# Patient Record
Sex: MALE | Race: WHITE | Employment: FULL TIME | URBAN - METROPOLITAN AREA
[De-identification: names, ages, dates, MRNs, and addresses within clinical notes are randomized per-mention and may not be internally consistent; named-entity substitution may affect disease eponyms.]

---

## 2017-09-06 ENCOUNTER — APPOINTMENT (OUTPATIENT)
Dept: RADIOLOGY | Facility: MEDICAL CENTER | Age: 47
End: 2017-09-06
Attending: EMERGENCY MEDICINE
Payer: MEDICAID

## 2017-09-06 ENCOUNTER — HOSPITAL ENCOUNTER (EMERGENCY)
Facility: MEDICAL CENTER | Age: 47
End: 2017-09-06
Attending: EMERGENCY MEDICINE
Payer: MEDICAID

## 2017-09-06 VITALS
HEART RATE: 90 BPM | WEIGHT: 193.34 LBS | RESPIRATION RATE: 16 BRPM | HEIGHT: 72 IN | BODY MASS INDEX: 26.19 KG/M2 | DIASTOLIC BLOOD PRESSURE: 60 MMHG | TEMPERATURE: 98.6 F | OXYGEN SATURATION: 96 % | SYSTOLIC BLOOD PRESSURE: 122 MMHG

## 2017-09-06 DIAGNOSIS — S82.61XA CLOSED DISPLACED FRACTURE OF LATERAL MALLEOLUS OF RIGHT FIBULA, INITIAL ENCOUNTER: ICD-10-CM

## 2017-09-06 DIAGNOSIS — S82.861A: ICD-10-CM

## 2017-09-06 DIAGNOSIS — S82.51XA CLOSED DISPLACED FRACTURE OF MEDIAL MALLEOLUS OF RIGHT TIBIA, INITIAL ENCOUNTER: ICD-10-CM

## 2017-09-06 PROCEDURE — 302874 HCHG BANDAGE ACE 2 OR 3""

## 2017-09-06 PROCEDURE — 73610 X-RAY EXAM OF ANKLE: CPT | Mod: RT

## 2017-09-06 PROCEDURE — 99284 EMERGENCY DEPT VISIT MOD MDM: CPT

## 2017-09-06 PROCEDURE — 73590 X-RAY EXAM OF LOWER LEG: CPT | Mod: RT

## 2017-09-06 PROCEDURE — 29515 APPLICATION SHORT LEG SPLINT: CPT

## 2017-09-06 PROCEDURE — 73562 X-RAY EXAM OF KNEE 3: CPT | Mod: RT

## 2017-09-06 PROCEDURE — 302875 HCHG BANDAGE ACE 4 OR 6""

## 2017-09-06 PROCEDURE — 73630 X-RAY EXAM OF FOOT: CPT | Mod: RT

## 2017-09-06 PROCEDURE — 700111 HCHG RX REV CODE 636 W/ 250 OVERRIDE (IP): Performed by: EMERGENCY MEDICINE

## 2017-09-06 PROCEDURE — 96372 THER/PROPH/DIAG INJ SC/IM: CPT

## 2017-09-06 RX ORDER — ONDANSETRON 4 MG/1
4 TABLET, ORALLY DISINTEGRATING ORAL ONCE
Status: COMPLETED | OUTPATIENT
Start: 2017-09-06 | End: 2017-09-06

## 2017-09-06 RX ORDER — OXYCODONE HYDROCHLORIDE AND ACETAMINOPHEN 5; 325 MG/1; MG/1
1-2 TABLET ORAL EVERY 4 HOURS PRN
Qty: 15 TAB | Refills: 0 | Status: ON HOLD | OUTPATIENT
Start: 2017-09-06 | End: 2021-03-18

## 2017-09-06 RX ADMIN — HYDROMORPHONE HYDROCHLORIDE 1 MG: 1 INJECTION, SOLUTION INTRAMUSCULAR; INTRAVENOUS; SUBCUTANEOUS at 16:09

## 2017-09-06 RX ADMIN — ONDANSETRON 4 MG: 4 TABLET, ORALLY DISINTEGRATING ORAL at 16:09

## 2017-09-06 NOTE — ED NOTES
Pt presents to ED with CC of right ankle pain, edema and right calf edema x 5 days. Pt states he fell last Friday and started experiencing pain after that. Pt able to bear weight.  Pt able to move toes, slight tingling has been present at times.

## 2017-09-07 NOTE — DISCHARGE INSTRUCTIONS
Ankle Fracture  A fracture is a break in a bone. The ankle joint is made up of three bones. These include the lower (distal) sections of your lower leg bones, called the tibia and fibula, along with a bone in your foot, called the talus. Depending on how bad the break is and if more than one ankle joint bone is broken, a cast or splint is used to protect and keep your injured bone from moving while it heals. Sometimes, surgery is required to help the fracture heal properly.   There are two general types of fractures:  · Stable fracture. This includes a single fracture line through one bone, with no injury to ankle ligaments. A fracture of the talus that does not have any displacement (movement of the bone on either side of the fracture line) is also stable.  · Unstable fracture. This includes more than one fracture line through one or more bones in the ankle joint. It also includes fractures that have displacement of the bone on either side of the fracture line.  CAUSES  · A direct blow to the ankle.    · Quickly and severely twisting your ankle.  · Trauma, such as a car accident or falling from a significant height.  RISK FACTORS  You may be at a higher risk of ankle fracture if:  · You have certain medical conditions.  · You are involved in high-impact sports.  · You are involved in a high-impact car accident.  SIGNS AND SYMPTOMS   · Tender and swollen ankle.  · Bruising around the injured ankle.  · Pain on movement of the ankle.  · Difficulty walking or putting weight on the ankle.  · A cold foot below the site of the ankle injury. This can occur if the blood vessels passing through your injured ankle were also damaged.  · Numbness in the foot below the site of the ankle injury.  DIAGNOSIS   An ankle fracture is usually diagnosed with a physical exam and X-rays. A CT scan may also be required for complex fractures.  TREATMENT   Stable fractures are treated with a cast or splint and using crutches to avoid putting  weight on your injured ankle. This is followed by an ankle strengthening program. Some patients require a special type of cast, depending on other medical problems they may have. Unstable fractures require surgery to ensure the bones heal properly. Your health care provider will tell you what type of fracture you have and the best treatment for your condition.  HOME CARE INSTRUCTIONS   · Review correct crutch use with your health care provider and use your crutches as directed. Safe use of crutches is extremely important. Misuse of crutches can cause you to fall or cause injury to nerves in your hands or armpits.  · Do not put weight or pressure on the injured ankle until directed by your health care provider.  · To lessen the swelling, keep the injured leg elevated while sitting or lying down.  · Apply ice to the injured area:  ¨ Put ice in a plastic bag.  ¨ Place a towel between your cast and the bag.  ¨ Leave the ice on for 20 minutes, 2-3 times a day.  · If you have a plaster or fiberglass cast:  ¨ Do not try to scratch the skin under the cast with any objects. This can increase your risk of skin infection.  ¨ Check the skin around the cast every day. You may put lotion on any red or sore areas.  ¨ Keep your cast dry and clean.  · If you have a plaster splint:  ¨ Wear the splint as directed.  ¨ You may loosen the elastic around the splint if your toes become numb, tingle, or turn cold or blue.  · Do not put pressure on any part of your cast or splint; it may break. Rest your cast only on a pillow the first 24 hours until it is fully hardened.  · Your cast or splint can be protected during bathing with a plastic bag sealed to your skin with medical tape. Do not lower the cast or splint into water.  · Take medicines as directed by your health care provider. Only take over-the-counter or prescription medicines for pain, discomfort, or fever as directed by your health care provider.  · Do not drive a vehicle until  your health care provider specifically tells you it is safe to do so.  · If your health care provider has given you a follow-up appointment, it is very important to keep that appointment. Not keeping the appointment could result in a chronic or permanent injury, pain, and disability. If you have any problem keeping the appointment, call the facility for assistance.  SEEK MEDICAL CARE IF:  You develop increased swelling or discomfort.  SEEK IMMEDIATE MEDICAL CARE IF:   · Your cast gets damaged or breaks.  · You have continued severe pain.  · You develop new pain or swelling after the cast was put on.  · Your skin or toenails below the injury turn blue or gray.  · Your skin or toenails below the injury feel cold, numb, or have loss of sensitivity to touch.  · There is a bad smell or pus draining from under the cast.  MAKE SURE YOU:   · Understand these instructions.  · Will watch your condition.  · Will get help right away if you are not doing well or get worse.     This information is not intended to replace advice given to you by your health care provider. Make sure you discuss any questions you have with your health care provider.     Document Released: 12/15/2001 Document Revised: 12/23/2014 Document Reviewed: 07/17/2014  [employer] Interactive Patient Education ©2016 [employer]      Cast or Splint Care  Casts and splints support injured limbs and keep bones from moving while they heal. It is important to care for your cast or splint at home.    HOME CARE INSTRUCTIONS  · Keep the cast or splint uncovered during the drying period. It can take 24 to 48 hours to dry if it is made of plaster. A fiberglass cast will dry in less than 1 hour.  · Do not rest the cast on anything harder than a pillow for the first 24 hours.  · Do not put weight on your injured limb or apply pressure to the cast until your health care provider gives you permission.  · Keep the cast or splint dry. Wet casts or splints can lose their shape  and may not support the limb as well. A wet cast that has lost its shape can also create harmful pressure on your skin when it dries. Also, wet skin can become infected.  ¨ Cover the cast or splint with a plastic bag when bathing or when out in the rain or snow. If the cast is on the trunk of the body, take sponge baths until the cast is removed.  ¨ If your cast does become wet, dry it with a towel or a blow dryer on the cool setting only.  · Keep your cast or splint clean. Soiled casts may be wiped with a moistened cloth.  · Do not place any hard or soft foreign objects under your cast or splint, such as cotton, toilet paper, lotion, or powder.  · Do not try to scratch the skin under the cast with any object. The object could get stuck inside the cast. Also, scratching could lead to an infection. If itching is a problem, use a blow dryer on a cool setting to relieve discomfort.  · Do not trim or cut your cast or remove padding from inside of it.  · Exercise all joints next to the injury that are not immobilized by the cast or splint. For example, if you have a long leg cast, exercise the hip joint and toes. If you have an arm cast or splint, exercise the shoulder, elbow, thumb, and fingers.  · Elevate your injured arm or leg on 1 or 2 pillows for the first 1 to 3 days to decrease swelling and pain. It is best if you can comfortably elevate your cast so it is higher than your heart.  SEEK MEDICAL CARE IF:   · Your cast or splint cracks.  · Your cast or splint is too tight or too loose.  · You have unbearable itching inside the cast.  · Your cast becomes wet or develops a soft spot or area.  · You have a bad smell coming from inside your cast.  · You get an object stuck under your cast.  · Your skin around the cast becomes red or raw.  · You have new pain or worsening pain after the cast has been applied.  SEEK IMMEDIATE MEDICAL CARE IF:   · You have fluid leaking through the cast.  · You are unable to move your  fingers or toes.  · You have discolored (blue or white), cool, painful, or very swollen fingers or toes beyond the cast.  · You have tingling or numbness around the injured area.  · You have severe pain or pressure under the cast.  · You have any difficulty with your breathing or have shortness of breath.  · You have chest pain.     This information is not intended to replace advice given to you by your health care provider. Make sure you discuss any questions you have with your health care provider.     Document Released: 12/15/2001 Document Revised: 10/08/2014 Document Reviewed: 06/26/2014  [employer] Interactive Patient Education ©2016 [employer]

## 2017-09-07 NOTE — DISCHARGE PLANNING
Telephone message with pt verbalizing frustration over  2 month wait at [facility] and having to pay privately up front. At the [facility] express.    CM returned call and pt reports he will be seeing his PCP tomorrow and has gone to Medicaid office to apply for [place] Medicaid.  No other needs at this time re referrals.  Pt will get referral from Dr. Arredondo PCP

## 2017-09-07 NOTE — ED PROVIDER NOTES
ED Provider Note    CHIEF COMPLAINT  Chief Complaint   Patient presents with   • Ankle Pain     Right   • Other     right calf edema       HPI  Yury Blake is a 47 y.o. male who presentsWith fall 3 days ago twisting his right ankle. The patient did strike his head at that time but has denied any headaches but didn't lose consciousness when he fell. The patient was drinking at that time. The patient says that his ankle has progressively gotten more and more swollen his way up to his knee. He was concerned given the amount of swelling and so came in to the emergency department. He says the pain is right over the ankle and is very painful when he moves it. He says he also has some pain over the outside aspect of his knee. The patient has no pain in his head chest neck or back at this time.    REVIEW OF SYSTEMS  See HPI for further details. All other systems are negative.     PAST MEDICAL HISTORY       SOCIAL HISTORY  Social History     Social History Main Topics   • Smoking status: Not on file   • Smokeless tobacco: Not on file   • Alcohol use Not on file   • Drug use: Unknown   • Sexual activity: Not on file       SURGICAL HISTORY  patient denies any surgical history    CURRENT MEDICATIONS  Home Medications    **Home medications have not yet been reviewed for this encounter**         ALLERGIES  No Known Allergies    PHYSICAL EXAM  VITAL SIGNS: /81   Pulse (!) 104   Temp 37 °C (98.6 °F)   Resp 18   Ht 1.829 m (6')   Wt 87.7 kg (193 lb 5.5 oz)   SpO2 96%   BMI 26.22 kg/m²  @FLORA[[id number]::@   Pulse ox interpretation: I interpret this pulse ox as normal.  Constitutional: Alert in no apparent distress.  HENT: No signs of trauma, Bilateral external ears normal, Nose normal.   Eyes: Pupils are equal and reactive, Conjunctiva normal, Non-icteric.   Neck: Normal range of motion, No tenderness, Supple, No stridor.   Lymphatic: No lymphadenopathy noted.   Cardiovascular: Regular rate and rhythm, no  murmurs.   Thorax & Lungs: Normal breath sounds, No respiratory distress, No wheezing, No chest tenderness.   Abdomen: Bowel sounds normal, Soft, No tenderness, No masses, No pulsatile masses. No peritoneal signs.  Skin: Warm, Dry, No erythema, No rash.   Back: No bony tenderness, No CVA tenderness.   Musculoskeletal: Edema noted on the patient's foot ankle up to his knee. The patient has no tenderness to palpation over his forefoot or over the lateral aspect of his right foot. The patient is able to wiggle his toes and sensation intact to light touch throughout his foot. The patient has pain with range of motion of his ankle. The patient has tenderness to palpation over the medial and lateral malleolus. The patient also has edema tracking up his leg and ecchymosis. To below his knee. The patient has tenderness palpation over the head of the fibula and very little to no pain in range of motion of his knee or any joint line tenderness or any laxity of the right knee  Neurologic: Alert , Normal motor function, Normal sensory function, No focal deficits noted.   Psychiatric: Affect normal, Judgment normal, Mood normal.       DIAGNOSTIC STUDIES / PROCEDURES      RADIOLOGY  DX-TIBIA AND FIBULA RIGHT   Final Result      Avulsion medial malleolus and possible nondisplaced avulsion lateral malleolus.   Linear bone fragment anterior to the distal tibia.   No proximal tibial and fibular fracture identified.      DX-KNEE 3 VIEWS RIGHT   Final Result      1.  There is a questionable nondisplaced fracture right proximal fibula versus prominent vascular channel.   2.  The right knee is intact without joint effusion.      DX-FOOT-COMPLETE 3+ RIGHT   Final Result      1.  There is no fracture of the right foot.   2.  There is an avulsion fracture involving the medial malleolus and a linear avulsion fragment projecting anterior to the distal tibia possibly due to an additional avulsion site.   3.  There is diffuse swelling of the  foot and ankle.      DX-ANKLE 3+ VIEWS RIGHT   Final Result      Marked soft tissue swelling.   Displaced avulsion of the medial malleolus.   Possible subtle nondisplaced avulsion tip of the lateral malleolus.   Linear bone fragment is located anterior to the distal tibia.              COURSE & MEDICAL DECISION MAKING  Pertinent Labs & Imaging studies reviewed. (See chart for details)    47 red male who presents today with fall and twisting his ankle 3 days ago. He has clear evidence of injury to his right lower extremity. I did assess his head and found no ecchymosis and no contusions or any other evidence of trauma. I do not believe a CT scan of his head is pertinent this time. Given the tenderness over the knee x-rayed and found that the patient had a fracture of the head of the fibula as well as the lateral and medial malleolus. Given this concern I consulted orthopedic surgery and spoke to Dr. Caldera. The recommendation was to have the patient follow-up in clinic and placed him in a short posterior splint. The patient will be nonweightbearing at this time. I will send the patient home with Percocet. I stressed to the patient the possibility he might need surgery given the questionable instability of this injury.    The patient will not drink alcohol nor drive with prescribed medications. The patient will return for worsening symptoms and is stable at the time of discharge. The patient verbalizes understanding and will comply.    FINAL IMPRESSION  1. Lateral Mall fx r  2.  Medial mal fx r  3. Fib head fx         Electronically signed by: Devang Hicks, 9/6/2017 6:42 PM

## 2017-09-12 NOTE — DISCHARGE PLANNING
9/12/17 1200 pm    Received call from pt requesting a referral be sent to Dr Reyes's office, done with confirmation.     Phone: [phone]  Fax:      [phone]

## 2017-09-12 NOTE — DISCHARGE PLANNING
9/12/17 1540    Call from pt who is extremely upset as now Dr Reyes will not see him due to his pending Medicaid. He states '[facility] dropped the ball and nobody is helping me'. He had tried [facility] but they wanted to charge him $450 up front.    CM called the following to see if they accept Medicaid:  [facility]= no unless <18 yo  [facility]=no  Dr Pan= yes but doesn't do ankles    CM called [facility] to speak with  (More) who stated they must see pt in office at least once and if Medicaid is approved they will be retro paid. She will call [facility] to explain they cannot charge him. CM asked if she would please call pt to explain and she is willing to do so. Phone number provided.

## 2017-09-26 ENCOUNTER — APPOINTMENT (OUTPATIENT)
Dept: RADIOLOGY | Facility: MEDICAL CENTER | Age: 47
End: 2017-09-26
Attending: ORTHOPAEDIC SURGERY
Payer: MEDICAID

## 2017-09-26 ENCOUNTER — HOSPITAL ENCOUNTER (OUTPATIENT)
Facility: MEDICAL CENTER | Age: 47
End: 2017-09-26
Attending: ORTHOPAEDIC SURGERY | Admitting: ORTHOPAEDIC SURGERY
Payer: MEDICAID

## 2017-09-26 VITALS
RESPIRATION RATE: 18 BRPM | BODY MASS INDEX: 26.19 KG/M2 | HEIGHT: 72 IN | DIASTOLIC BLOOD PRESSURE: 83 MMHG | SYSTOLIC BLOOD PRESSURE: 132 MMHG | HEART RATE: 75 BPM | OXYGEN SATURATION: 96 % | TEMPERATURE: 97.1 F | WEIGHT: 193.34 LBS

## 2017-09-26 PROBLEM — S82.891A OTHER FRACTURE OF RIGHT LOWER LEG, INITIAL ENCOUNTER FOR CLOSED FRACTURE: Status: ACTIVE | Noted: 2017-09-26

## 2017-09-26 PROCEDURE — 160002 HCHG RECOVERY MINUTES (STAT): Performed by: ORTHOPAEDIC SURGERY

## 2017-09-26 PROCEDURE — A9270 NON-COVERED ITEM OR SERVICE: HCPCS

## 2017-09-26 PROCEDURE — 160029 HCHG SURGERY MINUTES - 1ST 30 MINS LEVEL 4: Performed by: ORTHOPAEDIC SURGERY

## 2017-09-26 PROCEDURE — C1713 ANCHOR/SCREW BN/BN,TIS/BN: HCPCS | Performed by: ORTHOPAEDIC SURGERY

## 2017-09-26 PROCEDURE — 160046 HCHG PACU - 1ST 60 MINS PHASE II: Performed by: ORTHOPAEDIC SURGERY

## 2017-09-26 PROCEDURE — 160009 HCHG ANES TIME/MIN: Performed by: ORTHOPAEDIC SURGERY

## 2017-09-26 PROCEDURE — 160022 HCHG BLOCK: Performed by: ORTHOPAEDIC SURGERY

## 2017-09-26 PROCEDURE — 700102 HCHG RX REV CODE 250 W/ 637 OVERRIDE(OP)

## 2017-09-26 PROCEDURE — 160048 HCHG OR STATISTICAL LEVEL 1-5: Performed by: ORTHOPAEDIC SURGERY

## 2017-09-26 PROCEDURE — 500423 HCHG DRESSING, ABD COMBINE: Performed by: ORTHOPAEDIC SURGERY

## 2017-09-26 PROCEDURE — 501838 HCHG SUTURE GENERAL: Performed by: ORTHOPAEDIC SURGERY

## 2017-09-26 PROCEDURE — 160025 RECOVERY II MINUTES (STATS): Performed by: ORTHOPAEDIC SURGERY

## 2017-09-26 PROCEDURE — 160041 HCHG SURGERY MINUTES - EA ADDL 1 MIN LEVEL 4: Performed by: ORTHOPAEDIC SURGERY

## 2017-09-26 PROCEDURE — 700101 HCHG RX REV CODE 250

## 2017-09-26 PROCEDURE — 501480 HCHG STOCKINETTE: Performed by: ORTHOPAEDIC SURGERY

## 2017-09-26 PROCEDURE — 500881 HCHG PACK, EXTREMITY: Performed by: ORTHOPAEDIC SURGERY

## 2017-09-26 PROCEDURE — 73610 X-RAY EXAM OF ANKLE: CPT | Mod: RT

## 2017-09-26 PROCEDURE — 160035 HCHG PACU - 1ST 60 MINS PHASE I: Performed by: ORTHOPAEDIC SURGERY

## 2017-09-26 PROCEDURE — 700111 HCHG RX REV CODE 636 W/ 250 OVERRIDE (IP)

## 2017-09-26 DEVICE — IMPLANTABLE DEVICE: Type: IMPLANTABLE DEVICE | Status: FUNCTIONAL

## 2017-09-26 RX ORDER — LIDOCAINE HYDROCHLORIDE 10 MG/ML
0.5 INJECTION, SOLUTION INFILTRATION; PERINEURAL
Status: COMPLETED | OUTPATIENT
Start: 2017-09-26 | End: 2017-09-26

## 2017-09-26 RX ORDER — SODIUM CHLORIDE, SODIUM LACTATE, POTASSIUM CHLORIDE, CALCIUM CHLORIDE 600; 310; 30; 20 MG/100ML; MG/100ML; MG/100ML; MG/100ML
INJECTION, SOLUTION INTRAVENOUS CONTINUOUS
Status: DISCONTINUED | OUTPATIENT
Start: 2017-09-26 | End: 2017-09-26 | Stop reason: HOSPADM

## 2017-09-26 RX ORDER — OXYCODONE HCL 5 MG/5 ML
SOLUTION, ORAL ORAL
Status: COMPLETED
Start: 2017-09-26 | End: 2017-09-26

## 2017-09-26 RX ORDER — LIDOCAINE HYDROCHLORIDE 10 MG/ML
INJECTION, SOLUTION INFILTRATION; PERINEURAL
Status: COMPLETED
Start: 2017-09-26 | End: 2017-09-26

## 2017-09-26 RX ORDER — LIDOCAINE AND PRILOCAINE 25; 25 MG/G; MG/G
1 CREAM TOPICAL
Status: COMPLETED | OUTPATIENT
Start: 2017-09-26 | End: 2017-09-26

## 2017-09-26 RX ORDER — ASPIRIN 325 MG
325 TABLET ORAL 2 TIMES DAILY
Status: ON HOLD | COMMUNITY
Start: 2017-09-26 | End: 2021-03-18 | Stop reason: SDUPTHER

## 2017-09-26 RX ADMIN — SODIUM CHLORIDE, SODIUM LACTATE, POTASSIUM CHLORIDE, CALCIUM CHLORIDE: 600; 310; 30; 20 INJECTION, SOLUTION INTRAVENOUS at 10:00

## 2017-09-26 RX ADMIN — OXYCODONE HYDROCHLORIDE 5 MG: 5 SOLUTION ORAL at 11:29

## 2017-09-26 RX ADMIN — LIDOCAINE HYDROCHLORIDE 0.5 ML: 10 INJECTION, SOLUTION INFILTRATION; PERINEURAL at 10:00

## 2017-09-26 ASSESSMENT — PAIN SCALES - GENERAL
PAINLEVEL_OUTOF10: 0
PAINLEVEL_OUTOF10: 0
PAINLEVEL_OUTOF10: 5
PAINLEVEL_OUTOF10: 0

## 2017-09-26 NOTE — OR SURGEON
Operative Report    PreOp Diagnosis: Right prox fibula fracture, right syndesmosis disruption, right deltoid ligament disruption    PostOp Diagnosis: Same    Procedure(s):  ANKLE ORIF SYNDESOMOSIS REPAIR - Wound Class: Clean    Surgeon(s):  Armando Woodard M.D.    Anesthesiologist/Type of Anesthesia:  Anesthesiologist: Wilner Santizo M.D./General    Surgical Staff:  Circulator: Noris Oden R.N.  Scrub Person: Akanksha Jeong  First Assist: Sebastián Starkey P.A.-C.  Radiology Technologist: Elle Lofton    Specimens:  * No specimens in log *    Estimated Blood Loss: 25cc    TT: 14 min @ 250mmHg    Findings: Marked syndesmosis and deltoid instability    Complications: None        9/26/2017 11:29 AM Armando Woodard

## 2017-09-26 NOTE — DISCHARGE INSTRUCTIONS
ACTIVITY: Rest and take it easy for the first 24 hours.  A responsible adult is recommended to remain with you during that time.  It is normal to feel sleepy.  We encourage you to not do anything that requires balance, judgment or coordination.    MILD FLU-LIKE SYMPTOMS ARE NORMAL. YOU MAY EXPERIENCE GENERALIZED MUSCLE ACHES, THROAT IRRITATION, HEADACHE AND/OR SOME NAUSEA.    FOR 24 HOURS DO NOT:  Drive, operate machinery or run household appliances.  Drink beer or alcoholic beverages.   Make important decisions or sign legal documents.    SPECIAL INSTRUCTIONS: refer to [facility] packet    DIET: To avoid nausea, slowly advance diet as tolerated, avoiding spicy or greasy foods for the first day.  Add more substantial food to your diet according to your physician's instructions.  Babies can be fed formula or breast milk as soon as they are hungry.  INCREASE FLUIDS AND FIBER TO AVOID CONSTIPATION.    SURGICAL DRESSING/BATHING: keep clean and dry    FOLLOW-UP APPOINTMENT:  A follow-up appointment should be arranged with your doctor in 1-2weeks; call to schedule.    You should CALL YOUR PHYSICIAN if you develop:  Fever greater than 101 degrees F.  Pain not relieved by medication, or persistent nausea or vomiting.  Excessive bleeding (blood soaking through dressing) or unexpected drainage from the wound.  Extreme redness or swelling around the incision site, drainage of pus or foul smelling drainage.  Inability to urinate or empty your bladder within 8 hours.  Problems with breathing or chest pain.    You should call 911 if you develop problems with breathing or chest pain.  If you are unable to contact your doctor or surgical center, you should go to the nearest emergency room or urgent care center.  Physician's telephone #: [phone]    If any questions arise, call your doctor.  If your doctor is not available, please feel free to call the Surgical Center at [phone].  The Center is open Monday through Friday from 7AM  to 7PM.  You can also call the HEALTH HOTLINE open 24 hours/day, 7 days/week and speak to a nurse at [phone], or toll free at [phone].    A registered nurse may call you a few days after your surgery to see how you are doing after your procedure.    MEDICATIONS: Resume taking daily medication.  Take prescribed pain medication with food.  If no medication is prescribed, you may take non-aspirin pain medication if needed.  PAIN MEDICATION CAN BE VERY CONSTIPATING.  Take a stool softener or laxative such as senokot, pericolace, or milk of magnesia if needed.    Prescription given for hydrocodone.  Last pain medication given at 1130.    If your physician has prescribed pain medication that includes Acetaminophen (Tylenol), do not take additional Acetaminophen (Tylenol) while taking the prescribed medication.    Depression / Suicide Risk    As you are discharged from this [facility] facility, it is important to learn how to keep safe from harming yourself.    Recognize the warning signs:  · Abrupt changes in personality, positive or negative- including increase in energy   · Giving away possessions  · Change in eating patterns- significant weight changes-  positive or negative  · Change in sleeping patterns- unable to sleep or sleeping all the time   · Unwillingness or inability to communicate  · Depression  · Unusual sadness, discouragement and loneliness  · Talk of wanting to die  · Neglect of personal appearance   · Rebelliousness- reckless behavior  · Withdrawal from people/activities they love  · Confusion- inability to concentrate     If you or a loved one observes any of these behaviors or has concerns about self-harm, here's what you can do:  · Talk about it- your feelings and reasons for harming yourself  · Remove any means that you might use to hurt yourself (examples: pills, rope, extension cords, firearm)  · Get professional help from the community (Mental Health, Substance Abuse,  psychological counseling)  · Do not be alone:Call your Safe Contact- someone whom you trust who will be there for you.  · Call your local CRISIS HOTLINE 396-5177 or 420-432-9780  · Call your local Children's Mobile Crisis Response Team [place] (717) 679-3594 or www.[employer]  · Call the toll free National Suicide Prevention Hotlines   · National Suicide Prevention Lifeline 517-023-PBJA (6389)  · National Hope Line Network 800-SUICIDE (781-0458)

## 2017-09-26 NOTE — OP REPORT
DATE OF SERVICE:  09/26/2017    PREOPERATIVE DIAGNOSES:  1.  Right closed proximal fibula fracture Maisonneuve type.  2.  Right syndesmosis disruption.  3.  Right deltoid disruption.    POSTOPERATIVE DIAGNOSES:  1.  Right closed proximal fibula fracture Maisonneuve type.  2.  Right syndesmosis disruption.  3.  Right deltoid disruption.    PROCEDURE:  1.  Closed treatment right proximal fibular fracture with manipulation.  2.  Open reduction and internal fixation, right syndesmosis.    SURGEON:  Armando Woodard MD    ASSISTANT:  Sebastián Starkey PA-C    ANESTHESIA:  General with peripheral nerve block requested by me for   postoperative pain control.    ESTIMATED BLOOD LOSS:  25 mL.    TOURNIQUET TIME:  14 minutes at 250 mmHg.    IMPLANTS:  [employer]hew 2-hole 1/3 tubular plate and two 3.5 mm cortical   screws.    COMPLICATIONS:  None.    OUTCOME:  PACU in stable condition.    HISTORY OF PRESENT ILLNESS:  This is a very pleasant 47-year-old gentleman who   sustained a twisting injury to his right ankle.  He had marked instability of   the syndesmosis as well as his deltoid and had a minimally displaced   Maisonneuve type proximal fibular fracture.  He was indicated for the above   stated procedure, was agreed in the preoperative holding area and identified   by name and medical record number.  The right lower extremity was marked.    Risks of the procedure including bleeding, infection, pain, posttraumatic   arthritis, and need for more surgery were discussed and he provided a written   consent.    DESCRIPTION OF PROCEDURE:  He was taken to operating room and placed on table   in supine position.  Preoperative antibiotics were administered and general   anesthesia was induced.  A nonsterile tourniquet was placed on his right   thigh.  His right lower extremity was prepped and draped in the usual sterile   fashion.  An operative pause was undertaken, where all present were in   agreement with patient identification,  laterality, and procedure to be   performed.    Open reduction and internal fixation of syndesmosis, a 3 cm longitudinal   incision was made along the direct lateral border of the distal fibula.  Blunt   dissection was carried down to the syndesmosis with AITFL was found to be   ruptured.  A point-to-point reduction clamp was placed along the lateral   fibula midsubstance and along the medial tibia midsubstance just above the   joint line.  The syndesmosis was reduced both clinically and radiographically   with triplanar fluoroscopy.  A 2-hole plate was selected and a 3.5 mm cortical   screw was driven from lateral to medial into the tibia with 15 degrees of   posterior to anterior angulation.  A tricortical screw was placed.  We then   placed a second tricortical 3.5 mm screw with a 30-degree posterior to   anterior inclination.  At this point, on removal of the clamp the ankle   mortise remained anatomically reduced.  The external rotation stress test was   now negative.  We radiographically evaluated the Maisonneuve type proximal   fibular fracture, which was now anatomically reduced.  The tourniquet was let   down and hemostasis was achieved.  The subcutaneous layer closed with 3-0   Vicryl in buried interrupted fashion and skin closed with 3-0 nylon in   horizontal mattress fashion.  Xeroform gauze and a well-padded posterior   splint with a stirrup were placed.  The patient was awakened and extubated in   stable condition.    POSTOPERATIVE COURSE:  Discharge home today assuming adequate pain control and   mobilization.  I have recommended aspirin 325 mg b.i.d. for DVT prophylaxis.    Will follow up in 10-14 days for suture removal, wound check and transition   to a Cam walker boot.  He will be nonweightbearing for approximately 6 weeks.       ____________________________________     MD TWIN STANTON / ANAND    DD:  09/26/2017 11:29:39  DT:  09/26/2017 11:43:13    D#:  [id number]  Job#:  [id number]

## 2020-09-09 ENCOUNTER — APPOINTMENT (OUTPATIENT)
Dept: RADIOLOGY | Facility: MEDICAL CENTER | Age: 50
DRG: 853 | End: 2020-09-09
Attending: EMERGENCY MEDICINE
Payer: MEDICAID

## 2020-09-09 ENCOUNTER — APPOINTMENT (OUTPATIENT)
Dept: RADIOLOGY | Facility: MEDICAL CENTER | Age: 50
DRG: 853 | End: 2020-09-09
Attending: INTERNAL MEDICINE
Payer: MEDICAID

## 2020-09-09 ENCOUNTER — HOSPITAL ENCOUNTER (INPATIENT)
Facility: MEDICAL CENTER | Age: 50
LOS: 190 days | DRG: 853 | End: 2021-03-18
Attending: EMERGENCY MEDICINE | Admitting: HOSPITALIST
Payer: MEDICAID

## 2020-09-09 DIAGNOSIS — E80.6 HYPERBILIRUBINEMIA: ICD-10-CM

## 2020-09-09 DIAGNOSIS — S91.302D OPEN WOUND OF LEFT FOOT, SUBSEQUENT ENCOUNTER: ICD-10-CM

## 2020-09-09 DIAGNOSIS — E87.20 METABOLIC ACIDOSIS: ICD-10-CM

## 2020-09-09 DIAGNOSIS — N17.9 AKI (ACUTE KIDNEY INJURY) (HCC): ICD-10-CM

## 2020-09-09 DIAGNOSIS — D68.9 COAGULOPATHY (HCC): ICD-10-CM

## 2020-09-09 DIAGNOSIS — R13.19 OTHER DYSPHAGIA: ICD-10-CM

## 2020-09-09 DIAGNOSIS — A41.9 SEPTIC SHOCK (HCC): ICD-10-CM

## 2020-09-09 DIAGNOSIS — G92.9 TOXIC ENCEPHALOPATHY: ICD-10-CM

## 2020-09-09 DIAGNOSIS — R65.21 SEPSIS WITH ACUTE RENAL FAILURE AND SEPTIC SHOCK, DUE TO UNSPECIFIED ORGANISM, UNSPECIFIED ACUTE RENAL FAILURE TYPE (HCC): ICD-10-CM

## 2020-09-09 DIAGNOSIS — J96.00 ACUTE RESPIRATORY FAILURE, UNSPECIFIED WHETHER WITH HYPOXIA OR HYPERCAPNIA (HCC): ICD-10-CM

## 2020-09-09 DIAGNOSIS — N17.9 SEPSIS WITH ACUTE RENAL FAILURE AND SEPTIC SHOCK, DUE TO UNSPECIFIED ORGANISM, UNSPECIFIED ACUTE RENAL FAILURE TYPE (HCC): ICD-10-CM

## 2020-09-09 DIAGNOSIS — R65.21 SEPTIC SHOCK (HCC): ICD-10-CM

## 2020-09-09 DIAGNOSIS — A41.9 SEPSIS WITH ACUTE RENAL FAILURE AND SEPTIC SHOCK, DUE TO UNSPECIFIED ORGANISM, UNSPECIFIED ACUTE RENAL FAILURE TYPE (HCC): ICD-10-CM

## 2020-09-09 DIAGNOSIS — G93.41 ACUTE METABOLIC ENCEPHALOPATHY: ICD-10-CM

## 2020-09-09 DIAGNOSIS — E87.6 HYPOKALEMIA: ICD-10-CM

## 2020-09-09 DIAGNOSIS — G93.40 ENCEPHALOPATHY ACUTE: ICD-10-CM

## 2020-09-09 DIAGNOSIS — R79.89 ELEVATED TROPONIN: ICD-10-CM

## 2020-09-09 PROBLEM — F10.10 ALCOHOL ABUSE: Status: ACTIVE | Noted: 2020-09-09

## 2020-09-09 PROBLEM — E86.0 DEHYDRATION: Status: ACTIVE | Noted: 2020-09-09

## 2020-09-09 PROBLEM — F13.20 BENZODIAZEPINE DEPENDENCE (HCC): Status: ACTIVE | Noted: 2020-09-09

## 2020-09-09 PROBLEM — R74.01 ELEVATED TRANSAMINASE LEVEL: Status: ACTIVE | Noted: 2020-09-09

## 2020-09-09 PROBLEM — J96.01 ACUTE RESPIRATORY FAILURE WITH HYPOXIA (HCC): Status: ACTIVE | Noted: 2020-09-09

## 2020-09-09 PROBLEM — E86.0 DEHYDRATION: Status: RESOLVED | Noted: 2020-09-09 | Resolved: 2020-09-09

## 2020-09-09 LAB
ACTION RANGE TRIGGERED IACRT: NO
ALBUMIN SERPL BCP-MCNC: 3.4 G/DL (ref 3.2–4.9)
ALBUMIN/GLOB SERPL: 0.9 G/DL
ALP SERPL-CCNC: 156 U/L (ref 30–99)
ALT SERPL-CCNC: 48 U/L (ref 2–50)
AMMONIA PLAS-SCNC: 17 UMOL/L (ref 11–45)
AMPHET UR QL SCN: NEGATIVE
ANION GAP SERPL CALC-SCNC: 35 MMOL/L (ref 7–16)
ANISOCYTOSIS BLD QL SMEAR: ABNORMAL
APAP SERPL-MCNC: 6 UG/ML (ref 10–30)
APPEARANCE UR: CLEAR
AST SERPL-CCNC: 70 U/L (ref 12–45)
BACTERIA #/AREA URNS HPF: NEGATIVE /HPF
BARBITURATES UR QL SCN: NEGATIVE
BASE EXCESS BLDA CALC-SCNC: -10 MMOL/L (ref -4–3)
BASOPHILS # BLD AUTO: 3.4 % (ref 0–1.8)
BASOPHILS # BLD: 0.78 K/UL (ref 0–0.12)
BENZODIAZ UR QL SCN: POSITIVE
BILIRUB SERPL-MCNC: 2 MG/DL (ref 0.1–1.5)
BILIRUB UR QL STRIP.AUTO: ABNORMAL
BODY TEMPERATURE: ABNORMAL DEGREES
BUN SERPL-MCNC: 56 MG/DL (ref 8–22)
BURR CELLS/RBC NFR CSF MANUAL: 0 %
BZE UR QL SCN: NEGATIVE
CALCIUM SERPL-MCNC: 9.9 MG/DL (ref 8.5–10.5)
CANNABINOIDS UR QL SCN: NEGATIVE
CFT BLD TEG: 10.9 MIN (ref 5–10)
CHLORIDE SERPL-SCNC: 89 MMOL/L (ref 96–112)
CK SERPL-CCNC: 689 U/L (ref 0–154)
CLARITY CSF: CLEAR
CLOT ANGLE BLD TEG: 63.4 DEGREES (ref 53–72)
CLOT LYSIS 30M P MA LENFR BLD TEG: 0 % (ref 0–8)
CO2 BLDA-SCNC: 16 MMOL/L (ref 20–33)
CO2 SERPL-SCNC: 14 MMOL/L (ref 20–33)
COLOR CSF: COLORLESS
COLOR SPUN CSF: COLORLESS
COLOR UR: YELLOW
COVID ORDER STATUS COVID19: NORMAL
CREAT SERPL-MCNC: 2.73 MG/DL (ref 0.5–1.4)
CT.EXTRINSIC BLD ROTEM: 1.9 MIN (ref 1–3)
EKG IMPRESSION: NORMAL
EOSINOPHIL # BLD AUTO: 0 K/UL (ref 0–0.51)
EOSINOPHIL NFR BLD: 0 % (ref 0–6.9)
EPI CELLS #/AREA URNS HPF: NEGATIVE /HPF
ERYTHROCYTE [DISTWIDTH] IN BLOOD BY AUTOMATED COUNT: 46.2 FL (ref 35.9–50)
GLOBULIN SER CALC-MCNC: 3.6 G/DL (ref 1.9–3.5)
GLUCOSE BLD-MCNC: 111 MG/DL (ref 65–99)
GLUCOSE BLD-MCNC: 123 MG/DL (ref 65–99)
GLUCOSE BLD-MCNC: 134 MG/DL (ref 65–99)
GLUCOSE BLD-MCNC: 150 MG/DL (ref 65–99)
GLUCOSE CSF-MCNC: 82 MG/DL (ref 40–80)
GLUCOSE SERPL-MCNC: 188 MG/DL (ref 65–99)
GLUCOSE UR STRIP.AUTO-MCNC: NEGATIVE MG/DL
GRAM STN SPEC: NORMAL
HCO3 BLDA-SCNC: 15.2 MMOL/L (ref 17–25)
HCT VFR BLD AUTO: 49.9 % (ref 42–52)
HGB BLD-MCNC: 10.7 G/DL (ref 14–18)
HGB BLD-MCNC: 12.3 G/DL (ref 14–18)
HGB BLD-MCNC: 12.8 G/DL (ref 14–18)
HGB BLD-MCNC: 16.5 G/DL (ref 14–18)
HOROWITZ INDEX BLDA+IHG-RTO: 298 MM[HG]
HYALINE CASTS #/AREA URNS LPF: ABNORMAL /LPF
INR PPP: 1.56 (ref 0.87–1.13)
INR PPP: 8.93 (ref 0.87–1.13)
INR PPP: >=10 (ref 0.87–1.13)
INST. QUALIFIED PATIENT IIQPT: YES
KETONES UR STRIP.AUTO-MCNC: NEGATIVE MG/DL
LACTATE BLD-SCNC: 1.7 MMOL/L (ref 0.5–2)
LACTATE BLD-SCNC: 11 MMOL/L (ref 0.5–2)
LACTATE BLD-SCNC: 2.2 MMOL/L (ref 0.5–2)
LACTATE BLD-SCNC: 2.6 MMOL/L (ref 0.5–2)
LEUKOCYTE ESTERASE UR QL STRIP.AUTO: NEGATIVE
LIPASE SERPL-CCNC: 84 U/L (ref 11–82)
LYMPHOCYTES # BLD AUTO: 3.14 K/UL (ref 1–4.8)
LYMPHOCYTES NFR BLD: 13.7 % (ref 22–41)
LYMPHOCYTES NFR CSF: 19 %
MACROCYTES BLD QL SMEAR: ABNORMAL
MAGNESIUM SERPL-MCNC: 3.5 MG/DL (ref 1.5–2.5)
MANUAL DIFF BLD: NORMAL
MCF BLD TEG: 70.3 MM (ref 50–70)
MCH RBC QN AUTO: 31.7 PG (ref 27–33)
MCHC RBC AUTO-ENTMCNC: 33.1 G/DL (ref 33.7–35.3)
MCV RBC AUTO: 95.8 FL (ref 81.4–97.8)
METAMYELOCYTES NFR BLD MANUAL: 1.7 %
METHADONE UR QL SCN: NEGATIVE
MICRO URNS: ABNORMAL
MONOCYTES # BLD AUTO: 1.17 K/UL (ref 0–0.85)
MONOCYTES NFR BLD AUTO: 5.1 % (ref 0–13.4)
MONONUC CELLS NFR CSF: 79 %
MORPHOLOGY BLD-IMP: NORMAL
MRSA DNA SPEC QL NAA+PROBE: NORMAL
NEUTROPHILS # BLD AUTO: 17.43 K/UL (ref 1.82–7.42)
NEUTROPHILS NFR BLD: 74.4 % (ref 44–72)
NEUTROPHILS NFR CSF: 2 %
NEUTS BAND NFR BLD MANUAL: 1.7 % (ref 0–10)
NITRITE UR QL STRIP.AUTO: POSITIVE
NRBC # BLD AUTO: 0.27 K/UL
NRBC BLD-RTO: 1.2 /100 WBC
O2/TOTAL GAS SETTING VFR VENT: 50 %
OPIATES UR QL SCN: NEGATIVE
OXYCODONE UR QL SCN: NEGATIVE
PA AA BLD-ACNC: 0 %
PA ADP BLD-ACNC: 14.4 %
PCO2 BLDA: 29.2 MMHG (ref 26–37)
PCP UR QL SCN: NEGATIVE
PH BLDA: 7.32 [PH] (ref 7.4–7.5)
PH UR STRIP.AUTO: 5.5 [PH] (ref 5–8)
PHOSPHATE SERPL-MCNC: 6 MG/DL (ref 2.5–4.5)
PLATELET # BLD AUTO: 464 K/UL (ref 164–446)
PLATELET BLD QL SMEAR: NORMAL
PMV BLD AUTO: 11.2 FL (ref 9–12.9)
PO2 BLDA: 149 MMHG (ref 64–87)
POIKILOCYTOSIS BLD QL SMEAR: NORMAL
POLYCHROMASIA BLD QL SMEAR: NORMAL
POTASSIUM SERPL-SCNC: 2.7 MMOL/L (ref 3.6–5.5)
POTASSIUM SERPL-SCNC: 3.2 MMOL/L (ref 3.6–5.5)
PROPOXYPH UR QL SCN: NEGATIVE
PROT CSF-MCNC: 67 MG/DL (ref 15–45)
PROT SERPL-MCNC: 7 G/DL (ref 6–8.2)
PROT UR QL STRIP: NEGATIVE MG/DL
PROT UR-MCNC: 20 MG/DL (ref 0–15)
PROTHROMBIN TIME: 19.1 SEC (ref 12–14.6)
PROTHROMBIN TIME: 75.7 SEC (ref 12–14.6)
PROTHROMBIN TIME: 85.2 SEC (ref 12–14.6)
RBC # BLD AUTO: 5.21 M/UL (ref 4.7–6.1)
RBC # CSF: 2 CELLS/UL
RBC # URNS HPF: ABNORMAL /HPF
RBC BLD AUTO: PRESENT
RBC UR QL AUTO: NEGATIVE
SALICYLATES SERPL-MCNC: <1 MG/DL (ref 15–25)
SAO2 % BLDA: 99 % (ref 93–99)
SARS-COV-2 RNA RESP QL NAA+PROBE: NOTDETECTED
SIGNIFICANT IND 70042: NORMAL
SIGNIFICANT IND 70042: NORMAL
SITE SITE: NORMAL
SITE SITE: NORMAL
SODIUM SERPL-SCNC: 138 MMOL/L (ref 135–145)
SODIUM UR-SCNC: <20 MMOL/L
SOURCE SOURCE: NORMAL
SOURCE SOURCE: NORMAL
SP GR UR STRIP.AUTO: 1.02
SPECIMEN DRAWN FROM PATIENT: ABNORMAL
SPECIMEN SOURCE: NORMAL
SPECIMEN VOL CSF: 15.5 ML
TEG ALGORITHM TGALG: ABNORMAL
TROPONIN T SERPL-MCNC: 42 NG/L (ref 6–19)
TROPONIN T SERPL-MCNC: 45 NG/L (ref 6–19)
TSH SERPL DL<=0.005 MIU/L-ACNC: 1.45 UIU/ML (ref 0.38–5.33)
TUBE # CSF: 3
TUBE # CSF: 4
UROBILINOGEN UR STRIP.AUTO-MCNC: 1 MG/DL
VANCOMYCIN SERPL-MCNC: 24.9 UG/ML
WBC # BLD AUTO: 22.9 K/UL (ref 4.8–10.8)
WBC # CSF: 2 CELLS/UL (ref 0–10)
WBC #/AREA URNS HPF: ABNORMAL /HPF

## 2020-09-09 PROCEDURE — 62270 DX LMBR SPI PNXR: CPT

## 2020-09-09 PROCEDURE — 86789 WEST NILE VIRUS ANTIBODY: CPT

## 2020-09-09 PROCEDURE — 83690 ASSAY OF LIPASE: CPT

## 2020-09-09 PROCEDURE — 86696 HERPES SIMPLEX TYPE 2 TEST: CPT

## 2020-09-09 PROCEDURE — 700102 HCHG RX REV CODE 250 W/ 637 OVERRIDE(OP): Performed by: INTERNAL MEDICINE

## 2020-09-09 PROCEDURE — 86652 ENCEPHALTIS EAST EQNE ANBDY: CPT | Mod: 91

## 2020-09-09 PROCEDURE — 700111 HCHG RX REV CODE 636 W/ 250 OVERRIDE (IP): Performed by: INTERNAL MEDICINE

## 2020-09-09 PROCEDURE — 36556 INSERT NON-TUNNEL CV CATH: CPT

## 2020-09-09 PROCEDURE — 76700 US EXAM ABDOM COMPLETE: CPT

## 2020-09-09 PROCEDURE — 700101 HCHG RX REV CODE 250: Performed by: EMERGENCY MEDICINE

## 2020-09-09 PROCEDURE — 94760 N-INVAS EAR/PLS OXIMETRY 1: CPT

## 2020-09-09 PROCEDURE — 4A133B1 MONITORING OF ARTERIAL PRESSURE, PERIPHERAL, PERCUTANEOUS APPROACH: ICD-10-PCS | Performed by: INTERNAL MEDICINE

## 2020-09-09 PROCEDURE — 80307 DRUG TEST PRSMV CHEM ANLYZR: CPT

## 2020-09-09 PROCEDURE — 87205 SMEAR GRAM STAIN: CPT

## 2020-09-09 PROCEDURE — 85027 COMPLETE CBC AUTOMATED: CPT

## 2020-09-09 PROCEDURE — 81001 URINALYSIS AUTO W/SCOPE: CPT

## 2020-09-09 PROCEDURE — 31500 INSERT EMERGENCY AIRWAY: CPT

## 2020-09-09 PROCEDURE — C1751 CATH, INF, PER/CENT/MIDLINE: HCPCS

## 2020-09-09 PROCEDURE — 82140 ASSAY OF AMMONIA: CPT

## 2020-09-09 PROCEDURE — 82945 GLUCOSE OTHER FLUID: CPT

## 2020-09-09 PROCEDURE — 87086 URINE CULTURE/COLONY COUNT: CPT | Mod: 91

## 2020-09-09 PROCEDURE — 96367 TX/PROPH/DG ADDL SEQ IV INF: CPT

## 2020-09-09 PROCEDURE — U0003 INFECTIOUS AGENT DETECTION BY NUCLEIC ACID (DNA OR RNA); SEVERE ACUTE RESPIRATORY SYNDROME CORONAVIRUS 2 (SARS-COV-2) (CORONAVIRUS DISEASE [COVID-19]), AMPLIFIED PROBE TECHNIQUE, MAKING USE OF HIGH THROUGHPUT TECHNOLOGIES AS DESCRIBED BY CMS-2020-01-R: HCPCS

## 2020-09-09 PROCEDURE — A9270 NON-COVERED ITEM OR SERVICE: HCPCS | Performed by: INTERNAL MEDICINE

## 2020-09-09 PROCEDURE — 70450 CT HEAD/BRAIN W/O DYE: CPT

## 2020-09-09 PROCEDURE — 93010 ELECTROCARDIOGRAM REPORT: CPT | Performed by: INTERNAL MEDICINE

## 2020-09-09 PROCEDURE — 87070 CULTURE OTHR SPECIMN AEROBIC: CPT

## 2020-09-09 PROCEDURE — 99291 CRITICAL CARE FIRST HOUR: CPT

## 2020-09-09 PROCEDURE — 87483 CNS DNA AMP PROBE TYPE 12-25: CPT

## 2020-09-09 PROCEDURE — 94770 HCHG CO2 EXPIRED GAS DETERMINATION: CPT

## 2020-09-09 PROCEDURE — 36620 INSERTION CATHETER ARTERY: CPT

## 2020-09-09 PROCEDURE — 99292 CRITICAL CARE ADDL 30 MIN: CPT | Mod: 25 | Performed by: INTERNAL MEDICINE

## 2020-09-09 PROCEDURE — 84443 ASSAY THYROID STIM HORMONE: CPT

## 2020-09-09 PROCEDURE — 86651 ENCEPHALITIS CALIFORN ANTBDY: CPT

## 2020-09-09 PROCEDURE — 85018 HEMOGLOBIN: CPT | Mod: 91

## 2020-09-09 PROCEDURE — C9803 HOPD COVID-19 SPEC COLLECT: HCPCS | Performed by: EMERGENCY MEDICINE

## 2020-09-09 PROCEDURE — 82550 ASSAY OF CK (CPK): CPT

## 2020-09-09 PROCEDURE — 770022 HCHG ROOM/CARE - ICU (200)

## 2020-09-09 PROCEDURE — 87040 BLOOD CULTURE FOR BACTERIA: CPT | Mod: 91

## 2020-09-09 PROCEDURE — 93005 ELECTROCARDIOGRAM TRACING: CPT | Performed by: EMERGENCY MEDICINE

## 2020-09-09 PROCEDURE — 302214 INTUBATION BOX: Performed by: EMERGENCY MEDICINE

## 2020-09-09 PROCEDURE — 74176 CT ABD & PELVIS W/O CONTRAST: CPT

## 2020-09-09 PROCEDURE — 03HY32Z INSERTION OF MONITORING DEVICE INTO UPPER ARTERY, PERCUTANEOUS APPROACH: ICD-10-PCS | Performed by: INTERNAL MEDICINE

## 2020-09-09 PROCEDURE — 3E043XZ INTRODUCTION OF VASOPRESSOR INTO CENTRAL VEIN, PERCUTANEOUS APPROACH: ICD-10-PCS | Performed by: EMERGENCY MEDICINE

## 2020-09-09 PROCEDURE — 94002 VENT MGMT INPAT INIT DAY: CPT

## 2020-09-09 PROCEDURE — 71045 X-RAY EXAM CHEST 1 VIEW: CPT

## 2020-09-09 PROCEDURE — 85610 PROTHROMBIN TIME: CPT | Mod: 91

## 2020-09-09 PROCEDURE — 99291 CRITICAL CARE FIRST HOUR: CPT | Mod: 25 | Performed by: INTERNAL MEDICINE

## 2020-09-09 PROCEDURE — A9270 NON-COVERED ITEM OR SERVICE: HCPCS | Performed by: EMERGENCY MEDICINE

## 2020-09-09 PROCEDURE — C9113 INJ PANTOPRAZOLE SODIUM, VIA: HCPCS | Performed by: INTERNAL MEDICINE

## 2020-09-09 PROCEDURE — 02HV33Z INSERTION OF INFUSION DEVICE INTO SUPERIOR VENA CAVA, PERCUTANEOUS APPROACH: ICD-10-PCS | Performed by: EMERGENCY MEDICINE

## 2020-09-09 PROCEDURE — 87498 ENTEROVIRUS PROBE&REVRS TRNS: CPT

## 2020-09-09 PROCEDURE — 84157 ASSAY OF PROTEIN OTHER: CPT

## 2020-09-09 PROCEDURE — 84156 ASSAY OF PROTEIN URINE: CPT

## 2020-09-09 PROCEDURE — 700105 HCHG RX REV CODE 258: Performed by: INTERNAL MEDICINE

## 2020-09-09 PROCEDURE — 89051 BODY FLUID CELL COUNT: CPT

## 2020-09-09 PROCEDURE — 96368 THER/DIAG CONCURRENT INF: CPT

## 2020-09-09 PROCEDURE — 82803 BLOOD GASES ANY COMBINATION: CPT

## 2020-09-09 PROCEDURE — 96365 THER/PROPH/DIAG IV INF INIT: CPT

## 2020-09-09 PROCEDURE — 700101 HCHG RX REV CODE 250: Performed by: INTERNAL MEDICINE

## 2020-09-09 PROCEDURE — 80202 ASSAY OF VANCOMYCIN: CPT

## 2020-09-09 PROCEDURE — 0BH18EZ INSERTION OF ENDOTRACHEAL AIRWAY INTO TRACHEA, VIA NATURAL OR ARTIFICIAL OPENING ENDOSCOPIC: ICD-10-PCS | Performed by: EMERGENCY MEDICINE

## 2020-09-09 PROCEDURE — 85576 BLOOD PLATELET AGGREGATION: CPT

## 2020-09-09 PROCEDURE — 86694 HERPES SIMPLEX NES ANTBDY: CPT

## 2020-09-09 PROCEDURE — 84100 ASSAY OF PHOSPHORUS: CPT

## 2020-09-09 PROCEDURE — 86788 WEST NILE VIRUS AB IGM: CPT

## 2020-09-09 PROCEDURE — 85347 COAGULATION TIME ACTIVATED: CPT

## 2020-09-09 PROCEDURE — 009U3ZX DRAINAGE OF SPINAL CANAL, PERCUTANEOUS APPROACH, DIAGNOSTIC: ICD-10-PCS | Performed by: INTERNAL MEDICINE

## 2020-09-09 PROCEDURE — 62270 DX LMBR SPI PNXR: CPT | Performed by: INTERNAL MEDICINE

## 2020-09-09 PROCEDURE — 96366 THER/PROPH/DIAG IV INF ADDON: CPT

## 2020-09-09 PROCEDURE — 84300 ASSAY OF URINE SODIUM: CPT

## 2020-09-09 PROCEDURE — 86695 HERPES SIMPLEX TYPE 1 TEST: CPT

## 2020-09-09 PROCEDURE — 85384 FIBRINOGEN ACTIVITY: CPT

## 2020-09-09 PROCEDURE — 86654 ENCEPHALTIS WEST EQNE ANTBDY: CPT | Mod: 91

## 2020-09-09 PROCEDURE — 36600 WITHDRAWAL OF ARTERIAL BLOOD: CPT

## 2020-09-09 PROCEDURE — 700111 HCHG RX REV CODE 636 W/ 250 OVERRIDE (IP): Performed by: EMERGENCY MEDICINE

## 2020-09-09 PROCEDURE — 85007 BL SMEAR W/DIFF WBC COUNT: CPT

## 2020-09-09 PROCEDURE — 86653 ENCEPHALTIS ST LOUIS ANTBODY: CPT | Mod: 91

## 2020-09-09 PROCEDURE — 83605 ASSAY OF LACTIC ACID: CPT | Mod: 91

## 2020-09-09 PROCEDURE — 82962 GLUCOSE BLOOD TEST: CPT | Mod: 91

## 2020-09-09 PROCEDURE — 5A1945Z RESPIRATORY VENTILATION, 24-96 CONSECUTIVE HOURS: ICD-10-PCS | Performed by: EMERGENCY MEDICINE

## 2020-09-09 PROCEDURE — 83735 ASSAY OF MAGNESIUM: CPT

## 2020-09-09 PROCEDURE — 96375 TX/PRO/DX INJ NEW DRUG ADDON: CPT

## 2020-09-09 PROCEDURE — 4A133J1 MONITORING OF ARTERIAL PULSE, PERIPHERAL, PERCUTANEOUS APPROACH: ICD-10-PCS | Performed by: INTERNAL MEDICINE

## 2020-09-09 PROCEDURE — 84132 ASSAY OF SERUM POTASSIUM: CPT

## 2020-09-09 PROCEDURE — 36620 INSERTION CATHETER ARTERY: CPT | Performed by: INTERNAL MEDICINE

## 2020-09-09 PROCEDURE — 700102 HCHG RX REV CODE 250 W/ 637 OVERRIDE(OP): Performed by: EMERGENCY MEDICINE

## 2020-09-09 PROCEDURE — 84484 ASSAY OF TROPONIN QUANT: CPT

## 2020-09-09 PROCEDURE — 80053 COMPREHEN METABOLIC PANEL: CPT

## 2020-09-09 PROCEDURE — 700105 HCHG RX REV CODE 258: Performed by: EMERGENCY MEDICINE

## 2020-09-09 PROCEDURE — 87641 MR-STAPH DNA AMP PROBE: CPT

## 2020-09-09 RX ORDER — PROMETHAZINE HYDROCHLORIDE 25 MG/1
12.5-25 SUPPOSITORY RECTAL EVERY 4 HOURS PRN
Status: DISCONTINUED | OUTPATIENT
Start: 2020-09-09 | End: 2020-10-09

## 2020-09-09 RX ORDER — KETAMINE HYDROCHLORIDE 50 MG/ML
INJECTION, SOLUTION INTRAMUSCULAR; INTRAVENOUS
Status: DISPENSED
Start: 2020-09-09 | End: 2020-09-09

## 2020-09-09 RX ORDER — SODIUM CHLORIDE 9 MG/ML
INJECTION, SOLUTION INTRAVENOUS
Status: COMPLETED
Start: 2020-09-09 | End: 2020-09-09

## 2020-09-09 RX ORDER — ACETAMINOPHEN 650 MG/1
650 SUPPOSITORY RECTAL ONCE
Status: COMPLETED | OUTPATIENT
Start: 2020-09-09 | End: 2020-09-09

## 2020-09-09 RX ORDER — SODIUM CHLORIDE, SODIUM LACTATE, POTASSIUM CHLORIDE, AND CALCIUM CHLORIDE .6; .31; .03; .02 G/100ML; G/100ML; G/100ML; G/100ML
30 INJECTION, SOLUTION INTRAVENOUS
Status: DISCONTINUED | OUTPATIENT
Start: 2020-09-09 | End: 2020-10-09

## 2020-09-09 RX ORDER — AMOXICILLIN 250 MG
2 CAPSULE ORAL 2 TIMES DAILY
Status: DISCONTINUED | OUTPATIENT
Start: 2020-09-09 | End: 2020-09-14

## 2020-09-09 RX ORDER — ACETAMINOPHEN 325 MG/1
650 TABLET ORAL EVERY 6 HOURS PRN
Status: DISCONTINUED | OUTPATIENT
Start: 2020-09-09 | End: 2020-09-10

## 2020-09-09 RX ORDER — POLYETHYLENE GLYCOL 3350 17 G/17G
1 POWDER, FOR SOLUTION ORAL
Status: DISCONTINUED | OUTPATIENT
Start: 2020-09-09 | End: 2020-09-14

## 2020-09-09 RX ORDER — ROCURONIUM BROMIDE 10 MG/ML
50 INJECTION, SOLUTION INTRAVENOUS ONCE
Status: COMPLETED | OUTPATIENT
Start: 2020-09-09 | End: 2020-09-09

## 2020-09-09 RX ORDER — BISACODYL 10 MG
10 SUPPOSITORY, RECTAL RECTAL
Status: DISCONTINUED | OUTPATIENT
Start: 2020-09-09 | End: 2020-09-14

## 2020-09-09 RX ORDER — NOREPINEPHRINE BITARTRATE 0.03 MG/ML
.5-3 INJECTION, SOLUTION INTRAVENOUS CONTINUOUS
Status: DISCONTINUED | OUTPATIENT
Start: 2020-09-09 | End: 2020-09-12

## 2020-09-09 RX ORDER — MIDAZOLAM HYDROCHLORIDE 1 MG/ML
1 INJECTION INTRAMUSCULAR; INTRAVENOUS ONCE
Status: COMPLETED | OUTPATIENT
Start: 2020-09-09 | End: 2020-09-09

## 2020-09-09 RX ORDER — KETAMINE HYDROCHLORIDE 50 MG/ML
100 INJECTION, SOLUTION INTRAMUSCULAR; INTRAVENOUS ONCE
Status: COMPLETED | OUTPATIENT
Start: 2020-09-09 | End: 2020-09-09

## 2020-09-09 RX ORDER — THIAMINE MONONITRATE (VIT B1) 100 MG
100 TABLET ORAL DAILY
Status: DISCONTINUED | OUTPATIENT
Start: 2020-09-09 | End: 2020-09-09

## 2020-09-09 RX ORDER — POTASSIUM CHLORIDE 7.45 MG/ML
10 INJECTION INTRAVENOUS ONCE
Status: COMPLETED | OUTPATIENT
Start: 2020-09-09 | End: 2020-09-09

## 2020-09-09 RX ORDER — PROCHLORPERAZINE EDISYLATE 5 MG/ML
5-10 INJECTION INTRAMUSCULAR; INTRAVENOUS EVERY 4 HOURS PRN
Status: DISCONTINUED | OUTPATIENT
Start: 2020-09-09 | End: 2020-10-09

## 2020-09-09 RX ORDER — IPRATROPIUM BROMIDE AND ALBUTEROL SULFATE 2.5; .5 MG/3ML; MG/3ML
3 SOLUTION RESPIRATORY (INHALATION)
Status: DISCONTINUED | OUTPATIENT
Start: 2020-09-09 | End: 2020-11-26

## 2020-09-09 RX ORDER — FOLIC ACID 1 MG/1
1 TABLET ORAL DAILY
Status: DISCONTINUED | OUTPATIENT
Start: 2020-09-09 | End: 2020-09-10

## 2020-09-09 RX ORDER — ONDANSETRON 2 MG/ML
4 INJECTION INTRAMUSCULAR; INTRAVENOUS EVERY 4 HOURS PRN
Status: DISCONTINUED | OUTPATIENT
Start: 2020-09-09 | End: 2020-11-08

## 2020-09-09 RX ORDER — SODIUM CHLORIDE, SODIUM LACTATE, POTASSIUM CHLORIDE, AND CALCIUM CHLORIDE .6; .31; .03; .02 G/100ML; G/100ML; G/100ML; G/100ML
1000 INJECTION, SOLUTION INTRAVENOUS
Status: DISCONTINUED | OUTPATIENT
Start: 2020-09-09 | End: 2020-09-11

## 2020-09-09 RX ORDER — HEPARIN SODIUM 5000 [USP'U]/ML
5000 INJECTION, SOLUTION INTRAVENOUS; SUBCUTANEOUS EVERY 8 HOURS
Status: DISCONTINUED | OUTPATIENT
Start: 2020-09-09 | End: 2020-09-09

## 2020-09-09 RX ORDER — SODIUM CHLORIDE 9 MG/ML
2000 INJECTION, SOLUTION INTRAVENOUS ONCE
Status: COMPLETED | OUTPATIENT
Start: 2020-09-09 | End: 2020-09-09

## 2020-09-09 RX ORDER — PROMETHAZINE HYDROCHLORIDE 25 MG/1
12.5-25 TABLET ORAL EVERY 4 HOURS PRN
Status: DISCONTINUED | OUTPATIENT
Start: 2020-09-09 | End: 2020-09-10

## 2020-09-09 RX ORDER — PANTOPRAZOLE SODIUM 40 MG/10ML
40 INJECTION, POWDER, LYOPHILIZED, FOR SOLUTION INTRAVENOUS 2 TIMES DAILY
Status: DISCONTINUED | OUTPATIENT
Start: 2020-09-09 | End: 2020-09-11

## 2020-09-09 RX ORDER — POTASSIUM CHLORIDE 29.8 MG/ML
40 INJECTION INTRAVENOUS ONCE
Status: COMPLETED | OUTPATIENT
Start: 2020-09-09 | End: 2020-09-09

## 2020-09-09 RX ORDER — DEXMEDETOMIDINE HYDROCHLORIDE 4 UG/ML
0-.7 INJECTION, SOLUTION INTRAVENOUS CONTINUOUS
Status: DISCONTINUED | OUTPATIENT
Start: 2020-09-09 | End: 2020-09-10

## 2020-09-09 RX ORDER — FAMOTIDINE 20 MG/1
20 TABLET, FILM COATED ORAL EVERY 12 HOURS
Status: DISCONTINUED | OUTPATIENT
Start: 2020-09-09 | End: 2020-09-09

## 2020-09-09 RX ORDER — MIDAZOLAM HYDROCHLORIDE 1 MG/ML
2 INJECTION INTRAMUSCULAR; INTRAVENOUS ONCE
Status: COMPLETED | OUTPATIENT
Start: 2020-09-09 | End: 2020-09-09

## 2020-09-09 RX ORDER — DEXTROSE MONOHYDRATE 25 G/50ML
50 INJECTION, SOLUTION INTRAVENOUS
Status: DISCONTINUED | OUTPATIENT
Start: 2020-09-09 | End: 2020-09-10

## 2020-09-09 RX ORDER — ONDANSETRON 4 MG/1
4 TABLET, ORALLY DISINTEGRATING ORAL EVERY 4 HOURS PRN
Status: DISCONTINUED | OUTPATIENT
Start: 2020-09-09 | End: 2020-09-10

## 2020-09-09 RX ORDER — SODIUM CHLORIDE 9 MG/ML
2352 INJECTION, SOLUTION INTRAVENOUS ONCE
Status: COMPLETED | OUTPATIENT
Start: 2020-09-09 | End: 2020-09-09

## 2020-09-09 RX ORDER — MIDAZOLAM HYDROCHLORIDE 1 MG/ML
INJECTION INTRAMUSCULAR; INTRAVENOUS
Status: ACTIVE
Start: 2020-09-09 | End: 2020-09-10

## 2020-09-09 RX ADMIN — SODIUM CHLORIDE 2352 ML: 9 INJECTION, SOLUTION INTRAVENOUS at 00:15

## 2020-09-09 RX ADMIN — Medication 2500 MCG: at 01:46

## 2020-09-09 RX ADMIN — DOCUSATE SODIUM 50 MG AND SENNOSIDES 8.6 MG 2 TABLET: 8.6; 5 TABLET, FILM COATED ORAL at 17:43

## 2020-09-09 RX ADMIN — ACETAMINOPHEN 650 MG: 650 SUPPOSITORY RECTAL at 03:13

## 2020-09-09 RX ADMIN — ACETAMINOPHEN 650 MG: 325 TABLET, FILM COATED ORAL at 17:57

## 2020-09-09 RX ADMIN — POTASSIUM CHLORIDE 10 MEQ: 7.46 INJECTION, SOLUTION INTRAVENOUS at 03:14

## 2020-09-09 RX ADMIN — PANTOPRAZOLE SODIUM 40 MG: 40 INJECTION, POWDER, LYOPHILIZED, FOR SOLUTION INTRAVENOUS at 17:43

## 2020-09-09 RX ADMIN — PIPERACILLIN AND TAZOBACTAM 4.5 G: 4; .5 INJECTION, POWDER, LYOPHILIZED, FOR SOLUTION INTRAVENOUS; PARENTERAL at 01:45

## 2020-09-09 RX ADMIN — PIPERACILLIN AND TAZOBACTAM 4.5 G: 4; .5 INJECTION, POWDER, LYOPHILIZED, FOR SOLUTION INTRAVENOUS; PARENTERAL at 08:56

## 2020-09-09 RX ADMIN — DEXMEDETOMIDINE HYDROCHLORIDE 0.5 MCG/KG/HR: 100 INJECTION, SOLUTION INTRAVENOUS at 08:16

## 2020-09-09 RX ADMIN — FOLIC ACID 1 MG: 1 TABLET ORAL at 05:13

## 2020-09-09 RX ADMIN — SODIUM CHLORIDE 2000 ML: 9 INJECTION, SOLUTION INTRAVENOUS at 04:16

## 2020-09-09 RX ADMIN — POTASSIUM CHLORIDE 40 MEQ: 29.8 INJECTION, SOLUTION INTRAVENOUS at 05:01

## 2020-09-09 RX ADMIN — NOREPINEPHRINE BITARTRATE 15 MCG/MIN: 1 INJECTION INTRAVENOUS at 05:04

## 2020-09-09 RX ADMIN — HEPARIN SODIUM 5000 UNITS: 5000 INJECTION, SOLUTION INTRAVENOUS; SUBCUTANEOUS at 06:14

## 2020-09-09 RX ADMIN — THIAMINE HYDROCHLORIDE 500 MG: 100 INJECTION, SOLUTION INTRAMUSCULAR; INTRAVENOUS at 11:49

## 2020-09-09 RX ADMIN — NOREPINEPHRINE BITARTRATE 13 MCG/MIN: 1 INJECTION INTRAVENOUS at 12:56

## 2020-09-09 RX ADMIN — DEXMEDETOMIDINE HYDROCHLORIDE 0.7 MCG/KG/HR: 100 INJECTION, SOLUTION INTRAVENOUS at 12:57

## 2020-09-09 RX ADMIN — MIDAZOLAM HYDROCHLORIDE 2 MG: 1 INJECTION, SOLUTION INTRAMUSCULAR; INTRAVENOUS at 00:32

## 2020-09-09 RX ADMIN — FENTANYL CITRATE 50 MCG: 50 INJECTION INTRAMUSCULAR; INTRAVENOUS at 12:57

## 2020-09-09 RX ADMIN — PHYTONADIONE 10 MG: 10 INJECTION, EMULSION INTRAMUSCULAR; INTRAVENOUS; SUBCUTANEOUS at 05:01

## 2020-09-09 RX ADMIN — ROCURONIUM BROMIDE 50 MG: 10 INJECTION, SOLUTION INTRAVENOUS at 00:15

## 2020-09-09 RX ADMIN — PANTOPRAZOLE SODIUM 40 MG: 40 INJECTION, POWDER, LYOPHILIZED, FOR SOLUTION INTRAVENOUS at 06:14

## 2020-09-09 RX ADMIN — FENTANYL CITRATE 50 MCG: 50 INJECTION INTRAMUSCULAR; INTRAVENOUS at 10:50

## 2020-09-09 RX ADMIN — VANCOMYCIN HYDROCHLORIDE 2000 MG: 500 INJECTION, POWDER, LYOPHILIZED, FOR SOLUTION INTRAVENOUS at 02:24

## 2020-09-09 RX ADMIN — THERA TABS 1 TABLET: TAB at 05:13

## 2020-09-09 RX ADMIN — HYDROCORTISONE SODIUM SUCCINATE 50 MG: 100 INJECTION, POWDER, FOR SOLUTION INTRAMUSCULAR; INTRAVENOUS at 09:29

## 2020-09-09 RX ADMIN — FENTANYL CITRATE 50 MCG: 50 INJECTION INTRAMUSCULAR; INTRAVENOUS at 12:39

## 2020-09-09 RX ADMIN — FENTANYL CITRATE 50 MCG: 50 INJECTION INTRAMUSCULAR; INTRAVENOUS at 12:34

## 2020-09-09 RX ADMIN — THIAMINE HYDROCHLORIDE 500 MG: 100 INJECTION, SOLUTION INTRAMUSCULAR; INTRAVENOUS at 05:40

## 2020-09-09 RX ADMIN — CEFTRIAXONE SODIUM 2 G: 2 INJECTION, POWDER, FOR SOLUTION INTRAMUSCULAR; INTRAVENOUS at 16:18

## 2020-09-09 RX ADMIN — HYDROCORTISONE SODIUM SUCCINATE 50 MG: 100 INJECTION, POWDER, FOR SOLUTION INTRAMUSCULAR; INTRAVENOUS at 23:43

## 2020-09-09 RX ADMIN — PIPERACILLIN AND TAZOBACTAM 4.5 G: 4; .5 INJECTION, POWDER, LYOPHILIZED, FOR SOLUTION INTRAVENOUS; PARENTERAL at 05:13

## 2020-09-09 RX ADMIN — THIAMINE HYDROCHLORIDE 500 MG: 100 INJECTION, SOLUTION INTRAMUSCULAR; INTRAVENOUS at 17:43

## 2020-09-09 RX ADMIN — Medication 200 MCG/HR: at 16:40

## 2020-09-09 RX ADMIN — HYDROCORTISONE SODIUM SUCCINATE 50 MG: 100 INJECTION, POWDER, FOR SOLUTION INTRAMUSCULAR; INTRAVENOUS at 17:43

## 2020-09-09 RX ADMIN — MIDAZOLAM HYDROCHLORIDE 2 MG: 1 INJECTION, SOLUTION INTRAMUSCULAR; INTRAVENOUS at 17:21

## 2020-09-09 RX ADMIN — KETAMINE HYDROCHLORIDE 100 MG: 50 INJECTION INTRAMUSCULAR; INTRAVENOUS at 00:15

## 2020-09-09 RX ADMIN — DEXMEDETOMIDINE HYDROCHLORIDE 0.2 MCG/KG/HR: 100 INJECTION, SOLUTION INTRAVENOUS at 01:52

## 2020-09-09 ASSESSMENT — PAIN DESCRIPTION - PAIN TYPE
TYPE: ACUTE PAIN

## 2020-09-09 ASSESSMENT — FIBROSIS 4 INDEX: FIB4 SCORE: 1.07

## 2020-09-09 NOTE — DISCHARGE PLANNING
Medical Social Work (Late Entry)    Referral: Critical Patient    Intervention: LSW responded to RED 06. Pt JOCELYNE Abreu from home after ex-wife called Diony CARUSO to do a wellness check on the pt. EMS called to the home where pt was found altered and hypotensive. Pt's name is Yury Blake ( 1970).     LSW received call from pt's ex-wife, Kellee Fish ([phone]), who reports that she is now staying at the pt's home to watch the house. Kellee further states that she has the pt's house keys, cell phone, and dog if he asks. Per Kellee, the pt has no family, no kids, and she is the closest thing to family that the pt has. Kellee will remain available by phone for updates.     Kellee states that the pt takes xanax and high blood pressure medications and may have Medicaid FFS for insurance.     Plan: LSW will remain available as needed.

## 2020-09-09 NOTE — ASSESSMENT & PLAN NOTE
Consistent with ATN due to hypotension, dehydration and sepsis  Renal dose meds, avoid nephrotoxins  Strict I/Os  Improving

## 2020-09-09 NOTE — ED NOTES
Pt intubated, airway patent, rr even and unlabored, pt transports to floor with Caitlin RN, RT and second RN on ventilator and monitor in fair condition.

## 2020-09-09 NOTE — ED TRIAGE NOTES
"Chief Complaint   Patient presents with   • Altered Mental Status   • Hypotension     Pt arrives via EMS with complaint of altered mental status x 2 days. Upon arrival, EMS found patient altered and hypotensive. Hx of alcohol abuse.    Pulse (!) 126   Resp (!) 24   Ht 1.803 m (5' 11\")   Wt 79.4 kg (175 lb)   SpO2 98%   BMI 24.41 kg/m²     "

## 2020-09-09 NOTE — ED PROVIDER NOTES
"ED Provider Note    CHIEF COMPLAINT  Chief Complaint   Patient presents with   • Altered Mental Status   • Hypotension       HPI  Yury Blake is a 50 y.o. male who presents via EMS after being found altered in his home.  Patient was apparently last seen 2 days ago and his ex-wife was performing a welfare check because she had not been able to get a hold of them.  She and law enforcement had to make a forced entry into the house and found him in a state of decreased level of consciousness and altered mentation and his easy chair.  It was not clear how long he had been there.  He was A and O x0 for EMS and markedly hypotensive.    REVIEW OF SYSTEMS  Unable to obtain due to clinical condition      PAST MEDICAL HISTORY   has a past medical history of Arthritis, Hypertension, Pain, and Psychiatric problem.    SOCIAL HISTORY  Social History     Tobacco Use   • Smoking status: Never Smoker   • Smokeless tobacco: Current User     Types: Chew   Substance and Sexual Activity   • Alcohol use: Yes     Comment: 6-10 beers daily   • Drug use: No   • Sexual activity: Not on file       SURGICAL HISTORY   has a past surgical history that includes ankle orif (Right, 9/26/2017).    CURRENT MEDICATIONS  Home Medications    **Home medications have not yet been reviewed for this encounter**         ALLERGIES  No Known Allergies    PHYSICAL EXAM  VITAL SIGNS: BP (!) 95/57   Pulse 93   Temp (!) 38.8 °C (101.8 °F) (Core)   Resp 20   Ht 1.803 m (5' 11\")   Wt 79.4 kg (175 lb)   SpO2 100%   BMI 24.41 kg/m²    Gen: Stuporous, appears pale, cool  HEENT: No signs of trauma, Bilateral external ears normal, Nose normal. Conjunctiva normal, Non-icteric.  Pupils 2 to 3 mm, equal bilaterally  Neck: Supple, no masses, no JVD  Lymphatic: No cervical lymphadenopathy noted.   Cardiovascular: Tachycardia with regular rhythm.  Markedly delayed capillary refill 7 to 10 seconds all extremities, thready radial pulses.  1+ femoral pulses.  " Thready bilateral dorsalis pedis pulses.    Thorax & Lungs: Normal breath sounds, No respiratory distress, No wheezing bilateral chest rise  Abdomen: Bowel sounds normal, Soft, No tenderness, No masses, No pulsatile masses. No Guarding or rebound  Skin: Warm, Dry, No erythema, No rash noted to exposed areas.   Back: No bony tenderness, No CVA tenderness.   Extremities: Intact distal pulses, No edema  Neurologic: Alert , no facial droop, grossly normal coordination and strength  Psychiatric: Affect normal, Judgment normal, Mood normal.       LABS  Results for orders placed or performed during the hospital encounter of 09/09/20   CBC WITH DIFFERENTIAL   Result Value Ref Range    WBC 22.9 (H) 4.8 - 10.8 K/uL    RBC 5.21 4.70 - 6.10 M/uL    Hemoglobin 16.5 14.0 - 18.0 g/dL    Hematocrit 49.9 42.0 - 52.0 %    MCV 95.8 81.4 - 97.8 fL    MCH 31.7 27.0 - 33.0 pg    MCHC 33.1 (L) 33.7 - 35.3 g/dL    RDW 46.2 35.9 - 50.0 fL    Platelet Count 464 (H) 164 - 446 K/uL    MPV 11.2 9.0 - 12.9 fL    Neutrophils-Polys 74.40 (H) 44.00 - 72.00 %    Lymphocytes 13.70 (L) 22.00 - 41.00 %    Monocytes 5.10 0.00 - 13.40 %    Eosinophils 0.00 0.00 - 6.90 %    Basophils 3.40 (H) 0.00 - 1.80 %    Nucleated RBC 1.20 /100 WBC    Neutrophils (Absolute) 17.43 (H) 1.82 - 7.42 K/uL    Lymphs (Absolute) 3.14 1.00 - 4.80 K/uL    Monos (Absolute) 1.17 (H) 0.00 - 0.85 K/uL    Eos (Absolute) 0.00 0.00 - 0.51 K/uL    Baso (Absolute) 0.78 (H) 0.00 - 0.12 K/uL    NRBC (Absolute) 0.27 K/uL    Anisocytosis 1+     Macrocytosis 1+    COMP METABOLIC PANEL   Result Value Ref Range    Sodium 138 135 - 145 mmol/L    Potassium 2.7 (LL) 3.6 - 5.5 mmol/L    Chloride 89 (L) 96 - 112 mmol/L    Co2 14 (L) 20 - 33 mmol/L    Anion Gap 35.0 (H) 7.0 - 16.0    Glucose 188 (H) 65 - 99 mg/dL    Bun 56 (H) 8 - 22 mg/dL    Creatinine 2.73 (H) 0.50 - 1.40 mg/dL    Calcium 9.9 8.5 - 10.5 mg/dL    AST(SGOT) 70 (H) 12 - 45 U/L    ALT(SGPT) 48 2 - 50 U/L    Alkaline Phosphatase 156  (H) 30 - 99 U/L    Total Bilirubin 2.0 (H) 0.1 - 1.5 mg/dL    Albumin 3.4 3.2 - 4.9 g/dL    Total Protein 7.0 6.0 - 8.2 g/dL    Globulin 3.6 (H) 1.9 - 3.5 g/dL    A-G Ratio 0.9 g/dL   LACTIC ACID   Result Value Ref Range    Lactic Acid 11.0 (HH) 0.5 - 2.0 mmol/L   MAGNESIUM   Result Value Ref Range    Magnesium 3.5 (H) 1.5 - 2.5 mg/dL   PHOSPHORUS   Result Value Ref Range    Phosphorus 6.0 (H) 2.5 - 4.5 mg/dL   TROPONIN   Result Value Ref Range    Troponin T 45 (H) 6 - 19 ng/L   URINALYSIS    Specimen: Urine   Result Value Ref Range    Color Yellow     Character Clear     Specific Gravity 1.025 <1.035    Ph 5.5 5.0 - 8.0    Glucose Negative Negative mg/dL    Ketones Negative Negative mg/dL    Protein Negative Negative mg/dL    Bilirubin Small (A) Negative    Urobilinogen, Urine 1.0 Negative    Nitrite Positive (A) Negative    Leukocyte Esterase Negative Negative    Occult Blood Negative Negative    Micro Urine Req Microscopic    COVID/SARS CoV-2 PCR    Specimen: Nasopharyngeal; Respirate   Result Value Ref Range    COVID Order Status Received    SARS-CoV-2, PCR (In-House)   Result Value Ref Range    SARS-CoV-2 Source NP Swab     SARS-CoV-2 by PCR NotDetected    URINE MICROSCOPIC (W/UA)   Result Value Ref Range    WBC 0-2 (A) /hpf    RBC 2-5 (A) /hpf    Bacteria Negative None /hpf    Epithelial Cells Negative /hpf    Hyaline Cast 0-2 /lpf   ESTIMATED GFR   Result Value Ref Range    GFR If African American 30 (A) >60 mL/min/1.73 m 2    GFR If Non African American 25 (A) >60 mL/min/1.73 m 2   Prothrombin Time   Result Value Ref Range    PT 75.7 (HH) 12.0 - 14.6 sec    INR 8.93 (HH) 0.87 - 1.13   DIFFERENTIAL MANUAL   Result Value Ref Range    Bands-Stabs 1.70 0.00 - 10.00 %    Metamyelocytes 1.70 %    Manual Diff Status PERFORMED    PERIPHERAL SMEAR REVIEW   Result Value Ref Range    Peripheral Smear Review see below    PLATELET ESTIMATE   Result Value Ref Range    Plt Estimation Increased    MORPHOLOGY   Result Value  Ref Range    RBC Morphology Present     Polychromia 1+     Poikilocytosis 1+    ACCU-CHEK GLUCOSE   Result Value Ref Range    Glucose - Accu-Ck 150 (H) 65 - 99 mg/dL   EKG   Result Value Ref Range    Report       [facility] Emergency Dept.    Test Date:  2020  [facility]           Department: ER  MRN:        [id number]                      Room:       [facility]  Gender:     Male                         Technician: [id number]  :        1970                   Requested By:ER TRIAGE PROTOCOL  Order #:    [id number]                    Reading MD: Jamel Carmona MD    Measurements  Intervals                                Axis  Rate:       129                          P:  NC:                                      QRS:        80  QRSD:       80                           T:          219  QT:         308  QTc:        452    Interpretive Statements  JUNCTIONAL TACHYCARDIA  PROBABLE INFERIOR INFARCT, AGE INDETERMINATE  BORDERLINE R WAVE PROGRESSION, ANTERIOR LEADS  ABNORMAL T, CONSIDER ISCHEMIA, ANT-LAT LEADS  No previous ECG available for comparison  Electronically Signed On 2020 4:38:17 PDT by Jamel Carmona MD     EKG   Result Value Ref Range    Report       [facility] Emergency Dept.    Test Date:  2020  [facility]           Department: ER  MRN:        [id number]                      Room:       [facility]  Gender:     Male                         Technician: [id number]  :        1970                   Requested By:JAMEL CARMONA  Order #:    [id number]                    Reading MD: Jamel Carmona MD    Measurements  Intervals                                Axis  Rate:       93                           P:          67  NC:         192                          QRS:        62  QRSD:       90                           T:          118  QT:         396  QTc:        493    Interpretive Statements  SINUS RHYTHM  LOW VOLTAGE IN FRONTAL  LEADS  ABNORMAL T, CONSIDER ISCHEMIA, ANT-LAT LEADS  BORDERLINE PROLONGED QT INTERVAL  Compared to ECG 09/09/2020 01:22:50  Low QRS voltage now present  Junctional tachycardia no longer present  Myocardial infarct finding no longer present  T-wave abnormality still present   Possible ischemia still present  Electronically Signed On 9-9-2020 4:38:33 PDT by Tushar Mak MD     ISTAT ARTERIAL BLOOD GAS   Result Value Ref Range    Ph 7.323 (L) 7.400 - 7.500    Pco2 29.2 26.0 - 37.0 mmHg    Po2 149 (H) 64 - 87 mmHg    Tco2 16 (L) 20 - 33 mmol/L    S02 99 93 - 99 %    Hco3 15.2 (L) 17.0 - 25.0 mmol/L    BE -10 (L) -4 - 3 mmol/L    Body Temp see below degrees    O2 Therapy 50 %    iPF Ratio 298     Specimen Arterial     Action Range Triggered NO     Inst. Qualified Patient YES        RADIOLOGY  CT-HEAD W/O   Final Result         1.  No acute intracranial abnormality is identified, there are nonspecific white matter changes, commonly associated with small vessel ischemic disease.  Associated mild cerebral atrophy is noted.   2.  Atherosclerosis.      DX-CHEST-PORTABLE (1 VIEW)   Final Result         1.  No acute cardiopulmonary disease.      US-ABDOMEN COMPLETE SURVEY    (Results Pending)   EC-ECHOCARDIOGRAM COMPLETE W/O CONT    (Results Pending)     Intubation Procedure Note    Indication: airway protection    Consent: Unable to be obtained due to the emergent nature of this procedure.    Medications Used: ketamine intravenously    Procedure: The patient was placed in the appropriate position.  Cricoid pressure was not required.  Intubation was performed by direct laryngoscopy using a laryngoscope and an 8.0 cuffed endotracheal tube.  The cuff was then inflated and the tube was secured appropriately at a distance of 24 cm to the dental ridge.  Initial confirmation of placement included bilateral breath sounds, positive colorimetric end-tidal CO2, chest x-ray, and auscultation of both lungs in the epigastrium.  A chest  x-ray to verify correct placement of the tube showed appropriate tube position.    The patient tolerated the procedure well.     Complications: None  Total time for procedure 7 minutes    Central Line Placement Procedure Note  Indication: centrally administered medications    Consent: Unable to be obtained due to the emergent nature of this procedure.    Procedure: The patient was positioned appropriately and the skin over the right internal jugular vein was prepped with chlorhexidine. Local anesthesia was not performed due to the emergent nature of this procedure.  A large bore needle was used to identify the vein.  A guide wire was then inserted into the vein through the needle. A triple lumen catheter was then inserted into the vessel over the guide wire using the Seldinger technique.  All ports showed good, free flowing blood return and were flushed with saline solution.  The catheter was then securely fastened to the skin with sutures and covered with a sterile dressing.  A post procedure X-ray was ordered and showed good line position.    The patient tolerated the procedure well.    Complications: None  Total time for procedure 23 minutes    Critical Care Note  Upon my evaluation, this patient had high probability of imminent and life-threatening deterioration due to altered level consciousness, respiratory failure, hypotension and sepsis, which required my direct attention, intervention, and personal management. I personally provided 35 minutes of critical care time exclusive of time spent on separately billable procedures including intubation and central line placement. Time includes review of laboratory data, radiology results, discussion with critical care physician, and monitoring for potential decompensation.       HYDRATION: Based on the patient's presentation of Dehydration the patient was given IV fluids. IV Hydration was used because oral hydration was not adequate alone. Upon recheck following  hydration, the patient was stable.    COURSE & MEDICAL DECISION MAKING  Patient arrived altered with a GCS of 7 in the setting of signs of extremely poor peripheral perfusion and hypotension.  Patient appeared markedly dehydrated on clinical exam with mottled extremities and dry mucous membranes.  He had been given 1 mg of Narcan by EMS and received another from us because he did have pinpoint pupils.  This made absolutely no difference and patient was subsequently intubated for airway protection.  Right internal jugular central line was placed by the resident under my direct supervision.    4:11 AM  Still borderline in terms of mean arterial pressure, patient has apparently is only received 2 L of LR as a bolus despite initial orders for 30 mL/kg bolus.  Will start another liter of NS promptly.  Will initiate levophed as well.    4:30 AM  Patient's blood pressure and map improving after 2 more liters of normal saline are bolused.  Still holding off on Levophed at this point.  Patient will be taken to the intensive care unit.  Patient was discussed with Dr. Spencer, intensivist.  At this point patient appears to have a metabolic encephalopathy likely related to severe sepsis with organ dysfunction and hypotension.  Patient was broadly cultured and given empiric antibiotics including vancomycin and Zosyn.  He has a coagulopathy of unknown etiology although his liver enzymes do not appear impressively elevated.  He is noted to have a mildly elevated troponin which is likely demand ischemia although it is noted that he has ST segment depressions in the anterolateral leads.    FINAL IMPRESSION  1. Acute metabolic encephalopathy    2. Acute respiratory failure, unspecified whether with hypoxia or hypercapnia (HCC)    3. JEWELS (acute kidney injury) (HCC)    4. Sepsis with acute renal failure and septic shock, due to unspecified organism, unspecified acute renal failure type (HCC)    5. Coagulopathy (HCC)    6. Elevated  troponin    7. Hypokalemia    8. Metabolic acidosis    9. Hyperbilirubinemia        Electronically signed by: Tushar Mak M.D., 9/9/2020 1:59 AM

## 2020-09-09 NOTE — ASSESSMENT & PLAN NOTE
Monitor for seizure/withdrawal  Xanax 0.5 mg BID  EEG after sz-like activity 9/10 is negative despite jerking motions

## 2020-09-09 NOTE — CARE PLAN
Problem: Skin Integrity  Goal: Risk for impaired skin integrity will decrease  Outcome: PROGRESSING AS EXPECTED  Note: Pt turned Q2 hours with pillow positioning. No at risk areas noted.     Problem: Venous Thromboembolism (VTW)/Deep Vein Thrombosis (DVT) Prevention:  Goal: Patient will participate in Venous Thrombosis (VTE)/Deep Vein Thrombosis (DVT)Prevention Measures  Flowsheets  Taken 9/9/2020 1347  SCDs, Sequential Compression Device: On  Pharmacologic Prophylaxis Used: Contraindicated per MD  Taken 9/9/2020 1200  Mechanical Prophylaxis: SCDs, Sequential Compression Device  Note: INR >10, no medical DVT prophylaxis.

## 2020-09-09 NOTE — ED PROVIDER NOTES
ED Provider Note    Scribed for Sacha Ayers M.D. by Giorgi Hammer. 9/9/2020,  12:08 AM.    I had signed up for the patient, however the patient was critically ill and was already receiving excellent care from Dr. Mak when I entered the room.  Please see Dr. Mak's note for the details of this encounter and treatment.  I did not evaluate or treat this patient.

## 2020-09-09 NOTE — CARE PLAN
Problem: Ventilation Defect:  Goal: Ability to achieve and maintain unassisted ventilation or tolerate decreased levels of ventilator support  Outcome: PROGRESSING AS EXPECTED      Ventilator Daily Summary    Vent Day # 1    Ventilator settings changed this shift: FiO2 40%    Weaning trials: SBT x 1    Plan: Continue current ventilator settings and wean mechanical ventilation as tolerated per physician orders.

## 2020-09-09 NOTE — ASSESSMENT & PLAN NOTE
Tox vs metabolic/septic  CNS infection interrogated with LP, initial studies are reassuring  EEG negative for seizures  UDS + benzos

## 2020-09-09 NOTE — DISCHARGE PLANNING
"Care Transition Team Discharge Planning    Anticipated Discharge Disposition: TBD    Action: Per AM rounds, pt has a dog in his home. Pt's neighbor called BS RN, and was very concerned about the animal. There was no name or number for the neighbor.    Lsw called emergency contacts and was able to speak w/ 2nd EC Kellee @ [phone]. She indicates pt's mother, Tiesha, passed away February 2020.     Kellee has access to pt's dog, and is feeding and checking on her at pt's home. Kellee reports pt's friend, Glenn called Kellee to advise of pt's current situation, and Kellee had law enforcement conduct a welfare check earlier. Kellee works as dispatch at [employer].    Pt has hx w/ ETOH and per Glenn pt did not eat for the 11 days prior to admission which led to the medical crisis causing the admission.    Tiesha was w/ pt for 7 years and  for 1 year then  for the last year. Kellee and pt remained friends.     Pt is currently in legal gold w/ his brother Maxwell (unsure if correct name?). They are estranged and since mom passed fighting over mom's Living Trust via attorneys. \"Maxwell\" is addicted to Meth, per what pt has told his ex-wife.    Pt also has an Uncle Lance Rincon, mom's brother, in [place]. He was a former City  Member. Also, a great Aunt in [place] named [facility] Mayor who owns a string of Kapoor's.    Lsw called [facility] non-emergency dispatch and they indicate there are 4 Lance Olmedo in [place] in their 70's.    Lsw emailed Mony to request a NOK search.         Barriers to Discharge: TBD    Plan: f/u w/ medical team, etc.     "

## 2020-09-09 NOTE — ED NOTES
Dr. Mak notified pt blood pressure with map of 65 with intermittent episodes with map <65. Dr. Mak wants to hold off on levophed at this time.

## 2020-09-09 NOTE — CONSULTS
Critical Care Consultation    Date of consult: 9/9/2020    Referring Physician  Tushar Mak M.D.    Reason for Consultation  Acute respiratory failure, acute severe encephalopathy    History of Presenting Illness  Due to the patient's encephalopathic status and no family at bedside, all history is obtained from bedside providers and chart     50 y.o. male with a pmhx of HTN, chronic pain and alcohol abuse who presented 9/9/2020 with altered mental status, he was found obtunded in his house by his ex-wife who was checking on him as he had not been seen for 2 days or been able to get a hold of him.  He was found in a chair,  A&Ox0 and hypotensive.  He arrived in the ER with a GCS of 7 (E1,V1,M5) and severely hypotensive.  The patient was intubated for airway protection and central venous access was obtained for administration of high-volume crystalloid resuscitation and vasopressor therapy.  Labs in the ER show a significant leukocytosis at 22.9, metabolic panel with hypokalemia at 2.7, CO2 14, anion gap 35 glucose 188, creatinine 2.73, BUN 56, AST 70, ALT 48, alkaline phosphatase 136, total bilirubin 2, INR 8.93 and lactate 11.  UA did not show any evidence of urinary tract infection.    9/9 Lactate cleared, making urine, awake and alert - not following commands. CT A/P neg, tox w/u negative, LP pending    INR rapidly corrected with vitamin K, will proceed with lumbar puncture; TEG was overall reassuring even during coagulopathy.      Code Status  Full Code    Review of Systems  Review of Systems   Unable to perform ROS: Critical illness       Past Medical History   has a past medical history of Arthritis, Hypertension, Pain, and Psychiatric problem.    Surgical History   has a past surgical history that includes ankle orif (Right, 9/26/2017).    Family History  family history is not on file.    Social History   reports that he has never smoked. His smokeless tobacco use includes chew. He reports current alcohol  use. He reports that he does not use drugs.    Medications  Home Medications    **Home medications have not yet been reviewed for this encounter**       Current Facility-Administered Medications   Medication Dose Route Frequency Provider Last Rate Last Dose   • Respiratory Therapy Consult   Nebulization Continuous RT VERÓNICA Villalpando Jr..O.       • ipratropium-albuterol (DUONEB) nebulizer solution  3 mL Nebulization Q2HRS PRN (RT) VERÓNICA Villalpando Jr..O.       • senna-docusate (PERICOLACE or SENOKOT S) 8.6-50 MG per tablet 2 Tab  2 Tab Enteral Tube BID VERÓNICA Villalpando Jr..O.   Stopped at 09/09/20 0600    And   • polyethylene glycol/lytes (MIRALAX) PACKET 1 Packet  1 Packet Enteral Tube QDAY PRN VERÓNICA Villalpando Jr..O.        And   • magnesium hydroxide (MILK OF MAGNESIA) suspension 30 mL  30 mL Enteral Tube QDAY PRN VERÓNICA Villalpando Jr..O.        And   • bisacodyl (DULCOLAX) suppository 10 mg  10 mg Rectal QDAY PRN VERÓNICA Villalpando Jr..O.       • MD Alert...ICU Electrolyte Replacement per Pharmacy   Other PHARMACY TO DOSE VERÓNICA Villalpando Jr..ALBERT.       • lidocaine (XYLOCAINE) 1 % injection 1-2 mL  1-2 mL Tracheal Tube Q30 MIN PRN VERÓNICA Villalpando Jr..O.       • fentaNYL (SUBLIMAZE) injection 50 mcg  50 mcg Intravenous Q15 MIN PRN VERÓNICA Villalpando Jr..O.   50 mcg at 09/09/20 1239    And   • fentaNYL (SUBLIMAZE) injection 100 mcg  100 mcg Intravenous Q15 MIN PRN VERÓNICA Villalpando Jr..O.   50 mcg at 09/09/20 1257    And   • fentaNYL (SUBLIMAZE) 50 mcg/mL in 50mL (Continuous Infusion)   Intravenous Continuous VERÓNICA Villalpando Jr..O. 4 mL/hr at 09/09/20 1640 200 mcg/hr at 09/09/20 1640    And   • dexmedetomidine (PRECEDEX) 400 mcg/100mL NS premix infusion  0-0.7 mcg/kg/hr Intravenous Continuous VERÓNICA Villalpando Jr..O.   Stopped at 09/09/20 1625   • pantoprazole (PROTONIX) injection 40 mg  40 mg Intravenous BID Mode Ojeda Jr., D.O.   40 mg at 09/09/20 0614   • lactated ringers infusion (BOLUS):  BMI less than or equal to 30  30 mL/kg Intravenous Once PRN Tushar Mak M.D.       • lactated ringers infusion (BOLUS)  1,000 mL Intravenous Once PRN VERÓNICA Villalpando Jr..O.       • norepinephrine (Levophed) infusion 8 mg/250 mL (premix)  0.5-30 mcg/min Intravenous Continuous VERÓNICA Villalpando Jr..OJose Alberto 11.3 mL/hr at 09/09/20 1642 6 mcg/min at 09/09/20 1642    And   • vasopressin (VASOSTRICT) 20 Units in  mL Infusion  0.03 Units/min Intravenous Continuous VERÓNICA Villalpando Jr..O.   Stopped at 09/09/20 0400   • insulin regular (HumuLIN R,NovoLIN R) injection  1-6 Units Subcutaneous Q6HRS VERÓNICA Villalpando Jr..O.   Stopped at 09/09/20 0600    And   • glucose 4 g chewable tablet 16 g  16 g Oral Q15 MIN PRN VERÓNICA Villalpando Jr..OJose Alberto        And   • dextrose 50% (D50W) injection 50 mL  50 mL Intravenous Q15 MIN PRN VERÓNICA Villalpando Jr..O.       • thiamine (B-1) 500 mg in D5W 100 mL IVPB  500 mg Intravenous TID VERÓNICA Villalpando Jr..O. 200 mL/hr at 09/09/20 1149 500 mg at 09/09/20 1149    Followed by   • [START ON 9/11/2020] thiamine (B-1) 250 mg in D5W 100 mL IVPB  250 mg Intravenous DAILY VERÓNICA Villalpando Jr..O.       • multivitamin (THERAGRAN) tablet 1 Tab  1 Tab Oral DAILY VERÓNICA Villalpando Jr..O.   1 Tab at 09/09/20 0513    And   • folic acid (FOLVITE) tablet 1 mg  1 mg Oral DAILY VERÓNICA Villalpando Jr..O.   1 mg at 09/09/20 0513   • ondansetron (ZOFRAN) syringe/vial injection 4 mg  4 mg Intravenous Q4HRS PRN VERÓNICA Villalpando Jr..O.       • ondansetron (ZOFRAN ODT) dispertab 4 mg  4 mg Oral Q4HRS PRN Mode M Rusty Jr., D.O.       • promethazine (PHENERGAN) tablet 12.5-25 mg  12.5-25 mg Oral Q4HRS PRN Mode Ojeda Jr., D.O.       • promethazine (PHENERGAN) suppository 12.5-25 mg  12.5-25 mg Rectal Q4HRS PRN Mode Ojeda Jr., D.O.       • prochlorperazine (COMPAZINE) injection 5-10 mg  5-10 mg Intravenous Q4HRS PRN Mode Ojeda Jr., D.O.       • acetaminophen (TYLENOL) tablet 650 mg  650  mg Oral Q6HRS PRN Mode Ojeda Jr., D.O.       • hydrocortisone sodium succinate PF (SOLU-CORTEF) 100 MG injection 50 mg  50 mg Intravenous Q6HRS Avery Oliver M.D.   50 mg at 09/09/20 0929   • cefTRIAXone (ROCEPHIN) 2 g in  mL IVPB  2 g Intravenous Q24HRS Avery Oliver M.D.   Stopped at 09/09/20 1648   • MIDAZOLAM HCL 5 MG/5ML INJ SOLN (WRAPPED)                Allergies  No Known Allergies    Vital Signs last 24 hours  Temp:  [35.8 °C (96.4 °F)-39 °C (102.2 °F)] 38.2 °C (100.8 °F)  Pulse:  [] 58  Resp:  [15-42] 20  BP: ()/(31-80) 87/41  SpO2:  [71 %-100 %] 100 %    Physical Exam  Physical Exam  Vitals signs and nursing note reviewed.   Constitutional:       General: He is in acute distress.      Appearance: He is well-developed. He is ill-appearing and toxic-appearing.      Interventions: He is intubated.   HENT:      Head: Normocephalic and atraumatic.      Right Ear: External ear normal.      Left Ear: External ear normal.      Nose: Nose normal.      Mouth/Throat:      Mouth: Mucous membranes are dry.      Pharynx: Oropharynx is clear.      Comments: Dark fluid in OGT  ETT in place  Eyes:      Extraocular Movements: Extraocular movements intact.      Conjunctiva/sclera: Conjunctivae normal.      Pupils: Pupils are equal, round, and reactive to light.   Neck:      Musculoskeletal: Neck supple. No neck rigidity.      Vascular: No JVD.      Trachea: No tracheal deviation.      Meningeal: Brudzinski's sign and Kernig's sign absent.   Cardiovascular:      Rate and Rhythm: Regular rhythm. Tachycardia present.      Pulses: Normal pulses.   Pulmonary:      Effort: Respiratory distress present. He is intubated.      Breath sounds: No wheezing.   Abdominal:      General: Bowel sounds are normal. There is no distension.      Palpations: Abdomen is soft.      Tenderness: There is no abdominal tenderness. There is no guarding or rebound.   Musculoskeletal:         General: No tenderness.    Skin:     General: Skin is warm and dry.      Capillary Refill: Capillary refill takes less than 2 seconds.      Findings: No rash.   Neurological:      Cranial Nerves: No cranial nerve deficit.      Comments: Moving all 4 extremities equally.  Localizes to pain in UE   Psychiatric:      Comments: Unable to assess         Fluids    Intake/Output Summary (Last 24 hours) at 9/9/2020 1723  Last data filed at 9/9/2020 1600  Gross per 24 hour   Intake 6464.55 ml   Output 975 ml   Net 5489.55 ml       Laboratory  Recent Results (from the past 48 hour(s))   CBC WITH DIFFERENTIAL    Collection Time: 09/09/20 12:08 AM   Result Value Ref Range    WBC 22.9 (H) 4.8 - 10.8 K/uL    RBC 5.21 4.70 - 6.10 M/uL    Hemoglobin 16.5 14.0 - 18.0 g/dL    Hematocrit 49.9 42.0 - 52.0 %    MCV 95.8 81.4 - 97.8 fL    MCH 31.7 27.0 - 33.0 pg    MCHC 33.1 (L) 33.7 - 35.3 g/dL    RDW 46.2 35.9 - 50.0 fL    Platelet Count 464 (H) 164 - 446 K/uL    MPV 11.2 9.0 - 12.9 fL    Neutrophils-Polys 74.40 (H) 44.00 - 72.00 %    Lymphocytes 13.70 (L) 22.00 - 41.00 %    Monocytes 5.10 0.00 - 13.40 %    Eosinophils 0.00 0.00 - 6.90 %    Basophils 3.40 (H) 0.00 - 1.80 %    Nucleated RBC 1.20 /100 WBC    Neutrophils (Absolute) 17.43 (H) 1.82 - 7.42 K/uL    Lymphs (Absolute) 3.14 1.00 - 4.80 K/uL    Monos (Absolute) 1.17 (H) 0.00 - 0.85 K/uL    Eos (Absolute) 0.00 0.00 - 0.51 K/uL    Baso (Absolute) 0.78 (H) 0.00 - 0.12 K/uL    NRBC (Absolute) 0.27 K/uL    Anisocytosis 1+     Macrocytosis 1+    COMP METABOLIC PANEL    Collection Time: 09/09/20 12:08 AM   Result Value Ref Range    Sodium 138 135 - 145 mmol/L    Potassium 2.7 (LL) 3.6 - 5.5 mmol/L    Chloride 89 (L) 96 - 112 mmol/L    Co2 14 (L) 20 - 33 mmol/L    Anion Gap 35.0 (H) 7.0 - 16.0    Glucose 188 (H) 65 - 99 mg/dL    Bun 56 (H) 8 - 22 mg/dL    Creatinine 2.73 (H) 0.50 - 1.40 mg/dL    Calcium 9.9 8.5 - 10.5 mg/dL    AST(SGOT) 70 (H) 12 - 45 U/L    ALT(SGPT) 48 2 - 50 U/L    Alkaline Phosphatase 156  (H) 30 - 99 U/L    Total Bilirubin 2.0 (H) 0.1 - 1.5 mg/dL    Albumin 3.4 3.2 - 4.9 g/dL    Total Protein 7.0 6.0 - 8.2 g/dL    Globulin 3.6 (H) 1.9 - 3.5 g/dL    A-G Ratio 0.9 g/dL   LACTIC ACID    Collection Time: 09/09/20 12:08 AM   Result Value Ref Range    Lactic Acid 11.0 (HH) 0.5 - 2.0 mmol/L   MAGNESIUM    Collection Time: 09/09/20 12:08 AM   Result Value Ref Range    Magnesium 3.5 (H) 1.5 - 2.5 mg/dL   PHOSPHORUS    Collection Time: 09/09/20 12:08 AM   Result Value Ref Range    Phosphorus 6.0 (H) 2.5 - 4.5 mg/dL   TROPONIN    Collection Time: 09/09/20 12:08 AM   Result Value Ref Range    Troponin T 45 (H) 6 - 19 ng/L   ESTIMATED GFR    Collection Time: 09/09/20 12:08 AM   Result Value Ref Range    GFR If African American 30 (A) >60 mL/min/1.73 m 2    GFR If Non African American 25 (A) >60 mL/min/1.73 m 2   DIFFERENTIAL MANUAL    Collection Time: 09/09/20 12:08 AM   Result Value Ref Range    Bands-Stabs 1.70 0.00 - 10.00 %    Metamyelocytes 1.70 %    Manual Diff Status PERFORMED    PERIPHERAL SMEAR REVIEW    Collection Time: 09/09/20 12:08 AM   Result Value Ref Range    Peripheral Smear Review see below    PLATELET ESTIMATE    Collection Time: 09/09/20 12:08 AM   Result Value Ref Range    Plt Estimation Increased    MORPHOLOGY    Collection Time: 09/09/20 12:08 AM   Result Value Ref Range    RBC Morphology Present     Polychromia 1+     Poikilocytosis 1+    ACCU-CHEK GLUCOSE    Collection Time: 09/09/20 12:10 AM   Result Value Ref Range    Glucose - Accu-Ck 150 (H) 65 - 99 mg/dL   ISTAT ARTERIAL BLOOD GAS    Collection Time: 09/09/20 12:34 AM   Result Value Ref Range    Ph 7.323 (L) 7.400 - 7.500    Pco2 29.2 26.0 - 37.0 mmHg    Po2 149 (H) 64 - 87 mmHg    Tco2 16 (L) 20 - 33 mmol/L    S02 99 93 - 99 %    Hco3 15.2 (L) 17.0 - 25.0 mmol/L    BE -10 (L) -4 - 3 mmol/L    Body Temp see below degrees    O2 Therapy 50 %    iPF Ratio 298     Specimen Arterial     Action Range Triggered NO     Inst. Qualified  Patient YES    EKG    Collection Time: 20  1:22 AM   Result Value Ref Range    Report       [facility] Emergency Dept.    Test Date:  2020  Pt Name:    JOON CANTU           Department: ER  MRN:        [id number]                      Room:       [facility]  Gender:     Male                         Technician: [id number]  :        1970                   Requested By:ER TRIAGE PROTOCOL  Order #:    [id number]                    Reading MD: Tushar Mak MD    Measurements  Intervals                                Axis  Rate:       129                          P:  VA:                                      QRS:        80  QRSD:       80                           T:          219  QT:         308  QTc:        452    Interpretive Statements  JUNCTIONAL TACHYCARDIA  PROBABLE INFERIOR INFARCT, AGE INDETERMINATE  BORDERLINE R WAVE PROGRESSION, ANTERIOR LEADS  ABNORMAL T, CONSIDER ISCHEMIA, ANT-LAT LEADS  No previous ECG available for comparison  Electronically Signed On 2020 4:38:17 PDT by Tushar Mak MD     COVID/SARS CoV-2 PCR    Collection Time: 20  1:24 AM    Specimen: Nasopharyngeal; Respirate   Result Value Ref Range    COVID Order Status Received    SARS-CoV-2, PCR (In-House)    Collection Time: 20  1:24 AM   Result Value Ref Range    SARS-CoV-2 Source NP Swab     SARS-CoV-2 by PCR NotDetected    URINALYSIS    Collection Time: 20  1:49 AM    Specimen: Blood   Result Value Ref Range    Color Yellow     Character Clear     Specific Gravity 1.025 <1.035    Ph 5.5 5.0 - 8.0    Glucose Negative Negative mg/dL    Ketones Negative Negative mg/dL    Protein Negative Negative mg/dL    Bilirubin Small (A) Negative    Urobilinogen, Urine 1.0 Negative    Nitrite Positive (A) Negative    Leukocyte Esterase Negative Negative    Occult Blood Negative Negative    Micro Urine Req Microscopic    URINE MICROSCOPIC (W/UA)    Collection Time: 20  1:49 AM   Result Value Ref  Range    WBC 0-2 (A) /hpf    RBC 2-5 (A) /hpf    Bacteria Negative None /hpf    Epithelial Cells Negative /hpf    Hyaline Cast 0-2 /lpf   URINE DRUG SCREEN    Collection Time: 20  1:49 AM   Result Value Ref Range    Amphetamines Urine Negative Negative    Barbiturates Negative Negative    Benzodiazepines Positive (A) Negative    Cocaine Metabolite Negative Negative    Methadone Negative Negative    Opiates Negative Negative    Oxycodone Negative Negative    Phencyclidine -Pcp Negative Negative    Propoxyphene Negative Negative    Cannabinoid Metab Negative Negative   URINE SODIUM RANDOM    Collection Time: 20  1:49 AM   Result Value Ref Range    Sodium, Urine -per volume <20 mmol/L   URINE PROTEIN    Collection Time: 20  1:49 AM   Result Value Ref Range    Total Protein, Urine 20.0 (H) 0.0 - 15.0 mg/dL   Prothrombin Time    Collection Time: 20  2:15 AM   Result Value Ref Range    PT 75.7 (HH) 12.0 - 14.6 sec    INR 8.93 (HH) 0.87 - 1.13   EKG    Collection Time: 20  4:22 AM   Result Value Ref Range    Report       [facility] Emergency Dept.    Test Date:  2020  Pt Name:    JOON CANTU           Department: ER  MRN:        [id number]                      Room:       [facility]  Gender:     Male                         Technician: [id number]  :        1970                   Requested By:JAMEL CARMONA  Order #:    [id number]                    Reading MD: Jamel Carmona MD    Measurements  Intervals                                Axis  Rate:       93                           P:          67  AL:         192                          QRS:        62  QRSD:       90                           T:          118  QT:         396  QTc:        493    Interpretive Statements  SINUS RHYTHM  LOW VOLTAGE IN FRONTAL LEADS  ABNORMAL T, CONSIDER ISCHEMIA, ANT-LAT LEADS  BORDERLINE PROLONGED QT INTERVAL  Compared to ECG 2020 01:22:50  Low QRS voltage now  present  Junctional tachycardia no longer present  Myocardial infarct finding no longer present  T-wave abnormality still present   Possible ischemia still present  Electronically Signed On 9-9-2020 4:38:33 PDT by Tushar Mak MD     ACETAMINOPHEN    Collection Time: 09/09/20  4:55 AM   Result Value Ref Range    Acetaminophen -Tylenol 6 (L) 10 - 30 ug/mL   Salicylate    Collection Time: 09/09/20  4:55 AM   Result Value Ref Range    Salicylates, Quant. <1 (L) 15 - 25 mg/dL   Lactic Acid Every four hours after STAT order    Collection Time: 09/09/20  4:55 AM   Result Value Ref Range    Lactic Acid 2.2 (H) 0.5 - 2.0 mmol/L   Prothrombin time (INR)    Collection Time: 09/09/20  4:55 AM   Result Value Ref Range    PT 85.2 (HH) 12.0 - 14.6 sec    INR >=10.00 (HH) 0.87 - 1.13   PLATELET MAPPING WITH BASIC TEG    Collection Time: 09/09/20  4:55 AM   Result Value Ref Range    Reaction Time Initial-R 10.9 (H) 5.0 - 10.0 min    Clot Kinetics-K 1.9 1.0 - 3.0 min    Clot Angle-Angle 63.4 53.0 - 72.0 degrees    Maximum Clot Strength-MA 70.3 (H) 50.0 - 70.0 mm    Lysis 30 minutes-LY30 0.0 0.0 - 8.0 %    % Inhibition ADP 14.4 %    % Inhibition AA 0.0 %    TEG Algorithm Link Algorithm    HGB    Collection Time: 09/09/20  4:55 AM   Result Value Ref Range    Hemoglobin 10.7 (L) 14.0 - 18.0 g/dL   TROPONIN    Collection Time: 09/09/20  4:55 AM   Result Value Ref Range    Troponin T 45 (H) 6 - 19 ng/L   CREATINE KINASE    Collection Time: 09/09/20  4:55 AM   Result Value Ref Range    CPK Total 689 (H) 0 - 154 U/L   AMMONIA    Collection Time: 09/09/20  5:20 AM   Result Value Ref Range    Ammonia 17 11 - 45 umol/L   ACCU-CHEK GLUCOSE    Collection Time: 09/09/20  6:23 AM   Result Value Ref Range    Glucose - Accu-Ck 134 (H) 65 - 99 mg/dL   EKG (NOW)    Collection Time: 09/09/20  7:49 AM   Result Value Ref Range    Report       [facility]    Test Date:  2020-09-09  Pt Name:    JOON CANTU           Department: ER  MRN:         [id number]                      Room:       [facility]  Gender:     Male                         Technician: [facility]  :        1970                   Requested By:JAMEL VERÓNICA KRISTINE  Order #:    [id number]                    Reading MD: Ronen Martínez MD    Measurements  Intervals                                Axis  Rate:       87                           P:          66  NE:         190                          QRS:        45  QRSD:       89                           T:          100  QT:         419  QTc:        504    Interpretive Statements  SINUS RHYTHM  LOW VOLTAGE, EXTREMITY AND PRECORDIAL LEADS  ABNRM T, CONSIDER ISCHEMIA, ANTEROLATERAL LDS  CONSIDER POSTERIOR INFARCT  PROLONGED QT INTERVAL  Compared to ECG 2020 04:22:58  T-wave abnormality no longer present  Possible ischemia still present  Electronically Signed On 2020 8:34:57 PDT by Ronen Martínez MD     HGB    Collection Time: 20  8:04 AM   Result Value Ref Range    Hemoglobin 12.8 (L) 14.0 - 18.0 g/dL   TROPONIN    Collection Time: 20  8:04 AM   Result Value Ref Range    Troponin T 45 (H) 6 - 19 ng/L   Lactic Acid Every four hours after STAT order    Collection Time: 20  8:04 AM   Result Value Ref Range    Lactic Acid 2.6 (H) 0.5 - 2.0 mmol/L   LIPASE    Collection Time: 20  9:22 AM   Result Value Ref Range    Lipase 84 (H) 11 - 82 U/L   TSH WITH REFLEX TO FT4    Collection Time: 20  9:22 AM   Result Value Ref Range    TSH 1.450 0.380 - 5.330 uIU/mL   MRSA By PCR (Amp)    Collection Time: 20  9:35 AM    Specimen: Nares; Respirate   Result Value Ref Range    Significant Indicator NEG     Source RESP     Site NARES     MRSA PCR Negative for MRSA by PCR.    ACCU-CHEK GLUCOSE    Collection Time: 20 11:36 AM   Result Value Ref Range    Glucose - Accu-Ck 111 (H) 65 - 99 mg/dL   VANCOMYCIN TIMED    Collection Time: 20  2:02 PM   Result Value Ref Range    Vancomycin Unknown Level 24.9 ug/mL   Lactic  Acid Every four hours after STAT order    Collection Time: 09/09/20  2:02 PM   Result Value Ref Range    Lactic Acid 1.7 0.5 - 2.0 mmol/L   TROPONIN    Collection Time: 09/09/20  2:02 PM   Result Value Ref Range    Troponin T 42 (H) 6 - 19 ng/L   Prothrombin Time    Collection Time: 09/09/20  2:02 PM   Result Value Ref Range    PT 19.1 (H) 12.0 - 14.6 sec    INR 1.56 (H) 0.87 - 1.13   POTASSIUM SERUM (K)    Collection Time: 09/09/20  2:02 PM   Result Value Ref Range    Potassium 3.2 (L) 3.6 - 5.5 mmol/L   HGB    Collection Time: 09/09/20  2:45 PM   Result Value Ref Range    Hemoglobin 12.3 (L) 14.0 - 18.0 g/dL       Imaging  CT-ABDOMEN-PELVIS W/O   Final Result      1.  No renal stone or hydronephrosis.   2.  Normal appendix.   3.  No focal mesenteric inflammatory process.      DX-ABDOMEN FOR TUBE PLACEMENT   Final Result      NG tube tip projects over expected location the gastric fundus.      US-ABDOMEN COMPLETE SURVEY   Final Result         1.  Echogenic liver compatible with fatty change versus fibrosis.   2.  Gallbladder sludge without additional sonographic findings of cholecystitis.   3.  Partial atrophic bilateral kidneys with lobulated contour         CT-HEAD W/O   Final Result         1.  No acute intracranial abnormality is identified, there are nonspecific white matter changes, commonly associated with small vessel ischemic disease.  Associated mild cerebral atrophy is noted.   2.  Atherosclerosis.      DX-CHEST-PORTABLE (1 VIEW)   Final Result         1.  No acute cardiopulmonary disease.      EC-ECHOCARDIOGRAM COMPLETE W/O CONT    (Results Pending)       Assessment/Plan  Acute respiratory failure with hypoxia (HCC)- (present on admission)  Assessment & Plan  Intubated for airway protection  RT/O2 protocols  Daily and PRN ABGs  Titration of ventilator therapy based on ABGs and patient's status  Sedation as tolerated/indicated  Daily CXR  HOB >30 degrees and peridex for VAP prevention  Pepcid for GI  prophylaxis  SAT/SBT when able (ABCDEF Bundle)  Early mobility    Benzodiazepine dependence (HCC)- (present on admission)  Assessment & Plan  Monitor for seizure/withdrawal    Elevated transaminase level- (present on admission)  Assessment & Plan  AST, Alk phos  RUQ ultrasound ordered    Lactic acidosis- (present on admission)  Assessment & Plan  Improved s/p resusitation    Alcohol abuse- (present on admission)  Assessment & Plan  Reported hx of  Vitamins  High dose thiamine  Seizure precautions    Coagulopathy (HCC)- (present on admission)  Assessment & Plan  Markedly elevated INR  Protonix 40 mg BID  Vit K 10 mg reduced it to 1.5    Septic shock (HCC)- (present on admission)  Assessment & Plan  This is Septic shock Present on admission  SIRS criteria identified on my evaluation include: Fever, with temperature greater than 101 deg F, Tachycardia, with heart rate greater than 90 BPM, Tachypnea, with respirations greater than 20 per minute and Leukocytosis, with WBC greater than 12,000  Source is Unknown, ? pulmonary  Presentation includes: Severe sepsis present, persistent hypotension after 30 ml/kg completed, and initial lactate level result is >= 4 mmol/L.   Despite appropriate fluid resuscitation with crystalloid given per sepsis guidelines, the patient remains hypotensive with systolic blood pressure less than 90 or MAP less than 65  Hemodynamic support with additional fluids and IV vasopressors as needed to maintain a SBP of 90 or MAP of 65  IV antibiotics as appropriate for source of sepsis  Reassessment: I have reassessed the patient's hemodynamic status    sepsis protocol  Broad-spectrum antibiotics: vanc/zosyn --> ceftriaxone  30 mL/kg crystalloid bolus provided  Titrate vasopressors MAP >65 mmHg  Pressors if needed to maintain MAP >65 mmHg  blood, respiratory and urine cultures       JEWELS (acute kidney injury) (HCC)- (present on admission)  Assessment & Plan  Likely ATN due to hypotension, dehydration and  sepsis  IVF  Renal dose meds, avoid nephrotoxins  Strict I/Os  Follow renal function  urine studies and US    Hypokalemia- (present on admission)  Assessment & Plan  Agressively replete to maintain >4    Encephalopathy acute- (present on admission)  Assessment & Plan  Tox vs metabolic/septic  CNS infection interrogated with LP  Limit sedatives  Monitor for need for EEG/MRI  UDS + benzos  On percocet and Xanax at home    Titrating vasopressors for MAPs > 65    Lung protective ventilation strategies  Titrate ventilator prescription to optimize oxygenation, ventilation, and acid base balance.      Discussed patient condition and risk of morbidity and/or mortality with RN, RT, Pharmacy, Code status disscussed and ERP.      The patient remains critically ill.  Critical care time = 52 minutes in directly providing and coordinating critical care and extensive data review.  No time overlap and excludes procedures.

## 2020-09-09 NOTE — ASSESSMENT & PLAN NOTE
Broad-spectrum antibiotics: vanc/zosyn --> C3, now will monitor off abx  Blood culture, CSF culture, urinalysis are negative  Monitor off antibiotics  Norepinephrine if needed

## 2020-09-09 NOTE — CONSULTS
Critical Care Consultation    Date of consult: 9/9/2020    Referring Physician  Tushar Mak M.D.    Reason for Consultation  Acute respiratory failure, acute severe encephalopathy    History of Presenting Illness  Due to the patient's encephalopathic status and no family at bedside, all history is obtained from bedside providers and chart     50 y.o. male with a pmhx of HTN, chronic pain and alcohol abuse who presented 9/9/2020 with altered mental status, he was found obtunded in his house by his ex-wife who was checking on him as he had not been seen for 2 days or been able to get a hold of him.  He was found in a chair,  A&Ox0 and hypotensive.  He arrived in the ER with a GCS of 7 (E1,V1,M5) and severely hypotensive.  The patient was intubated for airway protection and central venous access was obtained for administration of high-volume crystalloid resuscitation and vasopressor therapy.  Labs in the ER show a significant leukocytosis at 22.9, metabolic panel with hypokalemia at 2.7, CO2 14, anion gap 35 glucose 188, creatinine 2.73, BUN 56, AST 70, ALT 48, alkaline phosphatase 136, total bilirubin 2, INR 8.93 and lactate 11.  UA did not show any evidence of urinary tract infection.    Code Status  Full Code    Review of Systems  Review of Systems   Unable to perform ROS: Critical illness       Past Medical History   has a past medical history of Arthritis, Hypertension, Pain, and Psychiatric problem.    Surgical History   has a past surgical history that includes ankle orif (Right, 9/26/2017).    Family History  family history is not on file.    Social History   reports that he has never smoked. His smokeless tobacco use includes chew. He reports current alcohol use. He reports that he does not use drugs.    Medications  Home Medications    **Home medications have not yet been reviewed for this encounter**       Current Facility-Administered Medications   Medication Dose Route Frequency Provider Last Rate  Last Dose   • Respiratory Therapy Consult   Nebulization Continuous RT VERÓNICA Villalpando Jr..ALBERT.       • ipratropium-albuterol (DUONEB) nebulizer solution  3 mL Nebulization Q2HRS PRN (RT) REYES Villalpando Jr.O.       • senna-docusate (PERICOLACE or SENOKOT S) 8.6-50 MG per tablet 2 Tab  2 Tab Enteral Tube BID Mode Ojeda Jr., D.O.        And   • polyethylene glycol/lytes (MIRALAX) PACKET 1 Packet  1 Packet Enteral Tube QDAY PRN VERÓNICA Villalpando Jr..O.        And   • magnesium hydroxide (MILK OF MAGNESIA) suspension 30 mL  30 mL Enteral Tube QDAY PRN VERÓNICA Villalpando Jr..KITTY        And   • bisacodyl (DULCOLAX) suppository 10 mg  10 mg Rectal QDAY PRN VERÓNICA Villalpando Jr..O.       • MD Alert...ICU Electrolyte Replacement per Pharmacy   Other PHARMACY TO DOSE VERÓNICA Vilallpando Jr..ALBERT.       • lidocaine (XYLOCAINE) 1 % injection 1-2 mL  1-2 mL Tracheal Tube Q30 MIN PRN VERÓNICA Villalpando Jr..O.       • fentaNYL (SUBLIMAZE) injection 50 mcg  50 mcg Intravenous Q15 MIN PRN Mode Ojeda Jr., D.O.        And   • fentaNYL (SUBLIMAZE) injection 100 mcg  100 mcg Intravenous Q15 MIN PRN VERÓNICA Villalpando Jr..OJose Alberto        And   • fentaNYL (SUBLIMAZE) 50 mcg/mL in 50mL (Continuous Infusion)   Intravenous Continuous VERÓNICA Villalpando Jr..O. 50 mL/hr at 09/09/20 0146 2,500 mcg at 09/09/20 0146    And   • dexmedetomidine (PRECEDEX) 400 mcg/100mL NS premix infusion  0-0.7 mcg/kg/hr Intravenous Continuous VERÓNICA Villalpando Jr..O. 4 mL/hr at 09/09/20 0152 0.2 mcg/kg/hr at 09/09/20 0152   • pantoprazole (PROTONIX) injection 40 mg  40 mg Intravenous BID VERÓNICA Villalpando Jr..O.       • lactated ringers infusion (BOLUS): BMI less than or equal to 30  30 mL/kg Intravenous Once PRN Tushar Mak M.D.       • vancomycin (VANCOCIN) 2,000 mg in  mL IVPB  25 mg/kg Intravenous Once Tushar Mak M.D. 166.7 mL/hr at 09/09/20 0224 2,000 mg at 09/09/20 0224   • potassium chloride (KCL) ivpb 10 mEq  10 mEq Intravenous  Once Tushar Mak M.D. 100 mL/hr at 09/09/20 0314 10 mEq at 09/09/20 0314   • phytonadione (AQUA-MEPHYTON) 10 mg in NS 50 mL IVPB  10 mg Intravenous Once Tushar Mak M.D.       • potassium chloride in water (KCL) ivpb **Administer in ICU only** 40 mEq  40 mEq Intravenous Once Mode Ojeda Jr., D.O.       • lactated ringers infusion (BOLUS)  1,000 mL Intravenous Once PRN VERÓNICA Villalpando Jr..O.       • piperacillin-tazobactam (ZOSYN) 4.5 g in  mL IVPB  4.5 g Intravenous Once Mode Ojeda Jr., D.O.        And   • piperacillin-tazobactam (ZOSYN) 4.5 g in  mL IVPB  4.5 g Intravenous Q8HRS VERÓNICA Villalpando Jr..O.       • MD Alert...Vancomycin per Pharmacy  1 Each Other PHARMACY TO DOSE VERÓNICA Villalpando Jr..ALBERT.       • norepinephrine (Levophed) infusion 8 mg/250 mL (premix)  0.5-30 mcg/min Intravenous Continuous VERÓNICA Villalpando Jr..O.        And   • vasopressin (VASOSTRICT) 20 Units in  mL Infusion  0.03 Units/min Intravenous Continuous VERÓNICA Villalpando Jr..O.       • insulin regular (HumuLIN R,NovoLIN R) injection  1-6 Units Subcutaneous Q6HRS VERÓNICA Villalpando Jr..O.        And   • glucose 4 g chewable tablet 16 g  16 g Oral Q15 MIN PRN VERÓNICA Villalpando Jr..O.        And   • dextrose 50% (D50W) injection 50 mL  50 mL Intravenous Q15 MIN PRN VERÓNICA Villalpando Jr..O.         Current Outpatient Medications   Medication Sig Dispense Refill   • aspirin (ASA) 325 MG Tab Take 1 Tab by mouth 2 Times a Day.     • oxycodone-acetaminophen (PERCOCET) 5-325 MG Tab Take 1-2 Tabs by mouth every four hours as needed. 15 Tab 0   • allopurinol (ZYLOPRIM) 300 MG Tab Take 300 mg by mouth every day.  11   • alprazolam (XANAX) 0.5 MG Tab Take 0.5-1 mg by mouth 2 times a day as needed.  5   • hydrochlorothiazide (HYDRODIURIL) 25 MG Tab Take 25 mg by mouth every day.  11   • verapamil ER (CALAN-SR) 240 MG Tab CR Take 240 mg by mouth every day.  11       Allergies  No Known Allergies    Vital Signs  last 24 hours  Temp:  [38.8 °C (101.8 °F)] 38.8 °C (101.8 °F)  Pulse:  [112-133] 112  Resp:  [19-24] 20  BP: ()/(53-69) 91/55  SpO2:  [97 %-100 %] 100 %    Physical Exam  Physical Exam  Vitals signs and nursing note reviewed.   Constitutional:       General: He is in acute distress.      Appearance: He is well-developed. He is ill-appearing and toxic-appearing.      Interventions: He is intubated.   HENT:      Head: Normocephalic and atraumatic.      Right Ear: External ear normal.      Left Ear: External ear normal.      Nose: Nose normal.      Mouth/Throat:      Mouth: Mucous membranes are dry.      Pharynx: Oropharynx is clear.      Comments: Dark fluid in OGT  ETT in place  Eyes:      Extraocular Movements: Extraocular movements intact.      Conjunctiva/sclera: Conjunctivae normal.      Pupils: Pupils are equal, round, and reactive to light.   Neck:      Musculoskeletal: Neck supple. No neck rigidity.      Vascular: No JVD.      Trachea: No tracheal deviation.      Meningeal: Brudzinski's sign and Kernig's sign absent.   Cardiovascular:      Rate and Rhythm: Regular rhythm. Tachycardia present.      Pulses: Normal pulses.   Pulmonary:      Effort: Respiratory distress present. He is intubated.      Breath sounds: No wheezing.   Abdominal:      General: Bowel sounds are normal. There is no distension.      Palpations: Abdomen is soft.      Tenderness: There is no abdominal tenderness. There is no guarding or rebound.   Musculoskeletal:         General: No tenderness.   Skin:     General: Skin is warm and dry.      Capillary Refill: Capillary refill takes less than 2 seconds.      Findings: No rash.   Neurological:      Cranial Nerves: No cranial nerve deficit.      Comments: Moving all 4 extremities equally.  Localizes to pain in UE   Psychiatric:      Comments: Unable to assess         Fluids  No intake or output data in the 24 hours ending 09/09/20 8626    Laboratory  Recent Results (from the past 48  hour(s))   CBC WITH DIFFERENTIAL    Collection Time: 09/09/20 12:08 AM   Result Value Ref Range    WBC 22.9 (H) 4.8 - 10.8 K/uL    RBC 5.21 4.70 - 6.10 M/uL    Hemoglobin 16.5 14.0 - 18.0 g/dL    Hematocrit 49.9 42.0 - 52.0 %    MCV 95.8 81.4 - 97.8 fL    MCH 31.7 27.0 - 33.0 pg    MCHC 33.1 (L) 33.7 - 35.3 g/dL    RDW 46.2 35.9 - 50.0 fL    Platelet Count 464 (H) 164 - 446 K/uL    MPV 11.2 9.0 - 12.9 fL    Neutrophils-Polys 74.40 (H) 44.00 - 72.00 %    Lymphocytes 13.70 (L) 22.00 - 41.00 %    Monocytes 5.10 0.00 - 13.40 %    Eosinophils 0.00 0.00 - 6.90 %    Basophils 3.40 (H) 0.00 - 1.80 %    Nucleated RBC 1.20 /100 WBC    Neutrophils (Absolute) 17.43 (H) 1.82 - 7.42 K/uL    Lymphs (Absolute) 3.14 1.00 - 4.80 K/uL    Monos (Absolute) 1.17 (H) 0.00 - 0.85 K/uL    Eos (Absolute) 0.00 0.00 - 0.51 K/uL    Baso (Absolute) 0.78 (H) 0.00 - 0.12 K/uL    NRBC (Absolute) 0.27 K/uL    Anisocytosis 1+     Macrocytosis 1+    COMP METABOLIC PANEL    Collection Time: 09/09/20 12:08 AM   Result Value Ref Range    Sodium 138 135 - 145 mmol/L    Potassium 2.7 (LL) 3.6 - 5.5 mmol/L    Chloride 89 (L) 96 - 112 mmol/L    Co2 14 (L) 20 - 33 mmol/L    Anion Gap 35.0 (H) 7.0 - 16.0    Glucose 188 (H) 65 - 99 mg/dL    Bun 56 (H) 8 - 22 mg/dL    Creatinine 2.73 (H) 0.50 - 1.40 mg/dL    Calcium 9.9 8.5 - 10.5 mg/dL    AST(SGOT) 70 (H) 12 - 45 U/L    ALT(SGPT) 48 2 - 50 U/L    Alkaline Phosphatase 156 (H) 30 - 99 U/L    Total Bilirubin 2.0 (H) 0.1 - 1.5 mg/dL    Albumin 3.4 3.2 - 4.9 g/dL    Total Protein 7.0 6.0 - 8.2 g/dL    Globulin 3.6 (H) 1.9 - 3.5 g/dL    A-G Ratio 0.9 g/dL   LACTIC ACID    Collection Time: 09/09/20 12:08 AM   Result Value Ref Range    Lactic Acid 11.0 (HH) 0.5 - 2.0 mmol/L   MAGNESIUM    Collection Time: 09/09/20 12:08 AM   Result Value Ref Range    Magnesium 3.5 (H) 1.5 - 2.5 mg/dL   PHOSPHORUS    Collection Time: 09/09/20 12:08 AM   Result Value Ref Range    Phosphorus 6.0 (H) 2.5 - 4.5 mg/dL   TROPONIN     Collection Time: 20 12:08 AM   Result Value Ref Range    Troponin T 45 (H) 6 - 19 ng/L   ESTIMATED GFR    Collection Time: 20 12:08 AM   Result Value Ref Range    GFR If African American 30 (A) >60 mL/min/1.73 m 2    GFR If Non African American 25 (A) >60 mL/min/1.73 m 2   DIFFERENTIAL MANUAL    Collection Time: 20 12:08 AM   Result Value Ref Range    Bands-Stabs 1.70 0.00 - 10.00 %    Metamyelocytes 1.70 %    Manual Diff Status PERFORMED    PERIPHERAL SMEAR REVIEW    Collection Time: 20 12:08 AM   Result Value Ref Range    Peripheral Smear Review see below    PLATELET ESTIMATE    Collection Time: 20 12:08 AM   Result Value Ref Range    Plt Estimation Increased    MORPHOLOGY    Collection Time: 20 12:08 AM   Result Value Ref Range    RBC Morphology Present     Polychromia 1+     Poikilocytosis 1+    ACCU-CHEK GLUCOSE    Collection Time: 20 12:10 AM   Result Value Ref Range    Glucose - Accu-Ck 150 (H) 65 - 99 mg/dL   ISTAT ARTERIAL BLOOD GAS    Collection Time: 20 12:34 AM   Result Value Ref Range    Ph 7.323 (L) 7.400 - 7.500    Pco2 29.2 26.0 - 37.0 mmHg    Po2 149 (H) 64 - 87 mmHg    Tco2 16 (L) 20 - 33 mmol/L    S02 99 93 - 99 %    Hco3 15.2 (L) 17.0 - 25.0 mmol/L    BE -10 (L) -4 - 3 mmol/L    Body Temp see below degrees    O2 Therapy 50 %    iPF Ratio 298     Specimen Arterial     Action Range Triggered NO     Inst. Qualified Patient YES    EKG    Collection Time: 20  1:22 AM   Result Value Ref Range    Report       [facility] Emergency Dept.    Test Date:  2020  Pt Name:    JOON CANTU           Department: ER  MRN:        [id number]                      Room:        06  Gender:     Male                         Technician: [id number]  :        1970                   Requested By:ER TRIAGE PROTOCOL  Order #:    [id number]                    Reading MD:    Measurements  Intervals                                Axis  Rate:        129                          P:  VT:                                      QRS:        80  QRSD:       80                           T:          219  QT:         308  QTc:        452    Interpretive Statements  JUNCTIONAL TACHYCARDIA  PROBABLE INFERIOR INFARCT, AGE INDETERMINATE  BORDERLINE R WAVE PROGRESSION, ANTERIOR LEADS  ABNORMAL T, CONSIDER ISCHEMIA, ANT-LAT LEADS  No previous ECG available for comparison     COVID/SARS CoV-2 PCR    Collection Time: 09/09/20  1:24 AM    Specimen: Nasopharyngeal; Respirate   Result Value Ref Range    COVID Order Status Received    SARS-CoV-2, PCR (In-House)    Collection Time: 09/09/20  1:24 AM   Result Value Ref Range    SARS-CoV-2 Source NP Swab     SARS-CoV-2 by PCR NotDetected    URINALYSIS    Collection Time: 09/09/20  1:49 AM    Specimen: Urine   Result Value Ref Range    Color Yellow     Character Clear     Specific Gravity 1.025 <1.035    Ph 5.5 5.0 - 8.0    Glucose Negative Negative mg/dL    Ketones Negative Negative mg/dL    Protein Negative Negative mg/dL    Bilirubin Small (A) Negative    Urobilinogen, Urine 1.0 Negative    Nitrite Positive (A) Negative    Leukocyte Esterase Negative Negative    Occult Blood Negative Negative    Micro Urine Req Microscopic    URINE MICROSCOPIC (W/UA)    Collection Time: 09/09/20  1:49 AM   Result Value Ref Range    WBC 0-2 (A) /hpf    RBC 2-5 (A) /hpf    Bacteria Negative None /hpf    Epithelial Cells Negative /hpf    Hyaline Cast 0-2 /lpf   Prothrombin Time    Collection Time: 09/09/20  2:15 AM   Result Value Ref Range    PT 75.7 (HH) 12.0 - 14.6 sec    INR 8.93 (HH) 0.87 - 1.13       Imaging  CT-HEAD W/O   Final Result         1.  No acute intracranial abnormality is identified, there are nonspecific white matter changes, commonly associated with small vessel ischemic disease.  Associated mild cerebral atrophy is noted.   2.  Atherosclerosis.      DX-CHEST-PORTABLE (1 VIEW)   Final Result         1.  No acute cardiopulmonary  disease.      US-ABDOMEN COMPLETE SURVEY    (Results Pending)   EC-ECHOCARDIOGRAM COMPLETE W/O CONT    (Results Pending)       Assessment/Plan  Benzodiazepine dependence (HCC)- (present on admission)  Assessment & Plan  Monitor for seizure/withdrawal    Elevated transaminase level- (present on admission)  Assessment & Plan  AST, Alk phos  RUQ ultrasound ordered    Dehydration- (present on admission)  Assessment & Plan  Agressively resuscitated in the ER    Lactic acidosis- (present on admission)  Assessment & Plan  Trend, continue aggressive resuscitation    Alcohol abuse- (present on admission)  Assessment & Plan  Reported hx of  Vitamins  High dose thiamine  Seizure precautions    Coagulopathy (HCC)- (present on admission)  Assessment & Plan  Markedly elevated INR  Black GI aspirate, ? minor GIB  Protonix 40 mg BID  Vit K 10 mg  Check TEG    Septic shock (HCC)- (present on admission)  Assessment & Plan  This is Septic shock Present on admission  SIRS criteria identified on my evaluation include: Fever, with temperature greater than 101 deg F, Tachycardia, with heart rate greater than 90 BPM, Tachypnea, with respirations greater than 20 per minute and Leukocytosis, with WBC greater than 12,000  Source is Unknown, ? pulmonary  Presentation includes: Severe sepsis present, persistent hypotension after 30 ml/kg completed, and initial lactate level result is >= 4 mmol/L.   Despite appropriate fluid resuscitation with crystalloid given per sepsis guidelines, the patient remains hypotensive with systolic blood pressure less than 90 or MAP less than 65  Hemodynamic support with additional fluids and IV vasopressors as needed to maintain a SBP of 90 or MAP of 65  IV antibiotics as appropriate for source of sepsis  Reassessment: I have reassessed the patient's hemodynamic status    sepsis protocol  Broad-spectrum antibiotics: zosyn, vanco  30 mL/kg crystalloid bolus provided  PRN IVF bolus to maintain MAP >65 mmHg  Pressors  if needed to maintain MAP >65 mmHg  blood, respiratory and urine cultures  lactate every 4 hours until normalized or downtrending    JEWELS (acute kidney injury) (HCC)- (present on admission)  Assessment & Plan  Likely ATN due to hypotension, dehydration and sepsis  IVF  Renal dose meds, avoid nephrotoxins  Strict I/Os  Follow renal function  urine studies and US    Hypokalemia- (present on admission)  Assessment & Plan  Agressively replete to maintain >4    Encephalopathy acute- (present on admission)  Assessment & Plan  ? Tox vs metabolic/septioc  CNS infection unlikely given exam  Limit sedatives  Monitor for need for EEG/MRI/LP  UDS pending  On percocet and Xanax at home      Discussed patient condition and risk of morbidity and/or mortality with RN, RT, Pharmacy, Code status disscussed and ERP.      The patient remains critically ill.  Critical care time = 92 minutes in directly providing and coordinating critical care and extensive data review.  No time overlap and excludes procedures.

## 2020-09-09 NOTE — PROGRESS NOTES
Pt admitted to [facility] with cardiac monitoring, ACLS RN, and RT.    Pt intubated and has bilateral soft wrist restraints. Gtts verified. 2 L bolus from ED infusing. Bed locked and in low position with bed alarm on.

## 2020-09-09 NOTE — ED NOTES
Thu from Lab called with critical result of potassium of 2.7 at 0205. Critical lab result read back to Thu.   Dr. Mak notified of critical lab result at 0206.  Critical lab result read back by Dr. Mak.

## 2020-09-09 NOTE — ED NOTES
ERP notified of pt bp of 75/52 with map of 60 by break RN. 2 liter of NS ordered, wants to hold on levophed at this time and see what response is to fluids.

## 2020-09-09 NOTE — PROCEDURES
"Arterial Line Insertion    Date/Time: 9/9/2020 5:54 AM  Performed by: Mode Ojeda Jr., D.O.  Authorized by: Mode Ojeda Jr., D.O.   Consent: The procedure was performed in an emergent situation.  Time out: Immediately prior to procedure a \"time out\" was called to verify the correct patient, procedure, equipment, support staff and site/side marked as required.  Preparation: Patient was prepped and draped in the usual sterile fashion.  Indications: multiple ABGs, respiratory failure and hemodynamic monitoring  Location: left radial    Sedation:  Patient sedated: yes (vent)    Abilio's test normal: yes  Needle gauge: 20  Seldinger technique: Seldinger technique used  Number of attempts: 1  Post-procedure: line sutured and dressing applied  Post-procedure CMS: unchanged  Patient tolerance: patient tolerated the procedure well with no immediate complications              "

## 2020-09-09 NOTE — PROGRESS NOTES
1100 pt back to room from CT. VS stable during transportation. 50 mcg fentanyl given as pt was restless.

## 2020-09-10 ENCOUNTER — APPOINTMENT (OUTPATIENT)
Dept: CARDIOLOGY | Facility: MEDICAL CENTER | Age: 50
DRG: 853 | End: 2020-09-10
Attending: INTERNAL MEDICINE
Payer: MEDICAID

## 2020-09-10 ENCOUNTER — APPOINTMENT (OUTPATIENT)
Dept: RADIOLOGY | Facility: MEDICAL CENTER | Age: 50
DRG: 853 | End: 2020-09-10
Attending: INTERNAL MEDICINE
Payer: MEDICAID

## 2020-09-10 PROBLEM — E87.20 LACTIC ACIDOSIS: Status: RESOLVED | Noted: 2020-09-09 | Resolved: 2020-09-10

## 2020-09-10 PROBLEM — R74.01 ELEVATED TRANSAMINASE LEVEL: Status: RESOLVED | Noted: 2020-09-09 | Resolved: 2020-09-10

## 2020-09-10 PROBLEM — E87.6 HYPOKALEMIA: Status: RESOLVED | Noted: 2020-09-09 | Resolved: 2020-09-10

## 2020-09-10 LAB
ACTION RANGE TRIGGERED IACRT: NO
ANION GAP SERPL CALC-SCNC: 14 MMOL/L (ref 7–16)
BASE EXCESS BLDA CALC-SCNC: -5 MMOL/L (ref -4–3)
BASOPHILS # BLD AUTO: 0.2 % (ref 0–1.8)
BASOPHILS # BLD: 0.05 K/UL (ref 0–0.12)
BODY TEMPERATURE: ABNORMAL DEGREES
BUN SERPL-MCNC: 46 MG/DL (ref 8–22)
CALCIUM SERPL-MCNC: 8.6 MG/DL (ref 8.5–10.5)
CHLORIDE SERPL-SCNC: 112 MMOL/L (ref 96–112)
CO2 BLDA-SCNC: 18 MMOL/L (ref 20–33)
CO2 SERPL-SCNC: 19 MMOL/L (ref 20–33)
CREAT SERPL-MCNC: 1.56 MG/DL (ref 0.5–1.4)
EOSINOPHIL # BLD AUTO: 0 K/UL (ref 0–0.51)
EOSINOPHIL NFR BLD: 0 % (ref 0–6.9)
ERYTHROCYTE [DISTWIDTH] IN BLOOD BY AUTOMATED COUNT: 45.5 FL (ref 35.9–50)
GLUCOSE BLD-MCNC: 121 MG/DL (ref 65–99)
GLUCOSE SERPL-MCNC: 140 MG/DL (ref 65–99)
HCO3 BLDA-SCNC: 17.5 MMOL/L (ref 17–25)
HCT VFR BLD AUTO: 35.7 % (ref 42–52)
HGB BLD-MCNC: 11.6 G/DL (ref 14–18)
HOROWITZ INDEX BLDA+IHG-RTO: 410 MM[HG]
IMM GRANULOCYTES # BLD AUTO: 0.44 K/UL (ref 0–0.11)
IMM GRANULOCYTES NFR BLD AUTO: 2 % (ref 0–0.9)
INST. QUALIFIED PATIENT IIQPT: YES
LV EJECT FRACT  99904: 55
LV EJECT FRACT MOD 2C 99903: 55.1
LV EJECT FRACT MOD 4C 99902: 57.38
LV EJECT FRACT MOD BP 99901: 56.28
LYMPHOCYTES # BLD AUTO: 1.28 K/UL (ref 1–4.8)
LYMPHOCYTES NFR BLD: 5.9 % (ref 22–41)
MAGNESIUM SERPL-MCNC: 2.9 MG/DL (ref 1.5–2.5)
MCH RBC QN AUTO: 31.3 PG (ref 27–33)
MCHC RBC AUTO-ENTMCNC: 33 G/DL (ref 33.7–35.3)
MCV RBC AUTO: 94.6 FL (ref 81.4–97.8)
MONOCYTES # BLD AUTO: 0.76 K/UL (ref 0–0.85)
MONOCYTES NFR BLD AUTO: 3.5 % (ref 0–13.4)
NEUTROPHILS # BLD AUTO: 19.26 K/UL (ref 1.82–7.42)
NEUTROPHILS NFR BLD: 88.4 % (ref 44–72)
NRBC # BLD AUTO: 0.03 K/UL
NRBC BLD-RTO: 0.1 /100 WBC
O2/TOTAL GAS SETTING VFR VENT: 40 %
PCO2 BLDA: 22.5 MMHG (ref 26–37)
PCO2 TEMP ADJ BLDA: 23.3 MMHG (ref 26–37)
PH BLDA: 7.5 [PH] (ref 7.4–7.5)
PH TEMP ADJ BLDA: 7.49 [PH] (ref 7.4–7.5)
PHOSPHATE SERPL-MCNC: 3 MG/DL (ref 2.5–4.5)
PLATELET # BLD AUTO: 210 K/UL (ref 164–446)
PMV BLD AUTO: 10.9 FL (ref 9–12.9)
PO2 BLDA: 164 MMHG (ref 64–87)
PO2 TEMP ADJ BLDA: 168 MMHG (ref 64–87)
POTASSIUM SERPL-SCNC: 2.9 MMOL/L (ref 3.6–5.5)
RBC # BLD AUTO: 3.68 M/UL (ref 4.7–6.1)
SAO2 % BLDA: 100 % (ref 93–99)
SODIUM SERPL-SCNC: 145 MMOL/L (ref 135–145)
SPECIMEN DRAWN FROM PATIENT: ABNORMAL
TRIGL SERPL-MCNC: 118 MG/DL (ref 0–149)
WBC # BLD AUTO: 21.8 K/UL (ref 4.8–10.8)

## 2020-09-10 PROCEDURE — 4A10X4Z MONITORING OF CENTRAL NERVOUS ELECTRICAL ACTIVITY, EXTERNAL APPROACH: ICD-10-PCS | Performed by: PSYCHIATRY & NEUROLOGY

## 2020-09-10 PROCEDURE — 770022 HCHG ROOM/CARE - ICU (200)

## 2020-09-10 PROCEDURE — 85025 COMPLETE CBC W/AUTO DIFF WBC: CPT

## 2020-09-10 PROCEDURE — 700102 HCHG RX REV CODE 250 W/ 637 OVERRIDE(OP): Performed by: INTERNAL MEDICINE

## 2020-09-10 PROCEDURE — 700111 HCHG RX REV CODE 636 W/ 250 OVERRIDE (IP): Performed by: INTERNAL MEDICINE

## 2020-09-10 PROCEDURE — 83735 ASSAY OF MAGNESIUM: CPT

## 2020-09-10 PROCEDURE — 93306 TTE W/DOPPLER COMPLETE: CPT | Mod: 26 | Performed by: INTERNAL MEDICINE

## 2020-09-10 PROCEDURE — 84478 ASSAY OF TRIGLYCERIDES: CPT

## 2020-09-10 PROCEDURE — 74018 RADEX ABDOMEN 1 VIEW: CPT

## 2020-09-10 PROCEDURE — 700105 HCHG RX REV CODE 258: Performed by: INTERNAL MEDICINE

## 2020-09-10 PROCEDURE — 94003 VENT MGMT INPAT SUBQ DAY: CPT

## 2020-09-10 PROCEDURE — A9270 NON-COVERED ITEM OR SERVICE: HCPCS | Performed by: INTERNAL MEDICINE

## 2020-09-10 PROCEDURE — 80048 BASIC METABOLIC PNL TOTAL CA: CPT

## 2020-09-10 PROCEDURE — 94770 HCHG CO2 EXPIRED GAS DETERMINATION: CPT

## 2020-09-10 PROCEDURE — 99291 CRITICAL CARE FIRST HOUR: CPT | Performed by: INTERNAL MEDICINE

## 2020-09-10 PROCEDURE — 95822 EEG COMA OR SLEEP ONLY: CPT | Mod: 26 | Performed by: PSYCHIATRY & NEUROLOGY

## 2020-09-10 PROCEDURE — 93306 TTE W/DOPPLER COMPLETE: CPT

## 2020-09-10 PROCEDURE — 82803 BLOOD GASES ANY COMBINATION: CPT

## 2020-09-10 PROCEDURE — 36600 WITHDRAWAL OF ARTERIAL BLOOD: CPT

## 2020-09-10 PROCEDURE — 84100 ASSAY OF PHOSPHORUS: CPT

## 2020-09-10 PROCEDURE — 302136 NUTRITION PUMP: Performed by: INTERNAL MEDICINE

## 2020-09-10 PROCEDURE — 700101 HCHG RX REV CODE 250: Performed by: INTERNAL MEDICINE

## 2020-09-10 PROCEDURE — 94760 N-INVAS EAR/PLS OXIMETRY 1: CPT

## 2020-09-10 PROCEDURE — C9113 INJ PANTOPRAZOLE SODIUM, VIA: HCPCS | Performed by: INTERNAL MEDICINE

## 2020-09-10 PROCEDURE — 71045 X-RAY EXAM CHEST 1 VIEW: CPT

## 2020-09-10 PROCEDURE — 95822 EEG COMA OR SLEEP ONLY: CPT | Performed by: PSYCHIATRY & NEUROLOGY

## 2020-09-10 PROCEDURE — 700105 HCHG RX REV CODE 258

## 2020-09-10 RX ORDER — ALPRAZOLAM 0.5 MG/1
0.5 TABLET ORAL 2 TIMES DAILY
Status: DISCONTINUED | OUTPATIENT
Start: 2020-09-10 | End: 2020-09-10

## 2020-09-10 RX ORDER — ALPRAZOLAM 0.5 MG/1
0.5 TABLET ORAL 2 TIMES DAILY
Status: DISCONTINUED | OUTPATIENT
Start: 2020-09-10 | End: 2020-09-12

## 2020-09-10 RX ORDER — HEPARIN SODIUM 5000 [USP'U]/ML
5000 INJECTION, SOLUTION INTRAVENOUS; SUBCUTANEOUS EVERY 8 HOURS
Status: DISCONTINUED | OUTPATIENT
Start: 2020-09-10 | End: 2020-09-18

## 2020-09-10 RX ORDER — SODIUM CHLORIDE 9 MG/ML
INJECTION, SOLUTION INTRAVENOUS
Status: COMPLETED
Start: 2020-09-10 | End: 2020-09-11

## 2020-09-10 RX ORDER — LEVETIRACETAM 500 MG/1
500 TABLET ORAL 2 TIMES DAILY
Status: DISCONTINUED | OUTPATIENT
Start: 2020-09-10 | End: 2020-09-10

## 2020-09-10 RX ORDER — POTASSIUM CHLORIDE 29.8 MG/ML
40 INJECTION INTRAVENOUS ONCE
Status: COMPLETED | OUTPATIENT
Start: 2020-09-10 | End: 2020-09-10

## 2020-09-10 RX ORDER — ACETAMINOPHEN 325 MG/1
650 TABLET ORAL EVERY 6 HOURS PRN
Status: DISCONTINUED | OUTPATIENT
Start: 2020-09-10 | End: 2020-09-14

## 2020-09-10 RX ORDER — FOLIC ACID 1 MG/1
1 TABLET ORAL DAILY
Status: COMPLETED | OUTPATIENT
Start: 2020-09-11 | End: 2020-09-12

## 2020-09-10 RX ORDER — ONDANSETRON 4 MG/1
4 TABLET, ORALLY DISINTEGRATING ORAL EVERY 4 HOURS PRN
Status: DISCONTINUED | OUTPATIENT
Start: 2020-09-10 | End: 2020-10-09

## 2020-09-10 RX ORDER — LORAZEPAM 2 MG/ML
INJECTION INTRAMUSCULAR
Status: COMPLETED
Start: 2020-09-10 | End: 2020-09-10

## 2020-09-10 RX ORDER — LORAZEPAM 2 MG/ML
4 INJECTION INTRAMUSCULAR
Status: DISCONTINUED | OUTPATIENT
Start: 2020-09-10 | End: 2020-09-22

## 2020-09-10 RX ORDER — DEXMEDETOMIDINE HYDROCHLORIDE 4 UG/ML
.1-1.5 INJECTION, SOLUTION INTRAVENOUS CONTINUOUS
Status: DISCONTINUED | OUTPATIENT
Start: 2020-09-10 | End: 2020-09-10

## 2020-09-10 RX ORDER — PROMETHAZINE HYDROCHLORIDE 25 MG/1
12.5-25 TABLET ORAL EVERY 4 HOURS PRN
Status: DISCONTINUED | OUTPATIENT
Start: 2020-09-10 | End: 2020-10-09

## 2020-09-10 RX ORDER — LEVETIRACETAM 5 MG/ML
500 INJECTION INTRAVASCULAR EVERY 12 HOURS
Status: DISCONTINUED | OUTPATIENT
Start: 2020-09-10 | End: 2020-09-11

## 2020-09-10 RX ORDER — HEPARIN SODIUM 5000 [USP'U]/ML
5000 INJECTION, SOLUTION INTRAVENOUS; SUBCUTANEOUS EVERY 8 HOURS
Status: DISCONTINUED | OUTPATIENT
Start: 2020-09-10 | End: 2020-09-10

## 2020-09-10 RX ORDER — POTASSIUM CHLORIDE 14.9 MG/ML
20 INJECTION INTRAVENOUS ONCE
Status: COMPLETED | OUTPATIENT
Start: 2020-09-10 | End: 2020-09-10

## 2020-09-10 RX ADMIN — PANTOPRAZOLE SODIUM 40 MG: 40 INJECTION, POWDER, LYOPHILIZED, FOR SOLUTION INTRAVENOUS at 04:36

## 2020-09-10 RX ADMIN — SODIUM CHLORIDE 2000 MG: 9 INJECTION, SOLUTION INTRAVENOUS at 06:45

## 2020-09-10 RX ADMIN — Medication 200 MCG/HR: at 03:05

## 2020-09-10 RX ADMIN — FENTANYL CITRATE 100 MCG: 50 INJECTION INTRAMUSCULAR; INTRAVENOUS at 19:36

## 2020-09-10 RX ADMIN — THIAMINE HYDROCHLORIDE 500 MG: 100 INJECTION, SOLUTION INTRAMUSCULAR; INTRAVENOUS at 18:08

## 2020-09-10 RX ADMIN — SODIUM CHLORIDE 500 ML: 9 INJECTION, SOLUTION INTRAVENOUS at 11:50

## 2020-09-10 RX ADMIN — DEXMEDETOMIDINE HYDROCHLORIDE 0.2 MCG/KG/HR: 100 INJECTION, SOLUTION INTRAVENOUS at 10:58

## 2020-09-10 RX ADMIN — THIAMINE HYDROCHLORIDE 500 MG: 100 INJECTION, SOLUTION INTRAMUSCULAR; INTRAVENOUS at 04:36

## 2020-09-10 RX ADMIN — POTASSIUM CHLORIDE 20 MEQ: 14.9 INJECTION, SOLUTION INTRAVENOUS at 16:08

## 2020-09-10 RX ADMIN — CEFTRIAXONE SODIUM 2 G: 2 INJECTION, POWDER, FOR SOLUTION INTRAMUSCULAR; INTRAVENOUS at 17:58

## 2020-09-10 RX ADMIN — PROPOFOL 10 MCG/KG/MIN: 10 INJECTION, EMULSION INTRAVENOUS at 05:58

## 2020-09-10 RX ADMIN — POTASSIUM CHLORIDE 40 MEQ: 29.8 INJECTION, SOLUTION INTRAVENOUS at 10:50

## 2020-09-10 RX ADMIN — HEPARIN SODIUM 5000 UNITS: 5000 INJECTION, SOLUTION INTRAVENOUS; SUBCUTANEOUS at 23:14

## 2020-09-10 RX ADMIN — DOCUSATE SODIUM 50 MG AND SENNOSIDES 8.6 MG 2 TABLET: 8.6; 5 TABLET, FILM COATED ORAL at 17:58

## 2020-09-10 RX ADMIN — SODIUM CHLORIDE 250 ML: 9 INJECTION, SOLUTION INTRAVENOUS at 18:08

## 2020-09-10 RX ADMIN — Medication 200 MCG/HR: at 19:38

## 2020-09-10 RX ADMIN — PANTOPRAZOLE SODIUM 40 MG: 40 INJECTION, POWDER, LYOPHILIZED, FOR SOLUTION INTRAVENOUS at 17:58

## 2020-09-10 RX ADMIN — THIAMINE HYDROCHLORIDE 500 MG: 100 INJECTION, SOLUTION INTRAMUSCULAR; INTRAVENOUS at 11:49

## 2020-09-10 RX ADMIN — HEPARIN SODIUM 5000 UNITS: 5000 INJECTION, SOLUTION INTRAVENOUS; SUBCUTANEOUS at 13:26

## 2020-09-10 RX ADMIN — ALPRAZOLAM 0.5 MG: 0.5 TABLET ORAL at 17:58

## 2020-09-10 RX ADMIN — LEVETIRACETAM INJECTION 500 MG: 5 INJECTION INTRAVENOUS at 18:47

## 2020-09-10 RX ADMIN — HYDROCORTISONE SODIUM SUCCINATE 50 MG: 100 INJECTION, POWDER, FOR SOLUTION INTRAMUSCULAR; INTRAVENOUS at 04:36

## 2020-09-10 RX ADMIN — SODIUM CHLORIDE 250 ML: 9 INJECTION, SOLUTION INTRAVENOUS at 18:14

## 2020-09-10 RX ADMIN — LORAZEPAM 4 MG: 2 INJECTION INTRAMUSCULAR; INTRAVENOUS at 05:10

## 2020-09-10 ASSESSMENT — PAIN DESCRIPTION - PAIN TYPE
TYPE: ACUTE PAIN

## 2020-09-10 ASSESSMENT — FIBROSIS 4 INDEX: FIB4 SCORE: 2.36

## 2020-09-10 NOTE — PROCEDURES
Procedure Lumbar Puncture    Date/Time: 9/9/2020 5:14 PM  Performed by: Avery Oliver M.D.  Authorized by: Avery Oliver M.D.     Consent:     Consent obtained:  Written    Consent given by:  Healthcare agent    Alternatives discussed:  No treatment  Pre-procedure details:     Procedure purpose:  Diagnostic    Preparation: Patient was prepped and draped in usual sterile fashion    Sedation:     Sedation type:  Anxiolysis  Anesthesia:     Anesthesia method:  None  Procedure details:     Lumbar space:  L3-L4 interspace    Needle gauge:  20    Needle type:  Spinal needle - Quincke tip    Needle length (in):  2.5    Ultrasound guidance: no      Number of attempts:  1    Fluid appearance:  Clear    Tubes of fluid:  4    Total volume (ml):  12  Post-procedure:     Puncture site:  Adhesive bandage applied and direct pressure applied    Patient tolerance of procedure:  Tolerated well, no immediate complications

## 2020-09-10 NOTE — DIETARY
"Nutrition Support Assessment:  Day 1 of admit.  Yury Blake is a 50 y.o. male with admitting DX of acute respiratory failure with hypoxia.      Current problem list:  1. Encephalopathy acute  2. Acute kidney injury  3. Septic shock  4. Coagulopathy  5. Alcohol abuse  6. Benzodiazepine dependence  7. Acute respiratory failure with hypoxia     Assessment:  Estimated Nutritional Needs based on:   Height: 180.3 cm (5' 10.98\")  Weight: 84.9 kg (187 lb 2.7 oz) via bed scale 9/10 - pt noted to be dehydrated upon admit, suspect this weight closer to this pt's euvolemic wt.   Body mass index is 26.12 kg/m²., BMI classification: overweight    Calculation/Equation: MSJ x 1.2 = 2085 kcal/day; PSU (Ve 7.3, Tmax 39) = 2160 kcal/day  Calories: 9110-7874 kcal/day (25-28 kcal/kg current wt)  Protein: 127-163 grams/day (1.5-2 g/kg current wt)     Evaluation:   1. Nutrition support indicated as patient is currently intubated and sedated.  2. Enteral access via gastric Cortrak pending placement verification.  3. MAR: ICU electrolyte replacement per pharmacy, MVI, folvite, KCl, thiamine 500 mg in D5W @ 200 mL/hr, levophed @ 2 mcg/min  4. Labs: K 2.9 (L), glu 140 (acceptable), BUN 46 (H), Creat 1.56 (H), GFR 47 (L), Mag 2.9 (H), lipase 84 (H)  5. Skin/bowel movements reviewed- no nutrition concerns at this time.   6. Pt febrile overnight (Tmax 39).   7. EEG today for seizure-like activity this morning.   8. High protein, lower CHO formula appropriate to meet patient's needs given current critical status.      Malnutrition Risk: NA     Recommendations/Plan:  1. Okay per MD to start trickle feeds- suggest Impact 1.5 @ 10 mL/hr. Advancement per MD. Suggested goal rate of 60 mL/hr will provide 2160 kcal, 135.36 g pro, and 1108.8 mL free water per day.   2. Monitor for refeeding: Order BMP w/ Mg and Phos x 7 days. Replete K, Phos and Mg prn. Supplement 100 mg Thiamine x 7 days to reduce risk of refeeding.    3. Fluids per " MD. QUAN following.

## 2020-09-10 NOTE — PROGRESS NOTES
RT called this RN to bedside due to seizure like activity. Upon arrival, pt appears to have seizure like activity with all four extremities and body convulsing in bed. Pt desaturating in the 70's so pt removed from ventilator and bagged by RT.    Dr. Ojeda paged to bedside. Orders for 4 mg Ativan. Post Ativan administration, seizure like activity decreased. Pt connected back to Ventilator, saturating high 90's. New orders received for Keppra and Propofol - see MAR.

## 2020-09-10 NOTE — PROGRESS NOTES
Critical care brief note    Patient remains febrile, altered with negative infectious work-up today.  Lumbar puncture was performed by Dr. Oliver this evening.  I reviewed CSF results: No significant pleocytosis, WBC 2, protein elevated 67, glucose 82.    Results for JOON CANTU (MRN [id number]) as of 9/9/2020 19:38   Ref. Range 9/9/2020 17:13   Number Of Tubes Unknown 4   Volume Latest Units: mL 15.5   Color-Body Fluid Unknown Colorless   Character-Body Fluid Unknown Clear   Supernatant Appearance Unknown Colorless   Total WBC Count Latest Ref Range: 0 - 10 cells/uL 2   Total RBC Count Latest Units: cells/uL 2   Crenated RBC Latest Units: % 0   Polys Latest Units: % 2   Lymphs Latest Units: % 19   Mononuclear Cells - CSF Latest Units: % 79   CSF Tube Number Unknown 3   Glucose CSF Latest Ref Range: 40 - 80 mg/dL 82 (H)   Total Protein, CSF Latest Ref Range: 15 - 45 mg/dL 67 (H)   Enterovirus Source Unknown CSF       MDM: We will continue current ceftriaxone 2 g every 24 hours and not escalate to CNS coverage currently.  Await viral studies and cultures.

## 2020-09-10 NOTE — CARE PLAN
Problem: Nutritional:  Goal: Nutrition support tolerated and meeting greater than 85% of estimated needs  Outcome: NOT MET   Okay per MD to start trickle feeds- Impact 1.5 @ 10 mL/hr. Advancement per MD to goal rate of 60 mL/hr. See RD note.    RD following.

## 2020-09-10 NOTE — PROGRESS NOTES
Pulmonary/Critical Care Medicine   Progress Note    Date of service: 9/10/2020  Time: 5:22 AM      Call to bedside for seizure-like activity witnessed by RN. Patient was given 4 mg of IV Lorazepam prior to my arrival. On my exam the patient has intermittent rhythmic eye/facial movements with fine arm tremor, no clearly ictal (this exam is changed from prior per bedside RN, was clearly ictal prior).     The patient does take Xanax at home, ? Withdrawal seizure vs withdrawal. I will restart his home Xanax, start propofol for sedation with KING activity. He will be loaded with keppra and started on BID dosing. EEG has been ordered.    Mode Ojeda Jr., D.O.  [facility]

## 2020-09-10 NOTE — PROGRESS NOTES
Lumbar puncture with time out performed at 1650. Versed given to help pt remain still, Levophed gtt titrated as needed for blood pressure support.

## 2020-09-10 NOTE — PROGRESS NOTES
Monitor Summary    SB-SR 50-70's (Brief Tachycardia 140-150's)  .20/.08/.48    12 Hour Chart Check

## 2020-09-10 NOTE — CARE PLAN
Problem: Ventilation Defect:  Goal: Ability to achieve and maintain unassisted ventilation or tolerate decreased levels of ventilator support  Outcome: PROGRESSING AS EXPECTED      Ventilator Daily Summary    Vent Day # 2    Ventilator settings changed this shift: RR 16    Weaning trials: SBT x 2    Plan: Continue current ventilator settings and wean mechanical ventilation as tolerated per physician orders.

## 2020-09-10 NOTE — PROGRESS NOTES
Received a phone call from patient's Brother, Maxwell Blake ([phone]).  Was requesting updates on his brothers status.  Maxwell informed me that he has concerns that his brother has been poisoned by a woman named Rajwinder LOPEZ.  He states that his mother was under her care, recently passed, and he believes that she also was poisoned.  Rajwinder has apparently been spending time with the patient recently.  However, per Kellee, patient's ex-wife and point of contact, the patient and his brother are estranged.

## 2020-09-10 NOTE — CONSULTS
Critical Care Consultation    Date of consult: 9/9/2020    Referring Physician  Tushar Mak M.D.    Reason for Consultation  Acute respiratory failure, acute severe encephalopathy    History of Presenting Illness  Due to the patient's encephalopathic status and no family at bedside, all history is obtained from bedside providers and chart     50 y.o. male with a pmhx of HTN, chronic pain and alcohol abuse who presented 9/9/2020 with altered mental status, he was found obtunded in his house by his ex-wife who was checking on him as he had not been seen for 2 days or been able to get a hold of him.  He was found in a chair,  A&Ox0 and hypotensive.  He arrived in the ER with a GCS of 7 (E1,V1,M5) and severely hypotensive.  The patient was intubated for airway protection and central venous access was obtained for administration of high-volume crystalloid resuscitation and vasopressor therapy.  Labs in the ER show a significant leukocytosis at 22.9, metabolic panel with hypokalemia at 2.7, CO2 14, anion gap 35 glucose 188, creatinine 2.73, BUN 56, AST 70, ALT 48, alkaline phosphatase 136, total bilirubin 2, INR 8.93 and lactate 11.  UA did not show any evidence of urinary tract infection.    9/9 Lactate cleared, making urine, awake and alert - not following commands. CT A/P neg, tox w/u negative, LP pending  INR rapidly corrected with vitamin K, will proceed with lumbar puncture; TEG was overall reassuring even during coagulopathy.    9/10 seizure-like activity overnight, awaiting continuous EEG, continues to have fevers with cultures that are negative-CNS studies appear reassuring, okay to start heparin subcu for prophylaxis given resolution of coagulopathy    Reviewed last 24 hour events:              - Overnight events: seizure like activity              - Hemodynamics: NE for MAPs .> 65              - Neuro/Pain/Sedation: propofol   - Resp: MV              - GI: initiate TF today  - Infectious Disease: empiric  ceftriaxone               - UOP:  adequate                         Code Status  Full Code    Review of Systems  Review of Systems   Unable to perform ROS: Critical illness       Past Medical History   has a past medical history of Arthritis, Hypertension, Pain, and Psychiatric problem.    Surgical History   has a past surgical history that includes ankle orif (Right, 9/26/2017).    Family History  family history is not on file.    Social History   reports that he has never smoked. His smokeless tobacco use includes chew. He reports current alcohol use. He reports that he does not use drugs.    Medications  Home Medications    **Home medications have not yet been reviewed for this encounter**       Current Facility-Administered Medications   Medication Dose Route Frequency Provider Last Rate Last Dose   • LORazepam (ATIVAN) injection 4 mg  4 mg Intravenous Q10 MIN PRN Mode Ojeda Jr., D.O.   4 mg at 09/10/20 0510   • heparin injection 5,000 Units  5,000 Units Subcutaneous Q8HRS Avery Oliver M.D.   5,000 Units at 09/10/20 1326   • potassium chloride in water (KCL) ivpb **Administer in ICU only** 20 mEq  20 mEq Intravenous Once Avery Oliver M.D. 50 mL/hr at 09/10/20 1608 20 mEq at 09/10/20 1608   • levETIRAcetam (Keppra) 500 mg in 100 mL NaCl IV premix  500 mg Intravenous Q12HRS Avery Oliver M.D.       • Pharmacy Consult: Enteral tube insertion - review meds/change route/product selection  1 Each Other PHARMACY TO DOSE Avery Oliver M.D.       • acetaminophen (TYLENOL) tablet 650 mg  650 mg Enteral Tube Q6HRS PRN Mode Ojeda Jr., D.O.       • ondansetron (ZOFRAN ODT) dispertab 4 mg  4 mg Enteral Tube Q4HRS PRN Mode Ojeda Jr., D.O.       • promethazine (PHENERGAN) tablet 12.5-25 mg  12.5-25 mg Enteral Tube Q4HRS PRN Mode Ojeda Jr., D.O.       • [START ON 9/11/2020] multivitamin (THERAGRAN) tablet 1 Tab  1 Tab Enteral Tube DAILY Mode Ojeda Jr., D.O.        And   • [START ON  9/11/2020] folic acid (FOLVITE) tablet 1 mg  1 mg Enteral Tube DAILY REYES Villalpando Jr.O.       • ALPRAZolam (XANAX) tablet 0.5 mg  0.5 mg Enteral Tube BID Mode Ojeda Jr., D.O.       • ketamine 500 mg in  mL infusion (critical care only)  0-2.5 mg/kg/hr Intravenous Continuous Avery Oliver M.D.       • Respiratory Therapy Consult   Nebulization Continuous RT VERÓNICA Villalpando Jr..ALBERT.       • ipratropium-albuterol (DUONEB) nebulizer solution  3 mL Nebulization Q2HRS PRN (RT) Mode Ojeda Jr., D.O.       • senna-docusate (PERICOLACE or SENOKOT S) 8.6-50 MG per tablet 2 Tab  2 Tab Enteral Tube BID VERÓNICA Villalpando Jr..O.   Stopped at 09/10/20 0600    And   • polyethylene glycol/lytes (MIRALAX) PACKET 1 Packet  1 Packet Enteral Tube QDAY PRN VERÓNICA Villalpando Jr..KITTY        And   • magnesium hydroxide (MILK OF MAGNESIA) suspension 30 mL  30 mL Enteral Tube QDAY PRN REYES Villalpando Jr.OJose Alberto        And   • bisacodyl (DULCOLAX) suppository 10 mg  10 mg Rectal QDAY PRN VERÓNICA Villalpando Jr..O.       • MD Alert...ICU Electrolyte Replacement per Pharmacy   Other PHARMACY TO DOSE VERÓNICA Villalpando Jr..ALBERT.       • lidocaine (XYLOCAINE) 1 % injection 1-2 mL  1-2 mL Tracheal Tube Q30 MIN PRN VERÓNICA Villalpando Jr..O.       • fentaNYL (SUBLIMAZE) injection 50 mcg  50 mcg Intravenous Q15 MIN PRN VERÓNICA Villalpando Jr..O.   50 mcg at 09/09/20 1239    And   • fentaNYL (SUBLIMAZE) injection 100 mcg  100 mcg Intravenous Q15 MIN PRN VERÓNICA Villalpando Jr..O.   50 mcg at 09/09/20 1257    And   • fentaNYL (SUBLIMAZE) 50 mcg/mL in 50mL (Continuous Infusion)   Intravenous Continuous VERÓINCA Villalpando Jr..O. 1 mL/hr at 09/10/20 1550 50 mcg/hr at 09/10/20 1550   • pantoprazole (PROTONIX) injection 40 mg  40 mg Intravenous BID Mode Ojeda Jr., D.O.   40 mg at 09/10/20 3886   • lactated ringers infusion (BOLUS): BMI less than or equal to 30  30 mL/kg Intravenous Once PRN Tushar Mak M.D.       • lactated  ringers infusion (BOLUS)  1,000 mL Intravenous Once PRN VERÓNICA Villalpando Jr..O.       • norepinephrine (Levophed) infusion 8 mg/250 mL (premix)  0.5-30 mcg/min Intravenous Continuous VERÓNICA Villalpando Jr..OJose Alberto 3.8 mL/hr at 09/10/20 1050 2 mcg/min at 09/10/20 1050    And   • vasopressin (VASOSTRICT) 20 Units in  mL Infusion  0.03 Units/min Intravenous Continuous VERÓNICA Villalpando Jr..OJose Alberto   Stopped at 09/09/20 0400   • thiamine (B-1) 500 mg in D5W 100 mL IVPB  500 mg Intravenous TID VERÓNICA Villalpando Jr..O. 200 mL/hr at 09/10/20 1149 500 mg at 09/10/20 1149    Followed by   • [START ON 9/11/2020] thiamine (B-1) 250 mg in D5W 100 mL IVPB  250 mg Intravenous DAILY VERÓNICA Villalpando Jr..O.       • ondansetron (ZOFRAN) syringe/vial injection 4 mg  4 mg Intravenous Q4HRS PRN VERÓNICA Villalpando Jr..O.       • promethazine (PHENERGAN) suppository 12.5-25 mg  12.5-25 mg Rectal Q4HRS PRN VERÓNICA Villalpando Jr..O.       • prochlorperazine (COMPAZINE) injection 5-10 mg  5-10 mg Intravenous Q4HRS PRN VERÓNICA Villalpando Jr..O.       • cefTRIAXone (ROCEPHIN) 2 g in  mL IVPB  2 g Intravenous Q24HRS Avery Oliver M.D.   Stopped at 09/09/20 1648       Allergies  No Known Allergies    Vital Signs last 24 hours  Temp:  [36.6 °C (97.9 °F)-38.7 °C (101.7 °F)] 36.6 °C (97.9 °F)  Pulse:  [] 49  Resp:  [13-27] 16  BP: ()/() 84/56  SpO2:  [79 %-100 %] 100 %    Physical Exam  Physical Exam  Vitals signs and nursing note reviewed.   Constitutional:       General: He is not in acute distress.     Appearance: He is ill-appearing. He is not toxic-appearing.   HENT:      Head: Normocephalic and atraumatic.      Mouth/Throat:      Mouth: Mucous membranes are moist.   Eyes:      Extraocular Movements: Extraocular movements intact.      Pupils: Pupils are equal, round, and reactive to light.   Cardiovascular:      Rate and Rhythm: Regular rhythm. Bradycardia present.   Pulmonary:      Breath sounds: Normal breath  sounds.      Comments: Mechanical ventilation  Abdominal:      General: Abdomen is flat. There is no distension.      Tenderness: There is no abdominal tenderness. There is no guarding or rebound.   Musculoskeletal:         General: No swelling or tenderness.   Skin:     General: Skin is warm and dry.      Capillary Refill: Capillary refill takes less than 2 seconds.   Neurological:      Comments: Moves all 4 extremities, does not localize but opens his eyes to noxious stimuli and looks around         Fluids    Intake/Output Summary (Last 24 hours) at 9/10/2020 1712  Last data filed at 9/10/2020 1600  Gross per 24 hour   Intake 527.62 ml   Output 1420 ml   Net -892.38 ml       Laboratory  Recent Results (from the past 48 hour(s))   CBC WITH DIFFERENTIAL    Collection Time: 09/09/20 12:08 AM   Result Value Ref Range    WBC 22.9 (H) 4.8 - 10.8 K/uL    RBC 5.21 4.70 - 6.10 M/uL    Hemoglobin 16.5 14.0 - 18.0 g/dL    Hematocrit 49.9 42.0 - 52.0 %    MCV 95.8 81.4 - 97.8 fL    MCH 31.7 27.0 - 33.0 pg    MCHC 33.1 (L) 33.7 - 35.3 g/dL    RDW 46.2 35.9 - 50.0 fL    Platelet Count 464 (H) 164 - 446 K/uL    MPV 11.2 9.0 - 12.9 fL    Neutrophils-Polys 74.40 (H) 44.00 - 72.00 %    Lymphocytes 13.70 (L) 22.00 - 41.00 %    Monocytes 5.10 0.00 - 13.40 %    Eosinophils 0.00 0.00 - 6.90 %    Basophils 3.40 (H) 0.00 - 1.80 %    Nucleated RBC 1.20 /100 WBC    Neutrophils (Absolute) 17.43 (H) 1.82 - 7.42 K/uL    Lymphs (Absolute) 3.14 1.00 - 4.80 K/uL    Monos (Absolute) 1.17 (H) 0.00 - 0.85 K/uL    Eos (Absolute) 0.00 0.00 - 0.51 K/uL    Baso (Absolute) 0.78 (H) 0.00 - 0.12 K/uL    NRBC (Absolute) 0.27 K/uL    Anisocytosis 1+     Macrocytosis 1+    COMP METABOLIC PANEL    Collection Time: 09/09/20 12:08 AM   Result Value Ref Range    Sodium 138 135 - 145 mmol/L    Potassium 2.7 (LL) 3.6 - 5.5 mmol/L    Chloride 89 (L) 96 - 112 mmol/L    Co2 14 (L) 20 - 33 mmol/L    Anion Gap 35.0 (H) 7.0 - 16.0    Glucose 188 (H) 65 - 99 mg/dL    Bun  56 (H) 8 - 22 mg/dL    Creatinine 2.73 (H) 0.50 - 1.40 mg/dL    Calcium 9.9 8.5 - 10.5 mg/dL    AST(SGOT) 70 (H) 12 - 45 U/L    ALT(SGPT) 48 2 - 50 U/L    Alkaline Phosphatase 156 (H) 30 - 99 U/L    Total Bilirubin 2.0 (H) 0.1 - 1.5 mg/dL    Albumin 3.4 3.2 - 4.9 g/dL    Total Protein 7.0 6.0 - 8.2 g/dL    Globulin 3.6 (H) 1.9 - 3.5 g/dL    A-G Ratio 0.9 g/dL   LACTIC ACID    Collection Time: 09/09/20 12:08 AM   Result Value Ref Range    Lactic Acid 11.0 (HH) 0.5 - 2.0 mmol/L   MAGNESIUM    Collection Time: 09/09/20 12:08 AM   Result Value Ref Range    Magnesium 3.5 (H) 1.5 - 2.5 mg/dL   PHOSPHORUS    Collection Time: 09/09/20 12:08 AM   Result Value Ref Range    Phosphorus 6.0 (H) 2.5 - 4.5 mg/dL   TROPONIN    Collection Time: 09/09/20 12:08 AM   Result Value Ref Range    Troponin T 45 (H) 6 - 19 ng/L   ESTIMATED GFR    Collection Time: 09/09/20 12:08 AM   Result Value Ref Range    GFR If African American 30 (A) >60 mL/min/1.73 m 2    GFR If Non African American 25 (A) >60 mL/min/1.73 m 2   DIFFERENTIAL MANUAL    Collection Time: 09/09/20 12:08 AM   Result Value Ref Range    Bands-Stabs 1.70 0.00 - 10.00 %    Metamyelocytes 1.70 %    Manual Diff Status PERFORMED    PERIPHERAL SMEAR REVIEW    Collection Time: 09/09/20 12:08 AM   Result Value Ref Range    Peripheral Smear Review see below    PLATELET ESTIMATE    Collection Time: 09/09/20 12:08 AM   Result Value Ref Range    Plt Estimation Increased    MORPHOLOGY    Collection Time: 09/09/20 12:08 AM   Result Value Ref Range    RBC Morphology Present     Polychromia 1+     Poikilocytosis 1+    ACCU-CHEK GLUCOSE    Collection Time: 09/09/20 12:10 AM   Result Value Ref Range    Glucose - Accu-Ck 150 (H) 65 - 99 mg/dL   BLOOD CULTURE    Collection Time: 09/09/20 12:30 AM    Specimen: Peripheral; Blood   Result Value Ref Range    Significant Indicator NEG     Source BLD     Site PERIPHERAL     Culture Result       No Growth  Note: Blood cultures are incubated for 5 days  and  are monitored continuously.Positive blood cultures  are called to the RN and reported as soon as  they are identified.     ISTAT ARTERIAL BLOOD GAS    Collection Time: 20 12:34 AM   Result Value Ref Range    Ph 7.323 (L) 7.400 - 7.500    Pco2 29.2 26.0 - 37.0 mmHg    Po2 149 (H) 64 - 87 mmHg    Tco2 16 (L) 20 - 33 mmol/L    S02 99 93 - 99 %    Hco3 15.2 (L) 17.0 - 25.0 mmol/L    BE -10 (L) -4 - 3 mmol/L    Body Temp see below degrees    O2 Therapy 50 %    iPF Ratio 298     Specimen Arterial     Action Range Triggered NO     Inst. Qualified Patient YES    EKG    Collection Time: 20  1:22 AM   Result Value Ref Range    Report       [facility] Emergency Dept.    Test Date:  2020  Pt Name:    JOON CANTU           Department: ER  MRN:        [id number]                      Room:       [facility]  Gender:     Male                         Technician: [id number]  :        1970                   Requested By:ER TRIAGE PROTOCOL  Order #:    [id number]                    Reading MD: Tushar Mak MD    Measurements  Intervals                                Axis  Rate:       129                          P:  AZ:                                      QRS:        80  QRSD:       80                           T:          219  QT:         308  QTc:        452    Interpretive Statements  JUNCTIONAL TACHYCARDIA  PROBABLE INFERIOR INFARCT, AGE INDETERMINATE  BORDERLINE R WAVE PROGRESSION, ANTERIOR LEADS  ABNORMAL T, CONSIDER ISCHEMIA, ANT-LAT LEADS  No previous ECG available for comparison  Electronically Signed On 2020 4:38:17 PDT by Tushar Mak MD     BLOOD CULTURE    Collection Time: 20  1:24 AM    Specimen: Peripheral; Blood   Result Value Ref Range    Significant Indicator NEG     Source BLD     Site PERIPHERAL     Culture Result       No Growth  Note: Blood cultures are incubated for 5 days and  are monitored continuously.Positive blood cultures  are called to the RN and  reported as soon as  they are identified.     COVID/SARS CoV-2 PCR    Collection Time: 09/09/20  1:24 AM    Specimen: Nasopharyngeal; Respirate   Result Value Ref Range    COVID Order Status Received    SARS-CoV-2, PCR (In-House)    Collection Time: 09/09/20  1:24 AM   Result Value Ref Range    SARS-CoV-2 Source NP Swab     SARS-CoV-2 by PCR NotDetected    URINALYSIS    Collection Time: 09/09/20  1:49 AM    Specimen: Blood   Result Value Ref Range    Color Yellow     Character Clear     Specific Gravity 1.025 <1.035    Ph 5.5 5.0 - 8.0    Glucose Negative Negative mg/dL    Ketones Negative Negative mg/dL    Protein Negative Negative mg/dL    Bilirubin Small (A) Negative    Urobilinogen, Urine 1.0 Negative    Nitrite Positive (A) Negative    Leukocyte Esterase Negative Negative    Occult Blood Negative Negative    Micro Urine Req Microscopic    URINE MICROSCOPIC (W/UA)    Collection Time: 09/09/20  1:49 AM   Result Value Ref Range    WBC 0-2 (A) /hpf    RBC 2-5 (A) /hpf    Bacteria Negative None /hpf    Epithelial Cells Negative /hpf    Hyaline Cast 0-2 /lpf   URINE DRUG SCREEN    Collection Time: 09/09/20  1:49 AM   Result Value Ref Range    Amphetamines Urine Negative Negative    Barbiturates Negative Negative    Benzodiazepines Positive (A) Negative    Cocaine Metabolite Negative Negative    Methadone Negative Negative    Opiates Negative Negative    Oxycodone Negative Negative    Phencyclidine -Pcp Negative Negative    Propoxyphene Negative Negative    Cannabinoid Metab Negative Negative   URINE SODIUM RANDOM    Collection Time: 09/09/20  1:49 AM   Result Value Ref Range    Sodium, Urine -per volume <20 mmol/L   URINE PROTEIN    Collection Time: 09/09/20  1:49 AM   Result Value Ref Range    Total Protein, Urine 20.0 (H) 0.0 - 15.0 mg/dL   Prothrombin Time    Collection Time: 09/09/20  2:15 AM   Result Value Ref Range    PT 75.7 (HH) 12.0 - 14.6 sec    INR 8.93 (HH) 0.87 - 1.13   EKG    Collection Time: 09/09/20   4:22 AM   Result Value Ref Range    Report       [facility] Emergency Dept.    Test Date:  2020  Pt Name:    JOON CANTU           Department: ER  MRN:        [id number]                      Room:        06  Gender:     Male                         Technician: [id number]  :        1970                   Requested By:JAMEL CARMONA  Order #:    [id number]                    Reading MD: Jamel Carmona MD    Measurements  Intervals                                Axis  Rate:       93                           P:          67  IL:         192                          QRS:        62  QRSD:       90                           T:          118  QT:         396  QTc:        493    Interpretive Statements  SINUS RHYTHM  LOW VOLTAGE IN FRONTAL LEADS  ABNORMAL T, CONSIDER ISCHEMIA, ANT-LAT LEADS  BORDERLINE PROLONGED QT INTERVAL  Compared to ECG 2020 01:22:50  Low QRS voltage now present  Junctional tachycardia no longer present  Myocardial infarct finding no longer present  T-wave abnormality still present   Possible ischemia still present  Electronically Signed On 2020 4:38:33 PDT by Jamel Carmona MD     ACETAMINOPHEN    Collection Time: 20  4:55 AM   Result Value Ref Range    Acetaminophen -Tylenol 6 (L) 10 - 30 ug/mL   Salicylate    Collection Time: 20  4:55 AM   Result Value Ref Range    Salicylates, Quant. <1 (L) 15 - 25 mg/dL   Lactic Acid Every four hours after STAT order    Collection Time: 20  4:55 AM   Result Value Ref Range    Lactic Acid 2.2 (H) 0.5 - 2.0 mmol/L   Prothrombin time (INR)    Collection Time: 20  4:55 AM   Result Value Ref Range    PT 85.2 (HH) 12.0 - 14.6 sec    INR >=10.00 (HH) 0.87 - 1.13   PLATELET MAPPING WITH BASIC TEG    Collection Time: 20  4:55 AM   Result Value Ref Range    Reaction Time Initial-R 10.9 (H) 5.0 - 10.0 min    Clot Kinetics-K 1.9 1.0 - 3.0 min    Clot Angle-Angle 63.4 53.0 - 72.0 degrees    Maximum Clot  Strength-MA 70.3 (H) 50.0 - 70.0 mm    Lysis 30 minutes-LY30 0.0 0.0 - 8.0 %    % Inhibition ADP 14.4 %    % Inhibition AA 0.0 %    TEG Algorithm Link Algorithm    HGB    Collection Time: 20  4:55 AM   Result Value Ref Range    Hemoglobin 10.7 (L) 14.0 - 18.0 g/dL   TROPONIN    Collection Time: 20  4:55 AM   Result Value Ref Range    Troponin T 45 (H) 6 - 19 ng/L   CREATINE KINASE    Collection Time: 20  4:55 AM   Result Value Ref Range    CPK Total 689 (H) 0 - 154 U/L   AMMONIA    Collection Time: 20  5:20 AM   Result Value Ref Range    Ammonia 17 11 - 45 umol/L   ACCU-CHEK GLUCOSE    Collection Time: 20  6:23 AM   Result Value Ref Range    Glucose - Accu-Ck 134 (H) 65 - 99 mg/dL   EKG (NOW)    Collection Time: 20  7:49 AM   Result Value Ref Range    Report       [facility]    Test Date:  2020  Pt Name:    JOON CANTU           Department: ER  MRN:        [id number]                      Room:       [facility]  Gender:     Male                         Technician: [facility]  :        1970                   Requested By:JAMEL CARMONA  Order #:    [id number]                    Reading MD: Ronen Martínez MD    Measurements  Intervals                                Axis  Rate:       87                           P:          66  GA:         190                          QRS:        45  QRSD:       89                           T:          100  QT:         419  QTc:        504    Interpretive Statements  SINUS RHYTHM  LOW VOLTAGE, EXTREMITY AND PRECORDIAL LEADS  ABNRM T, CONSIDER ISCHEMIA, ANTEROLATERAL LDS  CONSIDER POSTERIOR INFARCT  PROLONGED QT INTERVAL  Compared to ECG 2020 04:22:58  T-wave abnormality no longer present  Possible ischemia still present  Electronically Signed On 2020 8:34:57 PDT by Ronen Martínez MD     HGB    Collection Time: 20  8:04 AM   Result Value Ref Range    Hemoglobin 12.8 (L) 14.0 - 18.0 g/dL   TROPONIN    Collection  Time: 09/09/20  8:04 AM   Result Value Ref Range    Troponin T 45 (H) 6 - 19 ng/L   Lactic Acid Every four hours after STAT order    Collection Time: 09/09/20  8:04 AM   Result Value Ref Range    Lactic Acid 2.6 (H) 0.5 - 2.0 mmol/L   LIPASE    Collection Time: 09/09/20  9:22 AM   Result Value Ref Range    Lipase 84 (H) 11 - 82 U/L   TSH WITH REFLEX TO FT4    Collection Time: 09/09/20  9:22 AM   Result Value Ref Range    TSH 1.450 0.380 - 5.330 uIU/mL   MRSA By PCR (Amp)    Collection Time: 09/09/20  9:35 AM    Specimen: Nares; Respirate   Result Value Ref Range    Significant Indicator NEG     Source RESP     Site NARES     MRSA PCR Negative for MRSA by PCR.    ACCU-CHEK GLUCOSE    Collection Time: 09/09/20 11:36 AM   Result Value Ref Range    Glucose - Accu-Ck 111 (H) 65 - 99 mg/dL   VANCOMYCIN TIMED    Collection Time: 09/09/20  2:02 PM   Result Value Ref Range    Vancomycin Unknown Level 24.9 ug/mL   Lactic Acid Every four hours after STAT order    Collection Time: 09/09/20  2:02 PM   Result Value Ref Range    Lactic Acid 1.7 0.5 - 2.0 mmol/L   TROPONIN    Collection Time: 09/09/20  2:02 PM   Result Value Ref Range    Troponin T 42 (H) 6 - 19 ng/L   Prothrombin Time    Collection Time: 09/09/20  2:02 PM   Result Value Ref Range    PT 19.1 (H) 12.0 - 14.6 sec    INR 1.56 (H) 0.87 - 1.13   POTASSIUM SERUM (K)    Collection Time: 09/09/20  2:02 PM   Result Value Ref Range    Potassium 3.2 (L) 3.6 - 5.5 mmol/L   HGB    Collection Time: 09/09/20  2:45 PM   Result Value Ref Range    Hemoglobin 12.3 (L) 14.0 - 18.0 g/dL   CSF Glucose    Collection Time: 09/09/20  5:13 PM   Result Value Ref Range    Glucose CSF 82 (H) 40 - 80 mg/dL   CSF Protein    Collection Time: 09/09/20  5:13 PM   Result Value Ref Range    Total Protein, CSF 67 (H) 15 - 45 mg/dL   CSF Cell Count    Collection Time: 09/09/20  5:13 PM   Result Value Ref Range    Number Of Tubes 4     Volume 15.5 mL    Color-Body Fluid Colorless     Character-Body  Fluid Clear     Supernatant Appearance Colorless     Total RBC Count 2 cells/uL    Crenated RBC 0 %    Total WBC Count 2 0 - 10 cells/uL    Polys 2 %    Lymphs 19 %    Mononuclear Cells - CSF 79 %    CSF Tube Number 3    Enterovirus (CSF) PCR    Collection Time: 09/09/20  5:13 PM   Result Value Ref Range    Enterovirus Source CSF    CSF CULTURE    Collection Time: 09/09/20  5:13 PM    Specimen: CSF   Result Value Ref Range    Significant Indicator NEG     Source CSF     Site Tap     Culture Result No growth at 24 hours.     Gram Stain Result No organisms seen.    GRAM STAIN    Collection Time: 09/09/20  5:13 PM    Specimen: CSF   Result Value Ref Range    Significant Indicator .     Source CSF     Site Tap     Gram Stain Result No organisms seen.    ACCU-CHEK GLUCOSE    Collection Time: 09/09/20  5:35 PM   Result Value Ref Range    Glucose - Accu-Ck 123 (H) 65 - 99 mg/dL   ACCU-CHEK GLUCOSE    Collection Time: 09/09/20 11:42 PM   Result Value Ref Range    Glucose - Accu-Ck 121 (H) 65 - 99 mg/dL   CBC with Differential    Collection Time: 09/10/20  3:55 AM   Result Value Ref Range    WBC 21.8 (H) 4.8 - 10.8 K/uL    RBC 3.68 (L) 4.70 - 6.10 M/uL    Hemoglobin 11.6 (L) 14.0 - 18.0 g/dL    Hematocrit 35.7 (L) 42.0 - 52.0 %    MCV 94.6 81.4 - 97.8 fL    MCH 31.3 27.0 - 33.0 pg    MCHC 33.0 (L) 33.7 - 35.3 g/dL    RDW 45.5 35.9 - 50.0 fL    Platelet Count 210 164 - 446 K/uL    MPV 10.9 9.0 - 12.9 fL    Neutrophils-Polys 88.40 (H) 44.00 - 72.00 %    Lymphocytes 5.90 (L) 22.00 - 41.00 %    Monocytes 3.50 0.00 - 13.40 %    Eosinophils 0.00 0.00 - 6.90 %    Basophils 0.20 0.00 - 1.80 %    Immature Granulocytes 2.00 (H) 0.00 - 0.90 %    Nucleated RBC 0.10 /100 WBC    Neutrophils (Absolute) 19.26 (H) 1.82 - 7.42 K/uL    Lymphs (Absolute) 1.28 1.00 - 4.80 K/uL    Monos (Absolute) 0.76 0.00 - 0.85 K/uL    Eos (Absolute) 0.00 0.00 - 0.51 K/uL    Baso (Absolute) 0.05 0.00 - 0.12 K/uL    Immature Granulocytes (abs) 0.44 (H) 0.00 -  0.11 K/uL    NRBC (Absolute) 0.03 K/uL   Basic Metabolic Panel (BMP)    Collection Time: 09/10/20  3:55 AM   Result Value Ref Range    Sodium 145 135 - 145 mmol/L    Potassium 2.9 (L) 3.6 - 5.5 mmol/L    Chloride 112 96 - 112 mmol/L    Co2 19 (L) 20 - 33 mmol/L    Glucose 140 (H) 65 - 99 mg/dL    Bun 46 (H) 8 - 22 mg/dL    Creatinine 1.56 (H) 0.50 - 1.40 mg/dL    Calcium 8.6 8.5 - 10.5 mg/dL    Anion Gap 14.0 7.0 - 16.0   Magnesium    Collection Time: 09/10/20  3:55 AM   Result Value Ref Range    Magnesium 2.9 (H) 1.5 - 2.5 mg/dL   Phosphorus    Collection Time: 09/10/20  3:55 AM   Result Value Ref Range    Phosphorus 3.0 2.5 - 4.5 mg/dL   ESTIMATED GFR    Collection Time: 09/10/20  3:55 AM   Result Value Ref Range    GFR If  57 (A) >60 mL/min/1.73 m 2    GFR If Non  47 (A) >60 mL/min/1.73 m 2   Triglycerides Starting now and then Every 3 Days    Collection Time: 09/10/20  3:55 AM   Result Value Ref Range    Triglycerides 118 0 - 149 mg/dL   ISTAT ARTERIAL BLOOD GAS    Collection Time: 09/10/20  8:06 AM   Result Value Ref Range    Ph 7.500 7.400 - 7.500    Pco2 22.5 (L) 26.0 - 37.0 mmHg    Po2 164 (H) 64 - 87 mmHg    Tco2 18 (L) 20 - 33 mmol/L    S02 100 (H) 93 - 99 %    Hco3 17.5 17.0 - 25.0 mmol/L    BE -5 (L) -4 - 3 mmol/L    Body Temp 37.8 C degrees    O2 Therapy 40 %    iPF Ratio 410     Ph Temp Emelina 7.488 7.400 - 7.500    Pco2 Temp Co 23.3 (L) 26.0 - 37.0 mmHg    Po2 Temp Cor 168 (H) 64 - 87 mmHg    Specimen Arterial     Action Range Triggered NO     Inst. Qualified Patient YES    EC-ECHOCARDIOGRAM COMPLETE W/O CONT    Collection Time: 09/10/20 10:06 AM   Result Value Ref Range    Eject.Frac. MOD BP 56.28     Eject.Frac. MOD 4C 57.38     Eject.Frac. MOD 2C 55.1     Left Ventrical Ejection Fraction 55        Imaging  [id number] VIEW   Final Result      Enteric tube projects over the distal stomach/pylorus.         EC-ECHOCARDIOGRAM COMPLETE W/O CONT   Final Result       DX-CHEST-PORTABLE (1 VIEW)   Final Result         1.  No acute cardiopulmonary disease.      CT-ABDOMEN-PELVIS W/O   Final Result      1.  No renal stone or hydronephrosis.   2.  Normal appendix.   3.  No focal mesenteric inflammatory process.      DX-ABDOMEN FOR TUBE PLACEMENT   Final Result      NG tube tip projects over expected location the gastric fundus.      US-ABDOMEN COMPLETE SURVEY   Final Result         1.  Echogenic liver compatible with fatty change versus fibrosis.   2.  Gallbladder sludge without additional sonographic findings of cholecystitis.   3.  Partial atrophic bilateral kidneys with lobulated contour         CT-HEAD W/O   Final Result         1.  No acute intracranial abnormality is identified, there are nonspecific white matter changes, commonly associated with small vessel ischemic disease.  Associated mild cerebral atrophy is noted.   2.  Atherosclerosis.      DX-CHEST-PORTABLE (1 VIEW)   Final Result         1.  No acute cardiopulmonary disease.          Assessment/Plan  Acute respiratory failure with hypoxia (HCC)- (present on admission)  Assessment & Plan  Intubated for airway protection  RT/O2 protocols  Daily and PRN ABGs  Titration of ventilator therapy based on ABGs and patient's status  Sedation as tolerated/indicated  Daily CXR  HOB >30 degrees and peridex for VAP prevention  Pepcid for GI prophylaxis  SAT/SBT when able (ABCDEF Bundle)  Early mobility    Benzodiazepine dependence (HCC)- (present on admission)  Assessment & Plan  Monitor for seizure/withdrawal  Xanax 0.5 mg BID  cEEG after sz-like activity 9/10    Alcohol abuse- (present on admission)  Assessment & Plan  Reported hx of  Vitamins  High dose thiamine  Seizure precautions    Coagulopathy (HCC)- (present on admission)  Assessment & Plan  Markedly elevated INR  Protonix 40 mg BID  Vit K 10 mg reduced it to 1.5    Septic shock (HCC)- (present on admission)  Assessment & Plan  This is Septic shock Present on  admission  SIRS criteria identified on my evaluation include: Fever, with temperature greater than 101 deg F, Tachycardia, with heart rate greater than 90 BPM, Tachypnea, with respirations greater than 20 per minute and Leukocytosis, with WBC greater than 12,000  Source is Unknown, ? pulmonary  Presentation includes: Severe sepsis present, persistent hypotension after 30 ml/kg completed, and initial lactate level result is >= 4 mmol/L.   Despite appropriate fluid resuscitation with crystalloid given per sepsis guidelines, the patient remains hypotensive with systolic blood pressure less than 90 or MAP less than 65  Hemodynamic support with additional fluids and IV vasopressors as needed to maintain a SBP of 90 or MAP of 65  IV antibiotics as appropriate for source of sepsis  Reassessment: I have reassessed the patient's hemodynamic status    sepsis protocol  Broad-spectrum antibiotics: vanc/zosyn --> ceftriaxone  30 mL/kg crystalloid bolus provided  Titrate vasopressors MAP >65 mmHg  Pressors if needed to maintain MAP >65 mmHg  blood, respiratory and urine cultures       JEWELS (acute kidney injury) (HCC)- (present on admission)  Assessment & Plan  Consistent with ATN due to hypotension, dehydration and sepsis  Renal dose meds, avoid nephrotoxins  Strict I/Os       Encephalopathy acute- (present on admission)  Assessment & Plan  Tox vs metabolic/septic  CNS infection interrogated with LP, initial studies are reassuring  Monitor for need for EEG   UDS + benzos       Titrating vasopressors for MAPs > 65    Lung protective ventilation strategies  Titrate ventilator prescription to optimize oxygenation, ventilation, and acid base balance.      Discussed patient condition and risk of morbidity and/or mortality with RN, RT, Pharmacy, Code status disscussed and ERP.      The patient remains critically ill.  Critical care time = 41 minutes in directly providing and coordinating critical care and extensive data review.  No time  overlap and excludes procedures.

## 2020-09-10 NOTE — PROGRESS NOTES
Cortrak Placement    Tube Team verified patient name and medical record number prior to tube placement.  Cortrak tube (43 inches, 10 Croatian) placed at 85 cm in right nare.  Per Cortrak picture, tube appears to be in the stomach.  Nursing Instructions: Awaiting KUB to confirm placement before use for medications or feeding. Once placement confirmed, flush tube with 30 ml of water, and then remove and save stylet, in patient medication drawer.

## 2020-09-10 NOTE — PROCEDURES
ROUTINE ELECTROENCEPHALOGRAM REPORT      Referring provider: Dr. Ojeda.     DOS: 9/10/2020 (total recording of 26 minutes)    INDICATION:  Yury Blake 50 y.o. male presenting with ams.    CURRENT ANTIEPILEPTIC REGIMEN: Levetiracetam, Lorazepam, Propofol (paused).     TECHNIQUE: 30 channel routine electroencephalogram (EEG) was performed in accordance with the international 10-20 system. The study was reviewed in bipolar and referential montages. The recording examined a sedated and/or comatose patient.     DESCRIPTION OF THE RECORD:  Generalized delta / theta activity. Did not wake up and no background eeg changes with stimulation. Mild jerks captured, which were not epileptic in nature.       ACTIVATION PROCEDURES:   Not performed.     EKG: sampling of the EKG recording demonstrated sinus rhythm.       INTERPRETATION:  This is an abnormal routine EEG recording in a sedated and/or comatose patient. A moderate encephalopathy is suggested, which is non specific. The patient did not wake up and there were no background eeg changes with stimulation. Mild jerks captured, which were not epileptic in nature. No seizures captured.  Clinical correlation is recommended.    Note: this EEG does not rule out epilepsy.  If the clinical suspicion remains high for seizures, a prolonged recording to capture clinical or subclinical events may be helpful.        Pérez Sharma MD   Epilepsy and Neurodiagnostics.   Clinical  of Neurology [facility] [employer].   Diplomate in Neurology, Epilepsy, and Electrodiagnostic Medicine.   Office: [phone]  Fax: [phone]

## 2020-09-10 NOTE — CARE PLAN
Problem: Communication  Goal: The ability to communicate needs accurately and effectively will improve  Outcome: PROGRESSING AS EXPECTED     Problem: Safety  Goal: Will remain free from injury  Outcome: PROGRESSING AS EXPECTED     Problem: Bowel/Gastric:  Goal: Normal bowel function is maintained or improved  Outcome: PROGRESSING AS EXPECTED     Problem: Knowledge Deficit  Goal: Knowledge of disease process/condition, treatment plan, diagnostic tests, and medications will improve  Outcome: PROGRESSING AS EXPECTED     Problem: Fluid Volume:  Goal: Will maintain balanced intake and output  Outcome: PROGRESSING AS EXPECTED     Problem: Respiratory:  Goal: Respiratory status will improve  Outcome: PROGRESSING AS EXPECTED     Problem: Safety - Medical Restraint  Goal: Remains free of injury from restraints (Restraint for Interference with Medical Device)  Description: INTERVENTIONS:  1. Determine that other, less restrictive measures have been tried or would not be effective before applying the restraint  2. Evaluate the patient's condition at the time of restraint application  3. Inform patient/family regarding the reason for restraint  4. Q2H: Monitor safety, psychosocial status, comfort, nutrition and hydration  Outcome: PROGRESSING AS EXPECTED     Problem: Pain Management  Goal: Pain level will decrease to patient's comfort goal  Outcome: PROGRESSING AS EXPECTED

## 2020-09-11 ENCOUNTER — APPOINTMENT (OUTPATIENT)
Dept: RADIOLOGY | Facility: MEDICAL CENTER | Age: 50
DRG: 853 | End: 2020-09-11
Attending: INTERNAL MEDICINE
Payer: MEDICAID

## 2020-09-11 LAB
ACTION RANGE TRIGGERED IACRT: NO
ANION GAP SERPL CALC-SCNC: 12 MMOL/L (ref 7–16)
BACTERIA UR CULT: NORMAL
BACTERIA UR CULT: NORMAL
BASE EXCESS BLDA CALC-SCNC: -4 MMOL/L (ref -4–3)
BASOPHILS # BLD AUTO: 0.2 % (ref 0–1.8)
BASOPHILS # BLD: 0.02 K/UL (ref 0–0.12)
BODY TEMPERATURE: ABNORMAL DEGREES
BUN SERPL-MCNC: 39 MG/DL (ref 8–22)
C GATTII+NEOFOR DNA CSF QL NAA+NON-PROBE: NOT DETECTED
CALCIUM SERPL-MCNC: 8.5 MG/DL (ref 8.5–10.5)
CHLORIDE SERPL-SCNC: 119 MMOL/L (ref 96–112)
CMV DNA CSF QL NAA+NON-PROBE: NOT DETECTED
CO2 BLDA-SCNC: 21 MMOL/L (ref 20–33)
CO2 SERPL-SCNC: 21 MMOL/L (ref 20–33)
CREAT SERPL-MCNC: 1.36 MG/DL (ref 0.5–1.4)
CRP SERPL HS-MCNC: 9.21 MG/DL (ref 0–0.75)
E COLI K1 DNA CSF QL NAA+NON-PROBE: NOT DETECTED
EOSINOPHIL # BLD AUTO: 0 K/UL (ref 0–0.51)
EOSINOPHIL NFR BLD: 0 % (ref 0–6.9)
ERYTHROCYTE [DISTWIDTH] IN BLOOD BY AUTOMATED COUNT: 47.4 FL (ref 35.9–50)
EV RNA CSF QL NAA+NON-PROBE: NOT DETECTED
GLUCOSE SERPL-MCNC: 114 MG/DL (ref 65–99)
GP B STREP DNA CSF QL NAA+NON-PROBE: NOT DETECTED
HAEM INFLU DNA CSF QL NAA+NON-PROBE: NOT DETECTED
HCO3 BLDA-SCNC: 20.3 MMOL/L (ref 17–25)
HCT VFR BLD AUTO: 30.8 % (ref 42–52)
HGB BLD-MCNC: 10.2 G/DL (ref 14–18)
HHV6 DNA CSF QL NAA+NON-PROBE: NOT DETECTED
HOROWITZ INDEX BLDA+IHG-RTO: 368 MM[HG]
HSV1 DNA CSF QL NAA+NON-PROBE: NOT DETECTED
HSV2 DNA CSF QL NAA+NON-PROBE: NOT DETECTED
IMM GRANULOCYTES # BLD AUTO: 0.16 K/UL (ref 0–0.11)
IMM GRANULOCYTES NFR BLD AUTO: 1.3 % (ref 0–0.9)
INST. QUALIFIED PATIENT IIQPT: YES
L MONOCYTOG DNA CSF QL NAA+NON-PROBE: NOT DETECTED
LYMPHOCYTES # BLD AUTO: 1.61 K/UL (ref 1–4.8)
LYMPHOCYTES NFR BLD: 13 % (ref 22–41)
MAGNESIUM SERPL-MCNC: 3.1 MG/DL (ref 1.5–2.5)
MCH RBC QN AUTO: 31.7 PG (ref 27–33)
MCHC RBC AUTO-ENTMCNC: 33.1 G/DL (ref 33.7–35.3)
MCV RBC AUTO: 95.7 FL (ref 81.4–97.8)
MONOCYTES # BLD AUTO: 0.68 K/UL (ref 0–0.85)
MONOCYTES NFR BLD AUTO: 5.5 % (ref 0–13.4)
N MEN DNA CSF QL NAA+NON-PROBE: NOT DETECTED
NEUTROPHILS # BLD AUTO: 9.92 K/UL (ref 1.82–7.42)
NEUTROPHILS NFR BLD: 80 % (ref 44–72)
NRBC # BLD AUTO: 0 K/UL
NRBC BLD-RTO: 0 /100 WBC
O2/TOTAL GAS SETTING VFR VENT: 40 %
PARECHOVIRUS A RNA CSF QL NAA+NON-PROBE: NOT DETECTED
PCO2 BLDA: 31.6 MMHG (ref 26–37)
PCO2 TEMP ADJ BLDA: 30.7 MMHG (ref 26–37)
PH BLDA: 7.42 [PH] (ref 7.4–7.5)
PH TEMP ADJ BLDA: 7.42 [PH] (ref 7.4–7.5)
PHOSPHATE SERPL-MCNC: 2.1 MG/DL (ref 2.5–4.5)
PLATELET # BLD AUTO: 158 K/UL (ref 164–446)
PMV BLD AUTO: 11.3 FL (ref 9–12.9)
PO2 BLDA: 147 MMHG (ref 64–87)
PO2 TEMP ADJ BLDA: 144 MMHG (ref 64–87)
POTASSIUM SERPL-SCNC: 3.2 MMOL/L (ref 3.6–5.5)
POTASSIUM SERPL-SCNC: 3.8 MMOL/L (ref 3.6–5.5)
PREALB SERPL-MCNC: 8.8 MG/DL (ref 18–38)
RBC # BLD AUTO: 3.22 M/UL (ref 4.7–6.1)
S PNEUM DNA CSF QL NAA+NON-PROBE: NOT DETECTED
SAO2 % BLDA: 99 % (ref 93–99)
SIGNIFICANT IND 70042: NORMAL
SIGNIFICANT IND 70042: NORMAL
SITE SITE: NORMAL
SITE SITE: NORMAL
SODIUM SERPL-SCNC: 152 MMOL/L (ref 135–145)
SOURCE SOURCE: NORMAL
SOURCE SOURCE: NORMAL
SPECIMEN DRAWN FROM PATIENT: ABNORMAL
VZV DNA CSF QL NAA+NON-PROBE: NOT DETECTED
WBC # BLD AUTO: 12.4 K/UL (ref 4.8–10.8)

## 2020-09-11 PROCEDURE — 700105 HCHG RX REV CODE 258: Performed by: INTERNAL MEDICINE

## 2020-09-11 PROCEDURE — 84134 ASSAY OF PREALBUMIN: CPT

## 2020-09-11 PROCEDURE — 36600 WITHDRAWAL OF ARTERIAL BLOOD: CPT

## 2020-09-11 PROCEDURE — 700102 HCHG RX REV CODE 250 W/ 637 OVERRIDE(OP): Performed by: INTERNAL MEDICINE

## 2020-09-11 PROCEDURE — 83735 ASSAY OF MAGNESIUM: CPT

## 2020-09-11 PROCEDURE — 80048 BASIC METABOLIC PNL TOTAL CA: CPT

## 2020-09-11 PROCEDURE — 85025 COMPLETE CBC W/AUTO DIFF WBC: CPT

## 2020-09-11 PROCEDURE — 84100 ASSAY OF PHOSPHORUS: CPT

## 2020-09-11 PROCEDURE — 700101 HCHG RX REV CODE 250: Performed by: INTERNAL MEDICINE

## 2020-09-11 PROCEDURE — 700111 HCHG RX REV CODE 636 W/ 250 OVERRIDE (IP): Performed by: INTERNAL MEDICINE

## 2020-09-11 PROCEDURE — 82803 BLOOD GASES ANY COMBINATION: CPT

## 2020-09-11 PROCEDURE — A9270 NON-COVERED ITEM OR SERVICE: HCPCS | Performed by: INTERNAL MEDICINE

## 2020-09-11 PROCEDURE — 94760 N-INVAS EAR/PLS OXIMETRY 1: CPT

## 2020-09-11 PROCEDURE — 86140 C-REACTIVE PROTEIN: CPT

## 2020-09-11 PROCEDURE — 84132 ASSAY OF SERUM POTASSIUM: CPT

## 2020-09-11 PROCEDURE — 770022 HCHG ROOM/CARE - ICU (200)

## 2020-09-11 PROCEDURE — 94003 VENT MGMT INPAT SUBQ DAY: CPT

## 2020-09-11 PROCEDURE — 99291 CRITICAL CARE FIRST HOUR: CPT | Performed by: INTERNAL MEDICINE

## 2020-09-11 PROCEDURE — C9113 INJ PANTOPRAZOLE SODIUM, VIA: HCPCS | Performed by: INTERNAL MEDICINE

## 2020-09-11 PROCEDURE — 71045 X-RAY EXAM CHEST 1 VIEW: CPT

## 2020-09-11 PROCEDURE — 94770 HCHG CO2 EXPIRED GAS DETERMINATION: CPT

## 2020-09-11 RX ORDER — POTASSIUM CHLORIDE 29.8 MG/ML
40 INJECTION INTRAVENOUS ONCE
Status: COMPLETED | OUTPATIENT
Start: 2020-09-11 | End: 2020-09-11

## 2020-09-11 RX ORDER — ALPRAZOLAM 0.5 MG/1
0.5 TABLET ORAL EVERY 8 HOURS PRN
Status: DISCONTINUED | OUTPATIENT
Start: 2020-09-11 | End: 2020-09-14

## 2020-09-11 RX ORDER — FAMOTIDINE 20 MG/1
20 TABLET, FILM COATED ORAL 2 TIMES DAILY
Status: DISCONTINUED | OUTPATIENT
Start: 2020-09-11 | End: 2020-09-12

## 2020-09-11 RX ADMIN — ALPRAZOLAM 0.5 MG: 0.5 TABLET ORAL at 16:40

## 2020-09-11 RX ADMIN — POTASSIUM CHLORIDE 40 MEQ: 29.8 INJECTION, SOLUTION INTRAVENOUS at 07:14

## 2020-09-11 RX ADMIN — DOCUSATE SODIUM 50 MG AND SENNOSIDES 8.6 MG 2 TABLET: 8.6; 5 TABLET, FILM COATED ORAL at 16:39

## 2020-09-11 RX ADMIN — FOLIC ACID 1 MG: 1 TABLET ORAL at 05:45

## 2020-09-11 RX ADMIN — THIAMINE HYDROCHLORIDE 250 MG: 100 INJECTION, SOLUTION INTRAMUSCULAR; INTRAVENOUS at 05:49

## 2020-09-11 RX ADMIN — HEPARIN SODIUM 5000 UNITS: 5000 INJECTION, SOLUTION INTRAVENOUS; SUBCUTANEOUS at 05:44

## 2020-09-11 RX ADMIN — HEPARIN SODIUM 5000 UNITS: 5000 INJECTION, SOLUTION INTRAVENOUS; SUBCUTANEOUS at 21:50

## 2020-09-11 RX ADMIN — SODIUM PHOSPHATE, MONOBASIC, MONOHYDRATE 15 MMOL: 276; 142 INJECTION, SOLUTION INTRAVENOUS at 10:15

## 2020-09-11 RX ADMIN — HEPARIN SODIUM 5000 UNITS: 5000 INJECTION, SOLUTION INTRAVENOUS; SUBCUTANEOUS at 13:48

## 2020-09-11 RX ADMIN — DOCUSATE SODIUM 50 MG AND SENNOSIDES 8.6 MG 2 TABLET: 8.6; 5 TABLET, FILM COATED ORAL at 05:45

## 2020-09-11 RX ADMIN — FENTANYL CITRATE 100 MCG: 50 INJECTION INTRAMUSCULAR; INTRAVENOUS at 04:23

## 2020-09-11 RX ADMIN — Medication 200 MCG/HR: at 06:34

## 2020-09-11 RX ADMIN — ACETAMINOPHEN 650 MG: 325 TABLET, FILM COATED ORAL at 16:40

## 2020-09-11 RX ADMIN — PANTOPRAZOLE SODIUM 40 MG: 40 INJECTION, POWDER, LYOPHILIZED, FOR SOLUTION INTRAVENOUS at 05:46

## 2020-09-11 RX ADMIN — FAMOTIDINE 20 MG: 20 TABLET, FILM COATED ORAL at 16:39

## 2020-09-11 RX ADMIN — LEVETIRACETAM INJECTION 500 MG: 5 INJECTION INTRAVENOUS at 05:46

## 2020-09-11 RX ADMIN — ALPRAZOLAM 0.5 MG: 0.5 TABLET ORAL at 05:45

## 2020-09-11 RX ADMIN — THERA TABS 1 TABLET: TAB at 05:45

## 2020-09-11 ASSESSMENT — COGNITIVE AND FUNCTIONAL STATUS - GENERAL
MOVING TO AND FROM BED TO CHAIR: A LOT
SUGGESTED CMS G CODE MODIFIER MOBILITY: CM
HELP NEEDED FOR BATHING: TOTAL
SUGGESTED CMS G CODE MODIFIER DAILY ACTIVITY: CN
CLIMB 3 TO 5 STEPS WITH RAILING: TOTAL
MOBILITY SCORE: 8
STANDING UP FROM CHAIR USING ARMS: TOTAL
TOILETING: TOTAL
DRESSING REGULAR UPPER BODY CLOTHING: TOTAL
WALKING IN HOSPITAL ROOM: TOTAL
DRESSING REGULAR LOWER BODY CLOTHING: TOTAL
MOVING FROM LYING ON BACK TO SITTING ON SIDE OF FLAT BED: UNABLE
EATING MEALS: TOTAL
PERSONAL GROOMING: TOTAL
TURNING FROM BACK TO SIDE WHILE IN FLAT BAD: A LOT
DAILY ACTIVITIY SCORE: 6

## 2020-09-11 ASSESSMENT — PAIN DESCRIPTION - PAIN TYPE: TYPE: ACUTE PAIN

## 2020-09-11 ASSESSMENT — FIBROSIS 4 INDEX: FIB4 SCORE: 3.13

## 2020-09-11 NOTE — CARE PLAN
Problem: Ventilation Defect:  Goal: Ability to achieve and maintain unassisted ventilation or tolerate decreased levels of ventilator support  Outcome: PROGRESSING AS EXPECTED      Ventilator Daily Summary    Vent Day # 3      Weaning trials: SBT x 1    Plan: Continue current ventilator settings and wean mechanical ventilation as tolerated per physician orders.

## 2020-09-11 NOTE — FLOWSHEET NOTE
Extubation    Cuff leak noted Yes  Stridor present No     FiO2%: 40 % (09/11/20 1200)        Patient toleration No complications  RCP Complete? Unable to obtain patient confused  Events/Summary/Plan: No parameters obtained. Patient extubated per Dr Oliver (09/11/20 1312)

## 2020-09-11 NOTE — PROGRESS NOTES
Upon this RNs arrival, patient was moving all extremities and spontaneously agitated with a RASS of 3+, unable to follow commands, PERRL at 2mm, not tracking. Patient disconnected baptiste by sitting up abruptly.  Patient removed his own restraint.  Still not following commands or tracking.  Sedation restarted due to safety concerns.

## 2020-09-11 NOTE — PROGRESS NOTES
BP unable to take on either upper extremities.  Attempted with different device.  Manual yielded 108/64, patient making adequate urine.  Calf pressure taken, MAP correlating.

## 2020-09-11 NOTE — CARE PLAN
Problem: Respiratory:  Goal: Respiratory status will improve  Outcome: PROGRESSING AS EXPECTED  Intervention: Administer and titrate oxygen therapy  Note: SpaO2 remains >/= 92% on 2L via NC flow; Pt extubated earlier today by RT per order without incident.      Problem: Safety - Medical Restraint  Goal: Remains free of injury from restraints (Restraint for Interference with Medical Device)  Description: INTERVENTIONS:  1. Determine that other, less restrictive measures have been tried or would not be effective before applying the restraint  2. Evaluate the patient's condition at the time of restraint application  3. Inform patient/family regarding the reason for restraint  4. Q2H: Monitor safety, psychosocial status, comfort, nutrition and hydration  Outcome: PROGRESSING AS EXPECTED  Flowsheets (Taken 9/11/2020 9793)  Addressed this shift: Remains free of injury from restraints (restraint for interference with medical device):   Determine that other, less restrictive measures have been tried or would not be effective before applying the restraint   Evaluate the patient's condition at the time of restraint application   Inform patient/family regarding the reason for restraint   Every 2 hours: Monitor safety, psychosocial status, comfort, nutrition and hydration  Note: Pt remains free from s/s of injury r/t medical restraint use.

## 2020-09-11 NOTE — DISCHARGE PLANNING
Care Transition Team Discharge Planning    Anticipated Discharge Disposition: TBD    Action: Lsw received VM from pt's  Jennifer Patrick's office. Aleja at the Atty office states Atty wants to speak w/ Lsw. They would like Lsw to call them at office number [phone]. They understand this Lsw was calling pt's ex-wife, Kellee.    Lsw staffed case w/ SW Director, Elle. Lsw will call Atty office to see if they have any information to assist medical team. Lsw is not able to release any information as the medical admission is not related to the Atty involvement. If the Atty insists she needs information, Lsw can contact supervisors to advise of the situation for f/u.       Barriers to Discharge: TBD    Plan: f/u w/ medical team, etc.

## 2020-09-11 NOTE — PROGRESS NOTES
Patient was found to become bradycardic to 42 while on 0.3mcg/kg/hr of precedex and 200mcg/hr of Fentanyl.  Precedex stopped and Fentanyl decreased to 50mcg/hr.  Dr. lOiver came to bedside and recommends cutting all sedation off to see if we can get him to wake up and increase heart rate.  Fentanyl stopped at this time.  Patient will be monitored for any signs of distress or pain.

## 2020-09-11 NOTE — CONSULTS
Critical Care Consultation    Date of consult: 9/9/2020    Referring Physician  Tushar Mak M.D.    Reason for Consultation  Acute respiratory failure, acute severe encephalopathy    History of Presenting Illness  Due to the patient's encephalopathic status and no family at bedside, all history is obtained from bedside providers and chart     50 y.o. male with a pmhx of HTN, chronic pain and alcohol abuse who presented 9/9/2020 with altered mental status, he was found obtunded in his house by his ex-wife who was checking on him as he had not been seen for 2 days or been able to get a hold of him.  He was found in a chair,  A&Ox0 and hypotensive.  He arrived in the ER with a GCS of 7 (E1,V1,M5) and severely hypotensive.  The patient was intubated for airway protection and central venous access was obtained for administration of high-volume crystalloid resuscitation and vasopressor therapy.  Labs in the ER show a significant leukocytosis at 22.9, metabolic panel with hypokalemia at 2.7, CO2 14, anion gap 35 glucose 188, creatinine 2.73, BUN 56, AST 70, ALT 48, alkaline phosphatase 136, total bilirubin 2, INR 8.93 and lactate 11.  UA did not show any evidence of urinary tract infection.    9/9 Lactate cleared, making urine, awake and alert - not following commands. CT A/P neg, tox w/u negative, LP pending  INR rapidly corrected with vitamin K, will proceed with lumbar puncture; TEG was overall reassuring even during coagulopathy.    9/10 seizure-like activity overnight, awaiting continuous EEG, continues to have fevers with cultures that are negative-CNS studies appear reassuring, okay to start heparin subcu for prophylaxis given resolution of coagulopathy    9/11 non-epileptiform activity with jerking based upon yesterday's EEG, wean to extubation if able, cultures are still negative, stop antibiotics, stop Keppra    Reviewed last 24 hour events:              - Overnight events:               - Hemodynamics: NE  for MAPs > 65              - Neuro/Pain/Sedation: off   - Resp: MV              - GI: initiate TF advance to goal, increase FWF  - Infectious Disease: monitor off abx    - UOP:  adequate                         Code Status  Full Code    Review of Systems  Review of Systems   Unable to perform ROS: Critical illness       Past Medical History   has a past medical history of Arthritis, Hypertension, Pain, and Psychiatric problem.    Surgical History   has a past surgical history that includes ankle orif (Right, 9/26/2017).    Family History  family history is not on file.    Social History   reports that he has never smoked. His smokeless tobacco use includes chew. He reports current alcohol use. He reports that he does not use drugs.    Medications  Home Medications    **Home medications have not yet been reviewed for this encounter**       Current Facility-Administered Medications   Medication Dose Route Frequency Provider Last Rate Last Dose   • sodium phosphate 15 mmol in D5W 250 mL ivpb  15 mmol Intravenous Once Avery Oliver M.D. 62.5 mL/hr at 09/11/20 1015 15 mmol at 09/11/20 1015   • LORazepam (ATIVAN) injection 4 mg  4 mg Intravenous Q10 MIN PRN Mode Ojeda Jr., D.O.   4 mg at 09/10/20 0510   • heparin injection 5,000 Units  5,000 Units Subcutaneous Q8HRS Avery Oliver M.D.   5,000 Units at 09/11/20 0544   • Pharmacy Consult: Enteral tube insertion - review meds/change route/product selection  1 Each Other PHARMACY TO DOSE Avery Oliver M.D.       • acetaminophen (TYLENOL) tablet 650 mg  650 mg Enteral Tube Q6HRS PRN Mode Ojeda Jr., D.O.       • ondansetron (ZOFRAN ODT) dispertab 4 mg  4 mg Enteral Tube Q4HRS PRN Mode Ojeda Jr., D.O.       • promethazine (PHENERGAN) tablet 12.5-25 mg  12.5-25 mg Enteral Tube Q4HRS PRN Mode Ojeda Jr., D.O.       • multivitamin (THERAGRAN) tablet 1 Tab  1 Tab Enteral Tube DAILY Mode Ojeda Jr., D.O.   1 Tab at 09/11/20 0545    And   • folic  acid (FOLVITE) tablet 1 mg  1 mg Enteral Tube DAILY VERÓNICA Villalpando Jr..O.   1 mg at 09/11/20 0545   • ALPRAZolam (XANAX) tablet 0.5 mg  0.5 mg Enteral Tube BID VERÓNICA Villalpando Jr..O.   0.5 mg at 09/11/20 0545   • ketamine 500 mg in  mL infusion (critical care only)  0-2.5 mg/kg/hr Intravenous Continuous Avery Oliver M.D.   Stopped at 09/10/20 1730   • Respiratory Therapy Consult   Nebulization Continuous RT VERÓNICA Villalpando Jr..O.       • ipratropium-albuterol (DUONEB) nebulizer solution  3 mL Nebulization Q2HRS PRN (RT) Mode Ojeda Jr., D.O.       • senna-docusate (PERICOLACE or SENOKOT S) 8.6-50 MG per tablet 2 Tab  2 Tab Enteral Tube BID REYES Villalpando Jr.OJose Alberto   2 Tab at 09/11/20 0545    And   • polyethylene glycol/lytes (MIRALAX) PACKET 1 Packet  1 Packet Enteral Tube QDAY PRN VERÓNICA Villalpando Jr..OJose Alberto        And   • magnesium hydroxide (MILK OF MAGNESIA) suspension 30 mL  30 mL Enteral Tube QDAY PRN VERÓNICA Villalpando Jr..KITTY        And   • bisacodyl (DULCOLAX) suppository 10 mg  10 mg Rectal QDAY PRN VERÓNICA Villalpando Jr..O.       • MD Alert...ICU Electrolyte Replacement per Pharmacy   Other PHARMACY TO DOSE VERÓNICA Villalpando Jr..ALBERT.       • lidocaine (XYLOCAINE) 1 % injection 1-2 mL  1-2 mL Tracheal Tube Q30 MIN PRN VERÓNICA Villalpando Jr..O.       • fentaNYL (SUBLIMAZE) injection 50 mcg  50 mcg Intravenous Q15 MIN PRN VERÓNICA Villalpando Jr..O.   50 mcg at 09/09/20 1239    And   • fentaNYL (SUBLIMAZE) injection 100 mcg  100 mcg Intravenous Q15 MIN PRN VERÓNICA Villalpando Jr..OJose Alberto   100 mcg at 09/11/20 0423    And   • fentaNYL (SUBLIMAZE) 50 mcg/mL in 50mL (Continuous Infusion)   Intravenous Continuous VERÓNICA Villalpando Jr..OJose Alberto   Stopped at 09/11/20 1033   • pantoprazole (PROTONIX) injection 40 mg  40 mg Intravenous BID Mode Ojeda Jr., D.O.   40 mg at 09/11/20 0546   • lactated ringers infusion (BOLUS): BMI less than or equal to 30  30 mL/kg Intravenous Once PRN Tushar Mak,  M.D.       • lactated ringers infusion (BOLUS)  1,000 mL Intravenous Once PRN VERÓNICA Villalpando Jr..O.       • norepinephrine (Levophed) infusion 8 mg/250 mL (premix)  0.5-30 mcg/min Intravenous Continuous VERÓNICA Villalpando Jr..OJose Alberto   Stopped at 09/11/20 0020    And   • vasopressin (VASOSTRICT) 20 Units in  mL Infusion  0.03 Units/min Intravenous Continuous VERÓNICA Villalpando Jr..OJose Alberto   Stopped at 09/09/20 0400   • thiamine (B-1) 250 mg in D5W 100 mL IVPB  250 mg Intravenous DAILY VERÓNICA Villalpando Jr..O. 200 mL/hr at 09/11/20 0549 250 mg at 09/11/20 0549   • ondansetron (ZOFRAN) syringe/vial injection 4 mg  4 mg Intravenous Q4HRS PRN VERÓNICA Villalpando Jr..O.       • promethazine (PHENERGAN) suppository 12.5-25 mg  12.5-25 mg Rectal Q4HRS PRN VERÓNICA Villalpando Jr..O.       • prochlorperazine (COMPAZINE) injection 5-10 mg  5-10 mg Intravenous Q4HRS PRN VERÓNICA Villalpando Jr..O.           Allergies  No Known Allergies    Vital Signs last 24 hours  Temp:  [36.1 °C (97 °F)-37.2 °C (99 °F)] 37.2 °C (99 °F)  Pulse:  [] 68  Resp:  [13-29] 23  BP: ()/(33-72) 108/60  SpO2:  [83 %-100 %] 100 %    Physical Exam  Physical Exam  Vitals signs and nursing note reviewed.   Constitutional:       General: He is not in acute distress.     Appearance: He is ill-appearing. He is not toxic-appearing.   HENT:      Head: Normocephalic and atraumatic.      Mouth/Throat:      Mouth: Mucous membranes are moist.   Eyes:      Extraocular Movements: Extraocular movements intact.      Pupils: Pupils are equal, round, and reactive to light.   Cardiovascular:      Rate and Rhythm: Regular rhythm. Bradycardia present.   Pulmonary:      Breath sounds: Normal breath sounds.      Comments: Mechanical ventilation  Abdominal:      General: Abdomen is flat. There is no distension.      Tenderness: There is no abdominal tenderness. There is no guarding or rebound.   Musculoskeletal:         General: No swelling or tenderness.   Skin:      General: Skin is warm and dry.      Capillary Refill: Capillary refill takes less than 2 seconds.   Neurological:      Comments: Moves all 4 extremities, does not localize but opens his eyes to noxious stimuli and looks around         Fluids    Intake/Output Summary (Last 24 hours) at 9/11/2020 1119  Last data filed at 9/11/2020 0600  Gross per 24 hour   Intake 798.08 ml   Output 745 ml   Net 53.08 ml       Laboratory  Recent Results (from the past 48 hour(s))   ACCU-CHEK GLUCOSE    Collection Time: 09/09/20 11:36 AM   Result Value Ref Range    Glucose - Accu-Ck 111 (H) 65 - 99 mg/dL   VANCOMYCIN TIMED    Collection Time: 09/09/20  2:02 PM   Result Value Ref Range    Vancomycin Unknown Level 24.9 ug/mL   Lactic Acid Every four hours after STAT order    Collection Time: 09/09/20  2:02 PM   Result Value Ref Range    Lactic Acid 1.7 0.5 - 2.0 mmol/L   TROPONIN    Collection Time: 09/09/20  2:02 PM   Result Value Ref Range    Troponin T 42 (H) 6 - 19 ng/L   Prothrombin Time    Collection Time: 09/09/20  2:02 PM   Result Value Ref Range    PT 19.1 (H) 12.0 - 14.6 sec    INR 1.56 (H) 0.87 - 1.13   POTASSIUM SERUM (K)    Collection Time: 09/09/20  2:02 PM   Result Value Ref Range    Potassium 3.2 (L) 3.6 - 5.5 mmol/L   URINE CULTURE(NEW)    Collection Time: 09/09/20  2:36 PM    Specimen: Urine, Clean Catch   Result Value Ref Range    Significant Indicator NEG     Source UR     Site URINE, CLEAN CATCH     Culture Result No growth at 48 hours.    HGB    Collection Time: 09/09/20  2:45 PM   Result Value Ref Range    Hemoglobin 12.3 (L) 14.0 - 18.0 g/dL   CSF Glucose    Collection Time: 09/09/20  5:13 PM   Result Value Ref Range    Glucose CSF 82 (H) 40 - 80 mg/dL   CSF Protein    Collection Time: 09/09/20  5:13 PM   Result Value Ref Range    Total Protein, CSF 67 (H) 15 - 45 mg/dL   CSF Cell Count    Collection Time: 09/09/20  5:13 PM   Result Value Ref Range    Number Of Tubes 4     Volume 15.5 mL    Color-Body Fluid  Colorless     Character-Body Fluid Clear     Supernatant Appearance Colorless     Total RBC Count 2 cells/uL    Crenated RBC 0 %    Total WBC Count 2 0 - 10 cells/uL    Polys 2 %    Lymphs 19 %    Mononuclear Cells - CSF 79 %    CSF Tube Number 3    Enterovirus (CSF) PCR    Collection Time: 09/09/20  5:13 PM   Result Value Ref Range    Enterovirus Source CSF    CSF CULTURE    Collection Time: 09/09/20  5:13 PM    Specimen: CSF   Result Value Ref Range    Significant Indicator NEG     Source CSF     Site Tap     Culture Result No growth at 48 hours.     Gram Stain Result No organisms seen.    GRAM STAIN    Collection Time: 09/09/20  5:13 PM    Specimen: CSF   Result Value Ref Range    Significant Indicator .     Source CSF     Site Tap     Gram Stain Result No organisms seen.    ACCU-CHEK GLUCOSE    Collection Time: 09/09/20  5:35 PM   Result Value Ref Range    Glucose - Accu-Ck 123 (H) 65 - 99 mg/dL   ACCU-CHEK GLUCOSE    Collection Time: 09/09/20 11:42 PM   Result Value Ref Range    Glucose - Accu-Ck 121 (H) 65 - 99 mg/dL   CBC with Differential    Collection Time: 09/10/20  3:55 AM   Result Value Ref Range    WBC 21.8 (H) 4.8 - 10.8 K/uL    RBC 3.68 (L) 4.70 - 6.10 M/uL    Hemoglobin 11.6 (L) 14.0 - 18.0 g/dL    Hematocrit 35.7 (L) 42.0 - 52.0 %    MCV 94.6 81.4 - 97.8 fL    MCH 31.3 27.0 - 33.0 pg    MCHC 33.0 (L) 33.7 - 35.3 g/dL    RDW 45.5 35.9 - 50.0 fL    Platelet Count 210 164 - 446 K/uL    MPV 10.9 9.0 - 12.9 fL    Neutrophils-Polys 88.40 (H) 44.00 - 72.00 %    Lymphocytes 5.90 (L) 22.00 - 41.00 %    Monocytes 3.50 0.00 - 13.40 %    Eosinophils 0.00 0.00 - 6.90 %    Basophils 0.20 0.00 - 1.80 %    Immature Granulocytes 2.00 (H) 0.00 - 0.90 %    Nucleated RBC 0.10 /100 WBC    Neutrophils (Absolute) 19.26 (H) 1.82 - 7.42 K/uL    Lymphs (Absolute) 1.28 1.00 - 4.80 K/uL    Monos (Absolute) 0.76 0.00 - 0.85 K/uL    Eos (Absolute) 0.00 0.00 - 0.51 K/uL    Baso (Absolute) 0.05 0.00 - 0.12 K/uL    Immature  Granulocytes (abs) 0.44 (H) 0.00 - 0.11 K/uL    NRBC (Absolute) 0.03 K/uL   Basic Metabolic Panel (BMP)    Collection Time: 09/10/20  3:55 AM   Result Value Ref Range    Sodium 145 135 - 145 mmol/L    Potassium 2.9 (L) 3.6 - 5.5 mmol/L    Chloride 112 96 - 112 mmol/L    Co2 19 (L) 20 - 33 mmol/L    Glucose 140 (H) 65 - 99 mg/dL    Bun 46 (H) 8 - 22 mg/dL    Creatinine 1.56 (H) 0.50 - 1.40 mg/dL    Calcium 8.6 8.5 - 10.5 mg/dL    Anion Gap 14.0 7.0 - 16.0   Magnesium    Collection Time: 09/10/20  3:55 AM   Result Value Ref Range    Magnesium 2.9 (H) 1.5 - 2.5 mg/dL   Phosphorus    Collection Time: 09/10/20  3:55 AM   Result Value Ref Range    Phosphorus 3.0 2.5 - 4.5 mg/dL   ESTIMATED GFR    Collection Time: 09/10/20  3:55 AM   Result Value Ref Range    GFR If  57 (A) >60 mL/min/1.73 m 2    GFR If Non  47 (A) >60 mL/min/1.73 m 2   Triglycerides Starting now and then Every 3 Days    Collection Time: 09/10/20  3:55 AM   Result Value Ref Range    Triglycerides 118 0 - 149 mg/dL   ISTAT ARTERIAL BLOOD GAS    Collection Time: 09/10/20  8:06 AM   Result Value Ref Range    Ph 7.500 7.400 - 7.500    Pco2 22.5 (L) 26.0 - 37.0 mmHg    Po2 164 (H) 64 - 87 mmHg    Tco2 18 (L) 20 - 33 mmol/L    S02 100 (H) 93 - 99 %    Hco3 17.5 17.0 - 25.0 mmol/L    BE -5 (L) -4 - 3 mmol/L    Body Temp 37.8 C degrees    O2 Therapy 40 %    iPF Ratio 410     Ph Temp Emelina 7.488 7.400 - 7.500    Pco2 Temp Co 23.3 (L) 26.0 - 37.0 mmHg    Po2 Temp Cor 168 (H) 64 - 87 mmHg    Specimen Arterial     Action Range Triggered NO     Inst. Qualified Patient YES    EC-ECHOCARDIOGRAM COMPLETE W/O CONT    Collection Time: 09/10/20 10:06 AM   Result Value Ref Range    Eject.Frac. MOD BP 56.28     Eject.Frac. MOD 4C 57.38     Eject.Frac. MOD 2C 55.1     Left Ventrical Ejection Fraction 55    ISTAT ARTERIAL BLOOD GAS    Collection Time: 09/11/20  2:33 AM   Result Value Ref Range    Ph 7.416 7.400 - 7.500    Pco2 31.6 26.0 - 37.0 mmHg     Po2 147 (H) 64 - 87 mmHg    Tco2 21 20 - 33 mmol/L    S02 99 93 - 99 %    Hco3 20.3 17.0 - 25.0 mmol/L    BE -4 -4 - 3 mmol/L    Body Temp 36.4 C degrees    O2 Therapy 40 %    iPF Ratio 368     Ph Temp Emelina 7.425 7.400 - 7.500    Pco2 Temp Co 30.7 26.0 - 37.0 mmHg    Po2 Temp Cor 144 (H) 64 - 87 mmHg    Specimen Arterial     Action Range Triggered NO     Inst. Qualified Patient YES    CBC with Differential    Collection Time: 09/11/20  3:30 AM   Result Value Ref Range    WBC 12.4 (H) 4.8 - 10.8 K/uL    RBC 3.22 (L) 4.70 - 6.10 M/uL    Hemoglobin 10.2 (L) 14.0 - 18.0 g/dL    Hematocrit 30.8 (L) 42.0 - 52.0 %    MCV 95.7 81.4 - 97.8 fL    MCH 31.7 27.0 - 33.0 pg    MCHC 33.1 (L) 33.7 - 35.3 g/dL    RDW 47.4 35.9 - 50.0 fL    Platelet Count 158 (L) 164 - 446 K/uL    MPV 11.3 9.0 - 12.9 fL    Neutrophils-Polys 80.00 (H) 44.00 - 72.00 %    Lymphocytes 13.00 (L) 22.00 - 41.00 %    Monocytes 5.50 0.00 - 13.40 %    Eosinophils 0.00 0.00 - 6.90 %    Basophils 0.20 0.00 - 1.80 %    Immature Granulocytes 1.30 (H) 0.00 - 0.90 %    Nucleated RBC 0.00 /100 WBC    Neutrophils (Absolute) 9.92 (H) 1.82 - 7.42 K/uL    Lymphs (Absolute) 1.61 1.00 - 4.80 K/uL    Monos (Absolute) 0.68 0.00 - 0.85 K/uL    Eos (Absolute) 0.00 0.00 - 0.51 K/uL    Baso (Absolute) 0.02 0.00 - 0.12 K/uL    Immature Granulocytes (abs) 0.16 (H) 0.00 - 0.11 K/uL    NRBC (Absolute) 0.00 K/uL   Basic Metabolic Panel (BMP)    Collection Time: 09/11/20  3:30 AM   Result Value Ref Range    Sodium 152 (H) 135 - 145 mmol/L    Potassium 3.2 (L) 3.6 - 5.5 mmol/L    Chloride 119 (H) 96 - 112 mmol/L    Co2 21 20 - 33 mmol/L    Glucose 114 (H) 65 - 99 mg/dL    Bun 39 (H) 8 - 22 mg/dL    Creatinine 1.36 0.50 - 1.40 mg/dL    Calcium 8.5 8.5 - 10.5 mg/dL    Anion Gap 12.0 7.0 - 16.0   Magnesium    Collection Time: 09/11/20  3:30 AM   Result Value Ref Range    Magnesium 3.1 (H) 1.5 - 2.5 mg/dL   Phosphorus    Collection Time: 09/11/20  3:30 AM   Result Value Ref Range     Phosphorus 2.1 (L) 2.5 - 4.5 mg/dL   CRP QUANTITIVE (NON-CARDIAC)    Collection Time: 09/11/20  3:30 AM   Result Value Ref Range    Stat C-Reactive Protein 9.21 (H) 0.00 - 0.75 mg/dL   ESTIMATED GFR    Collection Time: 09/11/20  3:30 AM   Result Value Ref Range    GFR If African American >60 >60 mL/min/1.73 m 2    GFR If Non  56 (A) >60 mL/min/1.73 m 2   Prealbumin    Collection Time: 09/11/20  6:00 AM   Result Value Ref Range    Pre-Albumin 8.8 (L) 18.0 - 38.0 mg/dL       Imaging  DX-CHEST-PORTABLE (1 VIEW)   Final Result         1.  Patchy left lung base opacity, new since prior, could represent early infiltrate         [id number] VIEW   Final Result      Enteric tube projects over the distal stomach/pylorus.         EC-ECHOCARDIOGRAM COMPLETE W/O CONT   Final Result      DX-CHEST-PORTABLE (1 VIEW)   Final Result         1.  No acute cardiopulmonary disease.      CT-ABDOMEN-PELVIS W/O   Final Result      1.  No renal stone or hydronephrosis.   2.  Normal appendix.   3.  No focal mesenteric inflammatory process.      DX-ABDOMEN FOR TUBE PLACEMENT   Final Result      NG tube tip projects over expected location the gastric fundus.      US-ABDOMEN COMPLETE SURVEY   Final Result         1.  Echogenic liver compatible with fatty change versus fibrosis.   2.  Gallbladder sludge without additional sonographic findings of cholecystitis.   3.  Partial atrophic bilateral kidneys with lobulated contour         CT-HEAD W/O   Final Result         1.  No acute intracranial abnormality is identified, there are nonspecific white matter changes, commonly associated with small vessel ischemic disease.  Associated mild cerebral atrophy is noted.   2.  Atherosclerosis.      DX-CHEST-PORTABLE (1 VIEW)   Final Result         1.  No acute cardiopulmonary disease.          Assessment/Plan  Acute respiratory failure with hypoxia (HCC)- (present on admission)  Assessment & Plan  Intubated for airway protection  RT/O2  protocols  Daily and PRN ABGs  Titration of ventilator therapy based on ABGs and patient's status  Sedation as tolerated/indicated  Daily CXR  HOB >30 degrees and peridex for VAP prevention  Pepcid for GI prophylaxis  SAT/SBT when able (ABCDEF Bundle)  Early mobility    Benzodiazepine dependence (HCC)- (present on admission)  Assessment & Plan  Monitor for seizure/withdrawal  Xanax 0.5 mg BID  EEG after sz-like activity 9/10 is negative despite jerking motions    Alcohol abuse- (present on admission)  Assessment & Plan  Reported hx of  Vitamins  High dose thiamine  Seizure precautions    Coagulopathy (HCC)- (present on admission)  Assessment & Plan  Markedly elevated INR  Vit K 10 mg reduced it to 1.5  Resolved    Septic shock ([facility])- (present on admission)  Assessment & Plan  Broad-spectrum antibiotics: vanc/zosyn --> C3, now will monitor off abx  Titrate vasopressors MAP >65 mmHg  Pressors if needed to maintain MAP >65 mmHg  blood, respiratory and urine cultures negative       JEWELS (acute kidney injury) ([facility])- (present on admission)  Assessment & Plan  Consistent with ATN due to hypotension, dehydration and sepsis  Renal dose meds, avoid nephrotoxins  Strict I/Os  Improving    Encephalopathy acute- (present on admission)  Assessment & Plan  Tox vs metabolic/septic  CNS infection interrogated with LP, initial studies are reassuring  Monitor for need for EEG   UDS + benzos       Titrating vasopressors for MAPs > 65    Lung protective ventilation strategies  Titrate ventilator prescription to optimize oxygenation, ventilation, and acid base balance.      Discussed patient condition and risk of morbidity and/or mortality with RN, RT, Pharmacy, Code status disscussed and ERP.      The patient remains critically ill.  Critical care time = 39 minutes in directly providing and coordinating critical care and extensive data review.  No time overlap and excludes procedures.

## 2020-09-12 ENCOUNTER — APPOINTMENT (OUTPATIENT)
Dept: RADIOLOGY | Facility: MEDICAL CENTER | Age: 50
DRG: 853 | End: 2020-09-12
Attending: INTERNAL MEDICINE
Payer: MEDICAID

## 2020-09-12 PROBLEM — D68.9 COAGULOPATHY (HCC): Status: RESOLVED | Noted: 2020-09-09 | Resolved: 2020-09-12

## 2020-09-12 LAB
ANION GAP SERPL CALC-SCNC: 14 MMOL/L (ref 7–16)
BACTERIA CSF CULT: NORMAL
BASOPHILS # BLD AUTO: 0.4 % (ref 0–1.8)
BASOPHILS # BLD: 0.04 K/UL (ref 0–0.12)
BUN SERPL-MCNC: 29 MG/DL (ref 8–22)
CALCIUM SERPL-MCNC: 9 MG/DL (ref 8.5–10.5)
CHLORIDE SERPL-SCNC: 117 MMOL/L (ref 96–112)
CO2 SERPL-SCNC: 22 MMOL/L (ref 20–33)
CREAT SERPL-MCNC: 1.06 MG/DL (ref 0.5–1.4)
EOSINOPHIL # BLD AUTO: 0.07 K/UL (ref 0–0.51)
EOSINOPHIL NFR BLD: 0.7 % (ref 0–6.9)
ERYTHROCYTE [DISTWIDTH] IN BLOOD BY AUTOMATED COUNT: 48 FL (ref 35.9–50)
GLUCOSE SERPL-MCNC: 103 MG/DL (ref 65–99)
GRAM STN SPEC: NORMAL
HCT VFR BLD AUTO: 31.6 % (ref 42–52)
HGB BLD-MCNC: 10.1 G/DL (ref 14–18)
HSV1+2 IGM CSF-ACNC: 0.33 IV
IMM GRANULOCYTES # BLD AUTO: 0.16 K/UL (ref 0–0.11)
IMM GRANULOCYTES NFR BLD AUTO: 1.6 % (ref 0–0.9)
LYMPHOCYTES # BLD AUTO: 2.5 K/UL (ref 1–4.8)
LYMPHOCYTES NFR BLD: 25.2 % (ref 22–41)
MAGNESIUM SERPL-MCNC: 2.2 MG/DL (ref 1.5–2.5)
MCH RBC QN AUTO: 31.5 PG (ref 27–33)
MCHC RBC AUTO-ENTMCNC: 32 G/DL (ref 33.7–35.3)
MCV RBC AUTO: 98.4 FL (ref 81.4–97.8)
MONOCYTES # BLD AUTO: 0.84 K/UL (ref 0–0.85)
MONOCYTES NFR BLD AUTO: 8.5 % (ref 0–13.4)
NEUTROPHILS # BLD AUTO: 6.33 K/UL (ref 1.82–7.42)
NEUTROPHILS NFR BLD: 63.6 % (ref 44–72)
NRBC # BLD AUTO: 0.03 K/UL
NRBC BLD-RTO: 0.3 /100 WBC
PHOSPHATE SERPL-MCNC: 2.2 MG/DL (ref 2.5–4.5)
PLATELET # BLD AUTO: 164 K/UL (ref 164–446)
PMV BLD AUTO: 11.4 FL (ref 9–12.9)
POTASSIUM SERPL-SCNC: 3.8 MMOL/L (ref 3.6–5.5)
RBC # BLD AUTO: 3.21 M/UL (ref 4.7–6.1)
SIGNIFICANT IND 70042: NORMAL
SITE SITE: NORMAL
SODIUM SERPL-SCNC: 153 MMOL/L (ref 135–145)
SOURCE SOURCE: NORMAL
WBC # BLD AUTO: 9.9 K/UL (ref 4.8–10.8)

## 2020-09-12 PROCEDURE — 700111 HCHG RX REV CODE 636 W/ 250 OVERRIDE (IP): Performed by: INTERNAL MEDICINE

## 2020-09-12 PROCEDURE — 84100 ASSAY OF PHOSPHORUS: CPT

## 2020-09-12 PROCEDURE — 80048 BASIC METABOLIC PNL TOTAL CA: CPT

## 2020-09-12 PROCEDURE — 700105 HCHG RX REV CODE 258: Performed by: INTERNAL MEDICINE

## 2020-09-12 PROCEDURE — A9270 NON-COVERED ITEM OR SERVICE: HCPCS | Performed by: INTERNAL MEDICINE

## 2020-09-12 PROCEDURE — 71045 X-RAY EXAM CHEST 1 VIEW: CPT

## 2020-09-12 PROCEDURE — 99291 CRITICAL CARE FIRST HOUR: CPT | Performed by: INTERNAL MEDICINE

## 2020-09-12 PROCEDURE — 85025 COMPLETE CBC W/AUTO DIFF WBC: CPT

## 2020-09-12 PROCEDURE — 700102 HCHG RX REV CODE 250 W/ 637 OVERRIDE(OP): Performed by: INTERNAL MEDICINE

## 2020-09-12 PROCEDURE — 83735 ASSAY OF MAGNESIUM: CPT

## 2020-09-12 PROCEDURE — 770022 HCHG ROOM/CARE - ICU (200)

## 2020-09-12 RX ORDER — ALPRAZOLAM 0.5 MG/1
0.5 TABLET ORAL 3 TIMES DAILY
Status: DISCONTINUED | OUTPATIENT
Start: 2020-09-12 | End: 2020-09-14

## 2020-09-12 RX ADMIN — ACETAMINOPHEN 650 MG: 325 TABLET, FILM COATED ORAL at 02:50

## 2020-09-12 RX ADMIN — DOCUSATE SODIUM 50 MG AND SENNOSIDES 8.6 MG 2 TABLET: 8.6; 5 TABLET, FILM COATED ORAL at 06:03

## 2020-09-12 RX ADMIN — DIBASIC SODIUM PHOSPHATE, MONOBASIC POTASSIUM PHOSPHATE AND MONOBASIC SODIUM PHOSPHATE 500 MG: 852; 155; 130 TABLET ORAL at 17:11

## 2020-09-12 RX ADMIN — DOCUSATE SODIUM 50 MG AND SENNOSIDES 8.6 MG 2 TABLET: 8.6; 5 TABLET, FILM COATED ORAL at 17:11

## 2020-09-12 RX ADMIN — THERA TABS 1 TABLET: TAB at 06:06

## 2020-09-12 RX ADMIN — HEPARIN SODIUM 5000 UNITS: 5000 INJECTION, SOLUTION INTRAVENOUS; SUBCUTANEOUS at 06:03

## 2020-09-12 RX ADMIN — FAMOTIDINE 20 MG: 20 TABLET, FILM COATED ORAL at 06:03

## 2020-09-12 RX ADMIN — DIBASIC SODIUM PHOSPHATE, MONOBASIC POTASSIUM PHOSPHATE AND MONOBASIC SODIUM PHOSPHATE 500 MG: 852; 155; 130 TABLET ORAL at 12:40

## 2020-09-12 RX ADMIN — HEPARIN SODIUM 5000 UNITS: 5000 INJECTION, SOLUTION INTRAVENOUS; SUBCUTANEOUS at 21:42

## 2020-09-12 RX ADMIN — THIAMINE HYDROCHLORIDE 250 MG: 100 INJECTION, SOLUTION INTRAMUSCULAR; INTRAVENOUS at 06:07

## 2020-09-12 RX ADMIN — ALPRAZOLAM 0.5 MG: 0.5 TABLET ORAL at 02:50

## 2020-09-12 RX ADMIN — ALPRAZOLAM 0.5 MG: 0.5 TABLET ORAL at 17:10

## 2020-09-12 RX ADMIN — ALPRAZOLAM 0.5 MG: 0.5 TABLET ORAL at 06:03

## 2020-09-12 RX ADMIN — ALPRAZOLAM 0.5 MG: 0.5 TABLET ORAL at 10:39

## 2020-09-12 RX ADMIN — ALPRAZOLAM 0.5 MG: 0.5 TABLET ORAL at 12:40

## 2020-09-12 RX ADMIN — FOLIC ACID 1 MG: 1 TABLET ORAL at 06:03

## 2020-09-12 RX ADMIN — HEPARIN SODIUM 5000 UNITS: 5000 INJECTION, SOLUTION INTRAVENOUS; SUBCUTANEOUS at 14:01

## 2020-09-12 ASSESSMENT — PATIENT HEALTH QUESTIONNAIRE - PHQ9
2. FEELING DOWN, DEPRESSED, IRRITABLE, OR HOPELESS: NOT AT ALL
1. LITTLE INTEREST OR PLEASURE IN DOING THINGS: NOT AT ALL
SUM OF ALL RESPONSES TO PHQ9 QUESTIONS 1 AND 2: 0

## 2020-09-12 ASSESSMENT — FIBROSIS 4 INDEX: FIB4 SCORE: 3.02

## 2020-09-12 NOTE — PROGRESS NOTES
Cortrak Placement    Tube Team verified patient name and medical record number prior to tube placement.  Cortrak tube (43 inches, 10 [place]) placed at 85 cm in right nare.  Per Cortrak picture, tube appears to be in the small bowel.  Nursing Instructions: Awaiting KUB to confirm placement before use for medications or feeding. Once placement confirmed, flush tube with 30 ml of water, and then remove and save stylet, in patient medication drawer.

## 2020-09-12 NOTE — CARE PLAN
Problem: Venous Thromboembolism (VTW)/Deep Vein Thrombosis (DVT) Prevention:  Goal: Patient will participate in Venous Thrombosis (VTE)/Deep Vein Thrombosis (DVT)Prevention Measures  Outcome: PROGRESSING AS EXPECTED     Problem: Communication  Goal: The ability to communicate needs accurately and effectively will improve  Outcome: PROGRESSING SLOWER THAN EXPECTED  Note: Pt non-verbal at this time     Problem: Safety  Goal: Will remain free from injury  Outcome: PROGRESSING SLOWER THAN EXPECTED  Note: Pt impulsive, has no regard for safety

## 2020-09-12 NOTE — RESPIRATORY CARE
COPD EDUCATION by COPD CLINICAL EDUCATOR  9/12/2020 at 9:43 AM by Shy Modi, CICI     Patient reviewed by COPD education team. Patient does not have a history or diagnosis of COPD and is a non-smoker, therefore does not qualify for the COPD program.

## 2020-09-12 NOTE — PROGRESS NOTES
12 hour chart check     Monitor Summary:     Sinus tachycardia, rate of 110-120bpm:     .14/.08/.32

## 2020-09-12 NOTE — CONSULTS
Critical Care Consultation    Date of consult: 9/9/2020    Referring Physician  Tushar Mak M.D.    Reason for Consultation  Acute respiratory failure, acute severe encephalopathy    History of Presenting Illness  Due to the patient's encephalopathic status and no family at bedside, all history is obtained from bedside providers and chart     50 y.o. male with a pmhx of HTN, chronic pain and alcohol abuse who presented 9/9/2020 with altered mental status, he was found obtunded in his house by his ex-wife who was checking on him as he had not been seen for 2 days or been able to get a hold of him.  He was found in a chair,  A&Ox0 and hypotensive.  He arrived in the ER with a GCS of 7 (E1,V1,M5) and severely hypotensive.  The patient was intubated for airway protection and central venous access was obtained for administration of high-volume crystalloid resuscitation and vasopressor therapy.  Labs in the ER show a significant leukocytosis at 22.9, metabolic panel with hypokalemia at 2.7, CO2 14, anion gap 35 glucose 188, creatinine 2.73, BUN 56, AST 70, ALT 48, alkaline phosphatase 136, total bilirubin 2, INR 8.93 and lactate 11.  UA did not show any evidence of urinary tract infection.    9/9 Lactate cleared, making urine, awake and alert - not following commands. CT A/P neg, tox w/u negative, LP pending  INR rapidly corrected with vitamin K, will proceed with lumbar puncture; TEG was overall reassuring even during coagulopathy.    9/10 seizure-like activity overnight, awaiting continuous EEG, continues to have fevers with cultures that are negative-CNS studies appear reassuring, okay to start heparin subcu for prophylaxis given resolution of coagulopathy    9/11 non-epileptiform activity with jerking based upon yesterday's EEG, wean to extubation if able, cultures are still negative, stop antibiotics, stop Keppra    9/12 poor mental status with risk of re-intubation, begin FWF for elevated Na    Reviewed last 24  hour events:              - Overnight events: remains extubated              - Hemodynamics: MAP>65              - Neuro/Pain/Sedation: off   - Resp: NC              - GI: initiate TF advance to goal, increase FWF  - Infectious Disease: monitor off abx    - UOP:  adequate                         Code Status  Full Code    Review of Systems  Review of Systems   Unable to perform ROS: Mental acuity       Past Medical History   has a past medical history of Arthritis, Hypertension, Pain, and Psychiatric problem.    Surgical History   has a past surgical history that includes ankle orif (Right, 9/26/2017).    Family History  family history is not on file.    Social History   reports that he has never smoked. His smokeless tobacco use includes chew. He reports current alcohol use. He reports that he does not use drugs.    Medications  Home Medications    **Home medications have not yet been reviewed for this encounter**       Current Facility-Administered Medications   Medication Dose Route Frequency Provider Last Rate Last Dose   • phosphorus (K-Phos-Neutral) per tablet 500 mg  500 mg Oral TID Avery Oliver M.D.       • ALPRAZolam (XANAX) tablet 0.5 mg  0.5 mg Enteral Tube TID Avery Oliver M.D.       • ALPRAZolam (XANAX) tablet 0.5 mg  0.5 mg Enteral Tube Q8HRS PRN Avery Oliver M.D.   0.5 mg at 09/12/20 1039   • LORazepam (ATIVAN) injection 4 mg  4 mg Intravenous Q10 MIN PRN Mode Ojeda Jr., D.O.   4 mg at 09/10/20 0510   • heparin injection 5,000 Units  5,000 Units Subcutaneous Q8HRS Avery Oliver M.D.   5,000 Units at 09/12/20 0603   • Pharmacy Consult: Enteral tube insertion - review meds/change route/product selection  1 Each Other PHARMACY TO DOSE Avery Oliver M.D.       • acetaminophen (TYLENOL) tablet 650 mg  650 mg Enteral Tube Q6HRS PRN Mode Ojeda Jr., D.O.   650 mg at 09/12/20 0250   • ondansetron (ZOFRAN ODT) dispertab 4 mg  4 mg Enteral Tube Q4HRS PRN Mode Ojeda Jr., D.O.        • promethazine (PHENERGAN) tablet 12.5-25 mg  12.5-25 mg Enteral Tube Q4HRS PRN VERÓNICA Villalpando Jr..O.       • Respiratory Therapy Consult   Nebulization Continuous RT VERÓNICA Villalpando Jr..ALBERT.       • ipratropium-albuterol (DUONEB) nebulizer solution  3 mL Nebulization Q2HRS PRN (RT) REYES Villalpando Jr.O.       • senna-docusate (PERICOLACE or SENOKOT S) 8.6-50 MG per tablet 2 Tab  2 Tab Enteral Tube BID VERÓNICA Villalpando Jr..O.   2 Tab at 09/12/20 0603    And   • polyethylene glycol/lytes (MIRALAX) PACKET 1 Packet  1 Packet Enteral Tube QDAY PRN VERÓNICA Villalpando Jr..OJose Alberto        And   • magnesium hydroxide (MILK OF MAGNESIA) suspension 30 mL  30 mL Enteral Tube QDAY PRN VERÓNICA Villalpando Jr..OJose Alberto        And   • bisacodyl (DULCOLAX) suppository 10 mg  10 mg Rectal QDAY PRN VERÓNICA Villalpando Jr..O.       • MD Alert...ICU Electrolyte Replacement per Pharmacy   Other PHARMACY TO DOSE VERÓNICA Villalpando Jr..ALBERT.       • lactated ringers infusion (BOLUS): BMI less than or equal to 30  30 mL/kg Intravenous Once PRN Tushar Mak M.D.       • thiamine (B-1) 250 mg in D5W 100 mL IVPB  250 mg Intravenous DAILY VERÓNICA Villalpando Jr..O. 200 mL/hr at 09/12/20 0607 250 mg at 09/12/20 0607   • ondansetron (ZOFRAN) syringe/vial injection 4 mg  4 mg Intravenous Q4HRS PRN VERÓNICA Villalpando Jr..O.       • promethazine (PHENERGAN) suppository 12.5-25 mg  12.5-25 mg Rectal Q4HRS PRN VERÓNICA Villalpando Jr..O.       • prochlorperazine (COMPAZINE) injection 5-10 mg  5-10 mg Intravenous Q4HRS PRN VERÓNICA Villalpando Jr..O.           Allergies  No Known Allergies    Vital Signs last 24 hours  Temp:  [36.2 °C (97.1 °F)-38.3 °C (100.9 °F)] 36.2 °C (97.1 °F)  Pulse:  [] 114  Resp:  [4-30] 27  BP: ()/(42-90) 118/69  SpO2:  [95 %-100 %] 99 %    Physical Exam  Physical Exam  Vitals signs and nursing note reviewed.   Constitutional:       Appearance: He is ill-appearing.      Comments: Ranges from agitation to somnolence    HENT:      Head: Normocephalic and atraumatic.      Mouth/Throat:      Mouth: Mucous membranes are moist.   Eyes:      Extraocular Movements: Extraocular movements intact.      Pupils: Pupils are equal, round, and reactive to light.   Neck:      Musculoskeletal: No neck rigidity or muscular tenderness.   Cardiovascular:      Rate and Rhythm: Normal rate and regular rhythm.      Pulses: Normal pulses.      Heart sounds: Normal heart sounds.   Pulmonary:      Effort: Pulmonary effort is normal. No respiratory distress.      Breath sounds: No wheezing.   Abdominal:      General: Abdomen is flat. There is no distension.      Tenderness: There is no abdominal tenderness. There is no guarding or rebound.   Musculoskeletal:         General: No swelling or tenderness.      Right lower leg: No edema.      Left lower leg: No edema.   Skin:     General: Skin is warm and dry.      Capillary Refill: Capillary refill takes less than 2 seconds.   Neurological:      Comments: Somnolent and when he arouses he is agitated, oriented to self only         Fluids    Intake/Output Summary (Last 24 hours) at 9/12/2020 1159  Last data filed at 9/12/2020 1000  Gross per 24 hour   Intake 1990.01 ml   Output 1650 ml   Net 340.01 ml       Laboratory  Recent Results (from the past 48 hour(s))   ISTAT ARTERIAL BLOOD GAS    Collection Time: 09/11/20  2:33 AM   Result Value Ref Range    Ph 7.416 7.400 - 7.500    Pco2 31.6 26.0 - 37.0 mmHg    Po2 147 (H) 64 - 87 mmHg    Tco2 21 20 - 33 mmol/L    S02 99 93 - 99 %    Hco3 20.3 17.0 - 25.0 mmol/L    BE -4 -4 - 3 mmol/L    Body Temp 36.4 C degrees    O2 Therapy 40 %    iPF Ratio 368     Ph Temp Emelina 7.425 7.400 - 7.500    Pco2 Temp Co 30.7 26.0 - 37.0 mmHg    Po2 Temp Cor 144 (H) 64 - 87 mmHg    Specimen Arterial     Action Range Triggered NO     Inst. Qualified Patient YES    CBC with Differential    Collection Time: 09/11/20  3:30 AM   Result Value Ref Range    WBC 12.4 (H) 4.8 - 10.8 K/uL    RBC  3.22 (L) 4.70 - 6.10 M/uL    Hemoglobin 10.2 (L) 14.0 - 18.0 g/dL    Hematocrit 30.8 (L) 42.0 - 52.0 %    MCV 95.7 81.4 - 97.8 fL    MCH 31.7 27.0 - 33.0 pg    MCHC 33.1 (L) 33.7 - 35.3 g/dL    RDW 47.4 35.9 - 50.0 fL    Platelet Count 158 (L) 164 - 446 K/uL    MPV 11.3 9.0 - 12.9 fL    Neutrophils-Polys 80.00 (H) 44.00 - 72.00 %    Lymphocytes 13.00 (L) 22.00 - 41.00 %    Monocytes 5.50 0.00 - 13.40 %    Eosinophils 0.00 0.00 - 6.90 %    Basophils 0.20 0.00 - 1.80 %    Immature Granulocytes 1.30 (H) 0.00 - 0.90 %    Nucleated RBC 0.00 /100 WBC    Neutrophils (Absolute) 9.92 (H) 1.82 - 7.42 K/uL    Lymphs (Absolute) 1.61 1.00 - 4.80 K/uL    Monos (Absolute) 0.68 0.00 - 0.85 K/uL    Eos (Absolute) 0.00 0.00 - 0.51 K/uL    Baso (Absolute) 0.02 0.00 - 0.12 K/uL    Immature Granulocytes (abs) 0.16 (H) 0.00 - 0.11 K/uL    NRBC (Absolute) 0.00 K/uL   Basic Metabolic Panel (BMP)    Collection Time: 09/11/20  3:30 AM   Result Value Ref Range    Sodium 152 (H) 135 - 145 mmol/L    Potassium 3.2 (L) 3.6 - 5.5 mmol/L    Chloride 119 (H) 96 - 112 mmol/L    Co2 21 20 - 33 mmol/L    Glucose 114 (H) 65 - 99 mg/dL    Bun 39 (H) 8 - 22 mg/dL    Creatinine 1.36 0.50 - 1.40 mg/dL    Calcium 8.5 8.5 - 10.5 mg/dL    Anion Gap 12.0 7.0 - 16.0   Magnesium    Collection Time: 09/11/20  3:30 AM   Result Value Ref Range    Magnesium 3.1 (H) 1.5 - 2.5 mg/dL   Phosphorus    Collection Time: 09/11/20  3:30 AM   Result Value Ref Range    Phosphorus 2.1 (L) 2.5 - 4.5 mg/dL   CRP QUANTITIVE (NON-CARDIAC)    Collection Time: 09/11/20  3:30 AM   Result Value Ref Range    Stat C-Reactive Protein 9.21 (H) 0.00 - 0.75 mg/dL   ESTIMATED GFR    Collection Time: 09/11/20  3:30 AM   Result Value Ref Range    GFR If African American >60 >60 mL/min/1.73 m 2    GFR If Non  56 (A) >60 mL/min/1.73 m 2   Prealbumin    Collection Time: 09/11/20  6:00 AM   Result Value Ref Range    Pre-Albumin 8.8 (L) 18.0 - 38.0 mg/dL   Potassium Serum (K)     Collection Time: 09/11/20 11:55 AM   Result Value Ref Range    Potassium 3.8 3.6 - 5.5 mmol/L   CBC with Differential    Collection Time: 09/12/20  6:35 AM   Result Value Ref Range    WBC 9.9 4.8 - 10.8 K/uL    RBC 3.21 (L) 4.70 - 6.10 M/uL    Hemoglobin 10.1 (L) 14.0 - 18.0 g/dL    Hematocrit 31.6 (L) 42.0 - 52.0 %    MCV 98.4 (H) 81.4 - 97.8 fL    MCH 31.5 27.0 - 33.0 pg    MCHC 32.0 (L) 33.7 - 35.3 g/dL    RDW 48.0 35.9 - 50.0 fL    Platelet Count 164 164 - 446 K/uL    MPV 11.4 9.0 - 12.9 fL    Neutrophils-Polys 63.60 44.00 - 72.00 %    Lymphocytes 25.20 22.00 - 41.00 %    Monocytes 8.50 0.00 - 13.40 %    Eosinophils 0.70 0.00 - 6.90 %    Basophils 0.40 0.00 - 1.80 %    Immature Granulocytes 1.60 (H) 0.00 - 0.90 %    Nucleated RBC 0.30 /100 WBC    Neutrophils (Absolute) 6.33 1.82 - 7.42 K/uL    Lymphs (Absolute) 2.50 1.00 - 4.80 K/uL    Monos (Absolute) 0.84 0.00 - 0.85 K/uL    Eos (Absolute) 0.07 0.00 - 0.51 K/uL    Baso (Absolute) 0.04 0.00 - 0.12 K/uL    Immature Granulocytes (abs) 0.16 (H) 0.00 - 0.11 K/uL    NRBC (Absolute) 0.03 K/uL   Basic Metabolic Panel (BMP)    Collection Time: 09/12/20  6:35 AM   Result Value Ref Range    Sodium 153 (H) 135 - 145 mmol/L    Potassium 3.8 3.6 - 5.5 mmol/L    Chloride 117 (H) 96 - 112 mmol/L    Co2 22 20 - 33 mmol/L    Glucose 103 (H) 65 - 99 mg/dL    Bun 29 (H) 8 - 22 mg/dL    Creatinine 1.06 0.50 - 1.40 mg/dL    Calcium 9.0 8.5 - 10.5 mg/dL    Anion Gap 14.0 7.0 - 16.0   Magnesium    Collection Time: 09/12/20  6:35 AM   Result Value Ref Range    Magnesium 2.2 1.5 - 2.5 mg/dL   Phosphorus    Collection Time: 09/12/20  6:35 AM   Result Value Ref Range    Phosphorus 2.2 (L) 2.5 - 4.5 mg/dL   ESTIMATED GFR    Collection Time: 09/12/20  6:35 AM   Result Value Ref Range    GFR If African American >60 >60 mL/min/1.73 m 2    GFR If Non African American >60 >60 mL/min/1.73 m 2       Imaging  DX-ABDOMEN FOR TUBE PLACEMENT   Final Result      Feeding tube tip overlies the second  portion of the duodenum.      DX-CHEST-PORTABLE (1 VIEW)   Final Result         1.  Patchy left lung base opacity, new since prior, could represent early infiltrate            DX-CHEST-PORTABLE (1 VIEW)   Final Result         1.  Patchy left lung base opacity, new since prior, could represent early infiltrate         [id number] VIEW   Final Result      Enteric tube projects over the distal stomach/pylorus.         EC-ECHOCARDIOGRAM COMPLETE W/O CONT   Final Result      DX-CHEST-PORTABLE (1 VIEW)   Final Result         1.  No acute cardiopulmonary disease.      CT-ABDOMEN-PELVIS W/O   Final Result      1.  No renal stone or hydronephrosis.   2.  Normal appendix.   3.  No focal mesenteric inflammatory process.      DX-ABDOMEN FOR TUBE PLACEMENT   Final Result      NG tube tip projects over expected location the gastric fundus.      US-ABDOMEN COMPLETE SURVEY   Final Result         1.  Echogenic liver compatible with fatty change versus fibrosis.   2.  Gallbladder sludge without additional sonographic findings of cholecystitis.   3.  Partial atrophic bilateral kidneys with lobulated contour         CT-HEAD W/O   Final Result         1.  No acute intracranial abnormality is identified, there are nonspecific white matter changes, commonly associated with small vessel ischemic disease.  Associated mild cerebral atrophy is noted.   2.  Atherosclerosis.      DX-CHEST-PORTABLE (1 VIEW)   Final Result         1.  No acute cardiopulmonary disease.          Assessment/Plan  Acute respiratory failure with hypoxia (HCC)- (present on admission)  Assessment & Plan  Extubated 9/11  Requiring nasal cannula, high risk for reintubation given mental status    Benzodiazepine dependence (HCC)- (present on admission)  Assessment & Plan  Monitor for seizure/withdrawal  Xanax 0.5 mg BID  EEG after sz-like activity 9/10 is negative despite jerking motions    Alcohol abuse- (present on admission)  Assessment & Plan  Reported hx  of  Vitamins  High dose thiamine  Seizure precautions    Septic shock (HCC)- (present on admission)  Assessment & Plan  Broad-spectrum antibiotics: vanc/zosyn --> C3, now will monitor off abx  Blood culture, CSF culture, urinalysis are negative  Monitor off antibiotics  Norepinephrine if needed    JEWELS (acute kidney injury) (HCC)- (present on admission)  Assessment & Plan  Consistent with ATN due to hypotension, dehydration and sepsis  Renal dose meds, avoid nephrotoxins  Strict I/Os  Improving    Encephalopathy acute- (present on admission)  Assessment & Plan  Tox vs metabolic/septic  CNS infection interrogated with LP, initial studies are reassuring  EEG negative for seizures  UDS + benzos         50-year-old male with a history of significant benzodiazepine and alcohol consumption presents with hypoxic respiratory failure and encephalopathy, now extubated but has tenuous mental status requiring close monitoring for possible reintubation.    Discussed patient condition and risk of morbidity and/or mortality with RN, RT, Pharmacy, Code status disscussed and ERP.      The patient remains critically ill.  Critical care time = 35 minutes in directly providing and coordinating critical care and extensive data review.  No time overlap and excludes procedures.

## 2020-09-12 NOTE — PROGRESS NOTES
Pt had been more alert and engaged, mobilized to edge of bed, unable to stand.  Pt suddenly became more confused and belligerent.  Pulled out Cortrak, broke temp sensor on catheter,  Cortrak replaced.  Pt c/o need to void, attempted to reposition newton and pt voided large amount tea colored urine around catheter.  Attempted to flush newton and was unable.  Newton catheter replaced.  Xray to confirm Cortrak unable to be performed due to agitation.

## 2020-09-13 PROBLEM — E87.0 HYPERNATREMIA: Status: ACTIVE | Noted: 2020-09-13

## 2020-09-13 LAB
ANION GAP SERPL CALC-SCNC: 11 MMOL/L (ref 7–16)
BASOPHILS # BLD AUTO: 1 % (ref 0–1.8)
BASOPHILS # BLD: 0.08 K/UL (ref 0–0.12)
BUN SERPL-MCNC: 24 MG/DL (ref 8–22)
CALCIUM SERPL-MCNC: 8.8 MG/DL (ref 8.5–10.5)
CHLORIDE SERPL-SCNC: 112 MMOL/L (ref 96–112)
CO2 SERPL-SCNC: 26 MMOL/L (ref 20–33)
CREAT SERPL-MCNC: 0.67 MG/DL (ref 0.5–1.4)
EOSINOPHIL # BLD AUTO: 0.19 K/UL (ref 0–0.51)
EOSINOPHIL NFR BLD: 2.5 % (ref 0–6.9)
ERYTHROCYTE [DISTWIDTH] IN BLOOD BY AUTOMATED COUNT: 46.6 FL (ref 35.9–50)
EV RNA SPEC QL NAA+PROBE: NOT DETECTED
GLUCOSE SERPL-MCNC: 114 MG/DL (ref 65–99)
HCT VFR BLD AUTO: 30 % (ref 42–52)
HGB BLD-MCNC: 10 G/DL (ref 14–18)
IMM GRANULOCYTES # BLD AUTO: 0.13 K/UL (ref 0–0.11)
IMM GRANULOCYTES NFR BLD AUTO: 1.7 % (ref 0–0.9)
LYMPHOCYTES # BLD AUTO: 2.08 K/UL (ref 1–4.8)
LYMPHOCYTES NFR BLD: 27 % (ref 22–41)
MCH RBC QN AUTO: 32.3 PG (ref 27–33)
MCHC RBC AUTO-ENTMCNC: 33.3 G/DL (ref 33.7–35.3)
MCV RBC AUTO: 96.8 FL (ref 81.4–97.8)
MONOCYTES # BLD AUTO: 0.67 K/UL (ref 0–0.85)
MONOCYTES NFR BLD AUTO: 8.7 % (ref 0–13.4)
NEUTROPHILS # BLD AUTO: 4.54 K/UL (ref 1.82–7.42)
NEUTROPHILS NFR BLD: 59.1 % (ref 44–72)
NRBC # BLD AUTO: 0 K/UL
NRBC BLD-RTO: 0 /100 WBC
PLATELET # BLD AUTO: 149 K/UL (ref 164–446)
PMV BLD AUTO: 11.6 FL (ref 9–12.9)
POTASSIUM SERPL-SCNC: 3 MMOL/L (ref 3.6–5.5)
RBC # BLD AUTO: 3.1 M/UL (ref 4.7–6.1)
SODIUM SERPL-SCNC: 149 MMOL/L (ref 135–145)
SPECIMEN SOURCE: NORMAL
WBC # BLD AUTO: 7.7 K/UL (ref 4.8–10.8)

## 2020-09-13 PROCEDURE — 770022 HCHG ROOM/CARE - ICU (200)

## 2020-09-13 PROCEDURE — 700102 HCHG RX REV CODE 250 W/ 637 OVERRIDE(OP): Performed by: INTERNAL MEDICINE

## 2020-09-13 PROCEDURE — 700105 HCHG RX REV CODE 258: Performed by: INTERNAL MEDICINE

## 2020-09-13 PROCEDURE — 51798 US URINE CAPACITY MEASURE: CPT

## 2020-09-13 PROCEDURE — 99253 IP/OBS CNSLTJ NEW/EST LOW 45: CPT | Performed by: STUDENT IN AN ORGANIZED HEALTH CARE EDUCATION/TRAINING PROGRAM

## 2020-09-13 PROCEDURE — 85025 COMPLETE CBC W/AUTO DIFF WBC: CPT

## 2020-09-13 PROCEDURE — A9270 NON-COVERED ITEM OR SERVICE: HCPCS | Performed by: INTERNAL MEDICINE

## 2020-09-13 PROCEDURE — 700111 HCHG RX REV CODE 636 W/ 250 OVERRIDE (IP): Performed by: INTERNAL MEDICINE

## 2020-09-13 PROCEDURE — 80048 BASIC METABOLIC PNL TOTAL CA: CPT

## 2020-09-13 RX ORDER — HALOPERIDOL 5 MG/ML
2.5 INJECTION INTRAMUSCULAR EVERY 4 HOURS PRN
Status: DISCONTINUED | OUTPATIENT
Start: 2020-09-13 | End: 2020-09-28

## 2020-09-13 RX ORDER — QUETIAPINE FUMARATE 25 MG/1
12.5 TABLET, FILM COATED ORAL EVERY 8 HOURS
Status: DISCONTINUED | OUTPATIENT
Start: 2020-09-13 | End: 2020-09-14

## 2020-09-13 RX ADMIN — HEPARIN SODIUM 5000 UNITS: 5000 INJECTION, SOLUTION INTRAVENOUS; SUBCUTANEOUS at 04:53

## 2020-09-13 RX ADMIN — ALPRAZOLAM 0.5 MG: 0.5 TABLET ORAL at 04:50

## 2020-09-13 RX ADMIN — ALPRAZOLAM 0.5 MG: 0.5 TABLET ORAL at 12:22

## 2020-09-13 RX ADMIN — HEPARIN SODIUM 5000 UNITS: 5000 INJECTION, SOLUTION INTRAVENOUS; SUBCUTANEOUS at 21:49

## 2020-09-13 RX ADMIN — POTASSIUM BICARBONATE 25 MEQ: 978 TABLET, EFFERVESCENT ORAL at 18:32

## 2020-09-13 RX ADMIN — POTASSIUM BICARBONATE 25 MEQ: 978 TABLET, EFFERVESCENT ORAL at 14:42

## 2020-09-13 RX ADMIN — ALPRAZOLAM 0.5 MG: 0.5 TABLET ORAL at 00:19

## 2020-09-13 RX ADMIN — QUETIAPINE FUMARATE 12.5 MG: 25 TABLET ORAL at 09:33

## 2020-09-13 RX ADMIN — POTASSIUM BICARBONATE 25 MEQ: 978 TABLET, EFFERVESCENT ORAL at 09:32

## 2020-09-13 RX ADMIN — ALPRAZOLAM 0.5 MG: 0.5 TABLET ORAL at 18:32

## 2020-09-13 RX ADMIN — THIAMINE HYDROCHLORIDE 250 MG: 100 INJECTION, SOLUTION INTRAMUSCULAR; INTRAVENOUS at 04:50

## 2020-09-13 RX ADMIN — QUETIAPINE FUMARATE 12.5 MG: 25 TABLET ORAL at 21:50

## 2020-09-13 RX ADMIN — HEPARIN SODIUM 5000 UNITS: 5000 INJECTION, SOLUTION INTRAVENOUS; SUBCUTANEOUS at 14:43

## 2020-09-13 ASSESSMENT — FIBROSIS 4 INDEX: FIB4 SCORE: 3.02

## 2020-09-13 ASSESSMENT — PATIENT HEALTH QUESTIONNAIRE - PHQ9
1. LITTLE INTEREST OR PLEASURE IN DOING THINGS: NOT AT ALL
SUM OF ALL RESPONSES TO PHQ9 QUESTIONS 1 AND 2: 0
2. FEELING DOWN, DEPRESSED, IRRITABLE, OR HOPELESS: NOT AT ALL

## 2020-09-13 NOTE — ASSESSMENT & PLAN NOTE
-likely secondary to etoh   -LP, MRI brain, EEG negative. TSH normal. HIV/RPR negative.  -Continue risperdal.  -Remains free from restraints but still requiring telesitter for frequent falls   -oriented to self only at baseline

## 2020-09-13 NOTE — CARE PLAN
Problem: Fluid Volume:  Goal: Will maintain balanced intake and output  Intervention: Monitor, educate, and encourage compliance with therapeutic intake of liquids  Note: Free water initiated     Problem: Safety - Medical Restraint  Goal: Remains free of injury from restraints (Restraint for Interference with Medical Device)  Description: INTERVENTIONS:  1. Determine that other, less restrictive measures have been tried or would not be effective before applying the restraint  2. Evaluate the patient's condition at the time of restraint application  3. Inform patient/family regarding the reason for restraint  4. Q2H: Monitor safety, psychosocial status, comfort, nutrition and hydration  Flowsheets (Taken 9/12/2020 4032)  Addressed this shift: Remains free of injury from restraints (restraint for interference with medical device): Every 2 hours: Monitor safety, psychosocial status, comfort, nutrition and hydration

## 2020-09-13 NOTE — CARE PLAN
Problem: Venous Thromboembolism (VTW)/Deep Vein Thrombosis (DVT) Prevention:  Goal: Patient will participate in Venous Thrombosis (VTE)/Deep Vein Thrombosis (DVT)Prevention Measures  Outcome: PROGRESSING AS EXPECTED     Problem: Respiratory:  Goal: Respiratory status will improve  Outcome: PROGRESSING AS EXPECTED     Problem: Bowel/Gastric:  Goal: Normal bowel function is maintained or improved  Outcome: PROGRESSING SLOWER THAN EXPECTED  Note: Bowels have started moving.  Pt has no sensation of needed to stool.     Problem: Psychosocial Needs:  Goal: Level of anxiety will decrease  Outcome: PROGRESSING SLOWER THAN EXPECTED  Note: Pt has short term memory impairment and needs frequent reorientation.

## 2020-09-13 NOTE — ASSESSMENT & PLAN NOTE
Resolved  Broad-spectrum antibiotics: s/p vanc/zosyn --> C3, now off abx  Blood culture, CSF culture, urinalysis allnegative  Monitor off antibiotics

## 2020-09-13 NOTE — CONSULTS
Hospital Medicine Consultation    Date of Service  9/13/2020    Referring Physician  Dr Gonda.     Consulting Physician  Xavi Durham D.O.    Reason for Consultation  ICU transfer    History of Presenting Illness  Due to the patient's encephalopathic status and no family at bedside, all history is obtained from bedside providers and chart     50 y.o. male with a pmhx of HTN, chronic pain and alcohol abuse who presented 9/9/2020 with altered mental status, he was found obtunded in his house by his ex-wife who was checking on him as he had not been seen for 2 days or been able to get a hold of him.  He was found in a chair,  A&Ox0 and hypotensive.  He arrived in the ER with a GCS of 7 (E1,V1,M5) and severely hypotensive.  The patient was intubated for airway protection and central venous access was obtained for administration of high-volume crystalloid resuscitation and vasopressor therapy.      Labs in the ER show a significant leukocytosis at 22.9, metabolic panel with hypokalemia at 2.7, CO2 14, anion gap 35 glucose 188, creatinine 2.73, BUN 56, AST 70, ALT 48, alkaline phosphatase 136, total bilirubin 2, INR 8.93 and lactate 11. UA did not show any evidence of urinary tract infection.    CT head as well as A/P were all unremarkable. He was briefly treated with antibiotics for concern for an underlying infection and a lumbar tap done was mostly unreassuring. Cultures have remained negative thus far.  For his coagulopathy, this was rapidly corrected with vitamin K and has remained low.  EEG done for concern for seizure-like activity was without any epileptiform activity. He was extubated on 9/11 and has been able to protect his airway ever since. A feeding tube remains in place and has been getting tube feeds without any complications. He is currently receiving treatment with benzos for concern for benzo withdrawals and IV thiamine for his history of alcohol abuse.    He will transfer out of the ICU to  telemetry/med for continued monitoring.    Review of Systems  Review of Systems   Unable to perform ROS: Mental status change       Past Medical History   has a past medical history of Arthritis, Hypertension, Pain, and Psychiatric problem.    Surgical History   has a past surgical history that includes ankle orif (Right, 9/26/2017).    Family History  family history is not on file.    Social History   reports that he has never smoked. His smokeless tobacco use includes chew. He reports current alcohol use. He reports that he does not use drugs.    Medications  Prior to Admission Medications   Prescriptions Last Dose Informant Patient Reported? Taking?   allopurinol (ZYLOPRIM) 300 MG Tab  Patient Yes No   Sig: Take 300 mg by mouth every day.   alprazolam (XANAX) 0.5 MG Tab  Patient Yes No   Sig: Take 0.5-1 mg by mouth 2 times a day as needed.   aspirin (ASA) 325 MG Tab   Yes No   Sig: Take 1 Tab by mouth 2 Times a Day.   hydrochlorothiazide (HYDRODIURIL) 25 MG Tab  Patient Yes No   Sig: Take 25 mg by mouth every day.   oxycodone-acetaminophen (PERCOCET) 5-325 MG Tab  Patient No No   Sig: Take 1-2 Tabs by mouth every four hours as needed.   verapamil ER (CALAN-SR) 240 MG Tab CR  Patient Yes No   Sig: Take 240 mg by mouth every day.      Facility-Administered Medications: None       Allergies  No Known Allergies    Physical Exam  Temp:  [36.2 °C (97.1 °F)-37 °C (98.6 °F)] 36.2 °C (97.1 °F)  Pulse:  [] 94  Resp:  [15-27] 25  BP: (114-141)/(62-93) 114/73  SpO2:  [89 %-100 %] 89 %    Physical Exam  Constitutional:       General: He is not in acute distress.     Appearance: He is ill-appearing.   HENT:      Head: Normocephalic and atraumatic.   Cardiovascular:      Rate and Rhythm: Normal rate and regular rhythm.      Pulses: Normal pulses.      Heart sounds: No murmur.   Pulmonary:      Effort: Pulmonary effort is normal. No respiratory distress.      Breath sounds: No wheezing.   Abdominal:      General: Bowel  sounds are normal. There is no distension.      Palpations: Abdomen is soft.      Tenderness: There is no abdominal tenderness.   Skin:     General: Skin is warm.      Coloration: Skin is not jaundiced or pale.      Findings: No bruising.   Neurological:      Comments: Somnolent, but when he arouses he is agitated, oriented to self only, moving all extremities, able to follow commands.         Fluids  Date 09/13/20 0700 - 09/14/20 0659   Shift 7514-4197 1648-6206 8825-5357 24 Hour Total   INTAKE   NG/   240   Enteral 30   30   Shift Total 270   270   OUTPUT   Urine 850   850   Shift Total 850   850   Weight (kg) 79.1 79.1 79.1 79.1       Laboratory  Recent Labs     09/11/20  0330 09/12/20  0635 09/13/20  0440   WBC 12.4* 9.9 7.7   RBC 3.22* 3.21* 3.10*   HEMOGLOBIN 10.2* 10.1* 10.0*   HEMATOCRIT 30.8* 31.6* 30.0*   MCV 95.7 98.4* 96.8   MCH 31.7 31.5 32.3   MCHC 33.1* 32.0* 33.3*   RDW 47.4 48.0 46.6   PLATELETCT 158* 164 149*   MPV 11.3 11.4 11.6     Recent Labs     09/11/20  0330 09/11/20  1155 09/12/20  0635 09/13/20  0440   SODIUM 152*  --  153* 149*   POTASSIUM 3.2* 3.8 3.8 3.0*   CHLORIDE 119*  --  117* 112   CO2 21  --  22 26   GLUCOSE 114*  --  103* 114*   BUN 39*  --  29* 24*   CREATININE 1.36  --  1.06 0.67   CALCIUM 8.5  --  9.0 8.8                     Imaging  DX-ABDOMEN FOR TUBE PLACEMENT   Final Result      Feeding tube tip overlies the second portion of the duodenum.      DX-CHEST-PORTABLE (1 VIEW)   Final Result         1.  Patchy left lung base opacity, new since prior, could represent early infiltrate            DX-CHEST-PORTABLE (1 VIEW)   Final Result         1.  Patchy left lung base opacity, new since prior, could represent early infiltrate         [id number] VIEW   Final Result      Enteric tube projects over the distal stomach/pylorus.         EC-ECHOCARDIOGRAM COMPLETE W/O CONT   Final Result      DX-CHEST-PORTABLE (1 VIEW)   Final Result         1.  No acute cardiopulmonary disease.       CT-ABDOMEN-PELVIS W/O   Final Result      1.  No renal stone or hydronephrosis.   2.  Normal appendix.   3.  No focal mesenteric inflammatory process.      DX-ABDOMEN FOR TUBE PLACEMENT   Final Result      NG tube tip projects over expected location the gastric fundus.      US-ABDOMEN COMPLETE SURVEY   Final Result         1.  Echogenic liver compatible with fatty change versus fibrosis.   2.  Gallbladder sludge without additional sonographic findings of cholecystitis.   3.  Partial atrophic bilateral kidneys with lobulated contour         CT-HEAD W/O   Final Result         1.  No acute intracranial abnormality is identified, there are nonspecific white matter changes, commonly associated with small vessel ischemic disease.  Associated mild cerebral atrophy is noted.   2.  Atherosclerosis.      DX-CHEST-PORTABLE (1 VIEW)   Final Result         1.  No acute cardiopulmonary disease.          Assessment/Plan  Hypernatremia  Assessment & Plan  FWF with tube feeds  Monitor BMP  Watch for over correction.    Acute respiratory failure with hypoxia (HCC)- (present on admission)  Assessment & Plan  Extubated 9/11  Requiring nasal cannula, high risk for reintubation given mental status    Benzodiazepine dependence (HCC)- (present on admission)  Assessment & Plan  Monitor for seizure/withdrawal  Xanax 0.5 mg BID  EEG after sz-like activity 9/10 is negative despite jerking motions    Alcohol abuse- (present on admission)  Assessment & Plan  Reported hx of  Vitamins  High dose thiamine  Seizure precautions    Septic shock (HCC)- (present on admission)  Assessment & Plan  Broad-spectrum antibiotics: vanc/zosyn --> C3, now will monitor off abx  Blood culture, CSF culture, urinalysis are negative  Monitor off antibiotics    JEWELS (acute kidney injury) (HCC)- (present on admission)  Assessment & Plan  Consistent with ATN due to hypotension, dehydration and sepsis  Renal dose meds, avoid nephrotoxins  Strict  I/Os  Improving    Encephalopathy acute- (present on admission)  Assessment & Plan  Tox vs metabolic/septic  CNS infection interrogated with LP, initial studies are reassuring  EEG negative for seizures  UDS + benzos

## 2020-09-13 NOTE — PROGRESS NOTES
Alerted that patient was stable to transfer out of the ICU by Dr Gonda.  I have contacted Dr Vicente Aleln and he will assume attending role/care of patient today.

## 2020-09-13 NOTE — ASSESSMENT & PLAN NOTE
Consistent with ATN 2/2 hypotension, dehydration and sepsis  Avoid nephrotoxins.  Renal dose all medications.  Currently problem is resolved

## 2020-09-14 PROBLEM — R13.19 OTHER DYSPHAGIA: Status: ACTIVE | Noted: 2020-09-14

## 2020-09-14 LAB
ALBUMIN SERPL BCP-MCNC: 2.9 G/DL (ref 3.2–4.9)
ALBUMIN/GLOB SERPL: 1.1 G/DL
ALP SERPL-CCNC: 103 U/L (ref 30–99)
ALT SERPL-CCNC: 61 U/L (ref 2–50)
ANION GAP SERPL CALC-SCNC: 7 MMOL/L (ref 7–16)
AST SERPL-CCNC: 47 U/L (ref 12–45)
BACTERIA BLD CULT: NORMAL
BACTERIA BLD CULT: NORMAL
BASOPHILS # BLD AUTO: 0.8 % (ref 0–1.8)
BASOPHILS # BLD: 0.05 K/UL (ref 0–0.12)
BILIRUB SERPL-MCNC: 1 MG/DL (ref 0.1–1.5)
BUN SERPL-MCNC: 25 MG/DL (ref 8–22)
CALCIUM SERPL-MCNC: 9.3 MG/DL (ref 8.5–10.5)
CHLORIDE SERPL-SCNC: 109 MMOL/L (ref 96–112)
CO2 SERPL-SCNC: 29 MMOL/L (ref 20–33)
CREAT SERPL-MCNC: 0.63 MG/DL (ref 0.5–1.4)
EOSINOPHIL # BLD AUTO: 0.19 K/UL (ref 0–0.51)
EOSINOPHIL NFR BLD: 2.9 % (ref 0–6.9)
ERYTHROCYTE [DISTWIDTH] IN BLOOD BY AUTOMATED COUNT: 47.1 FL (ref 35.9–50)
GLOBULIN SER CALC-MCNC: 2.7 G/DL (ref 1.9–3.5)
GLUCOSE SERPL-MCNC: 111 MG/DL (ref 65–99)
HCT VFR BLD AUTO: 32 % (ref 42–52)
HGB BLD-MCNC: 10.3 G/DL (ref 14–18)
HSV1 GG IGG CSF-ACNC: 0 IV
HSV2 GG IGG CSF-ACNC: 0.02 IV
IMM GRANULOCYTES # BLD AUTO: 0.17 K/UL (ref 0–0.11)
IMM GRANULOCYTES NFR BLD AUTO: 2.6 % (ref 0–0.9)
INR PPP: 0.9 (ref 0.87–1.13)
LYMPHOCYTES # BLD AUTO: 1.74 K/UL (ref 1–4.8)
LYMPHOCYTES NFR BLD: 26.2 % (ref 22–41)
MCH RBC QN AUTO: 31.7 PG (ref 27–33)
MCHC RBC AUTO-ENTMCNC: 32.2 G/DL (ref 33.7–35.3)
MCV RBC AUTO: 98.5 FL (ref 81.4–97.8)
MONOCYTES # BLD AUTO: 0.63 K/UL (ref 0–0.85)
MONOCYTES NFR BLD AUTO: 9.5 % (ref 0–13.4)
NEUTROPHILS # BLD AUTO: 3.85 K/UL (ref 1.82–7.42)
NEUTROPHILS NFR BLD: 58 % (ref 44–72)
NRBC # BLD AUTO: 0 K/UL
NRBC BLD-RTO: 0 /100 WBC
PHOSPHATE SERPL-MCNC: 2.3 MG/DL (ref 2.5–4.5)
PLATELET # BLD AUTO: 157 K/UL (ref 164–446)
PMV BLD AUTO: 11.2 FL (ref 9–12.9)
POTASSIUM SERPL-SCNC: 3.5 MMOL/L (ref 3.6–5.5)
PROT SERPL-MCNC: 5.6 G/DL (ref 6–8.2)
PROTHROMBIN TIME: 12.4 SEC (ref 12–14.6)
RBC # BLD AUTO: 3.25 M/UL (ref 4.7–6.1)
SIGNIFICANT IND 70042: NORMAL
SIGNIFICANT IND 70042: NORMAL
SITE SITE: NORMAL
SITE SITE: NORMAL
SODIUM SERPL-SCNC: 145 MMOL/L (ref 135–145)
SOURCE SOURCE: NORMAL
SOURCE SOURCE: NORMAL
WBC # BLD AUTO: 6.6 K/UL (ref 4.8–10.8)

## 2020-09-14 PROCEDURE — 92610 EVALUATE SWALLOWING FUNCTION: CPT

## 2020-09-14 PROCEDURE — 700102 HCHG RX REV CODE 250 W/ 637 OVERRIDE(OP): Performed by: STUDENT IN AN ORGANIZED HEALTH CARE EDUCATION/TRAINING PROGRAM

## 2020-09-14 PROCEDURE — 770001 HCHG ROOM/CARE - MED/SURG/GYN PRIV*

## 2020-09-14 PROCEDURE — A9270 NON-COVERED ITEM OR SERVICE: HCPCS | Performed by: INTERNAL MEDICINE

## 2020-09-14 PROCEDURE — 85610 PROTHROMBIN TIME: CPT

## 2020-09-14 PROCEDURE — 700102 HCHG RX REV CODE 250 W/ 637 OVERRIDE(OP): Performed by: INTERNAL MEDICINE

## 2020-09-14 PROCEDURE — 700111 HCHG RX REV CODE 636 W/ 250 OVERRIDE (IP): Performed by: INTERNAL MEDICINE

## 2020-09-14 PROCEDURE — 51798 US URINE CAPACITY MEASURE: CPT

## 2020-09-14 PROCEDURE — 80053 COMPREHEN METABOLIC PANEL: CPT

## 2020-09-14 PROCEDURE — 99232 SBSQ HOSP IP/OBS MODERATE 35: CPT | Performed by: STUDENT IN AN ORGANIZED HEALTH CARE EDUCATION/TRAINING PROGRAM

## 2020-09-14 PROCEDURE — 85025 COMPLETE CBC W/AUTO DIFF WBC: CPT

## 2020-09-14 PROCEDURE — 84100 ASSAY OF PHOSPHORUS: CPT

## 2020-09-14 PROCEDURE — A9270 NON-COVERED ITEM OR SERVICE: HCPCS | Performed by: STUDENT IN AN ORGANIZED HEALTH CARE EDUCATION/TRAINING PROGRAM

## 2020-09-14 PROCEDURE — 700105 HCHG RX REV CODE 258: Performed by: INTERNAL MEDICINE

## 2020-09-14 RX ORDER — ALPRAZOLAM 0.5 MG/1
0.5 TABLET ORAL EVERY 8 HOURS PRN
Status: DISCONTINUED | OUTPATIENT
Start: 2020-09-14 | End: 2020-09-23

## 2020-09-14 RX ORDER — BISACODYL 10 MG
10 SUPPOSITORY, RECTAL RECTAL
Status: DISCONTINUED | OUTPATIENT
Start: 2020-09-14 | End: 2020-10-09

## 2020-09-14 RX ORDER — ALPRAZOLAM 0.5 MG/1
0.5 TABLET ORAL 3 TIMES DAILY
Status: DISCONTINUED | OUTPATIENT
Start: 2020-09-14 | End: 2020-09-22

## 2020-09-14 RX ORDER — POLYETHYLENE GLYCOL 3350 17 G/17G
1 POWDER, FOR SOLUTION ORAL
Status: DISCONTINUED | OUTPATIENT
Start: 2020-09-14 | End: 2021-01-24

## 2020-09-14 RX ORDER — ACETAMINOPHEN 325 MG/1
650 TABLET ORAL EVERY 6 HOURS PRN
Status: DISCONTINUED | OUTPATIENT
Start: 2020-09-14 | End: 2021-01-07

## 2020-09-14 RX ORDER — AMOXICILLIN 250 MG
2 CAPSULE ORAL 2 TIMES DAILY
Status: DISCONTINUED | OUTPATIENT
Start: 2020-09-14 | End: 2021-01-24

## 2020-09-14 RX ORDER — QUETIAPINE FUMARATE 25 MG/1
12.5 TABLET, FILM COATED ORAL EVERY 8 HOURS
Status: DISCONTINUED | OUTPATIENT
Start: 2020-09-14 | End: 2020-09-23

## 2020-09-14 RX ADMIN — QUETIAPINE FUMARATE 12.5 MG: 25 TABLET ORAL at 04:53

## 2020-09-14 RX ADMIN — HEPARIN SODIUM 5000 UNITS: 5000 INJECTION, SOLUTION INTRAVENOUS; SUBCUTANEOUS at 13:24

## 2020-09-14 RX ADMIN — THIAMINE HYDROCHLORIDE 250 MG: 100 INJECTION, SOLUTION INTRAMUSCULAR; INTRAVENOUS at 05:20

## 2020-09-14 RX ADMIN — QUETIAPINE FUMARATE 12.5 MG: 25 TABLET ORAL at 22:00

## 2020-09-14 RX ADMIN — DOCUSATE SODIUM 50 MG AND SENNOSIDES 8.6 MG 2 TABLET: 8.6; 5 TABLET, FILM COATED ORAL at 16:53

## 2020-09-14 RX ADMIN — DOCUSATE SODIUM 50 MG AND SENNOSIDES 8.6 MG 2 TABLET: 8.6; 5 TABLET, FILM COATED ORAL at 04:54

## 2020-09-14 RX ADMIN — ALPRAZOLAM 0.5 MG: 0.5 TABLET ORAL at 11:24

## 2020-09-14 RX ADMIN — QUETIAPINE FUMARATE 12.5 MG: 25 TABLET ORAL at 13:24

## 2020-09-14 RX ADMIN — HALOPERIDOL LACTATE 2.5 MG: 5 INJECTION, SOLUTION INTRAMUSCULAR at 09:59

## 2020-09-14 RX ADMIN — HEPARIN SODIUM 5000 UNITS: 5000 INJECTION, SOLUTION INTRAVENOUS; SUBCUTANEOUS at 22:00

## 2020-09-14 RX ADMIN — ALPRAZOLAM 0.5 MG: 0.5 TABLET ORAL at 04:54

## 2020-09-14 RX ADMIN — ALPRAZOLAM 0.5 MG: 0.5 TABLET ORAL at 16:54

## 2020-09-14 RX ADMIN — ALPRAZOLAM 0.5 MG: 0.5 TABLET ORAL at 22:00

## 2020-09-14 RX ADMIN — HEPARIN SODIUM 5000 UNITS: 5000 INJECTION, SOLUTION INTRAVENOUS; SUBCUTANEOUS at 04:55

## 2020-09-14 ASSESSMENT — ENCOUNTER SYMPTOMS
GASTROINTESTINAL NEGATIVE: 1
WEAKNESS: 1
DIZZINESS: 0
HEADACHES: 0
EYES NEGATIVE: 1
TREMORS: 0
MUSCULOSKELETAL NEGATIVE: 1
CONSTITUTIONAL NEGATIVE: 1
CARDIOVASCULAR NEGATIVE: 1
RESPIRATORY NEGATIVE: 1

## 2020-09-14 NOTE — THERAPY
[facility]   Clinical Swallow Evaluation     Patient Name: Yury Blake  AGE:  50 y.o., SEX:  male  Medical Record #: [id number]  Today's Date: 9/14/2020          Assessment    Patient is 50 y.o. male with a pmhx of HTN, chronic pain and alcohol abuse who presented 9/9/2020 with altered mental status, he was found obtunded in his house by his ex-wife who was checking on him as he had not been seen for 2 days or been able to get a hold of him.The patient was intubated for airway protection from  9/9/2020-9/11/2020. Chest xray results from 9/9/2020 were unremarkable. Recent Chest xray completed on 9/12/2020 showed patchy left lung base opacity, new since prior, could represent early infiltrate. Additional factors influencing patient status/progress: Confusion, Impulsivity.     Pt was seen on this date for a clinical bedside assessment of swallow. RN reported that Pt presents with increased confusion, requiring bilateral wrist restraints. Pt attempted to get out of bed and pulled his NG tube out early this morning. Pt AAOx1, cooperative and able to follow directions during assessment. Oral motor examination WNL, vocal quality mildly hoarse, cough strength productive. PO trials were administered (see below). Swallow trigger was timely. Pt exhibited delayed mild cough x 2 following regular trial (cracker). Mild residue noted with regular texture which cleared with a liquid rinse. Pt was able to feed himself requiring cues for bolus size and to refrain from talking during PO trials. No additional overt s/sx aspiration observed.      Plan    Given AMS and impulsivity SLP recommendation include: soft/bited sized and mildly thick liquid diet, 1:1 supervision, no straws, ok to have unthickened water in between meals, float medication whole with puree.     Recommend Speech Therapy 3 times per week until therapy goals are met for the following treatments:  Dysphagia Training and Patient / Family /  Caregiver Education.    Discharge Recommendations: Recommend post-acute placement for additional speech therapy services prior to discharge home       Objective       09/14/20 0947   Vitals   O2 Delivery Device None - Room Air   Prior Level Of Function   Communication Unknown   Swallow Unknown   Dentition Intact   Dentures None   Hearing Within Functional Limits for Evaluation   Hearing Aid None   Vision Within Functional Limits for Evaluation   Patient's Primary Language English   Oral Motor Eval    Is Patient Able to Complete Oral Motor Eval Yes, Within Normal Limits   Laryngeal Function   Voice Quality Minimal  (Hoarse)   Volutional Cough Within Functional Limits   Excursion Upon Swallow Complete   Oral Food Presentation   Ice Chips Within Functional Limits   Single Swallow Mildly Thick (2) - (Nectar Thick)  Within Functional Limits   Single Swallow Thin (0) Within Functional Limits   Liquidised (3) Within Functional Limits   Pureed (4) Within Functional Limits   Soft & Bite-Sized (6) - (Dysphagia III) Within Functional Limits   Regular (7) Minimal  (Delayed throat clear  Simultaneous filing. User may not have seen previous data.)   Regular-Easy to Chew (7) Within Functional Limits   Self Feeding Independent   Dysphagia Strategies / Recommendations   Strategies / Interventions Recommended (Yes / No) Yes   Compensatory Strategies Direct Supervision During Meals;Head of Bed 90 Degrees During Eating / Drinking;Cough / Clear Wet Vocal Quality Post Swallow;No Straws;Single Sips / Bites;Alternate Solids / Liquids;No Talking During Eating / Drinking   Diet / Liquid Recommendation Mildly Thick (2) - (Nectar Thick);Soft & Bite-Sized (6) - (Dysphagia III)   Medication Administration  Float Whole with Puree   Therapy Interventions Dysphagia Therapy By Speech Language Pathologist;One To One Supervision With Nursing   Short Term Goals   Short Term Goal # 1 Pt will consume SB6/MT2 diet with no overt s/s aspiration given direct  supervision by nursing

## 2020-09-14 NOTE — CARE PLAN
Problem: Safety  Goal: Will remain free from injury  Outcome: PROGRESSING AS EXPECTED  Bed locked and in low position, Lower bed rails raised, bed alarm in use, call light within reach, treaded slipper socks on patient and patient room near nursing station. Pt educated on calling when in need of assistance.      Problem: Skin Integrity  Goal: Risk for impaired skin integrity will decrease  Outcome: PROGRESSING AS EXPECTED  Clean linen environment, helped with turning, pillows under elbows

## 2020-09-14 NOTE — DIETARY
Nutrition Services: Update   Day 5 of admit.  Yury Blake is a 50 y.o. male with admitting DX of Acute respiratory failure with hypoxia (HCC).    Pt is currently on 1:1 regular Level 6, mildly thick diet. Wt 9/13: 79.1 kg via bed scale - slightly down from previous weights.    Pt now off TF. Per SLP note, pt pulled NG tube and now on modified diet after SLP evaluation.     Evaluation:  1. Labs: Potassium 3.5, Phos 2.3, glu 111  2. Meds: thaimine 250mg, bowel regamen, KLYTE (9/13)  3. GI: last BM 9/13  4. Per progress notes, pt confused this am but more alert and able to follow commands.         Recommendations/Plan:  1. Continue on Level 6/Level 2 diet per SLP recommendations.    2. Encourage intake of meals.  3. Document intake of all meals as % taken in ADL's to provide interdisciplinary communication across all shifts.   4. Monitor weight.  5. Nutrition rep will continue to see patient for ongoing meal and snack preferences.  6. Obtain supplement order per RD as needed.  7. Continue to monitor refeeding labs (K+, Phos,and Mg), replete as needed.   8. Diet advance per SLP.     RD to follow for adequate intake/monitoring refeeding labs.

## 2020-09-14 NOTE — PROGRESS NOTES
Patient has not voided since straight cathed at 0500. Per RN protocol to insert newton catheter. Dr. Durham updated and orders to place newton catheter for urinary retention.

## 2020-09-14 NOTE — PROGRESS NOTES
Hospital Medicine Daily Progress Note    Date of Service  9/14/2020    Chief Complaint  50 y.o. male with a pmhx of HTN, chronic pain and alcohol abuse who presented 9/9/2020 with altered mental status.     Hospital Course  50 y.o. male with a pmhx of HTN, chronic pain and alcohol abuse who presented 9/9/2020 with altered mental status, he was found obtunded in his house by his ex-wife who was checking on him as he had not been seen for 2 days or been able to get a hold of him.  He was found in a chair,  A&Ox0 and hypotensive.  He arrived in the ER with a GCS of 7 (E1,V1,M5) and severely hypotensive.  The patient was intubated for airway protection and central venous access was obtained for administration of high-volume crystalloid resuscitation and vasopressor therapy.       Labs in the ER show a significant leukocytosis at 22.9, metabolic panel with hypokalemia at 2.7, CO2 14, anion gap 35 glucose 188, creatinine 2.73, BUN 56, AST 70, ALT 48, alkaline phosphatase 136, total bilirubin 2, INR 8.93 and lactate 11. UA did not show any evidence of urinary tract infection.     CT head as well as A/P were all unremarkable. He was briefly treated with antibiotics for concern for an underlying infection and a lumbar tap done was mostly unreassuring. Cultures have remained negative thus far.  For his coagulopathy, this was rapidly corrected with vitamin K and has remained low.  EEG done for concern for seizure-like activity was without any epileptiform activity. He was extubated on 9/11 and has been able to protect his airway ever since. A feeding tube remains in place and has been getting tube feeds without any complications. He is currently receiving treatment with benzos for concern for benzo withdrawals and IV thiamine for his history of alcohol abuse.    Interval Problem Update  Pt with confusion this morning, trying to get out of bed. Pulled out his cortrak. Restraints ordered.   He is more awake and able to follow  commands, will have SLP re-eval today.     Consultants/Specialty  ICU    Code Status  Full Code    Disposition  pending    Review of Systems  Review of Systems   Constitutional: Negative.    HENT: Negative.    Eyes: Negative.    Respiratory: Negative.    Cardiovascular: Negative.    Gastrointestinal: Negative.    Genitourinary: Negative.    Musculoskeletal: Negative.    Skin: Negative.    Neurological: Positive for weakness. Negative for dizziness, tremors and headaches.   Endo/Heme/Allergies: Negative.         Physical Exam  Temp:  [36.2 °C (97.1 °F)-37.2 °C (99 °F)] 36.9 °C (98.4 °F)  Pulse:  [77-94] 77  Resp:  [19-25] 20  BP: (114-125)/(64-77) 115/64  SpO2:  [89 %-100 %] 97 %    Physical Exam  Constitutional:       General: He is not in acute distress.     Appearance: He is not ill-appearing.   HENT:      Head: Normocephalic and atraumatic.   Eyes:      Conjunctiva/sclera: Conjunctivae normal.      Pupils: Pupils are equal, round, and reactive to light.   Neck:      Musculoskeletal: Normal range of motion and neck supple. No neck rigidity or muscular tenderness.   Cardiovascular:      Rate and Rhythm: Normal rate and regular rhythm.      Pulses: Normal pulses.      Heart sounds: Normal heart sounds.   Pulmonary:      Effort: Pulmonary effort is normal. No respiratory distress.      Breath sounds: Normal breath sounds. No wheezing.   Abdominal:      General: Bowel sounds are normal. There is no distension.      Palpations: Abdomen is soft.      Tenderness: There is no abdominal tenderness.   Musculoskeletal:         General: No swelling or tenderness.   Neurological:      Mental Status: He is alert.      Sensory: No sensory deficit.      Motor: Weakness present.      Comments: Awake, alert, but disoriented,oriented to self only, moving all extremities, able to follow commands.              Fluids    Intake/Output Summary (Last 24 hours) at 9/14/2020 0706  Last data filed at 9/14/2020 0500  Gross per 24 hour    Intake 1780 ml   Output 1050 ml   Net 730 ml       Laboratory  Recent Labs     09/12/20  0635 09/13/20  0440 09/14/20  0505   WBC 9.9 7.7 6.6   RBC 3.21* 3.10* 3.25*   HEMOGLOBIN 10.1* 10.0* 10.3*   HEMATOCRIT 31.6* 30.0* 32.0*   MCV 98.4* 96.8 98.5*   MCH 31.5 32.3 31.7   MCHC 32.0* 33.3* 32.2*   RDW 48.0 46.6 47.1   PLATELETCT 164 149* 157*   MPV 11.4 11.6 11.2     Recent Labs     09/12/20  0635 09/13/20  0440 09/14/20  0505   SODIUM 153* 149* 145   POTASSIUM 3.8 3.0* 3.5*   CHLORIDE 117* 112 109   CO2 22 26 29   GLUCOSE 103* 114* 111*   BUN 29* 24* 25*   CREATININE 1.06 0.67 0.63   CALCIUM 9.0 8.8 9.3     Recent Labs     09/14/20  0505   INR 0.90               Imaging  DX-ABDOMEN FOR TUBE PLACEMENT   Final Result      Feeding tube tip overlies the second portion of the duodenum.      DX-CHEST-PORTABLE (1 VIEW)   Final Result         1.  Patchy left lung base opacity, new since prior, could represent early infiltrate            DX-CHEST-PORTABLE (1 VIEW)   Final Result         1.  Patchy left lung base opacity, new since prior, could represent early infiltrate         [id number] VIEW   Final Result      Enteric tube projects over the distal stomach/pylorus.         EC-ECHOCARDIOGRAM COMPLETE W/O CONT   Final Result      DX-CHEST-PORTABLE (1 VIEW)   Final Result         1.  No acute cardiopulmonary disease.      CT-ABDOMEN-PELVIS W/O   Final Result      1.  No renal stone or hydronephrosis.   2.  Normal appendix.   3.  No focal mesenteric inflammatory process.      DX-ABDOMEN FOR TUBE PLACEMENT   Final Result      NG tube tip projects over expected location the gastric fundus.      US-ABDOMEN COMPLETE SURVEY   Final Result         1.  Echogenic liver compatible with fatty change versus fibrosis.   2.  Gallbladder sludge without additional sonographic findings of cholecystitis.   3.  Partial atrophic bilateral kidneys with lobulated contour         CT-HEAD W/O   Final Result         1.  No acute intracranial  abnormality is identified, there are nonspecific white matter changes, commonly associated with small vessel ischemic disease.  Associated mild cerebral atrophy is noted.   2.  Atherosclerosis.      DX-CHEST-PORTABLE (1 VIEW)   Final Result         1.  No acute cardiopulmonary disease.           Assessment/Plan  Other dysphagia  Assessment & Plan  On tube feed  SLP consult    Hypernatremia  Assessment & Plan  FWF with tube feeds  Monitor BMP  Watch for over correction.  Improving.    Acute respiratory failure with hypoxia ([facility])- (present on admission)  Assessment & Plan  Extubated 9/11  RESOLVED  On RA.    Benzodiazepine dependence ([facility])- (present on admission)  Assessment & Plan  Monitor for seizure/withdrawal  Xanax 0.5 mg BID  EEG after sz-like activity 9/10 is negative despite jerking motions    Alcohol abuse- (present on admission)  Assessment & Plan  Reported hx of  Vitamins  High dose thiamine  Seizure precautions    Septic shock ([facility])- (present on admission)  Assessment & Plan  Broad-spectrum antibiotics: vanc/zosyn --> C3, now will monitor off abx  Blood culture, CSF culture, urinalysis are negative  Monitor off antibiotics    RESOLVED    JEWELS (acute kidney injury) ([facility])- (present on admission)  Assessment & Plan  Consistent with ATN due to hypotension, dehydration and sepsis  Renal dose meds, avoid nephrotoxins  Strict I/Os  RESOLVED    Encephalopathy acute- (present on admission)  Assessment & Plan  Tox vs metabolic/septic  CNS infection interrogated with LP- negative  EEG negative for seizures  UDS + benzos  On xanax for concerns for benzo withdrawal  Seroquel for delirium          VTE prophylaxis: heparin

## 2020-09-14 NOTE — CARE PLAN
Problem: Bowel/Gastric:  Goal: Normal bowel function is maintained or improved  Outcome: PROGRESSING AS EXPECTED  Note: Regular BM overnight     Problem: Knowledge Deficit  Goal: Knowledge of disease process/condition, treatment plan, diagnostic tests, and medications will improve  Outcome: PROGRESSING SLOWER THAN EXPECTED  Note: Orientation improving, reoriented throughout day

## 2020-09-14 NOTE — PROGRESS NOTES
Patient AO x0-1, pulled cortrak part way out with bridle. Cortrak removed. Waiting fpr speech eval today. Patient continues to put rails down and crawl out of bed. Dr. Durham updated. Orders received for soft wrist restraints.

## 2020-09-15 LAB
ANION GAP SERPL CALC-SCNC: 11 MMOL/L (ref 7–16)
BASOPHILS # BLD AUTO: 0.6 % (ref 0–1.8)
BASOPHILS # BLD: 0.05 K/UL (ref 0–0.12)
BUN SERPL-MCNC: 18 MG/DL (ref 8–22)
CALCIUM SERPL-MCNC: 9 MG/DL (ref 8.5–10.5)
CHLORIDE SERPL-SCNC: 107 MMOL/L (ref 96–112)
CO2 SERPL-SCNC: 25 MMOL/L (ref 20–33)
CREAT SERPL-MCNC: 0.63 MG/DL (ref 0.5–1.4)
CRP SERPL HS-MCNC: 4.77 MG/DL (ref 0–0.75)
EEEV IGG TITR CSF IF: NORMAL {TITER}
EEEV IGM TITR CSF IF: NORMAL {TITER}
EOSINOPHIL # BLD AUTO: 0.19 K/UL (ref 0–0.51)
EOSINOPHIL NFR BLD: 2.3 % (ref 0–6.9)
ERYTHROCYTE [DISTWIDTH] IN BLOOD BY AUTOMATED COUNT: 47.2 FL (ref 35.9–50)
GLUCOSE SERPL-MCNC: 103 MG/DL (ref 65–99)
HCT VFR BLD AUTO: 30.6 % (ref 42–52)
HGB BLD-MCNC: 10.2 G/DL (ref 14–18)
IMM GRANULOCYTES # BLD AUTO: 0.15 K/UL (ref 0–0.11)
IMM GRANULOCYTES NFR BLD AUTO: 1.9 % (ref 0–0.9)
LACV IGG TITR CSF IF: NORMAL {TITER}
LACV IGM TITR CSF IF: NORMAL {TITER}
LYMPHOCYTES # BLD AUTO: 1.71 K/UL (ref 1–4.8)
LYMPHOCYTES NFR BLD: 21.1 % (ref 22–41)
MCH RBC QN AUTO: 32.2 PG (ref 27–33)
MCHC RBC AUTO-ENTMCNC: 33.3 G/DL (ref 33.7–35.3)
MCV RBC AUTO: 96.5 FL (ref 81.4–97.8)
MONOCYTES # BLD AUTO: 0.65 K/UL (ref 0–0.85)
MONOCYTES NFR BLD AUTO: 8 % (ref 0–13.4)
NEUTROPHILS # BLD AUTO: 5.34 K/UL (ref 1.82–7.42)
NEUTROPHILS NFR BLD: 66.1 % (ref 44–72)
NRBC # BLD AUTO: 0 K/UL
NRBC BLD-RTO: 0 /100 WBC
PLATELET # BLD AUTO: 177 K/UL (ref 164–446)
PMV BLD AUTO: 11.7 FL (ref 9–12.9)
POTASSIUM SERPL-SCNC: 3.3 MMOL/L (ref 3.6–5.5)
RBC # BLD AUTO: 3.17 M/UL (ref 4.7–6.1)
SLEV IGG TITR CSF IF: NORMAL {TITER}
SLEV IGM TITR CSF IF: NORMAL {TITER}
SODIUM SERPL-SCNC: 143 MMOL/L (ref 135–145)
WBC # BLD AUTO: 8.1 K/UL (ref 4.8–10.8)
WEEV IGG TITR CSF IF: NORMAL {TITER}
WEEV IGM TITR CSF IF: NORMAL {TITER}
WNV IGG CSF IA-ACNC: 0.1 IV
WNV IGM CSF IA-ACNC: 0.01 IV

## 2020-09-15 PROCEDURE — A9270 NON-COVERED ITEM OR SERVICE: HCPCS | Performed by: STUDENT IN AN ORGANIZED HEALTH CARE EDUCATION/TRAINING PROGRAM

## 2020-09-15 PROCEDURE — 80048 BASIC METABOLIC PNL TOTAL CA: CPT

## 2020-09-15 PROCEDURE — 700101 HCHG RX REV CODE 250: Performed by: STUDENT IN AN ORGANIZED HEALTH CARE EDUCATION/TRAINING PROGRAM

## 2020-09-15 PROCEDURE — 770001 HCHG ROOM/CARE - MED/SURG/GYN PRIV*

## 2020-09-15 PROCEDURE — 92526 ORAL FUNCTION THERAPY: CPT

## 2020-09-15 PROCEDURE — 99232 SBSQ HOSP IP/OBS MODERATE 35: CPT | Performed by: HOSPITALIST

## 2020-09-15 PROCEDURE — 97162 PT EVAL MOD COMPLEX 30 MIN: CPT

## 2020-09-15 PROCEDURE — 700105 HCHG RX REV CODE 258: Performed by: INTERNAL MEDICINE

## 2020-09-15 PROCEDURE — 85025 COMPLETE CBC W/AUTO DIFF WBC: CPT

## 2020-09-15 PROCEDURE — 700102 HCHG RX REV CODE 250 W/ 637 OVERRIDE(OP): Performed by: STUDENT IN AN ORGANIZED HEALTH CARE EDUCATION/TRAINING PROGRAM

## 2020-09-15 PROCEDURE — 86140 C-REACTIVE PROTEIN: CPT

## 2020-09-15 PROCEDURE — 51798 US URINE CAPACITY MEASURE: CPT

## 2020-09-15 PROCEDURE — 700111 HCHG RX REV CODE 636 W/ 250 OVERRIDE (IP): Performed by: INTERNAL MEDICINE

## 2020-09-15 RX ADMIN — POLYETHYLENE GLYCOL 3350 1 PACKET: 17 POWDER, FOR SOLUTION ORAL at 17:14

## 2020-09-15 RX ADMIN — ALPRAZOLAM 0.5 MG: 0.5 TABLET ORAL at 16:00

## 2020-09-15 RX ADMIN — THIAMINE HYDROCHLORIDE 250 MG: 100 INJECTION, SOLUTION INTRAMUSCULAR; INTRAVENOUS at 04:46

## 2020-09-15 RX ADMIN — QUETIAPINE FUMARATE 12.5 MG: 25 TABLET ORAL at 16:00

## 2020-09-15 RX ADMIN — ALPRAZOLAM 0.5 MG: 0.5 TABLET ORAL at 05:05

## 2020-09-15 RX ADMIN — ALPRAZOLAM 0.5 MG: 0.5 TABLET ORAL at 17:07

## 2020-09-15 RX ADMIN — DOCUSATE SODIUM 50 MG AND SENNOSIDES 8.6 MG 2 TABLET: 8.6; 5 TABLET, FILM COATED ORAL at 05:05

## 2020-09-15 RX ADMIN — QUETIAPINE FUMARATE 12.5 MG: 25 TABLET ORAL at 05:05

## 2020-09-15 RX ADMIN — DOCUSATE SODIUM 50 MG AND SENNOSIDES 8.6 MG 2 TABLET: 8.6; 5 TABLET, FILM COATED ORAL at 17:08

## 2020-09-15 RX ADMIN — HEPARIN SODIUM 5000 UNITS: 5000 INJECTION, SOLUTION INTRAVENOUS; SUBCUTANEOUS at 04:47

## 2020-09-15 ASSESSMENT — COGNITIVE AND FUNCTIONAL STATUS - GENERAL
STANDING UP FROM CHAIR USING ARMS: A LOT
CLIMB 3 TO 5 STEPS WITH RAILING: TOTAL
TURNING FROM BACK TO SIDE WHILE IN FLAT BAD: A LITTLE
MOBILITY SCORE: 13
WALKING IN HOSPITAL ROOM: A LOT
MOVING TO AND FROM BED TO CHAIR: A LITTLE
SUGGESTED CMS G CODE MODIFIER MOBILITY: CL
MOVING FROM LYING ON BACK TO SITTING ON SIDE OF FLAT BED: A LOT

## 2020-09-15 ASSESSMENT — GAIT ASSESSMENTS
ASSISTIVE DEVICE: FRONT WHEEL WALKER
GAIT LEVEL OF ASSIST: MODERATE ASSIST
DISTANCE (FEET): 20

## 2020-09-15 ASSESSMENT — LIFESTYLE VARIABLES
CONSUMPTION TOTAL: POSITIVE
HAVE YOU EVER FELT YOU SHOULD CUT DOWN ON YOUR DRINKING: YES
ON A TYPICAL DAY WHEN YOU DRINK ALCOHOL HOW MANY DRINKS DO YOU HAVE: 20
HOW MANY TIMES IN THE PAST YEAR HAVE YOU HAD 5 OR MORE DRINKS IN A DAY: 365
ALCOHOL_USE: YES
HAVE PEOPLE ANNOYED YOU BY CRITICIZING YOUR DRINKING: YES
AVERAGE NUMBER OF DAYS PER WEEK YOU HAVE A DRINK CONTAINING ALCOHOL: 7
EVER FELT BAD OR GUILTY ABOUT YOUR DRINKING: YES
TOTAL SCORE: 4
EVER HAD A DRINK FIRST THING IN THE MORNING TO STEADY YOUR NERVES TO GET RID OF A HANGOVER: YES
TOTAL SCORE: 4
DOES PATIENT WANT TO STOP DRINKING: CANNOT ASSESS
TOTAL SCORE: 4

## 2020-09-15 NOTE — PROGRESS NOTES
Hospital Medicine Daily Progress Note    Date of Service  9/15/2020    Chief Complaint  50 y.o. male with a pmhx of HTN, chronic pain and alcohol abuse who presented 9/9/2020 with altered mental status.     Hospital Course  50 y.o. male with a pmhx of HTN, chronic pain and alcohol abuse who presented 9/9/2020 with altered mental status, he was found obtunded in his house by his ex-wife who was checking on him as he had not been seen for 2 days or been able to get a hold of him.  He was found in a chair,  A&Ox0 and hypotensive.  He arrived in the ER with a GCS of 7 (E1,V1,M5) and severely hypotensive.  The patient was intubated for airway protection and central venous access was obtained for administration of high-volume crystalloid resuscitation and vasopressor therapy.    CT head and abdomen and pelvis were all unremarkable. He was briefly treated with antibiotics for concern for an underlying infection and a lumbar tap done was negative. Cultures remained negative.    EEG was done for concern for seizure-like activity was without any epileptiform activity.    He was extubated on 9/11 and has been able to protect his airway ever since. He is currently receiving treatment with benzos for concern for benzo withdrawals and IV thiamine for his history of alcohol abuse.    Interval Problem Update  Passed speech eval  Remains confused and in restraints but calm and cooperative     Consultants/Specialty  Intensivist    Code Status  Full Code    Disposition  Med floor  Will need placement of some sort if remains altered    D/W tx team on rounds      Review of Systems  Review of Systems   Unable to perform ROS: Mental acuity   he is not aware he has a newton, says he just gets up to go to the bathroom    Physical Exam  Temp:  [36.5 °C (97.7 °F)-37 °C (98.6 °F)] 36.9 °C (98.4 °F)  Pulse:  [67-91] 67  Resp:  [18] 18  BP: (113-130)/(65-75) 113/72  SpO2:  [91 %-100 %] 96 %    Physical Exam  Vitals signs and nursing note  reviewed.   Constitutional:       General: He is not in acute distress.  HENT:      Head: Normocephalic and atraumatic.      Right Ear: External ear normal.      Left Ear: External ear normal.      Nose: Nose normal.      Mouth/Throat:      Mouth: Mucous membranes are dry.   Eyes:      Pupils: Pupils are equal, round, and reactive to light.   Neck:      Musculoskeletal: Normal range of motion and neck supple. No neck rigidity or muscular tenderness.   Cardiovascular:      Rate and Rhythm: Normal rate and regular rhythm.      Pulses: Normal pulses.   Pulmonary:      Effort: Pulmonary effort is normal. No respiratory distress.      Breath sounds: Wheezing present. No rales.   Abdominal:      General: Bowel sounds are normal. There is no distension.      Palpations: Abdomen is soft.      Tenderness: There is no abdominal tenderness.   Genitourinary:     Comments: Catheter draining dark orange urine  Musculoskeletal: Normal range of motion.         General: No tenderness.      Right lower leg: No edema.      Left lower leg: No edema.      Comments: In restraints   Skin:     General: Skin is warm and dry.      Findings: No erythema.   Neurological:      General: No focal deficit present.      Mental Status: He is alert.      Comments: Oriented to self    Psychiatric:         Behavior: Behavior normal.         Fluids    Intake/Output Summary (Last 24 hours) at 9/15/2020 1411  Last data filed at 9/15/2020 0800  Gross per 24 hour   Intake 460 ml   Output 1085 ml   Net -625 ml       Laboratory  Recent Labs     09/13/20  0440 09/14/20  0505 09/15/20  0505   WBC 7.7 6.6 8.1   RBC 3.10* 3.25* 3.17*   HEMOGLOBIN 10.0* 10.3* 10.2*   HEMATOCRIT 30.0* 32.0* 30.6*   MCV 96.8 98.5* 96.5   MCH 32.3 31.7 32.2   MCHC 33.3* 32.2* 33.3*   RDW 46.6 47.1 47.2   PLATELETCT 149* 157* 177   MPV 11.6 11.2 11.7     Recent Labs     09/13/20  0440 09/14/20  0505 09/15/20  0505   SODIUM 149* 145 143   POTASSIUM 3.0* 3.5* 3.3*   CHLORIDE 112 109 107    CO2 26 29 25   GLUCOSE 114* 111* 103*   BUN 24* 25* 18   CREATININE 0.67 0.63 0.63   CALCIUM 8.8 9.3 9.0     Recent Labs     09/14/20  0505   INR 0.90               Imaging  DX-ABDOMEN FOR TUBE PLACEMENT   Final Result      Feeding tube tip overlies the second portion of the duodenum.      DX-CHEST-PORTABLE (1 VIEW)   Final Result         1.  Patchy left lung base opacity, new since prior, could represent early infiltrate            DX-CHEST-PORTABLE (1 VIEW)   Final Result         1.  Patchy left lung base opacity, new since prior, could represent early infiltrate         [id number] VIEW   Final Result      Enteric tube projects over the distal stomach/pylorus.         EC-ECHOCARDIOGRAM COMPLETE W/O CONT   Final Result      DX-CHEST-PORTABLE (1 VIEW)   Final Result         1.  No acute cardiopulmonary disease.      CT-ABDOMEN-PELVIS W/O   Final Result      1.  No renal stone or hydronephrosis.   2.  Normal appendix.   3.  No focal mesenteric inflammatory process.      DX-ABDOMEN FOR TUBE PLACEMENT   Final Result      NG tube tip projects over expected location the gastric fundus.      US-ABDOMEN COMPLETE SURVEY   Final Result         1.  Echogenic liver compatible with fatty change versus fibrosis.   2.  Gallbladder sludge without additional sonographic findings of cholecystitis.   3.  Partial atrophic bilateral kidneys with lobulated contour         CT-HEAD W/O   Final Result         1.  No acute intracranial abnormality is identified, there are nonspecific white matter changes, commonly associated with small vessel ischemic disease.  Associated mild cerebral atrophy is noted.   2.  Atherosclerosis.      DX-CHEST-PORTABLE (1 VIEW)   Final Result         1.  No acute cardiopulmonary disease.           Assessment/Plan  Other dysphagia- (present on admission)  Assessment & Plan  Passed speech eval now  On modified diet    Hypernatremia  Assessment & Plan  resolved    Acute respiratory failure with hypoxia (HCC)-  (present on admission)  Assessment & Plan  2/2 mental status  Extubated 9/11  Remains on room air    Benzodiazepine dependence (HCC)- (present on admission)  Assessment & Plan  Monitor for seizure/withdrawal  Xanax 0.5 mg BID; taper off  EEG after sz-like activity 9/10 was negative despite jerking motions    Alcohol abuse- (present on admission)  Assessment & Plan  Reported hx of  Continue Vitamins  High dose thiamine  Seizure precautions  S/p etoh w/d protocol    Septic shock ([facility])- (present on admission)  Assessment & Plan  Resolved  Broad-spectrum antibiotics: s/p vanc/zosyn --> C3, now off abx  Blood culture, CSF culture, urinalysis allnegative  Monitor off antibiotics        JEWELS (acute kidney injury) ([facility])- (present on admission)  Assessment & Plan  Consistent with ATN 2/2 hypotension, dehydration and sepsis  Resolved  Strict I/Os      Encephalopathy acute- (present on admission)  Assessment & Plan  Toxic vs metabolic/septic  LP- negative  EEG was negative for seizures  UDS + benzos  On xanax for concerns for benzo withdrawal-taper off  Seroquel for delirium          VTE prophylaxis: sq heparin

## 2020-09-15 NOTE — THERAPY
"Physical Therapy   Initial Evaluation     Patient Name: Yury Blake  Age:  50 y.o., Sex:  male  Medical Record #: [id number]  Today's Date: 9/15/2020     Precautions: Fall Risk    Assessment  Patient is 50 y.o. male that presented to acute after being found down with AMS; he required intubation during acute stay. PMHx significant for chronic pain and ETOH. He presented to PT with apparent impaired cognition, poor insight and safety awareness, impaired balance and coordination, functional weakness, and decreased activity tolerance which are limiting his ability to safely perform mobility at [facility]. He required mod A during ambulation with FWW for safety, he demonstrated inconsistent ROSALVA and increased lateral sway as well as LLE buckling. Anticipate he will progress well as cognition clears and with continued mobilization. Will follow.    Plan    Recommend Physical Therapy 3 times per week until therapy goals are met for the following treatments:  Bed Mobility, Community Re-integration, Equipment, Gait Training, Manual Therapy, Neuro Re-Education / Balance, Self Care/Home Evaluation, Stair Training, Therapeutic Activities and Therapeutic Exercises    DC Equipment Recommendations: Front-Wheel Walker, Unable to determine at this time  Discharge Recommendations: Recommend post-acute placement for additional physical therapy services prior to discharge home       Subjective    \"I remember you.\"     Objective       09/15/20 1508   Prior Living Situation   Prior Services Unable To Determine At This Time   Comments Patient reported he had 1SH but sold it and is now living \"here\"; reported he will likely buy another home   Prior Level of Functional Mobility   Bed Mobility Independent   Transfer Status Independent   Ambulation Independent   Distance Ambulation (Feet)   (community)   Assistive Devices Used Unable to Determine At This Time   Comments Patient confused and unreliable historian, need to verify social and " PLOF   History of Falls   History of Falls   (unable to determine)   Cognition    Cognition / Consciousness X   Orientation Level Not Oriented to Reason;Not Oriented to Year   Level of Consciousness Confused   Ability To Follow Commands 1 Step   Safety Awareness Impaired;Impulsive   New Learning Impaired   Attention Impaired   Comments patient thought he recognized PT despite never meeting; thought it was 1999 and the president was [place]; at times nonsensical thoughts   Balance Assessment   Sitting Balance (Static) Fair   Sitting Balance (Dynamic) Fair   Standing Balance (Static) Fair -   Standing Balance (Dynamic) Poor   Comments with FWW, required hands on assist for safety with mobility   Gait Analysis   Gait Level Of Assist Moderate Assist   Assistive Device Front Wheel Walker   Distance (Feet) 20   # of Times Distance was Traveled 1   Deviation   (inconsistent ROSALVA and step length)   # of Stairs Climbed 0   Bed Mobility    Supine to Sit Supervised  (with bed features)   Sit to Supine Supervised   Scooting Supervised   Functional Mobility   Sit to Stand Minimal Assist   Bed, Chair, Wheelchair Transfer Minimal Assist   Transfer Method Stand Step   Patient / Family Goals    Patient / Family Goal #1 go home   Short Term Goals    Short Term Goal # 1 Patient will move supine<>sitting EOB without bed features with supervision within 6tx in order to get in/out of bed   Short Term Goal # 2 Patient will move sitting<>standing with supervision within 6tx in order to initiate gait and transfers   Short Term Goal # 3 Patient will ambulate 150ft with supervision within 6tx in order to access environment   Anticipated Discharge Equipment and Recommendations   DC Equipment Recommendations Front-Wheel Walker;Unable to determine at this time   Discharge Recommendations Recommend post-acute placement for additional physical therapy services prior to discharge home

## 2020-09-15 NOTE — PROGRESS NOTES
Attending Hospitalist is Dr Fernandez starting at 0700. Please contact this physician for orders, updates or questions today.

## 2020-09-15 NOTE — CARE PLAN
Problem: Communication  Goal: The ability to communicate needs accurately and effectively will improve  Outcome: PROGRESSING AS EXPECTED     Problem: Safety  Goal: Will remain free from injury  Outcome: PROGRESSING AS EXPECTED     Problem: Venous Thromboembolism (VTW)/Deep Vein Thrombosis (DVT) Prevention:  Goal: Patient will participate in Venous Thrombosis (VTE)/Deep Vein Thrombosis (DVT)Prevention Measures  Outcome: PROGRESSING AS EXPECTED     Problem: Knowledge Deficit  Goal: Knowledge of disease process/condition, treatment plan, diagnostic tests, and medications will improve  Outcome: PROGRESSING AS EXPECTED     Problem: Respiratory:  Goal: Respiratory status will improve  Outcome: PROGRESSING AS EXPECTED     Problem: Psychosocial Needs:  Goal: Level of anxiety will decrease  Outcome: PROGRESSING AS EXPECTED     Problem: Skin Integrity  Goal: Risk for impaired skin integrity will decrease  Outcome: PROGRESSING AS EXPECTED

## 2020-09-15 NOTE — DISCHARGE PLANNING
Care Transition Team Discharge Planning    Anticipated Discharge Disposition: TBD    Action: Lsw spoke to PFA. They would like to speak w/ pt to complete an application for insurance. They are aware pt is A/O x1-to self today. Pt also appears to have an order to transfer to a lower level of care off the ICU. He appears to no longer meet ICU status.They will f/u w/ him again tomorrow. They are aware they may be able to speak w/ his ex wife Kellee as she has remained his friend and they have only been  for 1 year after being together for 7 years.     Barriers to Discharge: TBD    Plan: f/u w/ medical team, etc.

## 2020-09-16 LAB
ANION GAP SERPL CALC-SCNC: 12 MMOL/L (ref 7–16)
BASOPHILS # BLD AUTO: 0.4 % (ref 0–1.8)
BASOPHILS # BLD: 0.03 K/UL (ref 0–0.12)
BUN SERPL-MCNC: 13 MG/DL (ref 8–22)
CALCIUM SERPL-MCNC: 9 MG/DL (ref 8.5–10.5)
CHLORIDE SERPL-SCNC: 108 MMOL/L (ref 96–112)
CO2 SERPL-SCNC: 24 MMOL/L (ref 20–33)
CREAT SERPL-MCNC: 0.65 MG/DL (ref 0.5–1.4)
EOSINOPHIL # BLD AUTO: 0.17 K/UL (ref 0–0.51)
EOSINOPHIL NFR BLD: 2.4 % (ref 0–6.9)
ERYTHROCYTE [DISTWIDTH] IN BLOOD BY AUTOMATED COUNT: 48 FL (ref 35.9–50)
GLUCOSE SERPL-MCNC: 99 MG/DL (ref 65–99)
HCT VFR BLD AUTO: 32.4 % (ref 42–52)
HGB BLD-MCNC: 11 G/DL (ref 14–18)
IMM GRANULOCYTES # BLD AUTO: 0.11 K/UL (ref 0–0.11)
IMM GRANULOCYTES NFR BLD AUTO: 1.6 % (ref 0–0.9)
LYMPHOCYTES # BLD AUTO: 1.59 K/UL (ref 1–4.8)
LYMPHOCYTES NFR BLD: 22.6 % (ref 22–41)
MCH RBC QN AUTO: 32.2 PG (ref 27–33)
MCHC RBC AUTO-ENTMCNC: 34 G/DL (ref 33.7–35.3)
MCV RBC AUTO: 94.7 FL (ref 81.4–97.8)
MONOCYTES # BLD AUTO: 0.67 K/UL (ref 0–0.85)
MONOCYTES NFR BLD AUTO: 9.5 % (ref 0–13.4)
NEUTROPHILS # BLD AUTO: 4.48 K/UL (ref 1.82–7.42)
NEUTROPHILS NFR BLD: 63.5 % (ref 44–72)
NRBC # BLD AUTO: 0 K/UL
NRBC BLD-RTO: 0 /100 WBC
PLATELET # BLD AUTO: 181 K/UL (ref 164–446)
PMV BLD AUTO: 11.3 FL (ref 9–12.9)
POTASSIUM SERPL-SCNC: 3.4 MMOL/L (ref 3.6–5.5)
RBC # BLD AUTO: 3.42 M/UL (ref 4.7–6.1)
SODIUM SERPL-SCNC: 144 MMOL/L (ref 135–145)
WBC # BLD AUTO: 7.1 K/UL (ref 4.8–10.8)

## 2020-09-16 PROCEDURE — 99232 SBSQ HOSP IP/OBS MODERATE 35: CPT | Performed by: STUDENT IN AN ORGANIZED HEALTH CARE EDUCATION/TRAINING PROGRAM

## 2020-09-16 PROCEDURE — 770001 HCHG ROOM/CARE - MED/SURG/GYN PRIV*

## 2020-09-16 PROCEDURE — A9270 NON-COVERED ITEM OR SERVICE: HCPCS | Performed by: STUDENT IN AN ORGANIZED HEALTH CARE EDUCATION/TRAINING PROGRAM

## 2020-09-16 PROCEDURE — 80048 BASIC METABOLIC PNL TOTAL CA: CPT

## 2020-09-16 PROCEDURE — 700102 HCHG RX REV CODE 250 W/ 637 OVERRIDE(OP): Performed by: STUDENT IN AN ORGANIZED HEALTH CARE EDUCATION/TRAINING PROGRAM

## 2020-09-16 PROCEDURE — 85025 COMPLETE CBC W/AUTO DIFF WBC: CPT

## 2020-09-16 PROCEDURE — 700111 HCHG RX REV CODE 636 W/ 250 OVERRIDE (IP): Performed by: INTERNAL MEDICINE

## 2020-09-16 RX ADMIN — HEPARIN SODIUM 5000 UNITS: 5000 INJECTION, SOLUTION INTRAVENOUS; SUBCUTANEOUS at 22:28

## 2020-09-16 RX ADMIN — ALPRAZOLAM 0.5 MG: 0.5 TABLET ORAL at 13:06

## 2020-09-16 RX ADMIN — QUETIAPINE FUMARATE 12.5 MG: 25 TABLET ORAL at 13:06

## 2020-09-16 RX ADMIN — QUETIAPINE FUMARATE 12.5 MG: 25 TABLET ORAL at 22:28

## 2020-09-16 RX ADMIN — ALPRAZOLAM 0.5 MG: 0.5 TABLET ORAL at 05:07

## 2020-09-16 RX ADMIN — DOCUSATE SODIUM 50 MG AND SENNOSIDES 8.6 MG 2 TABLET: 8.6; 5 TABLET, FILM COATED ORAL at 17:12

## 2020-09-16 RX ADMIN — HEPARIN SODIUM 5000 UNITS: 5000 INJECTION, SOLUTION INTRAVENOUS; SUBCUTANEOUS at 13:07

## 2020-09-16 RX ADMIN — DOCUSATE SODIUM 50 MG AND SENNOSIDES 8.6 MG 2 TABLET: 8.6; 5 TABLET, FILM COATED ORAL at 05:07

## 2020-09-16 RX ADMIN — QUETIAPINE FUMARATE 12.5 MG: 25 TABLET ORAL at 05:07

## 2020-09-16 RX ADMIN — ALPRAZOLAM 0.5 MG: 0.5 TABLET ORAL at 17:12

## 2020-09-16 NOTE — CARE PLAN
Problem: Discharge Barriers/Planning  Goal: Patient's continuum of care needs will be met  Outcome: PROGRESSING SLOWER THAN EXPECTED  Note: Patient had previously lived independently, however, now is confused and only oriented to self. Patient will need to increase orientation or need additional care in order to discharge     Problem: Urinary Elimination:  Goal: Ability to reestablish a normal urinary elimination pattern will improve  Outcome: PROGRESSING SLOWER THAN EXPECTED  Note: Gallardo catheter still in place for urinary retention

## 2020-09-16 NOTE — CARE PLAN
Problem: Nutritional:  Goal: Achieve adequate nutritional intake  Description: Patient will consume >50% of meals.  Outcome: PROGRESSING AS EXPECTED

## 2020-09-16 NOTE — CARE PLAN
Problem: Safety  Goal: Will remain free from falls  Outcome: PROGRESSING AS EXPECTED  Intervention: Assess risk factors for falls  Fall Risk: High Risk to Fall - 2 or more points   History of fall: 2  Mobility Status Assessment: 1-1 Healthcare Provider Required for Assistance with Ambulation & Transfer  Risk for Injury-Any positive answers results in the pt being at high risk for fall related injury: Alcohol Abuse  Intervention: Implement fall precautions  Bed Alarm: Yes - Alarm On  Environmental Precautions:   Communication Sign for Patients & Families   Bed in Low Position   Personal Belongings, Wastebasket, Call Bell etc. in Easy Reach   Treaded Slipper Socks on Patient     Problem: Communication  Goal: The ability to communicate needs accurately and effectively will improve  Outcome: PROGRESSING SLOWER THAN EXPECTED  Intervention: Reorient patient to environment as needed  Note: Attempt to reorient patient at every interaction.  Pt has twice now recognized that he is in the hospital, but at most assessments, pt believes he is in his apartment or at his mom's house or at some other location.  Pt alternately states [facility] is President and it is March.  Pt routinely makes requests that are indicative of a complete lack of awareness of his surroundings.  Intervention: Educate patient and significant other/support system about the plan of care, procedures, treatments, medications, Location of Bathroom, Visiting Policy, Unit Routine, Call Light and Bedside Controls, Bedside Rail Policy, Smoking Policy, Rights and Responsibilities, Bedside Report, and allow for questions.   Note: Pt does not retain education.  Was able to fully educate pt's ex-wife Kellee, at bedside during visiting hours.

## 2020-09-16 NOTE — CARE PLAN
Problem: Nutritional:  Goal: Nutrition support tolerated and meeting greater than 85% of estimated needs  Outcome: DISCHARGED-GOAL NOT MET  Cortak out and diet initiated.

## 2020-09-16 NOTE — THERAPY
"Speech Language Pathology  Daily Treatment     Patient Name: Yury Blake  Age:  50 y.o., Sex:  male  Medical Record #: [id number]  Today's Date: 9/15/2020     Precautions  Precautions: (P) Fall Risk, Swallow Precautions ( See Comments)    Assessment    Patient was seen for dysphagia tx today. Per RN, he did not eat breakfast or lunch today, refused both. Pt's ex wife was at bedside and said she tried to get him to eat some peaches but he refused. Pt was alert, oriented x1, confused in general, thought he was at home yesterday and earlier today, wondering when he can leave and go to the [facility]. Pt agreed to eat a container of soft/bite sized peaches and half a string cheese before refusing further PO offered. He drank sips of thin water from the cup with no s/sx of aspiration (~5 oz). Recommend continuing Soft & Bite Sized (SB6) with upgrade to Thin Liquids (TN0). Pt will require 1:1 supervision d/t confusion, impulsivity.     Plan    Soft & Bite Sized (SB6) solids and Thin Liquids (TN0) with 1:1 supervision. Float pills in puree.     Continue current treatment plan.    Discharge Recommendations: (P) Recommend post-acute placement for additional speech therapy services prior to discharge home    Subjective    \"I thought we were going to the [facility].\"     Objective       09/15/20 1649   Cognitive-Linguistic   Level of Consciousness Confused   Dysphagia    Positioning / Behavior Modification Modulate Rate or Bite Size;Self Monitoring   Other Treatments   (PO of SB6 (peaches, cheese), tx trials of TN0)   Diet / Liquid Recommendation Soft & Bite-Sized (6) - (Dysphagia III);Thin (0)   Recommended Route of Medication Administration   Medication Administration  Float Whole with Puree   Short Term Goals   Short Term Goal # 1 Pt will consume SB6/MT2 diet with no overt s/s aspiration given direct supervision by nursing    Goal Outcome # 1 Goal met, new goal added   Short Term Goal # 1 B  Patient will " consume meals of SB6/TN0 with no s/sx of aspiration given 1:1 supervision for safe swallow strategies.

## 2020-09-16 NOTE — CARE PLAN
Problem: Safety  Goal: Will remain free from injury  Outcome: PROGRESSING AS EXPECTED  Note: Bed locked and in lowest position. Bed alarm on. Treaded socks. Call light and belongings within reach. Patient educated to call for assistance. Pt verbalized understanding. Hourly rounding in place.       Problem: Safety - Medical Restraint  Goal: Remains free of injury from restraints (Restraint for Interference with Medical Device)  Description: INTERVENTIONS:  1. Determine that other, less restrictive measures have been tried or would not be effective before applying the restraint  2. Evaluate the patient's condition at the time of restraint application  3. Inform patient/family regarding the reason for restraint  4. Q2H: Monitor safety, psychosocial status, comfort, nutrition and hydration  Outcome: PROGRESSING AS EXPECTED  Flowsheets (Taken 9/12/2020 [id number] by Elidia Barajas R.N.)  Addressed this shift: Remains free of injury from restraints (restraint for interference with medical device): Every 2 hours: Monitor safety, psychosocial status, comfort, nutrition and hydration

## 2020-09-17 LAB
ANION GAP SERPL CALC-SCNC: 14 MMOL/L (ref 7–16)
BASOPHILS # BLD AUTO: 0.4 % (ref 0–1.8)
BASOPHILS # BLD: 0.03 K/UL (ref 0–0.12)
BUN SERPL-MCNC: 8 MG/DL (ref 8–22)
CALCIUM SERPL-MCNC: 9.1 MG/DL (ref 8.5–10.5)
CHLORIDE SERPL-SCNC: 106 MMOL/L (ref 96–112)
CO2 SERPL-SCNC: 22 MMOL/L (ref 20–33)
CREAT SERPL-MCNC: 0.55 MG/DL (ref 0.5–1.4)
EOSINOPHIL # BLD AUTO: 0.16 K/UL (ref 0–0.51)
EOSINOPHIL NFR BLD: 2.2 % (ref 0–6.9)
ERYTHROCYTE [DISTWIDTH] IN BLOOD BY AUTOMATED COUNT: 53.6 FL (ref 35.9–50)
GLUCOSE SERPL-MCNC: 94 MG/DL (ref 65–99)
HCT VFR BLD AUTO: 31.9 % (ref 42–52)
HGB BLD-MCNC: 10.3 G/DL (ref 14–18)
IMM GRANULOCYTES # BLD AUTO: 0.12 K/UL (ref 0–0.11)
IMM GRANULOCYTES NFR BLD AUTO: 1.7 % (ref 0–0.9)
LYMPHOCYTES # BLD AUTO: 1.63 K/UL (ref 1–4.8)
LYMPHOCYTES NFR BLD: 22.4 % (ref 22–41)
MCH RBC QN AUTO: 31.9 PG (ref 27–33)
MCHC RBC AUTO-ENTMCNC: 32.3 G/DL (ref 33.7–35.3)
MCV RBC AUTO: 98.8 FL (ref 81.4–97.8)
MONOCYTES # BLD AUTO: 0.62 K/UL (ref 0–0.85)
MONOCYTES NFR BLD AUTO: 8.5 % (ref 0–13.4)
NEUTROPHILS # BLD AUTO: 4.71 K/UL (ref 1.82–7.42)
NEUTROPHILS NFR BLD: 64.8 % (ref 44–72)
NRBC # BLD AUTO: 0 K/UL
NRBC BLD-RTO: 0 /100 WBC
PLATELET # BLD AUTO: 204 K/UL (ref 164–446)
PMV BLD AUTO: 11 FL (ref 9–12.9)
POTASSIUM SERPL-SCNC: 3.1 MMOL/L (ref 3.6–5.5)
RBC # BLD AUTO: 3.23 M/UL (ref 4.7–6.1)
SODIUM SERPL-SCNC: 142 MMOL/L (ref 135–145)
WBC # BLD AUTO: 7.3 K/UL (ref 4.8–10.8)

## 2020-09-17 PROCEDURE — 700111 HCHG RX REV CODE 636 W/ 250 OVERRIDE (IP): Performed by: INTERNAL MEDICINE

## 2020-09-17 PROCEDURE — A9270 NON-COVERED ITEM OR SERVICE: HCPCS | Performed by: STUDENT IN AN ORGANIZED HEALTH CARE EDUCATION/TRAINING PROGRAM

## 2020-09-17 PROCEDURE — 85025 COMPLETE CBC W/AUTO DIFF WBC: CPT

## 2020-09-17 PROCEDURE — 700101 HCHG RX REV CODE 250: Performed by: STUDENT IN AN ORGANIZED HEALTH CARE EDUCATION/TRAINING PROGRAM

## 2020-09-17 PROCEDURE — 770022 HCHG ROOM/CARE - ICU (200)

## 2020-09-17 PROCEDURE — 97166 OT EVAL MOD COMPLEX 45 MIN: CPT

## 2020-09-17 PROCEDURE — 700102 HCHG RX REV CODE 250 W/ 637 OVERRIDE(OP): Performed by: STUDENT IN AN ORGANIZED HEALTH CARE EDUCATION/TRAINING PROGRAM

## 2020-09-17 PROCEDURE — 99232 SBSQ HOSP IP/OBS MODERATE 35: CPT | Performed by: STUDENT IN AN ORGANIZED HEALTH CARE EDUCATION/TRAINING PROGRAM

## 2020-09-17 PROCEDURE — 80048 BASIC METABOLIC PNL TOTAL CA: CPT

## 2020-09-17 RX ORDER — POTASSIUM CHLORIDE 20 MEQ/1
40 TABLET, EXTENDED RELEASE ORAL DAILY
Status: DISCONTINUED | OUTPATIENT
Start: 2020-09-17 | End: 2020-09-23

## 2020-09-17 RX ADMIN — DOCUSATE SODIUM 50 MG AND SENNOSIDES 8.6 MG 2 TABLET: 8.6; 5 TABLET, FILM COATED ORAL at 05:59

## 2020-09-17 RX ADMIN — HEPARIN SODIUM 5000 UNITS: 5000 INJECTION, SOLUTION INTRAVENOUS; SUBCUTANEOUS at 22:07

## 2020-09-17 RX ADMIN — QUETIAPINE FUMARATE 12.5 MG: 25 TABLET ORAL at 06:00

## 2020-09-17 RX ADMIN — POTASSIUM CHLORIDE 40 MEQ: 1500 TABLET, EXTENDED RELEASE ORAL at 17:23

## 2020-09-17 RX ADMIN — ACETAMINOPHEN 650 MG: 325 TABLET, FILM COATED ORAL at 17:23

## 2020-09-17 RX ADMIN — ALPRAZOLAM 0.5 MG: 0.5 TABLET ORAL at 00:49

## 2020-09-17 RX ADMIN — DOCUSATE SODIUM 50 MG AND SENNOSIDES 8.6 MG 2 TABLET: 8.6; 5 TABLET, FILM COATED ORAL at 17:23

## 2020-09-17 RX ADMIN — ALPRAZOLAM 0.5 MG: 0.5 TABLET ORAL at 05:59

## 2020-09-17 RX ADMIN — HEPARIN SODIUM 5000 UNITS: 5000 INJECTION, SOLUTION INTRAVENOUS; SUBCUTANEOUS at 06:00

## 2020-09-17 RX ADMIN — QUETIAPINE FUMARATE 12.5 MG: 25 TABLET ORAL at 22:07

## 2020-09-17 RX ADMIN — ALPRAZOLAM 0.5 MG: 0.5 TABLET ORAL at 11:29

## 2020-09-17 RX ADMIN — QUETIAPINE FUMARATE 12.5 MG: 25 TABLET ORAL at 15:24

## 2020-09-17 RX ADMIN — POLYETHYLENE GLYCOL 3350 1 PACKET: 17 POWDER, FOR SOLUTION ORAL at 17:23

## 2020-09-17 RX ADMIN — ALPRAZOLAM 0.5 MG: 0.5 TABLET ORAL at 17:23

## 2020-09-17 RX ADMIN — HEPARIN SODIUM 5000 UNITS: 5000 INJECTION, SOLUTION INTRAVENOUS; SUBCUTANEOUS at 15:23

## 2020-09-17 ASSESSMENT — COGNITIVE AND FUNCTIONAL STATUS - GENERAL
DRESSING REGULAR LOWER BODY CLOTHING: A LITTLE
DAILY ACTIVITIY SCORE: 20
DRESSING REGULAR UPPER BODY CLOTHING: A LITTLE
SUGGESTED CMS G CODE MODIFIER DAILY ACTIVITY: CJ
HELP NEEDED FOR BATHING: A LITTLE
TOILETING: A LITTLE

## 2020-09-17 ASSESSMENT — ACTIVITIES OF DAILY LIVING (ADL): TOILETING: UNABLE TO DETERMINE AT THIS TIME

## 2020-09-17 NOTE — PROGRESS NOTES
"Pt awakened. He is A+O to self. Difficult to elicit complaints from pt. He is rambling this am, but pleasantly confused. For instance, he says this am: \"All my friends dress up in super hero costumes and sneak around.\" Eating breakfast w/o difficulty, this RN SBA.   "

## 2020-09-17 NOTE — CARE PLAN
Problem: Safety  Goal: Will remain free from falls  Outcome: PROGRESSING AS EXPECTED    Strip alarm in use. Pt up to chair at this time.      Problem: Knowledge Deficit  Goal: Knowledge of disease process/condition, treatment plan, diagnostic tests, and medications will improve  Outcome: PROGRESSING SLOWER THAN EXPECTED    Pt pleasantly confused, difficult to educate due to his condition, awaiting hospitalist rounds.

## 2020-09-17 NOTE — PROGRESS NOTES
Hospital Medicine Daily Progress Note    Date of Service  9/16/2020    Chief Complaint  50 y.o. male with a pmhx of HTN, chronic pain and alcohol abuse who presented 9/9/2020 with altered mental status.     Hospital Course  50 y.o. male with a pmhx of HTN, chronic pain and alcohol abuse who presented 9/9/2020 with altered mental status, he was found obtunded in his house by his ex-wife who was checking on him as he had not been seen for 2 days or been able to get a hold of him.  He was found in a chair,  A&Ox0 and hypotensive.  He arrived in the ER with a GCS of 7 (E1,V1,M5) and severely hypotensive.  The patient was intubated for airway protection and central venous access was obtained for administration of high-volume crystalloid resuscitation and vasopressor therapy.    CT head and abdomen and pelvis were all unremarkable. He was briefly treated with antibiotics for concern for an underlying infection and a lumbar tap done was negative. Cultures remained negative.   EEG was done for concern for seizure-like activity was without any epileptiform activity.  He was extubated on 9/11 and has been able to protect his airway ever since. He is currently receiving treatment with benzos for concern for benzo withdrawals and IV thiamine for his history of alcohol abuse.    Interval Problem Update  Passed speech eval  Remains confused and in restraints but calm and cooperative     Consultants/Specialty  Intensivist    Code Status  Full Code    Disposition  Med floor  Will need placement of some sort if remains altered    Review of Systems  Review of Systems   Unable to perform ROS: Mental acuity   he is not aware he has a newton, says he just gets up to go to the bathroom    Physical Exam  Temp:  [36 °C (96.8 °F)-36.1 °C (97 °F)] 36 °C (96.8 °F)  Pulse:  [77-87] 77  Resp:  [18] 18  BP: (102-133)/(55-81) 107/55  SpO2:  [95 %-97 %] 95 %    Physical Exam  Vitals signs and nursing note reviewed.   Constitutional:       General:  He is not in acute distress.  HENT:      Head: Normocephalic and atraumatic.      Right Ear: External ear normal.      Left Ear: External ear normal.      Nose: Nose normal.      Mouth/Throat:      Mouth: Mucous membranes are dry.   Eyes:      Pupils: Pupils are equal, round, and reactive to light.   Neck:      Musculoskeletal: Normal range of motion and neck supple. No neck rigidity or muscular tenderness.   Cardiovascular:      Rate and Rhythm: Normal rate and regular rhythm.      Pulses: Normal pulses.   Pulmonary:      Effort: Pulmonary effort is normal. No respiratory distress.      Breath sounds: Wheezing present. No rales.   Abdominal:      General: Bowel sounds are normal. There is no distension.      Palpations: Abdomen is soft.      Tenderness: There is no abdominal tenderness.   Genitourinary:     Comments: Catheter draining dark orange urine  Musculoskeletal: Normal range of motion.         General: No tenderness.      Right lower leg: No edema.      Left lower leg: No edema.      Comments: In restraints   Skin:     General: Skin is warm and dry.      Findings: No erythema.   Neurological:      General: No focal deficit present.      Mental Status: He is alert.      Comments: Oriented to self    Psychiatric:         Behavior: Behavior normal.         Fluids    Intake/Output Summary (Last 24 hours) at 9/16/2020 1733  Last data filed at 9/16/2020 1600  Gross per 24 hour   Intake 1340 ml   Output 2075 ml   Net -735 ml       Laboratory  Recent Labs     09/14/20  0505 09/15/20  0505 09/16/20  0522   WBC 6.6 8.1 7.1   RBC 3.25* 3.17* 3.42*   HEMOGLOBIN 10.3* 10.2* 11.0*   HEMATOCRIT 32.0* 30.6* 32.4*   MCV 98.5* 96.5 94.7   MCH 31.7 32.2 32.2   MCHC 32.2* 33.3* 34.0   RDW 47.1 47.2 48.0   PLATELETCT 157* 177 181   MPV 11.2 11.7 11.3     Recent Labs     09/14/20  0505 09/15/20  0505 09/16/20  0522   SODIUM 145 143 144   POTASSIUM 3.5* 3.3* 3.4*   CHLORIDE 109 107 108   CO2 29 25 24   GLUCOSE 111* 103* 99   BUN  25* 18 13   CREATININE 0.63 0.63 0.65   CALCIUM 9.3 9.0 9.0     Recent Labs     09/14/20  0505   INR 0.90               Imaging  DX-ABDOMEN FOR TUBE PLACEMENT   Final Result      Feeding tube tip overlies the second portion of the duodenum.      DX-CHEST-PORTABLE (1 VIEW)   Final Result         1.  Patchy left lung base opacity, new since prior, could represent early infiltrate            DX-CHEST-PORTABLE (1 VIEW)   Final Result         1.  Patchy left lung base opacity, new since prior, could represent early infiltrate         [id number] VIEW   Final Result      Enteric tube projects over the distal stomach/pylorus.         EC-ECHOCARDIOGRAM COMPLETE W/O CONT   Final Result      DX-CHEST-PORTABLE (1 VIEW)   Final Result         1.  No acute cardiopulmonary disease.      CT-ABDOMEN-PELVIS W/O   Final Result      1.  No renal stone or hydronephrosis.   2.  Normal appendix.   3.  No focal mesenteric inflammatory process.      DX-ABDOMEN FOR TUBE PLACEMENT   Final Result      NG tube tip projects over expected location the gastric fundus.      US-ABDOMEN COMPLETE SURVEY   Final Result         1.  Echogenic liver compatible with fatty change versus fibrosis.   2.  Gallbladder sludge without additional sonographic findings of cholecystitis.   3.  Partial atrophic bilateral kidneys with lobulated contour         CT-HEAD W/O   Final Result         1.  No acute intracranial abnormality is identified, there are nonspecific white matter changes, commonly associated with small vessel ischemic disease.  Associated mild cerebral atrophy is noted.   2.  Atherosclerosis.      DX-CHEST-PORTABLE (1 VIEW)   Final Result         1.  No acute cardiopulmonary disease.           Assessment/Plan  Other dysphagia- (present on admission)  Assessment & Plan  Passed speech eval now  On modified diet    Hypernatremia  Assessment & Plan  resolved    Acute respiratory failure with hypoxia (HCC)- (present on admission)  Assessment & Plan  2/2  mental status  Extubated 9/11  Remains on room air    Benzodiazepine dependence (HCC)- (present on admission)  Assessment & Plan  Monitor for seizure/withdrawal  Xanax 0.5 mg BID; taper off  EEG after sz-like activity 9/10 was negative despite jerking motions    Alcohol abuse- (present on admission)  Assessment & Plan  Reported hx of  Continue Vitamins  High dose thiamine  Seizure precautions  S/p etoh w/d protocol    Septic shock ([facility])- (present on admission)  Assessment & Plan  Resolved  Broad-spectrum antibiotics: s/p vanc/zosyn --> C3, now off abx  Blood culture, CSF culture, urinalysis allnegative  Monitor off antibiotics        JEWELS (acute kidney injury) ([facility])- (present on admission)  Assessment & Plan  Consistent with ATN 2/2 hypotension, dehydration and sepsis  Resolved  Strict I/Os      Encephalopathy acute- (present on admission)  Assessment & Plan  Toxic vs metabolic/septic  LP- negative  EEG was negative for seizures  UDS + benzos  On xanax for concerns for benzo withdrawal-taper off  Seroquel for delirium          VTE prophylaxis: sq heparin

## 2020-09-17 NOTE — CARE PLAN
Problem: Safety  Goal: Will remain free from injury  Note: Bed alarm on at all times. Pt frequently checked on, reorientation provided. Bed locked and in low position      Problem: Psychosocial Needs:  Goal: Level of anxiety will decrease  Note: Pt A&OX1- self only. Continuous reorientation provided w/ little effect. Pt cooperative, PRN xanex administered for agitation

## 2020-09-17 NOTE — THERAPY
"Occupational Therapy   Initial Evaluation     Patient Name: Yury Blake  Age:  50 y.o., Sex:  male  Medical Record #: [id number]  Today's Date: 9/17/2020     Precautions  Precautions: Fall Risk, Swallow Precautions ( See Comments)    Assessment  Patient is 50 y.o. male who was found down with AMS by ex-wife; he required intubation during acute stay. PMHx significant for chronic pain and ETOH. Pt presents to OT eval with significant cognitive impairments and impaired balance affecting his independence and safety with ADLs, ADL transfers, and functional mobility. Once setup, pt is physically able to complete ADLs with min verbal cues for initiating tasks. Pt has impaired balance while seated EOB attempting to don/doff socks, pt has difficulty maintaining balance. Pt requires Jyoti to stand and to pivot for transfers. Pt currently unable to care for self and unable to return home safely, therefore recommend post-acute placement for additional occupational therapy services prior to discharge home. OT will continue to work with pt while here to address cognition, balance, ADL transfers, and functional mobility.    Plan    Recommend Occupational Therapy 3 times per week until therapy goals are met for the following treatments:  Adaptive Equipment, Cognitive Skill Development, Manual Therapy Techniques, Neuro Re-Education / Balance, Self Care/Activities of Daily Living, Therapeutic Activities and Therapeutic Exercises.    DC Equipment Recommendations: Unable to determine at this time  Discharge Recommendations: Recommend post-acute placement for additional occupational therapy services prior to discharge home     Subjective    Pt pleasantly confused and cooperative during OT eval. Pt states, \"You know Deonte, you guys used to date.\" Pt thought he knew therapist despite never meeting prior to this session.      Objective       09/17/20 1411   Prior Living Situation   Prior Services Unable To Determine At This Time "   Comments Pt unable to provide information regarding PLOF/home setup at this time. Chart review indicates pt lives alone.   Prior Level of ADL Function   Self Feeding Unable To Determine At This Time   Grooming / Hygiene Unable To Determine At This Time   Bathing Unable To Determine At This Time   Dressing Unable To Determine At This Time   Toileting Unable To Determine At This Time   Prior Level of IADL Function   Medication Management Unable To Determine At This Time   Laundry Unable To Determine At This Time   Kitchen Mobility Unable To Determine At This Time   Finances Unable To Determine At This Time   Home Management Unable To Determine At This Time   Shopping Unable To Determine At This Time   Prior Level Of Mobility Unable to Determine At This Time   Driving / Transportation Unable To Determine At This Time   Occupation (Pre-Hospital Vocational) Unable To Determine At This Time   Leisure Interests Unable To Determine At This Time   Cognition    Cognition / Consciousness X   Orientation Level Not Oriented to Year;Not Oriented to Month;Not Oriented to Day;Not Oriented to Time;Not Oriented to Place;Not Oriented to Reason   Level of Consciousness Confused   Ability To Follow Commands 1 Step   Safety Awareness Impaired;Impulsive   New Learning Impaired   Attention Impaired   Comments Pt thought therapist was someone he knew despite never meeting before and that he was at home.   Balance Assessment   Comments standing with FWW, took 1-2 steps but unsteady, losing balance, and sat back down.   Bed Mobility    Supine to Sit Supervised   ADL Assessment   Grooming Supervision;Seated   Lower Body Dressing Supervision  (socks)   Toileting   (unable to get to BSC, catheter in place)   Functional Mobility   Sit to Stand Minimal Assist   Toilet Transfers Unable to Participate   Mobility supine to EOB, STS x3, SPT to chair   Activity Tolerance   Comments limited by cognition, poor balance   Patient / Family Goals   Patient  / Family Goal #1 Unable to state   Short Term Goals   Short Term Goal # 1 Pt will complete toilet transfer using BSC if needed with spv, no LOB by discharge.   Short Term Goal # 2 Pt will complete standing grooming/hygiene with spv by discharge.   Short Term Goal # 3 Pt will complete toileting with spv by discharge.

## 2020-09-17 NOTE — PROGRESS NOTES
Medical Status    VSS    Patient still confused and only oriented to self    Per ex wife, Kellee, patient got in ambulance with ID and clothing, which is not currently with patient. Not in patient's room, [facility] legal hold locker or other patient belonging storage. Reached out to ER charge and not in ER. Per [employer], they do not keep clothing, however, they will look for his ID

## 2020-09-17 NOTE — PROGRESS NOTES
Hospital Medicine Daily Progress Note    Date of Service  9/16/2020    Chief Complaint  50 y.o. male with a pmhx of HTN, chronic pain and alcohol abuse who presented 9/9/2020 with altered mental status.     Hospital Course  50 y.o. male with a pmhx of HTN, chronic pain and alcohol abuse who presented 9/9/2020 with altered mental status, he was found obtunded in his house by his ex-wife who was checking on him as he had not been seen for 2 days or been able to get a hold of him.  He was found in a chair,  A&Ox0 and hypotensive.  He arrived in the ER with a GCS of 7 (E1,V1,M5) and severely hypotensive.  The patient was intubated for airway protection and central venous access was obtained for administration of high-volume crystalloid resuscitation and vasopressor therapy.    CT head and abdomen and pelvis were all unremarkable. He was briefly treated with antibiotics for concern for an underlying infection and a lumbar tap done was negative. Cultures remained negative.    EEG was done for concern for seizure-like activity was without any epileptiform activity.    He was extubated on 9/11 and has been able to protect his airway ever since. He is currently receiving treatment with benzos for concern for benzo withdrawals and IV thiamine for his history of alcohol abuse.    Interval Problem Update  Passed speech eval  Remains confused and in restraints but calm and cooperative     Consultants/Specialty  Intensivist    Code Status  Full Code    Disposition  Med floor  Will need placement of some sort if remains altered    D/W tx team on rounds      Review of Systems  Review of Systems   Unable to perform ROS: Mental acuity   he is not aware he has a newton, says he just gets up to go to the bathroom    Physical Exam  Temp:  [36 °C (96.8 °F)-36.1 °C (97 °F)] 36 °C (96.8 °F)  Pulse:  [77-78] 77  Resp:  [18] 18  BP: (102-133)/(55-76) 107/55  SpO2:  [95 %-96 %] 95 %    Physical Exam  Vitals signs and nursing note reviewed.    Constitutional:       General: He is not in acute distress.  HENT:      Head: Normocephalic and atraumatic.      Right Ear: External ear normal.      Left Ear: External ear normal.      Nose: Nose normal.      Mouth/Throat:      Mouth: Mucous membranes are dry.   Eyes:      Pupils: Pupils are equal, round, and reactive to light.   Neck:      Musculoskeletal: Normal range of motion and neck supple. No neck rigidity or muscular tenderness.   Cardiovascular:      Rate and Rhythm: Normal rate and regular rhythm.      Pulses: Normal pulses.   Pulmonary:      Effort: Pulmonary effort is normal. No respiratory distress.      Breath sounds: Wheezing present. No rales.   Abdominal:      General: Bowel sounds are normal. There is no distension.      Palpations: Abdomen is soft.      Tenderness: There is no abdominal tenderness.   Genitourinary:     Comments: Catheter draining dark orange urine  Musculoskeletal: Normal range of motion.         General: No tenderness.      Right lower leg: No edema.      Left lower leg: No edema.      Comments: In restraints   Skin:     General: Skin is warm and dry.      Findings: No erythema.   Neurological:      General: No focal deficit present.      Mental Status: He is alert.      Comments: Oriented to self    Psychiatric:         Behavior: Behavior normal.         Fluids    Intake/Output Summary (Last 24 hours) at 9/16/2020 1828  Last data filed at 9/16/2020 1600  Gross per 24 hour   Intake 1340 ml   Output 1925 ml   Net -585 ml       Laboratory  Recent Labs     09/14/20  0505 09/15/20  0505 09/16/20  0522   WBC 6.6 8.1 7.1   RBC 3.25* 3.17* 3.42*   HEMOGLOBIN 10.3* 10.2* 11.0*   HEMATOCRIT 32.0* 30.6* 32.4*   MCV 98.5* 96.5 94.7   MCH 31.7 32.2 32.2   MCHC 32.2* 33.3* 34.0   RDW 47.1 47.2 48.0   PLATELETCT 157* 177 181   MPV 11.2 11.7 11.3     Recent Labs     09/14/20  0505 09/15/20  0505 09/16/20  0522   SODIUM 145 143 144   POTASSIUM 3.5* 3.3* 3.4*   CHLORIDE 109 107 108   CO2 29 25  24   GLUCOSE 111* 103* 99   BUN 25* 18 13   CREATININE 0.63 0.63 0.65   CALCIUM 9.3 9.0 9.0     Recent Labs     09/14/20  0505   INR 0.90               Imaging  DX-ABDOMEN FOR TUBE PLACEMENT   Final Result      Feeding tube tip overlies the second portion of the duodenum.      DX-CHEST-PORTABLE (1 VIEW)   Final Result         1.  Patchy left lung base opacity, new since prior, could represent early infiltrate            DX-CHEST-PORTABLE (1 VIEW)   Final Result         1.  Patchy left lung base opacity, new since prior, could represent early infiltrate         [id number] VIEW   Final Result      Enteric tube projects over the distal stomach/pylorus.         EC-ECHOCARDIOGRAM COMPLETE W/O CONT   Final Result      DX-CHEST-PORTABLE (1 VIEW)   Final Result         1.  No acute cardiopulmonary disease.      CT-ABDOMEN-PELVIS W/O   Final Result      1.  No renal stone or hydronephrosis.   2.  Normal appendix.   3.  No focal mesenteric inflammatory process.      DX-ABDOMEN FOR TUBE PLACEMENT   Final Result      NG tube tip projects over expected location the gastric fundus.      US-ABDOMEN COMPLETE SURVEY   Final Result         1.  Echogenic liver compatible with fatty change versus fibrosis.   2.  Gallbladder sludge without additional sonographic findings of cholecystitis.   3.  Partial atrophic bilateral kidneys with lobulated contour         CT-HEAD W/O   Final Result         1.  No acute intracranial abnormality is identified, there are nonspecific white matter changes, commonly associated with small vessel ischemic disease.  Associated mild cerebral atrophy is noted.   2.  Atherosclerosis.      DX-CHEST-PORTABLE (1 VIEW)   Final Result         1.  No acute cardiopulmonary disease.           Assessment/Plan  Other dysphagia- (present on admission)  Assessment & Plan  Passed speech eval now  On modified diet    Hypernatremia  Assessment & Plan  resolved    Acute respiratory failure with hypoxia (HCC)- (present on  admission)  Assessment & Plan  2/2 mental status  Extubated 9/11  Remains on room air    Benzodiazepine dependence (HCC)- (present on admission)  Assessment & Plan  Monitor for seizure/withdrawal  Xanax 0.5 mg BID; taper off  EEG after sz-like activity 9/10 was negative despite jerking motions    Alcohol abuse- (present on admission)  Assessment & Plan  Reported hx of  Continue Vitamins  High dose thiamine  Seizure precautions  S/p etoh w/d protocol    Septic shock ([facility])- (present on admission)  Assessment & Plan  Resolved  Broad-spectrum antibiotics: s/p vanc/zosyn --> C3, now off abx  Blood culture, CSF culture, urinalysis allnegative  Monitor off antibiotics        JEWELS (acute kidney injury) ([facility])- (present on admission)  Assessment & Plan  Consistent with ATN 2/2 hypotension, dehydration and sepsis  Resolved  Strict I/Os      Encephalopathy acute- (present on admission)  Assessment & Plan  Toxic vs metabolic/septic  LP- negative  EEG was negative for seizures  UDS + benzos  On xanax for concerns for benzo withdrawal-taper off  Seroquel for delirium          VTE prophylaxis: sq heparin

## 2020-09-18 LAB
ANION GAP SERPL CALC-SCNC: 14 MMOL/L (ref 7–16)
BASOPHILS # BLD AUTO: 0.5 % (ref 0–1.8)
BASOPHILS # BLD: 0.03 K/UL (ref 0–0.12)
BUN SERPL-MCNC: 8 MG/DL (ref 8–22)
CALCIUM SERPL-MCNC: 9.5 MG/DL (ref 8.5–10.5)
CHLORIDE SERPL-SCNC: 105 MMOL/L (ref 96–112)
CO2 SERPL-SCNC: 22 MMOL/L (ref 20–33)
CREAT SERPL-MCNC: 0.64 MG/DL (ref 0.5–1.4)
EOSINOPHIL # BLD AUTO: 0.13 K/UL (ref 0–0.51)
EOSINOPHIL NFR BLD: 2.1 % (ref 0–6.9)
ERYTHROCYTE [DISTWIDTH] IN BLOOD BY AUTOMATED COUNT: 52.4 FL (ref 35.9–50)
GLUCOSE SERPL-MCNC: 94 MG/DL (ref 65–99)
HCT VFR BLD AUTO: 33.6 % (ref 42–52)
HGB BLD-MCNC: 11 G/DL (ref 14–18)
IMM GRANULOCYTES # BLD AUTO: 0.09 K/UL (ref 0–0.11)
IMM GRANULOCYTES NFR BLD AUTO: 1.5 % (ref 0–0.9)
LYMPHOCYTES # BLD AUTO: 1.7 K/UL (ref 1–4.8)
LYMPHOCYTES NFR BLD: 27.8 % (ref 22–41)
MCH RBC QN AUTO: 31.7 PG (ref 27–33)
MCHC RBC AUTO-ENTMCNC: 32.7 G/DL (ref 33.7–35.3)
MCV RBC AUTO: 96.8 FL (ref 81.4–97.8)
MONOCYTES # BLD AUTO: 0.57 K/UL (ref 0–0.85)
MONOCYTES NFR BLD AUTO: 9.3 % (ref 0–13.4)
NEUTROPHILS # BLD AUTO: 3.59 K/UL (ref 1.82–7.42)
NEUTROPHILS NFR BLD: 58.8 % (ref 44–72)
NRBC # BLD AUTO: 0 K/UL
NRBC BLD-RTO: 0 /100 WBC
PLATELET # BLD AUTO: 224 K/UL (ref 164–446)
PMV BLD AUTO: 11 FL (ref 9–12.9)
POTASSIUM SERPL-SCNC: 3.8 MMOL/L (ref 3.6–5.5)
RBC # BLD AUTO: 3.47 M/UL (ref 4.7–6.1)
SODIUM SERPL-SCNC: 141 MMOL/L (ref 135–145)
WBC # BLD AUTO: 6.1 K/UL (ref 4.8–10.8)

## 2020-09-18 PROCEDURE — 700102 HCHG RX REV CODE 250 W/ 637 OVERRIDE(OP): Performed by: STUDENT IN AN ORGANIZED HEALTH CARE EDUCATION/TRAINING PROGRAM

## 2020-09-18 PROCEDURE — A9270 NON-COVERED ITEM OR SERVICE: HCPCS | Performed by: STUDENT IN AN ORGANIZED HEALTH CARE EDUCATION/TRAINING PROGRAM

## 2020-09-18 PROCEDURE — 99232 SBSQ HOSP IP/OBS MODERATE 35: CPT | Performed by: STUDENT IN AN ORGANIZED HEALTH CARE EDUCATION/TRAINING PROGRAM

## 2020-09-18 PROCEDURE — 80048 BASIC METABOLIC PNL TOTAL CA: CPT

## 2020-09-18 PROCEDURE — 51798 US URINE CAPACITY MEASURE: CPT

## 2020-09-18 PROCEDURE — 770006 HCHG ROOM/CARE - MED/SURG/GYN SEMI*

## 2020-09-18 PROCEDURE — 700111 HCHG RX REV CODE 636 W/ 250 OVERRIDE (IP): Performed by: INTERNAL MEDICINE

## 2020-09-18 PROCEDURE — 85025 COMPLETE CBC W/AUTO DIFF WBC: CPT

## 2020-09-18 PROCEDURE — 700111 HCHG RX REV CODE 636 W/ 250 OVERRIDE (IP): Performed by: STUDENT IN AN ORGANIZED HEALTH CARE EDUCATION/TRAINING PROGRAM

## 2020-09-18 RX ADMIN — QUETIAPINE FUMARATE 12.5 MG: 25 TABLET ORAL at 05:57

## 2020-09-18 RX ADMIN — QUETIAPINE FUMARATE 12.5 MG: 25 TABLET ORAL at 13:35

## 2020-09-18 RX ADMIN — ALPRAZOLAM 0.5 MG: 0.5 TABLET ORAL at 11:43

## 2020-09-18 RX ADMIN — ALPRAZOLAM 0.5 MG: 0.5 TABLET ORAL at 05:57

## 2020-09-18 RX ADMIN — DOCUSATE SODIUM 50 MG AND SENNOSIDES 8.6 MG 2 TABLET: 8.6; 5 TABLET, FILM COATED ORAL at 18:29

## 2020-09-18 RX ADMIN — ALPRAZOLAM 0.5 MG: 0.5 TABLET ORAL at 18:29

## 2020-09-18 RX ADMIN — HEPARIN SODIUM 5000 UNITS: 5000 INJECTION, SOLUTION INTRAVENOUS; SUBCUTANEOUS at 13:35

## 2020-09-18 RX ADMIN — MAGNESIUM HYDROXIDE 30 ML: 400 SUSPENSION ORAL at 11:43

## 2020-09-18 RX ADMIN — HEPARIN SODIUM 5000 UNITS: 5000 INJECTION, SOLUTION INTRAVENOUS; SUBCUTANEOUS at 05:56

## 2020-09-18 RX ADMIN — ENOXAPARIN SODIUM 40 MG: 40 INJECTION SUBCUTANEOUS at 18:29

## 2020-09-18 RX ADMIN — QUETIAPINE FUMARATE 12.5 MG: 25 TABLET ORAL at 21:55

## 2020-09-18 RX ADMIN — DOCUSATE SODIUM 50 MG AND SENNOSIDES 8.6 MG 2 TABLET: 8.6; 5 TABLET, FILM COATED ORAL at 05:56

## 2020-09-18 RX ADMIN — POTASSIUM CHLORIDE 40 MEQ: 1500 TABLET, EXTENDED RELEASE ORAL at 05:56

## 2020-09-18 ASSESSMENT — FIBROSIS 4 INDEX: FIB4 SCORE: 1.32

## 2020-09-18 NOTE — DIETARY
Nutrition Services: Malnutrition Screening Score of 2 for unsure weight loss. Reviewed current weight against stated weight and determined no significant difference. RD available as further indicated.

## 2020-09-18 NOTE — PROGRESS NOTES
[facility] Medicine Daily Progress Note    Date of Service  9/18/2020    Chief Complaint  50 y.o. male with a pmhx of HTN, chronic pain and alcohol abuse who presented 9/9/2020 with altered mental status.     Hospital Course  50 y.o. male with a pmhx of HTN, chronic pain and alcohol abuse who presented 9/9/2020 with altered mental status, he was found obtunded in his house by his ex-wife who was checking on him as he had not been seen for 2 days or been able to get a hold of him.  He was found in a chair,  A&Ox0 and hypotensive.  He arrived in the ER with a GCS of 7 (E1,V1,M5) and severely hypotensive.  The patient was intubated for airway protection and central venous access was obtained for administration of high-volume crystalloid resuscitation and vasopressor therapy.    CT head and abdomen and pelvis were all unremarkable. He was briefly treated with antibiotics for concern for an underlying infection and a lumbar tap done was negative. Cultures remained negative.   EEG was done for concern for seizure-like activity was without any epileptiform activity.  He was extubated on 9/11 and has been able to protect his airway ever since. He is currently receiving treatment with benzos for concern for benzo withdrawals and IV thiamine for his history of alcohol abuse.    Interval Problem Update  Passed speech eval  Remains confused and in restraints but calm and cooperative     Consultants/Specialty  Intensivist    Code Status  Full Code    Disposition  Med floor  Will need placement of some sort if remains altered    Review of Systems  Review of Systems   Unable to perform ROS: Mental acuity   he is not aware he has a newton, says he just gets up to go to the bathroom    Physical Exam  Temp:  [35.9 °C (96.6 °F)-36.4 °C (97.6 °F)] 35.9 °C (96.6 °F)  Pulse:  [59-86] 75  Resp:  [16-18] 16  BP: (117-137)/(68-88) 124/79  SpO2:  [96 %-99 %] 97 %    Physical Exam  Vitals signs and nursing note reviewed.   Constitutional:        General: He is not in acute distress.  HENT:      Head: Normocephalic and atraumatic.      Right Ear: External ear normal.      Left Ear: External ear normal.      Nose: Nose normal.      Mouth/Throat:      Mouth: Mucous membranes are dry.   Eyes:      Pupils: Pupils are equal, round, and reactive to light.   Neck:      Musculoskeletal: Normal range of motion and neck supple. No neck rigidity or muscular tenderness.   Cardiovascular:      Rate and Rhythm: Normal rate and regular rhythm.      Pulses: Normal pulses.   Pulmonary:      Effort: Pulmonary effort is normal. No respiratory distress.      Breath sounds: Wheezing present. No rales.   Abdominal:      General: Bowel sounds are normal. There is no distension.      Palpations: Abdomen is soft.      Tenderness: There is no abdominal tenderness.   Genitourinary:     Comments: Catheter draining dark orange urine  Musculoskeletal: Normal range of motion.         General: No tenderness.      Right lower leg: No edema.      Left lower leg: No edema.      Comments: In restraints   Skin:     General: Skin is warm and dry.      Findings: No erythema.   Neurological:      General: No focal deficit present.      Mental Status: He is alert.      Comments: Oriented to self    Psychiatric:         Behavior: Behavior normal.         Fluids    Intake/Output Summary (Last 24 hours) at 9/18/2020 0647  Last data filed at 9/18/2020 0100  Gross per 24 hour   Intake 950 ml   Output 1750 ml   Net -800 ml       Laboratory  Recent Labs     09/16/20  0522 09/17/20  0820 09/18/20  0352   WBC 7.1 7.3 6.1   RBC 3.42* 3.23* 3.47*   HEMOGLOBIN 11.0* 10.3* 11.0*   HEMATOCRIT 32.4* 31.9* 33.6*   MCV 94.7 98.8* 96.8   MCH 32.2 31.9 31.7   MCHC 34.0 32.3* 32.7*   RDW 48.0 53.6* 52.4*   PLATELETCT 181 204 224   MPV 11.3 11.0 11.0     Recent Labs     09/16/20  0522 09/17/20  0558 09/18/20  0352   SODIUM 144 142 141   POTASSIUM 3.4* 3.1* 3.8   CHLORIDE 108 106 105   CO2 24 22 22   GLUCOSE 99 94 94    BUN 13 8 8   CREATININE 0.65 0.55 0.64   CALCIUM 9.0 9.1 9.5                   Imaging  DX-ABDOMEN FOR TUBE PLACEMENT   Final Result      Feeding tube tip overlies the second portion of the duodenum.      DX-CHEST-PORTABLE (1 VIEW)   Final Result         1.  Patchy left lung base opacity, new since prior, could represent early infiltrate            DX-CHEST-PORTABLE (1 VIEW)   Final Result         1.  Patchy left lung base opacity, new since prior, could represent early infiltrate         [id number] VIEW   Final Result      Enteric tube projects over the distal stomach/pylorus.         EC-ECHOCARDIOGRAM COMPLETE W/O CONT   Final Result      DX-CHEST-PORTABLE (1 VIEW)   Final Result         1.  No acute cardiopulmonary disease.      CT-ABDOMEN-PELVIS W/O   Final Result      1.  No renal stone or hydronephrosis.   2.  Normal appendix.   3.  No focal mesenteric inflammatory process.      DX-ABDOMEN FOR TUBE PLACEMENT   Final Result      NG tube tip projects over expected location the gastric fundus.      US-ABDOMEN COMPLETE SURVEY   Final Result         1.  Echogenic liver compatible with fatty change versus fibrosis.   2.  Gallbladder sludge without additional sonographic findings of cholecystitis.   3.  Partial atrophic bilateral kidneys with lobulated contour         CT-HEAD W/O   Final Result         1.  No acute intracranial abnormality is identified, there are nonspecific white matter changes, commonly associated with small vessel ischemic disease.  Associated mild cerebral atrophy is noted.   2.  Atherosclerosis.      DX-CHEST-PORTABLE (1 VIEW)   Final Result         1.  No acute cardiopulmonary disease.           Assessment/Plan  Other dysphagia- (present on admission)  Assessment & Plan  Passed speech eval now  On modified diet    Hypernatremia  Assessment & Plan  resolved    Acute respiratory failure with hypoxia (HCC)- (present on admission)  Assessment & Plan  2/2 mental status  Extubated 9/11  Remains  on room air    Benzodiazepine dependence (HCC)- (present on admission)  Assessment & Plan  Monitor for seizure/withdrawal  Xanax 0.5 mg BID; taper off  EEG after sz-like activity 9/10 was negative despite jerking motions    Alcohol abuse- (present on admission)  Assessment & Plan  Reported hx of  Continue Vitamins  High dose thiamine  Seizure precautions  S/p etoh w/d protocol    Septic shock ([facility])- (present on admission)  Assessment & Plan  Resolved  Broad-spectrum antibiotics: s/p vanc/zosyn --> C3, now off abx  Blood culture, CSF culture, urinalysis allnegative  Monitor off antibiotics        JEWELS (acute kidney injury) ([facility])- (present on admission)  Assessment & Plan  Consistent with ATN 2/2 hypotension, dehydration and sepsis  Resolved  Strict I/Os      Encephalopathy acute- (present on admission)  Assessment & Plan  Toxic vs metabolic/septic  LP- negative  EEG was negative for seizures  UDS + benzos  On xanax for concerns for benzo withdrawal-taper off  Seroquel for delirium          VTE prophylaxis: sq heparin

## 2020-09-18 NOTE — PROGRESS NOTES
Medicated for pain control. Confused, but calm. Sitting up in bed, eating dinner with supervision. Gallardo removed, condom cath placed. Discussed pt's care with Hospitalist this evening. Orders received. Will pass care to NOC RN @ [facility].

## 2020-09-18 NOTE — PROGRESS NOTES
[facility] Medicine Daily Progress Note    Date of Service  9/18/2020    Chief Complaint  50 y.o. male with a pmhx of HTN, chronic pain and alcohol abuse who presented 9/9/2020 with altered mental status.     Hospital Course  50 y.o. male with a pmhx of HTN, chronic pain and alcohol abuse who presented 9/9/2020 with altered mental status, he was found obtunded in his house by his ex-wife who was checking on him as he had not been seen for 2 days or been able to get a hold of him.  He was found in a chair,  A&Ox0 and hypotensive.  He arrived in the ER with a GCS of 7 (E1,V1,M5) and severely hypotensive.  The patient was intubated for airway protection and central venous access was obtained for administration of high-volume crystalloid resuscitation and vasopressor therapy.    CT head and abdomen and pelvis were all unremarkable. He was briefly treated with antibiotics for concern for an underlying infection and a lumbar tap done was negative. Cultures remained negative.   EEG was done for concern for seizure-like activity was without any epileptiform activity.  He was extubated on 9/11 and has been able to protect his airway ever since. He is currently receiving treatment with benzos for concern for benzo withdrawals and IV thiamine for his history of alcohol abuse.    Interval Problem Update  Passed speech eval  Remains confused and in restraints but calm and cooperative     Consultants/Specialty  Intensivist    Code Status  Full Code    Disposition  Med floor  Will need placement of some sort if remains altered    Review of Systems  Review of Systems   Unable to perform ROS: Mental acuity   he is not aware he has a newton, says he just gets up to go to the bathroom    Physical Exam  Temp:  [35.9 °C (96.6 °F)-36.4 °C (97.6 °F)] 35.9 °C (96.6 °F)  Pulse:  [59-86] 75  Resp:  [16-18] 16  BP: (117-137)/(68-88) 124/79  SpO2:  [96 %-99 %] 97 %    Physical Exam  Vitals signs and nursing note reviewed.   Constitutional:        General: He is not in acute distress.  HENT:      Head: Normocephalic and atraumatic.      Right Ear: External ear normal.      Left Ear: External ear normal.      Nose: Nose normal.      Mouth/Throat:      Mouth: Mucous membranes are dry.   Eyes:      Pupils: Pupils are equal, round, and reactive to light.   Neck:      Musculoskeletal: Normal range of motion and neck supple. No neck rigidity or muscular tenderness.   Cardiovascular:      Rate and Rhythm: Normal rate and regular rhythm.      Pulses: Normal pulses.   Pulmonary:      Effort: Pulmonary effort is normal. No respiratory distress.      Breath sounds: Wheezing present. No rales.   Abdominal:      General: Bowel sounds are normal. There is no distension.      Palpations: Abdomen is soft.      Tenderness: There is no abdominal tenderness.   Genitourinary:     Comments: Catheter draining dark orange urine  Musculoskeletal: Normal range of motion.         General: No tenderness.      Right lower leg: No edema.      Left lower leg: No edema.      Comments: In restraints   Skin:     General: Skin is warm and dry.      Findings: No erythema.   Neurological:      General: No focal deficit present.      Mental Status: He is alert.      Comments: Oriented to self    Psychiatric:         Behavior: Behavior normal.         Fluids    Intake/Output Summary (Last 24 hours) at 9/18/2020 0648  Last data filed at 9/18/2020 0100  Gross per 24 hour   Intake 950 ml   Output 1750 ml   Net -800 ml       Laboratory  Recent Labs     09/16/20  0522 09/17/20  0820 09/18/20  0352   WBC 7.1 7.3 6.1   RBC 3.42* 3.23* 3.47*   HEMOGLOBIN 11.0* 10.3* 11.0*   HEMATOCRIT 32.4* 31.9* 33.6*   MCV 94.7 98.8* 96.8   MCH 32.2 31.9 31.7   MCHC 34.0 32.3* 32.7*   RDW 48.0 53.6* 52.4*   PLATELETCT 181 204 224   MPV 11.3 11.0 11.0     Recent Labs     09/16/20  0522 09/17/20  0558 09/18/20  0352   SODIUM 144 142 141   POTASSIUM 3.4* 3.1* 3.8   CHLORIDE 108 106 105   CO2 24 22 22   GLUCOSE 99 94 94    BUN 13 8 8   CREATININE 0.65 0.55 0.64   CALCIUM 9.0 9.1 9.5                   Imaging  DX-ABDOMEN FOR TUBE PLACEMENT   Final Result      Feeding tube tip overlies the second portion of the duodenum.      DX-CHEST-PORTABLE (1 VIEW)   Final Result         1.  Patchy left lung base opacity, new since prior, could represent early infiltrate            DX-CHEST-PORTABLE (1 VIEW)   Final Result         1.  Patchy left lung base opacity, new since prior, could represent early infiltrate         [id number] VIEW   Final Result      Enteric tube projects over the distal stomach/pylorus.         EC-ECHOCARDIOGRAM COMPLETE W/O CONT   Final Result      DX-CHEST-PORTABLE (1 VIEW)   Final Result         1.  No acute cardiopulmonary disease.      CT-ABDOMEN-PELVIS W/O   Final Result      1.  No renal stone or hydronephrosis.   2.  Normal appendix.   3.  No focal mesenteric inflammatory process.      DX-ABDOMEN FOR TUBE PLACEMENT   Final Result      NG tube tip projects over expected location the gastric fundus.      US-ABDOMEN COMPLETE SURVEY   Final Result         1.  Echogenic liver compatible with fatty change versus fibrosis.   2.  Gallbladder sludge without additional sonographic findings of cholecystitis.   3.  Partial atrophic bilateral kidneys with lobulated contour         CT-HEAD W/O   Final Result         1.  No acute intracranial abnormality is identified, there are nonspecific white matter changes, commonly associated with small vessel ischemic disease.  Associated mild cerebral atrophy is noted.   2.  Atherosclerosis.      DX-CHEST-PORTABLE (1 VIEW)   Final Result         1.  No acute cardiopulmonary disease.           Assessment/Plan  Other dysphagia- (present on admission)  Assessment & Plan  Passed speech eval now  On modified diet    Hypernatremia  Assessment & Plan  resolved    Acute respiratory failure with hypoxia (HCC)- (present on admission)  Assessment & Plan  2/2 mental status  Extubated 9/11  Remains  on room air    Benzodiazepine dependence (HCC)- (present on admission)  Assessment & Plan  Monitor for seizure/withdrawal  Xanax 0.5 mg BID; taper off  EEG after sz-like activity 9/10 was negative despite jerking motions    Alcohol abuse- (present on admission)  Assessment & Plan  Reported hx of  Continue Vitamins  High dose thiamine  Seizure precautions  S/p etoh w/d protocol    Septic shock ([facility])- (present on admission)  Assessment & Plan  Resolved  Broad-spectrum antibiotics: s/p vanc/zosyn --> C3, now off abx  Blood culture, CSF culture, urinalysis allnegative  Monitor off antibiotics        JEWELS (acute kidney injury) ([facility])- (present on admission)  Assessment & Plan  Consistent with ATN 2/2 hypotension, dehydration and sepsis  Resolved  Strict I/Os      Encephalopathy acute- (present on admission)  Assessment & Plan  Toxic vs metabolic/septic  LP- negative  EEG was negative for seizures  UDS + benzos  On xanax for concerns for benzo withdrawal-taper off  Seroquel for delirium          VTE prophylaxis: sq heparin

## 2020-09-18 NOTE — CARE PLAN
Problem: Nutritional:  Goal: Achieve adequate nutritional intake  Description: Patient will consume >50% of meals.  9/18/2020 1057 by Dara Don R.D.  Outcome: MET     PO intake consistently recorded as % per ADLs.

## 2020-09-19 LAB
ANION GAP SERPL CALC-SCNC: 10 MMOL/L (ref 7–16)
APPEARANCE UR: CLEAR
BASOPHILS # BLD AUTO: 0.5 % (ref 0–1.8)
BASOPHILS # BLD: 0.03 K/UL (ref 0–0.12)
BILIRUB UR QL STRIP.AUTO: NEGATIVE
BUN SERPL-MCNC: 8 MG/DL (ref 8–22)
CALCIUM SERPL-MCNC: 9.3 MG/DL (ref 8.5–10.5)
CHLORIDE SERPL-SCNC: 107 MMOL/L (ref 96–112)
CO2 SERPL-SCNC: 22 MMOL/L (ref 20–33)
COLOR UR: YELLOW
CREAT SERPL-MCNC: 0.6 MG/DL (ref 0.5–1.4)
EOSINOPHIL # BLD AUTO: 0.12 K/UL (ref 0–0.51)
EOSINOPHIL NFR BLD: 1.8 % (ref 0–6.9)
ERYTHROCYTE [DISTWIDTH] IN BLOOD BY AUTOMATED COUNT: 55.8 FL (ref 35.9–50)
GLUCOSE SERPL-MCNC: 97 MG/DL (ref 65–99)
GLUCOSE UR STRIP.AUTO-MCNC: NEGATIVE MG/DL
HCT VFR BLD AUTO: 31 % (ref 42–52)
HGB BLD-MCNC: 10.1 G/DL (ref 14–18)
IMM GRANULOCYTES # BLD AUTO: 0.05 K/UL (ref 0–0.11)
IMM GRANULOCYTES NFR BLD AUTO: 0.8 % (ref 0–0.9)
KETONES UR STRIP.AUTO-MCNC: NEGATIVE MG/DL
LEUKOCYTE ESTERASE UR QL STRIP.AUTO: NEGATIVE
LYMPHOCYTES # BLD AUTO: 1.64 K/UL (ref 1–4.8)
LYMPHOCYTES NFR BLD: 25 % (ref 22–41)
MCH RBC QN AUTO: 32 PG (ref 27–33)
MCHC RBC AUTO-ENTMCNC: 32.6 G/DL (ref 33.7–35.3)
MCV RBC AUTO: 98.1 FL (ref 81.4–97.8)
MICRO URNS: NORMAL
MONOCYTES # BLD AUTO: 0.56 K/UL (ref 0–0.85)
MONOCYTES NFR BLD AUTO: 8.5 % (ref 0–13.4)
NEUTROPHILS # BLD AUTO: 4.17 K/UL (ref 1.82–7.42)
NEUTROPHILS NFR BLD: 63.4 % (ref 44–72)
NITRITE UR QL STRIP.AUTO: NEGATIVE
NRBC # BLD AUTO: 0 K/UL
NRBC BLD-RTO: 0 /100 WBC
PH UR STRIP.AUTO: 6 [PH] (ref 5–8)
PLATELET # BLD AUTO: 224 K/UL (ref 164–446)
PMV BLD AUTO: 11.3 FL (ref 9–12.9)
POTASSIUM SERPL-SCNC: 3.9 MMOL/L (ref 3.6–5.5)
PROT UR QL STRIP: NEGATIVE MG/DL
RBC # BLD AUTO: 3.16 M/UL (ref 4.7–6.1)
RBC UR QL AUTO: NEGATIVE
SODIUM SERPL-SCNC: 139 MMOL/L (ref 135–145)
SP GR UR STRIP.AUTO: 1
UROBILINOGEN UR STRIP.AUTO-MCNC: 0.2 MG/DL
WBC # BLD AUTO: 6.6 K/UL (ref 4.8–10.8)

## 2020-09-19 PROCEDURE — 770006 HCHG ROOM/CARE - MED/SURG/GYN SEMI*

## 2020-09-19 PROCEDURE — 36415 COLL VENOUS BLD VENIPUNCTURE: CPT

## 2020-09-19 PROCEDURE — 85025 COMPLETE CBC W/AUTO DIFF WBC: CPT

## 2020-09-19 PROCEDURE — A9270 NON-COVERED ITEM OR SERVICE: HCPCS | Performed by: STUDENT IN AN ORGANIZED HEALTH CARE EDUCATION/TRAINING PROGRAM

## 2020-09-19 PROCEDURE — 700111 HCHG RX REV CODE 636 W/ 250 OVERRIDE (IP): Performed by: STUDENT IN AN ORGANIZED HEALTH CARE EDUCATION/TRAINING PROGRAM

## 2020-09-19 PROCEDURE — 80048 BASIC METABOLIC PNL TOTAL CA: CPT

## 2020-09-19 PROCEDURE — 99232 SBSQ HOSP IP/OBS MODERATE 35: CPT | Performed by: FAMILY MEDICINE

## 2020-09-19 PROCEDURE — 81003 URINALYSIS AUTO W/O SCOPE: CPT

## 2020-09-19 PROCEDURE — 700102 HCHG RX REV CODE 250 W/ 637 OVERRIDE(OP): Performed by: FAMILY MEDICINE

## 2020-09-19 PROCEDURE — 700102 HCHG RX REV CODE 250 W/ 637 OVERRIDE(OP): Performed by: NURSE PRACTITIONER

## 2020-09-19 PROCEDURE — 700102 HCHG RX REV CODE 250 W/ 637 OVERRIDE(OP): Performed by: STUDENT IN AN ORGANIZED HEALTH CARE EDUCATION/TRAINING PROGRAM

## 2020-09-19 PROCEDURE — 93005 ELECTROCARDIOGRAM TRACING: CPT | Performed by: FAMILY MEDICINE

## 2020-09-19 PROCEDURE — 51798 US URINE CAPACITY MEASURE: CPT

## 2020-09-19 PROCEDURE — A9270 NON-COVERED ITEM OR SERVICE: HCPCS | Performed by: NURSE PRACTITIONER

## 2020-09-19 PROCEDURE — A9270 NON-COVERED ITEM OR SERVICE: HCPCS | Performed by: FAMILY MEDICINE

## 2020-09-19 RX ORDER — TAMSULOSIN HYDROCHLORIDE 0.4 MG/1
0.4 CAPSULE ORAL 2 TIMES DAILY
Status: DISCONTINUED | OUTPATIENT
Start: 2020-09-19 | End: 2020-09-19

## 2020-09-19 RX ORDER — TAMSULOSIN HYDROCHLORIDE 0.4 MG/1
0.8 CAPSULE ORAL
Status: DISCONTINUED | OUTPATIENT
Start: 2020-09-19 | End: 2021-03-18 | Stop reason: HOSPADM

## 2020-09-19 RX ORDER — TAMSULOSIN HYDROCHLORIDE 0.4 MG/1
0.4 CAPSULE ORAL
Status: DISCONTINUED | OUTPATIENT
Start: 2020-09-19 | End: 2020-09-19

## 2020-09-19 RX ADMIN — POTASSIUM CHLORIDE 40 MEQ: 1500 TABLET, EXTENDED RELEASE ORAL at 06:10

## 2020-09-19 RX ADMIN — ENOXAPARIN SODIUM 40 MG: 40 INJECTION SUBCUTANEOUS at 16:55

## 2020-09-19 RX ADMIN — TAMSULOSIN HYDROCHLORIDE 0.4 MG: 0.4 CAPSULE ORAL at 07:31

## 2020-09-19 RX ADMIN — TAMSULOSIN HYDROCHLORIDE 0.8 MG: 0.4 CAPSULE ORAL at 10:49

## 2020-09-19 RX ADMIN — ALPRAZOLAM 0.5 MG: 0.5 TABLET ORAL at 16:54

## 2020-09-19 RX ADMIN — ALPRAZOLAM 0.5 MG: 0.5 TABLET ORAL at 06:10

## 2020-09-19 RX ADMIN — DOCUSATE SODIUM 50 MG AND SENNOSIDES 8.6 MG 2 TABLET: 8.6; 5 TABLET, FILM COATED ORAL at 06:10

## 2020-09-19 RX ADMIN — QUETIAPINE FUMARATE 12.5 MG: 25 TABLET ORAL at 13:17

## 2020-09-19 RX ADMIN — QUETIAPINE FUMARATE 12.5 MG: 25 TABLET ORAL at 06:09

## 2020-09-19 RX ADMIN — DOCUSATE SODIUM 50 MG AND SENNOSIDES 8.6 MG 2 TABLET: 8.6; 5 TABLET, FILM COATED ORAL at 16:54

## 2020-09-19 RX ADMIN — ALPRAZOLAM 0.5 MG: 0.5 TABLET ORAL at 13:17

## 2020-09-19 RX ADMIN — QUETIAPINE FUMARATE 12.5 MG: 25 TABLET ORAL at 19:45

## 2020-09-19 ASSESSMENT — FIBROSIS 4 INDEX: FIB4 SCORE: 1.32

## 2020-09-19 NOTE — PROGRESS NOTES
Assumed care of patient at 0700. Received bedside report from noc RN. Patient resting comfortably in bed. No signs of distress. Bed is in low and locked position. Non-slip socks are in place. Call light is within reach. Bed alarm is on. 1:1 sitter at bedside.

## 2020-09-19 NOTE — CARE PLAN
Problem: Psychosocial Needs:  Goal: Level of anxiety will decrease  Note: Safety sitter CNA at bedside.      Problem: Urinary Elimination:  Goal: Ability to reestablish a normal urinary elimination pattern will improve  Intervention: Assess and monitor for signs and symptoms of urinary retention  Note: Bladder scan ordered for hx retention.

## 2020-09-19 NOTE — CARE PLAN
Problem: Communication  Goal: The ability to communicate needs accurately and effectively will improve  Outcome: PROGRESSING SLOWER THAN EXPECTED  Note: Educated patient on the use of call light for assistance. Went over controls and functions on the remote. Answered any questions patient had. Patient has not been calling appropriately.      Problem: Knowledge Deficit  Goal: Knowledge of disease process/condition, treatment plan, diagnostic tests, and medications will improve  Outcome: PROGRESSING SLOWER THAN EXPECTED  Note: Went over plan of care with patient and answered any questions patient had. Patient is confused and needs frequent reorientation and redirection.

## 2020-09-19 NOTE — PROGRESS NOTES
"Rec'd pt via WC, escorted by ICU RN. Assumed pt care. Assessment completed. AA&O to self, reoriented to time, place, event. Explained unit routine including call light use, bed alarm need, plan of care. Pt verbalized understanding and then started talking about the events of his day which included \"I went to  my wife and mother in law, we went to the track races. Can you see if my car registration is ?\"  Continual reorientation required for pt. Safety sitter CNA is at bedside. Pt's BLE are cold to touch, pale color, +1 pedal pulses. Bed in lowest position, bed locked, bed alarm on for safety, treaded socks in place, RN and CNA numbers provided, call light within reach.   "

## 2020-09-19 NOTE — PROGRESS NOTES
[facility] Medicine Daily Progress Note    Date of Service  9/19/2020    Chief Complaint  50 y.o. male with a pmhx of HTN, chronic pain and alcohol abuse who presented 9/9/2020 with altered mental status.     Hospital Course  50 y.o. male with a pmhx of HTN, chronic pain and alcohol abuse who presented 9/9/2020 with altered mental status, he was found obtunded in his house by his ex-wife who was checking on him as he had not been seen for 2 days or been able to get a hold of him.  He was found in a chair,  A&Ox0 and hypotensive.  He arrived in the ER with a GCS of 7 (E1,V1,M5) and severely hypotensive.  The patient was intubated for airway protection and central venous access was obtained for administration of high-volume crystalloid resuscitation and vasopressor therapy.    CT head and abdomen and pelvis were all unremarkable. He was briefly treated with antibiotics for concern for an underlying infection and a lumbar tap done was negative. Cultures remained negative.   EEG was done for concern for seizure-like activity was without any epileptiform activity.  He was extubated on 9/11 and has been able to protect his airway ever since. He is currently receiving treatment with benzos for concern for benzo withdrawals and IV thiamine for his history of alcohol abuse.    Interval Problem Update  9/19: Resting in bed comfortable.  Saturating well on room air.  Off restraints.  Sitter at bedside.  Disoriented to time.  Poor insight.  Easily distracted and gets easily confused.  Tangential speech.     Consultants/Specialty  Intensivist    Code Status  Full Code    Disposition  Pending SNF    Review of Systems  Review of Systems   Unable to perform ROS: Mental acuity   he is not aware he has a newton, says he just gets up to go to the bathroom    Physical Exam  Temp:  [36.1 °C (97 °F)-36.6 °C (97.8 °F)] 36.3 °C (97.4 °F)  Pulse:  [] 80  Resp:  [16-18] 17  BP: (115-135)/(79-92) 130/82  SpO2:  [95 %-98 %] 97 %    Physical  Exam  Vitals signs and nursing note reviewed.   Constitutional:       General: He is not in acute distress.  HENT:      Head: Normocephalic and atraumatic.      Right Ear: External ear normal.      Left Ear: External ear normal.      Nose: Nose normal.   Eyes:      Extraocular Movements: Extraocular movements intact.      Pupils: Pupils are equal, round, and reactive to light.   Neck:      Musculoskeletal: Normal range of motion and neck supple. No neck rigidity or muscular tenderness.   Cardiovascular:      Rate and Rhythm: Normal rate and regular rhythm.      Pulses: Normal pulses.   Pulmonary:      Effort: Pulmonary effort is normal. No respiratory distress.   Abdominal:      General: Bowel sounds are normal. There is no distension.      Palpations: Abdomen is soft.   Genitourinary:     Comments: Catheter draining dark orange urine  Musculoskeletal: Normal range of motion.         General: No tenderness.      Right lower leg: No edema.      Left lower leg: No edema.      Comments: In restraints   Skin:     General: Skin is warm and dry.      Findings: No erythema.   Neurological:      General: No focal deficit present.      Mental Status: He is alert. He is disoriented.      Comments: Oriented to self    Psychiatric:         Cognition and Memory: Cognition is impaired. He exhibits impaired recent memory.         Judgment: Judgment is impulsive.         Fluids    Intake/Output Summary (Last 24 hours) at 9/19/2020 1101  Last data filed at 9/19/2020 0955  Gross per 24 hour   Intake 400 ml   Output 800 ml   Net -400 ml       Laboratory  Recent Labs     09/17/20  0820 09/18/20  0352 09/19/20  0454   WBC 7.3 6.1 6.6   RBC 3.23* 3.47* 3.16*   HEMOGLOBIN 10.3* 11.0* 10.1*   HEMATOCRIT 31.9* 33.6* 31.0*   MCV 98.8* 96.8 98.1*   MCH 31.9 31.7 32.0   MCHC 32.3* 32.7* 32.6*   RDW 53.6* 52.4* 55.8*   PLATELETCT 204 224 224   MPV 11.0 11.0 11.3     Recent Labs     09/17/20  0558 09/18/20  0352 09/19/20  0454   SODIUM 142 141  139   POTASSIUM 3.1* 3.8 3.9   CHLORIDE 106 105 107   CO2 22 22 22   GLUCOSE 94 94 97   BUN 8 8 8   CREATININE 0.55 0.64 0.60   CALCIUM 9.1 9.5 9.3                   Imaging  DX-ABDOMEN FOR TUBE PLACEMENT   Final Result      Feeding tube tip overlies the second portion of the duodenum.      DX-CHEST-PORTABLE (1 VIEW)   Final Result         1.  Patchy left lung base opacity, new since prior, could represent early infiltrate            DX-CHEST-PORTABLE (1 VIEW)   Final Result         1.  Patchy left lung base opacity, new since prior, could represent early infiltrate         [id number] VIEW   Final Result      Enteric tube projects over the distal stomach/pylorus.         EC-ECHOCARDIOGRAM COMPLETE W/O CONT   Final Result      DX-CHEST-PORTABLE (1 VIEW)   Final Result         1.  No acute cardiopulmonary disease.      CT-ABDOMEN-PELVIS W/O   Final Result      1.  No renal stone or hydronephrosis.   2.  Normal appendix.   3.  No focal mesenteric inflammatory process.      DX-ABDOMEN FOR TUBE PLACEMENT   Final Result      NG tube tip projects over expected location the gastric fundus.      US-ABDOMEN COMPLETE SURVEY   Final Result         1.  Echogenic liver compatible with fatty change versus fibrosis.   2.  Gallbladder sludge without additional sonographic findings of cholecystitis.   3.  Partial atrophic bilateral kidneys with lobulated contour         CT-HEAD W/O   Final Result         1.  No acute intracranial abnormality is identified, there are nonspecific white matter changes, commonly associated with small vessel ischemic disease.  Associated mild cerebral atrophy is noted.   2.  Atherosclerosis.      DX-CHEST-PORTABLE (1 VIEW)   Final Result         1.  No acute cardiopulmonary disease.           Assessment/Plan  Other dysphagia- (present on admission)  Assessment & Plan  Passed speech eval now  On modified diet    Hypernatremia  Assessment & Plan  resolved    Acute respiratory failure with hypoxia (HCC)-  (present on admission)  Assessment & Plan  2/2 mental status  Extubated 9/11  Remains on room air    Benzodiazepine dependence (HCC)- (present on admission)  Assessment & Plan  Monitor for seizure/withdrawal  Xanax 0.5 mg BID; taper off  EEG after sz-like activity 9/10 was negative despite jerking motions    Alcohol abuse- (present on admission)  Assessment & Plan  Reported hx of  Continue Vitamins  High dose thiamine  Seizure precautions  S/p etoh w/d protocol    Septic shock ([facility])- (present on admission)  Assessment & Plan  Resolved  Broad-spectrum antibiotics: s/p vanc/zosyn --> C3, now off abx  Blood culture, CSF culture, urinalysis allnegative  Monitor off antibiotics        JEWELS (acute kidney injury) ([facility])- (present on admission)  Assessment & Plan  Consistent with ATN 2/2 hypotension, dehydration and sepsis  Resolved  Strict I/Os      Encephalopathy acute- (present on admission)  Assessment & Plan  No clear etiology. Patient was found down for unknown period.   Toxic vs metabolic/septic  LP- negative  EEG was negative for seizures  UDS + benzos  On xanax for concerns for benzo withdrawal-taper off  Seroquel for delirium          VTE prophylaxis: sq heparin

## 2020-09-19 NOTE — PROGRESS NOTES
Pt voided in urinal, about 800ml yellow urine. Will order new bladder scan for post void residuals.

## 2020-09-19 NOTE — PROGRESS NOTES
· 2 RN skin check complete Trupti RN.   · Devices in place n/a.  · Skin assessed under devices n/a.  · Confirmed pressure ulcers found on n/a.  · New potential pressure ulcers noted on n/a. Wound consult placed? n/a. Photo uploaded? n/a.   · The following interventions are in place pt self turns, pillows used for positioning.    Callous to left outer foot (near 5th digit). Elbows are pink and blanching. Bilat heels are cold to touch, pale in color.

## 2020-09-20 LAB
ANION GAP SERPL CALC-SCNC: 13 MMOL/L (ref 7–16)
BASOPHILS # BLD AUTO: 0.4 % (ref 0–1.8)
BASOPHILS # BLD: 0.03 K/UL (ref 0–0.12)
BUN SERPL-MCNC: 5 MG/DL (ref 8–22)
CALCIUM SERPL-MCNC: 9.6 MG/DL (ref 8.5–10.5)
CHLORIDE SERPL-SCNC: 105 MMOL/L (ref 96–112)
CO2 SERPL-SCNC: 22 MMOL/L (ref 20–33)
CREAT SERPL-MCNC: 0.66 MG/DL (ref 0.5–1.4)
EKG IMPRESSION: NORMAL
EOSINOPHIL # BLD AUTO: 0.12 K/UL (ref 0–0.51)
EOSINOPHIL NFR BLD: 1.8 % (ref 0–6.9)
ERYTHROCYTE [DISTWIDTH] IN BLOOD BY AUTOMATED COUNT: 55 FL (ref 35.9–50)
GLUCOSE SERPL-MCNC: 103 MG/DL (ref 65–99)
HCT VFR BLD AUTO: 32.5 % (ref 42–52)
HGB BLD-MCNC: 10.6 G/DL (ref 14–18)
IMM GRANULOCYTES # BLD AUTO: 0.05 K/UL (ref 0–0.11)
IMM GRANULOCYTES NFR BLD AUTO: 0.7 % (ref 0–0.9)
LYMPHOCYTES # BLD AUTO: 1.76 K/UL (ref 1–4.8)
LYMPHOCYTES NFR BLD: 26 % (ref 22–41)
MCH RBC QN AUTO: 31.7 PG (ref 27–33)
MCHC RBC AUTO-ENTMCNC: 32.6 G/DL (ref 33.7–35.3)
MCV RBC AUTO: 97.3 FL (ref 81.4–97.8)
MONOCYTES # BLD AUTO: 0.5 K/UL (ref 0–0.85)
MONOCYTES NFR BLD AUTO: 7.4 % (ref 0–13.4)
NEUTROPHILS # BLD AUTO: 4.31 K/UL (ref 1.82–7.42)
NEUTROPHILS NFR BLD: 63.7 % (ref 44–72)
NRBC # BLD AUTO: 0 K/UL
NRBC BLD-RTO: 0 /100 WBC
PLATELET # BLD AUTO: 271 K/UL (ref 164–446)
PMV BLD AUTO: 11.1 FL (ref 9–12.9)
POTASSIUM SERPL-SCNC: 4 MMOL/L (ref 3.6–5.5)
RBC # BLD AUTO: 3.34 M/UL (ref 4.7–6.1)
SODIUM SERPL-SCNC: 140 MMOL/L (ref 135–145)
WBC # BLD AUTO: 6.8 K/UL (ref 4.8–10.8)

## 2020-09-20 PROCEDURE — 85025 COMPLETE CBC W/AUTO DIFF WBC: CPT

## 2020-09-20 PROCEDURE — 700102 HCHG RX REV CODE 250 W/ 637 OVERRIDE(OP): Performed by: FAMILY MEDICINE

## 2020-09-20 PROCEDURE — 700102 HCHG RX REV CODE 250 W/ 637 OVERRIDE(OP): Performed by: STUDENT IN AN ORGANIZED HEALTH CARE EDUCATION/TRAINING PROGRAM

## 2020-09-20 PROCEDURE — 36415 COLL VENOUS BLD VENIPUNCTURE: CPT

## 2020-09-20 PROCEDURE — A9270 NON-COVERED ITEM OR SERVICE: HCPCS | Performed by: FAMILY MEDICINE

## 2020-09-20 PROCEDURE — 770006 HCHG ROOM/CARE - MED/SURG/GYN SEMI*

## 2020-09-20 PROCEDURE — A9270 NON-COVERED ITEM OR SERVICE: HCPCS | Performed by: STUDENT IN AN ORGANIZED HEALTH CARE EDUCATION/TRAINING PROGRAM

## 2020-09-20 PROCEDURE — 80048 BASIC METABOLIC PNL TOTAL CA: CPT

## 2020-09-20 PROCEDURE — 700111 HCHG RX REV CODE 636 W/ 250 OVERRIDE (IP): Performed by: STUDENT IN AN ORGANIZED HEALTH CARE EDUCATION/TRAINING PROGRAM

## 2020-09-20 PROCEDURE — 99232 SBSQ HOSP IP/OBS MODERATE 35: CPT | Performed by: FAMILY MEDICINE

## 2020-09-20 PROCEDURE — 93010 ELECTROCARDIOGRAM REPORT: CPT | Performed by: INTERNAL MEDICINE

## 2020-09-20 PROCEDURE — 700111 HCHG RX REV CODE 636 W/ 250 OVERRIDE (IP): Performed by: INTERNAL MEDICINE

## 2020-09-20 RX ADMIN — ALPRAZOLAM 0.5 MG: 0.5 TABLET ORAL at 13:09

## 2020-09-20 RX ADMIN — QUETIAPINE FUMARATE 12.5 MG: 25 TABLET ORAL at 20:55

## 2020-09-20 RX ADMIN — DOCUSATE SODIUM 50 MG AND SENNOSIDES 8.6 MG 2 TABLET: 8.6; 5 TABLET, FILM COATED ORAL at 17:49

## 2020-09-20 RX ADMIN — HALOPERIDOL LACTATE 2.5 MG: 5 INJECTION, SOLUTION INTRAMUSCULAR at 04:41

## 2020-09-20 RX ADMIN — QUETIAPINE FUMARATE 12.5 MG: 25 TABLET ORAL at 04:22

## 2020-09-20 RX ADMIN — ALPRAZOLAM 0.5 MG: 0.5 TABLET ORAL at 04:22

## 2020-09-20 RX ADMIN — ENOXAPARIN SODIUM 40 MG: 40 INJECTION SUBCUTANEOUS at 17:49

## 2020-09-20 RX ADMIN — ALPRAZOLAM 0.5 MG: 0.5 TABLET ORAL at 17:49

## 2020-09-20 RX ADMIN — TAMSULOSIN HYDROCHLORIDE 0.8 MG: 0.4 CAPSULE ORAL at 09:40

## 2020-09-20 RX ADMIN — QUETIAPINE FUMARATE 12.5 MG: 25 TABLET ORAL at 13:09

## 2020-09-20 RX ADMIN — POTASSIUM CHLORIDE 40 MEQ: 1500 TABLET, EXTENDED RELEASE ORAL at 04:22

## 2020-09-20 ASSESSMENT — COGNITIVE AND FUNCTIONAL STATUS - GENERAL
WALKING IN HOSPITAL ROOM: A LOT
CLIMB 3 TO 5 STEPS WITH RAILING: TOTAL
SUGGESTED CMS G CODE MODIFIER DAILY ACTIVITY: CJ
STANDING UP FROM CHAIR USING ARMS: A LOT
SUGGESTED CMS G CODE MODIFIER MOBILITY: CL
MOVING FROM LYING ON BACK TO SITTING ON SIDE OF FLAT BED: A LOT
DRESSING REGULAR LOWER BODY CLOTHING: A LITTLE
MOBILITY SCORE: 13
TURNING FROM BACK TO SIDE WHILE IN FLAT BAD: A LITTLE
DRESSING REGULAR UPPER BODY CLOTHING: A LITTLE
TOILETING: A LITTLE
HELP NEEDED FOR BATHING: A LITTLE
MOVING TO AND FROM BED TO CHAIR: A LITTLE
DAILY ACTIVITIY SCORE: 20

## 2020-09-20 ASSESSMENT — FIBROSIS 4 INDEX: FIB4 SCORE: 1.32

## 2020-09-20 NOTE — DISCHARGE PLANNING
Care Transition Team Discharge Planning    Anticipated Discharge Disposition: d/c to SNF for rehab PT/OT/SLP prior to d/c home     Action: Lsw received order for SNF. Lsw unable to acquire choice as pt is not A/O and has no AD or appropriate NOK to complete the document.     Unless the ethics committee would be comfortable w/ pt's ex-wife of 1 yr and current friend, Kellee, to be decision maker. She currently has his dog, and I have been unable to contact his Uncle who reportedly lives in [place].     Also, he and his brother are currently suing each other over their mom's estate since her death February 2020.    Lsw requested NOK search again.       Barriers to Discharge: pt is not A/O, is pt at base line...    Plan: f/u w/ medical team, CCA

## 2020-09-20 NOTE — CARE PLAN
Problem: Infection  Goal: Will remain free from infection  Outcome: PROGRESSING AS EXPECTED  Intervention: Assess signs and symptoms of infection  Note: Vitals WNL. Afebrile. Denies chills.      Problem: Communication  Goal: The ability to communicate needs accurately and effectively will improve  Outcome: PROGRESSING SLOWER THAN EXPECTED  Intervention: [place] patient and significant other/support system to call light to alert staff of needs  Note: Pt remains confused. PSA in room for safety.

## 2020-09-20 NOTE — CARE PLAN
Problem: Infection  Goal: Will remain free from infection  Outcome: PROGRESSING SLOWER THAN EXPECTED  Note: Patient educated on the importance of washing hands to prevent spread of infection. Patient needs frequent reminders.     Problem: Knowledge Deficit  Goal: Knowledge of disease process/condition, treatment plan, diagnostic tests, and medications will improve  Outcome: PROGRESSING SLOWER THAN EXPECTED  Note: Went over plan of care with patient and answered any questions patient had. Patient requires frequent reorientation and redirection.

## 2020-09-20 NOTE — PROGRESS NOTES
Pt oriented to self only. Trying to orient to plae and situation without success. Thinks he is going to [place] and wants to get ready and pack to his flight. Multiple reorientation required. He gets anxious at times. On RA. Respirations equal and unlabored. Complaints of buttocks pain but relieves after repositioning. Pivot self from wheelchair to bed with one person assist... Good po intake without any s/sx of aspiration noted. GARNER.  Plan of care reviewed including: fall precautions, medications and safety. Verbalized understanding and agrees. White board updated. PSA in room for safety. Call light within reach.

## 2020-09-20 NOTE — PROGRESS NOTES
Assumed care of patient at 0700. Received report from noc RN. Patient resting comfortably in bed sleeping. No signs of distress. Bed is in low and locked position. Non-slip socks are in place. Call light is within reach. Bed alarm is on. 1:1 safety sitter at bedside.

## 2020-09-20 NOTE — PROGRESS NOTES
Hospital Medicine Daily Progress Note    Date of Service  9/20/2020    Chief Complaint  50 y.o. male with a pmhx of HTN, chronic pain and alcohol abuse who presented 9/9/2020 with altered mental status.     Hospital Course  50 y.o. male with a pmhx of HTN, chronic pain and alcohol abuse who presented 9/9/2020 with altered mental status, he was found obtunded in his house by his ex-wife who was checking on him as he had not been seen for 2 days or been able to get a hold of him.  He was found in a chair,  A&Ox0 and hypotensive.  He arrived in the ER with a GCS of 7 (E1,V1,M5) and severely hypotensive.  The patient was intubated for airway protection and central venous access was obtained for administration of high-volume crystalloid resuscitation and vasopressor therapy.    CT head and abdomen and pelvis were all unremarkable. He was briefly treated with antibiotics for concern for an underlying infection and a lumbar tap done was negative. Cultures remained negative.   EEG was done for concern for seizure-like activity was without any epileptiform activity.  He was extubated on 9/11 and has been able to protect his airway ever since. He is currently receiving treatment with benzos for concern for benzo withdrawals and IV thiamine for his history of alcohol abuse.    Interval Problem Update  9/19: Resting in bed comfortable.  Saturating well on room air.  Off restraints.  Sitter at bedside.  Disoriented to time.  Poor insight.  Easily distracted and gets easily confused.  Tangential speech.   9/20: Resting in bed comfortably. Still confused. Sitter at bedside. No acute distress noted. No issues over night per staff.   Consultants/Specialty  Intensivist    Code Status  Full Code    Disposition  Pending [facility]    Review of Systems  Review of Systems   Unable to perform ROS: Mental acuity   he is not aware he has a newton, says he just gets up to go to the bathroom    Physical Exam  Temp:  [36.1 °C (96.9 °F)-36.7 °C (98  °F)] 36.7 °C (98 °F)  Pulse:  [107-120] 120  Resp:  [16-18] 17  BP: (121-137)/(86-96) 121/94  SpO2:  [96 %-100 %] 96 %    Physical Exam  Vitals signs and nursing note reviewed.   Constitutional:       General: He is not in acute distress.     Appearance: He is not toxic-appearing.   HENT:      Head: Normocephalic and atraumatic.      Right Ear: External ear normal.      Left Ear: External ear normal.      Nose: Nose normal.   Eyes:      Extraocular Movements: Extraocular movements intact.      Pupils: Pupils are equal, round, and reactive to light.   Neck:      Musculoskeletal: Normal range of motion and neck supple. No neck rigidity or muscular tenderness.   Cardiovascular:      Rate and Rhythm: Normal rate and regular rhythm.      Pulses: Normal pulses.   Pulmonary:      Effort: Pulmonary effort is normal. No respiratory distress.   Abdominal:      General: Bowel sounds are normal.      Palpations: Abdomen is soft.   Genitourinary:     Comments: Catheter draining dark orange urine  Musculoskeletal: Normal range of motion.         General: No tenderness.   Skin:     General: Skin is warm and dry.      Findings: No erythema.   Neurological:      General: No focal deficit present.      Mental Status: He is alert. He is disoriented.      Comments: Oriented to self    Psychiatric:         Cognition and Memory: Cognition is impaired. He exhibits impaired recent memory.         Judgment: Judgment is impulsive.         Fluids    Intake/Output Summary (Last 24 hours) at 9/20/2020 1228  Last data filed at 9/20/2020 1006  Gross per 24 hour   Intake 185 ml   Output 2600 ml   Net -2415 ml       Laboratory  Recent Labs     09/18/20  0352 09/19/20  0454 09/20/20  0428   WBC 6.1 6.6 6.8   RBC 3.47* 3.16* 3.34*   HEMOGLOBIN 11.0* 10.1* 10.6*   HEMATOCRIT 33.6* 31.0* 32.5*   MCV 96.8 98.1* 97.3   MCH 31.7 32.0 31.7   MCHC 32.7* 32.6* 32.6*   RDW 52.4* 55.8* 55.0*   PLATELETCT 224 224 271   MPV 11.0 11.3 11.1     Recent Labs      09/18/20  0352 09/19/20  0454 09/20/20  0428   SODIUM 141 139 140   POTASSIUM 3.8 3.9 4.0   CHLORIDE 105 107 105   CO2 22 22 22   GLUCOSE 94 97 103*   BUN 8 8 5*   CREATININE 0.64 0.60 0.66   CALCIUM 9.5 9.3 9.6                   Imaging  DX-ABDOMEN FOR TUBE PLACEMENT   Final Result      Feeding tube tip overlies the second portion of the duodenum.      DX-CHEST-PORTABLE (1 VIEW)   Final Result         1.  Patchy left lung base opacity, new since prior, could represent early infiltrate            DX-CHEST-PORTABLE (1 VIEW)   Final Result         1.  Patchy left lung base opacity, new since prior, could represent early infiltrate         [id number] VIEW   Final Result      Enteric tube projects over the distal stomach/pylorus.         EC-ECHOCARDIOGRAM COMPLETE W/O CONT   Final Result      DX-CHEST-PORTABLE (1 VIEW)   Final Result         1.  No acute cardiopulmonary disease.      CT-ABDOMEN-PELVIS W/O   Final Result      1.  No renal stone or hydronephrosis.   2.  Normal appendix.   3.  No focal mesenteric inflammatory process.      DX-ABDOMEN FOR TUBE PLACEMENT   Final Result      NG tube tip projects over expected location the gastric fundus.      US-ABDOMEN COMPLETE SURVEY   Final Result         1.  Echogenic liver compatible with fatty change versus fibrosis.   2.  Gallbladder sludge without additional sonographic findings of cholecystitis.   3.  Partial atrophic bilateral kidneys with lobulated contour         CT-HEAD W/O   Final Result         1.  No acute intracranial abnormality is identified, there are nonspecific white matter changes, commonly associated with small vessel ischemic disease.  Associated mild cerebral atrophy is noted.   2.  Atherosclerosis.      DX-CHEST-PORTABLE (1 VIEW)   Final Result         1.  No acute cardiopulmonary disease.           Assessment/Plan  Other dysphagia- (present on admission)  Assessment & Plan  Passed speech eval now  On modified diet    Hypernatremia  Assessment &  Plan  resolved    Acute respiratory failure with hypoxia (HCC)- (present on admission)  Assessment & Plan  2/2 mental status  Extubated 9/11  Remains on room air    Benzodiazepine dependence (HCC)- (present on admission)  Assessment & Plan  Monitor for seizure/withdrawal  Xanax 0.5 mg BID; taper off  EEG after sz-like activity 9/10 was negative despite jerking motions    Alcohol abuse- (present on admission)  Assessment & Plan  Reported hx of  Continue Vitamins  High dose thiamine  Seizure precautions  S/p etoh w/d protocol    Septic shock ([facility])- (present on admission)  Assessment & Plan  Resolved  Broad-spectrum antibiotics: s/p vanc/zosyn --> C3, now off abx  Blood culture, CSF culture, urinalysis allnegative  Monitor off antibiotics        JEWELS (acute kidney injury) ([facility])- (present on admission)  Assessment & Plan  Consistent with ATN 2/2 hypotension, dehydration and sepsis  Resolved  Avoid nephrotoxins.  Renal dose all medications.      Encephalopathy acute- (present on admission)  Assessment & Plan  No clear etiology. Patient was found down for unknown period.   Toxic vs metabolic/septic  LP- negative  EEG was negative for seizures  UDS + benzos  On xanax for concerns for benzo withdrawal-taper off  Seroquel for delirium  9/20: Check MRI brain.           VTE prophylaxis: Lovenox

## 2020-09-20 NOTE — PROGRESS NOTES
Pt woke up frustrated and wanting to leave for a . Reorients without success. He was getting anxious and did not want to stay in the hospital any longer. Haldol given. Resting now.

## 2020-09-21 ENCOUNTER — APPOINTMENT (OUTPATIENT)
Dept: RADIOLOGY | Facility: MEDICAL CENTER | Age: 50
DRG: 853 | End: 2020-09-21
Attending: FAMILY MEDICINE
Payer: MEDICAID

## 2020-09-21 LAB
ANION GAP SERPL CALC-SCNC: 12 MMOL/L (ref 7–16)
BASOPHILS # BLD AUTO: 0.5 % (ref 0–1.8)
BASOPHILS # BLD: 0.03 K/UL (ref 0–0.12)
BUN SERPL-MCNC: 6 MG/DL (ref 8–22)
CALCIUM SERPL-MCNC: 9.2 MG/DL (ref 8.5–10.5)
CHLORIDE SERPL-SCNC: 107 MMOL/L (ref 96–112)
CO2 SERPL-SCNC: 21 MMOL/L (ref 20–33)
CREAT SERPL-MCNC: 0.76 MG/DL (ref 0.5–1.4)
EOSINOPHIL # BLD AUTO: 0.1 K/UL (ref 0–0.51)
EOSINOPHIL NFR BLD: 1.6 % (ref 0–6.9)
ERYTHROCYTE [DISTWIDTH] IN BLOOD BY AUTOMATED COUNT: 55.5 FL (ref 35.9–50)
GLUCOSE SERPL-MCNC: 97 MG/DL (ref 65–99)
HCT VFR BLD AUTO: 29.4 % (ref 42–52)
HGB BLD-MCNC: 9.4 G/DL (ref 14–18)
IMM GRANULOCYTES # BLD AUTO: 0.05 K/UL (ref 0–0.11)
IMM GRANULOCYTES NFR BLD AUTO: 0.8 % (ref 0–0.9)
LYMPHOCYTES # BLD AUTO: 1.59 K/UL (ref 1–4.8)
LYMPHOCYTES NFR BLD: 25.4 % (ref 22–41)
MCH RBC QN AUTO: 31.5 PG (ref 27–33)
MCHC RBC AUTO-ENTMCNC: 32 G/DL (ref 33.7–35.3)
MCV RBC AUTO: 98.7 FL (ref 81.4–97.8)
MONOCYTES # BLD AUTO: 0.51 K/UL (ref 0–0.85)
MONOCYTES NFR BLD AUTO: 8.2 % (ref 0–13.4)
NEUTROPHILS # BLD AUTO: 3.97 K/UL (ref 1.82–7.42)
NEUTROPHILS NFR BLD: 63.5 % (ref 44–72)
NRBC # BLD AUTO: 0 K/UL
NRBC BLD-RTO: 0 /100 WBC
PLATELET # BLD AUTO: 250 K/UL (ref 164–446)
PMV BLD AUTO: 10.7 FL (ref 9–12.9)
POTASSIUM SERPL-SCNC: 3.8 MMOL/L (ref 3.6–5.5)
RBC # BLD AUTO: 2.98 M/UL (ref 4.7–6.1)
SODIUM SERPL-SCNC: 140 MMOL/L (ref 135–145)
WBC # BLD AUTO: 6.3 K/UL (ref 4.8–10.8)

## 2020-09-21 PROCEDURE — A9270 NON-COVERED ITEM OR SERVICE: HCPCS | Performed by: STUDENT IN AN ORGANIZED HEALTH CARE EDUCATION/TRAINING PROGRAM

## 2020-09-21 PROCEDURE — 700102 HCHG RX REV CODE 250 W/ 637 OVERRIDE(OP): Performed by: FAMILY MEDICINE

## 2020-09-21 PROCEDURE — 70551 MRI BRAIN STEM W/O DYE: CPT

## 2020-09-21 PROCEDURE — 80048 BASIC METABOLIC PNL TOTAL CA: CPT

## 2020-09-21 PROCEDURE — 770006 HCHG ROOM/CARE - MED/SURG/GYN SEMI*

## 2020-09-21 PROCEDURE — A9270 NON-COVERED ITEM OR SERVICE: HCPCS | Performed by: FAMILY MEDICINE

## 2020-09-21 PROCEDURE — 700111 HCHG RX REV CODE 636 W/ 250 OVERRIDE (IP): Performed by: STUDENT IN AN ORGANIZED HEALTH CARE EDUCATION/TRAINING PROGRAM

## 2020-09-21 PROCEDURE — 97112 NEUROMUSCULAR REEDUCATION: CPT

## 2020-09-21 PROCEDURE — 87086 URINE CULTURE/COLONY COUNT: CPT

## 2020-09-21 PROCEDURE — 97530 THERAPEUTIC ACTIVITIES: CPT

## 2020-09-21 PROCEDURE — 700102 HCHG RX REV CODE 250 W/ 637 OVERRIDE(OP): Performed by: STUDENT IN AN ORGANIZED HEALTH CARE EDUCATION/TRAINING PROGRAM

## 2020-09-21 PROCEDURE — 99231 SBSQ HOSP IP/OBS SF/LOW 25: CPT | Performed by: FAMILY MEDICINE

## 2020-09-21 PROCEDURE — 85025 COMPLETE CBC W/AUTO DIFF WBC: CPT

## 2020-09-21 PROCEDURE — 51798 US URINE CAPACITY MEASURE: CPT

## 2020-09-21 PROCEDURE — 36415 COLL VENOUS BLD VENIPUNCTURE: CPT

## 2020-09-21 PROCEDURE — 92526 ORAL FUNCTION THERAPY: CPT

## 2020-09-21 RX ADMIN — ALPRAZOLAM 0.5 MG: 0.5 TABLET ORAL at 04:50

## 2020-09-21 RX ADMIN — DOCUSATE SODIUM 50 MG AND SENNOSIDES 8.6 MG 2 TABLET: 8.6; 5 TABLET, FILM COATED ORAL at 17:18

## 2020-09-21 RX ADMIN — TAMSULOSIN HYDROCHLORIDE 0.8 MG: 0.4 CAPSULE ORAL at 08:22

## 2020-09-21 RX ADMIN — DOCUSATE SODIUM 50 MG AND SENNOSIDES 8.6 MG 2 TABLET: 8.6; 5 TABLET, FILM COATED ORAL at 04:50

## 2020-09-21 RX ADMIN — QUETIAPINE FUMARATE 12.5 MG: 25 TABLET ORAL at 04:50

## 2020-09-21 RX ADMIN — QUETIAPINE FUMARATE 12.5 MG: 25 TABLET ORAL at 13:58

## 2020-09-21 RX ADMIN — ENOXAPARIN SODIUM 40 MG: 40 INJECTION SUBCUTANEOUS at 17:18

## 2020-09-21 RX ADMIN — ALPRAZOLAM 0.5 MG: 0.5 TABLET ORAL at 17:18

## 2020-09-21 RX ADMIN — ALPRAZOLAM 0.5 MG: 0.5 TABLET ORAL at 12:48

## 2020-09-21 RX ADMIN — POTASSIUM CHLORIDE 40 MEQ: 1500 TABLET, EXTENDED RELEASE ORAL at 04:51

## 2020-09-21 RX ADMIN — QUETIAPINE FUMARATE 12.5 MG: 25 TABLET ORAL at 21:13

## 2020-09-21 ASSESSMENT — GAIT ASSESSMENTS
DISTANCE (FEET): 15
ASSISTIVE DEVICE: FRONT WHEEL WALKER
GAIT LEVEL OF ASSIST: MINIMAL ASSIST

## 2020-09-21 ASSESSMENT — COGNITIVE AND FUNCTIONAL STATUS - GENERAL
CLIMB 3 TO 5 STEPS WITH RAILING: A LOT
MOBILITY SCORE: 16
WALKING IN HOSPITAL ROOM: A LITTLE
MOVING TO AND FROM BED TO CHAIR: A LITTLE
SUGGESTED CMS G CODE MODIFIER MOBILITY: CK
MOVING FROM LYING ON BACK TO SITTING ON SIDE OF FLAT BED: A LOT
STANDING UP FROM CHAIR USING ARMS: A LITTLE
TURNING FROM BACK TO SIDE WHILE IN FLAT BAD: A LITTLE

## 2020-09-21 NOTE — PROGRESS NOTES
Patient showing increased agitation and pulling at newton. Patient took apart newton earlier in the NOC shift and is not redirectable. Patient is non-compliant with staff requests and decision to apply bi-lateral soft wrist restraints made. Patient pulling at medical equipment and attempting to stand up while being unsafe to ambulate. MD paged for order. Pending page back.   On call hospitalist called back with orders for bilateral soft wrist restraints.

## 2020-09-21 NOTE — PROGRESS NOTES
Assumed care at 0700, report received from NOC RN.  Pt A&O x 2, disoriented to event, and time. States pain is 03/10 in penis, explains it feels like a yanking. Declines intervention. Pt has a Gallardo in place.  Pt has bilateral wrist restraints on, and earlier this AM was able to get out of them twice and pulled out his IV, as well as tugged on his Gallardo. MD notified, and order placed for an additional vest restraint. Rounding in place. Bed alarm on. Bed locked, 2 rails up, bed in lowest position. Call light in place, belongings at bedside, no needs at this time and hourly rounding in place.

## 2020-09-21 NOTE — PROGRESS NOTES
"Bedside report completed with day shift RN Teresa and patient. Fall precautions in place and hourly rounding continued. Patient vital signs /77   Pulse 99   Temp 36.3 °C (97.4 °F) (Temporal)   Resp 17   Ht 1.803 m (5' 10.98\")   Wt 82 kg (180 lb 12.4 oz)   SpO2 97%   BMI 25.22 kg/m² . Full assessment completed in flowsheet. Assumed patient care at 1900.   "

## 2020-09-21 NOTE — CARE PLAN
Problem: Bowel/Gastric:  Goal: Will not experience complications related to bowel motility  Outcome: PROGRESSING AS EXPECTED  Intervention: Assess baseline bowel pattern  Note: Patient denies bowel issues with constipation or diarrhea.      Problem: Knowledge Deficit  Goal: Knowledge of the prescribed therapeutic regimen will improve  Outcome: PROGRESSING AS EXPECTED  Intervention: Discuss information regarding therpeutic regimen and document in education  Note: Knowledge of plan of care provided. No signs of learning.

## 2020-09-21 NOTE — DISCHARGE PLANNING
Care Transition Team Discharge Planning    Anticipated Discharge Disposition: TBD    Action: Lsw received VM from pt's Atty Jennifer Patrick @ [phone]. She left VM indicating she is aware [facility] is not able to share information w/ her. She further indicates if LSw would like to do so, Lsw may call her.     Atty reports she is talking to pt's ex wife, Kellee. Atty was working w/ pt on estate planning prior to his admission. She would assist him w/ completing the paperwork if he becomes A/Ox4 again.        Barriers to Discharge: TBD    Plan: f/u w/ medical team, etc.

## 2020-09-21 NOTE — PROGRESS NOTES
[facility] Medicine Daily Progress Note    Date of Service  9/21/2020    Chief Complaint  50 y.o. male with a pmhx of HTN, chronic pain and alcohol abuse who presented 9/9/2020 with altered mental status.     Hospital Course  50 y.o. male with a pmhx of HTN, chronic pain and alcohol abuse who presented 9/9/2020 with altered mental status, he was found obtunded in his house by his ex-wife who was checking on him as he had not been seen for 2 days or been able to get a hold of him.  He was found in a chair,  A&Ox0 and hypotensive.  He arrived in the ER with a GCS of 7 (E1,V1,M5) and severely hypotensive.  The patient was intubated for airway protection and central venous access was obtained for administration of high-volume crystalloid resuscitation and vasopressor therapy.    CT head and abdomen and pelvis were all unremarkable. He was briefly treated with antibiotics for concern for an underlying infection and a lumbar tap done was negative. Cultures remained negative.   EEG was done for concern for seizure-like activity was without any epileptiform activity.  He was extubated on 9/11 and has been able to protect his airway ever since. He is currently receiving treatment with benzos for concern for benzo withdrawals and IV thiamine for his history of alcohol abuse.    Interval Problem Update  9/19: Resting in bed comfortable.  Saturating well on room air.  Off restraints.  Sitter at bedside.  Disoriented to time.  Poor insight.  Easily distracted and gets easily confused.  Tangential speech.   9/20: Resting in bed comfortably. Still confused. Sitter at bedside. No acute distress noted. No issues over night per staff.   9/21: Resting in bed comfortable.  Agitated and confused occasionally.  Disoriented to place and time.  Requiring physical restraints.  No acute distress noted.  Dynamically stable.  No issues overnight per staff.  Consultants/Specialty  Intensivist    Code Status  Full Code    Disposition  Pending  SNF    Review of Systems  Review of Systems   Unable to perform ROS: Mental acuity   he is not aware he has a newton, says he just gets up to go to the bathroom    Physical Exam  Temp:  [36.3 °C (97.4 °F)-36.7 °C (98.1 °F)] 36.4 °C (97.6 °F)  Pulse:  [] 101  Resp:  [17-18] 18  BP: (128-147)/(77-95) 134/86  SpO2:  [96 %-99 %] 99 %    Physical Exam  Vitals signs and nursing note reviewed.   Constitutional:       General: He is not in acute distress.  HENT:      Head: Normocephalic and atraumatic.      Right Ear: External ear normal.      Left Ear: External ear normal.      Nose: Nose normal.   Eyes:      Extraocular Movements: Extraocular movements intact.      Pupils: Pupils are equal, round, and reactive to light.   Neck:      Musculoskeletal: Normal range of motion and neck supple. No neck rigidity or muscular tenderness.   Cardiovascular:      Rate and Rhythm: Normal rate and regular rhythm.      Pulses: Normal pulses.   Pulmonary:      Effort: Pulmonary effort is normal.   Abdominal:      General: Bowel sounds are normal.      Palpations: Abdomen is soft.   Genitourinary:     Comments: Catheter draining dark orange urine  Musculoskeletal: Normal range of motion.         General: No tenderness.   Skin:     General: Skin is warm and dry.   Neurological:      General: No focal deficit present.      Mental Status: He is alert. He is disoriented.      Comments: Oriented to self    Psychiatric:         Cognition and Memory: Cognition is impaired. He exhibits impaired recent memory.         Judgment: Judgment is impulsive.      Comments: Confused          Fluids    Intake/Output Summary (Last 24 hours) at 9/21/2020 1519  Last data filed at 9/21/2020 1400  Gross per 24 hour   Intake 1380 ml   Output 1950 ml   Net -570 ml       Laboratory  Recent Labs     09/19/20  0454 09/20/20  0428 09/21/20  0524   WBC 6.6 6.8 6.3   RBC 3.16* 3.34* 2.98*   HEMOGLOBIN 10.1* 10.6* 9.4*   HEMATOCRIT 31.0* 32.5* 29.4*   MCV 98.1* 97.3  98.7*   MCH 32.0 31.7 31.5   MCHC 32.6* 32.6* 32.0*   RDW 55.8* 55.0* 55.5*   PLATELETCT 224 271 250   MPV 11.3 11.1 10.7     Recent Labs     09/19/20  0454 09/20/20  0428 09/21/20  0524   SODIUM 139 140 140   POTASSIUM 3.9 4.0 3.8   CHLORIDE 107 105 107   CO2 22 22 21   GLUCOSE 97 103* 97   BUN 8 5* 6*   CREATININE 0.60 0.66 0.76   CALCIUM 9.3 9.6 9.2                   Imaging  DX-ABDOMEN FOR TUBE PLACEMENT   Final Result      Feeding tube tip overlies the second portion of the duodenum.      DX-CHEST-PORTABLE (1 VIEW)   Final Result         1.  Patchy left lung base opacity, new since prior, could represent early infiltrate            DX-CHEST-PORTABLE (1 VIEW)   Final Result         1.  Patchy left lung base opacity, new since prior, could represent early infiltrate         [id number] VIEW   Final Result      Enteric tube projects over the distal stomach/pylorus.         EC-ECHOCARDIOGRAM COMPLETE W/O CONT   Final Result      DX-CHEST-PORTABLE (1 VIEW)   Final Result         1.  No acute cardiopulmonary disease.      CT-ABDOMEN-PELVIS W/O   Final Result      1.  No renal stone or hydronephrosis.   2.  Normal appendix.   3.  No focal mesenteric inflammatory process.      DX-ABDOMEN FOR TUBE PLACEMENT   Final Result      NG tube tip projects over expected location the gastric fundus.      US-ABDOMEN COMPLETE SURVEY   Final Result         1.  Echogenic liver compatible with fatty change versus fibrosis.   2.  Gallbladder sludge without additional sonographic findings of cholecystitis.   3.  Partial atrophic bilateral kidneys with lobulated contour         CT-HEAD W/O   Final Result         1.  No acute intracranial abnormality is identified, there are nonspecific white matter changes, commonly associated with small vessel ischemic disease.  Associated mild cerebral atrophy is noted.   2.  Atherosclerosis.      DX-CHEST-PORTABLE (1 VIEW)   Final Result         1.  No acute cardiopulmonary disease.      MR-BRAIN-W/O     (Results Pending)        Assessment/Plan  Other dysphagia- (present on admission)  Assessment & Plan  Passed speech eval now  On modified diet    Hypernatremia  Assessment & Plan  resolved    Acute respiratory failure with hypoxia ([facility])- (present on admission)  Assessment & Plan  2/2 mental status  Extubated 9/11  Remains on room air    Benzodiazepine dependence (HCC)- (present on admission)  Assessment & Plan  Monitor for seizure/withdrawal  Xanax 0.5 mg BID; taper off  EEG after sz-like activity 9/10 was negative despite jerking motions    Alcohol abuse- (present on admission)  Assessment & Plan  Reported hx of  Continue Vitamins  High dose thiamine  Seizure precautions  S/p etoh w/d protocol    Septic shock ([facility])- (present on admission)  Assessment & Plan  Resolved  Broad-spectrum antibiotics: s/p vanc/zosyn --> C3, now off abx  Blood culture, CSF culture, urinalysis allnegative  Monitor off antibiotics        JEWELS (acute kidney injury) ([facility])- (present on admission)  Assessment & Plan  Consistent with ATN 2/2 hypotension, dehydration and sepsis  Resolved  Avoid nephrotoxins.  Renal dose all medications.      Encephalopathy acute- (present on admission)  Assessment & Plan  No clear etiology. Patient was found down for unknown period.   Toxic vs metabolic/septic  LP- negative  EEG was negative for seizures  UDS + benzos  On xanax for concerns for benzo withdrawal-taper off  Seroquel for delirium  9/20: Check MRI brain.   9/21: Pending MRI brain.           VTE prophylaxis: Lovenox

## 2020-09-21 NOTE — THERAPY
Speech Language Pathology  Daily Treatment     Patient Name: Yury Blake  Age:  50 y.o., Sex:  male  Medical Record #: [id number]  Today's Date: 9/21/2020     Precautions: (P) Fall Risk, Swallow Precautions ( See Comments)    Assessment    Patient was seen on this date for dysphagia treatment with a SB6/TN0 meal tray. Patient pleasantly confused. Per RN, pt pulled IV this morning and OK to be off bilateral wrist restraints only with supervision. PO consisted of >50% of SB6 textures, 12 oz thins, and cereal with raisins (regular texture). Patient able to feed independently but required verbal reminders to reduce bite size. No overt s/sx of aspiration with all PO consumed this session including consecutive sips of thins from cup. Mastication timely and functional for regular solids.     Plan    Recommend upgrade to regular/thin liquid diet given 1:1 supervision. OK to administer meds whole with liquid wash. Consider placing orders for a cognitive-linguistic evaluation if confusion persists.     Continue current treatment plan.    Discharge Recommendations: Recommend post-acute placement for additional speech therapy services prior to discharge home       Objective       09/21/20 0905   Vitals   O2 Delivery Device None - Room Air   Cognitive-Linguistic   Level of Consciousness Confused   Orientation Level Not Oriented to Reason;Not Oriented to Time;Not Oriented to Day;Not Oriented to Year   Dysphagia    Positioning / Behavior Modification Modulate Rate or Bite Size;Self Monitoring   Other Treatments Items from SB6/TN0 meal tray and PO trials of regular textures   Diet / Liquid Recommendation Regular (7);Thin (0)   Recommended Route of Medication Administration   Medication Administration  Whole with Liquid Wash   Short Term Goals   Short Term Goal # 1 B  Patient will consume meals of SB6/TN0 with no s/sx of aspiration given 1:1 supervision for safe swallow strategies.   Goal Outcome  # 1 B Goal met   Short  Term Goal # 2 NEW 9/21: Patient will consume a RG7/TN0 diet with no overt s/sx of aspiration given min cues to swallow strategies.

## 2020-09-21 NOTE — CARE PLAN
Problem: Knowledge Deficit  Goal: Knowledge of the prescribed therapeutic regimen will improve  Outcome: NOT MET  Intervention: Discuss information regarding therpeutic regimen and document in education  Note: Pt confused. No learning evident regarding therapeutic regimen.     Problem: Safety  Goal: Will remain free from falls  Outcome: PROGRESSING AS EXPECTED  Note: Pt is rounded on frequently, bed alarm in place, charting done near bedside.

## 2020-09-22 PROBLEM — G93.40 ENCEPHALOPATHY ACUTE: Status: RESOLVED | Noted: 2020-09-09 | Resolved: 2020-09-22

## 2020-09-22 PROBLEM — G92.9 TOXIC ENCEPHALOPATHY: Status: ACTIVE | Noted: 2020-09-09

## 2020-09-22 LAB
ANION GAP SERPL CALC-SCNC: 12 MMOL/L (ref 7–16)
BASOPHILS # BLD AUTO: 0.6 % (ref 0–1.8)
BASOPHILS # BLD: 0.05 K/UL (ref 0–0.12)
BUN SERPL-MCNC: 5 MG/DL (ref 8–22)
CALCIUM SERPL-MCNC: 9.7 MG/DL (ref 8.5–10.5)
CHLORIDE SERPL-SCNC: 107 MMOL/L (ref 96–112)
CO2 SERPL-SCNC: 21 MMOL/L (ref 20–33)
CREAT SERPL-MCNC: 0.66 MG/DL (ref 0.5–1.4)
EOSINOPHIL # BLD AUTO: 0.13 K/UL (ref 0–0.51)
EOSINOPHIL NFR BLD: 1.6 % (ref 0–6.9)
ERYTHROCYTE [DISTWIDTH] IN BLOOD BY AUTOMATED COUNT: 52 FL (ref 35.9–50)
GLUCOSE SERPL-MCNC: 95 MG/DL (ref 65–99)
HCT VFR BLD AUTO: 32.5 % (ref 42–52)
HGB BLD-MCNC: 10.5 G/DL (ref 14–18)
IMM GRANULOCYTES # BLD AUTO: 0.04 K/UL (ref 0–0.11)
IMM GRANULOCYTES NFR BLD AUTO: 0.5 % (ref 0–0.9)
LYMPHOCYTES # BLD AUTO: 1.67 K/UL (ref 1–4.8)
LYMPHOCYTES NFR BLD: 20.8 % (ref 22–41)
MCH RBC QN AUTO: 31.5 PG (ref 27–33)
MCHC RBC AUTO-ENTMCNC: 32.3 G/DL (ref 33.7–35.3)
MCV RBC AUTO: 97.6 FL (ref 81.4–97.8)
MONOCYTES # BLD AUTO: 0.62 K/UL (ref 0–0.85)
MONOCYTES NFR BLD AUTO: 7.7 % (ref 0–13.4)
NEUTROPHILS # BLD AUTO: 5.5 K/UL (ref 1.82–7.42)
NEUTROPHILS NFR BLD: 68.8 % (ref 44–72)
NRBC # BLD AUTO: 0 K/UL
NRBC BLD-RTO: 0 /100 WBC
PLATELET # BLD AUTO: 274 K/UL (ref 164–446)
PMV BLD AUTO: 9.9 FL (ref 9–12.9)
POTASSIUM SERPL-SCNC: 3.7 MMOL/L (ref 3.6–5.5)
RBC # BLD AUTO: 3.33 M/UL (ref 4.7–6.1)
SODIUM SERPL-SCNC: 140 MMOL/L (ref 135–145)
WBC # BLD AUTO: 8 K/UL (ref 4.8–10.8)

## 2020-09-22 PROCEDURE — 80048 BASIC METABOLIC PNL TOTAL CA: CPT

## 2020-09-22 PROCEDURE — 700102 HCHG RX REV CODE 250 W/ 637 OVERRIDE(OP): Performed by: FAMILY MEDICINE

## 2020-09-22 PROCEDURE — A9270 NON-COVERED ITEM OR SERVICE: HCPCS | Performed by: STUDENT IN AN ORGANIZED HEALTH CARE EDUCATION/TRAINING PROGRAM

## 2020-09-22 PROCEDURE — 36415 COLL VENOUS BLD VENIPUNCTURE: CPT

## 2020-09-22 PROCEDURE — 700102 HCHG RX REV CODE 250 W/ 637 OVERRIDE(OP): Performed by: STUDENT IN AN ORGANIZED HEALTH CARE EDUCATION/TRAINING PROGRAM

## 2020-09-22 PROCEDURE — A9270 NON-COVERED ITEM OR SERVICE: HCPCS | Performed by: FAMILY MEDICINE

## 2020-09-22 PROCEDURE — 700111 HCHG RX REV CODE 636 W/ 250 OVERRIDE (IP): Performed by: INTERNAL MEDICINE

## 2020-09-22 PROCEDURE — 700111 HCHG RX REV CODE 636 W/ 250 OVERRIDE (IP): Performed by: STUDENT IN AN ORGANIZED HEALTH CARE EDUCATION/TRAINING PROGRAM

## 2020-09-22 PROCEDURE — 99232 SBSQ HOSP IP/OBS MODERATE 35: CPT | Performed by: HOSPITALIST

## 2020-09-22 PROCEDURE — 85025 COMPLETE CBC W/AUTO DIFF WBC: CPT

## 2020-09-22 PROCEDURE — 92526 ORAL FUNCTION THERAPY: CPT

## 2020-09-22 PROCEDURE — 770006 HCHG ROOM/CARE - MED/SURG/GYN SEMI*

## 2020-09-22 RX ORDER — ALPRAZOLAM 0.5 MG/1
0.5 TABLET ORAL 2 TIMES DAILY
Status: DISCONTINUED | OUTPATIENT
Start: 2020-09-23 | End: 2020-09-23

## 2020-09-22 RX ADMIN — DOCUSATE SODIUM 50 MG AND SENNOSIDES 8.6 MG 2 TABLET: 8.6; 5 TABLET, FILM COATED ORAL at 18:01

## 2020-09-22 RX ADMIN — ALPRAZOLAM 0.5 MG: 0.5 TABLET ORAL at 05:30

## 2020-09-22 RX ADMIN — ALPRAZOLAM 0.5 MG: 0.5 TABLET ORAL at 12:38

## 2020-09-22 RX ADMIN — HALOPERIDOL LACTATE 2.5 MG: 5 INJECTION, SOLUTION INTRAMUSCULAR at 03:54

## 2020-09-22 RX ADMIN — POTASSIUM CHLORIDE 40 MEQ: 1500 TABLET, EXTENDED RELEASE ORAL at 05:30

## 2020-09-22 RX ADMIN — QUETIAPINE FUMARATE 12.5 MG: 25 TABLET ORAL at 21:41

## 2020-09-22 RX ADMIN — TAMSULOSIN HYDROCHLORIDE 0.8 MG: 0.4 CAPSULE ORAL at 08:15

## 2020-09-22 RX ADMIN — ENOXAPARIN SODIUM 40 MG: 40 INJECTION SUBCUTANEOUS at 18:01

## 2020-09-22 RX ADMIN — QUETIAPINE FUMARATE 12.5 MG: 25 TABLET ORAL at 14:02

## 2020-09-22 RX ADMIN — QUETIAPINE FUMARATE 12.5 MG: 25 TABLET ORAL at 05:30

## 2020-09-22 NOTE — THERAPY
"Physical Therapy   Daily Treatment     Patient Name: Yury Blake  Age:  50 y.o., Sex:  male  Medical Record #: [id number]  Today's Date: 9/21/2020     Precautions: Fall Risk, Swallow Precautions ( See Comments)(wrist restraints and Posey vest)    Assessment    Patient demonstrated minimal improvement in functional mobility; he continues to be limited by impaired cognition. Today he ambulated approximately 15ft with FWW and min A; he demonstrated inconsistent ROSALVA and required max verbal cueing for safety and FWW management. He was cooperative throughout but with tangential and at times nonsensical speech. Will continue to follow.    Plan    Continue current treatment plan.    DC Equipment Recommendations: Unable to determine at this time  Discharge Recommendations: Recommend post-acute placement for additional physical therapy services prior to discharge home      Subjective    \"The people at the hospital were going to kidnap me, they were using hand signals to communicate.\"     Objective       09/21/20 1208   Cognition    Cognition / Consciousness X   Level of Consciousness Confused   Ability To Follow Commands 1 Step   Safety Awareness Impaired;Impulsive   New Learning Impaired   Attention Impaired   Comments pleasant and cooperative; again patient thought he knew therapist, stated he was \"going to see Deonte, you know him, he is your \".    Balance   Sitting Balance (Static) Fair +   Sitting Balance (Dynamic) Fair   Standing Balance (Static) Fair -   Standing Balance (Dynamic) Poor +   Comments with FWW, hands on assist for safety but no overt LOB   Gait Analysis   Gait Level Of Assist Minimal Assist   Assistive Device Front Wheel Walker   Distance (Feet) 15   # of Times Distance was Traveled 1   Deviation   (inconsistent ROSALVA)   Comments required verbal cueing for FWW management   Bed Mobility    Supine to Sit Supervised   Sit to Supine Supervised   Scooting Supervised   Functional Mobility   Sit to " Stand Minimal Assist   Bed, Chair, Wheelchair Transfer Minimal Assist   Transfer Method Stand Step   Patient / Family Goals    Patient / Family Goal #1 go home   Goal #1 Outcome Goal not met   Short Term Goals    Short Term Goal # 1 Patient will move supine<>sitting EOB without bed features with supervision within 6tx in order to get in/out of bed   Goal Outcome # 1 Goal met   Short Term Goal # 2 Patient will move sitting<>standing with supervision within 6tx in order to initiate gait and transfers   Goal Outcome # 2 Goal not met   Short Term Goal # 3 Patient will ambulate 150ft with supervision within 6tx in order to access environment   Goal Outcome # 3 Goal not met   Anticipated Discharge Equipment and Recommendations   DC Equipment Recommendations Unable to determine at this time   Discharge Recommendations Recommend post-acute placement for additional physical therapy services prior to discharge home

## 2020-09-22 NOTE — PROGRESS NOTES
Assumed care at 0700, report received from NOC RN.  Pt A&O x self, states pain is 04/10 in head (headache). Pt refusing intervention. Will check again shortly.   Pt continues to undo his bilateral wrist restraints and vest restraint. Charting done near pt, and frequent distraction technique utilized.   Pt has 18 Yoruba newton in place. Draining appropriately.  Bed alarm on. Bed locked, 2 rails up, bed in lowest position. Call light in place, belongings at bedside, no needs at this time and hourly rounding in place.

## 2020-09-22 NOTE — CONSULTS
DATE OF SERVICE:  09/21/2020    UROLOGY CONSULTATION    REASON FOR CONSULTATION:  Traumatic catheter removal.    BRIEF HISTORY:  This 50-year-old male was brought into the hospital   approximately 11 days ago with a primary past medical history of hypertension,   chronic pain, and alcohol abuse.  Presented with an altered mental status and   was found to be obtunded.  He was intubated for 2 days at that time and   resuscitated with high volume crystalloid and vasopressor therapy.  CTs of the   head, abdomen and pelvis were all unremarkable, treated with antibiotics for   concern of an underlying infection.  Currently, he is in the hospital and in   restraints.  He is still very confused.  This evening, he apparently pulled   his Gallardo catheter out.  I was called because they were unable to get it out   beyond the glans penis.  In the time that it took for me to come down, the   nurses had been able to remove that.  At this point, the patient is unable to   give a good history.  At this point, an 18-Macedonian catheter was reinserted   easily into the bladder and 35 mL of water placed in the balloon to minimize   him pulling it out in the future.  Large amount of fluid drained.  Apparently,   a bladder scan showed he had over 1000 mL in his bladder.  At this point, we   will leave the catheter until he is able to get up and about and please call   us if you have any further need for consultation.       ____________________________________     MD MILAN REEVES / ANAND    DD:  09/21/2020 22:51:16  DT:  09/21/2020 23:01:54    D#:  [id number]  Job#:  [id number]

## 2020-09-22 NOTE — PROGRESS NOTES
Report received from day shift RN. Assumed care at 1900, assessment complete. Pt is A & O x 1, only oriented to self. Patient is in bilateral wrist restraints and chest restraint.  Pt denies having any pain at this time. Fall precautions and appropriate signs in place. Pt oriented to unit routine, call light/phone system and RN extension number provided. Pt educated regarding fall precautions. Bed alarm in use. Pt denies any additional needs at this time. Call light within reach.

## 2020-09-22 NOTE — PROGRESS NOTES
[facility] Medicine Daily Progress Note    Date of Service  9/22/2020    Chief Complaint  50 y.o. male with a pmhx of HTN, chronic pain and alcohol abuse who presented 9/9/2020 with altered mental status.     Hospital Course  50 y.o. male with a pmhx of HTN, chronic pain and alcohol abuse who presented 9/9/2020 with altered mental status, he was found obtunded in his house by his ex-wife who was checking on him as he had not been seen for 2 days or been able to get a hold of him.  He was found in a chair,  A&Ox0 and hypotensive.  He arrived in the ER with a GCS of 7 (E1,V1,M5) and severely hypotensive.  The patient was intubated for airway protection and central venous access was obtained for administration of high-volume crystalloid resuscitation and vasopressor therapy.    CT head and abdomen and pelvis were all unremarkable. He was briefly treated with antibiotics for concern for an underlying infection and a lumbar tap done was negative. Cultures remained negative.   EEG was done for concern for seizure-like activity was without any epileptiform activity.  He was extubated on 9/11 and has been able to protect his airway ever since. He is currently receiving treatment with benzos for concern for benzo withdrawals and IV thiamine for his history of alcohol abuse.    Interval Problem Update  9/19: Resting in bed comfortable.  Saturating well on room air.  Off restraints.  Sitter at bedside.  Disoriented to time.  Poor insight.  Easily distracted and gets easily confused.  Tangential speech.   9/20: Resting in bed comfortably. Still confused. Sitter at bedside. No acute distress noted. No issues over night per staff.   9/21: Resting in bed comfortable.  Agitated and confused occasionally.  Disoriented to place and time.  Requiring physical restraints.  No acute distress noted.  Dynamically stable.  No issues overnight per staff.    9/22 sedated    Mri negative for acute issues    Gallardo in place--> would leave in place  due to urinary retention    restrained  Consultants/Specialty  Intensivist    Code Status  Full Code    Disposition  Pending SNF    Review of Systems  Review of Systems   Unable to perform ROS: Mental acuity       Physical Exam  Temp:  [36.4 °C (97.5 °F)-36.7 °C (98.1 °F)] 36.4 °C (97.5 °F)  Pulse:  [] 87  Resp:  [16-18] 18  BP: (134-136)/(85-97) 134/86  SpO2:  [94 %-95 %] 94 %    Physical Exam  Vitals signs and nursing note reviewed.   Constitutional:       General: He is not in acute distress.  HENT:      Head: Normocephalic and atraumatic.      Right Ear: External ear normal.      Left Ear: External ear normal.      Nose: Nose normal.   Eyes:      Extraocular Movements: Extraocular movements intact.      Pupils: Pupils are equal, round, and reactive to light.   Neck:      Musculoskeletal: Normal range of motion and neck supple. No neck rigidity or muscular tenderness.   Cardiovascular:      Rate and Rhythm: Normal rate and regular rhythm.      Pulses: Normal pulses.   Pulmonary:      Effort: Pulmonary effort is normal.   Abdominal:      General: Bowel sounds are normal.      Palpations: Abdomen is soft.   Genitourinary:     Comments: Catheter draining dark orange urine  Musculoskeletal: Normal range of motion.         General: No tenderness.   Skin:     General: Skin is warm and dry.   Neurological:      General: No focal deficit present.      Mental Status: He is alert. He is disoriented.      Comments: Oriented to self    Psychiatric:         Cognition and Memory: Cognition is impaired. He exhibits impaired recent memory.         Judgment: Judgment is impulsive.      Comments: Confused          Fluids    Intake/Output Summary (Last 24 hours) at 9/22/2020 1443  Last data filed at 9/22/2020 1015  Gross per 24 hour   Intake 240 ml   Output 1900 ml   Net -1660 ml       Laboratory  Recent Labs     09/20/20  0428 09/21/20  0524 09/22/20  0927   WBC 6.8 6.3 8.0   RBC 3.34* 2.98* 3.33*   HEMOGLOBIN 10.6* 9.4*  10.5*   HEMATOCRIT 32.5* 29.4* 32.5*   MCV 97.3 98.7* 97.6   MCH 31.7 31.5 31.5   MCHC 32.6* 32.0* 32.3*   RDW 55.0* 55.5* 52.0*   PLATELETCT 271 250 274   MPV 11.1 10.7 9.9     Recent Labs     09/20/20  0428 09/21/20  0524 09/22/20  0927   SODIUM 140 140 140   POTASSIUM 4.0 3.8 3.7   CHLORIDE 105 107 107   CO2 22 21 21   GLUCOSE 103* 97 95   BUN 5* 6* 5*   CREATININE 0.66 0.76 0.66   CALCIUM 9.6 9.2 9.7                   Imaging  MR-BRAIN-W/O   Final Result      1.  Moderate diffuse cerebral atrophy.      2.  Minimal periventricular and juxtacortical white matter changes consistent with chronic microvascular ischemic gliosis.      3.  No evidence of acute infarction in the brain parenchyma.      DX-ABDOMEN FOR TUBE PLACEMENT   Final Result      Feeding tube tip overlies the second portion of the duodenum.      DX-CHEST-PORTABLE (1 VIEW)   Final Result         1.  Patchy left lung base opacity, new since prior, could represent early infiltrate            DX-CHEST-PORTABLE (1 VIEW)   Final Result         1.  Patchy left lung base opacity, new since prior, could represent early infiltrate         OE-BPNPTMI-3 VIEW   Final Result      Enteric tube projects over the distal stomach/pylorus.         EC-ECHOCARDIOGRAM COMPLETE W/O CONT   Final Result      DX-CHEST-PORTABLE (1 VIEW)   Final Result         1.  No acute cardiopulmonary disease.      CT-ABDOMEN-PELVIS W/O   Final Result      1.  No renal stone or hydronephrosis.   2.  Normal appendix.   3.  No focal mesenteric inflammatory process.      DX-ABDOMEN FOR TUBE PLACEMENT   Final Result      NG tube tip projects over expected location the gastric fundus.      US-ABDOMEN COMPLETE SURVEY   Final Result         1.  Echogenic liver compatible with fatty change versus fibrosis.   2.  Gallbladder sludge without additional sonographic findings of cholecystitis.   3.  Partial atrophic bilateral kidneys with lobulated contour         CT-HEAD W/O   Final Result         1.  No  acute intracranial abnormality is identified, there are nonspecific white matter changes, commonly associated with small vessel ischemic disease.  Associated mild cerebral atrophy is noted.   2.  Atherosclerosis.      DX-CHEST-PORTABLE (1 VIEW)   Final Result         1.  No acute cardiopulmonary disease.           Assessment/Plan  * Toxic encephalopathy- (present on admission)  Assessment & Plan  No clear etiology. Patient was found down for unknown period.     LP- negative  EEG was negative for seizures  UDS + benzos  On xanax for concerns for benzo withdrawal-taper off  Seroquel for delirium      Mri brain negative       Other dysphagia- (present on admission)  Assessment & Plan  Passed speech eval now  On modified diet    Hypernatremia  Assessment & Plan  resolved    Acute respiratory failure with hypoxia (HCC)- (present on admission)  Assessment & Plan  2/2 mental status  Extubated 9/11  Remains on room air    Benzodiazepine dependence (HCC)- (present on admission)  Assessment & Plan  Monitor for seizure/withdrawal  Xanax 0.5 mg BID; taper off  EEG after sz-like activity 9/10 was negative despite jerking motions    Alcohol abuse- (present on admission)  Assessment & Plan  Reported hx of  Continue Vitamins  High dose thiamine  Seizure precautions  S/p etoh w/d protocol    Septic shock ([facility])- (present on admission)  Assessment & Plan  Resolved  Broad-spectrum antibiotics: s/p vanc/zosyn --> C3, now off abx  Blood culture, CSF culture, urinalysis allnegative  Monitor off antibiotics        JEWELS (acute kidney injury) ([facility])- (present on admission)  Assessment & Plan  Consistent with ATN 2/2 hypotension, dehydration and sepsis  Resolved  Avoid nephrotoxins.  Renal dose all medications.         VTE prophylaxis: Lovenox

## 2020-09-22 NOTE — CARE PLAN
Problem: Fluid Volume:  Goal: Will maintain balanced intake and output  Outcome: PROGRESSING AS EXPECTED  Intervention: Monitor, educate, and encourage compliance with therapeutic intake of liquids  Note: Monitoring I's and O's. Fluids offered frequently.     Problem: Psychosocial Needs:  Goal: Level of anxiety will decrease  Outcome: PROGRESSING AS EXPECTED  Intervention: Identify and develop with patient strategies to cope with anxiety triggers  Note: Pt confused today, but seems to be experiencing less level of anxiety. Pt resting, with no signs of symptoms of distress or labored breathing.

## 2020-09-22 NOTE — THERAPY
Speech Language Pathology  Daily Treatment     Patient Name: Yury Blake  Age:  50 y.o., Sex:  male  Medical Record #: [id number]  Today's Date: 9/22/2020     Precautions  Precautions: (P) Fall Risk, Swallow Precautions ( See Comments)  Comments: (P) wrist restraints and Posey vest    Assessment    Pt seen on this date for dysphagia therapy with regular/thin liquid breakfast. Pt asleep upon entry, but woke with RN/SLP/tech at bedside. Pt currently in posy vest and bilateral wrist restraints, but okay per RN to remove wrist restraints for breakfast. Pt attempted to pull at vest and catheter, however, redirected easily. Pt independently fed self ~25% of breakfast with no overt s/sx of aspiration. Mastication slightly prolonged but functional for regular texture. Vocal quality clear and swallow trigger timely. Confusion noted through out session. Pt declined further PO so bilateral wrist restraints re-applied; RN aware of session.    Plan    Continue regular/thin liquid diet with 1:1 supervision    Continue current treatment plan.    Discharge Recommendations: (P) Recommend home health for continued speech therapy services       Objective       09/22/20 0951   Precautions   Precautions Fall Risk;Swallow Precautions ( See Comments)   Comments wrist restraints and Posey vest   Vitals   O2 (LPM) 0   O2 Delivery Device None - Room Air   Pain 0 - 10 Group   Therapist Pain Assessment Post Activity Pain Same as Prior to Activity;Nurse Notified;0   Cognitive-Linguistic   Level of Consciousness Confused   Speech / Dysarthria   Comments mild dysarthria   Voice   Comments clear   Dysphagia    Dysphagia X   Positioning / Behavior Modification Modulate Rate or Bite Size;Self Monitoring   Other Treatments regular/thin liquid breakfast   Diet / Liquid Recommendation Regular (7);Thin (0)   Nutritional Liquid Intake Rating Scale Non thickened beverages   Nutritional Food Intake Rating Scale Total oral diet with no restrictions    Skilled Intervention Compensatory Strategies;Verbal Cueing   Recommended Route of Medication Administration   Medication Administration  Whole with Liquid Wash   Short Term Goals   Short Term Goal # 2 NEW 9/21: Patient will consume a RG7/TN0 diet with no overt s/sx of aspiration given min cues to swallow strategies.    Goal Outcome # 2  Progressing as expected

## 2020-09-22 NOTE — CARE PLAN
Problem: Communication  Goal: The ability to communicate needs accurately and effectively will improve  Outcome: PROGRESSING SLOWER THAN EXPECTED  Note: Patient is disoriented to time, place and situation. Patient is able to communicate needs to an extent.      Problem: Safety  Goal: Will remain free from injury  Outcome: PROGRESSING SLOWER THAN EXPECTED  Note: Patient continues to pull on lines and not listen to instruction.

## 2020-09-22 NOTE — PROGRESS NOTES
Pt partial pulled out newton causing difficulty to remove it fully. Paged on call physician, Physician had RN page urology. Newton was able to be removed, Patient had some bloody discharge from penis. Spoke to Lorie at urology. Lorie states to replace newton and to call back with any updates. RN will continue to monitor situation.

## 2020-09-23 PROBLEM — R33.9 URINARY RETENTION: Status: ACTIVE | Noted: 2020-09-23

## 2020-09-23 LAB
AMMONIA PLAS-SCNC: 10 UMOL/L (ref 11–45)
ANION GAP SERPL CALC-SCNC: 11 MMOL/L (ref 7–16)
BACTERIA UR CULT: NORMAL
BASOPHILS # BLD AUTO: 0.8 % (ref 0–1.8)
BASOPHILS # BLD: 0.06 K/UL (ref 0–0.12)
BUN SERPL-MCNC: 8 MG/DL (ref 8–22)
CALCIUM SERPL-MCNC: 9.6 MG/DL (ref 8.5–10.5)
CHLORIDE SERPL-SCNC: 109 MMOL/L (ref 96–112)
CO2 SERPL-SCNC: 22 MMOL/L (ref 20–33)
CREAT SERPL-MCNC: 0.67 MG/DL (ref 0.5–1.4)
EOSINOPHIL # BLD AUTO: 0.13 K/UL (ref 0–0.51)
EOSINOPHIL NFR BLD: 1.7 % (ref 0–6.9)
ERYTHROCYTE [DISTWIDTH] IN BLOOD BY AUTOMATED COUNT: 52.2 FL (ref 35.9–50)
GLUCOSE SERPL-MCNC: 99 MG/DL (ref 65–99)
HCT VFR BLD AUTO: 29.8 % (ref 42–52)
HGB BLD-MCNC: 9.7 G/DL (ref 14–18)
IMM GRANULOCYTES # BLD AUTO: 0.05 K/UL (ref 0–0.11)
IMM GRANULOCYTES NFR BLD AUTO: 0.7 % (ref 0–0.9)
LYMPHOCYTES # BLD AUTO: 1.56 K/UL (ref 1–4.8)
LYMPHOCYTES NFR BLD: 20.3 % (ref 22–41)
MCH RBC QN AUTO: 31.6 PG (ref 27–33)
MCHC RBC AUTO-ENTMCNC: 32.6 G/DL (ref 33.7–35.3)
MCV RBC AUTO: 97.1 FL (ref 81.4–97.8)
MONOCYTES # BLD AUTO: 0.62 K/UL (ref 0–0.85)
MONOCYTES NFR BLD AUTO: 8.1 % (ref 0–13.4)
NEUTROPHILS # BLD AUTO: 5.27 K/UL (ref 1.82–7.42)
NEUTROPHILS NFR BLD: 68.4 % (ref 44–72)
NRBC # BLD AUTO: 0 K/UL
NRBC BLD-RTO: 0 /100 WBC
PLATELET # BLD AUTO: 255 K/UL (ref 164–446)
PMV BLD AUTO: 9.7 FL (ref 9–12.9)
POTASSIUM SERPL-SCNC: 3.7 MMOL/L (ref 3.6–5.5)
RBC # BLD AUTO: 3.07 M/UL (ref 4.7–6.1)
SIGNIFICANT IND 70042: NORMAL
SITE SITE: NORMAL
SODIUM SERPL-SCNC: 142 MMOL/L (ref 135–145)
SOURCE SOURCE: NORMAL
WBC # BLD AUTO: 7.7 K/UL (ref 4.8–10.8)

## 2020-09-23 PROCEDURE — 700111 HCHG RX REV CODE 636 W/ 250 OVERRIDE (IP): Performed by: INTERNAL MEDICINE

## 2020-09-23 PROCEDURE — 82140 ASSAY OF AMMONIA: CPT

## 2020-09-23 PROCEDURE — 700111 HCHG RX REV CODE 636 W/ 250 OVERRIDE (IP): Performed by: STUDENT IN AN ORGANIZED HEALTH CARE EDUCATION/TRAINING PROGRAM

## 2020-09-23 PROCEDURE — 80048 BASIC METABOLIC PNL TOTAL CA: CPT

## 2020-09-23 PROCEDURE — A9270 NON-COVERED ITEM OR SERVICE: HCPCS | Performed by: STUDENT IN AN ORGANIZED HEALTH CARE EDUCATION/TRAINING PROGRAM

## 2020-09-23 PROCEDURE — 700102 HCHG RX REV CODE 250 W/ 637 OVERRIDE(OP): Performed by: STUDENT IN AN ORGANIZED HEALTH CARE EDUCATION/TRAINING PROGRAM

## 2020-09-23 PROCEDURE — 85025 COMPLETE CBC W/AUTO DIFF WBC: CPT

## 2020-09-23 PROCEDURE — 306015 LOCK STAT FOLEY: Performed by: HOSPITALIST

## 2020-09-23 PROCEDURE — 36415 COLL VENOUS BLD VENIPUNCTURE: CPT

## 2020-09-23 PROCEDURE — 99232 SBSQ HOSP IP/OBS MODERATE 35: CPT | Performed by: HOSPITALIST

## 2020-09-23 PROCEDURE — 770006 HCHG ROOM/CARE - MED/SURG/GYN SEMI*

## 2020-09-23 PROCEDURE — A9270 NON-COVERED ITEM OR SERVICE: HCPCS | Performed by: HOSPITALIST

## 2020-09-23 PROCEDURE — 97535 SELF CARE MNGMENT TRAINING: CPT

## 2020-09-23 PROCEDURE — 700102 HCHG RX REV CODE 250 W/ 637 OVERRIDE(OP): Performed by: HOSPITALIST

## 2020-09-23 PROCEDURE — 700102 HCHG RX REV CODE 250 W/ 637 OVERRIDE(OP): Performed by: FAMILY MEDICINE

## 2020-09-23 PROCEDURE — A9270 NON-COVERED ITEM OR SERVICE: HCPCS | Performed by: FAMILY MEDICINE

## 2020-09-23 RX ORDER — LORAZEPAM 2 MG/ML
0.5 INJECTION INTRAMUSCULAR EVERY 6 HOURS PRN
Status: DISCONTINUED | OUTPATIENT
Start: 2020-09-23 | End: 2020-11-08

## 2020-09-23 RX ORDER — GAUZE BANDAGE 2" X 2"
100 BANDAGE TOPICAL DAILY
Status: DISCONTINUED | OUTPATIENT
Start: 2020-09-23 | End: 2021-02-14

## 2020-09-23 RX ADMIN — DOCUSATE SODIUM 50 MG AND SENNOSIDES 8.6 MG 2 TABLET: 8.6; 5 TABLET, FILM COATED ORAL at 17:23

## 2020-09-23 RX ADMIN — HALOPERIDOL LACTATE 2.5 MG: 5 INJECTION, SOLUTION INTRAMUSCULAR at 00:09

## 2020-09-23 RX ADMIN — TAMSULOSIN HYDROCHLORIDE 0.8 MG: 0.4 CAPSULE ORAL at 09:06

## 2020-09-23 RX ADMIN — DOCUSATE SODIUM 50 MG AND SENNOSIDES 8.6 MG 2 TABLET: 8.6; 5 TABLET, FILM COATED ORAL at 05:15

## 2020-09-23 RX ADMIN — ALPRAZOLAM 0.5 MG: 0.5 TABLET ORAL at 05:15

## 2020-09-23 RX ADMIN — ENOXAPARIN SODIUM 40 MG: 40 INJECTION SUBCUTANEOUS at 17:23

## 2020-09-23 RX ADMIN — Medication 100 MG: at 13:03

## 2020-09-23 RX ADMIN — HALOPERIDOL LACTATE 2.5 MG: 5 INJECTION, SOLUTION INTRAMUSCULAR at 22:13

## 2020-09-23 RX ADMIN — POTASSIUM CHLORIDE 40 MEQ: 1500 TABLET, EXTENDED RELEASE ORAL at 05:15

## 2020-09-23 RX ADMIN — QUETIAPINE FUMARATE 12.5 MG: 25 TABLET ORAL at 05:15

## 2020-09-23 ASSESSMENT — COGNITIVE AND FUNCTIONAL STATUS - GENERAL
TOILETING: A LOT
DAILY ACTIVITIY SCORE: 16
DRESSING REGULAR LOWER BODY CLOTHING: A LITTLE
EATING MEALS: A LITTLE
PERSONAL GROOMING: A LITTLE
HELP NEEDED FOR BATHING: A LOT
DRESSING REGULAR UPPER BODY CLOTHING: A LITTLE
SUGGESTED CMS G CODE MODIFIER DAILY ACTIVITY: CK

## 2020-09-23 ASSESSMENT — FIBROSIS 4 INDEX: FIB4 SCORE: 1.16

## 2020-09-23 NOTE — THERAPY
Occupational Therapy  Daily Treatment     Patient Name: Yury Blake  Age:  50 y.o., Sex:  male  Medical Record #: [id number]  Today's Date: 9/23/2020     Precautions  Precautions: Fall Risk, Swallow Precautions ( See Comments)  Comments: B wrist restraints and posey vest    Assessment    Pt seen for OT session. Continues to demo confusion and decreased cognition req v/cs and min A for safety and sequencing. Progressing with activity tolerance. In AM, when eating, edu on modification using spoon for pears/pancakes instead of fork, as well as knife instead of hand for moving food onto spoon.  Presyncopal episode in BR req max A to get to chair and BTB. Appeared to be vasovagal after diarrhea; RN aware. Continues to be limited by decreased functional mobility, activity tolerance, cognition, coordination, balance, adherence to precautions, and pain which are currently affecting pt's ability to complete ADLs/IADLs at baseline. Will continue to follow.     Plan    Continue current treatment plan.    DC Equipment Recommendations: Unable to determine at this time  Discharge Recommendations: Recommend post-acute placement for additional occupational therapy services prior to discharge home    Objective     09/23/20 1119   Pain 0 - 10 Group   Therapist Pain Assessment Post Activity Pain Same as Prior to Activity;During Activity;Nurse Notified  (no c/o pain)   Cognition    Cognition / Consciousness X   Orientation Level Not Oriented to Place   Level of Consciousness Confused   Ability To Follow Commands 1 Step   Safety Awareness Impaired;Impulsive   New Learning Impaired   Attention Impaired   Sequencing Impaired   Comments pleasant and cooperative, but req max v/cs and Jyoti to not pull on catheter. He reported we were in his home.   Other Treatments   Other Treatments Provided Presyncopal episode in BR req max A to get to chair and BTB. Appeared to be vasovagal after diarrhea; RN aware   Balance   Sitting Balance  (Static) Fair +   Sitting Balance (Dynamic) Fair +   Standing Balance (Static) Poor +   Standing Balance (Dynamic) Poor +   Weight Shift Sitting Fair   Weight Shift Standing Poor   Skilled Intervention Verbal Cuing;Tactile Cuing;Facilitation;Compensatory Strategies;Sequencing   Comments w/ FWW; min A for balance and cognition   Bed Mobility    Supine to Sit Supervised   Sit to Supine Moderate Assist  (d/t lightheadedness)   Scooting Supervised   Skilled Intervention Verbal Cuing;Tactile Cuing;Compensatory Strategies;Facilitation   Comments HOB flat   Activities of Daily Living   Eating Supervision   Grooming   (declined)   Lower Body Dressing Supervision  (socks; extra time)   Toileting Moderate Assist  (for pericare)   Skilled Intervention Verbal Cuing;Tactile Cuing;Facilitation;Compensatory Strategies   Comments limited d/t presyncopal episode. in AM, when eating, edu on modification using spoon for pears/pancakes instead of fork, as well as knife instead of hand for moving food onto spoon   Functional Mobility   Sit to Stand Minimal Assist   Bed, Chair, Wheelchair Transfer Minimal Assist   Transfer Method Slide Board   Mobility w/ FWW; in room   Skilled Intervention Verbal Cuing;Tactile Cuing;Facilitation;Compensatory Strategies;Postural Facilitation;Sequencing   Comments limited by appearance of vasovagal response and fatigue; recommend use of BSC   Visual Perception   Visual Perception  Not Tested   Activity Tolerance   Sitting in Chair 15 min on toilet   Sitting Edge of Bed 15 min   Standing 5 min total   Short Term Goals   Short Term Goal # 1 Pt will complete toilet transfer using BSC if needed with spv, no LOB by discharge.   Goal Outcome # 1 Goal not met   Short Term Goal # 2 Pt will complete standing grooming/hygiene with spv by discharge.   Goal Outcome # 2 Goal not met   Short Term Goal # 3 Pt will complete toileting with spv by discharge.   Goal Outcome # 3 Goal not met   Anticipated Discharge Equipment  and Recommendations   DC Equipment Recommendations Unable to determine at this time   Discharge Recommendations Recommend post-acute placement for additional occupational therapy services prior to discharge home

## 2020-09-23 NOTE — CARE PLAN
Problem: Safety  Goal: Will remain free from injury  Outcome: PROGRESSING SLOWER THAN EXPECTED  Note: Patient continues to pull at lines. Patient is no compliant .      Problem: Bowel/Gastric:  Goal: Normal bowel function is maintained or improved  Outcome: PROGRESSING SLOWER THAN EXPECTED  Note: Patients is incontinent of stool

## 2020-09-23 NOTE — PROGRESS NOTES
Assumed care at 0700, report received from NOC RN.  Pt A&O x self, denies any pain at this time. Pt still in bilateral soft wrist restraints, and vest restraint per MD order.   Pt is irritable this morning, pt pulling at Gallardo during morning assessment. Pt readjusted, Gallardo secured. WCTM. Bed alarm in place. Bed locked, 2 rails up, bed in lowest position. Call light in place, belongings at bedside, no needs at this time and hourly rounding in place.

## 2020-09-23 NOTE — PROGRESS NOTES
Report received from GAMAL Sainz RN. Assumed care at 1900, assessment complete. Pt is A & O x 1, only oriented to self.  Patient is in bilateral wrist restrains and chest restraints. Pt denies having any pain at this time. Fall precautions and appropriate signs in place. Pt oriented to unit routine, call light/phone system and RN extension number provided. Pt educated regarding fall precautions. Bed alarm in use. Pt denies any additional needs at this time. Call light within reach.

## 2020-09-23 NOTE — DISCHARGE PLANNING
Anticipated Discharge Disposition:   · TBD possibly SNF    Action:   · LSW spoke with pt's , Jennifer Patrick [phone] on Monday 09/21/2020 to determine if she was aware of next of kin to assist with medical decisions. She reported his next of kin would be his brother, Maxwell however she confirmed that they are currently in a legal batter over their mother's estate and have been estranged. She indicated she does not have the brother's contact information but does have contact information to Maxwell's  and would be will to provide information if needed. She also reported pt has an uncle Lance Rincon but is unsure if he would want to be involved.   · LSW discussed concerns with provider, Dr. Peterson in IDT rounds 09/22/2020 at this time medical team feels it maybe best to obtain assistance with medical decisions from ex-wife, Kellee given family dynamics. LSW to staff with supervisors to determine if this would be appropriate.     Barriers to Discharge:   · No current insurance on file  · Lack of NOK to help with medical decisions as pt is not fully orientated.       Plan:   · Care coordination will continue to follow up and provide assistance with discharge plans/barriers.

## 2020-09-23 NOTE — CARE PLAN
Problem: Bowel/Gastric:  Goal: Normal bowel function is maintained or improved  Outcome: PROGRESSING SLOWER THAN EXPECTED  Note: Pt is incontinent of bowel.   Problem: Skin Integrity  Goal: Risk for impaired skin integrity will decrease  Outcome: PROGRESSING AS EXPECTED  Intervention: Assess risk factors for impaired skin integrity and/or pressure ulcers  Note: Pt is incontinent of stool, barrier cream applied.

## 2020-09-23 NOTE — PROGRESS NOTES
[facility] Medicine Daily Progress Note    Date of Service  9/23/2020    Chief Complaint  50 y.o. male with a pmhx of HTN, chronic pain and alcohol abuse who presented 9/9/2020 with altered mental status.     Hospital Course  50 y.o. male with a pmhx of HTN, chronic pain and alcohol abuse who presented 9/9/2020 with altered mental status, he was found obtunded in his house by his ex-wife who was checking on him as he had not been seen for 2 days or been able to get a hold of him.  He was found in a chair,  A&Ox0 and hypotensive.  He arrived in the ER with a GCS of 7 (E1,V1,M5) and severely hypotensive.  The patient was intubated for airway protection and central venous access was obtained for administration of high-volume crystalloid resuscitation and vasopressor therapy.    CT head and abdomen and pelvis were all unremarkable. He was briefly treated with antibiotics for concern for an underlying infection and a lumbar tap done was negative. Cultures remained negative.   EEG was done for concern for seizure-like activity was without any epileptiform activity.  He was extubated on 9/11 and has been able to protect his airway ever since. He is currently receiving treatment with benzos for concern for benzo withdrawals and IV thiamine for his history of alcohol abuse.    Interval Problem Update  9/19: Resting in bed comfortable.  Saturating well on room air.  Off restraints.  Sitter at bedside.  Disoriented to time.  Poor insight.  Easily distracted and gets easily confused.  Tangential speech.   9/20: Resting in bed comfortably. Still confused. Sitter at bedside. No acute distress noted. No issues over night per staff.   9/21: Resting in bed comfortable.  Agitated and confused occasionally.  Disoriented to place and time.  Requiring physical restraints.  No acute distress noted.  Dynamically stable.  No issues overnight per staff.    9/22 sedated    Mri negative for acute issues    Gallardo in place--> would leave in place  due to urinary retention    Restrained    9/23      Patient is impulsive and is pulling at his tubing so restraints are still needed    Patient appears a little bit sedated this morning    He could answer some simple questions and was oriented x3    Still intermittently confused    Gallardo in place  Consultants/Specialty  Intensivist    Code Status  Full Code    Disposition  Pending [facility]    Review of Systems  Review of Systems   Unable to perform ROS: Mental acuity       Physical Exam  Temp:  [36.2 °C (97.2 °F)-37.3 °C (99.2 °F)] 36.2 °C (97.2 °F)  Pulse:  [] 96  Resp:  [16-17] 17  BP: (133-144)/(80-84) 134/80  SpO2:  [96 %-98 %] 98 %    Physical Exam  Vitals signs and nursing note reviewed.   Constitutional:       General: He is not in acute distress.  HENT:      Head: Normocephalic and atraumatic.      Right Ear: External ear normal.      Left Ear: External ear normal.      Nose: Nose normal.   Eyes:      Extraocular Movements: Extraocular movements intact.      Pupils: Pupils are equal, round, and reactive to light.   Neck:      Musculoskeletal: Normal range of motion and neck supple. No neck rigidity or muscular tenderness.   Cardiovascular:      Rate and Rhythm: Normal rate and regular rhythm.      Pulses: Normal pulses.   Pulmonary:      Effort: Pulmonary effort is normal.   Abdominal:      General: Bowel sounds are normal.      Palpations: Abdomen is soft.   Genitourinary:     Comments: Catheter draining dark orange urine  Musculoskeletal: Normal range of motion.         General: No tenderness.   Skin:     General: Skin is warm and dry.   Neurological:      General: No focal deficit present.      Mental Status: He is alert. He is disoriented.      Comments: Oriented to self and year   Psychiatric:         Cognition and Memory: Cognition is impaired. He exhibits impaired recent memory.         Judgment: Judgment is impulsive.      Comments: Confused          Fluids    Intake/Output Summary (Last 24 hours)  at 9/23/2020 1148  Last data filed at 9/23/2020 1000  Gross per 24 hour   Intake 347 ml   Output 400 ml   Net -53 ml       Laboratory  Recent Labs     09/21/20 0524 09/22/20 0927 09/23/20 0442   WBC 6.3 8.0 7.7   RBC 2.98* 3.33* 3.07*   HEMOGLOBIN 9.4* 10.5* 9.7*   HEMATOCRIT 29.4* 32.5* 29.8*   MCV 98.7* 97.6 97.1   MCH 31.5 31.5 31.6   MCHC 32.0* 32.3* 32.6*   RDW 55.5* 52.0* 52.2*   PLATELETCT 250 274 255   MPV 10.7 9.9 9.7     Recent Labs     09/21/20 0524 09/22/20 0927 09/23/20 0442   SODIUM 140 140 142   POTASSIUM 3.8 3.7 3.7   CHLORIDE 107 107 109   CO2 21 21 22   GLUCOSE 97 95 99   BUN 6* 5* 8   CREATININE 0.76 0.66 0.67   CALCIUM 9.2 9.7 9.6                   Imaging  MR-BRAIN-W/O   Final Result      1.  Moderate diffuse cerebral atrophy.      2.  Minimal periventricular and juxtacortical white matter changes consistent with chronic microvascular ischemic gliosis.      3.  No evidence of acute infarction in the brain parenchyma.      DX-ABDOMEN FOR TUBE PLACEMENT   Final Result      Feeding tube tip overlies the second portion of the duodenum.      DX-CHEST-PORTABLE (1 VIEW)   Final Result         1.  Patchy left lung base opacity, new since prior, could represent early infiltrate            DX-CHEST-PORTABLE (1 VIEW)   Final Result         1.  Patchy left lung base opacity, new since prior, could represent early infiltrate         [id number] VIEW   Final Result      Enteric tube projects over the distal stomach/pylorus.         EC-ECHOCARDIOGRAM COMPLETE W/O CONT   Final Result      DX-CHEST-PORTABLE (1 VIEW)   Final Result         1.  No acute cardiopulmonary disease.      CT-ABDOMEN-PELVIS W/O   Final Result      1.  No renal stone or hydronephrosis.   2.  Normal appendix.   3.  No focal mesenteric inflammatory process.      DX-ABDOMEN FOR TUBE PLACEMENT   Final Result      NG tube tip projects over expected location the gastric fundus.      US-ABDOMEN COMPLETE SURVEY   Final Result         1.   Echogenic liver compatible with fatty change versus fibrosis.   2.  Gallbladder sludge without additional sonographic findings of cholecystitis.   3.  Partial atrophic bilateral kidneys with lobulated contour         CT-HEAD W/O   Final Result         1.  No acute intracranial abnormality is identified, there are nonspecific white matter changes, commonly associated with small vessel ischemic disease.  Associated mild cerebral atrophy is noted.   2.  Atherosclerosis.      DX-CHEST-PORTABLE (1 VIEW)   Final Result         1.  No acute cardiopulmonary disease.           Assessment/Plan  * Toxic encephalopathy- (present on admission)  Assessment & Plan  No clear etiology. Patient was found down for unknown period.     LP- negative  EEG was negative for seizures  UDS + benzos  At this time there is more concerned that the medication we are giving him a causing his mental status to not clear    We have discontinued Seroquel    Change Xanax to as needed Ativan prn    Continue to monitor    Restrain as necessary    Mri brain negative       Urinary retention- (present on admission)  Assessment & Plan  Po flomax    Voiding trial in 5 to 7 days    Other dysphagia- (present on admission)  Assessment & Plan  Passed speech eval now  On modified diet    Hypernatremia  Assessment & Plan  resolved    Acute respiratory failure with hypoxia (HCC)- (present on admission)  Assessment & Plan  2/2 mental status  Extubated 9/11  Remains on room air    Benzodiazepine dependence (HCC)- (present on admission)  Assessment & Plan  Monitor for seizure/withdrawal  Xanax 0.5 mg BID; taper off  EEG after sz-like activity 9/10 was negative despite jerking motions    Alcohol abuse- (present on admission)  Assessment & Plan  Reported hx of  Continue Vitamins  High dose thiamine  Seizure precautions  S/p etoh w/d protocol    Septic shock (HCC)- (present on admission)  Assessment & Plan  Resolved  Broad-spectrum antibiotics: s/p vanc/zosyn --> C3, now  off abx  Blood culture, CSF culture, urinalysis allnegative  Monitor off antibiotics        JEWELS (acute kidney injury) (HCC)- (present on admission)  Assessment & Plan  Consistent with ATN 2/2 hypotension, dehydration and sepsis  Resolved  Avoid nephrotoxins.  Renal dose all medications.         VTE prophylaxis: Lovenox

## 2020-09-24 LAB
ANION GAP SERPL CALC-SCNC: 13 MMOL/L (ref 7–16)
BASOPHILS # BLD AUTO: 0.8 % (ref 0–1.8)
BASOPHILS # BLD: 0.07 K/UL (ref 0–0.12)
BUN SERPL-MCNC: 9 MG/DL (ref 8–22)
CALCIUM SERPL-MCNC: 9.3 MG/DL (ref 8.5–10.5)
CHLORIDE SERPL-SCNC: 105 MMOL/L (ref 96–112)
CO2 SERPL-SCNC: 21 MMOL/L (ref 20–33)
CREAT SERPL-MCNC: 0.81 MG/DL (ref 0.5–1.4)
EOSINOPHIL # BLD AUTO: 0.13 K/UL (ref 0–0.51)
EOSINOPHIL NFR BLD: 1.5 % (ref 0–6.9)
ERYTHROCYTE [DISTWIDTH] IN BLOOD BY AUTOMATED COUNT: 51.8 FL (ref 35.9–50)
GLUCOSE SERPL-MCNC: 97 MG/DL (ref 65–99)
HCT VFR BLD AUTO: 29.5 % (ref 42–52)
HGB BLD-MCNC: 9.4 G/DL (ref 14–18)
IMM GRANULOCYTES # BLD AUTO: 0.05 K/UL (ref 0–0.11)
IMM GRANULOCYTES NFR BLD AUTO: 0.6 % (ref 0–0.9)
LYMPHOCYTES # BLD AUTO: 1.62 K/UL (ref 1–4.8)
LYMPHOCYTES NFR BLD: 19 % (ref 22–41)
MCH RBC QN AUTO: 31.1 PG (ref 27–33)
MCHC RBC AUTO-ENTMCNC: 31.9 G/DL (ref 33.7–35.3)
MCV RBC AUTO: 97.7 FL (ref 81.4–97.8)
MONOCYTES # BLD AUTO: 0.69 K/UL (ref 0–0.85)
MONOCYTES NFR BLD AUTO: 8.1 % (ref 0–13.4)
NEUTROPHILS # BLD AUTO: 5.95 K/UL (ref 1.82–7.42)
NEUTROPHILS NFR BLD: 70 % (ref 44–72)
NRBC # BLD AUTO: 0 K/UL
NRBC BLD-RTO: 0 /100 WBC
PLATELET # BLD AUTO: 257 K/UL (ref 164–446)
PMV BLD AUTO: 9.8 FL (ref 9–12.9)
POTASSIUM SERPL-SCNC: 3.5 MMOL/L (ref 3.6–5.5)
RBC # BLD AUTO: 3.02 M/UL (ref 4.7–6.1)
SODIUM SERPL-SCNC: 139 MMOL/L (ref 135–145)
WBC # BLD AUTO: 8.5 K/UL (ref 4.8–10.8)

## 2020-09-24 PROCEDURE — A9270 NON-COVERED ITEM OR SERVICE: HCPCS | Performed by: HOSPITALIST

## 2020-09-24 PROCEDURE — 80048 BASIC METABOLIC PNL TOTAL CA: CPT

## 2020-09-24 PROCEDURE — 92526 ORAL FUNCTION THERAPY: CPT

## 2020-09-24 PROCEDURE — 97112 NEUROMUSCULAR REEDUCATION: CPT

## 2020-09-24 PROCEDURE — 85025 COMPLETE CBC W/AUTO DIFF WBC: CPT

## 2020-09-24 PROCEDURE — 700102 HCHG RX REV CODE 250 W/ 637 OVERRIDE(OP): Performed by: FAMILY MEDICINE

## 2020-09-24 PROCEDURE — A9270 NON-COVERED ITEM OR SERVICE: HCPCS | Performed by: FAMILY MEDICINE

## 2020-09-24 PROCEDURE — 36415 COLL VENOUS BLD VENIPUNCTURE: CPT

## 2020-09-24 PROCEDURE — 97530 THERAPEUTIC ACTIVITIES: CPT

## 2020-09-24 PROCEDURE — 700111 HCHG RX REV CODE 636 W/ 250 OVERRIDE (IP): Performed by: INTERNAL MEDICINE

## 2020-09-24 PROCEDURE — 770006 HCHG ROOM/CARE - MED/SURG/GYN SEMI*

## 2020-09-24 PROCEDURE — 700102 HCHG RX REV CODE 250 W/ 637 OVERRIDE(OP): Performed by: HOSPITALIST

## 2020-09-24 PROCEDURE — 99232 SBSQ HOSP IP/OBS MODERATE 35: CPT | Performed by: HOSPITALIST

## 2020-09-24 RX ADMIN — HALOPERIDOL LACTATE 2.5 MG: 5 INJECTION, SOLUTION INTRAMUSCULAR at 22:42

## 2020-09-24 RX ADMIN — TAMSULOSIN HYDROCHLORIDE 0.8 MG: 0.4 CAPSULE ORAL at 09:15

## 2020-09-24 RX ADMIN — Medication 100 MG: at 05:04

## 2020-09-24 ASSESSMENT — COGNITIVE AND FUNCTIONAL STATUS - GENERAL
SUGGESTED CMS G CODE MODIFIER MOBILITY: CL
CLIMB 3 TO 5 STEPS WITH RAILING: A LOT
STANDING UP FROM CHAIR USING ARMS: A LOT
WALKING IN HOSPITAL ROOM: A LOT
MOBILITY SCORE: 14
TURNING FROM BACK TO SIDE WHILE IN FLAT BAD: A LITTLE
MOVING FROM LYING ON BACK TO SITTING ON SIDE OF FLAT BED: A LOT
MOVING TO AND FROM BED TO CHAIR: A LITTLE

## 2020-09-24 ASSESSMENT — ENCOUNTER SYMPTOMS
ABDOMINAL PAIN: 0
HEARTBURN: 0
PALPITATIONS: 0
EYES NEGATIVE: 1
VOMITING: 0
PSYCHIATRIC NEGATIVE: 1
HEMOPTYSIS: 0
ORTHOPNEA: 0
SPUTUM PRODUCTION: 0
CLAUDICATION: 0
NEUROLOGICAL NEGATIVE: 1
MUSCULOSKELETAL NEGATIVE: 1
COUGH: 0
NAUSEA: 0

## 2020-09-24 ASSESSMENT — FIBROSIS 4 INDEX: FIB4 SCORE: 1.15

## 2020-09-24 ASSESSMENT — GAIT ASSESSMENTS
DISTANCE (FEET): 75
GAIT LEVEL OF ASSIST: MODERATE ASSIST
ASSISTIVE DEVICE: FRONT WHEEL WALKER

## 2020-09-24 NOTE — PROGRESS NOTES
[facility] Medicine Daily Progress Note    Date of Service  9/24/2020    Chief Complaint  50 y.o. male with a pmhx of HTN, chronic pain and alcohol abuse who presented 9/9/2020 with altered mental status.     Hospital Course  50 y.o. male with a pmhx of HTN, chronic pain and alcohol abuse who presented 9/9/2020 with altered mental status, he was found obtunded in his house by his ex-wife who was checking on him as he had not been seen for 2 days or been able to get a hold of him.  He was found in a chair,  A&Ox0 and hypotensive.  He arrived in the ER with a GCS of 7 (E1,V1,M5) and severely hypotensive.  The patient was intubated for airway protection and central venous access was obtained for administration of high-volume crystalloid resuscitation and vasopressor therapy.    CT head and abdomen and pelvis were all unremarkable. He was briefly treated with antibiotics for concern for an underlying infection and a lumbar tap done was negative. Cultures remained negative.   EEG was done for concern for seizure-like activity was without any epileptiform activity.  He was extubated on 9/11 and has been able to protect his airway ever since. He is currently receiving treatment with benzos for concern for benzo withdrawals and IV thiamine for his history of alcohol abuse.    Interval Problem Update  9/19: Resting in bed comfortable.  Saturating well on room air.  Off restraints.  Sitter at bedside.  Disoriented to time.  Poor insight.  Easily distracted and gets easily confused.  Tangential speech.   9/20: Resting in bed comfortably. Still confused. Sitter at bedside. No acute distress noted. No issues over night per staff.   9/21: Resting in bed comfortable.  Agitated and confused occasionally.  Disoriented to place and time.  Requiring physical restraints.  No acute distress noted.  Dynamically stable.  No issues overnight per staff.    9/22 sedated    Mri negative for acute issues    Gallardo in place--> would leave in place  due to urinary retention    Restrained    9/23      Patient is impulsive and is pulling at his tubing so restraints are still needed    Patient appears a little bit sedated this morning    He could answer some simple questions and was oriented x3    Still intermittently confused    Gallardo in place    9/24    Confused    Oriented to self only today    Restrained    Gallardo for retention to stay      Consultants/Specialty  Intensivist    Code Status  Full Code    Disposition  Pending [facility]    Review of Systems  Review of Systems   Constitutional: Positive for malaise/fatigue.   HENT: Negative.    Eyes: Negative.    Respiratory: Negative for cough, hemoptysis and sputum production.    Cardiovascular: Negative for chest pain, palpitations, orthopnea and claudication.   Gastrointestinal: Negative for abdominal pain, heartburn, nausea and vomiting.   Genitourinary: Negative for dysuria, frequency and urgency.   Musculoskeletal: Negative.    Neurological: Negative.    Psychiatric/Behavioral: Negative.        Physical Exam  Temp:  [36.4 °C (97.5 °F)-36.6 °C (97.8 °F)] 36.6 °C (97.8 °F)  Pulse:  [] 102  Resp:  [17-18] 18  BP: (133-149)/(75-85) 140/85  SpO2:  [96 %-98 %] 96 %    Physical Exam  Vitals signs and nursing note reviewed.   Constitutional:       General: He is not in acute distress.  HENT:      Head: Normocephalic and atraumatic.      Right Ear: External ear normal.      Left Ear: External ear normal.      Nose: Nose normal.   Eyes:      Extraocular Movements: Extraocular movements intact.      Pupils: Pupils are equal, round, and reactive to light.   Neck:      Musculoskeletal: Normal range of motion and neck supple. No neck rigidity or muscular tenderness.   Cardiovascular:      Rate and Rhythm: Normal rate and regular rhythm.      Pulses: Normal pulses.   Pulmonary:      Effort: Pulmonary effort is normal.   Abdominal:      General: Bowel sounds are normal.      Palpations: Abdomen is soft.   Musculoskeletal:  Normal range of motion.         General: No tenderness.   Skin:     General: Skin is warm and dry.   Neurological:      General: No focal deficit present.      Mental Status: He is alert. He is disoriented.      Comments: Oriented to self    Psychiatric:         Cognition and Memory: Cognition is impaired. He exhibits impaired recent memory.         Judgment: Judgment is impulsive.      Comments: Confused          Fluids    Intake/Output Summary (Last 24 hours) at 9/24/2020 1135  Last data filed at 9/24/2020 1010  Gross per 24 hour   Intake 1150 ml   Output 1050 ml   Net 100 ml       Laboratory  Recent Labs     09/22/20 0927 09/23/20  0442 09/24/20  0111   WBC 8.0 7.7 8.5   RBC 3.33* 3.07* 3.02*   HEMOGLOBIN 10.5* 9.7* 9.4*   HEMATOCRIT 32.5* 29.8* 29.5*   MCV 97.6 97.1 97.7   MCH 31.5 31.6 31.1   MCHC 32.3* 32.6* 31.9*   RDW 52.0* 52.2* 51.8*   PLATELETCT 274 255 257   MPV 9.9 9.7 9.8     Recent Labs     09/22/20 0927 09/23/20  0442 09/24/20  0111   SODIUM 140 142 139   POTASSIUM 3.7 3.7 3.5*   CHLORIDE 107 109 105   CO2 21 22 21   GLUCOSE 95 99 97   BUN 5* 8 9   CREATININE 0.66 0.67 0.81   CALCIUM 9.7 9.6 9.3                   Imaging  MR-BRAIN-W/O   Final Result      1.  Moderate diffuse cerebral atrophy.      2.  Minimal periventricular and juxtacortical white matter changes consistent with chronic microvascular ischemic gliosis.      3.  No evidence of acute infarction in the brain parenchyma.      DX-ABDOMEN FOR TUBE PLACEMENT   Final Result      Feeding tube tip overlies the second portion of the duodenum.      DX-CHEST-PORTABLE (1 VIEW)   Final Result         1.  Patchy left lung base opacity, new since prior, could represent early infiltrate            DX-CHEST-PORTABLE (1 VIEW)   Final Result         1.  Patchy left lung base opacity, new since prior, could represent early infiltrate         [id number] VIEW   Final Result      Enteric tube projects over the distal stomach/pylorus.          EC-ECHOCARDIOGRAM COMPLETE W/O CONT   Final Result      DX-CHEST-PORTABLE (1 VIEW)   Final Result         1.  No acute cardiopulmonary disease.      CT-ABDOMEN-PELVIS W/O   Final Result      1.  No renal stone or hydronephrosis.   2.  Normal appendix.   3.  No focal mesenteric inflammatory process.      DX-ABDOMEN FOR TUBE PLACEMENT   Final Result      NG tube tip projects over expected location the gastric fundus.      US-ABDOMEN COMPLETE SURVEY   Final Result         1.  Echogenic liver compatible with fatty change versus fibrosis.   2.  Gallbladder sludge without additional sonographic findings of cholecystitis.   3.  Partial atrophic bilateral kidneys with lobulated contour         CT-HEAD W/O   Final Result         1.  No acute intracranial abnormality is identified, there are nonspecific white matter changes, commonly associated with small vessel ischemic disease.  Associated mild cerebral atrophy is noted.   2.  Atherosclerosis.      DX-CHEST-PORTABLE (1 VIEW)   Final Result         1.  No acute cardiopulmonary disease.           Assessment/Plan  * Toxic encephalopathy- (present on admission)  Assessment & Plan  No clear etiology. Patient was found down for unknown period.     LP- negative  EEG was negative for seizures  UDS + benzos  At this time there is more concerned that the medication we are giving him a causing his mental status to not clear    We have discontinued Seroquel    Change Xanax to as needed Ativan prn    Continue to monitor    Restrain as necessary    Mri brain negative       Urinary retention- (present on admission)  Assessment & Plan  Po flomax    Voiding trial in 5 to 7 days    Other dysphagia- (present on admission)  Assessment & Plan  Passed speech eval now  On modified diet    Hypernatremia  Assessment & Plan  resolved    Acute respiratory failure with hypoxia (HCC)- (present on admission)  Assessment & Plan  2/2 mental status  Extubated 9/11  Remains on room air    Benzodiazepine  dependence ([facility])- (present on admission)  Assessment & Plan  Monitor for seizure/withdrawal  Xanax 0.5 mg BID; taper off  EEG after sz-like activity 9/10 was negative despite jerking motions    Alcohol abuse- (present on admission)  Assessment & Plan  Reported hx of  Continue Vitamins  High dose thiamine  Seizure precautions  S/p etoh w/d protocol    Septic shock ([facility])- (present on admission)  Assessment & Plan  Resolved  Broad-spectrum antibiotics: s/p vanc/zosyn --> C3, now off abx  Blood culture, CSF culture, urinalysis allnegative  Monitor off antibiotics        JEWELS (acute kidney injury) ([facility])- (present on admission)  Assessment & Plan  Consistent with ATN 2/2 hypotension, dehydration and sepsis  Resolved  Avoid nephrotoxins.  Renal dose all medications.         VTE prophylaxis: Lovenox

## 2020-09-24 NOTE — THERAPY
Physical Therapy   Daily Treatment     Patient Name: Yury Blake  Age:  50 y.o., Sex:  male  Medical Record #: [id number]  Today's Date: 9/24/2020     Precautions: Fall Risk, Swallow Precautions ( See Comments)    Assessment    Pt with ongoing confusion and balance/coordination deficits. Pt needed mod A to ambulate x 75 feet with FWW, weaving and unstable, at risk to fall. PT to continue.     Plan    Continue current treatment plan.    DC Equipment Recommendations: Unable to determine at this time  Discharge Recommendations: Recommend post-acute placement for additional physical therapy services prior to discharge home           Objective       09/24/20 1550   Cognition    Cognition / Consciousness X   Orientation Level Not Oriented to Reason;Not Oriented to Place   Level of Consciousness Confused   Ability To Follow Commands 1 Step   Safety Awareness Impulsive;Impaired   New Learning Impaired   Attention Impaired   Sequencing Impaired   Comments telling stories completely out of context to situation.    Neurological Concerns   Neurological Concerns Yes   Comments cogition deficits and ongoing balance issues.    Gait Analysis   Gait Level Of Assist Moderate Assist   Assistive Device Front Wheel Walker   Distance (Feet) 75   Deviation   (LOB x 2 with mod assist for safety, weaving. )   Bed Mobility    Supine to Sit Supervised   Sit to Supine Minimal Assist  (multiple cues encourage pt to scoot to HOB before BTB)   Scooting Supervised   Functional Mobility   Sit to Stand Minimal Assist   Bed, Chair, Wheelchair Transfer Minimal Assist   Short Term Goals    Short Term Goal # 2 Patient will move sitting<>standing with supervision within 6tx in order to initiate gait and transfers   Goal Outcome # 2 Goal not met   Short Term Goal # 3 Patient will ambulate 150ft with supervision within 6tx in order to access environment   Goal Outcome # 3 Goal not met   Anticipated Discharge Equipment and Recommendations   DC  Equipment Recommendations Unable to determine at this time   Discharge Recommendations Recommend post-acute placement for additional physical therapy services prior to discharge home

## 2020-09-24 NOTE — THERAPY
Speech Language Pathology  Daily Treatment     Patient Name: Yury Blake  Age:  50 y.o., Sex:  male  Medical Record #: [id number]  Today's Date: 9/24/2020     Precautions  Precautions: (P) Fall Risk, Swallow Precautions ( See Comments)  Comments: (P) B wrist restraints and posey vest    Assessment    Pt seen on this date for a dysphagia therapy. Pt A&Ox1 to self only and confusion noted through out session. Bilateral wrist restraints were removed for session; RN aware. Pt consumed ~75% of regular/thin liquid breakfast with this clinician in the room and no overt s/sx of aspiration appreciated. Mastication timely and vocal quality clear following all trials. At this time, no further acute SLP services recommended for swallowing as pt is now tolerating a regular diet. May benefit from a cognitive-linguistic evaluation given confusion.     Plan    No further acute SLP services recommended for dysphagia, however, pt may benefit from a cognitive-linguistic evaluation given confusion    Continue current treatment plan.    Discharge Recommendations: (P) Anticipate that the patient will have no further speech therapy needs after discharge from the hospital       Objective       09/24/20 0945   Charge Group   SLP Swallowing Dysfunction Treatment Swallowing Dysfunction Treatment   Precautions   Precautions Fall Risk;Swallow Precautions ( See Comments)   Comments B wrist restraints and posey vest   Vitals   O2 (LPM) 0   O2 Delivery Device None - Room Air   Pain 0 - 10 Group   Therapist Pain Assessment Post Activity Pain Same as Prior to Activity;Nurse Notified;0   Cognitive-Linguistic   Level of Consciousness Confused   Orientation Level Not Oriented to Reason;Not Oriented to Place;Not Oriented to Year   Voice   Comments clear   Dysphagia    Dysphagia WDL   Other Treatments regular/thin liquid breakfast   Diet / Liquid Recommendation Regular (7);Thin (0)   Nutritional Liquid Intake Rating Scale Non thickened beverages    Nutritional Food Intake Rating Scale Total oral diet with multiple consistencies without special preparation but with specific food limitations   Skilled Intervention Compensatory Strategies;Verbal Cueing   Recommended Route of Medication Administration   Medication Administration  Whole with Liquid Wash   Short Term Goals   Short Term Goal # 2 NEW 9/21: Patient will consume a RG7/TN0 diet with no overt s/sx of aspiration given min cues to swallow strategies.    Goal Outcome # 2  Goal met

## 2020-09-24 NOTE — PROGRESS NOTES
Assumed care at 1900, report received from Day RN.  Pt A&O x 1 to self only, Denies pain. Pt continues to be in bilateral soft wrist restraints and vest. Bed alarm in use. Gallardo in place. Bed locked, 2 rails up, bed in lowest position. Call light in place, belongings at bedside, no needs at this time and hourly rounding in place.

## 2020-09-24 NOTE — PROGRESS NOTES
DAVID Kaufman called this RN to assist her with this patient. Patient was very agitated and forcefully pulled his arm back while DAVID Kaufman was try to tighten his wrist restraint and change the stat lock for his folley. This RN reoriented patient and reeducated him on the need for the wrist restraints. Patient verbalized understanding. Will continue to monitor.

## 2020-09-24 NOTE — CARE PLAN
Problem: Safety - Medical Restraint  Goal: Remains free of injury from restraints (Restraint for Interference with Medical Device)  Description: INTERVENTIONS:  1. Determine that other, less restrictive measures have been tried or would not be effective before applying the restraint  2. Evaluate the patient's condition at the time of restraint application  3. Inform patient/family regarding the reason for restraint  4. Q2H: Monitor safety, psychosocial status, comfort, nutrition and hydration  Outcome: PROGRESSING AS EXPECTED  Flowsheets (Taken 9/12/2020 1827 by Elidia Barajas R.N.)  Addressed this shift: Remains free of injury from restraints (restraint for interference with medical device): Every 2 hours: Monitor safety, psychosocial status, comfort, nutrition and hydration     Problem: Skin Integrity  Goal: Risk for impaired skin integrity will decrease  Outcome: PROGRESSING AS EXPECTED     Problem: Psychosocial Needs:  Goal: Level of anxiety will decrease  Outcome: PROGRESSING SLOWER THAN EXPECTED  Flowsheets (Taken 9/24/2020 0800)  Patient Behaviors: Confused

## 2020-09-24 NOTE — PROGRESS NOTES
"Report received from MUSTAPHA Whitfield at 0700. Assumed care, assessment complete. Pt is A & O x 1. He continues to pull at his wrist restraints and is trying to find a way to unhook them. He thinks he is \"stuck\" on something. Educated on the reason for being in wrist restraints. Patient verbalized understanding. Pt denies having any pain at this time. Fall precautions and appropriate signs in place. Pt oriented to unit routine, call light/phone system and CNA and RN extension number provided. Pt educated regarding fall precautions. Bed alarm in use and locked in lowest position. Pt denies any additional needs at this time. Call light within reach.   "

## 2020-09-24 NOTE — CARE PLAN
Problem: Safety  Goal: Will remain free from falls  Outcome: PROGRESSING AS EXPECTED  Pt continues to be in bilateral soft wrist restraints and vest. Bed alarm in use.      Problem: Knowledge Deficit  Goal: Knowledge of the prescribed therapeutic regimen will improve  Outcome: PROGRESSING SLOWER THAN EXPECTED   Reinforcement needed with education provided to patient regarding restraint necessity.

## 2020-09-25 LAB
ANION GAP SERPL CALC-SCNC: 17 MMOL/L (ref 7–16)
BASOPHILS # BLD AUTO: 1 % (ref 0–1.8)
BASOPHILS # BLD: 0.08 K/UL (ref 0–0.12)
BUN SERPL-MCNC: 9 MG/DL (ref 8–22)
CALCIUM SERPL-MCNC: 9.3 MG/DL (ref 8.5–10.5)
CHLORIDE SERPL-SCNC: 105 MMOL/L (ref 96–112)
CO2 SERPL-SCNC: 19 MMOL/L (ref 20–33)
CREAT SERPL-MCNC: 0.79 MG/DL (ref 0.5–1.4)
EOSINOPHIL # BLD AUTO: 0.2 K/UL (ref 0–0.51)
EOSINOPHIL NFR BLD: 2.6 % (ref 0–6.9)
ERYTHROCYTE [DISTWIDTH] IN BLOOD BY AUTOMATED COUNT: 49.9 FL (ref 35.9–50)
GLUCOSE SERPL-MCNC: 100 MG/DL (ref 65–99)
HCT VFR BLD AUTO: 29.7 % (ref 42–52)
HGB BLD-MCNC: 9.7 G/DL (ref 14–18)
IMM GRANULOCYTES # BLD AUTO: 0.03 K/UL (ref 0–0.11)
IMM GRANULOCYTES NFR BLD AUTO: 0.4 % (ref 0–0.9)
LYMPHOCYTES # BLD AUTO: 1.93 K/UL (ref 1–4.8)
LYMPHOCYTES NFR BLD: 25 % (ref 22–41)
MCH RBC QN AUTO: 31 PG (ref 27–33)
MCHC RBC AUTO-ENTMCNC: 32.7 G/DL (ref 33.7–35.3)
MCV RBC AUTO: 94.9 FL (ref 81.4–97.8)
MONOCYTES # BLD AUTO: 0.73 K/UL (ref 0–0.85)
MONOCYTES NFR BLD AUTO: 9.4 % (ref 0–13.4)
NEUTROPHILS # BLD AUTO: 4.76 K/UL (ref 1.82–7.42)
NEUTROPHILS NFR BLD: 61.6 % (ref 44–72)
NRBC # BLD AUTO: 0 K/UL
NRBC BLD-RTO: 0 /100 WBC
PLATELET # BLD AUTO: 268 K/UL (ref 164–446)
PMV BLD AUTO: 9.9 FL (ref 9–12.9)
POTASSIUM SERPL-SCNC: 3.2 MMOL/L (ref 3.6–5.5)
RBC # BLD AUTO: 3.13 M/UL (ref 4.7–6.1)
SODIUM SERPL-SCNC: 141 MMOL/L (ref 135–145)
WBC # BLD AUTO: 7.7 K/UL (ref 4.8–10.8)

## 2020-09-25 PROCEDURE — 700102 HCHG RX REV CODE 250 W/ 637 OVERRIDE(OP): Performed by: FAMILY MEDICINE

## 2020-09-25 PROCEDURE — 85025 COMPLETE CBC W/AUTO DIFF WBC: CPT

## 2020-09-25 PROCEDURE — 770006 HCHG ROOM/CARE - MED/SURG/GYN SEMI*

## 2020-09-25 PROCEDURE — A9270 NON-COVERED ITEM OR SERVICE: HCPCS | Performed by: FAMILY MEDICINE

## 2020-09-25 PROCEDURE — 700111 HCHG RX REV CODE 636 W/ 250 OVERRIDE (IP): Performed by: STUDENT IN AN ORGANIZED HEALTH CARE EDUCATION/TRAINING PROGRAM

## 2020-09-25 PROCEDURE — 80048 BASIC METABOLIC PNL TOTAL CA: CPT

## 2020-09-25 PROCEDURE — 36415 COLL VENOUS BLD VENIPUNCTURE: CPT

## 2020-09-25 PROCEDURE — A9270 NON-COVERED ITEM OR SERVICE: HCPCS | Performed by: HOSPITALIST

## 2020-09-25 PROCEDURE — 700102 HCHG RX REV CODE 250 W/ 637 OVERRIDE(OP): Performed by: STUDENT IN AN ORGANIZED HEALTH CARE EDUCATION/TRAINING PROGRAM

## 2020-09-25 PROCEDURE — 99232 SBSQ HOSP IP/OBS MODERATE 35: CPT | Performed by: HOSPITALIST

## 2020-09-25 PROCEDURE — 700102 HCHG RX REV CODE 250 W/ 637 OVERRIDE(OP): Performed by: HOSPITALIST

## 2020-09-25 PROCEDURE — A9270 NON-COVERED ITEM OR SERVICE: HCPCS | Performed by: STUDENT IN AN ORGANIZED HEALTH CARE EDUCATION/TRAINING PROGRAM

## 2020-09-25 RX ORDER — POTASSIUM CHLORIDE 20 MEQ/1
40 TABLET, EXTENDED RELEASE ORAL ONCE
Status: COMPLETED | OUTPATIENT
Start: 2020-09-25 | End: 2020-09-25

## 2020-09-25 RX ORDER — CHLORDIAZEPOXIDE HYDROCHLORIDE 25 MG/1
25 CAPSULE, GELATIN COATED ORAL EVERY 12 HOURS
Status: DISCONTINUED | OUTPATIENT
Start: 2020-09-25 | End: 2020-09-28

## 2020-09-25 RX ORDER — RISPERIDONE 0.5 MG/1
0.5 TABLET ORAL EVERY EVENING
Status: DISCONTINUED | OUTPATIENT
Start: 2020-09-25 | End: 2020-09-27

## 2020-09-25 RX ADMIN — DOCUSATE SODIUM 50 MG AND SENNOSIDES 8.6 MG 2 TABLET: 8.6; 5 TABLET, FILM COATED ORAL at 04:32

## 2020-09-25 RX ADMIN — TAMSULOSIN HYDROCHLORIDE 0.8 MG: 0.4 CAPSULE ORAL at 08:24

## 2020-09-25 RX ADMIN — DOCUSATE SODIUM 50 MG AND SENNOSIDES 8.6 MG 2 TABLET: 8.6; 5 TABLET, FILM COATED ORAL at 17:48

## 2020-09-25 RX ADMIN — CHLORDIAZEPOXIDE HYDROCHLORIDE 25 MG: 25 CAPSULE ORAL at 14:04

## 2020-09-25 RX ADMIN — ENOXAPARIN SODIUM 40 MG: 40 INJECTION SUBCUTANEOUS at 17:48

## 2020-09-25 RX ADMIN — Medication 100 MG: at 04:33

## 2020-09-25 RX ADMIN — POTASSIUM CHLORIDE 40 MEQ: 1500 TABLET, EXTENDED RELEASE ORAL at 09:31

## 2020-09-25 RX ADMIN — RISPERIDONE 0.5 MG: 0.5 TABLET ORAL at 17:48

## 2020-09-25 ASSESSMENT — ENCOUNTER SYMPTOMS
CLAUDICATION: 0
COUGH: 0
PALPITATIONS: 0
ORTHOPNEA: 0
EYES NEGATIVE: 1
HALLUCINATIONS: 1
NAUSEA: 0
MUSCULOSKELETAL NEGATIVE: 1
HEMOPTYSIS: 0
HEARTBURN: 0
NEUROLOGICAL NEGATIVE: 1
ABDOMINAL PAIN: 0
VOMITING: 0
SPUTUM PRODUCTION: 0

## 2020-09-25 NOTE — PROGRESS NOTES
Received change of shift report from day-shift RN and assumed care of pt at 1900. Assessment performed. Pt is A&Ox0. Pt on andrew soft wrist restraints, vest, and 4 rails. Pt continues to pull at Gallardo, stat lock replaced. Underwear placed to help prevent pt from pulling Gallardo. Scant blood residues from penis due to pt pulling on Gallardo. Attempted to feed pt dinner. Pt spitting food out. Pt call light within reach, personal possessions nearby, bed in low position and locked, hourly rounding in practice, and non-skid socks in place.

## 2020-09-25 NOTE — CARE PLAN
Problem: Knowledge Deficit  Goal: Knowledge of disease process/condition, treatment plan, diagnostic tests, and medications will improve  9/24/2020 2016 by IKE MoralesN.  Outcome: NOT MET  Intervention: Assess knowledge level of disease process/condition, treatment plan, diagnostic tests, and medications  Note: Pt very confused. Pt A&Ox0.      Problem: Psychosocial Needs:  Goal: Level of anxiety will decrease  9/24/2020 2017 by Jayna Freeman R.N.  Outcome: NOT MET  Intervention: Identify and develop with patient strategies to cope with anxiety triggers  Note: Pt is confused and agitated. Pt attempting to get out of restraints and pulling at Gallardo cath.      Problem: Mobility  Goal: Risk for activity intolerance will decrease  9/24/2020 2016 by IKE MoralesN.  Outcome: PROGRESSING SLOWER THAN EXPECTED  Intervention: Assess and monitor signs of activity intolerance  Note: Pt turns side to side in bed. Pt found at edge of bed multiple times.

## 2020-09-25 NOTE — PROGRESS NOTES
Report received from MUSTAPHA Freeman at 0700. Assumed care, assessment complete. Pt is A & O x 1.  Pt denies having any pain at this time. Fall precautions and appropriate signs in place. Pt oriented to unit routine, call light/phone system and CNA and RN extension number provided. Pt educated regarding fall precautions. Bed alarm in use and locked in lowest position. Pt denies any additional needs at this time. Call light within reach.

## 2020-09-25 NOTE — PROGRESS NOTES
Pt confused. Pt continues to remove wrist restraints and attempting to get OOB. Pt kicking bed rails and staff. Pt verbally abusive to staff.

## 2020-09-25 NOTE — CONSULTS
"BRIEF PSYCHIATRIC CONSULT NOTE: not seen. Consult is for delusions and hallucinations. Chart reviewed. No documented psych hx of file. His orientation fluctuates between \"0\" and \"1\", he gets agitated, waxes and wanes in behavior and  is \"confused\" . He has a hx of alcoholism as well as vascular insults in brain. Pt appears encephalopathic of which delusions and hallucinations can be a part. Baseline unknown: he may have cognitive deficits. Nevertheless, at this time, can try using low dose haldol 0.5 mg bid for psychosis (can be increased if tolerated), or seroquel 25 mg tid which can also be increased. If psychosis persists after he has stabilized medically and/or if there are other issues,  please reconsult.  "

## 2020-09-25 NOTE — PROGRESS NOTES
[facility] Medicine Daily Progress Note    Date of Service  9/25/2020    Chief Complaint  50 y.o. male with a pmhx of HTN, chronic pain and alcohol abuse who presented 9/9/2020 with altered mental status.     Hospital Course  50 y.o. male with a pmhx of HTN, chronic pain and alcohol abuse who presented 9/9/2020 with altered mental status, he was found obtunded in his house by his ex-wife who was checking on him as he had not been seen for 2 days or been able to get a hold of him.  He was found in a chair,  A&Ox0 and hypotensive.  He arrived in the ER with a GCS of 7 (E1,V1,M5) and severely hypotensive.  The patient was intubated for airway protection and central venous access was obtained for administration of high-volume crystalloid resuscitation and vasopressor therapy.    CT head and abdomen and pelvis were all unremarkable. He was briefly treated with antibiotics for concern for an underlying infection and a lumbar tap done was negative. Cultures remained negative.   EEG was done for concern for seizure-like activity was without any epileptiform activity.  He was extubated on 9/11 and has been able to protect his airway ever since. He is currently receiving treatment with benzos for concern for benzo withdrawals and IV thiamine for his history of alcohol abuse.    Interval Problem Update  9/19: Resting in bed comfortable.  Saturating well on room air.  Off restraints.  Sitter at bedside.  Disoriented to time.  Poor insight.  Easily distracted and gets easily confused.  Tangential speech.   9/20: Resting in bed comfortably. Still confused. Sitter at bedside. No acute distress noted. No issues over night per staff.   9/21: Resting in bed comfortable.  Agitated and confused occasionally.  Disoriented to place and time.  Requiring physical restraints.  No acute distress noted.  Dynamically stable.  No issues overnight per staff.    9/22 sedated    Mri negative for acute issues    Gallardo in place--> would leave in place  due to urinary retention    Restrained    9/23      Patient is impulsive and is pulling at his tubing so restraints are still needed    Patient appears a little bit sedated this morning    He could answer some simple questions and was oriented x3    Still intermittently confused    Gallardo in place    9/24    Confused    Oriented to self only today    Restrained    Gallardo for retention to stay    9/25     Patient is still confused    Patient is delusional    Periods  Of agitation yesterday    Restraints still required    Oriented to self only.    he thought that he was in his friends [place]'s house and that the year is 1925    Psych md consulted today    Consultants/Specialty  Intensivist    Code Status  Full Code    Disposition  Pending [facility]    Review of Systems  Review of Systems   Constitutional: Positive for malaise/fatigue.   HENT: Negative.    Eyes: Negative.    Respiratory: Negative for cough, hemoptysis and sputum production.    Cardiovascular: Negative for chest pain, palpitations, orthopnea and claudication.   Gastrointestinal: Negative for abdominal pain, heartburn, nausea and vomiting.   Genitourinary: Negative for dysuria, frequency and urgency.   Musculoskeletal: Negative.    Neurological: Negative.    Psychiatric/Behavioral: Positive for hallucinations.       Physical Exam  Temp:  [36.1 °C (97 °F)-37.3 °C (99.1 °F)] 37.3 °C (99.1 °F)  Pulse:  [] 103  Resp:  [17-19] 18  BP: (121-135)/(70-90) 130/79  SpO2:  [92 %-97 %] 92 %    Physical Exam  Vitals signs and nursing note reviewed.   Constitutional:       General: He is not in acute distress.  HENT:      Head: Normocephalic and atraumatic.      Right Ear: External ear normal.      Left Ear: External ear normal.      Nose: Nose normal.   Eyes:      Extraocular Movements: Extraocular movements intact.      Pupils: Pupils are equal, round, and reactive to light.   Neck:      Musculoskeletal: Normal range of motion and neck supple. No neck rigidity or  muscular tenderness.   Cardiovascular:      Rate and Rhythm: Normal rate and regular rhythm.      Pulses: Normal pulses.   Pulmonary:      Effort: Pulmonary effort is normal.   Abdominal:      General: Bowel sounds are normal.      Palpations: Abdomen is soft.   Musculoskeletal: Normal range of motion.         General: No tenderness.   Skin:     General: Skin is warm and dry.   Neurological:      General: No focal deficit present.      Mental Status: He is alert. He is disoriented.      Comments: Oriented to self    Psychiatric:         Mood and Affect: Affect is flat.         Behavior: Behavior is combative.         Thought Content: Thought content is delusional.         Cognition and Memory: Cognition is impaired. He exhibits impaired recent memory.         Judgment: Judgment is impulsive.      Comments: Confused          Fluids    Intake/Output Summary (Last 24 hours) at 9/25/2020 0837  Last data filed at 9/25/2020 0435  Gross per 24 hour   Intake 598 ml   Output 600 ml   Net -2 ml       Laboratory  Recent Labs     09/23/20  0442 09/24/20  0111 09/25/20  0505   WBC 7.7 8.5 7.7   RBC 3.07* 3.02* 3.13*   HEMOGLOBIN 9.7* 9.4* 9.7*   HEMATOCRIT 29.8* 29.5* 29.7*   MCV 97.1 97.7 94.9   MCH 31.6 31.1 31.0   MCHC 32.6* 31.9* 32.7*   RDW 52.2* 51.8* 49.9   PLATELETCT 255 257 268   MPV 9.7 9.8 9.9     Recent Labs     09/23/20  0442 09/24/20  0111 09/25/20  0505   SODIUM 142 139 141   POTASSIUM 3.7 3.5* 3.2*   CHLORIDE 109 105 105   CO2 22 21 19*   GLUCOSE 99 97 100*   BUN 8 9 9   CREATININE 0.67 0.81 0.79   CALCIUM 9.6 9.3 9.3                   Imaging  MR-BRAIN-W/O   Final Result      1.  Moderate diffuse cerebral atrophy.      2.  Minimal periventricular and juxtacortical white matter changes consistent with chronic microvascular ischemic gliosis.      3.  No evidence of acute infarction in the brain parenchyma.      DX-ABDOMEN FOR TUBE PLACEMENT   Final Result      Feeding tube tip overlies the second portion of the  duodenum.      DX-CHEST-PORTABLE (1 VIEW)   Final Result         1.  Patchy left lung base opacity, new since prior, could represent early infiltrate            DX-CHEST-PORTABLE (1 VIEW)   Final Result         1.  Patchy left lung base opacity, new since prior, could represent early infiltrate         [id number] VIEW   Final Result      Enteric tube projects over the distal stomach/pylorus.         EC-ECHOCARDIOGRAM COMPLETE W/O CONT   Final Result      DX-CHEST-PORTABLE (1 VIEW)   Final Result         1.  No acute cardiopulmonary disease.      CT-ABDOMEN-PELVIS W/O   Final Result      1.  No renal stone or hydronephrosis.   2.  Normal appendix.   3.  No focal mesenteric inflammatory process.      DX-ABDOMEN FOR TUBE PLACEMENT   Final Result      NG tube tip projects over expected location the gastric fundus.      US-ABDOMEN COMPLETE SURVEY   Final Result         1.  Echogenic liver compatible with fatty change versus fibrosis.   2.  Gallbladder sludge without additional sonographic findings of cholecystitis.   3.  Partial atrophic bilateral kidneys with lobulated contour         CT-HEAD W/O   Final Result         1.  No acute intracranial abnormality is identified, there are nonspecific white matter changes, commonly associated with small vessel ischemic disease.  Associated mild cerebral atrophy is noted.   2.  Atherosclerosis.      DX-CHEST-PORTABLE (1 VIEW)   Final Result         1.  No acute cardiopulmonary disease.           Assessment/Plan  * Toxic encephalopathy- (present on admission)  Assessment & Plan  No clear etiology. Patient was found down for unknown period.     LP- negative  EEG was negative for seizures  UDS + benzos  At this time there is more concerned that the medication we are giving him a causing his mental status to not clear    Prn haldol    Change Xanax to as needed Ativan prn    Psych md consult    Continue to monitor    Restrain as necessary    Mri brain negative       Urinary  retention- (present on admission)  Assessment & Plan  Po flomax    Voiding trial in 5 to 7 days    Other dysphagia- (present on admission)  Assessment & Plan  Passed speech eval now  On modified diet    Hypernatremia  Assessment & Plan  resolved    Acute respiratory failure with hypoxia (HCC)- (present on admission)  Assessment & Plan  2/2 mental status  Extubated 9/11  Remains on room air    Benzodiazepine dependence (HCC)- (present on admission)  Assessment & Plan  Monitor for seizure/withdrawal  Ativan prn  EEG after sz-like activity 9/10 was negative despite jerking motions    Alcohol abuse- (present on admission)  Assessment & Plan  Reported hx of  Continue Vitamins  High dose thiamine  Seizure precautions  S/p etoh w/d protocol    Septic shock ([facility])- (present on admission)  Assessment & Plan  Resolved  Broad-spectrum antibiotics: s/p vanc/zosyn --> C3, now off abx  Blood culture, CSF culture, urinalysis allnegative  Monitor off antibiotics        JEWELS (acute kidney injury) ([facility])- (present on admission)  Assessment & Plan  Consistent with ATN 2/2 hypotension, dehydration and sepsis  Resolved  Avoid nephrotoxins.  Renal dose all medications.         VTE prophylaxis: Lovenox

## 2020-09-25 NOTE — CARE PLAN
Problem: Safety - Medical Restraint  Goal: Remains free of injury from restraints (Restraint for Interference with Medical Device)  Description: INTERVENTIONS:  1. Determine that other, less restrictive measures have been tried or would not be effective before applying the restraint  2. Evaluate the patient's condition at the time of restraint application  3. Inform patient/family regarding the reason for restraint  4. Q2H: Monitor safety, psychosocial status, comfort, nutrition and hydration  Outcome: PROGRESSING AS EXPECTED  Flowsheets (Taken 9/12/2020 4774 by Elidia Barajas RSANDRA)  Addressed this shift: Remains free of injury from restraints (restraint for interference with medical device): Every 2 hours: Monitor safety, psychosocial status, comfort, nutrition and hydration     Problem: Skin Integrity  Goal: Risk for impaired skin integrity will decrease  Outcome: PROGRESSING AS EXPECTED     Problem: Knowledge Deficit  Goal: Knowledge of disease process/condition, treatment plan, diagnostic tests, and medications will improve  Outcome: PROGRESSING SLOWER THAN EXPECTED

## 2020-09-26 LAB
ANION GAP SERPL CALC-SCNC: 15 MMOL/L (ref 7–16)
BASOPHILS # BLD AUTO: 0.9 % (ref 0–1.8)
BASOPHILS # BLD: 0.07 K/UL (ref 0–0.12)
BUN SERPL-MCNC: 10 MG/DL (ref 8–22)
CALCIUM SERPL-MCNC: 9.5 MG/DL (ref 8.5–10.5)
CHLORIDE SERPL-SCNC: 106 MMOL/L (ref 96–112)
CO2 SERPL-SCNC: 19 MMOL/L (ref 20–33)
CREAT SERPL-MCNC: 0.87 MG/DL (ref 0.5–1.4)
EOSINOPHIL # BLD AUTO: 0.24 K/UL (ref 0–0.51)
EOSINOPHIL NFR BLD: 3.2 % (ref 0–6.9)
ERYTHROCYTE [DISTWIDTH] IN BLOOD BY AUTOMATED COUNT: 49.1 FL (ref 35.9–50)
GLUCOSE SERPL-MCNC: 109 MG/DL (ref 65–99)
HCT VFR BLD AUTO: 31 % (ref 42–52)
HGB BLD-MCNC: 10.3 G/DL (ref 14–18)
IMM GRANULOCYTES # BLD AUTO: 0.04 K/UL (ref 0–0.11)
IMM GRANULOCYTES NFR BLD AUTO: 0.5 % (ref 0–0.9)
LYMPHOCYTES # BLD AUTO: 2.27 K/UL (ref 1–4.8)
LYMPHOCYTES NFR BLD: 29.8 % (ref 22–41)
MCH RBC QN AUTO: 31.3 PG (ref 27–33)
MCHC RBC AUTO-ENTMCNC: 33.2 G/DL (ref 33.7–35.3)
MCV RBC AUTO: 94.2 FL (ref 81.4–97.8)
MONOCYTES # BLD AUTO: 0.61 K/UL (ref 0–0.85)
MONOCYTES NFR BLD AUTO: 8 % (ref 0–13.4)
NEUTROPHILS # BLD AUTO: 4.38 K/UL (ref 1.82–7.42)
NEUTROPHILS NFR BLD: 57.6 % (ref 44–72)
NRBC # BLD AUTO: 0 K/UL
NRBC BLD-RTO: 0 /100 WBC
PLATELET # BLD AUTO: 274 K/UL (ref 164–446)
PMV BLD AUTO: 9.3 FL (ref 9–12.9)
POTASSIUM SERPL-SCNC: 3.4 MMOL/L (ref 3.6–5.5)
RBC # BLD AUTO: 3.29 M/UL (ref 4.7–6.1)
SODIUM SERPL-SCNC: 140 MMOL/L (ref 135–145)
WBC # BLD AUTO: 7.6 K/UL (ref 4.8–10.8)

## 2020-09-26 PROCEDURE — 85025 COMPLETE CBC W/AUTO DIFF WBC: CPT

## 2020-09-26 PROCEDURE — 99232 SBSQ HOSP IP/OBS MODERATE 35: CPT | Performed by: HOSPITALIST

## 2020-09-26 PROCEDURE — 700111 HCHG RX REV CODE 636 W/ 250 OVERRIDE (IP): Performed by: STUDENT IN AN ORGANIZED HEALTH CARE EDUCATION/TRAINING PROGRAM

## 2020-09-26 PROCEDURE — 700111 HCHG RX REV CODE 636 W/ 250 OVERRIDE (IP): Performed by: INTERNAL MEDICINE

## 2020-09-26 PROCEDURE — 770006 HCHG ROOM/CARE - MED/SURG/GYN SEMI*

## 2020-09-26 PROCEDURE — A9270 NON-COVERED ITEM OR SERVICE: HCPCS | Performed by: FAMILY MEDICINE

## 2020-09-26 PROCEDURE — 700102 HCHG RX REV CODE 250 W/ 637 OVERRIDE(OP): Performed by: STUDENT IN AN ORGANIZED HEALTH CARE EDUCATION/TRAINING PROGRAM

## 2020-09-26 PROCEDURE — 80048 BASIC METABOLIC PNL TOTAL CA: CPT

## 2020-09-26 PROCEDURE — 700102 HCHG RX REV CODE 250 W/ 637 OVERRIDE(OP): Performed by: FAMILY MEDICINE

## 2020-09-26 PROCEDURE — 700102 HCHG RX REV CODE 250 W/ 637 OVERRIDE(OP): Performed by: HOSPITALIST

## 2020-09-26 PROCEDURE — A9270 NON-COVERED ITEM OR SERVICE: HCPCS | Performed by: STUDENT IN AN ORGANIZED HEALTH CARE EDUCATION/TRAINING PROGRAM

## 2020-09-26 PROCEDURE — 36415 COLL VENOUS BLD VENIPUNCTURE: CPT

## 2020-09-26 PROCEDURE — A9270 NON-COVERED ITEM OR SERVICE: HCPCS | Performed by: HOSPITALIST

## 2020-09-26 RX ADMIN — CHLORDIAZEPOXIDE HYDROCHLORIDE 25 MG: 25 CAPSULE ORAL at 17:47

## 2020-09-26 RX ADMIN — CHLORDIAZEPOXIDE HYDROCHLORIDE 25 MG: 25 CAPSULE ORAL at 05:56

## 2020-09-26 RX ADMIN — Medication 100 MG: at 05:56

## 2020-09-26 RX ADMIN — DOCUSATE SODIUM 50 MG AND SENNOSIDES 8.6 MG 2 TABLET: 8.6; 5 TABLET, FILM COATED ORAL at 05:56

## 2020-09-26 RX ADMIN — ENOXAPARIN SODIUM 40 MG: 40 INJECTION SUBCUTANEOUS at 17:47

## 2020-09-26 RX ADMIN — TAMSULOSIN HYDROCHLORIDE 0.8 MG: 0.4 CAPSULE ORAL at 09:45

## 2020-09-26 RX ADMIN — DOCUSATE SODIUM 50 MG AND SENNOSIDES 8.6 MG 2 TABLET: 8.6; 5 TABLET, FILM COATED ORAL at 17:47

## 2020-09-26 RX ADMIN — RISPERIDONE 0.5 MG: 0.5 TABLET ORAL at 17:47

## 2020-09-26 RX ADMIN — HALOPERIDOL LACTATE 2.5 MG: 5 INJECTION, SOLUTION INTRAMUSCULAR at 21:58

## 2020-09-26 ASSESSMENT — ENCOUNTER SYMPTOMS
HALLUCINATIONS: 1
COUGH: 0
HEARTBURN: 0
ORTHOPNEA: 0
CLAUDICATION: 0
NAUSEA: 0
EYES NEGATIVE: 1
MUSCULOSKELETAL NEGATIVE: 1
PALPITATIONS: 0
VOMITING: 0
HEMOPTYSIS: 0
SPUTUM PRODUCTION: 0
NEUROLOGICAL NEGATIVE: 1
ABDOMINAL PAIN: 0

## 2020-09-26 ASSESSMENT — FIBROSIS 4 INDEX: FIB4 SCORE: 1.08

## 2020-09-26 NOTE — CARE PLAN
Problem: Communication  Goal: The ability to communicate needs accurately and effectively will improve  Outcome: PROGRESSING AS EXPECTED  Intervention: [place] patient and significant other/support system to call light to alert staff of needs  Note: Pt educated about use of call light. Pt confused and disoriented. Does not understand teaching. Pt increasingly able to communicate needs. Pt states no needs at this time.       Problem: Safety  Goal: Will remain free from falls  Outcome: PROGRESSING AS EXPECTED  Note: Pt assessed for risk of falls. Pt high fall risk. Pt educated on used of call light. Pt confused and disoriented, does not understand teaching. Fall precautions in place. Bed in lowest and locked position. Bed alarm on. Treaded slipper socks on pt. Bedside table, pt belongings, and call light in reach. Pt in restraints for safety. All four side rails up, vest restraint on, and soft bilateral wrist restraints in place.

## 2020-09-26 NOTE — CARE PLAN
Problem: Safety  Goal: Will remain free from falls  Outcome: PROGRESSING AS EXPECTED     Problem: Knowledge Deficit  Goal: Knowledge of disease process/condition, treatment plan, diagnostic tests, and medications will improve  Outcome: PROGRESSING SLOWER THAN EXPECTED     Problem: Discharge Barriers/Planning  Goal: Patient's continuum of care needs will be met  Outcome: PROGRESSING SLOWER THAN EXPECTED

## 2020-09-26 NOTE — PROGRESS NOTES
[facility] Medicine Daily Progress Note    Date of Service  9/26/2020    Chief Complaint  50 y.o. male with a pmhx of HTN, chronic pain and alcohol abuse who presented 9/9/2020 with altered mental status.     Hospital Course  50 y.o. male with a pmhx of HTN, chronic pain and alcohol abuse who presented 9/9/2020 with altered mental status, he was found obtunded in his house by his ex-wife who was checking on him as he had not been seen for 2 days or been able to get a hold of him.  He was found in a chair,  A&Ox0 and hypotensive.  He arrived in the ER with a GCS of 7 (E1,V1,M5) and severely hypotensive.  The patient was intubated for airway protection and central venous access was obtained for administration of high-volume crystalloid resuscitation and vasopressor therapy.    CT head and abdomen and pelvis were all unremarkable. He was briefly treated with antibiotics for concern for an underlying infection and a lumbar tap done was negative. Cultures remained negative.   EEG was done for concern for seizure-like activity was without any epileptiform activity.  He was extubated on 9/11 and has been able to protect his airway ever since. He is currently receiving treatment with benzos for concern for benzo withdrawals and IV thiamine for his history of alcohol abuse.    Interval Problem Update  9/19: Resting in bed comfortable.  Saturating well on room air.  Off restraints.  Sitter at bedside.  Disoriented to time.  Poor insight.  Easily distracted and gets easily confused.  Tangential speech.   9/20: Resting in bed comfortably. Still confused. Sitter at bedside. No acute distress noted. No issues over night per staff.   9/21: Resting in bed comfortable.  Agitated and confused occasionally.  Disoriented to place and time.  Requiring physical restraints.  No acute distress noted.  Dynamically stable.  No issues overnight per staff.    9/22 sedated    Mri negative for acute issues    Gallardo in place--> would leave in place  "due to urinary retention    Restrained    9/23      Patient is impulsive and is pulling at his tubing so restraints are still needed    Patient appears a little bit sedated this morning    He could answer some simple questions and was oriented x3    Still intermittently confused    Gallardo in place    9/24    Confused    Oriented to self only today    Restrained    Gallardo for retention to stay    9/25     Patient is still confused    Patient is delusional    Periods  Of agitation yesterday    Restraints still required    Oriented to self only.    he thought that he was in his friends Huber's house and that the year is 1925    Psych md consulted today    9/26     He tells me \"I feel great\"    Patient is awake he is restrained    He thinks he is at his friend Deonte's house however when I tell him that I am a doctor and he states he must be in the hospital    Despite daily reorientation he still is unsure about the year he believes now it is 2003    Consultants/Specialty  Intensivist    Code Status  Full Code    Disposition  Pending [facility]    Review of Systems  Review of Systems   Constitutional: Positive for malaise/fatigue.   HENT: Negative.    Eyes: Negative.    Respiratory: Negative for cough, hemoptysis and sputum production.    Cardiovascular: Negative for chest pain, palpitations, orthopnea and claudication.   Gastrointestinal: Negative for abdominal pain, heartburn, nausea and vomiting.   Genitourinary: Negative for dysuria, frequency and urgency.   Musculoskeletal: Negative.    Neurological: Negative.    Psychiatric/Behavioral: Positive for hallucinations.       Physical Exam  Temp:  [36.3 °C (97.3 °F)-36.9 °C (98.5 °F)] 36.9 °C (98.5 °F)  Pulse:  [100-130] 100  Resp:  [18] 18  BP: (135-145)/(76-94) 142/85  SpO2:  [93 %-97 %] 97 %    Physical Exam  Vitals signs and nursing note reviewed.   Constitutional:       General: He is not in acute distress.  HENT:      Head: Normocephalic and atraumatic.      Right Ear: " External ear normal.      Left Ear: External ear normal.      Nose: Nose normal.   Eyes:      Extraocular Movements: Extraocular movements intact.      Pupils: Pupils are equal, round, and reactive to light.   Neck:      Musculoskeletal: Normal range of motion and neck supple. No neck rigidity or muscular tenderness.   Cardiovascular:      Rate and Rhythm: Normal rate and regular rhythm.      Pulses: Normal pulses.   Pulmonary:      Effort: Pulmonary effort is normal.   Abdominal:      General: Bowel sounds are normal.      Palpations: Abdomen is soft.   Musculoskeletal: Normal range of motion.         General: No tenderness.   Skin:     General: Skin is warm and dry.   Neurological:      General: No focal deficit present.      Mental Status: He is alert. He is disoriented.      Comments: Oriented to self    Psychiatric:         Mood and Affect: Affect is flat.         Behavior: Behavior is combative.         Thought Content: Thought content is delusional.         Cognition and Memory: Cognition is impaired. He exhibits impaired recent memory.         Judgment: Judgment is impulsive.      Comments: Confused          Fluids    Intake/Output Summary (Last 24 hours) at 9/26/2020 0920  Last data filed at 9/26/2020 0913  Gross per 24 hour   Intake 840 ml   Output 640 ml   Net 200 ml       Laboratory  Recent Labs     09/24/20  0111 09/25/20  0505 09/26/20  0140   WBC 8.5 7.7 7.6   RBC 3.02* 3.13* 3.29*   HEMOGLOBIN 9.4* 9.7* 10.3*   HEMATOCRIT 29.5* 29.7* 31.0*   MCV 97.7 94.9 94.2   MCH 31.1 31.0 31.3   MCHC 31.9* 32.7* 33.2*   RDW 51.8* 49.9 49.1   PLATELETCT 257 268 274   MPV 9.8 9.9 9.3     Recent Labs     09/24/20  0111 09/25/20  0505 09/26/20  0140   SODIUM 139 141 140   POTASSIUM 3.5* 3.2* 3.4*   CHLORIDE 105 105 106   CO2 21 19* 19*   GLUCOSE 97 100* 109*   BUN 9 9 10   CREATININE 0.81 0.79 0.87   CALCIUM 9.3 9.3 9.5                   Imaging  MR-BRAIN-W/O   Final Result      1.  Moderate diffuse cerebral atrophy.       2.  Minimal periventricular and juxtacortical white matter changes consistent with chronic microvascular ischemic gliosis.      3.  No evidence of acute infarction in the brain parenchyma.      DX-ABDOMEN FOR TUBE PLACEMENT   Final Result      Feeding tube tip overlies the second portion of the duodenum.      DX-CHEST-PORTABLE (1 VIEW)   Final Result         1.  Patchy left lung base opacity, new since prior, could represent early infiltrate            DX-CHEST-PORTABLE (1 VIEW)   Final Result         1.  Patchy left lung base opacity, new since prior, could represent early infiltrate         [id number]-[id number]-6 VIEW   Final Result      Enteric tube projects over the distal stomach/pylorus.         EC-ECHOCARDIOGRAM COMPLETE W/O CONT   Final Result      DX-CHEST-PORTABLE (1 VIEW)   Final Result         1.  No acute cardiopulmonary disease.      CT-ABDOMEN-PELVIS W/O   Final Result      1.  No renal stone or hydronephrosis.   2.  Normal appendix.   3.  No focal mesenteric inflammatory process.      DX-ABDOMEN FOR TUBE PLACEMENT   Final Result      NG tube tip projects over expected location the gastric fundus.      US-ABDOMEN COMPLETE SURVEY   Final Result         1.  Echogenic liver compatible with fatty change versus fibrosis.   2.  Gallbladder sludge without additional sonographic findings of cholecystitis.   3.  Partial atrophic bilateral kidneys with lobulated contour         CT-HEAD W/O   Final Result         1.  No acute intracranial abnormality is identified, there are nonspecific white matter changes, commonly associated with small vessel ischemic disease.  Associated mild cerebral atrophy is noted.   2.  Atherosclerosis.      DX-CHEST-PORTABLE (1 VIEW)   Final Result         1.  No acute cardiopulmonary disease.           Assessment/Plan  * Toxic encephalopathy- (present on admission)  Assessment & Plan  No clear etiology. Patient was found down for unknown period.     LP- negative  EEG was negative for  seizures    Prn haldol    Po librium  Po risperdal    Psych md consult--> they believe it is etoh related    Continue to monitor    Restrain as necessary    Mri brain negative       Urinary retention- (present on admission)  Assessment & Plan  Po flomax    Voiding trial in 5 to 7 days    Other dysphagia- (present on admission)  Assessment & Plan  Passed speech eval now  On modified diet    Hypernatremia  Assessment & Plan  resolved    Acute respiratory failure with hypoxia ([facility])- (present on admission)  Assessment & Plan  2/2 mental status  Extubated 9/11  Remains on room air    Benzodiazepine dependence ([facility])- (present on admission)  Assessment & Plan  Monitor for seizure/withdrawal  Po librium  EEG after sz-like activity 9/10 was negative despite jerking motions    Alcohol abuse- (present on admission)  Assessment & Plan  Reported hx of  Continue Vitamins  High dose thiamine  Seizure precautions  S/p etoh w/d protocol    Septic shock ([facility])- (present on admission)  Assessment & Plan  Resolved  Broad-spectrum antibiotics: s/p vanc/zosyn --> C3, now off abx  Blood culture, CSF culture, urinalysis allnegative  Monitor off antibiotics        JEWELS (acute kidney injury) ([facility])- (present on admission)  Assessment & Plan  Consistent with ATN 2/2 hypotension, dehydration and sepsis  Resolved  Avoid nephrotoxins.  Renal dose all medications.         VTE prophylaxis: Lovenox

## 2020-09-26 NOTE — PROGRESS NOTES
Assume care of pt at 0700. Report received from NOC RN. Pt is A/O x 0. Pt declines pain at this time. Pt is resting in bed. Bed in lowest and locked position, call light in reach, hourly rounding in place. Labs reviewed. Communication board updated. Will continue to monitor.     Vest, bilateral wrist restraints in place, 4 [facility] This RN walked into pts room this AM and he had broken off the right wrist restraint and was holding it in his hand. Pt was not trying to get out of bed. This RN applied a new wrist restraint to replace the broken one. Pt continues to get out of his restraints. Charge RN and MD aware. WCTM.

## 2020-09-26 NOTE — PROGRESS NOTES
Assumed care at 1900, report received from Kajal LUCIANO.  Pt A&O x 0, pt delusional. Pt initially stated that he thinks he is [place] but thought process changes from encounter to encounter.  Pt denies pain at this time. No signs of distress noted. Pt sitting up in bed. Pt in restraints with active order. All four side rails up, vest restraint, and bilateral soft wrist restraints all in place. Pt fed snack by RN. Bed locked, 2 rails up, bed in lowest position. Call light in place, belongings at bedside, no needs at this time and hourly rounding in place.

## 2020-09-27 LAB
ANION GAP SERPL CALC-SCNC: 13 MMOL/L (ref 7–16)
BASOPHILS # BLD AUTO: 1 % (ref 0–1.8)
BASOPHILS # BLD: 0.08 K/UL (ref 0–0.12)
BUN SERPL-MCNC: 8 MG/DL (ref 8–22)
CALCIUM SERPL-MCNC: 9.3 MG/DL (ref 8.5–10.5)
CHLORIDE SERPL-SCNC: 107 MMOL/L (ref 96–112)
CO2 SERPL-SCNC: 21 MMOL/L (ref 20–33)
CREAT SERPL-MCNC: 0.76 MG/DL (ref 0.5–1.4)
EOSINOPHIL # BLD AUTO: 0.27 K/UL (ref 0–0.51)
EOSINOPHIL NFR BLD: 3.4 % (ref 0–6.9)
ERYTHROCYTE [DISTWIDTH] IN BLOOD BY AUTOMATED COUNT: 49.1 FL (ref 35.9–50)
GLUCOSE SERPL-MCNC: 100 MG/DL (ref 65–99)
HCT VFR BLD AUTO: 29.4 % (ref 42–52)
HGB BLD-MCNC: 9.5 G/DL (ref 14–18)
IMM GRANULOCYTES # BLD AUTO: 0.06 K/UL (ref 0–0.11)
IMM GRANULOCYTES NFR BLD AUTO: 0.7 % (ref 0–0.9)
LYMPHOCYTES # BLD AUTO: 1.76 K/UL (ref 1–4.8)
LYMPHOCYTES NFR BLD: 21.9 % (ref 22–41)
MCH RBC QN AUTO: 30.8 PG (ref 27–33)
MCHC RBC AUTO-ENTMCNC: 32.3 G/DL (ref 33.7–35.3)
MCV RBC AUTO: 95.5 FL (ref 81.4–97.8)
MONOCYTES # BLD AUTO: 0.65 K/UL (ref 0–0.85)
MONOCYTES NFR BLD AUTO: 8.1 % (ref 0–13.4)
NEUTROPHILS # BLD AUTO: 5.23 K/UL (ref 1.82–7.42)
NEUTROPHILS NFR BLD: 64.9 % (ref 44–72)
NRBC # BLD AUTO: 0 K/UL
NRBC BLD-RTO: 0 /100 WBC
PLATELET # BLD AUTO: 236 K/UL (ref 164–446)
PMV BLD AUTO: 9.2 FL (ref 9–12.9)
POTASSIUM SERPL-SCNC: 3.3 MMOL/L (ref 3.6–5.5)
RBC # BLD AUTO: 3.08 M/UL (ref 4.7–6.1)
SODIUM SERPL-SCNC: 141 MMOL/L (ref 135–145)
WBC # BLD AUTO: 8.1 K/UL (ref 4.8–10.8)

## 2020-09-27 PROCEDURE — A9270 NON-COVERED ITEM OR SERVICE: HCPCS | Performed by: HOSPITALIST

## 2020-09-27 PROCEDURE — 80048 BASIC METABOLIC PNL TOTAL CA: CPT

## 2020-09-27 PROCEDURE — 700111 HCHG RX REV CODE 636 W/ 250 OVERRIDE (IP): Performed by: STUDENT IN AN ORGANIZED HEALTH CARE EDUCATION/TRAINING PROGRAM

## 2020-09-27 PROCEDURE — 85025 COMPLETE CBC W/AUTO DIFF WBC: CPT

## 2020-09-27 PROCEDURE — A9270 NON-COVERED ITEM OR SERVICE: HCPCS | Performed by: FAMILY MEDICINE

## 2020-09-27 PROCEDURE — A9270 NON-COVERED ITEM OR SERVICE: HCPCS | Performed by: STUDENT IN AN ORGANIZED HEALTH CARE EDUCATION/TRAINING PROGRAM

## 2020-09-27 PROCEDURE — 770006 HCHG ROOM/CARE - MED/SURG/GYN SEMI*

## 2020-09-27 PROCEDURE — 700102 HCHG RX REV CODE 250 W/ 637 OVERRIDE(OP): Performed by: FAMILY MEDICINE

## 2020-09-27 PROCEDURE — 36415 COLL VENOUS BLD VENIPUNCTURE: CPT

## 2020-09-27 PROCEDURE — 700102 HCHG RX REV CODE 250 W/ 637 OVERRIDE(OP): Performed by: HOSPITALIST

## 2020-09-27 PROCEDURE — 99232 SBSQ HOSP IP/OBS MODERATE 35: CPT | Performed by: HOSPITALIST

## 2020-09-27 PROCEDURE — 700102 HCHG RX REV CODE 250 W/ 637 OVERRIDE(OP): Performed by: STUDENT IN AN ORGANIZED HEALTH CARE EDUCATION/TRAINING PROGRAM

## 2020-09-27 RX ORDER — RISPERIDONE 0.5 MG/1
1 TABLET ORAL EVERY EVENING
Status: DISCONTINUED | OUTPATIENT
Start: 2020-09-27 | End: 2020-10-22

## 2020-09-27 RX ADMIN — ENOXAPARIN SODIUM 40 MG: 40 INJECTION SUBCUTANEOUS at 17:42

## 2020-09-27 RX ADMIN — CHLORDIAZEPOXIDE HYDROCHLORIDE 25 MG: 25 CAPSULE ORAL at 05:48

## 2020-09-27 RX ADMIN — Medication 100 MG: at 05:48

## 2020-09-27 RX ADMIN — CHLORDIAZEPOXIDE HYDROCHLORIDE 25 MG: 25 CAPSULE ORAL at 17:42

## 2020-09-27 RX ADMIN — TAMSULOSIN HYDROCHLORIDE 0.8 MG: 0.4 CAPSULE ORAL at 09:52

## 2020-09-27 RX ADMIN — DOCUSATE SODIUM 50 MG AND SENNOSIDES 8.6 MG 2 TABLET: 8.6; 5 TABLET, FILM COATED ORAL at 05:48

## 2020-09-27 RX ADMIN — RISPERIDONE 1 MG: 0.5 TABLET ORAL at 17:42

## 2020-09-27 ASSESSMENT — ENCOUNTER SYMPTOMS
ORTHOPNEA: 0
SPUTUM PRODUCTION: 0
EYES NEGATIVE: 1
HEARTBURN: 0
ABDOMINAL PAIN: 0
PALPITATIONS: 0
COUGH: 0
NEUROLOGICAL NEGATIVE: 1
CLAUDICATION: 0
NAUSEA: 0
HEMOPTYSIS: 0
MUSCULOSKELETAL NEGATIVE: 1
VOMITING: 0
HALLUCINATIONS: 1

## 2020-09-27 NOTE — PROGRESS NOTES
[facility] Medicine Daily Progress Note    Date of Service  9/27/2020    Chief Complaint  50 y.o. male with a pmhx of HTN, chronic pain and alcohol abuse who presented 9/9/2020 with altered mental status.     Hospital Course  50 y.o. male with a pmhx of HTN, chronic pain and alcohol abuse who presented 9/9/2020 with altered mental status, he was found obtunded in his house by his ex-wife who was checking on him as he had not been seen for 2 days or been able to get a hold of him.  He was found in a chair,  A&Ox0 and hypotensive.  He arrived in the ER with a GCS of 7 (E1,V1,M5) and severely hypotensive.  The patient was intubated for airway protection and central venous access was obtained for administration of high-volume crystalloid resuscitation and vasopressor therapy.    CT head and abdomen and pelvis were all unremarkable. He was briefly treated with antibiotics for concern for an underlying infection and a lumbar tap done was negative. Cultures remained negative.   EEG was done for concern for seizure-like activity was without any epileptiform activity.  He was extubated on 9/11 and has been able to protect his airway ever since. He is currently receiving treatment with benzos for concern for benzo withdrawals and IV thiamine for his history of alcohol abuse.    Interval Problem Update  9/19: Resting in bed comfortable.  Saturating well on room air.  Off restraints.  Sitter at bedside.  Disoriented to time.  Poor insight.  Easily distracted and gets easily confused.  Tangential speech.   9/20: Resting in bed comfortably. Still confused. Sitter at bedside. No acute distress noted. No issues over night per staff.   9/21: Resting in bed comfortable.  Agitated and confused occasionally.  Disoriented to place and time.  Requiring physical restraints.  No acute distress noted.  Dynamically stable.  No issues overnight per staff.    9/22 sedated    Mri negative for acute issues    Gallardo in place--> would leave in place  "due to urinary retention    Restrained    9/23      Patient is impulsive and is pulling at his tubing so restraints are still needed    Patient appears a little bit sedated this morning    He could answer some simple questions and was oriented x3    Still intermittently confused    Gallardo in place    9/24    Confused    Oriented to self only today    Restrained    Gallardo for retention to stay    9/25     Patient is still confused    Patient is delusional    Periods  Of agitation yesterday    Restraints still required    Oriented to self only.    he thought that he was in his friends [place]'s house and that the year is 1925    Psych md consulted today    9/26     He tells me \"I feel great\"    Patient is awake he is restrained    He thinks he is at his friend Deonte's house however when I tell him that I am a doctor and he states he must be in the hospital    Despite daily reorientation he still is unsure about the year he believes now it is 2003 9/27     Patient is awake alert but still confused    He is oriented only to self    And period of agitation last night    Still requiring restraints    Consultants/Specialty  Intensivist    Code Status  Full Code    Disposition  Pending SNF    Review of Systems  Review of Systems   Constitutional: Positive for malaise/fatigue.   HENT: Negative.    Eyes: Negative.    Respiratory: Negative for cough, hemoptysis and sputum production.    Cardiovascular: Negative for chest pain, palpitations, orthopnea and claudication.   Gastrointestinal: Negative for abdominal pain, heartburn, nausea and vomiting.   Genitourinary: Negative for dysuria, frequency and urgency.   Musculoskeletal: Negative.    Neurological: Negative.    Psychiatric/Behavioral: Positive for hallucinations.       Physical Exam  Temp:  [36.5 °C (97.7 °F)-37.2 °C (98.9 °F)] 36.5 °C (97.7 °F)  Pulse:  [] 99  Resp:  [18] 18  BP: (135-152)/(81-89) 152/81  SpO2:  [92 %-96 %] 94 %    Physical Exam  Vitals signs and " nursing note reviewed.   Constitutional:       General: He is not in acute distress.  HENT:      Head: Normocephalic and atraumatic.      Right Ear: External ear normal.      Left Ear: External ear normal.      Nose: Nose normal.   Eyes:      Extraocular Movements: Extraocular movements intact.      Pupils: Pupils are equal, round, and reactive to light.   Neck:      Musculoskeletal: Normal range of motion and neck supple. No neck rigidity or muscular tenderness.   Cardiovascular:      Rate and Rhythm: Normal rate and regular rhythm.      Pulses: Normal pulses.   Pulmonary:      Effort: Pulmonary effort is normal.   Abdominal:      General: Bowel sounds are normal.      Palpations: Abdomen is soft.   Musculoskeletal: Normal range of motion.         General: No tenderness.   Skin:     General: Skin is warm and dry.   Neurological:      General: No focal deficit present.      Mental Status: He is alert. He is disoriented.      Comments: Oriented to self    Psychiatric:         Mood and Affect: Affect is flat.         Behavior: Behavior is combative.         Thought Content: Thought content is delusional.         Cognition and Memory: Cognition is impaired. He exhibits impaired recent memory.         Judgment: Judgment is impulsive.      Comments: Confused          Fluids    Intake/Output Summary (Last 24 hours) at 9/27/2020 0845  Last data filed at 9/26/2020 1841  Gross per 24 hour   Intake 557 ml   Output --   Net 557 ml       Laboratory  Recent Labs     09/25/20  0505 09/26/20  0140 09/27/20 0216   WBC 7.7 7.6 8.1   RBC 3.13* 3.29* 3.08*   HEMOGLOBIN 9.7* 10.3* 9.5*   HEMATOCRIT 29.7* 31.0* 29.4*   MCV 94.9 94.2 95.5   MCH 31.0 31.3 30.8   MCHC 32.7* 33.2* 32.3*   RDW 49.9 49.1 49.1   PLATELETCT 268 274 236   MPV 9.9 9.3 9.2     Recent Labs     09/25/20  0505 09/26/20  0140 09/27/20  0216   SODIUM 141 140 141   POTASSIUM 3.2* 3.4* 3.3*   CHLORIDE 105 106 107   CO2 19* 19* 21   GLUCOSE 100* 109* 100*   BUN 9 10 8    CREATININE 0.79 0.87 0.76   CALCIUM 9.3 9.5 9.3                   Imaging  MR-BRAIN-W/O   Final Result      1.  Moderate diffuse cerebral atrophy.      2.  Minimal periventricular and juxtacortical white matter changes consistent with chronic microvascular ischemic gliosis.      3.  No evidence of acute infarction in the brain parenchyma.      DX-ABDOMEN FOR TUBE PLACEMENT   Final Result      Feeding tube tip overlies the second portion of the duodenum.      DX-CHEST-PORTABLE (1 VIEW)   Final Result         1.  Patchy left lung base opacity, new since prior, could represent early infiltrate            DX-CHEST-PORTABLE (1 VIEW)   Final Result         1.  Patchy left lung base opacity, new since prior, could represent early infiltrate         [id number]-2 VIEW   Final Result      Enteric tube projects over the distal stomach/pylorus.         EC-ECHOCARDIOGRAM COMPLETE W/O CONT   Final Result      DX-CHEST-PORTABLE (1 VIEW)   Final Result         1.  No acute cardiopulmonary disease.      CT-ABDOMEN-PELVIS W/O   Final Result      1.  No renal stone or hydronephrosis.   2.  Normal appendix.   3.  No focal mesenteric inflammatory process.      DX-ABDOMEN FOR TUBE PLACEMENT   Final Result      NG tube tip projects over expected location the gastric fundus.      US-ABDOMEN COMPLETE SURVEY   Final Result         1.  Echogenic liver compatible with fatty change versus fibrosis.   2.  Gallbladder sludge without additional sonographic findings of cholecystitis.   3.  Partial atrophic bilateral kidneys with lobulated contour         CT-HEAD W/O   Final Result         1.  No acute intracranial abnormality is identified, there are nonspecific white matter changes, commonly associated with small vessel ischemic disease.  Associated mild cerebral atrophy is noted.   2.  Atherosclerosis.      DX-CHEST-PORTABLE (1 VIEW)   Final Result         1.  No acute cardiopulmonary disease.           Assessment/Plan  * Toxic encephalopathy-  (present on admission)  Assessment & Plan  No clear etiology. Patient was found down for unknown period.     LP- negative  EEG was negative for seizures    Prn haldol    Po librium  Po risperdal... Increased to 1 mg nightly on 9/27/2020    Psych md consult--> they believe it is etoh related    Continue to monitor    Restrain as necessary    Mri brain negative       Urinary retention- (present on admission)  Assessment & Plan  Po flomax    Voiding trial in 5 to 7 days    Other dysphagia- (present on admission)  Assessment & Plan  Passed speech eval now  On modified diet    Hypernatremia  Assessment & Plan  resolved    Acute respiratory failure with hypoxia ([facility])- (present on admission)  Assessment & Plan  2/2 mental status  Extubated 9/11  Remains on room air    Benzodiazepine dependence ([facility])- (present on admission)  Assessment & Plan  Monitor for seizure/withdrawal  Po librium  EEG after sz-like activity 9/10 was negative despite jerking motions    Alcohol abuse- (present on admission)  Assessment & Plan  Reported hx of  Continue Vitamins  High dose thiamine  Seizure precautions  S/p etoh w/d protocol    Septic shock ([facility])- (present on admission)  Assessment & Plan  Resolved  Broad-spectrum antibiotics: s/p vanc/zosyn --> C3, now off abx  Blood culture, CSF culture, urinalysis allnegative  Monitor off antibiotics        JEWELS (acute kidney injury) ([facility])- (present on admission)  Assessment & Plan  Consistent with ATN 2/2 hypotension, dehydration and sepsis  Resolved  Avoid nephrotoxins.  Renal dose all medications.         VTE prophylaxis: Lovenox

## 2020-09-27 NOTE — CARE PLAN
Problem: Communication  Goal: The ability to communicate needs accurately and effectively will improve  Outcome: PROGRESSING AS EXPECTED  Note: Pt educated about use of call light. Pt confused and disoriented. Pt does not understand teaching. Pt able to communicate needs. Pt states no needs at this time.       Problem: Safety  Goal: Will remain free from falls  Outcome: PROGRESSING AS EXPECTED  Note: Pt assessed for risk of falls. Pt high fall risk. Pt educated on use of call light. Pt confused, disoriented and does not call appropriately. Fall precautions in place. Bed in lowest and locked position. Bed alarm on. Bilateral soft wrist restraints, vest restraint and all four side rails up.Treaded slipper socks on pt. Bedside table, pt belongings, and call light in reach.

## 2020-09-27 NOTE — PROGRESS NOTES
Assume care of pt at 0700. Report received from NOC RN. Pt is A/O x 0. Pt declines pain. Pt is resting in bed. Bed in lowest and locked position, call light in reach, hourly rounding in place. Labs reviewed. Communication board updated. Will continue to monitor.     Pt in bilateral wrist restraints, 4 rails up, vest restraint, active order. Pt calm this morning. NOC RN reported some hallucinations and aggression from the pt over night. Pt now sitting calm in bed and asking to watch football. TV turned on and pt now watching football and no longer pulling at lines and attempting to get out of the restraints. WCTM.

## 2020-09-27 NOTE — CARE PLAN
Problem: Pain Management  Goal: Pain level will decrease to patient's comfort goal  Outcome: PROGRESSING AS EXPECTED     Problem: Knowledge Deficit  Goal: Knowledge of disease process/condition, treatment plan, diagnostic tests, and medications will improve  Outcome: PROGRESSING SLOWER THAN EXPECTED     Problem: Discharge Barriers/Planning  Goal: Patient's continuum of care needs will be met  Outcome: PROGRESSING SLOWER THAN EXPECTED

## 2020-09-27 NOTE — CARE PLAN
Problem: Safety  Goal: Will remain free from falls  9/27/2020 0207 by Josesito Spencer, R.RODOLFO.  Outcome: PROGRESSING AS EXPECTED  Note: Pt assessed for risk of falls. Pt high fall risk. Fall precautions in place. Bed in lowest and locked position. Bed alarm on. Treaded slipper socks on pt. Pt in restraints with active order. Bilateral soft wrist and vest restraints in place with four side rails up. Bedside table, pt belongings, and call light in reach.         Problem: Communication  Goal: The ability to communicate needs accurately and effectively will improve  9/27/2020 0207 by Josesito Spencer, R.N.  Outcome: PROGRESSING SLOWER THAN EXPECTED  Note: Pt educated about use of call light. Pt not oriented and confused. Pt does not understand teaching. Pt able to communicate needs. Pt states no needs at this time.

## 2020-09-27 NOTE — PROGRESS NOTES
Assumed care at 1900, report received from Samuel LUCIANO.  Pt A&O x 0. Pt denies pain at this time. Pt sitting up in bed. No signs of distress noted. Bed locked, 2 rails up, bed in lowest position. Call light in place, belongings at bedside, no needs at this time and hourly rounding in place.

## 2020-09-27 NOTE — PROGRESS NOTES
Pt became extremely agitated. Attempted to pull out newton catheter. RN attempted to redirect. Pt not redirectable. Pt became verbally aggressive and physically aggressive towards staff. New PIV inserted and pt medicated with haldol per MAR. Will continue to monitor.

## 2020-09-28 LAB
ALBUMIN SERPL BCP-MCNC: 3 G/DL (ref 3.2–4.9)
ALBUMIN/GLOB SERPL: 1 G/DL
ALP SERPL-CCNC: 108 U/L (ref 30–99)
ALT SERPL-CCNC: 14 U/L (ref 2–50)
ANION GAP SERPL CALC-SCNC: 14 MMOL/L (ref 7–16)
AST SERPL-CCNC: 15 U/L (ref 12–45)
BASOPHILS # BLD AUTO: 1 % (ref 0–1.8)
BASOPHILS # BLD: 0.08 K/UL (ref 0–0.12)
BILIRUB SERPL-MCNC: 0.5 MG/DL (ref 0.1–1.5)
BUN SERPL-MCNC: 5 MG/DL (ref 8–22)
CALCIUM SERPL-MCNC: 9.4 MG/DL (ref 8.5–10.5)
CHLORIDE SERPL-SCNC: 106 MMOL/L (ref 96–112)
CO2 SERPL-SCNC: 22 MMOL/L (ref 20–33)
CREAT SERPL-MCNC: 0.69 MG/DL (ref 0.5–1.4)
EOSINOPHIL # BLD AUTO: 0.37 K/UL (ref 0–0.51)
EOSINOPHIL NFR BLD: 4.4 % (ref 0–6.9)
ERYTHROCYTE [DISTWIDTH] IN BLOOD BY AUTOMATED COUNT: 48.7 FL (ref 35.9–50)
GLOBULIN SER CALC-MCNC: 3 G/DL (ref 1.9–3.5)
GLUCOSE SERPL-MCNC: 94 MG/DL (ref 65–99)
HCT VFR BLD AUTO: 29.7 % (ref 42–52)
HGB BLD-MCNC: 9.7 G/DL (ref 14–18)
IMM GRANULOCYTES # BLD AUTO: 0.06 K/UL (ref 0–0.11)
IMM GRANULOCYTES NFR BLD AUTO: 0.7 % (ref 0–0.9)
LYMPHOCYTES # BLD AUTO: 1.79 K/UL (ref 1–4.8)
LYMPHOCYTES NFR BLD: 21.3 % (ref 22–41)
MCH RBC QN AUTO: 30.8 PG (ref 27–33)
MCHC RBC AUTO-ENTMCNC: 32.7 G/DL (ref 33.7–35.3)
MCV RBC AUTO: 94.3 FL (ref 81.4–97.8)
MONOCYTES # BLD AUTO: 0.67 K/UL (ref 0–0.85)
MONOCYTES NFR BLD AUTO: 8 % (ref 0–13.4)
NEUTROPHILS # BLD AUTO: 5.42 K/UL (ref 1.82–7.42)
NEUTROPHILS NFR BLD: 64.6 % (ref 44–72)
NRBC # BLD AUTO: 0 K/UL
NRBC BLD-RTO: 0 /100 WBC
PLATELET # BLD AUTO: 242 K/UL (ref 164–446)
PMV BLD AUTO: 9.6 FL (ref 9–12.9)
POTASSIUM SERPL-SCNC: 3 MMOL/L (ref 3.6–5.5)
PROT SERPL-MCNC: 6 G/DL (ref 6–8.2)
RBC # BLD AUTO: 3.15 M/UL (ref 4.7–6.1)
SODIUM SERPL-SCNC: 142 MMOL/L (ref 135–145)
WBC # BLD AUTO: 8.4 K/UL (ref 4.8–10.8)

## 2020-09-28 PROCEDURE — A9270 NON-COVERED ITEM OR SERVICE: HCPCS | Performed by: STUDENT IN AN ORGANIZED HEALTH CARE EDUCATION/TRAINING PROGRAM

## 2020-09-28 PROCEDURE — 99232 SBSQ HOSP IP/OBS MODERATE 35: CPT | Performed by: HOSPITALIST

## 2020-09-28 PROCEDURE — 700102 HCHG RX REV CODE 250 W/ 637 OVERRIDE(OP): Performed by: STUDENT IN AN ORGANIZED HEALTH CARE EDUCATION/TRAINING PROGRAM

## 2020-09-28 PROCEDURE — A9270 NON-COVERED ITEM OR SERVICE: HCPCS | Performed by: NURSE PRACTITIONER

## 2020-09-28 PROCEDURE — 80053 COMPREHEN METABOLIC PANEL: CPT

## 2020-09-28 PROCEDURE — 36415 COLL VENOUS BLD VENIPUNCTURE: CPT

## 2020-09-28 PROCEDURE — 700102 HCHG RX REV CODE 250 W/ 637 OVERRIDE(OP): Performed by: HOSPITALIST

## 2020-09-28 PROCEDURE — A9270 NON-COVERED ITEM OR SERVICE: HCPCS | Performed by: HOSPITALIST

## 2020-09-28 PROCEDURE — 770006 HCHG ROOM/CARE - MED/SURG/GYN SEMI*

## 2020-09-28 PROCEDURE — A9270 NON-COVERED ITEM OR SERVICE: HCPCS | Performed by: FAMILY MEDICINE

## 2020-09-28 PROCEDURE — 700111 HCHG RX REV CODE 636 W/ 250 OVERRIDE (IP): Performed by: STUDENT IN AN ORGANIZED HEALTH CARE EDUCATION/TRAINING PROGRAM

## 2020-09-28 PROCEDURE — 85025 COMPLETE CBC W/AUTO DIFF WBC: CPT

## 2020-09-28 PROCEDURE — 700102 HCHG RX REV CODE 250 W/ 637 OVERRIDE(OP): Performed by: FAMILY MEDICINE

## 2020-09-28 PROCEDURE — 700102 HCHG RX REV CODE 250 W/ 637 OVERRIDE(OP): Performed by: NURSE PRACTITIONER

## 2020-09-28 PROCEDURE — 700111 HCHG RX REV CODE 636 W/ 250 OVERRIDE (IP): Performed by: NURSE PRACTITIONER

## 2020-09-28 RX ORDER — POTASSIUM CHLORIDE 20 MEQ/1
20 TABLET, EXTENDED RELEASE ORAL 2 TIMES DAILY
Status: DISCONTINUED | OUTPATIENT
Start: 2020-09-28 | End: 2020-10-11

## 2020-09-28 RX ORDER — CHLORDIAZEPOXIDE HYDROCHLORIDE 25 MG/1
25 CAPSULE, GELATIN COATED ORAL EVERY EVENING
Status: DISCONTINUED | OUTPATIENT
Start: 2020-09-28 | End: 2020-10-03

## 2020-09-28 RX ORDER — HALOPERIDOL 5 MG/ML
2.5 INJECTION INTRAMUSCULAR EVERY 4 HOURS PRN
Status: DISCONTINUED | OUTPATIENT
Start: 2020-09-28 | End: 2020-11-08

## 2020-09-28 RX ORDER — DIPHENHYDRAMINE HCL 25 MG
25 TABLET ORAL ONCE
Status: COMPLETED | OUTPATIENT
Start: 2020-09-28 | End: 2020-09-28

## 2020-09-28 RX ADMIN — POTASSIUM CHLORIDE 20 MEQ: 20 TABLET, EXTENDED RELEASE ORAL at 18:00

## 2020-09-28 RX ADMIN — DOCUSATE SODIUM 50 MG AND SENNOSIDES 8.6 MG 2 TABLET: 8.6; 5 TABLET, FILM COATED ORAL at 17:12

## 2020-09-28 RX ADMIN — HALOPERIDOL LACTATE 2.5 MG: 5 INJECTION, SOLUTION INTRAMUSCULAR at 21:53

## 2020-09-28 RX ADMIN — Medication 100 MG: at 09:02

## 2020-09-28 RX ADMIN — TAMSULOSIN HYDROCHLORIDE 0.8 MG: 0.4 CAPSULE ORAL at 09:03

## 2020-09-28 RX ADMIN — CHLORDIAZEPOXIDE HYDROCHLORIDE 25 MG: 25 CAPSULE ORAL at 17:12

## 2020-09-28 RX ADMIN — DIPHENHYDRAMINE HYDROCHLORIDE 25 MG: 25 TABLET ORAL at 00:51

## 2020-09-28 RX ADMIN — RISPERIDONE 1 MG: 0.5 TABLET ORAL at 17:12

## 2020-09-28 RX ADMIN — ENOXAPARIN SODIUM 40 MG: 40 INJECTION SUBCUTANEOUS at 17:13

## 2020-09-28 RX ADMIN — Medication 400 MG: at 12:59

## 2020-09-28 RX ADMIN — DOCUSATE SODIUM 50 MG AND SENNOSIDES 8.6 MG 2 TABLET: 8.6; 5 TABLET, FILM COATED ORAL at 09:02

## 2020-09-28 ASSESSMENT — ENCOUNTER SYMPTOMS
COUGH: 0
VOMITING: 0
MUSCULOSKELETAL NEGATIVE: 1
HEARTBURN: 0
CLAUDICATION: 0
HALLUCINATIONS: 0
ORTHOPNEA: 0
ABDOMINAL PAIN: 0
SPUTUM PRODUCTION: 0
NEUROLOGICAL NEGATIVE: 1
NAUSEA: 0
EYES NEGATIVE: 1
HEMOPTYSIS: 0
PALPITATIONS: 0

## 2020-09-28 NOTE — PROGRESS NOTES
Assumed care at 0700, report received from NOC RN.  Pt A&O x 1, states pain is 0/10. Bed locked, 2 rails up, bed in lowest position. Call light in place, belongings at bedside. Restraints on and frequent rounding in place. No needs at this time.

## 2020-09-28 NOTE — DISCHARGE PLANNING
Anticipated Discharge Disposition:   · TBD possibly SNF    Action:   · Per provider, Dr. Peterson pt is still not medically clear for discharge. However still remain concerns related to determining decision maker on pt's behave if pt remains disoriented and confused. Currently still pending care coordination leadership recommendations. LSW placed follow up e-mail to leadership today.     Barriers to Discharge:   · No current insurance on file  · Lack of NOK (pt does have brother but relationship is strained and pt and brother are currently in a legal dispute over their mother's estate) to help with medical decisions as pt is not fully orientated.     Plan:   · Care coordination will continue to follow up and provide assistance with discharge plans/barriers.

## 2020-09-28 NOTE — CARE PLAN
Problem: Safety  Goal: Will remain free from falls  Outcome: PROGRESSING AS EXPECTED  Note: Pt assessed for risk of falls. Pt high fall risk. Pt educated on use of call light. Pt confused and disoriented. Does not understand teaching or call appropriately. Pt restrained in bilateral soft wrist restraints, vest and 4 side rails up. Nurse charting at bedside. Fall precautions in place. Bed in lowest and locked position. Bed alarm on. Treaded slipper socks on pt. Bedside table, pt belongings, and call light in reach.       Problem: Pain Management  Goal: Pain level will decrease to patient's comfort goal  Outcome: PROGRESSING AS EXPECTED  Note: Pt assessed for pain. Pt denies pain at this time. Pt resting comfortably in bed. Will continue to monitor.

## 2020-09-28 NOTE — PROGRESS NOTES
Assumed care at 1900, report received from Paul LUCIANO.  Pt A&O x 0, pt states that his name is Nicholas and that he is [place] the police [place]. Pt denies pain and is in no signs of distress. Pt sitting up in bed. Pt is in soft bilateral wrist restraints, 4 side rails up, and a vest restraints. Pt frequently unlocks restraints and attempts to pull out newton catheter. RN completing bedside commitment. Bed locked, 2 rails up, bed in lowest position. Call light in place, belongings at bedside, no needs at this time and hourly rounding in place.

## 2020-09-28 NOTE — PROGRESS NOTES
Hospital Medicine Daily Progress Note    Date of Service  9/28/2020    Chief Complaint  50 y.o. male with a pmhx of HTN, chronic pain and alcohol abuse who presented 9/9/2020 with altered mental status.     Hospital Course  50 y.o. male with a pmhx of HTN, chronic pain and alcohol abuse who presented 9/9/2020 with altered mental status, he was found obtunded in his house by his ex-wife who was checking on him as he had not been seen for 2 days or been able to get a hold of him.  He was found in a chair,  A&Ox0 and hypotensive.  He arrived in the ER with a GCS of 7 (E1,V1,M5) and severely hypotensive.  The patient was intubated for airway protection and central venous access was obtained for administration of high-volume crystalloid resuscitation and vasopressor therapy.    CT head and abdomen and pelvis were all unremarkable. He was briefly treated with antibiotics for concern for an underlying infection and a lumbar tap done was negative. Cultures remained negative.   EEG was done for concern for seizure-like activity was without any epileptiform activity.  He was extubated on 9/11 and has been able to protect his airway ever since. He is currently receiving treatment with benzos for concern for benzo withdrawals and IV thiamine for his history of alcohol abuse.    Interval Problem Update      9/23      Patient is impulsive and is pulling at his tubing so restraints are still needed    Patient appears a little bit sedated this morning    He could answer some simple questions and was oriented x3    Still intermittently confused    Gallardo in place    9/24    Confused    Oriented to self only today    Restrained    Gallardo for retention to stay    9/25     Patient is still confused    Patient is delusional    Periods  Of agitation yesterday    Restraints still required    Oriented to self only.    he thought that he was in his friends Hub[place]'s house and that the year is 1925    Psych md consulted today    9/26     He tells  "me \"I feel great\"    Patient is awake he is restrained    He thinks he is at his friend Deonte's house however when I tell him that I am a doctor and he states he must be in the hospital    Despite daily reorientation he still is unsure about the year he believes now it is 2003 9/27     Patient is awake alert but still confused    He is oriented only to self    And period of agitation last night    Still requiring restraints    9/28     Patient still requires restraints    Gallardo in place    Patient is oriented only to himself    Appears that his agitation may get  increased during the evening so we have tapered his medication to the evening to hopefully allow the day to be medication/sedative free    Low potassium 3.0    Consultants/Specialty  Intensivist    Code Status  Full Code    Disposition  Pending SNF    Review of Systems  Review of Systems   Constitutional: Negative for malaise/fatigue.   HENT: Negative.    Eyes: Negative.    Respiratory: Negative for cough, hemoptysis and sputum production.    Cardiovascular: Negative for chest pain, palpitations, orthopnea and claudication.   Gastrointestinal: Negative for abdominal pain, heartburn, nausea and vomiting.   Genitourinary: Negative for dysuria, frequency and urgency.   Musculoskeletal: Negative.    Neurological: Negative.    Psychiatric/Behavioral: Negative for hallucinations.       Physical Exam  Temp:  [36.1 °C (97 °F)-36.9 °C (98.5 °F)] 36.9 °C (98.5 °F)  Pulse:  [] 97  Resp:  [17-18] 17  BP: (141-146)/(77-93) 145/77  SpO2:  [93 %-98 %] 93 %    Physical Exam  Vitals signs and nursing note reviewed.   Constitutional:       General: He is not in acute distress.  HENT:      Head: Normocephalic and atraumatic.      Right Ear: External ear normal.      Left Ear: External ear normal.      Nose: Nose normal.   Eyes:      Extraocular Movements: Extraocular movements intact.      Pupils: Pupils are equal, round, and reactive to light.   Neck:      " Musculoskeletal: Normal range of motion and neck supple. No neck rigidity or muscular tenderness.   Cardiovascular:      Rate and Rhythm: Normal rate and regular rhythm.      Pulses: Normal pulses.   Pulmonary:      Effort: Pulmonary effort is normal.   Abdominal:      General: Bowel sounds are normal.      Palpations: Abdomen is soft.   Musculoskeletal: Normal range of motion.         General: No tenderness.   Skin:     General: Skin is warm and dry.   Neurological:      General: No focal deficit present.      Mental Status: He is alert. He is disoriented.      Comments: Oriented to self    Psychiatric:         Mood and Affect: Affect is flat.         Behavior: Behavior is combative.         Thought Content: Thought content is delusional.         Cognition and Memory: Cognition is impaired. He exhibits impaired recent memory.         Judgment: Judgment is impulsive.      Comments: Confused          Fluids    Intake/Output Summary (Last 24 hours) at 9/28/2020 1146  Last data filed at 9/28/2020 0930  Gross per 24 hour   Intake 360 ml   Output 650 ml   Net -290 ml       Laboratory  Recent Labs     09/26/20  0140 09/27/20  0216 09/28/20  0725   WBC 7.6 8.1 8.4   RBC 3.29* 3.08* 3.15*   HEMOGLOBIN 10.3* 9.5* 9.7*   HEMATOCRIT 31.0* 29.4* 29.7*   MCV 94.2 95.5 94.3   MCH 31.3 30.8 30.8   MCHC 33.2* 32.3* 32.7*   RDW 49.1 49.1 48.7   PLATELETCT 274 236 242   MPV 9.3 9.2 9.6     Recent Labs     09/26/20  0140 09/27/20  0216 09/28/20  0725   SODIUM 140 141 142   POTASSIUM 3.4* 3.3* 3.0*   CHLORIDE 106 107 106   CO2 19* 21 22   GLUCOSE 109* 100* 94   BUN 10 8 5*   CREATININE 0.87 0.76 0.69   CALCIUM 9.5 9.3 9.4                   Imaging  MR-BRAIN-W/O   Final Result      1.  Moderate diffuse cerebral atrophy.      2.  Minimal periventricular and juxtacortical white matter changes consistent with chronic microvascular ischemic gliosis.      3.  No evidence of acute infarction in the brain parenchyma.      DX-ABDOMEN FOR TUBE  PLACEMENT   Final Result      Feeding tube tip overlies the second portion of the duodenum.      DX-CHEST-PORTABLE (1 VIEW)   Final Result         1.  Patchy left lung base opacity, new since prior, could represent early infiltrate            DX-CHEST-PORTABLE (1 VIEW)   Final Result         1.  Patchy left lung base opacity, new since prior, could represent early infiltrate         [id number] VIEW   Final Result      Enteric tube projects over the distal stomach/pylorus.         EC-ECHOCARDIOGRAM COMPLETE W/O CONT   Final Result      DX-CHEST-PORTABLE (1 VIEW)   Final Result         1.  No acute cardiopulmonary disease.      CT-ABDOMEN-PELVIS W/O   Final Result      1.  No renal stone or hydronephrosis.   2.  Normal appendix.   3.  No focal mesenteric inflammatory process.      DX-ABDOMEN FOR TUBE PLACEMENT   Final Result      NG tube tip projects over expected location the gastric fundus.      US-ABDOMEN COMPLETE SURVEY   Final Result         1.  Echogenic liver compatible with fatty change versus fibrosis.   2.  Gallbladder sludge without additional sonographic findings of cholecystitis.   3.  Partial atrophic bilateral kidneys with lobulated contour         CT-HEAD W/O   Final Result         1.  No acute intracranial abnormality is identified, there are nonspecific white matter changes, commonly associated with small vessel ischemic disease.  Associated mild cerebral atrophy is noted.   2.  Atherosclerosis.      DX-CHEST-PORTABLE (1 VIEW)   Final Result         1.  No acute cardiopulmonary disease.           Assessment/Plan  * Toxic encephalopathy- (present on admission)  Assessment & Plan  No clear etiology. Patient was found down for unknown period.     LP- negative  EEG was negative for seizures    Prn haldol    Po librium  Po risperdal... Increased to 1 mg nightly on 9/27/2020    Psych md consult--> they believe it is etoh related    Continue to monitor    Restrain as necessary    Mri brain negative        Urinary retention- (present on admission)  Assessment & Plan  Po flomax    Voiding trial in 1 to 2  Days.. Be aware that urologist was needed to replace this current  Gallardo as it was difficult to place     Other dysphagia- (present on admission)  Assessment & Plan  Passed speech eval now  On modified diet    Hypernatremia  Assessment & Plan  Replacing potassium and adding daily magnesium     check  a BMP in a.m.    Acute respiratory failure with hypoxia ([facility])- (present on admission)  Assessment & Plan  2/2 mental status  Extubated 9/11  Remains on room air    Benzodiazepine dependence ([facility])- (present on admission)  Assessment & Plan  Monitor for seizure/withdrawal  Po librium  EEG after sz-like activity 9/10 was negative despite jerking motions    Alcohol abuse- (present on admission)  Assessment & Plan  Reported hx of  Continue Vitamins  High dose thiamine  Seizure precautions  S/p etoh w/d protocol    Septic shock ([facility])- (present on admission)  Assessment & Plan  Resolved  Broad-spectrum antibiotics: s/p vanc/zosyn --> C3, now off abx  Blood culture, CSF culture, urinalysis allnegative  Monitor off antibiotics        JEWELS (acute kidney injury) ([facility])- (present on admission)  Assessment & Plan  Consistent with ATN 2/2 hypotension, dehydration and sepsis  Resolved  Avoid nephrotoxins.  Renal dose all medications.         VTE prophylaxis: Lovenox

## 2020-09-29 LAB
ANION GAP SERPL CALC-SCNC: 12 MMOL/L (ref 7–16)
BUN SERPL-MCNC: 6 MG/DL (ref 8–22)
CALCIUM SERPL-MCNC: 9.6 MG/DL (ref 8.5–10.5)
CHLORIDE SERPL-SCNC: 109 MMOL/L (ref 96–112)
CO2 SERPL-SCNC: 22 MMOL/L (ref 20–33)
CREAT SERPL-MCNC: 0.7 MG/DL (ref 0.5–1.4)
GLUCOSE SERPL-MCNC: 91 MG/DL (ref 65–99)
POTASSIUM SERPL-SCNC: 3.6 MMOL/L (ref 3.6–5.5)
SODIUM SERPL-SCNC: 143 MMOL/L (ref 135–145)

## 2020-09-29 PROCEDURE — 97530 THERAPEUTIC ACTIVITIES: CPT | Mod: CO

## 2020-09-29 PROCEDURE — 700102 HCHG RX REV CODE 250 W/ 637 OVERRIDE(OP): Performed by: HOSPITALIST

## 2020-09-29 PROCEDURE — 700111 HCHG RX REV CODE 636 W/ 250 OVERRIDE (IP): Performed by: STUDENT IN AN ORGANIZED HEALTH CARE EDUCATION/TRAINING PROGRAM

## 2020-09-29 PROCEDURE — 99231 SBSQ HOSP IP/OBS SF/LOW 25: CPT | Performed by: HOSPITALIST

## 2020-09-29 PROCEDURE — 80048 BASIC METABOLIC PNL TOTAL CA: CPT

## 2020-09-29 PROCEDURE — 97535 SELF CARE MNGMENT TRAINING: CPT | Mod: CO

## 2020-09-29 PROCEDURE — 770006 HCHG ROOM/CARE - MED/SURG/GYN SEMI*

## 2020-09-29 PROCEDURE — 36415 COLL VENOUS BLD VENIPUNCTURE: CPT

## 2020-09-29 PROCEDURE — A9270 NON-COVERED ITEM OR SERVICE: HCPCS | Performed by: HOSPITALIST

## 2020-09-29 PROCEDURE — 700102 HCHG RX REV CODE 250 W/ 637 OVERRIDE(OP): Performed by: STUDENT IN AN ORGANIZED HEALTH CARE EDUCATION/TRAINING PROGRAM

## 2020-09-29 PROCEDURE — A9270 NON-COVERED ITEM OR SERVICE: HCPCS | Performed by: STUDENT IN AN ORGANIZED HEALTH CARE EDUCATION/TRAINING PROGRAM

## 2020-09-29 RX ADMIN — POTASSIUM CHLORIDE 20 MEQ: 20 TABLET, EXTENDED RELEASE ORAL at 05:57

## 2020-09-29 RX ADMIN — CHLORDIAZEPOXIDE HYDROCHLORIDE 25 MG: 25 CAPSULE ORAL at 17:31

## 2020-09-29 RX ADMIN — ENOXAPARIN SODIUM 40 MG: 40 INJECTION SUBCUTANEOUS at 17:32

## 2020-09-29 RX ADMIN — Medication 400 MG: at 05:58

## 2020-09-29 RX ADMIN — DOCUSATE SODIUM 50 MG AND SENNOSIDES 8.6 MG 2 TABLET: 8.6; 5 TABLET, FILM COATED ORAL at 17:31

## 2020-09-29 RX ADMIN — RISPERIDONE 1 MG: 0.5 TABLET ORAL at 17:31

## 2020-09-29 RX ADMIN — Medication 100 MG: at 05:57

## 2020-09-29 RX ADMIN — POTASSIUM CHLORIDE 20 MEQ: 20 TABLET, EXTENDED RELEASE ORAL at 17:31

## 2020-09-29 ASSESSMENT — ENCOUNTER SYMPTOMS
HEMOPTYSIS: 0
EYES NEGATIVE: 1
MUSCULOSKELETAL NEGATIVE: 1
HALLUCINATIONS: 0
HEARTBURN: 0
ABDOMINAL PAIN: 0
ORTHOPNEA: 0
SPUTUM PRODUCTION: 0
COUGH: 0
NEUROLOGICAL NEGATIVE: 1
CLAUDICATION: 0
PALPITATIONS: 0
NAUSEA: 0
VOMITING: 0

## 2020-09-29 ASSESSMENT — COGNITIVE AND FUNCTIONAL STATUS - GENERAL
DRESSING REGULAR UPPER BODY CLOTHING: A LITTLE
DRESSING REGULAR LOWER BODY CLOTHING: A LITTLE
TOILETING: A LOT
PERSONAL GROOMING: A LITTLE
HELP NEEDED FOR BATHING: A LITTLE
SUGGESTED CMS G CODE MODIFIER DAILY ACTIVITY: CK
EATING MEALS: A LITTLE
DAILY ACTIVITIY SCORE: 17

## 2020-09-29 NOTE — PROGRESS NOTES
Report received from Adamaris LUCIANO and assumed care of patient at 0000. Patient confused and restless, in bilateral soft wrist restraints and vest restraint for patient safety. Gallardo catheter in place. Oriented to self.   Respirations normal and unlabored. VSS.  Fall risk protocol in place. Bed locked and in lowest position. Non-skid socks and bed alarm on.  Rounded on hourly. Call light with in reach.

## 2020-09-29 NOTE — PROGRESS NOTES
Received report from jarad RN and assumed care of pt. Pt currently sleeping. Bilateral soft wrist restraints in place as well as vest restraint for pt safety. Unable to determine orientation status at this time.    Call light within reach and fall precautions in place. Bed alarm on. Will monitor

## 2020-09-29 NOTE — THERAPY
"Occupational Therapy  Daily Treatment     Patient Name: Yury Blake  Age:  50 y.o., Sex:  male  Medical Record #: [id number]  Today's Date: 9/29/2020     Precautions  Precautions: (P) Fall Risk, Swallow Precautions ( See Comments)  Comments: (P) Pt can be combative, B wrist restraints due to attempting to pull out newton cath. Chemo vest     Assessment    Pt requires increased encouragement to participate in self care tasks. Pt confused and easily frustrated and angry re: B wrist restrains and posey vest. Pt attempting initially to be combative but was easily able to decrease anxiety with encouragement.  Pt attempting to pull out newton cath. Pt agreed with encouragement to sponge bathe and wash hair with shampoo cap seated EOB. Pt refused assist to wash back, appearing slight paranoid/fearful with someone going behind him. Pt did give good effort but needed constant reassurance that he is safe and needing help with self care tasks. Pt will need 24/7 care with all self care at this time. RN updated on OT treatment findings.        Plan    Continue current treatment plan.    DC Equipment Recommendations: (P) Unable to determine at this time  Discharge Recommendations: (P) Recommend post-acute placement for additional occupational therapy services prior to discharge home    Subjective     \" Get these things off me\". Pt referring to wrist restraints and posey vest.       Objective       09/29/20 1051   Cognition    Cognition / Consciousness X   Orientation Level Not Oriented to Reason;Not Oriented to Place;Not Oriented to Time;Not Oriented to Day;Not Oriented to Year;Not Oriented to Month   Level of Consciousness Confused   Ability To Follow Commands 1 Step   Safety Awareness Impulsive;Impaired   New Learning Impaired   Attention Impaired   Sequencing Impaired   Comments easily angry with suggestions and not removing wrist restraints.    Other Treatments   Other Treatments Provided Psychosocial intervention, " encouragement needed to complete tasks safely.    Balance   Sitting Balance (Static) Fair   Sitting Balance (Dynamic) Fair -   Standing Balance (Static) Poor -   Comments with HHA X2   Activities of Daily Living   Grooming Moderate Assist  (with shampoo cap and combing hair. Distracted. )   Bathing Minimal Assist  (sponge bathing seated EOB)   Upper Body Dressing Minimal Assist   Lower Body Dressing Minimal Assist   Toileting Moderate Assist   Skilled Intervention Verbal Cuing;Tactile Cuing;Sequencing;Postural Facilitation;Compensatory Strategies   Comments limited due to decreased functional cognition  and easily distracted by wrist restraints and posey vest as well as attempting to pull out newton cath.    Activity Tolerance   Comments limited by fatigue , cognition and safety concerns.    Patient / Family Goals   Patient / Family Goal #1 Unable to state   Short Term Goals   Short Term Goal # 1 Pt will complete toilet transfer using BSC if needed with spv, no LOB by discharge.   Goal Outcome # 1 Goal not met   Short Term Goal # 2 Pt will complete standing grooming/hygiene with spv by discharge.   Goal Outcome # 2 Goal not met   Short Term Goal # 3 Pt will complete toileting with spv by discharge.   Goal Outcome # 3 Goal not met   Anticipated Discharge Equipment and Recommendations   DC Equipment Recommendations Unable to determine at this time   Discharge Recommendations Recommend post-acute placement for additional occupational therapy services prior to discharge home   Interdisciplinary Plan of Care Collaboration   IDT Collaboration with  Nursing;Certified Nursing Assistant   Patient Position at End of Therapy In Bed;Bed Alarm On;[place] Vest Applied;Call Light within Reach;Phone within Reach;Tray Table within Reach  (B wrist restraints reapplied. )   Collaboration Comments RN updated   Session Information   Priority 2

## 2020-09-29 NOTE — CARE PLAN
Problem: Safety - Medical Restraint  Goal: Remains free of injury from restraints (Restraint for Interference with Medical Device)  Description: INTERVENTIONS:  1. Determine that other, less restrictive measures have been tried or would not be effective before applying the restraint  2. Evaluate the patient's condition at the time of restraint application  3. Inform patient/family regarding the reason for restraint  4. Q2H: Monitor safety, psychosocial status, comfort, nutrition and hydration  Outcome: NOT MET  Goal: Free from restraint(s) (Restraint for Interference with Medical Device)  Description: INTERVENTIONS:  1. ONCE/SHIFT or MINIMUM Q12H: Assess and document the continuing need for restraints  2. Q24H: Continued use of restraint requires LIP to perform face to face examination and written order  3. Identify and implement measures to help patient regain control  Outcome: NOT MET     Problem: Safety  Goal: Will remain free from injury  Outcome: PROGRESSING AS EXPECTED     Problem: Venous Thromboembolism (VTW)/Deep Vein Thrombosis (DVT) Prevention:  Goal: Patient will participate in Venous Thrombosis (VTE)/Deep Vein Thrombosis (DVT)Prevention Measures  Outcome: PROGRESSING AS EXPECTED     Problem: Respiratory:  Goal: Respiratory status will improve  Outcome: PROGRESSING AS EXPECTED     Problem: Psychosocial Needs:  Goal: Level of anxiety will decrease  Outcome: PROGRESSING AS EXPECTED

## 2020-09-29 NOTE — CARE PLAN
Problem: Safety - Medical Restraint  Goal: Remains free of injury from restraints (Restraint for Interference with Medical Device)  Description: INTERVENTIONS:  1. Determine that other, less restrictive measures have been tried or would not be effective before applying the restraint  2. Evaluate the patient's condition at the time of restraint application  3. Inform patient/family regarding the reason for restraint  4. Q2H: Monitor safety, psychosocial status, comfort, nutrition and hydration  Outcome: PROGRESSING AS EXPECTED     Problem: Knowledge Deficit  Goal: Knowledge of disease process/condition, treatment plan, diagnostic tests, and medications will improve  Outcome: PROGRESSING SLOWER THAN EXPECTED     Problem: Discharge Barriers/Planning  Goal: Patient's continuum of care needs will be met  Outcome: PROGRESSING SLOWER THAN EXPECTED

## 2020-09-29 NOTE — PROGRESS NOTES
Assume care of pt at 0700. Report received from NOC RN. Pt is A/O x 0. Pt declines pain. Pt is resting in bed. Bed in lowest and locked position, call light in reach, hourly rounding in place. Labs reviewed. Communication board updated. Will continue to monitor.      Pt in bilateral wrist restraints, 4 rails up, vest restraint, active order. Pt calm this morning. NOC RN reported some hallucinations and aggression from the pt over night. Pt now sitting calm in bed. Pt continues to attempt to pull out his newton catheter. Hourly rounding in place. [facility]

## 2020-09-29 NOTE — PROGRESS NOTES
Hospital Medicine Daily Progress Note    Date of Service  9/29/2020    Chief Complaint  50 y.o. male with a pmhx of HTN, chronic pain and alcohol abuse who presented 9/9/2020 with altered mental status.     Hospital Course  50 y.o. male with a pmhx of HTN, chronic pain and alcohol abuse who presented 9/9/2020 with altered mental status, he was found obtunded in his house by his ex-wife who was checking on him as he had not been seen for 2 days or been able to get a hold of him.  He was found in a chair,  A&Ox0 and hypotensive.  He arrived in the ER with a GCS of 7 (E1,V1,M5) and severely hypotensive.  The patient was intubated for airway protection and central venous access was obtained for administration of high-volume crystalloid resuscitation and vasopressor therapy.    CT head and abdomen and pelvis were all unremarkable. He was briefly treated with antibiotics for concern for an underlying infection and a lumbar tap done was negative. Cultures remained negative.   EEG was done for concern for seizure-like activity was without any epileptiform activity.  He was extubated on 9/11 and has been able to protect his airway ever since. He is currently receiving treatment with benzos for concern for benzo withdrawals and IV thiamine for his history of alcohol abuse.    Interval Problem Update  9/29: Patient was seen and examined by me today.  Found to be resting in bed comfortably.  We will continue his Librium and Risperdal for now.  Try to take off restraints today.  Repeat blood work in the a.m.  SNF placement is pending.    Consultants/Specialty  Intensivist    Code Status  Full Code    Disposition  Pending SNF    Review of Systems  Review of Systems   Constitutional: Negative for malaise/fatigue.   HENT: Negative.    Eyes: Negative.    Respiratory: Negative for cough, hemoptysis and sputum production.    Cardiovascular: Negative for chest pain, palpitations, orthopnea and claudication.   Gastrointestinal: Negative  for abdominal pain, heartburn, nausea and vomiting.   Genitourinary: Negative for dysuria, frequency and urgency.   Musculoskeletal: Negative.    Neurological: Negative.    Psychiatric/Behavioral: Negative for hallucinations.       Physical Exam  Temp:  [36.6 °C (97.9 °F)-36.8 °C (98.3 °F)] 36.6 °C (97.9 °F)  Pulse:  [] 102  Resp:  [17-18] 18  BP: (121-148)/(79-93) 121/83  SpO2:  [94 %-97 %] 96 %    Physical Exam  Vitals signs and nursing note reviewed.   Constitutional:       General: He is not in acute distress.  HENT:      Head: Normocephalic and atraumatic.      Right Ear: External ear normal.      Left Ear: External ear normal.      Nose: Nose normal.   Eyes:      Extraocular Movements: Extraocular movements intact.      Pupils: Pupils are equal, round, and reactive to light.   Neck:      Musculoskeletal: Normal range of motion and neck supple. No neck rigidity or muscular tenderness.   Cardiovascular:      Rate and Rhythm: Normal rate and regular rhythm.      Pulses: Normal pulses.   Pulmonary:      Effort: Pulmonary effort is normal.   Abdominal:      General: Bowel sounds are normal.      Palpations: Abdomen is soft.   Musculoskeletal: Normal range of motion.         General: No tenderness.   Skin:     General: Skin is warm and dry.   Neurological:      General: No focal deficit present.      Mental Status: He is alert. He is disoriented.      Comments: Oriented to self    Psychiatric:         Mood and Affect: Affect is flat.         Behavior: Behavior is combative.         Thought Content: Thought content is delusional.         Cognition and Memory: Cognition is impaired. He exhibits impaired recent memory.         Judgment: Judgment is impulsive.      Comments: Confused          Fluids    Intake/Output Summary (Last 24 hours) at 9/29/2020 1116  Last data filed at 9/29/2020 0347  Gross per 24 hour   Intake 240 ml   Output 1050 ml   Net -810 ml       Laboratory  Recent Labs     09/27/20  0216  09/28/20  0725   WBC 8.1 8.4   RBC 3.08* 3.15*   HEMOGLOBIN 9.5* 9.7*   HEMATOCRIT 29.4* 29.7*   MCV 95.5 94.3   MCH 30.8 30.8   MCHC 32.3* 32.7*   RDW 49.1 48.7   PLATELETCT 236 242   MPV 9.2 9.6     Recent Labs     09/27/20  0216 09/28/20  0725   SODIUM 141 142   POTASSIUM 3.3* 3.0*   CHLORIDE 107 106   CO2 21 22   GLUCOSE 100* 94   BUN 8 5*   CREATININE 0.76 0.69   CALCIUM 9.3 9.4                   Imaging  MR-BRAIN-W/O   Final Result      1.  Moderate diffuse cerebral atrophy.      2.  Minimal periventricular and juxtacortical white matter changes consistent with chronic microvascular ischemic gliosis.      3.  No evidence of acute infarction in the brain parenchyma.      DX-ABDOMEN FOR TUBE PLACEMENT   Final Result      Feeding tube tip overlies the second portion of the duodenum.      DX-CHEST-PORTABLE (1 VIEW)   Final Result         1.  Patchy left lung base opacity, new since prior, could represent early infiltrate            DX-CHEST-PORTABLE (1 VIEW)   Final Result         1.  Patchy left lung base opacity, new since prior, could represent early infiltrate         [id number] VIEW   Final Result      Enteric tube projects over the distal stomach/pylorus.         EC-ECHOCARDIOGRAM COMPLETE W/O CONT   Final Result      DX-CHEST-PORTABLE (1 VIEW)   Final Result         1.  No acute cardiopulmonary disease.      CT-ABDOMEN-PELVIS W/O   Final Result      1.  No renal stone or hydronephrosis.   2.  Normal appendix.   3.  No focal mesenteric inflammatory process.      DX-ABDOMEN FOR TUBE PLACEMENT   Final Result      NG tube tip projects over expected location the gastric fundus.      US-ABDOMEN COMPLETE SURVEY   Final Result         1.  Echogenic liver compatible with fatty change versus fibrosis.   2.  Gallbladder sludge without additional sonographic findings of cholecystitis.   3.  Partial atrophic bilateral kidneys with lobulated contour         CT-HEAD W/O   Final Result         1.  No acute intracranial  abnormality is identified, there are nonspecific white matter changes, commonly associated with small vessel ischemic disease.  Associated mild cerebral atrophy is noted.   2.  Atherosclerosis.      DX-CHEST-PORTABLE (1 VIEW)   Final Result         1.  No acute cardiopulmonary disease.           Assessment/Plan  * Toxic encephalopathy- (present on admission)  Assessment & Plan  No clear etiology. Patient was found down for unknown period.     LP- negative  EEG was negative for seizures    Prn haldol    Po librium  Po risperdal... Increased to 1 mg nightly on 9/27/2020    Psych md consult--> they believe it is etoh related    Continue to monitor    Restrain as necessary    Mri brain negative       Urinary retention- (present on admission)  Assessment & Plan  Po flomax    Voiding trial in 1 to 2  Days.. Be aware that urologist was needed to replace this current  Gallardo as it was difficult to place     Other dysphagia- (present on admission)  Assessment & Plan  Passed speech eval now  On modified diet    Hypernatremia  Assessment & Plan  Replacing potassium and adding daily magnesium     check  a BMP in a.m.    Acute respiratory failure with hypoxia (HCC)- (present on admission)  Assessment & Plan  2/2 mental status  Extubated 9/11  Remains on room air    Benzodiazepine dependence (HCC)- (present on admission)  Assessment & Plan  Monitor for seizure/withdrawal  Po librium  EEG after sz-like activity 9/10 was negative despite jerking motions    Alcohol abuse- (present on admission)  Assessment & Plan  Reported hx of  Continue Vitamins  High dose thiamine  Seizure precautions  S/p etoh w/d protocol    Septic shock ([facility])- (present on admission)  Assessment & Plan  Resolved  Broad-spectrum antibiotics: s/p vanc/zosyn --> C3, now off abx  Blood culture, CSF culture, urinalysis allnegative  Monitor off antibiotics        JEWELS (acute kidney injury) (HCC)- (present on admission)  Assessment & Plan  Consistent with ATN 2/2  hypotension, dehydration and sepsis  Resolved  Avoid nephrotoxins.  Renal dose all medications.         VTE prophylaxis: Lovenox

## 2020-09-30 LAB
ALBUMIN SERPL BCP-MCNC: 3 G/DL (ref 3.2–4.9)
ALBUMIN/GLOB SERPL: 1 G/DL
ALP SERPL-CCNC: 109 U/L (ref 30–99)
ALT SERPL-CCNC: 13 U/L (ref 2–50)
ANION GAP SERPL CALC-SCNC: 13 MMOL/L (ref 7–16)
AST SERPL-CCNC: 15 U/L (ref 12–45)
BASOPHILS # BLD AUTO: 0.7 % (ref 0–1.8)
BASOPHILS # BLD: 0.06 K/UL (ref 0–0.12)
BILIRUB SERPL-MCNC: 0.4 MG/DL (ref 0.1–1.5)
BUN SERPL-MCNC: 8 MG/DL (ref 8–22)
CALCIUM SERPL-MCNC: 9.4 MG/DL (ref 8.5–10.5)
CHLORIDE SERPL-SCNC: 112 MMOL/L (ref 96–112)
CO2 SERPL-SCNC: 22 MMOL/L (ref 20–33)
CREAT SERPL-MCNC: 0.88 MG/DL (ref 0.5–1.4)
EOSINOPHIL # BLD AUTO: 0.35 K/UL (ref 0–0.51)
EOSINOPHIL NFR BLD: 4.2 % (ref 0–6.9)
ERYTHROCYTE [DISTWIDTH] IN BLOOD BY AUTOMATED COUNT: 49.6 FL (ref 35.9–50)
GLOBULIN SER CALC-MCNC: 3.1 G/DL (ref 1.9–3.5)
GLUCOSE SERPL-MCNC: 147 MG/DL (ref 65–99)
HCT VFR BLD AUTO: 33.1 % (ref 42–52)
HGB BLD-MCNC: 10.4 G/DL (ref 14–18)
IMM GRANULOCYTES # BLD AUTO: 0.06 K/UL (ref 0–0.11)
IMM GRANULOCYTES NFR BLD AUTO: 0.7 % (ref 0–0.9)
LYMPHOCYTES # BLD AUTO: 1.66 K/UL (ref 1–4.8)
LYMPHOCYTES NFR BLD: 20.1 % (ref 22–41)
MAGNESIUM SERPL-MCNC: 2.4 MG/DL (ref 1.5–2.5)
MCH RBC QN AUTO: 30.1 PG (ref 27–33)
MCHC RBC AUTO-ENTMCNC: 31.4 G/DL (ref 33.7–35.3)
MCV RBC AUTO: 95.9 FL (ref 81.4–97.8)
MONOCYTES # BLD AUTO: 0.62 K/UL (ref 0–0.85)
MONOCYTES NFR BLD AUTO: 7.5 % (ref 0–13.4)
NEUTROPHILS # BLD AUTO: 5.5 K/UL (ref 1.82–7.42)
NEUTROPHILS NFR BLD: 66.8 % (ref 44–72)
NRBC # BLD AUTO: 0 K/UL
NRBC BLD-RTO: 0 /100 WBC
PHOSPHATE SERPL-MCNC: 4.8 MG/DL (ref 2.5–4.5)
PLATELET # BLD AUTO: 254 K/UL (ref 164–446)
PMV BLD AUTO: 9.9 FL (ref 9–12.9)
POTASSIUM SERPL-SCNC: 3.2 MMOL/L (ref 3.6–5.5)
PROT SERPL-MCNC: 6.1 G/DL (ref 6–8.2)
RBC # BLD AUTO: 3.45 M/UL (ref 4.7–6.1)
SODIUM SERPL-SCNC: 147 MMOL/L (ref 135–145)
WBC # BLD AUTO: 8.3 K/UL (ref 4.8–10.8)

## 2020-09-30 PROCEDURE — 700102 HCHG RX REV CODE 250 W/ 637 OVERRIDE(OP): Performed by: STUDENT IN AN ORGANIZED HEALTH CARE EDUCATION/TRAINING PROGRAM

## 2020-09-30 PROCEDURE — 84100 ASSAY OF PHOSPHORUS: CPT

## 2020-09-30 PROCEDURE — A9270 NON-COVERED ITEM OR SERVICE: HCPCS | Performed by: FAMILY MEDICINE

## 2020-09-30 PROCEDURE — 700111 HCHG RX REV CODE 636 W/ 250 OVERRIDE (IP): Performed by: STUDENT IN AN ORGANIZED HEALTH CARE EDUCATION/TRAINING PROGRAM

## 2020-09-30 PROCEDURE — A9270 NON-COVERED ITEM OR SERVICE: HCPCS | Performed by: HOSPITALIST

## 2020-09-30 PROCEDURE — 700102 HCHG RX REV CODE 250 W/ 637 OVERRIDE(OP): Performed by: HOSPITALIST

## 2020-09-30 PROCEDURE — 770006 HCHG ROOM/CARE - MED/SURG/GYN SEMI*

## 2020-09-30 PROCEDURE — 700102 HCHG RX REV CODE 250 W/ 637 OVERRIDE(OP): Performed by: FAMILY MEDICINE

## 2020-09-30 PROCEDURE — 80053 COMPREHEN METABOLIC PANEL: CPT

## 2020-09-30 PROCEDURE — A9270 NON-COVERED ITEM OR SERVICE: HCPCS | Performed by: STUDENT IN AN ORGANIZED HEALTH CARE EDUCATION/TRAINING PROGRAM

## 2020-09-30 PROCEDURE — 85025 COMPLETE CBC W/AUTO DIFF WBC: CPT

## 2020-09-30 PROCEDURE — 99231 SBSQ HOSP IP/OBS SF/LOW 25: CPT | Performed by: HOSPITALIST

## 2020-09-30 PROCEDURE — 83735 ASSAY OF MAGNESIUM: CPT

## 2020-09-30 PROCEDURE — 36415 COLL VENOUS BLD VENIPUNCTURE: CPT

## 2020-09-30 RX ADMIN — ENOXAPARIN SODIUM 40 MG: 40 INJECTION SUBCUTANEOUS at 17:51

## 2020-09-30 RX ADMIN — CHLORDIAZEPOXIDE HYDROCHLORIDE 25 MG: 25 CAPSULE ORAL at 17:50

## 2020-09-30 RX ADMIN — TAMSULOSIN HYDROCHLORIDE 0.8 MG: 0.4 CAPSULE ORAL at 09:12

## 2020-09-30 RX ADMIN — DOCUSATE SODIUM 50 MG AND SENNOSIDES 8.6 MG 2 TABLET: 8.6; 5 TABLET, FILM COATED ORAL at 04:54

## 2020-09-30 RX ADMIN — POTASSIUM CHLORIDE 20 MEQ: 20 TABLET, EXTENDED RELEASE ORAL at 04:54

## 2020-09-30 RX ADMIN — DOCUSATE SODIUM 50 MG AND SENNOSIDES 8.6 MG 2 TABLET: 8.6; 5 TABLET, FILM COATED ORAL at 17:51

## 2020-09-30 RX ADMIN — Medication 100 MG: at 04:54

## 2020-09-30 RX ADMIN — Medication 400 MG: at 04:54

## 2020-09-30 RX ADMIN — RISPERIDONE 1 MG: 0.5 TABLET ORAL at 17:51

## 2020-09-30 RX ADMIN — POTASSIUM CHLORIDE 20 MEQ: 20 TABLET, EXTENDED RELEASE ORAL at 17:51

## 2020-09-30 ASSESSMENT — ENCOUNTER SYMPTOMS
VOMITING: 0
CLAUDICATION: 0
NEUROLOGICAL NEGATIVE: 1
ORTHOPNEA: 0
COUGH: 0
EYES NEGATIVE: 1
SPUTUM PRODUCTION: 0
HEMOPTYSIS: 0
ABDOMINAL PAIN: 0
NAUSEA: 0
PALPITATIONS: 0
MUSCULOSKELETAL NEGATIVE: 1
HEARTBURN: 0
HALLUCINATIONS: 0

## 2020-09-30 ASSESSMENT — FIBROSIS 4 INDEX: FIB4 SCORE: 0.8

## 2020-09-30 NOTE — PROGRESS NOTES
Hospital Medicine Daily Progress Note    Date of Service  9/30/2020    Chief Complaint  50 y.o. male with a pmhx of HTN, chronic pain and alcohol abuse who presented 9/9/2020 with altered mental status.     Hospital Course  50 y.o. male with a pmhx of HTN, chronic pain and alcohol abuse who presented 9/9/2020 with altered mental status, he was found obtunded in his house by his ex-wife who was checking on him as he had not been seen for 2 days or been able to get a hold of him.  He was found in a chair,  A&Ox0 and hypotensive.  He arrived in the ER with a GCS of 7 (E1,V1,M5) and severely hypotensive.  The patient was intubated for airway protection and central venous access was obtained for administration of high-volume crystalloid resuscitation and vasopressor therapy.    CT head and abdomen and pelvis were all unremarkable. He was briefly treated with antibiotics for concern for an underlying infection and a lumbar tap done was negative. Cultures remained negative.   EEG was done for concern for seizure-like activity was without any epileptiform activity.  He was extubated on 9/11 and has been able to protect his airway ever since. He is currently receiving treatment with benzos for concern for benzo withdrawals and IV thiamine for his history of alcohol abuse.    Interval Problem Update  9/29: Patient was seen and examined by me today.  Found to be resting in bed comfortably.  We will continue his Librium and Risperdal for now.  Try to take off restraints today.  Repeat blood work in the a.m.  SNF placement is pending.  9/30: Patient was found to be in bed without any complaints.  We will continue Librium and Risperdal.  Continue restraints and Gallardo catheter.  Placement is pending.  Consultants/Specialty  Intensivist    Code Status  Full Code    Disposition  Pending [facility]    Review of Systems  Review of Systems   Constitutional: Negative for malaise/fatigue.   HENT: Negative.    Eyes: Negative.    Respiratory:  Negative for cough, hemoptysis and sputum production.    Cardiovascular: Negative for chest pain, palpitations, orthopnea and claudication.   Gastrointestinal: Negative for abdominal pain, heartburn, nausea and vomiting.   Genitourinary: Negative for dysuria, frequency and urgency.   Musculoskeletal: Negative.    Neurological: Negative.    Psychiatric/Behavioral: Negative for hallucinations.       Physical Exam  Temp:  [36.6 °C (97.9 °F)-37.2 °C (98.9 °F)] 36.6 °C (97.9 °F)  Pulse:  [] 86  Resp:  [17-18] 18  BP: (128-133)/(74-84) 128/80  SpO2:  [92 %-96 %] 96 %    Physical Exam  Vitals signs and nursing note reviewed.   Constitutional:       General: He is not in acute distress.  HENT:      Head: Normocephalic and atraumatic.      Right Ear: External ear normal.      Left Ear: External ear normal.      Nose: Nose normal.   Eyes:      Extraocular Movements: Extraocular movements intact.      Pupils: Pupils are equal, round, and reactive to light.   Neck:      Musculoskeletal: Normal range of motion and neck supple. No neck rigidity or muscular tenderness.   Cardiovascular:      Rate and Rhythm: Normal rate and regular rhythm.      Pulses: Normal pulses.   Pulmonary:      Effort: Pulmonary effort is normal.   Abdominal:      General: Bowel sounds are normal.      Palpations: Abdomen is soft.   Musculoskeletal: Normal range of motion.         General: No tenderness.   Skin:     General: Skin is warm and dry.   Neurological:      General: No focal deficit present.      Mental Status: He is alert. He is disoriented.      Comments: Oriented to self    Psychiatric:         Mood and Affect: Affect is flat.         Behavior: Behavior is combative.         Thought Content: Thought content is delusional.         Cognition and Memory: Cognition is impaired. He exhibits impaired recent memory.         Judgment: Judgment is impulsive.      Comments: Confused          Fluids    Intake/Output Summary (Last 24 hours) at  9/30/2020 1516  Last data filed at 9/30/2020 0938  Gross per 24 hour   Intake 720 ml   Output 450 ml   Net 270 ml       Laboratory  Recent Labs     09/28/20  0725 09/30/20  1023   WBC 8.4 8.3   RBC 3.15* 3.45*   HEMOGLOBIN 9.7* 10.4*   HEMATOCRIT 29.7* 33.1*   MCV 94.3 95.9   MCH 30.8 30.1   MCHC 32.7* 31.4*   RDW 48.7 49.6   PLATELETCT 242 254   MPV 9.6 9.9     Recent Labs     09/28/20  0725 09/29/20  1038 09/30/20  1023   SODIUM 142 143 147*   POTASSIUM 3.0* 3.6 3.2*   CHLORIDE 106 109 112   CO2 22 22 22   GLUCOSE 94 91 147*   BUN 5* 6* 8   CREATININE 0.69 0.70 0.88   CALCIUM 9.4 9.6 9.4                   Imaging  MR-BRAIN-W/O   Final Result      1.  Moderate diffuse cerebral atrophy.      2.  Minimal periventricular and juxtacortical white matter changes consistent with chronic microvascular ischemic gliosis.      3.  No evidence of acute infarction in the brain parenchyma.      DX-ABDOMEN FOR TUBE PLACEMENT   Final Result      Feeding tube tip overlies the second portion of the duodenum.      DX-CHEST-PORTABLE (1 VIEW)   Final Result         1.  Patchy left lung base opacity, new since prior, could represent early infiltrate            DX-CHEST-PORTABLE (1 VIEW)   Final Result         1.  Patchy left lung base opacity, new since prior, could represent early infiltrate         [id number] VIEW   Final Result      Enteric tube projects over the distal stomach/pylorus.         EC-ECHOCARDIOGRAM COMPLETE W/O CONT   Final Result      DX-CHEST-PORTABLE (1 VIEW)   Final Result         1.  No acute cardiopulmonary disease.      CT-ABDOMEN-PELVIS W/O   Final Result      1.  No renal stone or hydronephrosis.   2.  Normal appendix.   3.  No focal mesenteric inflammatory process.      DX-ABDOMEN FOR TUBE PLACEMENT   Final Result      NG tube tip projects over expected location the gastric fundus.      US-ABDOMEN COMPLETE SURVEY   Final Result         1.  Echogenic liver compatible with fatty change versus fibrosis.   2.   Gallbladder sludge without additional sonographic findings of cholecystitis.   3.  Partial atrophic bilateral kidneys with lobulated contour         CT-HEAD W/O   Final Result         1.  No acute intracranial abnormality is identified, there are nonspecific white matter changes, commonly associated with small vessel ischemic disease.  Associated mild cerebral atrophy is noted.   2.  Atherosclerosis.      DX-CHEST-PORTABLE (1 VIEW)   Final Result         1.  No acute cardiopulmonary disease.           Assessment/Plan  * Toxic encephalopathy- (present on admission)  Assessment & Plan  No clear etiology. Patient was found down for unknown period.     LP- negative  EEG was negative for seizures    Prn haldol    Po librium  Po risperdal... Increased to 1 mg nightly on 9/27/2020    Psych md consult--> they believe it is etoh related    Continue to monitor    Restrain as necessary    Mri brain negative       Urinary retention- (present on admission)  Assessment & Plan  Po flomax    Voiding trial in 1 to 2  Days.. Be aware that urologist was needed to replace this current  Gallardo as it was difficult to place     Other dysphagia- (present on admission)  Assessment & Plan  Passed speech eval now  On modified diet    Hypernatremia  Assessment & Plan  Replacing potassium and adding daily magnesium     check  a BMP in a.m.    Acute respiratory failure with hypoxia (HCC)- (present on admission)  Assessment & Plan  2/2 mental status  Extubated 9/11  Remains on room air    Benzodiazepine dependence (HCC)- (present on admission)  Assessment & Plan  Monitor for seizure/withdrawal  Po librium  EEG after sz-like activity 9/10 was negative despite jerking motions    Alcohol abuse- (present on admission)  Assessment & Plan  Reported hx of  Continue Vitamins  High dose thiamine  Seizure precautions  S/p etoh w/d protocol    Septic shock (HCC)- (present on admission)  Assessment & Plan  Resolved  Broad-spectrum antibiotics: s/p vanc/zosyn  --> C3, now off abx  Blood culture, CSF culture, urinalysis allnegative  Monitor off antibiotics        JEWELS (acute kidney injury) (HCC)- (present on admission)  Assessment & Plan  Consistent with ATN 2/2 hypotension, dehydration and sepsis  Resolved  Avoid nephrotoxins.  Renal dose all medications.         VTE prophylaxis: Lovenox

## 2020-09-30 NOTE — PROGRESS NOTES
"Assumed care at 0700 following night nurse report. Patient remains in vest and wrist restraints, bed alarms on. Patient A&O x3, assistance provided for meals. Patinet compliant with assessment, denies pain, needs met. Hourly rounding and PRN in place.   /80   Pulse 86   Temp 36.6 °C (97.9 °F) (Temporal)   Resp 18   Ht 1.803 m (5' 10.98\")   Wt 81 kg (178 lb 9.2 oz)   SpO2 96%   BMI 24.92 kg/m²     "

## 2020-09-30 NOTE — CARE PLAN
Problem: Safety - Medical Restraint  Goal: Free from restraint(s) (Restraint for Interference with Medical Device)  Description: INTERVENTIONS:  1. ONCE/SHIFT or MINIMUM Q12H: Assess and document the continuing need for restraints  2. Q24H: Continued use of restraint requires LIP to perform face to face examination and written order  3. Identify and implement measures to help patient regain control  Outcome: NOT MET     Problem: Urinary Elimination:  Goal: Ability to reestablish a normal urinary elimination pattern will improve  Outcome: NOT MET     Problem: Safety  Goal: Will remain free from falls  Outcome: PROGRESSING AS EXPECTED     Problem: Psychosocial Needs:  Goal: Level of anxiety will decrease  Outcome: PROGRESSING AS EXPECTED     Problem: Safety - Medical Restraint  Goal: Remains free of injury from restraints (Restraint for Interference with Medical Device)  Description: INTERVENTIONS:  1. Determine that other, less restrictive measures have been tried or would not be effective before applying the restraint  2. Evaluate the patient's condition at the time of restraint application  3. Inform patient/family regarding the reason for restraint  4. Q2H: Monitor safety, psychosocial status, comfort, nutrition and hydration  Outcome: PROGRESSING AS EXPECTED

## 2020-09-30 NOTE — PROGRESS NOTES
Received report from jarad RN and assumed care of pt. Pt currently sleeping. Denies any acute needs or concerns at this time. Unable to determine orientation status.    Call light within reach and fall precautions in place. Bed alarm for safety. Bilateral soft wrist restraints and vest in place for safety and to prevent pulling tubes. Will monitor

## 2020-10-01 LAB
ALBUMIN SERPL BCP-MCNC: 2.8 G/DL (ref 3.2–4.9)
ALBUMIN/GLOB SERPL: 1 G/DL
ALP SERPL-CCNC: 103 U/L (ref 30–99)
ALT SERPL-CCNC: 8 U/L (ref 2–50)
AMORPH CRY #/AREA URNS HPF: PRESENT /HPF
ANION GAP SERPL CALC-SCNC: 11 MMOL/L (ref 7–16)
APPEARANCE UR: ABNORMAL
AST SERPL-CCNC: 10 U/L (ref 12–45)
BACTERIA #/AREA URNS HPF: ABNORMAL /HPF
BASOPHILS # BLD AUTO: 0.7 % (ref 0–1.8)
BASOPHILS # BLD: 0.07 K/UL (ref 0–0.12)
BILIRUB SERPL-MCNC: 0.3 MG/DL (ref 0.1–1.5)
BILIRUB UR QL STRIP.AUTO: NEGATIVE
BUN SERPL-MCNC: 9 MG/DL (ref 8–22)
CALCIUM SERPL-MCNC: 9.4 MG/DL (ref 8.5–10.5)
CHLORIDE SERPL-SCNC: 112 MMOL/L (ref 96–112)
CO2 SERPL-SCNC: 22 MMOL/L (ref 20–33)
COLOR UR: YELLOW
CREAT SERPL-MCNC: 0.85 MG/DL (ref 0.5–1.4)
EOSINOPHIL # BLD AUTO: 0.34 K/UL (ref 0–0.51)
EOSINOPHIL NFR BLD: 3.6 % (ref 0–6.9)
EPI CELLS #/AREA URNS HPF: NEGATIVE /HPF
ERYTHROCYTE [DISTWIDTH] IN BLOOD BY AUTOMATED COUNT: 50.7 FL (ref 35.9–50)
GLOBULIN SER CALC-MCNC: 2.9 G/DL (ref 1.9–3.5)
GLUCOSE SERPL-MCNC: 104 MG/DL (ref 65–99)
GLUCOSE UR STRIP.AUTO-MCNC: NEGATIVE MG/DL
HCT VFR BLD AUTO: 30.6 % (ref 42–52)
HGB BLD-MCNC: 9.7 G/DL (ref 14–18)
HYALINE CASTS #/AREA URNS LPF: ABNORMAL /LPF
IMM GRANULOCYTES # BLD AUTO: 0.08 K/UL (ref 0–0.11)
IMM GRANULOCYTES NFR BLD AUTO: 0.8 % (ref 0–0.9)
KETONES UR STRIP.AUTO-MCNC: NEGATIVE MG/DL
LEUKOCYTE ESTERASE UR QL STRIP.AUTO: ABNORMAL
LYMPHOCYTES # BLD AUTO: 1.93 K/UL (ref 1–4.8)
LYMPHOCYTES NFR BLD: 20.3 % (ref 22–41)
MCH RBC QN AUTO: 30.6 PG (ref 27–33)
MCHC RBC AUTO-ENTMCNC: 31.7 G/DL (ref 33.7–35.3)
MCV RBC AUTO: 96.5 FL (ref 81.4–97.8)
MICRO URNS: ABNORMAL
MONOCYTES # BLD AUTO: 0.67 K/UL (ref 0–0.85)
MONOCYTES NFR BLD AUTO: 7 % (ref 0–13.4)
NEUTROPHILS # BLD AUTO: 6.44 K/UL (ref 1.82–7.42)
NEUTROPHILS NFR BLD: 67.6 % (ref 44–72)
NITRITE UR QL STRIP.AUTO: POSITIVE
NRBC # BLD AUTO: 0 K/UL
NRBC BLD-RTO: 0 /100 WBC
PH UR STRIP.AUTO: 6 [PH] (ref 5–8)
PLATELET # BLD AUTO: 231 K/UL (ref 164–446)
PMV BLD AUTO: 9.8 FL (ref 9–12.9)
POTASSIUM SERPL-SCNC: 3.7 MMOL/L (ref 3.6–5.5)
PROT SERPL-MCNC: 5.7 G/DL (ref 6–8.2)
PROT UR QL STRIP: 100 MG/DL
RBC # BLD AUTO: 3.17 M/UL (ref 4.7–6.1)
RBC # URNS HPF: ABNORMAL /HPF
RBC UR QL AUTO: ABNORMAL
SODIUM SERPL-SCNC: 145 MMOL/L (ref 135–145)
SP GR UR REFRACTOMETRY: 1.02
UROBILINOGEN UR STRIP.AUTO-MCNC: 0.2 MG/DL
WBC # BLD AUTO: 9.5 K/UL (ref 4.8–10.8)
WBC #/AREA URNS HPF: ABNORMAL /HPF

## 2020-10-01 PROCEDURE — 87186 SC STD MICRODIL/AGAR DIL: CPT

## 2020-10-01 PROCEDURE — 81001 URINALYSIS AUTO W/SCOPE: CPT

## 2020-10-01 PROCEDURE — 700111 HCHG RX REV CODE 636 W/ 250 OVERRIDE (IP): Performed by: STUDENT IN AN ORGANIZED HEALTH CARE EDUCATION/TRAINING PROGRAM

## 2020-10-01 PROCEDURE — 85025 COMPLETE CBC W/AUTO DIFF WBC: CPT

## 2020-10-01 PROCEDURE — 87086 URINE CULTURE/COLONY COUNT: CPT

## 2020-10-01 PROCEDURE — A9270 NON-COVERED ITEM OR SERVICE: HCPCS | Performed by: FAMILY MEDICINE

## 2020-10-01 PROCEDURE — 99231 SBSQ HOSP IP/OBS SF/LOW 25: CPT | Performed by: HOSPITALIST

## 2020-10-01 PROCEDURE — 770006 HCHG ROOM/CARE - MED/SURG/GYN SEMI*

## 2020-10-01 PROCEDURE — 36415 COLL VENOUS BLD VENIPUNCTURE: CPT

## 2020-10-01 PROCEDURE — A9270 NON-COVERED ITEM OR SERVICE: HCPCS | Performed by: STUDENT IN AN ORGANIZED HEALTH CARE EDUCATION/TRAINING PROGRAM

## 2020-10-01 PROCEDURE — 80053 COMPREHEN METABOLIC PANEL: CPT

## 2020-10-01 PROCEDURE — 700102 HCHG RX REV CODE 250 W/ 637 OVERRIDE(OP): Performed by: FAMILY MEDICINE

## 2020-10-01 PROCEDURE — 97116 GAIT TRAINING THERAPY: CPT | Mod: CQ

## 2020-10-01 PROCEDURE — 92523 SPEECH SOUND LANG COMPREHEN: CPT

## 2020-10-01 PROCEDURE — 97530 THERAPEUTIC ACTIVITIES: CPT | Mod: CQ

## 2020-10-01 PROCEDURE — 700102 HCHG RX REV CODE 250 W/ 637 OVERRIDE(OP): Performed by: HOSPITALIST

## 2020-10-01 PROCEDURE — A9270 NON-COVERED ITEM OR SERVICE: HCPCS | Performed by: HOSPITALIST

## 2020-10-01 PROCEDURE — 87077 CULTURE AEROBIC IDENTIFY: CPT

## 2020-10-01 PROCEDURE — 700102 HCHG RX REV CODE 250 W/ 637 OVERRIDE(OP): Performed by: STUDENT IN AN ORGANIZED HEALTH CARE EDUCATION/TRAINING PROGRAM

## 2020-10-01 RX ADMIN — TAMSULOSIN HYDROCHLORIDE 0.8 MG: 0.4 CAPSULE ORAL at 08:39

## 2020-10-01 RX ADMIN — CHLORDIAZEPOXIDE HYDROCHLORIDE 25 MG: 25 CAPSULE ORAL at 17:45

## 2020-10-01 RX ADMIN — POTASSIUM CHLORIDE 20 MEQ: 20 TABLET, EXTENDED RELEASE ORAL at 04:42

## 2020-10-01 RX ADMIN — Medication 400 MG: at 04:42

## 2020-10-01 RX ADMIN — RISPERIDONE 1 MG: 0.5 TABLET ORAL at 17:45

## 2020-10-01 RX ADMIN — Medication 100 MG: at 04:42

## 2020-10-01 RX ADMIN — DOCUSATE SODIUM 50 MG AND SENNOSIDES 8.6 MG 2 TABLET: 8.6; 5 TABLET, FILM COATED ORAL at 04:38

## 2020-10-01 RX ADMIN — ENOXAPARIN SODIUM 40 MG: 40 INJECTION SUBCUTANEOUS at 19:23

## 2020-10-01 RX ADMIN — POTASSIUM CHLORIDE 20 MEQ: 20 TABLET, EXTENDED RELEASE ORAL at 17:45

## 2020-10-01 ASSESSMENT — ENCOUNTER SYMPTOMS
NAUSEA: 0
ABDOMINAL PAIN: 0
EYES NEGATIVE: 1
COUGH: 0
ORTHOPNEA: 0
HEMOPTYSIS: 0
CLAUDICATION: 0
SPUTUM PRODUCTION: 0
PALPITATIONS: 0
MUSCULOSKELETAL NEGATIVE: 1
NEUROLOGICAL NEGATIVE: 1
VOMITING: 0
HALLUCINATIONS: 0
HEARTBURN: 0

## 2020-10-01 ASSESSMENT — COGNITIVE AND FUNCTIONAL STATUS - GENERAL
STANDING UP FROM CHAIR USING ARMS: A LOT
WALKING IN HOSPITAL ROOM: A LOT
SUGGESTED CMS G CODE MODIFIER MOBILITY: CL
MOVING TO AND FROM BED TO CHAIR: A LITTLE
TURNING FROM BACK TO SIDE WHILE IN FLAT BAD: A LITTLE
MOBILITY SCORE: 14
CLIMB 3 TO 5 STEPS WITH RAILING: A LOT
MOVING FROM LYING ON BACK TO SITTING ON SIDE OF FLAT BED: A LOT

## 2020-10-01 ASSESSMENT — FIBROSIS 4 INDEX: FIB4 SCORE: 0.75

## 2020-10-01 ASSESSMENT — GAIT ASSESSMENTS
DISTANCE (FEET): 10
GAIT LEVEL OF ASSIST: MODERATE ASSIST
ASSISTIVE DEVICE: FRONT WHEEL WALKER

## 2020-10-01 NOTE — CARE PLAN
Problem: Safety  Goal: Will remain free from falls  Outcome: PROGRESSING AS EXPECTED  Note: Remind the patient to use call light for assistance.     Problem: Fluid Volume:  Goal: Will maintain balanced intake and output  Outcome: PROGRESSING AS EXPECTED  Note: Encourage and monitor fluid intake and output.     Problem: Safety - Medical Restraint  Goal: Remains free of injury from restraints (Restraint for Interference with Medical Device)  Description: INTERVENTIONS:  1. Determine that other, less restrictive measures have been tried or would not be effective before applying the restraint  2. Evaluate the patient's condition at the time of restraint application  3. Inform patient/family regarding the reason for restraint  4. Q2H: Monitor safety, psychosocial status, comfort, nutrition and hydration  Outcome: PROGRESSING AS EXPECTED  Note: Monitor and assess the needs for restraint every 2 hours.

## 2020-10-01 NOTE — PROGRESS NOTES
[facility] Medicine Daily Progress Note    Date of Service  10/1/2020    Chief Complaint  50 y.o. male with a pmhx of HTN, chronic pain and alcohol abuse who presented 9/9/2020 with altered mental status.     Hospital Course  50 y.o. male with a pmhx of HTN, chronic pain and alcohol abuse who presented 9/9/2020 with altered mental status, he was found obtunded in his house by his ex-wife who was checking on him as he had not been seen for 2 days or been able to get a hold of him.  He was found in a chair,  A&Ox0 and hypotensive.  He arrived in the ER with a GCS of 7 (E1,V1,M5) and severely hypotensive.  The patient was intubated for airway protection and central venous access was obtained for administration of high-volume crystalloid resuscitation and vasopressor therapy.    CT head and abdomen and pelvis were all unremarkable. He was briefly treated with antibiotics for concern for an underlying infection and a lumbar tap done was negative. Cultures remained negative.   EEG was done for concern for seizure-like activity was without any epileptiform activity.  He was extubated on 9/11 and has been able to protect his airway ever since. He is currently receiving treatment with benzos for concern for benzo withdrawals and IV thiamine for his history of alcohol abuse.    Interval Problem Update  9/29: Patient was seen and examined by me today.  Found to be resting in bed comfortably.  We will continue his Librium and Risperdal for now.  Try to take off restraints today.  Repeat blood work in the a.m.  SNF placement is pending.  9/30: Patient was found to be in bed without any complaints.  We will continue Librium and Risperdal.  Continue restraints and Gallardo catheter.  Placement is pending.  10/1: Continue current management.  Gallardo found to have sediments and I will send his for urinalysis.  Continue to find placement.  Consultants/Specialty  Intensivist    Code Status  Full Code    Disposition  Pending [facility]    Review of  Systems  Review of Systems   Constitutional: Negative for malaise/fatigue.   HENT: Negative.    Eyes: Negative.    Respiratory: Negative for cough, hemoptysis and sputum production.    Cardiovascular: Negative for chest pain, palpitations, orthopnea and claudication.   Gastrointestinal: Negative for abdominal pain, heartburn, nausea and vomiting.   Genitourinary: Negative for dysuria, frequency and urgency.   Musculoskeletal: Negative.    Neurological: Negative.    Psychiatric/Behavioral: Negative for hallucinations.       Physical Exam  Temp:  [36.1 °C (97 °F)-37.2 °C (98.9 °F)] 37.2 °C (98.9 °F)  Pulse:  [] 90  Resp:  [16-17] 16  BP: (127-139)/(73-95) 139/80  SpO2:  [93 %-95 %] 93 %    Physical Exam  Vitals signs and nursing note reviewed.   Constitutional:       General: He is not in acute distress.  HENT:      Head: Normocephalic and atraumatic.      Right Ear: External ear normal.      Left Ear: External ear normal.      Nose: Nose normal.   Eyes:      Extraocular Movements: Extraocular movements intact.      Pupils: Pupils are equal, round, and reactive to light.   Neck:      Musculoskeletal: Normal range of motion and neck supple. No neck rigidity or muscular tenderness.   Cardiovascular:      Rate and Rhythm: Normal rate and regular rhythm.      Pulses: Normal pulses.   Pulmonary:      Effort: Pulmonary effort is normal.   Abdominal:      General: Bowel sounds are normal.      Palpations: Abdomen is soft.   Musculoskeletal: Normal range of motion.         General: No tenderness.   Skin:     General: Skin is warm and dry.   Neurological:      General: No focal deficit present.      Mental Status: He is alert. He is disoriented.      Comments: Oriented to self    Psychiatric:         Mood and Affect: Affect is flat.         Behavior: Behavior is combative.         Thought Content: Thought content is delusional.         Cognition and Memory: Cognition is impaired. He exhibits impaired recent memory.          Judgment: Judgment is impulsive.      Comments: Confused          Fluids    Intake/Output Summary (Last 24 hours) at 10/1/2020 1139  Last data filed at 10/1/2020 1058  Gross per 24 hour   Intake 1080 ml   Output 700 ml   Net 380 ml       Laboratory  Recent Labs     09/30/20  1023 10/01/20  0042   WBC 8.3 9.5   RBC 3.45* 3.17*   HEMOGLOBIN 10.4* 9.7*   HEMATOCRIT 33.1* 30.6*   MCV 95.9 96.5   MCH 30.1 30.6   MCHC 31.4* 31.7*   RDW 49.6 50.7*   PLATELETCT 254 231   MPV 9.9 9.8     Recent Labs     09/29/20  1038 09/30/20  1023 10/01/20  0042   SODIUM 143 147* 145   POTASSIUM 3.6 3.2* 3.7   CHLORIDE 109 112 112   CO2 22 22 22   GLUCOSE 91 147* 104*   BUN 6* 8 9   CREATININE 0.70 0.88 0.85   CALCIUM 9.6 9.4 9.4                   Imaging  MR-BRAIN-W/O   Final Result      1.  Moderate diffuse cerebral atrophy.      2.  Minimal periventricular and juxtacortical white matter changes consistent with chronic microvascular ischemic gliosis.      3.  No evidence of acute infarction in the brain parenchyma.      DX-ABDOMEN FOR TUBE PLACEMENT   Final Result      Feeding tube tip overlies the second portion of the duodenum.      DX-CHEST-PORTABLE (1 VIEW)   Final Result         1.  Patchy left lung base opacity, new since prior, could represent early infiltrate            DX-CHEST-PORTABLE (1 VIEW)   Final Result         1.  Patchy left lung base opacity, new since prior, could represent early infiltrate         KS-SUNKNDO-7 VIEW   Final Result      Enteric tube projects over the distal stomach/pylorus.         EC-ECHOCARDIOGRAM COMPLETE W/O CONT   Final Result      DX-CHEST-PORTABLE (1 VIEW)   Final Result         1.  No acute cardiopulmonary disease.      CT-ABDOMEN-PELVIS W/O   Final Result      1.  No renal stone or hydronephrosis.   2.  Normal appendix.   3.  No focal mesenteric inflammatory process.      DX-ABDOMEN FOR TUBE PLACEMENT   Final Result      NG tube tip projects over expected location the gastric fundus.       US-ABDOMEN COMPLETE SURVEY   Final Result         1.  Echogenic liver compatible with fatty change versus fibrosis.   2.  Gallbladder sludge without additional sonographic findings of cholecystitis.   3.  Partial atrophic bilateral kidneys with lobulated contour         CT-HEAD W/O   Final Result         1.  No acute intracranial abnormality is identified, there are nonspecific white matter changes, commonly associated with small vessel ischemic disease.  Associated mild cerebral atrophy is noted.   2.  Atherosclerosis.      DX-CHEST-PORTABLE (1 VIEW)   Final Result         1.  No acute cardiopulmonary disease.           Assessment/Plan  * Toxic encephalopathy- (present on admission)  Assessment & Plan  No clear etiology. Patient was found down for unknown period.     LP- negative  EEG was negative for seizures    Prn haldol    Po librium  Po risperdal... Increased to 1 mg nightly on 9/27/2020    Psych md consult--> they believe it is etoh related    Continue to monitor    Restrain as necessary    Mri brain negative       Urinary retention- (present on admission)  Assessment & Plan  Po flomax    Voiding trial in 1 to 2  Days.. Be aware that urologist was needed to replace this current  Gallardo as it was difficult to place     Other dysphagia- (present on admission)  Assessment & Plan  Passed speech eval now  On modified diet    Hypernatremia  Assessment & Plan  Replacing potassium and adding daily magnesium     check  a BMP in a.m.    Acute respiratory failure with hypoxia (HCC)- (present on admission)  Assessment & Plan  2/2 mental status  Extubated 9/11  Remains on room air    Benzodiazepine dependence (HCC)- (present on admission)  Assessment & Plan  Monitor for seizure/withdrawal  Po librium  EEG after sz-like activity 9/10 was negative despite jerking motions    Alcohol abuse- (present on admission)  Assessment & Plan  Reported hx of  Continue Vitamins  High dose thiamine  Seizure precautions  S/p etoh w/d  protocol    Septic shock (HCC)- (present on admission)  Assessment & Plan  Resolved  Broad-spectrum antibiotics: s/p vanc/zosyn --> C3, now off abx  Blood culture, CSF culture, urinalysis allnegative  Monitor off antibiotics        JEWELS (acute kidney injury) ([facility])- (present on admission)  Assessment & Plan  Consistent with ATN 2/2 hypotension, dehydration and sepsis  Resolved  Avoid nephrotoxins.  Renal dose all medications.         VTE prophylaxis: Lovenox

## 2020-10-01 NOTE — PROGRESS NOTES
"Assumed care at 0700 following night nurse report. Patient remains in vest and wrist restraints, bed alarms on. Patient A&O x2/3, assistance provided for meals. Patinet compliant with assessment, denies pain, needs met. Hourly rounding and PRN in place. /80   Pulse 90   Temp 37.2 °C (98.9 °F) (Temporal)   Resp 16   Ht 1.803 m (5' 10.98\")   Wt 84 kg (185 lb 3 oz)   SpO2 93%   BMI 25.84 kg/m²     "

## 2020-10-01 NOTE — THERAPY
"Speech Language Pathology   Initial Assessment     Patient Name: Yury Blaek  AGE:  50 y.o., SEX:  male  Medical Record #: [id number]  Today's Date: 10/1/2020          Assessment    Patient is 50 y.o. male with a pmhx of HTN, chronic pain and alcohol abuse who presented 9/9/2020 with altered mental status, he was found obtunded in his house by his ex-wife who was checking on him as he had not been seen for 2 days or been able to get a hold of him.The patient was intubated for airway protection from  9/9/2020-9/11/2020. Chest xray results from 9/9/2020 were unremarkable. Patient has reportedly had persistent confusion since admission and SLP evaluation was ordered. The patient was awake, alert and oriented to self only. The patient was unable to retain orientation information despite use of environmental strategies. The patient was pleasant and cooperative throughout assessment. The patient was given a variety of standardized and non-standardized cognitive assessments. Testing revealed profound deficits in orientation, visuo-spatial construction, memory and judgement. Moderate deficits appreciated in reading comprehension and written expression. Mild deficits noted in attention, auditory comprehension, calculations and simple reasoning. The patient was unaware of deficits and appeared to \"call out\" to others in the room however, No one else was talking to him. The patient was also noted to have delayed processing time however, accuracy improved when provided delay for response.     Plan:     Recommendations: 1) SLP following for trial cognitive therapy to further assess for carry-over of skilled SLP strategies. 2) Given current cognitive deficits, recommend 24/7 supervision upon discharge from this facility to ensure safety.     Recommend Speech Therapy 3 times per week until therapy goals are met for the following treatments:  Reading Training and Cognitive-Linguistic Training.    Discharge Recommendations: " "Recommend post-acute placement for additional speech therapy services prior to discharge home       Objective       10/01/20 1226   Verbal Expression   Verbal Output Automatic Supervision (5)  (tangential)   Verbal Output: Phrases Supervision (5)   Verbal Output Conversation Supervision (5)   Verbal Output Functional Supervision (5)   Repetition: Single Words Within Functional Limits (6-7)   Repetition: Phrases Within Functional Limits (6-7)   Repetition: Sentences Within Functional Limits (6-7)   Naming Within Functional Limits (6-7)   Auditory Comprehension   Identifies Body Parts Within Functional Limits (6-7)   Follows One Unit Commands Within Functional Limits (6-7)   Follows Two Unit Commands Supervision (5)   Follows Three Unit Commands Moderate (3)   Understands Paragraph Moderate (3)   Understands Simple, Structured Conversation  Supervision (5)   Understands Complex Conversation Moderate (3)   Comments Delayed processing noted   Reading Comprehension   Following Written Direction Minimal (4)   Functional Reading Materials Moderate (3)   Written Expression   Formulates: Sentence Moderate (3)   Dominant Hand Right   Cognitive-Linguistic   Level of Consciousness Confused   Orientation Level Not Oriented to Reason;Not Oriented to Place;Not Oriented to Time;Not Oriented to Day;Not Oriented to Year;Not Oriented to Month   Sustained Attention Supervision (5)   Divided Attention Minimal (4)   Short Term Memory Profound (1)   Immediate Memory Profound (1)   Simple Reasoning / Problem Solving Minimal (4)   Complex Reasoning  / Problem Solving Severe (2)   Insight into Deficits Profound (1)   Verbal Sequencing (Simple) Severe (2)   Auditory Math Minimal (4)   Medication Management  Profound (1)   Outcome Measures   Outcome Measures Utilized   (COGNISTAT- Plus non-standardized)   Patient / Family Goals   Patient / Family Goal #1 \"Can we go for a walk?\"   Short Term Goals   Short Term Goal # 3 The patient will utilize " memory strategies during STM tasks to improved recall to >50% acurracy.    Short Term Goal # 4 The patient will complete functional reading comprehension tasks with >60% accuracy given min A.

## 2020-10-01 NOTE — THERAPY
Physical Therapy   Daily Treatment     Patient Name: Yury Blake  Age:  50 y.o., Sex:  male  Medical Record #: [id number]  Today's Date: 10/1/2020     Precautions: Fall Risk, Swallow Precautions ( See Comments)    Assessment    Pt was pleasant and agreeable to therapy session. Rn present to assist with mobility. He required extra time to process sequencing and followed simple commands well. He required Jyoti for bed mobility. As well Jyoti with STS and HHAx2. He was able to ambulate a short distance with HHA and short shuffling steps. He required more vc for sequencing and hand placement during transfers. Will continue to follow while in house.     Plan    Continue current treatment plan.    DC Equipment Recommendations: Unable to determine at this time  Discharge Recommendations: Recommend post-acute placement for additional physical therapy services prior to discharge home       10/01/20 1500   Gait Analysis   Gait Level Of Assist Moderate Assist   Assistive Device Front Wheel Walker   Distance (Feet) 10   # of Times Distance was Traveled 1   Skilled Intervention Verbal Cuing;Tactile Cuing   Comments distance limited by patient ready and requesting to sit.    Bed Mobility    Supine to Sit Minimal Assist   Sit to Supine   (left up in wc )   Scooting Supervised   Rolling Supervised   Skilled Intervention Verbal Cuing   Comments extra time and hob elevated.    Functional Mobility   Sit to Stand Minimal Assist   Bed, Chair, Wheelchair Transfer Minimal Assist   Skilled Intervention Verbal Cuing;Tactile Cuing;Sequencing   Comments HHAx2. Vc for hand placement and slow and controlled descend.    Short Term Goals    Short Term Goal # 2 Patient will move sitting<>standing with supervision within 6tx in order to initiate gait and transfers   Goal Outcome # 2 Goal not met   Short Term Goal # 3 Patient will ambulate 150ft with supervision within 6tx in order to access environment   Goal Outcome # 3 Goal not met

## 2020-10-01 NOTE — DISCHARGE PLANNING
Anticipated Discharge Disposition:   · TBD (SNF and/or LTC)    Action:   · Pt discussed in IDT rounds. Pt remains unable to participate in care. ST order for cognitive evaluation, pending determination will discuss psychiatry evaluation to determine capacity and need to potentially pursue guardianship.     Barriers to Discharge:   · Lack of insurance for coverage for placement.   · Lack of NOK (pt does have brother but relationship is strained and pt and brother are currently in a legal dispute over their mother's estate) to help with medical decisions as pt is not fully orientated.   Plan:   · Care coordination will continue to follow up and provide assistance with discharge plans/barriers.

## 2020-10-01 NOTE — PROGRESS NOTES
Assume care of pt at 1900. Report received from day shift RN. Pt is A/O X2. Reorient the patient on place and situation. Pain is 0/10. Pt is lying and resting in bed. Patient on restrain on vest and both wrist. Monitor and assess the patient every 2 hours for the restraint. Offer fluids and assess the skin. Bed in lowest and locked position, call light within reach, hourly rounding in place. Labs reviewed. Communication board updated. Will continue to monitor.

## 2020-10-02 PROBLEM — N30.00 ACUTE CYSTITIS: Status: ACTIVE | Noted: 2020-10-02

## 2020-10-02 LAB
ALBUMIN SERPL BCP-MCNC: 2.7 G/DL (ref 3.2–4.9)
ALBUMIN/GLOB SERPL: 0.9 G/DL
ALP SERPL-CCNC: 103 U/L (ref 30–99)
ALT SERPL-CCNC: 9 U/L (ref 2–50)
ANION GAP SERPL CALC-SCNC: 11 MMOL/L (ref 7–16)
AST SERPL-CCNC: 13 U/L (ref 12–45)
BASOPHILS # BLD AUTO: 0.7 % (ref 0–1.8)
BASOPHILS # BLD: 0.07 K/UL (ref 0–0.12)
BILIRUB SERPL-MCNC: 0.4 MG/DL (ref 0.1–1.5)
BUN SERPL-MCNC: 9 MG/DL (ref 8–22)
CALCIUM SERPL-MCNC: 9.3 MG/DL (ref 8.5–10.5)
CHLORIDE SERPL-SCNC: 107 MMOL/L (ref 96–112)
CO2 SERPL-SCNC: 23 MMOL/L (ref 20–33)
CREAT SERPL-MCNC: 0.83 MG/DL (ref 0.5–1.4)
EOSINOPHIL # BLD AUTO: 0.32 K/UL (ref 0–0.51)
EOSINOPHIL NFR BLD: 3.2 % (ref 0–6.9)
ERYTHROCYTE [DISTWIDTH] IN BLOOD BY AUTOMATED COUNT: 49.7 FL (ref 35.9–50)
GLOBULIN SER CALC-MCNC: 2.9 G/DL (ref 1.9–3.5)
GLUCOSE SERPL-MCNC: 88 MG/DL (ref 65–99)
HCT VFR BLD AUTO: 32.3 % (ref 42–52)
HGB BLD-MCNC: 10.3 G/DL (ref 14–18)
IMM GRANULOCYTES # BLD AUTO: 0.09 K/UL (ref 0–0.11)
IMM GRANULOCYTES NFR BLD AUTO: 0.9 % (ref 0–0.9)
LYMPHOCYTES # BLD AUTO: 2.02 K/UL (ref 1–4.8)
LYMPHOCYTES NFR BLD: 20.2 % (ref 22–41)
MCH RBC QN AUTO: 30.7 PG (ref 27–33)
MCHC RBC AUTO-ENTMCNC: 31.9 G/DL (ref 33.7–35.3)
MCV RBC AUTO: 96.4 FL (ref 81.4–97.8)
MONOCYTES # BLD AUTO: 0.69 K/UL (ref 0–0.85)
MONOCYTES NFR BLD AUTO: 6.9 % (ref 0–13.4)
NEUTROPHILS # BLD AUTO: 6.81 K/UL (ref 1.82–7.42)
NEUTROPHILS NFR BLD: 68.1 % (ref 44–72)
NRBC # BLD AUTO: 0 K/UL
NRBC BLD-RTO: 0 /100 WBC
PLATELET # BLD AUTO: 238 K/UL (ref 164–446)
PMV BLD AUTO: 9.9 FL (ref 9–12.9)
POTASSIUM SERPL-SCNC: 3.9 MMOL/L (ref 3.6–5.5)
PROT SERPL-MCNC: 5.6 G/DL (ref 6–8.2)
RBC # BLD AUTO: 3.35 M/UL (ref 4.7–6.1)
SODIUM SERPL-SCNC: 141 MMOL/L (ref 135–145)
WBC # BLD AUTO: 10 K/UL (ref 4.8–10.8)

## 2020-10-02 PROCEDURE — 85025 COMPLETE CBC W/AUTO DIFF WBC: CPT

## 2020-10-02 PROCEDURE — 700102 HCHG RX REV CODE 250 W/ 637 OVERRIDE(OP): Performed by: HOSPITALIST

## 2020-10-02 PROCEDURE — 700102 HCHG RX REV CODE 250 W/ 637 OVERRIDE(OP): Performed by: FAMILY MEDICINE

## 2020-10-02 PROCEDURE — 36415 COLL VENOUS BLD VENIPUNCTURE: CPT

## 2020-10-02 PROCEDURE — 99232 SBSQ HOSP IP/OBS MODERATE 35: CPT | Performed by: HOSPITALIST

## 2020-10-02 PROCEDURE — A9270 NON-COVERED ITEM OR SERVICE: HCPCS | Performed by: STUDENT IN AN ORGANIZED HEALTH CARE EDUCATION/TRAINING PROGRAM

## 2020-10-02 PROCEDURE — 51798 US URINE CAPACITY MEASURE: CPT

## 2020-10-02 PROCEDURE — A9270 NON-COVERED ITEM OR SERVICE: HCPCS | Performed by: HOSPITALIST

## 2020-10-02 PROCEDURE — 770006 HCHG ROOM/CARE - MED/SURG/GYN SEMI*

## 2020-10-02 PROCEDURE — 80053 COMPREHEN METABOLIC PANEL: CPT

## 2020-10-02 PROCEDURE — 700111 HCHG RX REV CODE 636 W/ 250 OVERRIDE (IP): Performed by: STUDENT IN AN ORGANIZED HEALTH CARE EDUCATION/TRAINING PROGRAM

## 2020-10-02 PROCEDURE — A9270 NON-COVERED ITEM OR SERVICE: HCPCS | Performed by: FAMILY MEDICINE

## 2020-10-02 PROCEDURE — 700102 HCHG RX REV CODE 250 W/ 637 OVERRIDE(OP): Performed by: STUDENT IN AN ORGANIZED HEALTH CARE EDUCATION/TRAINING PROGRAM

## 2020-10-02 RX ORDER — SULFAMETHOXAZOLE AND TRIMETHOPRIM 800; 160 MG/1; MG/1
1 TABLET ORAL EVERY 12 HOURS
Status: COMPLETED | OUTPATIENT
Start: 2020-10-02 | End: 2020-10-06

## 2020-10-02 RX ADMIN — Medication 100 MG: at 05:44

## 2020-10-02 RX ADMIN — POTASSIUM CHLORIDE 20 MEQ: 20 TABLET, EXTENDED RELEASE ORAL at 17:28

## 2020-10-02 RX ADMIN — DOCUSATE SODIUM 50 MG AND SENNOSIDES 8.6 MG 2 TABLET: 8.6; 5 TABLET, FILM COATED ORAL at 05:44

## 2020-10-02 RX ADMIN — Medication 400 MG: at 05:44

## 2020-10-02 RX ADMIN — POTASSIUM CHLORIDE 20 MEQ: 20 TABLET, EXTENDED RELEASE ORAL at 05:43

## 2020-10-02 RX ADMIN — DOCUSATE SODIUM 50 MG AND SENNOSIDES 8.6 MG 2 TABLET: 8.6; 5 TABLET, FILM COATED ORAL at 17:28

## 2020-10-02 RX ADMIN — SULFAMETHOXAZOLE AND TRIMETHOPRIM 1 TABLET: 800; 160 TABLET ORAL at 10:46

## 2020-10-02 RX ADMIN — RISPERIDONE 1 MG: 0.5 TABLET ORAL at 17:28

## 2020-10-02 RX ADMIN — CHLORDIAZEPOXIDE HYDROCHLORIDE 25 MG: 25 CAPSULE ORAL at 17:28

## 2020-10-02 RX ADMIN — ENOXAPARIN SODIUM 40 MG: 40 INJECTION SUBCUTANEOUS at 17:28

## 2020-10-02 RX ADMIN — SULFAMETHOXAZOLE AND TRIMETHOPRIM 1 TABLET: 800; 160 TABLET ORAL at 17:28

## 2020-10-02 RX ADMIN — TAMSULOSIN HYDROCHLORIDE 0.8 MG: 0.4 CAPSULE ORAL at 08:50

## 2020-10-02 ASSESSMENT — ENCOUNTER SYMPTOMS
SPUTUM PRODUCTION: 0
EYES NEGATIVE: 1
ORTHOPNEA: 0
ABDOMINAL PAIN: 0
PALPITATIONS: 0
NAUSEA: 0
MUSCULOSKELETAL NEGATIVE: 1
CLAUDICATION: 0
NEUROLOGICAL NEGATIVE: 1
COUGH: 0
VOMITING: 0
HEARTBURN: 0
HEMOPTYSIS: 0
HALLUCINATIONS: 0

## 2020-10-02 ASSESSMENT — FIBROSIS 4 INDEX: FIB4 SCORE: 0.89
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
## 2020-10-02 NOTE — PROGRESS NOTES
[facility] Medicine Daily Progress Note    Date of Service  10/2/2020    Chief Complaint  50 y.o. male with a pmhx of HTN, chronic pain and alcohol abuse who presented 9/9/2020 with altered mental status.     Hospital Course  50 y.o. male with a pmhx of HTN, chronic pain and alcohol abuse who presented 9/9/2020 with altered mental status, he was found obtunded in his house by his ex-wife who was checking on him as he had not been seen for 2 days or been able to get a hold of him.  He was found in a chair,  A&Ox0 and hypotensive.  He arrived in the ER with a GCS of 7 (E1,V1,M5) and severely hypotensive.  The patient was intubated for airway protection and central venous access was obtained for administration of high-volume crystalloid resuscitation and vasopressor therapy.    CT head and abdomen and pelvis were all unremarkable. He was briefly treated with antibiotics for concern for an underlying infection and a lumbar tap done was negative. Cultures remained negative.   EEG was done for concern for seizure-like activity was without any epileptiform activity.  He was extubated on 9/11 and has been able to protect his airway ever since. He is currently receiving treatment with benzos for concern for benzo withdrawals and IV thiamine for his history of alcohol abuse.    Interval Problem Update  9/29: Patient was seen and examined by me today.  Found to be resting in bed comfortably.  We will continue his Librium and Risperdal for now.  Try to take off restraints today.  Repeat blood work in the a.m.  SNF placement is pending.  9/30: Patient was found to be in bed without any complaints.  We will continue Librium and Risperdal.  Continue restraints and Gallardo catheter.  Placement is pending.  10/1: Continue current management.  Gallardo found to have sediments and I will send his for urinalysis.  Continue to find placement.  10/2: Patient was seen and examined me today.  Urinalysis was positive for infection.  I have asked  the nurse to remove the Gallardo catheter.  Started patient on Bactrim for 5 days.  Consultants/Specialty  Intensivist    Code Status  Full Code    Disposition  Pending [facility]    Review of Systems  Review of Systems   Constitutional: Negative for malaise/fatigue.   HENT: Negative.    Eyes: Negative.    Respiratory: Negative for cough, hemoptysis and sputum production.    Cardiovascular: Negative for chest pain, palpitations, orthopnea and claudication.   Gastrointestinal: Negative for abdominal pain, heartburn, nausea and vomiting.   Genitourinary: Negative for dysuria, frequency and urgency.   Musculoskeletal: Negative.    Neurological: Negative.    Psychiatric/Behavioral: Negative for hallucinations.       Physical Exam  Temp:  [36.4 °C (97.5 °F)-36.7 °C (98.1 °F)] 36.7 °C (98 °F)  Pulse:  [] 80  Resp:  [14-17] 14  BP: (123-139)/(78-95) 129/78  SpO2:  [92 %-96 %] 96 %    Physical Exam  Vitals signs and nursing note reviewed.   Constitutional:       General: He is not in acute distress.  HENT:      Head: Normocephalic and atraumatic.      Right Ear: External ear normal.      Left Ear: External ear normal.      Nose: Nose normal.   Eyes:      Extraocular Movements: Extraocular movements intact.      Pupils: Pupils are equal, round, and reactive to light.   Neck:      Musculoskeletal: Normal range of motion and neck supple. No neck rigidity or muscular tenderness.   Cardiovascular:      Rate and Rhythm: Normal rate and regular rhythm.      Pulses: Normal pulses.   Pulmonary:      Effort: Pulmonary effort is normal.   Abdominal:      General: Bowel sounds are normal.      Palpations: Abdomen is soft.   Musculoskeletal: Normal range of motion.         General: No tenderness.   Skin:     General: Skin is warm and dry.   Neurological:      General: No focal deficit present.      Mental Status: He is alert. He is disoriented.      Comments: Oriented to self    Psychiatric:         Mood and Affect: Affect is flat.          Behavior: Behavior is combative.         Thought Content: Thought content is delusional.         Cognition and Memory: Cognition is impaired. He exhibits impaired recent memory.         Judgment: Judgment is impulsive.      Comments: Confused          Fluids    Intake/Output Summary (Last 24 hours) at 10/2/2020 1455  Last data filed at 10/2/2020 1000  Gross per 24 hour   Intake 240 ml   Output 600 ml   Net -360 ml       Laboratory  Recent Labs     09/30/20  1023 10/01/20  0042 10/02/20  0218   WBC 8.3 9.5 10.0   RBC 3.45* 3.17* 3.35*   HEMOGLOBIN 10.4* 9.7* 10.3*   HEMATOCRIT 33.1* 30.6* 32.3*   MCV 95.9 96.5 96.4   MCH 30.1 30.6 30.7   MCHC 31.4* 31.7* 31.9*   RDW 49.6 50.7* 49.7   PLATELETCT 254 231 238   MPV 9.9 9.8 9.9     Recent Labs     09/30/20  1023 10/01/20  0042 10/02/20  0218   SODIUM 147* 145 141   POTASSIUM 3.2* 3.7 3.9   CHLORIDE 112 112 107   CO2 22 22 23   GLUCOSE 147* 104* 88   BUN 8 9 9   CREATININE 0.88 0.85 0.83   CALCIUM 9.4 9.4 9.3                   Imaging  MR-BRAIN-W/O   Final Result      1.  Moderate diffuse cerebral atrophy.      2.  Minimal periventricular and juxtacortical white matter changes consistent with chronic microvascular ischemic gliosis.      3.  No evidence of acute infarction in the brain parenchyma.      DX-ABDOMEN FOR TUBE PLACEMENT   Final Result      Feeding tube tip overlies the second portion of the duodenum.      DX-CHEST-PORTABLE (1 VIEW)   Final Result         1.  Patchy left lung base opacity, new since prior, could represent early infiltrate            DX-CHEST-PORTABLE (1 VIEW)   Final Result         1.  Patchy left lung base opacity, new since prior, could represent early infiltrate         [id number] VIEW   Final Result      Enteric tube projects over the distal stomach/pylorus.         EC-ECHOCARDIOGRAM COMPLETE W/O CONT   Final Result      DX-CHEST-PORTABLE (1 VIEW)   Final Result         1.  No acute cardiopulmonary disease.      CT-ABDOMEN-PELVIS W/O    Final Result      1.  No renal stone or hydronephrosis.   2.  Normal appendix.   3.  No focal mesenteric inflammatory process.      DX-ABDOMEN FOR TUBE PLACEMENT   Final Result      NG tube tip projects over expected location the gastric fundus.      US-ABDOMEN COMPLETE SURVEY   Final Result         1.  Echogenic liver compatible with fatty change versus fibrosis.   2.  Gallbladder sludge without additional sonographic findings of cholecystitis.   3.  Partial atrophic bilateral kidneys with lobulated contour         CT-HEAD W/O   Final Result         1.  No acute intracranial abnormality is identified, there are nonspecific white matter changes, commonly associated with small vessel ischemic disease.  Associated mild cerebral atrophy is noted.   2.  Atherosclerosis.      DX-CHEST-PORTABLE (1 VIEW)   Final Result         1.  No acute cardiopulmonary disease.           Assessment/Plan  * Toxic encephalopathy- (present on admission)  Assessment & Plan  No clear etiology. Patient was found down for unknown period.     LP- negative  EEG was negative for seizures    Prn haldol    Po librium  Po risperdal... Increased to 1 mg nightly on 9/27/2020    Psych md consult--> they believe it is etoh related    Continue to monitor    Restrain as necessary    Mri brain negative       Acute cystitis  Assessment & Plan  UA + for bacteria, LE and nitrite  F/u urine cultures  Started pt on bactrim for 5 days  Previous cultures found      Urinary retention- (present on admission)  Assessment & Plan  Po flomax    Voiding trial in 1 to 2  Days.. Be aware that urologist was needed to replace this current  Gallardo as it was difficult to place   Remove Gallardo catheter today    Other dysphagia- (present on admission)  Assessment & Plan  Passed speech eval now  On modified diet    Hypernatremia  Assessment & Plan  Replacing potassium and adding daily magnesium     check  a BMP in a.m.    Acute respiratory failure with hypoxia (HCC)- (present on  admission)  Assessment & Plan  2/2 mental status  Extubated 9/11  Remains on room air    Benzodiazepine dependence (HCC)- (present on admission)  Assessment & Plan  Monitor for seizure/withdrawal  Po librium  EEG after sz-like activity 9/10 was negative despite jerking motions    Alcohol abuse- (present on admission)  Assessment & Plan  Reported hx of  Continue Vitamins  High dose thiamine  Seizure precautions  S/p etoh w/d protocol    Septic shock ([facility])- (present on admission)  Assessment & Plan  Resolved  Broad-spectrum antibiotics: s/p vanc/zosyn --> C3, now off abx  Blood culture, CSF culture, urinalysis allnegative  Monitor off antibiotics        JEWELS (acute kidney injury) ([facility])- (present on admission)  Assessment & Plan  Consistent with ATN 2/2 hypotension, dehydration and sepsis  Resolved  Avoid nephrotoxins.  Renal dose all medications.         VTE prophylaxis: Lovenox

## 2020-10-02 NOTE — PROGRESS NOTES
Assume care of pt at 1900. Report received from day shift RN. Pt is A/O x2. Reorient the patient on place and situation. Pain is 0/10. Pt is resting in bed. Bed in lowest and locked position, call light within reach, hourly rounding in place. Labs reviewed. Communication board updated. Will continue to monitor.

## 2020-10-02 NOTE — CARE PLAN
Problem: Communication  Goal: The ability to communicate needs accurately and effectively will improve  Outcome: PROGRESSING AS EXPECTED     Problem: Safety  Goal: Will remain free from injury  Outcome: PROGRESSING SLOWER THAN EXPECTED

## 2020-10-02 NOTE — PROGRESS NOTES
"Assumed care at 0700 following night nurse report. Patient remains in vest and wrist restraints, bed alarms on. Patient A&O x2, assistance provided for meals. Patinet compliant with assessment, denies pain, needs met. Hourly rounding and PRN in place. /78   Pulse 80   Temp 36.7 °C (98 °F) (Temporal)   Resp 14   Ht 1.803 m (5' 10.98\")   Wt 84 kg (185 lb 3 oz)   SpO2 96%   BMI 25.84 kg/m²     "

## 2020-10-03 LAB
ALBUMIN SERPL BCP-MCNC: 2.9 G/DL (ref 3.2–4.9)
ALBUMIN/GLOB SERPL: 1 G/DL
ALP SERPL-CCNC: 105 U/L (ref 30–99)
ALT SERPL-CCNC: 9 U/L (ref 2–50)
ANION GAP SERPL CALC-SCNC: 13 MMOL/L (ref 7–16)
AST SERPL-CCNC: 15 U/L (ref 12–45)
BASOPHILS # BLD AUTO: 0.8 % (ref 0–1.8)
BASOPHILS # BLD: 0.06 K/UL (ref 0–0.12)
BILIRUB SERPL-MCNC: 0.4 MG/DL (ref 0.1–1.5)
BUN SERPL-MCNC: 10 MG/DL (ref 8–22)
CALCIUM SERPL-MCNC: 9.3 MG/DL (ref 8.5–10.5)
CHLORIDE SERPL-SCNC: 107 MMOL/L (ref 96–112)
CO2 SERPL-SCNC: 20 MMOL/L (ref 20–33)
CREAT SERPL-MCNC: 0.97 MG/DL (ref 0.5–1.4)
EOSINOPHIL # BLD AUTO: 0.33 K/UL (ref 0–0.51)
EOSINOPHIL NFR BLD: 4.2 % (ref 0–6.9)
ERYTHROCYTE [DISTWIDTH] IN BLOOD BY AUTOMATED COUNT: 49.2 FL (ref 35.9–50)
GLOBULIN SER CALC-MCNC: 3 G/DL (ref 1.9–3.5)
GLUCOSE SERPL-MCNC: 93 MG/DL (ref 65–99)
HCT VFR BLD AUTO: 32.1 % (ref 42–52)
HGB BLD-MCNC: 10.2 G/DL (ref 14–18)
IMM GRANULOCYTES # BLD AUTO: 0.07 K/UL (ref 0–0.11)
IMM GRANULOCYTES NFR BLD AUTO: 0.9 % (ref 0–0.9)
LYMPHOCYTES # BLD AUTO: 1.83 K/UL (ref 1–4.8)
LYMPHOCYTES NFR BLD: 23.6 % (ref 22–41)
MCH RBC QN AUTO: 30.4 PG (ref 27–33)
MCHC RBC AUTO-ENTMCNC: 31.8 G/DL (ref 33.7–35.3)
MCV RBC AUTO: 95.8 FL (ref 81.4–97.8)
MONOCYTES # BLD AUTO: 0.48 K/UL (ref 0–0.85)
MONOCYTES NFR BLD AUTO: 6.2 % (ref 0–13.4)
NEUTROPHILS # BLD AUTO: 5 K/UL (ref 1.82–7.42)
NEUTROPHILS NFR BLD: 64.3 % (ref 44–72)
NRBC # BLD AUTO: 0 K/UL
NRBC BLD-RTO: 0 /100 WBC
PLATELET # BLD AUTO: 232 K/UL (ref 164–446)
PMV BLD AUTO: 10.2 FL (ref 9–12.9)
POTASSIUM SERPL-SCNC: 3.9 MMOL/L (ref 3.6–5.5)
PROT SERPL-MCNC: 5.9 G/DL (ref 6–8.2)
RBC # BLD AUTO: 3.35 M/UL (ref 4.7–6.1)
SODIUM SERPL-SCNC: 140 MMOL/L (ref 135–145)
WBC # BLD AUTO: 7.8 K/UL (ref 4.8–10.8)

## 2020-10-03 PROCEDURE — A9270 NON-COVERED ITEM OR SERVICE: HCPCS | Performed by: HOSPITALIST

## 2020-10-03 PROCEDURE — 700111 HCHG RX REV CODE 636 W/ 250 OVERRIDE (IP): Performed by: STUDENT IN AN ORGANIZED HEALTH CARE EDUCATION/TRAINING PROGRAM

## 2020-10-03 PROCEDURE — 99232 SBSQ HOSP IP/OBS MODERATE 35: CPT | Performed by: HOSPITALIST

## 2020-10-03 PROCEDURE — 770006 HCHG ROOM/CARE - MED/SURG/GYN SEMI*

## 2020-10-03 PROCEDURE — 700102 HCHG RX REV CODE 250 W/ 637 OVERRIDE(OP): Performed by: STUDENT IN AN ORGANIZED HEALTH CARE EDUCATION/TRAINING PROGRAM

## 2020-10-03 PROCEDURE — 51798 US URINE CAPACITY MEASURE: CPT

## 2020-10-03 PROCEDURE — 85025 COMPLETE CBC W/AUTO DIFF WBC: CPT

## 2020-10-03 PROCEDURE — 700102 HCHG RX REV CODE 250 W/ 637 OVERRIDE(OP): Performed by: HOSPITALIST

## 2020-10-03 PROCEDURE — 80053 COMPREHEN METABOLIC PANEL: CPT

## 2020-10-03 PROCEDURE — A9270 NON-COVERED ITEM OR SERVICE: HCPCS | Performed by: FAMILY MEDICINE

## 2020-10-03 PROCEDURE — A9270 NON-COVERED ITEM OR SERVICE: HCPCS | Performed by: STUDENT IN AN ORGANIZED HEALTH CARE EDUCATION/TRAINING PROGRAM

## 2020-10-03 PROCEDURE — 36415 COLL VENOUS BLD VENIPUNCTURE: CPT

## 2020-10-03 PROCEDURE — 700102 HCHG RX REV CODE 250 W/ 637 OVERRIDE(OP): Performed by: FAMILY MEDICINE

## 2020-10-03 RX ADMIN — POTASSIUM CHLORIDE 20 MEQ: 20 TABLET, EXTENDED RELEASE ORAL at 17:08

## 2020-10-03 RX ADMIN — POTASSIUM CHLORIDE 20 MEQ: 20 TABLET, EXTENDED RELEASE ORAL at 05:29

## 2020-10-03 RX ADMIN — TAMSULOSIN HYDROCHLORIDE 0.8 MG: 0.4 CAPSULE ORAL at 07:57

## 2020-10-03 RX ADMIN — SULFAMETHOXAZOLE AND TRIMETHOPRIM 1 TABLET: 800; 160 TABLET ORAL at 17:08

## 2020-10-03 RX ADMIN — Medication 100 MG: at 05:30

## 2020-10-03 RX ADMIN — SULFAMETHOXAZOLE AND TRIMETHOPRIM 1 TABLET: 800; 160 TABLET ORAL at 05:29

## 2020-10-03 RX ADMIN — Medication 400 MG: at 05:30

## 2020-10-03 RX ADMIN — DOCUSATE SODIUM 50 MG AND SENNOSIDES 8.6 MG 2 TABLET: 8.6; 5 TABLET, FILM COATED ORAL at 17:08

## 2020-10-03 RX ADMIN — ENOXAPARIN SODIUM 40 MG: 40 INJECTION SUBCUTANEOUS at 17:08

## 2020-10-03 RX ADMIN — RISPERIDONE 1 MG: 0.5 TABLET ORAL at 17:08

## 2020-10-03 RX ADMIN — DOCUSATE SODIUM 50 MG AND SENNOSIDES 8.6 MG 2 TABLET: 8.6; 5 TABLET, FILM COATED ORAL at 05:29

## 2020-10-03 ASSESSMENT — ENCOUNTER SYMPTOMS
CLAUDICATION: 0
HEMOPTYSIS: 0
NEUROLOGICAL NEGATIVE: 1
PALPITATIONS: 0
SPUTUM PRODUCTION: 0
COUGH: 0
HEARTBURN: 0
ORTHOPNEA: 0
MUSCULOSKELETAL NEGATIVE: 1
ABDOMINAL PAIN: 0
VOMITING: 0
HALLUCINATIONS: 0
EYES NEGATIVE: 1
NAUSEA: 0

## 2020-10-03 ASSESSMENT — FIBROSIS 4 INDEX: FIB4 SCORE: 1.06

## 2020-10-03 NOTE — PROGRESS NOTES
Assumed care at 1900, report received from Teresa LUCIANO.  Pt A&O x 0, not oriented to person or place. Pt denies pain at this time. Pt sitting up in bed. Restraints in place for aggression and impulsivity. Soft bilateral wrist restraints, vest restraint, and four side rails up.  Bed locked, 2 rails up, bed in lowest position. Call light in place, belongings at bedside, no needs at this time and hourly rounding in place.

## 2020-10-03 NOTE — PROGRESS NOTES
[facility] Medicine Daily Progress Note    Date of Service  10/3/2020    Chief Complaint  50 y.o. male with a pmhx of HTN, chronic pain and alcohol abuse who presented 9/9/2020 with altered mental status.     Hospital Course  50 y.o. male with a pmhx of HTN, chronic pain and alcohol abuse who presented 9/9/2020 with altered mental status, he was found obtunded in his house by his ex-wife who was checking on him as he had not been seen for 2 days or been able to get a hold of him.  He was found in a chair,  A&Ox0 and hypotensive.  He arrived in the ER with a GCS of 7 (E1,V1,M5) and severely hypotensive.  The patient was intubated for airway protection and central venous access was obtained for administration of high-volume crystalloid resuscitation and vasopressor therapy.    CT head and abdomen and pelvis were all unremarkable. He was briefly treated with antibiotics for concern for an underlying infection and a lumbar tap done was negative. Cultures remained negative.   EEG was done for concern for seizure-like activity was without any epileptiform activity.  He was extubated on 9/11 and has been able to protect his airway ever since. He is currently receiving treatment with benzos for concern for benzo withdrawals and IV thiamine for his history of alcohol abuse.    Interval Problem Update  9/29: Patient was seen and examined by me today.  Found to be resting in bed comfortably.  We will continue his Librium and Risperdal for now.  Try to take off restraints today.  Repeat blood work in the a.m.  SNF placement is pending.  9/30: Patient was found to be in bed without any complaints.  We will continue Librium and Risperdal.  Continue restraints and Gallardo catheter.  Placement is pending.  10/1: Continue current management.  Gallardo found to have sediments and I will send his for urinalysis.  Continue to find placement.  10/2: Patient was seen and examined me today.  Urinalysis was positive for infection.  I have asked  the nurse to remove the Gallardo catheter.  Started patient on Bactrim for 5 days.  10/3: Patient continues to have hallucinations and confusion.  Continue to treat with Bactrim for UTI.  Continue voiding trial after Gallardo catheter was pulled out.  Consultants/Specialty  Intensivist    Code Status  Full Code    Disposition  Pending [facility]    Review of Systems  Review of Systems   Constitutional: Negative for malaise/fatigue.   HENT: Negative.    Eyes: Negative.    Respiratory: Negative for cough, hemoptysis and sputum production.    Cardiovascular: Negative for chest pain, palpitations, orthopnea and claudication.   Gastrointestinal: Negative for abdominal pain, heartburn, nausea and vomiting.   Genitourinary: Negative for dysuria, frequency and urgency.   Musculoskeletal: Negative.    Neurological: Negative.    Psychiatric/Behavioral: Negative for hallucinations.       Physical Exam  Temp:  [36.4 °C (97.5 °F)-37 °C (98.6 °F)] 36.4 °C (97.5 °F)  Pulse:  [80-85] 80  Resp:  [14-16] 14  BP: (118-133)/(70-83) 122/74  SpO2:  [95 %-99 %] 99 %    Physical Exam  Vitals signs and nursing note reviewed.   Constitutional:       General: He is not in acute distress.  HENT:      Head: Normocephalic and atraumatic.      Right Ear: External ear normal.      Left Ear: External ear normal.      Nose: Nose normal.   Eyes:      Extraocular Movements: Extraocular movements intact.      Pupils: Pupils are equal, round, and reactive to light.   Neck:      Musculoskeletal: Normal range of motion and neck supple. No neck rigidity or muscular tenderness.   Cardiovascular:      Rate and Rhythm: Normal rate and regular rhythm.      Pulses: Normal pulses.   Pulmonary:      Effort: Pulmonary effort is normal.   Abdominal:      General: Bowel sounds are normal.      Palpations: Abdomen is soft.   Musculoskeletal: Normal range of motion.         General: No tenderness.   Skin:     General: Skin is warm and dry.   Neurological:      General: No focal  deficit present.      Mental Status: He is alert. He is disoriented.      Comments: Oriented to self    Psychiatric:         Mood and Affect: Affect is flat.         Behavior: Behavior is combative.         Thought Content: Thought content is delusional.         Cognition and Memory: Cognition is impaired. He exhibits impaired recent memory.         Judgment: Judgment is impulsive.      Comments: Confused          Fluids    Intake/Output Summary (Last 24 hours) at 10/3/2020 1134  Last data filed at 10/3/2020 0812  Gross per 24 hour   Intake 120 ml   Output 850 ml   Net -730 ml       Laboratory  Recent Labs     10/01/20  0042 10/02/20  0218 10/03/20  0041   WBC 9.5 10.0 7.8   RBC 3.17* 3.35* 3.35*   HEMOGLOBIN 9.7* 10.3* 10.2*   HEMATOCRIT 30.6* 32.3* 32.1*   MCV 96.5 96.4 95.8   MCH 30.6 30.7 30.4   MCHC 31.7* 31.9* 31.8*   RDW 50.7* 49.7 49.2   PLATELETCT 231 238 232   MPV 9.8 9.9 10.2     Recent Labs     10/01/20  0042 10/02/20  0218 10/03/20  0041   SODIUM 145 141 140   POTASSIUM 3.7 3.9 3.9   CHLORIDE 112 107 107   CO2 22 23 20   GLUCOSE 104* 88 93   BUN 9 9 10   CREATININE 0.85 0.83 0.97   CALCIUM 9.4 9.3 9.3                   Imaging  MR-BRAIN-W/O   Final Result      1.  Moderate diffuse cerebral atrophy.      2.  Minimal periventricular and juxtacortical white matter changes consistent with chronic microvascular ischemic gliosis.      3.  No evidence of acute infarction in the brain parenchyma.      DX-ABDOMEN FOR TUBE PLACEMENT   Final Result      Feeding tube tip overlies the second portion of the duodenum.      DX-CHEST-PORTABLE (1 VIEW)   Final Result         1.  Patchy left lung base opacity, new since prior, could represent early infiltrate            DX-CHEST-PORTABLE (1 VIEW)   Final Result         1.  Patchy left lung base opacity, new since prior, could represent early infiltrate         [id number] VIEW   Final Result      Enteric tube projects over the distal stomach/pylorus.          EC-ECHOCARDIOGRAM COMPLETE W/O CONT   Final Result      DX-CHEST-PORTABLE (1 VIEW)   Final Result         1.  No acute cardiopulmonary disease.      CT-ABDOMEN-PELVIS W/O   Final Result      1.  No renal stone or hydronephrosis.   2.  Normal appendix.   3.  No focal mesenteric inflammatory process.      DX-ABDOMEN FOR TUBE PLACEMENT   Final Result      NG tube tip projects over expected location the gastric fundus.      US-ABDOMEN COMPLETE SURVEY   Final Result         1.  Echogenic liver compatible with fatty change versus fibrosis.   2.  Gallbladder sludge without additional sonographic findings of cholecystitis.   3.  Partial atrophic bilateral kidneys with lobulated contour         CT-HEAD W/O   Final Result         1.  No acute intracranial abnormality is identified, there are nonspecific white matter changes, commonly associated with small vessel ischemic disease.  Associated mild cerebral atrophy is noted.   2.  Atherosclerosis.      DX-CHEST-PORTABLE (1 VIEW)   Final Result         1.  No acute cardiopulmonary disease.           Assessment/Plan  * Toxic encephalopathy- (present on admission)  Assessment & Plan  No clear etiology. Patient was found down for unknown period.     LP- negative  EEG was negative for seizures    Prn haldol    Po librium  Po risperdal... Increased to 1 mg nightly on 9/27/2020    Psych md consult--> they believe it is etoh related    Continue to monitor    Restrain as necessary    Mri brain negative       Acute cystitis  Assessment & Plan  UA + for bacteria, LE and nitrite  F/u urine cultures  Started pt on bactrim for 5 days  Previous cultures found      Urinary retention- (present on admission)  Assessment & Plan  Po flomax    Voiding trial in 1 to 2  Days.. Be aware that urologist was needed to replace this current  Gallardo as it was difficult to place   Remove Gallardo catheter today    Other dysphagia- (present on admission)  Assessment & Plan  Passed speech eval now  On modified  diet    Hypernatremia  Assessment & Plan  Replacing potassium and adding daily magnesium     check  a BMP in a.m.    Acute respiratory failure with hypoxia ([facility])- (present on admission)  Assessment & Plan  2/2 mental status  Extubated 9/11  Remains on room air    Benzodiazepine dependence ([facility])- (present on admission)  Assessment & Plan  Monitor for seizure/withdrawal  Po librium  EEG after sz-like activity 9/10 was negative despite jerking motions    Alcohol abuse- (present on admission)  Assessment & Plan  Reported hx of  Continue Vitamins  High dose thiamine  Seizure precautions  S/p etoh w/d protocol    Septic shock ([facility])- (present on admission)  Assessment & Plan  Resolved  Broad-spectrum antibiotics: s/p vanc/zosyn --> C3, now off abx  Blood culture, CSF culture, urinalysis allnegative  Monitor off antibiotics        JEWELS (acute kidney injury) ([facility])- (present on admission)  Assessment & Plan  Consistent with ATN 2/2 hypotension, dehydration and sepsis  Resolved  Avoid nephrotoxins.  Renal dose all medications.         VTE prophylaxis: Lovenox

## 2020-10-03 NOTE — PROGRESS NOTES
Pt retaining fluid. Called MD to notify. Orders to straight cath pt for bladder scan over 300. Straight cath pt and obtained 300 mL output. Will continue to monitor.

## 2020-10-03 NOTE — PROGRESS NOTES
Patient bladder scanned 637ml unable to void despite attempt. Was straight cathed by night RN. MD paged for orders.

## 2020-10-03 NOTE — CARE PLAN
Problem: Communication  Goal: The ability to communicate needs accurately and effectively will improve  Outcome: NOT MET  Re-orientation, lights on during day  Problem: Safety  Goal: Will remain free from injury  Outcome: PROGRESSING AS EXPECTED  Goal: Will remain free from falls  Outcome: PROGRESSING AS EXPECTED   1:1 Feed, restraints, hourly rounding, re-orientation, ADL's preformed with staff.

## 2020-10-03 NOTE — PROGRESS NOTES
Patient unable to void, RN ordered bladder scan per order and will place indwelling newton if meets criteria.

## 2020-10-03 NOTE — PROGRESS NOTES
"Patient AOX3 patient was able to tell RN name, thought it was 2011 or 2012, stated was in [facility] for a study. Then patient began to ramble off about a scary book he was reading that causes him to become in an altercation with police officers, RN was unable to ask anymore question and receive logical response. Patient is restrained, restraints verified and CMS intact and checked. Patient repositioned. Offered fluids and snack. Straight cathed for retention. 1:1 feed. MD orders reviewed, plan of care discussed, no evidence of learning will need re-enforcement. /74   Pulse 80   Temp 36.4 °C (97.5 °F) (Temporal)   Resp 14   Ht 1.803 m (5' 10.98\")   Wt 83.6 kg (184 lb 4.9 oz)   SpO2 99%   s  "

## 2020-10-03 NOTE — PROGRESS NOTES
MD returned page and RN received order to [facility] cath patient. Patient was straight cathed for 550 ml, New order to place indwelling newton if unable to void again.

## 2020-10-03 NOTE — CARE PLAN
Problem: Communication  Goal: The ability to communicate needs accurately and effectively will improve  Outcome: PROGRESSING AS EXPECTED  Note: Pt educated about use of call light. Pt confused and disoriented. Pt does not understand teaching and does not call appropriately. Pt able to communicate needs. Pt states no needs at this time.       Problem: Safety  Goal: Will remain free from falls  Outcome: PROGRESSING AS EXPECTED  Note: Pt assessed for risk of falls. Pt high fall risk. Fall precautions in place. Bed in lowest and locked position. Bed alarm on. Pt impulsive and aggressive. Pt in bilateral soft wrist restraints, vest and 4 side rails up with active order.Treaded slipper socks on pt. Bedside table, pt belongings, and call light in reach.

## 2020-10-04 LAB
ALBUMIN SERPL BCP-MCNC: 3 G/DL (ref 3.2–4.9)
ALBUMIN/GLOB SERPL: 1 G/DL
ALP SERPL-CCNC: 111 U/L (ref 30–99)
ALT SERPL-CCNC: 8 U/L (ref 2–50)
ANION GAP SERPL CALC-SCNC: 12 MMOL/L (ref 7–16)
AST SERPL-CCNC: 16 U/L (ref 12–45)
BASOPHILS # BLD AUTO: 0.7 % (ref 0–1.8)
BASOPHILS # BLD: 0.06 K/UL (ref 0–0.12)
BILIRUB SERPL-MCNC: 0.2 MG/DL (ref 0.1–1.5)
BUN SERPL-MCNC: 9 MG/DL (ref 8–22)
CALCIUM SERPL-MCNC: 9.2 MG/DL (ref 8.5–10.5)
CHLORIDE SERPL-SCNC: 107 MMOL/L (ref 96–112)
CO2 SERPL-SCNC: 23 MMOL/L (ref 20–33)
CREAT SERPL-MCNC: 1.12 MG/DL (ref 0.5–1.4)
EOSINOPHIL # BLD AUTO: 0.33 K/UL (ref 0–0.51)
EOSINOPHIL NFR BLD: 3.6 % (ref 0–6.9)
ERYTHROCYTE [DISTWIDTH] IN BLOOD BY AUTOMATED COUNT: 49.3 FL (ref 35.9–50)
GLOBULIN SER CALC-MCNC: 2.9 G/DL (ref 1.9–3.5)
GLUCOSE SERPL-MCNC: 102 MG/DL (ref 65–99)
HCT VFR BLD AUTO: 32.5 % (ref 42–52)
HGB BLD-MCNC: 10.2 G/DL (ref 14–18)
IMM GRANULOCYTES # BLD AUTO: 0.08 K/UL (ref 0–0.11)
IMM GRANULOCYTES NFR BLD AUTO: 0.9 % (ref 0–0.9)
LYMPHOCYTES # BLD AUTO: 1.78 K/UL (ref 1–4.8)
LYMPHOCYTES NFR BLD: 19.4 % (ref 22–41)
MCH RBC QN AUTO: 30.5 PG (ref 27–33)
MCHC RBC AUTO-ENTMCNC: 31.4 G/DL (ref 33.7–35.3)
MCV RBC AUTO: 97.3 FL (ref 81.4–97.8)
MONOCYTES # BLD AUTO: 0.52 K/UL (ref 0–0.85)
MONOCYTES NFR BLD AUTO: 5.7 % (ref 0–13.4)
NEUTROPHILS # BLD AUTO: 6.4 K/UL (ref 1.82–7.42)
NEUTROPHILS NFR BLD: 69.7 % (ref 44–72)
NRBC # BLD AUTO: 0 K/UL
NRBC BLD-RTO: 0 /100 WBC
PLATELET # BLD AUTO: 230 K/UL (ref 164–446)
PMV BLD AUTO: 10 FL (ref 9–12.9)
POTASSIUM SERPL-SCNC: 3.9 MMOL/L (ref 3.6–5.5)
PROT SERPL-MCNC: 5.9 G/DL (ref 6–8.2)
RBC # BLD AUTO: 3.34 M/UL (ref 4.7–6.1)
SODIUM SERPL-SCNC: 142 MMOL/L (ref 135–145)
WBC # BLD AUTO: 9.2 K/UL (ref 4.8–10.8)

## 2020-10-04 PROCEDURE — A9270 NON-COVERED ITEM OR SERVICE: HCPCS | Performed by: FAMILY MEDICINE

## 2020-10-04 PROCEDURE — A9270 NON-COVERED ITEM OR SERVICE: HCPCS | Performed by: HOSPITALIST

## 2020-10-04 PROCEDURE — 700111 HCHG RX REV CODE 636 W/ 250 OVERRIDE (IP): Performed by: STUDENT IN AN ORGANIZED HEALTH CARE EDUCATION/TRAINING PROGRAM

## 2020-10-04 PROCEDURE — 99232 SBSQ HOSP IP/OBS MODERATE 35: CPT | Performed by: HOSPITALIST

## 2020-10-04 PROCEDURE — 700102 HCHG RX REV CODE 250 W/ 637 OVERRIDE(OP): Performed by: HOSPITALIST

## 2020-10-04 PROCEDURE — 770006 HCHG ROOM/CARE - MED/SURG/GYN SEMI*

## 2020-10-04 PROCEDURE — A9270 NON-COVERED ITEM OR SERVICE: HCPCS | Performed by: STUDENT IN AN ORGANIZED HEALTH CARE EDUCATION/TRAINING PROGRAM

## 2020-10-04 PROCEDURE — 80053 COMPREHEN METABOLIC PANEL: CPT

## 2020-10-04 PROCEDURE — 700102 HCHG RX REV CODE 250 W/ 637 OVERRIDE(OP): Performed by: STUDENT IN AN ORGANIZED HEALTH CARE EDUCATION/TRAINING PROGRAM

## 2020-10-04 PROCEDURE — 700102 HCHG RX REV CODE 250 W/ 637 OVERRIDE(OP): Performed by: FAMILY MEDICINE

## 2020-10-04 PROCEDURE — 85025 COMPLETE CBC W/AUTO DIFF WBC: CPT

## 2020-10-04 PROCEDURE — 36415 COLL VENOUS BLD VENIPUNCTURE: CPT

## 2020-10-04 RX ADMIN — TAMSULOSIN HYDROCHLORIDE 0.8 MG: 0.4 CAPSULE ORAL at 12:01

## 2020-10-04 RX ADMIN — ENOXAPARIN SODIUM 40 MG: 40 INJECTION SUBCUTANEOUS at 17:17

## 2020-10-04 RX ADMIN — DOCUSATE SODIUM 50 MG AND SENNOSIDES 8.6 MG 2 TABLET: 8.6; 5 TABLET, FILM COATED ORAL at 05:27

## 2020-10-04 RX ADMIN — SULFAMETHOXAZOLE AND TRIMETHOPRIM 1 TABLET: 800; 160 TABLET ORAL at 17:18

## 2020-10-04 RX ADMIN — Medication 100 MG: at 05:27

## 2020-10-04 RX ADMIN — RISPERIDONE 1 MG: 0.5 TABLET ORAL at 17:17

## 2020-10-04 RX ADMIN — POTASSIUM CHLORIDE 20 MEQ: 20 TABLET, EXTENDED RELEASE ORAL at 18:00

## 2020-10-04 RX ADMIN — SULFAMETHOXAZOLE AND TRIMETHOPRIM 1 TABLET: 800; 160 TABLET ORAL at 05:27

## 2020-10-04 RX ADMIN — DOCUSATE SODIUM 50 MG AND SENNOSIDES 8.6 MG 2 TABLET: 8.6; 5 TABLET, FILM COATED ORAL at 17:18

## 2020-10-04 ASSESSMENT — ENCOUNTER SYMPTOMS
SPUTUM PRODUCTION: 0
VOMITING: 0
PALPITATIONS: 0
EYES NEGATIVE: 1
MUSCULOSKELETAL NEGATIVE: 1
COUGH: 0
CLAUDICATION: 0
HEMOPTYSIS: 0
ABDOMINAL PAIN: 0
NEUROLOGICAL NEGATIVE: 1
ORTHOPNEA: 0
HALLUCINATIONS: 0
HEARTBURN: 0
NAUSEA: 0

## 2020-10-04 NOTE — CARE PLAN
Problem: Safety  Goal: Will remain free from falls  Outcome: PROGRESSING AS EXPECTED  Note: Pt assessed for risk of falls. Pt high fall risk. Fall precautions in place. Bed in lowest and locked position. Bed alarm on. Treaded slipper socks on pt. Bedside table, pt belongings, and call light in reach. Pt in bilateral wrist restraints and vest.      Problem: Communication  Goal: The ability to communicate needs accurately and effectively will improve  Outcome: PROGRESSING SLOWER THAN EXPECTED  Note: Pt educated about use of call light. Pt is confused and disoriented. Pt has difficulty expressing needs due to being confused. Hourly rounding in place and will continue to monitor.

## 2020-10-04 NOTE — CARE PLAN
Problem: Safety - Medical Restraint  Goal: Remains free of injury from restraints (Restraint for Interference with Medical Device)  Description: INTERVENTIONS:  1. Determine that other, less restrictive measures have been tried or would not be effective before applying the restraint  2. Evaluate the patient's condition at the time of restraint application  3. Inform patient/family regarding the reason for restraint  4. Q2H: Monitor safety, psychosocial status, comfort, nutrition and hydration  Outcome: PROGRESSING AS EXPECTED  Note: Skin under wrist restraints remain intact without redness or irritation. Will continue to monitor and check need for restraints Q2h.      Problem: Communication  Goal: The ability to communicate needs accurately and effectively will improve  Outcome: PROGRESSING SLOWER THAN EXPECTED  Note: Pt is confused an unable to communicate needs to RN. Will continue to monitor and anticipate pt needs.

## 2020-10-04 NOTE — PROGRESS NOTES
[facility] Medicine Daily Progress Note    Date of Service  10/4/2020    Chief Complaint  50 y.o. male with a pmhx of HTN, chronic pain and alcohol abuse who presented 9/9/2020 with altered mental status.     Hospital Course  50 y.o. male with a pmhx of HTN, chronic pain and alcohol abuse who presented 9/9/2020 with altered mental status, he was found obtunded in his house by his ex-wife who was checking on him as he had not been seen for 2 days or been able to get a hold of him.  He was found in a chair,  A&Ox0 and hypotensive.  He arrived in the ER with a GCS of 7 (E1,V1,M5) and severely hypotensive.  The patient was intubated for airway protection and central venous access was obtained for administration of high-volume crystalloid resuscitation and vasopressor therapy.    CT head and abdomen and pelvis were all unremarkable. He was briefly treated with antibiotics for concern for an underlying infection and a lumbar tap done was negative. Cultures remained negative.   EEG was done for concern for seizure-like activity was without any epileptiform activity.  He was extubated on 9/11 and has been able to protect his airway ever since. He is currently receiving treatment with benzos for concern for benzo withdrawals and IV thiamine for his history of alcohol abuse.    Interval Problem Update  9/29: Patient was seen and examined by me today.  Found to be resting in bed comfortably.  We will continue his Librium and Risperdal for now.  Try to take off restraints today.  Repeat blood work in the a.m.  SNF placement is pending.  9/30: Patient was found to be in bed without any complaints.  We will continue Librium and Risperdal.  Continue restraints and Gallardo catheter.  Placement is pending.  10/1: Continue current management.  Gallardo found to have sediments and I will send his for urinalysis.  Continue to find placement.  10/2: Patient was seen and examined me today.  Urinalysis was positive for infection.  I have asked  the nurse to remove the Gallardo catheter.  Started patient on Bactrim for 5 days.  10/3: Patient continues to have hallucinations and confusion.  Continue to treat with Bactrim for UTI.  Continue voiding trial after Gallardo catheter was pulled out.  10/4: Patient continues to be confused.  Continue to treat for UTI.  Gallardo catheter is replaced.  Consultants/Specialty  Intensivist    Code Status  Full Code    Disposition  Pending [facility]    Review of Systems  Review of Systems   Constitutional: Negative for malaise/fatigue.   HENT: Negative.    Eyes: Negative.    Respiratory: Negative for cough, hemoptysis and sputum production.    Cardiovascular: Negative for chest pain, palpitations, orthopnea and claudication.   Gastrointestinal: Negative for abdominal pain, heartburn, nausea and vomiting.   Genitourinary: Negative for dysuria, frequency and urgency.   Musculoskeletal: Negative.    Neurological: Negative.    Psychiatric/Behavioral: Negative for hallucinations.       Physical Exam  Temp:  [36.2 °C (97.2 °F)-36.7 °C (98.1 °F)] 36.6 °C (97.9 °F)  Pulse:  [87-92] 92  Resp:  [16-19] 17  BP: (125-137)/(73-86) 137/86  SpO2:  [93 %-97 %] 95 %    Physical Exam  Vitals signs and nursing note reviewed.   Constitutional:       General: He is not in acute distress.  HENT:      Head: Normocephalic and atraumatic.      Right Ear: External ear normal.      Left Ear: External ear normal.      Nose: Nose normal.   Eyes:      Extraocular Movements: Extraocular movements intact.      Pupils: Pupils are equal, round, and reactive to light.   Neck:      Musculoskeletal: Normal range of motion and neck supple. No neck rigidity or muscular tenderness.   Cardiovascular:      Rate and Rhythm: Normal rate and regular rhythm.      Pulses: Normal pulses.   Pulmonary:      Effort: Pulmonary effort is normal.   Abdominal:      General: Bowel sounds are normal.      Palpations: Abdomen is soft.   Musculoskeletal: Normal range of motion.         General:  No tenderness.   Skin:     General: Skin is warm and dry.   Neurological:      General: No focal deficit present.      Mental Status: He is alert. He is disoriented.      Comments: Oriented to self    Psychiatric:         Mood and Affect: Affect is flat.         Behavior: Behavior is combative.         Thought Content: Thought content is delusional.         Cognition and Memory: Cognition is impaired. He exhibits impaired recent memory.         Judgment: Judgment is impulsive.      Comments: Confused          Fluids    Intake/Output Summary (Last 24 hours) at 10/4/2020 1429  Last data filed at 10/4/2020 0600  Gross per 24 hour   Intake 360 ml   Output 1200 ml   Net -840 ml       Laboratory  Recent Labs     10/02/20  0218 10/03/20  0041 10/04/20  0045   WBC 10.0 7.8 9.2   RBC 3.35* 3.35* 3.34*   HEMOGLOBIN 10.3* 10.2* 10.2*   HEMATOCRIT 32.3* 32.1* 32.5*   MCV 96.4 95.8 97.3   MCH 30.7 30.4 30.5   MCHC 31.9* 31.8* 31.4*   RDW 49.7 49.2 49.3   PLATELETCT 238 232 230   MPV 9.9 10.2 10.0     Recent Labs     10/02/20  0218 10/03/20  0041 10/04/20  0045   SODIUM 141 140 142   POTASSIUM 3.9 3.9 3.9   CHLORIDE 107 107 107   CO2 23 20 23   GLUCOSE 88 93 102*   BUN 9 10 9   CREATININE 0.83 0.97 1.12   CALCIUM 9.3 9.3 9.2                   Imaging  MR-BRAIN-W/O   Final Result      1.  Moderate diffuse cerebral atrophy.      2.  Minimal periventricular and juxtacortical white matter changes consistent with chronic microvascular ischemic gliosis.      3.  No evidence of acute infarction in the brain parenchyma.      DX-ABDOMEN FOR TUBE PLACEMENT   Final Result      Feeding tube tip overlies the second portion of the duodenum.      DX-CHEST-PORTABLE (1 VIEW)   Final Result         1.  Patchy left lung base opacity, new since prior, could represent early infiltrate            DX-CHEST-PORTABLE (1 VIEW)   Final Result         1.  Patchy left lung base opacity, new since prior, could represent early infiltrate         [id number]  VIEW   Final Result      Enteric tube projects over the distal stomach/pylorus.         EC-ECHOCARDIOGRAM COMPLETE W/O CONT   Final Result      DX-CHEST-PORTABLE (1 VIEW)   Final Result         1.  No acute cardiopulmonary disease.      CT-ABDOMEN-PELVIS W/O   Final Result      1.  No renal stone or hydronephrosis.   2.  Normal appendix.   3.  No focal mesenteric inflammatory process.      DX-ABDOMEN FOR TUBE PLACEMENT   Final Result      NG tube tip projects over expected location the gastric fundus.      US-ABDOMEN COMPLETE SURVEY   Final Result         1.  Echogenic liver compatible with fatty change versus fibrosis.   2.  Gallbladder sludge without additional sonographic findings of cholecystitis.   3.  Partial atrophic bilateral kidneys with lobulated contour         CT-HEAD W/O   Final Result         1.  No acute intracranial abnormality is identified, there are nonspecific white matter changes, commonly associated with small vessel ischemic disease.  Associated mild cerebral atrophy is noted.   2.  Atherosclerosis.      DX-CHEST-PORTABLE (1 VIEW)   Final Result         1.  No acute cardiopulmonary disease.           Assessment/Plan  * Toxic encephalopathy- (present on admission)  Assessment & Plan  No clear etiology. Patient was found down for unknown period.     LP- negative  EEG was negative for seizures    Prn haldol    Po librium  Po risperdal... Increased to 1 mg nightly on 9/27/2020    Psych md consult--> they believe it is etoh related    Continue to monitor    Restrain as necessary    Mri brain negative       Acute cystitis  Assessment & Plan  UA + for bacteria, LE and nitrite  F/u urine cultures  Started pt on bactrim for 5 days  Previous cultures found      Urinary retention- (present on admission)  Assessment & Plan  Po flomax    Voiding trial in 1 to 2  Days.. Be aware that urologist was needed to replace this current  Gallardo as it was difficult to place   Remove Gallardo catheter today    Other  dysphagia- (present on admission)  Assessment & Plan  Passed speech eval now  On modified diet    Hypernatremia  Assessment & Plan  Replacing potassium and adding daily magnesium     check  a BMP in a.m.    Acute respiratory failure with hypoxia ([facility])- (present on admission)  Assessment & Plan  2/2 mental status  Extubated 9/11  Remains on room air    Benzodiazepine dependence ([facility])- (present on admission)  Assessment & Plan  Monitor for seizure/withdrawal  Po librium  EEG after sz-like activity 9/10 was negative despite jerking motions    Alcohol abuse- (present on admission)  Assessment & Plan  Reported hx of  Continue Vitamins  High dose thiamine  Seizure precautions  S/p etoh w/d protocol    Septic shock ([facility])- (present on admission)  Assessment & Plan  Resolved  Broad-spectrum antibiotics: s/p vanc/zosyn --> C3, now off abx  Blood culture, CSF culture, urinalysis allnegative  Monitor off antibiotics        JEWELS (acute kidney injury) ([facility])- (present on admission)  Assessment & Plan  Consistent with ATN 2/2 hypotension, dehydration and sepsis  Resolved  Avoid nephrotoxins.  Renal dose all medications.         VTE prophylaxis: Lovenox

## 2020-10-04 NOTE — PROGRESS NOTES
Patient failed to void following straight cath this AM. 500 ml in bladder following scan. Gallardo placed per MD order. Pt tolerated well.

## 2020-10-04 NOTE — PROGRESS NOTES
"Pt friend Devang Henderson ([phone]) is requesting a call from case management regarding pt discharge planning. This RN reached out to patients ex wife Kellee who stated \"I handed everything over to him yesterday\". Mr. Henderson stated that he would like to be the person who makes decisions for the patient going forward and is unsure what the process will be to make this happen.  "

## 2020-10-04 NOTE — PROGRESS NOTES
Assumed care at 1900, report received from Samia LUCIANO.  Pt A&O x 1, oriented to self. Pt denies pain at this time. Pt sitting up in bed. PT in restraints due to pulling out lines and equipment.  Soft bilateral wrist and vest restraint in place. Bed locked, 2 rails up, bed in lowest position. Call light in place, belongings at bedside, no needs at this time and hourly rounding in place.

## 2020-10-05 LAB
ALBUMIN SERPL BCP-MCNC: 2.9 G/DL (ref 3.2–4.9)
ALBUMIN/GLOB SERPL: 0.9 G/DL
ALP SERPL-CCNC: 104 U/L (ref 30–99)
ALT SERPL-CCNC: 7 U/L (ref 2–50)
ANION GAP SERPL CALC-SCNC: 10 MMOL/L (ref 7–16)
AST SERPL-CCNC: 15 U/L (ref 12–45)
BACTERIA UR CULT: ABNORMAL
BACTERIA UR CULT: ABNORMAL
BASOPHILS # BLD AUTO: 0.8 % (ref 0–1.8)
BASOPHILS # BLD: 0.06 K/UL (ref 0–0.12)
BILIRUB SERPL-MCNC: 0.2 MG/DL (ref 0.1–1.5)
BUN SERPL-MCNC: 10 MG/DL (ref 8–22)
CALCIUM SERPL-MCNC: 9.9 MG/DL (ref 8.5–10.5)
CHLORIDE SERPL-SCNC: 110 MMOL/L (ref 96–112)
CO2 SERPL-SCNC: 22 MMOL/L (ref 20–33)
CREAT SERPL-MCNC: 1.18 MG/DL (ref 0.5–1.4)
EOSINOPHIL # BLD AUTO: 0.27 K/UL (ref 0–0.51)
EOSINOPHIL NFR BLD: 3.8 % (ref 0–6.9)
ERYTHROCYTE [DISTWIDTH] IN BLOOD BY AUTOMATED COUNT: 49.4 FL (ref 35.9–50)
GLOBULIN SER CALC-MCNC: 3.1 G/DL (ref 1.9–3.5)
GLUCOSE SERPL-MCNC: 87 MG/DL (ref 65–99)
HCT VFR BLD AUTO: 32.7 % (ref 42–52)
HGB BLD-MCNC: 10.1 G/DL (ref 14–18)
IMM GRANULOCYTES # BLD AUTO: 0.07 K/UL (ref 0–0.11)
IMM GRANULOCYTES NFR BLD AUTO: 1 % (ref 0–0.9)
LYMPHOCYTES # BLD AUTO: 1.58 K/UL (ref 1–4.8)
LYMPHOCYTES NFR BLD: 22.3 % (ref 22–41)
MCH RBC QN AUTO: 29.9 PG (ref 27–33)
MCHC RBC AUTO-ENTMCNC: 30.9 G/DL (ref 33.7–35.3)
MCV RBC AUTO: 96.7 FL (ref 81.4–97.8)
MONOCYTES # BLD AUTO: 0.53 K/UL (ref 0–0.85)
MONOCYTES NFR BLD AUTO: 7.5 % (ref 0–13.4)
NEUTROPHILS # BLD AUTO: 4.57 K/UL (ref 1.82–7.42)
NEUTROPHILS NFR BLD: 64.6 % (ref 44–72)
NRBC # BLD AUTO: 0 K/UL
NRBC BLD-RTO: 0 /100 WBC
PLATELET # BLD AUTO: 226 K/UL (ref 164–446)
PMV BLD AUTO: 10.1 FL (ref 9–12.9)
POTASSIUM SERPL-SCNC: 3.8 MMOL/L (ref 3.6–5.5)
PROT SERPL-MCNC: 6 G/DL (ref 6–8.2)
RBC # BLD AUTO: 3.38 M/UL (ref 4.7–6.1)
SIGNIFICANT IND 70042: ABNORMAL
SITE SITE: ABNORMAL
SODIUM SERPL-SCNC: 142 MMOL/L (ref 135–145)
SOURCE SOURCE: ABNORMAL
WBC # BLD AUTO: 7.1 K/UL (ref 4.8–10.8)

## 2020-10-05 PROCEDURE — 99232 SBSQ HOSP IP/OBS MODERATE 35: CPT | Performed by: HOSPITALIST

## 2020-10-05 PROCEDURE — 700111 HCHG RX REV CODE 636 W/ 250 OVERRIDE (IP): Performed by: STUDENT IN AN ORGANIZED HEALTH CARE EDUCATION/TRAINING PROGRAM

## 2020-10-05 PROCEDURE — A9270 NON-COVERED ITEM OR SERVICE: HCPCS | Performed by: HOSPITALIST

## 2020-10-05 PROCEDURE — 770006 HCHG ROOM/CARE - MED/SURG/GYN SEMI*

## 2020-10-05 PROCEDURE — 700102 HCHG RX REV CODE 250 W/ 637 OVERRIDE(OP): Performed by: HOSPITALIST

## 2020-10-05 PROCEDURE — 700102 HCHG RX REV CODE 250 W/ 637 OVERRIDE(OP): Performed by: STUDENT IN AN ORGANIZED HEALTH CARE EDUCATION/TRAINING PROGRAM

## 2020-10-05 PROCEDURE — 36415 COLL VENOUS BLD VENIPUNCTURE: CPT

## 2020-10-05 PROCEDURE — A9270 NON-COVERED ITEM OR SERVICE: HCPCS | Performed by: FAMILY MEDICINE

## 2020-10-05 PROCEDURE — 700111 HCHG RX REV CODE 636 W/ 250 OVERRIDE (IP): Performed by: NURSE PRACTITIONER

## 2020-10-05 PROCEDURE — A9270 NON-COVERED ITEM OR SERVICE: HCPCS | Performed by: STUDENT IN AN ORGANIZED HEALTH CARE EDUCATION/TRAINING PROGRAM

## 2020-10-05 PROCEDURE — 700102 HCHG RX REV CODE 250 W/ 637 OVERRIDE(OP): Performed by: FAMILY MEDICINE

## 2020-10-05 PROCEDURE — 80053 COMPREHEN METABOLIC PANEL: CPT

## 2020-10-05 PROCEDURE — 85025 COMPLETE CBC W/AUTO DIFF WBC: CPT

## 2020-10-05 RX ADMIN — SULFAMETHOXAZOLE AND TRIMETHOPRIM 1 TABLET: 800; 160 TABLET ORAL at 17:22

## 2020-10-05 RX ADMIN — POTASSIUM CHLORIDE 20 MEQ: 20 TABLET, EXTENDED RELEASE ORAL at 06:00

## 2020-10-05 RX ADMIN — DOCUSATE SODIUM 50 MG AND SENNOSIDES 8.6 MG 2 TABLET: 8.6; 5 TABLET, FILM COATED ORAL at 05:51

## 2020-10-05 RX ADMIN — POTASSIUM CHLORIDE 20 MEQ: 20 TABLET, EXTENDED RELEASE ORAL at 17:21

## 2020-10-05 RX ADMIN — TAMSULOSIN HYDROCHLORIDE 0.8 MG: 0.4 CAPSULE ORAL at 10:08

## 2020-10-05 RX ADMIN — HALOPERIDOL LACTATE 2.5 MG: 5 INJECTION, SOLUTION INTRAMUSCULAR at 00:40

## 2020-10-05 RX ADMIN — DOCUSATE SODIUM 50 MG AND SENNOSIDES 8.6 MG 2 TABLET: 8.6; 5 TABLET, FILM COATED ORAL at 17:21

## 2020-10-05 RX ADMIN — Medication 100 MG: at 05:51

## 2020-10-05 RX ADMIN — HALOPERIDOL LACTATE 2.5 MG: 5 INJECTION, SOLUTION INTRAMUSCULAR at 06:39

## 2020-10-05 RX ADMIN — ENOXAPARIN SODIUM 40 MG: 40 INJECTION SUBCUTANEOUS at 17:22

## 2020-10-05 RX ADMIN — SULFAMETHOXAZOLE AND TRIMETHOPRIM 1 TABLET: 800; 160 TABLET ORAL at 05:51

## 2020-10-05 RX ADMIN — RISPERIDONE 1 MG: 0.5 TABLET ORAL at 17:22

## 2020-10-05 ASSESSMENT — ENCOUNTER SYMPTOMS
HALLUCINATIONS: 0
HEMOPTYSIS: 0
VOMITING: 0
NEUROLOGICAL NEGATIVE: 1
ORTHOPNEA: 0
ABDOMINAL PAIN: 0
HEARTBURN: 0
EYES NEGATIVE: 1
COUGH: 0
CLAUDICATION: 0
PALPITATIONS: 0
NAUSEA: 0
SPUTUM PRODUCTION: 0
MUSCULOSKELETAL NEGATIVE: 1

## 2020-10-05 NOTE — DOCUMENTATION QUERY
[facility]                                                                       Query Response Note      PATIENT:               JOON CANTU  ACCT #:                  [id number]  MRN:                     [id number]  :                      1970  ADMIT DATE:       2020 12:01 AM  DISCH DATE:          RESPONDING  PROVIDER #:        [id number]           QUERY TEXT:    Please clarify in documentation the relationship, if any, between Acute cystitis and Gallardo catheter.    NOTE: If an appropriate response is not listed below, please respond with a new note.    The patient's Clinical Indicators include:      10/2-10/5- Acute cystitis  10/1-Urine culture:  Staphylococcus aureus >100,000 cfu/ml  Replace current Gallardo   Bactrim PO Q 12   Urinary retention/Flomax POA  JEWELS  Options provided:   -- Acute cystitis is due to/associated with Gallardo catheter   -- Unrelated to each other   -- Unable to determine   -- Unknown      Query created by: Joanna Desir on 10/5/2020 12:13 PM    RESPONSE TEXT:    Acute cystitis is due to/associated with Gallardo catheter          Electronically signed by:  ARLENE TALAMANTES MD 10/5/2020 12:16 PM

## 2020-10-05 NOTE — CARE PLAN
Problem: Communication  Goal: The ability to communicate needs accurately and effectively will improve  Outcome: PROGRESSING SLOWER THAN EXPECTED  Note: Pt continues to be only oriented to self     Problem: Safety - Medical Restraint  Goal: Remains free of injury from restraints (Restraint for Interference with Medical Device)  Description: INTERVENTIONS:  1. Determine that other, less restrictive measures have been tried or would not be effective before applying the restraint  2. Evaluate the patient's condition at the time of restraint application  3. Inform patient/family regarding the reason for restraint  4. Q2H: Monitor safety, psychosocial status, comfort, nutrition and hydration  Outcome: PROGRESSING SLOWER THAN EXPECTED  Note: Pt continues to require restraints to prevent self removal of newton

## 2020-10-05 NOTE — PROGRESS NOTES
[facility] Medicine Daily Progress Note    Date of Service  10/5/2020    Chief Complaint  50 y.o. male with a pmhx of HTN, chronic pain and alcohol abuse who presented 9/9/2020 with altered mental status.     Hospital Course  50 y.o. male with a pmhx of HTN, chronic pain and alcohol abuse who presented 9/9/2020 with altered mental status, he was found obtunded in his house by his ex-wife who was checking on him as he had not been seen for 2 days or been able to get a hold of him.  He was found in a chair,  A&Ox0 and hypotensive.  He arrived in the ER with a GCS of 7 (E1,V1,M5) and severely hypotensive.  The patient was intubated for airway protection and central venous access was obtained for administration of high-volume crystalloid resuscitation and vasopressor therapy.    CT head and abdomen and pelvis were all unremarkable. He was briefly treated with antibiotics for concern for an underlying infection and a lumbar tap done was negative. Cultures remained negative.   EEG was done for concern for seizure-like activity was without any epileptiform activity.  He was extubated on 9/11 and has been able to protect his airway ever since. He is currently receiving treatment with benzos for concern for benzo withdrawals and IV thiamine for his history of alcohol abuse.    Interval Problem Update  9/29: Patient was seen and examined by me today.  Found to be resting in bed comfortably.  We will continue his Librium and Risperdal for now.  Try to take off restraints today.  Repeat blood work in the a.m.  SNF placement is pending.  9/30: Patient was found to be in bed without any complaints.  We will continue Librium and Risperdal.  Continue restraints and Gallardo catheter.  Placement is pending.  10/1: Continue current management.  Gallardo found to have sediments and I will send his for urinalysis.  Continue to find placement.  10/2: Patient was seen and examined me today.  Urinalysis was positive for infection.  I have asked  the nurse to remove the Gallardo catheter.  Started patient on Bactrim for 5 days.  10/3: Patient continues to have hallucinations and confusion.  Continue to treat with Bactrim for UTI.  Continue voiding trial after Gallardo catheter was pulled out.  10/4: Patient continues to be confused.  Continue to treat for UTI.  Gallardo catheter is replaced.  10/5: Patient seen and examined by me today.  Still confused despite taking off of Librium and given antibiotics for UTI infection.    Consultants/Specialty  Intensivist    Code Status  Full Code    Disposition  Pending [facility]    Review of Systems  Review of Systems   Constitutional: Negative for malaise/fatigue.   HENT: Negative.    Eyes: Negative.    Respiratory: Negative for cough, hemoptysis and sputum production.    Cardiovascular: Negative for chest pain, palpitations, orthopnea and claudication.   Gastrointestinal: Negative for abdominal pain, heartburn, nausea and vomiting.   Genitourinary: Negative for dysuria, frequency and urgency.   Musculoskeletal: Negative.    Neurological: Negative.    Psychiatric/Behavioral: Negative for hallucinations.       Physical Exam  Temp:  [36.2 °C (97.2 °F)-36.6 °C (97.8 °F)] 36.6 °C (97.8 °F)  Pulse:  [75-92] 89  Resp:  [18-19] 18  BP: (106-135)/(62-81) 106/62  SpO2:  [95 %-96 %] 96 %    Physical Exam  Vitals signs and nursing note reviewed.   Constitutional:       General: He is not in acute distress.  HENT:      Head: Normocephalic and atraumatic.      Right Ear: External ear normal.      Left Ear: External ear normal.      Nose: Nose normal.   Eyes:      Extraocular Movements: Extraocular movements intact.      Pupils: Pupils are equal, round, and reactive to light.   Neck:      Musculoskeletal: Normal range of motion and neck supple. No neck rigidity or muscular tenderness.   Cardiovascular:      Rate and Rhythm: Normal rate and regular rhythm.      Pulses: Normal pulses.   Pulmonary:      Effort: Pulmonary effort is normal.    Abdominal:      General: Bowel sounds are normal.      Palpations: Abdomen is soft.   Musculoskeletal: Normal range of motion.         General: No tenderness.   Skin:     General: Skin is warm and dry.   Neurological:      General: No focal deficit present.      Mental Status: He is alert. He is disoriented.      Comments: Oriented to self    Psychiatric:         Mood and Affect: Affect is flat.         Behavior: Behavior is combative.         Thought Content: Thought content is delusional.         Cognition and Memory: Cognition is impaired. He exhibits impaired recent memory.         Judgment: Judgment is impulsive.      Comments: Confused          Fluids    Intake/Output Summary (Last 24 hours) at 10/5/2020 1047  Last data filed at 10/5/2020 0942  Gross per 24 hour   Intake 460 ml   Output 1100 ml   Net -640 ml       Laboratory  Recent Labs     10/03/20  0041 10/04/20  0045 10/05/20  0238   WBC 7.8 9.2 7.1   RBC 3.35* 3.34* 3.38*   HEMOGLOBIN 10.2* 10.2* 10.1*   HEMATOCRIT 32.1* 32.5* 32.7*   MCV 95.8 97.3 96.7   MCH 30.4 30.5 29.9   MCHC 31.8* 31.4* 30.9*   RDW 49.2 49.3 49.4   PLATELETCT 232 230 226   MPV 10.2 10.0 10.1     Recent Labs     10/03/20  0041 10/04/20  0045 10/05/20  0238   SODIUM 140 142 142   POTASSIUM 3.9 3.9 3.8   CHLORIDE 107 107 110   CO2 20 23 22   GLUCOSE 93 102* 87   BUN 10 9 10   CREATININE 0.97 1.12 1.18   CALCIUM 9.3 9.2 9.9                   Imaging  MR-BRAIN-W/O   Final Result      1.  Moderate diffuse cerebral atrophy.      2.  Minimal periventricular and juxtacortical white matter changes consistent with chronic microvascular ischemic gliosis.      3.  No evidence of acute infarction in the brain parenchyma.      DX-ABDOMEN FOR TUBE PLACEMENT   Final Result      Feeding tube tip overlies the second portion of the duodenum.      DX-CHEST-PORTABLE (1 VIEW)   Final Result         1.  Patchy left lung base opacity, new since prior, could represent early infiltrate             DX-CHEST-PORTABLE (1 VIEW)   Final Result         1.  Patchy left lung base opacity, new since prior, could represent early infiltrate         [id number] VIEW   Final Result      Enteric tube projects over the distal stomach/pylorus.         EC-ECHOCARDIOGRAM COMPLETE W/O CONT   Final Result      DX-CHEST-PORTABLE (1 VIEW)   Final Result         1.  No acute cardiopulmonary disease.      CT-ABDOMEN-PELVIS W/O   Final Result      1.  No renal stone or hydronephrosis.   2.  Normal appendix.   3.  No focal mesenteric inflammatory process.      DX-ABDOMEN FOR TUBE PLACEMENT   Final Result      NG tube tip projects over expected location the gastric fundus.      US-ABDOMEN COMPLETE SURVEY   Final Result         1.  Echogenic liver compatible with fatty change versus fibrosis.   2.  Gallbladder sludge without additional sonographic findings of cholecystitis.   3.  Partial atrophic bilateral kidneys with lobulated contour         CT-HEAD W/O   Final Result         1.  No acute intracranial abnormality is identified, there are nonspecific white matter changes, commonly associated with small vessel ischemic disease.  Associated mild cerebral atrophy is noted.   2.  Atherosclerosis.      DX-CHEST-PORTABLE (1 VIEW)   Final Result         1.  No acute cardiopulmonary disease.           Assessment/Plan  * Toxic encephalopathy- (present on admission)  Assessment & Plan  Unknown etiology whether this is hypoxic induced brain injury versus Wernicke encephalopathy    LP- negative  EEG was negative for seizures    Prn haldol  Po risperdal... Increased to 1 mg nightly on 9/27/2020    Psych md consult--> they believe it is etoh related    Continue to monitor    Restrain as necessary    Mri brain negative       Acute cystitis  Assessment & Plan  UA + for bacteria, LE and nitrite  F/u urine cultures found staph aures   Started pt on bactrim for 5 days      Urinary retention- (present on admission)  Assessment & Plan  Po  flomax    Voiding trial in 1 to 2  Days.. Be aware that urologist was needed to replace this current  Gallardo as it was difficult to place   Remove Gallardo catheter today    Other dysphagia- (present on admission)  Assessment & Plan  Passed speech eval now  On modified diet    Hypernatremia  Assessment & Plan  Replacing potassium and adding daily magnesium     check  a BMP in a.m.    Acute respiratory failure with hypoxia ([facility])- (present on admission)  Assessment & Plan  2/2 mental status  Extubated 9/11  Remains on room air    Benzodiazepine dependence ([facility])- (present on admission)  Assessment & Plan  Monitor for seizure/withdrawal  Po librium  EEG after sz-like activity 9/10 was negative despite jerking motions    Alcohol abuse- (present on admission)  Assessment & Plan  Reported hx of  Continue Vitamins  High dose thiamine  Seizure precautions  S/p etoh w/d protocol    Septic shock ([facility])- (present on admission)  Assessment & Plan  Resolved  Broad-spectrum antibiotics: s/p vanc/zosyn --> C3, now off abx  Blood culture, CSF culture, urinalysis allnegative  Monitor off antibiotics        JEWELS (acute kidney injury) ([facility])- (present on admission)  Assessment & Plan  Consistent with ATN 2/2 hypotension, dehydration and sepsis  Resolved  Avoid nephrotoxins.  Renal dose all medications.         VTE prophylaxis: Lovenox

## 2020-10-05 NOTE — PROGRESS NOTES
Assumed care at 0700, report received from night shift RN.  Pt is laying in bed, awake. Oriented to self , on RA , reports 0/10 pain . Bed is locked and in the lowest position. Call light and belongings within reach. Plan of care discussed and whiteboard updated, Pt has no questions at this time.

## 2020-10-05 NOTE — DISCHARGE PLANNING
Anticipated Discharge Disposition:   · TBD: likely long-term care    Action:   · Student JANE completed chart review and filled out [facility] Public Guardian forms. JANE printed facesheet, consult notes, PT/OT/SLP notes, MAR report, and H&P. JAEN obtained filled and signed Physician's Certificate With Needs Assessment from [facility]. JANE called Pt's , Jennifer Patrick,[phone]  regarding additional questions on Public Guardian forms; however, she is out of the office for the week. JANE emailed forms to Case Management leadership for approval.      Barriers to Discharge:   · No current insurance on file  · Lack of NOK to help with medical decisions as pt is not fully orientated.   · Pending determination on pursing guardianship.        Plan:   · Case Management team to f/u with public guardianship process and placement for pt.     Reviewed by CAMILLE Harris MSW

## 2020-10-05 NOTE — PROGRESS NOTES
Report received from day shift RN. Assumed care at 1900, assessment complete. Pt is A & O x 0. Pt is on bilateral wrist restrains and chest restraint. Patient is showing no signs injury/distress. Pt denies having any pain at this time. Fall precautions and appropriate signs in place. Pt oriented to unit routine, call light/phone system and RN extension number provided. Pt educated regarding fall precautions. Bed alarm in use. Pt denies any additional needs at this time. Call light within reach.

## 2020-10-05 NOTE — CARE PLAN
Problem: Bowel/Gastric:  Goal: Normal bowel function is maintained or improved  Outcome: PROGRESSING SLOWER THAN EXPECTED  Note: Pt is incontinent of stool     Problem: Knowledge Deficit  Goal: Knowledge of disease process/condition, treatment plan, diagnostic tests, and medications will improve  Outcome: PROGRESSING SLOWER THAN EXPECTED  Note: Patient is disoriented to place, person, situation and time. Patient is unaware of treatment plan. RN educated pt, no evidence of learning observed.

## 2020-10-06 LAB
ALBUMIN SERPL BCP-MCNC: 3.3 G/DL (ref 3.2–4.9)
ALBUMIN/GLOB SERPL: 1.1 G/DL
ALP SERPL-CCNC: 111 U/L (ref 30–99)
ALT SERPL-CCNC: 10 U/L (ref 2–50)
ANION GAP SERPL CALC-SCNC: 13 MMOL/L (ref 7–16)
AST SERPL-CCNC: 16 U/L (ref 12–45)
BASOPHILS # BLD AUTO: 0.7 % (ref 0–1.8)
BASOPHILS # BLD: 0.06 K/UL (ref 0–0.12)
BILIRUB SERPL-MCNC: 0.3 MG/DL (ref 0.1–1.5)
BUN SERPL-MCNC: 12 MG/DL (ref 8–22)
CALCIUM SERPL-MCNC: 10 MG/DL (ref 8.5–10.5)
CHLORIDE SERPL-SCNC: 109 MMOL/L (ref 96–112)
CO2 SERPL-SCNC: 21 MMOL/L (ref 20–33)
CREAT SERPL-MCNC: 1.09 MG/DL (ref 0.5–1.4)
EOSINOPHIL # BLD AUTO: 0.27 K/UL (ref 0–0.51)
EOSINOPHIL NFR BLD: 3.3 % (ref 0–6.9)
ERYTHROCYTE [DISTWIDTH] IN BLOOD BY AUTOMATED COUNT: 48.3 FL (ref 35.9–50)
GLOBULIN SER CALC-MCNC: 3 G/DL (ref 1.9–3.5)
GLUCOSE SERPL-MCNC: 91 MG/DL (ref 65–99)
HCT VFR BLD AUTO: 34 % (ref 42–52)
HGB BLD-MCNC: 10.9 G/DL (ref 14–18)
IMM GRANULOCYTES # BLD AUTO: 0.05 K/UL (ref 0–0.11)
IMM GRANULOCYTES NFR BLD AUTO: 0.6 % (ref 0–0.9)
LYMPHOCYTES # BLD AUTO: 1.79 K/UL (ref 1–4.8)
LYMPHOCYTES NFR BLD: 21.7 % (ref 22–41)
MCH RBC QN AUTO: 30.4 PG (ref 27–33)
MCHC RBC AUTO-ENTMCNC: 32.1 G/DL (ref 33.7–35.3)
MCV RBC AUTO: 95 FL (ref 81.4–97.8)
MONOCYTES # BLD AUTO: 0.51 K/UL (ref 0–0.85)
MONOCYTES NFR BLD AUTO: 6.2 % (ref 0–13.4)
NEUTROPHILS # BLD AUTO: 5.56 K/UL (ref 1.82–7.42)
NEUTROPHILS NFR BLD: 67.5 % (ref 44–72)
NRBC # BLD AUTO: 0 K/UL
NRBC BLD-RTO: 0 /100 WBC
PLATELET # BLD AUTO: 267 K/UL (ref 164–446)
PMV BLD AUTO: 9.9 FL (ref 9–12.9)
POTASSIUM SERPL-SCNC: 4.1 MMOL/L (ref 3.6–5.5)
PROT SERPL-MCNC: 6.3 G/DL (ref 6–8.2)
RBC # BLD AUTO: 3.58 M/UL (ref 4.7–6.1)
SODIUM SERPL-SCNC: 143 MMOL/L (ref 135–145)
WBC # BLD AUTO: 8.2 K/UL (ref 4.8–10.8)

## 2020-10-06 PROCEDURE — 85025 COMPLETE CBC W/AUTO DIFF WBC: CPT

## 2020-10-06 PROCEDURE — A9270 NON-COVERED ITEM OR SERVICE: HCPCS | Performed by: HOSPITALIST

## 2020-10-06 PROCEDURE — 80053 COMPREHEN METABOLIC PANEL: CPT

## 2020-10-06 PROCEDURE — 700111 HCHG RX REV CODE 636 W/ 250 OVERRIDE (IP): Performed by: STUDENT IN AN ORGANIZED HEALTH CARE EDUCATION/TRAINING PROGRAM

## 2020-10-06 PROCEDURE — 700102 HCHG RX REV CODE 250 W/ 637 OVERRIDE(OP): Performed by: HOSPITALIST

## 2020-10-06 PROCEDURE — 700101 HCHG RX REV CODE 250: Performed by: STUDENT IN AN ORGANIZED HEALTH CARE EDUCATION/TRAINING PROGRAM

## 2020-10-06 PROCEDURE — 700102 HCHG RX REV CODE 250 W/ 637 OVERRIDE(OP): Performed by: FAMILY MEDICINE

## 2020-10-06 PROCEDURE — 97535 SELF CARE MNGMENT TRAINING: CPT

## 2020-10-06 PROCEDURE — 36415 COLL VENOUS BLD VENIPUNCTURE: CPT

## 2020-10-06 PROCEDURE — A9270 NON-COVERED ITEM OR SERVICE: HCPCS | Performed by: STUDENT IN AN ORGANIZED HEALTH CARE EDUCATION/TRAINING PROGRAM

## 2020-10-06 PROCEDURE — 700111 HCHG RX REV CODE 636 W/ 250 OVERRIDE (IP): Performed by: NURSE PRACTITIONER

## 2020-10-06 PROCEDURE — 97530 THERAPEUTIC ACTIVITIES: CPT

## 2020-10-06 PROCEDURE — 700102 HCHG RX REV CODE 250 W/ 637 OVERRIDE(OP): Performed by: STUDENT IN AN ORGANIZED HEALTH CARE EDUCATION/TRAINING PROGRAM

## 2020-10-06 PROCEDURE — A9270 NON-COVERED ITEM OR SERVICE: HCPCS | Performed by: FAMILY MEDICINE

## 2020-10-06 PROCEDURE — 306565 RIGID MIT RESTRAINT(PAIR): Performed by: HOSPITALIST

## 2020-10-06 PROCEDURE — 770006 HCHG ROOM/CARE - MED/SURG/GYN SEMI*

## 2020-10-06 PROCEDURE — 99231 SBSQ HOSP IP/OBS SF/LOW 25: CPT | Performed by: HOSPITALIST

## 2020-10-06 RX ADMIN — RISPERIDONE 1 MG: 0.5 TABLET ORAL at 18:17

## 2020-10-06 RX ADMIN — Medication 100 MG: at 05:15

## 2020-10-06 RX ADMIN — HALOPERIDOL LACTATE 2.5 MG: 5 INJECTION, SOLUTION INTRAMUSCULAR at 23:16

## 2020-10-06 RX ADMIN — SULFAMETHOXAZOLE AND TRIMETHOPRIM 1 TABLET: 800; 160 TABLET ORAL at 05:15

## 2020-10-06 RX ADMIN — DOCUSATE SODIUM 50 MG AND SENNOSIDES 8.6 MG 2 TABLET: 8.6; 5 TABLET, FILM COATED ORAL at 18:17

## 2020-10-06 RX ADMIN — ENOXAPARIN SODIUM 40 MG: 40 INJECTION SUBCUTANEOUS at 18:17

## 2020-10-06 RX ADMIN — SULFAMETHOXAZOLE AND TRIMETHOPRIM 1 TABLET: 800; 160 TABLET ORAL at 18:17

## 2020-10-06 RX ADMIN — POTASSIUM CHLORIDE 20 MEQ: 20 TABLET, EXTENDED RELEASE ORAL at 05:15

## 2020-10-06 RX ADMIN — POTASSIUM CHLORIDE 20 MEQ: 20 TABLET, EXTENDED RELEASE ORAL at 18:17

## 2020-10-06 RX ADMIN — TAMSULOSIN HYDROCHLORIDE 0.8 MG: 0.4 CAPSULE ORAL at 10:00

## 2020-10-06 RX ADMIN — POLYETHYLENE GLYCOL 3350 1 PACKET: 17 POWDER, FOR SOLUTION ORAL at 05:15

## 2020-10-06 ASSESSMENT — FIBROSIS 4 INDEX: FIB4 SCORE: 1.23

## 2020-10-06 ASSESSMENT — ENCOUNTER SYMPTOMS
HEARTBURN: 0
CLAUDICATION: 0
SPUTUM PRODUCTION: 0
HALLUCINATIONS: 0
COUGH: 0
ORTHOPNEA: 0
EYES NEGATIVE: 1
MUSCULOSKELETAL NEGATIVE: 1
PALPITATIONS: 0
ABDOMINAL PAIN: 0
HEMOPTYSIS: 0
VOMITING: 0
NEUROLOGICAL NEGATIVE: 1
NAUSEA: 0

## 2020-10-06 ASSESSMENT — COGNITIVE AND FUNCTIONAL STATUS - GENERAL
PERSONAL GROOMING: A LITTLE
DRESSING REGULAR UPPER BODY CLOTHING: A LITTLE
SUGGESTED CMS G CODE MODIFIER DAILY ACTIVITY: CK
DAILY ACTIVITIY SCORE: 16
EATING MEALS: A LITTLE
TOILETING: A LOT
HELP NEEDED FOR BATHING: A LOT
DRESSING REGULAR LOWER BODY CLOTHING: A LITTLE

## 2020-10-06 NOTE — THERAPY
PT treatment attempted. Pt confused upon PT arrival. Agreeable to don gown in preparation for sitting EOB. Upon moving pt's newton to allow safe mobility to EOB, pt became aggitated and grabbed at newton attempting to pull it out. Pt required 3 people (PT and 2 CNAs) to remove  from newton line and reposition in bed. PT will continue to follow at this time.     Analilia Cunningham, PT, DPT Phone: [phone]

## 2020-10-06 NOTE — THERAPY
Occupational Therapy  Daily Treatment     Patient Name: Yury Blake  Age:  50 y.o., Sex:  male  Medical Record #: [id number]  Today's Date: 10/6/2020     Precautions  Precautions: Fall Risk, Swallow Precautions ( See Comments)  Comments: B wrist restraints and Posey vest; very impulsive    Assessment    Pt seen for OT session. Appears to be more confused this session with tangential and nonsensical speech. Req SPV for side lying LB dressing, min-mod A with FWW for functional ambulation to sink, min A for standing g/h d/t decreased balance. Pt resistant to continued therapy and ambulated BTB quickly and unsafely before returning self to supine over the raised bedrail on opposite side of bed with SPV. Poor safety awareness, impulsive, poor insight into deficits and problem solving, as well as decreased attention req max redirection. Continues to be limited by decreased functional mobility, activity tolerance, cognition, coordination, balance, and pain which are currently affecting pt's ability to complete ADLs/IADLs at baseline. Will continue to follow.     Plan    Continue current treatment plan.    DC Equipment Recommendations: Unable to determine at this time  Discharge Recommendations: Recommend post-acute placement for additional occupational therapy services prior to discharge home    Objective     10/06/20 0859   Pain 0 - 10 Group   Therapist Pain Assessment Post Activity Pain Same as Prior to Activity;During Activity;Nurse Notified  (no c/o pain)   Cognition    Cognition / Consciousness X   Level of Consciousness Confused   Ability To Follow Commands 1 Step   Safety Awareness Impulsive;Impaired   New Learning Impaired   Attention Impaired   Sequencing Impaired   Comments pleasant, but poor safety awareness and very impulsive. can follow commands, but req max v/cs for redirection. poor insight into deficits and poor problem solving. confusion seems worse than prior session    Other Treatments   Other  Treatments Provided Attempted to educate on safety and slowing down, but pt not receptive   Balance   Sitting Balance (Static) Fair   Sitting Balance (Dynamic) Fair -   Standing Balance (Static) Poor +   Standing Balance (Dynamic) Poor   Weight Shift Sitting Fair   Weight Shift Standing Fair   Skilled Intervention Verbal Cuing;Tactile Cuing;Facilitation;Postural Facilitation;Compensatory Strategies   Comments with FWW req min A for balance throughout and v/cs to correct L lateral/posterior lean at sink   Bed Mobility    Supine to Sit Minimal Assist  (d/t cog)   Sit to Supine Supervised  (returned to bed w/o v/cs over the top of lower safety rail)   Scooting Supervised   Skilled Intervention Verbal Cuing;Tactile Cuing;Facilitation;Compensatory Strategies;Sequencing   Comments HOB elevated   Activities of Daily Living   Grooming Minimal Assist;Standing  (washing hands at sink; mostly for balance)   Lower Body Dressing Supervision  (sidelying)   Skilled Intervention Verbal Cuing;Tactile Cuing;Postural Facilitation;Facilitation;Compensatory Strategies   Comments distracted and resistant to encouragement to continue ADLs at sink, refused to continue and quickly ambulated unsafely to opposite side of bed and put self back to bed over bed rail   Functional Mobility   Sit to Stand Supervised   Bed, Chair, Wheelchair Transfer Minimal Assist   Transfer Method Stand Step   Mobility with FWW to sink   Skilled Intervention Tactile Cuing;Verbal Cuing;Facilitation;Compensatory Strategies;Sequencing   Comments req max v/cs, but pt with poor following and impulsivity   Activity Tolerance   Sitting Edge of Bed 5min   Standing 5 min   Comments limited by volition and cognition; resistant to continued activity   Short Term Goals   Short Term Goal # 1 Pt will complete toilet transfer using BSC if needed with spv, no LOB by discharge.   Goal Outcome # 1 Goal not met   Short Term Goal # 2 Pt will complete standing grooming/hygiene with  spv by discharge.   Goal Outcome # 2 Progressing slower than expected   Short Term Goal # 3 Pt will complete toileting with spv by discharge.   Goal Outcome # 3 Progressing slower than expected   Anticipated Discharge Equipment and Recommendations   DC Equipment Recommendations Unable to determine at this time   Discharge Recommendations Recommend post-acute placement for additional occupational therapy services prior to discharge home

## 2020-10-06 NOTE — DISCHARGE PLANNING
Anticipated Discharge Disposition:   · TBD    Action:   · Guardianship application completed and approved by care coordination leadership for submission to [facility]. LSW is unable to email as referral size is too large. Pending follow up from law office regarding fax number to attempt to fax referral.     Barriers to Discharge:   · No current insurance on file  · Lack of NOK to help with medical decisions as pt is not fully orientated.   · Pending determination on pursing guardianship.     Plan:   · Care coordination will continue to follow up and provide assistance with discharge plans/barriers.

## 2020-10-06 NOTE — PROGRESS NOTES
Received report from dayshift RN and assumed care of pt. Pt currently resting in bed with bilateral soft wrist restraints and vest restraint in place for pt safety and prevent pulling at tubes. Denies any acute needs or concerns at this time. Aox1 (self).    Call light within reach and fall precautions in place. Bed alarm on for safety. Hourly rounding maintained. Will monitor.

## 2020-10-06 NOTE — PROGRESS NOTES
Assumed care at 0700, report received from night shift RN.  Pt is laying in bed, awake. Oriented to self, disoriented to time place and situation, on RA , reports 0/10 pain . Bed is locked and in the lowest position. Call light and belongings within reach. Plan of care discussed and whiteboard updated, Pt has no questions at this time. Bilateral wrist restraints and vest restraint in place with active order.

## 2020-10-06 NOTE — PROGRESS NOTES
[facility] Medicine Daily Progress Note    Date of Service  10/6/2020    Chief Complaint  50 y.o. male with a pmhx of HTN, chronic pain and alcohol abuse who presented 9/9/2020 with altered mental status.     Hospital Course  50 y.o. male with a pmhx of HTN, chronic pain and alcohol abuse who presented 9/9/2020 with altered mental status, he was found obtunded in his house by his ex-wife who was checking on him as he had not been seen for 2 days or been able to get a hold of him.  He was found in a chair,  A&Ox0 and hypotensive.  He arrived in the ER with a GCS of 7 (E1,V1,M5) and severely hypotensive.  The patient was intubated for airway protection and central venous access was obtained for administration of high-volume crystalloid resuscitation and vasopressor therapy.    CT head and abdomen and pelvis were all unremarkable. He was briefly treated with antibiotics for concern for an underlying infection and a lumbar tap done was negative. Cultures remained negative.   EEG was done for concern for seizure-like activity was without any epileptiform activity.  He was extubated on 9/11 and has been able to protect his airway ever since. He is currently receiving treatment with benzos for concern for benzo withdrawals and IV thiamine for his history of alcohol abuse.    Interval Problem Update  9/29: Patient was seen and examined by me today.  Found to be resting in bed comfortably.  We will continue his Librium and Risperdal for now.  Try to take off restraints today.  Repeat blood work in the a.m.  SNF placement is pending.  9/30: Patient was found to be in bed without any complaints.  We will continue Librium and Risperdal.  Continue restraints and Gallardo catheter.  Placement is pending.  10/1: Continue current management.  Gallardo found to have sediments and I will send his for urinalysis.  Continue to find placement.  10/2: Patient was seen and examined me today.  Urinalysis was positive for infection.  I have asked  the nurse to remove the Gallardo catheter.  Started patient on Bactrim for 5 days.  10/3: Patient continues to have hallucinations and confusion.  Continue to treat with Bactrim for UTI.  Continue voiding trial after Gallardo catheter was pulled out.  10/4: Patient continues to be confused.  Continue to treat for UTI.  Gallardo catheter is replaced.  10/5: Patient seen and examined by me today.  Still confused despite taking off of Librium and given antibiotics for UTI infection.    10/6: Patient was seen and examined by me today.  He found to be confused and confabulating.  Continue pursue guardianship.  Consultants/Specialty  Intensivist    Code Status  Full Code    Disposition  Pending SNF    Review of Systems  Review of Systems   Constitutional: Negative for malaise/fatigue.   HENT: Negative.    Eyes: Negative.    Respiratory: Negative for cough, hemoptysis and sputum production.    Cardiovascular: Negative for chest pain, palpitations, orthopnea and claudication.   Gastrointestinal: Negative for abdominal pain, heartburn, nausea and vomiting.   Genitourinary: Negative for dysuria, frequency and urgency.   Musculoskeletal: Negative.    Neurological: Negative.    Psychiatric/Behavioral: Negative for hallucinations.       Physical Exam  Temp:  [36.3 °C (97.3 °F)-36.7 °C (98 °F)] 36.3 °C (97.3 °F)  Pulse:  [79-88] 88  Resp:  [16] 16  BP: (119-137)/(73-78) 119/76  SpO2:  [93 %-96 %] 93 %    Physical Exam  Vitals signs and nursing note reviewed.   Constitutional:       General: He is not in acute distress.  HENT:      Head: Normocephalic and atraumatic.      Right Ear: External ear normal.      Left Ear: External ear normal.      Nose: Nose normal.   Eyes:      Extraocular Movements: Extraocular movements intact.      Pupils: Pupils are equal, round, and reactive to light.   Neck:      Musculoskeletal: Normal range of motion and neck supple. No neck rigidity or muscular tenderness.   Cardiovascular:      Rate and Rhythm:  Normal rate and regular rhythm.      Pulses: Normal pulses.   Pulmonary:      Effort: Pulmonary effort is normal.   Abdominal:      General: Bowel sounds are normal.      Palpations: Abdomen is soft.   Musculoskeletal: Normal range of motion.         General: No tenderness.   Skin:     General: Skin is warm and dry.   Neurological:      General: No focal deficit present.      Mental Status: He is alert. He is disoriented.      Comments: Oriented to self    Psychiatric:         Mood and Affect: Affect is flat.         Behavior: Behavior is combative.         Thought Content: Thought content is delusional.         Cognition and Memory: Cognition is impaired. He exhibits impaired recent memory.         Judgment: Judgment is impulsive.      Comments: Confused          Fluids    Intake/Output Summary (Last 24 hours) at 10/6/2020 1333  Last data filed at 10/6/2020 0550  Gross per 24 hour   Intake 480 ml   Output 900 ml   Net -420 ml       Laboratory  Recent Labs     10/04/20  0045 10/05/20  0238 10/06/20  1036   WBC 9.2 7.1 8.2   RBC 3.34* 3.38* 3.58*   HEMOGLOBIN 10.2* 10.1* 10.9*   HEMATOCRIT 32.5* 32.7* 34.0*   MCV 97.3 96.7 95.0   MCH 30.5 29.9 30.4   MCHC 31.4* 30.9* 32.1*   RDW 49.3 49.4 48.3   PLATELETCT 230 226 267   MPV 10.0 10.1 9.9     Recent Labs     10/04/20  0045 10/05/20  0238 10/06/20  1036   SODIUM 142 142 143   POTASSIUM 3.9 3.8 4.1   CHLORIDE 107 110 109   CO2 23 22 21   GLUCOSE 102* 87 91   BUN 9 10 12   CREATININE 1.12 1.18 1.09   CALCIUM 9.2 9.9 10.0                   Imaging  MR-BRAIN-W/O   Final Result      1.  Moderate diffuse cerebral atrophy.      2.  Minimal periventricular and juxtacortical white matter changes consistent with chronic microvascular ischemic gliosis.      3.  No evidence of acute infarction in the brain parenchyma.      DX-ABDOMEN FOR TUBE PLACEMENT   Final Result      Feeding tube tip overlies the second portion of the duodenum.      DX-CHEST-PORTABLE (1 VIEW)   Final Result          1.  Patchy left lung base opacity, new since prior, could represent early infiltrate            DX-CHEST-PORTABLE (1 VIEW)   Final Result         1.  Patchy left lung base opacity, new since prior, could represent early infiltrate         [id number] VIEW   Final Result      Enteric tube projects over the distal stomach/pylorus.         EC-ECHOCARDIOGRAM COMPLETE W/O CONT   Final Result      DX-CHEST-PORTABLE (1 VIEW)   Final Result         1.  No acute cardiopulmonary disease.      CT-ABDOMEN-PELVIS W/O   Final Result      1.  No renal stone or hydronephrosis.   2.  Normal appendix.   3.  No focal mesenteric inflammatory process.      DX-ABDOMEN FOR TUBE PLACEMENT   Final Result      NG tube tip projects over expected location the gastric fundus.      US-ABDOMEN COMPLETE SURVEY   Final Result         1.  Echogenic liver compatible with fatty change versus fibrosis.   2.  Gallbladder sludge without additional sonographic findings of cholecystitis.   3.  Partial atrophic bilateral kidneys with lobulated contour         CT-HEAD W/O   Final Result         1.  No acute intracranial abnormality is identified, there are nonspecific white matter changes, commonly associated with small vessel ischemic disease.  Associated mild cerebral atrophy is noted.   2.  Atherosclerosis.      DX-CHEST-PORTABLE (1 VIEW)   Final Result         1.  No acute cardiopulmonary disease.           Assessment/Plan  * Toxic encephalopathy- (present on admission)  Assessment & Plan  Unknown etiology whether this is hypoxic induced brain injury versus Wernicke encephalopathy    LP- negative  EEG was negative for seizures    Prn haldol  Po risperdal... Increased to 1 mg nightly on 9/27/2020    Psych md consult--> they believe it is etoh related    Continue to monitor    Restrain as necessary    Mri brain negative       Acute cystitis  Assessment & Plan  UA + for bacteria, LE and nitrite  F/u urine cultures found stapniki aures   Started pt on  bactrim for 5 days      Urinary retention- (present on admission)  Assessment & Plan  Po flomax    Voiding trial in 1 to 2  Days.. Be aware that urologist was needed to replace this current  Gallardo as it was difficult to place   Remove Gallardo catheter today    Other dysphagia- (present on admission)  Assessment & Plan  Passed speech eval now  On modified diet    Hypernatremia  Assessment & Plan  Replacing potassium and adding daily magnesium     check  a BMP in a.m.    Acute respiratory failure with hypoxia ([facility])- (present on admission)  Assessment & Plan  2/2 mental status  Extubated 9/11  Remains on room air    Benzodiazepine dependence ([facility])- (present on admission)  Assessment & Plan  Monitor for seizure/withdrawal  Po librium  EEG after sz-like activity 9/10 was negative despite jerking motions    Alcohol abuse- (present on admission)  Assessment & Plan  Reported hx of  Continue Vitamins  High dose thiamine  Seizure precautions  S/p etoh w/d protocol    Septic shock ([facility])- (present on admission)  Assessment & Plan  Resolved  Broad-spectrum antibiotics: s/p vanc/zosyn --> C3, now off abx  Blood culture, CSF culture, urinalysis allnegative  Monitor off antibiotics        JEWELS (acute kidney injury) ([facility])- (present on admission)  Assessment & Plan  Consistent with ATN 2/2 hypotension, dehydration and sepsis  Resolved  Avoid nephrotoxins.  Renal dose all medications.         VTE prophylaxis: Lovenox

## 2020-10-06 NOTE — CARE PLAN
Problem: Safety - Medical Restraint  Goal: Remains free of injury from restraints (Restraint for Interference with Medical Device)  Description: INTERVENTIONS:  1. Determine that other, less restrictive measures have been tried or would not be effective before applying the restraint  2. Evaluate the patient's condition at the time of restraint application  3. Inform patient/family regarding the reason for restraint  4. Q2H: Monitor safety, psychosocial status, comfort, nutrition and hydration  Outcome: NOT MET  Goal: Free from restraint(s) (Restraint for Interference with Medical Device)  Description: INTERVENTIONS:  1. ONCE/SHIFT or MINIMUM Q12H: Assess and document the continuing need for restraints  2. Q24H: Continued use of restraint requires LIP to perform face to face examination and written order  3. Identify and implement measures to help patient regain control  Outcome: NOT MET     Problem: Safety  Goal: Will remain free from falls  Outcome: PROGRESSING AS EXPECTED     Problem: Psychosocial Needs:  Goal: Level of anxiety will decrease  Outcome: PROGRESSING AS EXPECTED     Problem: Pain Management  Goal: Pain level will decrease to patient's comfort goal  Outcome: PROGRESSING AS EXPECTED

## 2020-10-07 PROCEDURE — 700111 HCHG RX REV CODE 636 W/ 250 OVERRIDE (IP): Performed by: STUDENT IN AN ORGANIZED HEALTH CARE EDUCATION/TRAINING PROGRAM

## 2020-10-07 PROCEDURE — 770006 HCHG ROOM/CARE - MED/SURG/GYN SEMI*

## 2020-10-07 PROCEDURE — 700102 HCHG RX REV CODE 250 W/ 637 OVERRIDE(OP): Performed by: STUDENT IN AN ORGANIZED HEALTH CARE EDUCATION/TRAINING PROGRAM

## 2020-10-07 PROCEDURE — A9270 NON-COVERED ITEM OR SERVICE: HCPCS | Performed by: FAMILY MEDICINE

## 2020-10-07 PROCEDURE — A9270 NON-COVERED ITEM OR SERVICE: HCPCS | Performed by: HOSPITALIST

## 2020-10-07 PROCEDURE — A9270 NON-COVERED ITEM OR SERVICE: HCPCS | Performed by: STUDENT IN AN ORGANIZED HEALTH CARE EDUCATION/TRAINING PROGRAM

## 2020-10-07 PROCEDURE — 700102 HCHG RX REV CODE 250 W/ 637 OVERRIDE(OP): Performed by: FAMILY MEDICINE

## 2020-10-07 PROCEDURE — 99231 SBSQ HOSP IP/OBS SF/LOW 25: CPT | Performed by: HOSPITALIST

## 2020-10-07 PROCEDURE — 700101 HCHG RX REV CODE 250: Performed by: STUDENT IN AN ORGANIZED HEALTH CARE EDUCATION/TRAINING PROGRAM

## 2020-10-07 PROCEDURE — 700102 HCHG RX REV CODE 250 W/ 637 OVERRIDE(OP): Performed by: HOSPITALIST

## 2020-10-07 RX ADMIN — POLYETHYLENE GLYCOL 3350 1 PACKET: 17 POWDER, FOR SOLUTION ORAL at 05:24

## 2020-10-07 RX ADMIN — DOCUSATE SODIUM 50 MG AND SENNOSIDES 8.6 MG 2 TABLET: 8.6; 5 TABLET, FILM COATED ORAL at 16:54

## 2020-10-07 RX ADMIN — RISPERIDONE 1 MG: 0.5 TABLET ORAL at 16:54

## 2020-10-07 RX ADMIN — TAMSULOSIN HYDROCHLORIDE 0.8 MG: 0.4 CAPSULE ORAL at 08:52

## 2020-10-07 RX ADMIN — POTASSIUM CHLORIDE 20 MEQ: 20 TABLET, EXTENDED RELEASE ORAL at 16:54

## 2020-10-07 RX ADMIN — ENOXAPARIN SODIUM 40 MG: 40 INJECTION SUBCUTANEOUS at 16:54

## 2020-10-07 ASSESSMENT — FIBROSIS 4 INDEX: FIB4 SCORE: 0.93

## 2020-10-07 ASSESSMENT — ENCOUNTER SYMPTOMS
HEMOPTYSIS: 0
NAUSEA: 0
HEARTBURN: 0
NEUROLOGICAL NEGATIVE: 1
ORTHOPNEA: 0
EYES NEGATIVE: 1
CLAUDICATION: 0
SPUTUM PRODUCTION: 0
HALLUCINATIONS: 0
COUGH: 0
PALPITATIONS: 0
ABDOMINAL PAIN: 0
MUSCULOSKELETAL NEGATIVE: 1
VOMITING: 0

## 2020-10-07 NOTE — PROGRESS NOTES
Report received from MUSTAPHA Lyman at 0700. Assumed care, assessment complete. Pt is A & O x 1 and aggitated. In bilateral soft wrist restraints, vest, and bilateral mitts. Pt denies having any pain at this time. Fall precautions and appropriate signs in place. Pt oriented to unit routine, call light/phone system and CNA and RN extension number provided. Pt educated regarding fall precautions. Bed alarm in use and locked in lowest position. Pt denies any additional needs at this time. Call light within reach.

## 2020-10-07 NOTE — CARE PLAN
Problem: Infection  Goal: Will remain free from infection  Outcome: PROGRESSING AS EXPECTED     Problem: Safety - Medical Restraint  Goal: Remains free of injury from restraints (Restraint for Interference with Medical Device)  Description: INTERVENTIONS:  1. Determine that other, less restrictive measures have been tried or would not be effective before applying the restraint  2. Evaluate the patient's condition at the time of restraint application  3. Inform patient/family regarding the reason for restraint  4. Q2H: Monitor safety, psychosocial status, comfort, nutrition and hydration  Outcome: PROGRESSING AS EXPECTED  Flowsheets (Taken 9/12/2020 1827 by Elidia Barajas RSANDRA)  Addressed this shift: Remains free of injury from restraints (restraint for interference with medical device): Every 2 hours: Monitor safety, psychosocial status, comfort, nutrition and hydration     Problem: Skin Integrity  Goal: Risk for impaired skin integrity will decrease  Outcome: PROGRESSING AS EXPECTED     Problem: Bowel/Gastric:  Goal: Normal bowel function is maintained or improved  Outcome: PROGRESSING SLOWER THAN EXPECTED  Note: Miralax given to patient

## 2020-10-07 NOTE — PROGRESS NOTES
[facility] Medicine Daily Progress Note    Date of Service  10/7/2020    Chief Complaint  50 y.o. male with a pmhx of HTN, chronic pain and alcohol abuse who presented 9/9/2020 with altered mental status.     Hospital Course  50 y.o. male with a pmhx of HTN, chronic pain and alcohol abuse who presented 9/9/2020 with altered mental status, he was found obtunded in his house by his ex-wife who was checking on him as he had not been seen for 2 days or been able to get a hold of him.  He was found in a chair,  A&Ox0 and hypotensive.  He arrived in the ER with a GCS of 7 (E1,V1,M5) and severely hypotensive.  The patient was intubated for airway protection and central venous access was obtained for administration of high-volume crystalloid resuscitation and vasopressor therapy.    CT head and abdomen and pelvis were all unremarkable. He was briefly treated with antibiotics for concern for an underlying infection and a lumbar tap done was negative. Cultures remained negative.   EEG was done for concern for seizure-like activity was without any epileptiform activity.  He was extubated on 9/11 and has been able to protect his airway ever since. He is currently receiving treatment with benzos for concern for benzo withdrawals and IV thiamine for his history of alcohol abuse.    Interval Problem Update  9/29: Patient was seen and examined by me today.  Found to be resting in bed comfortably.  We will continue his Librium and Risperdal for now.  Try to take off restraints today.  Repeat blood work in the a.m.  SNF placement is pending.  9/30: Patient was found to be in bed without any complaints.  We will continue Librium and Risperdal.  Continue restraints and Gallardo catheter.  Placement is pending.  10/1: Continue current management.  Gallardo found to have sediments and I will send his for urinalysis.  Continue to find placement.  10/2: Patient was seen and examined me today.  Urinalysis was positive for infection.  I have asked  the nurse to remove the Gallardo catheter.  Started patient on Bactrim for 5 days.  10/3: Patient continues to have hallucinations and confusion.  Continue to treat with Bactrim for UTI.  Continue voiding trial after Gallardo catheter was pulled out.  10/4: Patient continues to be confused.  Continue to treat for UTI.  Gallardo catheter is replaced.  10/5: Patient seen and examined by me today.  Still confused despite taking off of Librium and given antibiotics for UTI infection.    10/6: Patient was seen and examined by me today.  He found to be confused and confabulating.  Continue pursue guardianship.  10/7: Patient seen and examined he still confused and impulsive.  Continue to pursue guardianship.  Consultants/Specialty  Intensivist    Code Status  Full Code    Disposition  Pending SNF    Review of Systems  Review of Systems   Constitutional: Negative for malaise/fatigue.   HENT: Negative.    Eyes: Negative.    Respiratory: Negative for cough, hemoptysis and sputum production.    Cardiovascular: Negative for chest pain, palpitations, orthopnea and claudication.   Gastrointestinal: Negative for abdominal pain, heartburn, nausea and vomiting.   Genitourinary: Negative for dysuria, frequency and urgency.   Musculoskeletal: Negative.    Neurological: Negative.    Psychiatric/Behavioral: Negative for hallucinations.       Physical Exam  Temp:  [36.4 °C (97.6 °F)-37 °C (98.6 °F)] 36.8 °C (98.2 °F)  Pulse:  [78-96] 95  Resp:  [16] 16  BP: (119-134)/(71-84) 134/84  SpO2:  [94 %-95 %] 95 %    Physical Exam  Vitals signs and nursing note reviewed.   Constitutional:       General: He is not in acute distress.  HENT:      Head: Normocephalic and atraumatic.      Right Ear: External ear normal.      Left Ear: External ear normal.      Nose: Nose normal.   Eyes:      Extraocular Movements: Extraocular movements intact.      Pupils: Pupils are equal, round, and reactive to light.   Neck:      Musculoskeletal: Normal range of motion  and neck supple. No neck rigidity or muscular tenderness.   Cardiovascular:      Rate and Rhythm: Normal rate and regular rhythm.      Pulses: Normal pulses.   Pulmonary:      Effort: Pulmonary effort is normal.   Abdominal:      General: Bowel sounds are normal.      Palpations: Abdomen is soft.   Musculoskeletal: Normal range of motion.         General: No tenderness.   Skin:     General: Skin is warm and dry.   Neurological:      General: No focal deficit present.      Mental Status: He is alert. He is disoriented.      Comments: Oriented to self    Psychiatric:         Mood and Affect: Affect is flat.         Behavior: Behavior is combative.         Thought Content: Thought content is delusional.         Cognition and Memory: Cognition is impaired. He exhibits impaired recent memory.         Judgment: Judgment is impulsive.      Comments: Confused          Fluids    Intake/Output Summary (Last 24 hours) at 10/7/2020 1259  Last data filed at 10/7/2020 1009  Gross per 24 hour   Intake 418 ml   Output 500 ml   Net -82 ml       Laboratory  Recent Labs     10/05/20  0238 10/06/20  1036   WBC 7.1 8.2   RBC 3.38* 3.58*   HEMOGLOBIN 10.1* 10.9*   HEMATOCRIT 32.7* 34.0*   MCV 96.7 95.0   MCH 29.9 30.4   MCHC 30.9* 32.1*   RDW 49.4 48.3   PLATELETCT 226 267   MPV 10.1 9.9     Recent Labs     10/05/20  0238 10/06/20  1036   SODIUM 142 143   POTASSIUM 3.8 4.1   CHLORIDE 110 109   CO2 22 21   GLUCOSE 87 91   BUN 10 12   CREATININE 1.18 1.09   CALCIUM 9.9 10.0                   Imaging  MR-BRAIN-W/O   Final Result      1.  Moderate diffuse cerebral atrophy.      2.  Minimal periventricular and juxtacortical white matter changes consistent with chronic microvascular ischemic gliosis.      3.  No evidence of acute infarction in the brain parenchyma.      DX-ABDOMEN FOR TUBE PLACEMENT   Final Result      Feeding tube tip overlies the second portion of the duodenum.      DX-CHEST-PORTABLE (1 VIEW)   Final Result         1.   Patchy left lung base opacity, new since prior, could represent early infiltrate            DX-CHEST-PORTABLE (1 VIEW)   Final Result         1.  Patchy left lung base opacity, new since prior, could represent early infiltrate         [id number] VIEW   Final Result      Enteric tube projects over the distal stomach/pylorus.         EC-ECHOCARDIOGRAM COMPLETE W/O CONT   Final Result      DX-CHEST-PORTABLE (1 VIEW)   Final Result         1.  No acute cardiopulmonary disease.      CT-ABDOMEN-PELVIS W/O   Final Result      1.  No renal stone or hydronephrosis.   2.  Normal appendix.   3.  No focal mesenteric inflammatory process.      DX-ABDOMEN FOR TUBE PLACEMENT   Final Result      NG tube tip projects over expected location the gastric fundus.      US-ABDOMEN COMPLETE SURVEY   Final Result         1.  Echogenic liver compatible with fatty change versus fibrosis.   2.  Gallbladder sludge without additional sonographic findings of cholecystitis.   3.  Partial atrophic bilateral kidneys with lobulated contour         CT-HEAD W/O   Final Result         1.  No acute intracranial abnormality is identified, there are nonspecific white matter changes, commonly associated with small vessel ischemic disease.  Associated mild cerebral atrophy is noted.   2.  Atherosclerosis.      DX-CHEST-PORTABLE (1 VIEW)   Final Result         1.  No acute cardiopulmonary disease.           Assessment/Plan  * Toxic encephalopathy- (present on admission)  Assessment & Plan  Unknown etiology whether this is hypoxic induced brain injury versus Wernicke encephalopathy    LP- negative  EEG was negative for seizures    Prn haldol  Po risperdal... Increased to 1 mg nightly on 9/27/2020    Psych md consult--> they believe it is etoh related    Continue to monitor    Restrain as necessary    Mri brain negative       Acute cystitis  Assessment & Plan  UA + for bacteria, LE and nitrite  F/u urine cultures found staph aures   Started pt on bactrim for  5 days      Urinary retention- (present on admission)  Assessment & Plan  Po flomax    Voiding trial in 1 to 2  Days.. Be aware that urologist was needed to replace this current  Gallardo as it was difficult to place   Remove Gallardo catheter today    Other dysphagia- (present on admission)  Assessment & Plan  Passed speech eval now  On modified diet    Hypernatremia  Assessment & Plan  Replacing potassium and adding daily magnesium     check  a BMP in a.m.    Acute respiratory failure with hypoxia ([facility])- (present on admission)  Assessment & Plan  2/2 mental status  Extubated 9/11  Remains on room air    Benzodiazepine dependence ([facility])- (present on admission)  Assessment & Plan  Monitor for seizure/withdrawal  Po librium  EEG after sz-like activity 9/10 was negative despite jerking motions    Alcohol abuse- (present on admission)  Assessment & Plan  Reported hx of  Continue Vitamins  High dose thiamine  Seizure precautions  S/p etoh w/d protocol    Septic shock ([facility])- (present on admission)  Assessment & Plan  Resolved  Broad-spectrum antibiotics: s/p vanc/zosyn --> C3, now off abx  Blood culture, CSF culture, urinalysis allnegative  Monitor off antibiotics        JEWELS (acute kidney injury) ([facility])- (present on admission)  Assessment & Plan  Consistent with ATN 2/2 hypotension, dehydration and sepsis  Resolved  Avoid nephrotoxins.  Renal dose all medications.         VTE prophylaxis: Lovenox

## 2020-10-07 NOTE — CARE PLAN
Problem: Safety - Medical Restraint  Goal: Remains free of injury from restraints (Restraint for Interference with Medical Device)  Description: INTERVENTIONS:  1. Determine that other, less restrictive measures have been tried or would not be effective before applying the restraint  2. Evaluate the patient's condition at the time of restraint application  3. Inform patient/family regarding the reason for restraint  4. Q2H: Monitor safety, psychosocial status, comfort, nutrition and hydration  Outcome: NOT MET  Goal: Free from restraint(s) (Restraint for Interference with Medical Device)  Description: INTERVENTIONS:  1. ONCE/SHIFT or MINIMUM Q12H: Assess and document the continuing need for restraints  2. Q24H: Continued use of restraint requires LIP to perform face to face examination and written order  3. Identify and implement measures to help patient regain control  Outcome: NOT MET     Problem: Safety  Goal: Will remain free from falls  Outcome: PROGRESSING AS EXPECTED     Problem: Respiratory:  Goal: Respiratory status will improve  Outcome: PROGRESSING AS EXPECTED     Problem: Psychosocial Needs:  Goal: Level of anxiety will decrease  Outcome: PROGRESSING AS EXPECTED

## 2020-10-07 NOTE — PROGRESS NOTES
Received report from dayshift RN and assumed care of pt. Pt currently resting in bed, in bilateral soft wrist restraints and mitts as well as vest. Denies any acute needs or concerns at this time. Aox1 (self).    Call light within reach and fall precautions in place. Bed alarm on for safety. Will monitor

## 2020-10-07 NOTE — CARE PLAN
Problem: Safety  Goal: Will remain free from falls  Outcome: PROGRESSING SLOWER THAN EXPECTED  Note: Pt continues to attempt ambulation without calling for assistance despite education. Bed is in the lowest/locked position, call light and belongings are within reach. Hourly rounding in place.     Problem: Safety - Medical Restraint  Goal: Free from restraint(s) (Restraint for Interference with Medical Device)  Description: INTERVENTIONS:  1. ONCE/SHIFT or MINIMUM Q12H: Assess and document the continuing need for restraints  2. Q24H: Continued use of restraint requires LIP to perform face to face examination and written order  3. Identify and implement measures to help patient regain control  Outcome: PROGRESSING SLOWER THAN EXPECTED  Note: Pt continues to pull at newton catheter resulting in the need for restraints

## 2020-10-08 PROCEDURE — 700102 HCHG RX REV CODE 250 W/ 637 OVERRIDE(OP): Performed by: FAMILY MEDICINE

## 2020-10-08 PROCEDURE — 700102 HCHG RX REV CODE 250 W/ 637 OVERRIDE(OP): Performed by: STUDENT IN AN ORGANIZED HEALTH CARE EDUCATION/TRAINING PROGRAM

## 2020-10-08 PROCEDURE — 92507 TX SP LANG VOICE COMM INDIV: CPT

## 2020-10-08 PROCEDURE — A9270 NON-COVERED ITEM OR SERVICE: HCPCS | Performed by: HOSPITALIST

## 2020-10-08 PROCEDURE — 770006 HCHG ROOM/CARE - MED/SURG/GYN SEMI*

## 2020-10-08 PROCEDURE — 99231 SBSQ HOSP IP/OBS SF/LOW 25: CPT | Performed by: HOSPITALIST

## 2020-10-08 PROCEDURE — 700102 HCHG RX REV CODE 250 W/ 637 OVERRIDE(OP): Performed by: HOSPITALIST

## 2020-10-08 PROCEDURE — 700101 HCHG RX REV CODE 250: Performed by: STUDENT IN AN ORGANIZED HEALTH CARE EDUCATION/TRAINING PROGRAM

## 2020-10-08 PROCEDURE — A9270 NON-COVERED ITEM OR SERVICE: HCPCS | Performed by: STUDENT IN AN ORGANIZED HEALTH CARE EDUCATION/TRAINING PROGRAM

## 2020-10-08 PROCEDURE — 700111 HCHG RX REV CODE 636 W/ 250 OVERRIDE (IP): Performed by: STUDENT IN AN ORGANIZED HEALTH CARE EDUCATION/TRAINING PROGRAM

## 2020-10-08 PROCEDURE — A9270 NON-COVERED ITEM OR SERVICE: HCPCS | Performed by: FAMILY MEDICINE

## 2020-10-08 RX ADMIN — Medication 100 MG: at 04:39

## 2020-10-08 RX ADMIN — TAMSULOSIN HYDROCHLORIDE 0.8 MG: 0.4 CAPSULE ORAL at 08:27

## 2020-10-08 RX ADMIN — POTASSIUM CHLORIDE 20 MEQ: 20 TABLET, EXTENDED RELEASE ORAL at 04:39

## 2020-10-08 RX ADMIN — POTASSIUM CHLORIDE 20 MEQ: 20 TABLET, EXTENDED RELEASE ORAL at 17:14

## 2020-10-08 RX ADMIN — DOCUSATE SODIUM 50 MG AND SENNOSIDES 8.6 MG 2 TABLET: 8.6; 5 TABLET, FILM COATED ORAL at 04:39

## 2020-10-08 RX ADMIN — POLYETHYLENE GLYCOL 3350 1 PACKET: 17 POWDER, FOR SOLUTION ORAL at 04:39

## 2020-10-08 RX ADMIN — RISPERIDONE 1 MG: 0.5 TABLET ORAL at 17:14

## 2020-10-08 RX ADMIN — ENOXAPARIN SODIUM 40 MG: 40 INJECTION SUBCUTANEOUS at 17:14

## 2020-10-08 ASSESSMENT — ENCOUNTER SYMPTOMS
HEMOPTYSIS: 0
ABDOMINAL PAIN: 0
HEARTBURN: 0
HALLUCINATIONS: 0
SPUTUM PRODUCTION: 0
PALPITATIONS: 0
ORTHOPNEA: 0
MUSCULOSKELETAL NEGATIVE: 1
CLAUDICATION: 0
VOMITING: 0
NAUSEA: 0
NEUROLOGICAL NEGATIVE: 1
COUGH: 0
EYES NEGATIVE: 1

## 2020-10-08 ASSESSMENT — FIBROSIS 4 INDEX: FIB4 SCORE: 0.93

## 2020-10-08 NOTE — CARE PLAN
Problem: Infection  Goal: Will remain free from infection  Outcome: PROGRESSING AS EXPECTED     Problem: Psychosocial Needs:  Goal: Level of anxiety will decrease  Outcome: PROGRESSING AS EXPECTED     Problem: Safety - Medical Restraint  Goal: Remains free of injury from restraints (Restraint for Interference with Medical Device)  Description: INTERVENTIONS:  1. Determine that other, less restrictive measures have been tried or would not be effective before applying the restraint  2. Evaluate the patient's condition at the time of restraint application  3. Inform patient/family regarding the reason for restraint  4. Q2H: Monitor safety, psychosocial status, comfort, nutrition and hydration  Outcome: PROGRESSING AS EXPECTED  Flowsheets (Taken 9/12/2020 4071 by Elidia Barajas R.N.)  Addressed this shift: Remains free of injury from restraints (restraint for interference with medical device): Every 2 hours: Monitor safety, psychosocial status, comfort, nutrition and hydration     Problem: Safety - Medical Restraint  Goal: Free from restraint(s) (Restraint for Interference with Medical Device)  Description: INTERVENTIONS:  1. ONCE/SHIFT or MINIMUM Q12H: Assess and document the continuing need for restraints  2. Q24H: Continued use of restraint requires LIP to perform face to face examination and written order  3. Identify and implement measures to help patient regain control  Outcome: PROGRESSING SLOWER THAN EXPECTED     Problem: Safety - Medical Restraint  Goal: Free from restraint(s) (Restraint for Interference with Medical Device)  Description: INTERVENTIONS:  1. ONCE/SHIFT or MINIMUM Q12H: Assess and document the continuing need for restraints  2. Q24H: Continued use of restraint requires LIP to perform face to face examination and written order  3. Identify and implement measures to help patient regain control  Outcome: PROGRESSING SLOWER THAN EXPECTED

## 2020-10-08 NOTE — PROGRESS NOTES
Received report from dayshift RN and assumed care of pt. Pt currently resting in be. Denies any acute needs or concerns at this time. Aox1 (self).    Call light within reach and fall precautions in place. Bed alarm on for safety as well as bilateral soft wrist restraints, mitts and vest. Will monitor

## 2020-10-08 NOTE — CARE PLAN
Problem: Safety - Medical Restraint  Goal: Remains free of injury from restraints (Restraint for Interference with Medical Device)  Description: INTERVENTIONS:  1. Determine that other, less restrictive measures have been tried or would not be effective before applying the restraint  2. Evaluate the patient's condition at the time of restraint application  3. Inform patient/family regarding the reason for restraint  4. Q2H: Monitor safety, psychosocial status, comfort, nutrition and hydration  Outcome: NOT MET  Goal: Free from restraint(s) (Restraint for Interference with Medical Device)  Description: INTERVENTIONS:  1. ONCE/SHIFT or MINIMUM Q12H: Assess and document the continuing need for restraints  2. Q24H: Continued use of restraint requires LIP to perform face to face examination and written order  3. Identify and implement measures to help patient regain control  Outcome: NOT MET     Problem: Safety  Goal: Will remain free from injury  Outcome: PROGRESSING AS EXPECTED  Goal: Will remain free from falls  Outcome: PROGRESSING AS EXPECTED     Problem: Respiratory:  Goal: Respiratory status will improve  Outcome: PROGRESSING AS EXPECTED     Problem: Psychosocial Needs:  Goal: Level of anxiety will decrease  Outcome: PROGRESSING AS EXPECTED

## 2020-10-08 NOTE — PROGRESS NOTES
"Pt's brother Maxwell called bedside RN to get medical information about pt. Pt's brother is not on pt's medical record, and notified that he cannot get any medical information. Charge RN also discussed with Maxwell that unfortunately we have to respect pt privacy and we cannot release medical information at this time, if he wants updates, then he can speak with ex-wife Kellee, who has been involved in plan of care, per  notes. W Pat notified. Maxwell then asked for \"legal department\" of [facility], in which Charge RN forwarded call to W to further assist.   "

## 2020-10-08 NOTE — PROGRESS NOTES
[facility] Medicine Daily Progress Note    Date of Service  10/8/2020    Chief Complaint  50 y.o. male with a pmhx of HTN, chronic pain and alcohol abuse who presented 9/9/2020 with altered mental status.     Hospital Course  50 y.o. male with a pmhx of HTN, chronic pain and alcohol abuse who presented 9/9/2020 with altered mental status, he was found obtunded in his house by his ex-wife who was checking on him as he had not been seen for 2 days or been able to get a hold of him.  He was found in a chair,  A&Ox0 and hypotensive.  He arrived in the ER with a GCS of 7 (E1,V1,M5) and severely hypotensive.  The patient was intubated for airway protection and central venous access was obtained for administration of high-volume crystalloid resuscitation and vasopressor therapy.    CT head and abdomen and pelvis were all unremarkable. He was briefly treated with antibiotics for concern for an underlying infection and a lumbar tap done was negative. Cultures remained negative.   EEG was done for concern for seizure-like activity was without any epileptiform activity.  He was extubated on 9/11 and has been able to protect his airway ever since. He is currently receiving treatment with benzos for concern for benzo withdrawals and IV thiamine for his history of alcohol abuse.    Interval Problem Update  9/29: Patient was seen and examined by me today.  Found to be resting in bed comfortably.  We will continue his Librium and Risperdal for now.  Try to take off restraints today.  Repeat blood work in the a.m.  SNF placement is pending.  9/30: Patient was found to be in bed without any complaints.  We will continue Librium and Risperdal.  Continue restraints and Gallardo catheter.  Placement is pending.  10/1: Continue current management.  Gallardo found to have sediments and I will send his for urinalysis.  Continue to find placement.  10/2: Patient was seen and examined me today.  Urinalysis was positive for infection.  I have asked  the nurse to remove the Gallardo catheter.  Started patient on Bactrim for 5 days.  10/3: Patient continues to have hallucinations and confusion.  Continue to treat with Bactrim for UTI.  Continue voiding trial after Gallardo catheter was pulled out.  10/4: Patient continues to be confused.  Continue to treat for UTI.  Gallardo catheter is replaced.  10/5: Patient seen and examined by me today.  Still confused despite taking off of Librium and given antibiotics for UTI infection.    10/6: Patient was seen and examined by me today.  He found to be confused and confabulating.  Continue pursue guardianship.  10/7: Patient seen and examined he still confused and impulsive.  Continue to pursue guardianship.  10/8: Patient still confused continue to pursue guardianship  Consultants/Specialty  Intensivist    Code Status  Full Code    Disposition  Pending [facility]    Review of Systems  Review of Systems   Constitutional: Negative for malaise/fatigue.   HENT: Negative.    Eyes: Negative.    Respiratory: Negative for cough, hemoptysis and sputum production.    Cardiovascular: Negative for chest pain, palpitations, orthopnea and claudication.   Gastrointestinal: Negative for abdominal pain, heartburn, nausea and vomiting.   Genitourinary: Negative for dysuria, frequency and urgency.   Musculoskeletal: Negative.    Neurological: Negative.    Psychiatric/Behavioral: Negative for hallucinations.       Physical Exam  Temp:  [36.5 °C (97.7 °F)-36.8 °C (98.2 °F)] 36.6 °C (97.9 °F)  Pulse:  [75-95] 86  Resp:  [17-19] 18  BP: (120-137)/(81-83) 120/83  SpO2:  [91 %-100 %] 93 %    Physical Exam  Vitals signs and nursing note reviewed.   Constitutional:       General: He is not in acute distress.  HENT:      Head: Normocephalic and atraumatic.      Right Ear: External ear normal.      Left Ear: External ear normal.      Nose: Nose normal.   Eyes:      Extraocular Movements: Extraocular movements intact.      Pupils: Pupils are equal, round, and  reactive to light.   Neck:      Musculoskeletal: Normal range of motion and neck supple. No neck rigidity or muscular tenderness.   Cardiovascular:      Rate and Rhythm: Normal rate and regular rhythm.      Pulses: Normal pulses.   Pulmonary:      Effort: Pulmonary effort is normal.   Abdominal:      General: Bowel sounds are normal.      Palpations: Abdomen is soft.   Musculoskeletal: Normal range of motion.         General: No tenderness.   Skin:     General: Skin is warm and dry.   Neurological:      General: No focal deficit present.      Mental Status: He is alert. He is disoriented.      Comments: Oriented to self    Psychiatric:         Mood and Affect: Affect is flat.         Behavior: Behavior is combative.         Thought Content: Thought content is delusional.         Cognition and Memory: Cognition is impaired. He exhibits impaired recent memory.         Judgment: Judgment is impulsive.      Comments: Confused          Fluids    Intake/Output Summary (Last 24 hours) at 10/8/2020 1501  Last data filed at 10/8/2020 0957  Gross per 24 hour   Intake 238 ml   Output 700 ml   Net -462 ml       Laboratory  Recent Labs     10/06/20  1036   WBC 8.2   RBC 3.58*   HEMOGLOBIN 10.9*   HEMATOCRIT 34.0*   MCV 95.0   MCH 30.4   MCHC 32.1*   RDW 48.3   PLATELETCT 267   MPV 9.9     Recent Labs     10/06/20  1036   SODIUM 143   POTASSIUM 4.1   CHLORIDE 109   CO2 21   GLUCOSE 91   BUN 12   CREATININE 1.09   CALCIUM 10.0                   Imaging  MR-BRAIN-W/O   Final Result      1.  Moderate diffuse cerebral atrophy.      2.  Minimal periventricular and juxtacortical white matter changes consistent with chronic microvascular ischemic gliosis.      3.  No evidence of acute infarction in the brain parenchyma.      DX-ABDOMEN FOR TUBE PLACEMENT   Final Result      Feeding tube tip overlies the second portion of the duodenum.      DX-CHEST-PORTABLE (1 VIEW)   Final Result         1.  Patchy left lung base opacity, new since  prior, could represent early infiltrate            DX-CHEST-PORTABLE (1 VIEW)   Final Result         1.  Patchy left lung base opacity, new since prior, could represent early infiltrate         [id number] VIEW   Final Result      Enteric tube projects over the distal stomach/pylorus.         EC-ECHOCARDIOGRAM COMPLETE W/O CONT   Final Result      DX-CHEST-PORTABLE (1 VIEW)   Final Result         1.  No acute cardiopulmonary disease.      CT-ABDOMEN-PELVIS W/O   Final Result      1.  No renal stone or hydronephrosis.   2.  Normal appendix.   3.  No focal mesenteric inflammatory process.      DX-ABDOMEN FOR TUBE PLACEMENT   Final Result      NG tube tip projects over expected location the gastric fundus.      US-ABDOMEN COMPLETE SURVEY   Final Result         1.  Echogenic liver compatible with fatty change versus fibrosis.   2.  Gallbladder sludge without additional sonographic findings of cholecystitis.   3.  Partial atrophic bilateral kidneys with lobulated contour         CT-HEAD W/O   Final Result         1.  No acute intracranial abnormality is identified, there are nonspecific white matter changes, commonly associated with small vessel ischemic disease.  Associated mild cerebral atrophy is noted.   2.  Atherosclerosis.      DX-CHEST-PORTABLE (1 VIEW)   Final Result         1.  No acute cardiopulmonary disease.           Assessment/Plan  * Toxic encephalopathy- (present on admission)  Assessment & Plan  Unknown etiology whether this is hypoxic induced brain injury versus Wernicke encephalopathy    LP- negative  EEG was negative for seizures    Prn haldol  Po risperdal... Increased to 1 mg nightly on 9/27/2020    Psych md consult--> they believe it is etoh related    Continue to monitor    Restrain as necessary    Mri brain negative       Acute cystitis  Assessment & Plan  UA + for bacteria, LE and nitrite  F/u urine cultures found staph aures   Started pt on bactrim for 5 days      Urinary retention- (present  on admission)  Assessment & Plan  Po flomax    Voiding trial in 1 to 2  Days.. Be aware that urologist was needed to replace this current  Gallardo as it was difficult to place   Remove Gallardo catheter today    Other dysphagia- (present on admission)  Assessment & Plan  Passed speech eval now  On modified diet    Hypernatremia  Assessment & Plan  Replacing potassium and adding daily magnesium     check  a BMP in a.m.    Acute respiratory failure with hypoxia ([facility])- (present on admission)  Assessment & Plan  2/2 mental status  Extubated 9/11  Remains on room air    Benzodiazepine dependence ([facility])- (present on admission)  Assessment & Plan  Monitor for seizure/withdrawal  Po librium  EEG after sz-like activity 9/10 was negative despite jerking motions    Alcohol abuse- (present on admission)  Assessment & Plan  Reported hx of  Continue Vitamins  High dose thiamine  Seizure precautions  S/p etoh w/d protocol    Septic shock ([facility])- (present on admission)  Assessment & Plan  Resolved  Broad-spectrum antibiotics: s/p vanc/zosyn --> C3, now off abx  Blood culture, CSF culture, urinalysis allnegative  Monitor off antibiotics        JEWELS (acute kidney injury) ([facility])- (present on admission)  Assessment & Plan  Consistent with ATN 2/2 hypotension, dehydration and sepsis  Resolved  Avoid nephrotoxins.  Renal dose all medications.         VTE prophylaxis: Lovenox

## 2020-10-08 NOTE — PROGRESS NOTES
Report received from MUSTAPHA Lyman at 0700. Assumed care, assessment complete. Pt is A & O x 1, confused and aggitated.  Pt denies having any pain at this time. Bilateral wrist restraints are on as well as bilateral mitts and vest. Fall precautions and appropriate signs in place. Pt oriented to unit routine, call light/phone system and CNA and RN extension number provided. Pt educated regarding fall precautions. Bed alarm in use and locked in lowest position. Pt denies any additional needs at this time. Call light within reach.

## 2020-10-08 NOTE — DISCHARGE PLANNING
Anticipated Discharge Disposition:   · TBD     Action:   · Guardianship referral submitted to CHAZ Culver on 10/07/2020.   · LSW received incoming call from pt's brother, Maxwell, transferred to LSW from RN. Maxwell reporting he is next-of-kin to pt and demanding medical information on pt. LSW notified Maxwell that hospital does not have consent to discuss medical information with him. Maxwell reported he would get a  and hung up phone.   · LSW reached out to [facility]  CHAZ Culver to discuss appropriateness of sharing medical information with pt's brother given knowledge [facility] has from pt's ex-wife (Kellee) and pt's  (Jennifer Patrick) indicating pt's relationship with his brother is strained and there is a current legal gold between the two regarding their mother's estate. Per 's recommendation, brother has no right to pt's protected medical information and given the additional knowledge that they are in a current legal gold it would not be appropriate to disclose information to brother.     Barriers to Discharge:   · Pending guardianship determination   Plan:   · Care coordination will continue to assist as needed with pt's discharge plans/barriers.

## 2020-10-08 NOTE — THERAPY
Speech Language Pathology  Daily Treatment     Patient Name: Yury Blake  Age:  50 y.o., Sex:  male  Medical Record #: [id number]  Today's Date: 10/8/2020       Precautions: (P) Fall Risk, Swallow Precautions ( See Comments)  Comments: (P) Bilateral wrist restraints and mitts    Assessment    Patient was seen on this date for cognitive-linguistic therapy with emphasis on orientation, reading, writing, and visual tracking. Patient confused, in bilateral wrist restraints and mitts, but pleasant and cooperative. Restraints were removed for session and replaced after. Patient oriented to self, , address, and year. Not orientated to place, reason for admit, day/month, and age. Patient stated he was at his parent's house despite being given an option of 3. Despite re-orientation, patient was unable to recall that he was in the hospital given 3 opportunities throughout session. Patient performed visual tracking task (cancellation of single letter) with 90% accuracy given initial min A. Patient required additional processing time and mod A during written instructions task at the sentence level (good accuracy in 3/5 opportunities). Patient wrote name, address, and sentence about himself with fair-good accuracy (omitted pronoun x1).     Plan    Patient is not at the level for independent living and will required 24-hour supervision upon discharge. Recommend ongoing speech therapy services at the acute and post acute level of care.     Continue current treatment plan.    Discharge Recommendations: Recommend post-acute placement for additional speech therapy services prior to discharge home     Objective       10/08/20 1052   Vitals   O2 Delivery Device None - Room Air   Reading Comprehension   Reading Words Within Functional Limits (6-7)   Reading Phrases Within Functional Limits (6-7)   Reading Sentences Minimal (4)   Following Written Direction Moderate (3)   Written Expression   Written Expression (WDL) WDL    Comments Wrote name, address, and sentence about himself    Cognitive-Linguistic   Level of Consciousness Confused   Orientation Level Not Oriented to Reason;Not Oriented to Place;Not Oriented to Time;Not Oriented to Day;Not Oriented to Month   Sustained Attention Supervision (5)   Visual Scanning / Cancellation Skills Minimal (4)   Short Term Memory Severe (2)   Immediate Memory Severe (2)  (unable to carryover information regarding place)   Insight into Deficits Profound (1)   Short Term Goals   Short Term Goal # 3 The patient will utilize memory strategies during STM tasks to improved recall to >50% acurracy.    Goal Outcome  # 3 Progressing slower than expected   Short Term Goal # 4 The patient will complete functional reading comprehension tasks with >60% accuracy given min A.    Goal Outcome  # 4 Progressing as expected

## 2020-10-09 PROCEDURE — A9270 NON-COVERED ITEM OR SERVICE: HCPCS | Performed by: HOSPITALIST

## 2020-10-09 PROCEDURE — A9270 NON-COVERED ITEM OR SERVICE: HCPCS | Performed by: FAMILY MEDICINE

## 2020-10-09 PROCEDURE — 700111 HCHG RX REV CODE 636 W/ 250 OVERRIDE (IP): Performed by: HOSPITALIST

## 2020-10-09 PROCEDURE — A9270 NON-COVERED ITEM OR SERVICE: HCPCS | Performed by: STUDENT IN AN ORGANIZED HEALTH CARE EDUCATION/TRAINING PROGRAM

## 2020-10-09 PROCEDURE — 99231 SBSQ HOSP IP/OBS SF/LOW 25: CPT | Performed by: HOSPITALIST

## 2020-10-09 PROCEDURE — 700102 HCHG RX REV CODE 250 W/ 637 OVERRIDE(OP): Performed by: FAMILY MEDICINE

## 2020-10-09 PROCEDURE — 700102 HCHG RX REV CODE 250 W/ 637 OVERRIDE(OP): Performed by: STUDENT IN AN ORGANIZED HEALTH CARE EDUCATION/TRAINING PROGRAM

## 2020-10-09 PROCEDURE — 700102 HCHG RX REV CODE 250 W/ 637 OVERRIDE(OP): Performed by: HOSPITALIST

## 2020-10-09 PROCEDURE — 770006 HCHG ROOM/CARE - MED/SURG/GYN SEMI*

## 2020-10-09 RX ADMIN — POTASSIUM CHLORIDE 20 MEQ: 20 TABLET, EXTENDED RELEASE ORAL at 05:21

## 2020-10-09 RX ADMIN — RISPERIDONE 1 MG: 0.5 TABLET ORAL at 17:52

## 2020-10-09 RX ADMIN — POTASSIUM CHLORIDE 20 MEQ: 20 TABLET, EXTENDED RELEASE ORAL at 17:52

## 2020-10-09 RX ADMIN — DOCUSATE SODIUM 50 MG AND SENNOSIDES 8.6 MG 2 TABLET: 8.6; 5 TABLET, FILM COATED ORAL at 17:52

## 2020-10-09 RX ADMIN — Medication 100 MG: at 05:21

## 2020-10-09 RX ADMIN — ENOXAPARIN SODIUM 40 MG: 40 INJECTION SUBCUTANEOUS at 17:52

## 2020-10-09 RX ADMIN — DOCUSATE SODIUM 50 MG AND SENNOSIDES 8.6 MG 2 TABLET: 8.6; 5 TABLET, FILM COATED ORAL at 05:21

## 2020-10-09 RX ADMIN — TAMSULOSIN HYDROCHLORIDE 0.8 MG: 0.4 CAPSULE ORAL at 10:16

## 2020-10-09 ASSESSMENT — ENCOUNTER SYMPTOMS
HEMOPTYSIS: 0
PALPITATIONS: 0
SPUTUM PRODUCTION: 0
NEUROLOGICAL NEGATIVE: 1
ABDOMINAL PAIN: 0
CLAUDICATION: 0
EYES NEGATIVE: 1
VOMITING: 0
HALLUCINATIONS: 0
ORTHOPNEA: 0
MUSCULOSKELETAL NEGATIVE: 1
NAUSEA: 0
HEARTBURN: 0
COUGH: 0

## 2020-10-09 NOTE — PROGRESS NOTES
[facility] Medicine Daily Progress Note    Date of Service  10/9/2020    Chief Complaint  50 y.o. male with a pmhx of HTN, chronic pain and alcohol abuse who presented 9/9/2020 with altered mental status.     Hospital Course  50 y.o. male with a pmhx of HTN, chronic pain and alcohol abuse who presented 9/9/2020 with altered mental status, he was found obtunded in his house by his ex-wife who was checking on him as he had not been seen for 2 days or been able to get a hold of him.  He was found in a chair,  A&Ox0 and hypotensive.  He arrived in the ER with a GCS of 7 (E1,V1,M5) and severely hypotensive.  The patient was intubated for airway protection and central venous access was obtained for administration of high-volume crystalloid resuscitation and vasopressor therapy.    CT head and abdomen and pelvis were all unremarkable. He was briefly treated with antibiotics for concern for an underlying infection and a lumbar tap done was negative. Cultures remained negative.   EEG was done for concern for seizure-like activity was without any epileptiform activity.  He was extubated on 9/11 and has been able to protect his airway ever since. He is currently receiving treatment with benzos for concern for benzo withdrawals and IV thiamine for his history of alcohol abuse.    Interval Problem Update  9/29: Patient was seen and examined by me today.  Found to be resting in bed comfortably.  We will continue his Librium and Risperdal for now.  Try to take off restraints today.  Repeat blood work in the a.m.  SNF placement is pending.  9/30: Patient was found to be in bed without any complaints.  We will continue Librium and Risperdal.  Continue restraints and Gallardo catheter.  Placement is pending.  10/1: Continue current management.  Gallardo found to have sediments and I will send his for urinalysis.  Continue to find placement.  10/2: Patient was seen and examined me today.  Urinalysis was positive for infection.  I have asked  the nurse to remove the Gallardo catheter.  Started patient on Bactrim for 5 days.  10/3: Patient continues to have hallucinations and confusion.  Continue to treat with Bactrim for UTI.  Continue voiding trial after Gallardo catheter was pulled out.  10/4: Patient continues to be confused.  Continue to treat for UTI.  Gallardo catheter is replaced.  10/5: Patient seen and examined by me today.  Still confused despite taking off of Librium and given antibiotics for UTI infection.    10/6: Patient was seen and examined by me today.  He found to be confused and confabulating.  Continue pursue guardianship.  10/7: Patient seen and examined he still confused and impulsive.  Continue to pursue guardianship.  10/8: Patient still confused continue to pursue guardianship  10/9: Patient continues to be confused, hallucinating, and delusional  Consultants/Specialty  Intensivist    Code Status  Full Code    Disposition  Pending [facility]    Review of Systems  Review of Systems   Constitutional: Negative for malaise/fatigue.   HENT: Negative.    Eyes: Negative.    Respiratory: Negative for cough, hemoptysis and sputum production.    Cardiovascular: Negative for chest pain, palpitations, orthopnea and claudication.   Gastrointestinal: Negative for abdominal pain, heartburn, nausea and vomiting.   Genitourinary: Negative for dysuria, frequency and urgency.   Musculoskeletal: Negative.    Neurological: Negative.    Psychiatric/Behavioral: Negative for hallucinations.       Physical Exam  Temp:  [35.9 °C (96.7 °F)-36.7 °C (98.1 °F)] 36.1 °C (97 °F)  Pulse:  [85-99] 85  Resp:  [18-19] 18  BP: (130-147)/(85-91) 145/85  SpO2:  [93 %-96 %] 95 %    Physical Exam  Vitals signs and nursing note reviewed.   Constitutional:       General: He is not in acute distress.  HENT:      Head: Normocephalic and atraumatic.      Right Ear: External ear normal.      Left Ear: External ear normal.      Nose: Nose normal.   Eyes:      Extraocular Movements:  Extraocular movements intact.      Pupils: Pupils are equal, round, and reactive to light.   Neck:      Musculoskeletal: Normal range of motion and neck supple. No neck rigidity or muscular tenderness.   Cardiovascular:      Rate and Rhythm: Normal rate and regular rhythm.      Pulses: Normal pulses.   Pulmonary:      Effort: Pulmonary effort is normal.   Abdominal:      General: Bowel sounds are normal.      Palpations: Abdomen is soft.   Musculoskeletal: Normal range of motion.         General: No tenderness.   Skin:     General: Skin is warm and dry.   Neurological:      General: No focal deficit present.      Mental Status: He is alert. He is disoriented.      Comments: Oriented to self    Psychiatric:         Mood and Affect: Affect is flat.         Behavior: Behavior is combative.         Thought Content: Thought content is delusional.         Cognition and Memory: Cognition is impaired. He exhibits impaired recent memory.         Judgment: Judgment is impulsive.      Comments: Confused          Fluids    Intake/Output Summary (Last 24 hours) at 10/9/2020 1533  Last data filed at 10/9/2020 0900  Gross per 24 hour   Intake 800 ml   Output 300 ml   Net 500 ml       Laboratory                        Imaging  MR-BRAIN-W/O   Final Result      1.  Moderate diffuse cerebral atrophy.      2.  Minimal periventricular and juxtacortical white matter changes consistent with chronic microvascular ischemic gliosis.      3.  No evidence of acute infarction in the brain parenchyma.      DX-ABDOMEN FOR TUBE PLACEMENT   Final Result      Feeding tube tip overlies the second portion of the duodenum.      DX-CHEST-PORTABLE (1 VIEW)   Final Result         1.  Patchy left lung base opacity, new since prior, could represent early infiltrate            DX-CHEST-PORTABLE (1 VIEW)   Final Result         1.  Patchy left lung base opacity, new since prior, could represent early infiltrate         [id number] VIEW   Final Result       Enteric tube projects over the distal stomach/pylorus.         EC-ECHOCARDIOGRAM COMPLETE W/O CONT   Final Result      DX-CHEST-PORTABLE (1 VIEW)   Final Result         1.  No acute cardiopulmonary disease.      CT-ABDOMEN-PELVIS W/O   Final Result      1.  No renal stone or hydronephrosis.   2.  Normal appendix.   3.  No focal mesenteric inflammatory process.      DX-ABDOMEN FOR TUBE PLACEMENT   Final Result      NG tube tip projects over expected location the gastric fundus.      US-ABDOMEN COMPLETE SURVEY   Final Result         1.  Echogenic liver compatible with fatty change versus fibrosis.   2.  Gallbladder sludge without additional sonographic findings of cholecystitis.   3.  Partial atrophic bilateral kidneys with lobulated contour         CT-HEAD W/O   Final Result         1.  No acute intracranial abnormality is identified, there are nonspecific white matter changes, commonly associated with small vessel ischemic disease.  Associated mild cerebral atrophy is noted.   2.  Atherosclerosis.      DX-CHEST-PORTABLE (1 VIEW)   Final Result         1.  No acute cardiopulmonary disease.           Assessment/Plan  * Toxic encephalopathy- (present on admission)  Assessment & Plan  Unknown etiology whether this is hypoxic induced brain injury versus Wernicke encephalopathy    LP- negative  EEG was negative for seizures    Prn haldol  Po risperdal... Increased to 1 mg nightly on 9/27/2020    Psych md consult--> they believe it is etoh related    Continue to monitor    Restrain as necessary    Mri brain negative       Acute cystitis  Assessment & Plan  UA + for bacteria, LE and nitrite  F/u urine cultures found staph aures   Started pt on bactrim for 5 days      Urinary retention- (present on admission)  Assessment & Plan  Po flomax    Voiding trial in 1 to 2  Days.. Be aware that urologist was needed to replace this current  Gallardo as it was difficult to place   Remove Gallardo catheter today    Other dysphagia-  (present on admission)  Assessment & Plan  Passed speech eval now  On modified diet    Hypernatremia  Assessment & Plan  Replacing potassium and adding daily magnesium     check  a BMP in a.m.    Acute respiratory failure with hypoxia ([facility])- (present on admission)  Assessment & Plan  2/2 mental status  Extubated 9/11  Remains on room air    Benzodiazepine dependence ([facility])- (present on admission)  Assessment & Plan  Monitor for seizure/withdrawal  Po librium  EEG after sz-like activity 9/10 was negative despite jerking motions    Alcohol abuse- (present on admission)  Assessment & Plan  Reported hx of  Continue Vitamins  High dose thiamine  Seizure precautions  S/p etoh w/d protocol    Septic shock ([facility])- (present on admission)  Assessment & Plan  Resolved  Broad-spectrum antibiotics: s/p vanc/zosyn --> C3, now off abx  Blood culture, CSF culture, urinalysis allnegative  Monitor off antibiotics        JEWELS (acute kidney injury) ([facility])- (present on admission)  Assessment & Plan  Consistent with ATN 2/2 hypotension, dehydration and sepsis  Resolved  Avoid nephrotoxins.  Renal dose all medications.         VTE prophylaxis: Lovenox

## 2020-10-09 NOTE — PROGRESS NOTES
Assumed care of patient at bedside report from NOC RN. Updated on POC. Patient currently A & O x 1, oriented to self; on room air; up x2 assist, unsteady; without complaints of acute pain. Call light within reach.  Fall precautions in place. Bed locked and in lowest position. All questions answered. No other needs indicated at this time.

## 2020-10-09 NOTE — CARE PLAN
Assumed day shift care around 0900  Patient alert to self, reoriented to place, time, and situation  Denies pain  In soft wrist restraints 2/2 impulsiveness and pulling at lines, see restraint flowsheet  Gallardo patent and draining   No needs at this time, [facility]    Fall precautions/hourly rounding maintained, call light within reach and functioning. Bed alarm active.       Problem: Safety  Goal: Will remain free from injury  Outcome: PROGRESSING AS EXPECTED

## 2020-10-09 NOTE — PROGRESS NOTES
Pharmacy Pharmacotherapy Consult for LOS >30 days    Admit Date: 9/9/2020      Medications were reviewed for appropriateness and ongoing need.     Current Facility-Administered Medications   Medication Dose Route Frequency Provider Last Rate Last Dose   • haloperidol lactate (HALDOL) injection 2.5 mg  2.5 mg Intramuscular Q4HRS PRN ALONZO Shabazz   2.5 mg at 10/06/20 2316   • potassium chloride SA (Kdur) tablet 20 mEq  20 mEq Oral BID Krunal Peterson M.D.   20 mEq at 10/09/20 0521   • risperiDONE (RISPERDAL) tablet 1 mg  1 mg Oral Q EVENING Krunal Peterson M.D.   1 mg at 10/08/20 1714   • LORazepam (ATIVAN) injection 0.5 mg  0.5 mg Intravenous Q6HRS PRN Krunal Peterson M.D.       • thiamine tablet 100 mg  100 mg Oral DAILY Krunal Peterson M.D.   100 mg at 10/09/20 0521   • tamsulosin (FLOMAX) capsule 0.8 mg  0.8 mg Oral AFTER BREAKFAST Janice Fine M.D.   0.8 mg at 10/09/20 1016   • enoxaparin (LOVENOX) inj 40 mg  40 mg Subcutaneous Q EVENING Shayne Mary M.D.   40 mg at 10/08/20 1714   • acetaminophen (TYLENOL) tablet 650 mg  650 mg Oral Q6HRS PRN Xavi Durham, D.O.   650 mg at 09/17/20 1723   • senna-docusate (PERICOLACE or SENOKOT S) 8.6-50 MG per tablet 2 Tab  2 Tab Oral BID Fortune Vicente-Juanor, D.O.   2 Tab at 10/09/20 0521    And   • polyethylene glycol/lytes (MIRALAX) PACKET 1 Packet  1 Packet Oral QDAY PRN Fortune Vicente-Juanor, D.O.   1 Packet at 10/08/20 0439    And   • magnesium hydroxide (MILK OF MAGNESIA) suspension 30 mL  30 mL Oral QDAY PRN [place] Zhang-Alejandro, D.O.   30 mL at 09/18/20 1143    And   • bisacodyl (DULCOLAX) suppository 10 mg  10 mg Rectal QDAY PRN Xavi Durham D.O.       • ondansetron (ZOFRAN ODT) dispertab 4 mg  4 mg Enteral Tube Q4HRS PRN Mode Ojeda Jr., D.O.       • promethazine (PHENERGAN) tablet 12.5-25 mg  12.5-25 mg Enteral Tube Q4HRS PRN Mode Ojeda Jr., D.O.       • Respiratory Therapy Consult   Nebulization Continuous  RT Mode Ojeda Jr., D.O.       • ipratropium-albuterol (DUONEB) nebulizer solution  3 mL Nebulization Q2HRS PRN (RT) Mode Ojeda Jr. D.O.       • lactated ringers infusion (BOLUS): BMI less than or equal to 30  30 mL/kg Intravenous Once PRN Tushar Mak M.D.       • ondansetron (ZOFRAN) syringe/vial injection 4 mg  4 mg Intravenous Q4HRS PRN Mode Ojeda Jr., D.O.       • promethazine (PHENERGAN) suppository 12.5-25 mg  12.5-25 mg Rectal Q4HRS PRN Mode Ojeda Jr., D.O.       • prochlorperazine (COMPAZINE) injection 5-10 mg  5-10 mg Intravenous Q4HRS PRN Mode Ojeda Jr., D.O.           Recommendations:  D/C bisacodyl, never used.   D/C LR bolus 30 ml/kg  D/C ondansetron IV, pt never used.   D/C Phenergan supp and tabs pt never used.   D/C Compazine IV, pt never used.      Discussed above with MD who agreed to these recommendations.     Farnaz Ellis, PharmD, BCOP

## 2020-10-09 NOTE — THERAPY
"Missed Therapy     Patient Name: Yury Blake  Age:  50 y.o., Sex:  male  Medical Record #: [id number]  Today's Date: 10/9/2020    Discussed missed therapy with RN. Attempted OT treatment X2 today. First attempt this AM pt wanted to rest and sleep. Attempted this PM, pt could not keep his eyes open. Attempted a cool wash cloth to face. Pt initially agreed to wash face but then pushed hand away. Pt in Bilateral wrist restraints and posey vest. When removed and  OT attempted to take off covers to sit at EOB. Pt  began to yell, used foul language, became agitated and nearly became combative. Informed pt that OT would attempt another day. Pt shook head \"yes\". Relaxed and  Bilateral wrist restraints and posey vest re-applied. RN/CNA informed of OT's attempt.          10/09/20 1517   Precautions   Precautions Fall Risk;Swallow Precautions ( See Comments)   Comments Bilateral wrist restraints and Posey vest.    Cognition    Orientation Level Not Oriented to Reason;Not Oriented to Place;Not Oriented to Time;Not Oriented to Day;Not Oriented to Month   Level of Consciousness Confused   Comments Pt refused all activity. Agitated and nearly became combative, using foul language.    Other Treatments   Other Treatments Provided Attempted psychosocial intervention but pt not receptive .    Activities of Daily Living   Comments Refused all activity   Anticipated Discharge Equipment and Recommendations   DC Equipment Recommendations Unable to determine at this time   Discharge Recommendations Recommend post-acute placement for additional occupational therapy services prior to discharge home   Interdisciplinary Plan of Care Collaboration   IDT Collaboration with  Nursing;Certified Nursing Assistant   Collaboration Comments Attempted OT tx X2 today. Adamantly refused. Nearly became combative when wrist restraints and posey vest removed. Confused. RN updated on OT attempts.       "

## 2020-10-10 PROCEDURE — 770006 HCHG ROOM/CARE - MED/SURG/GYN SEMI*

## 2020-10-10 PROCEDURE — A9270 NON-COVERED ITEM OR SERVICE: HCPCS | Performed by: HOSPITALIST

## 2020-10-10 PROCEDURE — 700111 HCHG RX REV CODE 636 W/ 250 OVERRIDE (IP): Performed by: HOSPITALIST

## 2020-10-10 PROCEDURE — 700102 HCHG RX REV CODE 250 W/ 637 OVERRIDE(OP): Performed by: HOSPITALIST

## 2020-10-10 PROCEDURE — 99231 SBSQ HOSP IP/OBS SF/LOW 25: CPT | Performed by: HOSPITALIST

## 2020-10-10 RX ADMIN — TAMSULOSIN HYDROCHLORIDE 0.8 MG: 0.4 CAPSULE ORAL at 09:32

## 2020-10-10 RX ADMIN — POTASSIUM CHLORIDE 20 MEQ: 20 TABLET, EXTENDED RELEASE ORAL at 17:14

## 2020-10-10 RX ADMIN — POTASSIUM CHLORIDE 20 MEQ: 20 TABLET, EXTENDED RELEASE ORAL at 05:34

## 2020-10-10 RX ADMIN — DOCUSATE SODIUM 50 MG AND SENNOSIDES 8.6 MG 2 TABLET: 8.6; 5 TABLET, FILM COATED ORAL at 17:13

## 2020-10-10 RX ADMIN — Medication 100 MG: at 05:35

## 2020-10-10 RX ADMIN — ENOXAPARIN SODIUM 40 MG: 40 INJECTION SUBCUTANEOUS at 17:14

## 2020-10-10 RX ADMIN — RISPERIDONE 1 MG: 0.5 TABLET ORAL at 17:13

## 2020-10-10 RX ADMIN — DOCUSATE SODIUM 50 MG AND SENNOSIDES 8.6 MG 2 TABLET: 8.6; 5 TABLET, FILM COATED ORAL at 05:35

## 2020-10-10 ASSESSMENT — ENCOUNTER SYMPTOMS
ORTHOPNEA: 0
HEARTBURN: 0
NAUSEA: 0
HEMOPTYSIS: 0
ABDOMINAL PAIN: 0
SPUTUM PRODUCTION: 0
VOMITING: 0
HALLUCINATIONS: 0
PALPITATIONS: 0
MUSCULOSKELETAL NEGATIVE: 1
EYES NEGATIVE: 1
CLAUDICATION: 0
NEUROLOGICAL NEGATIVE: 1
COUGH: 0

## 2020-10-10 ASSESSMENT — COPD QUESTIONNAIRES
COPD SCREENING SCORE: 0
DO YOU EVER COUGH UP ANY MUCUS OR PHLEGM?: NO/ONLY WITH OCCASIONAL COLDS OR INFECTIONS
HAVE YOU SMOKED AT LEAST 100 CIGARETTES IN YOUR ENTIRE LIFE: NO/DON'T KNOW
DURING THE PAST 4 WEEKS HOW MUCH DID YOU FEEL SHORT OF BREATH: NONE/LITTLE OF THE TIME

## 2020-10-10 ASSESSMENT — FIBROSIS 4 INDEX: FIB4 SCORE: 0.93

## 2020-10-10 NOTE — PROGRESS NOTES
Received bedside report from Night RN. Pt awake and alert, confused to place, time and situation. Bed alarm in use. No complains of pain at this time. Discussed plan of care. Continues with bilateral soft wrist restraints and vest for safety and prevent pulling out tubes.

## 2020-10-10 NOTE — CARE PLAN
Problem: Psychosocial Needs:  Goal: Level of anxiety will decrease  Outcome: PROGRESSING AS EXPECTED  Note: Quiet environment and decreased stimulation provided to pt to promote decreased levels of anxiety and rest.      Problem: Communication  Goal: The ability to communicate needs accurately and effectively will improve  Outcome: PROGRESSING SLOWER THAN EXPECTED  Note: Pt is confused and unable to effectively communicate needs to RN. Will continue to monitor and anticipate pt needs. Hourly rounding in place.

## 2020-10-10 NOTE — PROGRESS NOTES
[facility] Medicine Daily Progress Note    Date of Service  10/10/2020    Chief Complaint  50 y.o. male with a pmhx of HTN, chronic pain and alcohol abuse who presented 9/9/2020 with altered mental status.     Hospital Course  50 y.o. male with a pmhx of HTN, chronic pain and alcohol abuse who presented 9/9/2020 with altered mental status, he was found obtunded in his house by his ex-wife who was checking on him as he had not been seen for 2 days or been able to get a hold of him.  He was found in a chair,  A&Ox0 and hypotensive.  He arrived in the ER with a GCS of 7 (E1,V1,M5) and severely hypotensive.  The patient was intubated for airway protection and central venous access was obtained for administration of high-volume crystalloid resuscitation and vasopressor therapy.    CT head and abdomen and pelvis were all unremarkable. He was briefly treated with antibiotics for concern for an underlying infection and a lumbar tap done was negative. Cultures remained negative.   EEG was done for concern for seizure-like activity was without any epileptiform activity.  He was extubated on 9/11 and has been able to protect his airway ever since. He is currently receiving treatment with benzos for concern for benzo withdrawals and IV thiamine for his history of alcohol abuse.    Interval Problem Update  9/29: Patient was seen and examined by me today.  Found to be resting in bed comfortably.  We will continue his Librium and Risperdal for now.  Try to take off restraints today.  Repeat blood work in the a.m.  SNF placement is pending.  9/30: Patient was found to be in bed without any complaints.  We will continue Librium and Risperdal.  Continue restraints and Gallardo catheter.  Placement is pending.  10/1: Continue current management.  Gallardo found to have sediments and I will send his for urinalysis.  Continue to find placement.  10/2: Patient was seen and examined me today.  Urinalysis was positive for infection.  I have asked  the nurse to remove the Gallardo catheter.  Started patient on Bactrim for 5 days.  10/3: Patient continues to have hallucinations and confusion.  Continue to treat with Bactrim for UTI.  Continue voiding trial after Gallardo catheter was pulled out.  10/4: Patient continues to be confused.  Continue to treat for UTI.  Gallardo catheter is replaced.  10/5: Patient seen and examined by me today.  Still confused despite taking off of Librium and given antibiotics for UTI infection.    10/6: Patient was seen and examined by me today.  He found to be confused and confabulating.  Continue pursue guardianship.  10/7: Patient seen and examined he still confused and impulsive.  Continue to pursue guardianship.  10/8: Patient still confused continue to pursue guardianship  10/9: Patient continues to be confused, hallucinating, and delusional  10/10: Patient continues to be confused.  We will try another voiding trial today.  Consultants/Specialty  Intensivist    Code Status  Full Code    Disposition  Pending [facility]    Review of Systems  Review of Systems   Constitutional: Negative for malaise/fatigue.   HENT: Negative.    Eyes: Negative.    Respiratory: Negative for cough, hemoptysis and sputum production.    Cardiovascular: Negative for chest pain, palpitations, orthopnea and claudication.   Gastrointestinal: Negative for abdominal pain, heartburn, nausea and vomiting.   Genitourinary: Negative for dysuria, frequency and urgency.   Musculoskeletal: Negative.    Neurological: Negative.    Psychiatric/Behavioral: Negative for hallucinations.       Physical Exam  Temp:  [36.2 °C (97.2 °F)-36.7 °C (98 °F)] 36.2 °C (97.2 °F)  Pulse:  [74-97] 76  Resp:  [16-18] 16  BP: (116-137)/(74-84) 116/80  SpO2:  [93 %-97 %] 93 %    Physical Exam  Vitals signs and nursing note reviewed.   Constitutional:       General: He is not in acute distress.  HENT:      Head: Normocephalic and atraumatic.      Right Ear: External ear normal.      Left Ear:  External ear normal.      Nose: Nose normal.   Eyes:      Extraocular Movements: Extraocular movements intact.      Pupils: Pupils are equal, round, and reactive to light.   Neck:      Musculoskeletal: Normal range of motion and neck supple. No neck rigidity or muscular tenderness.   Cardiovascular:      Rate and Rhythm: Normal rate and regular rhythm.      Pulses: Normal pulses.   Pulmonary:      Effort: Pulmonary effort is normal.   Abdominal:      General: Bowel sounds are normal.      Palpations: Abdomen is soft.   Musculoskeletal: Normal range of motion.         General: No tenderness.   Skin:     General: Skin is warm and dry.   Neurological:      General: No focal deficit present.      Mental Status: He is alert. He is disoriented.      Comments: Oriented to self    Psychiatric:         Mood and Affect: Affect is flat.         Behavior: Behavior is combative.         Thought Content: Thought content is delusional.         Cognition and Memory: Cognition is impaired. He exhibits impaired recent memory.         Judgment: Judgment is impulsive.      Comments: Confused          Fluids    Intake/Output Summary (Last 24 hours) at 10/10/2020 1200  Last data filed at 10/10/2020 0900  Gross per 24 hour   Intake 720 ml   Output 500 ml   Net 220 ml       Laboratory                        Imaging  MR-BRAIN-W/O   Final Result      1.  Moderate diffuse cerebral atrophy.      2.  Minimal periventricular and juxtacortical white matter changes consistent with chronic microvascular ischemic gliosis.      3.  No evidence of acute infarction in the brain parenchyma.      DX-ABDOMEN FOR TUBE PLACEMENT   Final Result      Feeding tube tip overlies the second portion of the duodenum.      DX-CHEST-PORTABLE (1 VIEW)   Final Result         1.  Patchy left lung base opacity, new since prior, could represent early infiltrate            DX-CHEST-PORTABLE (1 VIEW)   Final Result         1.  Patchy left lung base opacity, new since prior,  could represent early infiltrate         PL-[id number] VIEW   Final Result      Enteric tube projects over the distal stomach/pylorus.         EC-ECHOCARDIOGRAM COMPLETE W/O CONT   Final Result      DX-CHEST-PORTABLE (1 VIEW)   Final Result         1.  No acute cardiopulmonary disease.      CT-ABDOMEN-PELVIS W/O   Final Result      1.  No renal stone or hydronephrosis.   2.  Normal appendix.   3.  No focal mesenteric inflammatory process.      DX-ABDOMEN FOR TUBE PLACEMENT   Final Result      NG tube tip projects over expected location the gastric fundus.      US-ABDOMEN COMPLETE SURVEY   Final Result         1.  Echogenic liver compatible with fatty change versus fibrosis.   2.  Gallbladder sludge without additional sonographic findings of cholecystitis.   3.  Partial atrophic bilateral kidneys with lobulated contour         CT-HEAD W/O   Final Result         1.  No acute intracranial abnormality is identified, there are nonspecific white matter changes, commonly associated with small vessel ischemic disease.  Associated mild cerebral atrophy is noted.   2.  Atherosclerosis.      DX-CHEST-PORTABLE (1 VIEW)   Final Result         1.  No acute cardiopulmonary disease.           Assessment/Plan  * Toxic encephalopathy- (present on admission)  Assessment & Plan  Unknown etiology whether this is hypoxic induced brain injury versus Wernicke encephalopathy    LP- negative  EEG was negative for seizures    Prn haldol  Po risperdal... Increased to 1 mg nightly on 9/27/2020    Psych md consult--> they believe it is etoh related    Continue to monitor    Restrain as necessary    Mri brain negative       Acute cystitis  Assessment & Plan  UA + for bacteria, LE and nitrite  F/u urine cultures found staph aures   Started pt on bactrim for 5 days      Urinary retention- (present on admission)  Assessment & Plan  Po flomax    Voiding trial in 1 to 2  Days.. Be aware that urologist was needed to replace this current  Gallardo as it was  difficult to place   Remove Gallardo catheter today    Other dysphagia- (present on admission)  Assessment & Plan  Passed speech eval now  On modified diet    Hypernatremia  Assessment & Plan  Replacing potassium and adding daily magnesium     check  a BMP in a.m.    Acute respiratory failure with hypoxia (HCC)- (present on admission)  Assessment & Plan  2/2 mental status  Extubated 9/11  Remains on room air    Benzodiazepine dependence (HCC)- (present on admission)  Assessment & Plan  Monitor for seizure/withdrawal  Po librium  EEG after sz-like activity 9/10 was negative despite jerking motions    Alcohol abuse- (present on admission)  Assessment & Plan  Reported hx of  Continue Vitamins  High dose thiamine  Seizure precautions  S/p etoh w/d protocol    Septic shock ([facility])- (present on admission)  Assessment & Plan  Resolved  Broad-spectrum antibiotics: s/p vanc/zosyn --> C3, now off abx  Blood culture, CSF culture, urinalysis allnegative  Monitor off antibiotics        JEWELS (acute kidney injury) ([facility])- (present on admission)  Assessment & Plan  Consistent with ATN 2/2 hypotension, dehydration and sepsis  Resolved  Avoid nephrotoxins.  Renal dose all medications.         VTE prophylaxis: Lovenox

## 2020-10-10 NOTE — PROGRESS NOTES
Assumed care at 1900. Received report from day shift RN. Pt is awake and alert in bed, A&Ox 1, disoriented to time, place, and situation, on RA, no obvious s/sx of distress or discomfort. Fall precautions and appropriate signs in place. Bilateral soft wrist restraints and vest restraint in place.  Call light and belongings within reach. Bed alarm and hourly rounding in place. Communication board updated.

## 2020-10-11 PROCEDURE — 700111 HCHG RX REV CODE 636 W/ 250 OVERRIDE (IP): Performed by: HOSPITALIST

## 2020-10-11 PROCEDURE — A9270 NON-COVERED ITEM OR SERVICE: HCPCS | Performed by: HOSPITALIST

## 2020-10-11 PROCEDURE — 700102 HCHG RX REV CODE 250 W/ 637 OVERRIDE(OP): Performed by: HOSPITALIST

## 2020-10-11 PROCEDURE — 770006 HCHG ROOM/CARE - MED/SURG/GYN SEMI*

## 2020-10-11 PROCEDURE — 99231 SBSQ HOSP IP/OBS SF/LOW 25: CPT | Performed by: HOSPITALIST

## 2020-10-11 RX ADMIN — DOCUSATE SODIUM 50 MG AND SENNOSIDES 8.6 MG 2 TABLET: 8.6; 5 TABLET, FILM COATED ORAL at 05:44

## 2020-10-11 RX ADMIN — TAMSULOSIN HYDROCHLORIDE 0.8 MG: 0.4 CAPSULE ORAL at 09:00

## 2020-10-11 RX ADMIN — POTASSIUM CHLORIDE 20 MEQ: 20 TABLET, EXTENDED RELEASE ORAL at 05:44

## 2020-10-11 RX ADMIN — Medication 100 MG: at 05:44

## 2020-10-11 RX ADMIN — RISPERIDONE 1 MG: 0.5 TABLET ORAL at 16:23

## 2020-10-11 RX ADMIN — ENOXAPARIN SODIUM 40 MG: 40 INJECTION SUBCUTANEOUS at 16:23

## 2020-10-11 ASSESSMENT — ENCOUNTER SYMPTOMS
NEUROLOGICAL NEGATIVE: 1
HEMOPTYSIS: 0
COUGH: 0
PALPITATIONS: 0
HEARTBURN: 0
ORTHOPNEA: 0
HALLUCINATIONS: 0
EYES NEGATIVE: 1
NAUSEA: 0
CLAUDICATION: 0
SPUTUM PRODUCTION: 0
MUSCULOSKELETAL NEGATIVE: 1
ABDOMINAL PAIN: 0
VOMITING: 0

## 2020-10-11 NOTE — PROGRESS NOTES
Assumed care at 1900. Received report from day shift RN. Pt is asleep in bed, on RA. Bilateral soft wrist and vest restraints in place. Fall precautions and appropriate signs in place. Call light and belongings within reach. Bed alarm and hourly rounding in place. Communication board updated. Will continue to monitor.

## 2020-10-11 NOTE — CARE PLAN
Problem: Safety  Goal: Will remain free from falls  Outcome: PROGRESSING AS EXPECTED  Note: Bed locked in lowest position, pt wearing non slip socks, call light and pt belongings within reach, environment is spill and clutter free, pt educated on fall risk.      Problem: Communication  Goal: The ability to communicate needs accurately and effectively will improve  Outcome: PROGRESSING SLOWER THAN EXPECTED  Note: Pt is confused and unable to effectively communicate needs. Will continue to monitor and anticipate pt needs.

## 2020-10-11 NOTE — PROGRESS NOTES
[facility] Medicine Daily Progress Note    Date of Service  10/11/2020    Chief Complaint  50 y.o. male with a pmhx of HTN, chronic pain and alcohol abuse who presented 9/9/2020 with altered mental status.     Hospital Course  50 y.o. male with a pmhx of HTN, chronic pain and alcohol abuse who presented 9/9/2020 with altered mental status, he was found obtunded in his house by his ex-wife who was checking on him as he had not been seen for 2 days or been able to get a hold of him.  He was found in a chair,  A&Ox0 and hypotensive.  He arrived in the ER with a GCS of 7 (E1,V1,M5) and severely hypotensive.  The patient was intubated for airway protection and central venous access was obtained for administration of high-volume crystalloid resuscitation and vasopressor therapy.    CT head and abdomen and pelvis were all unremarkable. He was briefly treated with antibiotics for concern for an underlying infection and a lumbar tap done was negative. Cultures remained negative.   EEG was done for concern for seizure-like activity was without any epileptiform activity.  He was extubated on 9/11 and has been able to protect his airway ever since. He is currently receiving treatment with benzos for concern for benzo withdrawals and IV thiamine for his history of alcohol abuse.    Interval Problem Update  9/29: Patient was seen and examined by me today.  Found to be resting in bed comfortably.  We will continue his Librium and Risperdal for now.  Try to take off restraints today.  Repeat blood work in the a.m.  SNF placement is pending.  9/30: Patient was found to be in bed without any complaints.  We will continue Librium and Risperdal.  Continue restraints and Gallardo catheter.  Placement is pending.  10/1: Continue current management.  Gallardo found to have sediments and I will send his for urinalysis.  Continue to find placement.  10/2: Patient was seen and examined me today.  Urinalysis was positive for infection.  I have asked  the nurse to remove the Gallardo catheter.  Started patient on Bactrim for 5 days.  10/3: Patient continues to have hallucinations and confusion.  Continue to treat with Bactrim for UTI.  Continue voiding trial after Gallardo catheter was pulled out.  10/4: Patient continues to be confused.  Continue to treat for UTI.  Gallardo catheter is replaced.  10/5: Patient seen and examined by me today.  Still confused despite taking off of Librium and given antibiotics for UTI infection.    10/6: Patient was seen and examined by me today.  He found to be confused and confabulating.  Continue pursue guardianship.  10/7: Patient seen and examined he still confused and impulsive.  Continue to pursue guardianship.  10/8: Patient still confused continue to pursue guardianship  10/9: Patient continues to be confused, hallucinating, and delusional  10/10: Patient continues to be confused.  We will try another voiding trial today.  10/11: Patient still hallucinating and delusional.  He is excited about watching football  Consultants/Specialty  Intensivist    Code Status  Full Code    Disposition  Pending [facility]    Review of Systems  Review of Systems   Constitutional: Negative for malaise/fatigue.   HENT: Negative.    Eyes: Negative.    Respiratory: Negative for cough, hemoptysis and sputum production.    Cardiovascular: Negative for chest pain, palpitations, orthopnea and claudication.   Gastrointestinal: Negative for abdominal pain, heartburn, nausea and vomiting.   Genitourinary: Negative for dysuria, frequency and urgency.   Musculoskeletal: Negative.    Neurological: Negative.    Psychiatric/Behavioral: Negative for hallucinations.       Physical Exam  Temp:  [36.2 °C (97.2 °F)-36.8 °C (98.2 °F)] 36.3 °C (97.3 °F)  Pulse:  [65-85] 65  Resp:  [18-20] 18  BP: (118-134)/(67-86) 134/86  SpO2:  [94 %-96 %] 96 %    Physical Exam  Vitals signs and nursing note reviewed.   Constitutional:       General: He is not in acute distress.  HENT:       Head: Normocephalic and atraumatic.      Right Ear: External ear normal.      Left Ear: External ear normal.      Nose: Nose normal.   Eyes:      Extraocular Movements: Extraocular movements intact.      Pupils: Pupils are equal, round, and reactive to light.   Neck:      Musculoskeletal: Normal range of motion and neck supple. No neck rigidity or muscular tenderness.   Cardiovascular:      Rate and Rhythm: Normal rate and regular rhythm.      Pulses: Normal pulses.   Pulmonary:      Effort: Pulmonary effort is normal.   Abdominal:      General: Bowel sounds are normal.      Palpations: Abdomen is soft.   Musculoskeletal: Normal range of motion.         General: No tenderness.   Skin:     General: Skin is warm and dry.   Neurological:      General: No focal deficit present.      Mental Status: He is alert. He is disoriented.      Comments: Oriented to self    Psychiatric:         Mood and Affect: Affect is flat.         Behavior: Behavior is combative.         Thought Content: Thought content is delusional.         Cognition and Memory: Cognition is impaired. He exhibits impaired recent memory.         Judgment: Judgment is impulsive.      Comments: Confused          Fluids    Intake/Output Summary (Last 24 hours) at 10/11/2020 0954  Last data filed at 10/10/2020 2200  Gross per 24 hour   Intake --   Output 200 ml   Net -200 ml       Laboratory                        Imaging  MR-BRAIN-W/O   Final Result      1.  Moderate diffuse cerebral atrophy.      2.  Minimal periventricular and juxtacortical white matter changes consistent with chronic microvascular ischemic gliosis.      3.  No evidence of acute infarction in the brain parenchyma.      DX-ABDOMEN FOR TUBE PLACEMENT   Final Result      Feeding tube tip overlies the second portion of the duodenum.      DX-CHEST-PORTABLE (1 VIEW)   Final Result         1.  Patchy left lung base opacity, new since prior, could represent early infiltrate             DX-CHEST-PORTABLE (1 VIEW)   Final Result         1.  Patchy left lung base opacity, new since prior, could represent early infiltrate         [id number] VIEW   Final Result      Enteric tube projects over the distal stomach/pylorus.         EC-ECHOCARDIOGRAM COMPLETE W/O CONT   Final Result      DX-CHEST-PORTABLE (1 VIEW)   Final Result         1.  No acute cardiopulmonary disease.      CT-ABDOMEN-PELVIS W/O   Final Result      1.  No renal stone or hydronephrosis.   2.  Normal appendix.   3.  No focal mesenteric inflammatory process.      DX-ABDOMEN FOR TUBE PLACEMENT   Final Result      NG tube tip projects over expected location the gastric fundus.      US-ABDOMEN COMPLETE SURVEY   Final Result         1.  Echogenic liver compatible with fatty change versus fibrosis.   2.  Gallbladder sludge without additional sonographic findings of cholecystitis.   3.  Partial atrophic bilateral kidneys with lobulated contour         CT-HEAD W/O   Final Result         1.  No acute intracranial abnormality is identified, there are nonspecific white matter changes, commonly associated with small vessel ischemic disease.  Associated mild cerebral atrophy is noted.   2.  Atherosclerosis.      DX-CHEST-PORTABLE (1 VIEW)   Final Result         1.  No acute cardiopulmonary disease.           Assessment/Plan  * Toxic encephalopathy- (present on admission)  Assessment & Plan  Unknown etiology whether this is hypoxic induced brain injury versus Wernicke encephalopathy    LP- negative  EEG was negative for seizures    Prn haldol  Po risperdal... Increased to 1 mg nightly on 9/27/2020    Psych md consult--> they believe it is etoh related    Continue to monitor    Restrain as necessary    Mri brain negative       Acute cystitis  Assessment & Plan  UA + for bacteria, LE and nitrite  F/u urine cultures found staph aures   Started pt on bactrim for 5 days      Urinary retention- (present on admission)  Assessment & Plan  Po  flomax    Voiding trial in 1 to 2  Days.. Be aware that urologist was needed to replace this current  Gallardo as it was difficult to place   Remove Gallardo catheter today    Other dysphagia- (present on admission)  Assessment & Plan  Passed speech eval now  On modified diet    Hypernatremia  Assessment & Plan  Replacing potassium and adding daily magnesium     check  a BMP in a.m.    Acute respiratory failure with hypoxia ([facility])- (present on admission)  Assessment & Plan  2/2 mental status  Extubated 9/11  Remains on room air    Benzodiazepine dependence ([facility])- (present on admission)  Assessment & Plan  Monitor for seizure/withdrawal  Po librium  EEG after sz-like activity 9/10 was negative despite jerking motions    Alcohol abuse- (present on admission)  Assessment & Plan  Reported hx of  Continue Vitamins  High dose thiamine  Seizure precautions  S/p etoh w/d protocol    Septic shock ([facility])- (present on admission)  Assessment & Plan  Resolved  Broad-spectrum antibiotics: s/p vanc/zosyn --> C3, now off abx  Blood culture, CSF culture, urinalysis allnegative  Monitor off antibiotics        JEWELS (acute kidney injury) ([facility])- (present on admission)  Assessment & Plan  Consistent with ATN 2/2 hypotension, dehydration and sepsis  Resolved  Avoid nephrotoxins.  Renal dose all medications.         VTE prophylaxis: Lovenox

## 2020-10-11 NOTE — CARE PLAN
Problem: Communication  Goal: The ability to communicate needs accurately and effectively will improve  Outcome: PROGRESSING AS EXPECTED     Problem: Discharge Barriers/Planning  Goal: Patient's continuum of care needs will be met  Outcome: PROGRESSING AS EXPECTED       Pt remains confused and in restraints at this time. Pt can be aggressive at times. Discharge date unknown at this time, will continue to monitor discharge needs.

## 2020-10-12 PROCEDURE — 97530 THERAPEUTIC ACTIVITIES: CPT

## 2020-10-12 PROCEDURE — 770006 HCHG ROOM/CARE - MED/SURG/GYN SEMI*

## 2020-10-12 PROCEDURE — 90471 IMMUNIZATION ADMIN: CPT

## 2020-10-12 PROCEDURE — 3E02340 INTRODUCTION OF INFLUENZA VACCINE INTO MUSCLE, PERCUTANEOUS APPROACH: ICD-10-PCS | Performed by: HOSPITALIST

## 2020-10-12 PROCEDURE — 700102 HCHG RX REV CODE 250 W/ 637 OVERRIDE(OP): Performed by: HOSPITALIST

## 2020-10-12 PROCEDURE — 700111 HCHG RX REV CODE 636 W/ 250 OVERRIDE (IP): Performed by: HOSPITALIST

## 2020-10-12 PROCEDURE — 99231 SBSQ HOSP IP/OBS SF/LOW 25: CPT | Performed by: HOSPITALIST

## 2020-10-12 PROCEDURE — 97116 GAIT TRAINING THERAPY: CPT

## 2020-10-12 PROCEDURE — A9270 NON-COVERED ITEM OR SERVICE: HCPCS | Performed by: HOSPITALIST

## 2020-10-12 PROCEDURE — 97535 SELF CARE MNGMENT TRAINING: CPT

## 2020-10-12 PROCEDURE — 90686 IIV4 VACC NO PRSV 0.5 ML IM: CPT | Performed by: HOSPITALIST

## 2020-10-12 RX ADMIN — RISPERIDONE 1 MG: 0.5 TABLET ORAL at 17:41

## 2020-10-12 RX ADMIN — INFLUENZA A VIRUS A/GUANGDONG-MAONAN/SWL1536/2019 CNIC-1909 (H1N1) ANTIGEN (FORMALDEHYDE INACTIVATED), INFLUENZA A VIRUS A/HONG KONG/2671/2019 (H3N2) ANTIGEN (FORMALDEHYDE INACTIVATED), INFLUENZA B VIRUS B/PHUKET/3073/2013 ANTIGEN (FORMALDEHYDE INACTIVATED), AND INFLUENZA B VIRUS B/WASHINGTON/02/2019 ANTIGEN (FORMALDEHYDE INACTIVATED) 0.5 ML: 15; 15; 15; 15 INJECTION, SUSPENSION INTRAMUSCULAR at 21:46

## 2020-10-12 RX ADMIN — TAMSULOSIN HYDROCHLORIDE 0.8 MG: 0.4 CAPSULE ORAL at 09:09

## 2020-10-12 RX ADMIN — DOCUSATE SODIUM 50 MG AND SENNOSIDES 8.6 MG 2 TABLET: 8.6; 5 TABLET, FILM COATED ORAL at 17:41

## 2020-10-12 RX ADMIN — ENOXAPARIN SODIUM 40 MG: 40 INJECTION SUBCUTANEOUS at 17:41

## 2020-10-12 RX ADMIN — Medication 100 MG: at 05:42

## 2020-10-12 ASSESSMENT — COGNITIVE AND FUNCTIONAL STATUS - GENERAL
STANDING UP FROM CHAIR USING ARMS: A LITTLE
TOILETING: A LOT
TURNING FROM BACK TO SIDE WHILE IN FLAT BAD: A LITTLE
MOVING FROM LYING ON BACK TO SITTING ON SIDE OF FLAT BED: A LOT
HELP NEEDED FOR BATHING: A LOT
PERSONAL GROOMING: A LITTLE
SUGGESTED CMS G CODE MODIFIER MOBILITY: CL
EATING MEALS: A LITTLE
SUGGESTED CMS G CODE MODIFIER DAILY ACTIVITY: CK
MOVING TO AND FROM BED TO CHAIR: A LITTLE
DRESSING REGULAR UPPER BODY CLOTHING: A LITTLE
WALKING IN HOSPITAL ROOM: A LOT
MOBILITY SCORE: 14
CLIMB 3 TO 5 STEPS WITH RAILING: TOTAL
DRESSING REGULAR LOWER BODY CLOTHING: A LITTLE
DAILY ACTIVITIY SCORE: 16

## 2020-10-12 ASSESSMENT — ENCOUNTER SYMPTOMS
VOMITING: 0
HEMOPTYSIS: 0
COUGH: 0
NAUSEA: 0
HALLUCINATIONS: 0
ORTHOPNEA: 0
HEARTBURN: 0
PALPITATIONS: 0
CLAUDICATION: 0
SPUTUM PRODUCTION: 0
EYES NEGATIVE: 1
MUSCULOSKELETAL NEGATIVE: 1
ABDOMINAL PAIN: 0
NEUROLOGICAL NEGATIVE: 1

## 2020-10-12 ASSESSMENT — GAIT ASSESSMENTS
ASSISTIVE DEVICE: HAND HELD ASSIST
GAIT LEVEL OF ASSIST: MODERATE ASSIST
DISTANCE (FEET): 55
DEVIATION: ATAXIC

## 2020-10-12 NOTE — CARE PLAN
Problem: Safety  Goal: Will remain free from injury  Outcome: PROGRESSING AS EXPECTED   Educated patient on fall precautions, room near nurses station . Frequent checks. Bed alarm on.   Problem: Infection  Goal: Will remain free from infection  Outcome: PROGRESSING AS EXPECTED     Problem: Safety - Medical Restraint  Goal: Remains free of injury from restraints (Restraint for Interference with Medical Device)  Description: INTERVENTIONS:  1. Determine that other, less restrictive measures have been tried or would not be effective before applying the restraint  2. Evaluate the patient's condition at the time of restraint application  3. Inform patient/family regarding the reason for restraint  4. Q2H: Monitor safety, psychosocial status, comfort, nutrition and hydration  Outcome: PROGRESSING AS EXPECTED

## 2020-10-12 NOTE — PROGRESS NOTES
Assumed care at 1900. Received report from day shift RN. Pt is awake in bed, A&Ox 1, disoriented to time, place, and event, on RA, reports a pain level of 0/10. Fall precautions and appropriate signs in place. Bilateral soft wrist restraints and vest restraint in place. Call light within reach. Bed alarm and hourly rounding in place. Communication board updated. Pt ambulated hallway. Pt denies further needs at this time.

## 2020-10-12 NOTE — PROGRESS NOTES
"Assumed care of patient this shift, patient alert to self. Impulsive.When RN asked where patient was he stated \"the hospital and \[facility]\""a hut\". Bilateral wrist/vest restraints in place with active order. Worked with physical therapy this morning. Max assist. Encouraging PO intake. Gallardo in place due to retention. Fall precautions in place, high fall risk. Bed alarm on. Call bell and belongings within reach.   "

## 2020-10-12 NOTE — THERAPY
Occupational Therapy  Daily Treatment     Patient Name: Yury Blake  Age:  50 y.o., Sex:  male  Medical Record #: [id number]  Today's Date: 10/12/2020     Precautions  Precautions: Fall Risk, Swallow Precautions ( See Comments)  Comments: Bilateral wrist restraints and Posey vest    Assessment    Pt seen for OT session. Continues to demo nonsensical speech with off topic statements, and perseverative on adjusting shorts this session, even after excessive explanation that he wasn't wearing any; difficult to redirect. Req max encouragement and redirection to participate; limited by volition/behavior. Min A for LB dressing (for standing/tying), refused g/h at sink and toileting. STS x4 w/ mod A -SPV with multiple posterior LOB, mod A for functional ambulation. Continues to be limited by decreased functional mobility, activity tolerance, cognition, coordination, balance, and pain which are currently affecting pt's ability to complete ADLs/IADLs at baseline. Will continue to follow.     Plan    Continue current treatment plan.    DC Equipment Recommendations: Unable to determine at this time  Discharge Recommendations: Recommend post-acute placement for additional occupational therapy services prior to discharge home     Objective     10/12/20 0935   Pain 0 - 10 Group   Therapist Pain Assessment Post Activity Pain Same as Prior to Activity;During Activity;Nurse Notified  (no c/o pain)   Cognition    Cognition / Consciousness X   Speech/ Communication   (intermittent nonsensical statements; off topic responses)   Level of Consciousness Confused   Ability To Follow Commands 1 Step  (intermittently)   Safety Awareness Impulsive;Impaired   New Learning Impaired   Attention Impaired   Sequencing Impaired   Comments Perseverative on wearing shorts, unable to understand not wearing shorts. Provided with pants, but difficult to redirect. becomes agitated easily. nonsesical statement. limted by cognitive deficits and  appears behavioral   Balance   Sitting Balance (Static) Fair   Sitting Balance (Dynamic) Fair -   Standing Balance (Static) Poor +   Standing Balance (Dynamic) Poor   Weight Shift Sitting Fair   Weight Shift Standing Poor   Skilled Intervention Verbal Cuing;Tactile Cuing;Facilitation;Compensatory Strategies;Postural Facilitation   Comments w/ HHA x2; multiple Posterior LOBs, no righting reaction; req v/cs for safety   Bed Mobility    Supine to Sit Minimal Assist  (use of therapist hand)   Sit to Supine Supervised   Scooting Supervised   Skilled Intervention Verbal Cuing;Tactile Cuing;Compensatory Strategies;Facilitation   Comments HOB slightly elevated   Activities of Daily Living   Grooming   (declined)   Lower Body Dressing Minimal assist  (seated at EOB; pants and socks)   Toileting   (declined)   Skilled Intervention Verbal Cuing;Tactile Cuing;Compensatory Strategies   Comments limited by behavior and cognition   Functional Mobility   Sit to Stand Moderate Assist  (x4; stands req btwn mod A and SPV)   Bed, Chair, Wheelchair Transfer Minimal Assist   Toilet Transfers   (declined)   Transfer Method Stand Step   Mobility w/ HHA x2 and max v/cs for safety (mod A)   Skilled Intervention Verbal Cuing;Tactile Cuing;Facilitation;Compensatory Strategies   Comments req mod-max v/cs but poor following; perseverative and impulsive   Visual Perception   Visual Perception  Not Tested   Activity Tolerance   Sitting Edge of Bed 20 min   Standing 5 min   Comments limited by volition and cognition; resistant to continued activity   Short Term Goals   Short Term Goal # 1 Pt will complete toilet transfer using BSC if needed with spv, no LOB by discharge.   Goal Outcome # 1 Progressing slower than expected   Short Term Goal # 2 Pt will complete standing grooming/hygiene with spv by discharge.   Goal Outcome # 2 Progressing slower than expected   Short Term Goal # 3 Pt will complete toileting with spv by discharge.   Goal Outcome # 3  Progressing slower than expected   Anticipated Discharge Equipment and Recommendations   DC Equipment Recommendations Unable to determine at this time   Discharge Recommendations Recommend post-acute placement for additional occupational therapy services prior to discharge home

## 2020-10-12 NOTE — CARE PLAN
Problem: Mobility  Goal: Risk for activity intolerance will decrease  Outcome: PROGRESSING AS EXPECTED  Note: Pt ambulated white and back to bed. Range of motion exercises offered to pt q2h.      Problem: Communication  Goal: The ability to communicate needs accurately and effectively will improve  Outcome: PROGRESSING SLOWER THAN EXPECTED  Note: Pt confused and unable to effectively communicate needs. Will continue to monitor and anticipate pt needs.

## 2020-10-12 NOTE — THERAPY
"Physical Therapy   Daily Treatment     Patient Name: Yury Blake  Age:  50 y.o., Sex:  male  Medical Record #: [id number]  Today's Date: 10/12/2020     Precautions: Fall Risk, Swallow Precautions ( See Comments)    Assessment    Pt continues to be limited by cognition. Max time and effort to have patient perform therapy tasks. Not aware he is in hospital, perseverating on clothing. Unable to identify objects, convinced his [place] is a pair of shorts. Performed multiple (8) STS, initially mod assist and very poor LE use, as stands progressed improved to SPV. Utilized HHA today as pt had previously been very impulsive with FWW. Extreme ataxia, lack of righting responses, and poor attention to task. Multiple losses of balance that required mod assist by therapist to correct. Acute PT to follow but at a decreased frequency as pt's mobility is greatly limited by his cognition. Continue to recommend placement and/or 24/7 care.      Plan    Treatment plan modified to 2 times per week until therapy goals are met for the following treatments:  Bed Mobility, Gait Training, Neuro Re-Education / Balance, Stair Training, Therapeutic Activities and Therapeutic Exercises.    DC Equipment Recommendations: Unable to determine at this time  Discharge Recommendations: Recommend post-acute placement for additional physical therapy services prior to discharge home      Subjective    \"What are you, from [facility]?\"     Objective       10/12/20 1002   Cognition    Level of Consciousness Confused   Ability To Follow Commands 1 Step   Safety Awareness Impaired;Impulsive   New Learning Impaired   Attention Impaired   Sequencing Impaired   Comments Pt unable to identify objects correctly (thinking his [place] is \"shorts\"). Not oriented to place, time, or purpose. Max cues to re-direct but can agitate quickly, best to go with his delusions   Balance   Sitting Balance (Static) Fair   Sitting Balance (Dynamic) Fair -   Standing Balance " (Static) Poor   Standing Balance (Dynamic) Poor -   Weight Shift Sitting Fair   Weight Shift Standing Poor   Comments HHA when standing, absent righting reactions   Gait Analysis   Gait Level Of Assist Moderate Assist   Assistive Device Hand Held Assist   Distance (Feet) 55   # of Times Distance was Traveled 1   Deviation Ataxic   Weight Bearing Status no restrictions   Bed Mobility    Supine to Sit Supervised   Sit to Supine Supervised   Scooting Supervised   Functional Mobility   Sit to Stand Moderate Assist   Comments performed x8 STS, initial 2 mod assist but by end was standing with SPV   Short Term Goals    Short Term Goal # 2 Patient will move sitting<>standing with supervision within 6tx in order to initiate gait and transfers   Goal Outcome # 2 Goal not met   Short Term Goal # 3 Patient will ambulate 150ft with supervision within 6tx in order to access environment   Goal Outcome # 3 Goal not met

## 2020-10-12 NOTE — PROGRESS NOTES
[facility] Medicine Daily Progress Note    Date of Service  10/12/2020    Chief Complaint  50 y.o. male with a pmhx of HTN, chronic pain and alcohol abuse who presented 9/9/2020 with altered mental status.     Hospital Course  50 y.o. male with a pmhx of HTN, chronic pain and alcohol abuse who presented 9/9/2020 with altered mental status, he was found obtunded in his house by his ex-wife who was checking on him as he had not been seen for 2 days or been able to get a hold of him.  He was found in a chair,  A&Ox0 and hypotensive.  He arrived in the ER with a GCS of 7 (E1,V1,M5) and severely hypotensive.  The patient was intubated for airway protection and central venous access was obtained for administration of high-volume crystalloid resuscitation and vasopressor therapy.    CT head and abdomen and pelvis were all unremarkable. He was briefly treated with antibiotics for concern for an underlying infection and a lumbar tap done was negative. Cultures remained negative.   EEG was done for concern for seizure-like activity was without any epileptiform activity.  He was extubated on 9/11 and has been able to protect his airway ever since. He is currently receiving treatment with benzos for concern for benzo withdrawals and IV thiamine for his history of alcohol abuse.    Interval Problem Update  9/29: Patient was seen and examined by me today.  Found to be resting in bed comfortably.  We will continue his Librium and Risperdal for now.  Try to take off restraints today.  Repeat blood work in the a.m.  SNF placement is pending.  9/30: Patient was found to be in bed without any complaints.  We will continue Librium and Risperdal.  Continue restraints and Gallardo catheter.  Placement is pending.  10/1: Continue current management.  Gallardo found to have sediments and I will send his for urinalysis.  Continue to find placement.  10/2: Patient was seen and examined me today.  Urinalysis was positive for infection.  I have asked  the nurse to remove the Gallardo catheter.  Started patient on Bactrim for 5 days.  10/3: Patient continues to have hallucinations and confusion.  Continue to treat with Bactrim for UTI.  Continue voiding trial after Gallardo catheter was pulled out.  10/4: Patient continues to be confused.  Continue to treat for UTI.  Gallardo catheter is replaced.  10/5: Patient seen and examined by me today.  Still confused despite taking off of Librium and given antibiotics for UTI infection.    10/6: Patient was seen and examined by me today.  He found to be confused and confabulating.  Continue pursue guardianship.  10/7: Patient seen and examined he still confused and impulsive.  Continue to pursue guardianship.  10/8: Patient still confused continue to pursue guardianship  10/9: Patient continues to be confused, hallucinating, and delusional  10/10: Patient continues to be confused.  We will try another voiding trial today.  10/11: Patient still hallucinating and delusional.  He is excited about watching football  10/12: Patient was working with PT OT today and was still confused.  Consultants/Specialty  Intensivist    Code Status  Full Code    Disposition  Pending [facility]    Review of Systems  Review of Systems   Constitutional: Negative for malaise/fatigue.   HENT: Negative.    Eyes: Negative.    Respiratory: Negative for cough, hemoptysis and sputum production.    Cardiovascular: Negative for chest pain, palpitations, orthopnea and claudication.   Gastrointestinal: Negative for abdominal pain, heartburn, nausea and vomiting.   Genitourinary: Negative for dysuria, frequency and urgency.   Musculoskeletal: Negative.    Neurological: Negative.    Psychiatric/Behavioral: Negative for hallucinations.       Physical Exam  Temp:  [36.3 °C (97.4 °F)-36.7 °C (98 °F)] 36.7 °C (98 °F)  Pulse:  [67-80] 74  Resp:  [17-18] 18  BP: (127-137)/(72-86) 137/86  SpO2:  [93 %-96 %] 96 %    Physical Exam  Vitals signs and nursing note reviewed.    Constitutional:       General: He is not in acute distress.  HENT:      Head: Normocephalic and atraumatic.      Right Ear: External ear normal.      Left Ear: External ear normal.      Nose: Nose normal.   Eyes:      Extraocular Movements: Extraocular movements intact.      Pupils: Pupils are equal, round, and reactive to light.   Neck:      Musculoskeletal: Normal range of motion and neck supple. No neck rigidity or muscular tenderness.   Cardiovascular:      Rate and Rhythm: Normal rate and regular rhythm.      Pulses: Normal pulses.   Pulmonary:      Effort: Pulmonary effort is normal.   Abdominal:      General: Bowel sounds are normal.      Palpations: Abdomen is soft.   Musculoskeletal: Normal range of motion.         General: No tenderness.   Skin:     General: Skin is warm and dry.   Neurological:      General: No focal deficit present.      Mental Status: He is alert. He is disoriented.      Comments: Oriented to self    Psychiatric:         Mood and Affect: Affect is flat.         Behavior: Behavior is combative.         Thought Content: Thought content is delusional.         Cognition and Memory: Cognition is impaired. He exhibits impaired recent memory.         Judgment: Judgment is impulsive.      Comments: Confused          Fluids    Intake/Output Summary (Last 24 hours) at 10/12/2020 1110  Last data filed at 10/12/2020 0500  Gross per 24 hour   Intake 240 ml   Output 1100 ml   Net -860 ml       Laboratory                        Imaging  MR-BRAIN-W/O   Final Result      1.  Moderate diffuse cerebral atrophy.      2.  Minimal periventricular and juxtacortical white matter changes consistent with chronic microvascular ischemic gliosis.      3.  No evidence of acute infarction in the brain parenchyma.      DX-ABDOMEN FOR TUBE PLACEMENT   Final Result      Feeding tube tip overlies the second portion of the duodenum.      DX-CHEST-PORTABLE (1 VIEW)   Final Result         1.  Patchy left lung base  opacity, new since prior, could represent early infiltrate            DX-CHEST-PORTABLE (1 VIEW)   Final Result         1.  Patchy left lung base opacity, new since prior, could represent early infiltrate         [id number] VIEW   Final Result      Enteric tube projects over the distal stomach/pylorus.         EC-ECHOCARDIOGRAM COMPLETE W/O CONT   Final Result      DX-CHEST-PORTABLE (1 VIEW)   Final Result         1.  No acute cardiopulmonary disease.      CT-ABDOMEN-PELVIS W/O   Final Result      1.  No renal stone or hydronephrosis.   2.  Normal appendix.   3.  No focal mesenteric inflammatory process.      DX-ABDOMEN FOR TUBE PLACEMENT   Final Result      NG tube tip projects over expected location the gastric fundus.      US-ABDOMEN COMPLETE SURVEY   Final Result         1.  Echogenic liver compatible with fatty change versus fibrosis.   2.  Gallbladder sludge without additional sonographic findings of cholecystitis.   3.  Partial atrophic bilateral kidneys with lobulated contour         CT-HEAD W/O   Final Result         1.  No acute intracranial abnormality is identified, there are nonspecific white matter changes, commonly associated with small vessel ischemic disease.  Associated mild cerebral atrophy is noted.   2.  Atherosclerosis.      DX-CHEST-PORTABLE (1 VIEW)   Final Result         1.  No acute cardiopulmonary disease.           Assessment/Plan  * Toxic encephalopathy- (present on admission)  Assessment & Plan  Unknown etiology whether this is hypoxic induced brain injury versus Wernicke encephalopathy    LP- negative  EEG was negative for seizures    Prn haldol  Po risperdal... Increased to 1 mg nightly on 9/27/2020    Psych md consult--> they believe it is etoh related    Continue to monitor    Restrain as necessary    Mri brain negative       Acute cystitis  Assessment & Plan  UA + for bacteria, LE and nitrite  F/u urine cultures found staph aures   Started pt on bactrim for 5 days      Urinary  retention- (present on admission)  Assessment & Plan  Po flomax    Voiding trial in 1 to 2  Days.. Be aware that urologist was needed to replace this current  Gallardo as it was difficult to place   Remove Gallardo catheter today    Other dysphagia- (present on admission)  Assessment & Plan  Passed speech eval now  On modified diet    Hypernatremia  Assessment & Plan  Replacing potassium and adding daily magnesium     check  a BMP in a.m.    Acute respiratory failure with hypoxia ([facility])- (present on admission)  Assessment & Plan  2/2 mental status  Extubated 9/11  Remains on room air    Benzodiazepine dependence ([facility])- (present on admission)  Assessment & Plan  Monitor for seizure/withdrawal  Po librium  EEG after sz-like activity 9/10 was negative despite jerking motions    Alcohol abuse- (present on admission)  Assessment & Plan  Reported hx of  Continue Vitamins  High dose thiamine  Seizure precautions  S/p etoh w/d protocol    Septic shock ([facility])- (present on admission)  Assessment & Plan  Resolved  Broad-spectrum antibiotics: s/p vanc/zosyn --> C3, now off abx  Blood culture, CSF culture, urinalysis allnegative  Monitor off antibiotics        JEWELS (acute kidney injury) ([facility])- (present on admission)  Assessment & Plan  Consistent with ATN 2/2 hypotension, dehydration and sepsis  Resolved  Avoid nephrotoxins.  Renal dose all medications.         VTE prophylaxis: Lovenox

## 2020-10-13 PROCEDURE — 700102 HCHG RX REV CODE 250 W/ 637 OVERRIDE(OP): Performed by: HOSPITALIST

## 2020-10-13 PROCEDURE — A9270 NON-COVERED ITEM OR SERVICE: HCPCS | Performed by: HOSPITALIST

## 2020-10-13 PROCEDURE — 700111 HCHG RX REV CODE 636 W/ 250 OVERRIDE (IP): Performed by: HOSPITALIST

## 2020-10-13 PROCEDURE — 99231 SBSQ HOSP IP/OBS SF/LOW 25: CPT | Performed by: HOSPITALIST

## 2020-10-13 PROCEDURE — 770006 HCHG ROOM/CARE - MED/SURG/GYN SEMI*

## 2020-10-13 RX ADMIN — Medication 100 MG: at 04:41

## 2020-10-13 RX ADMIN — HALOPERIDOL LACTATE 2.5 MG: 5 INJECTION, SOLUTION INTRAMUSCULAR at 20:22

## 2020-10-13 RX ADMIN — TAMSULOSIN HYDROCHLORIDE 0.8 MG: 0.4 CAPSULE ORAL at 09:53

## 2020-10-13 RX ADMIN — DOCUSATE SODIUM 50 MG AND SENNOSIDES 8.6 MG 2 TABLET: 8.6; 5 TABLET, FILM COATED ORAL at 04:40

## 2020-10-13 RX ADMIN — HALOPERIDOL LACTATE 2.5 MG: 5 INJECTION, SOLUTION INTRAMUSCULAR at 03:26

## 2020-10-13 RX ADMIN — DOCUSATE SODIUM 50 MG AND SENNOSIDES 8.6 MG 2 TABLET: 8.6; 5 TABLET, FILM COATED ORAL at 17:28

## 2020-10-13 RX ADMIN — RISPERIDONE 1 MG: 0.5 TABLET ORAL at 17:28

## 2020-10-13 RX ADMIN — ENOXAPARIN SODIUM 40 MG: 40 INJECTION SUBCUTANEOUS at 17:28

## 2020-10-13 ASSESSMENT — ENCOUNTER SYMPTOMS
HALLUCINATIONS: 0
HEMOPTYSIS: 0
NEUROLOGICAL NEGATIVE: 1
PALPITATIONS: 0
HEARTBURN: 0
ORTHOPNEA: 0
EYES NEGATIVE: 1
SPUTUM PRODUCTION: 0
ABDOMINAL PAIN: 0
MUSCULOSKELETAL NEGATIVE: 1
CLAUDICATION: 0
COUGH: 0
NAUSEA: 0
VOMITING: 0

## 2020-10-13 ASSESSMENT — FIBROSIS 4 INDEX: FIB4 SCORE: 0.93

## 2020-10-13 NOTE — PROGRESS NOTES
[facility] Medicine Daily Progress Note    Date of Service  10/13/2020    Chief Complaint  50 y.o. male with a pmhx of HTN, chronic pain and alcohol abuse who presented 9/9/2020 with altered mental status.     Hospital Course  50 y.o. male with a pmhx of HTN, chronic pain and alcohol abuse who presented 9/9/2020 with altered mental status, he was found obtunded in his house by his ex-wife who was checking on him as he had not been seen for 2 days or been able to get a hold of him.  He was found in a chair,  A&Ox0 and hypotensive.  He arrived in the ER with a GCS of 7 (E1,V1,M5) and severely hypotensive.  The patient was intubated for airway protection and central venous access was obtained for administration of high-volume crystalloid resuscitation and vasopressor therapy.    CT head and abdomen and pelvis were all unremarkable. He was briefly treated with antibiotics for concern for an underlying infection and a lumbar tap done was negative. Cultures remained negative.   EEG was done for concern for seizure-like activity was without any epileptiform activity.  He was extubated on 9/11 and has been able to protect his airway ever since. He is currently receiving treatment with benzos for concern for benzo withdrawals and IV thiamine for his history of alcohol abuse.    Interval Problem Update  9/29: Patient was seen and examined by me today.  Found to be resting in bed comfortably.  We will continue his Librium and Risperdal for now.  Try to take off restraints today.  Repeat blood work in the a.m.  SNF placement is pending.  9/30: Patient was found to be in bed without any complaints.  We will continue Librium and Risperdal.  Continue restraints and Gallardo catheter.  Placement is pending.  10/1: Continue current management.  Gallardo found to have sediments and I will send his for urinalysis.  Continue to find placement.  10/2: Patient was seen and examined me today.  Urinalysis was positive for infection.  I have asked  the nurse to remove the Gallardo catheter.  Started patient on Bactrim for 5 days.  10/3: Patient continues to have hallucinations and confusion.  Continue to treat with Bactrim for UTI.  Continue voiding trial after Gallardo catheter was pulled out.  10/4: Patient continues to be confused.  Continue to treat for UTI.  Gallardo catheter is replaced.  10/5: Patient seen and examined by me today.  Still confused despite taking off of Librium and given antibiotics for UTI infection.    10/6: Patient was seen and examined by me today.  He found to be confused and confabulating.  Continue pursue guardianship.  10/7: Patient seen and examined he still confused and impulsive.  Continue to pursue guardianship.  10/8: Patient still confused continue to pursue guardianship  10/9: Patient continues to be confused, hallucinating, and delusional  10/10: Patient continues to be confused.  We will try another voiding trial today.  10/11: Patient still hallucinating and delusional.  He is excited about watching football  10/12: Patient was working with PT OT today and was still confused.  10/13: Patient was found to be confused and hallucinating again today.  Consultants/Specialty  Intensivist    Code Status  Full Code    Disposition  Pending     Review of Systems  Review of Systems   Constitutional: Negative for malaise/fatigue.   HENT: Negative.    Eyes: Negative.    Respiratory: Negative for cough, hemoptysis and sputum production.    Cardiovascular: Negative for chest pain, palpitations, orthopnea and claudication.   Gastrointestinal: Negative for abdominal pain, heartburn, nausea and vomiting.   Genitourinary: Negative for dysuria, frequency and urgency.   Musculoskeletal: Negative.    Neurological: Negative.    Psychiatric/Behavioral: Negative for hallucinations.       Physical Exam  Temp:  [36.3 °C (97.3 °F)-36.6 °C (97.9 °F)] 36.6 °C (97.9 °F)  Pulse:  [76-91] 76  Resp:  [17-18] 17  BP: (123-136)/(76-86) 126/86  SpO2:  [94 %-97  %] 97 %    Physical Exam  Vitals signs and nursing note reviewed.   Constitutional:       General: He is not in acute distress.  HENT:      Head: Normocephalic and atraumatic.      Right Ear: External ear normal.      Left Ear: External ear normal.      Nose: Nose normal.   Eyes:      Extraocular Movements: Extraocular movements intact.      Pupils: Pupils are equal, round, and reactive to light.   Neck:      Musculoskeletal: Normal range of motion and neck supple. No neck rigidity or muscular tenderness.   Cardiovascular:      Rate and Rhythm: Normal rate and regular rhythm.      Pulses: Normal pulses.   Pulmonary:      Effort: Pulmonary effort is normal.   Abdominal:      General: Bowel sounds are normal.      Palpations: Abdomen is soft.   Musculoskeletal: Normal range of motion.         General: No tenderness.   Skin:     General: Skin is warm and dry.   Neurological:      General: No focal deficit present.      Mental Status: He is alert. He is disoriented.      Comments: Oriented to self    Psychiatric:         Mood and Affect: Affect is flat.         Behavior: Behavior is combative.         Thought Content: Thought content is delusional.         Cognition and Memory: Cognition is impaired. He exhibits impaired recent memory.         Judgment: Judgment is impulsive.      Comments: Confused          Fluids    Intake/Output Summary (Last 24 hours) at 10/13/2020 1057  Last data filed at 10/13/2020 1000  Gross per 24 hour   Intake 420 ml   Output 400 ml   Net 20 ml       Laboratory                        Imaging  MR-BRAIN-W/O   Final Result      1.  Moderate diffuse cerebral atrophy.      2.  Minimal periventricular and juxtacortical white matter changes consistent with chronic microvascular ischemic gliosis.      3.  No evidence of acute infarction in the brain parenchyma.      DX-ABDOMEN FOR TUBE PLACEMENT   Final Result      Feeding tube tip overlies the second portion of the duodenum.      DX-CHEST-PORTABLE (1  VIEW)   Final Result         1.  Patchy left lung base opacity, new since prior, could represent early infiltrate            DX-CHEST-PORTABLE (1 VIEW)   Final Result         1.  Patchy left lung base opacity, new since prior, could represent early infiltrate         XO-[id number]-0 VIEW   Final Result      Enteric tube projects over the distal stomach/pylorus.         EC-ECHOCARDIOGRAM COMPLETE W/O CONT   Final Result      DX-CHEST-PORTABLE (1 VIEW)   Final Result         1.  No acute cardiopulmonary disease.      CT-ABDOMEN-PELVIS W/O   Final Result      1.  No renal stone or hydronephrosis.   2.  Normal appendix.   3.  No focal mesenteric inflammatory process.      DX-ABDOMEN FOR TUBE PLACEMENT   Final Result      NG tube tip projects over expected location the gastric fundus.      US-ABDOMEN COMPLETE SURVEY   Final Result         1.  Echogenic liver compatible with fatty change versus fibrosis.   2.  Gallbladder sludge without additional sonographic findings of cholecystitis.   3.  Partial atrophic bilateral kidneys with lobulated contour         CT-HEAD W/O   Final Result         1.  No acute intracranial abnormality is identified, there are nonspecific white matter changes, commonly associated with small vessel ischemic disease.  Associated mild cerebral atrophy is noted.   2.  Atherosclerosis.      DX-CHEST-PORTABLE (1 VIEW)   Final Result         1.  No acute cardiopulmonary disease.           Assessment/Plan  * Toxic encephalopathy- (present on admission)  Assessment & Plan  Unknown etiology whether this is hypoxic induced brain injury versus Wernicke encephalopathy    LP- negative  EEG was negative for seizures    Prn haldol  Po risperdal... Increased to 1 mg nightly on 9/27/2020    Psych md consult--> they believe it is etoh related    Continue to monitor    Restrain as necessary    Mri brain negative       Acute cystitis  Assessment & Plan  UA + for bacteria, LE and nitrite  F/u urine cultures found staph  enrriques   Started pt on bactrim for 5 days      Urinary retention- (present on admission)  Assessment & Plan  Po flomax    Voiding trial in 1 to 2  Days.. Be aware that urologist was needed to replace this current  Gallardo as it was difficult to place   Remove Gallardo catheter today    Other dysphagia- (present on admission)  Assessment & Plan  Passed speech eval now  On modified diet    Hypernatremia  Assessment & Plan  Replacing potassium and adding daily magnesium     check  a BMP in a.m.    Acute respiratory failure with hypoxia ([facility])- (present on admission)  Assessment & Plan  2/2 mental status  Extubated 9/11  Remains on room air    Benzodiazepine dependence ([facility])- (present on admission)  Assessment & Plan  Monitor for seizure/withdrawal  Po librium  EEG after sz-like activity 9/10 was negative despite jerking motions    Alcohol abuse- (present on admission)  Assessment & Plan  Reported hx of  Continue Vitamins  High dose thiamine  Seizure precautions  S/p etoh w/d protocol    Septic shock ([facility])- (present on admission)  Assessment & Plan  Resolved  Broad-spectrum antibiotics: s/p vanc/zosyn --> C3, now off abx  Blood culture, CSF culture, urinalysis allnegative  Monitor off antibiotics        JEWELS (acute kidney injury) ([facility])- (present on admission)  Assessment & Plan  Consistent with ATN 2/2 hypotension, dehydration and sepsis  Resolved  Avoid nephrotoxins.  Renal dose all medications.         VTE prophylaxis: Lovenox

## 2020-10-13 NOTE — CARE PLAN
Problem: Nutritional:  Goal: Achieve adequate nutritional intake  Description: Patient will consume 50% of meals and supplements  Outcome: NOT MET

## 2020-10-13 NOTE — CARE PLAN
Problem: Discharge Barriers/Planning  Goal: Patient's continuum of care needs will be met  Outcome: PROGRESSING SLOWER THAN EXPECTED     Problem: Communication  Goal: The ability to communicate needs accurately and effectively will improve  Outcome: PROGRESSING AS EXPECTED     Problem: Safety  Goal: Will remain free from falls  Outcome: PROGRESSING AS EXPECTED     Problem: Psychosocial Needs:  Goal: Level of anxiety will decrease  Outcome: PROGRESSING AS EXPECTED

## 2020-10-13 NOTE — DIETARY
Nutrition services: Day 34 of admit.  49 yo male admitted with acute respiratory failure and hypoxia.  Follow up for adequacy of nutritional intake.     Evaluation:  1. Previously on tube feedings advanced to po diet on 9/14. Pt had been eating well but intake began to decline last week. Currently on a regular diet with 1:1 supervision refusing some meals and taking 25 - 100% of others.  2. Weight today 78.4 kg is decreased 3.3 kg from admit scale weight of 81.7 kg. This is a 4% weight loss in 1 month which is notable.   3. Pt is noted to be confused with hallucinations and delusional.   4. Added boost plus supplements TID with meals to better meet nutrition needs.     Malnutrition risk: na    Recommendations/Plan:  1. encourage intake of meals and supplements  2. document intake of all meals and supplements as % taken in ADL's to provide interdisciplinary communication across all shifts   3. monitor daily weights  4. Nutrition rep will continue to see patient for ongoing meal and snack preferences.

## 2020-10-13 NOTE — CARE PLAN
Problem: Safety  Goal: Will remain free from injury  Outcome: PROGRESSING SLOWER THAN EXPECTED   Monitor for discontinuation of restraints.       Problem: Urinary Elimination:  Goal: Ability to reestablish a normal urinary elimination pattern will improve  Outcome: PROGRESSING SLOWER THAN EXPECTED   Monitor for removal of newton catheter

## 2020-10-13 NOTE — PROGRESS NOTES
"Received bedside report from Night RN. Pt awake and alert, disoriented to event. Bed alarm in use. No complains of pain at this time. Discussed plan of care. Gallardo in place due to urinary retention. Bilateral soft wrist restraints and vest in use to prevent pulling out lines or climbing out of bed. Educated on use of call bell for assistance. Bed in lowest position and call bell in reach. /86   Pulse 76   Temp 36.6 °C (97.9 °F) (Temporal)   Resp 17   Ht 1.803 m (5' 10.98\")   Wt 78.4 kg (172 lb 13.5 oz)   SpO2 97%   BMI 24.12 kg/m²     "

## 2020-10-13 NOTE — PROGRESS NOTES
Received report from dayshift RN and assumed care of pt. Pt currently resting in bed with bilateral soft wrist restraints in place. Denies any acute needs or concerns at this time. Aox3 (not date).    Call light within reach and fall precautions in place. Bed alarm for safety. Will monitor

## 2020-10-14 PROCEDURE — 700102 HCHG RX REV CODE 250 W/ 637 OVERRIDE(OP): Performed by: HOSPITALIST

## 2020-10-14 PROCEDURE — 700111 HCHG RX REV CODE 636 W/ 250 OVERRIDE (IP): Performed by: HOSPITALIST

## 2020-10-14 PROCEDURE — A9270 NON-COVERED ITEM OR SERVICE: HCPCS | Performed by: HOSPITALIST

## 2020-10-14 PROCEDURE — 99231 SBSQ HOSP IP/OBS SF/LOW 25: CPT | Performed by: HOSPITALIST

## 2020-10-14 PROCEDURE — 770006 HCHG ROOM/CARE - MED/SURG/GYN SEMI*

## 2020-10-14 RX ADMIN — RISPERIDONE 1 MG: 0.5 TABLET ORAL at 17:27

## 2020-10-14 RX ADMIN — HALOPERIDOL LACTATE 2.5 MG: 5 INJECTION, SOLUTION INTRAMUSCULAR at 11:18

## 2020-10-14 RX ADMIN — DOCUSATE SODIUM 50 MG AND SENNOSIDES 8.6 MG 2 TABLET: 8.6; 5 TABLET, FILM COATED ORAL at 17:27

## 2020-10-14 RX ADMIN — TAMSULOSIN HYDROCHLORIDE 0.8 MG: 0.4 CAPSULE ORAL at 08:13

## 2020-10-14 RX ADMIN — ENOXAPARIN SODIUM 40 MG: 40 INJECTION SUBCUTANEOUS at 17:27

## 2020-10-14 RX ADMIN — DOCUSATE SODIUM 50 MG AND SENNOSIDES 8.6 MG 2 TABLET: 8.6; 5 TABLET, FILM COATED ORAL at 04:39

## 2020-10-14 RX ADMIN — Medication 100 MG: at 04:38

## 2020-10-14 ASSESSMENT — ENCOUNTER SYMPTOMS
HEARTBURN: 0
NEUROLOGICAL NEGATIVE: 1
NAUSEA: 0
HALLUCINATIONS: 0
ABDOMINAL PAIN: 0
EYES NEGATIVE: 1
CLAUDICATION: 0
VOMITING: 0
ORTHOPNEA: 0
COUGH: 0
PALPITATIONS: 0
MUSCULOSKELETAL NEGATIVE: 1
HEMOPTYSIS: 0
SPUTUM PRODUCTION: 0

## 2020-10-14 ASSESSMENT — FIBROSIS 4 INDEX: FIB4 SCORE: 0.93

## 2020-10-14 NOTE — PROGRESS NOTES
[facility] Medicine Daily Progress Note    Date of Service  10/14/2020    Chief Complaint  50 y.o. male with a pmhx of HTN, chronic pain and alcohol abuse who presented 9/9/2020 with altered mental status.     Hospital Course  50 y.o. male with a pmhx of HTN, chronic pain and alcohol abuse who presented 9/9/2020 with altered mental status, he was found obtunded in his house by his ex-wife who was checking on him as he had not been seen for 2 days or been able to get a hold of him.  He was found in a chair,  A&Ox0 and hypotensive.  He arrived in the ER with a GCS of 7 (E1,V1,M5) and severely hypotensive.  The patient was intubated for airway protection and central venous access was obtained for administration of high-volume crystalloid resuscitation and vasopressor therapy.    CT head and abdomen and pelvis were all unremarkable. He was briefly treated with antibiotics for concern for an underlying infection and a lumbar tap done was negative. Cultures remained negative.   EEG was done for concern for seizure-like activity was without any epileptiform activity.  He was extubated on 9/11 and has been able to protect his airway ever since. He is currently receiving treatment with benzos for concern for benzo withdrawals and IV thiamine for his history of alcohol abuse.    Interval Problem Update  9/29: Patient was seen and examined by me today.  Found to be resting in bed comfortably.  We will continue his Librium and Risperdal for now.  Try to take off restraints today.  Repeat blood work in the a.m.  SNF placement is pending.  9/30: Patient was found to be in bed without any complaints.  We will continue Librium and Risperdal.  Continue restraints and Gallardo catheter.  Placement is pending.  10/1: Continue current management.  Gallardo found to have sediments and I will send his for urinalysis.  Continue to find placement.  10/2: Patient was seen and examined me today.  Urinalysis was positive for infection.  I have asked  the nurse to remove the Gallardo catheter.  Started patient on Bactrim for 5 days.  10/3: Patient continues to have hallucinations and confusion.  Continue to treat with Bactrim for UTI.  Continue voiding trial after Gallardo catheter was pulled out.  10/4: Patient continues to be confused.  Continue to treat for UTI.  Gallardo catheter is replaced.  10/5: Patient seen and examined by me today.  Still confused despite taking off of Librium and given antibiotics for UTI infection.    10/6: Patient was seen and examined by me today.  He found to be confused and confabulating.  Continue pursue guardianship.  10/7: Patient seen and examined he still confused and impulsive.  Continue to pursue guardianship.  10/8: Patient still confused continue to pursue guardianship  10/9: Patient continues to be confused, hallucinating, and delusional  10/10: Patient continues to be confused.  We will try another voiding trial today.  10/11: Patient still hallucinating and delusional.  He is excited about watching football  10/12: Patient was working with PT OT today and was still confused.  10/13: Patient was found to be confused and hallucinating again today.  10/14: No acute issues today will continue pursue guardianship  Consultants/Specialty  Intensivist    Code Status  Full Code    Disposition  Pending SNF    Review of Systems  Review of Systems   Constitutional: Negative for malaise/fatigue.   HENT: Negative.    Eyes: Negative.    Respiratory: Negative for cough, hemoptysis and sputum production.    Cardiovascular: Negative for chest pain, palpitations, orthopnea and claudication.   Gastrointestinal: Negative for abdominal pain, heartburn, nausea and vomiting.   Genitourinary: Negative for dysuria, frequency and urgency.   Musculoskeletal: Negative.    Neurological: Negative.    Psychiatric/Behavioral: Negative for hallucinations.       Physical Exam  Temp:  [36.1 °C (97 °F)-36.6 °C (97.9 °F)] 36.4 °C (97.6 °F)  Pulse:  [71-92]  71  Resp:  [16-18] 16  BP: (113-133)/(77-82) 128/77  SpO2:  [95 %-99 %] 97 %    Physical Exam  Vitals signs and nursing note reviewed.   Constitutional:       General: He is not in acute distress.  HENT:      Head: Normocephalic and atraumatic.      Right Ear: External ear normal.      Left Ear: External ear normal.      Nose: Nose normal.   Eyes:      Extraocular Movements: Extraocular movements intact.      Pupils: Pupils are equal, round, and reactive to light.   Neck:      Musculoskeletal: Normal range of motion and neck supple. No neck rigidity or muscular tenderness.   Cardiovascular:      Rate and Rhythm: Normal rate and regular rhythm.      Pulses: Normal pulses.   Pulmonary:      Effort: Pulmonary effort is normal.   Abdominal:      General: Bowel sounds are normal.      Palpations: Abdomen is soft.   Musculoskeletal: Normal range of motion.         General: No tenderness.   Skin:     General: Skin is warm and dry.   Neurological:      General: No focal deficit present.      Mental Status: He is alert. He is disoriented.      Comments: Oriented to self    Psychiatric:         Mood and Affect: Affect is flat.         Behavior: Behavior is combative.         Thought Content: Thought content is delusional.         Cognition and Memory: Cognition is impaired. He exhibits impaired recent memory.         Judgment: Judgment is impulsive.      Comments: Confused          Fluids    Intake/Output Summary (Last 24 hours) at 10/14/2020 1057  Last data filed at 10/14/2020 0501  Gross per 24 hour   Intake 180 ml   Output 450 ml   Net -270 ml       Laboratory                        Imaging  MR-BRAIN-W/O   Final Result      1.  Moderate diffuse cerebral atrophy.      2.  Minimal periventricular and juxtacortical white matter changes consistent with chronic microvascular ischemic gliosis.      3.  No evidence of acute infarction in the brain parenchyma.      DX-ABDOMEN FOR TUBE PLACEMENT   Final Result      Feeding tube tip  overlies the second portion of the duodenum.      DX-CHEST-PORTABLE (1 VIEW)   Final Result         1.  Patchy left lung base opacity, new since prior, could represent early infiltrate            DX-CHEST-PORTABLE (1 VIEW)   Final Result         1.  Patchy left lung base opacity, new since prior, could represent early infiltrate         [id number] VIEW   Final Result      Enteric tube projects over the distal stomach/pylorus.         EC-ECHOCARDIOGRAM COMPLETE W/O CONT   Final Result      DX-CHEST-PORTABLE (1 VIEW)   Final Result         1.  No acute cardiopulmonary disease.      CT-ABDOMEN-PELVIS W/O   Final Result      1.  No renal stone or hydronephrosis.   2.  Normal appendix.   3.  No focal mesenteric inflammatory process.      DX-ABDOMEN FOR TUBE PLACEMENT   Final Result      NG tube tip projects over expected location the gastric fundus.      US-ABDOMEN COMPLETE SURVEY   Final Result         1.  Echogenic liver compatible with fatty change versus fibrosis.   2.  Gallbladder sludge without additional sonographic findings of cholecystitis.   3.  Partial atrophic bilateral kidneys with lobulated contour         CT-HEAD W/O   Final Result         1.  No acute intracranial abnormality is identified, there are nonspecific white matter changes, commonly associated with small vessel ischemic disease.  Associated mild cerebral atrophy is noted.   2.  Atherosclerosis.      DX-CHEST-PORTABLE (1 VIEW)   Final Result         1.  No acute cardiopulmonary disease.           Assessment/Plan  * Toxic encephalopathy- (present on admission)  Assessment & Plan  Unknown etiology whether this is hypoxic induced brain injury versus Wernicke encephalopathy    LP- negative  EEG was negative for seizures    Prn haldol  Po risperdal... Increased to 1 mg nightly on 9/27/2020    Psych md consult--> they believe it is etoh related    Continue to monitor    Restrain as necessary    Mri brain negative       Acute cystitis  Assessment &  Plan  resolved      Urinary retention- (present on admission)  Assessment & Plan  Po flomax  Failed voiding trial and newton had to be replaced    Other dysphagia- (present on admission)  Assessment & Plan  Passed speech eval now  On modified diet    Hypernatremia  Assessment & Plan  Replacing potassium and adding daily magnesium     check  a BMP in a.m.    Acute respiratory failure with hypoxia (HCC)- (present on admission)  Assessment & Plan  2/2 mental status  Extubated 9/11  Remains on room air    Benzodiazepine dependence (HCC)- (present on admission)  Assessment & Plan  Monitor for seizure/withdrawal  Po librium  EEG after sz-like activity 9/10 was negative despite jerking motions    Alcohol abuse- (present on admission)  Assessment & Plan  Reported hx of  Continue Vitamins  High dose thiamine  Seizure precautions  S/p etoh w/d protocol    Septic shock ([facility])- (present on admission)  Assessment & Plan  Resolved  Broad-spectrum antibiotics: s/p vanc/zosyn --> C3, now off abx  Blood culture, CSF culture, urinalysis allnegative  Monitor off antibiotics        JEWELS (acute kidney injury) ([facility])- (present on admission)  Assessment & Plan  Consistent with ATN 2/2 hypotension, dehydration and sepsis  Resolved  Avoid nephrotoxins.  Renal dose all medications.         VTE prophylaxis: Lovenox

## 2020-10-14 NOTE — CARE PLAN
Problem: Safety  Goal: Will remain free from injury  Outcome: PROGRESSING SLOWER THAN EXPECTED  Note: Pt agitated, removed bilateral wrist restraints and attempted to get OOB.   Pt also attempted to remove newton, removed statlock.     Generalized weakness noted, very unsteady. Sat at EOB. Strip bed alarm on. Bed locked and in lowest position. BL wrist and vest restraints in place.      Problem: Safety - Medical Restraint  Goal: Remains free of injury from restraints (Restraint for Interference with Medical Device)  Description: INTERVENTIONS:  1. Determine that other, less restrictive measures have been tried or would not be effective before applying the restraint  2. Evaluate the patient's condition at the time of restraint application  3. Inform patient/family regarding the reason for restraint  4. Q2H: Monitor safety, psychosocial status, comfort, nutrition and hydration  Outcome: PROGRESSING AS EXPECTED  Flowsheets (Taken 10/14/2020 1229)  Addressed this shift: Remains free of injury from restraints (restraint for interference with medical device):   Determine that other, less restrictive measures have been tried or would not be effective before applying the restraint   Evaluate the patient's condition at the time of restraint application   Inform patient/family regarding the reason for restraint   Every 2 hours: Monitor safety, psychosocial status, comfort, nutrition and hydration  Note: Mobility provided q2 hrs. Pt requiring reinforcement on safety measures.

## 2020-10-14 NOTE — CARE PLAN
Problem: Safety - Medical Restraint  Goal: Remains free of injury from restraints (Restraint for Interference with Medical Device)  Description: INTERVENTIONS:  1. Determine that other, less restrictive measures have been tried or would not be effective before applying the restraint  2. Evaluate the patient's condition at the time of restraint application  3. Inform patient/family regarding the reason for restraint  4. Q2H: Monitor safety, psychosocial status, comfort, nutrition and hydration  Outcome: NOT MET  Goal: Free from restraint(s) (Restraint for Interference with Medical Device)  Description: INTERVENTIONS:  1. ONCE/SHIFT or MINIMUM Q12H: Assess and document the continuing need for restraints  2. Q24H: Continued use of restraint requires LIP to perform face to face examination and written order  3. Identify and implement measures to help patient regain control  Outcome: NOT MET     Problem: Safety  Goal: Will remain free from falls  Outcome: PROGRESSING AS EXPECTED     Problem: Skin Integrity  Goal: Risk for impaired skin integrity will decrease  Outcome: PROGRESSING AS EXPECTED     Problem: Pain Management  Goal: Pain level will decrease to patient's comfort goal  Outcome: PROGRESSING AS EXPECTED     Problem: Psychosocial Needs:  Goal: Level of anxiety will decrease  Outcome: PROGRESSING SLOWER THAN EXPECTED     Problem: Urinary Elimination:  Goal: Ability to reestablish a normal urinary elimination pattern will improve  Outcome: PROGRESSING SLOWER THAN EXPECTED     Problem: Mobility  Goal: Risk for activity intolerance will decrease  Outcome: PROGRESSING SLOWER THAN EXPECTED

## 2020-10-15 PROCEDURE — 97530 THERAPEUTIC ACTIVITIES: CPT | Mod: CQ

## 2020-10-15 PROCEDURE — 99231 SBSQ HOSP IP/OBS SF/LOW 25: CPT | Performed by: HOSPITALIST

## 2020-10-15 PROCEDURE — 306565 RIGID MIT RESTRAINT(PAIR): Performed by: HOSPITALIST

## 2020-10-15 PROCEDURE — 97116 GAIT TRAINING THERAPY: CPT | Mod: CQ

## 2020-10-15 PROCEDURE — 700102 HCHG RX REV CODE 250 W/ 637 OVERRIDE(OP): Performed by: HOSPITALIST

## 2020-10-15 PROCEDURE — A9270 NON-COVERED ITEM OR SERVICE: HCPCS | Performed by: HOSPITALIST

## 2020-10-15 PROCEDURE — 700111 HCHG RX REV CODE 636 W/ 250 OVERRIDE (IP): Performed by: HOSPITALIST

## 2020-10-15 PROCEDURE — 770006 HCHG ROOM/CARE - MED/SURG/GYN SEMI*

## 2020-10-15 RX ADMIN — RISPERIDONE 1 MG: 0.5 TABLET ORAL at 17:20

## 2020-10-15 RX ADMIN — Medication 100 MG: at 05:24

## 2020-10-15 RX ADMIN — DOCUSATE SODIUM 50 MG AND SENNOSIDES 8.6 MG 2 TABLET: 8.6; 5 TABLET, FILM COATED ORAL at 05:24

## 2020-10-15 RX ADMIN — DOCUSATE SODIUM 50 MG AND SENNOSIDES 8.6 MG 2 TABLET: 8.6; 5 TABLET, FILM COATED ORAL at 17:20

## 2020-10-15 RX ADMIN — ENOXAPARIN SODIUM 40 MG: 40 INJECTION SUBCUTANEOUS at 17:20

## 2020-10-15 ASSESSMENT — COGNITIVE AND FUNCTIONAL STATUS - GENERAL
TURNING FROM BACK TO SIDE WHILE IN FLAT BAD: A LITTLE
STANDING UP FROM CHAIR USING ARMS: A LITTLE
MOVING FROM LYING ON BACK TO SITTING ON SIDE OF FLAT BED: A LOT
WALKING IN HOSPITAL ROOM: A LOT
SUGGESTED CMS G CODE MODIFIER MOBILITY: CL
MOBILITY SCORE: 14
MOVING TO AND FROM BED TO CHAIR: A LITTLE
CLIMB 3 TO 5 STEPS WITH RAILING: TOTAL

## 2020-10-15 ASSESSMENT — ENCOUNTER SYMPTOMS
HEARTBURN: 0
COUGH: 0
HEMOPTYSIS: 0
HALLUCINATIONS: 0
EYES NEGATIVE: 1
CLAUDICATION: 0
NEUROLOGICAL NEGATIVE: 1
VOMITING: 0
ABDOMINAL PAIN: 0
SPUTUM PRODUCTION: 0
NAUSEA: 0
PALPITATIONS: 0
MUSCULOSKELETAL NEGATIVE: 1
ORTHOPNEA: 0

## 2020-10-15 ASSESSMENT — GAIT ASSESSMENTS
DISTANCE (FEET): 20
GAIT LEVEL OF ASSIST: MODERATE ASSIST
DEVIATION: ATAXIC
ASSISTIVE DEVICE: HAND HELD ASSIST

## 2020-10-15 NOTE — CARE PLAN
Problem: Safety  Goal: Will remain free from falls  Outcome: PROGRESSING AS EXPECTED  Note: Patients bed in lowest and locked position, call light and side table within reach. Non-skid socks on and educated to use call light when he would like to get up. Verbalized understanding.       Problem: Infection  Goal: Will remain free from infection  Outcome: PROGRESSING AS EXPECTED     Problem: Safety - Medical Restraint  Goal: Remains free of injury from restraints (Restraint for Interference with Medical Device)  Description: INTERVENTIONS:  1. Determine that other, less restrictive measures have been tried or would not be effective before applying the restraint  2. Evaluate the patient's condition at the time of restraint application  3. Inform patient/family regarding the reason for restraint  4. Q2H: Monitor safety, psychosocial status, comfort, nutrition and hydration  Outcome: PROGRESSING AS EXPECTED  Flowsheets (Taken 10/15/2020 0817)  Addressed this shift: Remains free of injury from restraints (restraint for interference with medical device): Every 2 hours: Monitor safety, psychosocial status, comfort, nutrition and hydration     Problem: Skin Integrity  Goal: Risk for impaired skin integrity will decrease  Outcome: PROGRESSING AS EXPECTED  Note: Q2 turns, linenes changed as needed

## 2020-10-15 NOTE — PROGRESS NOTES
Assumed care of patient at 1900 after receiving report from day shift RN. Patient confused and only alert to self. Patient in bilateral wrist restraints and vest restraints, patient continues to try and climb out of bed frequently and pulls at newton cath. No s/s of pain. VSS. Bed alarm on, call light within reach, bedside commitment, hourly rounding in place.

## 2020-10-15 NOTE — THERAPY
Physical Therapy   Daily Treatment     Patient Name: Yury Blake  Age:  50 y.o., Sex:  male  Medical Record #: [id number]  Today's Date: 10/15/2020     Precautions: Fall Risk, Swallow Precautions ( See Comments)    Assessment    Pt was pleasantly confused but agreeable to therapy session.He continues to be limited due to cognition. He did not become agitated during today tx session and responded well to simple commands. He reached EOB with Jyoti. Practiced STS with Jyoti and HHA for safety and balance. At first demonstrating posterior lean but able to improve with practice. He ambulated a short distance and deferred further due to safety. Will continue to follow while in house.     Plan    Continue current treatment plan.    DC Equipment Recommendations: Unable to determine at this time  Discharge Recommendations: Recommend post-acute placement for additional physical therapy services prior to discharge home         10/15/20 1425   Gait Analysis   Gait Level Of Assist Moderate Assist   Assistive Device Hand Held Assist   Distance (Feet) 20   # of Times Distance was Traveled 1   Deviation Ataxic   Comments distance limited for safety    Bed Mobility    Supine to Sit Minimal Assist   Sit to Supine Minimal Assist   Scooting Supervised   Comments cues for seuqencing required more assist today due to cognition    Functional Mobility   Sit to Stand Minimal Assist   Bed, Chair, Wheelchair Transfer Minimal Assist   Skilled Intervention Verbal Cuing;Sequencing   Comments HHAx2 for safety, Jyoti for safety and balance. At first posterior lean but able to correct with vc   Short Term Goals    Short Term Goal # 2 Patient will move sitting<>standing with supervision within 6tx in order to initiate gait and transfers   Goal Outcome # 2 Goal not met   Short Term Goal # 3 Patient will ambulate 150ft with supervision within 6tx in order to access environment   Goal Outcome # 3 Goal not met

## 2020-10-15 NOTE — CARE PLAN
Problem: Pain Management  Goal: Pain level will decrease to patient's comfort goal  Outcome: PROGRESSING AS EXPECTED  Note: Denies pain. No s/s of pain.      Problem: Safety - Medical Restraint  Goal: Free from restraint(s) (Restraint for Interference with Medical Device)  Description: INTERVENTIONS:  1. ONCE/SHIFT or MINIMUM Q12H: Assess and document the continuing need for restraints  2. Q24H: Continued use of restraint requires LIP to perform face to face examination and written order  3. Identify and implement measures to help patient regain control  Outcome: PROGRESSING SLOWER THAN EXPECTED  Note: Patient in bilateral wrist and vest restraint. Patient continues to try and climb out of bed and pulls at newton cath.

## 2020-10-15 NOTE — PROGRESS NOTES
[facility] Medicine Daily Progress Note    Date of Service  10/15/2020    Chief Complaint  50 y.o. male with a pmhx of HTN, chronic pain and alcohol abuse who presented 9/9/2020 with altered mental status.     Hospital Course  50 y.o. male with a pmhx of HTN, chronic pain and alcohol abuse who presented 9/9/2020 with altered mental status, he was found obtunded in his house by his ex-wife who was checking on him as he had not been seen for 2 days or been able to get a hold of him.  He was found in a chair,  A&Ox0 and hypotensive.  He arrived in the ER with a GCS of 7 (E1,V1,M5) and severely hypotensive.  The patient was intubated for airway protection and central venous access was obtained for administration of high-volume crystalloid resuscitation and vasopressor therapy.    CT head and abdomen and pelvis were all unremarkable. He was briefly treated with antibiotics for concern for an underlying infection and a lumbar tap done was negative. Cultures remained negative.   EEG was done for concern for seizure-like activity was without any epileptiform activity.  He was extubated on 9/11 and has been able to protect his airway ever since. He is currently receiving treatment with benzos for concern for benzo withdrawals and IV thiamine for his history of alcohol abuse.    Interval Problem Update  9/29: Patient was seen and examined by me today.  Found to be resting in bed comfortably.  We will continue his Librium and Risperdal for now.  Try to take off restraints today.  Repeat blood work in the a.m.  SNF placement is pending.  9/30: Patient was found to be in bed without any complaints.  We will continue Librium and Risperdal.  Continue restraints and Gallardo catheter.  Placement is pending.  10/1: Continue current management.  Gallardo found to have sediments and I will send his for urinalysis.  Continue to find placement.  10/2: Patient was seen and examined me today.  Urinalysis was positive for infection.  I have asked  the nurse to remove the Gallardo catheter.  Started patient on Bactrim for 5 days.  10/3: Patient continues to have hallucinations and confusion.  Continue to treat with Bactrim for UTI.  Continue voiding trial after Gallardo catheter was pulled out.  10/4: Patient continues to be confused.  Continue to treat for UTI.  Gallardo catheter is replaced.  10/5: Patient seen and examined by me today.  Still confused despite taking off of Librium and given antibiotics for UTI infection.    10/6: Patient was seen and examined by me today.  He found to be confused and confabulating.  Continue pursue guardianship.  10/7: Patient seen and examined he still confused and impulsive.  Continue to pursue guardianship.  10/8: Patient still confused continue to pursue guardianship  10/9: Patient continues to be confused, hallucinating, and delusional  10/10: Patient continues to be confused.  We will try another voiding trial today.  10/11: Patient still hallucinating and delusional.  He is excited about watching football  10/12: Patient was working with PT OT today and was still confused.  10/13: Patient was found to be confused and hallucinating again today.  10/14: No acute issues today will continue pursue guardianship  10/15: NAOE    Consultants/Specialty  Intensivist    Code Status  Full Code    Disposition  Pending SNF    Review of Systems  Review of Systems   Constitutional: Negative for malaise/fatigue.   HENT: Negative.    Eyes: Negative.    Respiratory: Negative for cough, hemoptysis and sputum production.    Cardiovascular: Negative for chest pain, palpitations, orthopnea and claudication.   Gastrointestinal: Negative for abdominal pain, heartburn, nausea and vomiting.   Genitourinary: Negative for dysuria, frequency and urgency.   Musculoskeletal: Negative.    Neurological: Negative.    Psychiatric/Behavioral: Negative for hallucinations.       Physical Exam  Temp:  [36.2 °C (97.2 °F)-36.8 °C (98.2 °F)] 36.7 °C (98 °F)  Pulse:   [] 108  Resp:  [18-19] 18  BP: (124-137)/(75-97) 124/92  SpO2:  [95 %-97 %] 95 %    Physical Exam  Vitals signs and nursing note reviewed.   Constitutional:       General: He is not in acute distress.  HENT:      Head: Normocephalic and atraumatic.      Right Ear: External ear normal.      Left Ear: External ear normal.      Nose: Nose normal.   Eyes:      Extraocular Movements: Extraocular movements intact.      Pupils: Pupils are equal, round, and reactive to light.   Neck:      Musculoskeletal: Normal range of motion and neck supple. No neck rigidity or muscular tenderness.   Cardiovascular:      Rate and Rhythm: Normal rate and regular rhythm.      Pulses: Normal pulses.   Pulmonary:      Effort: Pulmonary effort is normal.   Abdominal:      General: Bowel sounds are normal.      Palpations: Abdomen is soft.   Musculoskeletal: Normal range of motion.         General: No tenderness.   Skin:     General: Skin is warm and dry.   Neurological:      General: No focal deficit present.      Mental Status: He is alert. He is disoriented.      Comments: Oriented to self    Psychiatric:         Mood and Affect: Affect is flat.         Behavior: Behavior is combative.         Thought Content: Thought content is delusional.         Cognition and Memory: Cognition is impaired. He exhibits impaired recent memory.         Judgment: Judgment is impulsive.      Comments: Confused        Current Facility-Administered Medications:   •  haloperidol lactate (HALDOL) injection 2.5 mg, 2.5 mg, Intramuscular, Q4HRS PRN, Gisele Barnes M.D., 2.5 mg at 10/14/20 1118  •  risperiDONE (RISPERDAL) tablet 1 mg, 1 mg, Oral, Q EVENING, Gisele Barnes M.D., 1 mg at 10/14/20 1727  •  LORazepam (ATIVAN) injection 0.5 mg, 0.5 mg, Intravenous, Q6HRS PRN, Gisele Barnes M.D.  •  thiamine tablet 100 mg, 100 mg, Oral, DAILY, Gisele Barnes M.D., 100 mg at 10/15/20 0524  •  tamsulosin (FLOMAX) capsule 0.8 mg, 0.8 mg, Oral, AFTER BREAKFAST, Gisele  JUDITH Barnes, 0.8 mg at 10/14/20 0813  •  enoxaparin (LOVENOX) inj 40 mg, 40 mg, Subcutaneous, Q EVENING, Gisele Barnes M.D., 40 mg at 10/14/20 1727  •  acetaminophen (TYLENOL) tablet 650 mg, 650 mg, Oral, Q6HRS PRN, Gisele Barnes M.D., 650 mg at 09/17/20 1723  •  senna-docusate (PERICOLACE or SENOKOT S) 8.6-50 MG per tablet 2 Tab, 2 Tab, Oral, BID, 2 Tab at 10/15/20 0524 **AND** polyethylene glycol/lytes (MIRALAX) PACKET 1 Packet, 1 Packet, Oral, QDAY PRN, 1 Packet at 10/08/20 0439 **AND** magnesium hydroxide (MILK OF MAGNESIA) suspension 30 mL, 30 mL, Oral, QDAY PRN, 30 mL at 09/18/20 1143 **AND** [DISCONTINUED] bisacodyl (DULCOLAX) suppository 10 mg, 10 mg, Rectal, QDAY PRN, Xavi Durham D.O.  •  Respiratory Therapy Consult, , Nebulization, Continuous RT, Gisele Barnes M.D.  •  ipratropium-albuterol (DUONEB) nebulizer solution, 3 mL, Nebulization, Q2HRS PRN (RT), Gisele Barnes M.D.  •  ondansetron (ZOFRAN) syringe/vial injection 4 mg, 4 mg, Intravenous, Q4HRS PRN, Gisele Barnes M.D.      Fluids    Intake/Output Summary (Last 24 hours) at 10/15/2020 1428  Last data filed at 10/15/2020 0600  Gross per 24 hour   Intake --   Output 495 ml   Net -495 ml       Laboratory                        Imaging  MR-BRAIN-W/O   Final Result      1.  Moderate diffuse cerebral atrophy.      2.  Minimal periventricular and juxtacortical white matter changes consistent with chronic microvascular ischemic gliosis.      3.  No evidence of acute infarction in the brain parenchyma.      DX-ABDOMEN FOR TUBE PLACEMENT   Final Result      Feeding tube tip overlies the second portion of the duodenum.      DX-CHEST-PORTABLE (1 VIEW)   Final Result         1.  Patchy left lung base opacity, new since prior, could represent early infiltrate            DX-CHEST-PORTABLE (1 VIEW)   Final Result         1.  Patchy left lung base opacity, new since prior, could represent early infiltrate         [id number] VIEW   Final Result      Enteric  tube projects over the distal stomach/pylorus.         EC-ECHOCARDIOGRAM COMPLETE W/O CONT   Final Result      DX-CHEST-PORTABLE (1 VIEW)   Final Result         1.  No acute cardiopulmonary disease.      CT-ABDOMEN-PELVIS W/O   Final Result      1.  No renal stone or hydronephrosis.   2.  Normal appendix.   3.  No focal mesenteric inflammatory process.      DX-ABDOMEN FOR TUBE PLACEMENT   Final Result      NG tube tip projects over expected location the gastric fundus.      US-ABDOMEN COMPLETE SURVEY   Final Result         1.  Echogenic liver compatible with fatty change versus fibrosis.   2.  Gallbladder sludge without additional sonographic findings of cholecystitis.   3.  Partial atrophic bilateral kidneys with lobulated contour         CT-HEAD W/O   Final Result         1.  No acute intracranial abnormality is identified, there are nonspecific white matter changes, commonly associated with small vessel ischemic disease.  Associated mild cerebral atrophy is noted.   2.  Atherosclerosis.      DX-CHEST-PORTABLE (1 VIEW)   Final Result         1.  No acute cardiopulmonary disease.           Assessment/Plan  * Toxic encephalopathy- (present on admission)  Assessment & Plan  Unknown etiology whether this is hypoxic induced brain injury versus Wernicke encephalopathy    LP- negative  EEG was negative for seizures    Prn haldol  Po risperdal... Increased to 1 mg nightly on 9/27/2020    Psych md consult--> they believe it is etoh related    Continue to monitor    Restrain as necessary    Mri brain negative       Acute cystitis  Assessment & Plan  resolved      Urinary retention- (present on admission)  Assessment & Plan  Po flomax  Failed voiding trial and newton had to be replaced    Other dysphagia- (present on admission)  Assessment & Plan  Passed speech eval now  On modified diet    Hypernatremia  Assessment & Plan  Replacing potassium and adding daily magnesium     check  a BMP in a.m.    Acute respiratory failure  with hypoxia (HCC)- (present on admission)  Assessment & Plan  2/2 mental status  Extubated 9/11  Remains on room air    Benzodiazepine dependence ([facility])- (present on admission)  Assessment & Plan  Monitor for seizure/withdrawal  Po librium  EEG after sz-like activity 9/10 was negative despite jerking motions    Alcohol abuse- (present on admission)  Assessment & Plan  Reported hx of  Continue Vitamins  High dose thiamine  Seizure precautions  S/p etoh w/d protocol    Septic shock ([facility])- (present on admission)  Assessment & Plan  Resolved  Broad-spectrum antibiotics: s/p vanc/zosyn --> C3, now off abx  Blood culture, CSF culture, urinalysis allnegative  Monitor off antibiotics        JEWELS (acute kidney injury) ([facility])- (present on admission)  Assessment & Plan  Consistent with ATN 2/2 hypotension, dehydration and sepsis  Resolved  Avoid nephrotoxins.  Renal dose all medications.         VTE prophylaxis: Lovenox

## 2020-10-15 NOTE — PROGRESS NOTES
Report received from MUSTAPHA Olvera at 0700. Assumed care, assessment complete. Pt is A & O x 1.  Pt denies having any pain at this time. Fall precautions and appropriate signs in place. Pt oriented to unit routine, call light/phone system and CNA and RN extension number provided. Pt educated regarding fall precautions. Bed alarm in use and locked in lowest position. Pt denies any additional needs at this time. Call light within reach.

## 2020-10-16 LAB
ALBUMIN SERPL BCP-MCNC: 3.8 G/DL (ref 3.2–4.9)
ALBUMIN/GLOB SERPL: 1.4 G/DL
ALP SERPL-CCNC: 109 U/L (ref 30–99)
ALT SERPL-CCNC: 8 U/L (ref 2–50)
ANION GAP SERPL CALC-SCNC: 17 MMOL/L (ref 7–16)
AST SERPL-CCNC: 14 U/L (ref 12–45)
BASOPHILS # BLD AUTO: 1.2 % (ref 0–1.8)
BASOPHILS # BLD: 0.09 K/UL (ref 0–0.12)
BILIRUB SERPL-MCNC: 0.4 MG/DL (ref 0.1–1.5)
BUN SERPL-MCNC: 18 MG/DL (ref 8–22)
CALCIUM SERPL-MCNC: 10.1 MG/DL (ref 8.5–10.5)
CHLORIDE SERPL-SCNC: 108 MMOL/L (ref 96–112)
CO2 SERPL-SCNC: 22 MMOL/L (ref 20–33)
CREAT SERPL-MCNC: 0.88 MG/DL (ref 0.5–1.4)
EOSINOPHIL # BLD AUTO: 0.15 K/UL (ref 0–0.51)
EOSINOPHIL NFR BLD: 2 % (ref 0–6.9)
ERYTHROCYTE [DISTWIDTH] IN BLOOD BY AUTOMATED COUNT: 47.9 FL (ref 35.9–50)
GLOBULIN SER CALC-MCNC: 2.7 G/DL (ref 1.9–3.5)
GLUCOSE SERPL-MCNC: 94 MG/DL (ref 65–99)
HCT VFR BLD AUTO: 39.1 % (ref 42–52)
HGB BLD-MCNC: 12.3 G/DL (ref 14–18)
IMM GRANULOCYTES # BLD AUTO: 0.03 K/UL (ref 0–0.11)
IMM GRANULOCYTES NFR BLD AUTO: 0.4 % (ref 0–0.9)
LYMPHOCYTES # BLD AUTO: 1.84 K/UL (ref 1–4.8)
LYMPHOCYTES NFR BLD: 24 % (ref 22–41)
MAGNESIUM SERPL-MCNC: 2.4 MG/DL (ref 1.5–2.5)
MCH RBC QN AUTO: 29.9 PG (ref 27–33)
MCHC RBC AUTO-ENTMCNC: 31.5 G/DL (ref 33.7–35.3)
MCV RBC AUTO: 95.1 FL (ref 81.4–97.8)
MONOCYTES # BLD AUTO: 0.56 K/UL (ref 0–0.85)
MONOCYTES NFR BLD AUTO: 7.3 % (ref 0–13.4)
NEUTROPHILS # BLD AUTO: 5.01 K/UL (ref 1.82–7.42)
NEUTROPHILS NFR BLD: 65.1 % (ref 44–72)
NRBC # BLD AUTO: 0 K/UL
NRBC BLD-RTO: 0 /100 WBC
PHOSPHATE SERPL-MCNC: 5.2 MG/DL (ref 2.5–4.5)
PLATELET # BLD AUTO: 267 K/UL (ref 164–446)
PMV BLD AUTO: 11.1 FL (ref 9–12.9)
POTASSIUM SERPL-SCNC: 3.7 MMOL/L (ref 3.6–5.5)
PROT SERPL-MCNC: 6.5 G/DL (ref 6–8.2)
RBC # BLD AUTO: 4.11 M/UL (ref 4.7–6.1)
SODIUM SERPL-SCNC: 147 MMOL/L (ref 135–145)
WBC # BLD AUTO: 7.7 K/UL (ref 4.8–10.8)

## 2020-10-16 PROCEDURE — A9270 NON-COVERED ITEM OR SERVICE: HCPCS | Performed by: HOSPITALIST

## 2020-10-16 PROCEDURE — 700111 HCHG RX REV CODE 636 W/ 250 OVERRIDE (IP): Performed by: HOSPITALIST

## 2020-10-16 PROCEDURE — 700102 HCHG RX REV CODE 250 W/ 637 OVERRIDE(OP): Performed by: HOSPITALIST

## 2020-10-16 PROCEDURE — 80053 COMPREHEN METABOLIC PANEL: CPT

## 2020-10-16 PROCEDURE — 84100 ASSAY OF PHOSPHORUS: CPT

## 2020-10-16 PROCEDURE — 83735 ASSAY OF MAGNESIUM: CPT

## 2020-10-16 PROCEDURE — 92507 TX SP LANG VOICE COMM INDIV: CPT

## 2020-10-16 PROCEDURE — 99231 SBSQ HOSP IP/OBS SF/LOW 25: CPT | Performed by: HOSPITALIST

## 2020-10-16 PROCEDURE — 36415 COLL VENOUS BLD VENIPUNCTURE: CPT

## 2020-10-16 PROCEDURE — 85025 COMPLETE CBC W/AUTO DIFF WBC: CPT

## 2020-10-16 PROCEDURE — 51798 US URINE CAPACITY MEASURE: CPT

## 2020-10-16 PROCEDURE — 770006 HCHG ROOM/CARE - MED/SURG/GYN SEMI*

## 2020-10-16 RX ADMIN — TAMSULOSIN HYDROCHLORIDE 0.8 MG: 0.4 CAPSULE ORAL at 08:13

## 2020-10-16 RX ADMIN — ENOXAPARIN SODIUM 40 MG: 40 INJECTION SUBCUTANEOUS at 17:19

## 2020-10-16 RX ADMIN — DOCUSATE SODIUM 50 MG AND SENNOSIDES 8.6 MG 2 TABLET: 8.6; 5 TABLET, FILM COATED ORAL at 04:54

## 2020-10-16 RX ADMIN — Medication 100 MG: at 04:54

## 2020-10-16 RX ADMIN — RISPERIDONE 1 MG: 0.5 TABLET ORAL at 17:20

## 2020-10-16 ASSESSMENT — ENCOUNTER SYMPTOMS
NEUROLOGICAL NEGATIVE: 1
PALPITATIONS: 0
HALLUCINATIONS: 0
NAUSEA: 0
EYES NEGATIVE: 1
HEARTBURN: 0
ABDOMINAL PAIN: 0
VOMITING: 0
SPUTUM PRODUCTION: 0
ORTHOPNEA: 0
CLAUDICATION: 0
HEMOPTYSIS: 0
COUGH: 0
MUSCULOSKELETAL NEGATIVE: 1

## 2020-10-16 NOTE — PROGRESS NOTES
Notified Dr. Smyth that patient has pulled out newton cath. MD states to bladder scan in 6 hours and follow protocol to straight cath if needed.

## 2020-10-16 NOTE — CARE PLAN
Problem: Safety  Goal: Will remain free from injury  Outcome: PROGRESSING AS EXPECTED     Problem: Discharge Barriers/Planning  Goal: Patient's continuum of care needs will be met  Outcome: PROGRESSING AS EXPECTED     Bladderscan this am s/p pt removal of newton showed 117ml. Will scan again in afternoon. Pt still confused and a high fall risk. Pt awaiting guardianship. Discharge date unknown at this time, will continue to monitor discharge needs.

## 2020-10-16 NOTE — PROGRESS NOTES
Assumed care of patient at 1900 after receiving report from day shift RN. Patient confused but alert to self. Patient was sitting up eating dinner with visitor at beginning of shift. Patient denies and shows no s/s of pain. Remains in restrains: bilateral wrists, bilateral mitts, and vest. Bed alarm on, call light within reach, hourly rounding in place.

## 2020-10-16 NOTE — PROGRESS NOTES
[facility] Medicine Daily Progress Note    Date of Service  10/16/2020    Chief Complaint  50 y.o. male with a pmhx of HTN, chronic pain and alcohol abuse who presented 9/9/2020 with altered mental status.     Hospital Course  50 y.o. male with a pmhx of HTN, chronic pain and alcohol abuse who presented 9/9/2020 with altered mental status, he was found obtunded in his house by his ex-wife who was checking on him as he had not been seen for 2 days or been able to get a hold of him.  He was found in a chair,  A&Ox0 and hypotensive.  He arrived in the ER with a GCS of 7 (E1,V1,M5) and severely hypotensive.  The patient was intubated for airway protection and central venous access was obtained for administration of high-volume crystalloid resuscitation and vasopressor therapy.    CT head and abdomen and pelvis were all unremarkable. He was briefly treated with antibiotics for concern for an underlying infection and a lumbar tap done was negative. Cultures remained negative.   EEG was done for concern for seizure-like activity was without any epileptiform activity.  He was extubated on 9/11 and has been able to protect his airway ever since. He is currently receiving treatment with benzos for concern for benzo withdrawals and IV thiamine for his history of alcohol abuse.    Interval Problem Update  9/29: Patient was seen and examined by me today.  Found to be resting in bed comfortably.  We will continue his Librium and Risperdal for now.  Try to take off restraints today.  Repeat blood work in the a.m.  SNF placement is pending.  9/30: Patient was found to be in bed without any complaints.  We will continue Librium and Risperdal.  Continue restraints and Gallardo catheter.  Placement is pending.  10/1: Continue current management.  Gallardo found to have sediments and I will send his for urinalysis.  Continue to find placement.  10/2: Patient was seen and examined me today.  Urinalysis was positive for infection.  I have asked  the nurse to remove the Newton catheter.  Started patient on Bactrim for 5 days.  10/3: Patient continues to have hallucinations and confusion.  Continue to treat with Bactrim for UTI.  Continue voiding trial after Newton catheter was pulled out.  10/4: Patient continues to be confused.  Continue to treat for UTI.  Newton catheter is replaced.  10/5: Patient seen and examined by me today.  Still confused despite taking off of Librium and given antibiotics for UTI infection.    10/6: Patient was seen and examined by me today.  He found to be confused and confabulating.  Continue pursue guardianship.  10/7: Patient seen and examined he still confused and impulsive.  Continue to pursue guardianship.  10/8: Patient still confused continue to pursue guardianship  10/9: Patient continues to be confused, hallucinating, and delusional  10/10: Patient continues to be confused.  We will try another voiding trial today.  10/11: Patient still hallucinating and delusional.  He is excited about watching football  10/12: Patient was working with PT OT today and was still confused.  10/13: Patient was found to be confused and hallucinating again today.  10/14: No acute issues today will continue pursue guardianship  10/15: NAOE  10/16: pulled out newton last night despite restraints.  Avoid replacement if possible.      Consultants/Specialty  Intensivist    Code Status  Full Code    Disposition  Pending [facility]    Review of Systems  Review of Systems   Constitutional: Negative for malaise/fatigue.   HENT: Negative.    Eyes: Negative.    Respiratory: Negative for cough, hemoptysis and sputum production.    Cardiovascular: Negative for chest pain, palpitations, orthopnea and claudication.   Gastrointestinal: Negative for abdominal pain, heartburn, nausea and vomiting.   Genitourinary: Negative for dysuria, frequency and urgency.   Musculoskeletal: Negative.    Neurological: Negative.    Psychiatric/Behavioral: Negative for hallucinations.        Physical Exam  Temp:  [36.4 °C (97.6 °F)-36.9 °C (98.5 °F)] 36.5 °C (97.7 °F)  Pulse:  [100-115] 100  Resp:  [16-18] 16  BP: (124-145)/(78-84) 124/78  SpO2:  [90 %-95 %] 92 %    Physical Exam  Vitals signs and nursing note reviewed.   Constitutional:       General: He is not in acute distress.  HENT:      Head: Normocephalic and atraumatic.      Right Ear: External ear normal.      Left Ear: External ear normal.      Nose: Nose normal.   Eyes:      Extraocular Movements: Extraocular movements intact.      Pupils: Pupils are equal, round, and reactive to light.   Neck:      Musculoskeletal: Normal range of motion and neck supple. No neck rigidity or muscular tenderness.   Cardiovascular:      Rate and Rhythm: Normal rate and regular rhythm.      Pulses: Normal pulses.   Pulmonary:      Effort: Pulmonary effort is normal.   Abdominal:      General: Bowel sounds are normal.      Palpations: Abdomen is soft.   Musculoskeletal: Normal range of motion.         General: No tenderness.   Skin:     General: Skin is warm and dry.   Neurological:      General: No focal deficit present.      Mental Status: He is alert. He is disoriented.      Comments: Oriented to self    Psychiatric:         Mood and Affect: Affect is flat.         Behavior: Behavior is combative.         Thought Content: Thought content is delusional.         Cognition and Memory: Cognition is impaired. He exhibits impaired recent memory.         Judgment: Judgment is impulsive.      Comments: Confused        Current Facility-Administered Medications:   •  haloperidol lactate (HALDOL) injection 2.5 mg, 2.5 mg, Intramuscular, Q4HRS PRN, Gisele Barnes M.D., 2.5 mg at 10/14/20 1118  •  risperiDONE (RISPERDAL) tablet 1 mg, 1 mg, Oral, Q EVENING, Gisele Barnes M.D., 1 mg at 10/15/20 1720  •  LORazepam (ATIVAN) injection 0.5 mg, 0.5 mg, Intravenous, Q6HRS PRN, Gisele Barnes M.D.  •  thiamine tablet 100 mg, 100 mg, Oral, DAILY, Gisele Barnes M.D., 100 mg at  10/16/20 0454  •  tamsulosin (FLOMAX) capsule 0.8 mg, 0.8 mg, Oral, AFTER BREAKFAST, Gisele Barnes M.D., 0.8 mg at 10/16/20 0813  •  enoxaparin (LOVENOX) inj 40 mg, 40 mg, Subcutaneous, Q EVENING, Gisele Barnes M.D., 40 mg at 10/15/20 1720  •  acetaminophen (TYLENOL) tablet 650 mg, 650 mg, Oral, Q6HRS PRN, Gisele Barnes M.D., 650 mg at 09/17/20 1723  •  senna-docusate (PERICOLACE or SENOKOT S) 8.6-50 MG per tablet 2 Tab, 2 Tab, Oral, BID, 2 Tab at 10/16/20 0454 **AND** polyethylene glycol/lytes (MIRALAX) PACKET 1 Packet, 1 Packet, Oral, QDAY PRN, 1 Packet at 10/08/20 0439 **AND** magnesium hydroxide (MILK OF MAGNESIA) suspension 30 mL, 30 mL, Oral, QDAY PRN, 30 mL at 09/18/20 1143 **AND** [DISCONTINUED] bisacodyl (DULCOLAX) suppository 10 mg, 10 mg, Rectal, QDAY PRN, Xavi Durham D.O.  •  Respiratory Therapy Consult, , Nebulization, Continuous RT, Gisele Barnes M.D.  •  ipratropium-albuterol (DUONEB) nebulizer solution, 3 mL, Nebulization, Q2HRS PRN (RT), Gisele Barnes M.D.  •  ondansetron (ZOFRAN) syringe/vial injection 4 mg, 4 mg, Intravenous, Q4HRS PRN, Gisele Barnes M.D.      Fluids    Intake/Output Summary (Last 24 hours) at 10/16/2020 1443  Last data filed at 10/16/2020 0900  Gross per 24 hour   Intake 580 ml   Output --   Net 580 ml       Laboratory  Recent Labs     10/16/20  0119   WBC 7.7   RBC 4.11*   HEMOGLOBIN 12.3*   HEMATOCRIT 39.1*   MCV 95.1   MCH 29.9   MCHC 31.5*   RDW 47.9   PLATELETCT 267   MPV 11.1     Recent Labs     10/16/20  0347   SODIUM 147*   POTASSIUM 3.7   CHLORIDE 108   CO2 22   GLUCOSE 94   BUN 18   CREATININE 0.88   CALCIUM 10.1                   Imaging  MR-BRAIN-W/O   Final Result      1.  Moderate diffuse cerebral atrophy.      2.  Minimal periventricular and juxtacortical white matter changes consistent with chronic microvascular ischemic gliosis.      3.  No evidence of acute infarction in the brain parenchyma.      DX-ABDOMEN FOR TUBE PLACEMENT   Final Result       Feeding tube tip overlies the second portion of the duodenum.      DX-CHEST-PORTABLE (1 VIEW)   Final Result         1.  Patchy left lung base opacity, new since prior, could represent early infiltrate            DX-CHEST-PORTABLE (1 VIEW)   Final Result         1.  Patchy left lung base opacity, new since prior, could represent early infiltrate         [id number] VIEW   Final Result      Enteric tube projects over the distal stomach/pylorus.         EC-ECHOCARDIOGRAM COMPLETE W/O CONT   Final Result      DX-CHEST-PORTABLE (1 VIEW)   Final Result         1.  No acute cardiopulmonary disease.      CT-ABDOMEN-PELVIS W/O   Final Result      1.  No renal stone or hydronephrosis.   2.  Normal appendix.   3.  No focal mesenteric inflammatory process.      DX-ABDOMEN FOR TUBE PLACEMENT   Final Result      NG tube tip projects over expected location the gastric fundus.      US-ABDOMEN COMPLETE SURVEY   Final Result         1.  Echogenic liver compatible with fatty change versus fibrosis.   2.  Gallbladder sludge without additional sonographic findings of cholecystitis.   3.  Partial atrophic bilateral kidneys with lobulated contour         CT-HEAD W/O   Final Result         1.  No acute intracranial abnormality is identified, there are nonspecific white matter changes, commonly associated with small vessel ischemic disease.  Associated mild cerebral atrophy is noted.   2.  Atherosclerosis.      DX-CHEST-PORTABLE (1 VIEW)   Final Result         1.  No acute cardiopulmonary disease.           Assessment/Plan  * Toxic encephalopathy- (present on admission)  Assessment & Plan  Unknown etiology whether this is hypoxic induced brain injury versus Wernicke encephalopathy    LP- negative  EEG was negative for seizures    Prn haldol  Po risperdal... Increased to 1 mg nightly on 9/27/2020    Psych md consult--> they believe it is etoh related    Continue to monitor    Restrain as necessary    Mri brain negative       Acute  cystitis  Assessment & Plan  resolved      Urinary retention- (present on admission)  Assessment & Plan  Po flomax  Failed voiding trial and newton had to be replaced    Other dysphagia- (present on admission)  Assessment & Plan  Passed speech eval now  On modified diet    Hypernatremia  Assessment & Plan  Replacing potassium and adding daily magnesium     check  a BMP in a.m.    Acute respiratory failure with hypoxia (HCC)- (present on admission)  Assessment & Plan  2/2 mental status  Extubated 9/11  Remains on room air    Benzodiazepine dependence (HCC)- (present on admission)  Assessment & Plan  Monitor for seizure/withdrawal  Po librium  EEG after sz-like activity 9/10 was negative despite jerking motions    Alcohol abuse- (present on admission)  Assessment & Plan  Reported hx of  Continue Vitamins  High dose thiamine  Seizure precautions  S/p etoh w/d protocol    Septic shock ([facility])- (present on admission)  Assessment & Plan  Resolved  Broad-spectrum antibiotics: s/p vanc/zosyn --> C3, now off abx  Blood culture, CSF culture, urinalysis allnegative  Monitor off antibiotics        JEWELS (acute kidney injury) ([facility])- (present on admission)  Assessment & Plan  Consistent with ATN 2/2 hypotension, dehydration and sepsis  Resolved  Avoid nephrotoxins.  Renal dose all medications.         VTE prophylaxis: Lovenox

## 2020-10-16 NOTE — THERAPY
"Speech Language Pathology  Daily Treatment     Patient Name: Yury Blake  Age:  50 y.o., Sex:  male  Medical Record #: [id number]  Today's Date: 10/16/2020     Precautions  Precautions: (P) Fall Risk  Comments: (P) vest restraint, trial of wrist restrains undone per RN this AM.    Assessment    Pt with vest and bilat wrist restraints. Nurs came in during session for trial release of wrist restraints. Pt with severe confusion and confabulatory speech. Pt provided with larger print calender and requires max cues to read the calender correctly and to state admit date and place that SLP wrote on the top part of his calender. This orientation task was targeted at beginning and the end of session. Pt requiring min cues to accurately read sentences and moderate cues to answer these sent level questions regarding a simple map and a prescription label. Pt with legible printing and accurate spelling at the 1-3 word level. Pt required moderate cues to list and write 10 activities or  with the fall season after SLP providing the label in large print \"FALL\" to help orientate pt.  Pt with mild perseverations in answers.     Plan  Target simple orientation using pt's calender that was posted in pt's room. Functional reading at the 1-2 sent level.   Continue current treatment plan.    Discharge Recommendations: (P) Recommend post-acute placement for additional speech therapy services prior to discharge home       10/16/20 1116   Verbal Expression   Verbal Expression / Aphasia Eval (WDL) X   Comments severe confabulatory speech    Reading Comprehension   Reading Comprehension (WDL) X   Reading Sentences Minimal (4)   Following Written Direction Moderate (3)   Functional Reading Materials Moderate (3)   Cognitive-Linguistic   Cognitive-Linguistic (WDL) X   Level of Consciousness Confused   Orientation Level Not Oriented to Month;Not Oriented to Day;Not Oriented to Time;Not Oriented to Place;Not Oriented to " Reason;Not Oriented to Year   Session Information   Priority 2

## 2020-10-16 NOTE — CARE PLAN
Problem: Safety - Medical Restraint  Goal: Free from restraint(s) (Restraint for Interference with Medical Device)  Description: INTERVENTIONS:  1. ONCE/SHIFT or MINIMUM Q12H: Assess and document the continuing need for restraints  2. Q24H: Continued use of restraint requires LIP to perform face to face examination and written order  3. Identify and implement measures to help patient regain control  Outcome: NOT MET     Problem: Safety - Medical Restraint  Goal: Remains free of injury from restraints (Restraint for Interference with Medical Device)  Description: INTERVENTIONS:  1. Determine that other, less restrictive measures have been tried or would not be effective before applying the restraint  2. Evaluate the patient's condition at the time of restraint application  3. Inform patient/family regarding the reason for restraint  4. Q2H: Monitor safety, psychosocial status, comfort, nutrition and hydration  Outcome: PROGRESSING AS EXPECTED  Flowsheets (Taken 10/15/2020 8129)  Addressed this shift: Remains free of injury from restraints (restraint for interference with medical device): Every 2 hours: Monitor safety, psychosocial status, comfort, nutrition and hydration     Problem: Pain Management  Goal: Pain level will decrease to patient's comfort goal  Outcome: PROGRESSING AS EXPECTED  Note: Denies. No s/s of pain.

## 2020-10-16 NOTE — CARE PLAN
Problem: Nutritional:  Goal: Achieve adequate nutritional intake  Description: Patient will consume 50% of meals and supplements  Outcome: PROGRESSING AS EXPECTED   Per flowsheets pt ate 50-75% of dinner yesterday and % of breakfast this morning. This is improved from previous intake of 0-<25%.     RD following.

## 2020-10-17 PROCEDURE — A9270 NON-COVERED ITEM OR SERVICE: HCPCS | Performed by: HOSPITALIST

## 2020-10-17 PROCEDURE — 770006 HCHG ROOM/CARE - MED/SURG/GYN SEMI*

## 2020-10-17 PROCEDURE — 700102 HCHG RX REV CODE 250 W/ 637 OVERRIDE(OP): Performed by: HOSPITALIST

## 2020-10-17 PROCEDURE — 700101 HCHG RX REV CODE 250: Performed by: HOSPITALIST

## 2020-10-17 PROCEDURE — 51798 US URINE CAPACITY MEASURE: CPT

## 2020-10-17 PROCEDURE — 700111 HCHG RX REV CODE 636 W/ 250 OVERRIDE (IP): Performed by: HOSPITALIST

## 2020-10-17 PROCEDURE — 99231 SBSQ HOSP IP/OBS SF/LOW 25: CPT | Performed by: HOSPITALIST

## 2020-10-17 RX ADMIN — RISPERIDONE 1 MG: 0.5 TABLET ORAL at 17:39

## 2020-10-17 RX ADMIN — DOCUSATE SODIUM 50 MG AND SENNOSIDES 8.6 MG 2 TABLET: 8.6; 5 TABLET, FILM COATED ORAL at 04:58

## 2020-10-17 RX ADMIN — TAMSULOSIN HYDROCHLORIDE 0.8 MG: 0.4 CAPSULE ORAL at 08:40

## 2020-10-17 RX ADMIN — POLYETHYLENE GLYCOL 3350 1 PACKET: 17 POWDER, FOR SOLUTION ORAL at 04:58

## 2020-10-17 RX ADMIN — ENOXAPARIN SODIUM 40 MG: 40 INJECTION SUBCUTANEOUS at 17:39

## 2020-10-17 RX ADMIN — Medication 100 MG: at 04:58

## 2020-10-17 ASSESSMENT — ENCOUNTER SYMPTOMS
VOMITING: 0
PALPITATIONS: 0
COUGH: 0
HALLUCINATIONS: 0
ORTHOPNEA: 0
NEUROLOGICAL NEGATIVE: 1
NAUSEA: 0
MUSCULOSKELETAL NEGATIVE: 1
ABDOMINAL PAIN: 0
SPUTUM PRODUCTION: 0
HEARTBURN: 0
CLAUDICATION: 0
HEMOPTYSIS: 0
EYES NEGATIVE: 1

## 2020-10-17 ASSESSMENT — FIBROSIS 4 INDEX
FIB4 SCORE: 0.91
FIB4 SCORE: 0.91

## 2020-10-17 NOTE — PROGRESS NOTES
[facility] Medicine Daily Progress Note    Date of Service  10/17/2020    Chief Complaint  50 y.o. male with a pmhx of HTN, chronic pain and alcohol abuse who presented 9/9/2020 with altered mental status.     Hospital Course  50 y.o. male with a pmhx of HTN, chronic pain and alcohol abuse who presented 9/9/2020 with altered mental status, he was found obtunded in his house by his ex-wife who was checking on him as he had not been seen for 2 days or been able to get a hold of him.  He was found in a chair,  A&Ox0 and hypotensive.  He arrived in the ER with a GCS of 7 (E1,V1,M5) and severely hypotensive.  The patient was intubated for airway protection and central venous access was obtained for administration of high-volume crystalloid resuscitation and vasopressor therapy.    CT head and abdomen and pelvis were all unremarkable. He was briefly treated with antibiotics for concern for an underlying infection and a lumbar tap done was negative. Cultures remained negative.   EEG was done for concern for seizure-like activity was without any epileptiform activity.  He was extubated on 9/11 and has been able to protect his airway ever since. He is currently receiving treatment with benzos for concern for benzo withdrawals and IV thiamine for his history of alcohol abuse.    Interval Problem Update  9/29: Patient was seen and examined by me today.  Found to be resting in bed comfortably.  We will continue his Librium and Risperdal for now.  Try to take off restraints today.  Repeat blood work in the a.m.  SNF placement is pending.  9/30: Patient was found to be in bed without any complaints.  We will continue Librium and Risperdal.  Continue restraints and Gallardo catheter.  Placement is pending.  10/1: Continue current management.  Gallardo found to have sediments and I will send his for urinalysis.  Continue to find placement.  10/2: Patient was seen and examined me today.  Urinalysis was positive for infection.  I have asked  the nurse to remove the Newton catheter.  Started patient on Bactrim for 5 days.  10/3: Patient continues to have hallucinations and confusion.  Continue to treat with Bactrim for UTI.  Continue voiding trial after Newton catheter was pulled out.  10/4: Patient continues to be confused.  Continue to treat for UTI.  Newton catheter is replaced.  10/5: Patient seen and examined by me today.  Still confused despite taking off of Librium and given antibiotics for UTI infection.    10/6: Patient was seen and examined by me today.  He found to be confused and confabulating.  Continue pursue guardianship.  10/7: Patient seen and examined he still confused and impulsive.  Continue to pursue guardianship.  10/8: Patient still confused continue to pursue guardianship  10/9: Patient continues to be confused, hallucinating, and delusional  10/10: Patient continues to be confused.  We will try another voiding trial today.  10/11: Patient still hallucinating and delusional.  He is excited about watching football  10/12: Patient was working with PT OT today and was still confused.  10/13: Patient was found to be confused and hallucinating again today.  10/14: No acute issues today will continue pursue guardianship  10/15: NAOE  10/16: pulled out newton last night despite restraints.  Avoid replacement if possible.    10/17: Patient was trialed off restraints this morning, unfortunately they had to be reapplied as patient was impulsively climbing out of bed, falling onto the floor.    Consultants/Specialty  Intensivist    Code Status  Full Code    Disposition  Pending [facility]    Review of Systems  Review of Systems   Constitutional: Negative for malaise/fatigue.   HENT: Negative.    Eyes: Negative.    Respiratory: Negative for cough, hemoptysis and sputum production.    Cardiovascular: Negative for chest pain, palpitations, orthopnea and claudication.   Gastrointestinal: Negative for abdominal pain, heartburn, nausea and vomiting.    Genitourinary: Negative for dysuria, frequency and urgency.   Musculoskeletal: Negative.    Neurological: Negative.    Psychiatric/Behavioral: Negative for hallucinations.       Physical Exam  Temp:  [36.4 °C (97.6 °F)-37.2 °C (98.9 °F)] 36.4 °C (97.6 °F)  Pulse:  [] 85  Resp:  [16-18] 18  BP: (108-125)/(56-85) 108/56  SpO2:  [92 %-96 %] 95 %    Physical Exam  Vitals signs and nursing note reviewed.   Constitutional:       General: He is not in acute distress.  HENT:      Head: Normocephalic and atraumatic.      Right Ear: External ear normal.      Left Ear: External ear normal.      Nose: Nose normal.   Eyes:      Extraocular Movements: Extraocular movements intact.      Pupils: Pupils are equal, round, and reactive to light.   Neck:      Musculoskeletal: Normal range of motion and neck supple. No neck rigidity or muscular tenderness.   Cardiovascular:      Rate and Rhythm: Normal rate and regular rhythm.      Pulses: Normal pulses.   Pulmonary:      Effort: Pulmonary effort is normal.   Abdominal:      General: Bowel sounds are normal.      Palpations: Abdomen is soft.   Musculoskeletal: Normal range of motion.         General: No tenderness.   Skin:     General: Skin is warm and dry.   Neurological:      General: No focal deficit present.      Mental Status: He is alert. He is disoriented.      Comments: Oriented to self    Psychiatric:         Mood and Affect: Affect is flat.         Behavior: Behavior is combative.         Thought Content: Thought content is delusional.         Cognition and Memory: Cognition is impaired. He exhibits impaired recent memory.         Judgment: Judgment is impulsive.      Comments: Confused        Current Facility-Administered Medications:   •  haloperidol lactate (HALDOL) injection 2.5 mg, 2.5 mg, Intramuscular, Q4HRS PRN, Gisele Barnes M.D., 2.5 mg at 10/14/20 1118  •  risperiDONE (RISPERDAL) tablet 1 mg, 1 mg, Oral, Q EVENING, Gisele Barnes M.D., 1 mg at 10/16/20  1720  •  LORazepam (ATIVAN) injection 0.5 mg, 0.5 mg, Intravenous, Q6HRS PRN, Gisele Barnes M.D.  •  thiamine tablet 100 mg, 100 mg, Oral, DAILY, Gisele Barnes M.D., 100 mg at 10/17/20 0458  •  tamsulosin (FLOMAX) capsule 0.8 mg, 0.8 mg, Oral, AFTER BREAKFAST, Gisele Barnes M.D., 0.8 mg at 10/17/20 0840  •  enoxaparin (LOVENOX) inj 40 mg, 40 mg, Subcutaneous, Q EVENING, Gisele Barnes M.D., 40 mg at 10/16/20 1719  •  acetaminophen (TYLENOL) tablet 650 mg, 650 mg, Oral, Q6HRS PRN, Gisele Barnes M.D., 650 mg at 09/17/20 1723  •  senna-docusate (PERICOLACE or SENOKOT S) 8.6-50 MG per tablet 2 Tab, 2 Tab, Oral, BID, 2 Tab at 10/17/20 0458 **AND** polyethylene glycol/lytes (MIRALAX) PACKET 1 Packet, 1 Packet, Oral, QDAY PRN, 1 Packet at 10/17/20 0458 **AND** magnesium hydroxide (MILK OF MAGNESIA) suspension 30 mL, 30 mL, Oral, QDAY PRN, 30 mL at 09/18/20 1143 **AND** [DISCONTINUED] bisacodyl (DULCOLAX) suppository 10 mg, 10 mg, Rectal, QDAY PRN, Xavi Durham D.O.  •  Respiratory Therapy Consult, , Nebulization, Continuous RT, Gisele Barnes M.D.  •  ipratropium-albuterol (DUONEB) nebulizer solution, 3 mL, Nebulization, Q2HRS PRN (RT), Gisele Barnes M.D.  •  ondansetron (ZOFRAN) syringe/vial injection 4 mg, 4 mg, Intravenous, Q4HRS PRN, Gisele Barnes M.D.      Fluids    Intake/Output Summary (Last 24 hours) at 10/17/2020 1413  Last data filed at 10/17/2020 0500  Gross per 24 hour   Intake 120 ml   Output 400 ml   Net -280 ml       Laboratory  Recent Labs     10/16/20  0119   WBC 7.7   RBC 4.11*   HEMOGLOBIN 12.3*   HEMATOCRIT 39.1*   MCV 95.1   MCH 29.9   MCHC 31.5*   RDW 47.9   PLATELETCT 267   MPV 11.1     Recent Labs     10/16/20  0347   SODIUM 147*   POTASSIUM 3.7   CHLORIDE 108   CO2 22   GLUCOSE 94   BUN 18   CREATININE 0.88   CALCIUM 10.1                   Imaging  MR-BRAIN-W/O   Final Result      1.  Moderate diffuse cerebral atrophy.      2.  Minimal periventricular and juxtacortical white matter changes  consistent with chronic microvascular ischemic gliosis.      3.  No evidence of acute infarction in the brain parenchyma.      DX-ABDOMEN FOR TUBE PLACEMENT   Final Result      Feeding tube tip overlies the second portion of the duodenum.      DX-CHEST-PORTABLE (1 VIEW)   Final Result         1.  Patchy left lung base opacity, new since prior, could represent early infiltrate            DX-CHEST-PORTABLE (1 VIEW)   Final Result         1.  Patchy left lung base opacity, new since prior, could represent early infiltrate         [id number] VIEW   Final Result      Enteric tube projects over the distal stomach/pylorus.         EC-ECHOCARDIOGRAM COMPLETE W/O CONT   Final Result      DX-CHEST-PORTABLE (1 VIEW)   Final Result         1.  No acute cardiopulmonary disease.      CT-ABDOMEN-PELVIS W/O   Final Result      1.  No renal stone or hydronephrosis.   2.  Normal appendix.   3.  No focal mesenteric inflammatory process.      DX-ABDOMEN FOR TUBE PLACEMENT   Final Result      NG tube tip projects over expected location the gastric fundus.      US-ABDOMEN COMPLETE SURVEY   Final Result         1.  Echogenic liver compatible with fatty change versus fibrosis.   2.  Gallbladder sludge without additional sonographic findings of cholecystitis.   3.  Partial atrophic bilateral kidneys with lobulated contour         CT-HEAD W/O   Final Result         1.  No acute intracranial abnormality is identified, there are nonspecific white matter changes, commonly associated with small vessel ischemic disease.  Associated mild cerebral atrophy is noted.   2.  Atherosclerosis.      DX-CHEST-PORTABLE (1 VIEW)   Final Result         1.  No acute cardiopulmonary disease.           Assessment/Plan  * Toxic encephalopathy- (present on admission)  Assessment & Plan  Unknown etiology whether this is hypoxic induced brain injury versus Wernicke encephalopathy    LP- negative  EEG was negative for seizures    Prn haldol  Po risperdal...  Increased to 1 mg nightly on 9/27/2020    Psych md consult--> they believe it is etoh related    Continue to monitor    Restrain as necessary    Mri brain negative       Acute cystitis  Assessment & Plan  resolved      Urinary retention- (present on admission)  Assessment & Plan  Po flomax  Failed voiding trial and newton had to be replaced    Other dysphagia- (present on admission)  Assessment & Plan  Passed speech eval now  On modified diet    Hypernatremia  Assessment & Plan  Replacing potassium and adding daily magnesium     check  a BMP in a.m.    Acute respiratory failure with hypoxia ([facility])- (present on admission)  Assessment & Plan  2/2 mental status  Extubated 9/11  Remains on room air    Benzodiazepine dependence ([facility])- (present on admission)  Assessment & Plan  Monitor for seizure/withdrawal  Po librium  EEG after sz-like activity 9/10 was negative despite jerking motions    Alcohol abuse- (present on admission)  Assessment & Plan  Reported hx of  Continue Vitamins  High dose thiamine  Seizure precautions  S/p etoh w/d protocol    Septic shock ([facility])- (present on admission)  Assessment & Plan  Resolved  Broad-spectrum antibiotics: s/p vanc/zosyn --> C3, now off abx  Blood culture, CSF culture, urinalysis allnegative  Monitor off antibiotics        JEWELS (acute kidney injury) ([facility])- (present on admission)  Assessment & Plan  Consistent with ATN 2/2 hypotension, dehydration and sepsis  Resolved  Avoid nephrotoxins.  Renal dose all medications.         VTE prophylaxis: Lovenox

## 2020-10-17 NOTE — CARE PLAN
Problem: Fluid Volume:  Goal: Will maintain balanced intake and output  Outcome: NOT MET     Problem: Safety  Goal: Will remain free from falls  Outcome: PROGRESSING AS EXPECTED     Problem: Respiratory:  Goal: Respiratory status will improve  Outcome: PROGRESSING AS EXPECTED     Problem: Psychosocial Needs:  Goal: Level of anxiety will decrease  Outcome: PROGRESSING AS EXPECTED     Problem: Skin Integrity  Goal: Risk for impaired skin integrity will decrease  Outcome: PROGRESSING AS EXPECTED     Problem: Pain Management  Goal: Pain level will decrease to patient's comfort goal  Outcome: PROGRESSING AS EXPECTED     Problem: Urinary Elimination:  Goal: Ability to reestablish a normal urinary elimination pattern will improve  Outcome: PROGRESSING SLOWER THAN EXPECTED

## 2020-10-17 NOTE — PROGRESS NOTES
Received report from dayshift RN and assumed care of pt. Pt currently resting in bed with visitor at bedside. Denies any acute needs or concerns at this time. Aox1 (self).    Call light within reach and fall precautions in place. Bed alarm on for safety. Will monitor

## 2020-10-18 PROCEDURE — 700102 HCHG RX REV CODE 250 W/ 637 OVERRIDE(OP): Performed by: HOSPITALIST

## 2020-10-18 PROCEDURE — 770006 HCHG ROOM/CARE - MED/SURG/GYN SEMI*

## 2020-10-18 PROCEDURE — 99231 SBSQ HOSP IP/OBS SF/LOW 25: CPT | Performed by: HOSPITALIST

## 2020-10-18 PROCEDURE — A9270 NON-COVERED ITEM OR SERVICE: HCPCS | Performed by: HOSPITALIST

## 2020-10-18 PROCEDURE — 700111 HCHG RX REV CODE 636 W/ 250 OVERRIDE (IP): Performed by: HOSPITALIST

## 2020-10-18 PROCEDURE — 51798 US URINE CAPACITY MEASURE: CPT

## 2020-10-18 RX ADMIN — Medication 100 MG: at 04:36

## 2020-10-18 RX ADMIN — DOCUSATE SODIUM 50 MG AND SENNOSIDES 8.6 MG 2 TABLET: 8.6; 5 TABLET, FILM COATED ORAL at 04:36

## 2020-10-18 RX ADMIN — ENOXAPARIN SODIUM 40 MG: 40 INJECTION SUBCUTANEOUS at 17:05

## 2020-10-18 RX ADMIN — RISPERIDONE 1 MG: 0.5 TABLET ORAL at 17:04

## 2020-10-18 RX ADMIN — DOCUSATE SODIUM 50 MG AND SENNOSIDES 8.6 MG 2 TABLET: 8.6; 5 TABLET, FILM COATED ORAL at 17:05

## 2020-10-18 RX ADMIN — TAMSULOSIN HYDROCHLORIDE 0.8 MG: 0.4 CAPSULE ORAL at 10:06

## 2020-10-18 ASSESSMENT — ENCOUNTER SYMPTOMS
HEARTBURN: 0
HALLUCINATIONS: 0
PALPITATIONS: 0
ABDOMINAL PAIN: 0
MUSCULOSKELETAL NEGATIVE: 1
CLAUDICATION: 0
ORTHOPNEA: 0
EYES NEGATIVE: 1
SPUTUM PRODUCTION: 0
NEUROLOGICAL NEGATIVE: 1
VOMITING: 0
COUGH: 0
NAUSEA: 0
HEMOPTYSIS: 0

## 2020-10-18 ASSESSMENT — FIBROSIS 4 INDEX: FIB4 SCORE: 0.91

## 2020-10-18 NOTE — PROGRESS NOTES
Assumed care at 0700, report received from night shift RN.  Pt is sitting up in bed, awake. Only oriented to self, on RA , reports 0/10 pain . Bed is locked and in the lowest position. Call light and belongings within reach. Plan of care discussed and whiteboard updated, Pt has no questions at this time.

## 2020-10-18 NOTE — PROGRESS NOTES
Received report from dayshift RN and assumed care of pt. Pt currently resting in bed with bilateral soft wrist restraints in place. Denies any acute needs or concerns at this time. Aox1 (self).     Call light within reach and fall precautions in place. Bed alarm on for safety. Will monitor

## 2020-10-18 NOTE — CARE PLAN
Problem: Safety - Medical Restraint  Goal: Free from restraint(s) (Restraint for Interference with Medical Device)  Description: INTERVENTIONS:  1. ONCE/SHIFT or MINIMUM Q12H: Assess and document the continuing need for restraints  2. Q24H: Continued use of restraint requires LIP to perform face to face examination and written order  3. Identify and implement measures to help patient regain control  Outcome: PROGRESSING AS EXPECTED  Note: Bilateral wrist restraints discontinued at 0930. Will continue to monitor patient for unsafe attempts at ambulation.     Problem: Safety  Goal: Will remain free from falls  Outcome: PROGRESSING SLOWER THAN EXPECTED  Note: Pt continues to attempt ambulation without calling for assistance despite multiple attempts at education. Bed is in the lowest/locked position, call light and belongings are within reach. Hourly rounding, bedside commitment and bed alarm in place.

## 2020-10-18 NOTE — PROGRESS NOTES
[facility] Medicine Daily Progress Note    Date of Service  10/18/2020    Chief Complaint  50 y.o. male with a pmhx of HTN, chronic pain and alcohol abuse who presented 9/9/2020 with altered mental status.     Hospital Course  50 y.o. male with a pmhx of HTN, chronic pain and alcohol abuse who presented 9/9/2020 with altered mental status, he was found obtunded in his house by his ex-wife who was checking on him as he had not been seen for 2 days or been able to get a hold of him.  He was found in a chair,  A&Ox0 and hypotensive.  He arrived in the ER with a GCS of 7 (E1,V1,M5) and severely hypotensive.  The patient was intubated for airway protection and central venous access was obtained for administration of high-volume crystalloid resuscitation and vasopressor therapy.    CT head and abdomen and pelvis were all unremarkable. He was briefly treated with antibiotics for concern for an underlying infection and a lumbar tap done was negative. Cultures remained negative.   EEG was done for concern for seizure-like activity was without any epileptiform activity.  He was extubated on 9/11 and has been able to protect his airway ever since. He is currently receiving treatment with benzos for concern for benzo withdrawals and IV thiamine for his history of alcohol abuse.    Interval Problem Update  9/29: Patient was seen and examined by me today.  Found to be resting in bed comfortably.  We will continue his Librium and Risperdal for now.  Try to take off restraints today.  Repeat blood work in the a.m.  SNF placement is pending.  9/30: Patient was found to be in bed without any complaints.  We will continue Librium and Risperdal.  Continue restraints and Gallardo catheter.  Placement is pending.  10/1: Continue current management.  Gallardo found to have sediments and I will send his for urinalysis.  Continue to find placement.  10/2: Patient was seen and examined me today.  Urinalysis was positive for infection.  I have asked  the nurse to remove the Newton catheter.  Started patient on Bactrim for 5 days.  10/3: Patient continues to have hallucinations and confusion.  Continue to treat with Bactrim for UTI.  Continue voiding trial after Newton catheter was pulled out.  10/4: Patient continues to be confused.  Continue to treat for UTI.  Newton catheter is replaced.  10/5: Patient seen and examined by me today.  Still confused despite taking off of Librium and given antibiotics for UTI infection.    10/6: Patient was seen and examined by me today.  He found to be confused and confabulating.  Continue pursue guardianship.  10/7: Patient seen and examined he still confused and impulsive.  Continue to pursue guardianship.  10/8: Patient still confused continue to pursue guardianship  10/9: Patient continues to be confused, hallucinating, and delusional  10/10: Patient continues to be confused.  We will try another voiding trial today.  10/11: Patient still hallucinating and delusional.  He is excited about watching football  10/12: Patient was working with PT OT today and was still confused.  10/13: Patient was found to be confused and hallucinating again today.  10/14: No acute issues today will continue pursue guardianship  10/15: NAOE  10/16: pulled out newton last night despite restraints.  Avoid replacement if possible.    10/17: Patient was trialed off restraints this morning, unfortunately they had to be reapplied as patient was impulsively climbing out of bed, falling onto the floor.  10/18: Patient doing well under close nursing supervision off restraints at this juncture.    Consultants/Specialty  Intensivist    Code Status  Full Code    Disposition  Pending [facility]    Review of Systems  Review of Systems   Constitutional: Negative for malaise/fatigue.   HENT: Negative.    Eyes: Negative.    Respiratory: Negative for cough, hemoptysis and sputum production.    Cardiovascular: Negative for chest pain, palpitations, orthopnea and  claudication.   Gastrointestinal: Negative for abdominal pain, heartburn, nausea and vomiting.   Genitourinary: Negative for dysuria, frequency and urgency.   Musculoskeletal: Negative.    Neurological: Negative.    Psychiatric/Behavioral: Negative for hallucinations.       Physical Exam  Temp:  [36.4 °C (97.6 °F)-36.6 °C (97.9 °F)] 36.6 °C (97.8 °F)  Pulse:  [74-84] 81  Resp:  [16-18] 17  BP: (105-131)/(67-81) 118/81  SpO2:  [95 %-98 %] 97 %    Physical Exam  Vitals signs and nursing note reviewed.   Constitutional:       General: He is not in acute distress.  HENT:      Head: Normocephalic and atraumatic.      Right Ear: External ear normal.      Left Ear: External ear normal.      Nose: Nose normal.   Eyes:      Extraocular Movements: Extraocular movements intact.      Pupils: Pupils are equal, round, and reactive to light.   Neck:      Musculoskeletal: Normal range of motion and neck supple. No neck rigidity or muscular tenderness.   Cardiovascular:      Rate and Rhythm: Normal rate and regular rhythm.      Pulses: Normal pulses.   Pulmonary:      Effort: Pulmonary effort is normal.   Abdominal:      General: Bowel sounds are normal.      Palpations: Abdomen is soft.   Musculoskeletal: Normal range of motion.         General: No tenderness.   Skin:     General: Skin is warm and dry.   Neurological:      General: No focal deficit present.      Mental Status: He is alert. He is disoriented.      Comments: Oriented to self    Psychiatric:         Mood and Affect: Affect is flat.         Behavior: Behavior is combative.         Thought Content: Thought content is delusional.         Cognition and Memory: Cognition is impaired. He exhibits impaired recent memory.         Judgment: Judgment is impulsive.      Comments: Confused        Current Facility-Administered Medications:   •  haloperidol lactate (HALDOL) injection 2.5 mg, 2.5 mg, Intramuscular, Q4HRS PRN, Gisele Barnes M.D., 2.5 mg at 10/14/20 1118  •   risperiDONE (RISPERDAL) tablet 1 mg, 1 mg, Oral, Q EVENING, Gisele Barnes M.D., 1 mg at 10/17/20 1739  •  LORazepam (ATIVAN) injection 0.5 mg, 0.5 mg, Intravenous, Q6HRS PRN, Gisele Barnes M.D.  •  thiamine tablet 100 mg, 100 mg, Oral, DAILY, Gisele Barnes M.D., 100 mg at 10/18/20 0436  •  tamsulosin (FLOMAX) capsule 0.8 mg, 0.8 mg, Oral, AFTER BREAKFAST, Gisele Barnes M.D., 0.8 mg at 10/18/20 1006  •  enoxaparin (LOVENOX) inj 40 mg, 40 mg, Subcutaneous, Q EVENING, Gisele Barnes M.D., 40 mg at 10/17/20 1739  •  acetaminophen (TYLENOL) tablet 650 mg, 650 mg, Oral, Q6HRS PRN, Gisele Barnes M.D., 650 mg at 09/17/20 1723  •  senna-docusate (PERICOLACE or SENOKOT S) 8.6-50 MG per tablet 2 Tab, 2 Tab, Oral, BID, 2 Tab at 10/18/20 0436 **AND** polyethylene glycol/lytes (MIRALAX) PACKET 1 Packet, 1 Packet, Oral, QDAY PRN, 1 Packet at 10/17/20 0458 **AND** magnesium hydroxide (MILK OF MAGNESIA) suspension 30 mL, 30 mL, Oral, QDAY PRN, 30 mL at 09/18/20 1143 **AND** [DISCONTINUED] bisacodyl (DULCOLAX) suppository 10 mg, 10 mg, Rectal, QDAY PRN, Xavi Durham D.O.  •  Respiratory Therapy Consult, , Nebulization, Continuous RT, Gisele Barnes M.D.  •  ipratropium-albuterol (DUONEB) nebulizer solution, 3 mL, Nebulization, Q2HRS PRN (RT), Gisele Barnes M.D.  •  ondansetron (ZOFRAN) syringe/vial injection 4 mg, 4 mg, Intravenous, Q4HRS PRN, Gisele Kellyraf, M.D.      Fluids    Intake/Output Summary (Last 24 hours) at 10/18/2020 1344  Last data filed at 10/18/2020 1100  Gross per 24 hour   Intake 718 ml   Output 350 ml   Net 368 ml       Laboratory  Recent Labs     10/16/20  0119   WBC 7.7   RBC 4.11*   HEMOGLOBIN 12.3*   HEMATOCRIT 39.1*   MCV 95.1   MCH 29.9   MCHC 31.5*   RDW 47.9   PLATELETCT 267   MPV 11.1     Recent Labs     10/16/20  0347   SODIUM 147*   POTASSIUM 3.7   CHLORIDE 108   CO2 22   GLUCOSE 94   BUN 18   CREATININE 0.88   CALCIUM 10.1                   Imaging  MR-BRAIN-W/O   Final Result      1.  Moderate  diffuse cerebral atrophy.      2.  Minimal periventricular and juxtacortical white matter changes consistent with chronic microvascular ischemic gliosis.      3.  No evidence of acute infarction in the brain parenchyma.      DX-ABDOMEN FOR TUBE PLACEMENT   Final Result      Feeding tube tip overlies the second portion of the duodenum.      DX-CHEST-PORTABLE (1 VIEW)   Final Result         1.  Patchy left lung base opacity, new since prior, could represent early infiltrate            DX-CHEST-PORTABLE (1 VIEW)   Final Result         1.  Patchy left lung base opacity, new since prior, could represent early infiltrate         [id number] VIEW   Final Result      Enteric tube projects over the distal stomach/pylorus.         EC-ECHOCARDIOGRAM COMPLETE W/O CONT   Final Result      DX-CHEST-PORTABLE (1 VIEW)   Final Result         1.  No acute cardiopulmonary disease.      CT-ABDOMEN-PELVIS W/O   Final Result      1.  No renal stone or hydronephrosis.   2.  Normal appendix.   3.  No focal mesenteric inflammatory process.      DX-ABDOMEN FOR TUBE PLACEMENT   Final Result      NG tube tip projects over expected location the gastric fundus.      US-ABDOMEN COMPLETE SURVEY   Final Result         1.  Echogenic liver compatible with fatty change versus fibrosis.   2.  Gallbladder sludge without additional sonographic findings of cholecystitis.   3.  Partial atrophic bilateral kidneys with lobulated contour         CT-HEAD W/O   Final Result         1.  No acute intracranial abnormality is identified, there are nonspecific white matter changes, commonly associated with small vessel ischemic disease.  Associated mild cerebral atrophy is noted.   2.  Atherosclerosis.      DX-CHEST-PORTABLE (1 VIEW)   Final Result         1.  No acute cardiopulmonary disease.           Assessment/Plan  * Toxic encephalopathy- (present on admission)  Assessment & Plan  Unknown etiology whether this is hypoxic induced brain injury versus Wernicke  encephalopathy    LP- negative  EEG was negative for seizures    Prn haldol  Po risperdal... Increased to 1 mg nightly on 9/27/2020    Psych md consult--> they believe it is etoh related    Continue to monitor    Restrain as necessary    Mri brain negative       Acute cystitis  Assessment & Plan  resolved      Urinary retention- (present on admission)  Assessment & Plan  Po flomax  Failed voiding trial and newton had to be replaced    Other dysphagia- (present on admission)  Assessment & Plan  Passed speech eval now  On modified diet    Hypernatremia  Assessment & Plan  Replacing potassium and adding daily magnesium     check  a BMP in a.m.    Acute respiratory failure with hypoxia (HCC)- (present on admission)  Assessment & Plan  2/2 mental status  Extubated 9/11  Remains on room air    Benzodiazepine dependence (HCC)- (present on admission)  Assessment & Plan  Monitor for seizure/withdrawal  Po librium  EEG after sz-like activity 9/10 was negative despite jerking motions    Alcohol abuse- (present on admission)  Assessment & Plan  Reported hx of  Continue Vitamins  High dose thiamine  Seizure precautions  S/p etoh w/d protocol    Septic shock ([facility])- (present on admission)  Assessment & Plan  Resolved  Broad-spectrum antibiotics: s/p vanc/zosyn --> C3, now off abx  Blood culture, CSF culture, urinalysis allnegative  Monitor off antibiotics        JEWELS (acute kidney injury) ([facility])- (present on admission)  Assessment & Plan  Consistent with ATN 2/2 hypotension, dehydration and sepsis  Resolved  Avoid nephrotoxins.  Renal dose all medications.         VTE prophylaxis: Lovenox

## 2020-10-18 NOTE — CARE PLAN
Problem: Safety  Goal: Will remain free from injury  Outcome: PROGRESSING AS EXPECTED     Problem: Discharge Barriers/Planning  Goal: Patient's continuum of care needs will be met  Outcome: PROGRESSING AS EXPECTED     Pt needed to continue bilateral wrist restraints due to high fall risk/agitation. Bladder scan ordered for 23:00. Will continue to monitor discharge needs.

## 2020-10-18 NOTE — CARE PLAN
Problem: Safety  Goal: Will remain free from falls  Outcome: PROGRESSING AS EXPECTED     Problem: Venous Thromboembolism (VTW)/Deep Vein Thrombosis (DVT) Prevention:  Goal: Patient will participate in Venous Thrombosis (VTE)/Deep Vein Thrombosis (DVT)Prevention Measures  Outcome: PROGRESSING AS EXPECTED     Problem: Psychosocial Needs:  Goal: Level of anxiety will decrease  Outcome: PROGRESSING AS EXPECTED

## 2020-10-19 PROCEDURE — A9270 NON-COVERED ITEM OR SERVICE: HCPCS | Performed by: HOSPITALIST

## 2020-10-19 PROCEDURE — 700102 HCHG RX REV CODE 250 W/ 637 OVERRIDE(OP): Performed by: HOSPITALIST

## 2020-10-19 PROCEDURE — 700111 HCHG RX REV CODE 636 W/ 250 OVERRIDE (IP): Performed by: HOSPITALIST

## 2020-10-19 PROCEDURE — 99231 SBSQ HOSP IP/OBS SF/LOW 25: CPT | Performed by: HOSPITALIST

## 2020-10-19 PROCEDURE — 51798 US URINE CAPACITY MEASURE: CPT

## 2020-10-19 PROCEDURE — 770006 HCHG ROOM/CARE - MED/SURG/GYN SEMI*

## 2020-10-19 RX ADMIN — Medication 100 MG: at 04:57

## 2020-10-19 RX ADMIN — DOCUSATE SODIUM 50 MG AND SENNOSIDES 8.6 MG 2 TABLET: 8.6; 5 TABLET, FILM COATED ORAL at 04:57

## 2020-10-19 RX ADMIN — TAMSULOSIN HYDROCHLORIDE 0.8 MG: 0.4 CAPSULE ORAL at 09:58

## 2020-10-19 RX ADMIN — ENOXAPARIN SODIUM 40 MG: 40 INJECTION SUBCUTANEOUS at 17:37

## 2020-10-19 RX ADMIN — RISPERIDONE 1 MG: 0.5 TABLET ORAL at 17:36

## 2020-10-19 ASSESSMENT — ENCOUNTER SYMPTOMS
HALLUCINATIONS: 0
VOMITING: 0
ORTHOPNEA: 0
EYES NEGATIVE: 1
MUSCULOSKELETAL NEGATIVE: 1
HEARTBURN: 0
PALPITATIONS: 0
HEMOPTYSIS: 0
ABDOMINAL PAIN: 0
NAUSEA: 0
SPUTUM PRODUCTION: 0
NEUROLOGICAL NEGATIVE: 1
COUGH: 0
CLAUDICATION: 0

## 2020-10-19 NOTE — CARE PLAN
Problem: Nutritional:  Goal: Achieve adequate nutritional intake  Description: Patient will consume 50% of meals and supplements  Outcome: MET

## 2020-10-19 NOTE — CARE PLAN
Problem: Safety  Goal: Will remain free from injury  Outcome: PROGRESSING AS EXPECTED     Problem: Communication  Goal: The ability to communicate needs accurately and effectively will improve  Outcome: PROGRESSING SLOWER THAN EXPECTED     Problem: Safety  Goal: Will remain free from falls  Outcome: PROGRESSING AS EXPECTED

## 2020-10-19 NOTE — PROGRESS NOTES
Rec'd report from day shift RN. Assumed pt care. Assessment completed. AA&O to self, reoriented to place, event, time. Denies pain at this time. No s/s of discomfort or distress. Pt ambulates with 2 assist, can be unsteady. Pt is impulsive, does not call appropriately. Bed in lowest position, bed locked, bed alarm on for safety, treaded socks in place, RN and CNA numbers provided, call light within reach.

## 2020-10-19 NOTE — CARE PLAN
Problem: Urinary Elimination:  Goal: Ability to reestablish a normal urinary elimination pattern will improve  Intervention: Record post-void residual volumes  Note: PVR recorded by ortho tech.   Intervention: Assist patient to sit on commode or toilet for voiding  Note: Assisted pt to the toilet for voiding.      Problem: Mobility  Goal: Risk for activity intolerance will decrease  Intervention: Assess and monitor signs of activity intolerance  Note: 2 assist with FWW to the bathroom, pt is very unsteady and tends to lean backward.

## 2020-10-19 NOTE — PROGRESS NOTES
Assumed care at 0700. Received bedside report from night shift RN. Patient resting comfortably in bed. No signs of distress. Bed is in low and locked position. Non-slip socks in place. Call light is within reach. Bed alarm is on.

## 2020-10-19 NOTE — PROGRESS NOTES
[facility] Medicine Daily Progress Note    Date of Service  10/19/2020    Chief Complaint  50 y.o. male with a pmhx of HTN, chronic pain and alcohol abuse who presented 9/9/2020 with altered mental status.     Hospital Course  50 y.o. male with a pmhx of HTN, chronic pain and alcohol abuse who presented 9/9/2020 with altered mental status, he was found obtunded in his house by his ex-wife who was checking on him as he had not been seen for 2 days or been able to get a hold of him.  He was found in a chair,  A&Ox0 and hypotensive.  He arrived in the ER with a GCS of 7 (E1,V1,M5) and severely hypotensive.  The patient was intubated for airway protection and central venous access was obtained for administration of high-volume crystalloid resuscitation and vasopressor therapy.    CT head and abdomen and pelvis were all unremarkable. He was briefly treated with antibiotics for concern for an underlying infection and a lumbar tap done was negative. Cultures remained negative.   EEG was done for concern for seizure-like activity was without any epileptiform activity.  He was extubated on 9/11 and has been able to protect his airway ever since. He is currently receiving treatment with benzos for concern for benzo withdrawals and IV thiamine for his history of alcohol abuse.    Interval Problem Update  9/29: Patient was seen and examined by me today.  Found to be resting in bed comfortably.  We will continue his Librium and Risperdal for now.  Try to take off restraints today.  Repeat blood work in the a.m.  SNF placement is pending.  9/30: Patient was found to be in bed without any complaints.  We will continue Librium and Risperdal.  Continue restraints and Gallardo catheter.  Placement is pending.  10/1: Continue current management.  Gallardo found to have sediments and I will send his for urinalysis.  Continue to find placement.  10/2: Patient was seen and examined me today.  Urinalysis was positive for infection.  I have asked  the nurse to remove the Newton catheter.  Started patient on Bactrim for 5 days.  10/3: Patient continues to have hallucinations and confusion.  Continue to treat with Bactrim for UTI.  Continue voiding trial after Newton catheter was pulled out.  10/4: Patient continues to be confused.  Continue to treat for UTI.  Newton catheter is replaced.  10/5: Patient seen and examined by me today.  Still confused despite taking off of Librium and given antibiotics for UTI infection.    10/6: Patient was seen and examined by me today.  He found to be confused and confabulating.  Continue pursue guardianship.  10/7: Patient seen and examined he still confused and impulsive.  Continue to pursue guardianship.  10/8: Patient still confused continue to pursue guardianship  10/9: Patient continues to be confused, hallucinating, and delusional  10/10: Patient continues to be confused.  We will try another voiding trial today.  10/11: Patient still hallucinating and delusional.  He is excited about watching football  10/12: Patient was working with PT OT today and was still confused.  10/13: Patient was found to be confused and hallucinating again today.  10/14: No acute issues today will continue pursue guardianship  10/15: NAOE  10/16: pulled out newton last night despite restraints.  Avoid replacement if possible.    10/17: Patient was trialed off restraints this morning, unfortunately they had to be reapplied as patient was impulsively climbing out of bed, falling onto the floor.  10/18: Patient doing well under close nursing supervision off restraints at this juncture.    Consultants/Specialty  Intensivist    Code Status  Full Code    Disposition  Pending [facility]    Review of Systems  Review of Systems   Constitutional: Negative for malaise/fatigue.   HENT: Negative.    Eyes: Negative.    Respiratory: Negative for cough, hemoptysis and sputum production.    Cardiovascular: Negative for chest pain, palpitations, orthopnea and  claudication.   Gastrointestinal: Negative for abdominal pain, heartburn, nausea and vomiting.   Genitourinary: Negative for dysuria, frequency and urgency.   Musculoskeletal: Negative.    Neurological: Negative.    Psychiatric/Behavioral: Negative for hallucinations.       Physical Exam  Temp:  [36.2 °C (97.2 °F)-37.2 °C (98.9 °F)] 36.6 °C (97.9 °F)  Pulse:  [69-84] 69  Resp:  [16-19] 16  BP: (111-130)/(51-95) 111/76  SpO2:  [94 %-96 %] 94 %    Physical Exam  Vitals signs and nursing note reviewed.   Constitutional:       General: He is not in acute distress.  HENT:      Head: Normocephalic and atraumatic.      Right Ear: External ear normal.      Left Ear: External ear normal.      Nose: Nose normal.   Eyes:      Extraocular Movements: Extraocular movements intact.      Pupils: Pupils are equal, round, and reactive to light.   Neck:      Musculoskeletal: Normal range of motion and neck supple. No neck rigidity or muscular tenderness.   Cardiovascular:      Rate and Rhythm: Normal rate and regular rhythm.      Pulses: Normal pulses.   Pulmonary:      Effort: Pulmonary effort is normal.   Abdominal:      General: Bowel sounds are normal.      Palpations: Abdomen is soft.   Musculoskeletal: Normal range of motion.         General: No tenderness.   Skin:     General: Skin is warm and dry.   Neurological:      General: No focal deficit present.      Mental Status: He is alert. He is disoriented.      Comments: Oriented to self    Psychiatric:         Mood and Affect: Affect is flat.         Behavior: Behavior is combative.         Thought Content: Thought content is delusional.         Cognition and Memory: Cognition is impaired. He exhibits impaired recent memory.         Judgment: Judgment is impulsive.      Comments: Confused        Current Facility-Administered Medications:   •  haloperidol lactate (HALDOL) injection 2.5 mg, 2.5 mg, Intramuscular, Q4HRS PRN, Gsiele Barnes M.D., 2.5 mg at 10/14/20 1118  •   risperiDONE (RISPERDAL) tablet 1 mg, 1 mg, Oral, Q EVENING, Gisele Barnes M.D., 1 mg at 10/18/20 1704  •  LORazepam (ATIVAN) injection 0.5 mg, 0.5 mg, Intravenous, Q6HRS PRN, Gisele Barnes M.D.  •  thiamine tablet 100 mg, 100 mg, Oral, DAILY, Gisele Barnes M.D., 100 mg at 10/19/20 0457  •  tamsulosin (FLOMAX) capsule 0.8 mg, 0.8 mg, Oral, AFTER BREAKFAST, Giseel Barnes M.D., 0.8 mg at 10/19/20 0958  •  enoxaparin (LOVENOX) inj 40 mg, 40 mg, Subcutaneous, Q EVENING, Gisele Barnes M.D., 40 mg at 10/18/20 1705  •  acetaminophen (TYLENOL) tablet 650 mg, 650 mg, Oral, Q6HRS PRN, Gisele Barnes M.D., 650 mg at 09/17/20 1723  •  senna-docusate (PERICOLACE or SENOKOT S) 8.6-50 MG per tablet 2 Tab, 2 Tab, Oral, BID, 2 Tab at 10/19/20 0457 **AND** polyethylene glycol/lytes (MIRALAX) PACKET 1 Packet, 1 Packet, Oral, QDAY PRN, 1 Packet at 10/17/20 0458 **AND** magnesium hydroxide (MILK OF MAGNESIA) suspension 30 mL, 30 mL, Oral, QDAY PRN, 30 mL at 09/18/20 1143 **AND** [DISCONTINUED] bisacodyl (DULCOLAX) suppository 10 mg, 10 mg, Rectal, QDAY PRN, Xavi Durham D.O.  •  Respiratory Therapy Consult, , Nebulization, Continuous RT, Gisele Barnes M.D.  •  ipratropium-albuterol (DUONEB) nebulizer solution, 3 mL, Nebulization, Q2HRS PRN (RT), Gisele Branes M.D.  •  ondansetron (ZOFRAN) syringe/vial injection 4 mg, 4 mg, Intravenous, Q4HRS PRN, Gisele Kellyraf, M.D.      Fluids    Intake/Output Summary (Last 24 hours) at 10/19/2020 1511  Last data filed at 10/19/2020 1018  Gross per 24 hour   Intake 600 ml   Output --   Net 600 ml       Laboratory                        Imaging  MR-BRAIN-W/O   Final Result      1.  Moderate diffuse cerebral atrophy.      2.  Minimal periventricular and juxtacortical white matter changes consistent with chronic microvascular ischemic gliosis.      3.  No evidence of acute infarction in the brain parenchyma.      DX-ABDOMEN FOR TUBE PLACEMENT   Final Result      Feeding tube tip overlies the second  portion of the duodenum.      DX-CHEST-PORTABLE (1 VIEW)   Final Result         1.  Patchy left lung base opacity, new since prior, could represent early infiltrate            DX-CHEST-PORTABLE (1 VIEW)   Final Result         1.  Patchy left lung base opacity, new since prior, could represent early infiltrate         EC-URKGMOI-1 VIEW   Final Result      Enteric tube projects over the distal stomach/pylorus.         EC-ECHOCARDIOGRAM COMPLETE W/O CONT   Final Result      DX-CHEST-PORTABLE (1 VIEW)   Final Result         1.  No acute cardiopulmonary disease.      CT-ABDOMEN-PELVIS W/O   Final Result      1.  No renal stone or hydronephrosis.   2.  Normal appendix.   3.  No focal mesenteric inflammatory process.      DX-ABDOMEN FOR TUBE PLACEMENT   Final Result      NG tube tip projects over expected location the gastric fundus.      US-ABDOMEN COMPLETE SURVEY   Final Result         1.  Echogenic liver compatible with fatty change versus fibrosis.   2.  Gallbladder sludge without additional sonographic findings of cholecystitis.   3.  Partial atrophic bilateral kidneys with lobulated contour         CT-HEAD W/O   Final Result         1.  No acute intracranial abnormality is identified, there are nonspecific white matter changes, commonly associated with small vessel ischemic disease.  Associated mild cerebral atrophy is noted.   2.  Atherosclerosis.      DX-CHEST-PORTABLE (1 VIEW)   Final Result         1.  No acute cardiopulmonary disease.           Assessment/Plan  * Toxic encephalopathy- (present on admission)  Assessment & Plan  Unknown etiology whether this is hypoxic induced brain injury versus Wernicke encephalopathy    LP- negative  EEG was negative for seizures    Prn haldol  Po risperdal... Increased to 1 mg nightly on 9/27/2020    Psych md consult--> they believe it is etoh related    Continue to monitor    Slowly improving.     Mri brain negative       Acute cystitis  Assessment &  Plan  resolved      Urinary retention- (present on admission)  Assessment & Plan  Po flomax  Improved w/ Flomax.      Other dysphagia- (present on admission)  Assessment & Plan  Passed speech eval now  On modified diet    Hypernatremia  Assessment & Plan  Replacing potassium and adding daily magnesium     check  a BMP in a.m.    Acute respiratory failure with hypoxia (HCC)- (present on admission)  Assessment & Plan  2/2 mental status  Extubated 9/11  Remains on room air    Benzodiazepine dependence (HCC)- (present on admission)  Assessment & Plan  Monitor for seizure/withdrawal  Po librium  EEG after sz-like activity 9/10 was negative despite jerking motions    Alcohol abuse- (present on admission)  Assessment & Plan  Reported hx of  Continue Vitamins  High dose thiamine  Seizure precautions  S/p etoh w/d protocol    Septic shock ([facility])- (present on admission)  Assessment & Plan  Resolved  Broad-spectrum antibiotics: s/p vanc/zosyn --> C3, now off abx  Blood culture, CSF culture, urinalysis allnegative  Monitor off antibiotics        JEWELS (acute kidney injury) ([facility])- (present on admission)  Assessment & Plan  Consistent with ATN 2/2 hypotension, dehydration and sepsis  Resolved  Avoid nephrotoxins.  Renal dose all medications.         VTE prophylaxis: Lovenox

## 2020-10-20 PROBLEM — D64.9 NORMOCYTIC ANEMIA: Status: ACTIVE | Noted: 2020-10-20

## 2020-10-20 LAB
ALBUMIN SERPL BCP-MCNC: 3.5 G/DL (ref 3.2–4.9)
ALBUMIN/GLOB SERPL: 1.3 G/DL
ALP SERPL-CCNC: 98 U/L (ref 30–99)
ALT SERPL-CCNC: 9 U/L (ref 2–50)
ANION GAP SERPL CALC-SCNC: 10 MMOL/L (ref 7–16)
AST SERPL-CCNC: 9 U/L (ref 12–45)
BILIRUB SERPL-MCNC: 0.4 MG/DL (ref 0.1–1.5)
BUN SERPL-MCNC: 9 MG/DL (ref 8–22)
CALCIUM SERPL-MCNC: 9.5 MG/DL (ref 8.5–10.5)
CHLORIDE SERPL-SCNC: 106 MMOL/L (ref 96–112)
CO2 SERPL-SCNC: 25 MMOL/L (ref 20–33)
CREAT SERPL-MCNC: 0.73 MG/DL (ref 0.5–1.4)
ERYTHROCYTE [DISTWIDTH] IN BLOOD BY AUTOMATED COUNT: 45 FL (ref 35.9–50)
GLOBULIN SER CALC-MCNC: 2.8 G/DL (ref 1.9–3.5)
GLUCOSE SERPL-MCNC: 101 MG/DL (ref 65–99)
HCT VFR BLD AUTO: 39.5 % (ref 42–52)
HGB BLD-MCNC: 12.4 G/DL (ref 14–18)
MCH RBC QN AUTO: 29.3 PG (ref 27–33)
MCHC RBC AUTO-ENTMCNC: 31.4 G/DL (ref 33.7–35.3)
MCV RBC AUTO: 93.4 FL (ref 81.4–97.8)
PLATELET # BLD AUTO: 240 K/UL (ref 164–446)
PMV BLD AUTO: 12.1 FL (ref 9–12.9)
POTASSIUM SERPL-SCNC: 3.3 MMOL/L (ref 3.6–5.5)
PROT SERPL-MCNC: 6.3 G/DL (ref 6–8.2)
RBC # BLD AUTO: 4.23 M/UL (ref 4.7–6.1)
SODIUM SERPL-SCNC: 141 MMOL/L (ref 135–145)
WBC # BLD AUTO: 7.9 K/UL (ref 4.8–10.8)

## 2020-10-20 PROCEDURE — 700102 HCHG RX REV CODE 250 W/ 637 OVERRIDE(OP): Performed by: INTERNAL MEDICINE

## 2020-10-20 PROCEDURE — 36415 COLL VENOUS BLD VENIPUNCTURE: CPT

## 2020-10-20 PROCEDURE — 700102 HCHG RX REV CODE 250 W/ 637 OVERRIDE(OP): Performed by: HOSPITALIST

## 2020-10-20 PROCEDURE — 99232 SBSQ HOSP IP/OBS MODERATE 35: CPT | Performed by: INTERNAL MEDICINE

## 2020-10-20 PROCEDURE — 700111 HCHG RX REV CODE 636 W/ 250 OVERRIDE (IP): Performed by: HOSPITALIST

## 2020-10-20 PROCEDURE — 85027 COMPLETE CBC AUTOMATED: CPT

## 2020-10-20 PROCEDURE — 80053 COMPREHEN METABOLIC PANEL: CPT

## 2020-10-20 PROCEDURE — 770006 HCHG ROOM/CARE - MED/SURG/GYN SEMI*

## 2020-10-20 PROCEDURE — A9270 NON-COVERED ITEM OR SERVICE: HCPCS | Performed by: HOSPITALIST

## 2020-10-20 PROCEDURE — 97530 THERAPEUTIC ACTIVITIES: CPT | Mod: CQ

## 2020-10-20 PROCEDURE — 97116 GAIT TRAINING THERAPY: CPT | Mod: CQ

## 2020-10-20 PROCEDURE — A9270 NON-COVERED ITEM OR SERVICE: HCPCS | Performed by: INTERNAL MEDICINE

## 2020-10-20 PROCEDURE — 51798 US URINE CAPACITY MEASURE: CPT

## 2020-10-20 RX ORDER — POTASSIUM CHLORIDE 20 MEQ/1
40 TABLET, EXTENDED RELEASE ORAL 2 TIMES DAILY
Status: COMPLETED | OUTPATIENT
Start: 2020-10-20 | End: 2020-10-20

## 2020-10-20 RX ADMIN — DOCUSATE SODIUM 50 MG AND SENNOSIDES 8.6 MG 2 TABLET: 8.6; 5 TABLET, FILM COATED ORAL at 18:12

## 2020-10-20 RX ADMIN — RISPERIDONE 1 MG: 0.5 TABLET ORAL at 18:12

## 2020-10-20 RX ADMIN — ENOXAPARIN SODIUM 40 MG: 40 INJECTION SUBCUTANEOUS at 18:11

## 2020-10-20 RX ADMIN — TAMSULOSIN HYDROCHLORIDE 0.8 MG: 0.4 CAPSULE ORAL at 10:29

## 2020-10-20 RX ADMIN — POTASSIUM CHLORIDE 40 MEQ: 1500 TABLET, EXTENDED RELEASE ORAL at 18:12

## 2020-10-20 RX ADMIN — Medication 100 MG: at 05:05

## 2020-10-20 RX ADMIN — POTASSIUM CHLORIDE 40 MEQ: 1500 TABLET, EXTENDED RELEASE ORAL at 11:43

## 2020-10-20 RX ADMIN — DOCUSATE SODIUM 50 MG AND SENNOSIDES 8.6 MG 2 TABLET: 8.6; 5 TABLET, FILM COATED ORAL at 05:05

## 2020-10-20 ASSESSMENT — ENCOUNTER SYMPTOMS
SPUTUM PRODUCTION: 0
ABDOMINAL PAIN: 0
COUGH: 0
ORTHOPNEA: 0
EYES NEGATIVE: 1
HALLUCINATIONS: 0
VOMITING: 0
NEUROLOGICAL NEGATIVE: 1
FALLS: 1
NAUSEA: 0
CLAUDICATION: 0
PALPITATIONS: 0
HEARTBURN: 0
HEMOPTYSIS: 0

## 2020-10-20 ASSESSMENT — COGNITIVE AND FUNCTIONAL STATUS - GENERAL
WALKING IN HOSPITAL ROOM: A LOT
SUGGESTED CMS G CODE MODIFIER MOBILITY: CK
TURNING FROM BACK TO SIDE WHILE IN FLAT BAD: A LITTLE
CLIMB 3 TO 5 STEPS WITH RAILING: TOTAL
MOBILITY SCORE: 15
MOVING FROM LYING ON BACK TO SITTING ON SIDE OF FLAT BED: A LITTLE
STANDING UP FROM CHAIR USING ARMS: A LITTLE
MOVING TO AND FROM BED TO CHAIR: A LITTLE

## 2020-10-20 ASSESSMENT — FIBROSIS 4 INDEX: FIB4 SCORE: 0.91

## 2020-10-20 ASSESSMENT — GAIT ASSESSMENTS
DISTANCE (FEET): 150
DEVIATION: ATAXIC
GAIT LEVEL OF ASSIST: MODERATE ASSIST
ASSISTIVE DEVICE: FRONT WHEEL WALKER

## 2020-10-20 NOTE — PROGRESS NOTES
At approximately 1245, this RN and CNA Hai Bautista responded to a bed alarm in pt. Room. The pt was lying on his back, and his head and shoulders off of the floor. Patient denied hitting his head at this time, and there was no visible signs of head trauma. Patient stated that he was attempting to get out of bed because he needed to urinate. Patient was assisted back into bed, and provided his urinal. Patient then began to demonstrate hostile behavior towards the staff, and stated that he did hit his head. Soft wrist restraints were applied. Vital signs were obtained and were within normal limits. Charge RN. MD and pharmacy notified.

## 2020-10-20 NOTE — THERAPY
Missed Therapy     Patient Name: Yury Blake  Age:  50 y.o., Sex:  male  Medical Record #: [id number]  Today's Date: 10/20/2020    Discussed missed therapy with RN    Attempted to see pt for OT session; RN reports pt agitated d/t fall this AM. Discussed with pt and continues to be agitated, would like to postpone OT until tomorrow. Will reattempt as appropriate/able.

## 2020-10-20 NOTE — CARE PLAN
Problem: Safety  Goal: Will remain free from falls  Outcome: PROGRESSING AS EXPECTED     Problem: Venous Thromboembolism (VTW)/Deep Vein Thrombosis (DVT) Prevention:  Goal: Patient will participate in Venous Thrombosis (VTE)/Deep Vein Thrombosis (DVT)Prevention Measures  Outcome: PROGRESSING AS EXPECTED     Problem: Respiratory:  Goal: Respiratory status will improve  Outcome: PROGRESSING AS EXPECTED     Problem: Urinary Elimination:  Goal: Ability to reestablish a normal urinary elimination pattern will improve  Outcome: PROGRESSING SLOWER THAN EXPECTED     Problem: Mobility  Goal: Risk for activity intolerance will decrease  Outcome: PROGRESSING SLOWER THAN EXPECTED

## 2020-10-20 NOTE — THERAPY
Physical Therapy   Daily Treatment     Patient Name: Yury Blake  Age:  50 y.o., Sex:  male  Medical Record #: [id number]  Today's Date: 10/20/2020     Precautions: (P) Fall Risk    Assessment    Pt had a fall earlier, now in wrist restraints. Pt willing to participate w/PT, cooperative throughout his tx session. Pt did manage 150' w/FWW, still needing mod assist for trunk stability and walker management. Bed mobility supervision and transfers still requiring min assist for safety.     Plan    Continue current treatment plan.    DC Equipment Recommendations: Unable to determine at this time  Discharge Recommendations: Recommend post-acute placement for additional physical therapy services prior to discharge home     Objective       10/20/20 1555   Balance   Sitting Balance (Static) Fair   Sitting Balance (Dynamic) Fair   Standing Balance (Static) Poor +   Standing Balance (Dynamic) Poor -   Weight Shift Sitting Fair   Weight Shift Standing Poor   Comments standing w/FWW   Gait Analysis   Gait Level Of Assist Moderate Assist   Assistive Device Front Wheel Walker   Distance (Feet) 150   # of Times Distance was Traveled 1   Deviation Ataxic   Skilled Intervention Verbal Cuing;Postural Facilitation;Compensatory Strategies   Comments Improvement w/his amb efforts from previous tx efforts. Pt needing walker assistance w/turns and trunk support during his efforts.    Bed Mobility    Supine to Sit Supervised   Sit to Supine Supervised   Scooting Supervised   Rolling Supervised   Comments HOB flat, supervision for safety. Pt impulsive, did have fall earlier.    Functional Mobility   Sit to Stand Minimal Assist  (from EOB->FWW)   Bed, Chair, Wheelchair Transfer Minimal Assist   Skilled Intervention Verbal Cuing;Postural Facilitation   Comments Initially w/posterior lean but self corrected quickly. Once he held standing, was able to initiate amb w/FWW.

## 2020-10-20 NOTE — PROGRESS NOTES
Hospital Medicine Daily Progress Note    Date of Service  10/20/2020    Chief Complaint  50 y.o. male with a pmhx of HTN, chronic pain and alcohol abuse who presented 9/9/2020 with altered mental status.     Hospital Course  50 y.o. male with a pmhx of HTN, chronic pain and alcohol abuse who presented 9/9/2020 with altered mental status, he was found obtunded in his house by his ex-wife who was checking on him as he had not been seen for 2 days or been able to get a hold of him.  He was found in a chair,  A&Ox0 and hypotensive.  He arrived in the ER with a GCS of 7 (E1,V1,M5) and severely hypotensive.  The patient was intubated for airway protection and central venous access was obtained for administration of high-volume crystalloid resuscitation and vasopressor therapy.    CT head and abdomen and pelvis were all unremarkable. He was briefly treated with antibiotics for concern for an underlying infection and a lumbar tap done was negative. Cultures remained negative.   EEG was done for concern for seizure-like activity was without any epileptiform activity.  He was extubated on 9/11 and has been able to protect his airway ever since. He received benzodiazepines and IV thiamine for etoh.  Now awaiting placement however continues to be impulsive and requires restraints intermittently.  He was found to have UTI s/p treatment with bactrim.  He had urinary retention s/p newton but continued to self-remove.     Interval Problem Update  - Had an unassisted fall this afternoon, restraints re-applied  - awaiting guardianship  - Afebrile  - On RA    Consultants/Specialty  Intensivist    Code Status  Full Code    Disposition  Pending SNF/guardianship    Review of Systems  Review of Systems   Constitutional: Negative for malaise/fatigue.   HENT: Negative.    Eyes: Negative.    Respiratory: Negative for cough, hemoptysis and sputum production.    Cardiovascular: Negative for chest pain, palpitations, orthopnea and claudication.    Gastrointestinal: Negative for abdominal pain, heartburn, nausea and vomiting.   Genitourinary: Negative for dysuria, frequency and urgency.   Musculoskeletal: Positive for falls.   Neurological: Negative.    Psychiatric/Behavioral: Negative for hallucinations.       Physical Exam  Temp:  [36.2 °C (97.2 °F)-37.1 °C (98.7 °F)] 37.1 °C (98.7 °F)  Pulse:  [69-99] 82  Resp:  [16-18] 17  BP: (106-150)/(65-82) 129/78  SpO2:  [94 %-96 %] 96 %    Physical Exam  Vitals signs and nursing note reviewed.   Constitutional:       General: He is not in acute distress.     Appearance: He is not toxic-appearing.   HENT:      Head: Normocephalic and atraumatic.      Nose: Nose normal.      Mouth/Throat:      Mouth: Mucous membranes are moist.   Eyes:      General: No scleral icterus.        Right eye: No discharge.         Left eye: No discharge.      Conjunctiva/sclera: Conjunctivae normal.   Neck:      Musculoskeletal: Normal range of motion.   Cardiovascular:      Rate and Rhythm: Normal rate and regular rhythm.      Pulses: Normal pulses.      Heart sounds: Normal heart sounds.   Pulmonary:      Effort: Pulmonary effort is normal.      Breath sounds: Normal breath sounds.   Abdominal:      General: Bowel sounds are normal. There is no distension.      Palpations: Abdomen is soft.      Tenderness: There is no abdominal tenderness.   Musculoskeletal:      Right lower leg: No edema.      Left lower leg: No edema.   Skin:     General: Skin is warm and dry.   Neurological:      Mental Status: He is alert. He is disoriented.      Comments: Oriented to x 2   Psychiatric:         Mood and Affect: Affect is flat.         Thought Content: Thought content is delusional.         Cognition and Memory: Cognition is impaired. He exhibits impaired recent memory.         Judgment: Judgment is impulsive.      Comments: Confused        Current Facility-Administered Medications:   •  potassium chloride SA (Kdur) tablet 40 mEq, 40 mEq, Oral, BID,  Akanksha Martinez M.D., 40 mEq at 10/20/20 1143  •  haloperidol lactate (HALDOL) injection 2.5 mg, 2.5 mg, Intramuscular, Q4HRS PRN, Gisele Barnes M.D., 2.5 mg at 10/14/20 1118  •  risperiDONE (RISPERDAL) tablet 1 mg, 1 mg, Oral, Q EVENING, Gisele Barnes M.D., 1 mg at 10/19/20 1736  •  LORazepam (ATIVAN) injection 0.5 mg, 0.5 mg, Intravenous, Q6HRS PRN, Gisele Barnes M.D.  •  thiamine tablet 100 mg, 100 mg, Oral, DAILY, Gisele Barnes M.D., 100 mg at 10/20/20 0505  •  tamsulosin (FLOMAX) capsule 0.8 mg, 0.8 mg, Oral, AFTER BREAKFAST, Gisele Barnes M.D., 0.8 mg at 10/20/20 1029  •  enoxaparin (LOVENOX) inj 40 mg, 40 mg, Subcutaneous, Q EVENING, Gisele Barnes M.D., 40 mg at 10/19/20 1737  •  acetaminophen (TYLENOL) tablet 650 mg, 650 mg, Oral, Q6HRS PRN, Gisele Barnes M.D., 650 mg at 09/17/20 1723  •  senna-docusate (PERICOLACE or SENOKOT S) 8.6-50 MG per tablet 2 Tab, 2 Tab, Oral, BID, 2 Tab at 10/20/20 0505 **AND** polyethylene glycol/lytes (MIRALAX) PACKET 1 Packet, 1 Packet, Oral, QDAY PRN, 1 Packet at 10/17/20 0458 **AND** magnesium hydroxide (MILK OF MAGNESIA) suspension 30 mL, 30 mL, Oral, QDAY PRN, 30 mL at 09/18/20 1143 **AND** [DISCONTINUED] bisacodyl (DULCOLAX) suppository 10 mg, 10 mg, Rectal, QDAY PRN, Xavi Durham D.O.  •  Respiratory Therapy Consult, , Nebulization, Continuous RT, Gisele Barnes M.D.  •  ipratropium-albuterol (DUONEB) nebulizer solution, 3 mL, Nebulization, Q2HRS PRN (RT), Gisele Barnes M.D.  •  ondansetron (ZOFRAN) syringe/vial injection 4 mg, 4 mg, Intravenous, Q4HRS PRN, Gisele Barnes M.D.      Fluids    Intake/Output Summary (Last 24 hours) at 10/20/2020 1359  Last data filed at 10/20/2020 1022  Gross per 24 hour   Intake 900 ml   Output 450 ml   Net 450 ml       Laboratory  Recent Labs     10/20/20  0837   WBC 7.9   RBC 4.23*   HEMOGLOBIN 12.4*   HEMATOCRIT 39.5*   MCV 93.4   MCH 29.3   MCHC 31.4*   RDW 45.0   PLATELETCT 240   MPV 12.1     Recent Labs     10/20/20  0837    SODIUM 141   POTASSIUM 3.3*   CHLORIDE 106   CO2 25   GLUCOSE 101*   BUN 9   CREATININE 0.73   CALCIUM 9.5                   Imaging  MR-BRAIN-W/O   Final Result      1.  Moderate diffuse cerebral atrophy.      2.  Minimal periventricular and juxtacortical white matter changes consistent with chronic microvascular ischemic gliosis.      3.  No evidence of acute infarction in the brain parenchyma.      DX-ABDOMEN FOR TUBE PLACEMENT   Final Result      Feeding tube tip overlies the second portion of the duodenum.      DX-CHEST-PORTABLE (1 VIEW)   Final Result         1.  Patchy left lung base opacity, new since prior, could represent early infiltrate            DX-CHEST-PORTABLE (1 VIEW)   Final Result         1.  Patchy left lung base opacity, new since prior, could represent early infiltrate         [id number] VIEW   Final Result      Enteric tube projects over the distal stomach/pylorus.         EC-ECHOCARDIOGRAM COMPLETE W/O CONT   Final Result      DX-CHEST-PORTABLE (1 VIEW)   Final Result         1.  No acute cardiopulmonary disease.      CT-ABDOMEN-PELVIS W/O   Final Result      1.  No renal stone or hydronephrosis.   2.  Normal appendix.   3.  No focal mesenteric inflammatory process.      DX-ABDOMEN FOR TUBE PLACEMENT   Final Result      NG tube tip projects over expected location the gastric fundus.      US-ABDOMEN COMPLETE SURVEY   Final Result         1.  Echogenic liver compatible with fatty change versus fibrosis.   2.  Gallbladder sludge without additional sonographic findings of cholecystitis.   3.  Partial atrophic bilateral kidneys with lobulated contour         CT-HEAD W/O   Final Result         1.  No acute intracranial abnormality is identified, there are nonspecific white matter changes, commonly associated with small vessel ischemic disease.  Associated mild cerebral atrophy is noted.   2.  Atherosclerosis.      DX-CHEST-PORTABLE (1 VIEW)   Final Result         1.  No acute cardiopulmonary  disease.           Assessment/Plan  * Toxic encephalopathy- (present on admission)  Assessment & Plan  Unknown etiology whether this is hypoxic induced brain injury versus Wernicke encephalopathy  LP- negative  MRI brain negative  EEG was negative for seizures  TSH normal  Prn haldol  Po risperdal... Increased to 1 mg nightly on 9/27/2020  Psych md consult--> they believe it is etoh related  Will check HIV/RPR         Acute cystitis  Assessment & Plan  resolved      Urinary retention- (present on admission)  Assessment & Plan  Po flomax  Improved w/ Flomax.      Other dysphagia- (present on admission)  Assessment & Plan  Passed speech eval now  On modified diet    Hypernatremia  Assessment & Plan  Resolved  CTM intermittently    Acute respiratory failure with hypoxia (HCC)- (present on admission)  Assessment & Plan  2/2 mental status  Extubated 9/11  Remains on room air    Benzodiazepine dependence (HCC)- (present on admission)  Assessment & Plan  EEG after sz-like activity 9/10 was negative despite jerking motions  S/p benzos, now off    Alcohol abuse- (present on admission)  Assessment & Plan  Reported hx of  Continue Vitamins  High dose thiamine  Seizure precautions  S/p etoh w/d protocol    Septic shock (HCC)- (present on admission)  Assessment & Plan  Resolved  Broad-spectrum antibiotics: s/p vanc/zosyn --> C3, now off abx  Blood culture, CSF culture, urinalysis allnegative  Monitor off antibiotics        JEWELS (acute kidney injury) (HCC)- (present on admission)  Assessment & Plan  Consistent with ATN 2/2 hypotension, dehydration and sepsis  Resolved  Avoid nephrotoxins.  Renal dose all medications.      Hypokalemia- (present on admission)  Assessment & Plan  S/p supplementation  CTM       VTE prophylaxis: Lovenox

## 2020-10-20 NOTE — CARE PLAN
Problem: Communication  Goal: The ability to communicate needs accurately and effectively will improve  Outcome: PROGRESSING SLOWER THAN EXPECTED  Note: Patient has not vocalized needs, and does not use his call bell appropriately. Reinforced education. Will continue to monitor, and provide education.      Problem: Safety  Goal: Will remain free from falls  Outcome: PROGRESSING SLOWER THAN EXPECTED  Note: Patient experienced a fall from bed 10/20/2020. Will continue to monitor, provide safety education, and safety precautions.      Problem: Urinary Elimination:  Goal: Ability to reestablish a normal urinary elimination pattern will improve  Outcome: PROGRESSING SLOWER THAN EXPECTED  Note: Patient has refused to ambulate to the bathroom, or urinate with a urinal. Patient educated on use of his urinal, or use of his call bell to ambulate to the bathroom, and the importance of urinary elimination.

## 2020-10-20 NOTE — PROGRESS NOTES
Assumed care at 0700. Received bedside report from night shift RN. Patient awake and alert and oriented x 1 in bed. No signs of distress. Bed is in low and locked position. Non-slip socks in place. Call light is within reach. Bed alarm is on.

## 2020-10-20 NOTE — PROGRESS NOTES
Received report from dayshift RN and assumed care of pt. Pt currently resting in bed. Denies any acute needs or concerns at this time. Aox1 (self).    Call light within reach and fall precautions in place. Bed alarm on for safety. Will monitor

## 2020-10-21 LAB
HIV 1+2 AB+HIV1 P24 AG SERPL QL IA: NORMAL
TREPONEMA PALLIDUM IGG+IGM AB [PRESENCE] IN SERUM OR PLASMA BY IMMUNOASSAY: NORMAL
VIT B12 SERPL-MCNC: 508 PG/ML (ref 211–911)

## 2020-10-21 PROCEDURE — 36415 COLL VENOUS BLD VENIPUNCTURE: CPT

## 2020-10-21 PROCEDURE — 700111 HCHG RX REV CODE 636 W/ 250 OVERRIDE (IP): Performed by: HOSPITALIST

## 2020-10-21 PROCEDURE — 770006 HCHG ROOM/CARE - MED/SURG/GYN SEMI*

## 2020-10-21 PROCEDURE — 86780 TREPONEMA PALLIDUM: CPT

## 2020-10-21 PROCEDURE — 700102 HCHG RX REV CODE 250 W/ 637 OVERRIDE(OP): Performed by: HOSPITALIST

## 2020-10-21 PROCEDURE — 51798 US URINE CAPACITY MEASURE: CPT

## 2020-10-21 PROCEDURE — A9270 NON-COVERED ITEM OR SERVICE: HCPCS | Performed by: HOSPITALIST

## 2020-10-21 PROCEDURE — 97535 SELF CARE MNGMENT TRAINING: CPT

## 2020-10-21 PROCEDURE — 99232 SBSQ HOSP IP/OBS MODERATE 35: CPT | Performed by: INTERNAL MEDICINE

## 2020-10-21 PROCEDURE — 87389 HIV-1 AG W/HIV-1&-2 AB AG IA: CPT

## 2020-10-21 PROCEDURE — 82607 VITAMIN B-12: CPT

## 2020-10-21 RX ADMIN — DOCUSATE SODIUM 50 MG AND SENNOSIDES 8.6 MG 2 TABLET: 8.6; 5 TABLET, FILM COATED ORAL at 04:39

## 2020-10-21 RX ADMIN — ENOXAPARIN SODIUM 40 MG: 40 INJECTION SUBCUTANEOUS at 17:24

## 2020-10-21 RX ADMIN — HALOPERIDOL LACTATE 2.5 MG: 5 INJECTION, SOLUTION INTRAMUSCULAR at 22:09

## 2020-10-21 RX ADMIN — Medication 100 MG: at 04:40

## 2020-10-21 RX ADMIN — TAMSULOSIN HYDROCHLORIDE 0.8 MG: 0.4 CAPSULE ORAL at 09:53

## 2020-10-21 RX ADMIN — RISPERIDONE 1 MG: 0.5 TABLET ORAL at 17:24

## 2020-10-21 ASSESSMENT — FIBROSIS 4 INDEX: FIB4 SCORE: .6125

## 2020-10-21 ASSESSMENT — ENCOUNTER SYMPTOMS
ORTHOPNEA: 0
ABDOMINAL PAIN: 0
CLAUDICATION: 0
NAUSEA: 0
SPUTUM PRODUCTION: 0
PALPITATIONS: 0
COUGH: 0
HALLUCINATIONS: 0
NEUROLOGICAL NEGATIVE: 1
EYES NEGATIVE: 1
VOMITING: 0
HEARTBURN: 0
HEMOPTYSIS: 0
FALLS: 1

## 2020-10-21 ASSESSMENT — COGNITIVE AND FUNCTIONAL STATUS - GENERAL
SUGGESTED CMS G CODE MODIFIER DAILY ACTIVITY: CK
TOILETING: A LITTLE
HELP NEEDED FOR BATHING: A LOT
PERSONAL GROOMING: A LITTLE
DAILY ACTIVITIY SCORE: 17
DRESSING REGULAR UPPER BODY CLOTHING: A LITTLE
DRESSING REGULAR LOWER BODY CLOTHING: A LITTLE
EATING MEALS: A LITTLE

## 2020-10-21 NOTE — PROGRESS NOTES
Assume care of pt at 0700. Report received from NOC RN. Pt is A/O x4. Pt declines pain. Pt is resting in bed. Bed in lowest and locked position, call light in reach, hourly rounding in place. Labs reviewed. Communication board updated. Will continue to monitor.     Restraints on bilat wrist on with active order. Pt continues to try to get out of bed without assistance despite multiple RN education and recent fall on 10/20. TM.

## 2020-10-21 NOTE — CARE PLAN
Problem: Knowledge Deficit  Goal: Knowledge of disease process/condition, treatment plan, diagnostic tests, and medications will improve  Outcome: PROGRESSING SLOWER THAN EXPECTED     Problem: Discharge Barriers/Planning  Goal: Patient's continuum of care needs will be met  Outcome: PROGRESSING SLOWER THAN EXPECTED

## 2020-10-21 NOTE — PROGRESS NOTES
[facility] Medicine Daily Progress Note    Date of Service  10/21/2020    Chief Complaint  50 y.o. male with a pmhx of HTN, chronic pain and alcohol abuse who presented 9/9/2020 with altered mental status.     Hospital Course  50 y.o. male with a pmhx of HTN, chronic pain and alcohol abuse who presented 9/9/2020 with altered mental status, he was found obtunded in his house by his ex-wife who was checking on him as he had not been seen for 2 days or been able to get a hold of him.  He was found in a chair,  A&Ox0 and hypotensive.  He arrived in the ER with a GCS of 7 (E1,V1,M5) and severely hypotensive.  The patient was intubated for airway protection and central venous access was obtained for administration of high-volume crystalloid resuscitation and vasopressor therapy.    CT head and abdomen and pelvis were all unremarkable. He was briefly treated with antibiotics for concern for an underlying infection and a lumbar tap done was negative. Cultures remained negative.   EEG was done for concern for seizure-like activity was without any epileptiform activity.  He was extubated on 9/11 and has been able to protect his airway ever since. He received benzodiazepines and IV thiamine for etoh.  Now awaiting placement however continues to be impulsive and requires restraints intermittently.  He was found to have UTI s/p treatment with bactrim.  He had urinary retention s/p newton but continued to self-remove.     Interval Problem Update  - Had a fall yesterday, back in restraints  - Intermittent urinary retention but hasn't needed I/O cath today  - Afebrile  - On RA    Consultants/Specialty  Intensivist    Code Status  Full Code    Disposition  Pending SNF/guardianship      Review of Systems  Review of Systems   Constitutional: Negative for malaise/fatigue.   HENT: Negative.    Eyes: Negative.    Respiratory: Negative for cough, hemoptysis and sputum production.    Cardiovascular: Negative for chest pain, palpitations,  orthopnea and claudication.   Gastrointestinal: Negative for abdominal pain, heartburn, nausea and vomiting.   Genitourinary: Negative for dysuria, frequency and urgency.   Musculoskeletal: Positive for falls (last fall 10/20).   Neurological: Negative.    Psychiatric/Behavioral: Negative for hallucinations.       Physical Exam  Temp:  [36.3 °C (97.4 °F)-37.1 °C (98.8 °F)] 36.5 °C (97.7 °F)  Pulse:  [77-98] 98  Resp:  [16-17] 17  BP: (119-129)/(77-92) 122/92  SpO2:  [92 %-96 %] 92 %    Physical Exam  Vitals signs and nursing note reviewed.   Constitutional:       General: He is not in acute distress.     Appearance: He is not toxic-appearing.   HENT:      Head: Normocephalic and atraumatic.      Nose: Nose normal.      Mouth/Throat:      Mouth: Mucous membranes are moist.   Eyes:      General: No scleral icterus.        Right eye: No discharge.         Left eye: No discharge.      Conjunctiva/sclera: Conjunctivae normal.   Neck:      Musculoskeletal: Normal range of motion.   Cardiovascular:      Rate and Rhythm: Normal rate and regular rhythm.      Pulses: Normal pulses.      Heart sounds: Normal heart sounds.   Pulmonary:      Effort: Pulmonary effort is normal.      Breath sounds: Normal breath sounds.   Abdominal:      General: Bowel sounds are normal. There is no distension.      Palpations: Abdomen is soft.      Tenderness: There is no abdominal tenderness.   Musculoskeletal:      Right lower leg: No edema.      Left lower leg: No edema.   Skin:     General: Skin is warm and dry.   Neurological:      Mental Status: He is alert. He is disoriented.      Comments: Oriented to x 2   Psychiatric:         Mood and Affect: Affect is flat.         Thought Content: Thought content is delusional.         Cognition and Memory: Cognition is impaired. He exhibits impaired recent memory.         Judgment: Judgment is impulsive.      Comments: Confused        Current Facility-Administered Medications:   •  haloperidol  lactate (HALDOL) injection 2.5 mg, 2.5 mg, Intramuscular, Q4HRS PRN, Gisele Barnes M.D., 2.5 mg at 10/14/20 1118  •  risperiDONE (RISPERDAL) tablet 1 mg, 1 mg, Oral, Q EVENING, Gisele Barnes M.D., 1 mg at 10/20/20 1812  •  LORazepam (ATIVAN) injection 0.5 mg, 0.5 mg, Intravenous, Q6HRS PRN, Gisele Barnes M.D.  •  thiamine tablet 100 mg, 100 mg, Oral, DAILY, Gisele Barnes M.D., 100 mg at 10/21/20 0440  •  tamsulosin (FLOMAX) capsule 0.8 mg, 0.8 mg, Oral, AFTER BREAKFAST, Gisele Barnes M.D., 0.8 mg at 10/21/20 0953  •  enoxaparin (LOVENOX) inj 40 mg, 40 mg, Subcutaneous, Q EVENING, Gisele Barnes M.D., 40 mg at 10/20/20 1811  •  acetaminophen (TYLENOL) tablet 650 mg, 650 mg, Oral, Q6HRS PRN, Gisele Barnes M.D., 650 mg at 09/17/20 1723  •  senna-docusate (PERICOLACE or SENOKOT S) 8.6-50 MG per tablet 2 Tab, 2 Tab, Oral, BID, 2 Tab at 10/21/20 0439 **AND** polyethylene glycol/lytes (MIRALAX) PACKET 1 Packet, 1 Packet, Oral, QDAY PRN, 1 Packet at 10/17/20 0458 **AND** magnesium hydroxide (MILK OF MAGNESIA) suspension 30 mL, 30 mL, Oral, QDAY PRN, 30 mL at 09/18/20 1143 **AND** [DISCONTINUED] bisacodyl (DULCOLAX) suppository 10 mg, 10 mg, Rectal, QDAY PRN, Xavi Durham D.O.  •  Respiratory Therapy Consult, , Nebulization, Continuous RT, Gisele Barnes M.D.  •  ipratropium-albuterol (DUONEB) nebulizer solution, 3 mL, Nebulization, Q2HRS PRN (RT), Gisele Barnes M.D.  •  ondansetron (ZOFRAN) syringe/vial injection 4 mg, 4 mg, Intravenous, Q4HRS PRN, Gisele Barnes M.D.      Fluids    Intake/Output Summary (Last 24 hours) at 10/21/2020 1647  Last data filed at 10/21/2020 1626  Gross per 24 hour   Intake 1860 ml   Output 1150 ml   Net 710 ml       Laboratory  Recent Labs     10/20/20  0837   WBC 7.9   RBC 4.23*   HEMOGLOBIN 12.4*   HEMATOCRIT 39.5*   MCV 93.4   MCH 29.3   MCHC 31.4*   RDW 45.0   PLATELETCT 240   MPV 12.1     Recent Labs     10/20/20  0837   SODIUM 141   POTASSIUM 3.3*   CHLORIDE 106   CO2 25   GLUCOSE  101*   BUN 9   CREATININE 0.73   CALCIUM 9.5                   Imaging  MR-BRAIN-W/O   Final Result      1.  Moderate diffuse cerebral atrophy.      2.  Minimal periventricular and juxtacortical white matter changes consistent with chronic microvascular ischemic gliosis.      3.  No evidence of acute infarction in the brain parenchyma.      DX-ABDOMEN FOR TUBE PLACEMENT   Final Result      Feeding tube tip overlies the second portion of the duodenum.      DX-CHEST-PORTABLE (1 VIEW)   Final Result         1.  Patchy left lung base opacity, new since prior, could represent early infiltrate            DX-CHEST-PORTABLE (1 VIEW)   Final Result         1.  Patchy left lung base opacity, new since prior, could represent early infiltrate         [id number] VIEW   Final Result      Enteric tube projects over the distal stomach/pylorus.         EC-ECHOCARDIOGRAM COMPLETE W/O CONT   Final Result      DX-CHEST-PORTABLE (1 VIEW)   Final Result         1.  No acute cardiopulmonary disease.      CT-ABDOMEN-PELVIS W/O   Final Result      1.  No renal stone or hydronephrosis.   2.  Normal appendix.   3.  No focal mesenteric inflammatory process.      DX-ABDOMEN FOR TUBE PLACEMENT   Final Result      NG tube tip projects over expected location the gastric fundus.      US-ABDOMEN COMPLETE SURVEY   Final Result         1.  Echogenic liver compatible with fatty change versus fibrosis.   2.  Gallbladder sludge without additional sonographic findings of cholecystitis.   3.  Partial atrophic bilateral kidneys with lobulated contour         CT-HEAD W/O   Final Result         1.  No acute intracranial abnormality is identified, there are nonspecific white matter changes, commonly associated with small vessel ischemic disease.  Associated mild cerebral atrophy is noted.   2.  Atherosclerosis.      DX-CHEST-PORTABLE (1 VIEW)   Final Result         1.  No acute cardiopulmonary disease.           Assessment/Plan  * Toxic encephalopathy-  (present on admission)  Assessment & Plan  Unknown etiology whether this is hypoxic induced brain injury versus Wernicke encephalopathy  LP- negative  MRI brain negative  EEG was negative for seizures  TSH normal  Prn haldol  Po risperdal... Increased to 1 mg nightly on 9/27/2020  Psych md consult--> they believe it is etoh related  HIV/RPR negative         Normocytic anemia  Assessment & Plan  Mild, unknown etiology  No active bleeding  CTM    Acute cystitis  Assessment & Plan  resolved      Urinary retention- (present on admission)  Assessment & Plan  Po flomax  Improved w/ Flomax.      Other dysphagia- (present on admission)  Assessment & Plan  Passed speech eval now  On modified diet    Hypernatremia  Assessment & Plan  Resolved  CTM intermittently    Acute respiratory failure with hypoxia (HCC)- (present on admission)  Assessment & Plan  2/2 mental status  Extubated 9/11  Remains on room air    Benzodiazepine dependence (HCC)- (present on admission)  Assessment & Plan  EEG after sz-like activity 9/10 was negative despite jerking motions  S/p benzos, now off    Alcohol abuse- (present on admission)  Assessment & Plan  Reported hx of  Continue Vitamins  High dose thiamine  Seizure precautions  S/p etoh w/d protocol    Septic shock ([facility])- (present on admission)  Assessment & Plan  Resolved  Broad-spectrum antibiotics: s/p vanc/zosyn --> C3, now off abx  Blood culture, CSF culture, urinalysis allnegative  Monitor off antibiotics        JEWELS (acute kidney injury) ([facility])- (present on admission)  Assessment & Plan  Consistent with ATN 2/2 hypotension, dehydration and sepsis  Resolved  Avoid nephrotoxins.  Renal dose all medications.      Hypokalemia- (present on admission)  Assessment & Plan  S/p supplementation  CTM       VTE prophylaxis: Lovenox

## 2020-10-21 NOTE — PROGRESS NOTES
Received report from dayshift RN and assumed care of pt. Pt currently eating supper in bed with visitor at bedside. Denies any acute needs or concerns at this time. Aox1 (self).    Call light within reach and fall precautions in place. Bilateral soft wrist restraints in place and bed alarm. Will monitor

## 2020-10-21 NOTE — THERAPY
"Occupational Therapy  Daily Treatment     Patient Name: Yury Blake  Age:  50 y.o., Sex:  male  Medical Record #: [id number]  Today's Date: 10/21/2020     Precautions  Precautions:  Fall Risk  Comments:  wrist restraints and telesitter, had a fall yesterday    Assessment    Pt seen for OT session. Progressing with participation, but continues to be limited by cognition and intermittent agitation. Min A for sup>sit, Min A for functional ambulation w/fww; 6 LOBs req min-mod A to correct, toilet txf with mod A, standing g/h with min A and v/cs for thoroughness/sequencing. Reported fatigue at end of session. Continues to be limited by decreased functional mobility, activity tolerance, cognition, strength, balance, and pain which are currently affecting pt's ability to complete ADLs/IADLs at baseline. Will continue to follow.     Plan    Treatment plan modified to 2 times per week until therapy goals are met for the following treatments:  Adaptive Equipment, Neuro Re-Education / Balance, Self Care/Activities of Daily Living, Therapeutic Activities and Therapeutic Exercises.     DC Equipment Recommendations: Unable to determine at this time  Discharge Recommendations: Recommend post-acute placement for additional occupational therapy services prior to discharge home     Subjective    \"I worked with your sister yesterday.\"    Objective     10/21/20 0852   Pain 0 - 10 Group   Location Back   Therapist Pain Assessment Post Activity Pain Same as Prior to Activity;During Activity;Nurse Notified  (no c/o pain )   Cognition    Cognition / Consciousness X   Speech/ Communication   (intermittent nonsensical statements; off topic responses)   Level of Consciousness Confused   Ability To Follow Commands 1 Step   Safety Awareness Impulsive;Impaired   New Learning Impaired   Attention Impaired   Sequencing Impaired   Comments req max v/cs to direct and for command following, limited memory, poor attention req redirection, " nonsensical statements, but less agitated this session. very impulsive   Balance   Sitting Balance (Static) Fair   Sitting Balance (Dynamic) Fair   Standing Balance (Static) Poor +   Standing Balance (Dynamic) Poor   Weight Shift Sitting Fair   Weight Shift Standing Poor   Skilled Intervention Verbal Cuing;Tactile Cuing;Facilitation;Compensatory Strategies;Postural Facilitation   Comments w/fww; hand 6 LOBs req min-mod A to correct. Reports feels he is losing balance, but no self correcting observed   Bed Mobility    Supine to Sit Minimal Assist  (therapist hand)   Sit to Supine Supervised   Scooting Supervised   Skilled Intervention Verbal Cuing;Compensatory Strategies;Facilitation   Comments HOB flat   Activities of Daily Living   Grooming Standing;Minimal Assist  (washing hands; v/cs for thoroughness assist for balance)   Lower Body Dressing Minimal Assist   Toileting Minimal Assist   Skilled Intervention Verbal Cuing;Tactile Cuing;Facilitation;Compensatory Strategies   Comments limited by cognition   Functional Mobility   Sit to Stand Minimal Assist  (for safety with FWW)   Bed, Chair, Wheelchair Transfer Minimal Assist   Toilet Transfers Moderate Assist  (from low surface and use of GB)   Transfer Method Stand Step   Mobility w/FWW; req max v/cs for safety   Skilled Intervention Verbal Cuing;Tactile Cuing;Facilitation;Compensatory Strategies;Postural Facilitation   Activity Tolerance   Sitting in Chair 2 min on toilet   Sitting Edge of Bed 3 min   Standing 9 min   Comments limited by cognition; reported fatigue after short session. Encouraged continued OOB activity   Short Term Goals   Short Term Goal # 1 Pt will complete toilet transfer using BSC if needed with spv, no LOB by discharge.   Goal Outcome # 1 Progressing slower than expected   Short Term Goal # 2 Pt will complete standing grooming/hygiene with spv by discharge.   Goal Outcome # 2 Progressing slower than expected   Short Term Goal # 3 Pt will  complete toileting with spv by discharge.   Goal Outcome # 3 Progressing slower than expected   Anticipated Discharge Equipment and Recommendations   DC Equipment Recommendations Unable to determine at this time   Discharge Recommendations Recommend post-acute placement for additional occupational therapy services prior to discharge home

## 2020-10-21 NOTE — CARE PLAN
Problem: Safety  Goal: Will remain free from falls  Outcome: NOT MET     Problem: Pain Management  Goal: Pain level will decrease to patient's comfort goal  Outcome: PROGRESSING AS EXPECTED     Problem: Psychosocial Needs:  Goal: Level of anxiety will decrease  Outcome: PROGRESSING SLOWER THAN EXPECTED     Problem: Safety - Medical Restraint  Goal: Remains free of injury from restraints (Restraint for Interference with Medical Device)  Description: INTERVENTIONS:  1. Determine that other, less restrictive measures have been tried or would not be effective before applying the restraint  2. Evaluate the patient's condition at the time of restraint application  3. Inform patient/family regarding the reason for restraint  4. Q2H: Monitor safety, psychosocial status, comfort, nutrition and hydration  Outcome: PROGRESSING SLOWER THAN EXPECTED  Goal: Free from restraint(s) (Restraint for Interference with Medical Device)  Description: INTERVENTIONS:  1. ONCE/SHIFT or MINIMUM Q12H: Assess and document the continuing need for restraints  2. Q24H: Continued use of restraint requires LIP to perform face to face examination and written order  3. Identify and implement measures to help patient regain control  Outcome: PROGRESSING SLOWER THAN EXPECTED     Problem: Urinary Elimination:  Goal: Ability to reestablish a normal urinary elimination pattern will improve  Outcome: PROGRESSING SLOWER THAN EXPECTED     Problem: Mobility  Goal: Risk for activity intolerance will decrease  Outcome: PROGRESSING SLOWER THAN EXPECTED

## 2020-10-22 PROCEDURE — 700102 HCHG RX REV CODE 250 W/ 637 OVERRIDE(OP): Performed by: INTERNAL MEDICINE

## 2020-10-22 PROCEDURE — 770006 HCHG ROOM/CARE - MED/SURG/GYN SEMI*

## 2020-10-22 PROCEDURE — A9270 NON-COVERED ITEM OR SERVICE: HCPCS | Performed by: HOSPITALIST

## 2020-10-22 PROCEDURE — 700102 HCHG RX REV CODE 250 W/ 637 OVERRIDE(OP): Performed by: HOSPITALIST

## 2020-10-22 PROCEDURE — A9270 NON-COVERED ITEM OR SERVICE: HCPCS | Performed by: INTERNAL MEDICINE

## 2020-10-22 PROCEDURE — 99232 SBSQ HOSP IP/OBS MODERATE 35: CPT | Performed by: INTERNAL MEDICINE

## 2020-10-22 PROCEDURE — 700111 HCHG RX REV CODE 636 W/ 250 OVERRIDE (IP): Performed by: HOSPITALIST

## 2020-10-22 PROCEDURE — 51798 US URINE CAPACITY MEASURE: CPT

## 2020-10-22 RX ORDER — RISPERIDONE 0.5 MG/1
1 TABLET ORAL 2 TIMES DAILY
Status: DISCONTINUED | OUTPATIENT
Start: 2020-10-22 | End: 2020-10-22

## 2020-10-22 RX ORDER — RISPERIDONE 0.5 MG/1
1 TABLET ORAL EVERY EVENING
Status: DISCONTINUED | OUTPATIENT
Start: 2020-10-22 | End: 2020-10-24

## 2020-10-22 RX ORDER — RISPERIDONE 0.5 MG/1
0.5 TABLET ORAL DAILY
Status: DISCONTINUED | OUTPATIENT
Start: 2020-10-23 | End: 2020-10-24

## 2020-10-22 RX ADMIN — DOCUSATE SODIUM 50 MG AND SENNOSIDES 8.6 MG 2 TABLET: 8.6; 5 TABLET, FILM COATED ORAL at 04:03

## 2020-10-22 RX ADMIN — ENOXAPARIN SODIUM 40 MG: 40 INJECTION SUBCUTANEOUS at 17:08

## 2020-10-22 RX ADMIN — DOCUSATE SODIUM 50 MG AND SENNOSIDES 8.6 MG 2 TABLET: 8.6; 5 TABLET, FILM COATED ORAL at 17:08

## 2020-10-22 RX ADMIN — LORAZEPAM 0.5 MG: 2 INJECTION INTRAMUSCULAR; INTRAVENOUS at 13:20

## 2020-10-22 RX ADMIN — RISPERIDONE 1 MG: 0.5 TABLET ORAL at 17:08

## 2020-10-22 RX ADMIN — Medication 100 MG: at 04:03

## 2020-10-22 RX ADMIN — TAMSULOSIN HYDROCHLORIDE 0.8 MG: 0.4 CAPSULE ORAL at 09:29

## 2020-10-22 ASSESSMENT — ENCOUNTER SYMPTOMS
ABDOMINAL PAIN: 0
VOMITING: 0
SPUTUM PRODUCTION: 0
CLAUDICATION: 0
FALLS: 1
PALPITATIONS: 0
HEMOPTYSIS: 0
ORTHOPNEA: 0
NAUSEA: 0
HEARTBURN: 0
COUGH: 0
NEUROLOGICAL NEGATIVE: 1
HALLUCINATIONS: 0
EYES NEGATIVE: 1

## 2020-10-22 NOTE — PROGRESS NOTES
Received report from day shift RN. Assumed care of pt. Pt alert and oriented X 1 at this time. Pt reports no pain at this time. Updated pt on plan of care. Pt resting comfortably and in no distress. Educated on use of call light. Hourly rounding in place. Patient has nonviolent bilateral soft wrist restraints in place for patient safety.

## 2020-10-22 NOTE — PROGRESS NOTES
Assume care of pt at 0700. Report received from NOC RN. Pt is A/O x self. Pt denies pain. Pt is resting in bed. Bed in lowest and locked position, call light in reach, hourly rounding in place. Labs reviewed. Communication board updated. Will continue to monitor.     Pt in soft bilateral wrist restraints with active order. Pt is a very high fall risk with a recent fall despite multiple RN education and interventions in place. Pt verbalized understanding of the reasoning for the restraints. Q6 bladder scans in place. Straight cath per order. WCTM.

## 2020-10-22 NOTE — CARE PLAN
Problem: Communication  Goal: The ability to communicate needs accurately and effectively will improve  Outcome: PROGRESSING AS EXPECTED     Problem: Safety  Goal: Will remain free from injury  Outcome: PROGRESSING AS EXPECTED     Problem: Venous Thromboembolism (VTW)/Deep Vein Thrombosis (DVT) Prevention:  Goal: Patient will participate in Venous Thrombosis (VTE)/Deep Vein Thrombosis (DVT)Prevention Measures  Outcome: PROGRESSING AS EXPECTED     Problem: Bowel/Gastric:  Goal: Normal bowel function is maintained or improved  Outcome: PROGRESSING AS EXPECTED  Goal: Will not experience complications related to bowel motility  Outcome: PROGRESSING AS EXPECTED     Problem: Knowledge Deficit  Goal: Knowledge of disease process/condition, treatment plan, diagnostic tests, and medications will improve  Outcome: PROGRESSING AS EXPECTED  Goal: Knowledge of the prescribed therapeutic regimen will improve  Outcome: PROGRESSING AS EXPECTED     Problem: Discharge Barriers/Planning  Goal: Patient's continuum of care needs will be met  Outcome: PROGRESSING AS EXPECTED     Problem: Fluid Volume:  Goal: Will maintain balanced intake and output  Outcome: PROGRESSING AS EXPECTED     Problem: Respiratory:  Goal: Respiratory status will improve  Outcome: PROGRESSING AS EXPECTED     Problem: Psychosocial Needs:  Goal: Level of anxiety will decrease  Outcome: PROGRESSING AS EXPECTED     Problem: Safety - Medical Restraint  Goal: Remains free of injury from restraints (Restraint for Interference with Medical Device)  Description: INTERVENTIONS:  1. Determine that other, less restrictive measures have been tried or would not be effective before applying the restraint  2. Evaluate the patient's condition at the time of restraint application  3. Inform patient/family regarding the reason for restraint  4. Q2H: Monitor safety, psychosocial status, comfort, nutrition and hydration  Outcome: PROGRESSING SLOWER THAN EXPECTED  Goal: Free from  restraint(s) (Restraint for Interference with Medical Device)  Description: INTERVENTIONS:  1. ONCE/SHIFT or MINIMUM Q12H: Assess and document the continuing need for restraints  2. Q24H: Continued use of restraint requires LIP to perform face to face examination and written order  3. Identify and implement measures to help patient regain control  Outcome: PROGRESSING SLOWER THAN EXPECTED     Problem: Skin Integrity  Goal: Risk for impaired skin integrity will decrease  Outcome: PROGRESSING AS EXPECTED     Problem: Pain Management  Goal: Pain level will decrease to patient's comfort goal  Outcome: PROGRESSING AS EXPECTED     Problem: Urinary Elimination:  Goal: Ability to reestablish a normal urinary elimination pattern will improve  Outcome: PROGRESSING AS EXPECTED     Problem: Mobility  Goal: Risk for activity intolerance will decrease  Outcome: PROGRESSING AS EXPECTED

## 2020-10-22 NOTE — CARE PLAN
Problem: Safety - Medical Restraint  Goal: Remains free of injury from restraints (Restraint for Interference with Medical Device)  Description: INTERVENTIONS:  1. Determine that other, less restrictive measures have been tried or would not be effective before applying the restraint  2. Evaluate the patient's condition at the time of restraint application  3. Inform patient/family regarding the reason for restraint  4. Q2H: Monitor safety, psychosocial status, comfort, nutrition and hydration  Outcome: PROGRESSING AS EXPECTED  Flowsheets (Taken 10/15/2020 1373 by Adamaris Olvera R.N.)  Addressed this shift: Remains free of injury from restraints (restraint for interference with medical device): Every 2 hours: Monitor safety, psychosocial status, comfort, nutrition and hydration  Note: RN able to fit atleast 2 fingers underneath the restraints. No signs of injury from restraints. Pt able to move hands and wrists under restraints. Q hour rounding with pt in place. Pt close to nurses station.      Problem: Safety  Goal: Will remain free from falls  Outcome: PROGRESSING SLOWER THAN EXPECTED  Note: Pt does not utilize call light to get up and out of bed. Pt continues to pull off his restraints and stand up unassisted, which has recently led to an unassisted fall in the pts room.

## 2020-10-22 NOTE — PROGRESS NOTES
Pt continues to be agitated and more restless. Pt medicated per MAR for reslessness. Vest restraint started at 1330 with active order from MD. Hourly rounding in place. [facility].

## 2020-10-22 NOTE — PROGRESS NOTES
Hospital Medicine Daily Progress Note    Date of Service  10/22/2020    Chief Complaint  50 y.o. male with a pmhx of HTN, chronic pain and alcohol abuse who presented 9/9/2020 with altered mental status.     Hospital Course  50 y.o. male with a pmhx of HTN, chronic pain and alcohol abuse who presented 9/9/2020 with altered mental status, he was found obtunded in his house by his ex-wife who was checking on him as he had not been seen for 2 days or been able to get a hold of him.  He was found in a chair,  A&Ox0 and hypotensive.  He arrived in the ER with a GCS of 7 (E1,V1,M5) and severely hypotensive.  The patient was intubated for airway protection and central venous access was obtained for administration of high-volume crystalloid resuscitation and vasopressor therapy.    CT head and abdomen and pelvis were all unremarkable. He was briefly treated with antibiotics for concern for an underlying infection and a lumbar tap done was negative. Cultures remained negative.   EEG was done for concern for seizure-like activity was without any epileptiform activity.  He was extubated on 9/11 and has been able to protect his airway ever since. He received benzodiazepines and IV thiamine for etoh.  Now awaiting placement however continues to be impulsive and requires restraints intermittently.  He was found to have UTI s/p treatment with bactrim.  He had urinary retention s/p newton but continued to self-remove.     Interval Problem Update  - continues to have agitation and restlessness, trying to get out of bed, needing vest now as well, will try increasing risperdal to BID  - doing bladder scans but no retention for last day  - Afebrile  - On RA  - still working on dc planning and guardianship    Consultants/Specialty  Intensivist    Code Status  Full Code    Disposition  Pending SNF/guardianship      Review of Systems  Review of Systems   Constitutional: Negative for malaise/fatigue.   HENT: Negative.    Eyes: Negative.     Respiratory: Negative for cough, hemoptysis and sputum production.    Cardiovascular: Negative for chest pain, palpitations, orthopnea and claudication.   Gastrointestinal: Negative for abdominal pain, heartburn, nausea and vomiting.   Genitourinary: Negative for dysuria, frequency and urgency.   Musculoskeletal: Positive for falls (last fall 10/20).   Neurological: Negative.    Psychiatric/Behavioral: Negative for hallucinations.       Physical Exam  Temp:  [36.2 °C (97.2 °F)-36.6 °C (97.9 °F)] 36.6 °C (97.9 °F)  Pulse:  [83-99] 99  Resp:  [15-18] 16  BP: (116-137)/(74-92) 122/84  SpO2:  [92 %-99 %] 95 %    Physical Exam  Vitals signs and nursing note reviewed.   Constitutional:       General: He is not in acute distress.     Appearance: He is not toxic-appearing.   HENT:      Head: Normocephalic and atraumatic.      Nose: Nose normal.      Mouth/Throat:      Mouth: Mucous membranes are moist.   Eyes:      General: No scleral icterus.        Right eye: No discharge.         Left eye: No discharge.      Conjunctiva/sclera: Conjunctivae normal.   Neck:      Musculoskeletal: Normal range of motion.   Cardiovascular:      Rate and Rhythm: Normal rate and regular rhythm.      Pulses: Normal pulses.      Heart sounds: Normal heart sounds.   Pulmonary:      Effort: Pulmonary effort is normal.      Breath sounds: Normal breath sounds.   Abdominal:      General: Bowel sounds are normal. There is no distension.      Palpations: Abdomen is soft.      Tenderness: There is no abdominal tenderness.   Musculoskeletal:      Right lower leg: No edema.      Left lower leg: No edema.   Skin:     General: Skin is warm and dry.   Neurological:      Mental Status: He is alert. He is disoriented.      Comments: Oriented to x 2   Psychiatric:         Mood and Affect: Affect is flat.         Thought Content: Thought content is delusional.         Cognition and Memory: Cognition is impaired. He exhibits impaired recent memory.          Judgment: Judgment is impulsive.      Comments: Confused        Current Facility-Administered Medications:   •  haloperidol lactate (HALDOL) injection 2.5 mg, 2.5 mg, Intramuscular, Q4HRS PRN, Gisele Barnes M.D., 2.5 mg at 10/21/20 2209  •  risperiDONE (RISPERDAL) tablet 1 mg, 1 mg, Oral, Q EVENING, Gisele Barnes M.D., 1 mg at 10/21/20 1724  •  LORazepam (ATIVAN) injection 0.5 mg, 0.5 mg, Intravenous, Q6HRS PRN, Gisele Barnes M.D., 0.5 mg at 10/22/20 1320  •  thiamine tablet 100 mg, 100 mg, Oral, DAILY, Gisele Barnes M.D., 100 mg at 10/22/20 0403  •  tamsulosin (FLOMAX) capsule 0.8 mg, 0.8 mg, Oral, AFTER BREAKFAST, Gisele Barnes M.D., 0.8 mg at 10/22/20 0929  •  enoxaparin (LOVENOX) inj 40 mg, 40 mg, Subcutaneous, Q EVENING, Gisele Barnes M.D., 40 mg at 10/21/20 1724  •  acetaminophen (TYLENOL) tablet 650 mg, 650 mg, Oral, Q6HRS PRN, Gisele Barnes M.D., 650 mg at 09/17/20 1723  •  senna-docusate (PERICOLACE or SENOKOT S) 8.6-50 MG per tablet 2 Tab, 2 Tab, Oral, BID, 2 Tab at 10/22/20 0403 **AND** polyethylene glycol/lytes (MIRALAX) PACKET 1 Packet, 1 Packet, Oral, QDAY PRN, 1 Packet at 10/17/20 0458 **AND** magnesium hydroxide (MILK OF MAGNESIA) suspension 30 mL, 30 mL, Oral, QDAY PRN, 30 mL at 09/18/20 1143 **AND** [DISCONTINUED] bisacodyl (DULCOLAX) suppository 10 mg, 10 mg, Rectal, QDAY PRN, Xavi Durham D.O.  •  Respiratory Therapy Consult, , Nebulization, Continuous RT, Gisele Barnes M.D.  •  ipratropium-albuterol (DUONEB) nebulizer solution, 3 mL, Nebulization, Q2HRS PRN (RT), Gisele Barnes M.D.  •  ondansetron (ZOFRAN) syringe/vial injection 4 mg, 4 mg, Intravenous, Q4HRS PRN, Gisele Barnes M.D.      Fluids    Intake/Output Summary (Last 24 hours) at 10/22/2020 1431  Last data filed at 10/21/2020 1626  Gross per 24 hour   Intake 180 ml   Output --   Net 180 ml       Laboratory  Recent Labs     10/20/20  0837   WBC 7.9   RBC 4.23*   HEMOGLOBIN 12.4*   HEMATOCRIT 39.5*   MCV 93.4   MCH 29.3   MCHC  31.4*   RDW 45.0   PLATELETCT 240   MPV 12.1     Recent Labs     10/20/20  0837   SODIUM 141   POTASSIUM 3.3*   CHLORIDE 106   CO2 25   GLUCOSE 101*   BUN 9   CREATININE 0.73   CALCIUM 9.5                   Imaging  MR-BRAIN-W/O   Final Result      1.  Moderate diffuse cerebral atrophy.      2.  Minimal periventricular and juxtacortical white matter changes consistent with chronic microvascular ischemic gliosis.      3.  No evidence of acute infarction in the brain parenchyma.      DX-ABDOMEN FOR TUBE PLACEMENT   Final Result      Feeding tube tip overlies the second portion of the duodenum.      DX-CHEST-PORTABLE (1 VIEW)   Final Result         1.  Patchy left lung base opacity, new since prior, could represent early infiltrate            DX-CHEST-PORTABLE (1 VIEW)   Final Result         1.  Patchy left lung base opacity, new since prior, could represent early infiltrate         [id number] VIEW   Final Result      Enteric tube projects over the distal stomach/pylorus.         EC-ECHOCARDIOGRAM COMPLETE W/O CONT   Final Result      DX-CHEST-PORTABLE (1 VIEW)   Final Result         1.  No acute cardiopulmonary disease.      CT-ABDOMEN-PELVIS W/O   Final Result      1.  No renal stone or hydronephrosis.   2.  Normal appendix.   3.  No focal mesenteric inflammatory process.      DX-ABDOMEN FOR TUBE PLACEMENT   Final Result      NG tube tip projects over expected location the gastric fundus.      US-ABDOMEN COMPLETE SURVEY   Final Result         1.  Echogenic liver compatible with fatty change versus fibrosis.   2.  Gallbladder sludge without additional sonographic findings of cholecystitis.   3.  Partial atrophic bilateral kidneys with lobulated contour         CT-HEAD W/O   Final Result         1.  No acute intracranial abnormality is identified, there are nonspecific white matter changes, commonly associated with small vessel ischemic disease.  Associated mild cerebral atrophy is noted.   2.  Atherosclerosis.       DX-CHEST-PORTABLE (1 VIEW)   Final Result         1.  No acute cardiopulmonary disease.           Assessment/Plan  * Toxic encephalopathy- (present on admission)  Assessment & Plan  Unknown etiology whether this is hypoxic induced brain injury versus Wernicke encephalopathy  LP- negative  MRI brain negative  EEG was negative for seizures  TSH normal  Prn haldol  Po risperdal... Increased to 1 mg nightly on 9/27/2020-->increased to 0.5mg qam, 1mg nightly 10/22  Psych md consult--> they believe it is etoh related  HIV/RPR negative         Normocytic anemia  Assessment & Plan  Mild, unknown etiology  No active bleeding  CTM    Acute cystitis  Assessment & Plan  resolved      Urinary retention- (present on admission)  Assessment & Plan  Po flomax  Improved w/ Flomax.      Other dysphagia- (present on admission)  Assessment & Plan  Passed speech eval now  On modified diet    Hypernatremia  Assessment & Plan  Resolved  CTM intermittently    Acute respiratory failure with hypoxia (HCC)- (present on admission)  Assessment & Plan  2/2 mental status  Extubated 9/11  Remains on room air    Benzodiazepine dependence (HCC)- (present on admission)  Assessment & Plan  EEG after sz-like activity 9/10 was negative despite jerking motions  S/p benzos, now off    Alcohol abuse- (present on admission)  Assessment & Plan  Reported hx of  Continue Vitamins  High dose thiamine  Seizure precautions  S/p etoh w/d protocol    Septic shock (HCC)- (present on admission)  Assessment & Plan  Resolved  Broad-spectrum antibiotics: s/p vanc/zosyn --> C3, now off abx  Blood culture, CSF culture, urinalysis allnegative  Monitor off antibiotics        JEWELS (acute kidney injury) (HCC)- (present on admission)  Assessment & Plan  Consistent with ATN 2/2 hypotension, dehydration and sepsis  Resolved  Avoid nephrotoxins.  Renal dose all medications.      Hypokalemia- (present on admission)  Assessment & Plan  S/p supplementation  CTM       VTE  prophylaxis: Lovenox

## 2020-10-23 PROCEDURE — A9270 NON-COVERED ITEM OR SERVICE: HCPCS | Performed by: INTERNAL MEDICINE

## 2020-10-23 PROCEDURE — 97530 THERAPEUTIC ACTIVITIES: CPT | Mod: CO

## 2020-10-23 PROCEDURE — A9270 NON-COVERED ITEM OR SERVICE: HCPCS | Performed by: HOSPITALIST

## 2020-10-23 PROCEDURE — 99232 SBSQ HOSP IP/OBS MODERATE 35: CPT | Performed by: INTERNAL MEDICINE

## 2020-10-23 PROCEDURE — 700102 HCHG RX REV CODE 250 W/ 637 OVERRIDE(OP): Performed by: INTERNAL MEDICINE

## 2020-10-23 PROCEDURE — 700111 HCHG RX REV CODE 636 W/ 250 OVERRIDE (IP): Performed by: HOSPITALIST

## 2020-10-23 PROCEDURE — 700102 HCHG RX REV CODE 250 W/ 637 OVERRIDE(OP): Performed by: HOSPITALIST

## 2020-10-23 PROCEDURE — 97535 SELF CARE MNGMENT TRAINING: CPT | Mod: CO

## 2020-10-23 PROCEDURE — 97129 THER IVNTJ 1ST 15 MIN: CPT | Mod: CO

## 2020-10-23 PROCEDURE — 770006 HCHG ROOM/CARE - MED/SURG/GYN SEMI*

## 2020-10-23 RX ADMIN — DOCUSATE SODIUM 50 MG AND SENNOSIDES 8.6 MG 2 TABLET: 8.6; 5 TABLET, FILM COATED ORAL at 06:12

## 2020-10-23 RX ADMIN — RISPERIDONE 1 MG: 0.5 TABLET ORAL at 17:08

## 2020-10-23 RX ADMIN — Medication 100 MG: at 06:12

## 2020-10-23 RX ADMIN — RISPERIDONE 0.5 MG: 0.5 TABLET ORAL at 08:53

## 2020-10-23 RX ADMIN — LORAZEPAM 0.5 MG: 2 INJECTION INTRAMUSCULAR; INTRAVENOUS at 01:51

## 2020-10-23 RX ADMIN — TAMSULOSIN HYDROCHLORIDE 0.8 MG: 0.4 CAPSULE ORAL at 08:53

## 2020-10-23 RX ADMIN — DOCUSATE SODIUM 50 MG AND SENNOSIDES 8.6 MG 2 TABLET: 8.6; 5 TABLET, FILM COATED ORAL at 17:08

## 2020-10-23 RX ADMIN — ENOXAPARIN SODIUM 40 MG: 40 INJECTION SUBCUTANEOUS at 17:08

## 2020-10-23 ASSESSMENT — ENCOUNTER SYMPTOMS
PALPITATIONS: 0
ABDOMINAL PAIN: 0
CLAUDICATION: 0
VOMITING: 0
NAUSEA: 0
SPUTUM PRODUCTION: 0
HEMOPTYSIS: 0
COUGH: 0
HEARTBURN: 0
ORTHOPNEA: 0
NEUROLOGICAL NEGATIVE: 1
FALLS: 1
EYES NEGATIVE: 1
HALLUCINATIONS: 0

## 2020-10-23 ASSESSMENT — COGNITIVE AND FUNCTIONAL STATUS - GENERAL
DAILY ACTIVITIY SCORE: 17
EATING MEALS: A LITTLE
TOILETING: A LITTLE
DRESSING REGULAR LOWER BODY CLOTHING: A LITTLE
SUGGESTED CMS G CODE MODIFIER DAILY ACTIVITY: CK
PERSONAL GROOMING: A LITTLE
HELP NEEDED FOR BATHING: A LOT
DRESSING REGULAR UPPER BODY CLOTHING: A LITTLE

## 2020-10-23 ASSESSMENT — FIBROSIS 4 INDEX: FIB4 SCORE: .6125

## 2020-10-23 NOTE — DIETARY
Nutrition services: Day 44 of admit.  49 yo male admitted with acute respiratory failure and hypoxia.  Follow up for adequacy of nutritional intake.     Evaluation:  1. Previously on tube feedings advanced to po diet on 9/14. Pt had been eating well but intake has been declinling. Currently on a regular diet with 1:1 supervision refusing some meals and taking 25 - 100% of others. Supplements being sent - little documentation of intake.    2. Pt continues to be restrained and now also has vest. RN last week informed me that pt eats well when not restrained. Today's RN reports pt is 1:1 feeder when restrained. RN to discuss with charge ability to remove restraints for meals.   3. Pt is noted to be confused with hallucinations and delusional at times.     Malnutrition risk: na    Recommendations/Plan:  1. Remove restraints for eating to improve intake of meals as appropriate/safe.   2. encourage intake of meals and supplements  3. document intake of all meals and supplements as % taken in ADL's to provide interdisciplinary communication across all shifts   4. monitor daily weights  5. Nutrition rep will continue to see patient for ongoing meal and snack preferences.

## 2020-10-23 NOTE — THERAPY
"Occupational Therapy  Daily Treatment     Patient Name: Yury Blake  Age:  50 y.o., Sex:  male  Medical Record #: [id number]  Today's Date: 10/23/2020     Precautions  Precautions: (P) Fall Risk  Comments: (P) Bilateral wrist restraints and posey vest.     Assessment    Pt agreed to attempt self care tasks. Pt highly confused and hallucinating, impulsive. Pt required Min A for all activity today and verbal cues to stay on task. Pt seeing people, mehran drawers with pants hanging out of them and a large field of greens in the corner of the room. RN updated. Pt refused to use FWW and did allow HHA to and from bed, to/from BSC and with all dressing UB, LB and berto care. Pt with increased encouragement and TLC will do more but is highly limited due to his cognition. Will continue OT POC. RN updated.       Plan    Continue current treatment plan.    DC Equipment Recommendations: (P) Unable to determine at this time  Discharge Recommendations: (P) Recommend post-acute placement for additional occupational therapy services prior to discharge home    Subjective    \" I don't want to go to that field over there;\" \" It looks scary\".      Objective       10/23/20 1252   Cognition    Cognition / Consciousness X   Orientation Level Not Oriented to Month;Not Oriented to Day;Not Oriented to Time;Not Oriented to Place;Not Oriented to Reason;Not Oriented to Year   Level of Consciousness Confused   Ability To Follow Commands 1 Step   Safety Awareness Impulsive;Impaired   New Learning Impaired   Attention Impaired   Sequencing Impaired   Comments Hallucinating, Seeing people, mehran drawers and a large field of greens not in room. Agitated and very impulsive. Poor attention span and easily distracted, Needed frequent cues to stay on task.     Strength Upper Body   Upper Body Strength  WDL   Comments WFL   Other Treatments   Other Treatments Provided Psychosocial intervention addressed.   Balance   Sitting Balance (Static) " Fair   Sitting Balance (Dynamic) Fair   Standing Balance (Static) Fair   Standing Balance (Dynamic) Fair -   Weight Shift Sitting Fair   Weight Shift Standing Fair   Comments With HHA, refused FWW.    Activities of Daily Living   Eating Supervision   Grooming   (refused)   Bathing Minimal Assist  (sponge bathing  UB and for berto care)   Upper Body Dressing Minimal Assist   Lower Body Dressing Minimal Assist   Toileting Minimal Assist   Skilled Intervention Verbal Cuing;Tactile Cuing;Sequencing;Compensatory Strategies   Comments limited by cognition   Functional Mobility   Sit to Stand Minimal Assist   Bed, Chair, Wheelchair Transfer Minimal Assist   Toilet Transfers Minimal Assist  (to/from BSC)   Mobility HHA   Skilled Intervention Verbal Cuing;Sequencing;Compensatory Strategies   Comments for safety awareness and fall prevention.    Activity Tolerance   Comments limited by cognition .    Short Term Goals   Short Term Goal # 1 Pt will complete toilet transfer using BSC if needed with spv, no LOB by discharge.   Goal Outcome # 1 Progressing slower than expected   Short Term Goal # 2 Pt will complete standing grooming/hygiene with spv by discharge.   Goal Outcome # 2 Progressing slower than expected   Short Term Goal # 3 Pt will complete toileting with spv by discharge.   Goal Outcome # 3 Progressing slower than expected   Anticipated Discharge Equipment and Recommendations   DC Equipment Recommendations Unable to determine at this time   Discharge Recommendations Recommend post-acute placement for additional occupational therapy services prior to discharge home   Interdisciplinary Plan of Care Collaboration   Patient Position at End of Therapy In Bed;Bed Alarm On;Wrist Restraints Applied;Luana Vest Applied;Tray Table within Reach;Phone within Reach   Collaboration Comments RN updated

## 2020-10-23 NOTE — PROGRESS NOTES
"Assumed care at 0700 following night nurse report, assessment completed. Patient is A&O X1, disoriented to place, time, situation. Patient denies pain at this time. Fall precautions and appropriate signs in place. Call light/phone system and RN extension updated on whiteboard. Bed alarm is in use, patient 2 person assist. Patient denies any additional needs at this time, Hourly rounding place./89   Pulse 89   Temp 36.5 °C (97.7 °F) (Temporal)   Resp 18   Ht 1.803 m (5' 10.98\")   Wt 80.4 kg (177 lb 4 oz)   SpO2 95%   BMI 24.73 kg/m²     "

## 2020-10-23 NOTE — PROGRESS NOTES
[facility] Medicine Daily Progress Note    Date of Service  10/23/2020    Chief Complaint  50 y.o. male with a pmhx of HTN, chronic pain and alcohol abuse who presented 9/9/2020 with altered mental status.     Hospital Course  50 y.o. male with a pmhx of HTN, chronic pain and alcohol abuse who presented 9/9/2020 with altered mental status, he was found obtunded in his house by his ex-wife who was checking on him as he had not been seen for 2 days or been able to get a hold of him.  He was found in a chair,  A&Ox0 and hypotensive.  He arrived in the ER with a GCS of 7 (E1,V1,M5) and severely hypotensive.  The patient was intubated for airway protection and central venous access was obtained for administration of high-volume crystalloid resuscitation and vasopressor therapy.    CT head and abdomen and pelvis were all unremarkable. He was briefly treated with antibiotics for concern for an underlying infection and a lumbar tap done was negative. Cultures remained negative.   EEG was done for concern for seizure-like activity was without any epileptiform activity.  He was extubated on 9/11 and has been able to protect his airway ever since. He received benzodiazepines and IV thiamine for etoh.  Now awaiting placement however continues to be impulsive and requires restraints intermittently.  He was found to have UTI s/p treatment with bactrim.  He had urinary retention s/p newton but continued to self-remove.     Interval Problem Update  - No acute overnight events  - Added risperdal daily (in addition to nightly) to help with day time impulsiveness/restlessness causing falls  - Afebrile  - On RA    Consultants/Specialty  Intensivist    Code Status  Full Code    Disposition  Pending SNF/guardianship      Review of Systems  Review of Systems   Unable to perform ROS: Psychiatric disorder   Constitutional: Negative for malaise/fatigue.   HENT: Negative.    Eyes: Negative.    Respiratory: Negative for cough, hemoptysis and  sputum production.    Cardiovascular: Negative for chest pain, palpitations, orthopnea and claudication.   Gastrointestinal: Negative for abdominal pain, heartburn, nausea and vomiting.   Genitourinary: Negative for dysuria, frequency and urgency.   Musculoskeletal: Positive for falls (last fall 10/20).   Neurological: Negative.    Psychiatric/Behavioral: Negative for hallucinations.       Physical Exam  Temp:  [36.3 °C (97.3 °F)-36.5 °C (97.7 °F)] 36.5 °C (97.7 °F)  Pulse:  [78-89] 89  Resp:  [17-18] 18  BP: (123-135)/(81-89) 123/89  SpO2:  [94 %-100 %] 95 %    Physical Exam  Vitals signs and nursing note reviewed.   Constitutional:       General: He is not in acute distress.     Appearance: He is not toxic-appearing.   HENT:      Head: Normocephalic and atraumatic.      Nose: Nose normal.      Mouth/Throat:      Mouth: Mucous membranes are moist.   Eyes:      General: No scleral icterus.        Right eye: No discharge.         Left eye: No discharge.      Conjunctiva/sclera: Conjunctivae normal.   Neck:      Musculoskeletal: Normal range of motion.   Cardiovascular:      Rate and Rhythm: Normal rate and regular rhythm.      Pulses: Normal pulses.      Heart sounds: Normal heart sounds.   Pulmonary:      Effort: Pulmonary effort is normal.      Breath sounds: Normal breath sounds.   Abdominal:      General: Bowel sounds are normal. There is no distension.      Palpations: Abdomen is soft.      Tenderness: There is no abdominal tenderness.   Musculoskeletal:      Right lower leg: No edema.      Left lower leg: No edema.   Skin:     General: Skin is warm and dry.   Neurological:      Mental Status: He is alert. He is disoriented.      Comments: Oriented to x 2   Psychiatric:         Mood and Affect: Affect is flat.         Thought Content: Thought content is delusional.         Cognition and Memory: Cognition is impaired. He exhibits impaired recent memory.         Judgment: Judgment is impulsive.      Comments:  Confused        Current Facility-Administered Medications:   •  risperiDONE (RISPERDAL) tablet 1 mg, 1 mg, Oral, Q EVENING, Akanksha Martinez M.D., 1 mg at 10/22/20 1708  •  risperiDONE (RISPERDAL) tablet 0.5 mg, 0.5 mg, Oral, DAILY, Akanksha Martinez M.D., 0.5 mg at 10/23/20 0853  •  haloperidol lactate (HALDOL) injection 2.5 mg, 2.5 mg, Intramuscular, Q4HRS PRN, Gisele Barnes M.D., 2.5 mg at 10/21/20 2209  •  LORazepam (ATIVAN) injection 0.5 mg, 0.5 mg, Intravenous, Q6HRS PRN, Gisele Barnes M.D., 0.5 mg at 10/23/20 0151  •  thiamine tablet 100 mg, 100 mg, Oral, DAILY, Gisele Barnes M.D., 100 mg at 10/23/20 0612  •  tamsulosin (FLOMAX) capsule 0.8 mg, 0.8 mg, Oral, AFTER BREAKFAST, Gisele Barnes M.D., 0.8 mg at 10/23/20 0853  •  enoxaparin (LOVENOX) inj 40 mg, 40 mg, Subcutaneous, Q EVENING, Gisele Barnes M.D., 40 mg at 10/22/20 1708  •  acetaminophen (TYLENOL) tablet 650 mg, 650 mg, Oral, Q6HRS PRN, Gisele Barnes M.D., 650 mg at 09/17/20 1723  •  senna-docusate (PERICOLACE or SENOKOT S) 8.6-50 MG per tablet 2 Tab, 2 Tab, Oral, BID, 2 Tab at 10/23/20 0612 **AND** polyethylene glycol/lytes (MIRALAX) PACKET 1 Packet, 1 Packet, Oral, QDAY PRN, 1 Packet at 10/17/20 0458 **AND** magnesium hydroxide (MILK OF MAGNESIA) suspension 30 mL, 30 mL, Oral, QDAY PRN, 30 mL at 09/18/20 1143 **AND** [DISCONTINUED] bisacodyl (DULCOLAX) suppository 10 mg, 10 mg, Rectal, QDAY PRN, Xavi Durham D.O.  •  Respiratory Therapy Consult, , Nebulization, Continuous RT, Gisele Barnes M.D.  •  ipratropium-albuterol (DUONEB) nebulizer solution, 3 mL, Nebulization, Q2HRS PRN (RT), Gisele Barnes M.D.  •  ondansetron (ZOFRAN) syringe/vial injection 4 mg, 4 mg, Intravenous, Q4HRS PRN, Gisele Barnes M.D.      Fluids    Intake/Output Summary (Last 24 hours) at 10/23/2020 1421  Last data filed at 10/22/2020 1900  Gross per 24 hour   Intake 100 ml   Output --   Net 100 ml       Laboratory                        Imaging  MR-BRAIN-W/O   Final  Result      1.  Moderate diffuse cerebral atrophy.      2.  Minimal periventricular and juxtacortical white matter changes consistent with chronic microvascular ischemic gliosis.      3.  No evidence of acute infarction in the brain parenchyma.      DX-ABDOMEN FOR TUBE PLACEMENT   Final Result      Feeding tube tip overlies the second portion of the duodenum.      DX-CHEST-PORTABLE (1 VIEW)   Final Result         1.  Patchy left lung base opacity, new since prior, could represent early infiltrate            DX-CHEST-PORTABLE (1 VIEW)   Final Result         1.  Patchy left lung base opacity, new since prior, could represent early infiltrate         [id number] VIEW   Final Result      Enteric tube projects over the distal stomach/pylorus.         EC-ECHOCARDIOGRAM COMPLETE W/O CONT   Final Result      DX-CHEST-PORTABLE (1 VIEW)   Final Result         1.  No acute cardiopulmonary disease.      CT-ABDOMEN-PELVIS W/O   Final Result      1.  No renal stone or hydronephrosis.   2.  Normal appendix.   3.  No focal mesenteric inflammatory process.      DX-ABDOMEN FOR TUBE PLACEMENT   Final Result      NG tube tip projects over expected location the gastric fundus.      US-ABDOMEN COMPLETE SURVEY   Final Result         1.  Echogenic liver compatible with fatty change versus fibrosis.   2.  Gallbladder sludge without additional sonographic findings of cholecystitis.   3.  Partial atrophic bilateral kidneys with lobulated contour         CT-HEAD W/O   Final Result         1.  No acute intracranial abnormality is identified, there are nonspecific white matter changes, commonly associated with small vessel ischemic disease.  Associated mild cerebral atrophy is noted.   2.  Atherosclerosis.      DX-CHEST-PORTABLE (1 VIEW)   Final Result         1.  No acute cardiopulmonary disease.           Assessment/Plan  * Toxic encephalopathy- (present on admission)  Assessment & Plan  Unknown etiology whether this is hypoxic induced brain  injury versus Wernicke encephalopathy  LP- negative  MRI brain negative  EEG was negative for seizures  TSH normal  Prn haldol  Po risperdal... Increased to 1 mg nightly on 9/27/2020-->increased to 0.5mg qam, 1mg nightly 10/22  Psych md consult--> they believe it is etoh related  HIV/RPR negative         Normocytic anemia  Assessment & Plan  Mild, unknown etiology  No active bleeding  CTM    Acute cystitis  Assessment & Plan  resolved      Urinary retention- (present on admission)  Assessment & Plan  Po flomax  Improved w/ Flomax.      Other dysphagia- (present on admission)  Assessment & Plan  Passed speech eval now  On modified diet    Hypernatremia  Assessment & Plan  Resolved  CTM intermittently    Acute respiratory failure with hypoxia (HCC)- (present on admission)  Assessment & Plan  2/2 mental status  Extubated 9/11  Remains on room air    Benzodiazepine dependence (HCC)- (present on admission)  Assessment & Plan  EEG after sz-like activity 9/10 was negative despite jerking motions  S/p benzos, now off    Alcohol abuse- (present on admission)  Assessment & Plan  Reported hx of  Continue Vitamins  High dose thiamine  Seizure precautions  S/p etoh w/d protocol    Septic shock (HCC)- (present on admission)  Assessment & Plan  Resolved  Broad-spectrum antibiotics: s/p vanc/zosyn --> C3, now off abx  Blood culture, CSF culture, urinalysis allnegative  Monitor off antibiotics        JEWELS (acute kidney injury) (HCC)- (present on admission)  Assessment & Plan  Consistent with ATN 2/2 hypotension, dehydration and sepsis  Resolved  Avoid nephrotoxins.  Renal dose all medications.      Hypokalemia- (present on admission)  Assessment & Plan  S/p supplementation  CTM       VTE prophylaxis: Lovenox

## 2020-10-23 NOTE — PROGRESS NOTES
Received report from morning RN. Assumed care of pt at 1900. Pt calm and resting during assessment. Restrains in place bilaterally on wrist and vest. Continuing bedside commitment. Bed locked and lowered, call light in reach.

## 2020-10-23 NOTE — CARE PLAN
Problem: Safety - Medical Restraint  Goal: Remains free of injury from restraints (Restraint for Interference with Medical Device)  Description: INTERVENTIONS:  1. Determine that other, less restrictive measures have been tried or would not be effective before applying the restraint  2. Evaluate the patient's condition at the time of restraint application  3. Inform patient/family regarding the reason for restraint  4. Q2H: Monitor safety, psychosocial status, comfort, nutrition and hydration  Note: Monitor safety, and address comfort and nutrition needs.      Problem: Urinary Elimination:  Goal: Ability to reestablish a normal urinary elimination pattern will improve  Intervention: Assess and monitor for signs and symptoms of urinary retention  Note: Bladder scan per protocol

## 2020-10-24 LAB
ANION GAP SERPL CALC-SCNC: 13 MMOL/L (ref 7–16)
BUN SERPL-MCNC: 6 MG/DL (ref 8–22)
CALCIUM SERPL-MCNC: 9.7 MG/DL (ref 8.5–10.5)
CHLORIDE SERPL-SCNC: 105 MMOL/L (ref 96–112)
CO2 SERPL-SCNC: 23 MMOL/L (ref 20–33)
CREAT SERPL-MCNC: 0.65 MG/DL (ref 0.5–1.4)
GLUCOSE SERPL-MCNC: 81 MG/DL (ref 65–99)
MAGNESIUM SERPL-MCNC: 2.2 MG/DL (ref 1.5–2.5)
POTASSIUM SERPL-SCNC: 3.7 MMOL/L (ref 3.6–5.5)
SODIUM SERPL-SCNC: 141 MMOL/L (ref 135–145)

## 2020-10-24 PROCEDURE — 83735 ASSAY OF MAGNESIUM: CPT

## 2020-10-24 PROCEDURE — A9270 NON-COVERED ITEM OR SERVICE: HCPCS | Performed by: HOSPITALIST

## 2020-10-24 PROCEDURE — A9270 NON-COVERED ITEM OR SERVICE: HCPCS | Performed by: INTERNAL MEDICINE

## 2020-10-24 PROCEDURE — 770006 HCHG ROOM/CARE - MED/SURG/GYN SEMI*

## 2020-10-24 PROCEDURE — 700102 HCHG RX REV CODE 250 W/ 637 OVERRIDE(OP): Performed by: HOSPITALIST

## 2020-10-24 PROCEDURE — 700102 HCHG RX REV CODE 250 W/ 637 OVERRIDE(OP): Performed by: INTERNAL MEDICINE

## 2020-10-24 PROCEDURE — 700111 HCHG RX REV CODE 636 W/ 250 OVERRIDE (IP): Performed by: HOSPITALIST

## 2020-10-24 PROCEDURE — 36415 COLL VENOUS BLD VENIPUNCTURE: CPT

## 2020-10-24 PROCEDURE — 80048 BASIC METABOLIC PNL TOTAL CA: CPT

## 2020-10-24 PROCEDURE — 99232 SBSQ HOSP IP/OBS MODERATE 35: CPT | Performed by: INTERNAL MEDICINE

## 2020-10-24 RX ORDER — RISPERIDONE 0.5 MG/1
1 TABLET ORAL 2 TIMES DAILY
Status: DISCONTINUED | OUTPATIENT
Start: 2020-10-24 | End: 2020-12-26

## 2020-10-24 RX ADMIN — DOCUSATE SODIUM 50 MG AND SENNOSIDES 8.6 MG 2 TABLET: 8.6; 5 TABLET, FILM COATED ORAL at 05:22

## 2020-10-24 RX ADMIN — TAMSULOSIN HYDROCHLORIDE 0.8 MG: 0.4 CAPSULE ORAL at 08:15

## 2020-10-24 RX ADMIN — Medication 100 MG: at 05:22

## 2020-10-24 RX ADMIN — ENOXAPARIN SODIUM 40 MG: 40 INJECTION SUBCUTANEOUS at 17:31

## 2020-10-24 RX ADMIN — RISPERIDONE 1 MG: 1 TABLET, FILM COATED ORAL at 17:29

## 2020-10-24 RX ADMIN — RISPERIDONE 0.5 MG: 0.5 TABLET ORAL at 08:14

## 2020-10-24 RX ADMIN — DOCUSATE SODIUM 50 MG AND SENNOSIDES 8.6 MG 2 TABLET: 8.6; 5 TABLET, FILM COATED ORAL at 17:29

## 2020-10-24 ASSESSMENT — ENCOUNTER SYMPTOMS
PALPITATIONS: 0
CLAUDICATION: 0
FALLS: 1
EYES NEGATIVE: 1
VOMITING: 0
ORTHOPNEA: 0
HEARTBURN: 0
NAUSEA: 0
ABDOMINAL PAIN: 0
NEUROLOGICAL NEGATIVE: 1
SPUTUM PRODUCTION: 0
COUGH: 0
HEMOPTYSIS: 0
HALLUCINATIONS: 0

## 2020-10-24 ASSESSMENT — FIBROSIS 4 INDEX: FIB4 SCORE: .6125

## 2020-10-24 NOTE — PROGRESS NOTES
Assumed care from patient at 0700 from . Patient is A&O x 2, states pain level is 0/10. Bed locked in lowest position with 3 rails up. Call light in place, belongings at bedside. Patient expresses no needs at this time and hourly rounding is in place. Bilateral soft wrist restraints in use, order verified and checks are conducted every 2 hours.

## 2020-10-24 NOTE — CARE PLAN
Problem: Safety - Medical Restraint  Goal: Remains free of injury from restraints (Restraint for Interference with Medical Device)  Description: INTERVENTIONS:  1. Determine that other, less restrictive measures have been tried or would not be effective before applying the restraint  2. Evaluate the patient's condition at the time of restraint application  3. Inform patient/family regarding the reason for restraint  4. Q2H: Monitor safety, psychosocial status, comfort, nutrition and hydration  Outcome: NOT MET  Goal: Free from restraint(s) (Restraint for Interference with Medical Device)  Description: INTERVENTIONS:  1. ONCE/SHIFT or MINIMUM Q12H: Assess and document the continuing need for restraints  2. Q24H: Continued use of restraint requires LIP to perform face to face examination and written order  3. Identify and implement measures to help patient regain control  Outcome: NOT MET

## 2020-10-24 NOTE — PROGRESS NOTES
Hospital Medicine Daily Progress Note    Date of Service  10/24/2020    Chief Complaint  50 y.o. male with a pmhx of HTN, chronic pain and alcohol abuse who presented 9/9/2020 with altered mental status.     Hospital Course  50 y.o. male with a pmhx of HTN, chronic pain and alcohol abuse who presented 9/9/2020 with altered mental status, he was found obtunded in his house by his ex-wife who was checking on him as he had not been seen for 2 days or been able to get a hold of him.  He was found in a chair,  A&Ox0 and hypotensive.  He arrived in the ER with a GCS of 7 (E1,V1,M5) and severely hypotensive.  The patient was intubated for airway protection and central venous access was obtained for administration of high-volume crystalloid resuscitation and vasopressor therapy.    CT head and abdomen and pelvis were all unremarkable. He was briefly treated with antibiotics for concern for an underlying infection and a lumbar tap done was negative. Cultures remained negative.   EEG was done for concern for seizure-like activity was without any epileptiform activity.  He was extubated on 9/11 and has been able to protect his airway ever since. He received benzodiazepines and IV thiamine for etoh.  Now awaiting placement however continues to be impulsive and requires restraints intermittently.  He was found to have UTI s/p treatment with bactrim.  He had urinary retention s/p newton but continued to self-remove.     Interval Problem Update  - No acute overnight events  - Still having intermittent restlessness and trying to get out of bed, added qam risperdal but will increase today to see if we can get him off restraints  - still very confused  - Afebrile  - On RA  - electrolytes normal today    Consultants/Specialty  Intensivist    Code Status  Full Code    Disposition  Pending SNF/guardianship      Review of Systems  Review of Systems   Unable to perform ROS: Psychiatric disorder   Constitutional: Negative for  malaise/fatigue.   HENT: Negative.    Eyes: Negative.    Respiratory: Negative for cough, hemoptysis and sputum production.    Cardiovascular: Negative for chest pain, palpitations, orthopnea and claudication.   Gastrointestinal: Negative for abdominal pain, heartburn, nausea and vomiting.   Genitourinary: Negative for dysuria, frequency and urgency.   Musculoskeletal: Positive for falls (last fall 10/20).   Neurological: Negative.    Psychiatric/Behavioral: Negative for hallucinations.       Physical Exam  Temp:  [36.3 °C (97.4 °F)-36.6 °C (97.9 °F)] 36.4 °C (97.6 °F)  Pulse:  [] 76  Resp:  [18] 18  BP: (119-136)/(78-81) 136/79  SpO2:  [92 %-96 %] 96 %    Physical Exam  Vitals signs and nursing note reviewed.   Constitutional:       General: He is not in acute distress.     Appearance: He is not toxic-appearing.   HENT:      Head: Normocephalic and atraumatic.      Nose: Nose normal.      Mouth/Throat:      Mouth: Mucous membranes are moist.   Eyes:      General: No scleral icterus.        Right eye: No discharge.         Left eye: No discharge.      Conjunctiva/sclera: Conjunctivae normal.   Neck:      Musculoskeletal: Normal range of motion.   Cardiovascular:      Rate and Rhythm: Normal rate and regular rhythm.      Pulses: Normal pulses.      Heart sounds: Normal heart sounds.   Pulmonary:      Effort: Pulmonary effort is normal.      Breath sounds: Normal breath sounds.   Abdominal:      General: Bowel sounds are normal. There is no distension.      Palpations: Abdomen is soft.      Tenderness: There is no abdominal tenderness.   Musculoskeletal:      Right lower leg: No edema.      Left lower leg: No edema.   Skin:     General: Skin is warm and dry.   Neurological:      Mental Status: He is alert. He is disoriented.      Comments: Oriented to x 2 (person, place-[facility] in [place])   Psychiatric:         Mood and Affect: Affect is flat.         Thought Content: Thought content is delusional.          Cognition and Memory: Cognition is impaired. He exhibits impaired recent memory.         Judgment: Judgment is impulsive.      Comments: Confused        Current Facility-Administered Medications:   •  risperiDONE (RISPERDAL) tablet 1 mg, 1 mg, Oral, BID, Akanksha Martinez M.D.  •  haloperidol lactate (HALDOL) injection 2.5 mg, 2.5 mg, Intramuscular, Q4HRS PRN, Gisele Barnes M.D., 2.5 mg at 10/21/20 2209  •  LORazepam (ATIVAN) injection 0.5 mg, 0.5 mg, Intravenous, Q6HRS PRN, Gisele Barnes M.D., 0.5 mg at 10/23/20 0151  •  thiamine tablet 100 mg, 100 mg, Oral, DAILY, Gisele Barnes M.D., 100 mg at 10/24/20 0522  •  tamsulosin (FLOMAX) capsule 0.8 mg, 0.8 mg, Oral, AFTER BREAKFAST, Gisele Barnes M.D., 0.8 mg at 10/24/20 0815  •  enoxaparin (LOVENOX) inj 40 mg, 40 mg, Subcutaneous, Q EVENING, Gisele Barnes M.D., 40 mg at 10/23/20 1708  •  acetaminophen (TYLENOL) tablet 650 mg, 650 mg, Oral, Q6HRS PRN, Gisele Barnes M.D., 650 mg at 09/17/20 1723  •  senna-docusate (PERICOLACE or SENOKOT S) 8.6-50 MG per tablet 2 Tab, 2 Tab, Oral, BID, 2 Tab at 10/24/20 0522 **AND** polyethylene glycol/lytes (MIRALAX) PACKET 1 Packet, 1 Packet, Oral, QDAY PRN, 1 Packet at 10/17/20 0458 **AND** magnesium hydroxide (MILK OF MAGNESIA) suspension 30 mL, 30 mL, Oral, QDAY PRN, 30 mL at 09/18/20 1143 **AND** [DISCONTINUED] bisacodyl (DULCOLAX) suppository 10 mg, 10 mg, Rectal, QDAY PRN, Xavi Durham D.O.  •  Respiratory Therapy Consult, , Nebulization, Continuous RT, Gisele Barnes M.D.  •  ipratropium-albuterol (DUONEB) nebulizer solution, 3 mL, Nebulization, Q2HRS PRN (RT), Gisele Barnes M.D.  •  ondansetron (ZOFRAN) syringe/vial injection 4 mg, 4 mg, Intravenous, Q4HRS PRN, Gisele Barnes M.D.      Fluids    Intake/Output Summary (Last 24 hours) at 10/24/2020 1026  Last data filed at 10/23/2020 1700  Gross per 24 hour   Intake 480 ml   Output --   Net 480 ml       Laboratory      Recent Labs     10/24/20  0415   SODIUM 141   POTASSIUM  3.7   CHLORIDE 105   CO2 23   GLUCOSE 81   BUN 6*   CREATININE 0.65   CALCIUM 9.7                   Imaging  MR-BRAIN-W/O   Final Result      1.  Moderate diffuse cerebral atrophy.      2.  Minimal periventricular and juxtacortical white matter changes consistent with chronic microvascular ischemic gliosis.      3.  No evidence of acute infarction in the brain parenchyma.      DX-ABDOMEN FOR TUBE PLACEMENT   Final Result      Feeding tube tip overlies the second portion of the duodenum.      DX-CHEST-PORTABLE (1 VIEW)   Final Result         1.  Patchy left lung base opacity, new since prior, could represent early infiltrate            DX-CHEST-PORTABLE (1 VIEW)   Final Result         1.  Patchy left lung base opacity, new since prior, could represent early infiltrate         [id number] VIEW   Final Result      Enteric tube projects over the distal stomach/pylorus.         EC-ECHOCARDIOGRAM COMPLETE W/O CONT   Final Result      DX-CHEST-PORTABLE (1 VIEW)   Final Result         1.  No acute cardiopulmonary disease.      CT-ABDOMEN-PELVIS W/O   Final Result      1.  No renal stone or hydronephrosis.   2.  Normal appendix.   3.  No focal mesenteric inflammatory process.      DX-ABDOMEN FOR TUBE PLACEMENT   Final Result      NG tube tip projects over expected location the gastric fundus.      US-ABDOMEN COMPLETE SURVEY   Final Result         1.  Echogenic liver compatible with fatty change versus fibrosis.   2.  Gallbladder sludge without additional sonographic findings of cholecystitis.   3.  Partial atrophic bilateral kidneys with lobulated contour         CT-HEAD W/O   Final Result         1.  No acute intracranial abnormality is identified, there are nonspecific white matter changes, commonly associated with small vessel ischemic disease.  Associated mild cerebral atrophy is noted.   2.  Atherosclerosis.      DX-CHEST-PORTABLE (1 VIEW)   Final Result         1.  No acute cardiopulmonary disease.            Assessment/Plan  * Toxic encephalopathy- (present on admission)  Assessment & Plan  Unknown etiology whether this is hypoxic induced brain injury versus Wernicke encephalopathy  LP- negative  MRI brain negative  EEG was negative for seizures  TSH normal  Prn haldol  Po risperdal... Increased to 1 mg nightly on 9/27/2020-->increased to 0.5mg qam, 1mg nightly 10/22--> 1mg BID 10/24  Psych md consult--> they believe it is etoh related however at some point may need to get them back involved   HIV/RPR negative         Normocytic anemia  Assessment & Plan  Mild, unknown etiology  No active bleeding  CTM    Acute cystitis  Assessment & Plan  resolved      Urinary retention- (present on admission)  Assessment & Plan  Po flomax  Improved w/ Flomax.      Other dysphagia- (present on admission)  Assessment & Plan  Passed speech eval now  On modified diet    Hypernatremia  Assessment & Plan  Resolved  CTM intermittently    Acute respiratory failure with hypoxia (HCC)- (present on admission)  Assessment & Plan  2/2 mental status  Extubated 9/11  Remains on room air    Benzodiazepine dependence (HCC)- (present on admission)  Assessment & Plan  EEG after sz-like activity 9/10 was negative despite jerking motions  S/p benzos, now off    Alcohol abuse- (present on admission)  Assessment & Plan  Reported hx of  Continue Vitamins  High dose thiamine  Seizure precautions  S/p etoh w/d protocol    Septic shock (HCC)- (present on admission)  Assessment & Plan  Resolved  Broad-spectrum antibiotics: s/p vanc/zosyn --> C3, now off abx  Blood culture, CSF culture, urinalysis allnegative  Monitor off antibiotics        JEWELS (acute kidney injury) (HCC)- (present on admission)  Assessment & Plan  Consistent with ATN 2/2 hypotension, dehydration and sepsis  Resolved  Avoid nephrotoxins.  Renal dose all medications.      Hypokalemia- (present on admission)  Assessment & Plan  S/p supplementation  CTM       VTE prophylaxis: Lovenox

## 2020-10-24 NOTE — CARE PLAN
Problem: Safety  Goal: Will remain free from injury  Outcome: PROGRESSING AS EXPECTED  Goal: Will remain free from falls  Outcome: PROGRESSING AS EXPECTED

## 2020-10-24 NOTE — PROGRESS NOTES
Patient AxO to self only  Respirations normal and unlabored. VSS.  No complaints of pain.   Remains in bilateral soft wrist restraints and vest restraints due to high fall risk and pulling at lines.   IV was pulled out by patient. Skin under restraints clean, dry and intact with no redness.     Fall risk protocol in place. Bed locked and in lowest position. Non-skid socks and bed alarm on.  Rounded on hourly. Call light with in reach.

## 2020-10-24 NOTE — CARE PLAN
Problem: Communication  Goal: The ability to communicate needs accurately and effectively will improve  Outcome: PROGRESSING AS EXPECTED  Note: Patient has been educated on the appropriate use of the call light system however the patient is confused and does not appropriately use the system. Education reinforcement will be provided and hourly rounding is in place.      Problem: Safety  Goal: Will remain free from injury  Outcome: PROGRESSING AS EXPECTED  Note: Patient is in bed with 3 rails placed up and bed alarm used. Patient belongings and call light are within reach. Nonslip socks are worn and bed is at the lowest position. Bilateral soft wrist restraints are in use and checks are performed every 2 hours.

## 2020-10-25 PROCEDURE — A9270 NON-COVERED ITEM OR SERVICE: HCPCS | Performed by: HOSPITALIST

## 2020-10-25 PROCEDURE — 700111 HCHG RX REV CODE 636 W/ 250 OVERRIDE (IP): Performed by: HOSPITALIST

## 2020-10-25 PROCEDURE — 700102 HCHG RX REV CODE 250 W/ 637 OVERRIDE(OP): Performed by: HOSPITALIST

## 2020-10-25 PROCEDURE — 700102 HCHG RX REV CODE 250 W/ 637 OVERRIDE(OP): Performed by: INTERNAL MEDICINE

## 2020-10-25 PROCEDURE — 99232 SBSQ HOSP IP/OBS MODERATE 35: CPT | Performed by: INTERNAL MEDICINE

## 2020-10-25 PROCEDURE — A9270 NON-COVERED ITEM OR SERVICE: HCPCS | Performed by: INTERNAL MEDICINE

## 2020-10-25 PROCEDURE — 770006 HCHG ROOM/CARE - MED/SURG/GYN SEMI*

## 2020-10-25 RX ADMIN — RISPERIDONE 1 MG: 1 TABLET, FILM COATED ORAL at 17:19

## 2020-10-25 RX ADMIN — RISPERIDONE 1 MG: 1 TABLET, FILM COATED ORAL at 05:08

## 2020-10-25 RX ADMIN — Medication 100 MG: at 05:08

## 2020-10-25 RX ADMIN — TAMSULOSIN HYDROCHLORIDE 0.8 MG: 0.4 CAPSULE ORAL at 09:26

## 2020-10-25 RX ADMIN — ENOXAPARIN SODIUM 40 MG: 40 INJECTION SUBCUTANEOUS at 17:19

## 2020-10-25 RX ADMIN — DOCUSATE SODIUM 50 MG AND SENNOSIDES 8.6 MG 2 TABLET: 8.6; 5 TABLET, FILM COATED ORAL at 05:08

## 2020-10-25 ASSESSMENT — ENCOUNTER SYMPTOMS
NAUSEA: 0
ABDOMINAL PAIN: 0
VOMITING: 0
ORTHOPNEA: 0
COUGH: 0
HEMOPTYSIS: 0
PALPITATIONS: 0
HALLUCINATIONS: 0
NEUROLOGICAL NEGATIVE: 1
EYES NEGATIVE: 1
CLAUDICATION: 0
FALLS: 1
SPUTUM PRODUCTION: 0
HEARTBURN: 0

## 2020-10-25 NOTE — CARE PLAN
Problem: Communication  Goal: The ability to communicate needs accurately and effectively will improve  Outcome: NOT MET     Problem: Safety - Medical Restraint  Goal: Remains free of injury from restraints (Restraint for Interference with Medical Device)  Description: INTERVENTIONS:  1. Determine that other, less restrictive measures have been tried or would not be effective before applying the restraint  2. Evaluate the patient's condition at the time of restraint application  3. Inform patient/family regarding the reason for restraint  4. Q2H: Monitor safety, psychosocial status, comfort, nutrition and hydration  Outcome: NOT MET  Goal: Free from restraint(s) (Restraint for Interference with Medical Device)  Description: INTERVENTIONS:  1. ONCE/SHIFT or MINIMUM Q12H: Assess and document the continuing need for restraints  2. Q24H: Continued use of restraint requires LIP to perform face to face examination and written order  3. Identify and implement measures to help patient regain control  Outcome: NOT MET     Problem: Safety  Goal: Will remain free from injury  Outcome: PROGRESSING AS EXPECTED  Goal: Will remain free from falls  Outcome: PROGRESSING AS EXPECTED

## 2020-10-25 NOTE — CARE PLAN
Problem: Safety  Goal: Will remain free from falls  Outcome: PROGRESSING SLOWER THAN EXPECTED     Problem: Knowledge Deficit  Goal: Knowledge of disease process/condition, treatment plan, diagnostic tests, and medications will improve  Outcome: PROGRESSING SLOWER THAN EXPECTED     Problem: Discharge Barriers/Planning  Goal: Patient's continuum of care needs will be met  Outcome: PROGRESSING SLOWER THAN EXPECTED

## 2020-10-25 NOTE — PROGRESS NOTES
Assume care of pt at 0700. Report received from NOC RN. Pt is A/O x self. Pt declines pain. Pt is resting in bed. Bed in lowest and locked position, call light in reach, hourly rounding in place. Labs reviewed. Communication board updated. Will continue to monitor.     Bilat wrist and vest restraints on with active order. Pt is a very high fall risk and continues to be impulsive despite multiple RN education, redirection, and interventions in place. Recent fall on 10/20. TM.

## 2020-10-25 NOTE — PROGRESS NOTES
Patient AxO to self. Delusional.   Respirations normal and unlabored. VSS.  Restraints remain in place due to behavior noncompliance, unable to follow instructions, frequent falls.   Fall risk protocol in place. Bed locked and in lowest position. Non-skid socks and bed alarm on.  Rounded on hourly. Call light with in reach.

## 2020-10-25 NOTE — PROGRESS NOTES
Hospital Medicine Daily Progress Note    Date of Service  10/25/2020    Chief Complaint  50 y.o. male with a pmhx of HTN, chronic pain and alcohol abuse who presented 9/9/2020 with altered mental status.     Hospital Course  50 y.o. male with a pmhx of HTN, chronic pain and alcohol abuse who presented 9/9/2020 with altered mental status, he was found obtunded in his house by his ex-wife who was checking on him as he had not been seen for 2 days or been able to get a hold of him.  He was found in a chair,  A&Ox0 and hypotensive.  He arrived in the ER with a GCS of 7 (E1,V1,M5) and severely hypotensive.  The patient was intubated for airway protection and central venous access was obtained for administration of high-volume crystalloid resuscitation and vasopressor therapy.    CT head and abdomen and pelvis were all unremarkable. He was briefly treated with antibiotics for concern for an underlying infection and a lumbar tap done was negative. Cultures remained negative.   EEG was done for concern for seizure-like activity was without any epileptiform activity.  He was extubated on 9/11 and has been able to protect his airway ever since. He received benzodiazepines and IV thiamine for etoh.  Now awaiting placement however continues to be impulsive and requires restraints intermittently.  He was found to have UTI s/p treatment with bactrim.  He had urinary retention s/p newton but continued to self-remove.     Interval Problem Update  - No acute overnight events  - Increased risperdal to 1mg BID, sleeping this morning during rounds  - condom cath in place, good UOP, can stop doing bladder scans  - Afebrile    Consultants/Specialty  Intensivist    Code Status  Full Code    Disposition  Pending SNF/guardianship      Review of Systems  Review of Systems   Unable to perform ROS: Psychiatric disorder   Constitutional: Negative for malaise/fatigue.   HENT: Negative.    Eyes: Negative.    Respiratory: Negative for cough,  hemoptysis and sputum production.    Cardiovascular: Negative for chest pain, palpitations, orthopnea and claudication.   Gastrointestinal: Negative for abdominal pain, heartburn, nausea and vomiting.   Genitourinary: Negative for dysuria, frequency and urgency.   Musculoskeletal: Positive for falls (last fall 10/20).   Neurological: Negative.    Psychiatric/Behavioral: Negative for hallucinations.       Physical Exam  Temp:  [36.1 °C (96.9 °F)-36.7 °C (98 °F)] 36.3 °C (97.4 °F)  Pulse:  [66-79] 66  Resp:  [17] 17  BP: (121-127)/(71-79) 127/77  SpO2:  [95 %-97 %] 97 %    Physical Exam  Vitals signs and nursing note reviewed.   Constitutional:       General: He is not in acute distress.     Appearance: He is not toxic-appearing.   HENT:      Head: Normocephalic and atraumatic.      Nose: Nose normal.      Mouth/Throat:      Mouth: Mucous membranes are moist.   Eyes:      General: No scleral icterus.        Right eye: No discharge.         Left eye: No discharge.      Conjunctiva/sclera: Conjunctivae normal.   Neck:      Musculoskeletal: Normal range of motion.   Cardiovascular:      Rate and Rhythm: Normal rate and regular rhythm.      Pulses: Normal pulses.      Heart sounds: Normal heart sounds.   Pulmonary:      Effort: Pulmonary effort is normal.      Breath sounds: Normal breath sounds.   Abdominal:      General: Bowel sounds are normal. There is no distension.      Palpations: Abdomen is soft.      Tenderness: There is no abdominal tenderness.   Genitourinary:     Comments: Condom cath in place  Musculoskeletal:      Right lower leg: No edema.      Left lower leg: No edema.   Skin:     General: Skin is warm and dry.   Neurological:      Mental Status: He is lethargic.   Psychiatric:         Mood and Affect: Affect is flat.         Thought Content: Thought content is delusional.         Cognition and Memory: Cognition is impaired. He exhibits impaired recent memory.         Judgment: Judgment is impulsive.       Comments: Confused        Current Facility-Administered Medications:   •  risperiDONE (RISPERDAL) tablet 1 mg, 1 mg, Oral, BID, Akanksha Martinez M.D., 1 mg at 10/25/20 0508  •  haloperidol lactate (HALDOL) injection 2.5 mg, 2.5 mg, Intramuscular, Q4HRS PRN, Gisele Barnes M.D., 2.5 mg at 10/21/20 2209  •  LORazepam (ATIVAN) injection 0.5 mg, 0.5 mg, Intravenous, Q6HRS PRN, Gisele Barnes M.D., 0.5 mg at 10/23/20 0151  •  thiamine tablet 100 mg, 100 mg, Oral, DAILY, Gisele Barnes M.D., 100 mg at 10/25/20 0508  •  tamsulosin (FLOMAX) capsule 0.8 mg, 0.8 mg, Oral, AFTER BREAKFAST, Gisele Barnes M.D., 0.8 mg at 10/25/20 0926  •  enoxaparin (LOVENOX) inj 40 mg, 40 mg, Subcutaneous, Q EVENING, Gisele Barnes M.D., 40 mg at 10/24/20 1731  •  acetaminophen (TYLENOL) tablet 650 mg, 650 mg, Oral, Q6HRS PRN, Gisele Barnes M.D., 650 mg at 09/17/20 1723  •  senna-docusate (PERICOLACE or SENOKOT S) 8.6-50 MG per tablet 2 Tab, 2 Tab, Oral, BID, 2 Tab at 10/25/20 0508 **AND** polyethylene glycol/lytes (MIRALAX) PACKET 1 Packet, 1 Packet, Oral, QDAY PRN, 1 Packet at 10/17/20 0458 **AND** magnesium hydroxide (MILK OF MAGNESIA) suspension 30 mL, 30 mL, Oral, QDAY PRN, 30 mL at 09/18/20 1143 **AND** [DISCONTINUED] bisacodyl (DULCOLAX) suppository 10 mg, 10 mg, Rectal, QDAY PRN, Xavi Durham D.O.  •  Respiratory Therapy Consult, , Nebulization, Continuous RT, Gisele Barnes M.D.  •  ipratropium-albuterol (DUONEB) nebulizer solution, 3 mL, Nebulization, Q2HRS PRN (RT), Gisele Barnes M.D.  •  ondansetron (ZOFRAN) syringe/vial injection 4 mg, 4 mg, Intravenous, Q4HRS PRN, Gisele Barnes M.D.      Fluids    Intake/Output Summary (Last 24 hours) at 10/25/2020 1043  Last data filed at 10/25/2020 0600  Gross per 24 hour   Intake 240 ml   Output 200 ml   Net 40 ml       Laboratory      Recent Labs     10/24/20  0415   SODIUM 141   POTASSIUM 3.7   CHLORIDE 105   CO2 23   GLUCOSE 81   BUN 6*   CREATININE 0.65   CALCIUM 9.7                    Imaging  MR-BRAIN-W/O   Final Result      1.  Moderate diffuse cerebral atrophy.      2.  Minimal periventricular and juxtacortical white matter changes consistent with chronic microvascular ischemic gliosis.      3.  No evidence of acute infarction in the brain parenchyma.      DX-ABDOMEN FOR TUBE PLACEMENT   Final Result      Feeding tube tip overlies the second portion of the duodenum.      DX-CHEST-PORTABLE (1 VIEW)   Final Result         1.  Patchy left lung base opacity, new since prior, could represent early infiltrate            DX-CHEST-PORTABLE (1 VIEW)   Final Result         1.  Patchy left lung base opacity, new since prior, could represent early infiltrate         [id number] VIEW   Final Result      Enteric tube projects over the distal stomach/pylorus.         EC-ECHOCARDIOGRAM COMPLETE W/O CONT   Final Result      DX-CHEST-PORTABLE (1 VIEW)   Final Result         1.  No acute cardiopulmonary disease.      CT-ABDOMEN-PELVIS W/O   Final Result      1.  No renal stone or hydronephrosis.   2.  Normal appendix.   3.  No focal mesenteric inflammatory process.      DX-ABDOMEN FOR TUBE PLACEMENT   Final Result      NG tube tip projects over expected location the gastric fundus.      US-ABDOMEN COMPLETE SURVEY   Final Result         1.  Echogenic liver compatible with fatty change versus fibrosis.   2.  Gallbladder sludge without additional sonographic findings of cholecystitis.   3.  Partial atrophic bilateral kidneys with lobulated contour         CT-HEAD W/O   Final Result         1.  No acute intracranial abnormality is identified, there are nonspecific white matter changes, commonly associated with small vessel ischemic disease.  Associated mild cerebral atrophy is noted.   2.  Atherosclerosis.      DX-CHEST-PORTABLE (1 VIEW)   Final Result         1.  No acute cardiopulmonary disease.           Assessment/Plan  * Toxic encephalopathy- (present on admission)  Assessment & Plan  Unknown etiology  whether this is hypoxic induced brain injury versus Wernicke encephalopathy  LP- negative  MRI brain negative  EEG was negative for seizures  TSH normal  Prn haldol  Po risperdal... Increased to 1 mg nightly on 9/27/2020-->increased to 0.5mg qam, 1mg nightly 10/22--> 1mg BID 10/24  Psych md consult--> they believe it is etoh related however at some point may need to get them back involved   HIV/RPR negative         Normocytic anemia  Assessment & Plan  Mild, unknown etiology  No active bleeding  CTM    Acute cystitis  Assessment & Plan  resolved      Urinary retention- (present on admission)  Assessment & Plan  Po flomax  Improved w/ Flomax.      Other dysphagia- (present on admission)  Assessment & Plan  Passed speech eval now  On modified diet    Hypernatremia  Assessment & Plan  Resolved  CTM intermittently    Acute respiratory failure with hypoxia (HCC)- (present on admission)  Assessment & Plan  2/2 mental status  Extubated 9/11  Remains on room air    Benzodiazepine dependence (HCC)- (present on admission)  Assessment & Plan  EEG after sz-like activity 9/10 was negative despite jerking motions  S/p benzos, now off    Alcohol abuse- (present on admission)  Assessment & Plan  Reported hx of  Continue Vitamins  High dose thiamine  Seizure precautions  S/p etoh w/d protocol    Septic shock (HCC)- (present on admission)  Assessment & Plan  Resolved  Broad-spectrum antibiotics: s/p vanc/zosyn --> C3, now off abx  Blood culture, CSF culture, urinalysis allnegative  Monitor off antibiotics        JEWELS (acute kidney injury) (HCC)- (present on admission)  Assessment & Plan  Consistent with ATN 2/2 hypotension, dehydration and sepsis  Resolved  Avoid nephrotoxins.  Renal dose all medications.      Hypokalemia- (present on admission)  Assessment & Plan  S/p supplementation  CTM       VTE prophylaxis: Lovenox

## 2020-10-26 PROCEDURE — 700102 HCHG RX REV CODE 250 W/ 637 OVERRIDE(OP): Performed by: INTERNAL MEDICINE

## 2020-10-26 PROCEDURE — A9270 NON-COVERED ITEM OR SERVICE: HCPCS | Performed by: INTERNAL MEDICINE

## 2020-10-26 PROCEDURE — 99232 SBSQ HOSP IP/OBS MODERATE 35: CPT | Performed by: INTERNAL MEDICINE

## 2020-10-26 PROCEDURE — 92507 TX SP LANG VOICE COMM INDIV: CPT

## 2020-10-26 PROCEDURE — 700102 HCHG RX REV CODE 250 W/ 637 OVERRIDE(OP): Performed by: HOSPITALIST

## 2020-10-26 PROCEDURE — 700111 HCHG RX REV CODE 636 W/ 250 OVERRIDE (IP): Performed by: HOSPITALIST

## 2020-10-26 PROCEDURE — A9270 NON-COVERED ITEM OR SERVICE: HCPCS | Performed by: HOSPITALIST

## 2020-10-26 PROCEDURE — 770006 HCHG ROOM/CARE - MED/SURG/GYN SEMI*

## 2020-10-26 RX ADMIN — ENOXAPARIN SODIUM 40 MG: 40 INJECTION SUBCUTANEOUS at 17:25

## 2020-10-26 RX ADMIN — RISPERIDONE 1 MG: 1 TABLET, FILM COATED ORAL at 17:25

## 2020-10-26 RX ADMIN — ACETAMINOPHEN 650 MG: 325 TABLET, FILM COATED ORAL at 22:04

## 2020-10-26 RX ADMIN — Medication 100 MG: at 04:55

## 2020-10-26 RX ADMIN — TAMSULOSIN HYDROCHLORIDE 0.8 MG: 0.4 CAPSULE ORAL at 09:32

## 2020-10-26 RX ADMIN — RISPERIDONE 1 MG: 1 TABLET, FILM COATED ORAL at 04:55

## 2020-10-26 RX ADMIN — DOCUSATE SODIUM 50 MG AND SENNOSIDES 8.6 MG 2 TABLET: 8.6; 5 TABLET, FILM COATED ORAL at 04:56

## 2020-10-26 ASSESSMENT — ENCOUNTER SYMPTOMS
FALLS: 1
HEARTBURN: 0
ORTHOPNEA: 0
SPUTUM PRODUCTION: 0
PALPITATIONS: 0
ABDOMINAL PAIN: 0
VOMITING: 0
CLAUDICATION: 0
NEUROLOGICAL NEGATIVE: 1
HALLUCINATIONS: 0
HEMOPTYSIS: 0
COUGH: 0
EYES NEGATIVE: 1
NAUSEA: 0

## 2020-10-26 ASSESSMENT — FIBROSIS 4 INDEX: FIB4 SCORE: .6125

## 2020-10-26 NOTE — CARE PLAN
Problem: Communication  Goal: The ability to communicate needs accurately and effectively will improve  Outcome: PROGRESSING SLOWER THAN EXPECTED     Problem: Safety  Goal: Will remain free from falls  Outcome: PROGRESSING SLOWER THAN EXPECTED     Problem: Knowledge Deficit  Goal: Knowledge of disease process/condition, treatment plan, diagnostic tests, and medications will improve  Outcome: PROGRESSING SLOWER THAN EXPECTED

## 2020-10-26 NOTE — PROGRESS NOTES
Assume care of pt at 0700. Report received from NOC RN. Pt is A/O x self. Pt denies pain. Pt is resting in bed. Bed in lowest and locked position, call light in reach, hourly rounding in place. Labs reviewed. Communication board updated. Will continue to monitor.     Bilat wrist and vest restraints in place with active order. Pt continues to be impulsive and attempting to get out of bed without calling for assistance despite multiple RN education and interventions.

## 2020-10-26 NOTE — THERAPY
Speech Language Pathology  Daily Treatment     Patient Name: Yury Blake  Age:  50 y.o., Sex:  male  Medical Record #: [id number]  Today's Date: 10/26/2020         Assessment  Patient was seen for cognitive-linguistic therapy this date. Patient continues to require vest and bilat wrist restraints per nursing staff. Patient continues to exhibit severe confusion and confabulatory and tangential speech. Patient was oriented to self only despite max cues to utilize environmental strategies (calendar on wall, clock in room, medical supplies). Patient requires max cues to read the calender correctly. This orientation tasks were targeted at beginning and the end of session with little to no carry-over (pt required max cues to reference calendar). Patient was provided functional reading comprehension tasks. Patient required mod-max cues to read each sentence start to finish. Patient with tangential responses to reading tasks. Patient was able to complete functional medication tasks with 1:1 cues this session.        Plan    Recommendations:   1) Continue to re-orient patient with use of environmental cues throughout room.  2) Given current cognitive-linguistic functioning, continue to recommend 24/7 supervision following medical discharge.     Continue current treatment plan.     Discharge Recommendations: Recommend post-acute placement for additional speech therapy services prior to discharge home      Objective       10/26/20 1234   Verbal Expression   Verbal Output Conversation Minimal (4)  (tangential )   Verbal Output Functional Minimal (4)   Auditory Comprehension   Follows Three Unit Commands Moderate (3)   Understands Complex Conversation Severe (2)   Reading Comprehension   Reading Sentences Moderate (3)   Reading Short Paragraphs  Severe (2)   Following Written Direction Moderate (3)   Functional Reading Materials Moderate (3)   Written Expression   Formulates: Sentence Minimal (4)   Comments Wrote  answers to reading comprehension tasks   Cognitive-Linguistic   Orientation Level Not Oriented to Month;Not Oriented to Day;Not Oriented to Time;Not Oriented to Place;Not Oriented to Reason;Not Oriented to Address;Not Oriented to Age;Not Oriented to Year  (max cues to utilize environmental cues. )   Selective Attention Moderate (3)   Short Term Memory Severe (2)   Immediate Memory Severe (2)  (unable to carry-over information)   Complex Reasoning  / Problem Solving Severe (2)   Medication Management  Severe (2)   Short Term Goals   Short Term Goal # 3 The patient will utilize memory strategies during STM tasks to improved recall to >50% acurracy.    Goal Outcome  # 3 Progressing slower than expected   Short Term Goal # 4 The patient will complete functional reading comprehension tasks with >60% accuracy given min A.    Goal Outcome  # 4 Progressing slower than expected

## 2020-10-26 NOTE — PROGRESS NOTES
Patient AxO to self. Delusional.   Respirations normal and unlabored. VSS.  Restraints remain in place due to behavior noncompliance, unable to follow instructions, frequent falls. Skin under restraints is dry, clean and intact. No redness.   Fall risk protocol in place. Bed locked and in lowest position. Non-skid socks and bed alarm on.  Rounded on hourly. Call light with in reach.

## 2020-10-26 NOTE — CARE PLAN
Problem: Safety - Medical Restraint  Goal: Remains free of injury from restraints (Restraint for Interference with Medical Device)  Description: INTERVENTIONS:  1. Determine that other, less restrictive measures have been tried or would not be effective before applying the restraint  2. Evaluate the patient's condition at the time of restraint application  3. Inform patient/family regarding the reason for restraint  4. Q2H: Monitor safety, psychosocial status, comfort, nutrition and hydration  Outcome: NOT MET  Goal: Free from restraint(s) (Restraint for Interference with Medical Device)  Description: INTERVENTIONS:  1. ONCE/SHIFT or MINIMUM Q12H: Assess and document the continuing need for restraints  2. Q24H: Continued use of restraint requires LIP to perform face to face examination and written order  3. Identify and implement measures to help patient regain control  Outcome: NOT MET     Problem: Safety  Goal: Will remain free from injury  Outcome: PROGRESSING AS EXPECTED  Goal: Will remain free from falls  Outcome: PROGRESSING AS EXPECTED

## 2020-10-27 PROBLEM — R13.19 OTHER DYSPHAGIA: Status: RESOLVED | Noted: 2020-09-14 | Resolved: 2020-10-27

## 2020-10-27 PROBLEM — F13.20 BENZODIAZEPINE DEPENDENCE (HCC): Status: RESOLVED | Noted: 2020-09-09 | Resolved: 2020-10-27

## 2020-10-27 PROBLEM — A41.9 SEPTIC SHOCK (HCC): Status: RESOLVED | Noted: 2020-09-09 | Resolved: 2020-10-27

## 2020-10-27 PROBLEM — R65.21 SEPTIC SHOCK (HCC): Status: RESOLVED | Noted: 2020-09-09 | Resolved: 2020-10-27

## 2020-10-27 PROBLEM — J96.01 ACUTE RESPIRATORY FAILURE WITH HYPOXIA (HCC): Status: RESOLVED | Noted: 2020-09-09 | Resolved: 2020-10-27

## 2020-10-27 PROBLEM — N30.00 ACUTE CYSTITIS: Status: RESOLVED | Noted: 2020-10-02 | Resolved: 2020-10-27

## 2020-10-27 PROBLEM — E87.0 HYPERNATREMIA: Status: RESOLVED | Noted: 2020-09-13 | Resolved: 2020-10-27

## 2020-10-27 PROCEDURE — A9270 NON-COVERED ITEM OR SERVICE: HCPCS | Performed by: HOSPITALIST

## 2020-10-27 PROCEDURE — 97530 THERAPEUTIC ACTIVITIES: CPT

## 2020-10-27 PROCEDURE — 770006 HCHG ROOM/CARE - MED/SURG/GYN SEMI*

## 2020-10-27 PROCEDURE — 99231 SBSQ HOSP IP/OBS SF/LOW 25: CPT | Performed by: INTERNAL MEDICINE

## 2020-10-27 PROCEDURE — 700102 HCHG RX REV CODE 250 W/ 637 OVERRIDE(OP): Performed by: HOSPITALIST

## 2020-10-27 PROCEDURE — 700102 HCHG RX REV CODE 250 W/ 637 OVERRIDE(OP): Performed by: INTERNAL MEDICINE

## 2020-10-27 PROCEDURE — 700111 HCHG RX REV CODE 636 W/ 250 OVERRIDE (IP): Performed by: HOSPITALIST

## 2020-10-27 PROCEDURE — A9270 NON-COVERED ITEM OR SERVICE: HCPCS | Performed by: INTERNAL MEDICINE

## 2020-10-27 PROCEDURE — 97116 GAIT TRAINING THERAPY: CPT

## 2020-10-27 RX ADMIN — DOCUSATE SODIUM 50 MG AND SENNOSIDES 8.6 MG 2 TABLET: 8.6; 5 TABLET, FILM COATED ORAL at 17:19

## 2020-10-27 RX ADMIN — ENOXAPARIN SODIUM 40 MG: 40 INJECTION SUBCUTANEOUS at 17:19

## 2020-10-27 RX ADMIN — RISPERIDONE 1 MG: 1 TABLET, FILM COATED ORAL at 17:19

## 2020-10-27 RX ADMIN — THERA TABS 1 TABLET: TAB at 13:33

## 2020-10-27 RX ADMIN — TAMSULOSIN HYDROCHLORIDE 0.8 MG: 0.4 CAPSULE ORAL at 09:19

## 2020-10-27 RX ADMIN — Medication 100 MG: at 05:05

## 2020-10-27 RX ADMIN — RISPERIDONE 1 MG: 1 TABLET, FILM COATED ORAL at 05:05

## 2020-10-27 ASSESSMENT — COGNITIVE AND FUNCTIONAL STATUS - GENERAL
TURNING FROM BACK TO SIDE WHILE IN FLAT BAD: A LITTLE
WALKING IN HOSPITAL ROOM: A LOT
SUGGESTED CMS G CODE MODIFIER MOBILITY: CL
MOBILITY SCORE: 14
MOVING FROM LYING ON BACK TO SITTING ON SIDE OF FLAT BED: UNABLE
MOVING TO AND FROM BED TO CHAIR: A LITTLE
CLIMB 3 TO 5 STEPS WITH RAILING: A LOT
STANDING UP FROM CHAIR USING ARMS: A LITTLE

## 2020-10-27 ASSESSMENT — GAIT ASSESSMENTS
GAIT LEVEL OF ASSIST: MODERATE ASSIST
DEVIATION: ATAXIC;DECREASED BASE OF SUPPORT
DISTANCE (FEET): 175
ASSISTIVE DEVICE: FRONT WHEEL WALKER

## 2020-10-27 ASSESSMENT — FIBROSIS 4 INDEX: FIB4 SCORE: .6125

## 2020-10-27 NOTE — PROGRESS NOTES
"Received bedside report from Night RN. Awake, oriented to name and date of birth only. Continues with vest and soft wrist restraints to prevent pulling out tubes or falling out of bed. Call bell in reach and bed in lowest position. Encouraged to consume more of his meals. /85   Pulse 89   Temp 37.1 °C (98.7 °F) (Temporal)   Resp 17   Ht 1.803 m (5' 10.98\")   Wt 82.9 kg (182 lb 12.2 oz)   SpO2 96%   BMI 25.50 kg/m²     "

## 2020-10-27 NOTE — CARE PLAN
Problem: Safety  Goal: Will remain free from injury  Outcome: PROGRESSING AS EXPECTED  Note: Bed in locked and lowest position. Three side rails up. Call light within reach. Patient instructed on use of call bell and how to call for assistance. Threaded socks in use. Objects in room cleared for ambulation. Bed alarm in use. Pt room close to the nursing station.      Problem: Urinary Elimination:  Goal: Ability to reestablish a normal urinary elimination pattern will improve  Outcome: PROGRESSING AS EXPECTED  Note: Pt is incontinent. Condom catheter in place and draining. I/O monitored.

## 2020-10-27 NOTE — CARE PLAN
Problem: Safety  Goal: Will remain free from injury  Outcome: PROGRESSING SLOWER THAN EXPECTED   Monitor use of vest and bilateral wrist restraints      Problem: Knowledge Deficit  Goal: Knowledge of disease process/condition, treatment plan, diagnostic tests, and medications will improve  Outcome: PROGRESSING SLOWER THAN EXPECTED

## 2020-10-27 NOTE — THERAPY
"Physical Therapy   Daily Treatment     Patient Name: Yury Blake  Age:  50 y.o., Sex:  male  Medical Record #: [id number]  Today's Date: 10/27/2020     Precautions: Fall Risk    Assessment    Pt much calmer and able to follow commands today. Able to perform bed mobility without assist. Posterior lean persists when coming to stand. Attention is largest barrier to gait. When focused with no distractions able to ambulate with light touching assist. Pt is highly distractible and any auditory/visual distraction causes him to lose his liseth and not be able to control step length which resulted in multiple losses of balance. Absent righting reactions, moderate assist to prevent fall. Use of metronome counting assisted in maintaining gait liseth and attention. Continue to recommend placement. Acute PT to continue to follow.  Plan    Continue current treatment plan.    DC Equipment Recommendations: Unable to determine at this time  Discharge Recommendations: Recommend post-acute placement for additional physical therapy services prior to discharge home      Subjective    \"Oh hey my dawgs are you coming to hang out with me?\"     Objective       10/27/20 1527   Cognition    Level of Consciousness Confused   Ability To Follow Commands 1 Step   Safety Awareness Impaired;Impulsive   New Learning Impaired   Attention Impaired   Sequencing Impaired   Comments easily distracted by others, this is when LOB occurs   Balance   Sitting Balance (Static) Fair   Sitting Balance (Dynamic) Fair   Standing Balance (Static) Poor +   Standing Balance (Dynamic) Poor -   Weight Shift Sitting Fair   Weight Shift Standing Poor   Comments FWW   Gait Analysis   Gait Level Of Assist Moderate Assist   Assistive Device Front Wheel Walker   Distance (Feet) 175   # of Times Distance was Traveled 1   Deviation Ataxic;Decreased Base Of Support   Weight Bearing Status no restrictions   Bed Mobility    Supine to Sit Supervised   Sit to Supine " Supervised   Functional Mobility   Sit to Stand Minimal Assist   Short Term Goals    Short Term Goal # 2 Patient will move sitting<>standing with supervision within 6tx in order to initiate gait and transfers   Goal Outcome # 2 Goal not met   Short Term Goal # 3 Patient will ambulate 150ft with supervision within 6tx in order to access environment   Goal Outcome # 3 Goal not met

## 2020-10-27 NOTE — PROGRESS NOTES
Hospital Medicine Daily Progress Note    Date of Service  10/26/2020    Chief Complaint  50 y.o. male with a pmhx of HTN, chronic pain and alcohol abuse who presented 9/9/2020 with altered mental status.     Hospital Course  50 y.o. male with a pmhx of HTN, chronic pain and alcohol abuse who presented 9/9/2020 with altered mental status, he was found obtunded in his house by his ex-wife who was checking on him as he had not been seen for 2 days or been able to get a hold of him.  He was found in a chair,  A&Ox0 and hypotensive.  He arrived in the ER with a GCS of 7 (E1,V1,M5) and severely hypotensive.  The patient was intubated for airway protection and central venous access was obtained for administration of high-volume crystalloid resuscitation and vasopressor therapy.    CT head and abdomen and pelvis were all unremarkable. He was briefly treated with antibiotics for concern for an underlying infection and a lumbar tap done was negative. Cultures remained negative.   EEG was done for concern for seizure-like activity was without any epileptiform activity.  He was extubated on 9/11 and has been able to protect his airway ever since. He received benzodiazepines and IV thiamine for etoh.  Now awaiting placement however continues to be impulsive and requires restraints intermittently.  He was found to have UTI s/p treatment with bactrim.  He had urinary retention s/p newton but continued to self-remove. Continues to be impulsive and has had multiple falls, now in restraints, on risperdal.      Interval Problem Update  - No acute overnight events  - Did better yesterday with increased risperdal during the day    Consultants/Specialty  Intensivist  Psych    Code Status  Full Code    Disposition  Pending SNF/guardianship      Review of Systems  Review of Systems   Unable to perform ROS: Psychiatric disorder   Constitutional: Negative for malaise/fatigue.   HENT: Negative.    Eyes: Negative.    Respiratory: Negative for  cough, hemoptysis and sputum production.    Cardiovascular: Negative for chest pain, palpitations, orthopnea and claudication.   Gastrointestinal: Negative for abdominal pain, heartburn, nausea and vomiting.   Genitourinary: Negative for dysuria, frequency and urgency.   Musculoskeletal: Positive for falls (last fall 10/20).   Neurological: Negative.    Psychiatric/Behavioral: Negative for hallucinations.       Physical Exam  Temp:  [36.5 °C (97.7 °F)-37.2 °C (98.9 °F)] 37.2 °C (98.9 °F)  Pulse:  [73-98] 98  Resp:  [18] 18  BP: (112-135)/(68-76) 124/73  SpO2:  [94 %-96 %] 96 %    Physical Exam  Vitals signs and nursing note reviewed.   Constitutional:       General: He is not in acute distress.     Appearance: He is not toxic-appearing.   HENT:      Head: Normocephalic and atraumatic.      Nose: Nose normal.      Mouth/Throat:      Mouth: Mucous membranes are moist.   Eyes:      General: No scleral icterus.        Right eye: No discharge.         Left eye: No discharge.      Conjunctiva/sclera: Conjunctivae normal.   Neck:      Musculoskeletal: Normal range of motion.   Cardiovascular:      Rate and Rhythm: Normal rate and regular rhythm.      Pulses: Normal pulses.      Heart sounds: Normal heart sounds.   Pulmonary:      Effort: Pulmonary effort is normal.      Breath sounds: Normal breath sounds.   Abdominal:      General: Bowel sounds are normal. There is no distension.      Palpations: Abdomen is soft.      Tenderness: There is no abdominal tenderness.   Genitourinary:     Comments: Condom cath in place  Musculoskeletal:      Right lower leg: No edema.      Left lower leg: No edema.   Skin:     General: Skin is warm and dry.   Neurological:      Mental Status: He is alert.   Psychiatric:         Mood and Affect: Affect is flat.         Thought Content: Thought content is delusional.         Cognition and Memory: Cognition is impaired. He exhibits impaired recent memory.         Judgment: Judgment is impulsive.       Comments: Confused        Current Facility-Administered Medications:   •  risperiDONE (RISPERDAL) tablet 1 mg, 1 mg, Oral, BID, Akanksha Martinez M.D., 1 mg at 10/26/20 1725  •  haloperidol lactate (HALDOL) injection 2.5 mg, 2.5 mg, Intramuscular, Q4HRS PRN, Gisele Barnes M.D., 2.5 mg at 10/21/20 2209  •  LORazepam (ATIVAN) injection 0.5 mg, 0.5 mg, Intravenous, Q6HRS PRN, Gisele Barnes M.D., 0.5 mg at 10/23/20 0151  •  thiamine tablet 100 mg, 100 mg, Oral, DAILY, Gisele Barnes M.D., 100 mg at 10/26/20 0455  •  tamsulosin (FLOMAX) capsule 0.8 mg, 0.8 mg, Oral, AFTER BREAKFAST, Gisele Barnes M.D., 0.8 mg at 10/26/20 0932  •  enoxaparin (LOVENOX) inj 40 mg, 40 mg, Subcutaneous, Q EVENING, Gisele Barnes M.D., 40 mg at 10/26/20 1725  •  acetaminophen (TYLENOL) tablet 650 mg, 650 mg, Oral, Q6HRS PRN, Gisele Barnes M.D., 650 mg at 09/17/20 1723  •  senna-docusate (PERICOLACE or SENOKOT S) 8.6-50 MG per tablet 2 Tab, 2 Tab, Oral, BID, Stopped at 10/26/20 1800 **AND** polyethylene glycol/lytes (MIRALAX) PACKET 1 Packet, 1 Packet, Oral, QDAY PRN, 1 Packet at 10/17/20 0458 **AND** magnesium hydroxide (MILK OF MAGNESIA) suspension 30 mL, 30 mL, Oral, QDAY PRN, 30 mL at 09/18/20 1143 **AND** [DISCONTINUED] bisacodyl (DULCOLAX) suppository 10 mg, 10 mg, Rectal, QDAY PRN, Xavi Durham D.O.  •  Respiratory Therapy Consult, , Nebulization, Continuous RT, Gisele Barnes M.D.  •  ipratropium-albuterol (DUONEB) nebulizer solution, 3 mL, Nebulization, Q2HRS PRN (RT), Gisele Barnes M.D.  •  ondansetron (ZOFRAN) syringe/vial injection 4 mg, 4 mg, Intravenous, Q4HRS PRN, Gisele Barnes M.D.      Fluids    Intake/Output Summary (Last 24 hours) at 10/26/2020 3994  Last data filed at 10/26/2020 0353  Gross per 24 hour   Intake --   Output 500 ml   Net -500 ml       Laboratory      Recent Labs     10/24/20  0415   SODIUM 141   POTASSIUM 3.7   CHLORIDE 105   CO2 23   GLUCOSE 81   BUN 6*   CREATININE 0.65   CALCIUM 9.7                    Imaging  MR-BRAIN-W/O   Final Result      1.  Moderate diffuse cerebral atrophy.      2.  Minimal periventricular and juxtacortical white matter changes consistent with chronic microvascular ischemic gliosis.      3.  No evidence of acute infarction in the brain parenchyma.      DX-ABDOMEN FOR TUBE PLACEMENT   Final Result      Feeding tube tip overlies the second portion of the duodenum.      DX-CHEST-PORTABLE (1 VIEW)   Final Result         1.  Patchy left lung base opacity, new since prior, could represent early infiltrate            DX-CHEST-PORTABLE (1 VIEW)   Final Result         1.  Patchy left lung base opacity, new since prior, could represent early infiltrate         [id number] VIEW   Final Result      Enteric tube projects over the distal stomach/pylorus.         EC-ECHOCARDIOGRAM COMPLETE W/O CONT   Final Result      DX-CHEST-PORTABLE (1 VIEW)   Final Result         1.  No acute cardiopulmonary disease.      CT-ABDOMEN-PELVIS W/O   Final Result      1.  No renal stone or hydronephrosis.   2.  Normal appendix.   3.  No focal mesenteric inflammatory process.      DX-ABDOMEN FOR TUBE PLACEMENT   Final Result      NG tube tip projects over expected location the gastric fundus.      US-ABDOMEN COMPLETE SURVEY   Final Result         1.  Echogenic liver compatible with fatty change versus fibrosis.   2.  Gallbladder sludge without additional sonographic findings of cholecystitis.   3.  Partial atrophic bilateral kidneys with lobulated contour         CT-HEAD W/O   Final Result         1.  No acute intracranial abnormality is identified, there are nonspecific white matter changes, commonly associated with small vessel ischemic disease.  Associated mild cerebral atrophy is noted.   2.  Atherosclerosis.      DX-CHEST-PORTABLE (1 VIEW)   Final Result         1.  No acute cardiopulmonary disease.           Assessment/Plan  * Toxic encephalopathy- (present on admission)  Assessment & Plan  Unknown etiology  whether this is hypoxic induced brain injury versus Wernicke encephalopathy  LP- negative  MRI brain negative  EEG was negative for seizures  TSH normal  Prn haldol  Po risperdal... Increased to 1 mg nightly on 9/27/2020-->increased to 0.5mg qam, 1mg nightly 10/22--> 1mg BID 10/24  Psych md consult--> they believe it is etoh related however at some point may need to get them back involved   HIV/RPR negative         Normocytic anemia  Assessment & Plan  Mild, unknown etiology  No active bleeding  CTM    Acute cystitis  Assessment & Plan  resolved      Urinary retention- (present on admission)  Assessment & Plan  Po flomax  Improved w/ Flomax.      Other dysphagia- (present on admission)  Assessment & Plan  Passed speech eval now  On modified diet    Hypernatremia  Assessment & Plan  Resolved  CTM intermittently    Acute respiratory failure with hypoxia (HCC)- (present on admission)  Assessment & Plan  2/2 mental status  Extubated 9/11  Remains on room air    Benzodiazepine dependence (HCC)- (present on admission)  Assessment & Plan  EEG after sz-like activity 9/10 was negative despite jerking motions  S/p benzos, now off    Alcohol abuse- (present on admission)  Assessment & Plan  Reported hx of  Continue Vitamins  High dose thiamine  Seizure precautions  S/p etoh w/d protocol    Septic shock (HCC)- (present on admission)  Assessment & Plan  Resolved  Broad-spectrum antibiotics: s/p vanc/zosyn --> C3, now off abx  Blood culture, CSF culture, urinalysis allnegative  Monitor off antibiotics        JEWELS (acute kidney injury) (HCC)- (present on admission)  Assessment & Plan  Consistent with ATN 2/2 hypotension, dehydration and sepsis  Resolved  Avoid nephrotoxins.  Renal dose all medications.      Hypokalemia- (present on admission)  Assessment & Plan  S/p supplementation  CTM       VTE prophylaxis: Lovenox

## 2020-10-27 NOTE — PROGRESS NOTES
Received bedside report from day RNSamuel. Assumed care at 1900. Patient is awake/alert. Assessment completed. Pt on room air. No md ranjith aware. Vest restraints and bilateral soft wrist restraints in place. Bed locked in lowest position. Call light within reach. Whiteboard updates with nurse's and CNA's numbers. Hourly rounding in place. Bed alarm in place.

## 2020-10-27 NOTE — DISCHARGE PLANNING
Anticipated Discharge Disposition:   · Needs placement/LTC    Action:   · Received in IDT rounds, pt continues to need restraints on and off. Lacks ability to contribute to his medical care. LSW pending follow up form [facility] regarding current status of guardianship referral.    Barriers to Discharge:   · Pending guardianship determination-no appropriate NOK to make decisions  · Pt has no insurance  · Pt will need placement. No current funding source for placement.     Plan:   · Care coordination will continue to assist as needed with pt's discharge plans/barriers.

## 2020-10-28 PROCEDURE — A9270 NON-COVERED ITEM OR SERVICE: HCPCS | Performed by: INTERNAL MEDICINE

## 2020-10-28 PROCEDURE — 700111 HCHG RX REV CODE 636 W/ 250 OVERRIDE (IP): Performed by: HOSPITALIST

## 2020-10-28 PROCEDURE — 770006 HCHG ROOM/CARE - MED/SURG/GYN SEMI*

## 2020-10-28 PROCEDURE — 700102 HCHG RX REV CODE 250 W/ 637 OVERRIDE(OP): Performed by: HOSPITALIST

## 2020-10-28 PROCEDURE — 700102 HCHG RX REV CODE 250 W/ 637 OVERRIDE(OP): Performed by: INTERNAL MEDICINE

## 2020-10-28 PROCEDURE — 99231 SBSQ HOSP IP/OBS SF/LOW 25: CPT | Performed by: INTERNAL MEDICINE

## 2020-10-28 PROCEDURE — A9270 NON-COVERED ITEM OR SERVICE: HCPCS | Performed by: HOSPITALIST

## 2020-10-28 RX ADMIN — DOCUSATE SODIUM 50 MG AND SENNOSIDES 8.6 MG 2 TABLET: 8.6; 5 TABLET, FILM COATED ORAL at 17:58

## 2020-10-28 RX ADMIN — RISPERIDONE 1 MG: 1 TABLET, FILM COATED ORAL at 17:57

## 2020-10-28 RX ADMIN — RISPERIDONE 1 MG: 1 TABLET, FILM COATED ORAL at 05:02

## 2020-10-28 RX ADMIN — THERA TABS 1 TABLET: TAB at 05:02

## 2020-10-28 RX ADMIN — Medication 100 MG: at 05:02

## 2020-10-28 RX ADMIN — ENOXAPARIN SODIUM 40 MG: 40 INJECTION SUBCUTANEOUS at 17:57

## 2020-10-28 RX ADMIN — DOCUSATE SODIUM 50 MG AND SENNOSIDES 8.6 MG 2 TABLET: 8.6; 5 TABLET, FILM COATED ORAL at 05:02

## 2020-10-28 NOTE — PROGRESS NOTES
Received bedside report from day Thomas LUCIANO. Assumed care at 1900. Patient is awake/alert. Assessment completed. Pt on room air. No md ranjith aware. Vest restraints and bilateral soft wrist restraints in place. Bed locked in lowest position. Call light within reach. Whiteboard updates with nurse's and CNA's numbers. Hourly rounding in place. Bed alarm in place.

## 2020-10-28 NOTE — PROGRESS NOTES
Hospital Medicine Daily Progress Note    Date of Service  10/28/2020    Chief Complaint  50 y.o. male admitted 9/9/2020 with altered mental status    Hospital Course    50M PMH hypertension, chronic pain, alcohol use disorder presented 09/09/2020 for altered mental status.  Patient was found by ex-wife obtunded and unclear as to reason.  Patient came in with GCS of 7 had to be intubated brought to the ICU.  EEG was performed did not show any seizure-like activity.  Patient was extubated 09/11/2020.  Patient was treated with benzodiazepines and IV thiamine for possible EtOH intoxication.  Patient has been impulsive while in the hospital while on the medical floor, he has required restraints intermittently.  He was started on Risperdal.  Patient was treated with Bactrim for UTI.      Interval Problem Update  I have seen and examined this patient this morning. Patient had no complaints today. Still exhibiting odd thoughts, not clear on insight to his condition.    As per nursing, no acute events overnight    Care plan discussed with patient in detail.  Care team notified of plan as well.    Consultants/Specialty  Critical care, psychiatry    Code Status  Full Code    Disposition  Pending guardianship, patient has no insurance which is a barrier.    Review of Systems  Review of Systems   Unable to perform ROS: Mental acuity        Physical Exam  Temp:  [36.5 °C (97.7 °F)-37 °C (98.6 °F)] 36.5 °C (97.7 °F)  Pulse:  [70-94] 70  Resp:  [16-18] 18  BP: (115-140)/(75-83) 130/76  SpO2:  [95 %-98 %] 98 %    Physical Exam  Vitals signs and nursing note reviewed.   Constitutional:       General: He is not in acute distress.     Appearance: Normal appearance. He is not diaphoretic.      Comments: In vest and B/L wrist restraints   Cardiovascular:      Rate and Rhythm: Normal rate and regular rhythm.      Pulses: Normal pulses.      Heart sounds: Normal heart sounds. No murmur. No friction rub. No gallop.    Pulmonary:       Effort: Pulmonary effort is normal. No respiratory distress.      Breath sounds: Normal breath sounds. No wheezing.   Abdominal:      General: Abdomen is flat. There is no distension.      Palpations: Abdomen is soft.      Tenderness: There is no abdominal tenderness.   Musculoskeletal: Normal range of motion.         General: No swelling, tenderness or deformity.   Skin:     General: Skin is warm.      Capillary Refill: Capillary refill takes less than 2 seconds.      Coloration: Skin is not jaundiced or pale.      Findings: No rash.   Neurological:      General: No focal deficit present.      Mental Status: He is alert. Mental status is at baseline.      Comments: Believes it is May 2000   Psychiatric:         Mood and Affect: Mood normal.         Behavior: Behavior normal.       Fluids    Intake/Output Summary (Last 24 hours) at 10/28/2020 1319  Last data filed at 10/28/2020 0440  Gross per 24 hour   Intake 240 ml   Output 100 ml   Net 140 ml       Laboratory                        Imaging  MR-BRAIN-W/O   Final Result      1.  Moderate diffuse cerebral atrophy.      2.  Minimal periventricular and juxtacortical white matter changes consistent with chronic microvascular ischemic gliosis.      3.  No evidence of acute infarction in the brain parenchyma.      DX-ABDOMEN FOR TUBE PLACEMENT   Final Result      Feeding tube tip overlies the second portion of the duodenum.      DX-CHEST-PORTABLE (1 VIEW)   Final Result         1.  Patchy left lung base opacity, new since prior, could represent early infiltrate            DX-CHEST-PORTABLE (1 VIEW)   Final Result         1.  Patchy left lung base opacity, new since prior, could represent early infiltrate         [id number] VIEW   Final Result      Enteric tube projects over the distal stomach/pylorus.         EC-ECHOCARDIOGRAM COMPLETE W/O CONT   Final Result      DX-CHEST-PORTABLE (1 VIEW)   Final Result         1.  No acute cardiopulmonary disease.       CT-ABDOMEN-PELVIS W/O   Final Result      1.  No renal stone or hydronephrosis.   2.  Normal appendix.   3.  No focal mesenteric inflammatory process.      DX-ABDOMEN FOR TUBE PLACEMENT   Final Result      NG tube tip projects over expected location the gastric fundus.      US-ABDOMEN COMPLETE SURVEY   Final Result         1.  Echogenic liver compatible with fatty change versus fibrosis.   2.  Gallbladder sludge without additional sonographic findings of cholecystitis.   3.  Partial atrophic bilateral kidneys with lobulated contour         CT-HEAD W/O   Final Result         1.  No acute intracranial abnormality is identified, there are nonspecific white matter changes, commonly associated with small vessel ischemic disease.  Associated mild cerebral atrophy is noted.   2.  Atherosclerosis.      DX-CHEST-PORTABLE (1 VIEW)   Final Result         1.  No acute cardiopulmonary disease.           Assessment/Plan  * Toxic encephalopathy- (present on admission)  Assessment & Plan  Unknown etiology whether this is hypoxic induced brain injury versus Wernicke encephalopathy  LP, MRI brain, EEG were negative. TSH normal. HIV/RPR negative.  - Psych md consult--> they believe it is etoh related however at some point may need to get them back involved  - Prn haldol  - Po risperdal 1mg BID  - Continue restraints as needed for patient safety, allow patient to walk on vilchis with assistance TID    Urinary retention- (present on admission)  Assessment & Plan  Improved w/ Flomax, continue medication    JEWELS (acute kidney injury) (HCC)- (present on admission)  Assessment & Plan  Consistent with ATN 2/2 hypotension, dehydration and sepsis  Avoid nephrotoxins.  Renal dose all medications.  Currently problem is resolved    Normocytic anemia  Assessment & Plan  Mild, unknown etiology  No active bleeding  CTM    Alcohol abuse- (present on admission)  Assessment & Plan  Reported hx of abuse. S/p etoh w/d protocol.  - Continue Vitamins,  thiamine  - High dose thiamine  - Seizure precautions    Hypokalemia- (present on admission)  Assessment & Plan  Replace as needed, monitor BMP intermittently     VTE prophylaxis: Lovenox

## 2020-10-28 NOTE — CARE PLAN
Problem: Communication  Goal: The ability to communicate needs accurately and effectively will improve  10/28/2020 1306 by Lance Simmons R.N.  Outcome: PROGRESSING AS EXPECTED  Note: Patient has been educated on the use of the electronic call lights system however the patient is confused. As such hourly rounding is in place and the patient has been placed in a room near the nursing station.   10/28/2020 1203 by Lance Simmons R.N.  Outcome: PROGRESSING AS EXPECTED  Note: Patient has been educated on the use of the electronic call light system however patient is confused and does not appropriately use the system. As such the patient has been placed in a room near the nursing station and hourly rounding is in place.     Problem: Safety  Goal: Will remain free from injury  10/28/2020 1306 by Lance Simmons R.N.  Outcome: PROGRESSING AS EXPECTED  Note: Patient is in bed with 3 rails placed up and bed alarm used. Patient belongings and call light are within reach. Nonslip socks are worn and bed is at the lowest position. Patient is restrained with bilateral soft wrist restraints and a vest restraint. Restraints are assessed every 2 hours and hourly rounding is in place.   10/28/2020 1203 by Lance Simmons R.N.  Outcome: PROGRESSING AS EXPECTED  Note:

## 2020-10-28 NOTE — PROGRESS NOTES
Assumed care from patient at 0700 from Gabriel Porter. Patient is Alert and Oriented to person, states pain level is 0/10. Bed locked with 3 rails up. Call light in place, belongings at bedside. Patient expresses no needs at this time and hourly rounding is in place. Patient is in restraints. Q2hr checks are in place.

## 2020-10-28 NOTE — CARE PLAN
Problem: Respiratory:  Goal: Respiratory status will improve  Outcome: PROGRESSING AS EXPECTED  Note: Pt on room air. No signs of respiratory distress or labored breathing.      Problem: Safety - Medical Restraint  Goal: Remains free of injury from restraints (Restraint for Interference with Medical Device)  Outcome: PROGRESSING AS EXPECTED  Note: Pt in bilateral soft wrist restraints and vest restraints. Orders are active. Range of motion performed on pt. Q2 charting in place. Skin is intact. Bed alarm is on. Pt room close to the nursing station.

## 2020-10-29 LAB
ANION GAP SERPL CALC-SCNC: 9 MMOL/L (ref 7–16)
BUN SERPL-MCNC: 11 MG/DL (ref 8–22)
CALCIUM SERPL-MCNC: 9.6 MG/DL (ref 8.5–10.5)
CHLORIDE SERPL-SCNC: 103 MMOL/L (ref 96–112)
CO2 SERPL-SCNC: 25 MMOL/L (ref 20–33)
CREAT SERPL-MCNC: 0.76 MG/DL (ref 0.5–1.4)
ERYTHROCYTE [DISTWIDTH] IN BLOOD BY AUTOMATED COUNT: 44.3 FL (ref 35.9–50)
GLUCOSE SERPL-MCNC: 91 MG/DL (ref 65–99)
HCT VFR BLD AUTO: 38.9 % (ref 42–52)
HGB BLD-MCNC: 12.4 G/DL (ref 14–18)
MAGNESIUM SERPL-MCNC: 2.3 MG/DL (ref 1.5–2.5)
MCH RBC QN AUTO: 29.4 PG (ref 27–33)
MCHC RBC AUTO-ENTMCNC: 31.9 G/DL (ref 33.7–35.3)
MCV RBC AUTO: 92.2 FL (ref 81.4–97.8)
PHOSPHATE SERPL-MCNC: 4.4 MG/DL (ref 2.5–4.5)
PLATELET # BLD AUTO: 299 K/UL (ref 164–446)
PMV BLD AUTO: 10.5 FL (ref 9–12.9)
POTASSIUM SERPL-SCNC: 3.9 MMOL/L (ref 3.6–5.5)
RBC # BLD AUTO: 4.22 M/UL (ref 4.7–6.1)
SODIUM SERPL-SCNC: 137 MMOL/L (ref 135–145)
WBC # BLD AUTO: 7.5 K/UL (ref 4.8–10.8)

## 2020-10-29 PROCEDURE — A9270 NON-COVERED ITEM OR SERVICE: HCPCS | Performed by: INTERNAL MEDICINE

## 2020-10-29 PROCEDURE — A9270 NON-COVERED ITEM OR SERVICE: HCPCS | Performed by: HOSPITALIST

## 2020-10-29 PROCEDURE — 700102 HCHG RX REV CODE 250 W/ 637 OVERRIDE(OP): Performed by: INTERNAL MEDICINE

## 2020-10-29 PROCEDURE — 700102 HCHG RX REV CODE 250 W/ 637 OVERRIDE(OP): Performed by: HOSPITALIST

## 2020-10-29 PROCEDURE — 85027 COMPLETE CBC AUTOMATED: CPT

## 2020-10-29 PROCEDURE — 36415 COLL VENOUS BLD VENIPUNCTURE: CPT

## 2020-10-29 PROCEDURE — 700111 HCHG RX REV CODE 636 W/ 250 OVERRIDE (IP): Performed by: HOSPITALIST

## 2020-10-29 PROCEDURE — 84100 ASSAY OF PHOSPHORUS: CPT

## 2020-10-29 PROCEDURE — 99231 SBSQ HOSP IP/OBS SF/LOW 25: CPT | Performed by: INTERNAL MEDICINE

## 2020-10-29 PROCEDURE — 83735 ASSAY OF MAGNESIUM: CPT

## 2020-10-29 PROCEDURE — 80048 BASIC METABOLIC PNL TOTAL CA: CPT

## 2020-10-29 PROCEDURE — 770006 HCHG ROOM/CARE - MED/SURG/GYN SEMI*

## 2020-10-29 RX ADMIN — ENOXAPARIN SODIUM 40 MG: 40 INJECTION SUBCUTANEOUS at 18:22

## 2020-10-29 RX ADMIN — THERA TABS 1 TABLET: TAB at 05:05

## 2020-10-29 RX ADMIN — ACETAMINOPHEN 650 MG: 325 TABLET, FILM COATED ORAL at 18:22

## 2020-10-29 RX ADMIN — DOCUSATE SODIUM 50 MG AND SENNOSIDES 8.6 MG 2 TABLET: 8.6; 5 TABLET, FILM COATED ORAL at 05:05

## 2020-10-29 RX ADMIN — Medication 100 MG: at 05:06

## 2020-10-29 RX ADMIN — MAGNESIUM HYDROXIDE 30 ML: 400 SUSPENSION ORAL at 18:22

## 2020-10-29 RX ADMIN — RISPERIDONE 1 MG: 1 TABLET, FILM COATED ORAL at 05:06

## 2020-10-29 RX ADMIN — RISPERIDONE 1 MG: 1 TABLET, FILM COATED ORAL at 18:22

## 2020-10-29 RX ADMIN — TAMSULOSIN HYDROCHLORIDE 0.8 MG: 0.4 CAPSULE ORAL at 08:04

## 2020-10-29 NOTE — CARE PLAN
Problem: Safety  Goal: Will remain free from injury  Outcome: PROGRESSING AS EXPECTED     Problem: Psychosocial Needs:  Goal: Level of anxiety will decrease  Outcome: PROGRESSING AS EXPECTED     Problem: Communication  Goal: The ability to communicate needs accurately and effectively will improve  Outcome: PROGRESSING SLOWER THAN EXPECTED

## 2020-10-29 NOTE — DISCHARGE PLANNING
Anticipated Discharge Disposition:   · TBD    Action:   · Reviewed in IDT rounds. Pt remains confused and not A/O. Pt pending guardianship hearing on November 20, 2020 at 1045    Barriers to Discharge:   · Pending guardianship determination     Plan:   · Care coordination will continue to follow up and provide assistance with discharge plans/barriers.

## 2020-10-29 NOTE — PROGRESS NOTES
Hospital Medicine Daily Progress Note    Date of Service  10/29/2020    Chief Complaint  50 y.o. male admitted 9/9/2020 with altered mental status    Hospital Course    50M PMH hypertension, chronic pain, alcohol use disorder presented 09/09/2020 for altered mental status.  Patient was found by ex-wife obtunded and unclear as to reason.  Patient came in with GCS of 7 had to be intubated brought to the ICU.  EEG was performed did not show any seizure-like activity.  Patient was extubated 09/11/2020.  Patient was treated with benzodiazepines and IV thiamine for possible EtOH intoxication.  Patient has been impulsive while in the hospital while on the medical floor, he has required restraints intermittently.  He was started on Risperdal.  Patient was treated with Bactrim for UTI.      Interval Problem Update  I have seen and examined this patient this morning. Patient had no complaints today. Noted to be eating breakfast without difficulty, no assistance required.    As per nursing, no acute events overnight    Care plan discussed with patient in detail.  Care team notified of plan as well.    Consultants/Specialty  Critical care, psychiatry    Code Status  Full Code    Disposition  Pending guardianship, patient has no insurance which is a barrier.    Review of Systems  Review of Systems   Unable to perform ROS: Mental acuity      Physical Exam  Temp:  [36.5 °C (97.7 °F)-37.1 °C (98.7 °F)] 37.1 °C (98.7 °F)  Pulse:  [77-93] 93  Resp:  [17-18] 17  BP: (118-126)/(71-84) 122/71  SpO2:  [94 %-96 %] 94 %    Physical Exam  Vitals signs and nursing note reviewed.   Constitutional:       General: He is not in acute distress.     Appearance: Normal appearance. He is not diaphoretic.      Comments: In vest and B/L wrist restraints   Cardiovascular:      Rate and Rhythm: Normal rate and regular rhythm.      Pulses: Normal pulses.      Heart sounds: Normal heart sounds. No murmur. No friction rub. No gallop.    Pulmonary:       Effort: Pulmonary effort is normal. No respiratory distress.      Breath sounds: Normal breath sounds. No wheezing.   Abdominal:      General: Abdomen is flat. There is no distension.      Palpations: Abdomen is soft.      Tenderness: There is no abdominal tenderness.   Musculoskeletal: Normal range of motion.         General: No swelling, tenderness or deformity.   Skin:     General: Skin is warm.      Capillary Refill: Capillary refill takes less than 2 seconds.      Coloration: Skin is not jaundiced or pale.      Findings: No rash.   Neurological:      General: No focal deficit present.      Mental Status: He is alert. Mental status is at baseline.      Comments: Believes it is May 2000   Psychiatric:         Mood and Affect: Mood normal.         Behavior: Behavior normal.       Fluids    Intake/Output Summary (Last 24 hours) at 10/29/2020 1226  Last data filed at 10/29/2020 1030  Gross per 24 hour   Intake 360 ml   Output 1350 ml   Net -990 ml       Laboratory  Recent Labs     10/29/20  0215   WBC 7.5   RBC 4.22*   HEMOGLOBIN 12.4*   HEMATOCRIT 38.9*   MCV 92.2   MCH 29.4   MCHC 31.9*   RDW 44.3   PLATELETCT 299   MPV 10.5     Recent Labs     10/29/20  0215   SODIUM 137   POTASSIUM 3.9   CHLORIDE 103   CO2 25   GLUCOSE 91   BUN 11   CREATININE 0.76   CALCIUM 9.6                   Imaging  MR-BRAIN-W/O   Final Result      1.  Moderate diffuse cerebral atrophy.      2.  Minimal periventricular and juxtacortical white matter changes consistent with chronic microvascular ischemic gliosis.      3.  No evidence of acute infarction in the brain parenchyma.      DX-ABDOMEN FOR TUBE PLACEMENT   Final Result      Feeding tube tip overlies the second portion of the duodenum.      DX-CHEST-PORTABLE (1 VIEW)   Final Result         1.  Patchy left lung base opacity, new since prior, could represent early infiltrate            DX-CHEST-PORTABLE (1 VIEW)   Final Result         1.  Patchy left lung base opacity, new since prior,  could represent early infiltrate         [id number] VIEW   Final Result      Enteric tube projects over the distal stomach/pylorus.         EC-ECHOCARDIOGRAM COMPLETE W/O CONT   Final Result      DX-CHEST-PORTABLE (1 VIEW)   Final Result         1.  No acute cardiopulmonary disease.      CT-ABDOMEN-PELVIS W/O   Final Result      1.  No renal stone or hydronephrosis.   2.  Normal appendix.   3.  No focal mesenteric inflammatory process.      DX-ABDOMEN FOR TUBE PLACEMENT   Final Result      NG tube tip projects over expected location the gastric fundus.      US-ABDOMEN COMPLETE SURVEY   Final Result         1.  Echogenic liver compatible with fatty change versus fibrosis.   2.  Gallbladder sludge without additional sonographic findings of cholecystitis.   3.  Partial atrophic bilateral kidneys with lobulated contour         CT-HEAD W/O   Final Result         1.  No acute intracranial abnormality is identified, there are nonspecific white matter changes, commonly associated with small vessel ischemic disease.  Associated mild cerebral atrophy is noted.   2.  Atherosclerosis.      DX-CHEST-PORTABLE (1 VIEW)   Final Result         1.  No acute cardiopulmonary disease.         Assessment/Plan  * Toxic encephalopathy- (present on admission)  Assessment & Plan  Unknown etiology whether this is hypoxic induced brain injury versus Wernicke encephalopathy  LP, MRI brain, EEG were negative. TSH normal. HIV/RPR negative.  - Psych md consult--> they believe it is etoh related however at some point may need to get them back involved  - Prn haldol  - Po risperdal 1mg BID  - Continue restraints as needed for patient safety, allow patient to walk on vilchis with assistance TID    Urinary retention- (present on admission)  Assessment & Plan  Improved w/ Flomax, continue medication    JEWELS (acute kidney injury) (HCC)- (present on admission)  Assessment & Plan  Consistent with ATN 2/2 hypotension, dehydration and sepsis  Avoid  nephrotoxins.  Renal dose all medications.  Currently problem is resolved    Normocytic anemia  Assessment & Plan  Mild, unknown etiology  No active bleeding  CTM    Alcohol abuse- (present on admission)  Assessment & Plan  Reported hx of abuse. S/p etoh w/d protocol.  - Continue Vitamins, thiamine  - High dose thiamine  - Seizure precautions    Hypokalemia- (present on admission)  Assessment & Plan  Replace as needed, monitor BMP intermittently     VTE prophylaxis: Lovenox

## 2020-10-29 NOTE — CARE PLAN
Problem: Communication  Goal: The ability to communicate needs accurately and effectively will improve  Outcome: PROGRESSING AS EXPECTED  Note: Patient has been instructed on the use of the electronic call light. Patient does not use the system appropriately. Hourly rounding is in place.      Problem: Safety  Goal: Will remain free from injury  Outcome: PROGRESSING AS EXPECTED  Note: Patient is in bed with 3 rails placed up and bed alarm used. Patient belongings and call light are within reach. Nonslip socks are worn and bed is at the lowest position. Patient has restraints in use, assessments are performed every 2 hours.

## 2020-10-29 NOTE — PROGRESS NOTES
Report received from Troy MCKEON RN. Assumed care, assessment complete at 1950. Pt is not alert and oriented and demonstrates confusion.  Pt denies having any pain at this time. Fall precautions and appropriate signs in place. Pt oriented to unit routine, call light/phone system and RN extension number provided. Pt educated regarding fall precautions. Bed alarm in use. Pt denies any additional needs at this time. Call light within reach. Vest restraint in place.

## 2020-10-29 NOTE — PROGRESS NOTES
Assumed care of patient at 0700 from Mode Covarrubias RN. Patient is oriented A&O x1, states pain level is 0/10. Bed locked in lowest position with 3 rails up. Call light in place, belongings at bedside. Patient expresses no needs at this time and hourly rounding is in place. Patient is in restraints, assessments are performed every 2 hours.

## 2020-10-30 PROCEDURE — 700111 HCHG RX REV CODE 636 W/ 250 OVERRIDE (IP): Performed by: HOSPITALIST

## 2020-10-30 PROCEDURE — A9270 NON-COVERED ITEM OR SERVICE: HCPCS | Performed by: INTERNAL MEDICINE

## 2020-10-30 PROCEDURE — 700102 HCHG RX REV CODE 250 W/ 637 OVERRIDE(OP): Performed by: HOSPITALIST

## 2020-10-30 PROCEDURE — 97535 SELF CARE MNGMENT TRAINING: CPT

## 2020-10-30 PROCEDURE — 700102 HCHG RX REV CODE 250 W/ 637 OVERRIDE(OP): Performed by: INTERNAL MEDICINE

## 2020-10-30 PROCEDURE — A9270 NON-COVERED ITEM OR SERVICE: HCPCS | Performed by: HOSPITALIST

## 2020-10-30 PROCEDURE — 97530 THERAPEUTIC ACTIVITIES: CPT

## 2020-10-30 PROCEDURE — 97530 THERAPEUTIC ACTIVITIES: CPT | Mod: CQ

## 2020-10-30 PROCEDURE — 770006 HCHG ROOM/CARE - MED/SURG/GYN SEMI*

## 2020-10-30 PROCEDURE — 99231 SBSQ HOSP IP/OBS SF/LOW 25: CPT | Performed by: INTERNAL MEDICINE

## 2020-10-30 RX ADMIN — DOCUSATE SODIUM 50 MG AND SENNOSIDES 8.6 MG 2 TABLET: 8.6; 5 TABLET, FILM COATED ORAL at 18:33

## 2020-10-30 RX ADMIN — THERA TABS 1 TABLET: TAB at 06:03

## 2020-10-30 RX ADMIN — Medication 100 MG: at 06:03

## 2020-10-30 RX ADMIN — TAMSULOSIN HYDROCHLORIDE 0.8 MG: 0.4 CAPSULE ORAL at 11:00

## 2020-10-30 RX ADMIN — DOCUSATE SODIUM 50 MG AND SENNOSIDES 8.6 MG 2 TABLET: 8.6; 5 TABLET, FILM COATED ORAL at 06:03

## 2020-10-30 RX ADMIN — ENOXAPARIN SODIUM 40 MG: 40 INJECTION SUBCUTANEOUS at 18:34

## 2020-10-30 RX ADMIN — RISPERIDONE 1 MG: 1 TABLET, FILM COATED ORAL at 18:34

## 2020-10-30 RX ADMIN — RISPERIDONE 1 MG: 1 TABLET, FILM COATED ORAL at 06:03

## 2020-10-30 ASSESSMENT — COGNITIVE AND FUNCTIONAL STATUS - GENERAL
SUGGESTED CMS G CODE MODIFIER DAILY ACTIVITY: CK
HELP NEEDED FOR BATHING: A LITTLE
DRESSING REGULAR UPPER BODY CLOTHING: A LITTLE
DRESSING REGULAR LOWER BODY CLOTHING: A LITTLE
SUGGESTED CMS G CODE MODIFIER MOBILITY: CL
STANDING UP FROM CHAIR USING ARMS: A LITTLE
CLIMB 3 TO 5 STEPS WITH RAILING: A LOT
MOBILITY SCORE: 14
EATING MEALS: A LITTLE
TOILETING: A LITTLE
MOVING TO AND FROM BED TO CHAIR: A LITTLE
DAILY ACTIVITIY SCORE: 18
PERSONAL GROOMING: A LITTLE
WALKING IN HOSPITAL ROOM: A LOT
MOVING FROM LYING ON BACK TO SITTING ON SIDE OF FLAT BED: UNABLE
TURNING FROM BACK TO SIDE WHILE IN FLAT BAD: A LITTLE

## 2020-10-30 ASSESSMENT — GAIT ASSESSMENTS: GAIT LEVEL OF ASSIST: REFUSED

## 2020-10-30 NOTE — PROGRESS NOTES
Hospital Medicine Daily Progress Note    Date of Service  10/30/2020    Chief Complaint  50 y.o. male admitted 9/9/2020 with altered mental status    Hospital Course    50M PMH hypertension, chronic pain, alcohol use disorder presented 09/09/2020 for altered mental status.  Patient was found by ex-wife obtunded and unclear as to reason.  Patient came in with GCS of 7 had to be intubated brought to the ICU.  EEG was performed did not show any seizure-like activity.  Patient was extubated 09/11/2020.  Patient was treated with benzodiazepines and IV thiamine for possible EtOH intoxication.  Patient has been impulsive while in the hospital while on the medical floor, he has required restraints intermittently.  He was started on Risperdal.  Patient was treated with Bactrim for UTI.      Interval Problem Update  I have seen and examined this patient this morning. Patient had no complaints today.     As per nursing, no acute events overnight.  On vest Restraints when in room.    Care plan discussed with patient in detail.  Care team notified of plan as well.    Consultants/Specialty  Critical care, psychiatry    Code Status  Full Code    Disposition  Pending guardianship, patient has no insurance which is a barrier.    Review of Systems  Review of Systems   Unable to perform ROS: Mental acuity      Physical Exam  Temp:  [36.2 °C (97.2 °F)-36.9 °C (98.4 °F)] 36.5 °C (97.7 °F)  Pulse:  [70-97] 70  Resp:  [16-18] 16  BP: (116-134)/(59-91) 118/66  SpO2:  [97 %-100 %] 98 %    Physical Exam  Vitals signs and nursing note reviewed.   Constitutional:       General: He is not in acute distress.     Appearance: Normal appearance. He is not diaphoretic.      Comments: In vest and B/L wrist restraints   Pulmonary:      Effort: Pulmonary effort is normal. No respiratory distress.      Breath sounds: Normal breath sounds. No wheezing.   Abdominal:      General: Abdomen is flat. There is no distension.      Palpations: Abdomen is soft.       Tenderness: There is no abdominal tenderness.   Musculoskeletal: Normal range of motion.         General: No swelling, tenderness or deformity.   Skin:     General: Skin is warm.      Capillary Refill: Capillary refill takes less than 2 seconds.      Coloration: Skin is not jaundiced or pale.      Findings: No rash.   Neurological:      General: No focal deficit present.      Mental Status: He is alert. Mental status is at baseline. He is disoriented.   Psychiatric:         Mood and Affect: Mood normal.         Behavior: Behavior normal.      Comments: Abnormal thought content, no insight, changes stories daily on current life situation.       Fluids    Intake/Output Summary (Last 24 hours) at 10/30/2020 1033  Last data filed at 10/29/2020 1916  Gross per 24 hour   Intake 620 ml   Output --   Net 620 ml       Laboratory  Recent Labs     10/29/20  0215   WBC 7.5   RBC 4.22*   HEMOGLOBIN 12.4*   HEMATOCRIT 38.9*   MCV 92.2   MCH 29.4   MCHC 31.9*   RDW 44.3   PLATELETCT 299   MPV 10.5     Recent Labs     10/29/20  0215   SODIUM 137   POTASSIUM 3.9   CHLORIDE 103   CO2 25   GLUCOSE 91   BUN 11   CREATININE 0.76   CALCIUM 9.6                   Imaging  MR-BRAIN-W/O   Final Result      1.  Moderate diffuse cerebral atrophy.      2.  Minimal periventricular and juxtacortical white matter changes consistent with chronic microvascular ischemic gliosis.      3.  No evidence of acute infarction in the brain parenchyma.      DX-ABDOMEN FOR TUBE PLACEMENT   Final Result      Feeding tube tip overlies the second portion of the duodenum.      DX-CHEST-PORTABLE (1 VIEW)   Final Result         1.  Patchy left lung base opacity, new since prior, could represent early infiltrate            DX-CHEST-PORTABLE (1 VIEW)   Final Result         1.  Patchy left lung base opacity, new since prior, could represent early infiltrate         [id number] VIEW   Final Result      Enteric tube projects over the distal stomach/pylorus.          EC-ECHOCARDIOGRAM COMPLETE W/O CONT   Final Result      DX-CHEST-PORTABLE (1 VIEW)   Final Result         1.  No acute cardiopulmonary disease.      CT-ABDOMEN-PELVIS W/O   Final Result      1.  No renal stone or hydronephrosis.   2.  Normal appendix.   3.  No focal mesenteric inflammatory process.      DX-ABDOMEN FOR TUBE PLACEMENT   Final Result      NG tube tip projects over expected location the gastric fundus.      US-ABDOMEN COMPLETE SURVEY   Final Result         1.  Echogenic liver compatible with fatty change versus fibrosis.   2.  Gallbladder sludge without additional sonographic findings of cholecystitis.   3.  Partial atrophic bilateral kidneys with lobulated contour         CT-HEAD W/O   Final Result         1.  No acute intracranial abnormality is identified, there are nonspecific white matter changes, commonly associated with small vessel ischemic disease.  Associated mild cerebral atrophy is noted.   2.  Atherosclerosis.      DX-CHEST-PORTABLE (1 VIEW)   Final Result         1.  No acute cardiopulmonary disease.         Assessment/Plan  * Toxic encephalopathy- (present on admission)  Assessment & Plan  Unknown etiology whether this is hypoxic induced brain injury versus Wernicke encephalopathy  LP, MRI brain, EEG were negative. TSH normal. HIV/RPR negative.  - Psych md consult--> they believe it is etoh related however at some point may need to get them back involved  - Prn haldol  - Po risperdal 1mg BID  - Continue restraints as needed for patient safety, allow patient to walk on vilchis with assistance TID    Urinary retention- (present on admission)  Assessment & Plan  Improved w/ Flomax, continue medication    JEWELS (acute kidney injury) (HCC)- (present on admission)  Assessment & Plan  Consistent with ATN 2/2 hypotension, dehydration and sepsis  Avoid nephrotoxins.  Renal dose all medications.  Currently problem is resolved    Normocytic anemia  Assessment & Plan  Mild, unknown etiology  No active  bleeding  CTM    Alcohol abuse- (present on admission)  Assessment & Plan  Reported hx of abuse. S/p etoh w/d protocol.  - Continue Vitamins, thiamine  - High dose thiamine  - Seizure precautions    Hypokalemia- (present on admission)  Assessment & Plan  Replace as needed, monitor BMP intermittently     VTE prophylaxis: Lovenox

## 2020-10-30 NOTE — PROGRESS NOTES
Report received from day shift RN. Assumed care 1900, assessment complete. Pt is A & O x 1, oriented to self.  Pt denies having any pain at this time. Fall precautions and appropriate signs in place. Pt oriented to unit routine, call light/phone system and RN extension number provided. Pt educated regarding fall precautions. Bed alarm in use. Pt is vest restraint. Pt denies any additional needs at this time. Call light within reach.

## 2020-10-30 NOTE — CARE PLAN
Problem: Communication  Goal: The ability to communicate needs accurately and effectively will improve  Outcome: PROGRESSING AS EXPECTED  Note: Patient has been instructed on the use of the call light system. Patient does not call appropriately. Patient is in room near nursing station and hourly rounding is in place.      Problem: Safety  Goal: Will remain free from injury  Outcome: PROGRESSING AS EXPECTED  Note: Patient is in bed with 3 rails placed up and be alarm) used. Patient belongings and call light are within reach. [place] socks are worn and bed is at the lowest position. Bed alarm is in use.

## 2020-10-30 NOTE — PROGRESS NOTES
Assumed care of patient at 0700 from [facility]. Patient is oriented A&O x 0, states pain level is 0/10. Bed locked at lowest position with 3 rails up.. Call light in place, belongings at bedside. Patient expresses 0 needs at this time and hourly rounding is in place. Patient is in a vest restraint, assessments will be conducted every 2 hours during the shift as per protocol.

## 2020-10-30 NOTE — THERAPY
Physical Therapy   Daily Treatment     Patient Name: Yury Blake  Age:  50 y.o., Sex:  male  Medical Record #: [id number]  Today's Date: 10/30/2020     Precautions: Fall Risk    Assessment    Pt continues to be confused during tx session. Today he seemed to require extensive time to complete any tasks. He would start to mobilize than get distracted with a thought and continue to lay there. Simple educated provided on importance of mobility. He required Jyoti from therapist to reach EOB using hand to pull himself up. He was able to tolerate EOB for short time before impulsive requesting to lay back down. Therefore he refused tranfers and gait at this time. He continues to be limited by cognition. Will follow while in house.     Plan    Continue current treatment plan.    DC Equipment Recommendations: Unable to determine at this time  Discharge Recommendations: Recommend post-acute placement for additional physical therapy services prior to discharge home         10/30/20 1019   Other Treatments   Other Treatments Provided Extra time to complete as pt would stat he would move start to move than get distracted by a though.    Balance   Sitting Balance (Static) Fair   Sitting Balance (Dynamic) Fair   Gait Analysis   Gait Level Of Assist Refused   Bed Mobility    Supine to Sit Minimal Assist   Sit to Supine Supervised   Scooting Supervised   Comments extra time, Jyoti using therapist hand to pull self at EOB.    Functional Mobility   Sit to Stand Refused   Short Term Goals    Short Term Goal # 2 Patient will move sitting<>standing with supervision within 6tx in order to initiate gait and transfers   Goal Outcome # 2 Goal not met   Short Term Goal # 3 Patient will ambulate 150ft with supervision within 6tx in order to access environment   Goal Outcome # 3 Goal not met

## 2020-10-30 NOTE — CARE PLAN
Problem: Bowel/Gastric:  Goal: Normal bowel function is maintained or improved  Outcome: PROGRESSING SLOWER THAN EXPECTED  Note: Pt is incontinent, Pt hasnt had bowel movement in 3 days.     Problem: Knowledge Deficit  Goal: Knowledge of disease process/condition, treatment plan, diagnostic tests, and medications will improve  Outcome: PROGRESSING SLOWER THAN EXPECTED  Note: Pt is only oriented to self. Pt has is not aware of treatment plan.

## 2020-10-30 NOTE — THERAPY
"Occupational Therapy  Daily Treatment     Patient Name: Yury Blake  Age:  50 y.o., Sex:  male  Medical Record #: [id number]  Today's Date: 10/30/2020     Precautions  Precautions: (P) Fall Risk  Comments: (P) posey vest, no wrist restraints    Assessment    Pt seen for follow up OT tx session, pt making slow progress in therapy. Pt seeming to be somewhat more clear cognition but still very limited by functional cognition, poor safety awareness, and dizziness with standing. Pt very talkative throughout session, often confusing this writer as familiar friend, attempted to re-orient patient throughout session as well as provide cues for safety. Pt limited in ability to retain new learning and or respond appropriately to education given during session.     Plan    Continue current treatment plan.    DC Equipment Recommendations: (P) Unable to determine at this time  Discharge Recommendations: (P) Recommend post-acute placement for additional occupational therapy services prior to discharge home    Subjective    \"I'm feeling really dizzy when I stand up, its quite heavy\"     Objective       10/30/20 1505   Precautions   Precautions Fall Risk   Comments posey vest, no wrist restraints   Vitals   Pulse 92   Patient BP Position Supine   Blood Pressure 121/80   Respiration 16   Pulse Oximetry 97 %   O2 (LPM) 0   O2 Delivery Device None - Room Air   Cognition    Orientation Level Not Oriented to Day;Not Oriented to Time;Not Oriented to Reason   Level of Consciousness Confused   Ability To Follow Commands 1 Step   Safety Awareness Impaired   New Learning Impaired   Attention Impaired   Sequencing Impaired   Other Treatments   Other Treatments Provided Tangenital throughout session, distracted but able to complete tasks   Balance   Sitting Balance (Static) Fair   Sitting Balance (Dynamic) Fair   Standing Balance (Static) Poor   Standing Balance (Dynamic) Poor -   Weight Shift Sitting Fair   Weight Shift Standing Poor "   Skilled Intervention Verbal Cuing;Postural Facilitation;Sequencing   Comments FWW strong posterior lean, limited by dizziness   Bed Mobility    Supine to Sit Supervised  (with bed features)   Sit to Supine Supervised   Scooting Supervised   Rolling Supervised   Skilled Intervention Verbal Cuing;Tactile Cuing   Comments able to complete with more time given   Activities of Daily Living   Grooming Supervision;Seated  (at bedside)   Upper Body Dressing Supervision   Lower Body Dressing Minimal Assist   Skilled Intervention Verbal Cuing;Tactile Cuing   Comments limited by cognition, dizziness with STS   How much help from another person does the patient currently need...   Putting on and taking off regular lower body clothing? 3   Bathing (including washing, rinsing, and drying)? 3   Toileting, which includes using a toilet, bedpan, or urinal? 3   Putting on and taking off regular upper body clothing? 3   Taking care of personal grooming such as brushing teeth? 3   Eating meals? 3   6 Clicks Daily Activity Score 18   Functional Mobility   Sit to Stand Minimal Assist  (limited by dizziness, multiple attempts given with HHA)   Transfer Method Stand Step   Mobility HHA, side steps   Skilled Intervention Verbal Cuing;Tactile Cuing;Sequencing   Comments safety awareness, fall prevention   Activity Tolerance   Sitting Edge of Bed 20   Standing 2 total   Short Term Goals   Short Term Goal # 1 Pt will complete toilet transfer using BSC if needed with spv, no LOB by discharge.   Goal Outcome # 1 Progressing slower than expected   Short Term Goal # 2 Pt will complete standing grooming/hygiene with spv by discharge.   Goal Outcome # 2 Progressing slower than expected   Short Term Goal # 3 Pt will complete toileting with spv by discharge.   Goal Outcome # 3 Progressing slower than expected   Education Group   Education Provided Activities of Daily Living   ADL Patient Response Patient;Acceptance;Explanation;Demonstration;Verbal  Demonstration;Action Demonstration;Reinforcement Needed   Interdisciplinary Plan of Care Collaboration   IDT Collaboration with  Nursing   Patient Position at End of Therapy In Bed;Bed Alarm On;[place] Vest Applied;Call Light within Reach;Tray Table within Reach;Phone within Reach   Collaboration Comments RN aware of outcomes

## 2020-10-31 PROCEDURE — 700111 HCHG RX REV CODE 636 W/ 250 OVERRIDE (IP): Performed by: HOSPITALIST

## 2020-10-31 PROCEDURE — A9270 NON-COVERED ITEM OR SERVICE: HCPCS | Performed by: HOSPITALIST

## 2020-10-31 PROCEDURE — 700102 HCHG RX REV CODE 250 W/ 637 OVERRIDE(OP): Performed by: HOSPITALIST

## 2020-10-31 PROCEDURE — 770006 HCHG ROOM/CARE - MED/SURG/GYN SEMI*

## 2020-10-31 PROCEDURE — 99231 SBSQ HOSP IP/OBS SF/LOW 25: CPT | Performed by: INTERNAL MEDICINE

## 2020-10-31 PROCEDURE — 700102 HCHG RX REV CODE 250 W/ 637 OVERRIDE(OP): Performed by: INTERNAL MEDICINE

## 2020-10-31 PROCEDURE — A9270 NON-COVERED ITEM OR SERVICE: HCPCS | Performed by: INTERNAL MEDICINE

## 2020-10-31 RX ADMIN — DOCUSATE SODIUM 50 MG AND SENNOSIDES 8.6 MG 2 TABLET: 8.6; 5 TABLET, FILM COATED ORAL at 17:15

## 2020-10-31 RX ADMIN — TAMSULOSIN HYDROCHLORIDE 0.8 MG: 0.4 CAPSULE ORAL at 07:50

## 2020-10-31 RX ADMIN — RISPERIDONE 1 MG: 1 TABLET, FILM COATED ORAL at 05:02

## 2020-10-31 RX ADMIN — Medication 100 MG: at 05:01

## 2020-10-31 RX ADMIN — THERA TABS 1 TABLET: TAB at 05:01

## 2020-10-31 RX ADMIN — ENOXAPARIN SODIUM 40 MG: 40 INJECTION SUBCUTANEOUS at 17:17

## 2020-10-31 RX ADMIN — DOCUSATE SODIUM 50 MG AND SENNOSIDES 8.6 MG 2 TABLET: 8.6; 5 TABLET, FILM COATED ORAL at 05:02

## 2020-10-31 RX ADMIN — ACETAMINOPHEN 650 MG: 325 TABLET, FILM COATED ORAL at 20:03

## 2020-10-31 RX ADMIN — RISPERIDONE 1 MG: 1 TABLET, FILM COATED ORAL at 17:15

## 2020-10-31 ASSESSMENT — FIBROSIS 4 INDEX
FIB4 SCORE: 0.49
FIB4 SCORE: 0.49

## 2020-10-31 NOTE — PROGRESS NOTES
Received bedside report from day RNKristie. Assumed care at 1900. Patient is awake/alert. Assessment completed. Pt on room air. No md ranjith aware. Vest restraints in place. Bed locked in lowest position. Call light within reach. Whiteboard updates with nurse's and CNA's numbers. Hourly rounding in place. Bed alarm in place.

## 2020-10-31 NOTE — PROGRESS NOTES
"Hospital Medicine Daily Progress Note    Date of Service  10/31/2020    Chief Complaint  50 y.o. male admitted 9/9/2020 with altered mental status    Hospital Course    50M PMH hypertension, chronic pain, alcohol use disorder presented 09/09/2020 for altered mental status.  Patient was found by ex-wife obtunded and unclear as to reason.  Patient came in with GCS of 7 had to be intubated brought to the ICU.  EEG was performed did not show any seizure-like activity.  Patient was extubated 09/11/2020.  Patient was treated with benzodiazepines and IV thiamine for possible EtOH intoxication.  Patient has been impulsive while in the hospital while on the medical floor, he has required restraints intermittently.  He was started on Risperdal.  Patient was treated with Bactrim for UTI.      Interval Problem Update  I have seen and examined this patient this morning. Patient had no complaints today.  Thinks it is \"Tuesday the 30th\" \"2002.\"     As per nursing, no acute events overnight.  On vest Restraints when in room.    Care plan discussed with patient in detail.  Care team notified of plan as well.    Consultants/Specialty  Critical care, psychiatry    Code Status  Full Code    Disposition  Pending guardianship, patient has no insurance which is a barrier.    Review of Systems  Review of Systems   Unable to perform ROS: Mental acuity      Physical Exam  Temp:  [36.1 °C (96.9 °F)-36.6 °C (97.8 °F)] 36.6 °C (97.8 °F)  Pulse:  [65-92] 86  Resp:  [16-19] 16  BP: (115-123)/(70-80) 117/70  SpO2:  [95 %-98 %] 96 %    Physical Exam  Vitals signs and nursing note reviewed.   Constitutional:       General: He is not in acute distress.     Appearance: Normal appearance. He is not diaphoretic.      Comments: In vest and B/L wrist restraints   Pulmonary:      Effort: Pulmonary effort is normal. No respiratory distress.      Breath sounds: Normal breath sounds. No wheezing.   Abdominal:      General: Abdomen is flat. There is no " distension.      Palpations: Abdomen is soft.      Tenderness: There is no abdominal tenderness.   Genitourinary:     Comments: Condom catheter in place  Musculoskeletal: Normal range of motion.         General: No swelling, tenderness or deformity.   Skin:     General: Skin is warm.      Capillary Refill: Capillary refill takes less than 2 seconds.      Coloration: Skin is not jaundiced or pale.      Findings: No rash.   Neurological:      General: No focal deficit present.      Mental Status: He is alert. Mental status is at baseline. He is disoriented.   Psychiatric:         Mood and Affect: Mood normal.         Behavior: Behavior normal.      Comments: Abnormal thought content, no insight, changes stories daily on current life situation.       Fluids    Intake/Output Summary (Last 24 hours) at 10/31/2020 1246  Last data filed at 10/30/2020 1452  Gross per 24 hour   Intake 477 ml   Output --   Net 477 ml       Laboratory  Recent Labs     10/29/20  0215   WBC 7.5   RBC 4.22*   HEMOGLOBIN 12.4*   HEMATOCRIT 38.9*   MCV 92.2   MCH 29.4   MCHC 31.9*   RDW 44.3   PLATELETCT 299   MPV 10.5     Recent Labs     10/29/20  0215   SODIUM 137   POTASSIUM 3.9   CHLORIDE 103   CO2 25   GLUCOSE 91   BUN 11   CREATININE 0.76   CALCIUM 9.6                   Imaging  MR-BRAIN-W/O   Final Result      1.  Moderate diffuse cerebral atrophy.      2.  Minimal periventricular and juxtacortical white matter changes consistent with chronic microvascular ischemic gliosis.      3.  No evidence of acute infarction in the brain parenchyma.      DX-ABDOMEN FOR TUBE PLACEMENT   Final Result      Feeding tube tip overlies the second portion of the duodenum.      DX-CHEST-PORTABLE (1 VIEW)   Final Result         1.  Patchy left lung base opacity, new since prior, could represent early infiltrate            DX-CHEST-PORTABLE (1 VIEW)   Final Result         1.  Patchy left lung base opacity, new since prior, could represent early infiltrate          [id number] VIEW   Final Result      Enteric tube projects over the distal stomach/pylorus.         EC-ECHOCARDIOGRAM COMPLETE W/O CONT   Final Result      DX-CHEST-PORTABLE (1 VIEW)   Final Result         1.  No acute cardiopulmonary disease.      CT-ABDOMEN-PELVIS W/O   Final Result      1.  No renal stone or hydronephrosis.   2.  Normal appendix.   3.  No focal mesenteric inflammatory process.      DX-ABDOMEN FOR TUBE PLACEMENT   Final Result      NG tube tip projects over expected location the gastric fundus.      US-ABDOMEN COMPLETE SURVEY   Final Result         1.  Echogenic liver compatible with fatty change versus fibrosis.   2.  Gallbladder sludge without additional sonographic findings of cholecystitis.   3.  Partial atrophic bilateral kidneys with lobulated contour         CT-HEAD W/O   Final Result         1.  No acute intracranial abnormality is identified, there are nonspecific white matter changes, commonly associated with small vessel ischemic disease.  Associated mild cerebral atrophy is noted.   2.  Atherosclerosis.      DX-CHEST-PORTABLE (1 VIEW)   Final Result         1.  No acute cardiopulmonary disease.         Assessment/Plan  * Toxic encephalopathy- (present on admission)  Assessment & Plan  Unknown etiology whether this is hypoxic induced brain injury versus Wernicke encephalopathy  LP, MRI brain, EEG were negative. TSH normal. HIV/RPR negative.  - Psych md consult--> they believe it is etoh related however at some point may need to get them back involved  - Prn haldol  - Po risperdal 1mg BID  - Continue restraints as needed for patient safety, allow patient to walk on vilchis with assistance TID    Urinary retention- (present on admission)  Assessment & Plan  Improved w/ Flomax, continue medication  Condom catheter in place for Ins and Out management    JEWELS (acute kidney injury) (HCC)- (present on admission)  Assessment & Plan  Consistent with ATN 2/2 hypotension, dehydration and  sepsis  Avoid nephrotoxins.  Renal dose all medications.  Currently problem is resolved    Normocytic anemia  Assessment & Plan  Mild, unknown etiology  No active bleeding  CTM    Alcohol abuse- (present on admission)  Assessment & Plan  Reported hx of abuse. S/p etoh w/d protocol.  - Continue Vitamins, thiamine  - High dose thiamine  - Seizure precautions    Hypokalemia- (present on admission)  Assessment & Plan  Replace as needed, monitor BMP intermittently     VTE prophylaxis: Lovenox

## 2020-10-31 NOTE — PROGRESS NOTES
Received report from night RN, assumed care at 0700. Patient oriented to self only. Vest restraint in place, CMS intact. Bed alarm in use, bed in lowest and locked position, non-skid socks in place. POC discussed, communication board updated. Call light in reach, hourly rounding in place.

## 2020-10-31 NOTE — CARE PLAN
Problem: Safety  Goal: Will remain free from injury  Outcome: PROGRESSING AS EXPECTED  Note: Bed in locked and lowest position. Three side rails up. Call light within reach. Patient instructed on use of call bell and how to call for assistance. Threaded socks in use. Objects in room cleared for ambulation. Bed alarm and frame alarm in use.  Pt room close to the nursing station. Vest restraint in place.      Problem: Skin Integrity  Goal: Risk for impaired skin integrity will decrease  Outcome: PROGRESSING AS EXPECTED  Note: Patient on pressure relieving mattress with waffle cushion. Turned every two hours. Moisturizer in use. Heels floated with pillows. Barrier paste used if indicated.

## 2020-10-31 NOTE — CARE PLAN
Problem: Safety  Goal: Will remain free from falls  Note: Vest restraint in place, bed alarm in use, patient in room close to nurses station, bed in lowest and locked position.     Problem: Knowledge Deficit  Goal: Knowledge of disease process/condition, treatment plan, diagnostic tests, and medications will improve  Note: Patient educated about ways to d/c vest restraint, no evidence of learning.

## 2020-11-01 PROCEDURE — 700111 HCHG RX REV CODE 636 W/ 250 OVERRIDE (IP): Performed by: HOSPITALIST

## 2020-11-01 PROCEDURE — 700102 HCHG RX REV CODE 250 W/ 637 OVERRIDE(OP): Performed by: HOSPITALIST

## 2020-11-01 PROCEDURE — A9270 NON-COVERED ITEM OR SERVICE: HCPCS | Performed by: HOSPITALIST

## 2020-11-01 PROCEDURE — 99231 SBSQ HOSP IP/OBS SF/LOW 25: CPT | Performed by: INTERNAL MEDICINE

## 2020-11-01 PROCEDURE — A9270 NON-COVERED ITEM OR SERVICE: HCPCS | Performed by: INTERNAL MEDICINE

## 2020-11-01 PROCEDURE — 700102 HCHG RX REV CODE 250 W/ 637 OVERRIDE(OP): Performed by: INTERNAL MEDICINE

## 2020-11-01 PROCEDURE — 770006 HCHG ROOM/CARE - MED/SURG/GYN SEMI*

## 2020-11-01 RX ADMIN — THERA TABS 1 TABLET: TAB at 04:19

## 2020-11-01 RX ADMIN — TAMSULOSIN HYDROCHLORIDE 0.8 MG: 0.4 CAPSULE ORAL at 09:51

## 2020-11-01 RX ADMIN — Medication 100 MG: at 04:19

## 2020-11-01 RX ADMIN — RISPERIDONE 1 MG: 1 TABLET, FILM COATED ORAL at 04:20

## 2020-11-01 RX ADMIN — ENOXAPARIN SODIUM 40 MG: 40 INJECTION SUBCUTANEOUS at 16:32

## 2020-11-01 RX ADMIN — RISPERIDONE 1 MG: 1 TABLET, FILM COATED ORAL at 16:32

## 2020-11-01 ASSESSMENT — PATIENT HEALTH QUESTIONNAIRE - PHQ9
1. LITTLE INTEREST OR PLEASURE IN DOING THINGS: NOT AT ALL
2. FEELING DOWN, DEPRESSED, IRRITABLE, OR HOPELESS: NOT AT ALL
SUM OF ALL RESPONSES TO PHQ9 QUESTIONS 1 AND 2: 0

## 2020-11-01 NOTE — PROGRESS NOTES
"Hospital Medicine Daily Progress Note    Date of Service  11/1/2020    Chief Complaint  50 y.o. male admitted 9/9/2020 with altered mental status    Hospital Course    50M PMH hypertension, chronic pain, alcohol use disorder presented 09/09/2020 for altered mental status.  Patient was found by ex-wife obtunded and unclear as to reason.  Patient came in with GCS of 7 had to be intubated brought to the ICU.  EEG was performed did not show any seizure-like activity.  Patient was extubated 09/11/2020.  Patient was treated with benzodiazepines and IV thiamine for possible EtOH intoxication.  Patient has been impulsive while in the hospital while on the medical floor, he has required restraints intermittently.  He was started on Risperdal.  Patient was treated with Bactrim for UTI.      Interval Problem Update  I have seen and examined this patient this morning. Patient had no complaints today.  Patient believed I was treating him outside in a clinic, but I have never had contact with the patient outside of the hospital during this stay. Patient unable to give specifics about treatments he was mentioning for anxiety.  He stated \"I ran out of my medications\" and he was unable to get his medications.    As per nursing, no acute events overnight.  On vest Restraints when in room.    Care plan discussed with patient in detail.  Care team notified of plan as well.    Consultants/Specialty  Critical care, psychiatry    Code Status  Full Code    Disposition  Pending guardianship date Nov 20, 2020.  Patient has no insurance which is a barrier.    Review of Systems  Review of Systems   Unable to perform ROS: Mental acuity      Physical Exam  Temp:  [35.9 °C (96.7 °F)-36.9 °C (98.5 °F)] 35.9 °C (96.7 °F)  Pulse:  [68-88] 68  Resp:  [16-18] 18  BP: (115-130)/(75-92) 130/75  SpO2:  [95 %-97 %] 97 %    Physical Exam  Vitals signs and nursing note reviewed.   Constitutional:       General: He is not in acute distress.     Appearance: " Normal appearance. He is not diaphoretic.      Comments: In vest and B/L wrist restraints   Pulmonary:      Effort: Pulmonary effort is normal. No respiratory distress.      Breath sounds: Normal breath sounds. No wheezing.   Abdominal:      General: Abdomen is flat. There is no distension.      Palpations: Abdomen is soft.      Tenderness: There is no abdominal tenderness.   Genitourinary:     Comments: Condom catheter in place  Musculoskeletal: Normal range of motion.         General: No swelling, tenderness or deformity.   Skin:     General: Skin is warm.      Capillary Refill: Capillary refill takes less than 2 seconds.      Coloration: Skin is not jaundiced or pale.      Findings: No rash.   Neurological:      General: No focal deficit present.      Mental Status: He is alert. Mental status is at baseline. He is disoriented.   Psychiatric:         Mood and Affect: Mood normal.         Behavior: Behavior normal.      Comments: Abnormal thought content, no insight, changes stories daily on current life situation.       Fluids  No intake or output data in the 24 hours ending 11/01/20 1105    Laboratory                        Imaging  MR-BRAIN-W/O   Final Result      1.  Moderate diffuse cerebral atrophy.      2.  Minimal periventricular and juxtacortical white matter changes consistent with chronic microvascular ischemic gliosis.      3.  No evidence of acute infarction in the brain parenchyma.      DX-ABDOMEN FOR TUBE PLACEMENT   Final Result      Feeding tube tip overlies the second portion of the duodenum.      DX-CHEST-PORTABLE (1 VIEW)   Final Result         1.  Patchy left lung base opacity, new since prior, could represent early infiltrate            DX-CHEST-PORTABLE (1 VIEW)   Final Result         1.  Patchy left lung base opacity, new since prior, could represent early infiltrate         [id number]-2 VIEW   Final Result      Enteric tube projects over the distal stomach/pylorus.         EC-ECHOCARDIOGRAM  COMPLETE W/O CONT   Final Result      DX-CHEST-PORTABLE (1 VIEW)   Final Result         1.  No acute cardiopulmonary disease.      CT-ABDOMEN-PELVIS W/O   Final Result      1.  No renal stone or hydronephrosis.   2.  Normal appendix.   3.  No focal mesenteric inflammatory process.      DX-ABDOMEN FOR TUBE PLACEMENT   Final Result      NG tube tip projects over expected location the gastric fundus.      US-ABDOMEN COMPLETE SURVEY   Final Result         1.  Echogenic liver compatible with fatty change versus fibrosis.   2.  Gallbladder sludge without additional sonographic findings of cholecystitis.   3.  Partial atrophic bilateral kidneys with lobulated contour         CT-HEAD W/O   Final Result         1.  No acute intracranial abnormality is identified, there are nonspecific white matter changes, commonly associated with small vessel ischemic disease.  Associated mild cerebral atrophy is noted.   2.  Atherosclerosis.      DX-CHEST-PORTABLE (1 VIEW)   Final Result         1.  No acute cardiopulmonary disease.         Assessment/Plan  * Toxic encephalopathy- (present on admission)  Assessment & Plan  Unknown etiology whether this is hypoxic induced brain injury versus Wernicke encephalopathy  LP, MRI brain, EEG were negative. TSH normal. HIV/RPR negative.  - Psych md consult--> they believe it is etoh related however at some point may need to get them back involved  - Prn haldol  - Po risperdal 1mg BID  - Continue restraints as needed for patient safety, allow patient to walk on vilchis with assistance TID    Urinary retention- (present on admission)  Assessment & Plan  Improved w/ Flomax, continue medication  Condom catheter in place for Ins and Out management    JEWELS (acute kidney injury) (HCC)- (present on admission)  Assessment & Plan  Consistent with ATN 2/2 hypotension, dehydration and sepsis  Avoid nephrotoxins.  Renal dose all medications.  Currently problem is resolved    Normocytic anemia  Assessment &  Plan  Mild, unknown etiology  No active bleeding  CTM    Alcohol abuse- (present on admission)  Assessment & Plan  Reported hx of abuse. S/p etoh w/d protocol.  - Continue Vitamins, thiamine  - High dose thiamine  - Seizure precautions    Hypokalemia- (present on admission)  Assessment & Plan  Replace as needed, monitor BMP intermittently     VTE prophylaxis: Lovenox

## 2020-11-01 NOTE — CARE PLAN
Problem: Bowel/Gastric:  Goal: Normal bowel function is maintained or improved  Outcome: PROGRESSING AS EXPECTED  Intervention: Educate patient and significant other/support system about diet, fluid intake, medications and activity to promote bowel function  Note: Patient BM's monitored by staff.      Problem: Knowledge Deficit  Goal: Knowledge of disease process/condition, treatment plan, diagnostic tests, and medications will improve  Outcome: PROGRESSING AS EXPECTED  Intervention: Assess knowledge level of disease process/condition, treatment plan, diagnostic tests, and medications  Note: Patient educated on hospital stay.

## 2020-11-01 NOTE — PROGRESS NOTES
"Bedside report completed with day shift RN Kristie and patient. Fall precautions in place and hourly rounding continued. Patient vitals signs /92   Pulse 87   Temp 36.7 °C (98.1 °F) (Temporal)   Resp 16   Ht 1.803 m (5' 10.98\")   Wt 81.9 kg (180 lb 8.9 oz)   SpO2 95%   BMI 25.19 kg/m² . Full assessment completed in flowsheet. Assumed patient care at 1900.   "

## 2020-11-01 NOTE — CARE PLAN
Problem: Skin Integrity  Goal: Risk for impaired skin integrity will decrease  Outcome: PROGRESSING AS EXPECTED  Patient's skin is in tact. Q2 hour turns are in place.     Problem: Pain Management  Goal: Pain level will decrease to patient's comfort goal  Outcome: PROGRESSING AS EXPECTED  Patient has no complaints of pain at this time.

## 2020-11-02 PROCEDURE — A9270 NON-COVERED ITEM OR SERVICE: HCPCS | Performed by: HOSPITALIST

## 2020-11-02 PROCEDURE — 700102 HCHG RX REV CODE 250 W/ 637 OVERRIDE(OP): Performed by: HOSPITALIST

## 2020-11-02 PROCEDURE — 51798 US URINE CAPACITY MEASURE: CPT

## 2020-11-02 PROCEDURE — 770006 HCHG ROOM/CARE - MED/SURG/GYN SEMI*

## 2020-11-02 PROCEDURE — A9270 NON-COVERED ITEM OR SERVICE: HCPCS | Performed by: INTERNAL MEDICINE

## 2020-11-02 PROCEDURE — 700111 HCHG RX REV CODE 636 W/ 250 OVERRIDE (IP): Performed by: HOSPITALIST

## 2020-11-02 PROCEDURE — 700102 HCHG RX REV CODE 250 W/ 637 OVERRIDE(OP): Performed by: INTERNAL MEDICINE

## 2020-11-02 PROCEDURE — 99231 SBSQ HOSP IP/OBS SF/LOW 25: CPT | Performed by: INTERNAL MEDICINE

## 2020-11-02 RX ADMIN — RISPERIDONE 1 MG: 1 TABLET, FILM COATED ORAL at 10:05

## 2020-11-02 RX ADMIN — ENOXAPARIN SODIUM 40 MG: 40 INJECTION SUBCUTANEOUS at 16:53

## 2020-11-02 RX ADMIN — RISPERIDONE 1 MG: 1 TABLET, FILM COATED ORAL at 16:53

## 2020-11-02 RX ADMIN — TAMSULOSIN HYDROCHLORIDE 0.8 MG: 0.4 CAPSULE ORAL at 10:30

## 2020-11-02 RX ADMIN — DOCUSATE SODIUM 50 MG AND SENNOSIDES 8.6 MG 2 TABLET: 8.6; 5 TABLET, FILM COATED ORAL at 16:52

## 2020-11-02 NOTE — THERAPY
Missed Therapy     Patient Name: Yury Blake  Age:  50 y.o., Sex:  male  Medical Record #: [id number]  Today's Date: 11/2/2020    Discussed missed therapy with RN       11/02/20 1048   Treatment Variance   Reason For Missed Therapy Non-Medical - Other (Please Comment)  (Pt incapacitated; awaiting guardianship)   Total Time Spent   Total Time Spent (Mins) 5   Interdisciplinary Plan of Care Collaboration   IDT Collaboration with  Nursing   Collaboration Comments Per chart review and discussion with RN, Pt has been deemed incapacitated and is awaiting guardianship. No further cognitive-linguistic tx appears warranted at this time. RN notified and aware. Thank you.

## 2020-11-02 NOTE — PROGRESS NOTES
Report received from Dayshift RN. Pt is vest restraint with no signs of distress. Q2 turns. Denies pain

## 2020-11-02 NOTE — PROGRESS NOTES
Assumed care of patient at 0700. Received bedside report from noc RN. Patient resting comfortably in bed. No signs of distress. Bed is in low and locked position. Non-slip socks are in place. Call light is within reach. Bed alarm is on. Patient room in direct view of nurses station.

## 2020-11-02 NOTE — CARE PLAN
Problem: Safety  Goal: Will remain free from injury  Outcome: PROGRESSING AS EXPECTED  Note: Checking restraint sites prn and q2 hours. No signs of injury at this time. No signs of distress. Pt free from falls     Problem: Pain Management  Goal: Pain level will decrease to patient's comfort goal  Outcome: PROGRESSING AS EXPECTED  Note: Pt denies pain

## 2020-11-02 NOTE — CARE PLAN
Problem: Communication  Goal: The ability to communicate needs accurately and effectively will improve  Outcome: PROGRESSING SLOWER THAN EXPECTED  Note: Educated patient on the use of call light for assistance. Went over controls and functions on the remote. Answered any questions patient had. Patient has not been calling appropriately.      Problem: Safety  Goal: Will remain free from injury  Outcome: PROGRESSING SLOWER THAN EXPECTED  Note: Patient educated on the importance of utilizing call light for assistance. Bed is in low position and locked. Patient is wearing non-slip socks. Bed alarm is on. Call light within reach. Patient room in direct view of nursing station. Patient is very impulsive and does not call appropriately for needs.

## 2020-11-02 NOTE — PROGRESS NOTES
Went to straight cath pt with assist of Mode LUCIANO and Vanesa LUCIANO. Pt refusing. Noted new urine on chux. Changed and follow-up bladder scan ordered.

## 2020-11-02 NOTE — PROGRESS NOTES
Hospital Medicine Daily Progress Note    Date of Service  11/2/2020    Chief Complaint  50 y.o. male admitted 9/9/2020 with altered mental status    Hospital Course    50M PMH hypertension, chronic pain, alcohol use disorder presented 09/09/2020 for altered mental status.  Patient was found by ex-wife obtunded and unclear as to reason.  Patient came in with GCS of 7 had to be intubated brought to the ICU.  EEG was performed did not show any seizure-like activity.  Patient was extubated 09/11/2020.  Patient was treated with benzodiazepines and IV thiamine for possible EtOH intoxication.  Patient has been impulsive while in the hospital while on the medical floor, he has required restraints intermittently.  He was started on Risperdal.  Patient was treated with Bactrim for UTI.      Interval Problem Update  I have seen and examined this patient this morning. Patient had no complaints today.     As per nursing, no acute events overnight.  On vest Restraints when in room.    Care plan discussed with patient in detail.  Care team notified of plan as well.    Consultants/Specialty  Critical care, psychiatry    Code Status  Full Code    Disposition  Pending guardianship date Nov 20, 2020.  Patient has no insurance which is a barrier.    Review of Systems  Review of Systems   Unable to perform ROS: Mental acuity      Physical Exam  Temp:  [36.4 °C (97.6 °F)-36.7 °C (98 °F)] 36.7 °C (98 °F)  Pulse:  [66-80] 80  Resp:  [16-18] 18  BP: (118-132)/(75-80) 130/79  SpO2:  [96 %-99 %] 98 %    Physical Exam  Vitals signs and nursing note reviewed.   Constitutional:       General: He is not in acute distress.     Appearance: Normal appearance.      Comments: In vest and B/L wrist restraints   Pulmonary:      Effort: Pulmonary effort is normal. No respiratory distress.      Breath sounds: Normal breath sounds.   Abdominal:      General: Abdomen is flat. There is no distension.      Palpations: Abdomen is soft.   Genitourinary:      Comments: Condom catheter in place  Musculoskeletal: Normal range of motion.         General: No swelling.   Neurological:      General: No focal deficit present.      Mental Status: He is alert. Mental status is at baseline. He is disoriented.   Psychiatric:         Mood and Affect: Mood normal.         Behavior: Behavior normal.      Comments: Abnormal thought content, no insight, changes stories daily on current life situation.       Fluids    Intake/Output Summary (Last 24 hours) at 11/2/2020 1545  Last data filed at 11/2/2020 1019  Gross per 24 hour   Intake 357 ml   Output --   Net 357 ml       Laboratory                        Imaging  MR-BRAIN-W/O   Final Result      1.  Moderate diffuse cerebral atrophy.      2.  Minimal periventricular and juxtacortical white matter changes consistent with chronic microvascular ischemic gliosis.      3.  No evidence of acute infarction in the brain parenchyma.      DX-ABDOMEN FOR TUBE PLACEMENT   Final Result      Feeding tube tip overlies the second portion of the duodenum.      DX-CHEST-PORTABLE (1 VIEW)   Final Result         1.  Patchy left lung base opacity, new since prior, could represent early infiltrate            DX-CHEST-PORTABLE (1 VIEW)   Final Result         1.  Patchy left lung base opacity, new since prior, could represent early infiltrate         PH-[id number]-4 VIEW   Final Result      Enteric tube projects over the distal stomach/pylorus.         EC-ECHOCARDIOGRAM COMPLETE W/O CONT   Final Result      DX-CHEST-PORTABLE (1 VIEW)   Final Result         1.  No acute cardiopulmonary disease.      CT-ABDOMEN-PELVIS W/O   Final Result      1.  No renal stone or hydronephrosis.   2.  Normal appendix.   3.  No focal mesenteric inflammatory process.      DX-ABDOMEN FOR TUBE PLACEMENT   Final Result      NG tube tip projects over expected location the gastric fundus.      US-ABDOMEN COMPLETE SURVEY   Final Result         1.  Echogenic liver compatible with fatty  change versus fibrosis.   2.  Gallbladder sludge without additional sonographic findings of cholecystitis.   3.  Partial atrophic bilateral kidneys with lobulated contour         CT-HEAD W/O   Final Result         1.  No acute intracranial abnormality is identified, there are nonspecific white matter changes, commonly associated with small vessel ischemic disease.  Associated mild cerebral atrophy is noted.   2.  Atherosclerosis.      DX-CHEST-PORTABLE (1 VIEW)   Final Result         1.  No acute cardiopulmonary disease.         Assessment/Plan  * Toxic encephalopathy- (present on admission)  Assessment & Plan  Unknown etiology whether this is hypoxic induced brain injury versus Wernicke encephalopathy  LP, MRI brain, EEG were negative. TSH normal. HIV/RPR negative.  - Psych md consult--> they believe it is etoh related however at some point may need to get them back involved  - Prn haldol  - Po risperdal 1mg BID  - Continue restraints as needed for patient safety, allow patient to walk on vilchis with assistance TID  - pending guardianship Nov 20, 2020.    Urinary retention- (present on admission)  Assessment & Plan  Improved w/ Flomax, continue medication  Condom catheter in place for Ins and Out management    JEWELS (acute kidney injury) (HCC)- (present on admission)  Assessment & Plan  Consistent with ATN 2/2 hypotension, dehydration and sepsis  Avoid nephrotoxins.  Renal dose all medications.  Currently problem is resolved    Normocytic anemia  Assessment & Plan  Mild, unknown etiology  No active bleeding  CTM    Alcohol abuse- (present on admission)  Assessment & Plan  Reported hx of abuse. S/p etoh w/d protocol.  - Continue Vitamins, thiamine  - High dose thiamine  - Seizure precautions    Hypokalemia- (present on admission)  Assessment & Plan  Replace as needed, monitor BMP intermittently     VTE prophylaxis: Lovenox

## 2020-11-03 LAB
ANION GAP SERPL CALC-SCNC: 9 MMOL/L (ref 7–16)
BUN SERPL-MCNC: 11 MG/DL (ref 8–22)
CALCIUM SERPL-MCNC: 10.1 MG/DL (ref 8.5–10.5)
CHLORIDE SERPL-SCNC: 104 MMOL/L (ref 96–112)
CO2 SERPL-SCNC: 25 MMOL/L (ref 20–33)
CREAT SERPL-MCNC: 0.7 MG/DL (ref 0.5–1.4)
ERYTHROCYTE [DISTWIDTH] IN BLOOD BY AUTOMATED COUNT: 43.9 FL (ref 35.9–50)
GLUCOSE SERPL-MCNC: 95 MG/DL (ref 65–99)
HCT VFR BLD AUTO: 40.2 % (ref 42–52)
HGB BLD-MCNC: 12.9 G/DL (ref 14–18)
MAGNESIUM SERPL-MCNC: 2.2 MG/DL (ref 1.5–2.5)
MCH RBC QN AUTO: 29.5 PG (ref 27–33)
MCHC RBC AUTO-ENTMCNC: 32.1 G/DL (ref 33.7–35.3)
MCV RBC AUTO: 91.8 FL (ref 81.4–97.8)
PHOSPHATE SERPL-MCNC: 4.7 MG/DL (ref 2.5–4.5)
PLATELET # BLD AUTO: 329 K/UL (ref 164–446)
PMV BLD AUTO: 10.3 FL (ref 9–12.9)
POTASSIUM SERPL-SCNC: 3.9 MMOL/L (ref 3.6–5.5)
RBC # BLD AUTO: 4.38 M/UL (ref 4.7–6.1)
SODIUM SERPL-SCNC: 138 MMOL/L (ref 135–145)
WBC # BLD AUTO: 7.9 K/UL (ref 4.8–10.8)

## 2020-11-03 PROCEDURE — 99232 SBSQ HOSP IP/OBS MODERATE 35: CPT | Performed by: INTERNAL MEDICINE

## 2020-11-03 PROCEDURE — A9270 NON-COVERED ITEM OR SERVICE: HCPCS | Performed by: HOSPITALIST

## 2020-11-03 PROCEDURE — 700102 HCHG RX REV CODE 250 W/ 637 OVERRIDE(OP): Performed by: HOSPITALIST

## 2020-11-03 PROCEDURE — 700102 HCHG RX REV CODE 250 W/ 637 OVERRIDE(OP): Performed by: INTERNAL MEDICINE

## 2020-11-03 PROCEDURE — 36415 COLL VENOUS BLD VENIPUNCTURE: CPT

## 2020-11-03 PROCEDURE — A9270 NON-COVERED ITEM OR SERVICE: HCPCS | Performed by: INTERNAL MEDICINE

## 2020-11-03 PROCEDURE — 80048 BASIC METABOLIC PNL TOTAL CA: CPT

## 2020-11-03 PROCEDURE — 84100 ASSAY OF PHOSPHORUS: CPT

## 2020-11-03 PROCEDURE — 83735 ASSAY OF MAGNESIUM: CPT

## 2020-11-03 PROCEDURE — 700111 HCHG RX REV CODE 636 W/ 250 OVERRIDE (IP): Performed by: HOSPITALIST

## 2020-11-03 PROCEDURE — 85027 COMPLETE CBC AUTOMATED: CPT

## 2020-11-03 PROCEDURE — 770006 HCHG ROOM/CARE - MED/SURG/GYN SEMI*

## 2020-11-03 RX ADMIN — DOCUSATE SODIUM 50 MG AND SENNOSIDES 8.6 MG 2 TABLET: 8.6; 5 TABLET, FILM COATED ORAL at 05:43

## 2020-11-03 RX ADMIN — RISPERIDONE 1 MG: 1 TABLET, FILM COATED ORAL at 17:08

## 2020-11-03 RX ADMIN — ENOXAPARIN SODIUM 40 MG: 40 INJECTION SUBCUTANEOUS at 17:08

## 2020-11-03 RX ADMIN — THERA TABS 1 TABLET: TAB at 05:43

## 2020-11-03 RX ADMIN — DOCUSATE SODIUM 50 MG AND SENNOSIDES 8.6 MG 2 TABLET: 8.6; 5 TABLET, FILM COATED ORAL at 20:05

## 2020-11-03 RX ADMIN — RISPERIDONE 1 MG: 1 TABLET, FILM COATED ORAL at 06:37

## 2020-11-03 RX ADMIN — TAMSULOSIN HYDROCHLORIDE 0.8 MG: 0.4 CAPSULE ORAL at 10:25

## 2020-11-03 RX ADMIN — Medication 100 MG: at 05:43

## 2020-11-03 ASSESSMENT — ENCOUNTER SYMPTOMS
EYES NEGATIVE: 1
FALLS: 1
HEMOPTYSIS: 0
PALPITATIONS: 0
VOMITING: 0
COUGH: 0
SPUTUM PRODUCTION: 0
HEARTBURN: 0
NAUSEA: 0
HALLUCINATIONS: 0
ABDOMINAL PAIN: 0
CLAUDICATION: 0
NEUROLOGICAL NEGATIVE: 1
ORTHOPNEA: 0

## 2020-11-03 ASSESSMENT — FIBROSIS 4 INDEX: FIB4 SCORE: 0.45

## 2020-11-03 NOTE — PROGRESS NOTES
Received report from dayshift RN and assumed care of pt. Pt currently resting in bed. Denies any acute needs or concerns at this time. Aox1 (self).    Call light within reach and fall precautions in place. Vest restraint in place. Bed alarm on for safety. Will monitor

## 2020-11-03 NOTE — CARE PLAN
Problem: Safety  Goal: Will remain free from falls  Outcome: PROGRESSING AS EXPECTED     Problem: Venous Thromboembolism (VTW)/Deep Vein Thrombosis (DVT) Prevention:  Goal: Patient will participate in Venous Thrombosis (VTE)/Deep Vein Thrombosis (DVT)Prevention Measures  Outcome: PROGRESSING AS EXPECTED     Problem: Psychosocial Needs:  Goal: Level of anxiety will decrease  Outcome: PROGRESSING AS EXPECTED     Problem: Safety - Medical Restraint  Goal: Remains free of injury from restraints (Restraint for Interference with Medical Device)  Description: INTERVENTIONS:  1. Determine that other, less restrictive measures have been tried or would not be effective before applying the restraint  2. Evaluate the patient's condition at the time of restraint application  3. Inform patient/family regarding the reason for restraint  4. Q2H: Monitor safety, psychosocial status, comfort, nutrition and hydration  Outcome: PROGRESSING AS EXPECTED  Goal: Free from restraint(s) (Restraint for Interference with Medical Device)  Description: INTERVENTIONS:  1. ONCE/SHIFT or MINIMUM Q12H: Assess and document the continuing need for restraints  2. Q24H: Continued use of restraint requires LIP to perform face to face examination and written order  3. Identify and implement measures to help patient regain control  Outcome: PROGRESSING AS EXPECTED     Problem: Pain Management  Goal: Pain level will decrease to patient's comfort goal  Outcome: PROGRESSING AS EXPECTED     Problem: Urinary Elimination:  Goal: Ability to reestablish a normal urinary elimination pattern will improve  Outcome: PROGRESSING AS EXPECTED

## 2020-11-03 NOTE — CARE PLAN
Problem: Safety  Goal: Will remain free from falls  Outcome: PROGRESSING AS EXPECTED  Note: Patient identified as high fall risk. Fall precautions in place. Attempted to reorient patient. Patient does not call appropriately, and will attempt to get out of bed without assistance.      Problem: Communication  Goal: The ability to communicate needs accurately and effectively will improve  Outcome: PROGRESSING SLOWER THAN EXPECTED  Note: Attempted to reorient patient, and offer patient education on the use of the call bell. Patient does not communicate all needs. Patient does not call appropriately.     Problem: Urinary Elimination:  Goal: Ability to reestablish a normal urinary elimination pattern will improve  Outcome: PROGRESSING SLOWER THAN EXPECTED

## 2020-11-04 PROCEDURE — A9270 NON-COVERED ITEM OR SERVICE: HCPCS | Performed by: INTERNAL MEDICINE

## 2020-11-04 PROCEDURE — A9270 NON-COVERED ITEM OR SERVICE: HCPCS | Performed by: HOSPITALIST

## 2020-11-04 PROCEDURE — 700102 HCHG RX REV CODE 250 W/ 637 OVERRIDE(OP): Performed by: HOSPITALIST

## 2020-11-04 PROCEDURE — 700102 HCHG RX REV CODE 250 W/ 637 OVERRIDE(OP): Performed by: INTERNAL MEDICINE

## 2020-11-04 PROCEDURE — 700101 HCHG RX REV CODE 250: Performed by: HOSPITALIST

## 2020-11-04 PROCEDURE — 770006 HCHG ROOM/CARE - MED/SURG/GYN SEMI*

## 2020-11-04 PROCEDURE — 97530 THERAPEUTIC ACTIVITIES: CPT | Mod: CO

## 2020-11-04 PROCEDURE — 97535 SELF CARE MNGMENT TRAINING: CPT | Mod: CO

## 2020-11-04 PROCEDURE — 97129 THER IVNTJ 1ST 15 MIN: CPT | Mod: CO

## 2020-11-04 PROCEDURE — 700111 HCHG RX REV CODE 636 W/ 250 OVERRIDE (IP): Performed by: HOSPITALIST

## 2020-11-04 RX ADMIN — DOCUSATE SODIUM 50 MG AND SENNOSIDES 8.6 MG 2 TABLET: 8.6; 5 TABLET, FILM COATED ORAL at 17:40

## 2020-11-04 RX ADMIN — ENOXAPARIN SODIUM 40 MG: 40 INJECTION SUBCUTANEOUS at 17:40

## 2020-11-04 RX ADMIN — RISPERIDONE 1 MG: 1 TABLET, FILM COATED ORAL at 04:33

## 2020-11-04 RX ADMIN — Medication 100 MG: at 04:34

## 2020-11-04 RX ADMIN — TAMSULOSIN HYDROCHLORIDE 0.8 MG: 0.4 CAPSULE ORAL at 09:40

## 2020-11-04 RX ADMIN — THERA TABS 1 TABLET: TAB at 04:34

## 2020-11-04 RX ADMIN — POLYETHYLENE GLYCOL 3350 1 PACKET: 17 POWDER, FOR SOLUTION ORAL at 04:34

## 2020-11-04 RX ADMIN — RISPERIDONE 1 MG: 1 TABLET, FILM COATED ORAL at 17:40

## 2020-11-04 ASSESSMENT — COGNITIVE AND FUNCTIONAL STATUS - GENERAL
PERSONAL GROOMING: A LITTLE
HELP NEEDED FOR BATHING: A LITTLE
DAILY ACTIVITIY SCORE: 18
EATING MEALS: A LITTLE
TOILETING: A LITTLE
DRESSING REGULAR UPPER BODY CLOTHING: A LITTLE
SUGGESTED CMS G CODE MODIFIER DAILY ACTIVITY: CK
DRESSING REGULAR LOWER BODY CLOTHING: A LITTLE

## 2020-11-04 ASSESSMENT — FIBROSIS 4 INDEX: FIB4 SCORE: 0.45

## 2020-11-04 NOTE — PROGRESS NOTES
Assumed care at 0700. Received bedside report from night shift RN. Patient was asleep and resting comfortably in bed. No signs of distress. Bed is in low and locked position. Non-slip socks in place. Call light is within reach. Bed alarm is on.

## 2020-11-04 NOTE — THERAPY
"Occupational Therapy  Daily Treatment     Patient Name: Yury Blake  Age:  50 y.o., Sex:  male  Medical Record #: [id number]  Today's Date: 11/4/2020     Precautions  Precautions: (P) Fall Risk  Comments: (P) Posey vest on at all times. No wrist restraints.     Assessment    Pt seen for OT treatment today.Pt agreeable demo less irritability and anxiety today. Pt supervised for seated EOB. Worked on UB and LB dressing. Pt able to do berto care post set up seated base. Pt in urine in bed unaware.  Attempted to walk to the sink for oral hygiene and grooming. Pt's BLE's weak and shaky. Pt post STS for 10 secs using FWW for support, pt fell back onto bed stating, \"I can't walk, I'm too weak\". Pt adamantly refused to attempt ambulating ADL's to BR or sink today. Pt demo LOB transferring to chair at the bedside, impulsive and attempting to sit prior to being completely in front of chair neeing Mod A to avoid a fall. Pt able to do oral hygiene and grooming seated base post set up with reminder cues for thoroughness.Demo decreased functional cognition, problem solving, safety awareness skills. Pt will need 24/7 supervision due to cognition. Pt's paul vest reapplied while up in chair. RN/CNA updated on OT tx findings and recommendations.       Plan    Continue current treatment plan.    DC Equipment Recommendations: (P) Unable to determine at this time  Discharge Recommendations: (P) Recommend post-acute placement for additional occupational therapy services prior to discharge home    Subjective    \"My legs are too weak , look they are shaking\". \"I can't do it\". Pt referring to walk to sink.      Objective       11/04/20 1208   Cognition    Cognition / Consciousness X   Orientation Level Not Oriented to Age;Not Oriented to Address;Not Oriented to Year;Not Oriented to Month;Not Oriented to Day;Not Oriented to Time;Not Oriented to Place;Not Oriented to Reason   Level of Consciousness Confused   Ability To Follow " Commands 1 Step   Safety Awareness Impaired   New Learning Impaired   Attention Impaired   Sequencing Impaired   Comments Pt agreeable to therapy today with encouragement . Less irritable and anxious today.    Passive ROM Upper Body   Comments WFL   Active ROM Upper Body   Comments WFL   Strength Upper Body   Comments WFL   Other Treatments   Other Treatments Provided Psychosocial intervention addressed. work on functional cognition as well.     Balance   Sitting Balance (Dynamic) Fair   Standing Balance (Static) Poor   Standing Balance (Dynamic) Poor -   Weight Shift Sitting Fair   Weight Shift Standing Poor   Skilled Intervention Verbal Cuing;Tactile Cuing;Sequencing;Postural Facilitation;Compensatory Strategies   Comments Demo some  LOB with transfers today using FWW. Impulsive attempting to sit before completely in front of chair.    Activities of Daily Living   Eating Supervision   Grooming Supervision;Seated  (Unable to stand,weakness in legs, shakey pt falling onto bed)   Bathing Minimal Assist   Upper Body Dressing Minimal Assist  (verbal cues for problem solving. )   Lower Body Dressing Minimal Assist   Toileting Minimal Assist   Skilled Intervention Verbal Cuing;Tactile Cuing;Sequencing;Compensatory Strategies   Comments limited by cognition.    Functional Mobility   Sit to Stand Minimal Assist   Bed, Chair, Wheelchair Transfer Moderate Assist   Transfer Method Stand Step   Mobility with FWW   Skilled Intervention Verbal Cuing;Tactile Cuing;Sequencing;Compensatory Strategies   Comments cues for safety awareness, problem solving. Fall Risk   Activity Tolerance   Comments limited by cognition and weakness.    Patient / Family Goals   Patient / Family Goal #1 Unable to state   Short Term Goals   Short Term Goal # 1 Pt will complete toilet transfer using BSC if needed with spv, no LOB by discharge.   Goal Outcome # 1 Progressing slower than expected   Short Term Goal # 2 Pt will complete standing  grooming/hygiene with spv by discharge.   Goal Outcome # 2 Progressing slower than expected   Short Term Goal # 3 Pt will complete toileting with spv by discharge.   Goal Outcome # 3 Progressing slower than expected   Anticipated Discharge Equipment and Recommendations   DC Equipment Recommendations Unable to determine at this time   Discharge Recommendations Recommend post-acute placement for additional occupational therapy services prior to discharge home   Interdisciplinary Plan of Care Collaboration   IDT Collaboration with  Nursing   Collaboration Comments RN updated on OT treatment findings and recommendations.            Pt to consume >75% meals/supplements daily to meet est energy/pro needs.

## 2020-11-04 NOTE — CARE PLAN
Problem: Safety  Goal: Will remain free from falls  Outcome: PROGRESSING AS EXPECTED     Problem: Communication  Goal: The ability to communicate needs accurately and effectively will improve  Outcome: PROGRESSING SLOWER THAN EXPECTED  Note: Patient oriented to self. Patient does not call appropriately. Patient does not verbalize needs or concerns.      Problem: Urinary Elimination:  Goal: Ability to reestablish a normal urinary elimination pattern will improve  Outcome: PROGRESSING SLOWER THAN EXPECTED  Flowsheets (Taken 11/4/2020 0900)  Urinary Elimination: Incontinence  Note: Patient is incontinent.

## 2020-11-04 NOTE — DISCHARGE PLANNING
Anticipated Discharge Disposition: Needs placement/LTC     Action: Reviewed in IDT rounds. Per BSN Inocencia pt’s friend Deonte Henderson comes daily to visit pt. Deonte has mentioned to the BSN that he is interested in taking pt home and that he has contacted a  and is in the process of applying for guardianship. Inocencia to document the conversation that she had with Deonte in further detail.    Per chart review of note by Skye LARA, she has submitted for guardianship and pt is pending guardianship hearing on November 20, 2020 at 1045.      Barriers to Discharge: Pending guardianship determination-no appropriate NOK to make decisions, Pt has no insurance, Pt will need placement. No current funding source for placement.      Plan: Care coordination will continue to assist as needed with pt's discharge plans/barriers.

## 2020-11-04 NOTE — PROGRESS NOTES
Assumed care at 0700. Received bedside report from night shift RN. Patient asleep and resting comfortably in bed. No signs of distress. Bed is in low and locked position. Non-slip socks in place. Call light is within reach. Bed alarm is on.

## 2020-11-04 NOTE — CARE PLAN
Problem: Safety - Medical Restraint  Goal: Remains free of injury from restraints (Restraint for Interference with Medical Device)  Description: INTERVENTIONS:  1. Determine that other, less restrictive measures have been tried or would not be effective before applying the restraint  2. Evaluate the patient's condition at the time of restraint application  3. Inform patient/family regarding the reason for restraint  4. Q2H: Monitor safety, psychosocial status, comfort, nutrition and hydration  Outcome: NOT MET  Goal: Free from restraint(s) (Restraint for Interference with Medical Device)  Description: INTERVENTIONS:  1. ONCE/SHIFT or MINIMUM Q12H: Assess and document the continuing need for restraints  2. Q24H: Continued use of restraint requires LIP to perform face to face examination and written order  3. Identify and implement measures to help patient regain control  Outcome: NOT MET     Problem: Safety  Goal: Will remain free from falls  Outcome: PROGRESSING AS EXPECTED     Problem: Venous Thromboembolism (VTW)/Deep Vein Thrombosis (DVT) Prevention:  Goal: Patient will participate in Venous Thrombosis (VTE)/Deep Vein Thrombosis (DVT)Prevention Measures  Outcome: PROGRESSING AS EXPECTED     Problem: Psychosocial Needs:  Goal: Level of anxiety will decrease  Outcome: PROGRESSING AS EXPECTED     Problem: Urinary Elimination:  Goal: Ability to reestablish a normal urinary elimination pattern will improve  Outcome: PROGRESSING AS EXPECTED     Problem: Mobility  Goal: Risk for activity intolerance will decrease  Outcome: PROGRESSING AS EXPECTED

## 2020-11-05 PROCEDURE — A9270 NON-COVERED ITEM OR SERVICE: HCPCS | Performed by: HOSPITALIST

## 2020-11-05 PROCEDURE — 700102 HCHG RX REV CODE 250 W/ 637 OVERRIDE(OP): Performed by: HOSPITALIST

## 2020-11-05 PROCEDURE — 700102 HCHG RX REV CODE 250 W/ 637 OVERRIDE(OP): Performed by: INTERNAL MEDICINE

## 2020-11-05 PROCEDURE — A9270 NON-COVERED ITEM OR SERVICE: HCPCS | Performed by: INTERNAL MEDICINE

## 2020-11-05 PROCEDURE — 97530 THERAPEUTIC ACTIVITIES: CPT

## 2020-11-05 PROCEDURE — 770006 HCHG ROOM/CARE - MED/SURG/GYN SEMI*

## 2020-11-05 PROCEDURE — 700111 HCHG RX REV CODE 636 W/ 250 OVERRIDE (IP): Performed by: HOSPITALIST

## 2020-11-05 RX ADMIN — RISPERIDONE 1 MG: 1 TABLET, FILM COATED ORAL at 18:18

## 2020-11-05 RX ADMIN — THERA TABS 1 TABLET: TAB at 05:16

## 2020-11-05 RX ADMIN — DOCUSATE SODIUM 50 MG AND SENNOSIDES 8.6 MG 2 TABLET: 8.6; 5 TABLET, FILM COATED ORAL at 18:18

## 2020-11-05 RX ADMIN — DOCUSATE SODIUM 50 MG AND SENNOSIDES 8.6 MG 2 TABLET: 8.6; 5 TABLET, FILM COATED ORAL at 05:16

## 2020-11-05 RX ADMIN — TAMSULOSIN HYDROCHLORIDE 0.8 MG: 0.4 CAPSULE ORAL at 09:56

## 2020-11-05 RX ADMIN — ENOXAPARIN SODIUM 40 MG: 40 INJECTION SUBCUTANEOUS at 18:17

## 2020-11-05 RX ADMIN — RISPERIDONE 1 MG: 1 TABLET, FILM COATED ORAL at 05:16

## 2020-11-05 RX ADMIN — Medication 100 MG: at 05:16

## 2020-11-05 ASSESSMENT — COGNITIVE AND FUNCTIONAL STATUS - GENERAL
SUGGESTED CMS G CODE MODIFIER MOBILITY: CK
MOBILITY SCORE: 18
CLIMB 3 TO 5 STEPS WITH RAILING: A LITTLE
WALKING IN HOSPITAL ROOM: A LITTLE
TURNING FROM BACK TO SIDE WHILE IN FLAT BAD: A LITTLE
STANDING UP FROM CHAIR USING ARMS: A LITTLE
MOVING FROM LYING ON BACK TO SITTING ON SIDE OF FLAT BED: A LITTLE
MOVING TO AND FROM BED TO CHAIR: A LITTLE

## 2020-11-05 ASSESSMENT — GAIT ASSESSMENTS
GAIT LEVEL OF ASSIST: REFUSED
ASSISTIVE DEVICE: FRONT WHEEL WALKER

## 2020-11-05 NOTE — PROGRESS NOTES
Pt friend, Deonte Xie, notified this RN that after contacting Yury's , he has hired a  to assist him in applying for emergency guardianship of patient.

## 2020-11-05 NOTE — PROGRESS NOTES
Assumed care at 1900, report received from Day RN.  Pt A&O x 1 to self only, denies pain. Pt continues to be in vest restraint. Bed alarm in use. Bed locked, 2 rails up, bed in lowest position. Call light in place, belongings at bedside, no needs at this time and hourly rounding in place.

## 2020-11-05 NOTE — CARE PLAN
Problem: Safety  Goal: Will remain free from falls  Outcome: PROGRESSING AS EXPECTED  Vest restraint in place for safety. Pt in room close to nurses station. Hourly rounding in place.      Problem: Discharge Barriers/Planning  Goal: Patient's continuum of care needs will be met  Outcome: PROGRESSING SLOWER THAN EXPECTED   Awaiting guardianship.

## 2020-11-06 PROCEDURE — A9270 NON-COVERED ITEM OR SERVICE: HCPCS | Performed by: INTERNAL MEDICINE

## 2020-11-06 PROCEDURE — 700102 HCHG RX REV CODE 250 W/ 637 OVERRIDE(OP): Performed by: INTERNAL MEDICINE

## 2020-11-06 PROCEDURE — 770006 HCHG ROOM/CARE - MED/SURG/GYN SEMI*

## 2020-11-06 PROCEDURE — A9270 NON-COVERED ITEM OR SERVICE: HCPCS | Performed by: HOSPITALIST

## 2020-11-06 PROCEDURE — 700102 HCHG RX REV CODE 250 W/ 637 OVERRIDE(OP): Performed by: HOSPITALIST

## 2020-11-06 PROCEDURE — 700111 HCHG RX REV CODE 636 W/ 250 OVERRIDE (IP): Performed by: HOSPITALIST

## 2020-11-06 RX ADMIN — THERA TABS 1 TABLET: TAB at 05:19

## 2020-11-06 RX ADMIN — DOCUSATE SODIUM 50 MG AND SENNOSIDES 8.6 MG 2 TABLET: 8.6; 5 TABLET, FILM COATED ORAL at 05:20

## 2020-11-06 RX ADMIN — RISPERIDONE 1 MG: 1 TABLET, FILM COATED ORAL at 16:59

## 2020-11-06 RX ADMIN — TAMSULOSIN HYDROCHLORIDE 0.8 MG: 0.4 CAPSULE ORAL at 09:04

## 2020-11-06 RX ADMIN — RISPERIDONE 1 MG: 1 TABLET, FILM COATED ORAL at 05:20

## 2020-11-06 RX ADMIN — ENOXAPARIN SODIUM 40 MG: 40 INJECTION SUBCUTANEOUS at 16:59

## 2020-11-06 RX ADMIN — Medication 100 MG: at 05:20

## 2020-11-06 NOTE — CARE PLAN
Problem: Safety  Goal: Will remain free from injury  Intervention: Educate patient and significant other/support system about adaptive mobility strategies and safe transfers  Note: Educated on staying safe during ambulation. Will need to reinforce education     Problem: Safety - Medical Restraint  Goal: Remains free of injury from restraints (Restraint for Interference with Medical Device)  Description: INTERVENTIONS:  1. Determine that other, less restrictive measures have been tried or would not be effective before applying the restraint  2. Evaluate the patient's condition at the time of restraint application  3. Inform patient/family regarding the reason for restraint  4. Q2H: Monitor safety, psychosocial status, comfort, nutrition and hydration  Note: Ever 2 hour checks and offer of comfort, nutrition and hydration

## 2020-11-06 NOTE — THERAPY
"Physical Therapy   Daily Treatment     Patient Name: Yury Blake  Age:  50 y.o., Sex:  male  Medical Record #: [id number]  Today's Date: 11/5/2020     Precautions: Fall Risk    Assessment    Pt has ceased to make progress with acute PT. His cog is the largest barrier to his participation, appears to be at new baseline. Pt very confused and becomes agitated with any re-direction. Pt consistently refuses gait with therapy yet will ambulate with nursing. Demonstrated ability today to perform bed mobility and transfers without physical assist, however refused to ambulate. Refused prior session as well. No significant change in past few weeks, thus no longer any skilled PT needs. Recommend 24/7 care due to his cognitive deficits. Patient will not be actively followed for physical therapy services at this time, however may be seen if requested by physician for 1 more visit within 30 days to address any discharge or equipment needs    Plan    Discharge secondary to no likely benefit.    DC Equipment Recommendations: Front-Wheel Walker  Discharge Recommendations: Other -(24/7 care due to cog)      Subjective    \"Hey yeah I think I needs to just chill you know don't want to work to hard. Oh I could clock you real good if I wanted\"  Objective       11/05/20 1535   Cognition    Level of Consciousness Confused   Ability To Follow Commands 1 Step   Safety Awareness Impaired;Impulsive   New Learning Impaired   Attention Impaired   Sequencing Impaired   Comments very confused- nonsensical   Balance   Sitting Balance (Static) Fair   Sitting Balance (Dynamic) Fair   Standing Balance (Static) Fair -   Standing Balance (Dynamic) Fair -   Weight Shift Sitting Fair   Weight Shift Standing Fair   Comments FWW   Gait Analysis   Gait Level Of Assist Refused   Assistive Device Front Wheel Walker   Bed Mobility    Supine to Sit Supervised   Sit to Supine Supervised   Functional Mobility   Sit to Stand Supervised   Short Term Goals  "   Short Term Goal # 2 Patient will move sitting<>standing with supervision within 6tx in order to initiate gait and transfers   Goal Outcome # 2 Goal met   Short Term Goal # 3 Patient will ambulate 150ft with supervision within 6tx in order to access environment   Goal Outcome # 3 Goal not met

## 2020-11-06 NOTE — DISCHARGE PLANNING
Anticipated Discharge Disposition:   · TBD     Action:   · Reviewed in IDT rounds. Pt remains confused and not A/O. Pt pending guardianship hearing which was changed to December 18, 2020 at 0930.  · Per chart review, on Wednesday 11/04, BSN reported to JAMES Silveira that pt's friend Deonte Henderson is wanting to apply for guardianship. No other information known.  [facility] does not have any contact information for individual.    · CHAZ Culver updated that friend may want to apply for guardianship. Per discussion individual would need to submit his petition to the court and provide pt with 20 days notice. No medical records can be given to friend for the purpose of his petition.       Barriers to Discharge:   · Pending guardianship determination      Plan:   · Care coordination will continue to follow up and provide assistance with discharge plans/barriers

## 2020-11-06 NOTE — PROGRESS NOTES
Received report from morning RN. Assumed pt care at 1900. Pt is AAOX1. Reoriented to place, time, and event. No complaint of pain at this time. Vest restrain is on and bed alarm on. Bed is locked, lowered and call light is in reach.

## 2020-11-07 PROBLEM — E87.6 HYPOKALEMIA: Status: RESOLVED | Noted: 2020-09-09 | Resolved: 2020-11-07

## 2020-11-07 PROBLEM — N17.9 AKI (ACUTE KIDNEY INJURY) (HCC): Status: RESOLVED | Noted: 2020-09-09 | Resolved: 2020-11-07

## 2020-11-07 PROCEDURE — A9270 NON-COVERED ITEM OR SERVICE: HCPCS | Performed by: HOSPITALIST

## 2020-11-07 PROCEDURE — A9270 NON-COVERED ITEM OR SERVICE: HCPCS | Performed by: INTERNAL MEDICINE

## 2020-11-07 PROCEDURE — 700102 HCHG RX REV CODE 250 W/ 637 OVERRIDE(OP): Performed by: HOSPITALIST

## 2020-11-07 PROCEDURE — 700111 HCHG RX REV CODE 636 W/ 250 OVERRIDE (IP): Performed by: HOSPITALIST

## 2020-11-07 PROCEDURE — 700102 HCHG RX REV CODE 250 W/ 637 OVERRIDE(OP): Performed by: INTERNAL MEDICINE

## 2020-11-07 PROCEDURE — 770006 HCHG ROOM/CARE - MED/SURG/GYN SEMI*

## 2020-11-07 RX ADMIN — DOCUSATE SODIUM 50 MG AND SENNOSIDES 8.6 MG 2 TABLET: 8.6; 5 TABLET, FILM COATED ORAL at 17:26

## 2020-11-07 RX ADMIN — THERA TABS 1 TABLET: TAB at 06:03

## 2020-11-07 RX ADMIN — DOCUSATE SODIUM 50 MG AND SENNOSIDES 8.6 MG 2 TABLET: 8.6; 5 TABLET, FILM COATED ORAL at 06:03

## 2020-11-07 RX ADMIN — ENOXAPARIN SODIUM 40 MG: 40 INJECTION SUBCUTANEOUS at 17:26

## 2020-11-07 RX ADMIN — Medication 100 MG: at 06:03

## 2020-11-07 RX ADMIN — RISPERIDONE 1 MG: 1 TABLET, FILM COATED ORAL at 06:03

## 2020-11-07 RX ADMIN — TAMSULOSIN HYDROCHLORIDE 0.8 MG: 0.4 CAPSULE ORAL at 09:27

## 2020-11-07 RX ADMIN — RISPERIDONE 1 MG: 1 TABLET, FILM COATED ORAL at 17:26

## 2020-11-07 ASSESSMENT — ENCOUNTER SYMPTOMS
INSOMNIA: 1
NECK PAIN: 0
MEMORY LOSS: 1
HEARTBURN: 0
ABDOMINAL PAIN: 0
CONSTITUTIONAL NEGATIVE: 1
BACK PAIN: 1
VOMITING: 0
NAUSEA: 0
MYALGIAS: 1
PALPITATIONS: 0

## 2020-11-07 ASSESSMENT — FIBROSIS 4 INDEX: FIB4 SCORE: 0.45

## 2020-11-07 NOTE — CARE PLAN
Problem: Communication  Goal: The ability to communicate needs accurately and effectively will improve  Outcome: PROGRESSING AS EXPECTED     Problem: Safety  Goal: Will remain free from falls  Outcome: PROGRESSING SLOWER THAN EXPECTED

## 2020-11-07 NOTE — CARE PLAN
Problem: Safety  Goal: Will remain free from injury  Outcome: PROGRESSING AS EXPECTED     Problem: Discharge Barriers/Planning  Goal: Patient's continuum of care needs will be met  Outcome: PROGRESSING AS EXPECTED     Pt with guardianship hearing 11/20, discharge date unknown at this time, will continue to monitor discharge needs.

## 2020-11-07 NOTE — CARE PLAN
Problem: Communication  Goal: The ability to communicate needs accurately and effectively will improve  Intervention: Develop alternate methods of communication with patient and significant other/support system  Note: Use active listening to help patient with communication     Problem: Fluid Volume:  Goal: Will maintain balanced intake and output  Intervention: Monitor, educate, and encourage compliance with therapeutic intake of liquids  Note: Monitor intake of fluids and properly document I/O's

## 2020-11-07 NOTE — PROGRESS NOTES
Received report from morning RN. Assumed pt care at 1900. Pt is oriented to self. Vest restraint in place. No complaint of pain and resting during assessment. Bed is locked, lowered and call light is in reach.

## 2020-11-07 NOTE — PROGRESS NOTES
"Assumed care at 0700 following night nurse report, assessment completed. Patient is A&O X1, disoriented to place, time, situation. Patient denies pain at this time. Fall precautions and appropriate signs in place. Call light/phone system and RN extension updated on whiteboard. Bed alarm is in use with vest restraint on, patient is 1 person assist. Patient denies any additional needs at this time, Hourly rounding place./71   Pulse 66   Temp 36.6 °C (97.9 °F) (Temporal)   Resp 15   Ht 1.803 m (5' 10.98\")   Wt 82.1 kg (181 lb)   SpO2 98%   BMI 25.26 kg/m²     "

## 2020-11-08 PROCEDURE — 700102 HCHG RX REV CODE 250 W/ 637 OVERRIDE(OP): Performed by: HOSPITALIST

## 2020-11-08 PROCEDURE — 700102 HCHG RX REV CODE 250 W/ 637 OVERRIDE(OP): Performed by: INTERNAL MEDICINE

## 2020-11-08 PROCEDURE — 770006 HCHG ROOM/CARE - MED/SURG/GYN SEMI*

## 2020-11-08 PROCEDURE — A9270 NON-COVERED ITEM OR SERVICE: HCPCS | Performed by: INTERNAL MEDICINE

## 2020-11-08 PROCEDURE — A9270 NON-COVERED ITEM OR SERVICE: HCPCS | Performed by: HOSPITALIST

## 2020-11-08 PROCEDURE — 700111 HCHG RX REV CODE 636 W/ 250 OVERRIDE (IP): Performed by: HOSPITALIST

## 2020-11-08 RX ADMIN — THERA TABS 1 TABLET: TAB at 05:58

## 2020-11-08 RX ADMIN — RISPERIDONE 1 MG: 1 TABLET, FILM COATED ORAL at 18:27

## 2020-11-08 RX ADMIN — TAMSULOSIN HYDROCHLORIDE 0.8 MG: 0.4 CAPSULE ORAL at 09:33

## 2020-11-08 RX ADMIN — Medication 100 MG: at 05:58

## 2020-11-08 RX ADMIN — RISPERIDONE 1 MG: 1 TABLET, FILM COATED ORAL at 05:58

## 2020-11-08 RX ADMIN — ENOXAPARIN SODIUM 40 MG: 40 INJECTION SUBCUTANEOUS at 18:27

## 2020-11-08 RX ADMIN — DOCUSATE SODIUM 50 MG AND SENNOSIDES 8.6 MG 2 TABLET: 8.6; 5 TABLET, FILM COATED ORAL at 18:27

## 2020-11-08 RX ADMIN — DOCUSATE SODIUM 50 MG AND SENNOSIDES 8.6 MG 2 TABLET: 8.6; 5 TABLET, FILM COATED ORAL at 05:58

## 2020-11-08 NOTE — PROGRESS NOTES
"Hospital Medicine Daily Progress Note    Date of Service  11/7/2020    Chief Complaint  50 y.o. male with a pmhx of HTN, chronic pain and alcohol abuse who presented 9/9/2020 with altered mental status.     Hospital Course  50 y.o. male with a pmhx of HTN, chronic pain and alcohol abuse who presented 9/9/2020 with altered mental status, he was found obtunded in his house by his ex-wife who was checking on him as he had not been seen for 2 days or been able to get a hold of him.  He was found in a chair,  A&Ox0 and hypotensive.  He arrived in the ER with a GCS of 7 (E1,V1,M5) and severely hypotensive.  The patient was intubated for airway protection and central venous access was obtained for administration of high-volume crystalloid resuscitation and vasopressor therapy.    CT head and abdomen and pelvis were all unremarkable. He was briefly treated with antibiotics for concern for an underlying infection and a lumbar tap done was negative. Cultures remained negative.   EEG was done for concern for seizure-like activity was without any epileptiform activity.  He was extubated on 9/11 and has been able to protect his airway ever since. He received benzodiazepines and IV thiamine for etoh.  Now awaiting placement however continues to be impulsive and requires restraints intermittently.  He was found to have UTI s/p treatment with bactrim.  He had urinary retention s/p newton but continued to self-remove. His urinary retention resolved. Continues to be impulsive and has had multiple falls, now in restraints, on risperdal.      Interval Problem Update  11/7: Laying in bed in vest restraint in no acute distress. Oriented to self only. 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    He frequently refuses PT/OT. SBPs 115-120s, afebrile. Guardianship hearing scheduled for 11/20.     Consultants/Specialty  Intensivist  Psych    Code Status  Full " Code    Disposition  Pending SNF/guardianship      Review of Systems  Review of Systems   Constitutional: Negative.    Cardiovascular: Negative for chest pain and palpitations.   Gastrointestinal: Negative for abdominal pain, heartburn, nausea and vomiting.   Musculoskeletal: Positive for back pain, joint pain and myalgias. Negative for neck pain.   Psychiatric/Behavioral: Positive for memory loss. The patient has insomnia.        Physical Exam  Temp:  [36.6 °C (97.9 °F)-36.8 °C (98.2 °F)] 36.8 °C (98.2 °F)  Pulse:  [66-94] 94  Resp:  [15-16] 16  BP: (115-127)/(70-79) 117/72  SpO2:  [93 %-98 %] 93 %    Physical Exam  Vitals signs and nursing note reviewed.   Constitutional:       General: He is not in acute distress.     Appearance: He is not toxic-appearing.   HENT:      Head: Normocephalic and atraumatic.      Nose: Nose normal.      Mouth/Throat:      Mouth: Mucous membranes are moist.   Eyes:      General: No scleral icterus.        Right eye: No discharge.         Left eye: No discharge.      Conjunctiva/sclera: Conjunctivae normal.   Neck:      Musculoskeletal: Normal range of motion.   Cardiovascular:      Rate and Rhythm: Normal rate.      Pulses: Normal pulses.   Pulmonary:      Effort: Pulmonary effort is normal.   Abdominal:      General: There is no distension.      Tenderness: There is no abdominal tenderness.   Musculoskeletal:      Right lower leg: No edema.      Left lower leg: No edema.   Skin:     General: Skin is warm and dry.   Neurological:      Mental Status: He is alert. He is disoriented and confused.      Motor: Motor function is intact.   Psychiatric:         Speech: Speech normal.         Cognition and Memory: Cognition is impaired. He exhibits impaired recent memory.         Judgment: Judgment is impulsive.       Current Facility-Administered Medications:   •  multivitamin (THERAGRAN) tablet 1 Tab, 1 Tab, Oral, DAILY, Glynn Encarnacion M.D., 1 Tab at 11/07/20 0603  •  risperiDONE  (RISPERDAL) tablet 1 mg, 1 mg, Oral, BID, Akanksha Martinez M.D., 1 mg at 11/07/20 0603  •  haloperidol lactate (HALDOL) injection 2.5 mg, 2.5 mg, Intramuscular, Q4HRS PRN, Gisele Barnes M.D., 2.5 mg at 10/21/20 2209  •  LORazepam (ATIVAN) injection 0.5 mg, 0.5 mg, Intravenous, Q6HRS PRN, Gisele Barnes M.D., 0.5 mg at 10/23/20 0151  •  thiamine tablet 100 mg, 100 mg, Oral, DAILY, Gisele Barnes M.D., 100 mg at 11/07/20 0603  •  tamsulosin (FLOMAX) capsule 0.8 mg, 0.8 mg, Oral, AFTER BREAKFAST, Gisele Barnes M.D., 0.8 mg at 11/07/20 0927  •  enoxaparin (LOVENOX) inj 40 mg, 40 mg, Subcutaneous, Q EVENING, Gisele Barnes M.D., 40 mg at 11/06/20 1659  •  acetaminophen (TYLENOL) tablet 650 mg, 650 mg, Oral, Q6HRS PRN, Gisele Barnes M.D., 650 mg at 10/31/20 2003  •  senna-docusate (PERICOLACE or SENOKOT S) 8.6-50 MG per tablet 2 Tab, 2 Tab, Oral, BID, 2 Tab at 11/07/20 0603 **AND** polyethylene glycol/lytes (MIRALAX) PACKET 1 Packet, 1 Packet, Oral, QDAY PRN, 1 Packet at 11/04/20 0434 **AND** magnesium hydroxide (MILK OF MAGNESIA) suspension 30 mL, 30 mL, Oral, QDAY PRN, 30 mL at 10/29/20 1822 **AND** [DISCONTINUED] bisacodyl (DULCOLAX) suppository 10 mg, 10 mg, Rectal, QDAY PRN, Xavi Durham D.O.  •  Respiratory Therapy Consult, , Nebulization, Continuous RT, Gisele Barnes M.D.  •  ipratropium-albuterol (DUONEB) nebulizer solution, 3 mL, Nebulization, Q2HRS PRN (RT), Gisele Barnes M.D.  •  ondansetron (ZOFRAN) syringe/vial injection 4 mg, 4 mg, Intravenous, Q4HRS PRN, Gisele Barnes M.D.      Fluids    Intake/Output Summary (Last 24 hours) at 11/7/2020 1654  Last data filed at 11/7/2020 1000  Gross per 24 hour   Intake 327 ml   Output --   Net 327 ml       Laboratory                        Imaging  MR-BRAIN-W/O   Final Result      1.  Moderate diffuse cerebral atrophy.      2.  Minimal periventricular and juxtacortical white matter changes consistent with chronic microvascular ischemic gliosis.      3.  No evidence  of acute infarction in the brain parenchyma.      DX-ABDOMEN FOR TUBE PLACEMENT   Final Result      Feeding tube tip overlies the second portion of the duodenum.      DX-CHEST-PORTABLE (1 VIEW)   Final Result         1.  Patchy left lung base opacity, new since prior, could represent early infiltrate            DX-CHEST-PORTABLE (1 VIEW)   Final Result         1.  Patchy left lung base opacity, new since prior, could represent early infiltrate         QW-AEETXCL-2 VIEW   Final Result      Enteric tube projects over the distal stomach/pylorus.         EC-ECHOCARDIOGRAM COMPLETE W/O CONT   Final Result      DX-CHEST-PORTABLE (1 VIEW)   Final Result         1.  No acute cardiopulmonary disease.      CT-ABDOMEN-PELVIS W/O   Final Result      1.  No renal stone or hydronephrosis.   2.  Normal appendix.   3.  No focal mesenteric inflammatory process.      DX-ABDOMEN FOR TUBE PLACEMENT   Final Result      NG tube tip projects over expected location the gastric fundus.      US-ABDOMEN COMPLETE SURVEY   Final Result         1.  Echogenic liver compatible with fatty change versus fibrosis.   2.  Gallbladder sludge without additional sonographic findings of cholecystitis.   3.  Partial atrophic bilateral kidneys with lobulated contour         CT-HEAD W/O   Final Result         1.  No acute intracranial abnormality is identified, there are nonspecific white matter changes, commonly associated with small vessel ischemic disease.  Associated mild cerebral atrophy is noted.   2.  Atherosclerosis.      DX-CHEST-PORTABLE (1 VIEW)   Final Result         1.  No acute cardiopulmonary disease.           Assessment/Plan  * Toxic encephalopathy- (present on admission)  Assessment & Plan  Unknown etiology whether this is hypoxic induced brain injury versus Wernicke encephalopathy  LP, MRI brain, EEG were negative. TSH normal. HIV/RPR negative.  - Psych md consult--> they believe it is etoh   - Prn haldol  - Po risperdal 1mg BID  - Continue  vest restraint as needed for patient safety, allow patient to walk on vilchis with assistance TID  - pending guardianship Nov 20, 2020.    Urinary retention- (present on admission)  Assessment & Plan  Improved w/ Flomax, continue medication  Condom catheter in place for Ins and Out management    Normocytic anemia  Assessment & Plan  Mild, unknown etiology  No active bleeding  CTM    Alcohol abuse- (present on admission)  Assessment & Plan  Reported hx of abuse. S/p etoh w/d protocol.  - Continue Vitamins, thiamine  - High dose thiamine  - Seizure precautions       VTE prophylaxis: Lovenox

## 2020-11-08 NOTE — PROGRESS NOTES
Patient:   JOSE FRAGOSO            MRN: [id number]            FIN: [id number]              Age:   69 years     Sex:  FEMALE     :  [birth year]   Associated Diagnoses:   None   Author:   MAXIMO ROUSE     Subjective:  The patient presents with 68 y/o woman with PMH sig for HTN, DM, asthma, breast Ca and s/p R mastectomy 7 years ago and not currently on hormone replacement.  Admitted with increasing SOB and now documented SARS-CoV-2 (+).  Symptoms started one week ago.  Since admission she's had rapidly increasing O2 requirments and has been transferred to ICU where she is undergoing proning protocol.   Objective:    I & O between:  07-MAY-2020 11:01 TO 08-MAY-2020 11:01  Med Dosing Weight:  75.5  kg   02-MAY-2020  24 Hour Intake:   973.63  ( 12.90 mL/kg )  24 Hour Output:   1190.00           24 Hour Urine/Stool Output:   0.0  24 Hour Balance:   -216.37           24 Hour Urine Output:   1190.00  ( 0.66 mL/kg/hr )  Vitals between:   07-MAY-2020 11:01:50   TO   08-MAY-2020 11:01:50                   LAST RESULT MINIMUM MAXIMUM  Temperature 37.2 36.8 37.6  Heart Rate 88 75 90  Respiratory Rate 26 15 26  NISBP           133 128 135  NIDBP           66 63 66  NIMBP           84 81 84  SpO2                    96 93 99  FiO2                    0.6 0.6 0.8   Medications (28) Active  Scheduled: (15)  Aspirin 81 mg chew tab  81 mg 1 tab, Oral, Daily  Atorvastatin 20 mg tab  20 mg 1 tab, Oral, Daily  Chlorhexidine gluconate 0.12% ORAL RINSE 15 mL repack  15 mL, Oral Mucosa, QAM  Enoxaparin 80 mg/0.8 mL syringe  80 mg 0.8 mL, Subcutaneous, Q12H  Famotidine 20 mg tab  20 mg 1 tab, Oral, Q12H  Fluticasone  mcg oral MDI 12 gm  220 mcg 2 puff, Inhaled, BID  Furosemide 40 mg/4 mL inj SDV  40 mg 4 mL, Slow IV Push, BID  Gabapentin 100 mg cap  100 mg 1 cap, Oral, TID  insulin glargine  25 unit 0.25 mL, Subcutaneous, Daily  Insulin human lispro 1 unit/0.01 mL inj  3-18 unit, Subcutaneous, Q6H  Insulin human lispro  Report received from Silviano TAO RN. Assumed care, assessment complete at 2030. Pt is A & O x 1, self only.  Pt denies having any pain at this time. Fall precautions and appropriate signs in place. Pt oriented to unit routine, call light/phone system and RN extension number provided. Pt educated regarding fall precautions. Bed alarm in use. Pt denies any additional needs at this time. Call light within reach. Vest restraint placed on patient to prevent unsafe ambulation and bed exits.   1 unit/0.01 mL inj  6 unit 0.06 mL, Subcutaneous, QID  MethylPREDNISolone Na succ PF 40 mg/1 mL inj SDV  60 mg 1.5 mL, Slow IV Push, Q6H  Oxybutynin 5 mg XL tab 24 hr  5 mg 1 tab, Oral, Daily  Polyvinyl-povidone tears 1.4%-0.6% tears PF ophth soln 0.4 mL  2 drop, Each Eye, Q6H  Senna-docusate sodium 8.6-50 mg tab  2 tab, Oral, Q Bedtime  Continuous: (4)  fentaNYL 1,250 mcg [25 mcg/hr] + premixed in Sodium Chloride 0.9% 125 mL  125 mL, IV, 2.5 mL/hr  midazolam 100 mg [2 mg/hr] + premixed in Sodium Chloride 0.9% 100 mL  100 mL, IV, 2 mL/hr  NORepinephrine 8 mg [5 mcg/min] + premixed in Sodium Chloride 0.9% 250 mL  250 mL, IV, 9.38 mL/hr  propofol 1,000 mg [10 mcg/kg/min] + premixed Solution 100 mL  100 mL, IV, 4.53 mL/hr  PRN: (9)  Acetaminophen 325 mg tab  650 mg 2 tab, Oral, Q4H  Acetaminophen 500 mg tab  500 mg 1 tab, Oral, Q4H  Dextrose (glucose) 40% 15 gm/37.5 gm oral gel UD  15 gm, Oral, As Directed PRN  Dextrose (glucose) 50% 25 gm/50 mL syringe  12.5 gm 25 mL, IV Push, As Directed PRN  FentaNYL 100 mcg/2 mL inj SDV  25 mcg 0.5 mL, IV Push, Q15 Minutes  glucagon  1 mg 1 mL, IM, As Directed PRN  Midazolam PF 2 mg/2 mL inj SDV  2 mg 2 mL, Slow IV Push, Q10 Minutes  NF Albuterol HFA 90 mcg/inh oral MDI (shared)  720 mcg 8 puff, Inhaled, Q4H  Polyvinyl-povidone tears 1.4%-0.6% tears PF ophth soln 0.4 mL  2 drop, Each Eye, Q2H  Physical exam: Not performed due to COVID status  Labs:    Labs between:  07-MAY-2020 11:01 to 08-MAY-2020 11:01  CBC:                 WBC  HgB  Hct  Plt  MCV  RDW   08-MAY-2020 (H) 15.4  (L) 11.5  (L) 35.6  370  91.8  13.8   DIFF:                 Seg  Neutroph//ABS  Lymph//ABS  Mono//ABS  EOS/ABS  08-MAY-2020 NOT APPLICABLE  92 // (H) 14.2  4 // (L) 0.6  4 // 0.6 0 // (L) 0.0  BMP:                 Na  Cl  BUN  Glu   08-MAY-2020 137  101  (H) 50  (H) 262                              K  CO2  Cr  Ca                              3.9  30  0.67  (L) 8.3   Other Chem:             Mg  Phos   Triglycerides  GGTP  DirectBili                                      POC GLU:                 Latest Result  Latest Date  Minimum  Min Date  Maximum  Max Date                             (H) 218  08-MAY-2020 (H) 218  08-MAY-2020 (H) 380  07-MAY-2020  COAG:                 INR  PT  PTT  Ddimer  Fibrinogen    08-MAY-2020       (H) 3.39     Blood Gas:            Ph  PCO2  PO2  BiCarb  BaseExcess   Arterial:  08-MAY-2020 7.40  (H) 49  (L) 79  (H) 31  (H) 5                              Ionized Ca  Na  K  HgB  Lactic Acid                              1.16  141  3.9  12.1  (H) 1.8                          Result title:  XR CHEST PORTABLE 1V  Result status:  Final  Verified by:  ISAIAH, MILLICENT on 05/08/2020 8:17  IMPRESSION:Mild bibasilar airspace disease, stable since the prior exam.  No pneumothorax.  No pleural effusions.  Cardiac size is normal.Endotracheal tube tip is approximately 3 cm above the clarence.  Enteric tube tip projects over the expected location of the stomach antrum.No acute osseous abnormality.  Clips overlie the right upper abdomen.        Assessment and plan:   The patient presents with 70 y/o woman with PMH sig for HTN, DM, asthma, breast Ca and s/p R mastectomy 7 years ago and not currently on hormone replacement.  Admitted with increasing SOB and now documented SARS-CoV-2 (+)  #Acute hypoxic respiratory failure 2/2 COVID19 PNA  -afebrile, intubated 60/12, levo  -s/p tocilizumab 5/3, salumdrol 60mg q6 (5/6-current)  -CXR improved since intubation, and unchanged  -AC per primary Lovenox 80 bid.   -ferritin improving 338->502->532->342  -D-Dimer no change 0.23->0.4->2.3->3.28->3.29  -CRP increasing 7.2-> 11.5->1.6  -Continue trending labs  Pt discussed with Dr. Lion Peterson PGY2  Please contact via [facility]

## 2020-11-08 NOTE — CARE PLAN
Problem: Communication  Goal: The ability to communicate needs accurately and effectively will improve  Outcome: PROGRESSING SLOWER THAN EXPECTED  Note: Educated patient on the use of call light for assistance. Went over controls and functions on the remote. Answered any questions patient had. Patient has not been calling appropriately.      Problem: Knowledge Deficit  Goal: Knowledge of disease process/condition, treatment plan, diagnostic tests, and medications will improve  Outcome: PROGRESSING SLOWER THAN EXPECTED  Note: Went over plan of care with patient and answered any questions patient had. Patient is A&Ox1 and requires frequent reorientation.

## 2020-11-08 NOTE — PROGRESS NOTES
Assumed care of patient at 0700. Received bedside report from noc RN. Patient resting comfortably in bed. No signs of distress. Bed is in low and locked position. Non-slip socks are in place. Call light is within reach. Bed alarm is on. Vest restraint in place for safety. Room near nursing station.

## 2020-11-09 PROCEDURE — A9270 NON-COVERED ITEM OR SERVICE: HCPCS | Performed by: INTERNAL MEDICINE

## 2020-11-09 PROCEDURE — 700102 HCHG RX REV CODE 250 W/ 637 OVERRIDE(OP): Performed by: INTERNAL MEDICINE

## 2020-11-09 PROCEDURE — 770006 HCHG ROOM/CARE - MED/SURG/GYN SEMI*

## 2020-11-09 PROCEDURE — A9270 NON-COVERED ITEM OR SERVICE: HCPCS | Performed by: HOSPITALIST

## 2020-11-09 PROCEDURE — 700111 HCHG RX REV CODE 636 W/ 250 OVERRIDE (IP): Performed by: HOSPITALIST

## 2020-11-09 PROCEDURE — 700102 HCHG RX REV CODE 250 W/ 637 OVERRIDE(OP): Performed by: HOSPITALIST

## 2020-11-09 RX ADMIN — TAMSULOSIN HYDROCHLORIDE 0.8 MG: 0.4 CAPSULE ORAL at 09:38

## 2020-11-09 RX ADMIN — RISPERIDONE 1 MG: 1 TABLET, FILM COATED ORAL at 05:15

## 2020-11-09 RX ADMIN — THERA TABS 1 TABLET: TAB at 05:16

## 2020-11-09 RX ADMIN — Medication 100 MG: at 06:00

## 2020-11-09 RX ADMIN — ENOXAPARIN SODIUM 40 MG: 40 INJECTION SUBCUTANEOUS at 17:08

## 2020-11-09 RX ADMIN — DOCUSATE SODIUM 50 MG AND SENNOSIDES 8.6 MG 2 TABLET: 8.6; 5 TABLET, FILM COATED ORAL at 17:07

## 2020-11-09 RX ADMIN — RISPERIDONE 1 MG: 1 TABLET, FILM COATED ORAL at 17:07

## 2020-11-09 RX ADMIN — DOCUSATE SODIUM 50 MG AND SENNOSIDES 8.6 MG 2 TABLET: 8.6; 5 TABLET, FILM COATED ORAL at 05:16

## 2020-11-09 NOTE — PROGRESS NOTES
Bedside report received from night RN. Assumed care of pt at 0645 . Pt is asleep at this time with equal chest rise and no signs of distress. Pt is A&O x 1. Pt is on room air. Bed alarm is on, bed in lowest position, bed rails up x 2, belongings and call light within reach. Hourly rounding in place.

## 2020-11-09 NOTE — CARE PLAN
Problem: Safety - Medical Restraint  Goal: Free from restraint(s) (Restraint for Interference with Medical Device)  Outcome: PROGRESSING AS EXPECTED  Note: Patient is no longer in vest restraint and redirectable.     Problem: Skin Integrity  Goal: Risk for impaired skin integrity will decrease  Outcome: PROGRESSING AS EXPECTED  Note: Pt is encouraged to shift positions to prevent skin breakdown. Extra pillows are utilized for floating limbs and position. Waffle cushion in place.

## 2020-11-09 NOTE — PROGRESS NOTES
Patient AxO to self only. Respirations normal and unlabored. VSS.  Patient laying calmly in bed and has made no attempts to get out of bed, vest restraint was removed and lap belt was placed on patient. Patient demonstrated the ability to unclip the lap belt. Non skid socks and bed alarm on.   Fall risk protocol in place. Bed locked and in lowest position. Rounded on hourly. Call light with in reach.

## 2020-11-10 PROCEDURE — 97129 THER IVNTJ 1ST 15 MIN: CPT | Mod: CO

## 2020-11-10 PROCEDURE — 700111 HCHG RX REV CODE 636 W/ 250 OVERRIDE (IP): Performed by: HOSPITALIST

## 2020-11-10 PROCEDURE — 97535 SELF CARE MNGMENT TRAINING: CPT | Mod: CO

## 2020-11-10 PROCEDURE — 99231 SBSQ HOSP IP/OBS SF/LOW 25: CPT | Performed by: NURSE PRACTITIONER

## 2020-11-10 PROCEDURE — 770006 HCHG ROOM/CARE - MED/SURG/GYN SEMI*

## 2020-11-10 PROCEDURE — A9270 NON-COVERED ITEM OR SERVICE: HCPCS | Performed by: HOSPITALIST

## 2020-11-10 PROCEDURE — 97530 THERAPEUTIC ACTIVITIES: CPT | Mod: CO

## 2020-11-10 PROCEDURE — 700102 HCHG RX REV CODE 250 W/ 637 OVERRIDE(OP): Performed by: HOSPITALIST

## 2020-11-10 PROCEDURE — 700102 HCHG RX REV CODE 250 W/ 637 OVERRIDE(OP): Performed by: INTERNAL MEDICINE

## 2020-11-10 PROCEDURE — A9270 NON-COVERED ITEM OR SERVICE: HCPCS | Performed by: INTERNAL MEDICINE

## 2020-11-10 RX ADMIN — DOCUSATE SODIUM 50 MG AND SENNOSIDES 8.6 MG 2 TABLET: 8.6; 5 TABLET, FILM COATED ORAL at 18:23

## 2020-11-10 RX ADMIN — TAMSULOSIN HYDROCHLORIDE 0.8 MG: 0.4 CAPSULE ORAL at 09:31

## 2020-11-10 RX ADMIN — Medication 100 MG: at 05:22

## 2020-11-10 RX ADMIN — RISPERIDONE 1 MG: 1 TABLET, FILM COATED ORAL at 05:47

## 2020-11-10 RX ADMIN — DOCUSATE SODIUM 50 MG AND SENNOSIDES 8.6 MG 2 TABLET: 8.6; 5 TABLET, FILM COATED ORAL at 05:22

## 2020-11-10 RX ADMIN — THERA TABS 1 TABLET: TAB at 05:22

## 2020-11-10 RX ADMIN — RISPERIDONE 1 MG: 1 TABLET, FILM COATED ORAL at 18:23

## 2020-11-10 RX ADMIN — ENOXAPARIN SODIUM 40 MG: 40 INJECTION SUBCUTANEOUS at 18:23

## 2020-11-10 ASSESSMENT — ENCOUNTER SYMPTOMS
BACK PAIN: 1
MYALGIAS: 1
INSOMNIA: 1
NAUSEA: 0
ABDOMINAL PAIN: 0
MEMORY LOSS: 1
PALPITATIONS: 0
CONSTITUTIONAL NEGATIVE: 1
NECK PAIN: 0
VOMITING: 0
HEARTBURN: 0

## 2020-11-10 ASSESSMENT — COGNITIVE AND FUNCTIONAL STATUS - GENERAL
DRESSING REGULAR UPPER BODY CLOTHING: A LITTLE
PERSONAL GROOMING: A LITTLE
DRESSING REGULAR LOWER BODY CLOTHING: A LITTLE
DAILY ACTIVITIY SCORE: 17
EATING MEALS: A LITTLE
TOILETING: A LOT
SUGGESTED CMS G CODE MODIFIER DAILY ACTIVITY: CK
HELP NEEDED FOR BATHING: A LITTLE

## 2020-11-10 NOTE — CARE PLAN
Problem: Safety  Goal: Will remain free from injury  Outcome: PROGRESSING SLOWER THAN EXPECTED   Safety precautions in use including use of call bell for assistance, bed in lowest position, bed/chair alarm utilized and use of treaded socks. Objects in room moved to allow access to bathroom       Problem: Knowledge Deficit  Goal: Knowledge of disease process/condition, treatment plan, diagnostic tests, and medications will improve  Outcome: PROGRESSING SLOWER THAN EXPECTED

## 2020-11-10 NOTE — THERAPY
"Occupational Therapy  Daily Treatment     Patient Name: Yury Blake  Age:  50 y.o., Sex:  male  Medical Record #: [id number]  Today's Date: 11/10/2020     Precautions  Precautions: (P) Fall Risk  Comments: (P) No wrist restraints. No posey vest.     Assessment    Pt was seen for OT treatment. Pt needed increased encouragement to participate. Pt stated he had pants on in bed and did not need to work on putting on pants. When blankets were removed, pt did not have pants on.  Pt agreed to work on LB dressing . Pt needed Min A for pant donning and cues for problem solving.Supervision for sock doff and don. Pt required Mod A for clothing management up over hips.Pt required Mod A for sit to stand due to c/o pain in right foot and ankle. Pt required Mod A for tranfers to chair at the bedside. H/G done seated in chair post set up with SBA for problem solving .UB dressing with supervision today. Pt gave good effort. Psychosocial intervention addressed. Pt is making progress towards OT goals slowly but is making progress. Pt is limited due to decreased functional cognition. Pt left up in chair with RN/CNA aware.  RN updated on OT treatment findings. Will continue as per OT POC as pt will participate.   Plan    Continue current treatment plan.    DC Equipment Recommendations: (P) Unable to determine at this time  Discharge Recommendations: (P) Recommend post-acute placement for additional occupational therapy services prior to discharge home    Subjective    \"I can't stand on my leg.  (pointing to right leg) \" I'm going to fall\". \"My leg won't hold me up\". Pt able to transfer with Mod A.      Objective       11/10/20 1514   Cognition    Cognition / Consciousness X   Orientation Level Not Oriented to Reason;Not Oriented to Place;Not Oriented to Time;Not Oriented to Day;Not Oriented to Month;Not Oriented to Year;Not Oriented to Address   Level of Consciousness Confused   Ability To Follow Commands 1 Step   Safety " Awareness Impaired;Impulsive   New Learning Impaired   Attention Impaired   Sequencing Impaired   Comments continues to be confused however appeared to be slightly  less confused today.    Other Treatments   Other Treatments Provided Psychosocial intervention addressed. work on functional cognition as well.    Balance   Sitting Balance (Static) Fair +   Standing Balance (Static) Fair   Weight Shift Sitting Fair   Weight Shift Standing Fair   Skilled Intervention Verbal Cuing;Tactile Cuing;Sequencing;Postural Facilitation;Compensatory Strategies   Comments HHA , refused FWW today.    Bed Mobility    Supine to Sit Supervised   Scooting Supervised   Rolling Supervised   Skilled Intervention Verbal Cuing;Sequencing   Comments HOB slightly elevated and use of bedrail    Activities of Daily Living   Eating Supervision   Grooming Supervision;Seated  (Pt refused to go  to the sink,c/o pain in right foot. )   Bathing Minimal Assist   Upper Body Dressing Supervision   Lower Body Dressing Minimal Assist   Toileting Minimal Assist  (full toilet hygiene not assessed.Incontinent of BM in bed)   Skilled Intervention Verbal Cuing;Tactile Cuing;Sequencing;Compensatory Strategies   Comments limited by cognition.    Functional Mobility   Sit to Stand Minimal Assist   Bed, Chair, Wheelchair Transfer Moderate Assist   Transfer Method Stand Step   Mobility with HHA   Skilled Intervention Verbal Cuing;Tactile Cuing;Sequencing;Postural Facilitation;Compensatory Strategies   Comments cues for safety awareness. Needed encouragement to participate.    Activity Tolerance   Sitting in Chair 12 mins    Sitting Edge of Bed 20 mins    Standing 1-2 mins X2   Comments limited by cognition and weakness.    Patient / Family Goals   Patient / Family Goal #1 Unable to state   Short Term Goals   Short Term Goal # 1 Pt will complete toilet transfer using BSC if needed with spv, no LOB by discharge.   Goal Outcome # 1 Progressing slower than expected    Short Term Goal # 2 Pt will complete standing grooming/hygiene with spv by discharge.   Goal Outcome # 2 Progressing slower than expected   Short Term Goal # 3 Pt will complete toileting with spv by discharge.   Goal Outcome # 3 Progressing slower than expected   Anticipated Discharge Equipment and Recommendations   DC Equipment Recommendations Unable to determine at this time   Discharge Recommendations Recommend post-acute placement for additional occupational therapy services prior to discharge home   Interdisciplinary Plan of Care Collaboration   IDT Collaboration with  Nursing;Certified Nursing Assistant   Collaboration Comments RN updated on OT treatment findings and recommendations.

## 2020-11-10 NOTE — PROGRESS NOTES
Assumed care at 1900. Received report from day shift RN. Pt is asleep in bed, on RA, has unlabored breathing showing no obvious s/sx of pain or distress. Fall precautions and appropriate signs in place. Call light and belongings within reach. Bed alarm and hourly rounding in place. Communication board updated. Will continue to monitor.

## 2020-11-10 NOTE — CARE PLAN
Problem: Safety  Goal: Will remain free from falls  Outcome: PROGRESSING AS EXPECTED  Note: Pt wearing non slip socks, bed locked in lowest position, call light and belongings within reach, environment clutter and spill free, bed alarm and hourly rounding in place.      Problem: Psychosocial Needs:  Goal: Level of anxiety will decrease  Outcome: PROGRESSING AS EXPECTED  Note: Pt given quiet dark environment to promote rest and relaxation.

## 2020-11-10 NOTE — PROGRESS NOTES
Assumed care of pt at 0700. Report received by NOC RN. Pt is A&O x 2. Pain is 3, aching in right foot. Pt is laying comfortably in bed. Bed in lowest locked position, call light in reach, hourly rounding in place. Labs reviewed, orders reviewed, communication board updated. WCTM.

## 2020-11-11 ENCOUNTER — APPOINTMENT (OUTPATIENT)
Dept: RADIOLOGY | Facility: MEDICAL CENTER | Age: 50
DRG: 853 | End: 2020-11-11
Attending: NURSE PRACTITIONER
Payer: MEDICAID

## 2020-11-11 PROBLEM — R00.0 TACHYCARDIA: Status: ACTIVE | Noted: 2020-11-11

## 2020-11-11 LAB
ANION GAP SERPL CALC-SCNC: 8 MMOL/L (ref 7–16)
BASOPHILS # BLD AUTO: 0.4 % (ref 0–1.8)
BASOPHILS # BLD: 0.06 K/UL (ref 0–0.12)
BUN SERPL-MCNC: 14 MG/DL (ref 8–22)
CALCIUM SERPL-MCNC: 10 MG/DL (ref 8.5–10.5)
CHLORIDE SERPL-SCNC: 106 MMOL/L (ref 96–112)
CO2 SERPL-SCNC: 26 MMOL/L (ref 20–33)
CREAT SERPL-MCNC: 0.8 MG/DL (ref 0.5–1.4)
D DIMER PPP IA.FEU-MCNC: 0.95 UG/ML (FEU) (ref 0–0.5)
EKG IMPRESSION: NORMAL
EOSINOPHIL # BLD AUTO: 0.08 K/UL (ref 0–0.51)
EOSINOPHIL NFR BLD: 0.5 % (ref 0–6.9)
ERYTHROCYTE [DISTWIDTH] IN BLOOD BY AUTOMATED COUNT: 44.5 FL (ref 35.9–50)
GLUCOSE SERPL-MCNC: 105 MG/DL (ref 65–99)
HCT VFR BLD AUTO: 38.6 % (ref 42–52)
HGB BLD-MCNC: 12.3 G/DL (ref 14–18)
IMM GRANULOCYTES # BLD AUTO: 0.08 K/UL (ref 0–0.11)
IMM GRANULOCYTES NFR BLD AUTO: 0.5 % (ref 0–0.9)
LYMPHOCYTES # BLD AUTO: 1.28 K/UL (ref 1–4.8)
LYMPHOCYTES NFR BLD: 7.8 % (ref 22–41)
MCH RBC QN AUTO: 28.9 PG (ref 27–33)
MCHC RBC AUTO-ENTMCNC: 31.9 G/DL (ref 33.7–35.3)
MCV RBC AUTO: 90.8 FL (ref 81.4–97.8)
MONOCYTES # BLD AUTO: 0.75 K/UL (ref 0–0.85)
MONOCYTES NFR BLD AUTO: 4.6 % (ref 0–13.4)
NEUTROPHILS # BLD AUTO: 14.06 K/UL (ref 1.82–7.42)
NEUTROPHILS NFR BLD: 86.2 % (ref 44–72)
NRBC # BLD AUTO: 0 K/UL
NRBC BLD-RTO: 0 /100 WBC
PLATELET # BLD AUTO: 263 K/UL (ref 164–446)
PMV BLD AUTO: 11.1 FL (ref 9–12.9)
POTASSIUM SERPL-SCNC: 4 MMOL/L (ref 3.6–5.5)
RBC # BLD AUTO: 4.25 M/UL (ref 4.7–6.1)
SODIUM SERPL-SCNC: 140 MMOL/L (ref 135–145)
TROPONIN T SERPL-MCNC: 36 NG/L (ref 6–19)
TROPONIN T SERPL-MCNC: 38 NG/L (ref 6–19)
WBC # BLD AUTO: 16.3 K/UL (ref 4.8–10.8)

## 2020-11-11 PROCEDURE — 700102 HCHG RX REV CODE 250 W/ 637 OVERRIDE(OP): Performed by: INTERNAL MEDICINE

## 2020-11-11 PROCEDURE — 700101 HCHG RX REV CODE 250: Performed by: NURSE PRACTITIONER

## 2020-11-11 PROCEDURE — 700102 HCHG RX REV CODE 250 W/ 637 OVERRIDE(OP): Performed by: HOSPITALIST

## 2020-11-11 PROCEDURE — A9270 NON-COVERED ITEM OR SERVICE: HCPCS | Performed by: NURSE PRACTITIONER

## 2020-11-11 PROCEDURE — 80048 BASIC METABOLIC PNL TOTAL CA: CPT

## 2020-11-11 PROCEDURE — 770020 HCHG ROOM/CARE - TELE (206)

## 2020-11-11 PROCEDURE — 700105 HCHG RX REV CODE 258: Performed by: NURSE PRACTITIONER

## 2020-11-11 PROCEDURE — 700102 HCHG RX REV CODE 250 W/ 637 OVERRIDE(OP): Performed by: NURSE PRACTITIONER

## 2020-11-11 PROCEDURE — 93005 ELECTROCARDIOGRAM TRACING: CPT | Performed by: NURSE PRACTITIONER

## 2020-11-11 PROCEDURE — A9270 NON-COVERED ITEM OR SERVICE: HCPCS | Performed by: HOSPITALIST

## 2020-11-11 PROCEDURE — 71275 CT ANGIOGRAPHY CHEST: CPT

## 2020-11-11 PROCEDURE — A9270 NON-COVERED ITEM OR SERVICE: HCPCS | Performed by: INTERNAL MEDICINE

## 2020-11-11 PROCEDURE — 71045 X-RAY EXAM CHEST 1 VIEW: CPT

## 2020-11-11 PROCEDURE — 85025 COMPLETE CBC W/AUTO DIFF WBC: CPT

## 2020-11-11 PROCEDURE — 700111 HCHG RX REV CODE 636 W/ 250 OVERRIDE (IP): Performed by: HOSPITALIST

## 2020-11-11 PROCEDURE — 85379 FIBRIN DEGRADATION QUANT: CPT

## 2020-11-11 PROCEDURE — 84484 ASSAY OF TROPONIN QUANT: CPT

## 2020-11-11 PROCEDURE — 93010 ELECTROCARDIOGRAM REPORT: CPT | Performed by: INTERNAL MEDICINE

## 2020-11-11 PROCEDURE — 700117 HCHG RX CONTRAST REV CODE 255: Performed by: NURSE PRACTITIONER

## 2020-11-11 PROCEDURE — 93005 ELECTROCARDIOGRAM TRACING: CPT | Performed by: INTERNAL MEDICINE

## 2020-11-11 PROCEDURE — 99231 SBSQ HOSP IP/OBS SF/LOW 25: CPT | Performed by: NURSE PRACTITIONER

## 2020-11-11 PROCEDURE — 99291 CRITICAL CARE FIRST HOUR: CPT | Performed by: INTERNAL MEDICINE

## 2020-11-11 PROCEDURE — 36415 COLL VENOUS BLD VENIPUNCTURE: CPT

## 2020-11-11 PROCEDURE — 93005 ELECTROCARDIOGRAM TRACING: CPT | Performed by: STUDENT IN AN ORGANIZED HEALTH CARE EDUCATION/TRAINING PROGRAM

## 2020-11-11 RX ORDER — ASPIRIN 325 MG
325 TABLET ORAL DAILY
Status: DISCONTINUED | OUTPATIENT
Start: 2020-11-11 | End: 2021-03-18 | Stop reason: HOSPADM

## 2020-11-11 RX ORDER — METOPROLOL TARTRATE 1 MG/ML
5 INJECTION, SOLUTION INTRAVENOUS
Status: DISCONTINUED | OUTPATIENT
Start: 2020-11-11 | End: 2020-11-20

## 2020-11-11 RX ORDER — SODIUM CHLORIDE 9 MG/ML
500 INJECTION, SOLUTION INTRAVENOUS ONCE
Status: COMPLETED | OUTPATIENT
Start: 2020-11-11 | End: 2020-11-12

## 2020-11-11 RX ORDER — METOPROLOL TARTRATE 1 MG/ML
5 INJECTION, SOLUTION INTRAVENOUS ONCE
Status: COMPLETED | OUTPATIENT
Start: 2020-11-11 | End: 2020-11-11

## 2020-11-11 RX ADMIN — TAMSULOSIN HYDROCHLORIDE 0.8 MG: 0.4 CAPSULE ORAL at 09:26

## 2020-11-11 RX ADMIN — METOPROLOL TARTRATE 5 MG: 5 INJECTION, SOLUTION INTRAVENOUS at 18:25

## 2020-11-11 RX ADMIN — THERA TABS 1 TABLET: TAB at 05:49

## 2020-11-11 RX ADMIN — Medication 100 MG: at 05:49

## 2020-11-11 RX ADMIN — RISPERIDONE 1 MG: 1 TABLET, FILM COATED ORAL at 06:21

## 2020-11-11 RX ADMIN — METOPROLOL TARTRATE 25 MG: 25 TABLET, FILM COATED ORAL at 15:47

## 2020-11-11 RX ADMIN — DOCUSATE SODIUM 50 MG AND SENNOSIDES 8.6 MG 2 TABLET: 8.6; 5 TABLET, FILM COATED ORAL at 05:49

## 2020-11-11 RX ADMIN — ENOXAPARIN SODIUM 40 MG: 40 INJECTION SUBCUTANEOUS at 16:19

## 2020-11-11 RX ADMIN — IOHEXOL 60 ML: 350 INJECTION, SOLUTION INTRAVENOUS at 21:56

## 2020-11-11 RX ADMIN — SODIUM CHLORIDE 500 ML: 9 INJECTION, SOLUTION INTRAVENOUS at 16:38

## 2020-11-11 RX ADMIN — ASPIRIN 325 MG ORAL TABLET 325 MG: 325 PILL ORAL at 16:24

## 2020-11-11 RX ADMIN — RISPERIDONE 1 MG: 1 TABLET, FILM COATED ORAL at 18:25

## 2020-11-11 ASSESSMENT — ENCOUNTER SYMPTOMS
CONSTIPATION: 0
FLANK PAIN: 0
MYALGIAS: 0
PND: 0
HEADACHES: 0
COUGH: 0
FEVER: 1
SHORTNESS OF BREATH: 0
NUMBNESS: 0
DIAPHORESIS: 0
BACK PAIN: 0
HEARTBURN: 0
ORTHOPNEA: 0
DIZZINESS: 0
FALLS: 0
VOMITING: 0
SLEEP DISTURBANCES DUE TO BREATHING: 0
WEAKNESS: 0
LOSS OF BALANCE: 0
DYSPNEA ON EXERTION: 0
SORE THROAT: 0
BACK PAIN: 1
FEVER: 0
DOUBLE VISION: 0
WHEEZING: 0
INSOMNIA: 0
HALLUCINATIONS: 1
MEMORY LOSS: 1
DIARRHEA: 0
NAUSEA: 0
BLOATING: 0
NECK PAIN: 0
PALPITATIONS: 0
MYALGIAS: 1
EXCESSIVE DAYTIME SLEEPINESS: 0
BLURRED VISION: 0
PARESTHESIAS: 0
NIGHT SWEATS: 0
LIGHT-HEADEDNESS: 0
ABDOMINAL PAIN: 0
NEAR-SYNCOPE: 0
DECREASED APPETITE: 0
SYNCOPE: 0
IRREGULAR HEARTBEAT: 0
CHILLS: 1

## 2020-11-11 NOTE — PROGRESS NOTES
Hospital Medicine Daily Progress Note    Date of Service  11/10/2020    Chief Complaint  altered mental status.     Hospital Course  50 y.o. male with a pmhx of HTN, chronic pain and alcohol abuse who presented 9/9/2020 with altered mental status, he was found obtunded in his house by his ex-wife who was checking on him as he had not been seen for 2 days or been able to get a hold of him.  He was found in a chair,  A&Ox0 and hypotensive.  He arrived in the ER with a GCS of 7 (E1,V1,M5) and severely hypotensive.  The patient was intubated for airway protection and central venous access was obtained for administration of high-volume crystalloid resuscitation and vasopressor therapy.    CT head and abdomen and pelvis were all unremarkable. He was briefly treated with antibiotics for concern for an underlying infection and a lumbar tap done was negative. Cultures resulted negative.   EEG was done for concern for seizure-like activity was without any epileptiform activity.  He was extubated on 9/11 and has been able to protect his airway ever since. He received benzodiazepines and IV thiamine for etoh.  Now awaiting placement however continues to be impulsive and has required restraints intermittently this admission.  He was found to have UTI s/p treatment with bactrim.  He had urinary retention s/p newton but continued to self-remove. His urinary retention resolved. Continues to be impulsive and has had multiple falls, now in restraints, on risperdal.      Interval Problem Update  Patient is lying in bed, easily aroused and cooperative.  Oriented to self and place.  He is disoriented to date and situation.  He believes he is here for a problem with anxiety.  He denies any other problems.  -Vital signs within normal limits  -Guardianship hearing scheduled for 11/20.     Consultants/Specialty  Intensivist  Psych    Code Status  Full Code    Disposition  Pending SNF/guardianship      Review of Systems  Review of Systems    Constitutional: Negative.    Cardiovascular: Negative for chest pain and palpitations.   Gastrointestinal: Negative for abdominal pain, heartburn, nausea and vomiting.   Musculoskeletal: Positive for back pain, joint pain and myalgias. Negative for neck pain.   Psychiatric/Behavioral: Positive for memory loss. The patient has insomnia.        Physical Exam  Temp:  [36.3 °C (97.3 °F)-36.7 °C (98 °F)] 36.7 °C (98 °F)  Pulse:  [68-99] 99  Resp:  [16-17] 16  BP: (112-130)/(70-88) 117/82  SpO2:  [95 %-100 %] 100 %    Physical Exam  Vitals signs and nursing note reviewed.   Constitutional:       General: He is not in acute distress.     Appearance: He is not toxic-appearing.   HENT:      Head: Normocephalic and atraumatic.      Nose: Nose normal.      Mouth/Throat:      Mouth: Mucous membranes are moist.   Eyes:      General: No scleral icterus.        Right eye: No discharge.         Left eye: No discharge.      Conjunctiva/sclera: Conjunctivae normal.   Neck:      Musculoskeletal: Normal range of motion.   Cardiovascular:      Rate and Rhythm: Normal rate.      Pulses: Normal pulses.   Pulmonary:      Effort: Pulmonary effort is normal.   Abdominal:      General: There is no distension.      Tenderness: There is no abdominal tenderness.   Musculoskeletal:      Right lower leg: No edema.      Left lower leg: No edema.   Skin:     General: Skin is warm and dry.   Neurological:      Mental Status: He is alert. He is disoriented and confused.      Motor: Motor function is intact.   Psychiatric:         Speech: Speech normal.         Cognition and Memory: Cognition is impaired. He exhibits impaired recent memory.         Judgment: Judgment is impulsive.       Current Facility-Administered Medications:   •  multivitamin (THERAGRAN) tablet 1 Tab, 1 Tab, Oral, DAILY, Glynn Encarnacion M.D., 1 Tab at 11/10/20 0522  •  risperiDONE (RISPERDAL) tablet 1 mg, 1 mg, Oral, BID, Akanksha Martinez M.D., 1 mg at 11/10/20 0566  •   thiamine tablet 100 mg, 100 mg, Oral, DAILY, Gisele Barnes M.D., 100 mg at 11/10/20 0522  •  tamsulosin (FLOMAX) capsule 0.8 mg, 0.8 mg, Oral, AFTER BREAKFAST, Gisele Barnes M.D., 0.8 mg at 11/10/20 0931  •  enoxaparin (LOVENOX) inj 40 mg, 40 mg, Subcutaneous, Q EVENING, Gisele Barnes M.D., 40 mg at 11/09/20 1708  •  acetaminophen (TYLENOL) tablet 650 mg, 650 mg, Oral, Q6HRS PRN, Gisele Barnes M.D., 650 mg at 10/31/20 2003  •  senna-docusate (PERICOLACE or SENOKOT S) 8.6-50 MG per tablet 2 Tab, 2 Tab, Oral, BID, 2 Tab at 11/10/20 0522 **AND** polyethylene glycol/lytes (MIRALAX) PACKET 1 Packet, 1 Packet, Oral, QDAY PRN, 1 Packet at 11/04/20 0434 **AND** magnesium hydroxide (MILK OF MAGNESIA) suspension 30 mL, 30 mL, Oral, QDAY PRN, 30 mL at 10/29/20 1822 **AND** [DISCONTINUED] bisacodyl (DULCOLAX) suppository 10 mg, 10 mg, Rectal, QDAY PRN, Fortune Herminio, D.O.  •  Respiratory Therapy Consult, , Nebulization, Continuous RT, Gisele Barnes M.D.  •  ipratropium-albuterol (DUONEB) nebulizer solution, 3 mL, Nebulization, Q2HRS PRN (RT), Gisele Barnes M.D.      Fluids    Intake/Output Summary (Last 24 hours) at 11/10/2020 1651  Last data filed at 11/10/2020 1423  Gross per 24 hour   Intake 240 ml   Output --   Net 240 ml       Laboratory                        Imaging  MR-BRAIN-W/O   Final Result      1.  Moderate diffuse cerebral atrophy.      2.  Minimal periventricular and juxtacortical white matter changes consistent with chronic microvascular ischemic gliosis.      3.  No evidence of acute infarction in the brain parenchyma.      DX-ABDOMEN FOR TUBE PLACEMENT   Final Result      Feeding tube tip overlies the second portion of the duodenum.      DX-CHEST-PORTABLE (1 VIEW)   Final Result         1.  Patchy left lung base opacity, new since prior, could represent early infiltrate            DX-CHEST-PORTABLE (1 VIEW)   Final Result         1.  Patchy left lung base opacity, new since prior, could represent early  infiltrate         RR-BEDNUVY-0 VIEW   Final Result      Enteric tube projects over the distal stomach/pylorus.         EC-ECHOCARDIOGRAM COMPLETE W/O CONT   Final Result      DX-CHEST-PORTABLE (1 VIEW)   Final Result         1.  No acute cardiopulmonary disease.      CT-ABDOMEN-PELVIS W/O   Final Result      1.  No renal stone or hydronephrosis.   2.  Normal appendix.   3.  No focal mesenteric inflammatory process.      DX-ABDOMEN FOR TUBE PLACEMENT   Final Result      NG tube tip projects over expected location the gastric fundus.      US-ABDOMEN COMPLETE SURVEY   Final Result         1.  Echogenic liver compatible with fatty change versus fibrosis.   2.  Gallbladder sludge without additional sonographic findings of cholecystitis.   3.  Partial atrophic bilateral kidneys with lobulated contour         CT-HEAD W/O   Final Result         1.  No acute intracranial abnormality is identified, there are nonspecific white matter changes, commonly associated with small vessel ischemic disease.  Associated mild cerebral atrophy is noted.   2.  Atherosclerosis.      DX-CHEST-PORTABLE (1 VIEW)   Final Result         1.  No acute cardiopulmonary disease.           Assessment/Plan  * Toxic encephalopathy- (present on admission)  Assessment & Plan  Unknown etiology whether this is hypoxic induced brain injury versus Wernicke encephalopathy  LP, MRI brain, EEG were negative. TSH normal. HIV/RPR negative.  - Psych md consult--> they believe it is etoh   - Po risperdal 1mg BID  - Continue vest restraint as needed for patient safety, allow patient to walk on vilchis with assistance TID  - pending guardianship Nov 20, 2020.    Urinary retention- (present on admission)  Assessment & Plan  Improved w/ Flomax, continue medication  Condom catheter in place for Ins and Out management    Normocytic anemia  Assessment & Plan  Mild, unknown etiology  No active bleeding, stable  CTM    Alcohol abuse- (present on admission)  Assessment &  Plan  Reported hx of abuse. S/p etoh w/d protocol.  - Continue Vitamins, thiamine  - High dose thiamine  - Seizure precautions       VTE prophylaxis: TRACE Bellamy.

## 2020-11-11 NOTE — CARE PLAN
Problem: Safety  Goal: Will remain free from falls  Outcome: PROGRESSING AS EXPECTED  Note: Pt is wearing non slip socks, bed is locked in lowest position, call light and belongings within reach, environment is spill and clutter free, bed alarm and hourly rounding in place.      Problem: Communication  Goal: The ability to communicate needs accurately and effectively will improve  Outcome: PROGRESSING SLOWER THAN EXPECTED  Note: Pt is confused and unable to communicate needs to RN.

## 2020-11-11 NOTE — PROGRESS NOTES
Assumed care at 1900. Received report from day shift RN. Pt is awake and alert sitting up in the chair, A&Ox 1, disoriented to time, place, situation, on RA, reports a pain level of 0/10. Fall precautions and appropriate signs in place. Call light and belongings within reach. Bed alarm and hourly rounding in place. Communication board updated. Pt denies any additional needs at this time.

## 2020-11-11 NOTE — PROGRESS NOTES
Assumed care at 0700. Pt disoriented x 4. Remains on room air tolerating current diet. Safety maintained, call light within reach. Fall precautions maintained. See flow sheet for details.

## 2020-11-12 PROBLEM — D72.829 LEUKOCYTOSIS: Status: ACTIVE | Noted: 2020-11-12

## 2020-11-12 LAB
EKG IMPRESSION: NORMAL
LACTATE BLD-SCNC: 1.3 MMOL/L (ref 0.5–2)
PROCALCITONIN SERPL-MCNC: 2.49 NG/ML
TROPONIN T SERPL-MCNC: 43 NG/L (ref 6–19)

## 2020-11-12 PROCEDURE — 700102 HCHG RX REV CODE 250 W/ 637 OVERRIDE(OP): Performed by: HOSPITALIST

## 2020-11-12 PROCEDURE — 700111 HCHG RX REV CODE 636 W/ 250 OVERRIDE (IP): Performed by: HOSPITALIST

## 2020-11-12 PROCEDURE — A9270 NON-COVERED ITEM OR SERVICE: HCPCS | Performed by: HOSPITALIST

## 2020-11-12 PROCEDURE — 36415 COLL VENOUS BLD VENIPUNCTURE: CPT

## 2020-11-12 PROCEDURE — A9270 NON-COVERED ITEM OR SERVICE: HCPCS | Performed by: INTERNAL MEDICINE

## 2020-11-12 PROCEDURE — 93010 ELECTROCARDIOGRAM REPORT: CPT | Performed by: INTERNAL MEDICINE

## 2020-11-12 PROCEDURE — 700102 HCHG RX REV CODE 250 W/ 637 OVERRIDE(OP): Performed by: NURSE PRACTITIONER

## 2020-11-12 PROCEDURE — 84484 ASSAY OF TROPONIN QUANT: CPT

## 2020-11-12 PROCEDURE — 770020 HCHG ROOM/CARE - TELE (206)

## 2020-11-12 PROCEDURE — 83605 ASSAY OF LACTIC ACID: CPT

## 2020-11-12 PROCEDURE — 700102 HCHG RX REV CODE 250 W/ 637 OVERRIDE(OP): Performed by: INTERNAL MEDICINE

## 2020-11-12 PROCEDURE — 99232 SBSQ HOSP IP/OBS MODERATE 35: CPT | Performed by: STUDENT IN AN ORGANIZED HEALTH CARE EDUCATION/TRAINING PROGRAM

## 2020-11-12 PROCEDURE — A9270 NON-COVERED ITEM OR SERVICE: HCPCS | Performed by: NURSE PRACTITIONER

## 2020-11-12 PROCEDURE — 87040 BLOOD CULTURE FOR BACTERIA: CPT | Mod: 91

## 2020-11-12 PROCEDURE — 84145 PROCALCITONIN (PCT): CPT

## 2020-11-12 RX ADMIN — RISPERIDONE 1 MG: 1 TABLET, FILM COATED ORAL at 05:49

## 2020-11-12 RX ADMIN — ENOXAPARIN SODIUM 40 MG: 40 INJECTION SUBCUTANEOUS at 17:12

## 2020-11-12 RX ADMIN — TAMSULOSIN HYDROCHLORIDE 0.8 MG: 0.4 CAPSULE ORAL at 08:31

## 2020-11-12 RX ADMIN — ACETAMINOPHEN 650 MG: 325 TABLET, FILM COATED ORAL at 12:15

## 2020-11-12 RX ADMIN — Medication 100 MG: at 05:47

## 2020-11-12 RX ADMIN — RISPERIDONE 1 MG: 1 TABLET, FILM COATED ORAL at 17:12

## 2020-11-12 RX ADMIN — ASPIRIN 325 MG ORAL TABLET 325 MG: 325 PILL ORAL at 05:48

## 2020-11-12 RX ADMIN — DOCUSATE SODIUM 50 MG AND SENNOSIDES 8.6 MG 2 TABLET: 8.6; 5 TABLET, FILM COATED ORAL at 05:49

## 2020-11-12 RX ADMIN — THERA TABS 1 TABLET: TAB at 05:47

## 2020-11-12 ASSESSMENT — ENCOUNTER SYMPTOMS
WEAKNESS: 0
INSOMNIA: 0
CHILLS: 0
HALLUCINATIONS: 1
CHEST TIGHTNESS: 0
NAUSEA: 0
BACK PAIN: 0
MYALGIAS: 1
VOMITING: 0
MEMORY LOSS: 0
COUGH: 0
HEARTBURN: 0
WHEEZING: 0
NECK PAIN: 0
DIZZINESS: 0
PALPITATIONS: 0
FEVER: 0
SORE THROAT: 0
HEADACHES: 0
ABDOMINAL PAIN: 0
SHORTNESS OF BREATH: 0

## 2020-11-12 ASSESSMENT — FIBROSIS 4 INDEX: FIB4 SCORE: 0.56

## 2020-11-12 NOTE — PROGRESS NOTES
Assumed care of PT A&O 3. Pt resting in bed with no signs of labored breathing. On RA. Tele monitor in place, cardiac rhythm being monitored. Call light within reach, bed in lowest position, upper bed rails up. Pt was updated on plan of care for the day. Will continue to monitor.

## 2020-11-12 NOTE — PROGRESS NOTES
Upon taking vitals this afternoon, pt was very tachycardic. Notified NP covering. Came to bedside. Pt denies chest pain. NS bolus started, PO lopressor 25 mg given. Report given to Alona LUCIANO. Pt vitals reordered per flow sheet. Safety maintained, pt transported in stable condition on heart monitor.

## 2020-11-12 NOTE — PROGRESS NOTES
Report received from day shift Rn. Assumed pt care. Pt A&O to self. Fall precautions in place, call light and belongings within reach, bed in lowest position. No signs of distress.

## 2020-11-12 NOTE — PROGRESS NOTES
Cardiology Follow Up Progress Note    Date of Service  11/12/2020    Attending Physician  Jason Jay M.D.    Chief Complaint   Abnormal ECG    HPI  Yury Blake is a 50 y.o. male admitted 9/9/2020 with a history of hypertension and alcohol abuse who presented to the hospital on 9/9/2020 with altered mental status.  Patient has had a prolonged and complicated medical stay, was initially intubated requiring pressor support for shock.  He was extubated on 9/11/2020 and has been in the hospital since due to ongoing encephalopathy, confusion and hallucinations and is pending guardianship.     Patient is not on telemetry monitoring and during his regular vital check this evening he was noted to be tachycardic with heart rate in 140s.  Stat EKG was obtained which was concerning for STEMI and cardiology was subsequently consulted. ECG showed ST    Interim Events  11/12/2020: SR 70's ST last night  Still confused  VSS    Review of Systems  Review of Systems   Constitutional: Negative for chills and fever.   Respiratory: Negative for cough, chest tightness, shortness of breath and wheezing.    Cardiovascular: Negative for chest pain, palpitations and leg swelling.   Gastrointestinal: Negative for nausea and vomiting.   Neurological: Negative for dizziness and headaches.   Psychiatric/Behavioral: Positive for hallucinations.   All other systems reviewed and are negative.      Vital signs in last 24 hours  Temp:  [36.2 °C (97.1 °F)-38.1 °C (100.6 °F)] 36.2 °C (97.1 °F)  Pulse:  [] 82  Resp:  [16-20] 18  BP: ()/() 123/73  SpO2:  [94 %-98 %] 96 %    Physical Exam  Physical Exam  Vitals signs and nursing note reviewed.   Constitutional:       Appearance: Normal appearance.   HENT:      Head: Normocephalic and atraumatic.      Mouth/Throat:      Mouth: Mucous membranes are moist.   Eyes:      Extraocular Movements: Extraocular movements intact.   Neck:      Musculoskeletal: Normal range of motion and  neck supple.      Comments: No JVD  Cardiovascular:      Rate and Rhythm: Normal rate and regular rhythm.      Pulses: Normal pulses.      Heart sounds: Normal heart sounds. No murmur.   Pulmonary:      Effort: Pulmonary effort is normal.      Breath sounds: Normal breath sounds.   Abdominal:      Palpations: Abdomen is soft.   Skin:     General: Skin is warm and dry.   Neurological:      General: No focal deficit present.      Mental Status: He is alert.   Psychiatric:         Mood and Affect: Mood normal.         Behavior: Behavior normal.         Lab Review  Lab Results   Component Value Date/Time    WBC 16.3 (H) 11/11/2020 09:17 PM    RBC 4.25 (L) 11/11/2020 09:17 PM    HEMOGLOBIN 12.3 (L) 11/11/2020 09:17 PM    HEMATOCRIT 38.6 (L) 11/11/2020 09:17 PM    MCV 90.8 11/11/2020 09:17 PM    MCH 28.9 11/11/2020 09:17 PM    MCHC 31.9 (L) 11/11/2020 09:17 PM    MPV 11.1 11/11/2020 09:17 PM      Lab Results   Component Value Date/Time    SODIUM 140 11/11/2020 09:17 PM    POTASSIUM 4.0 11/11/2020 09:17 PM    CHLORIDE 106 11/11/2020 09:17 PM    CO2 26 11/11/2020 09:17 PM    GLUCOSE 105 (H) 11/11/2020 09:17 PM    BUN 14 11/11/2020 09:17 PM    CREATININE 0.80 11/11/2020 09:17 PM      Lab Results   Component Value Date/Time    ASTSGOT 9 (L) 10/20/2020 08:37 AM    ALTSGPT 9 10/20/2020 08:37 AM     Lab Results   Component Value Date/Time    CHOLSTRLTOT 164 08/02/2016 07:30 AM    LDL 38 08/02/2016 07:30 AM    HDL 55 08/02/2016 07:30 AM    TRIGLYCERIDE 118 09/10/2020 03:55 AM    TROPONINT 43 (H) 11/12/2020 06:28 AM       No results for input(s): NTPROBNP in the last 72 hours.    Cardiac Imaging and Procedures Review  EKG:  My personal interpretation of the EKG dated 11/11/2020 is ST on 2 ECGs    Echocardiogram: 9/10/2020  CONCLUSIONS  Left ventricular ejection fraction is visually estimated to be 55%.  No significant valve abnormality.  Unable to estimate RVSP, normal estimated RAP.        Imaging  Chest X-Ray: 11/11/2020  No  acute cardiopulmonary process      Assessment/Plan  1.  Sinus tachycardia  -There was no a flutter on ECGs  -No STEMI  -Unsure of what precipitated tachycardia, if persistent can consider BB      Thank you for allowing me to participate in the care of this patient.  Cardiology will sign off on this patient    Please contact me with any questions.        TAMMY Woods  [facility]  [phone]    No future appointments.    Please note that this dictation was created using voice recognition software. I have worked with consultants from the vendor as well as technical experts from [facility] to optimize the interface. I have made every reasonable attempt to correct obvious errors, but I expect that there are errors of grammar and possibly content I did not discover before finalizing the note.

## 2020-11-12 NOTE — PROGRESS NOTES
Hospital Medicine Daily Progress Note    Date of Service  11/11/2020    Chief Complaint  altered mental status    Hospital Course  50 y.o. male with a pmhx of HTN, chronic pain and alcohol abuse who presented 9/9/2020 with altered mental status, he was found obtunded in his house by his ex-wife who was checking on him as he had not been seen for 2 days or been able to get a hold of him.  He was found in a chair,  A&Ox0 and hypotensive.  He arrived in the ER with a GCS of 7 (E1,V1,M5) and severely hypotensive.  The patient was intubated for airway protection and central venous access was obtained for administration of high-volume crystalloid resuscitation and vasopressor therapy.    CT head and abdomen and pelvis were all unremarkable. He was briefly treated with antibiotics for concern for an underlying infection and a lumbar tap done was negative. Cultures resulted negative.   EEG was done for concern for seizure-like activity was without any epileptiform activity.  He was extubated on 9/11 and has been able to protect his airway ever since. He received benzodiazepines and IV thiamine for etoh.  Now awaiting placement however continues to be impulsive and has required restraints intermittently this admission.  He was found to have UTI s/p treatment with bactrim.  He had urinary retention s/p newton but continued to self-remove. His urinary retention resolved. Continues to be impulsive and has had multiple falls, on risperdal.      Interval Problem Update  Patient is lying in bed, awake and cooperative.  He is oriented only to himself.  He endorses feeling feverish and sweaty.  He denies chest pain, palpitations and any other problems.   Nursing reports tachycardia in the 140s.  -Refractory to vasovagal procedure  -Temperature of 100.5, Tylenol given  -Blood pressures slightly low, 90s/ 60s. RR 20  -Stat EKG showed concern for ST elevations  -Stat troponins mildly elevated, trend.  Stat CXR pending   -Blood cultures  pending  -Metoprolol 25 mg given p.o.  -Discussed case with Dr. Gonzales, added stat Aspirin  Cardiology consulted, recommendations appreciated:  -Not likely true ST elevation, more likely atrial flutter   -Stat CTA pending for elevated D-dimer.  -Transfer to telemetry for cardiac monitor and 5 mg metoprolol IV.  May repeat IV metoprolol one time 6 hours after prior administration.  -If tachycardia over 120 persist after metoprolol IV, initiate IV amiodarone bolus and drip with plan for cardioversion tomorrow.  -500 cc NS bolus given  -Repeat ECG (results: unchanged from prior)     Consultants/Specialty  Intensivist  Psych    Code Status  Full Code    Disposition  Pending SNF/guardianship    Review of Systems  Review of Systems   Constitutional: Positive for chills and fever.   HENT: Negative for congestion and sore throat.    Respiratory: Negative for cough, shortness of breath and wheezing.    Cardiovascular: Negative for chest pain and palpitations.   Gastrointestinal: Negative for abdominal pain, heartburn, nausea and vomiting.   Genitourinary:        Incontinent at baseline   Musculoskeletal: Positive for back pain, joint pain and myalgias. Negative for neck pain.   Neurological: Negative for dizziness, weakness and headaches.   Psychiatric/Behavioral: Positive for hallucinations and memory loss. The patient does not have insomnia.        Physical Exam  Temp:  [36.4 °C (97.5 °F)-38.1 °C (100.6 °F)] 36.9 °C (98.4 °F)  Pulse:  [] 115  Resp:  [16-20] 18  BP: ()/() 108/72  SpO2:  [94 %-99 %] 95 %    Physical Exam  Vitals signs and nursing note reviewed.   Constitutional:       General: He is awake.      Appearance: He is ill-appearing. He is not toxic-appearing.      Comments: flushed   HENT:      Head: Normocephalic and atraumatic.      Nose: Nose normal.      Mouth/Throat:      Mouth: Mucous membranes are moist.   Eyes:      General: No scleral icterus.        Right eye: No discharge.          Left eye: No discharge.      Conjunctiva/sclera: Conjunctivae normal.   Neck:      Musculoskeletal: Normal range of motion.   Cardiovascular:      Rate and Rhythm: Tachycardia present.      Pulses: Normal pulses.   Pulmonary:      Effort: Pulmonary effort is normal.      Breath sounds: Normal breath sounds.   Abdominal:      General: There is no distension.      Tenderness: There is no abdominal tenderness.   Musculoskeletal:         General: No swelling.      Right lower leg: No edema.      Left lower leg: No edema.   Skin:     General: Skin is warm and moist.   Neurological:      Mental Status: He is alert. He is disoriented and confused.      Motor: Motor function is intact.   Psychiatric:         Speech: Speech normal.         Behavior: Behavior is cooperative.         Cognition and Memory: Cognition is impaired. He exhibits impaired recent memory.         Judgment: Judgment is impulsive.       Current Facility-Administered Medications:   •  aspirin (ASA) tablet 325 mg, 325 mg, Oral, DAILY, JAVIER Haile.P.R.N., 325 mg at 11/11/20 1624  •  Metoprolol Tartrate (LOPRESSOR) injection 5 mg, 5 mg, Intravenous, Once PRN, JAVIER Haile.P.R.N.  •  multivitamin (THERAGRAN) tablet 1 Tab, 1 Tab, Oral, DAILY, Glynn Encarnacion M.D., 1 Tab at 11/11/20 0549  •  risperiDONE (RISPERDAL) tablet 1 mg, 1 mg, Oral, BID, Akanksha Martinez M.D., 1 mg at 11/11/20 1825  •  thiamine tablet 100 mg, 100 mg, Oral, DAILY, Gisele Barnes M.D., 100 mg at 11/11/20 0549  •  tamsulosin (FLOMAX) capsule 0.8 mg, 0.8 mg, Oral, AFTER BREAKFAST, Gisele Barnes M.D., 0.8 mg at 11/11/20 0926  •  enoxaparin (LOVENOX) inj 40 mg, 40 mg, Subcutaneous, Q EVENING, Gisele Barnes M.D., 40 mg at 11/11/20 1619  •  acetaminophen (TYLENOL) tablet 650 mg, 650 mg, Oral, Q6HRS PRN, Gisele Barnes M.D., 650 mg at 10/31/20 2003  •  senna-docusate (PERICOLACE or SENOKOT S) 8.6-50 MG per tablet 2 Tab, 2 Tab, Oral, BID, Stopped at 11/11/20 0945 **AND** polyethylene  glycol/lytes (MIRALAX) PACKET 1 Packet, 1 Packet, Oral, QDAY PRN, 1 Packet at 11/04/20 0434 **AND** magnesium hydroxide (MILK OF MAGNESIA) suspension 30 mL, 30 mL, Oral, QDAY PRN, 30 mL at 10/29/20 1822 **AND** [DISCONTINUED] bisacodyl (DULCOLAX) suppository 10 mg, 10 mg, Rectal, QDAY PRN, Xavi Durham D.O.  •  Respiratory Therapy Consult, , Nebulization, Continuous RT, Gisele Barnes M.D.  •  ipratropium-albuterol (DUONEB) nebulizer solution, 3 mL, Nebulization, Q2HRS PRN (RT), Gisele Barnes M.D.      Fluids    Intake/Output Summary (Last 24 hours) at 11/11/2020 1840  Last data filed at 11/11/2020 1439  Gross per 24 hour   Intake 480 ml   Output --   Net 480 ml       Laboratory                        Imaging  DX-CHEST-PORTABLE (1 VIEW)   Final Result      No acute cardiac or pulmonary abnormalities are identified.      MR-BRAIN-W/O   Final Result      1.  Moderate diffuse cerebral atrophy.      2.  Minimal periventricular and juxtacortical white matter changes consistent with chronic microvascular ischemic gliosis.      3.  No evidence of acute infarction in the brain parenchyma.      DX-ABDOMEN FOR TUBE PLACEMENT   Final Result      Feeding tube tip overlies the second portion of the duodenum.      DX-CHEST-PORTABLE (1 VIEW)   Final Result         1.  Patchy left lung base opacity, new since prior, could represent early infiltrate            DX-CHEST-PORTABLE (1 VIEW)   Final Result         1.  Patchy left lung base opacity, new since prior, could represent early infiltrate         [id number] VIEW   Final Result      Enteric tube projects over the distal stomach/pylorus.         EC-ECHOCARDIOGRAM COMPLETE W/O CONT   Final Result      DX-CHEST-PORTABLE (1 VIEW)   Final Result         1.  No acute cardiopulmonary disease.      CT-ABDOMEN-PELVIS W/O   Final Result      1.  No renal stone or hydronephrosis.   2.  Normal appendix.   3.  No focal mesenteric inflammatory process.      DX-ABDOMEN FOR TUBE  PLACEMENT   Final Result      NG tube tip projects over expected location the gastric fundus.      US-ABDOMEN COMPLETE SURVEY   Final Result         1.  Echogenic liver compatible with fatty change versus fibrosis.   2.  Gallbladder sludge without additional sonographic findings of cholecystitis.   3.  Partial atrophic bilateral kidneys with lobulated contour         CT-HEAD W/O   Final Result         1.  No acute intracranial abnormality is identified, there are nonspecific white matter changes, commonly associated with small vessel ischemic disease.  Associated mild cerebral atrophy is noted.   2.  Atherosclerosis.      DX-CHEST-PORTABLE (1 VIEW)   Final Result         1.  No acute cardiopulmonary disease.      CT-CTA CHEST PULMONARY ARTERY W/ RECONS    (Results Pending)        Assessment/Plan  * Toxic encephalopathy- (present on admission)  Assessment & Plan  Unknown etiology whether this is hypoxic induced brain injury versus Wernicke encephalopathy  LP, MRI brain, EEG were negative. TSH normal. HIV/RPR negative.  - Psych md consult--> they believe it is etoh   - Po risperdal 1mg BID  - Continue vest restraint as needed for patient safety, allow patient to walk on vilchis with assistance TID  - pending guardianship Nov 20, 2020.    Tachycardia  Assessment & Plan  Refractory to vasovagal procedure  Without chest pain or palpitations  Given 25 mg p.o. metoprolol on MedSur floor  Abnormal ECG with suspicion for ST elevation.  Troponins mildly elevated. Trend.  Stat CXR pending   Cardiology consulted, recommendations appreciated:  -Not likely real ST elevation, more likely atrial flutter   -Stat CTA pending for elevated D-dimer.  -Transfer to telemetry for cardiac monitor and 5 mg metoprolol IV.  May repeat IV metoprolol one time 6 hours after prior administration.  -If tachycardia over 120 persist after metoprolol IV, initiate IV amiodarone bolus and drip with plan for cardioversion tomorrow.  -500 cc bolus  given  -Repeat ECG (unchanged)      Coagulopathy (HCC)  Assessment & Plan  Elevated D-dimer: .95  Stat CTA pending      Urinary retention- (present on admission)  Assessment & Plan  Improved w/ Flomax, continue medication  Condom catheter in place for Ins and Out management    Normocytic anemia  Assessment & Plan  Mild, unknown etiology  No active bleeding, stable  CTM    Alcohol abuse- (present on admission)  Assessment & Plan  Reported hx of abuse. S/p etoh w/d protocol.  - Continue Vitamins, thiamine  - High dose thiamine  - Seizure precautions       VTE prophylaxis: Lovenox     TRACE Haile.

## 2020-11-12 NOTE — CONSULTS
Cardiology Initial Consult Note    Date of note:    11/11/2020      Consulting Physician: Johnny Gonzales M.D.    Name:   Yury Blake   YOB: 1970  Age:   50 y.o.  male   MRN:   [id number]      Reason for Consultation: STEMI and tachycardia    HPI:  Yury Blake is a 50 y.o.-year-old male with a history of hypertension and alcohol abuse who presented to the hospital on 9/9/2020 with altered mental status.  Patient has had a prolonged and complicated medical stay, was initially intubated requiring pressor support for shock.  He was extubated on 9/11/2020 and has been in the hospital since due to ongoing encephalopathy, confusion and hallucinations and is pending guardianship.    Patient is not on telemetry monitoring and during his regular vital check this evening he was noted to be tachycardic with heart rate in 140s.  Stat EKG was obtained which was concerning for STEMI and cardiology was subsequently consulted.    Patient currently denies any chest pain, chest pressure, palpitations, lightheadedness or dizziness.  Is saturating 95% on room air.    Review of Systems   Constitution: Negative for decreased appetite, diaphoresis, fever, malaise/fatigue and night sweats.   HENT: Negative for congestion and sore throat.    Eyes: Negative for blurred vision and double vision.   Cardiovascular: Negative for chest pain, cyanosis, dyspnea on exertion, irregular heartbeat, leg swelling, near-syncope, orthopnea, palpitations, paroxysmal nocturnal dyspnea and syncope.   Respiratory: Negative for cough, shortness of breath, sleep disturbances due to breathing and wheezing.    Endocrine: Negative for cold intolerance and heat intolerance.   Musculoskeletal: Negative for back pain, falls and myalgias.   Gastrointestinal: Negative for bloating, constipation, diarrhea, nausea and vomiting.   Genitourinary: Negative for dysuria and flank pain.   Neurological: Negative for excessive daytime  sleepiness, dizziness, headaches, light-headedness, loss of balance, numbness, paresthesias and weakness.   Psychiatric/Behavioral: Positive for hallucinations.        All others reviewed and negative.      Past Medical History:   Diagnosis Date   • Arthritis     RA   • Hypertension    • Pain     Right ankle   • Psychiatric problem     anxiety       Past Surgical History:   Procedure Laterality Date   • ANKLE ORIF Right 9/26/2017    Procedure: ANKLE ORIF SYNDESOMOSIS REPAIR;  Surgeon: Armando Woodard M.D.;  Location: SURGERY [facility];  Service: Orthopedics         Medications Prior to Admission   Medication Sig Dispense Refill Last Dose   • aspirin (ASA) 325 MG Tab Take 1 Tab by mouth 2 Times a Day.      • oxycodone-acetaminophen (PERCOCET) 5-325 MG Tab Take 1-2 Tabs by mouth every four hours as needed. 15 Tab 0    • allopurinol (ZYLOPRIM) 300 MG Tab Take 300 mg by mouth every day.  11    • alprazolam (XANAX) 0.5 MG Tab Take 0.5-1 mg by mouth 2 times a day as needed.  5    • hydrochlorothiazide (HYDRODIURIL) 25 MG Tab Take 25 mg by mouth every day.  11    • verapamil ER (CALAN-SR) 240 MG Tab CR Take 240 mg by mouth every day.  11      Current Facility-Administered Medications   Medication Dose Route Frequency Provider Last Rate Last Admin   • aspirin (ASA) tablet 325 mg  325 mg Oral DAILY JAVIER Haile.P.R.N.   325 mg at 11/11/20 1624   • Metoprolol Tartrate (LOPRESSOR) injection 5 mg  5 mg Intravenous Once JAVIER Haile.P.R.N.       • multivitamin (THERAGRAN) tablet 1 Tab  1 Tab Oral DAILY Glynn Encarnacion M.D.   1 Tab at 11/11/20 0549   • risperiDONE (RISPERDAL) tablet 1 mg  1 mg Oral BID Akanksha Martinez M.D.   1 mg at 11/11/20 0621   • thiamine tablet 100 mg  100 mg Oral DAILY Gisele Barnes M.D.   100 mg at 11/11/20 0549   • tamsulosin (FLOMAX) capsule 0.8 mg  0.8 mg Oral AFTER BREAKFAST Gisele Barnes M.D.   0.8 mg at 11/11/20 0926   • enoxaparin (LOVENOX) inj 40 mg  40 mg Subcutaneous Q  EVENING Gisele Barnes M.D.   40 mg at 11/11/20 1619   • acetaminophen (TYLENOL) tablet 650 mg  650 mg Oral Q6HRS PRN Gisele Barnes M.D.   650 mg at 10/31/20 2003   • senna-docusate (PERICOLACE or SENOKOT S) 8.6-50 MG per tablet 2 Tab  2 Tab Oral BID Gisele Barnes M.D.   Stopped at 11/11/20 1615    And   • polyethylene glycol/lytes (MIRALAX) PACKET 1 Packet  1 Packet Oral QDAY PRN Gisele Barnes M.D.   1 Packet at 11/04/20 0434    And   • magnesium hydroxide (MILK OF MAGNESIA) suspension 30 mL  30 mL Oral QDAY PRN Gisele Barnes M.D.   30 mL at 10/29/20 1822   • Respiratory Therapy Consult   Nebulization Continuous RT Gisele Barnes M.D.       • ipratropium-albuterol (DUONEB) nebulizer solution  3 mL Nebulization Q2HRS PRN (RT) Gisele Barnes M.D.             No Known Allergies      No family history on file.      Social History     Socioeconomic History   • Marital status:      Spouse name: Not on file   • Number of children: Not on file   • Years of education: Not on file   • Highest education level: Not on file   Occupational History   • Not on file   Social Needs   • Financial resource strain: Not on file   • Food insecurity     Worry: Not on file     Inability: Not on file   • Transportation needs     Medical: Not on file     Non-medical: Not on file   Tobacco Use   • Smoking status: Never Smoker   • Smokeless tobacco: Current User     Types: Chew   Substance and Sexual Activity   • Alcohol use: Yes     Comment: 6-10 beers daily   • Drug use: No   • Sexual activity: Not on file   Lifestyle   • Physical activity     Days per week: Not on file     Minutes per session: Not on file   • Stress: Not on file   Relationships   • Social connections     Talks on phone: Not on file     Gets together: Not on file     Attends Yarsani service: Not on file     Active member of club or organization: Not on file     Attends meetings of clubs or organizations: Not on file     Relationship status: Not on file   • Intimate  "partner violence     Fear of current or ex partner: Not on file     Emotionally abused: Not on file     Physically abused: Not on file     Forced sexual activity: Not on file   Other Topics Concern   • Not on file   Social History Narrative   • Not on file           Intake/Output Summary (Last 24 hours) at 11/11/2020 1654  Last data filed at 11/11/2020 1439  Gross per 24 hour   Intake 480 ml   Output --   Net 480 ml        Physical Exam  Body mass index is 25.26 kg/m².  /61   Pulse (!) 124   Temp 37 °C (98.6 °F)   Resp 20   Ht 1.803 m (5' 10.98\")   Wt 82.1 kg (181 lb)   SpO2 96%   Vitals:    11/11/20 1537 11/11/20 1606 11/11/20 1629 11/11/20 1645   BP: 121/104 113/64 128/95 113/61   Pulse: (!) 142 (!) 144 (!) 144 (!) 124   Resp: 20 20 20 20   Temp:  36.9 °C (98.5 °F)  37 °C (98.6 °F)   TempSrc:  Oral     SpO2:  95% 96%    Weight:       Height:         Oxygen Therapy:  Pulse Oximetry: 96 %, O2 (LPM): 0, O2 Delivery Device: None - Room Air    General: Well appearing and in no apparent distress  Eyes: nl conjunctiva  ENT: OP clear  Neck: JVP not elevated,  no carotid bruits  Lungs: normal respiratory effort, CTAB  Heart: Tachycardic with regular rhythm, no murmurs, no rubs or gallops, no edema bilateral lower extremities. No LV/RV heave on cardiac palpatation. 2+ bilateral radial pulses.  2+ bilateral dp pulses.   Abdomen: soft, non tender, non distended, no masses, normal bowel sounds.  No HSM.  Extremities/MSK: no clubbing, no cyanosis  Neurological: No focal sensory deficits  Psychiatric: Appropriate affect, A/O x 3  Skin: Warm extremities      Labs (personally reviewed and notable for):   Lab Results   Component Value Date/Time    SODIUM 138 11/03/2020 03:37 AM    POTASSIUM 3.9 11/03/2020 03:37 AM    CHLORIDE 104 11/03/2020 03:37 AM    CO2 25 11/03/2020 03:37 AM    GLUCOSE 95 11/03/2020 03:37 AM    BUN 11 11/03/2020 03:37 AM    CREATININE 0.70 11/03/2020 03:37 AM      Lab Results   Component Value " Date/Time    WBC 7.9 11/03/2020 03:37 AM    RBC 4.38 (L) 11/03/2020 03:37 AM    HEMOGLOBIN 12.9 (L) 11/03/2020 03:37 AM    HEMATOCRIT 40.2 (L) 11/03/2020 03:37 AM    MCV 91.8 11/03/2020 03:37 AM    MCH 29.5 11/03/2020 03:37 AM    MCHC 32.1 (L) 11/03/2020 03:37 AM    MPV 10.3 11/03/2020 03:37 AM    NEUTSPOLYS 65.10 10/16/2020 01:19 AM    LYMPHOCYTES 24.00 10/16/2020 01:19 AM    MONOCYTES 7.30 10/16/2020 01:19 AM    EOSINOPHILS 2.00 10/16/2020 01:19 AM    BASOPHILS 1.20 10/16/2020 01:19 AM    ANISOCYTOSIS 1+ 09/09/2020 12:08 AM      Lab Results   Component Value Date/Time    CHOLSTRLTOT 164 08/02/2016 07:30 AM    LDL 38 08/02/2016 07:30 AM    HDL 55 08/02/2016 07:30 AM    TRIGLYCERIDE 118 09/10/2020 03:55 AM       Lab Results   Component Value Date/Time    TROPONINT 42 (H) 09/09/2020 1402     No results found for: NTPROBNP      Cardiac Imaging and Procedures Review:    EKG dated 11/11/2020: My personal interpretation is likely atrial flutter with heart rate 145 bpm, no ST elevation noted.    Echo dated 9/10/2020: My personal interpretation is normal left ventricular size, wall thickness and systolic function with EF 55%.  No regional wall motion abnormalities.      Radiology test Review:  CXR: Ordered but pending at this time      Impression and Medical Decision Making:  #Likely atrial flutter  #Hypotension with blood pressure 90s/50s    Recommendations:  --Patient received one-time dose of p.o. metoprolol 25 mg per primary team.   --For hypotension, recommend 500 cc NS bolus.  After initiation of the bolus, repeat blood pressure is 113/61, recommend additional dose of IV metoprolol 5 mg.  --Recommend transfer to telemetry floor with continuous telemetry monitoring.  --Ordered D-dimer to rule out pulmonary embolism.  Troponin ordered per primary team.  --Repeat heart rate now in 120s.  We will repeat another ECG to evaluate underlying rhythm.  --If D-dimer is positive, obtain CTA chest to rule out PE.  --If patient  continues to have persistent heart rate >120 bpm after receiving IV metoprolol can repeat another dose in 6 hours.  However, if difficult to control heart rate can initiate IV amiodarone bolus and drip with plan for cardioversion tomorrow.  Please keep the patient n.p.o. after midnight.      Thank you for allowing me to participate in the care of this patient, I will continue to follow.  Please contact me with any questions.    Cardiology Critical Care Documentation    This patient is critically ill.  I have spent a total of 35 minutes providing care for this patient. No time overlap. Procedures were not included in this time. This time was spent at the bedside evaluating the patient's chart, labs, imaging, physical exam, discussing with ALONZO Haile, formulating assessment and plan.      Agapito Hernández M.D.  Cardiologist, [facility]   [phone]    This note was generated using voice recognition software which has a small chance of producing errors of grammar and possibly content. I have made every reasonable attempt to find and correct any obvious errors, but expect that some may not be found prior to finalization of this note.

## 2020-11-13 LAB
ANION GAP SERPL CALC-SCNC: 8 MMOL/L (ref 7–16)
BASOPHILS # BLD AUTO: 1.3 % (ref 0–1.8)
BASOPHILS # BLD: 0.07 K/UL (ref 0–0.12)
BUN SERPL-MCNC: 11 MG/DL (ref 8–22)
CALCIUM SERPL-MCNC: 9.6 MG/DL (ref 8.5–10.5)
CHLORIDE SERPL-SCNC: 104 MMOL/L (ref 96–112)
CO2 SERPL-SCNC: 25 MMOL/L (ref 20–33)
CREAT SERPL-MCNC: 0.74 MG/DL (ref 0.5–1.4)
EOSINOPHIL # BLD AUTO: 0.27 K/UL (ref 0–0.51)
EOSINOPHIL NFR BLD: 4.9 % (ref 0–6.9)
ERYTHROCYTE [DISTWIDTH] IN BLOOD BY AUTOMATED COUNT: 44.9 FL (ref 35.9–50)
GLUCOSE SERPL-MCNC: 97 MG/DL (ref 65–99)
HCT VFR BLD AUTO: 38.6 % (ref 42–52)
HGB BLD-MCNC: 12.3 G/DL (ref 14–18)
IMM GRANULOCYTES # BLD AUTO: 0.03 K/UL (ref 0–0.11)
IMM GRANULOCYTES NFR BLD AUTO: 0.5 % (ref 0–0.9)
LYMPHOCYTES # BLD AUTO: 1.54 K/UL (ref 1–4.8)
LYMPHOCYTES NFR BLD: 27.7 % (ref 22–41)
MAGNESIUM SERPL-MCNC: 2.2 MG/DL (ref 1.5–2.5)
MCH RBC QN AUTO: 29.4 PG (ref 27–33)
MCHC RBC AUTO-ENTMCNC: 31.9 G/DL (ref 33.7–35.3)
MCV RBC AUTO: 92.3 FL (ref 81.4–97.8)
MONOCYTES # BLD AUTO: 0.63 K/UL (ref 0–0.85)
MONOCYTES NFR BLD AUTO: 11.4 % (ref 0–13.4)
NEUTROPHILS # BLD AUTO: 3.01 K/UL (ref 1.82–7.42)
NEUTROPHILS NFR BLD: 54.2 % (ref 44–72)
NRBC # BLD AUTO: 0 K/UL
NRBC BLD-RTO: 0 /100 WBC
PHOSPHATE SERPL-MCNC: 4.3 MG/DL (ref 2.5–4.5)
PLATELET # BLD AUTO: 239 K/UL (ref 164–446)
PMV BLD AUTO: 11.2 FL (ref 9–12.9)
POTASSIUM SERPL-SCNC: 3.6 MMOL/L (ref 3.6–5.5)
RBC # BLD AUTO: 4.18 M/UL (ref 4.7–6.1)
SODIUM SERPL-SCNC: 137 MMOL/L (ref 135–145)
WBC # BLD AUTO: 5.6 K/UL (ref 4.8–10.8)

## 2020-11-13 PROCEDURE — A9270 NON-COVERED ITEM OR SERVICE: HCPCS | Performed by: HOSPITALIST

## 2020-11-13 PROCEDURE — 700102 HCHG RX REV CODE 250 W/ 637 OVERRIDE(OP): Performed by: HOSPITALIST

## 2020-11-13 PROCEDURE — 36415 COLL VENOUS BLD VENIPUNCTURE: CPT

## 2020-11-13 PROCEDURE — 80048 BASIC METABOLIC PNL TOTAL CA: CPT

## 2020-11-13 PROCEDURE — 83735 ASSAY OF MAGNESIUM: CPT

## 2020-11-13 PROCEDURE — 85025 COMPLETE CBC W/AUTO DIFF WBC: CPT

## 2020-11-13 PROCEDURE — 700102 HCHG RX REV CODE 250 W/ 637 OVERRIDE(OP): Performed by: INTERNAL MEDICINE

## 2020-11-13 PROCEDURE — A9270 NON-COVERED ITEM OR SERVICE: HCPCS | Performed by: INTERNAL MEDICINE

## 2020-11-13 PROCEDURE — 700111 HCHG RX REV CODE 636 W/ 250 OVERRIDE (IP): Performed by: HOSPITALIST

## 2020-11-13 PROCEDURE — 770020 HCHG ROOM/CARE - TELE (206)

## 2020-11-13 PROCEDURE — 700102 HCHG RX REV CODE 250 W/ 637 OVERRIDE(OP): Performed by: NURSE PRACTITIONER

## 2020-11-13 PROCEDURE — A9270 NON-COVERED ITEM OR SERVICE: HCPCS | Performed by: NURSE PRACTITIONER

## 2020-11-13 PROCEDURE — 99232 SBSQ HOSP IP/OBS MODERATE 35: CPT | Performed by: STUDENT IN AN ORGANIZED HEALTH CARE EDUCATION/TRAINING PROGRAM

## 2020-11-13 PROCEDURE — 84100 ASSAY OF PHOSPHORUS: CPT

## 2020-11-13 RX ADMIN — RISPERIDONE 1 MG: 1 TABLET, FILM COATED ORAL at 17:08

## 2020-11-13 RX ADMIN — THERA TABS 1 TABLET: TAB at 05:16

## 2020-11-13 RX ADMIN — RISPERIDONE 1 MG: 1 TABLET, FILM COATED ORAL at 05:12

## 2020-11-13 RX ADMIN — Medication 100 MG: at 05:12

## 2020-11-13 RX ADMIN — DOCUSATE SODIUM 50 MG AND SENNOSIDES 8.6 MG 2 TABLET: 8.6; 5 TABLET, FILM COATED ORAL at 05:16

## 2020-11-13 RX ADMIN — ENOXAPARIN SODIUM 40 MG: 40 INJECTION SUBCUTANEOUS at 17:08

## 2020-11-13 RX ADMIN — TAMSULOSIN HYDROCHLORIDE 0.8 MG: 0.4 CAPSULE ORAL at 10:37

## 2020-11-13 RX ADMIN — ASPIRIN 325 MG ORAL TABLET 325 MG: 325 PILL ORAL at 05:12

## 2020-11-13 ASSESSMENT — ENCOUNTER SYMPTOMS
MEMORY LOSS: 0
SHORTNESS OF BREATH: 0
COUGH: 0
CHILLS: 0
FEVER: 0
PALPITATIONS: 0
DIZZINESS: 0
HALLUCINATIONS: 1
BACK PAIN: 0
SORE THROAT: 0
HEADACHES: 0
VOMITING: 0
WHEEZING: 0
NECK PAIN: 0
WEAKNESS: 0
HEARTBURN: 0
MYALGIAS: 1
INSOMNIA: 0
ABDOMINAL PAIN: 0
NAUSEA: 0

## 2020-11-13 ASSESSMENT — FIBROSIS 4 INDEX: FIB4 SCORE: 0.62

## 2020-11-13 NOTE — PROGRESS NOTES
[facility] Medicine Daily Progress Note    Date of Service  11/13/2020    Chief Complaint  altered mental status    Hospital Course  50 y.o. male with a pmhx of HTN, chronic pain and alcohol abuse who presented 9/9/2020 with altered mental status, he was found obtunded in his house by his ex-wife who was checking on him as he had not been seen for 2 days or been able to get a hold of him.  He was found in a chair,  A&Ox0 and hypotensive.  He arrived in the ER with a GCS of 7 (E1,V1,M5) and severely hypotensive.  The patient was intubated for airway protection and central venous access was obtained for administration of high-volume crystalloid resuscitation and vasopressor therapy.    CT head and abdomen and pelvis were all unremarkable. He was briefly treated with antibiotics for concern for an underlying infection and a lumbar tap done was negative. Cultures resulted negative.   EEG was done  9/10 no seizures captured.   He was extubated on 9/11 and has been able to protect his airway ever since. He received benzodiazepines and IV thiamine for etoh.  Now awaiting placement however continues to be impulsive and has required restraints intermittently this admission.  He was found to have UTI s/p treatment with bactrim.  He had urinary retention s/p newton but continued to self-remove. His urinary retention resolved. Continues to be impulsive and has had multiple falls, on risperdal.      11/12: Patient transferred to tele as patient was noted to be tachycardic with heart rate in 140 yesterday.  Stat EKG was obtained concerning for a flutter.  Patient did receive metoprolol after which repeated the ECG showed a sinus tachycardia with heart rate 109. Cardiology was consulted. D- dimer mildly elevation. CTA chest r/o PE.  Overnight telemetry shows sinus tachycardia with no atrial flutter or atrial fibrillation.      Patient was noted to have leukocytosis.  Blood culture negative, CXR negative.  UA, lactate acid normal and  procalcitonin 2.49.  Leukocytosis has been improving, no fever, will hold on antibiotics.      Interval Problem Update  Patient is lying in bed, awake and cooperative.  He is oriented only to himself.   He denies fever, chills, chest pain, palpitations and any other problems.     Consultants/Specialty  Intensivist  Psych  cardiology    Code Status  Full Code    Disposition  Pending SNF/guardianship    Review of Systems  Review of Systems   Constitutional: Negative for chills and fever.   HENT: Negative for congestion and sore throat.    Respiratory: Negative for cough, shortness of breath and wheezing.    Cardiovascular: Negative for chest pain and palpitations.   Gastrointestinal: Negative for abdominal pain, heartburn, nausea and vomiting.   Genitourinary:        Incontinent at baseline   Musculoskeletal: Positive for myalgias. Negative for back pain, joint pain and neck pain.   Neurological: Negative for dizziness, weakness and headaches.   Psychiatric/Behavioral: Positive for hallucinations. Negative for memory loss. The patient does not have insomnia.        Physical Exam  Temp:  [35.8 °C (96.5 °F)-36.4 °C (97.6 °F)] 36.4 °C (97.6 °F)  Pulse:  [78-92] 81  Resp:  [18-19] 19  BP: (115-130)/(67-81) 130/67  SpO2:  [95 %-97 %] 95 %    Physical Exam  Vitals signs and nursing note reviewed.   Constitutional:       General: He is awake.      Appearance: He is not ill-appearing or toxic-appearing.   HENT:      Head: Normocephalic and atraumatic.      Nose: Nose normal.      Mouth/Throat:      Mouth: Mucous membranes are dry.   Eyes:      General: No scleral icterus.        Right eye: No discharge.         Left eye: No discharge.      Conjunctiva/sclera: Conjunctivae normal.   Neck:      Musculoskeletal: Normal range of motion.   Cardiovascular:      Rate and Rhythm: Normal rate.      Pulses: Normal pulses.   Pulmonary:      Effort: Pulmonary effort is normal.      Breath sounds: Normal breath sounds.   Abdominal:       General: Abdomen is flat. There is no distension.      Palpations: Abdomen is soft.      Tenderness: There is no abdominal tenderness.   Musculoskeletal:         General: No swelling.      Right lower leg: No edema.      Left lower leg: No edema.   Skin:     General: Skin is warm and dry.   Neurological:      General: No focal deficit present.      Mental Status: He is alert. He is disoriented and confused.      Motor: Motor function is intact.   Psychiatric:         Speech: Speech normal.         Behavior: Behavior is cooperative.         Cognition and Memory: Cognition is impaired. He exhibits impaired recent memory.         Judgment: Judgment is impulsive.       Current Facility-Administered Medications:   •  aspirin (ASA) tablet 325 mg, 325 mg, Oral, DAILY, Diana Reed, A.P.R.N., 325 mg at 11/13/20 0512  •  Metoprolol Tartrate (LOPRESSOR) injection 5 mg, 5 mg, Intravenous, Once PRN, JAVIER Haile.P.R.N.  •  multivitamin (THERAGRAN) tablet 1 Tab, 1 Tab, Oral, DAILY, Glynn Encarnacion M.D., 1 Tab at 11/13/20 0516  •  risperiDONE (RISPERDAL) tablet 1 mg, 1 mg, Oral, BID, Akanksha Martinez M.D., 1 mg at 11/13/20 0512  •  thiamine tablet 100 mg, 100 mg, Oral, DAILY, Gisele Barnes M.D., 100 mg at 11/13/20 0512  •  tamsulosin (FLOMAX) capsule 0.8 mg, 0.8 mg, Oral, AFTER BREAKFAST, Gisele Barnes M.D., 0.8 mg at 11/12/20 0831  •  enoxaparin (LOVENOX) inj 40 mg, 40 mg, Subcutaneous, Q EVENING, Gisele Barnes M.D., 40 mg at 11/12/20 1712  •  acetaminophen (TYLENOL) tablet 650 mg, 650 mg, Oral, Q6HRS PRN, Gisele Barnes M.D., 650 mg at 11/12/20 1215  •  senna-docusate (PERICOLACE or SENOKOT S) 8.6-50 MG per tablet 2 Tab, 2 Tab, Oral, BID, 2 Tab at 11/13/20 0516 **AND** polyethylene glycol/lytes (MIRALAX) PACKET 1 Packet, 1 Packet, Oral, QDAY PRN, 1 Packet at 11/04/20 0434 **AND** magnesium hydroxide (MILK OF MAGNESIA) suspension 30 mL, 30 mL, Oral, QDAY PRN, 30 mL at 10/29/20 1822 **AND** [DISCONTINUED] bisacodyl  (DULCOLAX) suppository 10 mg, 10 mg, Rectal, QDAY PRN, Xavi Durham D.O.  •  Respiratory Therapy Consult, , Nebulization, Continuous RT, Gisele Barnes M.D.  •  ipratropium-albuterol (DUONEB) nebulizer solution, 3 mL, Nebulization, Q2HRS PRN (RT), Gisele Barnes M.D.      Fluids    Intake/Output Summary (Last 24 hours) at 11/13/2020 0744  Last data filed at 11/13/2020 0500  Gross per 24 hour   Intake 1020 ml   Output --   Net 1020 ml       Laboratory  Recent Labs     11/11/20 2117 11/13/20  0557   WBC 16.3* 5.6   RBC 4.25* 4.18*   HEMOGLOBIN 12.3* 12.3*   HEMATOCRIT 38.6* 38.6*   MCV 90.8 92.3   MCH 28.9 29.4   MCHC 31.9* 31.9*   RDW 44.5 44.9   PLATELETCT 263 239   MPV 11.1 11.2     Recent Labs     11/11/20 2117 11/13/20  0557   SODIUM 140 137   POTASSIUM 4.0 3.6   CHLORIDE 106 104   CO2 26 25   GLUCOSE 105* 97   BUN 14 11   CREATININE 0.80 0.74   CALCIUM 10.0 9.6                   Imaging  CT-CTA CHEST PULMONARY ARTERY W/ RECONS   Final Result      1.  No evidence of pulmonary emboli.   2.  Right lower lobe discoid atelectasis.   3.  Gynecomastia.            DX-CHEST-PORTABLE (1 VIEW)   Final Result      No acute cardiac or pulmonary abnormalities are identified.      MR-BRAIN-W/O   Final Result      1.  Moderate diffuse cerebral atrophy.      2.  Minimal periventricular and juxtacortical white matter changes consistent with chronic microvascular ischemic gliosis.      3.  No evidence of acute infarction in the brain parenchyma.      DX-ABDOMEN FOR TUBE PLACEMENT   Final Result      Feeding tube tip overlies the second portion of the duodenum.      DX-CHEST-PORTABLE (1 VIEW)   Final Result         1.  Patchy left lung base opacity, new since prior, could represent early infiltrate            DX-CHEST-PORTABLE (1 VIEW)   Final Result         1.  Patchy left lung base opacity, new since prior, could represent early infiltrate         [id number] VIEW   Final Result      Enteric tube projects over the distal  stomach/pylorus.         EC-ECHOCARDIOGRAM COMPLETE W/O CONT   Final Result      DX-CHEST-PORTABLE (1 VIEW)   Final Result         1.  No acute cardiopulmonary disease.      CT-ABDOMEN-PELVIS W/O   Final Result      1.  No renal stone or hydronephrosis.   2.  Normal appendix.   3.  No focal mesenteric inflammatory process.      DX-ABDOMEN FOR TUBE PLACEMENT   Final Result      NG tube tip projects over expected location the gastric fundus.      US-ABDOMEN COMPLETE SURVEY   Final Result         1.  Echogenic liver compatible with fatty change versus fibrosis.   2.  Gallbladder sludge without additional sonographic findings of cholecystitis.   3.  Partial atrophic bilateral kidneys with lobulated contour         CT-HEAD W/O   Final Result         1.  No acute intracranial abnormality is identified, there are nonspecific white matter changes, commonly associated with small vessel ischemic disease.  Associated mild cerebral atrophy is noted.   2.  Atherosclerosis.      DX-CHEST-PORTABLE (1 VIEW)   Final Result         1.  No acute cardiopulmonary disease.           Assessment/Plan  * Toxic encephalopathy- (present on admission)  Assessment & Plan  Unknown etiology whether this is hypoxic induced brain injury versus Wernicke encephalopathy  LP, MRI brain, EEG were negative. TSH normal. HIV/RPR negative.  - Psych consult--> they believe it is etoh?    - on risperdal 1mg BID  - Continue vest restraint as needed for patient safety, allow patient to walk on vilchis with assistance TID  - pending guardianship 12/2020    Leukocytosis  Assessment & Plan  Tachycardia in the leukocytosis noted 11/11-11/12, currently resolved  CXR no acute abnormalities  Blood culture negative to date  Order UA, LA normal and procalcitonin 2.49 to evaluate source of infection: Leukocytosis improved, afebrile, will hold on antibiotics   Close monitoring    Tachycardia  Assessment & Plan  Episode of tachycardia on 11/11 with heart rate in 140.    Stat  EKG concerning for a flutter  Resolved after receiving IVF, metoprolol.  Following telemetry showed sinus tachycardia.  Now in sinus rhythm with normal rate  Cardiology consulted, recommendations appreciated.     Urinary retention- (present on admission)  Assessment & Plan  Improved w/ Flomax, continue medication  Condom catheter in place for Ins and Out management    Normocytic anemia  Assessment & Plan  Mild, No active bleeding, stable  Continue monitoring    Alcohol abuse- (present on admission)  Assessment & Plan  Reported hx of abuse. s/p etoh w/d protocol.  - Continue Vitamins, thiamine  - High dose thiamine  - Seizure precautions       VTE prophylaxis: Lovenox     I have performed a physical exam and reviewed and updated ROS and Plan today (11/13). In review of yesterday's note (11/12), there are no changes except as documented above.

## 2020-11-13 NOTE — PROGRESS NOTES
Assumed care of patient at 0715, received bedside report from night shift RN. Bed is locked and in lowest position with call light within reach. Treaded socks in place. Patient updated on plan of care, no complaints or pain at this time. White board updated. Pt A&O1. Patient's breathing pattern is unlabored. Tele monitor in place and cardiac rhythm being monitored. All needs met at this time.

## 2020-11-13 NOTE — PROGRESS NOTES
Report received from day shift Rn. Assumed pt care. Pt placed back on tele monitor, monitor room aware. Pt A&O x 2. Fall precautions in place, call light and belongings within reach, bed in lowest position. No signs of distress.

## 2020-11-13 NOTE — ASSESSMENT & PLAN NOTE
Tachycardia in the leukocytosis noted 11/11-11/12, currently resolved  CXR no acute abnormalities  Blood culture negative to date  Order UA, LA normal and procalcitonin 2.49 to evaluate source of infection: Leukocytosis improved, afebrile, will hold on antibiotics   Close monitoring

## 2020-11-13 NOTE — PROGRESS NOTES
12 hour chart check.    Monitor Summary:    SR 80-88  Pt is refusing box as of 3pm. NOC RN to reapply when less restless  .20/.08/.36

## 2020-11-13 NOTE — PROGRESS NOTES
Hospital Medicine Daily Progress Note    Date of Service  11/12/2020    Chief Complaint  altered mental status    Hospital Course  50 y.o. male with a pmhx of HTN, chronic pain and alcohol abuse who presented 9/9/2020 with altered mental status, he was found obtunded in his house by his ex-wife who was checking on him as he had not been seen for 2 days or been able to get a hold of him.  He was found in a chair,  A&Ox0 and hypotensive.  He arrived in the ER with a GCS of 7 (E1,V1,M5) and severely hypotensive.  The patient was intubated for airway protection and central venous access was obtained for administration of high-volume crystalloid resuscitation and vasopressor therapy.    CT head and abdomen and pelvis were all unremarkable. He was briefly treated with antibiotics for concern for an underlying infection and a lumbar tap done was negative. Cultures resulted negative.   EEG was done  9/10 no seizures captured.   He was extubated on 9/11 and has been able to protect his airway ever since. He received benzodiazepines and IV thiamine for etoh.  Now awaiting placement however continues to be impulsive and has required restraints intermittently this admission.  He was found to have UTI s/p treatment with bactrim.  He had urinary retention s/p newton but continued to self-remove. His urinary retention resolved. Continues to be impulsive and has had multiple falls, on risperdal.      11/12: Patient transferred to tele as patient was noted to be tachycardic with heart rate in 140 yesterday.  Stat EKG was obtained concerning for a flutter.  Patient did receive metoprolol after which repeated the ECG showed a sinus tachycardia with heart rate 109. Cardiology was consulted. D- dimer mildly elevation. CTA chest r/o PE.  Overnight telemetry shows sinus tachycardia with no atrial flutter or atrial fibrillation.  Patient was noted to have leukocytosis.  Blood culture pending, CXR negative.  UA, lactate acid and  procalcitonin ordered    Interval Problem Update  Patient is lying in bed, awake and cooperative.  He is oriented only to himself.   He says he was helping friends today in his house, could not tell year and president.  He denies fever, chills, chest pain, palpitations and any other problems.     Consultants/Specialty  Intensivist  Psych  cardiology    Code Status  Full Code    Disposition  Pending SNF/guardianship    Review of Systems  Review of Systems   Constitutional: Negative for chills and fever.   HENT: Negative for congestion and sore throat.    Respiratory: Negative for cough, shortness of breath and wheezing.    Cardiovascular: Negative for chest pain and palpitations.   Gastrointestinal: Negative for abdominal pain, heartburn, nausea and vomiting.   Genitourinary:        Incontinent at baseline   Musculoskeletal: Positive for myalgias. Negative for back pain, joint pain and neck pain.   Neurological: Negative for dizziness, weakness and headaches.   Psychiatric/Behavioral: Positive for hallucinations. Negative for memory loss. The patient does not have insomnia.        Physical Exam  Temp:  [35.8 °C (96.5 °F)-37 °C (98.6 °F)] 35.8 °C (96.5 °F)  Pulse:  [] 78  Resp:  [16-20] 18  BP: (104-139)/(59-87) 126/79  SpO2:  [94 %-98 %] 96 %    Physical Exam  Vitals signs and nursing note reviewed.   Constitutional:       General: He is awake.      Appearance: He is not ill-appearing or toxic-appearing.   HENT:      Head: Normocephalic and atraumatic.      Nose: Nose normal.      Mouth/Throat:      Mouth: Mucous membranes are dry.   Eyes:      General: No scleral icterus.        Right eye: No discharge.         Left eye: No discharge.      Conjunctiva/sclera: Conjunctivae normal.   Neck:      Musculoskeletal: Normal range of motion.   Cardiovascular:      Rate and Rhythm: Normal rate.      Pulses: Normal pulses.   Pulmonary:      Effort: Pulmonary effort is normal.      Breath sounds: Normal breath sounds.    Abdominal:      General: Abdomen is flat. There is no distension.      Palpations: Abdomen is soft.      Tenderness: There is no abdominal tenderness.   Musculoskeletal:         General: No swelling.      Right lower leg: No edema.      Left lower leg: No edema.   Skin:     General: Skin is warm and dry.   Neurological:      General: No focal deficit present.      Mental Status: He is alert. He is disoriented and confused.      Motor: Motor function is intact.   Psychiatric:         Speech: Speech normal.         Behavior: Behavior is cooperative.         Cognition and Memory: Cognition is impaired. He exhibits impaired recent memory.         Judgment: Judgment is impulsive.       Current Facility-Administered Medications:   •  aspirin (ASA) tablet 325 mg, 325 mg, Oral, DAILY, Diana Reed, A.P.R.N., 325 mg at 11/12/20 0548  •  Metoprolol Tartrate (LOPRESSOR) injection 5 mg, 5 mg, Intravenous, Once PRN, Diana Reed A.P.R.N.  •  multivitamin (THERAGRAN) tablet 1 Tab, 1 Tab, Oral, DAILY, Glynn Encarnacion M.D., 1 Tab at 11/12/20 0547  •  risperiDONE (RISPERDAL) tablet 1 mg, 1 mg, Oral, BID, Akanksha Martinez M.D., 1 mg at 11/12/20 0549  •  thiamine tablet 100 mg, 100 mg, Oral, DAILY, Gisele Barnes M.D., 100 mg at 11/12/20 0547  •  tamsulosin (FLOMAX) capsule 0.8 mg, 0.8 mg, Oral, AFTER BREAKFAST, Gisele Barnes M.D., 0.8 mg at 11/12/20 0831  •  enoxaparin (LOVENOX) inj 40 mg, 40 mg, Subcutaneous, Q EVENING, Gisele Barnes M.D., 40 mg at 11/11/20 1619  •  acetaminophen (TYLENOL) tablet 650 mg, 650 mg, Oral, Q6HRS PRN, Gisele Barnes M.D., 650 mg at 11/12/20 1215  •  senna-docusate (PERICOLACE or SENOKOT S) 8.6-50 MG per tablet 2 Tab, 2 Tab, Oral, BID, 2 Tab at 11/12/20 0549 **AND** polyethylene glycol/lytes (MIRALAX) PACKET 1 Packet, 1 Packet, Oral, QDAY PRN, 1 Packet at 11/04/20 0434 **AND** magnesium hydroxide (MILK OF MAGNESIA) suspension 30 mL, 30 mL, Oral, QDAY PRN, 30 mL at 10/29/20 1822 **AND**  [DISCONTINUED] bisacodyl (DULCOLAX) suppository 10 mg, 10 mg, Rectal, QDAY PRN, Xavi Durham D.O.  •  Respiratory Therapy Consult, , Nebulization, Continuous RT, Gisele Barnes M.D.  •  ipratropium-albuterol (DUONEB) nebulizer solution, 3 mL, Nebulization, Q2HRS PRN (RT), Gisele Barnes M.D.      Fluids  No intake or output data in the 24 hours ending 11/12/20 1642    Laboratory  Recent Labs     11/11/20 2117   WBC 16.3*   RBC 4.25*   HEMOGLOBIN 12.3*   HEMATOCRIT 38.6*   MCV 90.8   MCH 28.9   MCHC 31.9*   RDW 44.5   PLATELETCT 263   MPV 11.1     Recent Labs     11/11/20 2117   SODIUM 140   POTASSIUM 4.0   CHLORIDE 106   CO2 26   GLUCOSE 105*   BUN 14   CREATININE 0.80   CALCIUM 10.0                   Imaging  CT-CTA CHEST PULMONARY ARTERY W/ RECONS   Final Result      1.  No evidence of pulmonary emboli.   2.  Right lower lobe discoid atelectasis.   3.  Gynecomastia.            DX-CHEST-PORTABLE (1 VIEW)   Final Result      No acute cardiac or pulmonary abnormalities are identified.      MR-BRAIN-W/O   Final Result      1.  Moderate diffuse cerebral atrophy.      2.  Minimal periventricular and juxtacortical white matter changes consistent with chronic microvascular ischemic gliosis.      3.  No evidence of acute infarction in the brain parenchyma.      DX-ABDOMEN FOR TUBE PLACEMENT   Final Result      Feeding tube tip overlies the second portion of the duodenum.      DX-CHEST-PORTABLE (1 VIEW)   Final Result         1.  Patchy left lung base opacity, new since prior, could represent early infiltrate            DX-CHEST-PORTABLE (1 VIEW)   Final Result         1.  Patchy left lung base opacity, new since prior, could represent early infiltrate         [id number] VIEW   Final Result      Enteric tube projects over the distal stomach/pylorus.         EC-ECHOCARDIOGRAM COMPLETE W/O CONT   Final Result      DX-CHEST-PORTABLE (1 VIEW)   Final Result         1.  No acute cardiopulmonary disease.       CT-ABDOMEN-PELVIS W/O   Final Result      1.  No renal stone or hydronephrosis.   2.  Normal appendix.   3.  No focal mesenteric inflammatory process.      DX-ABDOMEN FOR TUBE PLACEMENT   Final Result      NG tube tip projects over expected location the gastric fundus.      US-ABDOMEN COMPLETE SURVEY   Final Result         1.  Echogenic liver compatible with fatty change versus fibrosis.   2.  Gallbladder sludge without additional sonographic findings of cholecystitis.   3.  Partial atrophic bilateral kidneys with lobulated contour         CT-HEAD W/O   Final Result         1.  No acute intracranial abnormality is identified, there are nonspecific white matter changes, commonly associated with small vessel ischemic disease.  Associated mild cerebral atrophy is noted.   2.  Atherosclerosis.      DX-CHEST-PORTABLE (1 VIEW)   Final Result         1.  No acute cardiopulmonary disease.           Assessment/Plan  * Toxic encephalopathy- (present on admission)  Assessment & Plan  Unknown etiology whether this is hypoxic induced brain injury versus Wernicke encephalopathy  LP, MRI brain, EEG were negative. TSH normal. HIV/RPR negative.  - Psych consult--> they believe it is etoh?    - on risperdal 1mg BID  - Continue vest restraint as needed for patient safety, allow patient to walk on vilchis with assistance TID  - pending guardianship Nov 20, 2020.    Leukocytosis  Assessment & Plan  Tachycardia in the leukocytosis noted 11/11-11/12  CXR no acute abnormalities  Blood culture pending  Order UA, LA and procalcitonin to evaluate source of infection  Close monitoring    Tachycardia  Assessment & Plan  Episode of tachycardia on 11/11 with heart rate in 140.    Stat EKG concerning for a flutter  Resolved after receiving IVF, metoprolol.  Following telemetry showed sinus tachycardia.  Now in sinus rhythm with normal rate  Cardiology consulted, recommendations appreciated.     Urinary retention- (present on admission)  Assessment  & Plan  Improved w/ Flomax, continue medication  Condom catheter in place for Ins and Out management    Normocytic anemia  Assessment & Plan  Mild, No active bleeding, stable  Continue monitoring    Alcohol abuse- (present on admission)  Assessment & Plan  Reported hx of abuse. s/p etoh w/d protocol.  - Continue Vitamins, thiamine  - High dose thiamine  - Seizure precautions       VTE prophylaxis: Lovenox

## 2020-11-14 PROCEDURE — 700102 HCHG RX REV CODE 250 W/ 637 OVERRIDE(OP): Performed by: INTERNAL MEDICINE

## 2020-11-14 PROCEDURE — 700102 HCHG RX REV CODE 250 W/ 637 OVERRIDE(OP): Performed by: HOSPITALIST

## 2020-11-14 PROCEDURE — 99232 SBSQ HOSP IP/OBS MODERATE 35: CPT | Performed by: STUDENT IN AN ORGANIZED HEALTH CARE EDUCATION/TRAINING PROGRAM

## 2020-11-14 PROCEDURE — 700111 HCHG RX REV CODE 636 W/ 250 OVERRIDE (IP): Performed by: HOSPITALIST

## 2020-11-14 PROCEDURE — A9270 NON-COVERED ITEM OR SERVICE: HCPCS | Performed by: INTERNAL MEDICINE

## 2020-11-14 PROCEDURE — 700102 HCHG RX REV CODE 250 W/ 637 OVERRIDE(OP): Performed by: NURSE PRACTITIONER

## 2020-11-14 PROCEDURE — A9270 NON-COVERED ITEM OR SERVICE: HCPCS | Performed by: HOSPITALIST

## 2020-11-14 PROCEDURE — 770020 HCHG ROOM/CARE - TELE (206)

## 2020-11-14 PROCEDURE — A9270 NON-COVERED ITEM OR SERVICE: HCPCS | Performed by: NURSE PRACTITIONER

## 2020-11-14 RX ADMIN — ASPIRIN 325 MG ORAL TABLET 325 MG: 325 PILL ORAL at 04:51

## 2020-11-14 RX ADMIN — Medication 100 MG: at 04:51

## 2020-11-14 RX ADMIN — THERA TABS 1 TABLET: TAB at 04:51

## 2020-11-14 RX ADMIN — RISPERIDONE 1 MG: 1 TABLET, FILM COATED ORAL at 16:41

## 2020-11-14 RX ADMIN — TAMSULOSIN HYDROCHLORIDE 0.8 MG: 0.4 CAPSULE ORAL at 08:09

## 2020-11-14 RX ADMIN — ENOXAPARIN SODIUM 40 MG: 40 INJECTION SUBCUTANEOUS at 16:40

## 2020-11-14 RX ADMIN — RISPERIDONE 1 MG: 1 TABLET, FILM COATED ORAL at 04:51

## 2020-11-14 ASSESSMENT — ENCOUNTER SYMPTOMS
VOMITING: 0
ABDOMINAL PAIN: 0
DIZZINESS: 0
MEMORY LOSS: 0
HEARTBURN: 0
WEAKNESS: 0
COUGH: 0
NECK PAIN: 0
PALPITATIONS: 0
SORE THROAT: 0
HEADACHES: 0
INSOMNIA: 0
NAUSEA: 0
WHEEZING: 0
BACK PAIN: 0
HALLUCINATIONS: 1
MYALGIAS: 1
SHORTNESS OF BREATH: 0
FEVER: 0
CHILLS: 0

## 2020-11-14 ASSESSMENT — FIBROSIS 4 INDEX: FIB4 SCORE: 0.62

## 2020-11-14 NOTE — PROGRESS NOTES
Report received from day shift Rn. Assumed pt care. Pt is sitting in bed eating dinner. Pt A&O x 1. Fall precautions in place, call light and belongings within reach, bed in lowest position. No signs of distress.

## 2020-11-14 NOTE — PROGRESS NOTES
[facility] Medicine Daily Progress Note    Date of Service  11/14/2020    Chief Complaint  altered mental status    Hospital Course  50 y.o. male with a pmhx of HTN, chronic pain and alcohol abuse who presented 9/9/2020 with altered mental status, he was found obtunded in his house by his ex-wife who was checking on him as he had not been seen for 2 days or been able to get a hold of him.  He was found in a chair,  A&Ox0 and hypotensive.  He arrived in the ER with a GCS of 7 (E1,V1,M5) and severely hypotensive.  The patient was intubated for airway protection and central venous access was obtained for administration of high-volume crystalloid resuscitation and vasopressor therapy.    CT head and abdomen and pelvis were all unremarkable. He was briefly treated with antibiotics for concern for an underlying infection and a lumbar tap done was negative. Cultures resulted negative.   EEG was done  9/10 no seizures captured.   He was extubated on 9/11 and has been able to protect his airway ever since. He received benzodiazepines and IV thiamine for etoh.  Now awaiting placement however continues to be impulsive and has required restraints intermittently this admission.  He was found to have UTI s/p treatment with bactrim.  He had urinary retention s/p newton but continued to self-remove. His urinary retention resolved. Continues to be impulsive and has had multiple falls, on risperdal.      11/12: Patient transferred to tele as patient was noted to be tachycardic with heart rate in 140 yesterday.  Stat EKG was obtained concerning for a flutter.  Patient did receive metoprolol after which repeated the ECG showed a sinus tachycardia with heart rate 109. Cardiology was consulted. D- dimer mildly elevation. CTA chest r/o PE.  Overnight telemetry shows sinus tachycardia with no atrial flutter or atrial fibrillation.      Patient was noted to have leukocytosis.  Blood culture negative, CXR negative.  UA, lactate acid normal and  procalcitonin 2.49.  Leukocytosis has been improving, no fever, will hold on antibiotics.      Interval Problem Update  Patient is lying in bed, awake and cooperative. He is oriented only to himself.   No acute overnight events.  He follows commands, however he is confused and states he had a fall this morning while he was with his ex wife. He is currently going to race. Confirmed with nurse, he did not have fall.   He denies fever, chills, chest pain, palpitations and any other problems.     Consultants/Specialty  Intensivist  Psych  cardiology    Code Status  Full Code    Disposition  Pending SNF/guardianship    Review of Systems  Review of Systems   Constitutional: Negative for chills and fever.   HENT: Negative for congestion and sore throat.    Respiratory: Negative for cough, shortness of breath and wheezing.    Cardiovascular: Negative for chest pain and palpitations.   Gastrointestinal: Negative for abdominal pain, heartburn, nausea and vomiting.   Genitourinary: Negative for dysuria and urgency.        Incontinent at baseline   Musculoskeletal: Positive for myalgias. Negative for back pain, joint pain and neck pain.   Neurological: Negative for dizziness, weakness and headaches.   Psychiatric/Behavioral: Positive for hallucinations. Negative for memory loss. The patient does not have insomnia.        Physical Exam  Temp:  [36.6 °C (97.8 °F)-36.9 °C (98.4 °F)] 36.8 °C (98.3 °F)  Pulse:  [75-93] 82  Resp:  [15-17] 15  BP: (109-123)/(68-81) 120/73  SpO2:  [94 %-95 %] 94 %    Physical Exam  Vitals signs and nursing note reviewed.   Constitutional:       General: He is awake.      Appearance: He is not ill-appearing or toxic-appearing.   HENT:      Head: Normocephalic and atraumatic.      Nose: Nose normal.      Mouth/Throat:      Mouth: Mucous membranes are dry.   Eyes:      General: No scleral icterus.        Right eye: No discharge.         Left eye: No discharge.      Conjunctiva/sclera: Conjunctivae normal.    Neck:      Musculoskeletal: Normal range of motion.   Cardiovascular:      Rate and Rhythm: Normal rate.      Pulses: Normal pulses.   Pulmonary:      Effort: Pulmonary effort is normal.      Breath sounds: Normal breath sounds.   Abdominal:      General: Abdomen is flat. There is no distension.      Palpations: Abdomen is soft.      Tenderness: There is no abdominal tenderness.   Musculoskeletal:         General: No swelling.      Right lower leg: No edema.      Left lower leg: No edema.   Skin:     General: Skin is warm and dry.   Neurological:      General: No focal deficit present.      Mental Status: He is alert. He is disoriented and confused.      Motor: Motor function is intact.      Comments: AOx1 only self   Psychiatric:         Speech: Speech normal.         Behavior: Behavior is cooperative.         Cognition and Memory: Cognition is impaired. He exhibits impaired recent memory.         Judgment: Judgment is impulsive.       Current Facility-Administered Medications:   •  aspirin (ASA) tablet 325 mg, 325 mg, Oral, DAILY, JAVIER Haile.P.R.N., 325 mg at 11/14/20 0451  •  Metoprolol Tartrate (LOPRESSOR) injection 5 mg, 5 mg, Intravenous, Once PRN, JAVIER Haile.P.R.N.  •  multivitamin (THERAGRAN) tablet 1 Tab, 1 Tab, Oral, DAILY, Glynn Encarnacion M.D., 1 Tab at 11/14/20 0451  •  risperiDONE (RISPERDAL) tablet 1 mg, 1 mg, Oral, BID, Akanksha Martinez M.D., 1 mg at 11/14/20 0451  •  thiamine tablet 100 mg, 100 mg, Oral, DAILY, Gisele Barnes M.D., 100 mg at 11/14/20 0451  •  tamsulosin (FLOMAX) capsule 0.8 mg, 0.8 mg, Oral, AFTER BREAKFAST, Gisele Barnes M.D., 0.8 mg at 11/13/20 1037  •  enoxaparin (LOVENOX) inj 40 mg, 40 mg, Subcutaneous, Q EVENING, Gisele Barnes M.D., 40 mg at 11/13/20 1708  •  acetaminophen (TYLENOL) tablet 650 mg, 650 mg, Oral, Q6HRS PRN, Gisele Barnes M.D., 650 mg at 11/12/20 1215  •  senna-docusate (PERICOLACE or SENOKOT S) 8.6-50 MG per tablet 2 Tab, 2 Tab, Oral, BID,  Stopped at 11/14/20 0600 **AND** polyethylene glycol/lytes (MIRALAX) PACKET 1 Packet, 1 Packet, Oral, QDAY PRN, 1 Packet at 11/04/20 0434 **AND** magnesium hydroxide (MILK OF MAGNESIA) suspension 30 mL, 30 mL, Oral, QDAY PRN, 30 mL at 10/29/20 1822 **AND** [DISCONTINUED] bisacodyl (DULCOLAX) suppository 10 mg, 10 mg, Rectal, QDAY PRN, Xavi Durham D.O.  •  Respiratory Therapy Consult, , Nebulization, Continuous RT, Gisele Barnes M.D.  •  ipratropium-albuterol (DUONEB) nebulizer solution, 3 mL, Nebulization, Q2HRS PRN (RT), Gisele Barnes M.D.      Fluids    Intake/Output Summary (Last 24 hours) at 11/14/2020 0803  Last data filed at 11/13/2020 0900  Gross per 24 hour   Intake 60 ml   Output --   Net 60 ml       Laboratory  Recent Labs     11/11/20 2117 11/13/20  0557   WBC 16.3* 5.6   RBC 4.25* 4.18*   HEMOGLOBIN 12.3* 12.3*   HEMATOCRIT 38.6* 38.6*   MCV 90.8 92.3   MCH 28.9 29.4   MCHC 31.9* 31.9*   RDW 44.5 44.9   PLATELETCT 263 239   MPV 11.1 11.2     Recent Labs     11/11/20 2117 11/13/20  0557   SODIUM 140 137   POTASSIUM 4.0 3.6   CHLORIDE 106 104   CO2 26 25   GLUCOSE 105* 97   BUN 14 11   CREATININE 0.80 0.74   CALCIUM 10.0 9.6                   Imaging  CT-CTA CHEST PULMONARY ARTERY W/ RECONS   Final Result      1.  No evidence of pulmonary emboli.   2.  Right lower lobe discoid atelectasis.   3.  Gynecomastia.            DX-CHEST-PORTABLE (1 VIEW)   Final Result      No acute cardiac or pulmonary abnormalities are identified.      MR-BRAIN-W/O   Final Result      1.  Moderate diffuse cerebral atrophy.      2.  Minimal periventricular and juxtacortical white matter changes consistent with chronic microvascular ischemic gliosis.      3.  No evidence of acute infarction in the brain parenchyma.      DX-ABDOMEN FOR TUBE PLACEMENT   Final Result      Feeding tube tip overlies the second portion of the duodenum.      DX-CHEST-PORTABLE (1 VIEW)   Final Result         1.  Patchy left lung base opacity,  new since prior, could represent early infiltrate            DX-CHEST-PORTABLE (1 VIEW)   Final Result         1.  Patchy left lung base opacity, new since prior, could represent early infiltrate         [id number] VIEW   Final Result      Enteric tube projects over the distal stomach/pylorus.         EC-ECHOCARDIOGRAM COMPLETE W/O CONT   Final Result      DX-CHEST-PORTABLE (1 VIEW)   Final Result         1.  No acute cardiopulmonary disease.      CT-ABDOMEN-PELVIS W/O   Final Result      1.  No renal stone or hydronephrosis.   2.  Normal appendix.   3.  No focal mesenteric inflammatory process.      DX-ABDOMEN FOR TUBE PLACEMENT   Final Result      NG tube tip projects over expected location the gastric fundus.      US-ABDOMEN COMPLETE SURVEY   Final Result         1.  Echogenic liver compatible with fatty change versus fibrosis.   2.  Gallbladder sludge without additional sonographic findings of cholecystitis.   3.  Partial atrophic bilateral kidneys with lobulated contour         CT-HEAD W/O   Final Result         1.  No acute intracranial abnormality is identified, there are nonspecific white matter changes, commonly associated with small vessel ischemic disease.  Associated mild cerebral atrophy is noted.   2.  Atherosclerosis.      DX-CHEST-PORTABLE (1 VIEW)   Final Result         1.  No acute cardiopulmonary disease.           Assessment/Plan  * Toxic encephalopathy- (present on admission)  Assessment & Plan  Unknown etiology whether this is hypoxic induced brain injury versus Wernicke encephalopathy  LP, MRI brain, EEG were negative. TSH normal. HIV/RPR negative.  - Psych consult--> they believe it is etoh?    - on risperdal 1mg BID  - Continue vest restraint as needed for patient safety, allow patient to walk on vilchis with assistance TID  - pending guardianship 12/2020    Leukocytosis  Assessment & Plan  Tachycardia in the leukocytosis noted 11/11-11/12, currently resolved  CXR no acute  abnormalities  Blood culture negative to date  Order UA, LA normal and procalcitonin 2.49 to evaluate source of infection: Leukocytosis improved, afebrile, will hold on antibiotics   Close monitoring    Tachycardia  Assessment & Plan  Episode of tachycardia on 11/11 with heart rate in 140.    Stat EKG concerning for a flutter  Resolved after receiving IVF, metoprolol.  Following telemetry showed sinus tachycardia.  Now in sinus rhythm with normal rate  Cardiology consulted, recommendations appreciated.     Urinary retention- (present on admission)  Assessment & Plan  Improved w/ Flomax, continue medication  Condom catheter in place for Ins and Out management    Normocytic anemia  Assessment & Plan  Mild, No active bleeding, stable  Continue monitoring    Alcohol abuse- (present on admission)  Assessment & Plan  Reported hx of abuse. s/p etoh w/d protocol.  - Continue Vitamins, thiamine  - High dose thiamine  - Seizure precautions       VTE prophylaxis: Lovenox     I have performed a physical exam and reviewed and updated ROS and Plan today (11/14). In review of yesterday's note (11/13), there are no changes except as documented above.

## 2020-11-15 PROCEDURE — A9270 NON-COVERED ITEM OR SERVICE: HCPCS | Performed by: INTERNAL MEDICINE

## 2020-11-15 PROCEDURE — 700102 HCHG RX REV CODE 250 W/ 637 OVERRIDE(OP): Performed by: INTERNAL MEDICINE

## 2020-11-15 PROCEDURE — 700102 HCHG RX REV CODE 250 W/ 637 OVERRIDE(OP): Performed by: HOSPITALIST

## 2020-11-15 PROCEDURE — A9270 NON-COVERED ITEM OR SERVICE: HCPCS | Performed by: HOSPITALIST

## 2020-11-15 PROCEDURE — 700102 HCHG RX REV CODE 250 W/ 637 OVERRIDE(OP): Performed by: NURSE PRACTITIONER

## 2020-11-15 PROCEDURE — A9270 NON-COVERED ITEM OR SERVICE: HCPCS | Performed by: NURSE PRACTITIONER

## 2020-11-15 PROCEDURE — 700111 HCHG RX REV CODE 636 W/ 250 OVERRIDE (IP): Performed by: HOSPITALIST

## 2020-11-15 PROCEDURE — 99232 SBSQ HOSP IP/OBS MODERATE 35: CPT | Performed by: STUDENT IN AN ORGANIZED HEALTH CARE EDUCATION/TRAINING PROGRAM

## 2020-11-15 PROCEDURE — 770006 HCHG ROOM/CARE - MED/SURG/GYN SEMI*

## 2020-11-15 RX ADMIN — THERA TABS 1 TABLET: TAB at 05:13

## 2020-11-15 RX ADMIN — TAMSULOSIN HYDROCHLORIDE 0.8 MG: 0.4 CAPSULE ORAL at 08:20

## 2020-11-15 RX ADMIN — RISPERIDONE 1 MG: 1 TABLET, FILM COATED ORAL at 05:13

## 2020-11-15 RX ADMIN — ASPIRIN 325 MG ORAL TABLET 325 MG: 325 PILL ORAL at 05:13

## 2020-11-15 RX ADMIN — RISPERIDONE 1 MG: 1 TABLET, FILM COATED ORAL at 17:03

## 2020-11-15 RX ADMIN — Medication 100 MG: at 05:13

## 2020-11-15 RX ADMIN — ENOXAPARIN SODIUM 40 MG: 40 INJECTION SUBCUTANEOUS at 17:02

## 2020-11-15 ASSESSMENT — ENCOUNTER SYMPTOMS
HEADACHES: 0
INSOMNIA: 0
HEARTBURN: 0
WEAKNESS: 0
SORE THROAT: 0
MYALGIAS: 1
MEMORY LOSS: 0
SHORTNESS OF BREATH: 0
PALPITATIONS: 0
DIZZINESS: 0
HALLUCINATIONS: 1
NECK PAIN: 0
VOMITING: 0
NAUSEA: 0
FEVER: 0
BACK PAIN: 0
COUGH: 0
ABDOMINAL PAIN: 0
CHILLS: 0
WHEEZING: 0

## 2020-11-15 NOTE — PROGRESS NOTES
[facility] Medicine Daily Progress Note    Date of Service  11/15/2020    Chief Complaint  altered mental status    Hospital Course  50 y.o. male with a pmhx of HTN, chronic pain and alcohol abuse who presented 9/9/2020 with altered mental status, he was found obtunded in his house by his ex-wife who was checking on him as he had not been seen for 2 days or been able to get a hold of him.  He was found in a chair,  A&Ox0 and hypotensive.  He arrived in the ER with a GCS of 7 (E1,V1,M5) and severely hypotensive.  The patient was intubated for airway protection and central venous access was obtained for administration of high-volume crystalloid resuscitation and vasopressor therapy.    CT head and abdomen and pelvis were all unremarkable. He was briefly treated with antibiotics for concern for an underlying infection and a lumbar tap done was negative. Cultures resulted negative.   EEG was done  9/10 no seizures captured.   He was extubated on 9/11 and has been able to protect his airway ever since. He received benzodiazepines and IV thiamine for etoh.  Now awaiting placement however continues to be impulsive and has required restraints intermittently this admission.  He was found to have UTI s/p treatment with bactrim.  He had urinary retention s/p newton but continued to self-remove. His urinary retention resolved. Continues to be impulsive and has had multiple falls, on risperdal.      11/12: Patient transferred to tele as patient was noted to be tachycardic with heart rate in 140 yesterday.  Stat EKG was obtained concerning for a flutter.  Patient did receive metoprolol after which repeated the ECG showed a sinus tachycardia with heart rate 109. Cardiology was consulted. D- dimer mildly elevation. CTA chest r/o PE.  Overnight telemetry shows sinus tachycardia with no atrial flutter or atrial fibrillation.      Patient was noted to have leukocytosis.  Blood culture negative, CXR negative.  UA, lactate acid normal and  procalcitonin 2.49.  Leukocytosis has been improving, no fever, will hold on antibiotics.      Interval Problem Update  Patient is lying in bed, awake and cooperative. He is oriented only to himself.   No acute overnight events.  He follows commands. Today AOx2.   He denies fever, chills, chest pain, palpitations and any other problems.     Consultants/Specialty  Intensivist  Psych  cardiology    Code Status  Full Code    Disposition  Pending SNF/guardianship    Review of Systems  Review of Systems   Constitutional: Negative for chills and fever.   HENT: Negative for congestion and sore throat.    Respiratory: Negative for cough, shortness of breath and wheezing.    Cardiovascular: Negative for chest pain and palpitations.   Gastrointestinal: Negative for abdominal pain, heartburn, nausea and vomiting.   Genitourinary: Negative for dysuria and urgency.        Incontinent at baseline   Musculoskeletal: Positive for myalgias. Negative for back pain, joint pain and neck pain.   Neurological: Negative for dizziness, weakness and headaches.   Psychiatric/Behavioral: Positive for hallucinations. Negative for memory loss. The patient does not have insomnia.        Physical Exam  Temp:  [36.3 °C (97.3 °F)-36.8 °C (98.2 °F)] 36.7 °C (98 °F)  Pulse:  [71-90] 90  Resp:  [16-18] 18  BP: ()/(60-71) 119/70  SpO2:  [94 %-96 %] 95 %    Physical Exam  Vitals signs and nursing note reviewed.   Constitutional:       General: He is awake.      Appearance: He is not ill-appearing or toxic-appearing.   HENT:      Head: Normocephalic and atraumatic.      Nose: Nose normal.      Mouth/Throat:      Mouth: Mucous membranes are dry.   Eyes:      General: No scleral icterus.        Right eye: No discharge.         Left eye: No discharge.      Conjunctiva/sclera: Conjunctivae normal.   Neck:      Musculoskeletal: Normal range of motion.   Cardiovascular:      Rate and Rhythm: Normal rate.      Pulses: Normal pulses.   Pulmonary:       Effort: Pulmonary effort is normal.      Breath sounds: Normal breath sounds.   Abdominal:      General: Abdomen is flat. There is no distension.      Palpations: Abdomen is soft.      Tenderness: There is no abdominal tenderness.   Musculoskeletal:         General: No swelling.      Right lower leg: No edema.      Left lower leg: No edema.   Skin:     General: Skin is warm and dry.   Neurological:      General: No focal deficit present.      Mental Status: He is alert. He is disoriented and confused.      Motor: Motor function is intact.      Comments: AOx1 only self   Psychiatric:         Speech: Speech normal.         Behavior: Behavior is cooperative.         Cognition and Memory: Cognition is impaired. He exhibits impaired recent memory.         Judgment: Judgment is impulsive.       Current Facility-Administered Medications:   •  aspirin (ASA) tablet 325 mg, 325 mg, Oral, DAILY, Diana Reed A.P.R.N., 325 mg at 11/15/20 0513  •  Metoprolol Tartrate (LOPRESSOR) injection 5 mg, 5 mg, Intravenous, Once PRN, Diana Reed A.P.R.N.  •  multivitamin (THERAGRAN) tablet 1 Tab, 1 Tab, Oral, DAILY, Glynn Encarnacion M.D., 1 Tab at 11/15/20 0513  •  risperiDONE (RISPERDAL) tablet 1 mg, 1 mg, Oral, BID, Akanksha Martinez M.D., 1 mg at 11/15/20 0513  •  thiamine tablet 100 mg, 100 mg, Oral, DAILY, Gisele Barnes M.D., 100 mg at 11/15/20 0513  •  tamsulosin (FLOMAX) capsule 0.8 mg, 0.8 mg, Oral, AFTER BREAKFAST, Gisele Barnes M.D., 0.8 mg at 11/15/20 0820  •  enoxaparin (LOVENOX) inj 40 mg, 40 mg, Subcutaneous, Q EVENING, Gisele Barnes M.D., 40 mg at 11/14/20 1640  •  acetaminophen (TYLENOL) tablet 650 mg, 650 mg, Oral, Q6HRS PRN, Gisele Barnes M.D., 650 mg at 11/12/20 1215  •  senna-docusate (PERICOLACE or SENOKOT S) 8.6-50 MG per tablet 2 Tab, 2 Tab, Oral, BID, Stopped at 11/14/20 0600 **AND** polyethylene glycol/lytes (MIRALAX) PACKET 1 Packet, 1 Packet, Oral, QDAY PRN, 1 Packet at 11/04/20 0434 **AND** magnesium  hydroxide (MILK OF MAGNESIA) suspension 30 mL, 30 mL, Oral, QDAY PRN, 30 mL at 10/29/20 1822 **AND** [DISCONTINUED] bisacodyl (DULCOLAX) suppository 10 mg, 10 mg, Rectal, QDAY PRN, Xavi Durham D.O.  •  Respiratory Therapy Consult, , Nebulization, Continuous RT, Gisele Barnes M.D.  •  ipratropium-albuterol (DUONEB) nebulizer solution, 3 mL, Nebulization, Q2HRS PRN (RT), Gisele Barnes M.D.      Fluids    Intake/Output Summary (Last 24 hours) at 11/15/2020 1248  Last data filed at 11/14/2020 1900  Gross per 24 hour   Intake 120 ml   Output --   Net 120 ml       Laboratory  Recent Labs     11/13/20  0557   WBC 5.6   RBC 4.18*   HEMOGLOBIN 12.3*   HEMATOCRIT 38.6*   MCV 92.3   MCH 29.4   MCHC 31.9*   RDW 44.9   PLATELETCT 239   MPV 11.2     Recent Labs     11/13/20  0557   SODIUM 137   POTASSIUM 3.6   CHLORIDE 104   CO2 25   GLUCOSE 97   BUN 11   CREATININE 0.74   CALCIUM 9.6                   Imaging  CT-CTA CHEST PULMONARY ARTERY W/ RECONS   Final Result      1.  No evidence of pulmonary emboli.   2.  Right lower lobe discoid atelectasis.   3.  Gynecomastia.            DX-CHEST-PORTABLE (1 VIEW)   Final Result      No acute cardiac or pulmonary abnormalities are identified.      MR-BRAIN-W/O   Final Result      1.  Moderate diffuse cerebral atrophy.      2.  Minimal periventricular and juxtacortical white matter changes consistent with chronic microvascular ischemic gliosis.      3.  No evidence of acute infarction in the brain parenchyma.      DX-ABDOMEN FOR TUBE PLACEMENT   Final Result      Feeding tube tip overlies the second portion of the duodenum.      DX-CHEST-PORTABLE (1 VIEW)   Final Result         1.  Patchy left lung base opacity, new since prior, could represent early infiltrate            DX-CHEST-PORTABLE (1 VIEW)   Final Result         1.  Patchy left lung base opacity, new since prior, could represent early infiltrate         [id number] VIEW   Final Result      Enteric tube projects over the  distal stomach/pylorus.         EC-ECHOCARDIOGRAM COMPLETE W/O CONT   Final Result      DX-CHEST-PORTABLE (1 VIEW)   Final Result         1.  No acute cardiopulmonary disease.      CT-ABDOMEN-PELVIS W/O   Final Result      1.  No renal stone or hydronephrosis.   2.  Normal appendix.   3.  No focal mesenteric inflammatory process.      DX-ABDOMEN FOR TUBE PLACEMENT   Final Result      NG tube tip projects over expected location the gastric fundus.      US-ABDOMEN COMPLETE SURVEY   Final Result         1.  Echogenic liver compatible with fatty change versus fibrosis.   2.  Gallbladder sludge without additional sonographic findings of cholecystitis.   3.  Partial atrophic bilateral kidneys with lobulated contour         CT-HEAD W/O   Final Result         1.  No acute intracranial abnormality is identified, there are nonspecific white matter changes, commonly associated with small vessel ischemic disease.  Associated mild cerebral atrophy is noted.   2.  Atherosclerosis.      DX-CHEST-PORTABLE (1 VIEW)   Final Result         1.  No acute cardiopulmonary disease.           Assessment/Plan  * Toxic encephalopathy- (present on admission)  Assessment & Plan  Unknown etiology whether this is hypoxic induced brain injury versus Wernicke encephalopathy  LP, MRI brain, EEG were negative. TSH normal. HIV/RPR negative.  - Psych consult--> they believe it is etoh?    - on risperdal 1mg BID  - Continue vest restraint as needed for patient safety, allow patient to walk on vilchis with assistance TID  - pending guardianship 12/2020    Leukocytosis  Assessment & Plan  Tachycardia in the leukocytosis noted 11/11-11/12, currently resolved  CXR no acute abnormalities  Blood culture negative to date  Order UA, LA normal and procalcitonin 2.49 to evaluate source of infection: Leukocytosis improved, afebrile, will hold on antibiotics   Close monitoring    Tachycardia  Assessment & Plan  Episode of tachycardia on 11/11 with heart rate in 140.     Stat EKG concerning for a flutter  Resolved after receiving IVF, metoprolol.  Following telemetry showed sinus tachycardia.  Now in sinus rhythm with normal rate  Cardiology consulted, recommendations appreciated.     Urinary retention- (present on admission)  Assessment & Plan  Improved w/ Flomax, continue medication  Condom catheter in place for Ins and Out management    Normocytic anemia  Assessment & Plan  Mild, No active bleeding, stable  Continue monitoring    Alcohol abuse- (present on admission)  Assessment & Plan  Reported hx of abuse. s/p etoh w/d protocol.  - Continue Vitamins, thiamine  - High dose thiamine  - Seizure precautions       VTE prophylaxis: Lovenox     I have performed a physical exam and reviewed and updated ROS and Plan today (11/15). In review of yesterday's note (11/14), there are no changes except as documented above.

## 2020-11-15 NOTE — CARE PLAN
Problem: Communication  Goal: The ability to communicate needs accurately and effectively will improve  11/14/2020 2013 by Selene Ha R.N.  Outcome: PROGRESSING AS EXPECTED  11/14/2020 2012 by Selene Ha R.N.  Outcome: PROGRESSING AS EXPECTED     Problem: Safety  Goal: Will remain free from injury  Outcome: PROGRESSING AS EXPECTED  Goal: Will remain free from falls  Outcome: PROGRESSING AS EXPECTED     Problem: Infection  Goal: Will remain free from infection  Outcome: PROGRESSING AS EXPECTED

## 2020-11-16 LAB
ANION GAP SERPL CALC-SCNC: 10 MMOL/L (ref 7–16)
BASOPHILS # BLD AUTO: 0.5 % (ref 0–1.8)
BASOPHILS # BLD: 0.06 K/UL (ref 0–0.12)
BUN SERPL-MCNC: 20 MG/DL (ref 8–22)
CALCIUM SERPL-MCNC: 9.7 MG/DL (ref 8.5–10.5)
CHLORIDE SERPL-SCNC: 107 MMOL/L (ref 96–112)
CO2 SERPL-SCNC: 24 MMOL/L (ref 20–33)
CREAT SERPL-MCNC: 0.69 MG/DL (ref 0.5–1.4)
EOSINOPHIL # BLD AUTO: 0.25 K/UL (ref 0–0.51)
EOSINOPHIL NFR BLD: 2.1 % (ref 0–6.9)
ERYTHROCYTE [DISTWIDTH] IN BLOOD BY AUTOMATED COUNT: 43.6 FL (ref 35.9–50)
GLUCOSE SERPL-MCNC: 109 MG/DL (ref 65–99)
HCT VFR BLD AUTO: 43.7 % (ref 42–52)
HGB BLD-MCNC: 14.3 G/DL (ref 14–18)
IMM GRANULOCYTES # BLD AUTO: 0.03 K/UL (ref 0–0.11)
IMM GRANULOCYTES NFR BLD AUTO: 0.2 % (ref 0–0.9)
LYMPHOCYTES # BLD AUTO: 1.19 K/UL (ref 1–4.8)
LYMPHOCYTES NFR BLD: 9.8 % (ref 22–41)
MCH RBC QN AUTO: 29.4 PG (ref 27–33)
MCHC RBC AUTO-ENTMCNC: 32.7 G/DL (ref 33.7–35.3)
MCV RBC AUTO: 89.9 FL (ref 81.4–97.8)
MONOCYTES # BLD AUTO: 0.86 K/UL (ref 0–0.85)
MONOCYTES NFR BLD AUTO: 7.1 % (ref 0–13.4)
NEUTROPHILS # BLD AUTO: 9.8 K/UL (ref 1.82–7.42)
NEUTROPHILS NFR BLD: 80.3 % (ref 44–72)
NRBC # BLD AUTO: 0 K/UL
NRBC BLD-RTO: 0 /100 WBC
PLATELET # BLD AUTO: 294 K/UL (ref 164–446)
PMV BLD AUTO: 11 FL (ref 9–12.9)
POTASSIUM SERPL-SCNC: 4.1 MMOL/L (ref 3.6–5.5)
RBC # BLD AUTO: 4.86 M/UL (ref 4.7–6.1)
SODIUM SERPL-SCNC: 141 MMOL/L (ref 135–145)
WBC # BLD AUTO: 12.2 K/UL (ref 4.8–10.8)

## 2020-11-16 PROCEDURE — A9270 NON-COVERED ITEM OR SERVICE: HCPCS | Performed by: HOSPITALIST

## 2020-11-16 PROCEDURE — A9270 NON-COVERED ITEM OR SERVICE: HCPCS | Performed by: NURSE PRACTITIONER

## 2020-11-16 PROCEDURE — 85025 COMPLETE CBC W/AUTO DIFF WBC: CPT

## 2020-11-16 PROCEDURE — A9270 NON-COVERED ITEM OR SERVICE: HCPCS | Performed by: INTERNAL MEDICINE

## 2020-11-16 PROCEDURE — 99232 SBSQ HOSP IP/OBS MODERATE 35: CPT | Performed by: STUDENT IN AN ORGANIZED HEALTH CARE EDUCATION/TRAINING PROGRAM

## 2020-11-16 PROCEDURE — 36415 COLL VENOUS BLD VENIPUNCTURE: CPT

## 2020-11-16 PROCEDURE — 700111 HCHG RX REV CODE 636 W/ 250 OVERRIDE (IP): Performed by: HOSPITALIST

## 2020-11-16 PROCEDURE — 700102 HCHG RX REV CODE 250 W/ 637 OVERRIDE(OP): Performed by: NURSE PRACTITIONER

## 2020-11-16 PROCEDURE — 770006 HCHG ROOM/CARE - MED/SURG/GYN SEMI*

## 2020-11-16 PROCEDURE — 700102 HCHG RX REV CODE 250 W/ 637 OVERRIDE(OP): Performed by: HOSPITALIST

## 2020-11-16 PROCEDURE — 700102 HCHG RX REV CODE 250 W/ 637 OVERRIDE(OP): Performed by: INTERNAL MEDICINE

## 2020-11-16 PROCEDURE — 80048 BASIC METABOLIC PNL TOTAL CA: CPT

## 2020-11-16 RX ADMIN — TAMSULOSIN HYDROCHLORIDE 0.8 MG: 0.4 CAPSULE ORAL at 09:12

## 2020-11-16 RX ADMIN — THERA TABS 1 TABLET: TAB at 05:37

## 2020-11-16 RX ADMIN — DOCUSATE SODIUM 50 MG AND SENNOSIDES 8.6 MG 2 TABLET: 8.6; 5 TABLET, FILM COATED ORAL at 18:14

## 2020-11-16 RX ADMIN — Medication 100 MG: at 05:37

## 2020-11-16 RX ADMIN — ASPIRIN 325 MG ORAL TABLET 325 MG: 325 PILL ORAL at 05:37

## 2020-11-16 RX ADMIN — ENOXAPARIN SODIUM 40 MG: 40 INJECTION SUBCUTANEOUS at 18:14

## 2020-11-16 RX ADMIN — RISPERIDONE 1 MG: 1 TABLET, FILM COATED ORAL at 18:14

## 2020-11-16 RX ADMIN — RISPERIDONE 1 MG: 1 TABLET, FILM COATED ORAL at 05:37

## 2020-11-16 ASSESSMENT — ENCOUNTER SYMPTOMS
HEADACHES: 0
NAUSEA: 0
SORE THROAT: 0
HALLUCINATIONS: 1
WHEEZING: 0
NECK PAIN: 0
INSOMNIA: 0
FEVER: 0
PALPITATIONS: 0
WEAKNESS: 0
MYALGIAS: 1
SHORTNESS OF BREATH: 0
MEMORY LOSS: 0
HEARTBURN: 0
VOMITING: 0
CHILLS: 0
ABDOMINAL PAIN: 0
DIZZINESS: 0
COUGH: 0
BACK PAIN: 0

## 2020-11-16 NOTE — CARE PLAN
Problem: Communication  Goal: The ability to communicate needs accurately and effectively will improve  Outcome: PROGRESSING AS EXPECTED     Problem: Safety  Goal: Will remain free from injury  Outcome: PROGRESSING AS EXPECTED  Goal: Will remain free from falls  Outcome: PROGRESSING AS EXPECTED     Problem: Infection  Goal: Will remain free from infection  Outcome: PROGRESSING AS EXPECTED     Problem: Venous Thromboembolism (VTW)/Deep Vein Thrombosis (DVT) Prevention:  Goal: Patient will participate in Venous Thrombosis (VTE)/Deep Vein Thrombosis (DVT)Prevention Measures  Outcome: PROGRESSING AS EXPECTED     Problem: Bowel/Gastric:  Goal: Normal bowel function is maintained or improved  Outcome: PROGRESSING AS EXPECTED  Goal: Will not experience complications related to bowel motility  Outcome: PROGRESSING AS EXPECTED     Problem: Knowledge Deficit  Goal: Knowledge of disease process/condition, treatment plan, diagnostic tests, and medications will improve  Outcome: PROGRESSING AS EXPECTED  Goal: Knowledge of the prescribed therapeutic regimen will improve  Outcome: PROGRESSING AS EXPECTED     Problem: Discharge Barriers/Planning  Goal: Patient's continuum of care needs will be met  Outcome: PROGRESSING AS EXPECTED     Problem: Fluid Volume:  Goal: Will maintain balanced intake and output  Outcome: PROGRESSING AS EXPECTED     Problem: Respiratory:  Goal: Respiratory status will improve  Outcome: PROGRESSING AS EXPECTED     Problem: Psychosocial Needs:  Goal: Level of anxiety will decrease  Outcome: PROGRESSING AS EXPECTED     Problem: Safety - Medical Restraint  Goal: Remains free of injury from restraints (Restraint for Interference with Medical Device)  Description: INTERVENTIONS:  1. Determine that other, less restrictive measures have been tried or would not be effective before applying the restraint  2. Evaluate the patient's condition at the time of restraint application  3. Inform patient/family regarding  the reason for restraint  4. Q2H: Monitor safety, psychosocial status, comfort, nutrition and hydration  Outcome: PROGRESSING AS EXPECTED  Goal: Free from restraint(s) (Restraint for Interference with Medical Device)  Description: INTERVENTIONS:  1. ONCE/SHIFT or MINIMUM Q12H: Assess and document the continuing need for restraints  2. Q24H: Continued use of restraint requires LIP to perform face to face examination and written order  3. Identify and implement measures to help patient regain control  Outcome: PROGRESSING AS EXPECTED     Problem: Skin Integrity  Goal: Risk for impaired skin integrity will decrease  Outcome: PROGRESSING AS EXPECTED     Problem: Pain Management  Goal: Pain level will decrease to patient's comfort goal  Outcome: PROGRESSING AS EXPECTED     Problem: Urinary Elimination:  Goal: Ability to reestablish a normal urinary elimination pattern will improve  Outcome: PROGRESSING AS EXPECTED     Problem: Mobility  Goal: Risk for activity intolerance will decrease  Outcome: PROGRESSING AS EXPECTED

## 2020-11-16 NOTE — PROGRESS NOTES
Bedside shift report received, pt care assumed.  Pt denies any needs at this time, will continue to assess. Bed in low position, bed alarm on. Call light and bedside table within reach.  Pt calm

## 2020-11-17 PROBLEM — D72.829 LEUKOCYTOSIS: Status: RESOLVED | Noted: 2020-11-12 | Resolved: 2020-11-17

## 2020-11-17 LAB
BACTERIA BLD CULT: NORMAL
BACTERIA BLD CULT: NORMAL
BASOPHILS # BLD AUTO: 0.9 % (ref 0–1.8)
BASOPHILS # BLD: 0.08 K/UL (ref 0–0.12)
EOSINOPHIL # BLD AUTO: 0.44 K/UL (ref 0–0.51)
EOSINOPHIL NFR BLD: 5.1 % (ref 0–6.9)
ERYTHROCYTE [DISTWIDTH] IN BLOOD BY AUTOMATED COUNT: 45.1 FL (ref 35.9–50)
HCT VFR BLD AUTO: 40.3 % (ref 42–52)
HGB BLD-MCNC: 12.9 G/DL (ref 14–18)
IMM GRANULOCYTES # BLD AUTO: 0.04 K/UL (ref 0–0.11)
IMM GRANULOCYTES NFR BLD AUTO: 0.5 % (ref 0–0.9)
LYMPHOCYTES # BLD AUTO: 2.17 K/UL (ref 1–4.8)
LYMPHOCYTES NFR BLD: 25.4 % (ref 22–41)
MCH RBC QN AUTO: 29.3 PG (ref 27–33)
MCHC RBC AUTO-ENTMCNC: 32 G/DL (ref 33.7–35.3)
MCV RBC AUTO: 91.6 FL (ref 81.4–97.8)
MONOCYTES # BLD AUTO: 0.54 K/UL (ref 0–0.85)
MONOCYTES NFR BLD AUTO: 6.3 % (ref 0–13.4)
NEUTROPHILS # BLD AUTO: 5.29 K/UL (ref 1.82–7.42)
NEUTROPHILS NFR BLD: 61.8 % (ref 44–72)
NRBC # BLD AUTO: 0 K/UL
NRBC BLD-RTO: 0 /100 WBC
PLATELET # BLD AUTO: 269 K/UL (ref 164–446)
PMV BLD AUTO: 11.1 FL (ref 9–12.9)
RBC # BLD AUTO: 4.4 M/UL (ref 4.7–6.1)
SIGNIFICANT IND 70042: NORMAL
SIGNIFICANT IND 70042: NORMAL
SITE SITE: NORMAL
SITE SITE: NORMAL
SOURCE SOURCE: NORMAL
SOURCE SOURCE: NORMAL
WBC # BLD AUTO: 8.6 K/UL (ref 4.8–10.8)

## 2020-11-17 PROCEDURE — A9270 NON-COVERED ITEM OR SERVICE: HCPCS | Performed by: INTERNAL MEDICINE

## 2020-11-17 PROCEDURE — 700102 HCHG RX REV CODE 250 W/ 637 OVERRIDE(OP): Performed by: INTERNAL MEDICINE

## 2020-11-17 PROCEDURE — 700102 HCHG RX REV CODE 250 W/ 637 OVERRIDE(OP): Performed by: HOSPITALIST

## 2020-11-17 PROCEDURE — 700111 HCHG RX REV CODE 636 W/ 250 OVERRIDE (IP): Performed by: HOSPITALIST

## 2020-11-17 PROCEDURE — A9270 NON-COVERED ITEM OR SERVICE: HCPCS | Performed by: HOSPITALIST

## 2020-11-17 PROCEDURE — 700102 HCHG RX REV CODE 250 W/ 637 OVERRIDE(OP): Performed by: NURSE PRACTITIONER

## 2020-11-17 PROCEDURE — A9270 NON-COVERED ITEM OR SERVICE: HCPCS | Performed by: NURSE PRACTITIONER

## 2020-11-17 PROCEDURE — 36415 COLL VENOUS BLD VENIPUNCTURE: CPT

## 2020-11-17 PROCEDURE — 770006 HCHG ROOM/CARE - MED/SURG/GYN SEMI*

## 2020-11-17 PROCEDURE — 99232 SBSQ HOSP IP/OBS MODERATE 35: CPT | Performed by: INTERNAL MEDICINE

## 2020-11-17 PROCEDURE — 85025 COMPLETE CBC W/AUTO DIFF WBC: CPT

## 2020-11-17 RX ADMIN — RISPERIDONE 1 MG: 1 TABLET, FILM COATED ORAL at 18:13

## 2020-11-17 RX ADMIN — Medication 100 MG: at 05:49

## 2020-11-17 RX ADMIN — RISPERIDONE 1 MG: 1 TABLET, FILM COATED ORAL at 05:50

## 2020-11-17 RX ADMIN — ASPIRIN 325 MG ORAL TABLET 325 MG: 325 PILL ORAL at 05:50

## 2020-11-17 RX ADMIN — THERA TABS 1 TABLET: TAB at 05:50

## 2020-11-17 RX ADMIN — DOCUSATE SODIUM 50 MG AND SENNOSIDES 8.6 MG 2 TABLET: 8.6; 5 TABLET, FILM COATED ORAL at 18:13

## 2020-11-17 RX ADMIN — ENOXAPARIN SODIUM 40 MG: 40 INJECTION SUBCUTANEOUS at 18:15

## 2020-11-17 ASSESSMENT — ENCOUNTER SYMPTOMS
SHORTNESS OF BREATH: 0
MEMORY LOSS: 0
NECK PAIN: 0
FEVER: 0
VOMITING: 0
NAUSEA: 0
DEPRESSION: 0
INSOMNIA: 0
HALLUCINATIONS: 1
MYALGIAS: 0
ABDOMINAL PAIN: 0
WHEEZING: 0
PALPITATIONS: 0
CHILLS: 0
COUGH: 0
SORE THROAT: 0
WEAKNESS: 0
HEARTBURN: 0
HEADACHES: 0
DIZZINESS: 0
BACK PAIN: 0

## 2020-11-17 NOTE — PROGRESS NOTES
Bedside shift report received, pt care assumed.  Pt denies any needs at this time, will continue to assess.  Bed in low position, bed alarm on and pt educated on fall risk, verbalized understanding. Call light and bedside table within reach.

## 2020-11-17 NOTE — PROGRESS NOTES
[facility] Medicine Daily Progress Note    Date of Service  11/17/2020    Chief Complaint  altered mental status    Hospital Course  50 y.o. male with a pmhx of HTN, chronic pain and alcohol abuse who presented 9/9/2020 with altered mental status, he was found obtunded in his house by his ex-wife who was checking on him as he had not been seen for 2 days or been able to get a hold of him.  He was found in a chair,  A&Ox0 and hypotensive.  He arrived in the ER with a GCS of 7 (E1,V1,M5) and severely hypotensive.  The patient was intubated for airway protection and central venous access was obtained for administration of high-volume crystalloid resuscitation and vasopressor therapy.    CT head and abdomen and pelvis were all unremarkable. He was briefly treated with antibiotics for concern for an underlying infection and a lumbar tap done was negative. Cultures resulted negative.   EEG was done  9/10 no seizures captured.   He was extubated on 9/11 and has been able to protect his airway ever since. He received benzodiazepines and IV thiamine for etoh.  Now awaiting placement however continues to be impulsive and has required restraints intermittently this admission.  He was found to have UTI s/p treatment with bactrim.  He had urinary retention s/p newton but continued to self-remove. His urinary retention resolved. Continues to be impulsive and has had multiple falls, on risperdal.      11/12: Patient transferred to tele as patient was noted to be tachycardic with heart rate in 140 yesterday.  Stat EKG was obtained concerning for a flutter.  Patient did receive metoprolol after which repeated the ECG showed a sinus tachycardia with heart rate 109. Cardiology was consulted. D- dimer mildly elevation. CTA chest r/o PE.  Overnight telemetry shows sinus tachycardia with no atrial flutter or atrial fibrillation.      Patient was noted to have leukocytosis.  Blood culture negative, CXR negative.  UA, lactate acid normal and  procalcitonin 2.49.  Leukocytosis has been improving, no fever, will hold on antibiotics.      Interval Problem Update  AMS-remains at his baseline, confabulates, remains confused. No behavioral issues. Adherent to all medications    Consultants/Specialty  Intensivist  Psych  cardiology    Code Status  Full Code    Disposition  Pending SNF/guardianship    Review of Systems  Review of Systems   Constitutional: Negative for chills and fever.   HENT: Negative for congestion and sore throat.    Respiratory: Negative for cough, shortness of breath and wheezing.    Cardiovascular: Negative for chest pain and palpitations.   Gastrointestinal: Negative for abdominal pain, heartburn, nausea and vomiting.   Genitourinary: Negative for dysuria and urgency.        Incontinent at baseline   Musculoskeletal: Negative for back pain, joint pain, myalgias and neck pain.   Neurological: Negative for dizziness, weakness and headaches.   Psychiatric/Behavioral: Positive for hallucinations. Negative for depression, memory loss and suicidal ideas. The patient does not have insomnia.        Physical Exam  Temp:  [36.4 °C (97.5 °F)-36.8 °C (98.3 °F)] 36.4 °C (97.5 °F)  Pulse:  [78-83] 78  Resp:  [17-18] 18  BP: (106-121)/(67-75) 121/73  SpO2:  [93 %-96 %] 96 %    Physical Exam  Vitals signs and nursing note reviewed.   Constitutional:       General: He is awake. He is not in acute distress.     Appearance: He is not ill-appearing.   HENT:      Head: Normocephalic and atraumatic.      Nose: Nose normal.      Mouth/Throat:      Mouth: Mucous membranes are dry.   Eyes:      General:         Right eye: No discharge.         Left eye: No discharge.      Conjunctiva/sclera: Conjunctivae normal.   Neck:      Musculoskeletal: Normal range of motion.   Cardiovascular:      Rate and Rhythm: Normal rate.      Pulses: Normal pulses.   Pulmonary:      Effort: Pulmonary effort is normal.      Breath sounds: Normal breath sounds.   Abdominal:       General: Abdomen is flat.      Palpations: Abdomen is soft.      Tenderness: There is no abdominal tenderness.   Musculoskeletal:         General: No swelling.      Right lower leg: No edema.      Left lower leg: No edema.   Skin:     General: Skin is warm and dry.   Neurological:      General: No focal deficit present.      Mental Status: He is alert. He is disoriented and confused.      Motor: Motor function is intact.      Comments: AOx1 only self  Confabulates   Psychiatric:         Speech: Speech normal.         Behavior: Behavior is cooperative.         Cognition and Memory: Cognition is impaired. He exhibits impaired recent memory.         Judgment: Judgment is impulsive.           Fluids  No intake or output data in the 24 hours ending 11/17/20 1402    Laboratory  Recent Labs     11/16/20  0517 11/17/20  0621   WBC 12.2* 8.6   RBC 4.86 4.40*   HEMOGLOBIN 14.3 12.9*   HEMATOCRIT 43.7 40.3*   MCV 89.9 91.6   MCH 29.4 29.3   MCHC 32.7* 32.0*   RDW 43.6 45.1   PLATELETCT 294 269   MPV 11.0 11.1     Recent Labs     11/16/20  0517   SODIUM 141   POTASSIUM 4.1   CHLORIDE 107   CO2 24   GLUCOSE 109*   BUN 20   CREATININE 0.69   CALCIUM 9.7                   Imaging  No new imaging in the last 24 hours     Assessment/Plan  * Toxic encephalopathy- (present on admission)  Assessment & Plan  Unknown etiology whether this is hypoxic induced brain injury versus Wernicke encephalopathy  LP, MRI brain, EEG were negative. TSH normal. HIV/RPR negative.  - Psych consult--> they believe it is etoh?    - on risperdal 1mg BID  - Continue vest restraint as needed for patient safety, allow patient to walk on vilchis with assistance TID  - pending guardianship 12/2020, no change in mental status, patient remains without behavioral issues and adherent to medication regimen at this time    Tachycardia- (present on admission)  Assessment & Plan  -resolved, monitor     Urinary retention- (present on admission)  Assessment & Plan  Improved  w/ Flomax, continue medication  Condom catheter in place for Ins and Out management    Normocytic anemia- (present on admission)  Assessment & Plan  Mild, No active bleeding, stable  Continue monitoring    Alcohol abuse- (present on admission)  Assessment & Plan  Reported hx of abuse. s/p etoh w/d protocol.  - Continue Vitamins, thiamine  - High dose thiamine  - Seizure precautions       VTE prophylaxis: Lovenox

## 2020-11-17 NOTE — PROGRESS NOTES
Bedside report received, pt care assumed, tele box on.  Pt is A&O to person only and the fact that he is in the hospital, sitting up in bed, and denies any additional needs at this time. Bed in lowest position, pt educated on fall risk but needs reinforcement, bed alarm on and call light within reach.

## 2020-11-17 NOTE — PROGRESS NOTES
[facility] Medicine Daily Progress Note    Date of Service  11/16/2020    Chief Complaint  altered mental status    Hospital Course  50 y.o. male with a pmhx of HTN, chronic pain and alcohol abuse who presented 9/9/2020 with altered mental status, he was found obtunded in his house by his ex-wife who was checking on him as he had not been seen for 2 days or been able to get a hold of him.  He was found in a chair,  A&Ox0 and hypotensive.  He arrived in the ER with a GCS of 7 (E1,V1,M5) and severely hypotensive.  The patient was intubated for airway protection and central venous access was obtained for administration of high-volume crystalloid resuscitation and vasopressor therapy.    CT head and abdomen and pelvis were all unremarkable. He was briefly treated with antibiotics for concern for an underlying infection and a lumbar tap done was negative. Cultures resulted negative.   EEG was done  9/10 no seizures captured.   He was extubated on 9/11 and has been able to protect his airway ever since. He received benzodiazepines and IV thiamine for etoh.  Now awaiting placement however continues to be impulsive and has required restraints intermittently this admission.  He was found to have UTI s/p treatment with bactrim.  He had urinary retention s/p newton but continued to self-remove. His urinary retention resolved. Continues to be impulsive and has had multiple falls, on risperdal.      11/12: Patient transferred to tele as patient was noted to be tachycardic with heart rate in 140 yesterday.  Stat EKG was obtained concerning for a flutter.  Patient did receive metoprolol after which repeated the ECG showed a sinus tachycardia with heart rate 109. Cardiology was consulted. D- dimer mildly elevation. CTA chest r/o PE.  Overnight telemetry shows sinus tachycardia with no atrial flutter or atrial fibrillation.      Patient was noted to have leukocytosis.  Blood culture negative, CXR negative.  UA, lactate acid normal and  procalcitonin 2.49.  Leukocytosis has been improving, no fever, will hold on antibiotics.      Interval Problem Update  Patient is lying in bed, awake and cooperative.  No acute overnight events.  He follows commands. Today AOx2.   He denies fever, chills, chest pain, palpitations and any other problems.     Consultants/Specialty  Intensivist  Psych  cardiology    Code Status  Full Code    Disposition  Pending SNF/guardianship    Review of Systems  Review of Systems   Constitutional: Negative for chills and fever.   HENT: Negative for congestion and sore throat.    Respiratory: Negative for cough, shortness of breath and wheezing.    Cardiovascular: Negative for chest pain and palpitations.   Gastrointestinal: Negative for abdominal pain, heartburn, nausea and vomiting.   Genitourinary: Negative for dysuria and urgency.        Incontinent at baseline   Musculoskeletal: Positive for myalgias. Negative for back pain, joint pain and neck pain.   Neurological: Negative for dizziness, weakness and headaches.   Psychiatric/Behavioral: Positive for hallucinations. Negative for memory loss. The patient does not have insomnia.        Physical Exam  Temp:  [36.1 °C (97 °F)-36.8 °C (98.2 °F)] 36.7 °C (98 °F)  Pulse:  [] 83  Resp:  [18] 18  BP: ()/(67-75) 106/75  SpO2:  [94 %-95 %] 95 %    Physical Exam  Vitals signs and nursing note reviewed.   Constitutional:       General: He is awake.      Appearance: He is not ill-appearing or toxic-appearing.   HENT:      Head: Normocephalic and atraumatic.      Nose: Nose normal.      Mouth/Throat:      Mouth: Mucous membranes are dry.   Eyes:      General: No scleral icterus.        Right eye: No discharge.         Left eye: No discharge.      Conjunctiva/sclera: Conjunctivae normal.   Neck:      Musculoskeletal: Normal range of motion.   Cardiovascular:      Rate and Rhythm: Normal rate.      Pulses: Normal pulses.   Pulmonary:      Effort: Pulmonary effort is normal.       Breath sounds: Normal breath sounds.   Abdominal:      General: Abdomen is flat. There is no distension.      Palpations: Abdomen is soft.      Tenderness: There is no abdominal tenderness.   Musculoskeletal:         General: No swelling.      Right lower leg: No edema.      Left lower leg: No edema.   Skin:     General: Skin is warm and dry.   Neurological:      General: No focal deficit present.      Mental Status: He is alert. He is disoriented and confused.      Motor: Motor function is intact.      Comments: AOx1 only self   Psychiatric:         Speech: Speech normal.         Behavior: Behavior is cooperative.         Cognition and Memory: Cognition is impaired. He exhibits impaired recent memory.         Judgment: Judgment is impulsive.       Current Facility-Administered Medications:   •  aspirin (ASA) tablet 325 mg, 325 mg, Oral, DAILY, Diana Reed A.P.R.N., 325 mg at 11/16/20 0537  •  Metoprolol Tartrate (LOPRESSOR) injection 5 mg, 5 mg, Intravenous, Once PRN, JAVIER Haile.P.R.N.  •  multivitamin (THERAGRAN) tablet 1 Tab, 1 Tab, Oral, DAILY, Glynn Encarnacion M.D., 1 Tab at 11/16/20 0537  •  risperiDONE (RISPERDAL) tablet 1 mg, 1 mg, Oral, BID, Akanksha Martinez M.D., 1 mg at 11/16/20 0537  •  thiamine tablet 100 mg, 100 mg, Oral, DAILY, Gisele Barnes M.D., 100 mg at 11/16/20 0537  •  tamsulosin (FLOMAX) capsule 0.8 mg, 0.8 mg, Oral, AFTER BREAKFAST, Gisele Barnes M.D., 0.8 mg at 11/16/20 0912  •  enoxaparin (LOVENOX) inj 40 mg, 40 mg, Subcutaneous, Q EVENING, Gisele Barnes M.D., 40 mg at 11/15/20 1702  •  acetaminophen (TYLENOL) tablet 650 mg, 650 mg, Oral, Q6HRS PRN, Gisele Barnes M.D., 650 mg at 11/12/20 1215  •  senna-docusate (PERICOLACE or SENOKOT S) 8.6-50 MG per tablet 2 Tab, 2 Tab, Oral, BID, Stopped at 11/14/20 0600 **AND** polyethylene glycol/lytes (MIRALAX) PACKET 1 Packet, 1 Packet, Oral, QDAY PRN, 1 Packet at 11/04/20 0434 **AND** magnesium hydroxide (MILK OF MAGNESIA) suspension 30  mL, 30 mL, Oral, QDAY PRN, 30 mL at 10/29/20 1822 **AND** [DISCONTINUED] bisacodyl (DULCOLAX) suppository 10 mg, 10 mg, Rectal, QDAY PRN, Xavi Durham D.O.  •  Respiratory Therapy Consult, , Nebulization, Continuous RT, Gisele Barnes M.D.  •  ipratropium-albuterol (DUONEB) nebulizer solution, 3 mL, Nebulization, Q2HRS PRN (RT), Gisele Barnes M.D.      Fluids  No intake or output data in the 24 hours ending 11/16/20 1735    Laboratory  Recent Labs     11/16/20  0517   WBC 12.2*   RBC 4.86   HEMOGLOBIN 14.3   HEMATOCRIT 43.7   MCV 89.9   MCH 29.4   MCHC 32.7*   RDW 43.6   PLATELETCT 294   MPV 11.0     Recent Labs     11/16/20  0517   SODIUM 141   POTASSIUM 4.1   CHLORIDE 107   CO2 24   GLUCOSE 109*   BUN 20   CREATININE 0.69   CALCIUM 9.7                   Imaging  CT-CTA CHEST PULMONARY ARTERY W/ RECONS   Final Result      1.  No evidence of pulmonary emboli.   2.  Right lower lobe discoid atelectasis.   3.  Gynecomastia.            DX-CHEST-PORTABLE (1 VIEW)   Final Result      No acute cardiac or pulmonary abnormalities are identified.      MR-BRAIN-W/O   Final Result      1.  Moderate diffuse cerebral atrophy.      2.  Minimal periventricular and juxtacortical white matter changes consistent with chronic microvascular ischemic gliosis.      3.  No evidence of acute infarction in the brain parenchyma.      DX-ABDOMEN FOR TUBE PLACEMENT   Final Result      Feeding tube tip overlies the second portion of the duodenum.      DX-CHEST-PORTABLE (1 VIEW)   Final Result         1.  Patchy left lung base opacity, new since prior, could represent early infiltrate            DX-CHEST-PORTABLE (1 VIEW)   Final Result         1.  Patchy left lung base opacity, new since prior, could represent early infiltrate         [id number] VIEW   Final Result      Enteric tube projects over the distal stomach/pylorus.         EC-ECHOCARDIOGRAM COMPLETE W/O CONT   Final Result      DX-CHEST-PORTABLE (1 VIEW)   Final Result          1.  No acute cardiopulmonary disease.      CT-ABDOMEN-PELVIS W/O   Final Result      1.  No renal stone or hydronephrosis.   2.  Normal appendix.   3.  No focal mesenteric inflammatory process.      DX-ABDOMEN FOR TUBE PLACEMENT   Final Result      NG tube tip projects over expected location the gastric fundus.      US-ABDOMEN COMPLETE SURVEY   Final Result         1.  Echogenic liver compatible with fatty change versus fibrosis.   2.  Gallbladder sludge without additional sonographic findings of cholecystitis.   3.  Partial atrophic bilateral kidneys with lobulated contour         CT-HEAD W/O   Final Result         1.  No acute intracranial abnormality is identified, there are nonspecific white matter changes, commonly associated with small vessel ischemic disease.  Associated mild cerebral atrophy is noted.   2.  Atherosclerosis.      DX-CHEST-PORTABLE (1 VIEW)   Final Result         1.  No acute cardiopulmonary disease.           Assessment/Plan  * Toxic encephalopathy- (present on admission)  Assessment & Plan  Unknown etiology whether this is hypoxic induced brain injury versus Wernicke encephalopathy  LP, MRI brain, EEG were negative. TSH normal. HIV/RPR negative.  - Psych consult--> they believe it is etoh?    - on risperdal 1mg BID  - Continue vest restraint as needed for patient safety, allow patient to walk on vilchis with assistance TID  - pending guardianship 12/2020    Leukocytosis  Assessment & Plan  Tachycardia in the leukocytosis noted 11/11-11/12, currently resolved  CXR no acute abnormalities  Blood culture negative to date  Order UA, LA normal and procalcitonin 2.49 to evaluate source of infection: Leukocytosis improved, afebrile, will hold on antibiotics   Close monitoring    Tachycardia  Assessment & Plan  Episode of tachycardia on 11/11 with heart rate in 140.    Stat EKG concerning for a flutter  Resolved after receiving IVF, metoprolol.  Following telemetry showed sinus tachycardia.  Now in  sinus rhythm with normal rate  Cardiology consulted, recommendations appreciated.     Urinary retention- (present on admission)  Assessment & Plan  Improved w/ Flomax, continue medication  Condom catheter in place for Ins and Out management    Normocytic anemia  Assessment & Plan  Mild, No active bleeding, stable  Continue monitoring    Alcohol abuse- (present on admission)  Assessment & Plan  Reported hx of abuse. s/p etoh w/d protocol.  - Continue Vitamins, thiamine  - High dose thiamine  - Seizure precautions       VTE prophylaxis: Lovenox     I have performed a physical exam and reviewed and updated ROS and Plan today (11/16). In review of yesterday's note (11/15), there are no changes except as documented above.

## 2020-11-18 PROCEDURE — A9270 NON-COVERED ITEM OR SERVICE: HCPCS | Performed by: NURSE PRACTITIONER

## 2020-11-18 PROCEDURE — 700102 HCHG RX REV CODE 250 W/ 637 OVERRIDE(OP): Performed by: HOSPITALIST

## 2020-11-18 PROCEDURE — 700111 HCHG RX REV CODE 636 W/ 250 OVERRIDE (IP): Performed by: HOSPITALIST

## 2020-11-18 PROCEDURE — A9270 NON-COVERED ITEM OR SERVICE: HCPCS | Performed by: HOSPITALIST

## 2020-11-18 PROCEDURE — 770006 HCHG ROOM/CARE - MED/SURG/GYN SEMI*

## 2020-11-18 PROCEDURE — 700102 HCHG RX REV CODE 250 W/ 637 OVERRIDE(OP): Performed by: INTERNAL MEDICINE

## 2020-11-18 PROCEDURE — 99232 SBSQ HOSP IP/OBS MODERATE 35: CPT | Performed by: INTERNAL MEDICINE

## 2020-11-18 PROCEDURE — A9270 NON-COVERED ITEM OR SERVICE: HCPCS | Performed by: INTERNAL MEDICINE

## 2020-11-18 PROCEDURE — 700102 HCHG RX REV CODE 250 W/ 637 OVERRIDE(OP): Performed by: NURSE PRACTITIONER

## 2020-11-18 RX ADMIN — Medication 100 MG: at 05:28

## 2020-11-18 RX ADMIN — ENOXAPARIN SODIUM 40 MG: 40 INJECTION SUBCUTANEOUS at 18:00

## 2020-11-18 RX ADMIN — DOCUSATE SODIUM 50 MG AND SENNOSIDES 8.6 MG 2 TABLET: 8.6; 5 TABLET, FILM COATED ORAL at 05:28

## 2020-11-18 RX ADMIN — RISPERIDONE 1 MG: 1 TABLET, FILM COATED ORAL at 05:28

## 2020-11-18 RX ADMIN — THERA TABS 1 TABLET: TAB at 05:28

## 2020-11-18 RX ADMIN — ASPIRIN 325 MG ORAL TABLET 325 MG: 325 PILL ORAL at 05:28

## 2020-11-18 RX ADMIN — TAMSULOSIN HYDROCHLORIDE 0.8 MG: 0.4 CAPSULE ORAL at 08:00

## 2020-11-18 RX ADMIN — RISPERIDONE 1 MG: 1 TABLET, FILM COATED ORAL at 22:15

## 2020-11-18 NOTE — PROGRESS NOTES
Attempting to have patient sit up in chair and perform more ADL's. He refuses to walk or allow ADL's performed such as bathing or linen change.

## 2020-11-18 NOTE — PROGRESS NOTES
Hospital Medicine Daily Progress Note    Date of Service  11/18/2020    Chief Complaint  altered mental status    Hospital Course  50 y.o. male with a pmhx of HTN, chronic pain and alcohol abuse who presented 9/9/2020 with altered mental status, he was found obtunded in his house by his ex-wife who was checking on him as he had not been seen for 2 days or been able to get a hold of him.  He was found in a chair,  A&Ox0 and hypotensive.  He arrived in the ER with a GCS of 7 (E1,V1,M5) and severely hypotensive.  The patient was intubated for airway protection and central venous access was obtained for administration of high-volume crystalloid resuscitation and vasopressor therapy.    CT head and abdomen and pelvis were all unremarkable. He was briefly treated with antibiotics for concern for an underlying infection and a lumbar tap done was negative. Cultures resulted negative.   EEG was done  9/10 no seizures captured.   He was extubated on 9/11 and has been able to protect his airway ever since. He received benzodiazepines and IV thiamine for etoh.  Now awaiting placement however continues to be impulsive and has required restraints intermittently this admission.  He was found to have UTI s/p treatment with bactrim.  He had urinary retention s/p newton but continued to self-remove. His urinary retention resolved. Continues to be impulsive and has had multiple falls, on risperdal.      11/12: Patient transferred to tele as patient was noted to be tachycardic with heart rate in 140 yesterday.  Stat EKG was obtained concerning for a flutter.  Patient did receive metoprolol after which repeated the ECG showed a sinus tachycardia with heart rate 109. Cardiology was consulted. D- dimer mildly elevation. CTA chest r/o PE.  Overnight telemetry shows sinus tachycardia with no atrial flutter or atrial fibrillation.      Patient was noted to have leukocytosis.  Blood culture negative, CXR negative.  UA, lactate acid normal and  procalcitonin 2.49.  Leukocytosis has been improving, no fever, will hold on antibiotics.      Interval Problem Update  AMS-grossly confused, resistant to ADL's and therapies.     Consultants/Specialty  Intensivist  Psych  cardiology    Code Status  Full Code    Disposition  Pending SNF/guardianship    Review of Systems  Review of Systems   Unable to perform ROS: Acuity of condition       Physical Exam  Temp:  [36.1 °C (97 °F)-36.7 °C (98 °F)] 36.7 °C (98 °F)  Pulse:  [67-82] 67  Resp:  [16-17] 17  BP: (114-118)/(65-77) 117/73  SpO2:  [95 %] 95 %    Physical Exam  Vitals signs and nursing note reviewed.   Constitutional:       General: He is awake. He is not in acute distress.     Appearance: He is not ill-appearing.   HENT:      Head: Normocephalic and atraumatic.      Nose: Nose normal.      Mouth/Throat:      Mouth: Mucous membranes are dry.   Eyes:      General:         Right eye: No discharge.         Left eye: No discharge.      Conjunctiva/sclera: Conjunctivae normal.   Neck:      Musculoskeletal: Normal range of motion.   Cardiovascular:      Rate and Rhythm: Normal rate.      Pulses: Normal pulses.   Pulmonary:      Effort: Pulmonary effort is normal.      Breath sounds: Normal breath sounds.   Abdominal:      General: Abdomen is flat.      Palpations: Abdomen is soft.      Tenderness: There is no abdominal tenderness.   Musculoskeletal:         General: No swelling.      Right lower leg: No edema.      Left lower leg: No edema.   Skin:     General: Skin is warm and dry.   Neurological:      General: No focal deficit present.      Mental Status: He is alert. He is disoriented and confused.      Motor: Motor function is intact.      Comments: AOx1 only self  Confabulates   Psychiatric:         Speech: Speech normal.         Behavior: Behavior is cooperative.         Cognition and Memory: Cognition is impaired. He exhibits impaired recent memory.         Judgment: Judgment is impulsive.       No changes on  physical exam today on 11/18/2020    Fluids    Intake/Output Summary (Last 24 hours) at 11/18/2020 1534  Last data filed at 11/18/2020 0439  Gross per 24 hour   Intake --   Output 650 ml   Net -650 ml       Laboratory  Recent Labs     11/16/20  0517 11/17/20  0621   WBC 12.2* 8.6   RBC 4.86 4.40*   HEMOGLOBIN 14.3 12.9*   HEMATOCRIT 43.7 40.3*   MCV 89.9 91.6   MCH 29.4 29.3   MCHC 32.7* 32.0*   RDW 43.6 45.1   PLATELETCT 294 269   MPV 11.0 11.1     Recent Labs     11/16/20  0517   SODIUM 141   POTASSIUM 4.1   CHLORIDE 107   CO2 24   GLUCOSE 109*   BUN 20   CREATININE 0.69   CALCIUM 9.7                   Imaging  No new imaging in the last 24 hours     Assessment/Plan  * Toxic encephalopathy- (present on admission)  Assessment & Plan  Unknown etiology whether this is hypoxic induced brain injury versus Wernicke encephalopathy  LP, MRI brain, EEG were negative. TSH normal. HIV/RPR negative.  - Psych consult--> they believe it is etoh?    - on risperdal 1mg BID  - Continue vest restraint as needed for patient safety, allow patient to walk on vilchis with assistance TID  - pending guardianship 12/2020, no change in mental status, patient remains without behavioral issues and adherent to medication regimen at this time but refuses most therapies  -likely his baseline    Tachycardia- (present on admission)  Assessment & Plan  -resolved, monitor     Urinary retention- (present on admission)  Assessment & Plan  Improved w/ Flomax, continue medication  Condom catheter in place for Ins and Out management    Normocytic anemia- (present on admission)  Assessment & Plan  Mild, No active bleeding, stable  Continue monitoring    Alcohol abuse- (present on admission)  Assessment & Plan  Reported hx of abuse. s/p etoh w/d protocol.  - Continue Vitamins, thiamine  - High dose thiamine  - Seizure precautions       VTE prophylaxis: Lovenox

## 2020-11-18 NOTE — DISCHARGE PLANNING
Anticipated Discharge Disposition: TBD    Action: Pt discussed in IDT rounds. Pt is medically stable and no longer has any Tele needs. Order in to transfer Pt to medical floor.     Barriers to Discharge: Pending Guardianship hearing on 12/18/20    Plan: LSW will continue to follow up and provide assistance with discharge plans/barriers.

## 2020-11-18 NOTE — PROGRESS NOTES
Received bedside report from RN, pt care assumed, VSS, pt assessment complete. Pt AAOx1, chronic c/o 110 pain at this time. No signs of acute distress noted at this time. POC discussed with pt and verbalizes no questions. Pt denies any additional needs at this time. Bed in lowest position, bed alarm on, pt educated on fall risk and verbalized understanding, call light within reach, hourly rounding initiated. Patient is medical, awaiting disposition.

## 2020-11-18 NOTE — PROGRESS NOTES
Bedside shift report received, pt care assumed.  Pt denies any needs at this time, will continue to assess.  Bed in low position, bed alarm on and pt educated on fall risk, verbalized understanding. Call light and bedside table within reach.      Pt is confused, confabulates, oriented to person only.

## 2020-11-18 NOTE — PROGRESS NOTES
Plan of care discussed in rounds. Patient remains only orient to person. Awaiting session for Guardianship, see discharge planning note.

## 2020-11-19 PROCEDURE — A9270 NON-COVERED ITEM OR SERVICE: HCPCS | Performed by: HOSPITALIST

## 2020-11-19 PROCEDURE — 700102 HCHG RX REV CODE 250 W/ 637 OVERRIDE(OP): Performed by: NURSE PRACTITIONER

## 2020-11-19 PROCEDURE — 700102 HCHG RX REV CODE 250 W/ 637 OVERRIDE(OP): Performed by: HOSPITALIST

## 2020-11-19 PROCEDURE — 770006 HCHG ROOM/CARE - MED/SURG/GYN SEMI*

## 2020-11-19 PROCEDURE — A9270 NON-COVERED ITEM OR SERVICE: HCPCS | Performed by: INTERNAL MEDICINE

## 2020-11-19 PROCEDURE — 700102 HCHG RX REV CODE 250 W/ 637 OVERRIDE(OP): Performed by: INTERNAL MEDICINE

## 2020-11-19 PROCEDURE — 700111 HCHG RX REV CODE 636 W/ 250 OVERRIDE (IP): Performed by: HOSPITALIST

## 2020-11-19 PROCEDURE — 99232 SBSQ HOSP IP/OBS MODERATE 35: CPT | Performed by: INTERNAL MEDICINE

## 2020-11-19 PROCEDURE — L4398 FOOT DROP SPLINT PRE OTS: HCPCS

## 2020-11-19 PROCEDURE — A9270 NON-COVERED ITEM OR SERVICE: HCPCS | Performed by: NURSE PRACTITIONER

## 2020-11-19 RX ADMIN — ASPIRIN 325 MG ORAL TABLET 325 MG: 325 PILL ORAL at 05:50

## 2020-11-19 RX ADMIN — THERA TABS 1 TABLET: TAB at 05:50

## 2020-11-19 RX ADMIN — DOCUSATE SODIUM 50 MG AND SENNOSIDES 8.6 MG 2 TABLET: 8.6; 5 TABLET, FILM COATED ORAL at 05:50

## 2020-11-19 RX ADMIN — TAMSULOSIN HYDROCHLORIDE 0.8 MG: 0.4 CAPSULE ORAL at 07:47

## 2020-11-19 RX ADMIN — RISPERIDONE 1 MG: 1 TABLET, FILM COATED ORAL at 17:24

## 2020-11-19 RX ADMIN — ENOXAPARIN SODIUM 40 MG: 40 INJECTION SUBCUTANEOUS at 17:24

## 2020-11-19 RX ADMIN — RISPERIDONE 1 MG: 1 TABLET, FILM COATED ORAL at 05:50

## 2020-11-19 RX ADMIN — Medication 100 MG: at 05:50

## 2020-11-19 NOTE — PROGRESS NOTES
Assumed care this pm from day shift RN. Patient sitting up in bed with no signs of distress or labored breathing. Patient AxO 1 only to self, O2 @ RA , VSS. Call bell and personal items within reach, bed locked in low position. Bed exit alarm on. Hourly rounding in place. Tele box not in place, monitor room notified.

## 2020-11-19 NOTE — PROGRESS NOTES
[facility] Medicine Daily Progress Note    Date of Service  11/19/2020    Chief Complaint  altered mental status    Hospital Course  50 y.o. male with a pmhx of HTN, chronic pain and alcohol abuse who presented 9/9/2020 with altered mental status, he was found obtunded in his house by his ex-wife who was checking on him as he had not been seen for 2 days or been able to get a hold of him.  He was found in a chair,  A&Ox0 and hypotensive.  He arrived in the ER with a GCS of 7 (E1,V1,M5) and severely hypotensive.  The patient was intubated for airway protection and central venous access was obtained for administration of high-volume crystalloid resuscitation and vasopressor therapy.    CT head and abdomen and pelvis were all unremarkable. He was briefly treated with antibiotics for concern for an underlying infection and a lumbar tap done was negative. Cultures resulted negative.   EEG was done  9/10 no seizures captured.   He was extubated on 9/11 and has been able to protect his airway ever since. He received benzodiazepines and IV thiamine for etoh.  Now awaiting placement however continues to be impulsive and has required restraints intermittently this admission.  He was found to have UTI s/p treatment with bactrim.  He had urinary retention s/p newton but continued to self-remove. His urinary retention resolved. Continues to be impulsive and has had multiple falls, on risperdal.      11/12: Patient transferred to tele as patient was noted to be tachycardic with heart rate in 140 yesterday.  Stat EKG was obtained concerning for a flutter.  Patient did receive metoprolol after which repeated the ECG showed a sinus tachycardia with heart rate 109. Cardiology was consulted. D- dimer mildly elevation. CTA chest r/o PE.  Overnight telemetry shows sinus tachycardia with no atrial flutter or atrial fibrillation.      Patient was noted to have leukocytosis.  Blood culture negative, CXR negative.  UA, lactate acid normal and  procalcitonin 2.49.  Leukocytosis has been improving, no fever, will hold on antibiotics.      Interval Problem Update  AMS-no real change. More agitated today and using frequent flagrant language.    Consultants/Specialty  Intensivist  Psych  cardiology    Code Status  Full Code    Disposition  Pending SNF/guardianship    Review of Systems  Review of Systems   Unable to perform ROS: Acuity of condition       Physical Exam  Temp:  [36.5 °C (97.7 °F)-37.5 °C (99.5 °F)] 36.6 °C (97.9 °F)  Pulse:  [72-96] 96  Resp:  [15-17] 15  BP: (102-156)/(63-81) 156/81  SpO2:  [94 %-95 %] 95 %    Physical Exam  Vitals signs and nursing note reviewed.   Constitutional:       General: He is awake. He is not in acute distress.     Appearance: He is not ill-appearing.      Comments: More belligerent today   HENT:      Head: Normocephalic and atraumatic.      Nose: Nose normal.      Mouth/Throat:      Mouth: Mucous membranes are dry.   Eyes:      General: No scleral icterus.     Conjunctiva/sclera: Conjunctivae normal.   Neck:      Musculoskeletal: Normal range of motion.   Cardiovascular:      Rate and Rhythm: Normal rate.      Pulses: Normal pulses.   Pulmonary:      Effort: Pulmonary effort is normal. No respiratory distress.      Breath sounds: Normal breath sounds. No wheezing.   Abdominal:      General: Abdomen is flat.      Palpations: Abdomen is soft.      Tenderness: There is no abdominal tenderness.   Musculoskeletal:         General: No swelling.      Right lower leg: No edema.      Left lower leg: No edema.   Skin:     General: Skin is warm and dry.   Neurological:      General: No focal deficit present.      Mental Status: He is alert. He is disoriented and confused.      Motor: Motor function is intact.      Comments: AOx1 only self  Confabulates   Psychiatric:         Speech: Speech normal.         Behavior: Behavior is cooperative.         Cognition and Memory: Cognition is impaired. He exhibits impaired recent memory.          Judgment: Judgment is impulsive.         Fluids    Intake/Output Summary (Last 24 hours) at 11/19/2020 1322  Last data filed at 11/19/2020 0600  Gross per 24 hour   Intake --   Output 1500 ml   Net -1500 ml       Laboratory  Recent Labs     11/17/20  0621   WBC 8.6   RBC 4.40*   HEMOGLOBIN 12.9*   HEMATOCRIT 40.3*   MCV 91.6   MCH 29.3   MCHC 32.0*   RDW 45.1   PLATELETCT 269   MPV 11.1                       Imaging  No new imaging in the last 24 hours     Assessment/Plan  * Toxic encephalopathy- (present on admission)  Assessment & Plan  Unknown etiology whether this is hypoxic induced brain injury versus Wernicke encephalopathy  LP, MRI brain, EEG were negative. TSH normal. HIV/RPR negative.  - Psych consult--> they believe it is etoh?    - on risperdal 1mg BID  - Continue vest restraint as needed for patient safety, allow patient to walk on vilchis with assistance TID  - pending guardianship 12/2020, no change in mental status, patient remains without overt behavioral issues and adherent to medication regimen at this time but refuses most therapies  -intermittently belligerent but cooperative  -likely his baseline    Tachycardia- (present on admission)  Assessment & Plan  -resolved, monitor     Urinary retention- (present on admission)  Assessment & Plan  Improved w/ Flomax, continue medication  Condom catheter in place for Ins and Out management    Normocytic anemia- (present on admission)  Assessment & Plan  Mild, No active bleeding, stable  Continue monitoring    Alcohol abuse- (present on admission)  Assessment & Plan  Reported hx of abuse. s/p etoh w/d protocol.  - Continue Vitamins, thiamine  - High dose thiamine  - Seizure precautions       VTE prophylaxis: Lovenox

## 2020-11-20 PROCEDURE — A9270 NON-COVERED ITEM OR SERVICE: HCPCS | Performed by: NURSE PRACTITIONER

## 2020-11-20 PROCEDURE — 700102 HCHG RX REV CODE 250 W/ 637 OVERRIDE(OP): Performed by: HOSPITALIST

## 2020-11-20 PROCEDURE — 700102 HCHG RX REV CODE 250 W/ 637 OVERRIDE(OP): Performed by: INTERNAL MEDICINE

## 2020-11-20 PROCEDURE — A9270 NON-COVERED ITEM OR SERVICE: HCPCS | Performed by: HOSPITALIST

## 2020-11-20 PROCEDURE — A9270 NON-COVERED ITEM OR SERVICE: HCPCS | Performed by: INTERNAL MEDICINE

## 2020-11-20 PROCEDURE — 700111 HCHG RX REV CODE 636 W/ 250 OVERRIDE (IP): Performed by: HOSPITALIST

## 2020-11-20 PROCEDURE — 700102 HCHG RX REV CODE 250 W/ 637 OVERRIDE(OP): Performed by: NURSE PRACTITIONER

## 2020-11-20 PROCEDURE — 770006 HCHG ROOM/CARE - MED/SURG/GYN SEMI*

## 2020-11-20 RX ADMIN — RISPERIDONE 1 MG: 1 TABLET, FILM COATED ORAL at 17:28

## 2020-11-20 RX ADMIN — DOCUSATE SODIUM 50 MG AND SENNOSIDES 8.6 MG 2 TABLET: 8.6; 5 TABLET, FILM COATED ORAL at 06:10

## 2020-11-20 RX ADMIN — ASPIRIN 325 MG ORAL TABLET 325 MG: 325 PILL ORAL at 06:10

## 2020-11-20 RX ADMIN — THERA TABS 1 TABLET: TAB at 06:10

## 2020-11-20 RX ADMIN — TAMSULOSIN HYDROCHLORIDE 0.8 MG: 0.4 CAPSULE ORAL at 09:13

## 2020-11-20 RX ADMIN — Medication 100 MG: at 06:10

## 2020-11-20 RX ADMIN — RISPERIDONE 1 MG: 1 TABLET, FILM COATED ORAL at 06:10

## 2020-11-20 RX ADMIN — ENOXAPARIN SODIUM 40 MG: 40 INJECTION SUBCUTANEOUS at 17:28

## 2020-11-20 RX ADMIN — DOCUSATE SODIUM 50 MG AND SENNOSIDES 8.6 MG 2 TABLET: 8.6; 5 TABLET, FILM COATED ORAL at 17:28

## 2020-11-20 NOTE — PROGRESS NOTES
Assumed care this pm from day shift RN. Patient sitting in bed. Patient AxO 1 only to self, O2 @ RA, VSS. Call bell and personal items within reach, bed locked in low position. Bed exit alarm on. Hourly rounding in place. Tele box not in place, monitor room notified.

## 2020-11-21 PROCEDURE — 700102 HCHG RX REV CODE 250 W/ 637 OVERRIDE(OP): Performed by: INTERNAL MEDICINE

## 2020-11-21 PROCEDURE — 700102 HCHG RX REV CODE 250 W/ 637 OVERRIDE(OP): Performed by: HOSPITALIST

## 2020-11-21 PROCEDURE — 700102 HCHG RX REV CODE 250 W/ 637 OVERRIDE(OP): Performed by: NURSE PRACTITIONER

## 2020-11-21 PROCEDURE — A9270 NON-COVERED ITEM OR SERVICE: HCPCS | Performed by: HOSPITALIST

## 2020-11-21 PROCEDURE — 700111 HCHG RX REV CODE 636 W/ 250 OVERRIDE (IP): Performed by: HOSPITALIST

## 2020-11-21 PROCEDURE — A9270 NON-COVERED ITEM OR SERVICE: HCPCS | Performed by: INTERNAL MEDICINE

## 2020-11-21 PROCEDURE — A9270 NON-COVERED ITEM OR SERVICE: HCPCS | Performed by: NURSE PRACTITIONER

## 2020-11-21 PROCEDURE — 770006 HCHG ROOM/CARE - MED/SURG/GYN SEMI*

## 2020-11-21 RX ADMIN — ASPIRIN 325 MG ORAL TABLET 325 MG: 325 PILL ORAL at 05:57

## 2020-11-21 RX ADMIN — RISPERIDONE 1 MG: 1 TABLET, FILM COATED ORAL at 17:34

## 2020-11-21 RX ADMIN — ENOXAPARIN SODIUM 40 MG: 40 INJECTION SUBCUTANEOUS at 17:34

## 2020-11-21 RX ADMIN — MAGNESIUM HYDROXIDE 30 ML: 400 SUSPENSION ORAL at 05:56

## 2020-11-21 RX ADMIN — THERA TABS 1 TABLET: TAB at 05:57

## 2020-11-21 RX ADMIN — DOCUSATE SODIUM 50 MG AND SENNOSIDES 8.6 MG 2 TABLET: 8.6; 5 TABLET, FILM COATED ORAL at 17:34

## 2020-11-21 RX ADMIN — Medication 100 MG: at 05:57

## 2020-11-21 RX ADMIN — RISPERIDONE 1 MG: 1 TABLET, FILM COATED ORAL at 05:56

## 2020-11-21 RX ADMIN — TAMSULOSIN HYDROCHLORIDE 0.8 MG: 0.4 CAPSULE ORAL at 09:03

## 2020-11-21 ASSESSMENT — FIBROSIS 4 INDEX: FIB4 SCORE: 0.55

## 2020-11-21 NOTE — PROGRESS NOTES
Assumed care this pm from day shift RN. Patient sitting up in bed eating dinner. Patient AxO 1 only to self, O2 @ RA, VSS. Call bell and personal items within reach, bed locked in low position. Bed exit alarm on. Hourly rounding not in place. Tele box in place, monitor room notified.

## 2020-11-21 NOTE — CARE PLAN
Problem: Safety  Goal: Will remain free from injury  Outcome: PROGRESSING AS EXPECTED   Bed alarm on. Hourly rounds on pt.    Problem: Knowledge Deficit  Goal: Knowledge of disease process/condition, treatment plan, diagnostic tests, and medications will improve  Outcome: PROGRESSING AS EXPECTED   Reorient patient.    Problem: Skin Integrity  Goal: Risk for impaired skin integrity will decrease  Outcome: PROGRESSING AS EXPECTED   Skin checks frequently. Mepiliex and barrier cream in use.

## 2020-11-22 PROCEDURE — A9270 NON-COVERED ITEM OR SERVICE: HCPCS | Performed by: HOSPITALIST

## 2020-11-22 PROCEDURE — 770006 HCHG ROOM/CARE - MED/SURG/GYN SEMI*

## 2020-11-22 PROCEDURE — 700102 HCHG RX REV CODE 250 W/ 637 OVERRIDE(OP): Performed by: HOSPITALIST

## 2020-11-22 PROCEDURE — 700111 HCHG RX REV CODE 636 W/ 250 OVERRIDE (IP): Performed by: HOSPITALIST

## 2020-11-22 PROCEDURE — 700102 HCHG RX REV CODE 250 W/ 637 OVERRIDE(OP): Performed by: INTERNAL MEDICINE

## 2020-11-22 PROCEDURE — A9270 NON-COVERED ITEM OR SERVICE: HCPCS | Performed by: NURSE PRACTITIONER

## 2020-11-22 PROCEDURE — 700102 HCHG RX REV CODE 250 W/ 637 OVERRIDE(OP): Performed by: NURSE PRACTITIONER

## 2020-11-22 PROCEDURE — A9270 NON-COVERED ITEM OR SERVICE: HCPCS | Performed by: INTERNAL MEDICINE

## 2020-11-22 RX ADMIN — ASPIRIN 325 MG ORAL TABLET 325 MG: 325 PILL ORAL at 05:21

## 2020-11-22 RX ADMIN — ENOXAPARIN SODIUM 40 MG: 40 INJECTION SUBCUTANEOUS at 17:37

## 2020-11-22 RX ADMIN — RISPERIDONE 1 MG: 1 TABLET, FILM COATED ORAL at 17:37

## 2020-11-22 RX ADMIN — THERA TABS 1 TABLET: TAB at 05:21

## 2020-11-22 RX ADMIN — Medication 100 MG: at 05:22

## 2020-11-22 RX ADMIN — RISPERIDONE 1 MG: 1 TABLET, FILM COATED ORAL at 05:22

## 2020-11-22 RX ADMIN — DOCUSATE SODIUM 50 MG AND SENNOSIDES 8.6 MG 2 TABLET: 8.6; 5 TABLET, FILM COATED ORAL at 17:37

## 2020-11-22 RX ADMIN — TAMSULOSIN HYDROCHLORIDE 0.8 MG: 0.4 CAPSULE ORAL at 09:44

## 2020-11-22 NOTE — CARE PLAN
Problem: Communication  Goal: The ability to communicate needs accurately and effectively will improve  Outcome: PROGRESSING AS EXPECTED     Problem: Safety  Goal: Will remain free from injury  Outcome: PROGRESSING AS EXPECTED   Pt rounded on hourly, bed alarm on and in place.    Problem: Knowledge Deficit  Goal: Knowledge of disease process/condition, treatment plan, diagnostic tests, and medications will improve  Outcome: PROGRESSING AS EXPECTED   Pt reoriented throughout shift.

## 2020-11-22 NOTE — PROGRESS NOTES
"Pt sleeping intermittently, HOB elevated, calm, cooperative at this time, flat affect. RN reviewed POC which includes continued work with therapy, case mgt for guardianship. RN encouraged CDB w/ \"I.S.\" pt will need reinforcement - no evidence of learning. RN and CNA collaborated forQ 2 turns for skin integrity, pt has low air loss mattress. Pt refuses SCDs, has ASA for VTE. Hourly rounds ongoing, call light w/in reach.   "

## 2020-11-23 PROCEDURE — 700102 HCHG RX REV CODE 250 W/ 637 OVERRIDE(OP): Performed by: INTERNAL MEDICINE

## 2020-11-23 PROCEDURE — 770006 HCHG ROOM/CARE - MED/SURG/GYN SEMI*

## 2020-11-23 PROCEDURE — 700102 HCHG RX REV CODE 250 W/ 637 OVERRIDE(OP): Performed by: HOSPITALIST

## 2020-11-23 PROCEDURE — 97535 SELF CARE MNGMENT TRAINING: CPT | Mod: CO

## 2020-11-23 PROCEDURE — 700111 HCHG RX REV CODE 636 W/ 250 OVERRIDE (IP): Performed by: HOSPITALIST

## 2020-11-23 PROCEDURE — A9270 NON-COVERED ITEM OR SERVICE: HCPCS | Performed by: HOSPITALIST

## 2020-11-23 PROCEDURE — A9270 NON-COVERED ITEM OR SERVICE: HCPCS | Performed by: NURSE PRACTITIONER

## 2020-11-23 PROCEDURE — 700102 HCHG RX REV CODE 250 W/ 637 OVERRIDE(OP): Performed by: NURSE PRACTITIONER

## 2020-11-23 PROCEDURE — A9270 NON-COVERED ITEM OR SERVICE: HCPCS | Performed by: INTERNAL MEDICINE

## 2020-11-23 RX ADMIN — ENOXAPARIN SODIUM 40 MG: 40 INJECTION SUBCUTANEOUS at 18:29

## 2020-11-23 RX ADMIN — ASPIRIN 325 MG ORAL TABLET 325 MG: 325 PILL ORAL at 05:29

## 2020-11-23 RX ADMIN — THERA TABS 1 TABLET: TAB at 05:29

## 2020-11-23 RX ADMIN — RISPERIDONE 1 MG: 1 TABLET, FILM COATED ORAL at 18:30

## 2020-11-23 RX ADMIN — RISPERIDONE 1 MG: 1 TABLET, FILM COATED ORAL at 05:29

## 2020-11-23 RX ADMIN — DOCUSATE SODIUM 50 MG AND SENNOSIDES 8.6 MG 2 TABLET: 8.6; 5 TABLET, FILM COATED ORAL at 18:30

## 2020-11-23 RX ADMIN — TAMSULOSIN HYDROCHLORIDE 0.8 MG: 0.4 CAPSULE ORAL at 10:33

## 2020-11-23 RX ADMIN — Medication 100 MG: at 05:29

## 2020-11-23 NOTE — DISCHARGE PLANNING
Anticipated Discharge Disposition: Guardianship pending.    Action: guardianship hearing 12/18.    Barriers to Discharge: Guardianship pending     Plan: Cont to follow up.

## 2020-11-23 NOTE — PROGRESS NOTES
"Hospital Medicine Bi-Weekly Progress Note    Date of Service  11/23/2020    Chief Complaint  altered mental status    Hospital Course  50 y.o. male with a pmhx of HTN, chronic pain and alcohol abuse who presented 9/9/2020 with altered mental status, he was found obtunded in his house by his ex-wife who was checking on him as he had not been seen for 2 days or been able to get a hold of him.  He was found in a chair,  A&Ox0 and hypotensive.  He arrived in the ER with a GCS of 7 (E1,V1,M5) and severely hypotensive.  The patient was intubated for airway protection and central venous access was obtained for administration of high-volume crystalloid resuscitation and vasopressor therapy.    CT head and abdomen and pelvis were all unremarkable. He was briefly treated with antibiotics for concern for an underlying infection and a lumbar tap done was negative. Cultures resulted negative.   EEG was done  9/10 no seizures captured.   He was extubated on 9/11 and has been able to protect his airway ever since. He received benzodiazepines and IV thiamine for etoh.  Now awaiting placement however continues to be impulsive and has required restraints intermittently this admission.  He was found to have UTI s/p treatment with bactrim.  He had urinary retention s/p newton but continued to self-remove. His urinary retention resolved. Continues to be impulsive and has had multiple falls, on risperdal.          Interval Problem Update  11/23- Patient resting in bed, states his only needs are for prescriptions that he \"picked up this morning at his doctor's office up on the [place] for blood pressure but can't remember where he put it or if he took it\". Attempted to re-orient that patient has been hospitalized for over 2 months with no avail. Remains A&Ox1. No other needs. Encouraged to participate in therapy.     Consultants/Specialty  Intensivist  Psych  cardiology    Code Status  Full Code    Disposition  Pending " SNF/guardianship    Review of Systems  Review of Systems   Unable to perform ROS: Psychiatric disorder       Physical Exam  Temp:  [36.5 °C (97.7 °F)-37.1 °C (98.7 °F)] 36.8 °C (98.3 °F)  Pulse:  [62-86] 62  Resp:  [17-19] 17  BP: (103-122)/(68-76) 103/68  SpO2:  [96 %-97 %] 97 %    Physical Exam  Vitals signs and nursing note reviewed.   Constitutional:       General: He is awake. He is not in acute distress.     Appearance: He is not ill-appearing.   HENT:      Head: Normocephalic and atraumatic.      Nose: Nose normal.      Mouth/Throat:      Mouth: Mucous membranes are dry.   Eyes:      General:         Right eye: No discharge.         Left eye: No discharge.      Conjunctiva/sclera: Conjunctivae normal.   Neck:      Musculoskeletal: Normal range of motion and neck supple.   Cardiovascular:      Rate and Rhythm: Normal rate.      Pulses: Normal pulses.      Heart sounds: Normal heart sounds. No friction rub.   Pulmonary:      Effort: Pulmonary effort is normal.      Breath sounds: Normal breath sounds. No decreased breath sounds or wheezing.   Abdominal:      General: Abdomen is flat. Bowel sounds are normal.      Palpations: Abdomen is soft.      Tenderness: There is no abdominal tenderness.   Musculoskeletal:         General: No swelling.      Right lower leg: No edema.      Left lower leg: No edema.   Skin:     General: Skin is warm and dry.   Neurological:      Mental Status: He is alert. He is disoriented and confused.      Motor: Motor function is intact.      Comments: AOx1 only self  Confabulates   Psychiatric:         Mood and Affect: Affect is labile.         Speech: Speech normal.         Behavior: Behavior is cooperative.         Cognition and Memory: Cognition is impaired. He exhibits impaired recent memory.         Judgment: Judgment is impulsive and inappropriate.           Fluids    Intake/Output Summary (Last 24 hours) at 11/23/2020 1512  Last data filed at 11/23/2020 0700  Gross per 24 hour    Intake 240 ml   Output 1800 ml   Net -1560 ml       Laboratory                        Imaging  No new imaging in the last 24 hours     Assessment/Plan  * Toxic encephalopathy- (present on admission)  Assessment & Plan  Unknown etiology whether this is hypoxic induced brain injury versus Wernicke encephalopathy  LP, MRI brain, EEG were negative. TSH normal. HIV/RPR negative.  - Psych consult--> they believe it is etoh?    - on risperdal 1mg BID  - Continue vest restraint as needed for patient safety, allow patient to walk on vilchis with assistance TID  - pending guardianship 12/2020, no change in mental status, patient remains without overt behavioral issues and adherent to medication regimen at this time but refuses most therapies  -intermittently belligerent but cooperative  -likely his baseline    Tachycardia- (present on admission)  Assessment & Plan  -resolved, monitor     Urinary retention- (present on admission)  Assessment & Plan  Improved w/ Flomax, continue medication  Condom catheter in place for Ins and Out management    Normocytic anemia- (present on admission)  Assessment & Plan  Mild, No active bleeding, stable  Continue monitoring    Alcohol abuse- (present on admission)  Assessment & Plan  Reported hx of abuse. s/p etoh w/d protocol.  - Continue Vitamins, thiamine  - High dose thiamine  - Seizure precautions       VTE prophylaxis: Lovenox

## 2020-11-23 NOTE — CARE PLAN
Problem: Communication  Goal: The ability to communicate needs accurately and effectively will improve  Outcome: PROGRESSING AS EXPECTED     Problem: Safety  Goal: Will remain free from injury  Outcome: PROGRESSING AS EXPECTED     Problem: Knowledge Deficit  Goal: Knowledge of disease process/condition, treatment plan, diagnostic tests, and medications will improve  11/22/2020 2337 by Shannan Carmona R.N.  Outcome: PROGRESSING SLOWER THAN EXPECTED     Pt rounded on hourly. Bed in lowest position and call light within reach.

## 2020-11-23 NOTE — THERAPY
11/23/20 1124   Interdisciplinary Plan of Care Collaboration   Collaboration Comments Attempted to see pt for OT tx. Pt declined OOB at this time. Discussed w/ OTR about pt status. Pt not making progress w/ OT and declining OOB. Pt w/ impaired cognition and behavioral w/ intermittent participation impacting ability to progress towards goals. OT will attempt 1 more time, should pt not participate will d/c from OT services. Per chart pt awaiting guardianship and plans to d/c to a facility that will provide 24/7.

## 2020-11-24 PROCEDURE — A9270 NON-COVERED ITEM OR SERVICE: HCPCS | Performed by: HOSPITALIST

## 2020-11-24 PROCEDURE — 700111 HCHG RX REV CODE 636 W/ 250 OVERRIDE (IP): Performed by: HOSPITALIST

## 2020-11-24 PROCEDURE — 700102 HCHG RX REV CODE 250 W/ 637 OVERRIDE(OP): Performed by: HOSPITALIST

## 2020-11-24 PROCEDURE — A9270 NON-COVERED ITEM OR SERVICE: HCPCS | Performed by: INTERNAL MEDICINE

## 2020-11-24 PROCEDURE — 700102 HCHG RX REV CODE 250 W/ 637 OVERRIDE(OP): Performed by: INTERNAL MEDICINE

## 2020-11-24 PROCEDURE — A9270 NON-COVERED ITEM OR SERVICE: HCPCS | Performed by: NURSE PRACTITIONER

## 2020-11-24 PROCEDURE — 700102 HCHG RX REV CODE 250 W/ 637 OVERRIDE(OP): Performed by: NURSE PRACTITIONER

## 2020-11-24 PROCEDURE — 770006 HCHG ROOM/CARE - MED/SURG/GYN SEMI*

## 2020-11-24 RX ADMIN — RISPERIDONE 1 MG: 1 TABLET, FILM COATED ORAL at 17:35

## 2020-11-24 RX ADMIN — TAMSULOSIN HYDROCHLORIDE 0.8 MG: 0.4 CAPSULE ORAL at 08:54

## 2020-11-24 RX ADMIN — THERA TABS 1 TABLET: TAB at 05:23

## 2020-11-24 RX ADMIN — ASPIRIN 325 MG ORAL TABLET 325 MG: 325 PILL ORAL at 05:23

## 2020-11-24 RX ADMIN — Medication 100 MG: at 05:23

## 2020-11-24 RX ADMIN — RISPERIDONE 1 MG: 1 TABLET, FILM COATED ORAL at 05:23

## 2020-11-24 RX ADMIN — DOCUSATE SODIUM 50 MG AND SENNOSIDES 8.6 MG 2 TABLET: 8.6; 5 TABLET, FILM COATED ORAL at 17:35

## 2020-11-24 RX ADMIN — ENOXAPARIN SODIUM 40 MG: 40 INJECTION SUBCUTANEOUS at 17:36

## 2020-11-24 NOTE — PROGRESS NOTES
Report received. Assumed care. Pt in bed. A/O x1. VSS. Responds appropriately. Denies chest pain and SOB. Assessment complete. Discussed POC, , pt verbalizes understanding. Explained importance of calling before getting OOB. Call light and belongings within reach. Bed alarm on. Bed in the lowest position. Treaded socks in place. Hourly rounding in progress.

## 2020-11-25 PROCEDURE — 700102 HCHG RX REV CODE 250 W/ 637 OVERRIDE(OP): Performed by: HOSPITALIST

## 2020-11-25 PROCEDURE — 700102 HCHG RX REV CODE 250 W/ 637 OVERRIDE(OP): Performed by: NURSE PRACTITIONER

## 2020-11-25 PROCEDURE — 700102 HCHG RX REV CODE 250 W/ 637 OVERRIDE(OP): Performed by: INTERNAL MEDICINE

## 2020-11-25 PROCEDURE — A9270 NON-COVERED ITEM OR SERVICE: HCPCS | Performed by: INTERNAL MEDICINE

## 2020-11-25 PROCEDURE — A9270 NON-COVERED ITEM OR SERVICE: HCPCS | Performed by: HOSPITALIST

## 2020-11-25 PROCEDURE — 700111 HCHG RX REV CODE 636 W/ 250 OVERRIDE (IP): Performed by: HOSPITALIST

## 2020-11-25 PROCEDURE — 97535 SELF CARE MNGMENT TRAINING: CPT | Mod: CO

## 2020-11-25 PROCEDURE — A9270 NON-COVERED ITEM OR SERVICE: HCPCS | Performed by: NURSE PRACTITIONER

## 2020-11-25 PROCEDURE — 770006 HCHG ROOM/CARE - MED/SURG/GYN SEMI*

## 2020-11-25 RX ADMIN — TAMSULOSIN HYDROCHLORIDE 0.8 MG: 0.4 CAPSULE ORAL at 09:04

## 2020-11-25 RX ADMIN — ASPIRIN 325 MG ORAL TABLET 325 MG: 325 PILL ORAL at 05:09

## 2020-11-25 RX ADMIN — ENOXAPARIN SODIUM 40 MG: 40 INJECTION SUBCUTANEOUS at 17:47

## 2020-11-25 RX ADMIN — DOCUSATE SODIUM 50 MG AND SENNOSIDES 8.6 MG 2 TABLET: 8.6; 5 TABLET, FILM COATED ORAL at 17:46

## 2020-11-25 RX ADMIN — RISPERIDONE 1 MG: 1 TABLET, FILM COATED ORAL at 17:46

## 2020-11-25 RX ADMIN — RISPERIDONE 1 MG: 1 TABLET, FILM COATED ORAL at 05:09

## 2020-11-25 RX ADMIN — Medication 100 MG: at 05:09

## 2020-11-25 RX ADMIN — THERA TABS 1 TABLET: TAB at 05:09

## 2020-11-25 RX ADMIN — DOCUSATE SODIUM 50 MG AND SENNOSIDES 8.6 MG 2 TABLET: 8.6; 5 TABLET, FILM COATED ORAL at 05:09

## 2020-11-25 ASSESSMENT — PATIENT HEALTH QUESTIONNAIRE - PHQ9
SUM OF ALL RESPONSES TO PHQ9 QUESTIONS 1 AND 2: 0
2. FEELING DOWN, DEPRESSED, IRRITABLE, OR HOPELESS: NOT AT ALL
1. LITTLE INTEREST OR PLEASURE IN DOING THINGS: NOT AT ALL

## 2020-11-25 NOTE — PROGRESS NOTES
Condom cath and urinary drainage bag replaced  While repositioning pt, noticed right elbow red but blanching. Mepilex was applied to the right elbow.

## 2020-11-25 NOTE — THERAPY
"Occupational Therapy  Daily Treatment     Patient Name: Yury Blake  Age:  50 y.o., Sex:  male  Medical Record #: [id number]  Today's Date: 11/25/2020       Precautions: Fall Risk       Assessment    Attempted to see pt for OT tx. Pt declined OOB activity at this time stating \"I just was moving furniture around the house and lifting heavy things. I just got comfortable.\" Attempted to reorient pt to time and place. D/t pt's impaired cognition no carry over of education. Pt has made no progress w/ therapy and continues to require max encouragement to participate. On pt's own volition he participates. Discussed w/ RN pt status and pt being d/c'd from OT services. Per chart pt awaiting guardianship hearing for placement. Recommend placement at a facility that is able to provide 24/7 supervision and physical assistance.     Plan    Discharge secondary to no likely benefit.    DC Equipment Recommendations: Other (Comments)  Discharge Recommendations: Other - Recommend that pt d/c to facility that provides 24/7 supervision and physical assistance d/t poor safety awareness, balance deficits and cognitive deficits.       11/25/20 0901   Short Term Goals   Short Term Goal # 1 Pt will complete toilet transfer using BSC if needed with spv, no LOB by discharge.   Goal Outcome # 1 Progressing slower than expected   Short Term Goal # 2 Pt will complete standing grooming/hygiene with spv by discharge.   Goal Outcome # 2 Progressing slower than expected   Short Term Goal # 3 Pt will complete toileting with spv by discharge.   Goal Outcome # 3 Progressing slower than expected       "

## 2020-11-25 NOTE — CARE PLAN
Problem: Safety  Goal: Will remain free from injury  Outcome: PROGRESSING AS EXPECTED  Bed controls locked and in lowest position     Problem: Venous Thromboembolism (VTW)/Deep Vein Thrombosis (DVT) Prevention:  Goal: Patient will participate in Venous Thrombosis (VTE)/Deep Vein Thrombosis (DVT)Prevention Measures  Outcome: PROGRESSING AS EXPECTED  Educated to pt importance of wearing SCD's. Lovenox ordered and given.     Problem: Skin Integrity  Goal: Risk for impaired skin integrity will decrease  Outcome: PROGRESSING AS EXPECTED  Pt at risk for skin breakdown. Redness noted on sacral area and elbows. Mepilex and barrier cream in use.

## 2020-11-26 PROBLEM — R00.0 TACHYCARDIA: Status: RESOLVED | Noted: 2020-11-11 | Resolved: 2020-11-26

## 2020-11-26 PROCEDURE — A9270 NON-COVERED ITEM OR SERVICE: HCPCS | Performed by: INTERNAL MEDICINE

## 2020-11-26 PROCEDURE — 700102 HCHG RX REV CODE 250 W/ 637 OVERRIDE(OP): Performed by: NURSE PRACTITIONER

## 2020-11-26 PROCEDURE — 700102 HCHG RX REV CODE 250 W/ 637 OVERRIDE(OP): Performed by: HOSPITALIST

## 2020-11-26 PROCEDURE — A9270 NON-COVERED ITEM OR SERVICE: HCPCS | Performed by: HOSPITALIST

## 2020-11-26 PROCEDURE — 700111 HCHG RX REV CODE 636 W/ 250 OVERRIDE (IP): Performed by: HOSPITALIST

## 2020-11-26 PROCEDURE — 700102 HCHG RX REV CODE 250 W/ 637 OVERRIDE(OP): Performed by: INTERNAL MEDICINE

## 2020-11-26 PROCEDURE — 770006 HCHG ROOM/CARE - MED/SURG/GYN SEMI*

## 2020-11-26 PROCEDURE — 99231 SBSQ HOSP IP/OBS SF/LOW 25: CPT | Performed by: NURSE PRACTITIONER

## 2020-11-26 PROCEDURE — A9270 NON-COVERED ITEM OR SERVICE: HCPCS | Performed by: NURSE PRACTITIONER

## 2020-11-26 RX ADMIN — THERA TABS 1 TABLET: TAB at 05:19

## 2020-11-26 RX ADMIN — TAMSULOSIN HYDROCHLORIDE 0.8 MG: 0.4 CAPSULE ORAL at 07:48

## 2020-11-26 RX ADMIN — RISPERIDONE 1 MG: 1 TABLET, FILM COATED ORAL at 17:41

## 2020-11-26 RX ADMIN — Medication 100 MG: at 05:19

## 2020-11-26 RX ADMIN — ENOXAPARIN SODIUM 40 MG: 40 INJECTION SUBCUTANEOUS at 17:41

## 2020-11-26 RX ADMIN — DOCUSATE SODIUM 50 MG AND SENNOSIDES 8.6 MG 2 TABLET: 8.6; 5 TABLET, FILM COATED ORAL at 05:19

## 2020-11-26 RX ADMIN — DOCUSATE SODIUM 50 MG AND SENNOSIDES 8.6 MG 2 TABLET: 8.6; 5 TABLET, FILM COATED ORAL at 17:41

## 2020-11-26 RX ADMIN — ASPIRIN 325 MG ORAL TABLET 325 MG: 325 PILL ORAL at 05:19

## 2020-11-26 RX ADMIN — RISPERIDONE 1 MG: 1 TABLET, FILM COATED ORAL at 05:19

## 2020-11-26 NOTE — PROGRESS NOTES
[facility] Medicine Daily Progress Note    Date of Service  11/26/2020    Chief Complaint  Altered mental status    Hospital Course  50 y.o. male with a pmhx of HTN, chronic pain and alcohol abuse who presented 9/9/2020 with altered mental status, he was found obtunded in his house by his ex-wife who was checking on him as he had not been seen for 2 days or been able to get a hold of him.  He was found in a chair,      A&Ox0 and hypotensive. He arrived in the ER with a GCS of 7 (E1,V1,M5) and severely hypotensive. The patient was intubated for airway protection and central venous access was obtained for administration of high-volume crystalloid resuscitation and vasopressor therapy.    CT head and abdomen and pelvis were all unremarkable. He was briefly treated with antibiotics for concern for an underlying infection and a lumbar tap done was negative. Cultures resulted negative.   EEG was done  9/10 no seizures captured.   He was extubated on 9/11 and has been able to protect his airway ever since. He received benzodiazepines and IV thiamine for etoh.  Now awaiting placement however continues to be impulsive and has required restraints intermittently this admission.  He was found to have UTI s/p treatment with bactrim.  He had urinary retention s/p newton but continued to self-remove. His urinary retention resolved. Continues to be impulsive and has had multiple falls, on risperdal.    Interval Problem Update  -Sleeping during rounds, arouses easily. Offers no complaints. No acute events overnight  -Per bedside RN, he is no longer retaining urine    Consultants/Specialty  -Pulmonary/critical care -signed off  -Cardiology -signed off  -Psychiatry    Code Status  Full Code    Disposition  Pending guardianship and placement    Review of Systems  Review of Systems   Unable to perform ROS: Psychiatric disorder        Physical Exam  Temp:  [36 °C (96.8 °F)-36.8 °C (98.2 °F)] 36 °C (96.8 °F)  Pulse:  [64-98] 92  Resp:   [15-17] 15  BP: (115-138)/(66-85) 130/85  SpO2:  [92 %-96 %] 95 %    Physical Exam  Vitals signs and nursing note reviewed. Exam conducted with a chaperone present.   Constitutional:       General: He is awake. He is not in acute distress.     Appearance: Normal appearance. He is ill-appearing.      Comments: Chronically ill-appearing   HENT:      Head: Normocephalic and atraumatic.      Nose: Nose normal.      Mouth/Throat:      Mouth: Mucous membranes are dry.   Eyes:      General:         Right eye: No discharge.         Left eye: No discharge.      Conjunctiva/sclera: Conjunctivae normal.   Neck:      Musculoskeletal: Normal range of motion and neck supple.   Cardiovascular:      Rate and Rhythm: Normal rate.      Pulses: Normal pulses.      Heart sounds: Normal heart sounds. No friction rub.   Pulmonary:      Effort: Pulmonary effort is normal.      Breath sounds: Normal breath sounds. No decreased breath sounds or wheezing.   Abdominal:      General: Abdomen is flat. Bowel sounds are normal.      Palpations: Abdomen is soft.      Tenderness: There is no abdominal tenderness.   Musculoskeletal:         General: No swelling.      Right lower leg: No edema.      Left lower leg: No edema.   Skin:     General: Skin is warm and dry.   Neurological:      Mental Status: He is alert. He is disoriented and confused.      GCS: GCS eye subscore is 3. GCS verbal subscore is 4. GCS motor subscore is 6.      Motor: Motor function is intact.      Comments: AOx1 to self     Psychiatric:         Mood and Affect: Affect is labile.         Speech: Speech normal.         Behavior: Behavior is cooperative.         Cognition and Memory: Cognition is impaired. He exhibits impaired recent memory.         Judgment: Judgment is impulsive and inappropriate.         Fluids    Intake/Output Summary (Last 24 hours) at 11/26/2020 1148  Last data filed at 11/26/2020 0518  Gross per 24 hour   Intake 100 ml   Output 1400 ml   Net -1300 ml        Laboratory                        Imaging  CT-CTA CHEST PULMONARY ARTERY W/ RECONS   Final Result      1.  No evidence of pulmonary emboli.   2.  Right lower lobe discoid atelectasis.   3.  Gynecomastia.            DX-CHEST-PORTABLE (1 VIEW)   Final Result      No acute cardiac or pulmonary abnormalities are identified.      MR-BRAIN-W/O   Final Result      1.  Moderate diffuse cerebral atrophy.      2.  Minimal periventricular and juxtacortical white matter changes consistent with chronic microvascular ischemic gliosis.      3.  No evidence of acute infarction in the brain parenchyma.      DX-ABDOMEN FOR TUBE PLACEMENT   Final Result      Feeding tube tip overlies the second portion of the duodenum.      DX-CHEST-PORTABLE (1 VIEW)   Final Result         1.  Patchy left lung base opacity, new since prior, could represent early infiltrate            DX-CHEST-PORTABLE (1 VIEW)   Final Result         1.  Patchy left lung base opacity, new since prior, could represent early infiltrate         [id number] VIEW   Final Result      Enteric tube projects over the distal stomach/pylorus.         EC-ECHOCARDIOGRAM COMPLETE W/O CONT   Final Result      DX-CHEST-PORTABLE (1 VIEW)   Final Result         1.  No acute cardiopulmonary disease.      CT-ABDOMEN-PELVIS W/O   Final Result      1.  No renal stone or hydronephrosis.   2.  Normal appendix.   3.  No focal mesenteric inflammatory process.      DX-ABDOMEN FOR TUBE PLACEMENT   Final Result      NG tube tip projects over expected location the gastric fundus.      US-ABDOMEN COMPLETE SURVEY   Final Result         1.  Echogenic liver compatible with fatty change versus fibrosis.   2.  Gallbladder sludge without additional sonographic findings of cholecystitis.   3.  Partial atrophic bilateral kidneys with lobulated contour         CT-HEAD W/O   Final Result         1.  No acute intracranial abnormality is identified, there are nonspecific white matter changes, commonly  associated with small vessel ischemic disease.  Associated mild cerebral atrophy is noted.   2.  Atherosclerosis.      DX-CHEST-PORTABLE (1 VIEW)   Final Result         1.  No acute cardiopulmonary disease.           Assessment/Plan  * Toxic encephalopathy- (present on admission)  Assessment & Plan  -Unknown etiology whether this is hypoxic induced brain injury versus Wernicke encephalopathy  -LP, MRI brain, EEG were negative. TSH normal. HIV/RPR negative.  -Psych has consulted and believe it is alcohol related  -Continue Risperdal  -Remains free from restraints  -Pending guardianship  -Now likely at his baseline    Urinary retention- (present on admission)  Assessment & Plan  -Currently resolved  -Condom cath with clear yellow urine in bag  -Continue Flomax      Normocytic anemia- (present on admission)  Assessment & Plan  Mild, No active bleeding, stable  Continue monitoring    Alcohol abuse- (present on admission)  Assessment & Plan  -history of  - abstinent since admission  -continue daily PO Thiamine         VTE prophylaxis: Lovenox    I have performed a physical exam and reviewed and updated ROS and Assessment/Plan today (11/26/20). In review of the previous note there are no changes except as documented above    I certify the patient requires continued medically necessary hospital services for the treatment of cognitive disorder.  The patient will remain in the hospital for the foreseeable future.  Discharge may or may not occur in the next 20 days due to ongoing discharge delays due to having no medically acceptable discharge options.    Please note that this dictation was created using voice recognition software. I have made every reasonable attempt to correct obvious errors, but there may be errors of grammar and possibly content that I did not discover before finalizing the note.    TRACE Villagran.

## 2020-11-26 NOTE — PROGRESS NOTES
Assessment completed  Pt was repositioned. Perineal pad changed. Provided new pt gown  Condom catheter in use. Draining clear urine  Foot drop boot to the left foot. Skin assessed. No breakdown noted.

## 2020-11-26 NOTE — CARE PLAN
Problem: Safety  Goal: Will remain free from falls  Outcome: PROGRESSING AS EXPECTED     Problem: Venous Thromboembolism (VTW)/Deep Vein Thrombosis (DVT) Prevention:  Goal: Patient will participate in Venous Thrombosis (VTE)/Deep Vein Thrombosis (DVT)Prevention Measures  Outcome: PROGRESSING AS EXPECTED   Lovenox in use and SCDs

## 2020-11-27 PROCEDURE — 770006 HCHG ROOM/CARE - MED/SURG/GYN SEMI*

## 2020-11-27 PROCEDURE — A9270 NON-COVERED ITEM OR SERVICE: HCPCS | Performed by: INTERNAL MEDICINE

## 2020-11-27 PROCEDURE — 700102 HCHG RX REV CODE 250 W/ 637 OVERRIDE(OP): Performed by: INTERNAL MEDICINE

## 2020-11-27 PROCEDURE — 700102 HCHG RX REV CODE 250 W/ 637 OVERRIDE(OP): Performed by: HOSPITALIST

## 2020-11-27 PROCEDURE — A9270 NON-COVERED ITEM OR SERVICE: HCPCS | Performed by: HOSPITALIST

## 2020-11-27 PROCEDURE — 700111 HCHG RX REV CODE 636 W/ 250 OVERRIDE (IP): Performed by: HOSPITALIST

## 2020-11-27 PROCEDURE — 700101 HCHG RX REV CODE 250: Performed by: HOSPITALIST

## 2020-11-27 PROCEDURE — A9270 NON-COVERED ITEM OR SERVICE: HCPCS | Performed by: NURSE PRACTITIONER

## 2020-11-27 PROCEDURE — 700102 HCHG RX REV CODE 250 W/ 637 OVERRIDE(OP): Performed by: NURSE PRACTITIONER

## 2020-11-27 RX ORDER — MICONAZOLE NITRATE 20 MG/G
CREAM TOPICAL 2 TIMES DAILY
Status: DISPENSED | OUTPATIENT
Start: 2020-11-27 | End: 2020-12-04

## 2020-11-27 RX ADMIN — THERA TABS 1 TABLET: TAB at 05:41

## 2020-11-27 RX ADMIN — ASPIRIN 325 MG ORAL TABLET 325 MG: 325 PILL ORAL at 05:41

## 2020-11-27 RX ADMIN — RISPERIDONE 1 MG: 1 TABLET, FILM COATED ORAL at 05:40

## 2020-11-27 RX ADMIN — POLYETHYLENE GLYCOL 3350 1 PACKET: 17 POWDER, FOR SOLUTION ORAL at 05:40

## 2020-11-27 RX ADMIN — RISPERIDONE 1 MG: 1 TABLET, FILM COATED ORAL at 17:08

## 2020-11-27 RX ADMIN — DOCUSATE SODIUM 50 MG AND SENNOSIDES 8.6 MG 2 TABLET: 8.6; 5 TABLET, FILM COATED ORAL at 05:41

## 2020-11-27 RX ADMIN — ENOXAPARIN SODIUM 40 MG: 40 INJECTION SUBCUTANEOUS at 17:08

## 2020-11-27 RX ADMIN — TAMSULOSIN HYDROCHLORIDE 0.8 MG: 0.4 CAPSULE ORAL at 09:00

## 2020-11-27 RX ADMIN — DOCUSATE SODIUM 50 MG AND SENNOSIDES 8.6 MG 2 TABLET: 8.6; 5 TABLET, FILM COATED ORAL at 17:08

## 2020-11-27 RX ADMIN — Medication 100 MG: at 05:41

## 2020-11-28 PROCEDURE — A9270 NON-COVERED ITEM OR SERVICE: HCPCS | Performed by: NURSE PRACTITIONER

## 2020-11-28 PROCEDURE — 700102 HCHG RX REV CODE 250 W/ 637 OVERRIDE(OP): Performed by: HOSPITALIST

## 2020-11-28 PROCEDURE — A9270 NON-COVERED ITEM OR SERVICE: HCPCS | Performed by: HOSPITALIST

## 2020-11-28 PROCEDURE — 700102 HCHG RX REV CODE 250 W/ 637 OVERRIDE(OP): Performed by: NURSE PRACTITIONER

## 2020-11-28 PROCEDURE — A9270 NON-COVERED ITEM OR SERVICE: HCPCS | Performed by: INTERNAL MEDICINE

## 2020-11-28 PROCEDURE — 700102 HCHG RX REV CODE 250 W/ 637 OVERRIDE(OP): Performed by: INTERNAL MEDICINE

## 2020-11-28 PROCEDURE — 770006 HCHG ROOM/CARE - MED/SURG/GYN SEMI*

## 2020-11-28 PROCEDURE — 700111 HCHG RX REV CODE 636 W/ 250 OVERRIDE (IP): Performed by: HOSPITALIST

## 2020-11-28 RX ADMIN — ASPIRIN 325 MG ORAL TABLET 325 MG: 325 PILL ORAL at 06:01

## 2020-11-28 RX ADMIN — Medication 100 MG: at 06:01

## 2020-11-28 RX ADMIN — MICONAZOLE NITRATE: 20 CREAM TOPICAL at 10:45

## 2020-11-28 RX ADMIN — RISPERIDONE 1 MG: 1 TABLET, FILM COATED ORAL at 06:02

## 2020-11-28 RX ADMIN — THERA TABS 1 TABLET: TAB at 06:01

## 2020-11-28 RX ADMIN — DOCUSATE SODIUM 50 MG AND SENNOSIDES 8.6 MG 2 TABLET: 8.6; 5 TABLET, FILM COATED ORAL at 06:01

## 2020-11-28 RX ADMIN — DOCUSATE SODIUM 50 MG AND SENNOSIDES 8.6 MG 2 TABLET: 8.6; 5 TABLET, FILM COATED ORAL at 16:23

## 2020-11-28 RX ADMIN — TAMSULOSIN HYDROCHLORIDE 0.8 MG: 0.4 CAPSULE ORAL at 10:44

## 2020-11-28 RX ADMIN — MICONAZOLE NITRATE: 20 CREAM TOPICAL at 16:23

## 2020-11-28 RX ADMIN — RISPERIDONE 1 MG: 1 TABLET, FILM COATED ORAL at 16:23

## 2020-11-28 RX ADMIN — ENOXAPARIN SODIUM 40 MG: 40 INJECTION SUBCUTANEOUS at 16:23

## 2020-11-28 ASSESSMENT — COGNITIVE AND FUNCTIONAL STATUS - GENERAL
PERSONAL GROOMING: A LOT
EATING MEALS: A LOT
SUGGESTED CMS G CODE MODIFIER MOBILITY: CL
DRESSING REGULAR UPPER BODY CLOTHING: A LOT
TOILETING: TOTAL
DRESSING REGULAR LOWER BODY CLOTHING: A LOT
STANDING UP FROM CHAIR USING ARMS: A LOT
MOVING FROM LYING ON BACK TO SITTING ON SIDE OF FLAT BED: A LOT
TURNING FROM BACK TO SIDE WHILE IN FLAT BAD: A LITTLE
SUGGESTED CMS G CODE MODIFIER DAILY ACTIVITY: CL
DAILY ACTIVITIY SCORE: 11
CLIMB 3 TO 5 STEPS WITH RAILING: A LOT
MOVING TO AND FROM BED TO CHAIR: A LOT
HELP NEEDED FOR BATHING: A LOT
WALKING IN HOSPITAL ROOM: A LOT
MOBILITY SCORE: 13

## 2020-11-28 NOTE — WOUND TEAM
[facility]  Inpatient Services  Initial Wound and Skin Care Evaluation    Admission Date: 9/9/2020     Consult Date: 11/27/2020   HPI, PMH, SH: Reviewed    Unit where seen by Wound Team: [id number]     WOUND CONSULT RELATED TO:  sacrum     Self Report / Pain Level:  Patient denied pain to this area       OBJECTIVE:  Patient on waffle overlay, being turned with pillows. Condom cath in use. Incontinent of stool    WOUND TYPE, LOCATION, CHARACTERISTICS (Pressure Injuries: location, stage, POA or date identified)          Moisture Associated Skin Damage 11/25/20 Buttock (Active)   NEXT Weekly Photo (Inpatient Only) 12/02/20 11/27/20 1600   Drainage Amount Scant 11/27/20 1600   Drainage Description Sanguineous 11/27/20 1600   Periwound Assessment Satellite lesions 11/27/20 1600   IAD Cleansing Dimethecone Wipes 11/27/20 1600   Periwound Protectant Antifungal Therapy 11/27/20 1600   IAD Containment Device Condom Catheter 11/27/20 1600   Length (cm) 8 11/27/20 1600   Width (cm) 4 11/27/20 1600         Vascular:    Dorsal Pedal pulses:  NA not related to BLE or vascular issue  Posterior tib pulses:   NA    ANA:      NA    Lab Values:    Lab Results   Component Value Date/Time    WBC 8.6 11/17/2020 06:21 AM    RBC 4.40 (L) 11/17/2020 06:21 AM    HEMOGLOBIN 12.9 (L) 11/17/2020 06:21 AM    HEMATOCRIT 40.3 (L) 11/17/2020 06:21 AM                    Lab Results   Component Value Date/Time    HBA1C 5.2 08/02/2016 07:30 AM           Culture:   Obtained no, not indicated, Results show NA    INTERVENTIONS BY WOUND TEAM:  Chart and images reviewed. Discussed with bedside RN. This RN in to assess patient. Patient able to turn with moderate assistance. Patient incontinent of small BM, cleaned that up and removed soiled dri nicki. Skin to buttocks and inside the gluteal cleft is peeling, satellite lesions present, both indicate moisture associated skin damage with yeast infection. All areas blanching. A few partially denuded  areas present. Ordered miconazole antifungal cream.     Interdisciplinary consultation: Patient, Bedside RN     EVALUATION: miconazole is a broad spectrum antifungal cream. The zinc oxide in the cream is soothing and protects kin from incontinent episodes.    Goals: Steady decrease in wound area and depth weekly.    NURSING PLAN OF CARE ORDERS (X):  Dressing changes: See Dressing Care orders: NA  Skin care: See Skin Care orders: NA  Rectal tube care: See Rectal Tube Care orders:        Other orders:    XMAR for miconazole order                       RSKIN:   CURRENTLY IN PLACE (X), APPLIED THIS VISIT (A), ORDERED (O):   Q shift Hunter:  X  Q shift pressure point assessments:  X  Pressure redistribution mattress   X                          Low Airloss                        Bariatric SOMMER                     Bariatric foam                        Heel float boots                     Heel Silicone dressing                         Float Heels off Bed with Pillows               Barrier wipes   O      Barrier Cream    X     Barrier paste          Sacral silicone dressing  X       Silicone O2 tubing         Anchorfast         Cannula fixation Device (Tender )          Gray Foam Ear protectors           Trach with Optifoam split foam                 Waffle cushion        Waffle Overlay X       Rectal tube or BMS    Purwick/Condom Cath   X       Antifungal tx  O    Interdry          Reposition q 2 hours   X     Up to chair        Ambulate      PT/OT  X      Dietician        Diabetes Education      PO X    TF     TPN     NPO   # days   Other        WOUND TEAM PLAN OF CARE (X):   Dressing changes by wound team:          Follow up 1-2 times weekly:               Follow up 3 times weekly:                NPWT change 3 times weekly:     Follow up as needed:  X no follow up unless consulted     Other (explain):     Anticipated discharge plans (X):   LTACH:        SNF/Rehab:   X               Home Care:           Outpatient  Wound Center:            Self Care:            Other:

## 2020-11-29 PROCEDURE — 700111 HCHG RX REV CODE 636 W/ 250 OVERRIDE (IP): Performed by: HOSPITALIST

## 2020-11-29 PROCEDURE — 700102 HCHG RX REV CODE 250 W/ 637 OVERRIDE(OP): Performed by: HOSPITALIST

## 2020-11-29 PROCEDURE — A9270 NON-COVERED ITEM OR SERVICE: HCPCS | Performed by: INTERNAL MEDICINE

## 2020-11-29 PROCEDURE — A9270 NON-COVERED ITEM OR SERVICE: HCPCS | Performed by: NURSE PRACTITIONER

## 2020-11-29 PROCEDURE — 700102 HCHG RX REV CODE 250 W/ 637 OVERRIDE(OP): Performed by: NURSE PRACTITIONER

## 2020-11-29 PROCEDURE — A9270 NON-COVERED ITEM OR SERVICE: HCPCS | Performed by: HOSPITALIST

## 2020-11-29 PROCEDURE — 700102 HCHG RX REV CODE 250 W/ 637 OVERRIDE(OP): Performed by: INTERNAL MEDICINE

## 2020-11-29 PROCEDURE — 770006 HCHG ROOM/CARE - MED/SURG/GYN SEMI*

## 2020-11-29 RX ADMIN — THERA TABS 1 TABLET: TAB at 06:50

## 2020-11-29 RX ADMIN — RISPERIDONE 1 MG: 1 TABLET, FILM COATED ORAL at 18:37

## 2020-11-29 RX ADMIN — TAMSULOSIN HYDROCHLORIDE 0.8 MG: 0.4 CAPSULE ORAL at 08:50

## 2020-11-29 RX ADMIN — ENOXAPARIN SODIUM 40 MG: 40 INJECTION SUBCUTANEOUS at 18:37

## 2020-11-29 RX ADMIN — DOCUSATE SODIUM 50 MG AND SENNOSIDES 8.6 MG 2 TABLET: 8.6; 5 TABLET, FILM COATED ORAL at 06:50

## 2020-11-29 RX ADMIN — ASPIRIN 325 MG ORAL TABLET 325 MG: 325 PILL ORAL at 06:50

## 2020-11-29 RX ADMIN — MICONAZOLE NITRATE: 20 CREAM TOPICAL at 18:37

## 2020-11-29 RX ADMIN — Medication 100 MG: at 06:50

## 2020-11-29 RX ADMIN — RISPERIDONE 1 MG: 1 TABLET, FILM COATED ORAL at 06:50

## 2020-11-29 RX ADMIN — DOCUSATE SODIUM 50 MG AND SENNOSIDES 8.6 MG 2 TABLET: 8.6; 5 TABLET, FILM COATED ORAL at 18:37

## 2020-11-29 NOTE — CARE PLAN
Problem: Communication  Goal: The ability to communicate needs accurately and effectively will improve  Outcome: PROGRESSING SLOWER THAN EXPECTED   Pt remains confused. Is only oriented to self. Able to state needs on occasion. Reorienting and reenforcement of education needed frequently.   Problem: Safety  Goal: Will remain free from injury  Outcome: PROGRESSING AS EXPECTED   Bed locked and in lowest position. Bed alarm on. Frequent Q2 rounding.

## 2020-11-29 NOTE — CARE PLAN
Problem: Safety  Goal: Will remain free from injury  Outcome: PROGRESSING AS EXPECTED     Problem: Communication  Goal: The ability to communicate needs accurately and effectively will improve  Outcome: PROGRESSING SLOWER THAN EXPECTED     Problem: Knowledge Deficit  Goal: Knowledge of disease process/condition, treatment plan, diagnostic tests, and medications will improve  Outcome: PROGRESSING SLOWER THAN EXPECTED

## 2020-11-30 PROCEDURE — A9270 NON-COVERED ITEM OR SERVICE: HCPCS | Performed by: NURSE PRACTITIONER

## 2020-11-30 PROCEDURE — 700102 HCHG RX REV CODE 250 W/ 637 OVERRIDE(OP): Performed by: HOSPITALIST

## 2020-11-30 PROCEDURE — 700111 HCHG RX REV CODE 636 W/ 250 OVERRIDE (IP): Performed by: HOSPITALIST

## 2020-11-30 PROCEDURE — 700102 HCHG RX REV CODE 250 W/ 637 OVERRIDE(OP): Performed by: NURSE PRACTITIONER

## 2020-11-30 PROCEDURE — A9270 NON-COVERED ITEM OR SERVICE: HCPCS | Performed by: INTERNAL MEDICINE

## 2020-11-30 PROCEDURE — A9270 NON-COVERED ITEM OR SERVICE: HCPCS | Performed by: HOSPITALIST

## 2020-11-30 PROCEDURE — 770006 HCHG ROOM/CARE - MED/SURG/GYN SEMI*

## 2020-11-30 PROCEDURE — 700102 HCHG RX REV CODE 250 W/ 637 OVERRIDE(OP): Performed by: INTERNAL MEDICINE

## 2020-11-30 PROCEDURE — 99231 SBSQ HOSP IP/OBS SF/LOW 25: CPT | Performed by: NURSE PRACTITIONER

## 2020-11-30 RX ADMIN — MICONAZOLE NITRATE: 20 CREAM TOPICAL at 16:14

## 2020-11-30 RX ADMIN — ENOXAPARIN SODIUM 40 MG: 40 INJECTION SUBCUTANEOUS at 16:14

## 2020-11-30 RX ADMIN — THERA TABS 1 TABLET: TAB at 06:18

## 2020-11-30 RX ADMIN — ACETAMINOPHEN 650 MG: 325 TABLET, FILM COATED ORAL at 10:24

## 2020-11-30 RX ADMIN — DOCUSATE SODIUM 50 MG AND SENNOSIDES 8.6 MG 2 TABLET: 8.6; 5 TABLET, FILM COATED ORAL at 16:14

## 2020-11-30 RX ADMIN — RISPERIDONE 1 MG: 1 TABLET, FILM COATED ORAL at 06:18

## 2020-11-30 RX ADMIN — Medication 100 MG: at 06:18

## 2020-11-30 RX ADMIN — DOCUSATE SODIUM 50 MG AND SENNOSIDES 8.6 MG 2 TABLET: 8.6; 5 TABLET, FILM COATED ORAL at 06:18

## 2020-11-30 RX ADMIN — RISPERIDONE 1 MG: 1 TABLET, FILM COATED ORAL at 16:14

## 2020-11-30 RX ADMIN — TAMSULOSIN HYDROCHLORIDE 0.8 MG: 0.4 CAPSULE ORAL at 10:09

## 2020-11-30 RX ADMIN — ASPIRIN 325 MG ORAL TABLET 325 MG: 325 PILL ORAL at 06:18

## 2020-11-30 ASSESSMENT — PATIENT HEALTH QUESTIONNAIRE - PHQ9
2. FEELING DOWN, DEPRESSED, IRRITABLE, OR HOPELESS: NOT AT ALL
SUM OF ALL RESPONSES TO PHQ9 QUESTIONS 1 AND 2: 0
1. LITTLE INTEREST OR PLEASURE IN DOING THINGS: NOT AT ALL

## 2020-11-30 NOTE — PROGRESS NOTES
0728 Received report from night OH David discussed. Call light in place, bed lowered and locked, bed alarm on. Encourage pt to call if needed anything. Pt A&Ox1, oriented to self only

## 2020-11-30 NOTE — PROGRESS NOTES
Hospital Medicine Daily Progress Note    Date of Service  11/30/2020    Chief Complaint  Altered mental status    Hospital Course  50 y.o. male with a pmhx of HTN, chronic pain and alcohol abuse who presented 9/9/2020 with altered mental status, he was found obtunded in his house by his ex-wife who was checking on him as he had not been seen for 2 days or been able to get a hold of him.  He was found in a chair,      A&Ox0 and hypotensive. He arrived in the ER with a GCS of 7 (E1,V1,M5) and severely hypotensive. The patient was intubated for airway protection and central venous access was obtained for administration of high-volume crystalloid resuscitation and vasopressor therapy.    CT head and abdomen and pelvis were all unremarkable. He was briefly treated with antibiotics for concern for an underlying infection and a lumbar tap done was negative. Cultures resulted negative.   EEG was done  9/10 no seizures captured.   He was extubated on 9/11 and has been able to protect his airway ever since. He received benzodiazepines and IV thiamine for etoh.  Now awaiting placement however continues to be impulsive and has required restraints intermittently this admission.  He was found to have UTI s/p treatment with bactrim.  He had urinary retention s/p newton but continued to self-remove. His urinary retention resolved. Continues to be impulsive and has had multiple falls, on risperdal.    Interval Problem Update  11/30 -oriented to himself at baseline.  No acute events overnight  -Offers no complaints    11/26 - Sleeping during rounds, arouses easily. Offers no complaints. No acute events overnight  -Per bedside RN, he is no longer retaining urine    Consultants/Specialty  -Pulmonary/critical care -signed off  -Cardiology -signed off  -Psychiatry    Code Status  Full Code    Disposition  Pending guardianship and placement    Review of Systems  Review of Systems   Unable to perform ROS: Psychiatric disorder         Physical Exam  Temp:  [36.1 °C (97 °F)-36.6 °C (97.8 °F)] 36.1 °C (97 °F)  Pulse:  [64-71] 69  Resp:  [16] 16  BP: ()/(64-75) 118/75  SpO2:  [96 %-97 %] 97 %    Physical Exam  Vitals signs and nursing note reviewed. Exam conducted with a chaperone present.   Constitutional:       General: He is awake. He is not in acute distress.     Appearance: Normal appearance. He is ill-appearing.      Comments: Chronically ill-appearing   HENT:      Head: Normocephalic and atraumatic.      Nose: Nose normal.      Mouth/Throat:      Mouth: Mucous membranes are dry.   Eyes:      General:         Right eye: No discharge.         Left eye: No discharge.      Conjunctiva/sclera: Conjunctivae normal.   Neck:      Musculoskeletal: Normal range of motion and neck supple.   Cardiovascular:      Rate and Rhythm: Normal rate.      Pulses: Normal pulses.      Heart sounds: Normal heart sounds. No friction rub.   Pulmonary:      Effort: Pulmonary effort is normal.      Breath sounds: Normal breath sounds. No decreased breath sounds or wheezing.   Abdominal:      General: Abdomen is flat. Bowel sounds are normal.      Palpations: Abdomen is soft.      Tenderness: There is no abdominal tenderness.   Musculoskeletal:         General: No swelling.      Right lower leg: No edema.      Left lower leg: No edema.   Skin:     General: Skin is warm and dry.   Neurological:      Mental Status: He is alert. He is disoriented and confused.      GCS: GCS eye subscore is 3. GCS verbal subscore is 4. GCS motor subscore is 6.      Motor: Motor function is intact.      Comments: AOx1 to self     Psychiatric:         Mood and Affect: Affect is labile.         Speech: Speech normal.         Behavior: Behavior is cooperative.         Cognition and Memory: Cognition is impaired. He exhibits impaired recent memory.         Judgment: Judgment is impulsive and inappropriate.         Fluids    Intake/Output Summary (Last 24 hours) at 11/30/2020 1452  Last  data filed at 11/30/2020 1300  Gross per 24 hour   Intake 480 ml   Output --   Net 480 ml       Laboratory                        Imaging  CT-CTA CHEST PULMONARY ARTERY W/ RECONS   Final Result      1.  No evidence of pulmonary emboli.   2.  Right lower lobe discoid atelectasis.   3.  Gynecomastia.            DX-CHEST-PORTABLE (1 VIEW)   Final Result      No acute cardiac or pulmonary abnormalities are identified.      MR-BRAIN-W/O   Final Result      1.  Moderate diffuse cerebral atrophy.      2.  Minimal periventricular and juxtacortical white matter changes consistent with chronic microvascular ischemic gliosis.      3.  No evidence of acute infarction in the brain parenchyma.      DX-ABDOMEN FOR TUBE PLACEMENT   Final Result      Feeding tube tip overlies the second portion of the duodenum.      DX-CHEST-PORTABLE (1 VIEW)   Final Result         1.  Patchy left lung base opacity, new since prior, could represent early infiltrate            DX-CHEST-PORTABLE (1 VIEW)   Final Result         1.  Patchy left lung base opacity, new since prior, could represent early infiltrate         EN-[id number] VIEW   Final Result      Enteric tube projects over the distal stomach/pylorus.         EC-ECHOCARDIOGRAM COMPLETE W/O CONT   Final Result      DX-CHEST-PORTABLE (1 VIEW)   Final Result         1.  No acute cardiopulmonary disease.      CT-ABDOMEN-PELVIS W/O   Final Result      1.  No renal stone or hydronephrosis.   2.  Normal appendix.   3.  No focal mesenteric inflammatory process.      DX-ABDOMEN FOR TUBE PLACEMENT   Final Result      NG tube tip projects over expected location the gastric fundus.      US-ABDOMEN COMPLETE SURVEY   Final Result         1.  Echogenic liver compatible with fatty change versus fibrosis.   2.  Gallbladder sludge without additional sonographic findings of cholecystitis.   3.  Partial atrophic bilateral kidneys with lobulated contour         CT-HEAD W/O   Final Result         1.  No acute  intracranial abnormality is identified, there are nonspecific white matter changes, commonly associated with small vessel ischemic disease.  Associated mild cerebral atrophy is noted.   2.  Atherosclerosis.      DX-CHEST-PORTABLE (1 VIEW)   Final Result         1.  No acute cardiopulmonary disease.           Assessment/Plan  * Toxic encephalopathy- (present on admission)  Assessment & Plan  -Unknown etiology whether this is hypoxic induced brain injury versus Wernicke encephalopathy  -LP, MRI brain, EEG were negative. TSH normal. HIV/RPR negative.  -Psych has consulted and believe it is alcohol related  -Continue Risperdal  -Remains free from restraints  -Pending guardianship  -Now likely at his baseline    Urinary retention- (present on admission)  Assessment & Plan  -Currently resolved  -Condom cath with clear yellow urine in bag  -Continue Flomax      Normocytic anemia- (present on admission)  Assessment & Plan  Mild, No active bleeding, stable  Continue monitoring    Alcohol abuse- (present on admission)  Assessment & Plan  -history of  - abstinent since admission  -continue daily PO Thiamine         VTE prophylaxis: Lovenox    I have performed a physical exam and reviewed and updated ROS and Assessment/Plan today (11/30/20). In review of the previous note there are no changes except as documented above    I certify the patient requires continued medically necessary hospital services for the treatment of cognitive disorder.  The patient will remain in the hospital for the foreseeable future.  Discharge may or may not occur in the next 20 days due to ongoing discharge delays due to having no medically acceptable discharge options.    Please note that this dictation was created using voice recognition software. I have made every reasonable attempt to correct obvious errors, but there may be errors of grammar and possibly content that I did not discover before finalizing the note.    Ella Nunes,  ALONZO

## 2020-12-01 PROCEDURE — A9270 NON-COVERED ITEM OR SERVICE: HCPCS | Performed by: HOSPITALIST

## 2020-12-01 PROCEDURE — 700102 HCHG RX REV CODE 250 W/ 637 OVERRIDE(OP): Performed by: NURSE PRACTITIONER

## 2020-12-01 PROCEDURE — 700111 HCHG RX REV CODE 636 W/ 250 OVERRIDE (IP): Performed by: HOSPITALIST

## 2020-12-01 PROCEDURE — A9270 NON-COVERED ITEM OR SERVICE: HCPCS | Performed by: NURSE PRACTITIONER

## 2020-12-01 PROCEDURE — 770006 HCHG ROOM/CARE - MED/SURG/GYN SEMI*

## 2020-12-01 PROCEDURE — 700102 HCHG RX REV CODE 250 W/ 637 OVERRIDE(OP): Performed by: HOSPITALIST

## 2020-12-01 PROCEDURE — 700102 HCHG RX REV CODE 250 W/ 637 OVERRIDE(OP): Performed by: INTERNAL MEDICINE

## 2020-12-01 PROCEDURE — A9270 NON-COVERED ITEM OR SERVICE: HCPCS | Performed by: INTERNAL MEDICINE

## 2020-12-01 RX ADMIN — MICONAZOLE NITRATE: 20 CREAM TOPICAL at 05:11

## 2020-12-01 RX ADMIN — Medication 100 MG: at 05:10

## 2020-12-01 RX ADMIN — DOCUSATE SODIUM 50 MG AND SENNOSIDES 8.6 MG 2 TABLET: 8.6; 5 TABLET, FILM COATED ORAL at 05:10

## 2020-12-01 RX ADMIN — RISPERIDONE 1 MG: 1 TABLET, FILM COATED ORAL at 05:10

## 2020-12-01 RX ADMIN — ENOXAPARIN SODIUM 40 MG: 40 INJECTION SUBCUTANEOUS at 17:07

## 2020-12-01 RX ADMIN — ACETAMINOPHEN 650 MG: 325 TABLET, FILM COATED ORAL at 05:11

## 2020-12-01 RX ADMIN — DOCUSATE SODIUM 50 MG AND SENNOSIDES 8.6 MG 2 TABLET: 8.6; 5 TABLET, FILM COATED ORAL at 17:07

## 2020-12-01 RX ADMIN — RISPERIDONE 1 MG: 1 TABLET, FILM COATED ORAL at 17:07

## 2020-12-01 RX ADMIN — MICONAZOLE NITRATE: 20 CREAM TOPICAL at 17:07

## 2020-12-01 RX ADMIN — TAMSULOSIN HYDROCHLORIDE 0.8 MG: 0.4 CAPSULE ORAL at 07:36

## 2020-12-01 RX ADMIN — THERA TABS 1 TABLET: TAB at 05:11

## 2020-12-01 RX ADMIN — ASPIRIN 325 MG ORAL TABLET 325 MG: 325 PILL ORAL at 05:11

## 2020-12-02 PROCEDURE — A9270 NON-COVERED ITEM OR SERVICE: HCPCS | Performed by: HOSPITALIST

## 2020-12-02 PROCEDURE — A9270 NON-COVERED ITEM OR SERVICE: HCPCS | Performed by: INTERNAL MEDICINE

## 2020-12-02 PROCEDURE — 700111 HCHG RX REV CODE 636 W/ 250 OVERRIDE (IP): Performed by: HOSPITALIST

## 2020-12-02 PROCEDURE — A9270 NON-COVERED ITEM OR SERVICE: HCPCS | Performed by: NURSE PRACTITIONER

## 2020-12-02 PROCEDURE — 700102 HCHG RX REV CODE 250 W/ 637 OVERRIDE(OP): Performed by: NURSE PRACTITIONER

## 2020-12-02 PROCEDURE — 700102 HCHG RX REV CODE 250 W/ 637 OVERRIDE(OP): Performed by: INTERNAL MEDICINE

## 2020-12-02 PROCEDURE — 700102 HCHG RX REV CODE 250 W/ 637 OVERRIDE(OP): Performed by: HOSPITALIST

## 2020-12-02 PROCEDURE — 770006 HCHG ROOM/CARE - MED/SURG/GYN SEMI*

## 2020-12-02 RX ADMIN — ACETAMINOPHEN 650 MG: 325 TABLET, FILM COATED ORAL at 20:39

## 2020-12-02 RX ADMIN — DOCUSATE SODIUM 50 MG AND SENNOSIDES 8.6 MG 2 TABLET: 8.6; 5 TABLET, FILM COATED ORAL at 16:54

## 2020-12-02 RX ADMIN — Medication 100 MG: at 05:21

## 2020-12-02 RX ADMIN — RISPERIDONE 1 MG: 1 TABLET, FILM COATED ORAL at 16:54

## 2020-12-02 RX ADMIN — TAMSULOSIN HYDROCHLORIDE 0.8 MG: 0.4 CAPSULE ORAL at 09:21

## 2020-12-02 RX ADMIN — ENOXAPARIN SODIUM 40 MG: 40 INJECTION SUBCUTANEOUS at 16:54

## 2020-12-02 RX ADMIN — DOCUSATE SODIUM 50 MG AND SENNOSIDES 8.6 MG 2 TABLET: 8.6; 5 TABLET, FILM COATED ORAL at 05:21

## 2020-12-02 RX ADMIN — ASPIRIN 325 MG ORAL TABLET 325 MG: 325 PILL ORAL at 05:21

## 2020-12-02 RX ADMIN — MICONAZOLE NITRATE: 20 CREAM TOPICAL at 16:54

## 2020-12-02 RX ADMIN — RISPERIDONE 1 MG: 1 TABLET, FILM COATED ORAL at 05:21

## 2020-12-02 RX ADMIN — MICONAZOLE NITRATE: 20 CREAM TOPICAL at 05:19

## 2020-12-02 RX ADMIN — THERA TABS 1 TABLET: TAB at 05:21

## 2020-12-02 ASSESSMENT — PAIN SCALES - PAIN ASSESSMENT IN ADVANCED DEMENTIA (PAINAD)
TOTALSCORE: 0
BREATHING: NORMAL
CONSOLABILITY: NO NEED TO CONSOLE
BODYLANGUAGE: RELAXED
FACIALEXPRESSION: SMILING OR INEXPRESSIVE

## 2020-12-02 NOTE — DISCHARGE PLANNING
Anticipated Discharge Disposition: TBD    Action: Pt pending Gaurdianship, hearing scheduled 12/18    Barriers to Discharge: pending guardianship    Plan: f/u with medical team, guardianship process

## 2020-12-02 NOTE — CARE PLAN
Problem: Safety  Goal: Will remain free from falls  Outcome: PROGRESSING AS EXPECTED  Note: Pt. Being checked on hourly, bed alarm in place, bed in lowest position, locked, upper side rails up     Problem: Pain Management  Goal: Pain level will decrease to patient's comfort goal  Outcome: PROGRESSING AS EXPECTED  Note: Educated about the pain scale and non pharmacological pain methods, check the MAR

## 2020-12-03 PROBLEM — R33.9 URINARY RETENTION: Status: RESOLVED | Noted: 2020-09-23 | Resolved: 2020-12-03

## 2020-12-03 PROCEDURE — A9270 NON-COVERED ITEM OR SERVICE: HCPCS | Performed by: HOSPITALIST

## 2020-12-03 PROCEDURE — 770006 HCHG ROOM/CARE - MED/SURG/GYN SEMI*

## 2020-12-03 PROCEDURE — 700102 HCHG RX REV CODE 250 W/ 637 OVERRIDE(OP): Performed by: NURSE PRACTITIONER

## 2020-12-03 PROCEDURE — 700102 HCHG RX REV CODE 250 W/ 637 OVERRIDE(OP): Performed by: HOSPITALIST

## 2020-12-03 PROCEDURE — A9270 NON-COVERED ITEM OR SERVICE: HCPCS | Performed by: INTERNAL MEDICINE

## 2020-12-03 PROCEDURE — 700101 HCHG RX REV CODE 250: Performed by: HOSPITALIST

## 2020-12-03 PROCEDURE — 99231 SBSQ HOSP IP/OBS SF/LOW 25: CPT | Performed by: NURSE PRACTITIONER

## 2020-12-03 PROCEDURE — 700102 HCHG RX REV CODE 250 W/ 637 OVERRIDE(OP): Performed by: INTERNAL MEDICINE

## 2020-12-03 PROCEDURE — 700111 HCHG RX REV CODE 636 W/ 250 OVERRIDE (IP): Performed by: HOSPITALIST

## 2020-12-03 PROCEDURE — A9270 NON-COVERED ITEM OR SERVICE: HCPCS | Performed by: NURSE PRACTITIONER

## 2020-12-03 RX ADMIN — POLYETHYLENE GLYCOL 3350 1 PACKET: 17 POWDER, FOR SOLUTION ORAL at 08:44

## 2020-12-03 RX ADMIN — Medication 100 MG: at 04:07

## 2020-12-03 RX ADMIN — DOCUSATE SODIUM 50 MG AND SENNOSIDES 8.6 MG 2 TABLET: 8.6; 5 TABLET, FILM COATED ORAL at 16:16

## 2020-12-03 RX ADMIN — DOCUSATE SODIUM 50 MG AND SENNOSIDES 8.6 MG 2 TABLET: 8.6; 5 TABLET, FILM COATED ORAL at 04:07

## 2020-12-03 RX ADMIN — ENOXAPARIN SODIUM 40 MG: 40 INJECTION SUBCUTANEOUS at 16:17

## 2020-12-03 RX ADMIN — TAMSULOSIN HYDROCHLORIDE 0.8 MG: 0.4 CAPSULE ORAL at 08:44

## 2020-12-03 RX ADMIN — ASPIRIN 325 MG ORAL TABLET 325 MG: 325 PILL ORAL at 04:07

## 2020-12-03 RX ADMIN — RISPERIDONE 1 MG: 1 TABLET, FILM COATED ORAL at 16:16

## 2020-12-03 RX ADMIN — ACETAMINOPHEN 650 MG: 325 TABLET, FILM COATED ORAL at 08:51

## 2020-12-03 RX ADMIN — MICONAZOLE NITRATE: 20 CREAM TOPICAL at 16:17

## 2020-12-03 RX ADMIN — RISPERIDONE 1 MG: 1 TABLET, FILM COATED ORAL at 04:07

## 2020-12-03 RX ADMIN — THERA TABS 1 TABLET: TAB at 04:07

## 2020-12-03 RX ADMIN — MICONAZOLE NITRATE: 20 CREAM TOPICAL at 04:09

## 2020-12-03 ASSESSMENT — ENCOUNTER SYMPTOMS
DEPRESSION: 0
SHORTNESS OF BREATH: 0
SENSORY CHANGE: 0
NERVOUS/ANXIOUS: 0
WEAKNESS: 0
COUGH: 0
HEADACHES: 0
FEVER: 0
DIARRHEA: 0
FOCAL WEAKNESS: 0
ABDOMINAL PAIN: 0
FALLS: 1
DIZZINESS: 0
INSOMNIA: 0
VOMITING: 0
SPEECH CHANGE: 0
PALPITATIONS: 0
CONSTIPATION: 1
NAUSEA: 0

## 2020-12-03 ASSESSMENT — PAIN SCALES - PAIN ASSESSMENT IN ADVANCED DEMENTIA (PAINAD)
BREATHING: NORMAL
FACIALEXPRESSION: SMILING OR INEXPRESSIVE
TOTALSCORE: 0
BODYLANGUAGE: RELAXED
CONSOLABILITY: NO NEED TO CONSOLE

## 2020-12-03 NOTE — HOSPITAL COURSE
Mr. Blake is a 50-year-old male who was brought to the emergency department on 9/9/2020 with altered mental status after he was found by his ex-wife to be obtunded after she had checked on him when he had not been seen for at least 2 days.  At that time he was alert and oriented x0 and found to be hypotensive.  GCS in the ED was 7 and he was severely hypotensive.  He was intubated for airway protection and central venous access was obtained for administration of high-volume crystalloid resuscitation and vasopressor therapy.  He had a significant leukocytosis with a WBC of 22.9 and was hypokalemic with a potassium level of 2.7.  Additionally he had acute kidney injury with a creatinine of 2.73.  His INR was 8.93 and he also had a lactate level of 11.  Urinalysis was performed and was negative for urinary tract infection.  He was diagnosed with septic shock and admitted to the intensive care unit for further evaluation and treatment.  A lumbar puncture was done on 9/9/2020.  He was also noted to have seizure-like activity on 9/10/2020 so an EEG was obtained and was significant for a moderate encephalopathy.  Extubation occurred on 9/11/2020.  He also had a traumatic catheter removal so on 9/21/2020 urology was consulted but was ultimately unnecessary.  On 9/13/2020 he was transferred out of the ICU where he required restraints due to confusion, hallucination, agitation, and impulsivity.  An MRI was performed and was negative for acute pathologies.  He was also treated for a UTI starting 10/2/2020.  He has also fallen multiple times in hospital.  Additionally, on 11/11/2020 he had an acute episode of sinus tachycardia in the 140s accompanied by hypotension.  There was concern for STEMI so cardiology was consulted.  A 12-lead EKG was obtained and was concerning for atrial flutter.  The patient received metoprolol after which a repeat ECG showed sinus tachycardia.  A D-dimer was obtained and was elevated for which  a CT chest was done that was negative for pulmonary embolism.  Cardiology subsequently signed off.  He has remained in the hospital for a prolonged period of time and is now pending guardianship and then will need placement.  Guardianship hearing took place 2/5/2021.   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 Ortho was consulted and he does require I&D. Surgery with Dr. Roque completed 2/1/2021

## 2020-12-03 NOTE — PROGRESS NOTES
[facility] Medicine Twice Weekly Progress Note    Date of Service  12/3/2020    Chief Complaint  Altered mental status    Hospital Course  Mr. Blake is a 50-year-old male who was brought to the emergency department on 9/9/2020 with altered mental status after he was found by his ex-wife to be obtunded after she had checked on him when he had not been seen for at least 2 days.  At that time he was alert and oriented x0 and found to be hypotensive.  GCS in the ED was 7 and he was severely hypotensive.  He was intubated for airway protection and central venous access was obtained for administration of high-volume crystalloid resuscitation and vasopressor therapy.  He had a significant leukocytosis with a WBC of 22.9 and was hypokalemic with a potassium level of 2.7.  Additionally he had acute kidney injury with a creatinine of 2.73.  His INR was 8.93 and he also had a lactate level of 11.  Urinalysis was performed and was negative for urinary tract infection.  He was diagnosed with septic shock and admitted to the intensive care unit for further evaluation and treatment.  A lumbar puncture was done on 9/9/2020.  He was also noted to have seizure-like activity on 9/10/2020 so an EEG was obtained and was significant for a moderate encephalopathy.  Extubation occurred on 9/11/2020.  He also had a traumatic catheter removal so on 9/21/2020 urology was consulted but was ultimately unnecessary.  On 9/13/2020 he was transferred out of the ICU where he required restraints due to confusion, hallucination, agitation, and impulsivity.  An MRI was performed and was negative for acute pathologies.  He was also treated for a UTI starting 10/2/2020.  He has also fallen multiple times in hospital.  Additionally, on 11/11/2020 he had an acute episode of sinus tachycardia in the 140s accompanied by hypotension.  There was concern for STEMI so cardiology was consulted.  A 12-lead EKG was obtained and was concerning for atrial  flutter.  The patient received metoprolol after which a repeat ECG showed sinus tachycardia.  A D-dimer was obtained and was elevated for which a CT chest was done that was negative for pulmonary embolism.  Cardiology subsequently signed off.  He has remained in the hospital for a prolonged period of time and is now pending guardianship and then will need placement.  Guardianship hearing is tentatively scheduled for 12/18/2020.    Interval Problem Update  States he is feeling good today.  Denies pain.  No longer retaining urine.  Is pleasant and cooperative.    Has been afebrile, HR 60s-80s, SBP 90s-teens, O2 saturations within normal limits on room air.    Consultants/Specialty  Critical care while in ICU  Psychiatry  Cardiology  Urology    Code Status  Full Code    Disposition  Pending guardianship and then placement.  Guardianship hearing scheduled for 12/18/2020.    Review of Systems  Review of Systems   Constitutional: Negative for fever and malaise/fatigue.   Respiratory: Negative for cough and shortness of breath.    Cardiovascular: Negative for chest pain, palpitations and leg swelling.   Gastrointestinal: Positive for constipation. Negative for abdominal pain, diarrhea, nausea and vomiting.   Genitourinary: Negative for dysuria, frequency and urgency.   Musculoskeletal: Positive for falls.   Neurological: Negative for dizziness, sensory change, speech change, focal weakness, weakness and headaches.   Psychiatric/Behavioral: Negative for depression. The patient is not nervous/anxious and does not have insomnia.    All other systems reviewed and are negative.     Physical Exam  Temp:  [36 °C (96.8 °F)-36.4 °C (97.5 °F)] 36.3 °C (97.3 °F)  Pulse:  [64-83] 64  Resp:  [14-17] 14  BP: ()/(63-76) 109/73  SpO2:  [93 %-100 %] 100 %    Physical Exam  Vitals signs and nursing note reviewed.   Constitutional:       General: He is awake. He is not in acute distress.     Appearance: He is well-developed. He is not  ill-appearing.   HENT:      Head: Normocephalic and atraumatic.      Mouth/Throat:      Lips: Pink.      Mouth: Mucous membranes are moist.   Eyes:      Conjunctiva/sclera: Conjunctivae normal.      Pupils: Pupils are equal, round, and reactive to light.   Neck:      Musculoskeletal: Normal range of motion and neck supple.   Cardiovascular:      Rate and Rhythm: Normal rate and regular rhythm.      Pulses: Normal pulses.      Heart sounds: Normal heart sounds.   Pulmonary:      Effort: Pulmonary effort is normal.      Breath sounds: Normal breath sounds.   Abdominal:      General: Bowel sounds are normal. There is no distension or abdominal bruit.      Palpations: Abdomen is soft.      Tenderness: There is no abdominal tenderness.   Musculoskeletal:      Right lower leg: No edema.      Left lower leg: No edema.   Skin:     General: Skin is warm and dry.   Neurological:      General: No focal deficit present.      Mental Status: He is alert.   Psychiatric:         Attention and Perception: Attention and perception normal.         Mood and Affect: Mood and affect normal.         Speech: Speech normal.         Behavior: Behavior normal. Behavior is cooperative.         Thought Content: Thought content normal.         Cognition and Memory: Cognition and memory normal.         Judgment: Judgment normal.     Fluids    Intake/Output Summary (Last 24 hours) at 12/3/2020 1439  Last data filed at 12/3/2020 1200  Gross per 24 hour   Intake no documentation   Output 3300 ml   Net -3300 ml     Laboratory    Imaging  CT-CTA CHEST PULMONARY ARTERY W/ RECONS   Final Result      1.  No evidence of pulmonary emboli.   2.  Right lower lobe discoid atelectasis.   3.  Gynecomastia.            DX-CHEST-PORTABLE (1 VIEW)   Final Result      No acute cardiac or pulmonary abnormalities are identified.      MR-BRAIN-W/O   Final Result      1.  Moderate diffuse cerebral atrophy.      2.  Minimal periventricular and juxtacortical white matter  changes consistent with chronic microvascular ischemic gliosis.      3.  No evidence of acute infarction in the brain parenchyma.      DX-ABDOMEN FOR TUBE PLACEMENT   Final Result      Feeding tube tip overlies the second portion of the duodenum.      DX-CHEST-PORTABLE (1 VIEW)   Final Result         1.  Patchy left lung base opacity, new since prior, could represent early infiltrate            DX-CHEST-PORTABLE (1 VIEW)   Final Result         1.  Patchy left lung base opacity, new since prior, could represent early infiltrate         [id number] VIEW   Final Result      Enteric tube projects over the distal stomach/pylorus.         EC-ECHOCARDIOGRAM COMPLETE W/O CONT   Final Result      DX-CHEST-PORTABLE (1 VIEW)   Final Result         1.  No acute cardiopulmonary disease.      CT-ABDOMEN-PELVIS W/O   Final Result      1.  No renal stone or hydronephrosis.   2.  Normal appendix.   3.  No focal mesenteric inflammatory process.      DX-ABDOMEN FOR TUBE PLACEMENT   Final Result      NG tube tip projects over expected location the gastric fundus.      US-ABDOMEN COMPLETE SURVEY   Final Result         1.  Echogenic liver compatible with fatty change versus fibrosis.   2.  Gallbladder sludge without additional sonographic findings of cholecystitis.   3.  Partial atrophic bilateral kidneys with lobulated contour         CT-HEAD W/O   Final Result         1.  No acute intracranial abnormality is identified, there are nonspecific white matter changes, commonly associated with small vessel ischemic disease.  Associated mild cerebral atrophy is noted.   2.  Atherosclerosis.      DX-CHEST-PORTABLE (1 VIEW)   Final Result         1.  No acute cardiopulmonary disease.         Assessment/Plan  * Toxic encephalopathy- (present on admission)  Assessment & Plan  -Unknown etiology - hypoxic induced brain injury vs Wernicke encephalopathy.  -LP, MRI brain, EEG negative. TSH normal. HIV/RPR negative.  -Psych was consulted and believe  it is alcohol related.  -Continue risperdal.  -Remains free from restraints.  -Pending guardianship.    Normocytic anemia- (present on admission)  Assessment & Plan  -Very mild, no active bleeding.  -Check periodically.    Alcohol abuse- (present on admission)  Assessment & Plan  -Abstinent since admission.  -Continue daily PO vitamin therapy.     VTE prophylaxis: Madelin Aggarwal, MSN, RN, APRN, ACNPC-AG, CCRN  Nurse Practitioner, [facility]  [phone]    12/3/2020    2:39 PM

## 2020-12-03 NOTE — CARE PLAN
Problem: Safety  Goal: Will remain free from injury  Outcome: PROGRESSING AS EXPECTED  Note: Bed in lowest position, pt. Bed near nursing station, upper side rails up, bed alarm in place     Problem: Pain Management  Goal: Pain level will decrease to patient's comfort goal  Outcome: PROGRESSING AS EXPECTED  Note: Educated about the pain scale and non pharmacological pain methods, check the MAR

## 2020-12-04 PROCEDURE — A9270 NON-COVERED ITEM OR SERVICE: HCPCS | Performed by: HOSPITALIST

## 2020-12-04 PROCEDURE — 700102 HCHG RX REV CODE 250 W/ 637 OVERRIDE(OP): Performed by: HOSPITALIST

## 2020-12-04 PROCEDURE — A9270 NON-COVERED ITEM OR SERVICE: HCPCS | Performed by: INTERNAL MEDICINE

## 2020-12-04 PROCEDURE — 700102 HCHG RX REV CODE 250 W/ 637 OVERRIDE(OP): Performed by: INTERNAL MEDICINE

## 2020-12-04 PROCEDURE — A9270 NON-COVERED ITEM OR SERVICE: HCPCS | Performed by: NURSE PRACTITIONER

## 2020-12-04 PROCEDURE — 770006 HCHG ROOM/CARE - MED/SURG/GYN SEMI*

## 2020-12-04 PROCEDURE — 700102 HCHG RX REV CODE 250 W/ 637 OVERRIDE(OP): Performed by: NURSE PRACTITIONER

## 2020-12-04 PROCEDURE — 700111 HCHG RX REV CODE 636 W/ 250 OVERRIDE (IP): Performed by: HOSPITALIST

## 2020-12-04 PROCEDURE — 700101 HCHG RX REV CODE 250: Performed by: HOSPITALIST

## 2020-12-04 RX ADMIN — RISPERIDONE 1 MG: 1 TABLET, FILM COATED ORAL at 18:35

## 2020-12-04 RX ADMIN — Medication 100 MG: at 04:44

## 2020-12-04 RX ADMIN — ASPIRIN 325 MG ORAL TABLET 325 MG: 325 PILL ORAL at 04:44

## 2020-12-04 RX ADMIN — MICONAZOLE NITRATE: 20 CREAM TOPICAL at 04:45

## 2020-12-04 RX ADMIN — RISPERIDONE 1 MG: 1 TABLET, FILM COATED ORAL at 04:44

## 2020-12-04 RX ADMIN — POLYETHYLENE GLYCOL 3350 1 PACKET: 17 POWDER, FOR SOLUTION ORAL at 04:45

## 2020-12-04 RX ADMIN — THERA TABS 1 TABLET: TAB at 04:44

## 2020-12-04 RX ADMIN — ENOXAPARIN SODIUM 40 MG: 40 INJECTION SUBCUTANEOUS at 18:35

## 2020-12-04 RX ADMIN — DOCUSATE SODIUM 50 MG AND SENNOSIDES 8.6 MG 2 TABLET: 8.6; 5 TABLET, FILM COATED ORAL at 04:44

## 2020-12-04 RX ADMIN — TAMSULOSIN HYDROCHLORIDE 0.8 MG: 0.4 CAPSULE ORAL at 09:54

## 2020-12-04 ASSESSMENT — PAIN SCALES - PAIN ASSESSMENT IN ADVANCED DEMENTIA (PAINAD)
FACIALEXPRESSION: SMILING OR INEXPRESSIVE
TOTALSCORE: 0
BREATHING: NORMAL
BODYLANGUAGE: RELAXED
CONSOLABILITY: NO NEED TO CONSOLE

## 2020-12-04 NOTE — CARE PLAN
Problem: Safety  Goal: Will remain free from injury  Outcome: PROGRESSING AS EXPECTED  Note: Treaded socks in place,  Side rails up x2.  Bed in low, locked position.  Call light within reach, Patient able to call  appropriately.      Problem: Pain Management  Goal: Pain level will decrease to patient's comfort goal  Outcome: PROGRESSING AS EXPECTED  Flowsheets  Taken 12/3/2020 0920 by Zain Chappell R.N.  Comfort Goal:   4   Comfort with Movement  Pain Rating Scale (NPRS): 2  Taken 12/2/2020 1015 by Zain Chappell RBHUPINDER.  Non Verbal Scale: Calm  Note:  Patient's pain is well controlled at this time.  Patient is sleeping comfortably with no S/S if distress or uncontrolled pain present.

## 2020-12-05 PROCEDURE — A9270 NON-COVERED ITEM OR SERVICE: HCPCS | Performed by: INTERNAL MEDICINE

## 2020-12-05 PROCEDURE — 770006 HCHG ROOM/CARE - MED/SURG/GYN SEMI*

## 2020-12-05 PROCEDURE — 700102 HCHG RX REV CODE 250 W/ 637 OVERRIDE(OP): Performed by: INTERNAL MEDICINE

## 2020-12-05 PROCEDURE — A9270 NON-COVERED ITEM OR SERVICE: HCPCS | Performed by: HOSPITALIST

## 2020-12-05 PROCEDURE — 700102 HCHG RX REV CODE 250 W/ 637 OVERRIDE(OP): Performed by: HOSPITALIST

## 2020-12-05 PROCEDURE — 700111 HCHG RX REV CODE 636 W/ 250 OVERRIDE (IP): Performed by: HOSPITALIST

## 2020-12-05 PROCEDURE — A9270 NON-COVERED ITEM OR SERVICE: HCPCS | Performed by: NURSE PRACTITIONER

## 2020-12-05 PROCEDURE — 700102 HCHG RX REV CODE 250 W/ 637 OVERRIDE(OP): Performed by: NURSE PRACTITIONER

## 2020-12-05 RX ADMIN — TAMSULOSIN HYDROCHLORIDE 0.8 MG: 0.4 CAPSULE ORAL at 10:16

## 2020-12-05 RX ADMIN — RISPERIDONE 1 MG: 1 TABLET, FILM COATED ORAL at 05:00

## 2020-12-05 RX ADMIN — THERA TABS 1 TABLET: TAB at 05:00

## 2020-12-05 RX ADMIN — ASPIRIN 325 MG ORAL TABLET 325 MG: 325 PILL ORAL at 05:00

## 2020-12-05 RX ADMIN — DOCUSATE SODIUM 50 MG AND SENNOSIDES 8.6 MG 2 TABLET: 8.6; 5 TABLET, FILM COATED ORAL at 17:50

## 2020-12-05 RX ADMIN — RISPERIDONE 1 MG: 1 TABLET, FILM COATED ORAL at 17:50

## 2020-12-05 RX ADMIN — Medication 100 MG: at 05:00

## 2020-12-05 RX ADMIN — ENOXAPARIN SODIUM 40 MG: 40 INJECTION SUBCUTANEOUS at 17:50

## 2020-12-05 RX ADMIN — DOCUSATE SODIUM 50 MG AND SENNOSIDES 8.6 MG 2 TABLET: 8.6; 5 TABLET, FILM COATED ORAL at 05:00

## 2020-12-05 NOTE — PROGRESS NOTES
Received bedside report from day shift nurse. Patient resting in bed, call light and personal belongings within reach, bed in the low and locked position with upper side rails up. Assessment complete, vital signs stable, all needs met at this time. Hourly rounding in place.

## 2020-12-05 NOTE — PROGRESS NOTES
[facility] Medicine Twice Weekly Progress Note    Date of Service  12/6/2020    Chief Complaint  Altered mental status    Hospital Course  Mr. Blake is a 50-year-old male who was brought to the emergency department on 9/9/2020 with altered mental status after he was found by his ex-wife to be obtunded after she had checked on him when he had not been seen for at least 2 days.  At that time he was alert and oriented x0 and found to be hypotensive.  GCS in the ED was 7 and he was severely hypotensive.  He was intubated for airway protection and central venous access was obtained for administration of high-volume crystalloid resuscitation and vasopressor therapy.  He had a significant leukocytosis with a WBC of 22.9 and was hypokalemic with a potassium level of 2.7.  Additionally he had acute kidney injury with a creatinine of 2.73.  His INR was 8.93 and he also had a lactate level of 11.  Urinalysis was performed and was negative for urinary tract infection.  He was diagnosed with septic shock and admitted to the intensive care unit for further evaluation and treatment.  A lumbar puncture was done on 9/9/2020.  He was also noted to have seizure-like activity on 9/10/2020 so an EEG was obtained and was significant for a moderate encephalopathy.  Extubation occurred on 9/11/2020.  He also had a traumatic catheter removal so on 9/21/2020 urology was consulted but was ultimately unnecessary.  On 9/13/2020 he was transferred out of the ICU where he required restraints due to confusion, hallucination, agitation, and impulsivity.  An MRI was performed and was negative for acute pathologies.  He was also treated for a UTI starting 10/2/2020.  He has also fallen multiple times in hospital.  Additionally, on 11/11/2020 he had an acute episode of sinus tachycardia in the 140s accompanied by hypotension.  There was concern for STEMI so cardiology was consulted.  A 12-lead EKG was obtained and was concerning for atrial  flutter.  The patient received metoprolol after which a repeat ECG showed sinus tachycardia.  A D-dimer was obtained and was elevated for which a CT chest was done that was negative for pulmonary embolism.  Cardiology subsequently signed off.  He has remained in the hospital for a prolonged period of time and is now pending guardianship and then will need placement.  Guardianship hearing is tentatively scheduled for 12/18/2020.    Interval Problem Update  VSS  Patient fell getting OOB this a.m.  RN has 7 patients and couldn't get to the room in time. Bed alarm was on. Found sitting on the floor  A/O x 1 at baseline      Consultants/Specialty  Critical care while in ICU  Psychiatry  Cardiology  Urology    Code Status  Full Code    Disposition  Pending guardianship and then placement.  Guardianship hearing scheduled for 12/18/2020.    Review of Systems  ROS   Physical Exam  Temp:  [36.4 °C (97.5 °F)-37.1 °C (98.7 °F)] 36.9 °C (98.5 °F)  Pulse:  [76-92] 92  Resp:  [16-18] 18  BP: (104-118)/(61-83) 118/82  SpO2:  [95 %-100 %] 99 %    Physical Exam  HENT:      Head: Atraumatic.      Nose: Nose normal.      Mouth/Throat:      Mouth: Mucous membranes are moist.   Eyes:      Pupils: Pupils are equal, round, and reactive to light.   Cardiovascular:      Rate and Rhythm: Normal rate and regular rhythm.      Heart sounds: No murmur. No friction rub.   Pulmonary:      Effort: Pulmonary effort is normal. No respiratory distress.      Breath sounds: Normal breath sounds.   Abdominal:      General: Abdomen is flat.      Palpations: Abdomen is soft.   Skin:     General: Skin is warm and dry.      Capillary Refill: Capillary refill takes 2 to 3 seconds.   Neurological:      Mental Status: He is alert.      Comments: Foot drop at L; limited ROM at R ankle   Psychiatric:         Attention and Perception: Attention normal.         Mood and Affect: Mood normal.         Speech: Speech normal.         Behavior: Behavior normal.          Thought Content: Thought content is delusional.         Cognition and Memory: Cognition is impaired.         Judgment: Judgment is impulsive.     Fluids    Intake/Output Summary (Last 24 hours) at 12/6/2020 0755  Last data filed at 12/5/2020 1300  Gross per 24 hour   Intake no documentation   Output 1000 ml   Net -1000 ml     Laboratory    Imaging     Assessment/Plan  * Toxic encephalopathy- (present on admission)  Assessment & Plan  -Unknown etiology - hypoxic induced brain injury vs Wernicke encephalopathy.  -LP, MRI brain, EEG negative. TSH normal. HIV/RPR negative.  -Psych was consulted and believe it is alcohol related.  -Continue risperdal.  -Remains free from restraints.  -Pending guardianship.    Normocytic anemia- (present on admission)  Assessment & Plan  -Very mild, no active bleeding.  -Check periodically.    Alcohol abuse- (present on admission)  Assessment & Plan  -Abstinent since admission.  -Continue daily PO vitamin therapy.     VTE prophylaxis: Lovenox    I have performed a physical exam and reviewed and updated ROS and Plan today (12/6/2020). In review of previous notes, there are no changes except as documented above.

## 2020-12-05 NOTE — PROGRESS NOTES
"Pt upright in bed ( HOB >30) sleeping intermittently. Pt calm, cooperative, affect appropriate.  RN reviewed POC which is for continued discharge planning w/ case mgt. Pt is oriented to self / does not retain instructions. RN \"attempted\" EDU with \"I.S.\" reinforcement needed. Fall and safety precautions in place, bed alarm on. Pt does not uses call light consistently - RN charting by pt opened door for safety. Pt has refused SCDs but has Lovenox and ASA for VTE. RN and CNA have collaborated for Q 2 turns ( max assist). Hourly rounds ongoing, call light w/in reach.   "

## 2020-12-06 PROCEDURE — 700111 HCHG RX REV CODE 636 W/ 250 OVERRIDE (IP): Performed by: HOSPITALIST

## 2020-12-06 PROCEDURE — A9270 NON-COVERED ITEM OR SERVICE: HCPCS | Performed by: HOSPITALIST

## 2020-12-06 PROCEDURE — A9270 NON-COVERED ITEM OR SERVICE: HCPCS | Performed by: INTERNAL MEDICINE

## 2020-12-06 PROCEDURE — 700102 HCHG RX REV CODE 250 W/ 637 OVERRIDE(OP): Performed by: NURSE PRACTITIONER

## 2020-12-06 PROCEDURE — A9270 NON-COVERED ITEM OR SERVICE: HCPCS | Performed by: NURSE PRACTITIONER

## 2020-12-06 PROCEDURE — 700102 HCHG RX REV CODE 250 W/ 637 OVERRIDE(OP): Performed by: HOSPITALIST

## 2020-12-06 PROCEDURE — 700102 HCHG RX REV CODE 250 W/ 637 OVERRIDE(OP): Performed by: INTERNAL MEDICINE

## 2020-12-06 PROCEDURE — 99231 SBSQ HOSP IP/OBS SF/LOW 25: CPT | Performed by: NURSE PRACTITIONER

## 2020-12-06 PROCEDURE — 770006 HCHG ROOM/CARE - MED/SURG/GYN SEMI*

## 2020-12-06 RX ADMIN — ENOXAPARIN SODIUM 40 MG: 40 INJECTION SUBCUTANEOUS at 18:41

## 2020-12-06 RX ADMIN — DOCUSATE SODIUM 50 MG AND SENNOSIDES 8.6 MG 2 TABLET: 8.6; 5 TABLET, FILM COATED ORAL at 04:28

## 2020-12-06 RX ADMIN — TAMSULOSIN HYDROCHLORIDE 0.8 MG: 0.4 CAPSULE ORAL at 08:30

## 2020-12-06 RX ADMIN — Medication 100 MG: at 04:28

## 2020-12-06 RX ADMIN — ACETAMINOPHEN 650 MG: 325 TABLET, FILM COATED ORAL at 19:59

## 2020-12-06 RX ADMIN — THERA TABS 1 TABLET: TAB at 04:28

## 2020-12-06 RX ADMIN — ASPIRIN 325 MG ORAL TABLET 325 MG: 325 PILL ORAL at 04:28

## 2020-12-06 RX ADMIN — RISPERIDONE 1 MG: 1 TABLET, FILM COATED ORAL at 18:40

## 2020-12-06 RX ADMIN — DOCUSATE SODIUM 50 MG AND SENNOSIDES 8.6 MG 2 TABLET: 8.6; 5 TABLET, FILM COATED ORAL at 18:39

## 2020-12-06 RX ADMIN — ACETAMINOPHEN 650 MG: 325 TABLET, FILM COATED ORAL at 08:30

## 2020-12-06 RX ADMIN — RISPERIDONE 1 MG: 1 TABLET, FILM COATED ORAL at 04:27

## 2020-12-06 NOTE — PROGRESS NOTES
Patient is A&Ox1, impulsive, and requires frequent reorientation. Around 630am, this RN's orientee rounded on patient as she attempted to place condom cath, which patient refused. This RN's orienteeJanina reported that patient was pointing to bathroom, and insisted that the bathroom was his kitchen and he needed to get his breakfast. Patient was educated multiple times that breakfast is to arrive around 8am. Snacks were offered to patient, which patient declined.     At around 645am, this RN arrived to patients room after hearing bed alarm sounding. This RN went to check on patient, upon arrival patient found on ground. No noticeable signs of injury, patient reports no pain at this time. Patient was helped back to bed by this RN, and MUSTAPHA Brenner.     Charge RNUrsula Lake, Pharmacist Sharonda, and TAMMY Shin were all notified of patient's fall. Received new orders from TAMMY Shin to initiate lap belt restraint. No next of kin could be contacted regarding patient's fall, as patient is pending court hearing for guardianship.     Seizure precautions in place. Bed locked and in lowest position. Treaded socks in place. Call light and personal belongings within reach. Bed alarm active and working. 3 bed rails up. Lap belt in place. Report given to day shift RNLucy.

## 2020-12-06 NOTE — CARE PLAN
Problem: Safety  Goal: Will remain free from injury  Note: Fall precautions in place. Waffle cushion in use.q2hr turns. Bed alarm activated. Bed locked and placed in lowest position. Call light at the bedside. Personal belongings within reach. Hourly monitoring in place. Patient comfortably resting in bed, will continue to monitor.       Problem: Infection  Goal: Will remain free from infection  Note: Assess for signs and symptoms of infection. Pt afebrile.

## 2020-12-07 PROCEDURE — 700102 HCHG RX REV CODE 250 W/ 637 OVERRIDE(OP): Performed by: INTERNAL MEDICINE

## 2020-12-07 PROCEDURE — 770006 HCHG ROOM/CARE - MED/SURG/GYN SEMI*

## 2020-12-07 PROCEDURE — 700102 HCHG RX REV CODE 250 W/ 637 OVERRIDE(OP): Performed by: HOSPITALIST

## 2020-12-07 PROCEDURE — A9270 NON-COVERED ITEM OR SERVICE: HCPCS | Performed by: INTERNAL MEDICINE

## 2020-12-07 PROCEDURE — 700102 HCHG RX REV CODE 250 W/ 637 OVERRIDE(OP): Performed by: NURSE PRACTITIONER

## 2020-12-07 PROCEDURE — A9270 NON-COVERED ITEM OR SERVICE: HCPCS | Performed by: HOSPITALIST

## 2020-12-07 PROCEDURE — A9270 NON-COVERED ITEM OR SERVICE: HCPCS | Performed by: NURSE PRACTITIONER

## 2020-12-07 PROCEDURE — 700111 HCHG RX REV CODE 636 W/ 250 OVERRIDE (IP): Performed by: HOSPITALIST

## 2020-12-07 RX ADMIN — ACETAMINOPHEN 650 MG: 325 TABLET, FILM COATED ORAL at 04:54

## 2020-12-07 RX ADMIN — Medication 100 MG: at 04:55

## 2020-12-07 RX ADMIN — RISPERIDONE 1 MG: 1 TABLET, FILM COATED ORAL at 04:55

## 2020-12-07 RX ADMIN — ENOXAPARIN SODIUM 40 MG: 40 INJECTION SUBCUTANEOUS at 16:21

## 2020-12-07 RX ADMIN — RISPERIDONE 1 MG: 1 TABLET, FILM COATED ORAL at 16:21

## 2020-12-07 RX ADMIN — DOCUSATE SODIUM 50 MG AND SENNOSIDES 8.6 MG 2 TABLET: 8.6; 5 TABLET, FILM COATED ORAL at 16:21

## 2020-12-07 RX ADMIN — ASPIRIN 325 MG ORAL TABLET 325 MG: 325 PILL ORAL at 04:54

## 2020-12-07 RX ADMIN — THERA TABS 1 TABLET: TAB at 04:55

## 2020-12-07 RX ADMIN — DOCUSATE SODIUM 50 MG AND SENNOSIDES 8.6 MG 2 TABLET: 8.6; 5 TABLET, FILM COATED ORAL at 04:55

## 2020-12-07 RX ADMIN — TAMSULOSIN HYDROCHLORIDE 0.8 MG: 0.4 CAPSULE ORAL at 08:11

## 2020-12-07 NOTE — DISCHARGE PLANNING
Anticipated Discharge Disposition: TBD     Action: Pt pending Gaurdianship, hearing scheduled 12/18, will need cog eval the week of court date, preferrably 12/14 or 12/15     Barriers to Discharge: pending guardianship     Plan: f/u with medical team, guardianship process

## 2020-12-08 PROCEDURE — 700102 HCHG RX REV CODE 250 W/ 637 OVERRIDE(OP): Performed by: HOSPITALIST

## 2020-12-08 PROCEDURE — A9270 NON-COVERED ITEM OR SERVICE: HCPCS | Performed by: INTERNAL MEDICINE

## 2020-12-08 PROCEDURE — A9270 NON-COVERED ITEM OR SERVICE: HCPCS | Performed by: HOSPITALIST

## 2020-12-08 PROCEDURE — 770006 HCHG ROOM/CARE - MED/SURG/GYN SEMI*

## 2020-12-08 PROCEDURE — 700102 HCHG RX REV CODE 250 W/ 637 OVERRIDE(OP): Performed by: INTERNAL MEDICINE

## 2020-12-08 PROCEDURE — 700102 HCHG RX REV CODE 250 W/ 637 OVERRIDE(OP): Performed by: NURSE PRACTITIONER

## 2020-12-08 PROCEDURE — A9270 NON-COVERED ITEM OR SERVICE: HCPCS | Performed by: NURSE PRACTITIONER

## 2020-12-08 PROCEDURE — 700111 HCHG RX REV CODE 636 W/ 250 OVERRIDE (IP): Performed by: HOSPITALIST

## 2020-12-08 RX ADMIN — DOCUSATE SODIUM 50 MG AND SENNOSIDES 8.6 MG 2 TABLET: 8.6; 5 TABLET, FILM COATED ORAL at 16:49

## 2020-12-08 RX ADMIN — DOCUSATE SODIUM 50 MG AND SENNOSIDES 8.6 MG 2 TABLET: 8.6; 5 TABLET, FILM COATED ORAL at 04:39

## 2020-12-08 RX ADMIN — ENOXAPARIN SODIUM 40 MG: 40 INJECTION SUBCUTANEOUS at 16:49

## 2020-12-08 RX ADMIN — RISPERIDONE 1 MG: 1 TABLET, FILM COATED ORAL at 16:49

## 2020-12-08 RX ADMIN — ASPIRIN 325 MG ORAL TABLET 325 MG: 325 PILL ORAL at 04:39

## 2020-12-08 RX ADMIN — RISPERIDONE 1 MG: 1 TABLET, FILM COATED ORAL at 04:39

## 2020-12-08 RX ADMIN — THERA TABS 1 TABLET: TAB at 04:39

## 2020-12-08 RX ADMIN — Medication 100 MG: at 04:39

## 2020-12-08 RX ADMIN — TAMSULOSIN HYDROCHLORIDE 0.8 MG: 0.4 CAPSULE ORAL at 08:23

## 2020-12-08 NOTE — PROGRESS NOTES
Pharmacy Pharmacotherapy Consult for LOS >30 days    Admit Date: 9/9/2020      Medications were reviewed for appropriateness and ongoing need.     Current Facility-Administered Medications   Medication Dose Route Frequency Provider Last Rate Last Admin   • aspirin (ASA) tablet 325 mg  325 mg Oral DAILY JAVIER Haile.P.R.N.   325 mg at 12/08/20 0439   • multivitamin (THERAGRAN) tablet 1 Tab  1 Tab Oral DAILY Glynn Encarnacion M.D.   1 Tab at 12/08/20 0439   • risperiDONE (RISPERDAL) tablet 1 mg  1 mg Oral BID Akanksha Martinez M.D.   1 mg at 12/08/20 0439   • thiamine tablet 100 mg  100 mg Oral DAILY Gisele Barnes M.D.   100 mg at 12/08/20 0439   • tamsulosin (FLOMAX) capsule 0.8 mg  0.8 mg Oral AFTER BREAKFAST Gisele Barnes M.D.   0.8 mg at 12/08/20 0823   • enoxaparin (LOVENOX) inj 40 mg  40 mg Subcutaneous Q EVENING Gisele Barnes M.D.   40 mg at 12/07/20 1621   • acetaminophen (TYLENOL) tablet 650 mg  650 mg Oral Q6HRS PRN Ella Nunes A.P.R.N.   650 mg at 12/07/20 0454   • senna-docusate (PERICOLACE or SENOKOT S) 8.6-50 MG per tablet 2 Tab  2 Tab Oral BID Gisele Barnes M.D.   2 Tab at 12/08/20 0439    And   • polyethylene glycol/lytes (MIRALAX) PACKET 1 Packet  1 Packet Oral QDAY PRN Gisele Barnes M.D.   1 Packet at 12/04/20 0445       Recommendations:  No recommendations at this time. All medications appropriate and with use in the last 2 weeks    Meagan Galaviz, PharmD, BCPS

## 2020-12-09 PROCEDURE — 700102 HCHG RX REV CODE 250 W/ 637 OVERRIDE(OP): Performed by: HOSPITALIST

## 2020-12-09 PROCEDURE — A9270 NON-COVERED ITEM OR SERVICE: HCPCS | Performed by: NURSE PRACTITIONER

## 2020-12-09 PROCEDURE — A9270 NON-COVERED ITEM OR SERVICE: HCPCS | Performed by: HOSPITALIST

## 2020-12-09 PROCEDURE — 700111 HCHG RX REV CODE 636 W/ 250 OVERRIDE (IP): Performed by: HOSPITALIST

## 2020-12-09 PROCEDURE — 700102 HCHG RX REV CODE 250 W/ 637 OVERRIDE(OP): Performed by: NURSE PRACTITIONER

## 2020-12-09 PROCEDURE — 770006 HCHG ROOM/CARE - MED/SURG/GYN SEMI*

## 2020-12-09 RX ADMIN — ASPIRIN 325 MG ORAL TABLET 325 MG: 325 PILL ORAL at 05:46

## 2020-12-09 RX ADMIN — DOCUSATE SODIUM 50 MG AND SENNOSIDES 8.6 MG 2 TABLET: 8.6; 5 TABLET, FILM COATED ORAL at 18:19

## 2020-12-09 RX ADMIN — TAMSULOSIN HYDROCHLORIDE 0.8 MG: 0.4 CAPSULE ORAL at 09:09

## 2020-12-09 RX ADMIN — Medication 100 MG: at 05:46

## 2020-12-09 RX ADMIN — RISPERIDONE 1 MG: 1 TABLET, FILM COATED ORAL at 18:18

## 2020-12-09 RX ADMIN — RISPERIDONE 1 MG: 1 TABLET, FILM COATED ORAL at 05:46

## 2020-12-09 RX ADMIN — ENOXAPARIN SODIUM 40 MG: 40 INJECTION SUBCUTANEOUS at 18:19

## 2020-12-09 RX ADMIN — THERA TABS 1 TABLET: TAB at 05:46

## 2020-12-10 PROCEDURE — 700102 HCHG RX REV CODE 250 W/ 637 OVERRIDE(OP): Performed by: HOSPITALIST

## 2020-12-10 PROCEDURE — A9270 NON-COVERED ITEM OR SERVICE: HCPCS | Performed by: NURSE PRACTITIONER

## 2020-12-10 PROCEDURE — A9270 NON-COVERED ITEM OR SERVICE: HCPCS | Performed by: HOSPITALIST

## 2020-12-10 PROCEDURE — 700111 HCHG RX REV CODE 636 W/ 250 OVERRIDE (IP): Performed by: HOSPITALIST

## 2020-12-10 PROCEDURE — 770006 HCHG ROOM/CARE - MED/SURG/GYN SEMI*

## 2020-12-10 PROCEDURE — 700102 HCHG RX REV CODE 250 W/ 637 OVERRIDE(OP): Performed by: NURSE PRACTITIONER

## 2020-12-10 PROCEDURE — 99231 SBSQ HOSP IP/OBS SF/LOW 25: CPT | Performed by: NURSE PRACTITIONER

## 2020-12-10 RX ADMIN — Medication 100 MG: at 05:45

## 2020-12-10 RX ADMIN — TAMSULOSIN HYDROCHLORIDE 0.8 MG: 0.4 CAPSULE ORAL at 10:25

## 2020-12-10 RX ADMIN — ENOXAPARIN SODIUM 40 MG: 40 INJECTION SUBCUTANEOUS at 17:18

## 2020-12-10 RX ADMIN — RISPERIDONE 1 MG: 1 TABLET, FILM COATED ORAL at 17:18

## 2020-12-10 RX ADMIN — DOCUSATE SODIUM 50 MG AND SENNOSIDES 8.6 MG 2 TABLET: 8.6; 5 TABLET, FILM COATED ORAL at 05:45

## 2020-12-10 RX ADMIN — DOCUSATE SODIUM 50 MG AND SENNOSIDES 8.6 MG 2 TABLET: 8.6; 5 TABLET, FILM COATED ORAL at 17:17

## 2020-12-10 RX ADMIN — RISPERIDONE 1 MG: 1 TABLET, FILM COATED ORAL at 05:45

## 2020-12-10 RX ADMIN — ASPIRIN 325 MG ORAL TABLET 325 MG: 325 PILL ORAL at 05:45

## 2020-12-10 RX ADMIN — THERA TABS 1 TABLET: TAB at 05:45

## 2020-12-10 NOTE — PROGRESS NOTES
I ordered a speech cog evaluation to be completed on 12/14/2020 per request from case management.

## 2020-12-10 NOTE — DISCHARGE PLANNING
Anticipated Discharge Disposition: Guardianship then [facility] placement.    Action: Guardianship hearing scheduled for 12/18/20.     Barriers to Discharge: Awaiting guardianship and [facility]    Plan:Cont to follow for guardianship and eventual [facility] placement.

## 2020-12-10 NOTE — PROGRESS NOTES
Assumed care from 0645-1915.  Pt A/Ox1, denies pain, VSS. Pt with good PO intake. No questions or concern at this time.  Bed in lowest position with call light within reach.  Will continue to monitor.

## 2020-12-10 NOTE — PROGRESS NOTES
[facility] Medicine Twice Weekly Progress Note    Date of Service  12/10/2020    Chief Complaint  Altered mental status    Hospital Course  Mr. Blake is a 50-year-old male who was brought to the emergency department on 9/9/2020 with altered mental status after he was found by his ex-wife to be obtunded after she had checked on him when he had not been seen for at least 2 days.  At that time he was alert and oriented x0 and found to be hypotensive.  GCS in the ED was 7 and he was severely hypotensive.  He was intubated for airway protection and central venous access was obtained for administration of high-volume crystalloid resuscitation and vasopressor therapy.     He had a significant leukocytosis with a WBC of 22.9 and was hypokalemic with a potassium level of 2.7.  Additionally he had acute kidney injury with a creatinine of 2.73.  His INR was 8.93 and he also had a lactate level of 11.  Urinalysis was performed and was negative for urinary tract infection.  He was diagnosed with septic shock and admitted to the intensive care unit for further evaluation and treatment.  A lumbar puncture was done on 9/9/2020.  He was also noted to have seizure-like activity on 9/10/2020 so an EEG was obtained and was significant for a moderate encephalopathy.    He was extubated 9/11/2020.  He also had a traumatic catheter removal so on 9/21/2020 urology was consulted but was ultimately unnecessary.  On 9/13/2020 he was transferred out of the ICU where he required restraints due to confusion, hallucination, agitation, and impulsivity.  An MRI was performed and was negative for acute pathologies.  He was also treated for a UTI starting 10/2/2020.  He has also fallen multiple times in hospital.    On 11/11/2020 he had an acute episode of sinus tachycardia in the 140s accompanied by hypotension. There was concern for STEMI so cardiology was consulted.  A 12-lead EKG was obtained and was concerning for atrial flutter.  The  patient received metoprolol after which a repeat ECG showed sinus tachycardia.  A D-dimer was obtained and was elevated for which a CT chest was done that was negative for pulmonary embolism.  Cardiology subsequently signed off.  He has remained in the hospital for a prolonged period of time and is now pending guardianship and then will need placement.    He sustained an unwitnessed fall on 12/6 - found sitting on the floor by nursing staff. Reportedly no injuries.     Guardianship hearing is tentatively scheduled for 12/18/2020.    Interval Problem Update  -sleeping, wakens easily. Offers no complaints other than wanting to be left alone. Oriented to himself at baseline    Code Status  Full Code    Disposition  Pending guardianship and then placement.  Guardianship hearing scheduled for 12/18/2020.    Review of Systems  Review of Systems   Unable to perform ROS: Mental acuity        Physical Exam  Temp:  [36.1 °C (96.9 °F)-36.8 °C (98.2 °F)] 36.6 °C (97.8 °F)  Pulse:  [68-94] 82  Resp:  [16-18] 16  BP: (108-117)/(67-77) 111/76  SpO2:  [92 %-96 %] 92 %    Physical Exam  Vitals signs and nursing note reviewed.   Constitutional:       General: He is sleeping. He is not in acute distress.     Appearance: He is well-developed. He is not ill-appearing.   HENT:      Head: Normocephalic.      Right Ear: Hearing normal.      Left Ear: Hearing normal.      Nose: Nose normal.      Mouth/Throat:      Lips: Pink.      Mouth: Mucous membranes are moist.   Eyes:      Conjunctiva/sclera: Conjunctivae normal.      Pupils: Pupils are equal, round, and reactive to light.   Neck:      Musculoskeletal: Normal range of motion and neck supple.   Cardiovascular:      Rate and Rhythm: Normal rate and regular rhythm.      Pulses: Normal pulses.      Heart sounds: Normal heart sounds.   Pulmonary:      Effort: Pulmonary effort is normal.      Breath sounds: Normal breath sounds.   Abdominal:      General: Bowel sounds are normal. There is no  distension or abdominal bruit.      Palpations: Abdomen is soft.      Tenderness: There is no abdominal tenderness. There is no guarding or rebound.   Musculoskeletal: Normal range of motion.      Right lower leg: No edema.      Left lower leg: No edema.   Skin:     General: Skin is warm and dry.   Neurological:      General: No focal deficit present.      Mental Status: He is easily aroused. He is disoriented.   Psychiatric:         Attention and Perception: Attention normal.         Mood and Affect: Mood normal.         Speech: Speech normal.         Behavior: Behavior normal. Behavior is cooperative.         Thought Content: Thought content normal.         Fluids    Intake/Output Summary (Last 24 hours) at 12/10/2020 1448  Last data filed at 12/9/2020 1824  Gross per 24 hour   Intake 240 ml   Output 900 ml   Net -660 ml       Laboratory                        Imaging  CT-CTA CHEST PULMONARY ARTERY W/ RECONS   Final Result      1.  No evidence of pulmonary emboli.   2.  Right lower lobe discoid atelectasis.   3.  Gynecomastia.            DX-CHEST-PORTABLE (1 VIEW)   Final Result      No acute cardiac or pulmonary abnormalities are identified.      MR-BRAIN-W/O   Final Result      1.  Moderate diffuse cerebral atrophy.      2.  Minimal periventricular and juxtacortical white matter changes consistent with chronic microvascular ischemic gliosis.      3.  No evidence of acute infarction in the brain parenchyma.      DX-ABDOMEN FOR TUBE PLACEMENT   Final Result      Feeding tube tip overlies the second portion of the duodenum.      DX-CHEST-PORTABLE (1 VIEW)   Final Result         1.  Patchy left lung base opacity, new since prior, could represent early infiltrate            DX-CHEST-PORTABLE (1 VIEW)   Final Result         1.  Patchy left lung base opacity, new since prior, could represent early infiltrate         [id number] VIEW   Final Result      Enteric tube projects over the distal stomach/pylorus.          EC-ECHOCARDIOGRAM COMPLETE W/O CONT   Final Result      DX-CHEST-PORTABLE (1 VIEW)   Final Result         1.  No acute cardiopulmonary disease.      CT-ABDOMEN-PELVIS W/O   Final Result      1.  No renal stone or hydronephrosis.   2.  Normal appendix.   3.  No focal mesenteric inflammatory process.      DX-ABDOMEN FOR TUBE PLACEMENT   Final Result      NG tube tip projects over expected location the gastric fundus.      US-ABDOMEN COMPLETE SURVEY   Final Result         1.  Echogenic liver compatible with fatty change versus fibrosis.   2.  Gallbladder sludge without additional sonographic findings of cholecystitis.   3.  Partial atrophic bilateral kidneys with lobulated contour         CT-HEAD W/O   Final Result         1.  No acute intracranial abnormality is identified, there are nonspecific white matter changes, commonly associated with small vessel ischemic disease.  Associated mild cerebral atrophy is noted.   2.  Atherosclerosis.      DX-CHEST-PORTABLE (1 VIEW)   Final Result         1.  No acute cardiopulmonary disease.           Assessment/Plan  * Toxic encephalopathy- (present on admission)  Assessment & Plan  -Unknown etiology - hypoxic induced brain injury vs Wernicke encephalopathy.  -LP, MRI brain, EEG negative. TSH normal. HIV/RPR negative.  -Psych was consulted and believe it is alcohol related.  -Continue risperdal.  -Remains free from restraints.  -Pending guardianship - hearing 12/18 - will need SLP cog eval 12/14 or 12/15    Normocytic anemia- (present on admission)  Assessment & Plan  -Very mild, no active bleeding.  -Check periodically.    Alcohol abuse- (present on admission)  Assessment & Plan  -Abstinent since admission.  -Continue daily PO vitamin therapy.       VTE prophylaxis: Lovenox    I have performed a physical exam and reviewed and updated ROS and Assessment/Plan today (12/10/20). In review of the previous note there are no changes except as documented above    I certify the patient  requires continued medically necessary hospital services for the treatment of cognitive impairment, falls, inability to care for himself.  The patient will remain in the hospital for the foreseeable future.  Discharge may or may not occur in the next 20 days due to ongoing discharge delays due to having no medically acceptable discharge options.    Please note that this dictation was created using voice recognition software. I have made every reasonable attempt to correct obvious errors, but there may be errors of grammar and possibly content that I did not discover before finalizing the note.    TRACE Villagran.

## 2020-12-11 PROCEDURE — A9270 NON-COVERED ITEM OR SERVICE: HCPCS | Performed by: HOSPITALIST

## 2020-12-11 PROCEDURE — 700102 HCHG RX REV CODE 250 W/ 637 OVERRIDE(OP): Performed by: NURSE PRACTITIONER

## 2020-12-11 PROCEDURE — 700111 HCHG RX REV CODE 636 W/ 250 OVERRIDE (IP): Performed by: HOSPITALIST

## 2020-12-11 PROCEDURE — A9270 NON-COVERED ITEM OR SERVICE: HCPCS | Performed by: NURSE PRACTITIONER

## 2020-12-11 PROCEDURE — 770006 HCHG ROOM/CARE - MED/SURG/GYN SEMI*

## 2020-12-11 PROCEDURE — 700102 HCHG RX REV CODE 250 W/ 637 OVERRIDE(OP): Performed by: HOSPITALIST

## 2020-12-11 PROCEDURE — 700101 HCHG RX REV CODE 250: Performed by: HOSPITALIST

## 2020-12-11 RX ADMIN — THERA TABS 1 TABLET: TAB at 05:19

## 2020-12-11 RX ADMIN — DOCUSATE SODIUM 50 MG AND SENNOSIDES 8.6 MG 2 TABLET: 8.6; 5 TABLET, FILM COATED ORAL at 16:35

## 2020-12-11 RX ADMIN — ASPIRIN 325 MG ORAL TABLET 325 MG: 325 PILL ORAL at 05:19

## 2020-12-11 RX ADMIN — DOCUSATE SODIUM 50 MG AND SENNOSIDES 8.6 MG 2 TABLET: 8.6; 5 TABLET, FILM COATED ORAL at 05:20

## 2020-12-11 RX ADMIN — RISPERIDONE 1 MG: 1 TABLET, FILM COATED ORAL at 16:35

## 2020-12-11 RX ADMIN — Medication 100 MG: at 05:21

## 2020-12-11 RX ADMIN — TAMSULOSIN HYDROCHLORIDE 0.8 MG: 0.4 CAPSULE ORAL at 09:07

## 2020-12-11 RX ADMIN — RISPERIDONE 1 MG: 1 TABLET, FILM COATED ORAL at 05:19

## 2020-12-11 RX ADMIN — POLYETHYLENE GLYCOL 3350 1 PACKET: 17 POWDER, FOR SOLUTION ORAL at 05:19

## 2020-12-11 RX ADMIN — ACETAMINOPHEN 650 MG: 325 TABLET, FILM COATED ORAL at 20:45

## 2020-12-11 RX ADMIN — ENOXAPARIN SODIUM 40 MG: 40 INJECTION SUBCUTANEOUS at 16:36

## 2020-12-11 NOTE — CARE PLAN
Problem: Discharge Barriers/Planning  Goal: Patient's continuum of care needs will be met  Outcome: PROGRESSING SLOWER THAN EXPECTED   Guardianship pending. Court hearing 12/18.    Problem: Pain Management  Goal: Pain level will decrease to patient's comfort goal  Outcome: PROGRESSING AS EXPECTED  Patient denies pain at this time.   Will continue to monitor and implement non-pharmacological methods PRN.

## 2020-12-12 PROCEDURE — 700102 HCHG RX REV CODE 250 W/ 637 OVERRIDE(OP): Performed by: NURSE PRACTITIONER

## 2020-12-12 PROCEDURE — A9270 NON-COVERED ITEM OR SERVICE: HCPCS | Performed by: NURSE PRACTITIONER

## 2020-12-12 PROCEDURE — 700102 HCHG RX REV CODE 250 W/ 637 OVERRIDE(OP): Performed by: HOSPITALIST

## 2020-12-12 PROCEDURE — A9270 NON-COVERED ITEM OR SERVICE: HCPCS | Performed by: HOSPITALIST

## 2020-12-12 PROCEDURE — 770006 HCHG ROOM/CARE - MED/SURG/GYN SEMI*

## 2020-12-12 PROCEDURE — 700111 HCHG RX REV CODE 636 W/ 250 OVERRIDE (IP): Performed by: HOSPITALIST

## 2020-12-12 RX ADMIN — RISPERIDONE 1 MG: 1 TABLET, FILM COATED ORAL at 04:53

## 2020-12-12 RX ADMIN — THERA TABS 1 TABLET: TAB at 04:47

## 2020-12-12 RX ADMIN — TAMSULOSIN HYDROCHLORIDE 0.8 MG: 0.4 CAPSULE ORAL at 08:49

## 2020-12-12 RX ADMIN — DOCUSATE SODIUM 50 MG AND SENNOSIDES 8.6 MG 2 TABLET: 8.6; 5 TABLET, FILM COATED ORAL at 04:47

## 2020-12-12 RX ADMIN — ASPIRIN 325 MG ORAL TABLET 325 MG: 325 PILL ORAL at 04:47

## 2020-12-12 RX ADMIN — ENOXAPARIN SODIUM 40 MG: 40 INJECTION SUBCUTANEOUS at 17:23

## 2020-12-12 RX ADMIN — Medication 100 MG: at 04:54

## 2020-12-12 RX ADMIN — RISPERIDONE 1 MG: 1 TABLET, FILM COATED ORAL at 17:23

## 2020-12-13 PROCEDURE — 700102 HCHG RX REV CODE 250 W/ 637 OVERRIDE(OP): Performed by: NURSE PRACTITIONER

## 2020-12-13 PROCEDURE — A9270 NON-COVERED ITEM OR SERVICE: HCPCS | Performed by: NURSE PRACTITIONER

## 2020-12-13 PROCEDURE — 99231 SBSQ HOSP IP/OBS SF/LOW 25: CPT | Performed by: NURSE PRACTITIONER

## 2020-12-13 PROCEDURE — 700102 HCHG RX REV CODE 250 W/ 637 OVERRIDE(OP): Performed by: HOSPITALIST

## 2020-12-13 PROCEDURE — 700111 HCHG RX REV CODE 636 W/ 250 OVERRIDE (IP): Performed by: HOSPITALIST

## 2020-12-13 PROCEDURE — 770006 HCHG ROOM/CARE - MED/SURG/GYN SEMI*

## 2020-12-13 PROCEDURE — A9270 NON-COVERED ITEM OR SERVICE: HCPCS | Performed by: HOSPITALIST

## 2020-12-13 RX ADMIN — THERA TABS 1 TABLET: TAB at 05:03

## 2020-12-13 RX ADMIN — RISPERIDONE 1 MG: 1 TABLET, FILM COATED ORAL at 17:22

## 2020-12-13 RX ADMIN — ENOXAPARIN SODIUM 40 MG: 40 INJECTION SUBCUTANEOUS at 17:23

## 2020-12-13 RX ADMIN — TAMSULOSIN HYDROCHLORIDE 0.8 MG: 0.4 CAPSULE ORAL at 08:29

## 2020-12-13 RX ADMIN — RISPERIDONE 1 MG: 1 TABLET, FILM COATED ORAL at 05:04

## 2020-12-13 RX ADMIN — DOCUSATE SODIUM 50 MG AND SENNOSIDES 8.6 MG 2 TABLET: 8.6; 5 TABLET, FILM COATED ORAL at 17:22

## 2020-12-13 RX ADMIN — DOCUSATE SODIUM 50 MG AND SENNOSIDES 8.6 MG 2 TABLET: 8.6; 5 TABLET, FILM COATED ORAL at 05:03

## 2020-12-13 RX ADMIN — ASPIRIN 325 MG ORAL TABLET 325 MG: 325 PILL ORAL at 05:03

## 2020-12-13 RX ADMIN — Medication 100 MG: at 05:08

## 2020-12-13 NOTE — PROGRESS NOTES
[facility] Medicine Twice Weekly Progress Note    Date of Service  12/13/2020    Chief Complaint  Altered mental status    Hospital Course  Mr. Blake is a 50-year-old male who was brought to the emergency department on 9/9/2020 with altered mental status after he was found by his ex-wife to be obtunded after she had checked on him when he had not been seen for at least 2 days.  At that time he was alert and oriented x0 and found to be hypotensive.  GCS in the ED was 7 and he was severely hypotensive.  He was intubated for airway protection and central venous access was obtained for administration of high-volume crystalloid resuscitation and vasopressor therapy.     He had a significant leukocytosis with a WBC of 22.9 and was hypokalemic with a potassium level of 2.7.  Additionally he had acute kidney injury with a creatinine of 2.73.  His INR was 8.93 and he also had a lactate level of 11.  Urinalysis was performed and was negative for urinary tract infection.  He was diagnosed with septic shock and admitted to the intensive care unit for further evaluation and treatment.  A lumbar puncture was done on 9/9/2020.  He was also noted to have seizure-like activity on 9/10/2020 so an EEG was obtained and was significant for a moderate encephalopathy.    He was extubated 9/11/2020.  He also had a traumatic catheter removal so on 9/21/2020 urology was consulted but was ultimately unnecessary.  On 9/13/2020 he was transferred out of the ICU where he required restraints due to confusion, hallucination, agitation, and impulsivity.  An MRI was performed and was negative for acute pathologies.  He was also treated for a UTI starting 10/2/2020.  He has also fallen multiple times in hospital.    On 11/11/2020 he had an acute episode of sinus tachycardia in the 140s accompanied by hypotension. There was concern for STEMI so cardiology was consulted.  A 12-lead EKG was obtained and was concerning for atrial flutter.  The  patient received metoprolol after which a repeat ECG showed sinus tachycardia.  A D-dimer was obtained and was elevated for which a CT chest was done that was negative for pulmonary embolism.  Cardiology subsequently signed off.  He has remained in the hospital for a prolonged period of time and is now pending guardianship and then will need placement.    He sustained an unwitnessed fall on 12/6 - found sitting on the floor by nursing staff. Reportedly no injuries.     Guardianship hearing is tentatively scheduled for 12/18/2020.  SLP eval to be completed 12/14.    Interval Problem Update  12/13 -no acute events.  Offers no complaints.  Mentation at his baseline  -No acute medical needs    12/10 - sleeping, wakens easily. Offers no complaints other than wanting to be left alone. Oriented to himself at baseline    Code Status  Full Code    Disposition  Pending guardianship and then placement.  Guardianship hearing scheduled for 12/18/2020.    Review of Systems  Review of Systems   Unable to perform ROS: Mental acuity        Physical Exam  Temp:  [36.4 °C (97.6 °F)-37 °C (98.6 °F)] 36.4 °C (97.6 °F)  Pulse:  [67-76] 67  Resp:  [16-17] 17  BP: ()/(66-72) 94/72  SpO2:  [94 %-99 %] 94 %    Physical Exam  Vitals signs and nursing note reviewed.   Constitutional:       General: He is sleeping. He is not in acute distress.     Appearance: He is well-developed. He is not ill-appearing.   HENT:      Head: Normocephalic.      Right Ear: Hearing normal.      Left Ear: Hearing normal.      Nose: Nose normal.      Mouth/Throat:      Lips: Pink.      Mouth: Mucous membranes are moist.   Eyes:      Conjunctiva/sclera: Conjunctivae normal.      Pupils: Pupils are equal, round, and reactive to light.   Neck:      Musculoskeletal: Normal range of motion and neck supple.   Cardiovascular:      Rate and Rhythm: Normal rate and regular rhythm.      Pulses: Normal pulses.      Heart sounds: Normal heart sounds.   Pulmonary:       Effort: Pulmonary effort is normal.      Breath sounds: Normal breath sounds.   Abdominal:      General: Bowel sounds are normal. There is no distension or abdominal bruit.      Palpations: Abdomen is soft.      Tenderness: There is no abdominal tenderness. There is no guarding or rebound.   Musculoskeletal: Normal range of motion.      Right lower leg: No edema.      Left lower leg: No edema.   Skin:     General: Skin is warm and dry.   Neurological:      General: No focal deficit present.      Mental Status: He is easily aroused. He is disoriented.   Psychiatric:         Attention and Perception: Attention normal.         Mood and Affect: Mood normal.         Speech: Speech normal.         Behavior: Behavior normal. Behavior is cooperative.         Thought Content: Thought content normal.         Fluids    Intake/Output Summary (Last 24 hours) at 12/13/2020 1324  Last data filed at 12/13/2020 0910  Gross per 24 hour   Intake 240 ml   Output 1200 ml   Net -960 ml       Laboratory                        Imaging  CT-CTA CHEST PULMONARY ARTERY W/ RECONS   Final Result      1.  No evidence of pulmonary emboli.   2.  Right lower lobe discoid atelectasis.   3.  Gynecomastia.            DX-CHEST-PORTABLE (1 VIEW)   Final Result      No acute cardiac or pulmonary abnormalities are identified.      MR-BRAIN-W/O   Final Result      1.  Moderate diffuse cerebral atrophy.      2.  Minimal periventricular and juxtacortical white matter changes consistent with chronic microvascular ischemic gliosis.      3.  No evidence of acute infarction in the brain parenchyma.      DX-ABDOMEN FOR TUBE PLACEMENT   Final Result      Feeding tube tip overlies the second portion of the duodenum.      DX-CHEST-PORTABLE (1 VIEW)   Final Result         1.  Patchy left lung base opacity, new since prior, could represent early infiltrate            DX-CHEST-PORTABLE (1 VIEW)   Final Result         1.  Patchy left lung base opacity, new since prior,  could represent early infiltrate         [id number] VIEW   Final Result      Enteric tube projects over the distal stomach/pylorus.         EC-ECHOCARDIOGRAM COMPLETE W/O CONT   Final Result      DX-CHEST-PORTABLE (1 VIEW)   Final Result         1.  No acute cardiopulmonary disease.      CT-ABDOMEN-PELVIS W/O   Final Result      1.  No renal stone or hydronephrosis.   2.  Normal appendix.   3.  No focal mesenteric inflammatory process.      DX-ABDOMEN FOR TUBE PLACEMENT   Final Result      NG tube tip projects over expected location the gastric fundus.      US-ABDOMEN COMPLETE SURVEY   Final Result         1.  Echogenic liver compatible with fatty change versus fibrosis.   2.  Gallbladder sludge without additional sonographic findings of cholecystitis.   3.  Partial atrophic bilateral kidneys with lobulated contour         CT-HEAD W/O   Final Result         1.  No acute intracranial abnormality is identified, there are nonspecific white matter changes, commonly associated with small vessel ischemic disease.  Associated mild cerebral atrophy is noted.   2.  Atherosclerosis.      DX-CHEST-PORTABLE (1 VIEW)   Final Result         1.  No acute cardiopulmonary disease.           Assessment/Plan  * Toxic encephalopathy- (present on admission)  Assessment & Plan  -Unknown etiology - hypoxic induced brain injury vs Wernicke encephalopathy.  -LP, MRI brain, EEG negative. TSH normal. HIV/RPR negative.  -Psych was consulted and believe it is alcohol related.  -Continue risperdal.  -Remains free from restraints.  -Pending guardianship - hearing 12/18 - will need SLP cog eval 12/14 or 12/15    Normocytic anemia- (present on admission)  Assessment & Plan  -Very mild, no active bleeding.  -Check periodically.    Alcohol abuse- (present on admission)  Assessment & Plan  -Abstinent since admission.  -Continue daily PO vitamin therapy.       VTE prophylaxis: Lovenox    I have performed a physical exam and reviewed and updated ROS  and Assessment/Plan today (12/13/20). In review of the previous note there are no changes except as documented above    I certify the patient requires continued medically necessary hospital services for the treatment of cognitive impairment, falls, inability to care for himself.  The patient will remain in the hospital for the foreseeable future.  Discharge may or may not occur in the next 20 days due to ongoing discharge delays due to having no medically acceptable discharge options.    Please note that this dictation was created using voice recognition software. I have made every reasonable attempt to correct obvious errors, but there may be errors of grammar and possibly content that I did not discover before finalizing the note.    TRACE Villagran.

## 2020-12-13 NOTE — CARE PLAN
Problem: Communication  Goal: The ability to communicate needs accurately and effectively will improve  Note: POC discussed. Pt requires frequent reorientation.Tries to exit bed frequently.      Problem: Safety  Goal: Will remain free from injury  Note: Fall precautions in place. Waffle cushion in use. Patient able to reposition self independently. Bed alarm activated. Bed locked and placed in lowest position. Call light at the bedside. Personal belongings within reach. Hourly monitoring in place. Patient comfortably resting in bed, will continue to monitor.

## 2020-12-14 PROCEDURE — A9270 NON-COVERED ITEM OR SERVICE: HCPCS | Performed by: NURSE PRACTITIONER

## 2020-12-14 PROCEDURE — A9270 NON-COVERED ITEM OR SERVICE: HCPCS | Performed by: HOSPITALIST

## 2020-12-14 PROCEDURE — 700102 HCHG RX REV CODE 250 W/ 637 OVERRIDE(OP): Performed by: NURSE PRACTITIONER

## 2020-12-14 PROCEDURE — 97130 THER IVNTJ EA ADDL 15 MIN: CPT

## 2020-12-14 PROCEDURE — 700102 HCHG RX REV CODE 250 W/ 637 OVERRIDE(OP): Performed by: HOSPITALIST

## 2020-12-14 PROCEDURE — 97129 THER IVNTJ 1ST 15 MIN: CPT

## 2020-12-14 PROCEDURE — 770006 HCHG ROOM/CARE - MED/SURG/GYN SEMI*

## 2020-12-14 PROCEDURE — 700111 HCHG RX REV CODE 636 W/ 250 OVERRIDE (IP): Performed by: HOSPITALIST

## 2020-12-14 RX ADMIN — RISPERIDONE 1 MG: 1 TABLET, FILM COATED ORAL at 17:37

## 2020-12-14 RX ADMIN — DOCUSATE SODIUM 50 MG AND SENNOSIDES 8.6 MG 2 TABLET: 8.6; 5 TABLET, FILM COATED ORAL at 17:37

## 2020-12-14 RX ADMIN — DOCUSATE SODIUM 50 MG AND SENNOSIDES 8.6 MG 2 TABLET: 8.6; 5 TABLET, FILM COATED ORAL at 04:38

## 2020-12-14 RX ADMIN — ACETAMINOPHEN 650 MG: 325 TABLET, FILM COATED ORAL at 04:37

## 2020-12-14 RX ADMIN — ENOXAPARIN SODIUM 40 MG: 40 INJECTION SUBCUTANEOUS at 17:37

## 2020-12-14 RX ADMIN — ACETAMINOPHEN 650 MG: 325 TABLET, FILM COATED ORAL at 20:59

## 2020-12-14 RX ADMIN — Medication 100 MG: at 06:00

## 2020-12-14 RX ADMIN — TAMSULOSIN HYDROCHLORIDE 0.8 MG: 0.4 CAPSULE ORAL at 09:08

## 2020-12-14 RX ADMIN — THERA TABS 1 TABLET: TAB at 04:37

## 2020-12-14 RX ADMIN — ASPIRIN 325 MG ORAL TABLET 325 MG: 325 PILL ORAL at 04:37

## 2020-12-14 RX ADMIN — RISPERIDONE 1 MG: 1 TABLET, FILM COATED ORAL at 04:38

## 2020-12-14 NOTE — CARE PLAN
Problem: Safety  Goal: Will remain free from injury  Outcome: PROGRESSING AS EXPECTED  Note:  Treaded socks in place,  Side rails up x3.  Bed  in  low, locked position.   Call light within reach, patient calls appropriately.      Problem: Pain Management  Goal: Pain level will decrease to patient's comfort goal  Outcome: PROGRESSING AS EXPECTED  Flowsheets (Taken 12/14/2020 0906)  Comfort Goal: 0  Non Verbal Scale:   Calm   Unlabored Breathing  Pain Rating Scale (NPRS): 5  Note:   Patient pain well controlled at this time with current interventions.  Will continue to assess response to pain interventions.

## 2020-12-14 NOTE — PROGRESS NOTES
Restraints off per day nurse due to doing well without. Restraints remain off at this time. Will monitor

## 2020-12-14 NOTE — THERAPY
"Speech Language Pathology   Initial Assessment     Patient Name: Yury Blake  AGE:  50 y.o., SEX:  male  Medical Record #: [id number]  Today's Date: 12/14/2020     Precautions  Precautions: (P) Fall Risk  Comments: No wrist restraints. No posey vest.     Assessment    Patient is 50 y.o. male with a pmhx of HTN, chronic pain and alcohol abuse who presented 9/9/2020 with altered mental status, he was found obtunded in his house by his ex-wife who was checking on him as he had not been seen for 2 days or been able to get a hold of him.The patient was intubated for airway protection from  9/9/2020-9/11/2020. Chest xray results from 9/9/2020 were unremarkable.    The patient was seen on this date for a cognitive evaluation using a variety of standardized and non-standardized measures. The patient was oriented to name and place (\"a doctor place\" unable to provide name of hospital) only with disorientation to all other spheres. Testing revealed profound deficits in memory, orientation, and judgement. Severe deficits in paragraph comprehension and auditory comprehension of complex conversation. Moderate deficits were appreciated during medication management and calculations, which is concerning regarding the patients ability to complete IADL's independently. During written directions task, the patient was initially 70% accurate, however, the patient stated that the directions changed after he finished \"now that I am looking at the sentences they say something completely\". He required max verbal cues to formulate a sentence d/t delayed processing and disorganized thoughts. At the end of the session, the patient was provided education regarding his results and recommendations. The patient was in agreement with recommendations, then stated \"Okay, is it time to start working on your car?\". Pt appeared confused when he was told that we did not need to work on any cars today as he is in a hosptial. Overall the patient " presents with severe cognitive-linguistic deficits concerning for his ability to successfully and safely return to his prior level of functioning. Recommend 24/7 supervision upon discharge from this facility to ensure safety and sound decision making in the post-acute setting.     Plan    Recommend Speech Therapy for Evaluation only for the following treatments:  Comprehension Training, Expression Training, Reading Training, Writing Training and Cognitive-Linguistic Training.    Given current cognitive deficits, recommend 24/7 supervision upon discharge from this facility to ensure safety.      Discharge Recommendations: Recommend post-acute placement for additional speech therapy services prior to discharge home    Objective       12/14/20 1250   Verbal Expression   Vocal Quality Clear   Verbal Output Automatic Within Functional Limits (6-7)   Verbal Output: Phrases Within Functional Limits (6-7)   Verbal Output Conversation Supervision (5)   Verbal Output Functional Supervision (5)   Repetition: Single Words Within Functional Limits (6-7)   Repetition: Phrases Within Functional Limits (6-7)   Repetition: Sentences Within Functional Limits (6-7)   Naming Within Functional Limits (6-7)   Auditory Comprehension   Identifies Body Parts Within Functional Limits (6-7)   Follows One Unit Commands Within Functional Limits (6-7)   Follows Two Unit Commands Within Functional Limits (6-7)   Follows Three Unit Commands Minimal (4)   Understands Paragraph Minimal (4)   Understands Simple, Structured Conversation  Supervision (5)   Understands Complex Conversation Severe (2)   Comments Delayed processing noted   Reading Comprehension   Reading Words Within Functional Limits (6-7)   Reading Phrases Supervision (5)   Reading Sentences Minimal (4)   Reading Short Paragraphs  Severe (2)   Following Written Direction Minimal (4)   Functional Reading Materials Supervision (5)   Written Expression   Formulates: Sentence Minimal (4)    Dominant Hand Right   Cognitive-Linguistic   Level of Consciousness Confused   Orientation Level Not Oriented to Age;Not Oriented to Year;Not Oriented to Month;Not Oriented to Day;Not Oriented to Time;Not Oriented to Place;Not Oriented to Reason   Sustained Attention Supervision (5)   Short Term Memory Severe (2)   Immediate Memory Severe (2)   Simple Reasoning / Problem Solving Minimal (4)   Complex Reasoning  / Problem Solving Severe (2)   Abstract Reasoning Minimal (4)   Insight into Deficits Profound (1)   Auditory Math Moderate (3)   Medication Management  Moderate (3)   Clock Drawing Within Functional Limits

## 2020-12-15 PROCEDURE — 700101 HCHG RX REV CODE 250: Performed by: HOSPITALIST

## 2020-12-15 PROCEDURE — 700102 HCHG RX REV CODE 250 W/ 637 OVERRIDE(OP): Performed by: NURSE PRACTITIONER

## 2020-12-15 PROCEDURE — 700102 HCHG RX REV CODE 250 W/ 637 OVERRIDE(OP): Performed by: HOSPITALIST

## 2020-12-15 PROCEDURE — A9270 NON-COVERED ITEM OR SERVICE: HCPCS | Performed by: NURSE PRACTITIONER

## 2020-12-15 PROCEDURE — 700111 HCHG RX REV CODE 636 W/ 250 OVERRIDE (IP): Performed by: HOSPITALIST

## 2020-12-15 PROCEDURE — A9270 NON-COVERED ITEM OR SERVICE: HCPCS | Performed by: HOSPITALIST

## 2020-12-15 PROCEDURE — 770006 HCHG ROOM/CARE - MED/SURG/GYN SEMI*

## 2020-12-15 RX ADMIN — TAMSULOSIN HYDROCHLORIDE 0.8 MG: 0.4 CAPSULE ORAL at 08:52

## 2020-12-15 RX ADMIN — ASPIRIN 325 MG ORAL TABLET 325 MG: 325 PILL ORAL at 04:40

## 2020-12-15 RX ADMIN — ACETAMINOPHEN 650 MG: 325 TABLET, FILM COATED ORAL at 04:41

## 2020-12-15 RX ADMIN — POLYETHYLENE GLYCOL 3350 1 PACKET: 17 POWDER, FOR SOLUTION ORAL at 04:40

## 2020-12-15 RX ADMIN — DOCUSATE SODIUM 50 MG AND SENNOSIDES 8.6 MG 2 TABLET: 8.6; 5 TABLET, FILM COATED ORAL at 04:40

## 2020-12-15 RX ADMIN — ENOXAPARIN SODIUM 40 MG: 40 INJECTION SUBCUTANEOUS at 17:32

## 2020-12-15 RX ADMIN — RISPERIDONE 1 MG: 1 TABLET, FILM COATED ORAL at 17:32

## 2020-12-15 RX ADMIN — THERA TABS 1 TABLET: TAB at 04:40

## 2020-12-15 RX ADMIN — DOCUSATE SODIUM 50 MG AND SENNOSIDES 8.6 MG 2 TABLET: 8.6; 5 TABLET, FILM COATED ORAL at 17:32

## 2020-12-15 RX ADMIN — RISPERIDONE 1 MG: 1 TABLET, FILM COATED ORAL at 04:40

## 2020-12-15 NOTE — DISCHARGE PLANNING
Devang Rae Esq.is representing this pt in court on Friday. Cog eval faxed to him. He will will contact me to set up Zoom meeting.

## 2020-12-15 NOTE — PROGRESS NOTES
Patient ++ agitated and wanting to leave. 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 Patient stated writer was lying to him. Ex wife and friend (Deonte) contacted to help settle patient. Pt lying in bed at this time, condom cath removed, will monitor.

## 2020-12-15 NOTE — CARE PLAN
Problem: Safety  Goal: Will remain free from falls  Outcome: PROGRESSING AS EXPECTED  Note: Treaded socks in place,  Side rails up x3.  Bed in low, locked position.  Call light within reach, patient calls every once in awhile,  hourly rounding on patient in place.      Problem: Discharge Barriers/Planning  Goal: Patient's continuum of care needs will be met  Outcome: PROGRESSING SLOWER THAN EXPECTED  Note:  Patient is pending a guardianship hearing and this will help with discharge planning going forward.

## 2020-12-15 NOTE — PROGRESS NOTES
Patient is refusing  Q two hour turns.  Education attempted but patient's confusion makes education difficult.  Will attempt to turn patient again later today.

## 2020-12-16 PROCEDURE — A9270 NON-COVERED ITEM OR SERVICE: HCPCS | Performed by: HOSPITALIST

## 2020-12-16 PROCEDURE — 700102 HCHG RX REV CODE 250 W/ 637 OVERRIDE(OP): Performed by: HOSPITALIST

## 2020-12-16 PROCEDURE — 306637 HCHG MISC ORTHO ITEM RC 0274

## 2020-12-16 PROCEDURE — A9270 NON-COVERED ITEM OR SERVICE: HCPCS | Performed by: NURSE PRACTITIONER

## 2020-12-16 PROCEDURE — 99231 SBSQ HOSP IP/OBS SF/LOW 25: CPT | Performed by: NURSE PRACTITIONER

## 2020-12-16 PROCEDURE — L4396 STATIC OR DYNAMI AFO PRE CST: HCPCS

## 2020-12-16 PROCEDURE — 700102 HCHG RX REV CODE 250 W/ 637 OVERRIDE(OP): Performed by: NURSE PRACTITIONER

## 2020-12-16 PROCEDURE — 770006 HCHG ROOM/CARE - MED/SURG/GYN SEMI*

## 2020-12-16 PROCEDURE — 700111 HCHG RX REV CODE 636 W/ 250 OVERRIDE (IP): Performed by: HOSPITALIST

## 2020-12-16 RX ADMIN — RISPERIDONE 1 MG: 1 TABLET, FILM COATED ORAL at 16:24

## 2020-12-16 RX ADMIN — RISPERIDONE 1 MG: 1 TABLET, FILM COATED ORAL at 06:11

## 2020-12-16 RX ADMIN — Medication 100 MG: at 06:00

## 2020-12-16 RX ADMIN — DOCUSATE SODIUM 50 MG AND SENNOSIDES 8.6 MG 2 TABLET: 8.6; 5 TABLET, FILM COATED ORAL at 16:24

## 2020-12-16 RX ADMIN — ASPIRIN 325 MG ORAL TABLET 325 MG: 325 PILL ORAL at 06:11

## 2020-12-16 RX ADMIN — ENOXAPARIN SODIUM 40 MG: 40 INJECTION SUBCUTANEOUS at 16:24

## 2020-12-16 RX ADMIN — DOCUSATE SODIUM 50 MG AND SENNOSIDES 8.6 MG 2 TABLET: 8.6; 5 TABLET, FILM COATED ORAL at 06:11

## 2020-12-16 RX ADMIN — THERA TABS 1 TABLET: TAB at 06:11

## 2020-12-16 RX ADMIN — TAMSULOSIN HYDROCHLORIDE 0.8 MG: 0.4 CAPSULE ORAL at 08:21

## 2020-12-16 ASSESSMENT — PAIN SCALES - PAIN ASSESSMENT IN ADVANCED DEMENTIA (PAINAD)
BODYLANGUAGE: RELAXED
BREATHING: NORMAL
TOTALSCORE: 0
FACIALEXPRESSION: SMILING OR INEXPRESSIVE
CONSOLABILITY: NO NEED TO CONSOLE

## 2020-12-16 NOTE — DISCHARGE PLANNING
Facilitated initial Zoom meeting with pt and , Devang Rae. Pt was cooperative and answered questions. Guardianship hearing scheduled for 12/18/20 at 0930.

## 2020-12-16 NOTE — DISCHARGE PLANNING
Anticipated Discharge Disposition:   · Long-term care placement    Action:   · Received email from CHAZ Culver,  regarding upcoming guardianship hearing this Friday with Zoom links. Pt is no longer on [place] [address]. Email forwarded to care coordination team on [place] [address].     Barriers to Discharge:   · Pending Guardianship hearing and determination.     Plan:   · Care coordination will continue to follow up and provide assistance with discharge plans/barriers as needed.

## 2020-12-16 NOTE — PROGRESS NOTES
[facility] Medicine Twice Weekly Progress Note    Date of Service  12/16/2020    Chief Complaint  Altered mental status    Hospital Course  Mr. Blake is a 50-year-old male who was brought to the emergency department on 9/9/2020 with altered mental status after he was found by his ex-wife to be obtunded after she had checked on him when he had not been seen for at least 2 days.  At that time he was alert and oriented x0 and found to be hypotensive.  GCS in the ED was 7 and he was severely hypotensive.  He was intubated for airway protection and central venous access was obtained for administration of high-volume crystalloid resuscitation and vasopressor therapy.     He had a significant leukocytosis with a WBC of 22.9 and was hypokalemic with a potassium level of 2.7.  Additionally he had acute kidney injury with a creatinine of 2.73.  His INR was 8.93 and he also had a lactate level of 11.  Urinalysis was performed and was negative for urinary tract infection.  He was diagnosed with septic shock and admitted to the intensive care unit for further evaluation and treatment.  A lumbar puncture was done on 9/9/2020.  He was also noted to have seizure-like activity on 9/10/2020 so an EEG was obtained and was significant for a moderate encephalopathy.    He was extubated 9/11/2020.  He also had a traumatic catheter removal so on 9/21/2020 urology was consulted but was ultimately unnecessary.  On 9/13/2020 he was transferred out of the ICU where he required restraints due to confusion, hallucination, agitation, and impulsivity.  An MRI was performed and was negative for acute pathologies.  He was also treated for a UTI starting 10/2/2020.  He has also fallen multiple times in hospital.    On 11/11/2020 he had an acute episode of sinus tachycardia in the 140s accompanied by hypotension. There was concern for STEMI so cardiology was consulted.  A 12-lead EKG was obtained and was concerning for atrial flutter.  The  patient received metoprolol after which a repeat ECG showed sinus tachycardia.  A D-dimer was obtained and was elevated for which a CT chest was done that was negative for pulmonary embolism.  Cardiology subsequently signed off.  He has remained in the hospital for a prolonged period of time and is now pending guardianship and then will need placement.    He sustained an unwitnessed fall on 12/6 - found sitting on the floor by nursing staff. Reportedly no injuries.     Guardianship hearing is tentatively scheduled for 12/18/2020.  SLP eval to be completed 12/14.    Interval Problem Update  12/16- Patient resting in bed, opened eyes with auditory stimulus looked at provider then reclosed eyes and refused to engage. Patient in no acute distress. SLP cognitive evaluation completed on 12/14, which states patient has severe cognitive impairment, and will need 24/7 care on discharge. Guardianship hearing this week.     Code Status  Full Code    Disposition  Pending guardianship and then placement.  Guardianship hearing scheduled for 12/18/2020.    Review of Systems  Review of Systems   Unable to perform ROS: Mental acuity        Physical Exam  Temp:  [36.1 °C (96.9 °F)-36.4 °C (97.6 °F)] 36.4 °C (97.5 °F)  Pulse:  [64-95] 70  Resp:  [16-17] 17  BP: (104-110)/(63-70) 106/69  SpO2:  [94 %-96 %] 96 %    Physical Exam  Vitals signs and nursing note reviewed.   Constitutional:       General: He is sleeping.      Appearance: He is well-developed. He is not ill-appearing.   HENT:      Head: Normocephalic.   Eyes:      General: Lids are normal.      Pupils: Pupils are equal, round, and reactive to light.   Neck:      Musculoskeletal: Neck supple.   Cardiovascular:      Comments: Refused   Pulmonary:      Effort: Pulmonary effort is normal. No respiratory distress.   Musculoskeletal: Normal range of motion.   Skin:     General: Skin is warm and dry.   Neurological:      Mental Status: He is easily aroused. He is disoriented.          Fluids    Intake/Output Summary (Last 24 hours) at 12/16/2020 1402  Last data filed at 12/16/2020 1000  Gross per 24 hour   Intake 480 ml   Output 2100 ml   Net -1620 ml       Laboratory                        Imaging  CT-CTA CHEST PULMONARY ARTERY W/ RECONS   Final Result      1.  No evidence of pulmonary emboli.   2.  Right lower lobe discoid atelectasis.   3.  Gynecomastia.            DX-CHEST-PORTABLE (1 VIEW)   Final Result      No acute cardiac or pulmonary abnormalities are identified.      MR-BRAIN-W/O   Final Result      1.  Moderate diffuse cerebral atrophy.      2.  Minimal periventricular and juxtacortical white matter changes consistent with chronic microvascular ischemic gliosis.      3.  No evidence of acute infarction in the brain parenchyma.      DX-ABDOMEN FOR TUBE PLACEMENT   Final Result      Feeding tube tip overlies the second portion of the duodenum.      DX-CHEST-PORTABLE (1 VIEW)   Final Result         1.  Patchy left lung base opacity, new since prior, could represent early infiltrate            DX-CHEST-PORTABLE (1 VIEW)   Final Result         1.  Patchy left lung base opacity, new since prior, could represent early infiltrate         [id number] VIEW   Final Result      Enteric tube projects over the distal stomach/pylorus.         EC-ECHOCARDIOGRAM COMPLETE W/O CONT   Final Result      DX-CHEST-PORTABLE (1 VIEW)   Final Result         1.  No acute cardiopulmonary disease.      CT-ABDOMEN-PELVIS W/O   Final Result      1.  No renal stone or hydronephrosis.   2.  Normal appendix.   3.  No focal mesenteric inflammatory process.      DX-ABDOMEN FOR TUBE PLACEMENT   Final Result      NG tube tip projects over expected location the gastric fundus.      US-ABDOMEN COMPLETE SURVEY   Final Result         1.  Echogenic liver compatible with fatty change versus fibrosis.   2.  Gallbladder sludge without additional sonographic findings of cholecystitis.   3.  Partial atrophic bilateral  kidneys with lobulated contour         CT-HEAD W/O   Final Result         1.  No acute intracranial abnormality is identified, there are nonspecific white matter changes, commonly associated with small vessel ischemic disease.  Associated mild cerebral atrophy is noted.   2.  Atherosclerosis.      DX-CHEST-PORTABLE (1 VIEW)   Final Result         1.  No acute cardiopulmonary disease.           Assessment/Plan  * Toxic encephalopathy- (present on admission)  Assessment & Plan  -Unknown etiology - hypoxic induced brain injury vs Wernicke encephalopathy.  -LP, MRI brain, EEG negative. TSH normal. HIV/RPR negative.  -Psych was consulted and believe it is alcohol related.  -Continue risperdal.  -Remains free from restraints.  -Pending guardianship - hearing 12/18 - SLP completed cog eval on 12/14- showed severe deficits     Normocytic anemia- (present on admission)  Assessment & Plan  -Very mild, no active bleeding.  -Check periodically.    Alcohol abuse- (present on admission)  Assessment & Plan  -Abstinent since admission.  -Continue daily PO vitamin therapy.       VTE prophylaxis: Lovenox    I have performed a physical exam and reviewed and updated ROS and Assessment/Plan today 12/16/2020. In review of the previous note there are no changes except as documented above    I certify the patient requires continued medically necessary hospital services for the treatment of cognitive impairment, falls, inability to care for himself.  The patient will remain in the hospital for the foreseeable future.  Discharge may or may not occur in the next 20 days due to ongoing discharge delays due to having no medically acceptable discharge options.        TRACE Garcia.

## 2020-12-16 NOTE — PROGRESS NOTES
"Patient was restless and agitated, attempting to get out of bed x3 last night. He was very confused and said he \"wants to leave\". Needed frequent reorientation. Fall precautions in place. Frequent rounding and visual checks done throughout shift.  "

## 2020-12-16 NOTE — DISCHARGE PLANNING
Anticipated Discharge Disposition: GH placement after guardian appointed.    Action: Guardianship hearing scheduled for 12/18. Will proceed with discharge planning after that.    Barriers to Discharge: Pending guardianship.    Plan: F/U with discharge planning after decision on guardian.

## 2020-12-17 PROCEDURE — A9270 NON-COVERED ITEM OR SERVICE: HCPCS | Performed by: NURSE PRACTITIONER

## 2020-12-17 PROCEDURE — 700102 HCHG RX REV CODE 250 W/ 637 OVERRIDE(OP): Performed by: NURSE PRACTITIONER

## 2020-12-17 PROCEDURE — 700111 HCHG RX REV CODE 636 W/ 250 OVERRIDE (IP): Performed by: HOSPITALIST

## 2020-12-17 PROCEDURE — 700102 HCHG RX REV CODE 250 W/ 637 OVERRIDE(OP): Performed by: HOSPITALIST

## 2020-12-17 PROCEDURE — 770006 HCHG ROOM/CARE - MED/SURG/GYN SEMI*

## 2020-12-17 PROCEDURE — A9270 NON-COVERED ITEM OR SERVICE: HCPCS | Performed by: HOSPITALIST

## 2020-12-17 RX ADMIN — ASPIRIN 325 MG ORAL TABLET 325 MG: 325 PILL ORAL at 05:31

## 2020-12-17 RX ADMIN — ENOXAPARIN SODIUM 40 MG: 40 INJECTION SUBCUTANEOUS at 16:30

## 2020-12-17 RX ADMIN — DOCUSATE SODIUM 50 MG AND SENNOSIDES 8.6 MG 2 TABLET: 8.6; 5 TABLET, FILM COATED ORAL at 16:30

## 2020-12-17 RX ADMIN — THERA TABS 1 TABLET: TAB at 05:31

## 2020-12-17 RX ADMIN — TAMSULOSIN HYDROCHLORIDE 0.8 MG: 0.4 CAPSULE ORAL at 08:17

## 2020-12-17 RX ADMIN — DOCUSATE SODIUM 50 MG AND SENNOSIDES 8.6 MG 2 TABLET: 8.6; 5 TABLET, FILM COATED ORAL at 05:31

## 2020-12-17 RX ADMIN — RISPERIDONE 1 MG: 1 TABLET, FILM COATED ORAL at 16:30

## 2020-12-17 RX ADMIN — Medication 100 MG: at 05:31

## 2020-12-17 RX ADMIN — RISPERIDONE 1 MG: 1 TABLET, FILM COATED ORAL at 05:31

## 2020-12-17 NOTE — CARE PLAN
Problem: Bowel/Gastric:  Goal: Normal bowel function is maintained or improved  Outcome: PROGRESSING AS EXPECTED  Goal: Will not experience complications related to bowel motility  Outcome: PROGRESSING AS EXPECTED   Pt able to have bowel movements. Is incontinent of bowel and bladder.

## 2020-12-18 PROCEDURE — A9270 NON-COVERED ITEM OR SERVICE: HCPCS | Performed by: NURSE PRACTITIONER

## 2020-12-18 PROCEDURE — 700102 HCHG RX REV CODE 250 W/ 637 OVERRIDE(OP): Performed by: NURSE PRACTITIONER

## 2020-12-18 PROCEDURE — A9270 NON-COVERED ITEM OR SERVICE: HCPCS | Performed by: HOSPITALIST

## 2020-12-18 PROCEDURE — 700102 HCHG RX REV CODE 250 W/ 637 OVERRIDE(OP): Performed by: HOSPITALIST

## 2020-12-18 PROCEDURE — 770006 HCHG ROOM/CARE - MED/SURG/GYN SEMI*

## 2020-12-18 PROCEDURE — 700111 HCHG RX REV CODE 636 W/ 250 OVERRIDE (IP): Performed by: HOSPITALIST

## 2020-12-18 RX ADMIN — RISPERIDONE 1 MG: 1 TABLET, FILM COATED ORAL at 05:47

## 2020-12-18 RX ADMIN — TAMSULOSIN HYDROCHLORIDE 0.8 MG: 0.4 CAPSULE ORAL at 08:21

## 2020-12-18 RX ADMIN — ENOXAPARIN SODIUM 40 MG: 40 INJECTION SUBCUTANEOUS at 17:02

## 2020-12-18 RX ADMIN — ASPIRIN 325 MG ORAL TABLET 325 MG: 325 PILL ORAL at 05:47

## 2020-12-18 RX ADMIN — Medication 100 MG: at 05:47

## 2020-12-18 RX ADMIN — DOCUSATE SODIUM 50 MG AND SENNOSIDES 8.6 MG 2 TABLET: 8.6; 5 TABLET, FILM COATED ORAL at 05:47

## 2020-12-18 RX ADMIN — RISPERIDONE 1 MG: 1 TABLET, FILM COATED ORAL at 17:02

## 2020-12-18 RX ADMIN — THERA TABS 1 TABLET: TAB at 05:47

## 2020-12-18 RX ADMIN — DOCUSATE SODIUM 50 MG AND SENNOSIDES 8.6 MG 2 TABLET: 8.6; 5 TABLET, FILM COATED ORAL at 17:02

## 2020-12-18 RX ADMIN — ACETAMINOPHEN 650 MG: 325 TABLET, FILM COATED ORAL at 05:47

## 2020-12-18 NOTE — CARE PLAN
Problem: Safety  Goal: Will remain free from injury  Outcome: PROGRESSING AS EXPECTED   Bed locked and in lowest position, call light and personal belongings within reach, treaded socks and bed alarm in place, hourly rounding on going.    Problem: Infection  Goal: Will remain free from infection  Outcome: PROGRESSING AS EXPECTED   Standard precautions in place.

## 2020-12-18 NOTE — DISCHARGE PLANNING
Court complete on 12/18. Determination was to hold off on public guardian and pursue private guardian by pts personal friend with legal representation. Follow up guardianship court hearing on 2/5/2021 at 0930

## 2020-12-19 PROCEDURE — 700102 HCHG RX REV CODE 250 W/ 637 OVERRIDE(OP): Performed by: HOSPITALIST

## 2020-12-19 PROCEDURE — 700102 HCHG RX REV CODE 250 W/ 637 OVERRIDE(OP): Performed by: NURSE PRACTITIONER

## 2020-12-19 PROCEDURE — 700111 HCHG RX REV CODE 636 W/ 250 OVERRIDE (IP): Performed by: HOSPITALIST

## 2020-12-19 PROCEDURE — A9270 NON-COVERED ITEM OR SERVICE: HCPCS | Performed by: NURSE PRACTITIONER

## 2020-12-19 PROCEDURE — A9270 NON-COVERED ITEM OR SERVICE: HCPCS | Performed by: HOSPITALIST

## 2020-12-19 PROCEDURE — 770006 HCHG ROOM/CARE - MED/SURG/GYN SEMI*

## 2020-12-19 RX ADMIN — RISPERIDONE 1 MG: 1 TABLET, FILM COATED ORAL at 16:55

## 2020-12-19 RX ADMIN — ASPIRIN 325 MG ORAL TABLET 325 MG: 325 PILL ORAL at 05:15

## 2020-12-19 RX ADMIN — TAMSULOSIN HYDROCHLORIDE 0.8 MG: 0.4 CAPSULE ORAL at 08:40

## 2020-12-19 RX ADMIN — DOCUSATE SODIUM 50 MG AND SENNOSIDES 8.6 MG 2 TABLET: 8.6; 5 TABLET, FILM COATED ORAL at 05:15

## 2020-12-19 RX ADMIN — ENOXAPARIN SODIUM 40 MG: 40 INJECTION SUBCUTANEOUS at 16:55

## 2020-12-19 RX ADMIN — DOCUSATE SODIUM 50 MG AND SENNOSIDES 8.6 MG 2 TABLET: 8.6; 5 TABLET, FILM COATED ORAL at 16:55

## 2020-12-19 RX ADMIN — RISPERIDONE 1 MG: 1 TABLET, FILM COATED ORAL at 05:14

## 2020-12-19 RX ADMIN — Medication 100 MG: at 05:15

## 2020-12-19 RX ADMIN — THERA TABS 1 TABLET: TAB at 05:15

## 2020-12-19 NOTE — CARE PLAN
Problem: Safety  Goal: Will remain free from injury  Outcome: PROGRESSING AS EXPECTED   Bed locked and in lowest position. Call light within reach. Pt educated to call for assistance. Hourly rounding in place.  Problem: Psychosocial Needs:  Goal: Level of anxiety will decrease  Outcome: PROGRESSING AS EXPECTED   Pt resting in bed. No s/s of anxiety noted. Pt denies any needs at the moment.  Problem: Communication  Goal: The ability to communicate needs accurately and effectively will improve  Outcome: PROGRESSING SLOWER THAN EXPECTED   Pt needs reinforcement education to use call light. Hourly rounding in place.

## 2020-12-19 NOTE — CARE PLAN
Problem: Venous Thromboembolism (VTW)/Deep Vein Thrombosis (DVT) Prevention:  Goal: Patient will participate in Venous Thrombosis (VTE)/Deep Vein Thrombosis (DVT)Prevention Measures  Outcome: PROGRESSING AS EXPECTED   Scds in place, patient receives Lovenox for DVT prophylaxis.  Problem: Safety  Goal: Will remain free from injury  Outcome: PROGRESSING AS EXPECTED   Bed locked and in lowest position, call light and personal belongings within reach, treaded socks and bed alarm in place, hourly rounding on going.

## 2020-12-19 NOTE — PROGRESS NOTES
Patient with worsening wound to lateral aspect of foot.  D/w wound care RN Elle Nayak requests alternate q2 hours between heel float boot and foot drop boot.  D/w Keyla lancaster nurse as well.

## 2020-12-20 PROCEDURE — A9270 NON-COVERED ITEM OR SERVICE: HCPCS | Performed by: NURSE PRACTITIONER

## 2020-12-20 PROCEDURE — 700102 HCHG RX REV CODE 250 W/ 637 OVERRIDE(OP): Performed by: NURSE PRACTITIONER

## 2020-12-20 PROCEDURE — 99231 SBSQ HOSP IP/OBS SF/LOW 25: CPT | Performed by: NURSE PRACTITIONER

## 2020-12-20 PROCEDURE — 770006 HCHG ROOM/CARE - MED/SURG/GYN SEMI*

## 2020-12-20 PROCEDURE — 700111 HCHG RX REV CODE 636 W/ 250 OVERRIDE (IP): Performed by: HOSPITALIST

## 2020-12-20 PROCEDURE — L4398 FOOT DROP SPLINT PRE OTS: HCPCS

## 2020-12-20 PROCEDURE — 700102 HCHG RX REV CODE 250 W/ 637 OVERRIDE(OP): Performed by: HOSPITALIST

## 2020-12-20 PROCEDURE — A9270 NON-COVERED ITEM OR SERVICE: HCPCS | Performed by: HOSPITALIST

## 2020-12-20 RX ADMIN — ASPIRIN 325 MG ORAL TABLET 325 MG: 325 PILL ORAL at 05:41

## 2020-12-20 RX ADMIN — TAMSULOSIN HYDROCHLORIDE 0.8 MG: 0.4 CAPSULE ORAL at 07:45

## 2020-12-20 RX ADMIN — Medication 100 MG: at 05:41

## 2020-12-20 RX ADMIN — RISPERIDONE 1 MG: 1 TABLET, FILM COATED ORAL at 05:41

## 2020-12-20 RX ADMIN — THERA TABS 1 TABLET: TAB at 05:41

## 2020-12-20 RX ADMIN — ENOXAPARIN SODIUM 40 MG: 40 INJECTION SUBCUTANEOUS at 17:35

## 2020-12-20 RX ADMIN — RISPERIDONE 1 MG: 1 TABLET, FILM COATED ORAL at 17:36

## 2020-12-20 NOTE — PROGRESS NOTES
Patient with BL foot drop boots however has some missing components, d/w traction at bedside recommend new boots.  Notified Taniya Dutton APRN order given.  D/w traction, will rotate with heel float boots due to foot wound.

## 2020-12-20 NOTE — CARE PLAN
Problem: Communication  Goal: The ability to communicate needs accurately and effectively will improve  Outcome: PROGRESSING SLOWER THAN EXPECTED  Patient does not retain teaching or education.  Believes he lost his dog in the room helped to reorient the patient.      Problem: Bowel/Gastric:  Goal: Normal bowel function is maintained or improved  Outcome: PROGRESSING SLOWER THAN EXPECTED   Incontinent of stool and urine, has condom cath in place.

## 2020-12-20 NOTE — PROGRESS NOTES
[facility] Medicine Twice Weekly Progress Note    Date of Service  12/20/2020    Chief Complaint  Altered mental status    Hospital Course  Mr. Blake is a 50-year-old male who was brought to the emergency department on 9/9/2020 with altered mental status after he was found by his ex-wife to be obtunded after she had checked on him when he had not been seen for at least 2 days.  At that time he was alert and oriented x0 and found to be hypotensive.  GCS in the ED was 7 and he was severely hypotensive.  He was intubated for airway protection and central venous access was obtained for administration of high-volume crystalloid resuscitation and vasopressor therapy.     He had a significant leukocytosis with a WBC of 22.9 and was hypokalemic with a potassium level of 2.7.  Additionally he had acute kidney injury with a creatinine of 2.73.  His INR was 8.93 and he also had a lactate level of 11.  Urinalysis was performed and was negative for urinary tract infection.  He was diagnosed with septic shock and admitted to the intensive care unit for further evaluation and treatment.  A lumbar puncture was done on 9/9/2020.  He was also noted to have seizure-like activity on 9/10/2020 so an EEG was obtained and was significant for a moderate encephalopathy.    He was extubated 9/11/2020.  He also had a traumatic catheter removal so on 9/21/2020 urology was consulted but was ultimately unnecessary.  On 9/13/2020 he was transferred out of the ICU where he required restraints due to confusion, hallucination, agitation, and impulsivity.  An MRI was performed and was negative for acute pathologies.  He was also treated for a UTI starting 10/2/2020.  He has also fallen multiple times in hospital.    On 11/11/2020 he had an acute episode of sinus tachycardia in the 140s accompanied by hypotension. There was concern for STEMI so cardiology was consulted.  A 12-lead EKG was obtained and was concerning for atrial flutter.  The  "patient received metoprolol after which a repeat ECG showed sinus tachycardia.  A D-dimer was obtained and was elevated for which a CT chest was done that was negative for pulmonary embolism.  Cardiology subsequently signed off.  He has remained in the hospital for a prolonged period of time and is now pending guardianship and then will need placement.    He sustained an unwitnessed fall on 12/6 - found sitting on the floor by nursing staff. Reportedly no injuries.     Guardianship hearing held on  12/18/2020, now on hold until 2/2/21 due to personal friend attempting to become guardian.     Interval Problem Update  12/16- Patient resting in bed, opened eyes with auditory stimulus looked at provider then reclosed eyes and refused to engage. Patient in no acute distress. SLP cognitive evaluation completed on 12/14, which states patient has severe cognitive impairment, and will need 24/7 care on discharge. Guardianship hearing this week.   12/20- Patient resting in bed, states he is doing well. When asked if he knows where he is he laughs and deflects his answer to \"Yes I know where I am, I just don't know where we are at with this procedure\". Patient unable to give any clearer answer. Apparently a personal friend with legal representation attended guardianship hearing and public guardian hearing has been placed on hold until 2/5/21.       Code Status  Full Code    Disposition  Pending guardianship and then placement.  Guardianship hearing rescheduled for 2/5/21.    Review of Systems  Review of Systems   Unable to perform ROS: Mental acuity        Physical Exam  Temp:  [35.9 °C (96.7 °F)-36.9 °C (98.4 °F)] 36.5 °C (97.7 °F)  Pulse:  [61-72] 61  Resp:  [16-18] 16  BP: ()/(59-68) 104/68  SpO2:  [94 %-98 %] 98 %    Physical Exam  Vitals signs and nursing note reviewed.   Constitutional:       General: He is sleeping.      Appearance: He is well-developed. He is not ill-appearing.   HENT:      Head: Normocephalic. "   Eyes:      General: Lids are normal.      Pupils: Pupils are equal, round, and reactive to light.   Neck:      Musculoskeletal: Neck supple.   Cardiovascular:      Comments: Refused   Pulmonary:      Effort: Pulmonary effort is normal. No respiratory distress.   Musculoskeletal: Normal range of motion.   Skin:     General: Skin is warm and dry.   Neurological:      Mental Status: He is easily aroused. He is disoriented.         Fluids    Intake/Output Summary (Last 24 hours) at 12/20/2020 1145  Last data filed at 12/20/2020 0900  Gross per 24 hour   Intake 120 ml   Output 1450 ml   Net -1330 ml       Laboratory                        Imaging  CT-CTA CHEST PULMONARY ARTERY W/ RECONS   Final Result      1.  No evidence of pulmonary emboli.   2.  Right lower lobe discoid atelectasis.   3.  Gynecomastia.            DX-CHEST-PORTABLE (1 VIEW)   Final Result      No acute cardiac or pulmonary abnormalities are identified.      MR-BRAIN-W/O   Final Result      1.  Moderate diffuse cerebral atrophy.      2.  Minimal periventricular and juxtacortical white matter changes consistent with chronic microvascular ischemic gliosis.      3.  No evidence of acute infarction in the brain parenchyma.      DX-ABDOMEN FOR TUBE PLACEMENT   Final Result      Feeding tube tip overlies the second portion of the duodenum.      DX-CHEST-PORTABLE (1 VIEW)   Final Result         1.  Patchy left lung base opacity, new since prior, could represent early infiltrate            DX-CHEST-PORTABLE (1 VIEW)   Final Result         1.  Patchy left lung base opacity, new since prior, could represent early infiltrate         [id number]-0 VIEW   Final Result      Enteric tube projects over the distal stomach/pylorus.         EC-ECHOCARDIOGRAM COMPLETE W/O CONT   Final Result      DX-CHEST-PORTABLE (1 VIEW)   Final Result         1.  No acute cardiopulmonary disease.      CT-ABDOMEN-PELVIS W/O   Final Result      1.  No renal stone or hydronephrosis.   2.   Normal appendix.   3.  No focal mesenteric inflammatory process.      DX-ABDOMEN FOR TUBE PLACEMENT   Final Result      NG tube tip projects over expected location the gastric fundus.      US-ABDOMEN COMPLETE SURVEY   Final Result         1.  Echogenic liver compatible with fatty change versus fibrosis.   2.  Gallbladder sludge without additional sonographic findings of cholecystitis.   3.  Partial atrophic bilateral kidneys with lobulated contour         CT-HEAD W/O   Final Result         1.  No acute intracranial abnormality is identified, there are nonspecific white matter changes, commonly associated with small vessel ischemic disease.  Associated mild cerebral atrophy is noted.   2.  Atherosclerosis.      DX-CHEST-PORTABLE (1 VIEW)   Final Result         1.  No acute cardiopulmonary disease.           Assessment/Plan  * Toxic encephalopathy- (present on admission)  Assessment & Plan  -Unknown etiology - hypoxic induced brain injury vs Wernicke encephalopathy.  -LP, MRI brain, EEG negative. TSH normal. HIV/RPR negative.  -Psych was consulted and believe it is alcohol related.  -Continue risperdal.  -Remains free from restraints.  -Pending guardianship - hearing 12/18 - resulted in pushing out public guardian hearing until 2/5/21 in order to allow personal friend to attempt to gain guardianship prior.     Normocytic anemia- (present on admission)  Assessment & Plan  -Very mild, no active bleeding.  -Check periodically.    Alcohol abuse- (present on admission)  Assessment & Plan  -Abstinent since admission.  -Continue daily PO vitamin therapy.       VTE prophylaxis: Lovenox    I have performed a physical exam and reviewed and updated ROS and Assessment/Plan today 12/20/2020. In review of the previous note there are no changes except as documented above    I certify the patient requires continued medically necessary hospital services for the treatment of cognitive impairment, falls, inability to care for himself.   The patient will remain in the hospital for the foreseeable future.  Discharge may or may not occur in the next 20 days due to ongoing discharge delays due to having no medically acceptable discharge options.        TRACE Garcia.

## 2020-12-20 NOTE — CARE PLAN
Problem: Infection  Goal: Will remain free from infection  Outcome: PROGRESSING AS EXPECTED   Standard precautions in place.   Problem: Safety  Goal: Will remain free from falls  Outcome: PROGRESSING AS EXPECTED   Bed locked and in lowest position, call light and personal belongings within reach, treaded socks and bed alarm in place, hourly rounding on going.

## 2020-12-21 PROCEDURE — A9270 NON-COVERED ITEM OR SERVICE: HCPCS | Performed by: HOSPITALIST

## 2020-12-21 PROCEDURE — 770006 HCHG ROOM/CARE - MED/SURG/GYN SEMI*

## 2020-12-21 PROCEDURE — 700102 HCHG RX REV CODE 250 W/ 637 OVERRIDE(OP): Performed by: HOSPITALIST

## 2020-12-21 PROCEDURE — 700111 HCHG RX REV CODE 636 W/ 250 OVERRIDE (IP): Performed by: HOSPITALIST

## 2020-12-21 PROCEDURE — 700102 HCHG RX REV CODE 250 W/ 637 OVERRIDE(OP): Performed by: NURSE PRACTITIONER

## 2020-12-21 PROCEDURE — A9270 NON-COVERED ITEM OR SERVICE: HCPCS | Performed by: NURSE PRACTITIONER

## 2020-12-21 RX ADMIN — RISPERIDONE 1 MG: 1 TABLET, FILM COATED ORAL at 16:55

## 2020-12-21 RX ADMIN — ENOXAPARIN SODIUM 40 MG: 40 INJECTION SUBCUTANEOUS at 16:55

## 2020-12-21 RX ADMIN — TAMSULOSIN HYDROCHLORIDE 0.8 MG: 0.4 CAPSULE ORAL at 09:42

## 2020-12-21 RX ADMIN — RISPERIDONE 1 MG: 1 TABLET, FILM COATED ORAL at 05:43

## 2020-12-22 PROCEDURE — 700102 HCHG RX REV CODE 250 W/ 637 OVERRIDE(OP): Performed by: HOSPITALIST

## 2020-12-22 PROCEDURE — 700102 HCHG RX REV CODE 250 W/ 637 OVERRIDE(OP): Performed by: NURSE PRACTITIONER

## 2020-12-22 PROCEDURE — A9270 NON-COVERED ITEM OR SERVICE: HCPCS | Performed by: HOSPITALIST

## 2020-12-22 PROCEDURE — 700111 HCHG RX REV CODE 636 W/ 250 OVERRIDE (IP): Performed by: HOSPITALIST

## 2020-12-22 PROCEDURE — 770006 HCHG ROOM/CARE - MED/SURG/GYN SEMI*

## 2020-12-22 PROCEDURE — A9270 NON-COVERED ITEM OR SERVICE: HCPCS | Performed by: NURSE PRACTITIONER

## 2020-12-22 RX ADMIN — RISPERIDONE 1 MG: 1 TABLET, FILM COATED ORAL at 16:56

## 2020-12-22 RX ADMIN — RISPERIDONE 1 MG: 1 TABLET, FILM COATED ORAL at 06:24

## 2020-12-22 RX ADMIN — DOCUSATE SODIUM 50 MG AND SENNOSIDES 8.6 MG 2 TABLET: 8.6; 5 TABLET, FILM COATED ORAL at 16:56

## 2020-12-22 RX ADMIN — THERA TABS 1 TABLET: TAB at 06:24

## 2020-12-22 RX ADMIN — DOCUSATE SODIUM 50 MG AND SENNOSIDES 8.6 MG 2 TABLET: 8.6; 5 TABLET, FILM COATED ORAL at 06:24

## 2020-12-22 RX ADMIN — Medication 100 MG: at 06:25

## 2020-12-22 RX ADMIN — ASPIRIN 325 MG ORAL TABLET 325 MG: 325 PILL ORAL at 06:25

## 2020-12-22 RX ADMIN — ENOXAPARIN SODIUM 40 MG: 40 INJECTION SUBCUTANEOUS at 16:56

## 2020-12-22 RX ADMIN — TAMSULOSIN HYDROCHLORIDE 0.8 MG: 0.4 CAPSULE ORAL at 08:25

## 2020-12-22 NOTE — WOUND TEAM
Wound team consulted for left lateral foot.  This RN to see patient in his room.  Patient telling this RN that he has to go to work.  Reoriented patient without success.  Re-introduced self to patient and told him that this RN will need to see his feet.  Patient refuses to be touched, patient refused help to re-adjust his PRAFO boots.  Patient states he will do them himself.   Pily LUCIANO notified.  Will try again later today or tomorrow to complete consult.  Asked Pily LUCIANO if possible to help offload area of concern with mepilex if patient allows her to see the left foot.

## 2020-12-23 PROCEDURE — A9270 NON-COVERED ITEM OR SERVICE: HCPCS | Performed by: NURSE PRACTITIONER

## 2020-12-23 PROCEDURE — 770006 HCHG ROOM/CARE - MED/SURG/GYN SEMI*

## 2020-12-23 PROCEDURE — 700102 HCHG RX REV CODE 250 W/ 637 OVERRIDE(OP): Performed by: HOSPITALIST

## 2020-12-23 PROCEDURE — 700102 HCHG RX REV CODE 250 W/ 637 OVERRIDE(OP): Performed by: NURSE PRACTITIONER

## 2020-12-23 PROCEDURE — A9270 NON-COVERED ITEM OR SERVICE: HCPCS | Performed by: HOSPITALIST

## 2020-12-23 PROCEDURE — 700111 HCHG RX REV CODE 636 W/ 250 OVERRIDE (IP): Performed by: HOSPITALIST

## 2020-12-23 PROCEDURE — 99231 SBSQ HOSP IP/OBS SF/LOW 25: CPT | Performed by: NURSE PRACTITIONER

## 2020-12-23 RX ADMIN — RISPERIDONE 1 MG: 1 TABLET, FILM COATED ORAL at 16:17

## 2020-12-23 RX ADMIN — ENOXAPARIN SODIUM 40 MG: 40 INJECTION SUBCUTANEOUS at 16:17

## 2020-12-23 RX ADMIN — DOCUSATE SODIUM 50 MG AND SENNOSIDES 8.6 MG 2 TABLET: 8.6; 5 TABLET, FILM COATED ORAL at 06:07

## 2020-12-23 RX ADMIN — TAMSULOSIN HYDROCHLORIDE 0.8 MG: 0.4 CAPSULE ORAL at 08:30

## 2020-12-23 RX ADMIN — ASPIRIN 325 MG ORAL TABLET 325 MG: 325 PILL ORAL at 06:07

## 2020-12-23 RX ADMIN — DOCUSATE SODIUM 50 MG AND SENNOSIDES 8.6 MG 2 TABLET: 8.6; 5 TABLET, FILM COATED ORAL at 16:16

## 2020-12-23 RX ADMIN — RISPERIDONE 1 MG: 1 TABLET, FILM COATED ORAL at 06:07

## 2020-12-23 RX ADMIN — Medication 100 MG: at 06:00

## 2020-12-23 RX ADMIN — THERA TABS 1 TABLET: TAB at 06:06

## 2020-12-23 ASSESSMENT — ENCOUNTER SYMPTOMS: MYALGIAS: 1

## 2020-12-24 PROCEDURE — A9270 NON-COVERED ITEM OR SERVICE: HCPCS | Performed by: NURSE PRACTITIONER

## 2020-12-24 PROCEDURE — 700102 HCHG RX REV CODE 250 W/ 637 OVERRIDE(OP): Performed by: NURSE PRACTITIONER

## 2020-12-24 PROCEDURE — 700111 HCHG RX REV CODE 636 W/ 250 OVERRIDE (IP): Performed by: HOSPITALIST

## 2020-12-24 PROCEDURE — 700102 HCHG RX REV CODE 250 W/ 637 OVERRIDE(OP): Performed by: HOSPITALIST

## 2020-12-24 PROCEDURE — A9270 NON-COVERED ITEM OR SERVICE: HCPCS | Performed by: HOSPITALIST

## 2020-12-24 PROCEDURE — 770006 HCHG ROOM/CARE - MED/SURG/GYN SEMI*

## 2020-12-24 RX ADMIN — DOCUSATE SODIUM 50 MG AND SENNOSIDES 8.6 MG 2 TABLET: 8.6; 5 TABLET, FILM COATED ORAL at 18:32

## 2020-12-24 RX ADMIN — RISPERIDONE 1 MG: 1 TABLET, FILM COATED ORAL at 18:32

## 2020-12-24 RX ADMIN — Medication 100 MG: at 06:00

## 2020-12-24 RX ADMIN — DOCUSATE SODIUM 50 MG AND SENNOSIDES 8.6 MG 2 TABLET: 8.6; 5 TABLET, FILM COATED ORAL at 05:59

## 2020-12-24 RX ADMIN — ASPIRIN 325 MG ORAL TABLET 325 MG: 325 PILL ORAL at 05:59

## 2020-12-24 RX ADMIN — ENOXAPARIN SODIUM 40 MG: 40 INJECTION SUBCUTANEOUS at 18:32

## 2020-12-24 RX ADMIN — TAMSULOSIN HYDROCHLORIDE 0.8 MG: 0.4 CAPSULE ORAL at 09:42

## 2020-12-24 RX ADMIN — RISPERIDONE 1 MG: 1 TABLET, FILM COATED ORAL at 05:59

## 2020-12-24 RX ADMIN — THERA TABS 1 TABLET: TAB at 05:59

## 2020-12-24 NOTE — PROGRESS NOTES
"Attempted to remove patient's left sock to assess and photograph wound.  Patient became very agitated stating \"Leave my fucking feet alone.\"  Rn will attempt again in AM.  "

## 2020-12-24 NOTE — PROGRESS NOTES
[facility] Medicine Twice Weekly Progress Note    Date of Service  12/23/2020    Chief Complaint  Altered mental status    Hospital Course  Mr. Blake is a 50-year-old male who was brought to the emergency department on 9/9/2020 with altered mental status after he was found by his ex-wife to be obtunded after she had checked on him when he had not been seen for at least 2 days.  At that time he was alert and oriented x0 and found to be hypotensive.  GCS in the ED was 7 and he was severely hypotensive.  He was intubated for airway protection and central venous access was obtained for administration of high-volume crystalloid resuscitation and vasopressor therapy.     He had a significant leukocytosis with a WBC of 22.9 and was hypokalemic with a potassium level of 2.7.  Additionally he had acute kidney injury with a creatinine of 2.73.  His INR was 8.93 and he also had a lactate level of 11.  Urinalysis was performed and was negative for urinary tract infection.  He was diagnosed with septic shock and admitted to the intensive care unit for further evaluation and treatment.  A lumbar puncture was done on 9/9/2020.  He was also noted to have seizure-like activity on 9/10/2020 so an EEG was obtained and was significant for a moderate encephalopathy.    He was extubated 9/11/2020.  He also had a traumatic catheter removal so on 9/21/2020 urology was consulted but was ultimately unnecessary.  On 9/13/2020 he was transferred out of the ICU where he required restraints due to confusion, hallucination, agitation, and impulsivity.  An MRI was performed and was negative for acute pathologies.  He was also treated for a UTI starting 10/2/2020.  He has also fallen multiple times in hospital.    On 11/11/2020 he had an acute episode of sinus tachycardia in the 140s accompanied by hypotension. There was concern for STEMI so cardiology was consulted.  A 12-lead EKG was obtained and was concerning for atrial flutter.  The  patient received metoprolol after which a repeat ECG showed sinus tachycardia.  A D-dimer was obtained and was elevated for which a CT chest was done that was negative for pulmonary embolism.  Cardiology subsequently signed off.  He has remained in the hospital for a prolonged period of time and is now pending guardianship and then will need placement.    He sustained an unwitnessed fall on 12/6 - found sitting on the floor by nursing staff. Reportedly no injuries.     Guardianship hearing held on  12/18/2020, now on hold until 2/2/21 due to personal friend attempting to become guardian.     Interval Problem Update  12/23:  Patient in pleasant mood, but confused.  Appears to be at mental and physical baseline.  He does report bilateral lower extremity pain, but its not significant.  No acute needs.      Code Status  Full Code    Disposition  Pending guardianship and then placement.  Guardianship hearing rescheduled for 2/5/21.    Review of Systems  Review of Systems   Unable to perform ROS: Mental acuity   Musculoskeletal: Positive for myalgias.        Physical Exam  Temp:  [36.2 °C (97.1 °F)-36.9 °C (98.5 °F)] 36.6 °C (97.8 °F)  Pulse:  [61-88] 88  Resp:  [16-17] 17  BP: (101-111)/(65-75) 104/73  SpO2:  [92 %-98 %] 97 %    Physical Exam  Vitals signs and nursing note reviewed.   Constitutional:       General: He is sleeping.      Appearance: He is well-developed. He is not ill-appearing.   HENT:      Head: Normocephalic.   Eyes:      General: Lids are normal.      Pupils: Pupils are equal, round, and reactive to light.   Neck:      Musculoskeletal: Neck supple.   Cardiovascular:      Comments: Refused   Pulmonary:      Effort: Pulmonary effort is normal. No respiratory distress.   Musculoskeletal: Normal range of motion.   Skin:     General: Skin is warm and dry.   Neurological:      Mental Status: He is easily aroused. He is disoriented.         Fluids    Intake/Output Summary (Last 24 hours) at 12/23/2020  1627  Last data filed at 12/23/2020 1600  Gross per 24 hour   Intake 720 ml   Output 1500 ml   Net -780 ml       Laboratory                        Imaging  CT-CTA CHEST PULMONARY ARTERY W/ RECONS   Final Result      1.  No evidence of pulmonary emboli.   2.  Right lower lobe discoid atelectasis.   3.  Gynecomastia.            DX-CHEST-PORTABLE (1 VIEW)   Final Result      No acute cardiac or pulmonary abnormalities are identified.      MR-BRAIN-W/O   Final Result      1.  Moderate diffuse cerebral atrophy.      2.  Minimal periventricular and juxtacortical white matter changes consistent with chronic microvascular ischemic gliosis.      3.  No evidence of acute infarction in the brain parenchyma.      DX-ABDOMEN FOR TUBE PLACEMENT   Final Result      Feeding tube tip overlies the second portion of the duodenum.      DX-CHEST-PORTABLE (1 VIEW)   Final Result         1.  Patchy left lung base opacity, new since prior, could represent early infiltrate            DX-CHEST-PORTABLE (1 VIEW)   Final Result         1.  Patchy left lung base opacity, new since prior, could represent early infiltrate         RI-MONSOCF-0 VIEW   Final Result      Enteric tube projects over the distal stomach/pylorus.         EC-ECHOCARDIOGRAM COMPLETE W/O CONT   Final Result      DX-CHEST-PORTABLE (1 VIEW)   Final Result         1.  No acute cardiopulmonary disease.      CT-ABDOMEN-PELVIS W/O   Final Result      1.  No renal stone or hydronephrosis.   2.  Normal appendix.   3.  No focal mesenteric inflammatory process.      DX-ABDOMEN FOR TUBE PLACEMENT   Final Result      NG tube tip projects over expected location the gastric fundus.      US-ABDOMEN COMPLETE SURVEY   Final Result         1.  Echogenic liver compatible with fatty change versus fibrosis.   2.  Gallbladder sludge without additional sonographic findings of cholecystitis.   3.  Partial atrophic bilateral kidneys with lobulated contour         CT-HEAD W/O   Final Result          1.  No acute intracranial abnormality is identified, there are nonspecific white matter changes, commonly associated with small vessel ischemic disease.  Associated mild cerebral atrophy is noted.   2.  Atherosclerosis.      DX-CHEST-PORTABLE (1 VIEW)   Final Result         1.  No acute cardiopulmonary disease.           Assessment/Plan  * Toxic encephalopathy- (present on admission)  Assessment & Plan  -Unknown etiology - hypoxic induced brain injury vs Wernicke encephalopathy.  -LP, MRI brain, EEG negative. TSH normal. HIV/RPR negative.  -Psych was consulted and believe it is alcohol related.  -Continue risperdal.  -Remains free from restraints.  -Pending guardianship - hearing 12/18 - resulted in pushing out public guardian hearing until 2/5/21 in order to allow personal friend to attempt to gain guardianship prior.     Normocytic anemia- (present on admission)  Assessment & Plan  -Very mild, no active bleeding.  -Check periodically.    Alcohol abuse- (present on admission)  Assessment & Plan  -Abstinent since admission.  -Continue daily PO vitamin therapy.       VTE prophylaxis: Lovenox    I have performed a physical exam and reviewed and updated ROS and Assessment/Plan today 12/23/2020. In review of the previous note there are no changes except as documented above        TRACE Stock.

## 2020-12-25 PROCEDURE — A9270 NON-COVERED ITEM OR SERVICE: HCPCS | Performed by: NURSE PRACTITIONER

## 2020-12-25 PROCEDURE — A9270 NON-COVERED ITEM OR SERVICE: HCPCS | Performed by: HOSPITALIST

## 2020-12-25 PROCEDURE — 700111 HCHG RX REV CODE 636 W/ 250 OVERRIDE (IP): Performed by: HOSPITALIST

## 2020-12-25 PROCEDURE — 770006 HCHG ROOM/CARE - MED/SURG/GYN SEMI*

## 2020-12-25 PROCEDURE — 700102 HCHG RX REV CODE 250 W/ 637 OVERRIDE(OP): Performed by: HOSPITALIST

## 2020-12-25 PROCEDURE — 700102 HCHG RX REV CODE 250 W/ 637 OVERRIDE(OP): Performed by: NURSE PRACTITIONER

## 2020-12-25 RX ADMIN — RISPERIDONE 1 MG: 1 TABLET, FILM COATED ORAL at 04:55

## 2020-12-25 RX ADMIN — THERA TABS 1 TABLET: TAB at 04:55

## 2020-12-25 RX ADMIN — Medication 100 MG: at 06:00

## 2020-12-25 RX ADMIN — ENOXAPARIN SODIUM 40 MG: 40 INJECTION SUBCUTANEOUS at 16:12

## 2020-12-25 RX ADMIN — TAMSULOSIN HYDROCHLORIDE 0.8 MG: 0.4 CAPSULE ORAL at 10:50

## 2020-12-25 RX ADMIN — RISPERIDONE 1 MG: 1 TABLET, FILM COATED ORAL at 16:12

## 2020-12-25 RX ADMIN — ASPIRIN 325 MG ORAL TABLET 325 MG: 325 PILL ORAL at 04:55

## 2020-12-25 NOTE — PROGRESS NOTES
Two episodes of disorientation overnight where patient attempted to get out of bed. Bed alarm was activated and patient was found sitting on the edge of the bed. Patient was asking about his dog and Deonte. Pt complied with redirection and allowed this RN and CNA to get him settled back in bed. Pt was compliant with assessment and meds, but non-compliant with foot drop braces/boots. No complaints of pain. SCDs on, bed alarm on, bed in low and locked position.

## 2020-12-25 NOTE — CARE PLAN
Problem: Communication  Goal: The ability to communicate needs accurately and effectively will improve  Outcome: PROGRESSING SLOWER THAN EXPECTED     Problem: Safety  Goal: Will remain free from injury  Outcome: PROGRESSING SLOWER THAN EXPECTED    Patient has memory loss and does not remember where he is. Patient does not call appropriately. Patient attempts to get out of bed without using call light.

## 2020-12-25 NOTE — PROGRESS NOTES
Pt attempted to get out of bed, saying he had to go home and he doesn't want to be here anymore. I helped him back into bed and noticed his condom catheter had come off. I asked him if I could replace it, he said he didn't care. I said I was going to, and he said no, he doesn't want it. Day shift RN notified.

## 2020-12-26 PROCEDURE — 700102 HCHG RX REV CODE 250 W/ 637 OVERRIDE(OP): Performed by: HOSPITALIST

## 2020-12-26 PROCEDURE — A9270 NON-COVERED ITEM OR SERVICE: HCPCS | Performed by: HOSPITALIST

## 2020-12-26 PROCEDURE — A9270 NON-COVERED ITEM OR SERVICE: HCPCS | Performed by: NURSE PRACTITIONER

## 2020-12-26 PROCEDURE — 770006 HCHG ROOM/CARE - MED/SURG/GYN SEMI*

## 2020-12-26 PROCEDURE — 700111 HCHG RX REV CODE 636 W/ 250 OVERRIDE (IP): Performed by: HOSPITALIST

## 2020-12-26 PROCEDURE — 700102 HCHG RX REV CODE 250 W/ 637 OVERRIDE(OP): Performed by: NURSE PRACTITIONER

## 2020-12-26 PROCEDURE — 99231 SBSQ HOSP IP/OBS SF/LOW 25: CPT | Performed by: NURSE PRACTITIONER

## 2020-12-26 RX ORDER — QUETIAPINE FUMARATE 25 MG/1
25 TABLET, FILM COATED ORAL DAILY
Status: DISCONTINUED | OUTPATIENT
Start: 2020-12-26 | End: 2020-12-26

## 2020-12-26 RX ORDER — RISPERIDONE 2 MG/1
2 TABLET ORAL 2 TIMES DAILY
Status: DISCONTINUED | OUTPATIENT
Start: 2020-12-26 | End: 2021-03-18 | Stop reason: HOSPADM

## 2020-12-26 RX ADMIN — ENOXAPARIN SODIUM 40 MG: 40 INJECTION SUBCUTANEOUS at 17:44

## 2020-12-26 RX ADMIN — TAMSULOSIN HYDROCHLORIDE 0.8 MG: 0.4 CAPSULE ORAL at 08:26

## 2020-12-26 RX ADMIN — RISPERIDONE 2 MG: 2 TABLET ORAL at 17:44

## 2020-12-26 RX ADMIN — ASPIRIN 325 MG ORAL TABLET 325 MG: 325 PILL ORAL at 05:53

## 2020-12-26 RX ADMIN — Medication 100 MG: at 06:00

## 2020-12-26 RX ADMIN — DOCUSATE SODIUM 50 MG AND SENNOSIDES 8.6 MG 2 TABLET: 8.6; 5 TABLET, FILM COATED ORAL at 17:44

## 2020-12-26 RX ADMIN — RISPERIDONE 1 MG: 1 TABLET, FILM COATED ORAL at 05:53

## 2020-12-26 RX ADMIN — THERA TABS 1 TABLET: TAB at 05:53

## 2020-12-26 ASSESSMENT — ENCOUNTER SYMPTOMS: MYALGIAS: 1

## 2020-12-26 NOTE — PROGRESS NOTES
Pt fell out of bed around 1800. Pt denies hitting head. No injuries noted. Notified Ian APRN. Per Ian, if pt experience any pain will order imaging. No new orders received yet. 1:1 sitter at the bedside.

## 2020-12-26 NOTE — CARE PLAN
Problem: Safety  Goal: Will remain free from falls  Outcome: PROGRESSING AS EXPECTED  Note: Bed in lowest position, locked, treaded socks in place, educated about fall risk, sitter at bedside     Problem: Mobility  Goal: Risk for activity intolerance will decrease  Outcome: PROGRESSING SLOWER THAN EXPECTED  Note: Unsteady when ambulating, PT/OT following pt., using assistive devices

## 2020-12-26 NOTE — PROGRESS NOTES
Assumed care of pt at 0745. Report received and bedside rounding completed with night RN. Pt is calm no SOB, or in any acute distress noted.     Fall precautions in place,  bed alarm. - Treaded non slip socks. Bed locked. Communication board updated with POC. Call light and pt belongings within reach - hourly rounding in place.     Sitter at bedside 1:1, updated sitter about pt.     Foot drop brace applied to both bilateral feet

## 2020-12-26 NOTE — PROGRESS NOTES
1015 bed alarm went off, I ran to patient's room, found patient sitting on the floor, patient at baseline mentation alert to self only (baseline mentation since hospitalization), helped patient back to bed with 3 people, vital signs WNL, patient denies hitting head, patient reporting left hip pain 3/10, refuses pain meds at this time. Fall precautions already in place even before fall. Informed Ian Kraus, hospitalist APRN, no new orders at this time.

## 2020-12-26 NOTE — PROGRESS NOTES
[facility] Medicine Twice Weekly Progress Note    Date of Service  12/26/2020    Chief Complaint  Altered mental status    Hospital Course  Mr. Blake is a 50-year-old male who was brought to the emergency department on 9/9/2020 with altered mental status after he was found by his ex-wife to be obtunded after she had checked on him when he had not been seen for at least 2 days.  At that time he was alert and oriented x0 and found to be hypotensive.  GCS in the ED was 7 and he was severely hypotensive.  He was intubated for airway protection and central venous access was obtained for administration of high-volume crystalloid resuscitation and vasopressor therapy.     He had a significant leukocytosis with a WBC of 22.9 and was hypokalemic with a potassium level of 2.7.  Additionally he had acute kidney injury with a creatinine of 2.73.  His INR was 8.93 and he also had a lactate level of 11.  Urinalysis was performed and was negative for urinary tract infection.  He was diagnosed with septic shock and admitted to the intensive care unit for further evaluation and treatment.  A lumbar puncture was done on 9/9/2020.  He was also noted to have seizure-like activity on 9/10/2020 so an EEG was obtained and was significant for a moderate encephalopathy.    He was extubated 9/11/2020.  He also had a traumatic catheter removal so on 9/21/2020 urology was consulted but was ultimately unnecessary.  On 9/13/2020 he was transferred out of the ICU where he required restraints due to confusion, hallucination, agitation, and impulsivity.  An MRI was performed and was negative for acute pathologies.  He was also treated for a UTI starting 10/2/2020.  He has also fallen multiple times in hospital.    On 11/11/2020 he had an acute episode of sinus tachycardia in the 140s accompanied by hypotension. There was concern for STEMI so cardiology was consulted.  A 12-lead EKG was obtained and was concerning for atrial flutter.  The  patient received metoprolol after which a repeat ECG showed sinus tachycardia.  A D-dimer was obtained and was elevated for which a CT chest was done that was negative for pulmonary embolism.  Cardiology subsequently signed off.  He has remained in the hospital for a prolonged period of time and is now pending guardianship and then will need placement.    He sustained an unwitnessed fall on 12/6 - found sitting on the floor by nursing staff. Reportedly no injuries.     Guardianship hearing held on  12/18/2020, now on hold until 2/2/21 due to personal friend attempting to become guardian.     Interval Problem Update  12/23:  Patient in pleasant mood, but confused.  Appears to be at mental and physical baseline.  He does report bilateral lower extremity pain, but its not significant.  No acute needs.  12/26:  Patient fell twice yesterday attempting to get out of bed.  Does not appear to have acute injury.  Sitter now at bedside.  Increased dose of risperdal.  He otherwise appears at baseline.      Code Status  Full Code    Disposition  Pending guardianship and then placement.  Guardianship hearing rescheduled for 2/5/21.    Review of Systems  Review of Systems   Unable to perform ROS: Mental acuity   Musculoskeletal: Positive for myalgias.        Physical Exam  Temp:  [35.7 °C (96.2 °F)-36.7 °C (98.1 °F)] 36.2 °C (97.2 °F)  Pulse:  [] 75  Resp:  [16-17] 17  BP: (105-117)/(71-80) 117/72  SpO2:  [93 %-97 %] 93 %    Physical Exam  Vitals signs and nursing note reviewed.   Constitutional:       General: He is sleeping.      Appearance: He is well-developed. He is not ill-appearing.   HENT:      Head: Normocephalic.   Eyes:      General: Lids are normal.      Pupils: Pupils are equal, round, and reactive to light.   Neck:      Musculoskeletal: Neck supple.   Cardiovascular:      Comments: Refused   Pulmonary:      Effort: Pulmonary effort is normal. No respiratory distress.   Musculoskeletal: Normal range of motion.    Skin:     General: Skin is warm and dry.   Neurological:      Mental Status: He is easily aroused. He is disoriented.   Psychiatric:         Cognition and Memory: Cognition is impaired.         Judgment: Judgment is impulsive.         Fluids    Intake/Output Summary (Last 24 hours) at 12/26/2020 1104  Last data filed at 12/26/2020 0600  Gross per 24 hour   Intake 800 ml   Output 900 ml   Net -100 ml       Laboratory                        Imaging  CT-CTA CHEST PULMONARY ARTERY W/ RECONS   Final Result      1.  No evidence of pulmonary emboli.   2.  Right lower lobe discoid atelectasis.   3.  Gynecomastia.            DX-CHEST-PORTABLE (1 VIEW)   Final Result      No acute cardiac or pulmonary abnormalities are identified.      MR-BRAIN-W/O   Final Result      1.  Moderate diffuse cerebral atrophy.      2.  Minimal periventricular and juxtacortical white matter changes consistent with chronic microvascular ischemic gliosis.      3.  No evidence of acute infarction in the brain parenchyma.      DX-ABDOMEN FOR TUBE PLACEMENT   Final Result      Feeding tube tip overlies the second portion of the duodenum.      DX-CHEST-PORTABLE (1 VIEW)   Final Result         1.  Patchy left lung base opacity, new since prior, could represent early infiltrate            DX-CHEST-PORTABLE (1 VIEW)   Final Result         1.  Patchy left lung base opacity, new since prior, could represent early infiltrate         [id number] VIEW   Final Result      Enteric tube projects over the distal stomach/pylorus.         EC-ECHOCARDIOGRAM COMPLETE W/O CONT   Final Result      DX-CHEST-PORTABLE (1 VIEW)   Final Result         1.  No acute cardiopulmonary disease.      CT-ABDOMEN-PELVIS W/O   Final Result      1.  No renal stone or hydronephrosis.   2.  Normal appendix.   3.  No focal mesenteric inflammatory process.      DX-ABDOMEN FOR TUBE PLACEMENT   Final Result      NG tube tip projects over expected location the gastric fundus.       US-ABDOMEN COMPLETE SURVEY   Final Result         1.  Echogenic liver compatible with fatty change versus fibrosis.   2.  Gallbladder sludge without additional sonographic findings of cholecystitis.   3.  Partial atrophic bilateral kidneys with lobulated contour         CT-HEAD W/O   Final Result         1.  No acute intracranial abnormality is identified, there are nonspecific white matter changes, commonly associated with small vessel ischemic disease.  Associated mild cerebral atrophy is noted.   2.  Atherosclerosis.      DX-CHEST-PORTABLE (1 VIEW)   Final Result         1.  No acute cardiopulmonary disease.           Assessment/Plan  * Toxic encephalopathy- (present on admission)  Assessment & Plan  -Unknown etiology - hypoxic induced brain injury vs Wernicke encephalopathy.  -LP, MRI brain, EEG negative. TSH normal. HIV/RPR negative.  -Psych was consulted and believe it is alcohol related.  -Continue risperdal.  -Remains free from restraints.  -12/26:  Patient remains impulsive.  He fell trying to get out of bed twice yesterday.  Sitter now at bedside.  Increased risperdal dose.  -Pending guardianship - hearing 12/18 - resulted in pushing out public guardian hearing until 2/5/21 in order to allow personal friend to attempt to gain guardianship prior.     Normocytic anemia- (present on admission)  Assessment & Plan  -Very mild, no active bleeding.  -Check periodically.    Alcohol abuse- (present on admission)  Assessment & Plan  -Abstinent since admission.  -Continue daily PO vitamin therapy.       VTE prophylaxis: Lovenox    I have performed a physical exam and reviewed and updated ROS and Assessment/Plan today 12/26/2020. In review of the previous note there are no changes except as documented above        ALONZO Stock

## 2020-12-27 PROCEDURE — A9270 NON-COVERED ITEM OR SERVICE: HCPCS | Performed by: HOSPITALIST

## 2020-12-27 PROCEDURE — 700102 HCHG RX REV CODE 250 W/ 637 OVERRIDE(OP): Performed by: NURSE PRACTITIONER

## 2020-12-27 PROCEDURE — A9270 NON-COVERED ITEM OR SERVICE: HCPCS | Performed by: NURSE PRACTITIONER

## 2020-12-27 PROCEDURE — 770006 HCHG ROOM/CARE - MED/SURG/GYN SEMI*

## 2020-12-27 PROCEDURE — 700111 HCHG RX REV CODE 636 W/ 250 OVERRIDE (IP): Performed by: HOSPITALIST

## 2020-12-27 PROCEDURE — 700102 HCHG RX REV CODE 250 W/ 637 OVERRIDE(OP): Performed by: HOSPITALIST

## 2020-12-27 RX ADMIN — DOCUSATE SODIUM 50 MG AND SENNOSIDES 8.6 MG 2 TABLET: 8.6; 5 TABLET, FILM COATED ORAL at 17:33

## 2020-12-27 RX ADMIN — Medication 100 MG: at 06:00

## 2020-12-27 RX ADMIN — RISPERIDONE 2 MG: 2 TABLET ORAL at 04:38

## 2020-12-27 RX ADMIN — DOCUSATE SODIUM 50 MG AND SENNOSIDES 8.6 MG 2 TABLET: 8.6; 5 TABLET, FILM COATED ORAL at 04:38

## 2020-12-27 RX ADMIN — RISPERIDONE 2 MG: 2 TABLET ORAL at 17:33

## 2020-12-27 RX ADMIN — ASPIRIN 325 MG ORAL TABLET 325 MG: 325 PILL ORAL at 04:38

## 2020-12-27 RX ADMIN — ENOXAPARIN SODIUM 40 MG: 40 INJECTION SUBCUTANEOUS at 17:33

## 2020-12-27 RX ADMIN — TAMSULOSIN HYDROCHLORIDE 0.8 MG: 0.4 CAPSULE ORAL at 09:11

## 2020-12-27 RX ADMIN — THERA TABS 1 TABLET: TAB at 04:38

## 2020-12-27 NOTE — CARE PLAN
Problem: Pain Management  Goal: Pain level will decrease to patient's comfort goal  Outcome: PROGRESSING AS EXPECTED     Problem: Mobility  Goal: Risk for activity intolerance will decrease  Outcome: PROGRESSING SLOWER THAN EXPECTED

## 2020-12-27 NOTE — WOUND TEAM
[facility]  Inpatient Services  Initial Wound and Skin Care Evaluation    Admission Date: 9/9/2020     Last order of IP CONSULT TO WOUND CARE was found on 12/19/2020 from Hospital Encounter on 9/9/2020       HPI, PMH, SH: Reviewed    Unit where seen by Wound Team: [id number]     WOUND CONSULT/FOLLOW UP RELATED TO:  Left Lateral 5th MTH    Self Report / Pain Level:  0/10    OBJECTIVE:  Pt lying in bed with no dressing in place, mepilex placed at the dorsal and heel of foot. Preventable measures in place waffle overlay over pressure redistribution mattress.    WOUND TYPE, LOCATION, CHARACTERISTICS (Pressure Injuries: location, stage, POA or date identified)     Wound 12/18/20 Pressure Injury Lateral Left Lateral Left Foot Wound (Active)   Wound Image   12/27/20 1200   Site Assessment Eschar 12/27/20 1200   Periwound Assessment Clean;Dry;Intact;Blanchable erythema 12/27/20 1200   Margins Defined edges;Attached edges 12/27/20 1200   Closure Open to air 12/27/20 1200   Drainage Amount None 12/27/20 1200   Drainage Description KIRTI 12/27/20 0900   Dressing Options Mepilex 12/27/20 1200   Dressing Changed New 12/27/20 1200   Dressing Status Clean;Dry;Intact 12/27/20 1200   Dressing Change/Treatment Frequency Every 72 hrs, and As Needed 12/27/20 1200   NEXT Dressing Change/Treatment Date 12/30/20 12/27/20 1200   NEXT Weekly Photo (Inpatient Only) 01/03/21 12/27/20 1200   Pressure Injury Stage U 12/27/20 1200   Wound Length (cm) 3 cm 12/27/20 1200   Wound Width (cm) 3 cm 12/27/20 1200   Wound Surface Area (cm^2) 9 cm^2 12/27/20 1200   Shape circular 12/27/20 1200   Wound Odor None 12/27/20 1200   Exposed Structures KIRTI 12/27/20 1200   WOUND NURSE ONLY - Time Spent with Patient (mins) 45 12/27/20 1200     Vascular:    ANA:   No results found.      Lab Values:    Lab Results   Component Value Date/Time    WBC 8.6 11/17/2020 06:21 AM    RBC 4.40 (L) 11/17/2020 06:21 AM    HEMOGLOBIN 12.9 (L) 11/17/2020 06:21 AM     HEMATOCRIT 40.3 (L) 11/17/2020 06:21 AM    CREACTPROT 4.77 (H) 09/15/2020 05:05 AM    HBA1C 5.2 08/02/2016 07:30 AM          Culture:   Not indicated at this time.  Culture Results show:  No results found for this or any previous visit (from the past 720 hour(s)).      INTERVENTIONS BY WOUND TEAM:  Chart reviewed.  Patient seen for left lateral foot.  Left lateral foot has eschar on left lateral 5th MTH with surrounding redness that is blanching.  Mepilex heel cut in half and applied to the left lateral 5th MTH and left medial 1st MTH.  The discoloration is likely to PRAFO boot for drop foot.     Interdisciplinary consultation: Patient, Bedside RN (Marti)    EVALUATION: Wound team saw patient on 11/25 for sacrum.  Patient developed a discoloration on 12/05 to the lateral foot, wound team was not consulted.  12/18 a new photo was updated and wound team consulted on 12/19.  Patient left foot left open to air.  Patient was seen by wound care RN on 12/22 and patient was very confused and refused wound care.  Wound team tried to see patient on 12/24, but was again refused.  12/27, patient much more calmer with bedside sitter.  Patient allowed this wound care RN and bedside RN to assess left lateral foot and apply mepilex to the area.  The discoloration is likely due to PRAFO boot used for patient's drop foot.  Patient very confused and had refused bedside staff from touching patient and also had multiple falls due to confusion. Patient as of 12/26 has bedside sitter in room.     Goals: Steady decrease in wound area and depth weekly.    NURSING PLAN OF CARE ORDERS (X):    Dressing changes: See Dressing Care orders: x  Skin care: See Skin Care orders:   Rectal tube care: See Rectal Tube Care orders:   Other orders:    WOUND TEAM PLAN OF CARE   Dressing changes by wound team:          Follow up 1-2 times weekly:    X, weekly follow-up for left lateral foot           Follow up 3 times weekly:                NPWT change 3  times weekly:     Follow up as needed:       Other (explain):     Anticipated discharge plans:  Patient unable to take care of self at this time and is very confused.  Pending medical clearance.  Wound on left lateral needs to be offloaded.   LTACH:        SNF/Rehab:                  Home Care:           Outpatient Wound Center:            Self Care:

## 2020-12-28 PROCEDURE — 770006 HCHG ROOM/CARE - MED/SURG/GYN SEMI*

## 2020-12-28 PROCEDURE — 700102 HCHG RX REV CODE 250 W/ 637 OVERRIDE(OP): Performed by: NURSE PRACTITIONER

## 2020-12-28 PROCEDURE — A9270 NON-COVERED ITEM OR SERVICE: HCPCS | Performed by: NURSE PRACTITIONER

## 2020-12-28 PROCEDURE — 700102 HCHG RX REV CODE 250 W/ 637 OVERRIDE(OP): Performed by: HOSPITALIST

## 2020-12-28 PROCEDURE — 700111 HCHG RX REV CODE 636 W/ 250 OVERRIDE (IP): Performed by: HOSPITALIST

## 2020-12-28 PROCEDURE — A9270 NON-COVERED ITEM OR SERVICE: HCPCS | Performed by: HOSPITALIST

## 2020-12-28 RX ADMIN — RISPERIDONE 2 MG: 2 TABLET ORAL at 19:24

## 2020-12-28 RX ADMIN — ASPIRIN 325 MG ORAL TABLET 325 MG: 325 PILL ORAL at 06:19

## 2020-12-28 RX ADMIN — DOCUSATE SODIUM 50 MG AND SENNOSIDES 8.6 MG 2 TABLET: 8.6; 5 TABLET, FILM COATED ORAL at 04:10

## 2020-12-28 RX ADMIN — RISPERIDONE 2 MG: 2 TABLET ORAL at 04:10

## 2020-12-28 RX ADMIN — THERA TABS 1 TABLET: TAB at 04:10

## 2020-12-28 RX ADMIN — Medication 100 MG: at 04:10

## 2020-12-28 RX ADMIN — DOCUSATE SODIUM 50 MG AND SENNOSIDES 8.6 MG 2 TABLET: 8.6; 5 TABLET, FILM COATED ORAL at 19:23

## 2020-12-28 RX ADMIN — ENOXAPARIN SODIUM 40 MG: 40 INJECTION SUBCUTANEOUS at 17:15

## 2020-12-28 NOTE — CARE PLAN
Problem: Communication  Goal: The ability to communicate needs accurately and effectively will improve  Outcome: PROGRESSING SLOWER THAN EXPECTED     Problem: Safety  Goal: Will remain free from injury  Outcome: PROGRESSING AS EXPECTED  1:1 sitter at bedside. Patient is impulsive and has history of falls during hospitalization.

## 2020-12-29 PROCEDURE — 700102 HCHG RX REV CODE 250 W/ 637 OVERRIDE(OP): Performed by: HOSPITALIST

## 2020-12-29 PROCEDURE — 770006 HCHG ROOM/CARE - MED/SURG/GYN SEMI*

## 2020-12-29 PROCEDURE — A9270 NON-COVERED ITEM OR SERVICE: HCPCS | Performed by: NURSE PRACTITIONER

## 2020-12-29 PROCEDURE — A9270 NON-COVERED ITEM OR SERVICE: HCPCS | Performed by: HOSPITALIST

## 2020-12-29 PROCEDURE — 700111 HCHG RX REV CODE 636 W/ 250 OVERRIDE (IP): Performed by: HOSPITALIST

## 2020-12-29 PROCEDURE — 700102 HCHG RX REV CODE 250 W/ 637 OVERRIDE(OP): Performed by: NURSE PRACTITIONER

## 2020-12-29 RX ADMIN — ENOXAPARIN SODIUM 40 MG: 40 INJECTION SUBCUTANEOUS at 17:54

## 2020-12-29 RX ADMIN — DOCUSATE SODIUM 50 MG AND SENNOSIDES 8.6 MG 2 TABLET: 8.6; 5 TABLET, FILM COATED ORAL at 05:19

## 2020-12-29 RX ADMIN — RISPERIDONE 2 MG: 2 TABLET ORAL at 05:19

## 2020-12-29 RX ADMIN — RISPERIDONE 2 MG: 2 TABLET ORAL at 17:54

## 2020-12-29 RX ADMIN — Medication 100 MG: at 08:54

## 2020-12-29 RX ADMIN — THERA TABS 1 TABLET: TAB at 05:19

## 2020-12-29 RX ADMIN — ASPIRIN 325 MG ORAL TABLET 325 MG: 325 PILL ORAL at 05:19

## 2020-12-29 RX ADMIN — TAMSULOSIN HYDROCHLORIDE 0.8 MG: 0.4 CAPSULE ORAL at 08:53

## 2020-12-29 RX ADMIN — DOCUSATE SODIUM 50 MG AND SENNOSIDES 8.6 MG 2 TABLET: 8.6; 5 TABLET, FILM COATED ORAL at 17:54

## 2020-12-29 NOTE — DISCHARGE PLANNING
CAMILLE received email from  leadership Audelia Schneider, forwarded from RN JAMES Rushing.  Email requested CM to contact Ml Culver. CAMILLE called his office and was forwarded to his voicemail, left msg.

## 2020-12-30 PROCEDURE — 700102 HCHG RX REV CODE 250 W/ 637 OVERRIDE(OP): Performed by: NURSE PRACTITIONER

## 2020-12-30 PROCEDURE — A9270 NON-COVERED ITEM OR SERVICE: HCPCS | Performed by: HOSPITALIST

## 2020-12-30 PROCEDURE — 700102 HCHG RX REV CODE 250 W/ 637 OVERRIDE(OP): Performed by: HOSPITALIST

## 2020-12-30 PROCEDURE — A9270 NON-COVERED ITEM OR SERVICE: HCPCS | Performed by: NURSE PRACTITIONER

## 2020-12-30 PROCEDURE — 99231 SBSQ HOSP IP/OBS SF/LOW 25: CPT | Performed by: NURSE PRACTITIONER

## 2020-12-30 PROCEDURE — 700111 HCHG RX REV CODE 636 W/ 250 OVERRIDE (IP): Performed by: HOSPITALIST

## 2020-12-30 PROCEDURE — 770006 HCHG ROOM/CARE - MED/SURG/GYN SEMI*

## 2020-12-30 RX ADMIN — THERA TABS 1 TABLET: TAB at 04:58

## 2020-12-30 RX ADMIN — ASPIRIN 325 MG ORAL TABLET 325 MG: 325 PILL ORAL at 04:58

## 2020-12-30 RX ADMIN — TAMSULOSIN HYDROCHLORIDE 0.8 MG: 0.4 CAPSULE ORAL at 09:07

## 2020-12-30 RX ADMIN — ENOXAPARIN SODIUM 40 MG: 40 INJECTION SUBCUTANEOUS at 17:15

## 2020-12-30 RX ADMIN — DOCUSATE SODIUM 50 MG AND SENNOSIDES 8.6 MG 2 TABLET: 8.6; 5 TABLET, FILM COATED ORAL at 17:16

## 2020-12-30 RX ADMIN — Medication 100 MG: at 04:58

## 2020-12-30 RX ADMIN — DOCUSATE SODIUM 50 MG AND SENNOSIDES 8.6 MG 2 TABLET: 8.6; 5 TABLET, FILM COATED ORAL at 04:58

## 2020-12-30 RX ADMIN — RISPERIDONE 2 MG: 2 TABLET ORAL at 17:16

## 2020-12-30 RX ADMIN — RISPERIDONE 2 MG: 2 TABLET ORAL at 04:58

## 2020-12-30 NOTE — CARE PLAN
Problem: Safety  Goal: Will remain free from falls  Outcome: PROGRESSING AS EXPECTED   Bed locked and in lowest position. Treaded slipper socks on. Call light within reach. Pt educated to call for assistance. 1-1 sitter at bedside.    Problem: Venous Thromboembolism (VTW)/Deep Vein Thrombosis (DVT) Prevention:  Goal: Patient will participate in Venous Thrombosis (VTE)/Deep Vein Thrombosis (DVT)Prevention Measures  Outcome: PROGRESSING AS EXPECTED   SCD's in place and on.

## 2020-12-30 NOTE — CARE PLAN
Problem: Safety  Goal: Will remain free from injury  Outcome: PROGRESSING AS EXPECTED  Patient's bed moved to bed 1 (closer to nurse station) and 1:1 sitter replaced with tele-sitter.     Problem: Urinary Elimination:  Goal: Ability to reestablish a normal urinary elimination pattern will improve  Outcome: PROGRESSING AS EXPECTED  Condom cath in place.

## 2020-12-30 NOTE — PROGRESS NOTES
[facility] Medicine Twice Weekly Progress Note    Date of Service  12/30/2020    Chief Complaint  Altered mental status    Hospital Course  Mr. Blake is a 50-year-old male who was brought to the emergency department on 9/9/2020 with altered mental status after he was found by his ex-wife to be obtunded after she had checked on him when he had not been seen for at least 2 days.  At that time he was alert and oriented x0 and found to be hypotensive.  GCS in the ED was 7 and he was severely hypotensive.  He was intubated for airway protection and central venous access was obtained for administration of high-volume crystalloid resuscitation and vasopressor therapy.  He had a significant leukocytosis with a WBC of 22.9 and was hypokalemic with a potassium level of 2.7.  Additionally he had acute kidney injury with a creatinine of 2.73.  His INR was 8.93 and he also had a lactate level of 11.  Urinalysis was performed and was negative for urinary tract infection.  He was diagnosed with septic shock and admitted to the intensive care unit for further evaluation and treatment.  A lumbar puncture was done on 9/9/2020.  He was also noted to have seizure-like activity on 9/10/2020 so an EEG was obtained and was significant for a moderate encephalopathy.  Extubation occurred on 9/11/2020.  He also had a traumatic catheter removal so on 9/21/2020 urology was consulted but was ultimately unnecessary.  On 9/13/2020 he was transferred out of the ICU where he required restraints due to confusion, hallucination, agitation, and impulsivity.  An MRI was performed and was negative for acute pathologies.  He was also treated for a UTI starting 10/2/2020.  He has also fallen multiple times in hospital.  Additionally, on 11/11/2020 he had an acute episode of sinus tachycardia in the 140s accompanied by hypotension.  There was concern for STEMI so cardiology was consulted.  A 12-lead EKG was obtained and was concerning for atrial  flutter.  The patient received metoprolol after which a repeat ECG showed sinus tachycardia.  A D-dimer was obtained and was elevated for which a CT chest was done that was negative for pulmonary embolism.  Cardiology subsequently signed off.  He has remained in the hospital for a prolonged period of time and is now pending guardianship and then will need placement.  Guardianship hearing is tentatively scheduled for 2/5/2021.    Interval Problem Update  Uncooperative today. Angry. Wouldn't participate in ROS.     Afebrile, HR 70s-80s, SBP 90s-100s, O2 saturations within normal limits on room air.    Consultants/Specialty  Critical care  Psychiatry  Cardiology  Urology    Code Status  Full Code    Disposition  Pending guardianship and then placement.  Guardianship hearing scheduled for 2/5/2021.    Review of Systems  Review of Systems   Unable to perform ROS: Other (Uncooperative/Refused to participate)      Physical Exam  Temp:  [36.2 °C (97.2 °F)-36.6 °C (97.9 °F)] 36.4 °C (97.6 °F)  Pulse:  [74-82] 74  Resp:  [16-17] 17  BP: ()/(59-61) 90/59  SpO2:  [92 %-93 %] 93 %    Physical Exam  Vitals signs and nursing note reviewed.   Constitutional:       General: He is sleeping. He is not in acute distress.     Comments: Unkempt appearance   HENT:      Head: Normocephalic and atraumatic.      Mouth/Throat:      Lips: Pink.      Mouth: Mucous membranes are moist.   Eyes:      Conjunctiva/sclera: Conjunctivae normal.      Pupils: Pupils are equal, round, and reactive to light.   Neck:      Musculoskeletal: Normal range of motion and neck supple.   Cardiovascular:      Rate and Rhythm: Normal rate and regular rhythm.      Pulses: Normal pulses.      Heart sounds: Normal heart sounds.   Pulmonary:      Effort: Pulmonary effort is normal.      Breath sounds: Normal breath sounds.   Abdominal:      General: Bowel sounds are normal. There is no distension or abdominal bruit.      Palpations: Abdomen is soft.       Tenderness: There is no abdominal tenderness.   Musculoskeletal:      Right lower leg: No edema.      Left lower leg: No edema.   Skin:     General: Skin is warm and dry.   Neurological:      General: No focal deficit present.      Mental Status: He is easily aroused. He is confused.   Psychiatric:         Attention and Perception: He is inattentive.         Mood and Affect: Mood is anxious. Affect is labile and angry.         Speech: Speech normal.         Behavior: Behavior is uncooperative and agitated.     Fluids  No intake or output data in the 24 hours ending 12/30/20 1532    Laboratory    Imaging  CT-CTA CHEST PULMONARY ARTERY W/ RECONS   Final Result      1.  No evidence of pulmonary emboli.   2.  Right lower lobe discoid atelectasis.   3.  Gynecomastia.            DX-CHEST-PORTABLE (1 VIEW)   Final Result      No acute cardiac or pulmonary abnormalities are identified.      MR-BRAIN-W/O   Final Result      1.  Moderate diffuse cerebral atrophy.      2.  Minimal periventricular and juxtacortical white matter changes consistent with chronic microvascular ischemic gliosis.      3.  No evidence of acute infarction in the brain parenchyma.      DX-ABDOMEN FOR TUBE PLACEMENT   Final Result      Feeding tube tip overlies the second portion of the duodenum.      DX-CHEST-PORTABLE (1 VIEW)   Final Result         1.  Patchy left lung base opacity, new since prior, could represent early infiltrate            DX-CHEST-PORTABLE (1 VIEW)   Final Result         1.  Patchy left lung base opacity, new since prior, could represent early infiltrate         [id number] VIEW   Final Result      Enteric tube projects over the distal stomach/pylorus.         EC-ECHOCARDIOGRAM COMPLETE W/O CONT   Final Result      DX-CHEST-PORTABLE (1 VIEW)   Final Result         1.  No acute cardiopulmonary disease.      CT-ABDOMEN-PELVIS W/O   Final Result      1.  No renal stone or hydronephrosis.   2.  Normal appendix.   3.  No focal  mesenteric inflammatory process.      DX-ABDOMEN FOR TUBE PLACEMENT   Final Result      NG tube tip projects over expected location the gastric fundus.      US-ABDOMEN COMPLETE SURVEY   Final Result         1.  Echogenic liver compatible with fatty change versus fibrosis.   2.  Gallbladder sludge without additional sonographic findings of cholecystitis.   3.  Partial atrophic bilateral kidneys with lobulated contour         CT-HEAD W/O   Final Result         1.  No acute intracranial abnormality is identified, there are nonspecific white matter changes, commonly associated with small vessel ischemic disease.  Associated mild cerebral atrophy is noted.   2.  Atherosclerosis.      DX-CHEST-PORTABLE (1 VIEW)   Final Result         1.  No acute cardiopulmonary disease.         Assessment/Plan  * Toxic encephalopathy- (present on admission)  Assessment & Plan  -Unknown etiology - hypoxic induced brain injury vs Wernicke encephalopathy.  -LP, MRI brain, EEG negative. TSH normal. HIV/RPR negative.  -Psych was consulted and believe it is alcohol related.  -Continue risperdal.  -Remains free from restraints.  -12/26:  Patient remains impulsive. Has fallen multiple times while in hospital. Sitter now at bedside.     Normocytic anemia- (present on admission)  Assessment & Plan  -Very mild, no active bleeding.  -Check periodically.    Alcohol abuse- (present on admission)  Assessment & Plan  -Abstinent since admission.  -Continue daily PO vitamin therapy.     VTE prophylaxis: Lovetessax      Jacqueline Aggarwal, MSN, RN, APRN, ACNPC-AG, CCRN  Nurse Practitioner, [facility]  [phone]    12/30/2020    3:32 PM

## 2020-12-31 PROCEDURE — 700102 HCHG RX REV CODE 250 W/ 637 OVERRIDE(OP): Performed by: NURSE PRACTITIONER

## 2020-12-31 PROCEDURE — A9270 NON-COVERED ITEM OR SERVICE: HCPCS | Performed by: HOSPITALIST

## 2020-12-31 PROCEDURE — A9270 NON-COVERED ITEM OR SERVICE: HCPCS | Performed by: NURSE PRACTITIONER

## 2020-12-31 PROCEDURE — 700102 HCHG RX REV CODE 250 W/ 637 OVERRIDE(OP): Performed by: HOSPITALIST

## 2020-12-31 PROCEDURE — 770006 HCHG ROOM/CARE - MED/SURG/GYN SEMI*

## 2020-12-31 PROCEDURE — 700111 HCHG RX REV CODE 636 W/ 250 OVERRIDE (IP): Performed by: HOSPITALIST

## 2020-12-31 RX ADMIN — TAMSULOSIN HYDROCHLORIDE 0.8 MG: 0.4 CAPSULE ORAL at 09:47

## 2020-12-31 RX ADMIN — RISPERIDONE 2 MG: 2 TABLET ORAL at 17:23

## 2020-12-31 RX ADMIN — ASPIRIN 325 MG ORAL TABLET 325 MG: 325 PILL ORAL at 04:18

## 2020-12-31 RX ADMIN — RISPERIDONE 2 MG: 2 TABLET ORAL at 04:18

## 2020-12-31 RX ADMIN — ENOXAPARIN SODIUM 40 MG: 40 INJECTION SUBCUTANEOUS at 17:23

## 2020-12-31 RX ADMIN — THERA TABS 1 TABLET: TAB at 04:18

## 2020-12-31 RX ADMIN — Medication 100 MG: at 04:18

## 2020-12-31 RX ADMIN — ACETAMINOPHEN 650 MG: 325 TABLET, FILM COATED ORAL at 23:11

## 2020-12-31 RX ADMIN — DOCUSATE SODIUM 50 MG AND SENNOSIDES 8.6 MG 2 TABLET: 8.6; 5 TABLET, FILM COATED ORAL at 04:18

## 2020-12-31 RX ADMIN — DOCUSATE SODIUM 50 MG AND SENNOSIDES 8.6 MG 2 TABLET: 8.6; 5 TABLET, FILM COATED ORAL at 17:23

## 2021-01-01 PROCEDURE — A9270 NON-COVERED ITEM OR SERVICE: HCPCS | Performed by: HOSPITALIST

## 2021-01-01 PROCEDURE — 700101 HCHG RX REV CODE 250: Performed by: HOSPITALIST

## 2021-01-01 PROCEDURE — 700111 HCHG RX REV CODE 636 W/ 250 OVERRIDE (IP): Performed by: HOSPITALIST

## 2021-01-01 PROCEDURE — 700102 HCHG RX REV CODE 250 W/ 637 OVERRIDE(OP): Performed by: NURSE PRACTITIONER

## 2021-01-01 PROCEDURE — A9270 NON-COVERED ITEM OR SERVICE: HCPCS | Performed by: NURSE PRACTITIONER

## 2021-01-01 PROCEDURE — 770006 HCHG ROOM/CARE - MED/SURG/GYN SEMI*

## 2021-01-01 PROCEDURE — 700102 HCHG RX REV CODE 250 W/ 637 OVERRIDE(OP): Performed by: HOSPITALIST

## 2021-01-01 RX ADMIN — POLYETHYLENE GLYCOL 3350 1 PACKET: 17 POWDER, FOR SOLUTION ORAL at 05:03

## 2021-01-01 RX ADMIN — Medication 100 MG: at 05:03

## 2021-01-01 RX ADMIN — RISPERIDONE 2 MG: 2 TABLET ORAL at 05:03

## 2021-01-01 RX ADMIN — DOCUSATE SODIUM 50 MG AND SENNOSIDES 8.6 MG 2 TABLET: 8.6; 5 TABLET, FILM COATED ORAL at 17:37

## 2021-01-01 RX ADMIN — POLYETHYLENE GLYCOL 3350 1 PACKET: 17 POWDER, FOR SOLUTION ORAL at 17:38

## 2021-01-01 RX ADMIN — ENOXAPARIN SODIUM 40 MG: 40 INJECTION SUBCUTANEOUS at 17:38

## 2021-01-01 RX ADMIN — ASPIRIN 325 MG ORAL TABLET 325 MG: 325 PILL ORAL at 05:02

## 2021-01-01 RX ADMIN — TAMSULOSIN HYDROCHLORIDE 0.8 MG: 0.4 CAPSULE ORAL at 09:03

## 2021-01-01 RX ADMIN — DOCUSATE SODIUM 50 MG AND SENNOSIDES 8.6 MG 2 TABLET: 8.6; 5 TABLET, FILM COATED ORAL at 05:03

## 2021-01-01 RX ADMIN — RISPERIDONE 2 MG: 2 TABLET ORAL at 17:37

## 2021-01-01 RX ADMIN — THERA TABS 1 TABLET: TAB at 05:03

## 2021-01-01 NOTE — PROGRESS NOTES
D/w Wound team plan for left heel as the heel drop boot puts pressure on the area with a wound.  He would likely need a custom device wound RN will f/u for now heel float boot placed on left foot.  R has drop foot boot.

## 2021-01-01 NOTE — CARE PLAN
Problem: Infection  Goal: Will remain free from infection  Outcome: PROGRESSING AS EXPECTED   Afebrile.      Problem: Pain Management  Goal: Pain level will decrease to patient's comfort goal  Outcome: PROGRESSING SLOWER THAN EXPECTED  Pain controlled with prn medication.  However has not needed this shift thus far.       Problem: Urinary Elimination:  Goal: Ability to reestablish a normal urinary elimination pattern will improve  Outcome: PROGRESSING SLOWER THAN EXPECTED   Incontinent with condom catheter.

## 2021-01-02 PROCEDURE — A9270 NON-COVERED ITEM OR SERVICE: HCPCS | Performed by: HOSPITALIST

## 2021-01-02 PROCEDURE — 700102 HCHG RX REV CODE 250 W/ 637 OVERRIDE(OP): Performed by: HOSPITALIST

## 2021-01-02 PROCEDURE — 700102 HCHG RX REV CODE 250 W/ 637 OVERRIDE(OP): Performed by: NURSE PRACTITIONER

## 2021-01-02 PROCEDURE — A9270 NON-COVERED ITEM OR SERVICE: HCPCS | Performed by: NURSE PRACTITIONER

## 2021-01-02 PROCEDURE — 700111 HCHG RX REV CODE 636 W/ 250 OVERRIDE (IP): Performed by: HOSPITALIST

## 2021-01-02 PROCEDURE — 770006 HCHG ROOM/CARE - MED/SURG/GYN SEMI*

## 2021-01-02 PROCEDURE — 99231 SBSQ HOSP IP/OBS SF/LOW 25: CPT | Performed by: NURSE PRACTITIONER

## 2021-01-02 RX ADMIN — DOCUSATE SODIUM 50 MG AND SENNOSIDES 8.6 MG 2 TABLET: 8.6; 5 TABLET, FILM COATED ORAL at 04:42

## 2021-01-02 RX ADMIN — RISPERIDONE 2 MG: 2 TABLET ORAL at 04:42

## 2021-01-02 RX ADMIN — RISPERIDONE 2 MG: 2 TABLET ORAL at 17:34

## 2021-01-02 RX ADMIN — TAMSULOSIN HYDROCHLORIDE 0.8 MG: 0.4 CAPSULE ORAL at 09:06

## 2021-01-02 RX ADMIN — ASPIRIN 325 MG ORAL TABLET 325 MG: 325 PILL ORAL at 04:42

## 2021-01-02 RX ADMIN — THERA TABS 1 TABLET: TAB at 04:42

## 2021-01-02 RX ADMIN — Medication 100 MG: at 04:42

## 2021-01-02 RX ADMIN — ENOXAPARIN SODIUM 40 MG: 40 INJECTION SUBCUTANEOUS at 17:34

## 2021-01-02 RX ADMIN — DOCUSATE SODIUM 50 MG AND SENNOSIDES 8.6 MG 2 TABLET: 8.6; 5 TABLET, FILM COATED ORAL at 17:34

## 2021-01-02 ASSESSMENT — ENCOUNTER SYMPTOMS
VOMITING: 0
WEAKNESS: 0
SENSORY CHANGE: 0
SPEECH CHANGE: 0
FEVER: 0
CONSTIPATION: 0
DIZZINESS: 0
INSOMNIA: 0
DEPRESSION: 0
COUGH: 0
HEADACHES: 0
PALPITATIONS: 0
NAUSEA: 0
DIARRHEA: 0
ABDOMINAL PAIN: 0
NERVOUS/ANXIOUS: 0
FOCAL WEAKNESS: 0
SHORTNESS OF BREATH: 0

## 2021-01-02 NOTE — PROGRESS NOTES
Report received from Day Shift RN at 1915. Assumed care of patient. Plan of care discussed, questions answered. Assessment completed. VSS, A&O x4, denies pain. PRN med given. Call light in reach. Patient educated on use of call light for assistance. 1:1 safety sitter at bedside.

## 2021-01-02 NOTE — PROGRESS NOTES
[facility] Medicine Twice Weekly Progress Note    Date of Service  1/2/2021    Chief Complaint  Altered mental status    Hospital Course  Mr. Blake is a 50-year-old male who was brought to the emergency department on 9/9/2020 with altered mental status after he was found by his ex-wife to be obtunded after she had checked on him when he had not been seen for at least 2 days.  At that time he was alert and oriented x0 and found to be hypotensive.  GCS in the ED was 7 and he was severely hypotensive.  He was intubated for airway protection and central venous access was obtained for administration of high-volume crystalloid resuscitation and vasopressor therapy.  He had a significant leukocytosis with a WBC of 22.9 and was hypokalemic with a potassium level of 2.7.  Additionally he had acute kidney injury with a creatinine of 2.73.  His INR was 8.93 and he also had a lactate level of 11.  Urinalysis was performed and was negative for urinary tract infection.  He was diagnosed with septic shock and admitted to the intensive care unit for further evaluation and treatment.  A lumbar puncture was done on 9/9/2020.  He was also noted to have seizure-like activity on 9/10/2020 so an EEG was obtained and was significant for a moderate encephalopathy.  Extubation occurred on 9/11/2020.  He also had a traumatic catheter removal so on 9/21/2020 urology was consulted but was ultimately unnecessary.  On 9/13/2020 he was transferred out of the ICU where he required restraints due to confusion, hallucination, agitation, and impulsivity.  An MRI was performed and was negative for acute pathologies.  He was also treated for a UTI starting 10/2/2020.  He has also fallen multiple times in hospital.  Additionally, on 11/11/2020 he had an acute episode of sinus tachycardia in the 140s accompanied by hypotension.  There was concern for STEMI so cardiology was consulted.  A 12-lead EKG was obtained and was concerning for atrial flutter.   The patient received metoprolol after which a repeat ECG showed sinus tachycardia.  A D-dimer was obtained and was elevated for which a CT chest was done that was negative for pulmonary embolism.  Cardiology subsequently signed off.  He has remained in the hospital for a prolonged period of time and is now pending guardianship and then will need placement.  Guardianship hearing is tentatively scheduled for 2/5/2021.    Interval Problem Update  More cooperative today. Less angry. Remains oriented to self only. Talked to the patient and RN about maintaining an environment that is more conducive to a normal circadian rhythm. Also discussed increasing his mobility throughout the day, which he was amenable to.    Afebrile, HR 70s-80s, SBP 90s-100s, O2 saturations within normal limits on room air.    Consultants/Specialty  Critical care  Psychiatry  Cardiology  Urology    Code Status  Full Code    Disposition  Pending guardianship and then placement.  Guardianship hearing scheduled for 2/5/2021.    Review of Systems  Review of Systems   Constitutional: Negative for fever and malaise/fatigue.   Respiratory: Negative for cough and shortness of breath.    Cardiovascular: Negative for chest pain, palpitations and leg swelling.   Gastrointestinal: Negative for abdominal pain, constipation, diarrhea, nausea and vomiting.   Genitourinary: Negative for dysuria.   Neurological: Negative for dizziness, sensory change, speech change, focal weakness, weakness and headaches.   Psychiatric/Behavioral: Negative for depression. The patient is not nervous/anxious and does not have insomnia.    All other systems reviewed and are negative.     Physical Exam  Temp:  [36.2 °C (97.2 °F)-36.7 °C (98.1 °F)] 36.7 °C (98.1 °F)  Pulse:  [70-91] 82  Resp:  [16-18] 16  BP: ()/(60-76) 98/61  SpO2:  [93 %-98 %] 93 %    Physical Exam  Vitals signs and nursing note reviewed.   Constitutional:       General: He is awake. He is not in acute  distress.     Comments: Unkempt appearance   HENT:      Head: Normocephalic and atraumatic.      Mouth/Throat:      Lips: Pink.      Mouth: Mucous membranes are moist.   Eyes:      Pupils: Pupils are equal, round, and reactive to light.   Neck:      Musculoskeletal: Normal range of motion and neck supple.   Cardiovascular:      Rate and Rhythm: Normal rate and regular rhythm.      Pulses: Normal pulses.      Heart sounds: Normal heart sounds.   Pulmonary:      Effort: Pulmonary effort is normal.      Breath sounds: Normal breath sounds.   Abdominal:      General: Bowel sounds are normal. There is no distension or abdominal bruit.      Palpations: Abdomen is soft.      Tenderness: There is no abdominal tenderness.   Musculoskeletal:      Right lower leg: No edema.      Left lower leg: No edema.   Skin:     General: Skin is warm and dry.   Neurological:      General: No focal deficit present.      Mental Status: He is alert. He is disoriented and confused.   Psychiatric:         Attention and Perception: Attention normal.         Mood and Affect: Mood normal. Affect is labile.         Speech: Speech normal.         Behavior: Behavior normal. Behavior is cooperative.         Thought Content: Thought content is delusional.         Cognition and Memory: Cognition is impaired. Memory is impaired. He exhibits impaired recent memory and impaired remote memory.     Fluids    Intake/Output Summary (Last 24 hours) at 1/2/2021 1017  Last data filed at 1/2/2021 0400  Gross per 24 hour   Intake 240 ml   Output 1200 ml   Net -960 ml     Laboratory    Imaging  CT-CTA CHEST PULMONARY ARTERY W/ RECONS   Final Result      1.  No evidence of pulmonary emboli.   2.  Right lower lobe discoid atelectasis.   3.  Gynecomastia.            DX-CHEST-PORTABLE (1 VIEW)   Final Result      No acute cardiac or pulmonary abnormalities are identified.      MR-BRAIN-W/O   Final Result      1.  Moderate diffuse cerebral atrophy.      2.  Minimal  periventricular and juxtacortical white matter changes consistent with chronic microvascular ischemic gliosis.      3.  No evidence of acute infarction in the brain parenchyma.      DX-ABDOMEN FOR TUBE PLACEMENT   Final Result      Feeding tube tip overlies the second portion of the duodenum.      DX-CHEST-PORTABLE (1 VIEW)   Final Result         1.  Patchy left lung base opacity, new since prior, could represent early infiltrate            DX-CHEST-PORTABLE (1 VIEW)   Final Result         1.  Patchy left lung base opacity, new since prior, could represent early infiltrate         [id number] VIEW   Final Result      Enteric tube projects over the distal stomach/pylorus.         EC-ECHOCARDIOGRAM COMPLETE W/O CONT   Final Result      DX-CHEST-PORTABLE (1 VIEW)   Final Result         1.  No acute cardiopulmonary disease.      CT-ABDOMEN-PELVIS W/O   Final Result      1.  No renal stone or hydronephrosis.   2.  Normal appendix.   3.  No focal mesenteric inflammatory process.      DX-ABDOMEN FOR TUBE PLACEMENT   Final Result      NG tube tip projects over expected location the gastric fundus.      US-ABDOMEN COMPLETE SURVEY   Final Result         1.  Echogenic liver compatible with fatty change versus fibrosis.   2.  Gallbladder sludge without additional sonographic findings of cholecystitis.   3.  Partial atrophic bilateral kidneys with lobulated contour         CT-HEAD W/O   Final Result         1.  No acute intracranial abnormality is identified, there are nonspecific white matter changes, commonly associated with small vessel ischemic disease.  Associated mild cerebral atrophy is noted.   2.  Atherosclerosis.      DX-CHEST-PORTABLE (1 VIEW)   Final Result         1.  No acute cardiopulmonary disease.         Assessment/Plan  * Toxic encephalopathy- (present on admission)  Assessment & Plan  -Unknown etiology - hypoxic induced brain injury vs Wernicke encephalopathy.  -LP, MRI brain, EEG negative. TSH normal.  HIV/RPR negative.  -Psych was consulted and believe it is alcohol related.  -Continue risperdal.  -Remains free from restraints.  -Has fallen multiple times while in hospital. Sitter now at bedside.     Normocytic anemia- (present on admission)  Assessment & Plan  -Very mild, no active bleeding.  -Check periodically.    Alcohol abuse- (present on admission)  Assessment & Plan  -Abstinent since admission.  -Continue daily PO vitamin therapy.     VTE prophylaxis: Madelin Aggarwal, MSN, RN, APRN, ACNPC-AG, CCRN  Nurse Practitioner, [facility]  [phone]    1/2/2021    10:17 AM

## 2021-01-03 PROCEDURE — 700102 HCHG RX REV CODE 250 W/ 637 OVERRIDE(OP): Performed by: HOSPITALIST

## 2021-01-03 PROCEDURE — 770006 HCHG ROOM/CARE - MED/SURG/GYN SEMI*

## 2021-01-03 PROCEDURE — A9270 NON-COVERED ITEM OR SERVICE: HCPCS | Performed by: NURSE PRACTITIONER

## 2021-01-03 PROCEDURE — 700102 HCHG RX REV CODE 250 W/ 637 OVERRIDE(OP): Performed by: NURSE PRACTITIONER

## 2021-01-03 PROCEDURE — A9270 NON-COVERED ITEM OR SERVICE: HCPCS | Performed by: HOSPITALIST

## 2021-01-03 PROCEDURE — 700111 HCHG RX REV CODE 636 W/ 250 OVERRIDE (IP): Performed by: HOSPITALIST

## 2021-01-03 RX ADMIN — THERA TABS 1 TABLET: TAB at 05:57

## 2021-01-03 RX ADMIN — ENOXAPARIN SODIUM 40 MG: 40 INJECTION SUBCUTANEOUS at 16:38

## 2021-01-03 RX ADMIN — Medication 100 MG: at 05:57

## 2021-01-03 RX ADMIN — DOCUSATE SODIUM 50 MG AND SENNOSIDES 8.6 MG 2 TABLET: 8.6; 5 TABLET, FILM COATED ORAL at 05:57

## 2021-01-03 RX ADMIN — DOCUSATE SODIUM 50 MG AND SENNOSIDES 8.6 MG 2 TABLET: 8.6; 5 TABLET, FILM COATED ORAL at 16:38

## 2021-01-03 RX ADMIN — RISPERIDONE 2 MG: 2 TABLET ORAL at 16:38

## 2021-01-03 RX ADMIN — RISPERIDONE 2 MG: 2 TABLET ORAL at 05:57

## 2021-01-03 RX ADMIN — ASPIRIN 325 MG ORAL TABLET 325 MG: 325 PILL ORAL at 05:57

## 2021-01-03 RX ADMIN — TAMSULOSIN HYDROCHLORIDE 0.8 MG: 0.4 CAPSULE ORAL at 08:44

## 2021-01-03 NOTE — CARE PLAN
Problem: Communication  Goal: The ability to communicate needs accurately and effectively will improve  Outcome: PROGRESSING SLOWER THAN EXPECTED  He does not retain teaching or instruction.      Problem: Safety  Goal: Will remain free from injury  Outcome: PROGRESSING AS EXPECTED   1:1 patient safety sitter present for frequent falls.

## 2021-01-04 PROCEDURE — A9270 NON-COVERED ITEM OR SERVICE: HCPCS | Performed by: NURSE PRACTITIONER

## 2021-01-04 PROCEDURE — A9270 NON-COVERED ITEM OR SERVICE: HCPCS | Performed by: HOSPITALIST

## 2021-01-04 PROCEDURE — 700102 HCHG RX REV CODE 250 W/ 637 OVERRIDE(OP): Performed by: HOSPITALIST

## 2021-01-04 PROCEDURE — 700102 HCHG RX REV CODE 250 W/ 637 OVERRIDE(OP): Performed by: NURSE PRACTITIONER

## 2021-01-04 PROCEDURE — 700111 HCHG RX REV CODE 636 W/ 250 OVERRIDE (IP): Performed by: HOSPITALIST

## 2021-01-04 PROCEDURE — 770006 HCHG ROOM/CARE - MED/SURG/GYN SEMI*

## 2021-01-04 RX ADMIN — DOCUSATE SODIUM 50 MG AND SENNOSIDES 8.6 MG 2 TABLET: 8.6; 5 TABLET, FILM COATED ORAL at 05:16

## 2021-01-04 RX ADMIN — THERA TABS 1 TABLET: TAB at 05:16

## 2021-01-04 RX ADMIN — ENOXAPARIN SODIUM 40 MG: 40 INJECTION SUBCUTANEOUS at 16:56

## 2021-01-04 RX ADMIN — RISPERIDONE 2 MG: 2 TABLET ORAL at 05:16

## 2021-01-04 RX ADMIN — ACETAMINOPHEN 650 MG: 325 TABLET, FILM COATED ORAL at 17:00

## 2021-01-04 RX ADMIN — DOCUSATE SODIUM 50 MG AND SENNOSIDES 8.6 MG 2 TABLET: 8.6; 5 TABLET, FILM COATED ORAL at 16:56

## 2021-01-04 RX ADMIN — Medication 100 MG: at 05:16

## 2021-01-04 RX ADMIN — ASPIRIN 325 MG ORAL TABLET 325 MG: 325 PILL ORAL at 05:16

## 2021-01-04 RX ADMIN — TAMSULOSIN HYDROCHLORIDE 0.8 MG: 0.4 CAPSULE ORAL at 10:22

## 2021-01-04 RX ADMIN — RISPERIDONE 2 MG: 2 TABLET ORAL at 16:56

## 2021-01-04 NOTE — PROGRESS NOTES
0701 Received report from night RN Brennon, POC discussed. Call light in place, bed lowered and locked, bed alarm on. Encourage pt to call if needed anything. Pt A&Ox1, to self only, 1:1 sitter at bedside

## 2021-01-05 PROCEDURE — 700102 HCHG RX REV CODE 250 W/ 637 OVERRIDE(OP): Performed by: NURSE PRACTITIONER

## 2021-01-05 PROCEDURE — 770006 HCHG ROOM/CARE - MED/SURG/GYN SEMI*

## 2021-01-05 PROCEDURE — A9270 NON-COVERED ITEM OR SERVICE: HCPCS | Performed by: NURSE PRACTITIONER

## 2021-01-05 PROCEDURE — 700102 HCHG RX REV CODE 250 W/ 637 OVERRIDE(OP): Performed by: HOSPITALIST

## 2021-01-05 PROCEDURE — 700111 HCHG RX REV CODE 636 W/ 250 OVERRIDE (IP): Performed by: HOSPITALIST

## 2021-01-05 PROCEDURE — A9270 NON-COVERED ITEM OR SERVICE: HCPCS | Performed by: HOSPITALIST

## 2021-01-05 RX ADMIN — RISPERIDONE 2 MG: 2 TABLET ORAL at 04:08

## 2021-01-05 RX ADMIN — DOCUSATE SODIUM 50 MG AND SENNOSIDES 8.6 MG 2 TABLET: 8.6; 5 TABLET, FILM COATED ORAL at 16:13

## 2021-01-05 RX ADMIN — TAMSULOSIN HYDROCHLORIDE 0.8 MG: 0.4 CAPSULE ORAL at 09:17

## 2021-01-05 RX ADMIN — ASPIRIN 325 MG ORAL TABLET 325 MG: 325 PILL ORAL at 04:08

## 2021-01-05 RX ADMIN — RISPERIDONE 2 MG: 2 TABLET ORAL at 16:13

## 2021-01-05 RX ADMIN — THERA TABS 1 TABLET: TAB at 04:08

## 2021-01-05 RX ADMIN — DOCUSATE SODIUM 50 MG AND SENNOSIDES 8.6 MG 2 TABLET: 8.6; 5 TABLET, FILM COATED ORAL at 04:08

## 2021-01-05 RX ADMIN — Medication 100 MG: at 04:07

## 2021-01-05 RX ADMIN — ENOXAPARIN SODIUM 40 MG: 40 INJECTION SUBCUTANEOUS at 16:13

## 2021-01-05 NOTE — DISCHARGE PLANNING
Anticipated Discharge Disposition: Guardianship and GH placement.    Action: Guardianship hearing scheduled for 2/5/2021.    Barriers to Discharge: Pending guardianship.    Plan: Cont to follow and facilitate private guardianship process.

## 2021-01-05 NOTE — WOUND TEAM
[facility]  Inpatient Services  Wound and Skin Care Progress Note    Admission Date: 9/9/2020     Last order of IP CONSULT TO WOUND CARE was found on 12/19/2020 from Hospital Encounter on 9/9/2020       HPI, PMH, SH: Reviewed    Unit where seen by Wound Team: [id number]     WOUND CONSULT/FOLLOW UP RELATED TO:  Left Lateral 5th MTH    Self Report / Pain Level:  0/10    OBJECTIVE:  Pt lying in bed with no dressing in place, mepilex placed at the dorsal and heel of foot. Preventable measures in place waffle overlay over pressure redistribution mattress.    WOUND TYPE, LOCATION, CHARACTERISTICS (Pressure Injuries: location, stage, POA or date identified)     Wound 12/18/20 Pressure Injury Lateral Left Lateral Left Foot Wound (Active)   Wound Image   01/04/21 1600   Site Assessment Red;Yellow 01/04/21 1600   Periwound Assessment Blanchable erythema 01/04/21 1600   Margins Defined edges 01/04/21 1600   Closure Secondary intention 01/04/21 1600   Drainage Amount Small 01/04/21 1600   Drainage Description Serosanguineous 01/04/21 1600   Treatments Cleansed;Irrigation;Site care 01/04/21 1600   Wound Cleansing Approved Wound Cleanser 01/04/21 1600   Periwound Protectant Not Applicable 01/04/21 1600   Dressing Cleansing/Solutions Not Applicable 01/04/21 1600   Dressing Options Hydrocolloid Thin;Mepilex Heel 01/04/21 1600   Dressing Changed Changed 01/04/21 1600   Dressing Status Clean;Dry;Intact 01/04/21 1600   Dressing Change/Treatment Frequency Every 72 hrs, and As Needed 01/04/21 1600   NEXT Dressing Change/Treatment Date 01/07/21 01/04/21 1600   NEXT Weekly Photo (Inpatient Only) 01/11/21 01/04/21 1600   Pressure Injury Stage U 12/27/20 1200   Wound Length (cm) 2 cm 01/04/21 1600   Wound Width (cm) 2 cm 01/04/21 1600   Wound Surface Area (cm^2) 4 cm^2 01/04/21 1600   Shape circular 01/04/21 1600   Wound Odor None 01/04/21 1600   Exposed Structures KIRTI 12/27/20 1200   WOUND NURSE ONLY - Time Spent with Patient  (mins) 45 01/04/21 1600         Vascular:    ANA:   No results found.      Lab Values:    Lab Results   Component Value Date/Time    WBC 8.6 11/17/2020 06:21 AM    RBC 4.40 (L) 11/17/2020 06:21 AM    HEMOGLOBIN 12.9 (L) 11/17/2020 06:21 AM    HEMATOCRIT 40.3 (L) 11/17/2020 06:21 AM    CREACTPROT 4.77 (H) 09/15/2020 05:05 AM    HBA1C 5.2 08/02/2016 07:30 AM          Culture:   Not indicated at this time.  Culture Results show:  No results found for this or any previous visit (from the past 720 hour(s)).      INTERVENTIONS BY WOUND TEAM:  Chart reviewed.  Patient seen for left lateral foot.  Left lateral foot has moist yellow with pink wound bed with surrounding redness that is blanching.  Cleansed with wound cleanser and gauze, patted dry, hydrocolloid thin to left lateral 5th MTH.  Mepilex heel cut in half and applied to the left lateral 5th MTH.      Consult for custom PRAFO boot, wound team does not provide this service.  We are only able to offload area with prevalon boot and mepilex.     Interdisciplinary consultation: Patient, Bedside RN (KATHRYN)    EVALUATION:   01/04/2021:  Eschar cap has released and came off.  Small SS drainage with some mild odor.  Hydrocolloid thin to wound bed to help with autolytic debridement and maintain a moist wound environment.  Mepilex to help offload.   12/27/2020:  Wound team saw patient on 11/25 for sacrum.  Patient developed a discoloration on 12/05 to the lateral foot, wound team was not consulted.  12/18 a new photo was updated and wound team consulted on 12/19.  Patient left foot left open to air.  Patient was seen by wound care RN on 12/22 and patient was very confused and refused wound care.  Wound team tried to see patient on 12/24, but was again refused.  12/27, patient much more calmer with bedside sitter.  Patient allowed this wound care RN and bedside RN to assess left lateral foot and apply mepilex to the area.  The discoloration is likely due to PRAFO boot used for  patient's drop foot.  Patient very confused and had refused bedside staff from touching patient and also had multiple falls due to confusion. Patient as of 12/26 has bedside sitter in room.     Goals: Steady decrease in wound area and depth weekly.    NURSING PLAN OF CARE ORDERS (X):    Dressing changes: See Dressing Care orders: x  Skin care: See Skin Care orders:   Rectal tube care: See Rectal Tube Care orders:   Other orders:    WOUND TEAM PLAN OF CARE   Dressing changes by wound team:          Follow up 1-2 times weekly:    X, weekly follow-up for left lateral foot           Follow up 3 times weekly:                NPWT change 3 times weekly:     Follow up as needed:       Other (explain):     Anticipated discharge plans:  Patient unable to take care of self at this time and is very confused.  Pending medical clearance.  Wound on left lateral needs to be offloaded.   LTACH:        SNF/Rehab:                  Home Care:           Outpatient Wound Center:            Self Care:

## 2021-01-05 NOTE — PROGRESS NOTES
0705 Received report from night RN Ml, POC discussed. Call light in place, bed lowered and locked, bed alarm on. Encourage pt to call if needed anything. Pt A&Ox1, to self only, 1:1 sitter at bedside

## 2021-01-06 LAB — GLUCOSE BLD-MCNC: 95 MG/DL (ref 65–99)

## 2021-01-06 PROCEDURE — 770006 HCHG ROOM/CARE - MED/SURG/GYN SEMI*

## 2021-01-06 PROCEDURE — 82962 GLUCOSE BLOOD TEST: CPT

## 2021-01-06 PROCEDURE — A9270 NON-COVERED ITEM OR SERVICE: HCPCS | Performed by: NURSE PRACTITIONER

## 2021-01-06 PROCEDURE — 700102 HCHG RX REV CODE 250 W/ 637 OVERRIDE(OP): Performed by: HOSPITALIST

## 2021-01-06 PROCEDURE — 700102 HCHG RX REV CODE 250 W/ 637 OVERRIDE(OP): Performed by: NURSE PRACTITIONER

## 2021-01-06 PROCEDURE — A9270 NON-COVERED ITEM OR SERVICE: HCPCS | Performed by: HOSPITALIST

## 2021-01-06 PROCEDURE — 700111 HCHG RX REV CODE 636 W/ 250 OVERRIDE (IP): Performed by: HOSPITALIST

## 2021-01-06 RX ADMIN — ENOXAPARIN SODIUM 40 MG: 40 INJECTION SUBCUTANEOUS at 17:04

## 2021-01-06 RX ADMIN — TAMSULOSIN HYDROCHLORIDE 0.8 MG: 0.4 CAPSULE ORAL at 08:18

## 2021-01-06 RX ADMIN — DOCUSATE SODIUM 50 MG AND SENNOSIDES 8.6 MG 2 TABLET: 8.6; 5 TABLET, FILM COATED ORAL at 04:32

## 2021-01-06 RX ADMIN — RISPERIDONE 2 MG: 2 TABLET ORAL at 04:32

## 2021-01-06 RX ADMIN — DOCUSATE SODIUM 50 MG AND SENNOSIDES 8.6 MG 2 TABLET: 8.6; 5 TABLET, FILM COATED ORAL at 17:05

## 2021-01-06 RX ADMIN — ASPIRIN 325 MG ORAL TABLET 325 MG: 325 PILL ORAL at 06:00

## 2021-01-06 RX ADMIN — Medication 100 MG: at 04:32

## 2021-01-06 RX ADMIN — THERA TABS 1 TABLET: TAB at 04:32

## 2021-01-06 RX ADMIN — RISPERIDONE 2 MG: 2 TABLET ORAL at 17:04

## 2021-01-06 NOTE — PROGRESS NOTES
Pt encouraged to sit up on a chair and bed bath, educated importance, pt refuses. Pt agreed earlier but now pt refuses

## 2021-01-06 NOTE — DISCHARGE PLANNING
Received documents from court verifying date and time of Guardianship hearin2021 at 0930. Copy placed in chart.

## 2021-01-06 NOTE — CARE PLAN
Problem: Safety  Goal: Will remain free from injury  Outcome: PROGRESSING AS EXPECTED  Goal: Will remain free from falls  Outcome: PROGRESSING AS EXPECTED   Bed is locked and in lowest position. Bed alarm is on. Call bell and belongings within reach. 1:1 sitter in use. Q1 rounding implemented. Frequent reorientation. Pt educated to call when he needs something or wants to get up. No learning evidence noted.

## 2021-01-07 PROCEDURE — 700102 HCHG RX REV CODE 250 W/ 637 OVERRIDE(OP): Performed by: NURSE PRACTITIONER

## 2021-01-07 PROCEDURE — A9270 NON-COVERED ITEM OR SERVICE: HCPCS | Performed by: NURSE PRACTITIONER

## 2021-01-07 PROCEDURE — 700111 HCHG RX REV CODE 636 W/ 250 OVERRIDE (IP): Performed by: HOSPITALIST

## 2021-01-07 PROCEDURE — 99231 SBSQ HOSP IP/OBS SF/LOW 25: CPT | Performed by: NURSE PRACTITIONER

## 2021-01-07 PROCEDURE — A9270 NON-COVERED ITEM OR SERVICE: HCPCS | Performed by: HOSPITALIST

## 2021-01-07 PROCEDURE — 770006 HCHG ROOM/CARE - MED/SURG/GYN SEMI*

## 2021-01-07 PROCEDURE — 700102 HCHG RX REV CODE 250 W/ 637 OVERRIDE(OP): Performed by: HOSPITALIST

## 2021-01-07 RX ORDER — ONDANSETRON 4 MG/1
4 TABLET, ORALLY DISINTEGRATING ORAL EVERY 6 HOURS PRN
Status: DISCONTINUED | OUTPATIENT
Start: 2021-01-07 | End: 2021-02-11

## 2021-01-07 RX ORDER — ACETAMINOPHEN 325 MG/1
650 TABLET ORAL 3 TIMES DAILY
Status: DISCONTINUED | OUTPATIENT
Start: 2021-01-07 | End: 2021-01-17

## 2021-01-07 RX ADMIN — RISPERIDONE 2 MG: 2 TABLET ORAL at 16:26

## 2021-01-07 RX ADMIN — ACETAMINOPHEN 650 MG: 325 TABLET, FILM COATED ORAL at 20:22

## 2021-01-07 RX ADMIN — TAMSULOSIN HYDROCHLORIDE 0.8 MG: 0.4 CAPSULE ORAL at 09:04

## 2021-01-07 RX ADMIN — DOCUSATE SODIUM 50 MG AND SENNOSIDES 8.6 MG 2 TABLET: 8.6; 5 TABLET, FILM COATED ORAL at 05:38

## 2021-01-07 RX ADMIN — DOCUSATE SODIUM 50 MG AND SENNOSIDES 8.6 MG 2 TABLET: 8.6; 5 TABLET, FILM COATED ORAL at 16:26

## 2021-01-07 RX ADMIN — Medication 100 MG: at 05:38

## 2021-01-07 RX ADMIN — ENOXAPARIN SODIUM 40 MG: 40 INJECTION SUBCUTANEOUS at 16:27

## 2021-01-07 RX ADMIN — THERA TABS 1 TABLET: TAB at 05:38

## 2021-01-07 RX ADMIN — ASPIRIN 325 MG ORAL TABLET 325 MG: 325 PILL ORAL at 05:38

## 2021-01-07 RX ADMIN — RISPERIDONE 2 MG: 2 TABLET ORAL at 05:38

## 2021-01-07 RX ADMIN — ACETAMINOPHEN 650 MG: 325 TABLET, FILM COATED ORAL at 14:25

## 2021-01-07 ASSESSMENT — ENCOUNTER SYMPTOMS
SPEECH CHANGE: 0
FOCAL WEAKNESS: 0
DIARRHEA: 0
SENSORY CHANGE: 0
WEAKNESS: 0
COUGH: 0
HEADACHES: 0
NAUSEA: 1
MYALGIAS: 1
PALPITATIONS: 0
DIZZINESS: 0
SHORTNESS OF BREATH: 0
CONSTIPATION: 0
VOMITING: 0
FEVER: 0
ABDOMINAL PAIN: 0

## 2021-01-07 NOTE — PROGRESS NOTES
Pharmacy Pharmacotherapy Consult for LOS >30 days    Admit Date: 9/9/2020      Medications were reviewed for appropriateness and ongoing need.     Current Facility-Administered Medications   Medication Dose Route Frequency Provider Last Rate Last Admin   • risperiDONE (RISPERDAL) tablet 2 mg  2 mg Oral BID Ian Kraus, A.P.R.N.   2 mg at 01/07/21 0538   • aspirin (ASA) tablet 325 mg  325 mg Oral DAILY Ella Nunes A.P.R.N.   325 mg at 01/07/21 0538   • multivitamin (THERAGRAN) tablet 1 Tab  1 Tab Oral DAILY Ella Nunes A.P.R.N.   1 Tab at 01/07/21 0538   • thiamine tablet 100 mg  100 mg Oral DAILY Gisele Barnes M.D.   100 mg at 01/07/21 0538   • tamsulosin (FLOMAX) capsule 0.8 mg  0.8 mg Oral AFTER BREAKFAST Gisele Barnes M.D.   0.8 mg at 01/07/21 0904   • enoxaparin (LOVENOX) inj 40 mg  40 mg Subcutaneous Q EVENING Gisele Barnes M.D.   40 mg at 01/06/21 1704   • acetaminophen (TYLENOL) tablet 650 mg  650 mg Oral Q6HRS PRN Ella Nunes A.P.R.N.   650 mg at 01/07/21 1425   • senna-docusate (PERICOLACE or SENOKOT S) 8.6-50 MG per tablet 2 Tab  2 Tab Oral BID Gisele Barnes M.D.   2 Tab at 01/07/21 0538    And   • polyethylene glycol/lytes (MIRALAX) PACKET 1 Packet  1 Packet Oral QDAY PRN Gisele Barnes M.D.   1 Packet at 01/01/21 7571       Recommendations:    No recommendations at this time.     Nghia Patiño, Pharmacy Intern

## 2021-01-07 NOTE — CARE PLAN
Problem: Communication  Goal: The ability to communicate needs accurately and effectively will improve  Outcome: PROGRESSING SLOWER THAN EXPECTED  Note: Pt does not call for assistance, 1:1 safety sitter in place.      Problem: Safety  Goal: Will remain free from falls  Outcome: PROGRESSING SLOWER THAN EXPECTED  Note: Multiple falls this admission, 1:1 safety sitter in place.

## 2021-01-07 NOTE — PROGRESS NOTES
"Bedside report recieved, assumed care. /66   Pulse 85   Temp 36.4 °C (97.6 °F) (Temporal)   Resp 18   Ht 1.803 m (5' 10.98\")   Wt 80.3 kg (177 lb)   SpO2 93%   BMI 24.70 kg/m²   • Pt is A&O to self, denies pain, denies numbness/tingling.   • Denies N/V. BM 1/4  • Room air.   • Condom cath in place.  • All needs met at this time.   • Pt instructed to use call light when in need of assistance.   • Bed alarm on.   • Bed locked and in low position, call light and belongings within reach, upper rails up. Treaded socks on.   • SCD's in use.   "

## 2021-01-07 NOTE — CARE PLAN
Problem: Safety  Goal: Will remain free from injury  Outcome: PROGRESSING AS EXPECTED  Goal: Will remain free from falls  Outcome: PROGRESSING AS EXPECTED   Call light in place, bed lowered and locked, bed alarm on. Encourage patient to use call light. 1:1 sitter at bedside

## 2021-01-07 NOTE — CARE PLAN
Problem: Safety  Goal: Will remain free from injury  Outcome: PROGRESSING AS EXPECTED     Problem: Psychosocial Needs:  Goal: Level of anxiety will decrease  Outcome: PROGRESSING AS EXPECTED     Pt very pleasant and agreeable today. Q2 turns through shift. No pain reported. bed locked and 4 padded rails up.

## 2021-01-08 PROCEDURE — 770006 HCHG ROOM/CARE - MED/SURG/GYN SEMI*

## 2021-01-08 PROCEDURE — A9270 NON-COVERED ITEM OR SERVICE: HCPCS | Performed by: HOSPITALIST

## 2021-01-08 PROCEDURE — 700102 HCHG RX REV CODE 250 W/ 637 OVERRIDE(OP): Performed by: HOSPITALIST

## 2021-01-08 PROCEDURE — 700102 HCHG RX REV CODE 250 W/ 637 OVERRIDE(OP): Performed by: NURSE PRACTITIONER

## 2021-01-08 PROCEDURE — A9270 NON-COVERED ITEM OR SERVICE: HCPCS | Performed by: NURSE PRACTITIONER

## 2021-01-08 PROCEDURE — 700111 HCHG RX REV CODE 636 W/ 250 OVERRIDE (IP): Performed by: HOSPITALIST

## 2021-01-08 RX ADMIN — TAMSULOSIN HYDROCHLORIDE 0.8 MG: 0.4 CAPSULE ORAL at 09:13

## 2021-01-08 RX ADMIN — Medication 100 MG: at 04:43

## 2021-01-08 RX ADMIN — RISPERIDONE 2 MG: 2 TABLET ORAL at 16:22

## 2021-01-08 RX ADMIN — ACETAMINOPHEN 650 MG: 325 TABLET, FILM COATED ORAL at 16:22

## 2021-01-08 RX ADMIN — ENOXAPARIN SODIUM 40 MG: 40 INJECTION SUBCUTANEOUS at 16:22

## 2021-01-08 RX ADMIN — ACETAMINOPHEN 650 MG: 325 TABLET, FILM COATED ORAL at 04:42

## 2021-01-08 RX ADMIN — DOCUSATE SODIUM 50 MG AND SENNOSIDES 8.6 MG 2 TABLET: 8.6; 5 TABLET, FILM COATED ORAL at 16:22

## 2021-01-08 RX ADMIN — DOCUSATE SODIUM 50 MG AND SENNOSIDES 8.6 MG 2 TABLET: 8.6; 5 TABLET, FILM COATED ORAL at 04:43

## 2021-01-08 RX ADMIN — THERA TABS 1 TABLET: TAB at 04:42

## 2021-01-08 RX ADMIN — RISPERIDONE 2 MG: 2 TABLET ORAL at 04:43

## 2021-01-08 RX ADMIN — ASPIRIN 325 MG ORAL TABLET 325 MG: 325 PILL ORAL at 04:42

## 2021-01-08 NOTE — PROGRESS NOTES
[facility] Medicine Twice Weekly Progress Note    Date of Service  1/7/2021    Chief Complaint  Altered mental status    Hospital Course  Mr. Blake is a 50-year-old male who was brought to the emergency department on 9/9/2020 with altered mental status after he was found by his ex-wife to be obtunded after she had checked on him when he had not been seen for at least 2 days.  At that time he was alert and oriented x0 and found to be hypotensive.  GCS in the ED was 7 and he was severely hypotensive.  He was intubated for airway protection and central venous access was obtained for administration of high-volume crystalloid resuscitation and vasopressor therapy.  He had a significant leukocytosis with a WBC of 22.9 and was hypokalemic with a potassium level of 2.7.  Additionally he had acute kidney injury with a creatinine of 2.73.  His INR was 8.93 and he also had a lactate level of 11.  Urinalysis was performed and was negative for urinary tract infection.  He was diagnosed with septic shock and admitted to the intensive care unit for further evaluation and treatment.  A lumbar puncture was done on 9/9/2020.  He was also noted to have seizure-like activity on 9/10/2020 so an EEG was obtained and was significant for a moderate encephalopathy.  Extubation occurred on 9/11/2020.  He also had a traumatic catheter removal so on 9/21/2020 urology was consulted but was ultimately unnecessary.  On 9/13/2020 he was transferred out of the ICU where he required restraints due to confusion, hallucination, agitation, and impulsivity.  An MRI was performed and was negative for acute pathologies.  He was also treated for a UTI starting 10/2/2020.  He has also fallen multiple times in hospital.  Additionally, on 11/11/2020 he had an acute episode of sinus tachycardia in the 140s accompanied by hypotension.  There was concern for STEMI so cardiology was consulted.  A 12-lead EKG was obtained and was concerning for atrial flutter.  " The patient received metoprolol after which a repeat ECG showed sinus tachycardia.  A D-dimer was obtained and was elevated for which a CT chest was done that was negative for pulmonary embolism.  Cardiology subsequently signed off.  He has remained in the hospital for a prolonged period of time and is now pending guardianship and then will need placement.  Guardianship hearing is tentatively scheduled for 2/5/2021.    Interval Problem Update  Patient is lying in bed, calm and cooperative.  He remains oriented to himself only.  He complains of 7/10 \"all over\" pain and nausea.  He has difficulty remembering to request pain medications.  -Scheduled Tylenol 3 times daily  -Zofran as needed    Consultants/Specialty  Critical care  Psychiatry  Cardiology  Urology    Code Status  Full Code    Disposition  Pending guardianship and then placement.  Guardianship hearing scheduled for 2/5/2021.    Review of Systems  Review of Systems   Constitutional: Negative for fever and malaise/fatigue.   Respiratory: Negative for cough and shortness of breath.    Cardiovascular: Negative for chest pain, palpitations and leg swelling.   Gastrointestinal: Positive for nausea. Negative for abdominal pain, constipation, diarrhea and vomiting.   Genitourinary: Negative for dysuria.   Musculoskeletal: Positive for myalgias.   Neurological: Negative for dizziness, sensory change, speech change, focal weakness, weakness and headaches.   All other systems reviewed and are negative.     Physical Exam  Temp:  [36.4 °C (97.5 °F)-36.8 °C (98.2 °F)] 36.6 °C (97.8 °F)  Pulse:  [73-85] 81  Resp:  [16-18] 17  BP: (100-114)/(63-73) 104/72  SpO2:  [92 %-97 %] 93 %    Physical Exam  Vitals signs and nursing note reviewed.   Constitutional:       General: He is awake. He is not in acute distress.     Comments: Unkempt appearance   HENT:      Head: Normocephalic and atraumatic.      Mouth/Throat:      Lips: Pink.      Mouth: Mucous membranes are moist. "   Eyes:      Pupils: Pupils are equal, round, and reactive to light.   Neck:      Musculoskeletal: Normal range of motion and neck supple.   Cardiovascular:      Rate and Rhythm: Normal rate and regular rhythm.      Pulses: Normal pulses.      Heart sounds: Normal heart sounds.   Pulmonary:      Effort: Pulmonary effort is normal.      Breath sounds: Normal breath sounds.   Abdominal:      General: Bowel sounds are normal. There is no distension or abdominal bruit.      Palpations: Abdomen is soft.      Tenderness: There is no abdominal tenderness.   Musculoskeletal:      Right lower leg: No edema.      Left lower leg: No edema.   Skin:     General: Skin is warm and dry.   Neurological:      General: No focal deficit present.      Mental Status: He is alert. He is disoriented and confused.   Psychiatric:         Attention and Perception: Attention normal.         Mood and Affect: Mood normal. Affect is labile.         Speech: Speech normal.         Behavior: Behavior normal. Behavior is cooperative.         Thought Content: Thought content is delusional.         Cognition and Memory: Cognition is impaired. Memory is impaired. He exhibits impaired recent memory and impaired remote memory.     All systems reviewed and exam is unchanged from prior exam.    Fluids    Intake/Output Summary (Last 24 hours) at 1/7/2021 1948  Last data filed at 1/7/2021 1600  Gross per 24 hour   Intake 720 ml   Output 3550 ml   Net -2830 ml     Laboratory    Imaging  CT-CTA CHEST PULMONARY ARTERY W/ RECONS   Final Result      1.  No evidence of pulmonary emboli.   2.  Right lower lobe discoid atelectasis.   3.  Gynecomastia.            DX-CHEST-PORTABLE (1 VIEW)   Final Result      No acute cardiac or pulmonary abnormalities are identified.      MR-BRAIN-W/O   Final Result      1.  Moderate diffuse cerebral atrophy.      2.  Minimal periventricular and juxtacortical white matter changes consistent with chronic microvascular ischemic  gliosis.      3.  No evidence of acute infarction in the brain parenchyma.      DX-ABDOMEN FOR TUBE PLACEMENT   Final Result      Feeding tube tip overlies the second portion of the duodenum.      DX-CHEST-PORTABLE (1 VIEW)   Final Result         1.  Patchy left lung base opacity, new since prior, could represent early infiltrate            DX-CHEST-PORTABLE (1 VIEW)   Final Result         1.  Patchy left lung base opacity, new since prior, could represent early infiltrate         [id number] VIEW   Final Result      Enteric tube projects over the distal stomach/pylorus.         EC-ECHOCARDIOGRAM COMPLETE W/O CONT   Final Result      DX-CHEST-PORTABLE (1 VIEW)   Final Result         1.  No acute cardiopulmonary disease.      CT-ABDOMEN-PELVIS W/O   Final Result      1.  No renal stone or hydronephrosis.   2.  Normal appendix.   3.  No focal mesenteric inflammatory process.      DX-ABDOMEN FOR TUBE PLACEMENT   Final Result      NG tube tip projects over expected location the gastric fundus.      US-ABDOMEN COMPLETE SURVEY   Final Result         1.  Echogenic liver compatible with fatty change versus fibrosis.   2.  Gallbladder sludge without additional sonographic findings of cholecystitis.   3.  Partial atrophic bilateral kidneys with lobulated contour         CT-HEAD W/O   Final Result         1.  No acute intracranial abnormality is identified, there are nonspecific white matter changes, commonly associated with small vessel ischemic disease.  Associated mild cerebral atrophy is noted.   2.  Atherosclerosis.      DX-CHEST-PORTABLE (1 VIEW)   Final Result         1.  No acute cardiopulmonary disease.         Assessment/Plan  * Toxic encephalopathy- (present on admission)  Assessment & Plan  -Unknown etiology - hypoxic induced brain injury vs Wernicke encephalopathy.  -LP, MRI brain, EEG negative. TSH normal. HIV/RPR negative.  -Psych was consulted and believe it is alcohol related.  -Continue risperdal.  -Remains  free from restraints.  -Has fallen multiple times while in hospital. Sitter now at bedside.     Normocytic anemia- (present on admission)  Assessment & Plan  -Very mild, no active bleeding.  -Check periodically.    Alcohol abuse- (present on admission)  Assessment & Plan  -Abstinent since admission.  -Continue daily PO vitamin therapy.     VTE prophylaxis: Lovenox      ALONZO Haile    Nurse Practitioner, Formerly named [facility]  [phone]

## 2021-01-08 NOTE — CARE PLAN
Problem: Safety  Goal: Will remain free from injury  Outcome: PROGRESSING AS EXPECTED  Goal: Will remain free from falls  Outcome: PROGRESSING AS EXPECTED   Bed is locked and in lowest position. Call bell and belongings within reach. Bed alarm is on. Pt has a 1:1 sitter in place. Condom cath in place. Pt has no bathroom requests at this time. Attempted to reposition the pt and provide wound care, pt refused. Did allow for perineal care. Will continue to do intentional rounding Q2 hours since pt has a safety sitter in use.

## 2021-01-08 NOTE — DISCHARGE PLANNING
Anticipated Discharge Disposition: Guardianship    Action: Pt pending guardianship hearing 2/5/2021 @0930    Barriers to Discharge: pending guardianship    Plan: f/u with medical team

## 2021-01-08 NOTE — PROGRESS NOTES
Condom cath was found on floor at this time. Pt refused to allow me to place a new one. Attempted to encourage and educate pt. Pt still refuses at this time.

## 2021-01-09 PROCEDURE — A9270 NON-COVERED ITEM OR SERVICE: HCPCS | Performed by: NURSE PRACTITIONER

## 2021-01-09 PROCEDURE — A9270 NON-COVERED ITEM OR SERVICE: HCPCS | Performed by: HOSPITALIST

## 2021-01-09 PROCEDURE — 700111 HCHG RX REV CODE 636 W/ 250 OVERRIDE (IP): Performed by: HOSPITALIST

## 2021-01-09 PROCEDURE — 700102 HCHG RX REV CODE 250 W/ 637 OVERRIDE(OP): Performed by: NURSE PRACTITIONER

## 2021-01-09 PROCEDURE — 770006 HCHG ROOM/CARE - MED/SURG/GYN SEMI*

## 2021-01-09 PROCEDURE — 700102 HCHG RX REV CODE 250 W/ 637 OVERRIDE(OP): Performed by: HOSPITALIST

## 2021-01-09 RX ADMIN — DOCUSATE SODIUM 50 MG AND SENNOSIDES 8.6 MG 2 TABLET: 8.6; 5 TABLET, FILM COATED ORAL at 04:27

## 2021-01-09 RX ADMIN — Medication 100 MG: at 04:26

## 2021-01-09 RX ADMIN — THERA TABS 1 TABLET: TAB at 04:27

## 2021-01-09 RX ADMIN — ASPIRIN 325 MG ORAL TABLET 325 MG: 325 PILL ORAL at 04:27

## 2021-01-09 RX ADMIN — ACETAMINOPHEN 650 MG: 325 TABLET, FILM COATED ORAL at 11:15

## 2021-01-09 RX ADMIN — RISPERIDONE 2 MG: 2 TABLET ORAL at 16:51

## 2021-01-09 RX ADMIN — TAMSULOSIN HYDROCHLORIDE 0.8 MG: 0.4 CAPSULE ORAL at 08:09

## 2021-01-09 RX ADMIN — ENOXAPARIN SODIUM 40 MG: 40 INJECTION SUBCUTANEOUS at 16:51

## 2021-01-09 RX ADMIN — DOCUSATE SODIUM 50 MG AND SENNOSIDES 8.6 MG 2 TABLET: 8.6; 5 TABLET, FILM COATED ORAL at 16:51

## 2021-01-09 RX ADMIN — ACETAMINOPHEN 650 MG: 325 TABLET, FILM COATED ORAL at 04:27

## 2021-01-09 RX ADMIN — ACETAMINOPHEN 650 MG: 325 TABLET, FILM COATED ORAL at 16:51

## 2021-01-09 RX ADMIN — RISPERIDONE 2 MG: 2 TABLET ORAL at 04:27

## 2021-01-09 NOTE — CARE PLAN
Problem: Safety  Goal: Will remain free from injury  Outcome: PROGRESSING AS EXPECTED  Note: Call light/belongings in reach, bed in low position and locked, DME out of sight, siderails up x 2, pt wearing non-slip footwear, adequate lighting, clutter free environment, bed alarm on, 1:1 sitter in place. Educated on level of fall risk.     Problem: Bowel/Gastric:  Goal: Normal bowel function is maintained or improved  Outcome: PROGRESSING AS EXPECTED  Flowsheets (Taken 1/9/2021 0814)  Last BM: 01/08/21     Problem: Communication  Goal: The ability to communicate needs accurately and effectively will improve  Outcome: PROGRESSING SLOWER THAN EXPECTED  Note: Discussed POC with patient; patient requires frequent reorienting and reminding. Reorienting patient as needed.     Problem: Skin Integrity  Goal: Risk for impaired skin integrity will decrease  Outcome: PROGRESSING SLOWER THAN EXPECTED  Note: Patient refused this morning [id number] with this RN, as well as last nights turns per NOC RN. Will continue to encourage patient to turn and educate. Patient educated about importance of preventing skin breakdown, but continues to refuse. Barrier paste, waffle cushion, offloading boots, condom catheter containment device, mepliex to heels, and pillows for support in place.

## 2021-01-09 NOTE — CARE PLAN
Problem: Safety  Goal: Will remain free from injury  Outcome: PROGRESSING AS EXPECTED   Treaded socks in place, bed in the lowest position, 1:1 sitter at bedside call light and belongings within reach, pt call for assistance appropriately   Problem: Discharge Barriers/Planning  Goal: Patient's continuum of care needs will be met  Outcome: PROGRESSING AS EXPECTED   Pending guardianship

## 2021-01-09 NOTE — CARE PLAN
Problem: Safety  Goal: Will remain free from injury  Note: Fall precautions in place. Waffle cushion in use. Bed alarm activated. Bed locked and placed in lowest position. Call light at the bedside. Personal belongings within reach. Hourly monitoring in place. Patient comfortably resting in bed, will continue to monitor.  1:1 sitter in place.      Problem: Communication  Goal: The ability to communicate needs accurately and effectively will improve  Outcome: PROGRESSING AS EXPECTED  Note: Call light at bedside.      Problem: Skin Integrity  Goal: Risk for impaired skin integrity will decrease  Note: Dressing changed. Mepliex's reapplied.

## 2021-01-10 PROCEDURE — A9270 NON-COVERED ITEM OR SERVICE: HCPCS | Performed by: NURSE PRACTITIONER

## 2021-01-10 PROCEDURE — A9270 NON-COVERED ITEM OR SERVICE: HCPCS | Performed by: HOSPITALIST

## 2021-01-10 PROCEDURE — 700102 HCHG RX REV CODE 250 W/ 637 OVERRIDE(OP): Performed by: HOSPITALIST

## 2021-01-10 PROCEDURE — 700102 HCHG RX REV CODE 250 W/ 637 OVERRIDE(OP): Performed by: NURSE PRACTITIONER

## 2021-01-10 PROCEDURE — 770006 HCHG ROOM/CARE - MED/SURG/GYN SEMI*

## 2021-01-10 PROCEDURE — 700111 HCHG RX REV CODE 636 W/ 250 OVERRIDE (IP): Performed by: HOSPITALIST

## 2021-01-10 RX ADMIN — RISPERIDONE 2 MG: 2 TABLET ORAL at 16:22

## 2021-01-10 RX ADMIN — DOCUSATE SODIUM 50 MG AND SENNOSIDES 8.6 MG 2 TABLET: 8.6; 5 TABLET, FILM COATED ORAL at 16:22

## 2021-01-10 RX ADMIN — ASPIRIN 325 MG ORAL TABLET 325 MG: 325 PILL ORAL at 05:40

## 2021-01-10 RX ADMIN — ACETAMINOPHEN 650 MG: 325 TABLET, FILM COATED ORAL at 16:22

## 2021-01-10 RX ADMIN — ACETAMINOPHEN 650 MG: 325 TABLET, FILM COATED ORAL at 11:31

## 2021-01-10 RX ADMIN — DOCUSATE SODIUM 50 MG AND SENNOSIDES 8.6 MG 2 TABLET: 8.6; 5 TABLET, FILM COATED ORAL at 05:40

## 2021-01-10 RX ADMIN — THERA TABS 1 TABLET: TAB at 05:40

## 2021-01-10 RX ADMIN — ACETAMINOPHEN 650 MG: 325 TABLET, FILM COATED ORAL at 05:40

## 2021-01-10 RX ADMIN — RISPERIDONE 2 MG: 2 TABLET ORAL at 05:40

## 2021-01-10 RX ADMIN — TAMSULOSIN HYDROCHLORIDE 0.8 MG: 0.4 CAPSULE ORAL at 08:20

## 2021-01-10 RX ADMIN — ENOXAPARIN SODIUM 40 MG: 40 INJECTION SUBCUTANEOUS at 16:22

## 2021-01-10 RX ADMIN — Medication 100 MG: at 05:40

## 2021-01-10 ASSESSMENT — ENCOUNTER SYMPTOMS
ABDOMINAL PAIN: 0
SENSORY CHANGE: 0
VOMITING: 0
SPEECH CHANGE: 0
PALPITATIONS: 0
SHORTNESS OF BREATH: 0
FEVER: 0
MYALGIAS: 1
FOCAL WEAKNESS: 0
WEAKNESS: 0
COUGH: 0
CONSTIPATION: 0
DIZZINESS: 0
HEADACHES: 0
DIARRHEA: 0
NAUSEA: 1

## 2021-01-10 NOTE — CARE PLAN
Problem: Communication  Goal: The ability to communicate needs accurately and effectively will improve  Outcome: PROGRESSING AS EXPECTED   Plan of care reviewed with patient. Encouraged to voice feelings or concerns.    Problem: Safety  Goal: Will remain free from injury  Outcome: PROGRESSING AS EXPECTED   Fall precautions in place. Pt rounded on hourly. 1:1 sitter in use. Bed alarm on, bed locked and in lowest position. Call light within reach.

## 2021-01-11 PROCEDURE — 700102 HCHG RX REV CODE 250 W/ 637 OVERRIDE(OP): Performed by: NURSE PRACTITIONER

## 2021-01-11 PROCEDURE — 700102 HCHG RX REV CODE 250 W/ 637 OVERRIDE(OP): Performed by: HOSPITALIST

## 2021-01-11 PROCEDURE — A9270 NON-COVERED ITEM OR SERVICE: HCPCS | Performed by: NURSE PRACTITIONER

## 2021-01-11 PROCEDURE — 700111 HCHG RX REV CODE 636 W/ 250 OVERRIDE (IP): Performed by: HOSPITALIST

## 2021-01-11 PROCEDURE — A9270 NON-COVERED ITEM OR SERVICE: HCPCS | Performed by: HOSPITALIST

## 2021-01-11 PROCEDURE — 770006 HCHG ROOM/CARE - MED/SURG/GYN SEMI*

## 2021-01-11 RX ADMIN — RISPERIDONE 2 MG: 2 TABLET ORAL at 05:54

## 2021-01-11 RX ADMIN — ACETAMINOPHEN 650 MG: 325 TABLET, FILM COATED ORAL at 18:29

## 2021-01-11 RX ADMIN — RISPERIDONE 2 MG: 2 TABLET ORAL at 18:30

## 2021-01-11 RX ADMIN — DOCUSATE SODIUM 50 MG AND SENNOSIDES 8.6 MG 2 TABLET: 8.6; 5 TABLET, FILM COATED ORAL at 18:29

## 2021-01-11 RX ADMIN — ASPIRIN 325 MG ORAL TABLET 325 MG: 325 PILL ORAL at 05:54

## 2021-01-11 RX ADMIN — DOCUSATE SODIUM 50 MG AND SENNOSIDES 8.6 MG 2 TABLET: 8.6; 5 TABLET, FILM COATED ORAL at 05:54

## 2021-01-11 RX ADMIN — ENOXAPARIN SODIUM 40 MG: 40 INJECTION SUBCUTANEOUS at 18:29

## 2021-01-11 RX ADMIN — TAMSULOSIN HYDROCHLORIDE 0.8 MG: 0.4 CAPSULE ORAL at 09:10

## 2021-01-11 RX ADMIN — ACETAMINOPHEN 650 MG: 325 TABLET, FILM COATED ORAL at 05:54

## 2021-01-11 RX ADMIN — Medication 100 MG: at 05:55

## 2021-01-11 RX ADMIN — THERA TABS 1 TABLET: TAB at 05:54

## 2021-01-11 RX ADMIN — ACETAMINOPHEN 650 MG: 325 TABLET, FILM COATED ORAL at 13:15

## 2021-01-11 NOTE — DISCHARGE PLANNING
Anticipated Discharge Disposition: HG placement after guardianship in place.    Action: Guardianship hearing scheduled for 2/5/21. Will work with guardian for GH placement once guardian in place.    Barriers to Discharge: Pending guardianship hearing.    Plan: Guardianship then GH placement.

## 2021-01-11 NOTE — WOUND TEAM
[facility]  Inpatient Services  Wound and Skin Care Progress Note    Admission Date: 9/9/2020     Last order of IP CONSULT TO WOUND CARE was found on 12/19/2020 from Hospital Encounter on 9/9/2020       HPI, PMH, SH: Reviewed    Unit where seen by Wound Team: [id number]     WOUND CONSULT/FOLLOW UP RELATED TO:  Left Lateral 5th MTH    Self Report / Pain Level:  0/10    OBJECTIVE:  Pt lying in bed with no dressing in place, mepilex placed at the dorsal and heel of foot. Preventable measures in place waffle overlay over pressure redistribution mattress.    WOUND TYPE, LOCATION, CHARACTERISTICS (Pressure Injuries: location, stage, POA or date identified)     Wound 12/18/20 Pressure Injury Lateral Left Lateral Left Foot Wound (Active)   Wound Image   01/11/21 1500   Site Assessment Pink;Red;Yellow 01/11/21 1500   Periwound Assessment Clean;Dry;Intact;Blanchable erythema 01/11/21 1500   Margins Defined edges;Attached edges 01/11/21 1500   Closure Secondary intention 01/11/21 1500   Drainage Amount Small 01/11/21 1500   Drainage Description Serosanguineous 01/11/21 1500   Treatments Cleansed;Irrigation;Site care 01/11/21 1500   Wound Cleansing Normal Saline Irrigation 01/11/21 1500   Periwound Protectant Not Applicable 01/11/21 1500   Dressing Cleansing/Solutions Not Applicable 01/11/21 1500   Dressing Options Hydrofiber Silver;Mepilex 01/11/21 1500   Dressing Changed Changed 01/11/21 1500   Dressing Status Clean;Dry;Intact 01/11/21 1500   Dressing Change/Treatment Frequency Every 48 hrs, and As Needed 01/11/21 1500   NEXT Dressing Change/Treatment Date 01/13/21 01/11/21 1500   NEXT Weekly Photo (Inpatient Only) 01/18/21 01/11/21 1500   Pressure Injury Stage Unstageable 01/11/21 1500   Wound Length (cm) 1.5 cm 01/11/21 1500   Wound Width (cm) 1 cm 01/11/21 1500   Wound Depth (cm) 0.3 cm 01/11/21 1500   Wound Surface Area (cm^2) 1.5 cm^2 01/11/21 1500   Wound Volume (cm^3) 0.45 cm^3 01/11/21 1500   Shape  circular 01/11/21 1500   Wound Odor None 01/11/21 1500   Exposed Structures KIRTI 12/27/20 1200   WOUND NURSE ONLY - Time Spent with Patient (mins) 45 01/11/21 1500       Vascular:    ANA:   No results found.      Lab Values:    Lab Results   Component Value Date/Time    WBC 8.6 11/17/2020 06:21 AM    RBC 4.40 (L) 11/17/2020 06:21 AM    HEMOGLOBIN 12.9 (L) 11/17/2020 06:21 AM    HEMATOCRIT 40.3 (L) 11/17/2020 06:21 AM    CREACTPROT 4.77 (H) 09/15/2020 05:05 AM    HBA1C 5.2 08/02/2016 07:30 AM          Culture:   Not indicated at this time.  Culture Results show:  No results found for this or any previous visit (from the past 720 hour(s)).      INTERVENTIONS BY WOUND TEAM:  Chart reviewed.  Patient seen for left lateral foot.  Left lateral foot has moist yellow with pink wound bed with surrounding redness that is blanching.  Cleansed with normal saline and gauze, patted dry, Aquacel Ag hydrofiber to left lateral 5th MTH.  Mepilex heel cut in half and applied to the left lateral 5th MTH.          Interdisciplinary consultation: Patient, Bedside RN    EVALUATION:   01/11/2021:  Maceration due to drainage, odor from wound bed.  Switched to Aquacel Ag hydrofiber for drainage and odor.  01/04/2021:  Eschar cap has released and came off full slough base, unable to assess base.  Small SS drainage with some mild odor.  Hydrocolloid thin to wound bed to help with autolytic debridement and maintain a moist wound environment.  Mepilex to help offload.   12/27/2020:  Wound team saw patient on 11/25 for sacrum.  Patient developed a discoloration on 12/05 to the lateral foot, wound team was not consulted.  12/18 a new photo was updated and wound team consulted on 12/19.  Patient left foot left open to air.  Patient was seen by wound care RN on 12/22 and patient was very confused and refused wound care.  Wound team tried to see patient on 12/24, but was again refused.  12/27, patient much more calmer with bedside sitter.  Patient  allowed this wound care RN and bedside RN to assess left lateral foot and apply mepilex to the area.  The discoloration is likely due to PRAFO boot used for patient's drop foot.  Patient very confused and had refused bedside staff from touching patient and also had multiple falls due to confusion. Patient as of 12/26 has bedside sitter in room.     Goals: Steady decrease in wound area and depth weekly.    NURSING PLAN OF CARE ORDERS (X):    Dressing changes: See Dressing Care orders: x  Skin care: See Skin Care orders:   Rectal tube care: See Rectal Tube Care orders:   Other orders:    WOUND TEAM PLAN OF CARE   Dressing changes by wound team:          Follow up 1-2 times weekly:    X, weekly follow-up for left lateral foot           Follow up 3 times weekly:                NPWT change 3 times weekly:     Follow up as needed:       Other (explain):     Anticipated discharge plans:  Patient unable to take care of self at this time and is very confused.  Pending medical clearance.  Wound on left lateral needs to be offloaded.   LTACH:        SNF/Rehab:                  Home Care:           Outpatient Wound Center:            Self Care:

## 2021-01-11 NOTE — PROGRESS NOTES
[facility] Medicine Twice Weekly Progress Note    Date of Service  1/10/2021    Chief Complaint  Altered mental status    Hospital Course  Mr. Blake is a 50-year-old male who was brought to the emergency department on 9/9/2020 with altered mental status after he was found by his ex-wife to be obtunded after she had checked on him when he had not been seen for at least 2 days.  At that time he was alert and oriented x0 and found to be hypotensive.  GCS in the ED was 7 and he was severely hypotensive.  He was intubated for airway protection and central venous access was obtained for administration of high-volume crystalloid resuscitation and vasopressor therapy.  He had a significant leukocytosis with a WBC of 22.9 and was hypokalemic with a potassium level of 2.7.  Additionally he had acute kidney injury with a creatinine of 2.73.  His INR was 8.93 and he also had a lactate level of 11.  Urinalysis was performed and was negative for urinary tract infection.  He was diagnosed with septic shock and admitted to the intensive care unit for further evaluation and treatment.  A lumbar puncture was done on 9/9/2020.  He was also noted to have seizure-like activity on 9/10/2020 so an EEG was obtained and was significant for a moderate encephalopathy.  Extubation occurred on 9/11/2020.  He also had a traumatic catheter removal so on 9/21/2020 urology was consulted but was ultimately unnecessary.  On 9/13/2020 he was transferred out of the ICU where he required restraints due to confusion, hallucination, agitation, and impulsivity.  An MRI was performed and was negative for acute pathologies.  He was also treated for a UTI starting 10/2/2020.  He has also fallen multiple times in hospital.  Additionally, on 11/11/2020 he had an acute episode of sinus tachycardia in the 140s accompanied by hypotension.  There was concern for STEMI so cardiology was consulted.  A 12-lead EKG was obtained and was concerning for atrial  "flutter.  The patient received metoprolol after which a repeat ECG showed sinus tachycardia.  A D-dimer was obtained and was elevated for which a CT chest was done that was negative for pulmonary embolism.  Cardiology subsequently signed off.  He has remained in the hospital for a prolonged period of time and is now pending guardianship and then will need placement.  Guardianship hearing is tentatively scheduled for 2/5/2021.    Interval Problem Update  Patient is lying in bed, calm and cooperative.  He remains oriented to himself only an dis off by one day, one month and one year. He thinks he is in [place]. His pain and nausea are well managed on current regimen. \"I feel better.\" He has difficulty remembering to request pain medications.   -continue Scheduled Tylenol 3 times daily  -Zofran as needed  -tangential speech    Consultants/Specialty  Critical care  Psychiatry  Cardiology  Urology    Code Status  Full Code    Disposition  Pending guardianship and then placement.  Guardianship hearing scheduled for 2/5/2021.    Review of Systems  Review of Systems   Constitutional: Negative for fever and malaise/fatigue.   Respiratory: Negative for cough and shortness of breath.    Cardiovascular: Negative for chest pain, palpitations and leg swelling.   Gastrointestinal: Positive for nausea. Negative for abdominal pain, constipation, diarrhea and vomiting.   Genitourinary: Negative for dysuria.   Musculoskeletal: Positive for myalgias.   Neurological: Negative for dizziness, sensory change, speech change, focal weakness, weakness and headaches.   All other systems reviewed and are negative.     Physical Exam  Temp:  [36.2 °C (97.2 °F)-36.6 °C (97.9 °F)] 36.3 °C (97.4 °F)  Pulse:  [60-96] 96  Resp:  [16-18] 18  BP: ()/(59-78) 110/78  SpO2:  [93 %-99 %] 95 %    Physical Exam  Vitals signs and nursing note reviewed.   Constitutional:       General: He is awake. He is not in acute distress.     Comments: Unkempt " appearance   HENT:      Head: Normocephalic and atraumatic.      Mouth/Throat:      Lips: Pink.      Mouth: Mucous membranes are moist.   Eyes:      Pupils: Pupils are equal, round, and reactive to light.   Neck:      Musculoskeletal: Normal range of motion and neck supple.   Cardiovascular:      Rate and Rhythm: Normal rate and regular rhythm.      Pulses: Normal pulses.      Heart sounds: Normal heart sounds.   Pulmonary:      Effort: Pulmonary effort is normal.      Breath sounds: Normal breath sounds.   Abdominal:      General: Bowel sounds are normal. There is no distension or abdominal bruit.      Palpations: Abdomen is soft.      Tenderness: There is no abdominal tenderness.   Musculoskeletal:      Right lower leg: No edema.      Left lower leg: No edema.   Skin:     General: Skin is warm and dry.   Neurological:      General: No focal deficit present.      Mental Status: He is alert. He is disoriented and confused.   Psychiatric:         Attention and Perception: Attention normal.         Mood and Affect: Mood normal. Affect is labile.         Speech: Speech normal.         Behavior: Behavior normal. Behavior is cooperative.         Thought Content: Thought content is delusional.         Cognition and Memory: Cognition is impaired. Memory is impaired. He exhibits impaired recent memory and impaired remote memory.     All systems reviewed and exam is unchanged from prior exam.    Fluids    Intake/Output Summary (Last 24 hours) at 1/10/2021 1610  Last data filed at 1/10/2021 1000  Gross per 24 hour   Intake 720 ml   Output 1900 ml   Net -1180 ml     Laboratory    Imaging  CT-CTA CHEST PULMONARY ARTERY W/ RECONS   Final Result      1.  No evidence of pulmonary emboli.   2.  Right lower lobe discoid atelectasis.   3.  Gynecomastia.            DX-CHEST-PORTABLE (1 VIEW)   Final Result      No acute cardiac or pulmonary abnormalities are identified.      MR-BRAIN-W/O   Final Result      1.  Moderate diffuse  cerebral atrophy.      2.  Minimal periventricular and juxtacortical white matter changes consistent with chronic microvascular ischemic gliosis.      3.  No evidence of acute infarction in the brain parenchyma.      DX-ABDOMEN FOR TUBE PLACEMENT   Final Result      Feeding tube tip overlies the second portion of the duodenum.      DX-CHEST-PORTABLE (1 VIEW)   Final Result         1.  Patchy left lung base opacity, new since prior, could represent early infiltrate            DX-CHEST-PORTABLE (1 VIEW)   Final Result         1.  Patchy left lung base opacity, new since prior, could represent early infiltrate         [id number] VIEW   Final Result      Enteric tube projects over the distal stomach/pylorus.         EC-ECHOCARDIOGRAM COMPLETE W/O CONT   Final Result      DX-CHEST-PORTABLE (1 VIEW)   Final Result         1.  No acute cardiopulmonary disease.      CT-ABDOMEN-PELVIS W/O   Final Result      1.  No renal stone or hydronephrosis.   2.  Normal appendix.   3.  No focal mesenteric inflammatory process.      DX-ABDOMEN FOR TUBE PLACEMENT   Final Result      NG tube tip projects over expected location the gastric fundus.      US-ABDOMEN COMPLETE SURVEY   Final Result         1.  Echogenic liver compatible with fatty change versus fibrosis.   2.  Gallbladder sludge without additional sonographic findings of cholecystitis.   3.  Partial atrophic bilateral kidneys with lobulated contour         CT-HEAD W/O   Final Result         1.  No acute intracranial abnormality is identified, there are nonspecific white matter changes, commonly associated with small vessel ischemic disease.  Associated mild cerebral atrophy is noted.   2.  Atherosclerosis.      DX-CHEST-PORTABLE (1 VIEW)   Final Result         1.  No acute cardiopulmonary disease.         Assessment/Plan  * Toxic encephalopathy- (present on admission)  Assessment & Plan  -Unknown etiology - hypoxic induced brain injury vs Wernicke encephalopathy.  -LP, MRI  brain, EEG negative. TSH normal. HIV/RPR negative.  -Psych was consulted and believe it is alcohol related.  -Continue risperdal.  -Remains free from restraints.  -Has fallen multiple times while in hospital. Sitter now at bedside.     Normocytic anemia- (present on admission)  Assessment & Plan  -Very mild, no active bleeding.  -Check periodically.    Alcohol abuse- (present on admission)  Assessment & Plan  -Abstinent since admission.  -Continue daily PO vitamin therapy.     VTE prophylaxis: Lovenox      ALONZO Haile    Nurse Practitioner, [facility]  [phone]

## 2021-01-12 PROCEDURE — 700102 HCHG RX REV CODE 250 W/ 637 OVERRIDE(OP): Performed by: NURSE PRACTITIONER

## 2021-01-12 PROCEDURE — 700102 HCHG RX REV CODE 250 W/ 637 OVERRIDE(OP): Performed by: HOSPITALIST

## 2021-01-12 PROCEDURE — A9270 NON-COVERED ITEM OR SERVICE: HCPCS | Performed by: HOSPITALIST

## 2021-01-12 PROCEDURE — A9270 NON-COVERED ITEM OR SERVICE: HCPCS | Performed by: NURSE PRACTITIONER

## 2021-01-12 PROCEDURE — 700111 HCHG RX REV CODE 636 W/ 250 OVERRIDE (IP): Performed by: HOSPITALIST

## 2021-01-12 PROCEDURE — 770006 HCHG ROOM/CARE - MED/SURG/GYN SEMI*

## 2021-01-12 RX ADMIN — ACETAMINOPHEN 650 MG: 325 TABLET, FILM COATED ORAL at 17:20

## 2021-01-12 RX ADMIN — ACETAMINOPHEN 650 MG: 325 TABLET, FILM COATED ORAL at 11:11

## 2021-01-12 RX ADMIN — TAMSULOSIN HYDROCHLORIDE 0.8 MG: 0.4 CAPSULE ORAL at 07:50

## 2021-01-12 RX ADMIN — Medication 100 MG: at 05:47

## 2021-01-12 RX ADMIN — ACETAMINOPHEN 650 MG: 325 TABLET, FILM COATED ORAL at 05:47

## 2021-01-12 RX ADMIN — RISPERIDONE 2 MG: 2 TABLET ORAL at 17:20

## 2021-01-12 RX ADMIN — ENOXAPARIN SODIUM 40 MG: 40 INJECTION SUBCUTANEOUS at 17:20

## 2021-01-12 RX ADMIN — ASPIRIN 325 MG ORAL TABLET 325 MG: 325 PILL ORAL at 05:47

## 2021-01-12 RX ADMIN — THERA TABS 1 TABLET: TAB at 05:47

## 2021-01-12 RX ADMIN — DOCUSATE SODIUM 50 MG AND SENNOSIDES 8.6 MG 2 TABLET: 8.6; 5 TABLET, FILM COATED ORAL at 05:47

## 2021-01-12 RX ADMIN — RISPERIDONE 2 MG: 2 TABLET ORAL at 05:47

## 2021-01-12 RX ADMIN — DOCUSATE SODIUM 50 MG AND SENNOSIDES 8.6 MG 2 TABLET: 8.6; 5 TABLET, FILM COATED ORAL at 17:20

## 2021-01-12 NOTE — PROGRESS NOTES
"Patient educated on the importance of Q2 hour turns. Patient was at first non-compliant and would state \"I don't want to be moved, why are you trying to make me move?\" Patient verbalized understanding of turning in bed and with assistance, was able to rotate torso to lay on his left side. Will continue with the plan of care.   "

## 2021-01-13 PROCEDURE — 700102 HCHG RX REV CODE 250 W/ 637 OVERRIDE(OP): Performed by: HOSPITALIST

## 2021-01-13 PROCEDURE — 700102 HCHG RX REV CODE 250 W/ 637 OVERRIDE(OP): Performed by: NURSE PRACTITIONER

## 2021-01-13 PROCEDURE — A9270 NON-COVERED ITEM OR SERVICE: HCPCS | Performed by: NURSE PRACTITIONER

## 2021-01-13 PROCEDURE — A9270 NON-COVERED ITEM OR SERVICE: HCPCS | Performed by: HOSPITALIST

## 2021-01-13 PROCEDURE — 770006 HCHG ROOM/CARE - MED/SURG/GYN SEMI*

## 2021-01-13 PROCEDURE — 700111 HCHG RX REV CODE 636 W/ 250 OVERRIDE (IP): Performed by: HOSPITALIST

## 2021-01-13 RX ADMIN — DOCUSATE SODIUM 50 MG AND SENNOSIDES 8.6 MG 2 TABLET: 8.6; 5 TABLET, FILM COATED ORAL at 17:35

## 2021-01-13 RX ADMIN — ACETAMINOPHEN 650 MG: 325 TABLET, FILM COATED ORAL at 05:22

## 2021-01-13 RX ADMIN — DOCUSATE SODIUM 50 MG AND SENNOSIDES 8.6 MG 2 TABLET: 8.6; 5 TABLET, FILM COATED ORAL at 05:22

## 2021-01-13 RX ADMIN — Medication 100 MG: at 05:22

## 2021-01-13 RX ADMIN — ASPIRIN 325 MG ORAL TABLET 325 MG: 325 PILL ORAL at 05:22

## 2021-01-13 RX ADMIN — THERA TABS 1 TABLET: TAB at 05:22

## 2021-01-13 RX ADMIN — TAMSULOSIN HYDROCHLORIDE 0.8 MG: 0.4 CAPSULE ORAL at 08:47

## 2021-01-13 RX ADMIN — ACETAMINOPHEN 650 MG: 325 TABLET, FILM COATED ORAL at 12:31

## 2021-01-13 RX ADMIN — ENOXAPARIN SODIUM 40 MG: 40 INJECTION SUBCUTANEOUS at 17:33

## 2021-01-13 RX ADMIN — RISPERIDONE 2 MG: 2 TABLET ORAL at 05:22

## 2021-01-13 RX ADMIN — RISPERIDONE 2 MG: 2 TABLET ORAL at 17:33

## 2021-01-13 RX ADMIN — ACETAMINOPHEN 650 MG: 325 TABLET, FILM COATED ORAL at 17:33

## 2021-01-13 NOTE — PROGRESS NOTES
Problem: Safety  Goal: Will remain free from falls  Outcome: PROGRESSING AS EXPECTED   Safety precautions in place.  Call light and personal items within reach. Bed at lowest position and locked.  Siderails up X 2.  Telesitter in place. Alarm on. Clutter free environment & adequate lighting. Educated on level of risk and reminded to call for assistance.  Hourly rounding in effect.     Problem: Mobility  Goal: Risk for activity intolerance will decrease  Outcome: PROGRESSING AS EXPECTED    Educated on ROM exercises, risks and benefits.  Verbalized understanding.

## 2021-01-14 PROCEDURE — A9270 NON-COVERED ITEM OR SERVICE: HCPCS | Performed by: NURSE PRACTITIONER

## 2021-01-14 PROCEDURE — 700102 HCHG RX REV CODE 250 W/ 637 OVERRIDE(OP): Performed by: NURSE PRACTITIONER

## 2021-01-14 PROCEDURE — A9270 NON-COVERED ITEM OR SERVICE: HCPCS | Performed by: HOSPITALIST

## 2021-01-14 PROCEDURE — 770006 HCHG ROOM/CARE - MED/SURG/GYN SEMI*

## 2021-01-14 PROCEDURE — 700102 HCHG RX REV CODE 250 W/ 637 OVERRIDE(OP): Performed by: HOSPITALIST

## 2021-01-14 PROCEDURE — 700111 HCHG RX REV CODE 636 W/ 250 OVERRIDE (IP): Performed by: HOSPITALIST

## 2021-01-14 PROCEDURE — 99231 SBSQ HOSP IP/OBS SF/LOW 25: CPT | Performed by: NURSE PRACTITIONER

## 2021-01-14 RX ADMIN — DOCUSATE SODIUM 50 MG AND SENNOSIDES 8.6 MG 2 TABLET: 8.6; 5 TABLET, FILM COATED ORAL at 05:47

## 2021-01-14 RX ADMIN — RISPERIDONE 2 MG: 2 TABLET ORAL at 17:16

## 2021-01-14 RX ADMIN — ACETAMINOPHEN 650 MG: 325 TABLET, FILM COATED ORAL at 13:23

## 2021-01-14 RX ADMIN — ACETAMINOPHEN 650 MG: 325 TABLET, FILM COATED ORAL at 05:47

## 2021-01-14 RX ADMIN — ASPIRIN 325 MG ORAL TABLET 325 MG: 325 PILL ORAL at 05:47

## 2021-01-14 RX ADMIN — ENOXAPARIN SODIUM 40 MG: 40 INJECTION SUBCUTANEOUS at 17:16

## 2021-01-14 RX ADMIN — TAMSULOSIN HYDROCHLORIDE 0.8 MG: 0.4 CAPSULE ORAL at 09:23

## 2021-01-14 RX ADMIN — ACETAMINOPHEN 650 MG: 325 TABLET, FILM COATED ORAL at 17:16

## 2021-01-14 RX ADMIN — THERA TABS 1 TABLET: TAB at 05:47

## 2021-01-14 RX ADMIN — Medication 100 MG: at 05:47

## 2021-01-14 RX ADMIN — RISPERIDONE 2 MG: 2 TABLET ORAL at 05:47

## 2021-01-14 ASSESSMENT — ENCOUNTER SYMPTOMS
NAUSEA: 0
SENSORY CHANGE: 0
COUGH: 0
FOCAL WEAKNESS: 0
DIZZINESS: 0
CONSTIPATION: 0
MYALGIAS: 1
ABDOMINAL PAIN: 0
WEAKNESS: 0
VOMITING: 0
FEVER: 0
SPEECH CHANGE: 0
PALPITATIONS: 0
SHORTNESS OF BREATH: 0
DIARRHEA: 0
HEADACHES: 0

## 2021-01-14 NOTE — PROGRESS NOTES
[facility] Medicine Twice Weekly Progress Note    Date of Service  1/14/2021    Chief Complaint  Altered mental status    Hospital Course  Mr. Blake is a 50-year-old male who was brought to the emergency department on 9/9/2020 with altered mental status after he was found by his ex-wife to be obtunded after she had checked on him when he had not been seen for at least 2 days.  At that time he was alert and oriented x0 and found to be hypotensive.  GCS in the ED was 7 and he was severely hypotensive.  He was intubated for airway protection and central venous access was obtained for administration of high-volume crystalloid resuscitation and vasopressor therapy.  He had a significant leukocytosis with a WBC of 22.9 and was hypokalemic with a potassium level of 2.7.  Additionally he had acute kidney injury with a creatinine of 2.73.  His INR was 8.93 and he also had a lactate level of 11.  Urinalysis was performed and was negative for urinary tract infection.  He was diagnosed with septic shock and admitted to the intensive care unit for further evaluation and treatment.  A lumbar puncture was done on 9/9/2020.  He was also noted to have seizure-like activity on 9/10/2020 so an EEG was obtained and was significant for a moderate encephalopathy.  Extubation occurred on 9/11/2020.  He also had a traumatic catheter removal so on 9/21/2020 urology was consulted but was ultimately unnecessary.  On 9/13/2020 he was transferred out of the ICU where he required restraints due to confusion, hallucination, agitation, and impulsivity.  An MRI was performed and was negative for acute pathologies.  He was also treated for a UTI starting 10/2/2020.  He has also fallen multiple times in hospital.  Additionally, on 11/11/2020 he had an acute episode of sinus tachycardia in the 140s accompanied by hypotension.  There was concern for STEMI so cardiology was consulted.  A 12-lead EKG was obtained and was concerning for atrial  flutter.  The patient received metoprolol after which a repeat ECG showed sinus tachycardia.  A D-dimer was obtained and was elevated for which a CT chest was done that was negative for pulmonary embolism.  Cardiology subsequently signed off.  He has remained in the hospital for a prolonged period of time and is now pending guardianship and then will need placement.  Guardianship hearing is tentatively scheduled for 2/5/2021.    Interval Problem Update  Patient lying in bed in no acute distress. He reports right foot pain from pressure injury. Intermittently refusing nursing intervention and Q2 turns. I discussed discharge barriers and guardianship hearing . He expressed his dissatisfaction with us pursing guardianship and then refused to answer any further questions .     Consultants/Specialty  Critical care  Psychiatry  Cardiology  Urology    Code Status  Full Code    Disposition  Pending guardianship and then placement.  Guardianship hearing scheduled for 2/5/2021.    Review of Systems  Review of Systems   Constitutional: Negative for fever and malaise/fatigue.   Respiratory: Negative for cough and shortness of breath.    Cardiovascular: Negative for chest pain, palpitations and leg swelling.   Gastrointestinal: Negative for abdominal pain, constipation, diarrhea, nausea and vomiting.   Genitourinary: Negative for dysuria.   Musculoskeletal: Positive for joint pain and myalgias.   Neurological: Negative for dizziness, sensory change, speech change, focal weakness, weakness and headaches.      Physical Exam  Temp:  [36.1 °C (97 °F)-36.5 °C (97.7 °F)] 36.2 °C (97.1 °F)  Pulse:  [65-81] 69  Resp:  [16-17] 17  BP: (100-104)/(63-65) 104/63  SpO2:  [93 %-95 %] 94 %    Physical Exam  Vitals signs and nursing note reviewed.   Constitutional:       General: He is awake. He is not in acute distress.     Comments: Unkempt appearance  Frail, chronically ill appearing   HENT:      Head: Normocephalic and atraumatic.       Mouth/Throat:      Lips: Pink.      Mouth: Mucous membranes are moist.   Eyes:      Pupils: Pupils are equal, round, and reactive to light.   Neck:      Musculoskeletal: Normal range of motion and neck supple.   Cardiovascular:      Rate and Rhythm: Normal rate and regular rhythm.      Pulses: Normal pulses.   Pulmonary:      Effort: Pulmonary effort is normal.      Breath sounds: Normal breath sounds.   Abdominal:      General: Bowel sounds are normal. There is no distension or abdominal bruit.      Palpations: Abdomen is soft.      Tenderness: There is no abdominal tenderness.   Musculoskeletal:      Right lower leg: No edema.      Left lower leg: No edema.   Skin:     General: Skin is warm and dry.   Neurological:      Mental Status: He is alert. He is disoriented and confused.   Psychiatric:         Attention and Perception: Attention normal.         Mood and Affect: Affect is labile and flat.         Speech: Speech normal.         Behavior: Behavior is uncooperative.         Cognition and Memory: Cognition is impaired. Memory is impaired. He exhibits impaired recent memory and impaired remote memory.     All systems reviewed and exam is unchanged from prior exam.    Fluids    Intake/Output Summary (Last 24 hours) at 1/14/2021 0926  Last data filed at 1/13/2021 1525  Gross per 24 hour   Intake 520 ml   Output 800 ml   Net -280 ml     Laboratory    Imaging  CT-CTA CHEST PULMONARY ARTERY W/ RECONS   Final Result      1.  No evidence of pulmonary emboli.   2.  Right lower lobe discoid atelectasis.   3.  Gynecomastia.            DX-CHEST-PORTABLE (1 VIEW)   Final Result      No acute cardiac or pulmonary abnormalities are identified.      MR-BRAIN-W/O   Final Result      1.  Moderate diffuse cerebral atrophy.      2.  Minimal periventricular and juxtacortical white matter changes consistent with chronic microvascular ischemic gliosis.      3.  No evidence of acute infarction in the brain parenchyma.      DX-ABDOMEN  FOR TUBE PLACEMENT   Final Result      Feeding tube tip overlies the second portion of the duodenum.      DX-CHEST-PORTABLE (1 VIEW)   Final Result         1.  Patchy left lung base opacity, new since prior, could represent early infiltrate            DX-CHEST-PORTABLE (1 VIEW)   Final Result         1.  Patchy left lung base opacity, new since prior, could represent early infiltrate         [id number] VIEW   Final Result      Enteric tube projects over the distal stomach/pylorus.         EC-ECHOCARDIOGRAM COMPLETE W/O CONT   Final Result      DX-CHEST-PORTABLE (1 VIEW)   Final Result         1.  No acute cardiopulmonary disease.      CT-ABDOMEN-PELVIS W/O   Final Result      1.  No renal stone or hydronephrosis.   2.  Normal appendix.   3.  No focal mesenteric inflammatory process.      DX-ABDOMEN FOR TUBE PLACEMENT   Final Result      NG tube tip projects over expected location the gastric fundus.      US-ABDOMEN COMPLETE SURVEY   Final Result         1.  Echogenic liver compatible with fatty change versus fibrosis.   2.  Gallbladder sludge without additional sonographic findings of cholecystitis.   3.  Partial atrophic bilateral kidneys with lobulated contour         CT-HEAD W/O   Final Result         1.  No acute intracranial abnormality is identified, there are nonspecific white matter changes, commonly associated with small vessel ischemic disease.  Associated mild cerebral atrophy is noted.   2.  Atherosclerosis.      DX-CHEST-PORTABLE (1 VIEW)   Final Result         1.  No acute cardiopulmonary disease.         Assessment/Plan  * Toxic encephalopathy- (present on admission)  Assessment & Plan  -Unknown etiology - hypoxic induced brain injury vs Wernicke encephalopathy.  -LP, MRI brain, EEG negative. TSH normal. HIV/RPR negative.  -Psych was consulted and believe it is alcohol related.  -Continue risperdal.  -Remains free from restraints.  -Has fallen multiple times while in hospital. Sitter now at bedside.      Normocytic anemia- (present on admission)  Assessment & Plan  -Very mild, no active bleeding.  -Check periodically.    Alcohol abuse- (present on admission)  Assessment & Plan  -Abstinent since admission.  -Continue daily PO vitamin therapy.     VTE prophylaxis: Lovenox      Imani Rosa A.P.R.N.

## 2021-01-14 NOTE — CARE PLAN
Problem: Communication  Goal: The ability to communicate needs accurately and effectively will improve  Outcome: PROGRESSING AS EXPECTED     Problem: Safety  Goal: Will remain free from injury  Outcome: PROGRESSING AS EXPECTED   Fall precautions in place. Telemonitor in use. Pt rounded on hourly. Bed alarm on, bed locked and in lowest position. Call light within reach.    Problem: Skin Integrity  Goal: Risk for impaired skin integrity will decrease  Outcome: PROGRESSING AS EXPECTED

## 2021-01-14 NOTE — PROGRESS NOTES
Problem: Safety  Goal: Will remain free from falls  Outcome: PROGRESSING AS EXPECTED   Safety precautions in place.  Call light and personal items within reach. Bed at lowest position and locked.  Siderails up X 2.  Clutter free environment & adequate lighting. Educated on level of risk and reminded to call for assistance.  Hourly rounding in effect.     Problem: Infection  Goal: Will remain free from infection  Outcome: PROGRESSING AS EXPECTED   Afebrile. Standard precautions in effect.  Hand washing every encounter, and before & after patient care.  Verbalized understanding.     Problem: Venous Thromboembolism (VTW)/Deep Vein Thrombosis (DVT) Prevention:  Goal: Patient will participate in Venous Thrombosis (VTE)/Deep Vein Thrombosis (DVT)Prevention Measures  Outcome: PROGRESSING AS EXPECTED   SCDs in place.  Lovenox administration.

## 2021-01-15 PROCEDURE — 700102 HCHG RX REV CODE 250 W/ 637 OVERRIDE(OP): Performed by: NURSE PRACTITIONER

## 2021-01-15 PROCEDURE — 770006 HCHG ROOM/CARE - MED/SURG/GYN SEMI*

## 2021-01-15 PROCEDURE — A9270 NON-COVERED ITEM OR SERVICE: HCPCS | Performed by: HOSPITALIST

## 2021-01-15 PROCEDURE — 700102 HCHG RX REV CODE 250 W/ 637 OVERRIDE(OP): Performed by: HOSPITALIST

## 2021-01-15 PROCEDURE — A9270 NON-COVERED ITEM OR SERVICE: HCPCS | Performed by: NURSE PRACTITIONER

## 2021-01-15 PROCEDURE — 700111 HCHG RX REV CODE 636 W/ 250 OVERRIDE (IP): Performed by: HOSPITALIST

## 2021-01-15 RX ADMIN — DOCUSATE SODIUM 50 MG AND SENNOSIDES 8.6 MG 2 TABLET: 8.6; 5 TABLET, FILM COATED ORAL at 17:26

## 2021-01-15 RX ADMIN — RISPERIDONE 2 MG: 2 TABLET ORAL at 05:35

## 2021-01-15 RX ADMIN — THERA TABS 1 TABLET: TAB at 05:35

## 2021-01-15 RX ADMIN — DOCUSATE SODIUM 50 MG AND SENNOSIDES 8.6 MG 2 TABLET: 8.6; 5 TABLET, FILM COATED ORAL at 05:35

## 2021-01-15 RX ADMIN — ASPIRIN 325 MG ORAL TABLET 325 MG: 325 PILL ORAL at 05:35

## 2021-01-15 RX ADMIN — ENOXAPARIN SODIUM 40 MG: 40 INJECTION SUBCUTANEOUS at 17:26

## 2021-01-15 RX ADMIN — Medication 100 MG: at 05:35

## 2021-01-15 RX ADMIN — ACETAMINOPHEN 650 MG: 325 TABLET, FILM COATED ORAL at 05:35

## 2021-01-15 RX ADMIN — TAMSULOSIN HYDROCHLORIDE 0.8 MG: 0.4 CAPSULE ORAL at 09:33

## 2021-01-15 RX ADMIN — RISPERIDONE 2 MG: 2 TABLET ORAL at 17:26

## 2021-01-15 NOTE — CARE PLAN
Problem: Urinary Elimination:  Goal: Ability to reestablish a normal urinary elimination pattern will improve  Outcome: PROGRESSING SLOWER THAN EXPECTED  Patient is confused and incontinent. Condom catheter in place.     Problem: Mobility  Goal: Risk for activity intolerance will decrease  Outcome: PROGRESSING SLOWER THAN EXPECTED  Patient has bilateral foot drop and 2/5 strength in BLE. Patient unable to ambulate.

## 2021-01-15 NOTE — DISCHARGE PLANNING
Anticipated Discharge Disposition: Possible group home after guardianship in place.    Action: Gaurdianship hearing scheduled for 2/52021    Barriers to Discharge: pending tianna    Plan: once guardianship established find group home placement

## 2021-01-16 PROCEDURE — 700102 HCHG RX REV CODE 250 W/ 637 OVERRIDE(OP): Performed by: HOSPITALIST

## 2021-01-16 PROCEDURE — 700102 HCHG RX REV CODE 250 W/ 637 OVERRIDE(OP): Performed by: NURSE PRACTITIONER

## 2021-01-16 PROCEDURE — 770006 HCHG ROOM/CARE - MED/SURG/GYN SEMI*

## 2021-01-16 PROCEDURE — A9270 NON-COVERED ITEM OR SERVICE: HCPCS | Performed by: NURSE PRACTITIONER

## 2021-01-16 PROCEDURE — A9270 NON-COVERED ITEM OR SERVICE: HCPCS | Performed by: HOSPITALIST

## 2021-01-16 PROCEDURE — 700111 HCHG RX REV CODE 636 W/ 250 OVERRIDE (IP): Performed by: HOSPITALIST

## 2021-01-16 RX ADMIN — DOCUSATE SODIUM 50 MG AND SENNOSIDES 8.6 MG 2 TABLET: 8.6; 5 TABLET, FILM COATED ORAL at 09:41

## 2021-01-16 RX ADMIN — ASPIRIN 325 MG ORAL TABLET 325 MG: 325 PILL ORAL at 04:48

## 2021-01-16 RX ADMIN — ACETAMINOPHEN 650 MG: 325 TABLET, FILM COATED ORAL at 04:48

## 2021-01-16 RX ADMIN — ENOXAPARIN SODIUM 40 MG: 40 INJECTION SUBCUTANEOUS at 17:58

## 2021-01-16 RX ADMIN — DOCUSATE SODIUM 50 MG AND SENNOSIDES 8.6 MG 2 TABLET: 8.6; 5 TABLET, FILM COATED ORAL at 17:58

## 2021-01-16 RX ADMIN — THERA TABS 1 TABLET: TAB at 04:48

## 2021-01-16 RX ADMIN — TAMSULOSIN HYDROCHLORIDE 0.8 MG: 0.4 CAPSULE ORAL at 09:41

## 2021-01-16 RX ADMIN — RISPERIDONE 2 MG: 2 TABLET ORAL at 17:58

## 2021-01-16 RX ADMIN — RISPERIDONE 2 MG: 2 TABLET ORAL at 04:48

## 2021-01-16 RX ADMIN — Medication 100 MG: at 04:48

## 2021-01-16 NOTE — CARE PLAN
Problem: Mobility  Goal: Risk for activity intolerance will decrease  Outcome: PROGRESSING SLOWER THAN EXPECTED  Patient has bilateral foot drop and unable to stand.     Problem: Urinary Elimination:  Goal: Ability to reestablish a normal urinary elimination pattern will improve  Outcome: PROGRESSING AS EXPECTED  Patient is incontinent of urine. Condom catheter in place.

## 2021-01-17 PROCEDURE — 700102 HCHG RX REV CODE 250 W/ 637 OVERRIDE(OP): Performed by: HOSPITALIST

## 2021-01-17 PROCEDURE — A9270 NON-COVERED ITEM OR SERVICE: HCPCS | Performed by: HOSPITALIST

## 2021-01-17 PROCEDURE — 700102 HCHG RX REV CODE 250 W/ 637 OVERRIDE(OP): Performed by: NURSE PRACTITIONER

## 2021-01-17 PROCEDURE — 99231 SBSQ HOSP IP/OBS SF/LOW 25: CPT | Performed by: NURSE PRACTITIONER

## 2021-01-17 PROCEDURE — A9270 NON-COVERED ITEM OR SERVICE: HCPCS | Performed by: NURSE PRACTITIONER

## 2021-01-17 PROCEDURE — 700111 HCHG RX REV CODE 636 W/ 250 OVERRIDE (IP): Performed by: HOSPITALIST

## 2021-01-17 PROCEDURE — 770006 HCHG ROOM/CARE - MED/SURG/GYN SEMI*

## 2021-01-17 RX ORDER — ACETAMINOPHEN 325 MG/1
650 TABLET ORAL EVERY 6 HOURS PRN
Status: DISCONTINUED | OUTPATIENT
Start: 2021-01-17 | End: 2021-03-18 | Stop reason: HOSPADM

## 2021-01-17 RX ADMIN — DOCUSATE SODIUM 50 MG AND SENNOSIDES 8.6 MG 2 TABLET: 8.6; 5 TABLET, FILM COATED ORAL at 05:51

## 2021-01-17 RX ADMIN — DOCUSATE SODIUM 50 MG AND SENNOSIDES 8.6 MG 2 TABLET: 8.6; 5 TABLET, FILM COATED ORAL at 17:16

## 2021-01-17 RX ADMIN — RISPERIDONE 2 MG: 2 TABLET ORAL at 05:50

## 2021-01-17 RX ADMIN — ENOXAPARIN SODIUM 40 MG: 40 INJECTION SUBCUTANEOUS at 17:15

## 2021-01-17 RX ADMIN — RISPERIDONE 2 MG: 2 TABLET ORAL at 17:16

## 2021-01-17 RX ADMIN — THERA TABS 1 TABLET: TAB at 05:51

## 2021-01-17 RX ADMIN — ASPIRIN 325 MG ORAL TABLET 325 MG: 325 PILL ORAL at 05:50

## 2021-01-17 RX ADMIN — Medication 100 MG: at 05:51

## 2021-01-17 RX ADMIN — ACETAMINOPHEN 650 MG: 325 TABLET, FILM COATED ORAL at 05:51

## 2021-01-17 RX ADMIN — TAMSULOSIN HYDROCHLORIDE 0.8 MG: 0.4 CAPSULE ORAL at 07:43

## 2021-01-17 ASSESSMENT — ENCOUNTER SYMPTOMS
VOMITING: 0
MYALGIAS: 1
BACK PAIN: 0
NECK PAIN: 0
FEVER: 0
PALPITATIONS: 0
COUGH: 0
SHORTNESS OF BREATH: 0
NAUSEA: 0
DIARRHEA: 0
ABDOMINAL PAIN: 0
CONSTIPATION: 0

## 2021-01-17 NOTE — PROGRESS NOTES
[facility] Medicine Twice Weekly Progress Note    Date of Service  1/17/2021    Chief Complaint  Altered mental status    Hospital Course  Mr. Blake is a 50-year-old male who was brought to the emergency department on 9/9/2020 with altered mental status after he was found by his ex-wife to be obtunded after she had checked on him when he had not been seen for at least 2 days.  At that time he was alert and oriented x0 and found to be hypotensive.  GCS in the ED was 7 and he was severely hypotensive.  He was intubated for airway protection and central venous access was obtained for administration of high-volume crystalloid resuscitation and vasopressor therapy.  He had a significant leukocytosis with a WBC of 22.9 and was hypokalemic with a potassium level of 2.7.  Additionally he had acute kidney injury with a creatinine of 2.73.  His INR was 8.93 and he also had a lactate level of 11.  Urinalysis was performed and was negative for urinary tract infection.  He was diagnosed with septic shock and admitted to the intensive care unit for further evaluation and treatment.  A lumbar puncture was done on 9/9/2020.  He was also noted to have seizure-like activity on 9/10/2020 so an EEG was obtained and was significant for a moderate encephalopathy.  Extubation occurred on 9/11/2020.  He also had a traumatic catheter removal so on 9/21/2020 urology was consulted but was ultimately unnecessary.  On 9/13/2020 he was transferred out of the ICU where he required restraints due to confusion, hallucination, agitation, and impulsivity.  An MRI was performed and was negative for acute pathologies.  He was also treated for a UTI starting 10/2/2020.  He has also fallen multiple times in hospital.  Additionally, on 11/11/2020 he had an acute episode of sinus tachycardia in the 140s accompanied by hypotension.  There was concern for STEMI so cardiology was consulted.  A 12-lead EKG was obtained and was concerning for atrial  flutter.  The patient received metoprolol after which a repeat ECG showed sinus tachycardia.  A D-dimer was obtained and was elevated for which a CT chest was done that was negative for pulmonary embolism.  Cardiology subsequently signed off.  He has remained in the hospital for a prolonged period of time and is now pending guardianship and then will need placement.  Guardianship hearing is tentatively scheduled for 2/5/2021.    Interval Problem Update  Patient in better spirits this morning. He reported 6/10 right foot pain, but said he didn't want to take any medications for the pain.SBPs  on room air.      Consultants/Specialty  Critical care  Psychiatry  Cardiology  Urology    Code Status  Full Code    Disposition  Pending guardianship and then placement.  Guardianship hearing scheduled for 2/5/2021.    Review of Systems  Review of Systems   Constitutional: Negative for fever and malaise/fatigue.   Respiratory: Negative for cough and shortness of breath.    Cardiovascular: Negative for chest pain, palpitations and leg swelling.   Gastrointestinal: Negative for abdominal pain, constipation, diarrhea, nausea and vomiting.   Genitourinary: Negative for dysuria.   Musculoskeletal: Positive for joint pain and myalgias. Negative for back pain and neck pain.      Physical Exam  Temp:  [36.7 °C (98.1 °F)-36.8 °C (98.2 °F)] 36.8 °C (98.2 °F)  Pulse:  [81-83] 83  Resp:  [16] 16  BP: ()/(61-69) 108/69  SpO2:  [94 %-95 %] 94 %    Physical Exam  Vitals signs and nursing note reviewed.   Constitutional:       General: He is awake. He is not in acute distress.     Comments: Unkempt appearance  Frail, chronically ill appearing   HENT:      Head: Normocephalic and atraumatic.      Mouth/Throat:      Lips: Pink.      Mouth: Mucous membranes are moist.   Eyes:      Pupils: Pupils are equal, round, and reactive to light.   Neck:      Musculoskeletal: Normal range of motion and neck supple.   Cardiovascular:      Rate  and Rhythm: Normal rate and regular rhythm.      Pulses: Normal pulses.   Pulmonary:      Effort: Pulmonary effort is normal.      Breath sounds: Normal breath sounds.   Abdominal:      General: Bowel sounds are normal. There is no distension or abdominal bruit.      Palpations: Abdomen is soft.      Tenderness: There is no abdominal tenderness.   Musculoskeletal:      Right lower leg: No edema.      Left lower leg: No edema.   Skin:     General: Skin is warm and dry.   Neurological:      Mental Status: He is alert. He is disoriented.   Psychiatric:         Attention and Perception: Attention normal.         Mood and Affect: Affect is flat.         Speech: Speech normal.         Behavior: Behavior is cooperative.         Cognition and Memory: Cognition is impaired. Memory is impaired. He exhibits impaired recent memory and impaired remote memory.     All systems reviewed and exam is unchanged from prior exam.    Fluids    Intake/Output Summary (Last 24 hours) at 1/17/2021 0858  Last data filed at 1/17/2021 0500  Gross per 24 hour   Intake 720 ml   Output 2750 ml   Net -2030 ml     Laboratory    Imaging  CT-CTA CHEST PULMONARY ARTERY W/ RECONS   Final Result      1.  No evidence of pulmonary emboli.   2.  Right lower lobe discoid atelectasis.   3.  Gynecomastia.            DX-CHEST-PORTABLE (1 VIEW)   Final Result      No acute cardiac or pulmonary abnormalities are identified.      MR-BRAIN-W/O   Final Result      1.  Moderate diffuse cerebral atrophy.      2.  Minimal periventricular and juxtacortical white matter changes consistent with chronic microvascular ischemic gliosis.      3.  No evidence of acute infarction in the brain parenchyma.      DX-ABDOMEN FOR TUBE PLACEMENT   Final Result      Feeding tube tip overlies the second portion of the duodenum.      DX-CHEST-PORTABLE (1 VIEW)   Final Result         1.  Patchy left lung base opacity, new since prior, could represent early infiltrate             DX-CHEST-PORTABLE (1 VIEW)   Final Result         1.  Patchy left lung base opacity, new since prior, could represent early infiltrate         [id number] VIEW   Final Result      Enteric tube projects over the distal stomach/pylorus.         EC-ECHOCARDIOGRAM COMPLETE W/O CONT   Final Result      DX-CHEST-PORTABLE (1 VIEW)   Final Result         1.  No acute cardiopulmonary disease.      CT-ABDOMEN-PELVIS W/O   Final Result      1.  No renal stone or hydronephrosis.   2.  Normal appendix.   3.  No focal mesenteric inflammatory process.      DX-ABDOMEN FOR TUBE PLACEMENT   Final Result      NG tube tip projects over expected location the gastric fundus.      US-ABDOMEN COMPLETE SURVEY   Final Result         1.  Echogenic liver compatible with fatty change versus fibrosis.   2.  Gallbladder sludge without additional sonographic findings of cholecystitis.   3.  Partial atrophic bilateral kidneys with lobulated contour         CT-HEAD W/O   Final Result         1.  No acute intracranial abnormality is identified, there are nonspecific white matter changes, commonly associated with small vessel ischemic disease.  Associated mild cerebral atrophy is noted.   2.  Atherosclerosis.      DX-CHEST-PORTABLE (1 VIEW)   Final Result         1.  No acute cardiopulmonary disease.         Assessment/Plan  * Toxic encephalopathy- (present on admission)  Assessment & Plan  -Unknown etiology - hypoxic induced brain injury vs Wernicke encephalopathy.  -LP, MRI brain, EEG negative. TSH normal. HIV/RPR negative.  -Psych was consulted and believe it is alcohol related.  -Continue risperdal.  -Remains free from restraints.  -Has fallen multiple times while in hospital. Sitter now at bedside.     Normocytic anemia- (present on admission)  Assessment & Plan  -Very mild, no active bleeding.  -Check periodically.    Alcohol abuse- (present on admission)  Assessment & Plan  -Abstinent since admission.  -Continue daily PO vitamin therapy.      VTE prophylaxis: Lovenox    I have performed a physical exam and reviewed and updated ROS and Plan today (1/17/2021). In review of previous note, there are no changes except as documented above.       TRACE Hall.

## 2021-01-17 NOTE — CARE PLAN
Problem: Pain Management  Goal: Pain level will decrease to patient's comfort goal  Outcome: PROGRESSING AS EXPECTED  Flowsheets  Taken 1/17/2021 0940 by Karl Adam R.N.  Pain Rating Scale (NPRS): 3  Taken 1/16/2021 2215 by Patricia Cosby R.N.  Comfort Goal:   3   Comfort with Movement  Non Verbal Scale:   Calm   Unlabored Breathing  Note: Patient does  endorse a mild level of pain at this time, but declines use of pain medications.  Will continue to monitor pain levels and will medicate per MAR as patient allows.       Problem: Knowledge Deficit  Goal: Knowledge of disease process/condition, treatment plan, diagnostic tests, and medications will improve  Outcome: PROGRESSING SLOWER THAN EXPECTED  Note:  Patient's encephalopathy makes is difficult for patient to  understand all that is going on. Patient is confused and only oriented to self.  Patient updated on plan of care.

## 2021-01-18 PROCEDURE — A9270 NON-COVERED ITEM OR SERVICE: HCPCS | Performed by: NURSE PRACTITIONER

## 2021-01-18 PROCEDURE — 770006 HCHG ROOM/CARE - MED/SURG/GYN SEMI*

## 2021-01-18 PROCEDURE — 700102 HCHG RX REV CODE 250 W/ 637 OVERRIDE(OP): Performed by: NURSE PRACTITIONER

## 2021-01-18 PROCEDURE — A9270 NON-COVERED ITEM OR SERVICE: HCPCS | Performed by: HOSPITALIST

## 2021-01-18 PROCEDURE — 700102 HCHG RX REV CODE 250 W/ 637 OVERRIDE(OP): Performed by: HOSPITALIST

## 2021-01-18 PROCEDURE — 700111 HCHG RX REV CODE 636 W/ 250 OVERRIDE (IP): Performed by: HOSPITALIST

## 2021-01-18 RX ADMIN — DOCUSATE SODIUM 50 MG AND SENNOSIDES 8.6 MG 2 TABLET: 8.6; 5 TABLET, FILM COATED ORAL at 04:34

## 2021-01-18 RX ADMIN — RISPERIDONE 2 MG: 2 TABLET ORAL at 04:35

## 2021-01-18 RX ADMIN — ENOXAPARIN SODIUM 40 MG: 40 INJECTION SUBCUTANEOUS at 17:05

## 2021-01-18 RX ADMIN — DOCUSATE SODIUM 50 MG AND SENNOSIDES 8.6 MG 2 TABLET: 8.6; 5 TABLET, FILM COATED ORAL at 17:05

## 2021-01-18 RX ADMIN — THERA TABS 1 TABLET: TAB at 04:34

## 2021-01-18 RX ADMIN — TAMSULOSIN HYDROCHLORIDE 0.8 MG: 0.4 CAPSULE ORAL at 09:40

## 2021-01-18 RX ADMIN — RISPERIDONE 2 MG: 2 TABLET ORAL at 17:05

## 2021-01-18 RX ADMIN — Medication 100 MG: at 04:35

## 2021-01-18 RX ADMIN — ASPIRIN 325 MG ORAL TABLET 325 MG: 325 PILL ORAL at 04:35

## 2021-01-18 NOTE — CARE PLAN
Problem: Safety  Goal: Will remain free from injury  Outcome: PROGRESSING AS EXPECTED   Bed locked and in lowest position. Treaded slipper socks on. Call light within reach. Pt educated to call for assistance. Bed alarm on. Telesitter in place.      Problem: Communication  Goal: The ability to communicate needs accurately and effectively will improve  Outcome: PROGRESSING SLOWER THAN EXPECTED   Patient oriented to self only.

## 2021-01-19 PROCEDURE — A9270 NON-COVERED ITEM OR SERVICE: HCPCS | Performed by: NURSE PRACTITIONER

## 2021-01-19 PROCEDURE — 700102 HCHG RX REV CODE 250 W/ 637 OVERRIDE(OP): Performed by: NURSE PRACTITIONER

## 2021-01-19 PROCEDURE — 700102 HCHG RX REV CODE 250 W/ 637 OVERRIDE(OP): Performed by: HOSPITALIST

## 2021-01-19 PROCEDURE — 770006 HCHG ROOM/CARE - MED/SURG/GYN SEMI*

## 2021-01-19 PROCEDURE — A9270 NON-COVERED ITEM OR SERVICE: HCPCS | Performed by: HOSPITALIST

## 2021-01-19 PROCEDURE — 700111 HCHG RX REV CODE 636 W/ 250 OVERRIDE (IP): Performed by: HOSPITALIST

## 2021-01-19 RX ADMIN — DOCUSATE SODIUM 50 MG AND SENNOSIDES 8.6 MG 2 TABLET: 8.6; 5 TABLET, FILM COATED ORAL at 05:14

## 2021-01-19 RX ADMIN — DOCUSATE SODIUM 50 MG AND SENNOSIDES 8.6 MG 2 TABLET: 8.6; 5 TABLET, FILM COATED ORAL at 16:47

## 2021-01-19 RX ADMIN — RISPERIDONE 2 MG: 2 TABLET ORAL at 16:47

## 2021-01-19 RX ADMIN — ASPIRIN 325 MG ORAL TABLET 325 MG: 325 PILL ORAL at 05:14

## 2021-01-19 RX ADMIN — ENOXAPARIN SODIUM 40 MG: 40 INJECTION SUBCUTANEOUS at 16:47

## 2021-01-19 RX ADMIN — Medication 100 MG: at 05:14

## 2021-01-19 RX ADMIN — TAMSULOSIN HYDROCHLORIDE 0.8 MG: 0.4 CAPSULE ORAL at 10:08

## 2021-01-19 RX ADMIN — THERA TABS 1 TABLET: TAB at 05:14

## 2021-01-19 RX ADMIN — RISPERIDONE 2 MG: 2 TABLET ORAL at 05:14

## 2021-01-19 NOTE — DISCHARGE PLANNING
Anticipated Discharge Disposition: [facility] placement after guardianship in place.    Action: Awaiting guardianship hearing on 2/5/21. When guardian assigned can begin to work on [facility] placement.    Barriers to Discharge: Pending guardanship.    Plan: Cont to await guardianship.

## 2021-01-20 PROCEDURE — 700102 HCHG RX REV CODE 250 W/ 637 OVERRIDE(OP): Performed by: HOSPITALIST

## 2021-01-20 PROCEDURE — A9270 NON-COVERED ITEM OR SERVICE: HCPCS | Performed by: NURSE PRACTITIONER

## 2021-01-20 PROCEDURE — A9270 NON-COVERED ITEM OR SERVICE: HCPCS | Performed by: HOSPITALIST

## 2021-01-20 PROCEDURE — 700111 HCHG RX REV CODE 636 W/ 250 OVERRIDE (IP): Performed by: HOSPITALIST

## 2021-01-20 PROCEDURE — 700102 HCHG RX REV CODE 250 W/ 637 OVERRIDE(OP): Performed by: NURSE PRACTITIONER

## 2021-01-20 PROCEDURE — 770006 HCHG ROOM/CARE - MED/SURG/GYN SEMI*

## 2021-01-20 PROCEDURE — 99231 SBSQ HOSP IP/OBS SF/LOW 25: CPT | Performed by: NURSE PRACTITIONER

## 2021-01-20 RX ADMIN — ENOXAPARIN SODIUM 40 MG: 40 INJECTION SUBCUTANEOUS at 17:42

## 2021-01-20 RX ADMIN — TAMSULOSIN HYDROCHLORIDE 0.8 MG: 0.4 CAPSULE ORAL at 07:38

## 2021-01-20 RX ADMIN — DOCUSATE SODIUM 50 MG AND SENNOSIDES 8.6 MG 2 TABLET: 8.6; 5 TABLET, FILM COATED ORAL at 17:42

## 2021-01-20 RX ADMIN — THERA TABS 1 TABLET: TAB at 05:01

## 2021-01-20 RX ADMIN — DOCUSATE SODIUM 50 MG AND SENNOSIDES 8.6 MG 2 TABLET: 8.6; 5 TABLET, FILM COATED ORAL at 05:01

## 2021-01-20 RX ADMIN — RISPERIDONE 2 MG: 2 TABLET ORAL at 17:42

## 2021-01-20 RX ADMIN — ASPIRIN 325 MG ORAL TABLET 325 MG: 325 PILL ORAL at 05:01

## 2021-01-20 RX ADMIN — Medication 100 MG: at 05:01

## 2021-01-20 RX ADMIN — RISPERIDONE 2 MG: 2 TABLET ORAL at 05:01

## 2021-01-20 ASSESSMENT — ENCOUNTER SYMPTOMS
MYALGIAS: 0
ABDOMINAL PAIN: 0
DIARRHEA: 0
NERVOUS/ANXIOUS: 1
COUGH: 0
SHORTNESS OF BREATH: 0
FOCAL WEAKNESS: 0
SPEECH CHANGE: 0
PALPITATIONS: 0
CHILLS: 0
DIZZINESS: 0
FEVER: 0
SENSORY CHANGE: 0
CONSTIPATION: 0
VOMITING: 0
NAUSEA: 0
HEADACHES: 0
SORE THROAT: 0
WEAKNESS: 0

## 2021-01-20 NOTE — PROGRESS NOTES
[facility] Medicine Twice Weekly Progress Note    Date of Service  1/20/2021    Chief Complaint  Altered mental status    Hospital Course  Mr. Blake is a 50-year-old male who was brought to the emergency department on 9/9/2020 with altered mental status after he was found by his ex-wife to be obtunded after she had checked on him when he had not been seen for at least 2 days.  At that time he was alert and oriented x0 and found to be hypotensive.  GCS in the ED was 7 and he was severely hypotensive.  He was intubated for airway protection and central venous access was obtained for administration of high-volume crystalloid resuscitation and vasopressor therapy.  He had a significant leukocytosis with a WBC of 22.9 and was hypokalemic with a potassium level of 2.7.  Additionally he had acute kidney injury with a creatinine of 2.73.  His INR was 8.93 and he also had a lactate level of 11.  Urinalysis was performed and was negative for urinary tract infection.  He was diagnosed with septic shock and admitted to the intensive care unit for further evaluation and treatment.  A lumbar puncture was done on 9/9/2020.  He was also noted to have seizure-like activity on 9/10/2020 so an EEG was obtained and was significant for a moderate encephalopathy.  Extubation occurred on 9/11/2020.  He also had a traumatic catheter removal so on 9/21/2020 urology was consulted but was ultimately unnecessary.  On 9/13/2020 he was transferred out of the ICU where he required restraints due to confusion, hallucination, agitation, and impulsivity.  An MRI was performed and was negative for acute pathologies.  He was also treated for a UTI starting 10/2/2020.  He has also fallen multiple times in hospital.  Additionally, on 11/11/2020 he had an acute episode of sinus tachycardia in the 140s accompanied by hypotension.  There was concern for STEMI so cardiology was consulted.  A 12-lead EKG was obtained and was concerning for atrial  flutter.  The patient received metoprolol after which a repeat ECG showed sinus tachycardia.  A D-dimer was obtained and was elevated for which a CT chest was done that was negative for pulmonary embolism.  Cardiology subsequently signed off.  He has remained in the hospital for a prolonged period of time and is now pending guardianship and then will need placement.  Guardianship hearing is tentatively scheduled for 2/5/2021.    Interval Problem Update  Patient sitting upright in bed in no acute distress.  He is oriented only to himself in place.  He reports feeling stressed about discharge.  He denies pain and any other problems.  -therapeutic communication  -continue guardianship process    Consultants/Specialty  Critical care  Psychiatry  Cardiology  Urology    Code Status  Full Code    Disposition  Pending guardianship and then placement.  Guardianship hearing scheduled for 2/5/2021.    Review of Systems  Review of Systems   Constitutional: Negative for chills, fever and malaise/fatigue.   HENT: Negative for congestion and sore throat.    Respiratory: Negative for cough and shortness of breath.    Cardiovascular: Negative for chest pain, palpitations and leg swelling.   Gastrointestinal: Negative for abdominal pain, constipation, diarrhea, nausea and vomiting.   Genitourinary: Negative for dysuria.   Musculoskeletal: Negative for joint pain and myalgias.   Neurological: Negative for dizziness, sensory change, speech change, focal weakness, weakness and headaches.   Psychiatric/Behavioral: The patient is nervous/anxious.       Physical Exam  Temp:  [36.4 °C (97.5 °F)-36.9 °C (98.5 °F)] 36.9 °C (98.5 °F)  Pulse:  [73-87] 77  Resp:  [15-16] 16  BP: (101-110)/(67-76) 101/69  SpO2:  [95 %-96 %] 95 %    Physical Exam  Vitals signs and nursing note reviewed.   Constitutional:       General: He is awake. He is not in acute distress.     Comments: Unkempt appearance  Frail, chronically ill appearing   HENT:      Head:  Normocephalic and atraumatic.      Mouth/Throat:      Lips: Pink.      Mouth: Mucous membranes are moist.   Eyes:      Pupils: Pupils are equal, round, and reactive to light.   Neck:      Musculoskeletal: Normal range of motion and neck supple.   Cardiovascular:      Rate and Rhythm: Normal rate and regular rhythm.      Pulses: Normal pulses.   Pulmonary:      Effort: Pulmonary effort is normal.      Breath sounds: Normal breath sounds.   Abdominal:      General: Bowel sounds are normal. There is no distension or abdominal bruit.      Palpations: Abdomen is soft.      Tenderness: There is no abdominal tenderness.   Musculoskeletal:      Right lower leg: No edema.      Left lower leg: No edema.   Skin:     General: Skin is warm and dry.   Neurological:      Mental Status: He is alert. He is disoriented and confused.   Psychiatric:         Attention and Perception: Attention normal.         Mood and Affect: Affect is labile and flat.         Speech: Speech normal.         Behavior: Behavior is uncooperative.         Cognition and Memory: Cognition is impaired. Memory is impaired. He exhibits impaired recent memory and impaired remote memory.     All systems reviewed and exam is unchanged from prior exam.    Fluids    Intake/Output Summary (Last 24 hours) at 1/20/2021 1353  Last data filed at 1/20/2021 1000  Gross per 24 hour   Intake 780 ml   Output 4000 ml   Net -3220 ml     Laboratory    Imaging  CT-CTA CHEST PULMONARY ARTERY W/ RECONS   Final Result      1.  No evidence of pulmonary emboli.   2.  Right lower lobe discoid atelectasis.   3.  Gynecomastia.            DX-CHEST-PORTABLE (1 VIEW)   Final Result      No acute cardiac or pulmonary abnormalities are identified.      MR-BRAIN-W/O   Final Result      1.  Moderate diffuse cerebral atrophy.      2.  Minimal periventricular and juxtacortical white matter changes consistent with chronic microvascular ischemic gliosis.      3.  No evidence of acute infarction in  the brain parenchyma.      DX-ABDOMEN FOR TUBE PLACEMENT   Final Result      Feeding tube tip overlies the second portion of the duodenum.      DX-CHEST-PORTABLE (1 VIEW)   Final Result         1.  Patchy left lung base opacity, new since prior, could represent early infiltrate            DX-CHEST-PORTABLE (1 VIEW)   Final Result         1.  Patchy left lung base opacity, new since prior, could represent early infiltrate         [id number]-3 VIEW   Final Result      Enteric tube projects over the distal stomach/pylorus.         EC-ECHOCARDIOGRAM COMPLETE W/O CONT   Final Result      DX-CHEST-PORTABLE (1 VIEW)   Final Result         1.  No acute cardiopulmonary disease.      CT-ABDOMEN-PELVIS W/O   Final Result      1.  No renal stone or hydronephrosis.   2.  Normal appendix.   3.  No focal mesenteric inflammatory process.      DX-ABDOMEN FOR TUBE PLACEMENT   Final Result      NG tube tip projects over expected location the gastric fundus.      US-ABDOMEN COMPLETE SURVEY   Final Result         1.  Echogenic liver compatible with fatty change versus fibrosis.   2.  Gallbladder sludge without additional sonographic findings of cholecystitis.   3.  Partial atrophic bilateral kidneys with lobulated contour         CT-HEAD W/O   Final Result         1.  No acute intracranial abnormality is identified, there are nonspecific white matter changes, commonly associated with small vessel ischemic disease.  Associated mild cerebral atrophy is noted.   2.  Atherosclerosis.      DX-CHEST-PORTABLE (1 VIEW)   Final Result         1.  No acute cardiopulmonary disease.         Assessment/Plan  * Toxic encephalopathy- (present on admission)  Assessment & Plan  -Unknown etiology - hypoxic induced brain injury vs Wernicke encephalopathy.  -LP, MRI brain, EEG negative. TSH normal. HIV/RPR negative.  -Psych was consulted and believe it is alcohol related.  -Continue risperdal.  -Remains free from restraints.  -Has fallen multiple times  while in hospital. Sitter now at bedside.     Normocytic anemia- (present on admission)  Assessment & Plan  -Very mild, no active bleeding.  -Check periodically.    Alcohol abuse- (present on admission)  Assessment & Plan  -Abstinent since admission.  -Continue daily PO vitamin therapy.     VTE prophylaxis: ALONZO Bellamy

## 2021-01-21 ENCOUNTER — APPOINTMENT (OUTPATIENT)
Dept: RADIOLOGY | Facility: MEDICAL CENTER | Age: 51
DRG: 853 | End: 2021-01-21
Attending: NURSE PRACTITIONER
Payer: MEDICAID

## 2021-01-21 PROBLEM — S91.302A OPEN WOUND OF LEFT FOOT: Status: ACTIVE | Noted: 2021-01-21

## 2021-01-21 LAB
BASOPHILS # BLD AUTO: 0.6 % (ref 0–1.8)
BASOPHILS # BLD: 0.05 K/UL (ref 0–0.12)
CRP SERPL HS-MCNC: 3.58 MG/DL (ref 0–0.75)
EOSINOPHIL # BLD AUTO: 0.38 K/UL (ref 0–0.51)
EOSINOPHIL NFR BLD: 4.8 % (ref 0–6.9)
ERYTHROCYTE [DISTWIDTH] IN BLOOD BY AUTOMATED COUNT: 49.2 FL (ref 35.9–50)
ERYTHROCYTE [SEDIMENTATION RATE] IN BLOOD BY WESTERGREN METHOD: 16 MM/HOUR (ref 0–15)
HCT VFR BLD AUTO: 41.9 % (ref 42–52)
HGB BLD-MCNC: 13.5 G/DL (ref 14–18)
IMM GRANULOCYTES # BLD AUTO: 0.06 K/UL (ref 0–0.11)
IMM GRANULOCYTES NFR BLD AUTO: 0.8 % (ref 0–0.9)
LYMPHOCYTES # BLD AUTO: 2.02 K/UL (ref 1–4.8)
LYMPHOCYTES NFR BLD: 25.7 % (ref 22–41)
MCH RBC QN AUTO: 29.1 PG (ref 27–33)
MCHC RBC AUTO-ENTMCNC: 32.2 G/DL (ref 33.7–35.3)
MCV RBC AUTO: 90.3 FL (ref 81.4–97.8)
MONOCYTES # BLD AUTO: 0.54 K/UL (ref 0–0.85)
MONOCYTES NFR BLD AUTO: 6.9 % (ref 0–13.4)
NEUTROPHILS # BLD AUTO: 4.8 K/UL (ref 1.82–7.42)
NEUTROPHILS NFR BLD: 61.2 % (ref 44–72)
NRBC # BLD AUTO: 0 K/UL
NRBC BLD-RTO: 0 /100 WBC
PLATELET # BLD AUTO: 205 K/UL (ref 164–446)
PMV BLD AUTO: 11.3 FL (ref 9–12.9)
RBC # BLD AUTO: 4.64 M/UL (ref 4.7–6.1)
WBC # BLD AUTO: 7.9 K/UL (ref 4.8–10.8)

## 2021-01-21 PROCEDURE — 85652 RBC SED RATE AUTOMATED: CPT

## 2021-01-21 PROCEDURE — 700102 HCHG RX REV CODE 250 W/ 637 OVERRIDE(OP): Performed by: HOSPITALIST

## 2021-01-21 PROCEDURE — 73630 X-RAY EXAM OF FOOT: CPT | Mod: LT

## 2021-01-21 PROCEDURE — A9270 NON-COVERED ITEM OR SERVICE: HCPCS | Performed by: HOSPITALIST

## 2021-01-21 PROCEDURE — A9270 NON-COVERED ITEM OR SERVICE: HCPCS | Performed by: NURSE PRACTITIONER

## 2021-01-21 PROCEDURE — 700101 HCHG RX REV CODE 250: Performed by: HOSPITALIST

## 2021-01-21 PROCEDURE — 700102 HCHG RX REV CODE 250 W/ 637 OVERRIDE(OP): Performed by: NURSE PRACTITIONER

## 2021-01-21 PROCEDURE — 700111 HCHG RX REV CODE 636 W/ 250 OVERRIDE (IP): Performed by: HOSPITALIST

## 2021-01-21 PROCEDURE — 83036 HEMOGLOBIN GLYCOSYLATED A1C: CPT

## 2021-01-21 PROCEDURE — 85025 COMPLETE CBC W/AUTO DIFF WBC: CPT

## 2021-01-21 PROCEDURE — 86140 C-REACTIVE PROTEIN: CPT

## 2021-01-21 PROCEDURE — 36415 COLL VENOUS BLD VENIPUNCTURE: CPT

## 2021-01-21 PROCEDURE — 770006 HCHG ROOM/CARE - MED/SURG/GYN SEMI*

## 2021-01-21 RX ADMIN — RISPERIDONE 2 MG: 2 TABLET ORAL at 05:30

## 2021-01-21 RX ADMIN — DOCUSATE SODIUM 50 MG AND SENNOSIDES 8.6 MG 2 TABLET: 8.6; 5 TABLET, FILM COATED ORAL at 05:30

## 2021-01-21 RX ADMIN — Medication 100 MG: at 05:30

## 2021-01-21 RX ADMIN — ENOXAPARIN SODIUM 40 MG: 40 INJECTION SUBCUTANEOUS at 16:11

## 2021-01-21 RX ADMIN — RISPERIDONE 2 MG: 2 TABLET ORAL at 16:11

## 2021-01-21 RX ADMIN — POLYETHYLENE GLYCOL 3350 1 PACKET: 17 POWDER, FOR SOLUTION ORAL at 08:53

## 2021-01-21 RX ADMIN — THERA TABS 1 TABLET: TAB at 05:30

## 2021-01-21 RX ADMIN — DOCUSATE SODIUM 50 MG AND SENNOSIDES 8.6 MG 2 TABLET: 8.6; 5 TABLET, FILM COATED ORAL at 16:11

## 2021-01-21 RX ADMIN — ASPIRIN 325 MG ORAL TABLET 325 MG: 325 PILL ORAL at 05:30

## 2021-01-21 RX ADMIN — TAMSULOSIN HYDROCHLORIDE 0.8 MG: 0.4 CAPSULE ORAL at 08:53

## 2021-01-21 NOTE — PROGRESS NOTES
Head to toe skin check completed by RN. Bilateral foot dressings intact and in place- changed on 1/20, KIRTI underneath. Patient resting on waffle mattress, Q2 turns in place and barrier cream applied to patients bottom.

## 2021-01-21 NOTE — CARE PLAN
Problem: Safety  Goal: Will remain free from injury  Outcome: PROGRESSING AS EXPECTED     Problem: Safety  Goal: Will remain free from falls  Outcome: PROGRESSING AS EXPECTED   Pt resting in bed with alarm on, telesitter monitoring. No reported pain. Low appetite today. Q2 turns and condom cath in place.

## 2021-01-21 NOTE — WOUND TEAM
[facility]  Inpatient Services  Wound and Skin Care Progress Note    Admission Date: 9/9/2020     Last order of IP CONSULT TO WOUND CARE was found on 12/19/2020 from Hospital Encounter on 9/9/2020       HPI, PMH, SH: Reviewed    Unit where seen by Wound Team: [id number]     WOUND CONSULT/FOLLOW UP RELATED TO:  Left Lateral 5th MTH    Self Report / Pain Level:  0/10    OBJECTIVE:  Pt lying in bed with mepilex dressing in place, mepilex placed at the dorsal and heel of foot as well. Foot drop boot in place. Preventable measures in place waffle overlay over pressure redistribution mattress.    WOUND TYPE, LOCATION, CHARACTERISTICS (Pressure Injuries: location, stage, POA or date identified)   Skin Risk/[place]   Sensory Perception: Slightly Limited  Moisture: Occasionally Moist  Activity: Bedfast  Mobility: Very Limited  Nutrition: Adequate  Friction and Shear: Potential Problem  Total Score: 14  Skin Breakdown Risk: 13-17 Moderate Risk for Skin Breakdown    Sensory Interventions  Bed Types: Pressure Redistribution Mattress (Atmosair)  Skin Preventative Measures: Pillows in Use for Support / Positioning, Heel Float Boots in Use   Skin Preventative Dressing: Mepilex(to heels bilat)  Moisturizers/Barriers: Containment Devices  PT / OT Involved in Care: Physical Therapy Involved, Occupational Therapy Involved  Activity : Bed  Patient Turns / Repositioning: Right Side  Patient is Receiving Nutrition: Oral Intake Adequate  Nutrition Consult Ordered: No, Consult has Already been Placed  Vitamin Therapy in Use: No  Friction Interventions: Draw Sheet / Pad Used for Repositioning                  Wound 12/18/20 Pressure Injury Lateral Left Lateral Left Foot Wound (Active)   Wound Image   01/21/21 1100   Site Assessment Black;Yellow;[place] 01/21/21 1100   Periwound Assessment Edema;Other (Comment) 01/21/21 1100   Margins Other (Comment) 01/21/21 1100   Closure KIRTI 01/21/21 0853   Drainage Amount Small 01/21/21 1100      Drainage Description Serosanguineous 01/21/21 1100   Treatments Cleansed 01/21/21 1100   Wound Cleansing Normal Saline Irrigation 01/21/21 1100   Periwound Protectant Skin Protectant Wipes to Periwound 01/21/21 1100   Dressing Cleansing/Solutions Normal Saline 01/21/21 1100   Dressing Options Hydrofiber Silver 01/21/21 1100   Dressing Changed Changed 01/21/21 1100   Dressing Status Clean;Dry;Intact 01/21/21 1100   Dressing Change/Treatment Frequency Every 48 hrs, and As Needed 01/21/21 1100   NEXT Dressing Change/Treatment Date 01/23/21 01/21/21 1100   NEXT Weekly Photo (Inpatient Only) 01/20/21 01/19/21 1900   Pressure Injury Stage U 01/21/21 1100   Wound Length (cm) 2.3 cm 01/21/21 1100   Wound Width (cm) 2 cm 01/21/21 1100   Wound Depth (cm) 0.3 cm 01/11/21 1500   Wound Surface Area (cm^2) 4.6 cm^2 01/21/21 1100   Wound Volume (cm^3) 0.45 cm^3 01/11/21 1500   Tunneling (cm) 0 cm 01/21/21 1100   Undermining (cm) 0 cm 01/21/21 1100   Shape circular 01/11/21 1500   Wound Odor None 01/21/21 1100   Exposed Structures KIRTI 12/27/20 1200   WOUND NURSE ONLY - Time Spent with Patient (mins) 45 01/11/21 1500            Vascular: will need MD follow up. Unit mgr and wound care mgr notified.    ANA:   No results found.      Lab Values:    Lab Results   Component Value Date/Time    WBC 8.6 11/17/2020 06:21 AM    RBC 4.40 (L) 11/17/2020 06:21 AM    HEMOGLOBIN 12.9 (L) 11/17/2020 06:21 AM    HEMATOCRIT 40.3 (L) 11/17/2020 06:21 AM    CREACTPROT 4.77 (H) 09/15/2020 05:05 AM    HBA1C 5.2 08/02/2016 07:30 AM          Culture:   Not indicated at this time.  Culture Results show:  No results found for this or any previous visit (from the past 720 hour(s)).      INTERVENTIONS BY WOUND TEAM:  Chart reviewed.  Patient seen for left lateral foot.  Left lateral foot deterioration noted with black, moist yellow nonviable tissue on the majority of wound bed. Erythema to periwound skin extending to 5th toe noted.  Cleansed with normal  saline and gauze, patted dry, Aquacel Ag hydrofiber to left lateral 5th MTH.  Mepilex heel cut in half and applied to the left lateral 5th MTH to cover. Bedside RN updated on wound deterioration.    Interdisciplinary consultation: Patient, Bedside RN    EVALUATION:   1/21/21 Pt will need follow up with MD or EDWIN.  01/11/2021:  Maceration due to drainage, odor from wound bed.  Switched to Aquacel Ag hydrofiber for drainage and odor.  01/04/2021:  Eschar cap has released and came off full slough base, unable to assess base.  Small SS drainage with some mild odor.  Hydrocolloid thin to wound bed to help with autolytic debridement and maintain a moist wound environment.  Mepilex to help offload.   12/27/2020:  Wound team saw patient on 11/25 for sacrum.  Patient developed a discoloration on 12/05 to the lateral foot, wound team was not consulted.  12/18 a new photo was updated and wound team consulted on 12/19.  Patient left foot left open to air.  Patient was seen by wound care RN on 12/22 and patient was very confused and refused wound care.  Wound team tried to see patient on 12/24, but was again refused.  12/27, patient much more calmer with bedside sitter.  Patient allowed this wound care RN and bedside RN to assess left lateral foot and apply mepilex to the area.  The discoloration is likely due to PRAFO boot used for patient's drop foot.  Patient very confused and had refused bedside staff from touching patient and also had multiple falls due to confusion. Patient as of 12/26 has bedside sitter in room.     Goals: Steady decrease in wound area and depth weekly.    NURSING PLAN OF CARE ORDERS (X):    Dressing changes: See Dressing Care orders: x  Skin care: See Skin Care orders:   Rectal tube care: See Rectal Tube Care orders:   Other orders:    WOUND TEAM PLAN OF CARE   Dressing changes by wound team:          Follow up 1-2 times weekly:    X, weekly follow-up for left lateral foot           Follow up 3 times  weekly:                NPWT change 3 times weekly:     Follow up as needed:       Other (explain):     Anticipated discharge plans:  Patient unable to take care of self at this time and is very confused.  Pending medical clearance.  Wound on left lateral needs to be offloaded.   LTACH:        SNF/Rehab:                  Home Care:           Outpatient Wound Center:            Self Care:

## 2021-01-21 NOTE — CARE PLAN
Problem: Safety  Goal: Will remain free from falls  Outcome: PROGRESSING AS EXPECTED   Bed alarm in use. Call light w/in reach. Instructed pt to call for assistance before getting OOB- pt needs reinfocement.     Problem: Venous Thromboembolism (VTW)/Deep Vein Thrombosis (DVT) Prevention:  Goal: Patient will participate in Venous Thrombosis (VTE)/Deep Vein Thrombosis (DVT)Prevention Measures  Outcome: PROGRESSING AS EXPECTED   SCDs in place.     Problem: Bowel/Gastric:  Goal: Normal bowel function is maintained or improved  Outcome: PROGRESSING SLOWER THAN EXPECTED   Miralax given for constipation.

## 2021-01-21 NOTE — ASSESSMENT & PLAN NOTE
-Lateral aspect, worsening, red, nontender  -HgA1c 1/21: 5.2%  -No leukocytosis  -Wound care following  -1/21 LPS consulted  -1/24: Wound culture growing Staph aureus. Continue Augmentin. Bactrim discontinued.  -2/1: I &D surgery of foot with Dr. Roque    -2/4: Bone biopsy cultures positive for S epidermitis and Corynebacterium. ID consulted  -2/5: ID recommended 10 days of doxycycline, now completed  -3/10: LPS is following, off abx . Afebrile

## 2021-01-21 NOTE — PROGRESS NOTES
Pt aaox1. GARNER, BLE weakness- 2/5. +foot drop BLE. Denied pain. +BS, no BM since 1/17, Miralax given. Good appetite. Condom cath in place for incontinence. On waffle mattress, Q 2 hour turns done. Mepilex to bilat heels. Wound to L foot- dressing changed by wound RN- wound getting worse and may need surgical intervention per wound RN- APRN Derek made aware. Reviewed poc with pt- needs reinforcement. Bed alarm in use. Call light in reach.

## 2021-01-22 LAB
GRAM STN SPEC: NORMAL
SIGNIFICANT IND 70042: NORMAL
SITE SITE: NORMAL
SOURCE SOURCE: NORMAL

## 2021-01-22 PROCEDURE — A9270 NON-COVERED ITEM OR SERVICE: HCPCS | Performed by: NURSE PRACTITIONER

## 2021-01-22 PROCEDURE — 700102 HCHG RX REV CODE 250 W/ 637 OVERRIDE(OP): Performed by: NURSE PRACTITIONER

## 2021-01-22 PROCEDURE — 700102 HCHG RX REV CODE 250 W/ 637 OVERRIDE(OP): Performed by: HOSPITALIST

## 2021-01-22 PROCEDURE — 99222 1ST HOSP IP/OBS MODERATE 55: CPT | Performed by: NURSE PRACTITIONER

## 2021-01-22 PROCEDURE — A9270 NON-COVERED ITEM OR SERVICE: HCPCS | Performed by: HOSPITALIST

## 2021-01-22 PROCEDURE — 770006 HCHG ROOM/CARE - MED/SURG/GYN SEMI*

## 2021-01-22 PROCEDURE — 87205 SMEAR GRAM STAIN: CPT

## 2021-01-22 PROCEDURE — 87077 CULTURE AEROBIC IDENTIFY: CPT

## 2021-01-22 PROCEDURE — 87186 SC STD MICRODIL/AGAR DIL: CPT

## 2021-01-22 PROCEDURE — 700111 HCHG RX REV CODE 636 W/ 250 OVERRIDE (IP): Performed by: HOSPITALIST

## 2021-01-22 PROCEDURE — 87070 CULTURE OTHR SPECIMN AEROBIC: CPT

## 2021-01-22 RX ORDER — AMOXICILLIN AND CLAVULANATE POTASSIUM 875; 125 MG/1; MG/1
1 TABLET, FILM COATED ORAL EVERY 12 HOURS
Status: COMPLETED | OUTPATIENT
Start: 2021-01-22 | End: 2021-01-31

## 2021-01-22 RX ORDER — SULFAMETHOXAZOLE AND TRIMETHOPRIM 800; 160 MG/1; MG/1
1 TABLET ORAL EVERY 12 HOURS
Status: DISCONTINUED | OUTPATIENT
Start: 2021-01-22 | End: 2021-01-24

## 2021-01-22 RX ADMIN — ASPIRIN 325 MG ORAL TABLET 325 MG: 325 PILL ORAL at 05:44

## 2021-01-22 RX ADMIN — ENOXAPARIN SODIUM 40 MG: 40 INJECTION SUBCUTANEOUS at 17:30

## 2021-01-22 RX ADMIN — TAMSULOSIN HYDROCHLORIDE 0.8 MG: 0.4 CAPSULE ORAL at 09:29

## 2021-01-22 RX ADMIN — Medication 100 MG: at 05:44

## 2021-01-22 RX ADMIN — AMOXICILLIN AND CLAVULANATE POTASSIUM 1 TABLET: 875; 125 TABLET, FILM COATED ORAL at 17:30

## 2021-01-22 RX ADMIN — RISPERIDONE 2 MG: 2 TABLET ORAL at 17:30

## 2021-01-22 RX ADMIN — DOCUSATE SODIUM 50 MG AND SENNOSIDES 8.6 MG 2 TABLET: 8.6; 5 TABLET, FILM COATED ORAL at 05:44

## 2021-01-22 RX ADMIN — THERA TABS 1 TABLET: TAB at 05:44

## 2021-01-22 RX ADMIN — DOCUSATE SODIUM 50 MG AND SENNOSIDES 8.6 MG 2 TABLET: 8.6; 5 TABLET, FILM COATED ORAL at 17:30

## 2021-01-22 RX ADMIN — RISPERIDONE 2 MG: 2 TABLET ORAL at 05:44

## 2021-01-22 RX ADMIN — SULFAMETHOXAZOLE AND TRIMETHOPRIM 1 TABLET: 800; 160 TABLET ORAL at 17:30

## 2021-01-22 RX ADMIN — AMOXICILLIN AND CLAVULANATE POTASSIUM 1 TABLET: 875; 125 TABLET, FILM COATED ORAL at 12:58

## 2021-01-22 NOTE — PROGRESS NOTES
Patient AO x 1. Currently refusing to wear his condom catheter and to be changed. Will continue to educate patient on importance of changing sheets and being clean. Patient believes he is leaving today and is irritable when attempted to be reoriented. Will continue to monitor.

## 2021-01-22 NOTE — CARE PLAN
Problem: Safety  Goal: Will remain free from injury  Outcome: PROGRESSING AS EXPECTED  Note: Educated pt to use call button to call for help before getting up. Bed rails up x2. Provided hospital non-slip socks. Bed locked and at the lowest position. Call light and personal belongings within reach. Telesitter at bedside.     Problem: Skin Integrity  Goal: Risk for impaired skin integrity will decrease  Outcome: PROGRESSING AS EXPECTED  Note: Waffle cushion on place. Reposition pt every 2 hours. Mepilex (heel) in place.

## 2021-01-22 NOTE — DISCHARGE PLANNING
Anticipated Discharge Disposition: Placement    Action: pt pending guardianship hearing 2/5/2021    Barriers to Discharge: Guardianship hearing    Plan: after gaurdianship, find placement

## 2021-01-22 NOTE — CONSULTS
LIMB PRESERVATION SERVICE CONSULT      REFERRED BY: Jean-Paul MUNOZ    DATE OF CONSULTATION: 1/22/2021    REASON FOR CONSULT: Left fifth MTH ulcer     HISTORY OF PRESENT ILLNESS: Yury Blake is a 50 y.o.  with a past medical history that includes hypertension, alcohol abuse, , admitted 9/9/2020 for Acute respiratory failure with hypoxia (HCC)   LPS has been consulted for left fifth MTH ulcer.  Patient is a poor historian.  A&O to self only.  Chart reviewed.  Followed by wound team for hospital-acquired pressure injury to left fifth MTH.  Pressure injury first noted on 12/18/2020 that started as a deep tissue injury from PRAFO boot that evolved to an unstageable and now presents as a stage IV.  Patient would not allow anyone to assess his ulcer.  Finally he allowed wound team to see the ulcer on 12/27/2020.  Advanced wound dressings have been applied including hydrocolloid thin, Hydrofiber silver, Mepilex.  Patient initially presented to the hospital 9/9/2020 for altered mental status.  Found to be obtunded for least 2 days.  He was intubated, vasopressor therapy was utilized. extubated 9/11/2020. He had encephalopathy. Patient was severely agitated and confused with hallucinations and impulsivity.  He developed bilateral foot drop.  He was given a PRAFO boot for left foot on 11/19/2020 and a PRAFO boot for right foot on 12/16/2020.  He has had multiple falls. Patient is two-person max assist.  Not ambulating at this time. He has had sitters and remote sitter at bedside. currently pending guardianship.    Patient is currently not on antibiotics.  Xray completed and is negative for osteomyelitis. Ortho has not been consulted yet.            Smoking:   reports that he has never smoked. His smokeless tobacco use includes chew.    Alcohol:   reports current alcohol use.    Drug:   reports no history of drug use.      PAST MEDICAL HISTORY:   Past Medical History:   Diagnosis Date   • Arthritis     RA   •  Hypertension    • Pain     Right ankle   • Psychiatric problem     anxiety        PAST SURGICAL HISTORY:   Past Surgical History:   Procedure Laterality Date   • ANKLE ORIF Right 9/26/2017    Procedure: ANKLE ORIF SYNDESOMOSIS REPAIR;  Surgeon: Armando Woodard M.D.;  Location: SURGERY [facility];  Service: Orthopedics       MEDICATIONS:   Current Facility-Administered Medications   Medication Dose   • amoxicillin-clavulanate (AUGMENTIN) 875-125 MG per tablet 1 Tab  1 Tab   • acetaminophen (Tylenol) tablet 650 mg  650 mg   • ondansetron (ZOFRAN ODT) dispertab 4 mg  4 mg   • risperiDONE (RISPERDAL) tablet 2 mg  2 mg   • aspirin (ASA) tablet 325 mg  325 mg   • multivitamin (THERAGRAN) tablet 1 Tab  1 Tab   • thiamine tablet 100 mg  100 mg   • tamsulosin (FLOMAX) capsule 0.8 mg  0.8 mg   • enoxaparin (LOVENOX) inj 40 mg  40 mg   • senna-docusate (PERICOLACE or SENOKOT S) 8.6-50 MG per tablet 2 Tab  2 Tab    And   • polyethylene glycol/lytes (MIRALAX) PACKET 1 Packet  1 Packet       ALLERGIES:  No Known Allergies     FAMILY HISTORY: No family history on file.      REVIEW OF SYSTEMS:   Constitutional: Negative for chills, fever   Respiratory: Negative for cough and shortness of breath.    Cardiovascular:Negative for chest pain, and claudication.   Gastrointestinal: Negative for constipation, diarrhea, nausea and vomiting.   Lower extremities: positive for swelling and redness  Neurological: Negative for numbness to feet and lower legs  All other systems reviewed and are negative     RESULTS:     Recent Labs     01/21/21  1247   WBC 7.9   RBC 4.64*   HEMOGLOBIN 13.5*   HEMATOCRIT 41.9*   MCV 90.3   MCH 29.1   MCHC 32.2*   RDW 49.2   PLATELETCT 205   MPV 11.3     No results for input(s): SODIUM, POTASSIUM, CHLORIDE, CO2, GLUCOSE, BUN, CPKTOTAL in the last 72 hours.      ESR:     Results from last 7 days   Lab Units 01/21/21  1247   SED RATE WESTERGREN 1526 mm/hour 16*       CRP:       Results from last 7 days   Lab  "Units 01/21/21  1247   C REACTIVE PROTEIN 4596 mg/dL 3.58*         COVID-19: Not detected this admission 9/2020    X-ray: Left foot:1.  No acute traumatic bony injury. No destructive osseous lesion is readily apparent, note that plain film can be insensitive for evaluation of osteomyelitis and three-phase bone scan or MRI would offer improved diagnostic sensitivity as clinically   appropriate  2.  Atherosclerosis    MRI: N/A foot    Arterial studies: None    A1c:  Lab Results   Component Value Date/Time    HBA1C 5.2 08/02/2016 07:30 AM            Microbiology:  Results     Procedure Component Value Units Date/Time    CULTURE WOUND W/ GRAM STAIN [[id number]]     Order Status: No result Specimen: Wound from Left Foot            PHYSICAL EXAMINATION:     VITAL SIGNS: /61   Pulse 66   Temp 36.3 °C (97.4 °F) (Temporal)   Resp 16   Ht 1.803 m (5' 11\")   Wt 80.3 kg (177 lb)   SpO2 97%   BMI 24.69 kg/m²       General Appearance:  Well developed, well nourished, in no acute distress  AxO x1 to self only.  Thinks he is in [place], that the month is May and Bradly is president    Lower Extremity Assessment:    Edema:   Minimal edema surrounding ulcer    Pulses:  R foot: +2 DP, +2 PT  L foot: +2 DP, +2 PT    ROM dorsi/plantarflexion  Plantarflexion intact bilaterally  Dorsiflexion severely limited.  Cannot flex to neutral  Inversion plus 4 out of 5 bilaterally  Eversion plus 2 out of 5 for left, 4 out of 5 for right    Structural /mechanical changes:  Foot drop bilaterally, left worse than right    Sensory Assessment  KIRTI      Wound Assessment:    Left lateral fifth MTH ulcer, stage IV, HAPI:  75% slough, probes to bone 0.8 cm  Erythema: Severe  Drainage: Moderate serosanguineous  Callus: Macerated  Odor: None                  ASSESSMENT AND PLAN:   Patient admitted September 2020 for altered mental status.  Required intubation and vasopressor therapy.  Developed bilateral foot drop and subsequently " pressure injury to left fifth MTH from PRAFO boot.  CRP elevated, sed rate slightly elevated.  Cellulitis surrounding ulcer.  Ulcer does probe to bone however not exposed.  X-ray negative for osteomyelitis.  Wound culture taken.  Start patient on Augmentin and Bactrim and adjust antibiotics accordingly.  We will start with wound care and antibiotics at this time.  If ulcer worsens, will require surgical intervention.  Continue to monitor.      Wound care:   -Wound care orders placed for nursing by wound team  -Left fifth MTH: Aquacel Ag, Mepilex.  Change every 48 hours.    Imaging/Labs:  -COVID-19: not detected this admission  -Wound culture left fifth MTH ulcer ordered    Vascular status:   -Palpable pedal pulses bilaterally    Surgery:   -No plans for surgery at this time.  At risk for left fifth ray amputation.    Patient will benefit from Achilles lengthening procedure bilaterally and plantar fascia tendon releases for bilateral foot drop, left worse than right.    Antibiotics:   Cellulitis surrounding ulcer. Not on abx. Take wound cx. After, start augmentin 875/125mg BID for 10 days. abx ordered.   hospitalist to follow wound cx and modify abx as needed.  Consult ID if condition worsens        Weight Bearing Status:   -Heel weight bearing LLE    Offloading:   -Heel float boots bilaterally.  PRAFO boots are no longer appropriate as foot drop is fixed  -Orthotic company: None    PT/OT:   OT PT previously involved.  Last seen 11/2020            D/W: pt, RN, TAMMY Ordonez      Discharge Plan:  Awaiting guardianship.  Hearing 2/5/2021            Please note that this dictation was created using voice recognition software. I have  worked with technical experts from [employer] to optimize the interface.  I have made every reasonable attempt to correct obvious errors, but there may be errors of grammar and possibly content that I did not discover before finalizing the note.    Please contact [facility] through  [employer].

## 2021-01-23 LAB
EST. AVERAGE GLUCOSE BLD GHB EST-MCNC: 103 MG/DL
HBA1C MFR BLD: 5.2 % (ref 0–5.6)

## 2021-01-23 PROCEDURE — A9270 NON-COVERED ITEM OR SERVICE: HCPCS | Performed by: NURSE PRACTITIONER

## 2021-01-23 PROCEDURE — A9270 NON-COVERED ITEM OR SERVICE: HCPCS | Performed by: HOSPITALIST

## 2021-01-23 PROCEDURE — 700102 HCHG RX REV CODE 250 W/ 637 OVERRIDE(OP): Performed by: NURSE PRACTITIONER

## 2021-01-23 PROCEDURE — 700102 HCHG RX REV CODE 250 W/ 637 OVERRIDE(OP): Performed by: HOSPITALIST

## 2021-01-23 PROCEDURE — 700111 HCHG RX REV CODE 636 W/ 250 OVERRIDE (IP): Performed by: HOSPITALIST

## 2021-01-23 PROCEDURE — 770006 HCHG ROOM/CARE - MED/SURG/GYN SEMI*

## 2021-01-23 PROCEDURE — 700101 HCHG RX REV CODE 250: Performed by: HOSPITALIST

## 2021-01-23 RX ADMIN — AMOXICILLIN AND CLAVULANATE POTASSIUM 1 TABLET: 875; 125 TABLET, FILM COATED ORAL at 06:27

## 2021-01-23 RX ADMIN — ASPIRIN 325 MG ORAL TABLET 325 MG: 325 PILL ORAL at 06:27

## 2021-01-23 RX ADMIN — RISPERIDONE 2 MG: 2 TABLET ORAL at 06:27

## 2021-01-23 RX ADMIN — SULFAMETHOXAZOLE AND TRIMETHOPRIM 1 TABLET: 800; 160 TABLET ORAL at 18:11

## 2021-01-23 RX ADMIN — SULFAMETHOXAZOLE AND TRIMETHOPRIM 1 TABLET: 800; 160 TABLET ORAL at 06:27

## 2021-01-23 RX ADMIN — TAMSULOSIN HYDROCHLORIDE 0.8 MG: 0.4 CAPSULE ORAL at 09:44

## 2021-01-23 RX ADMIN — THERA TABS 1 TABLET: TAB at 06:27

## 2021-01-23 RX ADMIN — DOCUSATE SODIUM 50 MG AND SENNOSIDES 8.6 MG 2 TABLET: 8.6; 5 TABLET, FILM COATED ORAL at 18:11

## 2021-01-23 RX ADMIN — AMOXICILLIN AND CLAVULANATE POTASSIUM 1 TABLET: 875; 125 TABLET, FILM COATED ORAL at 18:11

## 2021-01-23 RX ADMIN — POLYETHYLENE GLYCOL 3350 1 PACKET: 17 POWDER, FOR SOLUTION ORAL at 06:27

## 2021-01-23 RX ADMIN — DOCUSATE SODIUM 50 MG AND SENNOSIDES 8.6 MG 2 TABLET: 8.6; 5 TABLET, FILM COATED ORAL at 06:27

## 2021-01-23 RX ADMIN — Medication 100 MG: at 06:27

## 2021-01-23 RX ADMIN — ENOXAPARIN SODIUM 40 MG: 40 INJECTION SUBCUTANEOUS at 18:11

## 2021-01-23 RX ADMIN — RISPERIDONE 2 MG: 2 TABLET ORAL at 18:11

## 2021-01-23 NOTE — PROGRESS NOTES
Report received from Day RN at 1915. Assumed care of patient. Plan of care discussed, questions answered. Assessment completed. VSS, A&O x4, states generalized pain, but declines pain during preceding assessment. PRN med given. Call light in reach. Patient educated on use of call light for assistance.    Telesitter in place.    Waffle cushion in place, heel float boots in place.  Patient refuses to wear heel foot drop boot at this time.  Condom catheter in place.

## 2021-01-23 NOTE — CARE PLAN
Problem: Communication  Goal: The ability to communicate needs accurately and effectively will improve  Outcome: PROGRESSING AS EXPECTED  Note: Plan of care discussed with patient, questions answered. Patient encouraged to verbalize feelings and concerns. Verbalized understanding.   Problem: Safety  Goal: Will remain free from injury  Outcome: PROGRESSING AS EXPECTED  Note: Call light in reach, bed in lowest and locked position, necessary belongings in reach. Patient educated on use of call light for assistance. Verbalized understanding. Telesitter in place.  Problem: Infection  Goal: Will remain free from infection  Outcome: PROGRESSING AS EXPECTED  Note: Patient educated on proper hand hygiene. Verbalized understanding. Healthcare team educated on proper hand hygiene while providing patient care and after patient care.    Problem: Venous Thromboembolism (VTW)/Deep Vein Thrombosis (DVT) Prevention:  Goal: Patient will participate in Venous Thrombosis (VTE)/Deep Vein Thrombosis (DVT)Prevention Measures  Outcome: PROGRESSING AS EXPECTED  Note: SCDs on  Problem: Knowledge Deficit  Goal: Knowledge of disease process/condition, treatment plan, diagnostic tests, and medications will improve  Outcome: PROGRESSING AS EXPECTED  Problem: Discharge Barriers/Planning  Goal: Patient's continuum of care needs will be met  Outcome: PROGRESSING AS EXPECTED  Problem: Psychosocial Needs:  Goal: Level of anxiety will decrease  Outcome: PROGRESSING AS EXPECTED  Problem: Skin Integrity  Goal: Risk for impaired skin integrity will decrease  Outcome: PROGRESSING AS EXPECTED  Note: Q2 turns in place. Waffle cushion in place. Heel float boots in place. Skin assessed under devices.

## 2021-01-23 NOTE — CARE PLAN
Problem: Safety  Goal: Will remain free from injury  Outcome: PROGRESSING AS EXPECTED  Note: Educated pt to use call button to call for help before getting up. Bed rails up x2. Provided hospital non-slip socks. Bed locked and at the lowest position. Call light and personal belongings within reach. Telesitter at bedside.     Problem: Skin Integrity  Goal: Risk for impaired skin integrity will decrease  Outcome: PROGRESSING AS EXPECTED  Note: Pt encouraged to reposition every 2 hours. Waffle cushion and mepilex in place.

## 2021-01-24 LAB
BACTERIA WND AEROBE CULT: ABNORMAL
GRAM STN SPEC: ABNORMAL
SIGNIFICANT IND 70042: ABNORMAL
SITE SITE: ABNORMAL
SOURCE SOURCE: ABNORMAL

## 2021-01-24 PROCEDURE — A9270 NON-COVERED ITEM OR SERVICE: HCPCS | Performed by: HOSPITALIST

## 2021-01-24 PROCEDURE — 770006 HCHG ROOM/CARE - MED/SURG/GYN SEMI*

## 2021-01-24 PROCEDURE — 700111 HCHG RX REV CODE 636 W/ 250 OVERRIDE (IP): Performed by: HOSPITALIST

## 2021-01-24 PROCEDURE — A9270 NON-COVERED ITEM OR SERVICE: HCPCS | Performed by: STUDENT IN AN ORGANIZED HEALTH CARE EDUCATION/TRAINING PROGRAM

## 2021-01-24 PROCEDURE — 99232 SBSQ HOSP IP/OBS MODERATE 35: CPT | Performed by: NURSE PRACTITIONER

## 2021-01-24 PROCEDURE — 700102 HCHG RX REV CODE 250 W/ 637 OVERRIDE(OP): Performed by: HOSPITALIST

## 2021-01-24 PROCEDURE — A9270 NON-COVERED ITEM OR SERVICE: HCPCS | Performed by: NURSE PRACTITIONER

## 2021-01-24 PROCEDURE — 700102 HCHG RX REV CODE 250 W/ 637 OVERRIDE(OP): Performed by: NURSE PRACTITIONER

## 2021-01-24 PROCEDURE — 700101 HCHG RX REV CODE 250: Performed by: HOSPITALIST

## 2021-01-24 PROCEDURE — 700102 HCHG RX REV CODE 250 W/ 637 OVERRIDE(OP): Performed by: STUDENT IN AN ORGANIZED HEALTH CARE EDUCATION/TRAINING PROGRAM

## 2021-01-24 RX ORDER — POLYETHYLENE GLYCOL 3350 17 G/17G
1 POWDER, FOR SOLUTION ORAL
Status: DISCONTINUED | OUTPATIENT
Start: 2021-01-24 | End: 2021-02-11

## 2021-01-24 RX ORDER — AMOXICILLIN 250 MG
2 CAPSULE ORAL 2 TIMES DAILY
Status: DISCONTINUED | OUTPATIENT
Start: 2021-01-24 | End: 2021-02-11

## 2021-01-24 RX ORDER — BISACODYL 10 MG
10 SUPPOSITORY, RECTAL RECTAL
Status: DISCONTINUED | OUTPATIENT
Start: 2021-01-24 | End: 2021-02-11

## 2021-01-24 RX ADMIN — POLYETHYLENE GLYCOL 3350 1 PACKET: 17 POWDER, FOR SOLUTION ORAL at 06:38

## 2021-01-24 RX ADMIN — AMOXICILLIN AND CLAVULANATE POTASSIUM 1 TABLET: 875; 125 TABLET, FILM COATED ORAL at 06:36

## 2021-01-24 RX ADMIN — MAGNESIUM HYDROXIDE 30 ML: 400 SUSPENSION ORAL at 20:03

## 2021-01-24 RX ADMIN — AMOXICILLIN AND CLAVULANATE POTASSIUM 1 TABLET: 875; 125 TABLET, FILM COATED ORAL at 18:02

## 2021-01-24 RX ADMIN — DOCUSATE SODIUM 50 MG AND SENNOSIDES 8.6 MG 2 TABLET: 8.6; 5 TABLET, FILM COATED ORAL at 06:36

## 2021-01-24 RX ADMIN — ENOXAPARIN SODIUM 40 MG: 40 INJECTION SUBCUTANEOUS at 18:02

## 2021-01-24 RX ADMIN — RISPERIDONE 2 MG: 2 TABLET ORAL at 18:02

## 2021-01-24 RX ADMIN — ASPIRIN 325 MG ORAL TABLET 325 MG: 325 PILL ORAL at 06:37

## 2021-01-24 RX ADMIN — SULFAMETHOXAZOLE AND TRIMETHOPRIM 1 TABLET: 800; 160 TABLET ORAL at 06:36

## 2021-01-24 RX ADMIN — DOCUSATE SODIUM 50 MG AND SENNOSIDES 8.6 MG 2 TABLET: 8.6; 5 TABLET, FILM COATED ORAL at 18:01

## 2021-01-24 RX ADMIN — Medication 100 MG: at 06:37

## 2021-01-24 RX ADMIN — TAMSULOSIN HYDROCHLORIDE 0.8 MG: 0.4 CAPSULE ORAL at 08:17

## 2021-01-24 RX ADMIN — RISPERIDONE 2 MG: 2 TABLET ORAL at 06:37

## 2021-01-24 RX ADMIN — THERA TABS 1 TABLET: TAB at 06:37

## 2021-01-24 ASSESSMENT — ENCOUNTER SYMPTOMS
DIARRHEA: 0
SHORTNESS OF BREATH: 0
NERVOUS/ANXIOUS: 0
VOMITING: 0
DIZZINESS: 0
ABDOMINAL PAIN: 0
SENSORY CHANGE: 0
FEVER: 0
SORE THROAT: 0
CHILLS: 0
PALPITATIONS: 0
CONSTIPATION: 0
MYALGIAS: 0
SPEECH CHANGE: 0
WEAKNESS: 0
FOCAL WEAKNESS: 0
NAUSEA: 0
COUGH: 0
HEADACHES: 0

## 2021-01-24 NOTE — PROGRESS NOTES
[facility] Medicine Twice Weekly Progress Note    Date of Service  1/24/2021    Chief Complaint  Altered mental status    Hospital Course  Mr. Blake is a 50-year-old male who was brought to the emergency department on 9/9/2020 with altered mental status after he was found by his ex-wife to be obtunded after she had checked on him when he had not been seen for at least 2 days.  At that time he was alert and oriented x0 and found to be hypotensive.  GCS in the ED was 7 and he was severely hypotensive.  He was intubated for airway protection and central venous access was obtained for administration of high-volume crystalloid resuscitation and vasopressor therapy.  He had a significant leukocytosis with a WBC of 22.9 and was hypokalemic with a potassium level of 2.7.  Additionally he had acute kidney injury with a creatinine of 2.73.  His INR was 8.93 and he also had a lactate level of 11.  Urinalysis was performed and was negative for urinary tract infection.  He was diagnosed with septic shock and admitted to the intensive care unit for further evaluation and treatment.  A lumbar puncture was done on 9/9/2020.  He was also noted to have seizure-like activity on 9/10/2020 so an EEG was obtained and was significant for a moderate encephalopathy.  Extubation occurred on 9/11/2020.  He also had a traumatic catheter removal so on 9/21/2020 urology was consulted but was ultimately unnecessary.  On 9/13/2020 he was transferred out of the ICU where he required restraints due to confusion, hallucination, agitation, and impulsivity.  An MRI was performed and was negative for acute pathologies.  He was also treated for a UTI starting 10/2/2020.  He has also fallen multiple times in hospital.  Additionally, on 11/11/2020 he had an acute episode of sinus tachycardia in the 140s accompanied by hypotension.  There was concern for STEMI so cardiology was consulted.  A 12-lead EKG was obtained and was concerning for atrial  flutter.  The patient received metoprolol after which a repeat ECG showed sinus tachycardia.  A D-dimer was obtained and was elevated for which a CT chest was done that was negative for pulmonary embolism.  Cardiology subsequently signed off.  He has remained in the hospital for a prolonged period of time and is now pending guardianship and then will need placement.  Guardianship hearing is tentatively scheduled for 2/5/2021.    Interval Problem Update  Patient lying in bed, pleasant, cooperative and in no acute distress.  He remains oriented only to himself in place.  He denies headache, foot pain, abdominal pain, bowel and urinary and any other problems.  1/21: LPS consulted for worsening left lateral foot wound. Xray completed and is negative for osteomyelitis. Wound culture obtained, empiric antibiotics started.  1/24: Wound culture growing Staph aureus. Continue Augmentin. Bactrim discontinued.  -continue guardianship process    Consultants/Specialty  Critical care  Psychiatry  Cardiology  Urology    Code Status  Full Code    Disposition  Pending guardianship and then placement.  Guardianship hearing scheduled for 2/5/2021.    Review of Systems  Review of Systems   Constitutional: Negative for chills, fever and malaise/fatigue.   HENT: Negative for congestion and sore throat.    Respiratory: Negative for cough and shortness of breath.    Cardiovascular: Negative for chest pain, palpitations and leg swelling.   Gastrointestinal: Negative for abdominal pain, constipation, diarrhea, nausea and vomiting.   Genitourinary: Negative for dysuria and urgency.   Musculoskeletal: Negative for joint pain and myalgias.   Skin: Negative for itching.   Neurological: Negative for dizziness, sensory change, speech change, focal weakness, weakness and headaches.   Psychiatric/Behavioral: The patient is not nervous/anxious.       Physical Exam  Temp:  [36.3 °C (97.3 °F)-36.7 °C (98.1 °F)] 36.7 °C (98.1 °F)  Pulse:  [66-74]  71  Resp:  [17-18] 17  BP: (100-110)/(60-67) 100/60  SpO2:  [94 %-98 %] 95 %    Physical Exam  Vitals signs and nursing note reviewed.   Constitutional:       General: He is awake. He is not in acute distress.     Comments: Unkempt appearance  Frail, chronically ill appearing   HENT:      Head: Normocephalic and atraumatic.      Mouth/Throat:      Lips: Pink.      Mouth: Mucous membranes are moist.   Eyes:      Pupils: Pupils are equal, round, and reactive to light.   Neck:      Musculoskeletal: Normal range of motion and neck supple.   Cardiovascular:      Rate and Rhythm: Normal rate and regular rhythm.      Pulses: Normal pulses.   Pulmonary:      Effort: Pulmonary effort is normal.      Breath sounds: Normal breath sounds.   Abdominal:      General: Bowel sounds are normal. There is no distension or abdominal bruit.      Palpations: Abdomen is soft.      Tenderness: There is no abdominal tenderness.   Musculoskeletal:      Right lower leg: No edema.      Left lower leg: No edema.        Feet:    Feet:      Left foot:      Skin integrity: Ulcer, skin breakdown and erythema present.   Skin:     General: Skin is warm and dry.   Neurological:      Mental Status: He is alert. He is disoriented and confused.   Psychiatric:         Attention and Perception: Attention normal.         Mood and Affect: Affect is labile and flat.         Speech: Speech normal.         Behavior: Behavior is cooperative.         Cognition and Memory: Cognition is impaired. Memory is impaired. He exhibits impaired recent memory and impaired remote memory.         Fluids    Intake/Output Summary (Last 24 hours) at 1/24/2021 0923  Last data filed at 1/23/2021 1528  Gross per 24 hour   Intake 480 ml   Output 850 ml   Net -370 ml     Laboratory    Imaging  DX-FOOT-COMPLETE 3+ LEFT   Final Result         1.  No acute traumatic bony injury. No destructive osseous lesion is readily apparent, note that plain film can be insensitive for evaluation of  osteomyelitis and three-phase bone scan or MRI would offer improved diagnostic sensitivity as clinically    appropriate   2.  Atherosclerosis      CT-CTA CHEST PULMONARY ARTERY W/ RECONS   Final Result      1.  No evidence of pulmonary emboli.   2.  Right lower lobe discoid atelectasis.   3.  Gynecomastia.            DX-CHEST-PORTABLE (1 VIEW)   Final Result      No acute cardiac or pulmonary abnormalities are identified.      MR-BRAIN-W/O   Final Result      1.  Moderate diffuse cerebral atrophy.      2.  Minimal periventricular and juxtacortical white matter changes consistent with chronic microvascular ischemic gliosis.      3.  No evidence of acute infarction in the brain parenchyma.      DX-ABDOMEN FOR TUBE PLACEMENT   Final Result      Feeding tube tip overlies the second portion of the duodenum.      DX-CHEST-PORTABLE (1 VIEW)   Final Result         1.  Patchy left lung base opacity, new since prior, could represent early infiltrate            DX-CHEST-PORTABLE (1 VIEW)   Final Result         1.  Patchy left lung base opacity, new since prior, could represent early infiltrate         BW-TPNICII-2 VIEW   Final Result      Enteric tube projects over the distal stomach/pylorus.         EC-ECHOCARDIOGRAM COMPLETE W/O CONT   Final Result      DX-CHEST-PORTABLE (1 VIEW)   Final Result         1.  No acute cardiopulmonary disease.      CT-ABDOMEN-PELVIS W/O   Final Result      1.  No renal stone or hydronephrosis.   2.  Normal appendix.   3.  No focal mesenteric inflammatory process.      DX-ABDOMEN FOR TUBE PLACEMENT   Final Result      NG tube tip projects over expected location the gastric fundus.      US-ABDOMEN COMPLETE SURVEY   Final Result         1.  Echogenic liver compatible with fatty change versus fibrosis.   2.  Gallbladder sludge without additional sonographic findings of cholecystitis.   3.  Partial atrophic bilateral kidneys with lobulated contour         CT-HEAD W/O   Final Result         1.  No  acute intracranial abnormality is identified, there are nonspecific white matter changes, commonly associated with small vessel ischemic disease.  Associated mild cerebral atrophy is noted.   2.  Atherosclerosis.      DX-CHEST-PORTABLE (1 VIEW)   Final Result         1.  No acute cardiopulmonary disease.         Assessment/Plan  * Toxic encephalopathy- (present on admission)  Assessment & Plan  -Unknown etiology - hypoxic induced brain injury vs Wernicke encephalopathy.  -LP, MRI brain, EEG negative. TSH normal. HIV/RPR negative.  -Psych was consulted and believe it is alcohol related.  -Continue risperdal.  -Remains free from restraints.  -Has fallen multiple times while in hospital. Sitter now at bedside.     Open wound of left foot  Assessment & Plan  Lateral aspect, worsening, red, nontender  HgA1c pending  No leukocytosis  Wound care following, recommended LPS consult  1/21 LPS consulted  1/24: Wound culture growing Staph aureus. Continue Augmentin. Bactrim discontinued.    Normocytic anemia- (present on admission)  Assessment & Plan  -Very mild, no active bleeding.  - stable  -Check periodically.    Alcohol abuse- (present on admission)  Assessment & Plan  -Abstinent since admission.  -Continue daily PO vitamin therapy.     VTE prophylaxis: Lovenox      ALONZO Haile

## 2021-01-24 NOTE — CARE PLAN
Problem: Communication  Goal: The ability to communicate needs accurately and effectively will improve  Outcome: PROGRESSING AS EXPECTED  Problem: Safety  Goal: Will remain free from injury  Outcome: PROGRESSING AS EXPECTED  Note: Call light in reach, bed in lowest and locked position, necessary belongings in reach. Patient educated on use of call light for assistance. Verbalized understanding.   Problem: Venous Thromboembolism (VTW)/Deep Vein Thrombosis (DVT) Prevention:  Goal: Patient will participate in Venous Thrombosis (VTE)/Deep Vein Thrombosis (DVT)Prevention Measures  Outcome: PROGRESSING AS EXPECTED  Problem: Skin Integrity  Goal: Risk for impaired skin integrity will decrease  Outcome: PROGRESSING AS EXPECTED  Note: Heel float boots in use. Q2 turning in effect. Mepilex in use.

## 2021-01-24 NOTE — PROGRESS NOTES
Report received from Day RN at [id number]. Assumed care of patient. Plan of care discussed, questions answered. Assessment completed. VSS, A&O x4, denies pain. PRN med given. Call light in reach. Patient educated on use of call light for assistance.    Telesitter in place. SCDs on. Heel float boots on. Mepilex in use.

## 2021-01-25 PROCEDURE — A9270 NON-COVERED ITEM OR SERVICE: HCPCS | Performed by: NURSE PRACTITIONER

## 2021-01-25 PROCEDURE — 700102 HCHG RX REV CODE 250 W/ 637 OVERRIDE(OP): Performed by: NURSE PRACTITIONER

## 2021-01-25 PROCEDURE — 700101 HCHG RX REV CODE 250: Performed by: STUDENT IN AN ORGANIZED HEALTH CARE EDUCATION/TRAINING PROGRAM

## 2021-01-25 PROCEDURE — A9270 NON-COVERED ITEM OR SERVICE: HCPCS | Performed by: HOSPITALIST

## 2021-01-25 PROCEDURE — A9270 NON-COVERED ITEM OR SERVICE: HCPCS | Performed by: STUDENT IN AN ORGANIZED HEALTH CARE EDUCATION/TRAINING PROGRAM

## 2021-01-25 PROCEDURE — 700102 HCHG RX REV CODE 250 W/ 637 OVERRIDE(OP): Performed by: HOSPITALIST

## 2021-01-25 PROCEDURE — 770006 HCHG ROOM/CARE - MED/SURG/GYN SEMI*

## 2021-01-25 PROCEDURE — 700111 HCHG RX REV CODE 636 W/ 250 OVERRIDE (IP): Performed by: HOSPITALIST

## 2021-01-25 PROCEDURE — 700102 HCHG RX REV CODE 250 W/ 637 OVERRIDE(OP): Performed by: STUDENT IN AN ORGANIZED HEALTH CARE EDUCATION/TRAINING PROGRAM

## 2021-01-25 RX ADMIN — BISACODYL 10 MG: 10 SUPPOSITORY RECTAL at 21:11

## 2021-01-25 RX ADMIN — RISPERIDONE 2 MG: 2 TABLET ORAL at 05:05

## 2021-01-25 RX ADMIN — DOCUSATE SODIUM 50 MG AND SENNOSIDES 8.6 MG 2 TABLET: 8.6; 5 TABLET, FILM COATED ORAL at 05:05

## 2021-01-25 RX ADMIN — POLYETHYLENE GLYCOL 3350 1 PACKET: 17 POWDER, FOR SOLUTION ORAL at 12:53

## 2021-01-25 RX ADMIN — AMOXICILLIN AND CLAVULANATE POTASSIUM 1 TABLET: 875; 125 TABLET, FILM COATED ORAL at 05:04

## 2021-01-25 RX ADMIN — RISPERIDONE 2 MG: 2 TABLET ORAL at 17:58

## 2021-01-25 RX ADMIN — Medication 100 MG: at 05:04

## 2021-01-25 RX ADMIN — ENOXAPARIN SODIUM 40 MG: 40 INJECTION SUBCUTANEOUS at 17:58

## 2021-01-25 RX ADMIN — TAMSULOSIN HYDROCHLORIDE 0.8 MG: 0.4 CAPSULE ORAL at 07:50

## 2021-01-25 RX ADMIN — ASPIRIN 325 MG ORAL TABLET 325 MG: 325 PILL ORAL at 05:04

## 2021-01-25 RX ADMIN — THERA TABS 1 TABLET: TAB at 05:05

## 2021-01-25 RX ADMIN — DOCUSATE SODIUM 50 MG AND SENNOSIDES 8.6 MG 2 TABLET: 8.6; 5 TABLET, FILM COATED ORAL at 17:58

## 2021-01-25 RX ADMIN — AMOXICILLIN AND CLAVULANATE POTASSIUM 1 TABLET: 875; 125 TABLET, FILM COATED ORAL at 17:57

## 2021-01-25 NOTE — PROGRESS NOTES
Paged Dr. Solares with hospitalist team regarding patient last bowel movement on 1/17. Received orders for bowel protocol and SCDs.

## 2021-01-25 NOTE — CARE PLAN
Problem: Safety  Goal: Will remain free from injury  Outcome: PROGRESSING AS EXPECTED  Note: Call light in reach, bed in lowest and locked position, necessary belongings in reach. Patient educated on use of call light for assistance. Verbalized understanding.   Problem: Venous Thromboembolism (VTW)/Deep Vein Thrombosis (DVT) Prevention:  Goal: Patient will participate in Venous Thrombosis (VTE)/Deep Vein Thrombosis (DVT)Prevention Measures  Outcome: PROGRESSING AS EXPECTED  Note: SCDs on  Problem: Discharge Barriers/Planning  Goal: Patient's continuum of care needs will be met  Outcome: PROGRESSING AS EXPECTED  Problem: Skin Integrity  Goal: Risk for impaired skin integrity will decrease  Outcome: PROGRESSING AS EXPECTED  Note: Heel float boots in place. Dressing changes done as needed and every 48 hours.

## 2021-01-25 NOTE — PROGRESS NOTES
Report received from Day RN at 1915. Assumed care of patient. Plan of care discussed, questions answered. Assessment completed. VSS, A&O x4, denies pain. PRN med ordered. Call light in reach. Patient educated on use of call light for assistance.

## 2021-01-26 PROCEDURE — 770006 HCHG ROOM/CARE - MED/SURG/GYN SEMI*

## 2021-01-26 PROCEDURE — 700102 HCHG RX REV CODE 250 W/ 637 OVERRIDE(OP): Performed by: NURSE PRACTITIONER

## 2021-01-26 PROCEDURE — A9270 NON-COVERED ITEM OR SERVICE: HCPCS | Performed by: NURSE PRACTITIONER

## 2021-01-26 PROCEDURE — A9270 NON-COVERED ITEM OR SERVICE: HCPCS | Performed by: STUDENT IN AN ORGANIZED HEALTH CARE EDUCATION/TRAINING PROGRAM

## 2021-01-26 PROCEDURE — 700102 HCHG RX REV CODE 250 W/ 637 OVERRIDE(OP): Performed by: HOSPITALIST

## 2021-01-26 PROCEDURE — A9270 NON-COVERED ITEM OR SERVICE: HCPCS | Performed by: HOSPITALIST

## 2021-01-26 PROCEDURE — 700102 HCHG RX REV CODE 250 W/ 637 OVERRIDE(OP): Performed by: STUDENT IN AN ORGANIZED HEALTH CARE EDUCATION/TRAINING PROGRAM

## 2021-01-26 PROCEDURE — 700111 HCHG RX REV CODE 636 W/ 250 OVERRIDE (IP): Performed by: HOSPITALIST

## 2021-01-26 PROCEDURE — 99232 SBSQ HOSP IP/OBS MODERATE 35: CPT | Performed by: NURSE PRACTITIONER

## 2021-01-26 PROCEDURE — 99231 SBSQ HOSP IP/OBS SF/LOW 25: CPT | Performed by: NURSE PRACTITIONER

## 2021-01-26 RX ADMIN — DOCUSATE SODIUM 50 MG AND SENNOSIDES 8.6 MG 2 TABLET: 8.6; 5 TABLET, FILM COATED ORAL at 17:04

## 2021-01-26 RX ADMIN — DOCUSATE SODIUM 50 MG AND SENNOSIDES 8.6 MG 2 TABLET: 8.6; 5 TABLET, FILM COATED ORAL at 05:45

## 2021-01-26 RX ADMIN — RISPERIDONE 2 MG: 2 TABLET ORAL at 17:04

## 2021-01-26 RX ADMIN — Medication 100 MG: at 05:45

## 2021-01-26 RX ADMIN — THERA TABS 1 TABLET: TAB at 05:45

## 2021-01-26 RX ADMIN — AMOXICILLIN AND CLAVULANATE POTASSIUM 1 TABLET: 875; 125 TABLET, FILM COATED ORAL at 17:04

## 2021-01-26 RX ADMIN — ENOXAPARIN SODIUM 40 MG: 40 INJECTION SUBCUTANEOUS at 17:04

## 2021-01-26 RX ADMIN — RISPERIDONE 2 MG: 2 TABLET ORAL at 05:45

## 2021-01-26 RX ADMIN — ASPIRIN 325 MG ORAL TABLET 325 MG: 325 PILL ORAL at 05:45

## 2021-01-26 RX ADMIN — TAMSULOSIN HYDROCHLORIDE 0.8 MG: 0.4 CAPSULE ORAL at 08:32

## 2021-01-26 RX ADMIN — AMOXICILLIN AND CLAVULANATE POTASSIUM 1 TABLET: 875; 125 TABLET, FILM COATED ORAL at 05:45

## 2021-01-26 ASSESSMENT — ENCOUNTER SYMPTOMS
DIARRHEA: 0
FEVER: 0
SHORTNESS OF BREATH: 0
CONSTIPATION: 0
VOMITING: 0
MYALGIAS: 1
NECK PAIN: 0
NAUSEA: 0
ABDOMINAL PAIN: 0
COUGH: 0
PALPITATIONS: 0
BACK PAIN: 0

## 2021-01-26 NOTE — CARE PLAN
Problem: Safety  Goal: Will remain free from falls  Outcome: PROGRESSING AS EXPECTED     Problem: Respiratory:  Goal: Respiratory status will improve  Outcome: PROGRESSING AS EXPECTED   Pt calm, resting.

## 2021-01-26 NOTE — PROGRESS NOTES
" [facility]  PROGRESS NOTE    HPI: 50 y.o.  with a past medical history that includes hypertension, alcohol abuse, , admitted 9/9/2020 for Acute respiratory failure with hypoxia (HCC)   LPS has been consulted for left fifth MTH ulcer.  Patient is a poor historian.  A&O to self only.  Chart reviewed.  Followed by wound team for hospital-acquired pressure injury to left fifth MTH.  Pressure injury first noted on 12/18/2020 that started as a deep tissue injury from PRAFO boot that evolved to an unstageable and now presents as a stage IV.  Patient would not allow anyone to assess his ulcer.  Finally he allowed wound team to see the ulcer on 12/27/2020.  Advanced wound dressings have been applied including hydrocolloid thin, Hydrofiber silver, Mepilex.  Patient initially presented to the hospital 9/9/2020 for altered mental status.  Found to be obtunded for least 2 days.  He was intubated, vasopressor therapy was utilized. extubated 9/11/2020. He had encephalopathy. Patient was severely agitated and confused with hallucinations and impulsivity.  He developed bilateral foot drop.  He was given a PRAFO boot for left foot on 11/19/2020 and a PRAFO boot for right foot on 12/16/2020.  He has had multiple falls. Patient is two-person max assist.  Not ambulating at this time. He has had sitters and remote sitter at bedside. currently pending guardianship.         INTERVAL HISTORY:  1/26/2021: Patient sleeping, wakes up when name is called, but goes back to sleep. Intermittent twitching left foot. PRAFO on L foot, foot slipped out. On augmentin, bactrim discontinued once cx resulted    PERTINENT LPS RESULTS:   COVID-19: not detected this admission 9/2020    Wound cx +MSSA      EXAM:      /66   Pulse 72   Temp 36.4 °C (97.5 °F) (Temporal)   Resp 17   Ht 1.803 m (5' 11\")   Wt 80.3 kg (177 lb)   SpO2 93%   BMI 24.69 kg/m²     Pedal Pulses: palpable pedal pulses   Sensation: KIRTI  Plantarflexion intact " bilaterally  Dorsiflexion severely limited.  Cannot flex to neutral  Inversion plus 4 out of 5 bilaterally  Eversion plus 2 out of 5 for left, 4 out of 5 for right  Foot drop bilaterally, left worse than right    Wound :  Left lateral fifth MTH ulcer, stage IV, HAPI:  40% slough, decreased. Bone palpable but covered with tissue  Erythema: improved with abx  Drainage: minimal serosanguineous  Callus: thin intact  Odor: None    Wound Care: completed by RN                INFECTION MANAGEMENT:    Results from last 7 days   Lab Units 01/21/21  1247   WBC 1501 K/uL 7.9   PLATELET COUNT 1518 K/uL 205     Wound culture results:   Results     Procedure Component Value Units Date/Time    CULTURE WOUND W/ GRAM STAIN [[id number]]  (Abnormal)  (Susceptibility) Collected: 01/22/21 1129    Order Status: Completed Specimen: Wound from Left Foot Updated: 01/24/21 0942     Significant Indicator POS     Source WND     Site LEFT FOOT     Culture Result Rare growth usual skin susan.     Gram Stain Result Few WBCs.  Few epithelial cells.  Moderate Gram positive cocci.       Culture Result Staphylococcus aureus  Moderate growth        -    Narrative:      Collected By:[id number] RHONDA ROSE  [facility]  Collected By:[id number] RHONDA ROSE    Susceptibility     Staphylococcus aureus (1)     Antibiotic Interpretation Microscan Method Status    Azithromycin Resistant >4 mcg/mL AMANDA Final    Clindamycin Sensitive 0.5 mcg/mL AMANDA Final    Cefotaxime Sensitive <=8 mcg/mL AMANDA Final    Cefazolin Sensitive <=8 mcg/mL AMANDA Final    Cefepime Sensitive <=4 mcg/mL AMANDA Final    Ceftaroline Sensitive <=0.5 mcg/mL AMANDA Final    Daptomycin Sensitive 1 mcg/mL AMANDA Final    Erythromycin Resistant >4 mcg/mL AMANDA Final    Ampicillin/sulbactam Sensitive <=8/4 mcg/mL AMANDA Final    Vancomycin Sensitive 2 mcg/mL AMANDA Final    Oxacillin Sensitive 0.5 mcg/mL AMANDA Final    Penicillin Resistant >2 mcg/mL AMANDA Final    Pip/Tazobactam Sensitive <=8 mcg/mL AMANDA Final    Trimeth/Sulfa  Sensitive <=0.5/9.5 mcg/mL AMANDA Final    Tetracycline Sensitive <=4 mcg/mL AMANDA Final                   GRAM STAIN [[id number]] Collected: 01/22/21 1129    Order Status: Completed Specimen: Wound Updated: 01/22/21 1759     Significant Indicator .     Source WND     Site LEFT FOOT     Gram Stain Result Few WBCs.  Few epithelial cells.  Moderate Gram positive cocci.      Narrative:      Collected By:[id number] RHONDA ROSE  [facility]  Collected By:[id number] RHONDA ROSE                     ASSESSMENT/PLAN:     Patient admitted September 2020 for altered mental status.  Required intubation and vasopressor therapy.  Developed bilateral foot drop and subsequently pressure injury to left fifth MTH from PRAFO boot.    Cellulitis improving with Augmentin. Continue wound care, offloading with heel float boots, and abx. coordinate excisional debridement to promote further wound healing when pt is awake and able to keep foot still. If wound worsens, recommend surgical intervention.       Wound care:   -wound care orders placed for nursing   -Left fifth MTH: Aquacel Ag, Mepilex.  Change every 48 hours.    Labs/Imaging:  -COVID-19: not detected 9/2020  -no further labs or imaging at this time    Vascular status:   -Palpable pedal pulses bilaterally    Surgery:   -No plans for surgery at this time.  At risk for left fifth ray amputation.    Patient will benefit from Achilles lengthening procedure bilaterally and plantar fascia tendon releases for bilateral foot drop, left worse than right.    Antibiotics:   -ID: not involved  augmentin for 10 day, continue, bactrim previously d/cd as culture +MSSA   Consult ID if condition worsens    Weight Bearing Status:   -Heel weight bearing LLE    Offloading:   -Heel float boots bilaterally.   Avoid PRAFO boots, no longer appropriate as foot drop is fixed  -Orthotic company: none    PT/OT :   OT PT previously involved.  Last seen 11/2020        DISCHARGE PLAN:    Disposition: Awaiting guardianship.   Hearing 2/5/2021          Discussed with: ronnell, RN,    Please note that this dictation was created using voice recognition software. I have  worked with technical experts from [employer] to optimize the interface.  I have made every reasonable attempt to correct obvious errors, but there may be errors of grammar and possibly content that I did not discover before finalizing the note.    Viri Lynn, A.P.R.N.    If any questions or concerns, please contact LPS through voalte.

## 2021-01-26 NOTE — DISCHARGE PLANNING
Anticipated Discharge Disposition: Guardianship then [facility] placement.    Action: Pt's friend has filed for private guardianship. Hearing scheduled for 2/5.     Barriers to Discharge: Pending guardian assignment.    Plan: Contimue to be available to assist with guardianship process.

## 2021-01-26 NOTE — PROGRESS NOTES
[facility] Medicine Twice Weekly Progress Note    Date of Service  1/26/2021    Chief Complaint  Altered mental status    Hospital Course  Mr. Blake is a 50-year-old male who was brought to the emergency department on 9/9/2020 with altered mental status after he was found by his ex-wife to be obtunded after she had checked on him when he had not been seen for at least 2 days.  At that time he was alert and oriented x0 and found to be hypotensive.  GCS in the ED was 7 and he was severely hypotensive.  He was intubated for airway protection and central venous access was obtained for administration of high-volume crystalloid resuscitation and vasopressor therapy.  He had a significant leukocytosis with a WBC of 22.9 and was hypokalemic with a potassium level of 2.7.  Additionally he had acute kidney injury with a creatinine of 2.73.  His INR was 8.93 and he also had a lactate level of 11.  Urinalysis was performed and was negative for urinary tract infection.  He was diagnosed with septic shock and admitted to the intensive care unit for further evaluation and treatment.  A lumbar puncture was done on 9/9/2020.  He was also noted to have seizure-like activity on 9/10/2020 so an EEG was obtained and was significant for a moderate encephalopathy.  Extubation occurred on 9/11/2020.  He also had a traumatic catheter removal so on 9/21/2020 urology was consulted but was ultimately unnecessary.  On 9/13/2020 he was transferred out of the ICU where he required restraints due to confusion, hallucination, agitation, and impulsivity.  An MRI was performed and was negative for acute pathologies.  He was also treated for a UTI starting 10/2/2020.  He has also fallen multiple times in hospital.  Additionally, on 11/11/2020 he had an acute episode of sinus tachycardia in the 140s accompanied by hypotension.  There was concern for STEMI so cardiology was consulted.  A 12-lead EKG was obtained and was concerning for atrial  flutter.  The patient received metoprolol after which a repeat ECG showed sinus tachycardia.  A D-dimer was obtained and was elevated for which a CT chest was done that was negative for pulmonary embolism.  Cardiology subsequently signed off.  He has remained in the hospital for a prolonged period of time and is now pending guardianship and then will need placement.  Guardianship hearing is tentatively scheduled for 2/5/2021.    Interval Problem Update  1/26- Patient resting in bed, eyes closed. Attempted to waken and patient opened his eyes looked at me then closed his eyes and refused to open again. Awaiting guardianship hearing on 2/5. No acute signs of distress noted.     Consultants/Specialty  Critical care  Psychiatry  Cardiology  Urology    Code Status  Full Code    Disposition  Pending guardianship and then placement.  Guardianship hearing scheduled for 2/5/2021.    Review of Systems  Review of Systems   Constitutional: Negative for fever and malaise/fatigue.   Respiratory: Negative for cough and shortness of breath.    Cardiovascular: Negative for chest pain, palpitations and leg swelling.   Gastrointestinal: Negative for abdominal pain, constipation, diarrhea, nausea and vomiting.   Genitourinary: Negative for dysuria.   Musculoskeletal: Positive for joint pain and myalgias. Negative for back pain and neck pain.      Physical Exam  Temp:  [36.2 °C (97.2 °F)-36.7 °C (98.1 °F)] 36.4 °C (97.5 °F)  Pulse:  [66-83] 72  Resp:  [17-19] 17  BP: (101-126)/(66-74) 101/66  SpO2:  [93 %-98 %] 93 %    Physical Exam  Vitals signs and nursing note reviewed.   Constitutional:       General: He is awake. He is not in acute distress.     Comments: Unkempt appearance  Frail, chronically ill appearing   HENT:      Head: Normocephalic and atraumatic.      Mouth/Throat:      Lips: Pink.      Mouth: Mucous membranes are moist.   Eyes:      Pupils: Pupils are equal, round, and reactive to light.   Neck:      Musculoskeletal:  Normal range of motion and neck supple.   Cardiovascular:      Rate and Rhythm: Normal rate and regular rhythm.      Pulses: Normal pulses.   Pulmonary:      Effort: Pulmonary effort is normal.      Breath sounds: Normal breath sounds.   Abdominal:      General: Bowel sounds are normal. There is no distension or abdominal bruit.      Palpations: Abdomen is soft.      Tenderness: There is no abdominal tenderness.   Musculoskeletal:      Right lower leg: No edema.      Left lower leg: No edema.   Skin:     General: Skin is warm and dry.   Neurological:      Mental Status: He is alert. He is disoriented.   Psychiatric:         Attention and Perception: Attention normal.         Mood and Affect: Affect is flat.         Speech: Speech normal.         Behavior: Behavior is cooperative.         Cognition and Memory: Cognition is impaired. Memory is impaired. He exhibits impaired recent memory and impaired remote memory.       Fluids    Intake/Output Summary (Last 24 hours) at 1/26/2021 1448  Last data filed at 1/25/2021 1645  Gross per 24 hour   Intake --   Output 350 ml   Net -350 ml     Laboratory    Imaging  DX-FOOT-COMPLETE 3+ LEFT   Final Result         1.  No acute traumatic bony injury. No destructive osseous lesion is readily apparent, note that plain film can be insensitive for evaluation of osteomyelitis and three-phase bone scan or MRI would offer improved diagnostic sensitivity as clinically    appropriate   2.  Atherosclerosis      CT-CTA CHEST PULMONARY ARTERY W/ RECONS   Final Result      1.  No evidence of pulmonary emboli.   2.  Right lower lobe discoid atelectasis.   3.  Gynecomastia.            DX-CHEST-PORTABLE (1 VIEW)   Final Result      No acute cardiac or pulmonary abnormalities are identified.      MR-BRAIN-W/O   Final Result      1.  Moderate diffuse cerebral atrophy.      2.  Minimal periventricular and juxtacortical white matter changes consistent with chronic microvascular ischemic gliosis.       3.  No evidence of acute infarction in the brain parenchyma.      DX-ABDOMEN FOR TUBE PLACEMENT   Final Result      Feeding tube tip overlies the second portion of the duodenum.      DX-CHEST-PORTABLE (1 VIEW)   Final Result         1.  Patchy left lung base opacity, new since prior, could represent early infiltrate            DX-CHEST-PORTABLE (1 VIEW)   Final Result         1.  Patchy left lung base opacity, new since prior, could represent early infiltrate         LT-[id number]-9 VIEW   Final Result      Enteric tube projects over the distal stomach/pylorus.         EC-ECHOCARDIOGRAM COMPLETE W/O CONT   Final Result      DX-CHEST-PORTABLE (1 VIEW)   Final Result         1.  No acute cardiopulmonary disease.      CT-ABDOMEN-PELVIS W/O   Final Result      1.  No renal stone or hydronephrosis.   2.  Normal appendix.   3.  No focal mesenteric inflammatory process.      DX-ABDOMEN FOR TUBE PLACEMENT   Final Result      NG tube tip projects over expected location the gastric fundus.      US-ABDOMEN COMPLETE SURVEY   Final Result         1.  Echogenic liver compatible with fatty change versus fibrosis.   2.  Gallbladder sludge without additional sonographic findings of cholecystitis.   3.  Partial atrophic bilateral kidneys with lobulated contour         CT-HEAD W/O   Final Result         1.  No acute intracranial abnormality is identified, there are nonspecific white matter changes, commonly associated with small vessel ischemic disease.  Associated mild cerebral atrophy is noted.   2.  Atherosclerosis.      DX-CHEST-PORTABLE (1 VIEW)   Final Result         1.  No acute cardiopulmonary disease.         Assessment/Plan  * Toxic encephalopathy- (present on admission)  Assessment & Plan  -Unknown etiology - hypoxic induced brain injury vs Wernicke encephalopathy.  -LP, MRI brain, EEG negative. TSH normal. HIV/RPR negative.  -Psych was consulted and believe it is alcohol related.  -Continue risperdal.  -Remains free from  restraints.  -Has fallen multiple times while in hospital. Sitter now at bedside.     Open wound of left foot  Assessment & Plan  Lateral aspect, worsening, red, nontender  HgA1c pending  No leukocytosis  Wound care following, recommended LPS consult  1/21 LPS consulted  1/24: Wound culture growing Staph aureus. Continue Augmentin. Bactrim discontinued.    Normocytic anemia- (present on admission)  Assessment & Plan  -Very mild, no active bleeding.  - stable  -Check periodically.    Alcohol abuse- (present on admission)  Assessment & Plan  -Abstinent since admission.  -Continue daily PO vitamin therapy.     VTE prophylaxis: Lovenox    I have performed a physical exam and reviewed and updated ROS and Plan today (1/26/2021). In review of previous note, there are no changes except as documented above.       TRACE Garcia.

## 2021-01-27 PROCEDURE — A9270 NON-COVERED ITEM OR SERVICE: HCPCS | Performed by: NURSE PRACTITIONER

## 2021-01-27 PROCEDURE — A9270 NON-COVERED ITEM OR SERVICE: HCPCS | Performed by: STUDENT IN AN ORGANIZED HEALTH CARE EDUCATION/TRAINING PROGRAM

## 2021-01-27 PROCEDURE — 700102 HCHG RX REV CODE 250 W/ 637 OVERRIDE(OP): Performed by: NURSE PRACTITIONER

## 2021-01-27 PROCEDURE — A9270 NON-COVERED ITEM OR SERVICE: HCPCS | Performed by: HOSPITALIST

## 2021-01-27 PROCEDURE — 700111 HCHG RX REV CODE 636 W/ 250 OVERRIDE (IP): Performed by: HOSPITALIST

## 2021-01-27 PROCEDURE — 770006 HCHG ROOM/CARE - MED/SURG/GYN SEMI*

## 2021-01-27 PROCEDURE — 700102 HCHG RX REV CODE 250 W/ 637 OVERRIDE(OP): Performed by: HOSPITALIST

## 2021-01-27 PROCEDURE — 700102 HCHG RX REV CODE 250 W/ 637 OVERRIDE(OP): Performed by: STUDENT IN AN ORGANIZED HEALTH CARE EDUCATION/TRAINING PROGRAM

## 2021-01-27 RX ADMIN — ENOXAPARIN SODIUM 40 MG: 40 INJECTION SUBCUTANEOUS at 17:10

## 2021-01-27 RX ADMIN — RISPERIDONE 2 MG: 2 TABLET ORAL at 05:17

## 2021-01-27 RX ADMIN — THERA TABS 1 TABLET: TAB at 05:16

## 2021-01-27 RX ADMIN — DOCUSATE SODIUM 50 MG AND SENNOSIDES 8.6 MG 2 TABLET: 8.6; 5 TABLET, FILM COATED ORAL at 05:17

## 2021-01-27 RX ADMIN — ASPIRIN 325 MG ORAL TABLET 325 MG: 325 PILL ORAL at 05:16

## 2021-01-27 RX ADMIN — RISPERIDONE 2 MG: 2 TABLET ORAL at 17:09

## 2021-01-27 RX ADMIN — DOCUSATE SODIUM 50 MG AND SENNOSIDES 8.6 MG 2 TABLET: 8.6; 5 TABLET, FILM COATED ORAL at 17:10

## 2021-01-27 RX ADMIN — TAMSULOSIN HYDROCHLORIDE 0.8 MG: 0.4 CAPSULE ORAL at 09:04

## 2021-01-27 RX ADMIN — AMOXICILLIN AND CLAVULANATE POTASSIUM 1 TABLET: 875; 125 TABLET, FILM COATED ORAL at 17:09

## 2021-01-27 RX ADMIN — AMOXICILLIN AND CLAVULANATE POTASSIUM 1 TABLET: 875; 125 TABLET, FILM COATED ORAL at 05:16

## 2021-01-27 RX ADMIN — Medication 100 MG: at 05:17

## 2021-01-27 NOTE — CARE PLAN
Problem: Safety  Goal: Will remain free from falls  Outcome: PROGRESSING AS EXPECTED     Problem: Safety  Goal: Will remain free from injury  Outcome: PROGRESSING AS EXPECTED     Problem: Skin Integrity  Goal: Risk for impaired skin integrity will decrease  Outcome: PROGRESSING AS EXPECTED     Pt calm today, wound care on left foot shows improvement. Replaced condom cath, Q2 turns. VSS  Pt resting comfortably, no requests at this time.

## 2021-01-28 PROCEDURE — 770006 HCHG ROOM/CARE - MED/SURG/GYN SEMI*

## 2021-01-28 PROCEDURE — 700102 HCHG RX REV CODE 250 W/ 637 OVERRIDE(OP): Performed by: HOSPITALIST

## 2021-01-28 PROCEDURE — 700102 HCHG RX REV CODE 250 W/ 637 OVERRIDE(OP): Performed by: STUDENT IN AN ORGANIZED HEALTH CARE EDUCATION/TRAINING PROGRAM

## 2021-01-28 PROCEDURE — A9270 NON-COVERED ITEM OR SERVICE: HCPCS | Performed by: NURSE PRACTITIONER

## 2021-01-28 PROCEDURE — 700102 HCHG RX REV CODE 250 W/ 637 OVERRIDE(OP): Performed by: NURSE PRACTITIONER

## 2021-01-28 PROCEDURE — A9270 NON-COVERED ITEM OR SERVICE: HCPCS | Performed by: HOSPITALIST

## 2021-01-28 PROCEDURE — A9270 NON-COVERED ITEM OR SERVICE: HCPCS | Performed by: STUDENT IN AN ORGANIZED HEALTH CARE EDUCATION/TRAINING PROGRAM

## 2021-01-28 PROCEDURE — 97605 NEG PRS WND THER DME<=50SQCM: CPT

## 2021-01-28 PROCEDURE — 700111 HCHG RX REV CODE 636 W/ 250 OVERRIDE (IP): Performed by: HOSPITALIST

## 2021-01-28 RX ADMIN — TAMSULOSIN HYDROCHLORIDE 0.8 MG: 0.4 CAPSULE ORAL at 07:52

## 2021-01-28 RX ADMIN — DOCUSATE SODIUM 50 MG AND SENNOSIDES 8.6 MG 2 TABLET: 8.6; 5 TABLET, FILM COATED ORAL at 17:05

## 2021-01-28 RX ADMIN — ASPIRIN 325 MG ORAL TABLET 325 MG: 325 PILL ORAL at 04:47

## 2021-01-28 RX ADMIN — AMOXICILLIN AND CLAVULANATE POTASSIUM 1 TABLET: 875; 125 TABLET, FILM COATED ORAL at 17:06

## 2021-01-28 RX ADMIN — THERA TABS 1 TABLET: TAB at 04:47

## 2021-01-28 RX ADMIN — RISPERIDONE 2 MG: 2 TABLET ORAL at 17:06

## 2021-01-28 RX ADMIN — Medication 100 MG: at 04:47

## 2021-01-28 RX ADMIN — AMOXICILLIN AND CLAVULANATE POTASSIUM 1 TABLET: 875; 125 TABLET, FILM COATED ORAL at 04:47

## 2021-01-28 RX ADMIN — ENOXAPARIN SODIUM 40 MG: 40 INJECTION SUBCUTANEOUS at 17:05

## 2021-01-28 RX ADMIN — RISPERIDONE 2 MG: 2 TABLET ORAL at 04:47

## 2021-01-28 RX ADMIN — DOCUSATE SODIUM 50 MG AND SENNOSIDES 8.6 MG 2 TABLET: 8.6; 5 TABLET, FILM COATED ORAL at 04:47

## 2021-01-28 NOTE — DISCHARGE PLANNING
Anticipated Discharge Disposition: Guardianship pending followed by GH placement.     Action: Private guardianship hearing scheduled for 2/5. Once that established, can coordinate GH placement efforts with guardian.    Barriers to Discharge: Guardianship pending.    Plan: Remain available for assist as needed for private guardianship.

## 2021-01-28 NOTE — DISCHARGE PLANNING
Received copies of documents: Petition for Court to Assume Jurisdiction of Trust of Which Proposed Protected Person is a Beneficiary; Petition to Remove Successor Trustee Yury Blake; Petition to Confirm Sole Successor Trustee Ira Kearney: and Petition for Instuctions. Copy placed on chart for pt.

## 2021-01-29 PROCEDURE — 700111 HCHG RX REV CODE 636 W/ 250 OVERRIDE (IP): Performed by: HOSPITALIST

## 2021-01-29 PROCEDURE — 99232 SBSQ HOSP IP/OBS MODERATE 35: CPT | Performed by: NURSE PRACTITIONER

## 2021-01-29 PROCEDURE — 700102 HCHG RX REV CODE 250 W/ 637 OVERRIDE(OP): Performed by: STUDENT IN AN ORGANIZED HEALTH CARE EDUCATION/TRAINING PROGRAM

## 2021-01-29 PROCEDURE — 700102 HCHG RX REV CODE 250 W/ 637 OVERRIDE(OP): Performed by: NURSE PRACTITIONER

## 2021-01-29 PROCEDURE — 700102 HCHG RX REV CODE 250 W/ 637 OVERRIDE(OP): Performed by: HOSPITALIST

## 2021-01-29 PROCEDURE — A9270 NON-COVERED ITEM OR SERVICE: HCPCS | Performed by: STUDENT IN AN ORGANIZED HEALTH CARE EDUCATION/TRAINING PROGRAM

## 2021-01-29 PROCEDURE — A9270 NON-COVERED ITEM OR SERVICE: HCPCS | Performed by: HOSPITALIST

## 2021-01-29 PROCEDURE — 770006 HCHG ROOM/CARE - MED/SURG/GYN SEMI*

## 2021-01-29 PROCEDURE — A9270 NON-COVERED ITEM OR SERVICE: HCPCS | Performed by: NURSE PRACTITIONER

## 2021-01-29 PROCEDURE — U0003 INFECTIOUS AGENT DETECTION BY NUCLEIC ACID (DNA OR RNA); SEVERE ACUTE RESPIRATORY SYNDROME CORONAVIRUS 2 (SARS-COV-2) (CORONAVIRUS DISEASE [COVID-19]), AMPLIFIED PROBE TECHNIQUE, MAKING USE OF HIGH THROUGHPUT TECHNOLOGIES AS DESCRIBED BY CMS-2020-01-R: HCPCS

## 2021-01-29 PROCEDURE — U0005 INFEC AGEN DETEC AMPLI PROBE: HCPCS

## 2021-01-29 RX ADMIN — DOCUSATE SODIUM 50 MG AND SENNOSIDES 8.6 MG 2 TABLET: 8.6; 5 TABLET, FILM COATED ORAL at 04:12

## 2021-01-29 RX ADMIN — AMOXICILLIN AND CLAVULANATE POTASSIUM 1 TABLET: 875; 125 TABLET, FILM COATED ORAL at 16:43

## 2021-01-29 RX ADMIN — THERA TABS 1 TABLET: TAB at 04:12

## 2021-01-29 RX ADMIN — RISPERIDONE 2 MG: 2 TABLET ORAL at 16:43

## 2021-01-29 RX ADMIN — DOCUSATE SODIUM 50 MG AND SENNOSIDES 8.6 MG 2 TABLET: 8.6; 5 TABLET, FILM COATED ORAL at 16:43

## 2021-01-29 RX ADMIN — ENOXAPARIN SODIUM 40 MG: 40 INJECTION SUBCUTANEOUS at 16:43

## 2021-01-29 RX ADMIN — Medication 100 MG: at 04:12

## 2021-01-29 RX ADMIN — AMOXICILLIN AND CLAVULANATE POTASSIUM 1 TABLET: 875; 125 TABLET, FILM COATED ORAL at 04:12

## 2021-01-29 RX ADMIN — RISPERIDONE 2 MG: 2 TABLET ORAL at 04:12

## 2021-01-29 RX ADMIN — TAMSULOSIN HYDROCHLORIDE 0.8 MG: 0.4 CAPSULE ORAL at 09:09

## 2021-01-29 RX ADMIN — ASPIRIN 325 MG ORAL TABLET 325 MG: 325 PILL ORAL at 04:12

## 2021-01-29 NOTE — PROGRESS NOTES
" [facility]  PROGRESS NOTE    HPI: 50 y.o.  with a past medical history that includes hypertension, alcohol abuse, , admitted 9/9/2020 for Acute respiratory failure with hypoxia (HCC)   LPS has been consulted for left fifth MTH ulcer.      Patient is a poor historian.  A&O to self only.  Chart reviewed.  Followed by wound team for hospital-acquired pressure injury to left fifth MTH.  Pressure injury first noted on 12/18/2020 that started as a deep tissue injury from PRAFO boot that evolved to an unstageable and now presents as a stage IV.  Patient would not allow anyone to assess his ulcer.  Finally he allowed wound team to see the ulcer on 12/27/2020.  Advanced wound dressings have been applied including hydrocolloid thin, Hydrofiber silver, Mepilex.  Patient initially presented to the hospital 9/9/2020 for altered mental status.  Found to be obtunded for least 2 days.  He was intubated, vasopressor therapy was utilized. extubated 9/11/2020. He had encephalopathy. Patient was severely agitated and confused with hallucinations and impulsivity.  He developed bilateral foot drop.  He was given a PRAFO boot for left foot on 11/19/2020 and a PRAFO boot for right foot on 12/16/2020.  He has had multiple falls. Patient is two-person max assist.  Not ambulating at this time. He has had sitters and remote sitter at bedside. currently pending guardianship.         INTERVAL HISTORY:  1/26/2021: Patient sleeping, wakes up when name is called, but goes back to sleep. Intermittent twitching left foot. PRAFO on L foot, foot slipped out. On augmentin, bactrim discontinued once cx resulted  1/29/2021: Patient dozing.  Alert during exam.  Wound care debrided ulcer yesterday.  Continues to take Augmentin until 1/31/2021.      PERTINENT LPS RESULTS:   COVID-19: not detected this admission 9/2020    Wound cx +MSSA      EXAM:      /73   Pulse 82   Temp 36.1 °C (96.9 °F) (Temporal)   Resp 16   Ht 1.803 m (5' 11\") "   Wt 80.3 kg (177 lb)   SpO2 97%   BMI 24.69 kg/m²     Pedal Pulses: palpable pedal pulses   Sensation: KIRTI  Plantarflexion intact bilaterally  Dorsiflexion severely limited.  Cannot flex to neutral  Inversion plus 4 out of 5 bilaterally  Eversion plus 2 out of 5 for left, 4 out of 5 for right  Foot drop bilaterally, left worse than right      Wound :  Left lateral fifth MTH ulcer, stage IV, HAPI:  50% slough, loose from honey alginate. Bone palpable but covered with tissue  Erythema:  Continues to improve with antibiotics  Slight tenderness with palpation  Drainage: minimal serosanguineous  Callus: thin intact  Odor: None    Wound Care: To be completed by RN, [place] alginate, Mepilex      From 1/28/2021              INFECTION MANAGEMENT:        Wound culture results:   Results     Procedure Component Value Units Date/Time    CULTURE WOUND W/ GRAM STAIN [[id number]]  (Abnormal)  (Susceptibility) Collected: 01/22/21 1129    Order Status: Completed Specimen: Wound from Left Foot Updated: 01/24/21 0942     Significant Indicator POS     Source WND     Site LEFT FOOT     Culture Result Rare growth usual skin susan.     Gram Stain Result Few WBCs.  Few epithelial cells.  Moderate Gram positive cocci.       Culture Result Staphylococcus aureus  Moderate growth        -    Narrative:      Collected By:[id number] RHONDA ROSE  5th mth  Collected By:[id number] RHONDA ROSE    Susceptibility     Staphylococcus aureus (1)     Antibiotic Interpretation Microscan Method Status    Azithromycin Resistant >4 mcg/mL AMANDA Final    Clindamycin Sensitive 0.5 mcg/mL AMANDA Final    Cefotaxime Sensitive <=8 mcg/mL AMANDA Final    Cefazolin Sensitive <=8 mcg/mL AMANDA Final    Cefepime Sensitive <=4 mcg/mL AMANDA Final    Ceftaroline Sensitive <=0.5 mcg/mL AMANDA Final    Daptomycin Sensitive 1 mcg/mL AMANDA Final    Erythromycin Resistant >4 mcg/mL AMANDA Final    Ampicillin/sulbactam Sensitive <=8/4 mcg/mL AMANDA Final    Vancomycin Sensitive 2 mcg/mL AMANDA Final     Oxacillin Sensitive 0.5 mcg/mL AMANDA Final    Penicillin Resistant >2 mcg/mL AMANDA Final    Pip/Tazobactam Sensitive <=8 mcg/mL AMANDA Final    Trimeth/Sulfa Sensitive <=0.5/9.5 mcg/mL AMANDA Final    Tetracycline Sensitive <=4 mcg/mL AMANDA Final                   GRAM STAIN [[id number]] Collected: 01/22/21 1129    Order Status: Completed Specimen: Wound Updated: 01/22/21 1962     Significant Indicator .     Source WND     Site LEFT FOOT     Gram Stain Result Few WBCs.  Few epithelial cells.  Moderate Gram positive cocci.      Narrative:      Collected By:[id number] RHONDA ROSE  5th mth  Collected By:[id number] RHONDA ROSE                     ASSESSMENT/PLAN:     Patient admitted September 2020 for altered mental status.  Required intubation and vasopressor therapy.  Developed bilateral foot drop and subsequently pressure injury to left fifth MTH from PRAFO boot.    Cellulitis continues to improve with Augmentin.  There is slough to the wound.  Honey alginate in place to autolytically debride slough.  Patient unsuccessful with offloading ulcer due to foot drop.  Additionally patient unable to bear weight due to severe foot drop.  Requires continuous adjustment of heel float boot.  Consult foot and ankle surgeon.  Discussed with Dr. Roque.  Plan for bilateral foot drop reconstruction and I&D of left fifth MTH ulcer with possible bone biopsy.  Discussed with hospitalist APRN.  Patient alert and oriented to self.  Most likely require dual surgeon consent.  Continue wound care, offloading with heel float boots, and abx.       Wound care:   -wound care orders placed for nursing by wound team.  Continue  -Left fifth MTH: Honey alginate, Mepilex.  Change every 48 hours.    Labs/Imaging:  -COVID-19: not detected 9/2020   repeat Covid ordered for surgery 2/1/2021    Vascular status:   -Palpable pedal pulses bilaterally    Surgery:   -N.p.o. Sunday at midnight  Surgery Monday, 2/1/2021 with Dr. Roque for bilateral foot plantar fascia tendon  release, PT tendon release, Achilles lengthening for bilateral foot drop and left foot I&D with possible bone biopsy.       Antibiotics:   -ID: not involved.  Consult ID postop.  augmentin for 10 day, continue, bactrim previously d/cd as culture +MSSA       Weight Bearing Status:   -Heel weight bearing LLE    Offloading:   -Heel float boots bilaterally.   Avoid PRAFO boots, no longer appropriate as foot drop is fixed  -Orthotic company: none    PT/OT :   OT PT previously involved.  Last seen 11/2020  Reconsult PT OT postop      DISCHARGE PLAN:    Disposition: Awaiting guardianship.  Hearing 2/5/2021          Discussed with: pt, RN, Dr. Roque, Taniya Dutton, APRN    Please note that this dictation was created using voice recognition software. I have  worked with technical experts from [facility] to optimize the interface.  I have made every reasonable attempt to correct obvious errors, but there may be errors of grammar and possibly content that I did not discover before finalizing the note.    Viri Lynn, A.P.R.N.    If any questions or concerns, please contact [facility] through voalte.

## 2021-01-29 NOTE — WOUND TEAM
[facility]  Inpatient Services  Wound and Skin Care Progress Note    Admission Date: 9/9/2020     Last order of IP CONSULT TO WOUND CARE was found on 12/19/2020 from Hospital Encounter on 9/9/2020       HPI, PMH, SH: Reviewed    Unit where seen by Wound Team: [id number]     WOUND CONSULT/FOLLOW UP RELATED TO:  Left Lateral 5th MTH    Self Report / Pain Level:  0/10    OBJECTIVE:  Pt lying in bed with mepilex dressing in place, mepilex placed at the dorsal and heel of foot as well. Foot drop boot in place. Preventable measures in place waffle overlay over pressure redistribution mattress.    WOUND TYPE, LOCATION, CHARACTERISTICS (Pressure Injuries: location, stage, POA or date identified)          Wound 12/18/20 Pressure Injury Lateral Left Lateral Left Foot Wound (Active)   Wound Image      Site Assessment Yellow;Tan;Pale;Pink    Periwound Assessment Clean;Pink;Callused    Margins Attached edges;Undefined edges    Closure Secondary intention    Drainage Amount Scant    Drainage Description Serosanguineous    Treatments Cleansed;Site care;Offloading;Tape change    Wound Cleansing Approved Wound Cleanser    Periwound Protectant Skin Protectant Wipes to Periwound    Dressing Cleansing/Solutions Not Applicable    Dressing Options Honey Alginate;Mepilex    Dressing Changed Changed    Dressing Status Clean;Dry;Intact    Dressing Change/Treatment Frequency Every 48 hrs, and As Needed    NEXT Dressing Change/Treatment Date 01/30/21    NEXT Weekly Photo (Inpatient Only) 02/04/21    Pressure Injury Stage U    Wound Length (cm) 1.5 cm    Wound Width (cm) 1.5 cm    Wound Depth (cm) 0.1 cm    Wound Surface Area (cm^2) 2.25 cm^2    Wound Volume (cm^3) 0.22 cm^3    Post-Procedure Length (cm) 1.2 cm    Post-Procedure Width (cm) 1.2 cm    Post-Procedure Depth (cm) 0.4 cm    Post-Procedure Surface Area (cm^2) 1.44 cm^2    Post-Procedure Volume (cm^3) 0.58 cm^3    Wound Healing % 51    Shape circular    Wound Odor None       Exposed Structures KIRTI    WOUND NURSE ONLY - Time Spent with Patient (mins) 45                    Vascular: will need MD follow up. Unit mgr and wound care mgr notified.    ANA:   No results found.      Lab Values:    Lab Results   Component Value Date/Time    WBC 7.9 01/21/2021 12:47 PM    RBC 4.64 (L) 01/21/2021 12:47 PM    HEMOGLOBIN 13.5 (L) 01/21/2021 12:47 PM    HEMATOCRIT 41.9 (L) 01/21/2021 12:47 PM    CREACTPROT 3.58 (H) 01/21/2021 12:47 PM    SEDRATEWES 16 (H) 01/21/2021 12:47 PM    HBA1C 5.2 01/21/2021 12:47 PM          Culture:   Not indicated at this time.  Culture Results show:  Recent Results (from the past 720 hour(s))   CULTURE WOUND W/ GRAM STAIN    Collection Time: 01/22/21 11:29 AM    Specimen: Left Foot; Wound   Result Value Ref Range    Significant Indicator POS (POS)     Source WND     Site LEFT FOOT     Culture Result Rare growth usual skin susan. (A)     Gram Stain Result       Few WBCs.  Few epithelial cells.  Moderate Gram positive cocci.      Culture Result Staphylococcus aureus  Moderate growth   (A)     Culture Result - (A)        Susceptibility    Staphylococcus aureus - AMANDA     Azithromycin >4 Resistant mcg/mL     Clindamycin 0.5 Sensitive mcg/mL     Cefotaxime <=8 Sensitive mcg/mL     Cefazolin <=8 Sensitive mcg/mL     Cefepime <=4 Sensitive mcg/mL     Ceftaroline <=0.5 Sensitive mcg/mL     Daptomycin 1 Sensitive mcg/mL     Erythromycin >4 Resistant mcg/mL     Ampicillin/sulbactam <=8/4 Sensitive mcg/mL     Vancomycin 2 Sensitive mcg/mL     Oxacillin 0.5 Sensitive mcg/mL     Penicillin >2 Resistant mcg/mL     Pip/Tazobactam <=8 Sensitive mcg/mL     Trimeth/Sulfa <=0.5/9.5 Sensitive mcg/mL     Tetracycline <=4 Sensitive mcg/mL         INTERVENTIONS BY WOUND TEAM: patient and chart reviewed. Removed current dressing. Cleansed wound with NS and gauze. Pre debridement pictures/measurements taken (did not upload into chart). slough and non viable tissue debrided away using raven,  scalpel, and scissors.  <20 cm2 debrided. No bleeding noted, controlled with manual pressure. No pain at all. Cleansed again. New pictures/measurements taken. Prepped periwound with no sting skin prep. Cut a piece of honey alginate to fit and applied to wound bed, secured in place with mepilex. Orders updated.         Interdisciplinary consultation: Patient, Bedside RN    EVALUATION:   1/28: some yellow slough present. Honey alginate applied to cleanse and autolytically debride due to its high osmolarity. As well as help lower overall wound pH, while promoting a moisture-balanced environment conducive to wound healing.    1/21/21 Pt will need follow up with MD or EDWIN.  01/11/2021:  Maceration due to drainage, odor from wound bed.  Switched to Aquacel Ag hydrofiber for drainage and odor.  01/04/2021:  Eschar cap has released and came off full slough base, unable to assess base.  Small SS drainage with some mild odor.  Hydrocolloid thin to wound bed to help with autolytic debridement and maintain a moist wound environment.  Mepilex to help offload.   12/27/2020:  Wound team saw patient on 11/25 for sacrum.  Patient developed a discoloration on 12/05 to the lateral foot, wound team was not consulted.  12/18 a new photo was updated and wound team consulted on 12/19.  Patient left foot left open to air.  Patient was seen by wound care RN on 12/22 and patient was very confused and refused wound care.  Wound team tried to see patient on 12/24, but was again refused.  12/27, patient much more calmer with bedside sitter.  Patient allowed this wound care RN and bedside RN to assess left lateral foot and apply mepilex to the area.  The discoloration is likely due to PRAFO boot used for patient's drop foot.  Patient very confused and had refused bedside staff from touching patient and also had multiple falls due to confusion. Patient as of 12/26 has bedside sitter in room.     Goals: Steady decrease in wound area and depth  weekly.    NURSING PLAN OF CARE ORDERS (X):    Dressing changes: See Dressing Care orders: x  Skin care: See Skin Care orders:   Rectal tube care: See Rectal Tube Care orders:   Other orders:    WOUND TEAM PLAN OF CARE   Dressing changes by wound team:          Follow up 1-2 times weekly:    X, weekly follow-up for left lateral foot           Follow up 3 times weekly:                NPWT change 3 times weekly:     Follow up as needed:       Other (explain):     Anticipated discharge plans:  Patient unable to take care of self at this time and is very confused.  Pending medical clearance.  Wound on left lateral needs to be offloaded.   LTACH:        SNF/Rehab:                  Home Care:           Outpatient Wound Center:            Self Care:

## 2021-01-29 NOTE — CARE PLAN
Problem: Safety  Goal: Will remain free from injury  Outcome: PROGRESSING AS EXPECTED  Note: Educated pt to use call button to call for help before getting up. Bed rails up x2. Provided hospital non-slip socks. Bed locked and at the lowest position. Call light and personal belongings within reach. Telesitter at bedside.     Problem: Venous Thromboembolism (VTW)/Deep Vein Thrombosis (DVT) Prevention:  Goal: Patient will participate in Venous Thrombosis (VTE)/Deep Vein Thrombosis (DVT)Prevention Measures  Outcome: PROGRESSING AS EXPECTED  Note: SCD on and pt receiving scheduled VTE prophylaxis medication. Encouraged pt to mobilize out of bed.

## 2021-01-30 LAB
SARS-COV-2 RNA RESP QL NAA+PROBE: NOTDETECTED
SPECIMEN SOURCE: NORMAL

## 2021-01-30 PROCEDURE — 700102 HCHG RX REV CODE 250 W/ 637 OVERRIDE(OP): Performed by: NURSE PRACTITIONER

## 2021-01-30 PROCEDURE — 700102 HCHG RX REV CODE 250 W/ 637 OVERRIDE(OP): Performed by: STUDENT IN AN ORGANIZED HEALTH CARE EDUCATION/TRAINING PROGRAM

## 2021-01-30 PROCEDURE — A9270 NON-COVERED ITEM OR SERVICE: HCPCS | Performed by: HOSPITALIST

## 2021-01-30 PROCEDURE — 770006 HCHG ROOM/CARE - MED/SURG/GYN SEMI*

## 2021-01-30 PROCEDURE — 700111 HCHG RX REV CODE 636 W/ 250 OVERRIDE (IP): Performed by: HOSPITALIST

## 2021-01-30 PROCEDURE — A9270 NON-COVERED ITEM OR SERVICE: HCPCS | Performed by: NURSE PRACTITIONER

## 2021-01-30 PROCEDURE — 700102 HCHG RX REV CODE 250 W/ 637 OVERRIDE(OP): Performed by: HOSPITALIST

## 2021-01-30 PROCEDURE — 99231 SBSQ HOSP IP/OBS SF/LOW 25: CPT | Performed by: NURSE PRACTITIONER

## 2021-01-30 PROCEDURE — A9270 NON-COVERED ITEM OR SERVICE: HCPCS | Performed by: STUDENT IN AN ORGANIZED HEALTH CARE EDUCATION/TRAINING PROGRAM

## 2021-01-30 RX ADMIN — DOCUSATE SODIUM 50 MG AND SENNOSIDES 8.6 MG 2 TABLET: 8.6; 5 TABLET, FILM COATED ORAL at 17:55

## 2021-01-30 RX ADMIN — DOCUSATE SODIUM 50 MG AND SENNOSIDES 8.6 MG 2 TABLET: 8.6; 5 TABLET, FILM COATED ORAL at 04:17

## 2021-01-30 RX ADMIN — THERA TABS 1 TABLET: TAB at 04:17

## 2021-01-30 RX ADMIN — AMOXICILLIN AND CLAVULANATE POTASSIUM 1 TABLET: 875; 125 TABLET, FILM COATED ORAL at 17:55

## 2021-01-30 RX ADMIN — ENOXAPARIN SODIUM 40 MG: 40 INJECTION SUBCUTANEOUS at 17:55

## 2021-01-30 RX ADMIN — AMOXICILLIN AND CLAVULANATE POTASSIUM 1 TABLET: 875; 125 TABLET, FILM COATED ORAL at 04:17

## 2021-01-30 RX ADMIN — ASPIRIN 325 MG ORAL TABLET 325 MG: 325 PILL ORAL at 04:17

## 2021-01-30 RX ADMIN — RISPERIDONE 2 MG: 2 TABLET ORAL at 04:17

## 2021-01-30 RX ADMIN — TAMSULOSIN HYDROCHLORIDE 0.8 MG: 0.4 CAPSULE ORAL at 09:50

## 2021-01-30 RX ADMIN — RISPERIDONE 2 MG: 2 TABLET ORAL at 17:55

## 2021-01-30 RX ADMIN — Medication 100 MG: at 04:17

## 2021-01-30 ASSESSMENT — ENCOUNTER SYMPTOMS
VOMITING: 0
PALPITATIONS: 0
NECK PAIN: 0
CONSTIPATION: 0
DIARRHEA: 0
COUGH: 0
DIZZINESS: 0
BACK PAIN: 0
NAUSEA: 0
ABDOMINAL PAIN: 0
HEADACHES: 0
FEVER: 0
MEMORY LOSS: 1
MYALGIAS: 1
SHORTNESS OF BREATH: 0

## 2021-01-30 NOTE — CARE PLAN
Problem: Safety  Goal: Will remain free from injury  Outcome: PROGRESSING AS EXPECTED  Note: Educated pt to use call button to call for help before getting up. Bed rails up x2. Provided hospital non-slip socks. Bed locked and at the lowest position. Call light and personal belongings within reach. Telesitter at bedside.     Problem: Skin Integrity  Goal: Risk for impaired skin integrity will decrease  Outcome: PROGRESSING AS EXPECTED  Note: Pt on waffle cushion and repositioned using pillows. Barrier cream in use.

## 2021-01-30 NOTE — PROGRESS NOTES
[facility] Medicine Twice Weekly Progress Note    Date of Service  1/30/2021    Chief Complaint  Altered mental status    Hospital Course  Mr. Blake is a 50-year-old male who was brought to the emergency department on 9/9/2020 with altered mental status after he was found by his ex-wife to be obtunded after she had checked on him when he had not been seen for at least 2 days.  At that time he was alert and oriented x0 and found to be hypotensive.  GCS in the ED was 7 and he was severely hypotensive.  He was intubated for airway protection and central venous access was obtained for administration of high-volume crystalloid resuscitation and vasopressor therapy.  He had a significant leukocytosis with a WBC of 22.9 and was hypokalemic with a potassium level of 2.7.  Additionally he had acute kidney injury with a creatinine of 2.73.  His INR was 8.93 and he also had a lactate level of 11.  Urinalysis was performed and was negative for urinary tract infection.  He was diagnosed with septic shock and admitted to the intensive care unit for further evaluation and treatment.  A lumbar puncture was done on 9/9/2020.  He was also noted to have seizure-like activity on 9/10/2020 so an EEG was obtained and was significant for a moderate encephalopathy.  Extubation occurred on 9/11/2020.  He also had a traumatic catheter removal so on 9/21/2020 urology was consulted but was ultimately unnecessary.  On 9/13/2020 he was transferred out of the ICU where he required restraints due to confusion, hallucination, agitation, and impulsivity.  An MRI was performed and was negative for acute pathologies.  He was also treated for a UTI starting 10/2/2020.  He has also fallen multiple times in hospital.  Additionally, on 11/11/2020 he had an acute episode of sinus tachycardia in the 140s accompanied by hypotension.  There was concern for STEMI so cardiology was consulted.  A 12-lead EKG was obtained and was concerning for atrial  flutter.  The patient received metoprolol after which a repeat ECG showed sinus tachycardia.  A D-dimer was obtained and was elevated for which a CT chest was done that was negative for pulmonary embolism.  Cardiology subsequently signed off.  He has remained in the hospital for a prolonged period of time and is now pending guardianship and then will need placement.  Guardianship hearing is tentatively scheduled for 2/5/2021.    Interval Problem Update  1/26- Patient resting in bed, eyes closed. Attempted to waken and patient opened his eyes looked at me then closed his eyes and refused to open again. Awaiting guardianship hearing on 2/5. No acute signs of distress noted.     1/30- Patient much more cooperative today. States no needs other than his urine to drain as he holds catheter tubing. Patient being followed by LPS/ Wound care and is scheduled to undergo surgical procedure on Monday 2/1. 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     Consultants/Specialty  Critical care  Psychiatry  Cardiology  Urology    Code Status  Full Code    Disposition  Pending guardianship and then placement.  Guardianship hearing scheduled for 2/5/2021.    Review of Systems  Review of Systems   Constitutional: Negative for fever and malaise/fatigue.   Respiratory: Negative for cough and shortness of breath.    Cardiovascular: Negative for chest pain, palpitations and leg swelling.   Gastrointestinal: Negative for abdominal pain, constipation, diarrhea, nausea and vomiting.   Genitourinary: Negative for dysuria.   Musculoskeletal: Positive for joint pain and myalgias. Negative for back pain and neck pain.   Neurological: Negative for dizziness and headaches.   Psychiatric/Behavioral: Positive for memory loss.      Physical Exam  Temp:  [36.2 °C (97.2 °F)-36.6 °C (97.8 °F)] 36.3 °C (97.4 °F)  Pulse:  [72-77] 72  Resp:  [16] 16  BP: (100-112)/(64-78)  112/78  SpO2:  [96 %-97 %] 96 %    Physical Exam  Vitals signs and nursing note reviewed.   Constitutional:       General: He is awake. He is not in acute distress.     Comments: Unkempt appearance  Frail, chronically ill appearing   HENT:      Head: Normocephalic and atraumatic.      Mouth/Throat:      Lips: Pink.      Mouth: Mucous membranes are moist.   Eyes:      Pupils: Pupils are equal, round, and reactive to light.   Neck:      Musculoskeletal: Normal range of motion and neck supple.   Cardiovascular:      Rate and Rhythm: Normal rate and regular rhythm.      Pulses: Normal pulses.   Pulmonary:      Effort: Pulmonary effort is normal.      Breath sounds: Normal breath sounds.   Abdominal:      General: Bowel sounds are normal. There is no distension or abdominal bruit.      Palpations: Abdomen is soft.      Tenderness: There is no abdominal tenderness.   Musculoskeletal:      Right lower leg: No edema.      Left lower leg: No edema.   Skin:     General: Skin is warm and dry.   Neurological:      Mental Status: He is alert. He is disoriented.   Psychiatric:         Attention and Perception: Attention normal.         Mood and Affect: Affect is flat.         Speech: Speech normal.         Behavior: Behavior is cooperative.         Cognition and Memory: Cognition is impaired. Memory is impaired. He exhibits impaired recent memory and impaired remote memory.       Fluids  No intake or output data in the 24 hours ending 01/30/21 1226  Laboratory    Imaging  DX-FOOT-COMPLETE 3+ LEFT   Final Result         1.  No acute traumatic bony injury. No destructive osseous lesion is readily apparent, note that plain film can be insensitive for evaluation of osteomyelitis and three-phase bone scan or MRI would offer improved diagnostic sensitivity as clinically    appropriate   2.  Atherosclerosis      CT-CTA CHEST PULMONARY ARTERY W/ RECONS   Final Result      1.  No evidence of pulmonary emboli.   2.  Right lower lobe  discoid atelectasis.   3.  Gynecomastia.            DX-CHEST-PORTABLE (1 VIEW)   Final Result      No acute cardiac or pulmonary abnormalities are identified.      MR-BRAIN-W/O   Final Result      1.  Moderate diffuse cerebral atrophy.      2.  Minimal periventricular and juxtacortical white matter changes consistent with chronic microvascular ischemic gliosis.      3.  No evidence of acute infarction in the brain parenchyma.      DX-ABDOMEN FOR TUBE PLACEMENT   Final Result      Feeding tube tip overlies the second portion of the duodenum.      DX-CHEST-PORTABLE (1 VIEW)   Final Result         1.  Patchy left lung base opacity, new since prior, could represent early infiltrate            DX-CHEST-PORTABLE (1 VIEW)   Final Result         1.  Patchy left lung base opacity, new since prior, could represent early infiltrate         [id number] VIEW   Final Result      Enteric tube projects over the distal stomach/pylorus.         EC-ECHOCARDIOGRAM COMPLETE W/O CONT   Final Result      DX-CHEST-PORTABLE (1 VIEW)   Final Result         1.  No acute cardiopulmonary disease.      CT-ABDOMEN-PELVIS W/O   Final Result      1.  No renal stone or hydronephrosis.   2.  Normal appendix.   3.  No focal mesenteric inflammatory process.      DX-ABDOMEN FOR TUBE PLACEMENT   Final Result      NG tube tip projects over expected location the gastric fundus.      US-ABDOMEN COMPLETE SURVEY   Final Result         1.  Echogenic liver compatible with fatty change versus fibrosis.   2.  Gallbladder sludge without additional sonographic findings of cholecystitis.   3.  Partial atrophic bilateral kidneys with lobulated contour         CT-HEAD W/O   Final Result         1.  No acute intracranial abnormality is identified, there are nonspecific white matter changes, commonly associated with small vessel ischemic disease.  Associated mild cerebral atrophy is noted.   2.  Atherosclerosis.      DX-CHEST-PORTABLE (1 VIEW)   Final Result         1.   No acute cardiopulmonary disease.         Assessment/Plan  * Toxic encephalopathy- (present on admission)  Assessment & Plan  -Unknown etiology - hypoxic induced brain injury vs Wernicke encephalopathy.  -LP, MRI brain, EEG negative. TSH normal. HIV/RPR negative.  -Psych was consulted and believe it is alcohol related.  -Continue risperdal.  -Remains free from restraints.  -Has fallen multiple times while in hospital. Sitter now at bedside.     Open wound of left foot  Assessment & Plan  Lateral aspect, worsening, red, nontender  HgA1c pending  No leukocytosis  Wound care following, recommended LPS consult  1/21 LPS consulted  1/24: Wound culture growing Staph aureus. Continue Augmentin. Bactrim discontinued.  1/30- Patient to go to surgery scheduled for 2/1 - transition back to acute care service     Normocytic anemia- (present on admission)  Assessment & Plan  -Very mild, no active bleeding.  - stable  -Check periodically.    Alcohol abuse- (present on admission)  Assessment & Plan  -Abstinent since admission.  -Continue daily PO vitamin therapy.     VTE prophylaxis: Lovenox    I have performed a physical exam and reviewed and updated ROS and Plan today (1/30/2021). In review of previous note, there are no changes except as documented above.       TRACE Garcia.

## 2021-01-31 PROCEDURE — 700102 HCHG RX REV CODE 250 W/ 637 OVERRIDE(OP): Performed by: NURSE PRACTITIONER

## 2021-01-31 PROCEDURE — A9270 NON-COVERED ITEM OR SERVICE: HCPCS | Performed by: HOSPITALIST

## 2021-01-31 PROCEDURE — 700102 HCHG RX REV CODE 250 W/ 637 OVERRIDE(OP): Performed by: STUDENT IN AN ORGANIZED HEALTH CARE EDUCATION/TRAINING PROGRAM

## 2021-01-31 PROCEDURE — 700102 HCHG RX REV CODE 250 W/ 637 OVERRIDE(OP): Performed by: HOSPITALIST

## 2021-01-31 PROCEDURE — A9270 NON-COVERED ITEM OR SERVICE: HCPCS | Performed by: NURSE PRACTITIONER

## 2021-01-31 PROCEDURE — A9270 NON-COVERED ITEM OR SERVICE: HCPCS | Performed by: STUDENT IN AN ORGANIZED HEALTH CARE EDUCATION/TRAINING PROGRAM

## 2021-01-31 PROCEDURE — 700111 HCHG RX REV CODE 636 W/ 250 OVERRIDE (IP): Performed by: HOSPITALIST

## 2021-01-31 PROCEDURE — 770006 HCHG ROOM/CARE - MED/SURG/GYN SEMI*

## 2021-01-31 RX ADMIN — AMOXICILLIN AND CLAVULANATE POTASSIUM 1 TABLET: 875; 125 TABLET, FILM COATED ORAL at 16:31

## 2021-01-31 RX ADMIN — RISPERIDONE 2 MG: 2 TABLET ORAL at 04:47

## 2021-01-31 RX ADMIN — TAMSULOSIN HYDROCHLORIDE 0.8 MG: 0.4 CAPSULE ORAL at 09:55

## 2021-01-31 RX ADMIN — Medication 100 MG: at 04:47

## 2021-01-31 RX ADMIN — THERA TABS 1 TABLET: TAB at 04:47

## 2021-01-31 RX ADMIN — DOCUSATE SODIUM 50 MG AND SENNOSIDES 8.6 MG 2 TABLET: 8.6; 5 TABLET, FILM COATED ORAL at 04:47

## 2021-01-31 RX ADMIN — ASPIRIN 325 MG ORAL TABLET 325 MG: 325 PILL ORAL at 04:47

## 2021-01-31 RX ADMIN — RISPERIDONE 2 MG: 2 TABLET ORAL at 16:29

## 2021-01-31 RX ADMIN — AMOXICILLIN AND CLAVULANATE POTASSIUM 1 TABLET: 875; 125 TABLET, FILM COATED ORAL at 04:46

## 2021-01-31 RX ADMIN — DOCUSATE SODIUM 50 MG AND SENNOSIDES 8.6 MG 2 TABLET: 8.6; 5 TABLET, FILM COATED ORAL at 16:30

## 2021-01-31 RX ADMIN — ENOXAPARIN SODIUM 40 MG: 40 INJECTION SUBCUTANEOUS at 16:31

## 2021-01-31 NOTE — PROGRESS NOTES
LIMB PRESERVATION SERVICE   RN/PT NOTE    NPO at midnight  Covid negative    Patient tentatively scheduled for surgery for reconstructive surgery for bilateral drop foot.

## 2021-01-31 NOTE — CARE PLAN
Problem: Safety  Goal: Will remain free from injury  Outcome: PROGRESSING AS EXPECTED  Note: Educated pt to use call button to call for help before getting up. Bed rails up x2. Provided hospital non-slip socks. Bed locked and at the lowest position. Call light and personal belongings within reach. Telesitter at bedside.     Problem: Skin Integrity  Goal: Risk for impaired skin integrity will decrease  Outcome: PROGRESSING AS EXPECTED  Note: Waffle cushion in place. Encouraged pt to reposition every 2 hours. Barrier cream, mepilex, and heel float boots in use.

## 2021-02-01 ENCOUNTER — APPOINTMENT (OUTPATIENT)
Dept: RADIOLOGY | Facility: MEDICAL CENTER | Age: 51
DRG: 853 | End: 2021-02-01
Attending: ORTHOPAEDIC SURGERY
Payer: MEDICAID

## 2021-02-01 ENCOUNTER — APPOINTMENT (OUTPATIENT)
Dept: RADIOLOGY | Facility: MEDICAL CENTER | Age: 51
DRG: 853 | End: 2021-02-01
Attending: INTERNAL MEDICINE
Payer: MEDICAID

## 2021-02-01 ENCOUNTER — ANESTHESIA EVENT (OUTPATIENT)
Dept: SURGERY | Facility: MEDICAL CENTER | Age: 51
DRG: 853 | End: 2021-02-01
Payer: MEDICAID

## 2021-02-01 ENCOUNTER — ANESTHESIA (OUTPATIENT)
Dept: SURGERY | Facility: MEDICAL CENTER | Age: 51
DRG: 853 | End: 2021-02-01
Payer: MEDICAID

## 2021-02-01 PROCEDURE — 700102 HCHG RX REV CODE 250 W/ 637 OVERRIDE(OP): Performed by: NURSE PRACTITIONER

## 2021-02-01 PROCEDURE — 87205 SMEAR GRAM STAIN: CPT | Mod: 91

## 2021-02-01 PROCEDURE — 0LNW0ZZ RELEASE LEFT FOOT TENDON, OPEN APPROACH: ICD-10-PCS | Performed by: ORTHOPAEDIC SURGERY

## 2021-02-01 PROCEDURE — 88304 TISSUE EXAM BY PATHOLOGIST: CPT

## 2021-02-01 PROCEDURE — 700111 HCHG RX REV CODE 636 W/ 250 OVERRIDE (IP): Performed by: ANESTHESIOLOGY

## 2021-02-01 PROCEDURE — A9270 NON-COVERED ITEM OR SERVICE: HCPCS | Performed by: HOSPITALIST

## 2021-02-01 PROCEDURE — 88311 DECALCIFY TISSUE: CPT

## 2021-02-01 PROCEDURE — 0LXN0ZZ TRANSFER RIGHT LOWER LEG TENDON, OPEN APPROACH: ICD-10-PCS | Performed by: ORTHOPAEDIC SURGERY

## 2021-02-01 PROCEDURE — A9270 NON-COVERED ITEM OR SERVICE: HCPCS | Performed by: STUDENT IN AN ORGANIZED HEALTH CARE EDUCATION/TRAINING PROGRAM

## 2021-02-01 PROCEDURE — 501838 HCHG SUTURE GENERAL: Performed by: ORTHOPAEDIC SURGERY

## 2021-02-01 PROCEDURE — 700101 HCHG RX REV CODE 250: Performed by: ORTHOPAEDIC SURGERY

## 2021-02-01 PROCEDURE — 0QBP0ZX EXCISION OF LEFT METATARSAL, OPEN APPROACH, DIAGNOSTIC: ICD-10-PCS | Performed by: ORTHOPAEDIC SURGERY

## 2021-02-01 PROCEDURE — A6223 GAUZE >16<=48 NO W/SAL W/O B: HCPCS | Performed by: ORTHOPAEDIC SURGERY

## 2021-02-01 PROCEDURE — 160009 HCHG ANES TIME/MIN: Performed by: ORTHOPAEDIC SURGERY

## 2021-02-01 PROCEDURE — 160028 HCHG SURGERY MINUTES - 1ST 30 MINS LEVEL 3: Performed by: ORTHOPAEDIC SURGERY

## 2021-02-01 PROCEDURE — 0LXP0ZZ TRANSFER LEFT LOWER LEG TENDON, OPEN APPROACH: ICD-10-PCS | Performed by: ORTHOPAEDIC SURGERY

## 2021-02-01 PROCEDURE — 500881 HCHG PACK, EXTREMITY: Performed by: ORTHOPAEDIC SURGERY

## 2021-02-01 PROCEDURE — 770006 HCHG ROOM/CARE - MED/SURG/GYN SEMI*

## 2021-02-01 PROCEDURE — 160035 HCHG PACU - 1ST 60 MINS PHASE I: Performed by: ORTHOPAEDIC SURGERY

## 2021-02-01 PROCEDURE — C1713 ANCHOR/SCREW BN/BN,TIS/BN: HCPCS | Performed by: ORTHOPAEDIC SURGERY

## 2021-02-01 PROCEDURE — 87077 CULTURE AEROBIC IDENTIFY: CPT | Mod: 91

## 2021-02-01 PROCEDURE — 160036 HCHG PACU - EA ADDL 30 MINS PHASE I: Performed by: ORTHOPAEDIC SURGERY

## 2021-02-01 PROCEDURE — 87075 CULTR BACTERIA EXCEPT BLOOD: CPT | Mod: 91

## 2021-02-01 PROCEDURE — 160048 HCHG OR STATISTICAL LEVEL 1-5: Performed by: ORTHOPAEDIC SURGERY

## 2021-02-01 PROCEDURE — A9270 NON-COVERED ITEM OR SERVICE: HCPCS | Performed by: NURSE PRACTITIONER

## 2021-02-01 PROCEDURE — 500562 HCHG FIBERWIRE: Performed by: ORTHOPAEDIC SURGERY

## 2021-02-01 PROCEDURE — 87070 CULTURE OTHR SPECIMN AEROBIC: CPT

## 2021-02-01 PROCEDURE — 0QBP0ZZ EXCISION OF LEFT METATARSAL, OPEN APPROACH: ICD-10-PCS | Performed by: ORTHOPAEDIC SURGERY

## 2021-02-01 PROCEDURE — 700102 HCHG RX REV CODE 250 W/ 637 OVERRIDE(OP): Performed by: STUDENT IN AN ORGANIZED HEALTH CARE EDUCATION/TRAINING PROGRAM

## 2021-02-01 PROCEDURE — 160039 HCHG SURGERY MINUTES - EA ADDL 1 MIN LEVEL 3: Performed by: ORTHOPAEDIC SURGERY

## 2021-02-01 PROCEDURE — 87186 SC STD MICRODIL/AGAR DIL: CPT

## 2021-02-01 PROCEDURE — 87015 SPECIMEN INFECT AGNT CONCNTJ: CPT

## 2021-02-01 PROCEDURE — 700102 HCHG RX REV CODE 250 W/ 637 OVERRIDE(OP): Performed by: HOSPITALIST

## 2021-02-01 PROCEDURE — 160002 HCHG RECOVERY MINUTES (STAT): Performed by: ORTHOPAEDIC SURGERY

## 2021-02-01 PROCEDURE — 700105 HCHG RX REV CODE 258: Performed by: ANESTHESIOLOGY

## 2021-02-01 PROCEDURE — 700101 HCHG RX REV CODE 250: Performed by: ANESTHESIOLOGY

## 2021-02-01 PROCEDURE — 501330 HCHG SET, CYSTO IRRIG TUBING: Performed by: ORTHOPAEDIC SURGERY

## 2021-02-01 PROCEDURE — 99231 SBSQ HOSP IP/OBS SF/LOW 25: CPT | Performed by: INTERNAL MEDICINE

## 2021-02-01 PROCEDURE — 0LNV0ZZ RELEASE RIGHT FOOT TENDON, OPEN APPROACH: ICD-10-PCS | Performed by: ORTHOPAEDIC SURGERY

## 2021-02-01 DEVICE — SCREW BIOSURE 7 X 20MM: Type: IMPLANTABLE DEVICE | Site: FOOT | Status: FUNCTIONAL

## 2021-02-01 DEVICE — SCREW BIOSURE 6 X 20MM: Type: IMPLANTABLE DEVICE | Site: FOOT | Status: FUNCTIONAL

## 2021-02-01 RX ORDER — MAGNESIUM HYDROXIDE 1200 MG/15ML
LIQUID ORAL
Status: COMPLETED | OUTPATIENT
Start: 2021-02-01 | End: 2021-02-01

## 2021-02-01 RX ORDER — DIPHENHYDRAMINE HYDROCHLORIDE 50 MG/ML
12.5 INJECTION INTRAMUSCULAR; INTRAVENOUS
Status: DISCONTINUED | OUTPATIENT
Start: 2021-02-01 | End: 2021-02-01 | Stop reason: HOSPADM

## 2021-02-01 RX ORDER — KETAMINE HYDROCHLORIDE 50 MG/ML
INJECTION, SOLUTION INTRAMUSCULAR; INTRAVENOUS PRN
Status: DISCONTINUED | OUTPATIENT
Start: 2021-02-01 | End: 2021-02-01 | Stop reason: SURG

## 2021-02-01 RX ORDER — OXYCODONE HCL 5 MG/5 ML
10 SOLUTION, ORAL ORAL
Status: DISCONTINUED | OUTPATIENT
Start: 2021-02-01 | End: 2021-02-01 | Stop reason: HOSPADM

## 2021-02-01 RX ORDER — BUPIVACAINE HYDROCHLORIDE 5 MG/ML
INJECTION, SOLUTION EPIDURAL; INTRACAUDAL
Status: DISCONTINUED | OUTPATIENT
Start: 2021-02-01 | End: 2021-02-01 | Stop reason: HOSPADM

## 2021-02-01 RX ORDER — LIDOCAINE HYDROCHLORIDE 20 MG/ML
INJECTION, SOLUTION EPIDURAL; INFILTRATION; INTRACAUDAL; PERINEURAL PRN
Status: DISCONTINUED | OUTPATIENT
Start: 2021-02-01 | End: 2021-02-01 | Stop reason: SURG

## 2021-02-01 RX ORDER — ESMOLOL HYDROCHLORIDE 10 MG/ML
INJECTION INTRAVENOUS PRN
Status: DISCONTINUED | OUTPATIENT
Start: 2021-02-01 | End: 2021-02-01 | Stop reason: SURG

## 2021-02-01 RX ORDER — ROCURONIUM BROMIDE 10 MG/ML
INJECTION, SOLUTION INTRAVENOUS PRN
Status: DISCONTINUED | OUTPATIENT
Start: 2021-02-01 | End: 2021-02-01 | Stop reason: SURG

## 2021-02-01 RX ORDER — SODIUM CHLORIDE, SODIUM LACTATE, POTASSIUM CHLORIDE, CALCIUM CHLORIDE 600; 310; 30; 20 MG/100ML; MG/100ML; MG/100ML; MG/100ML
INJECTION, SOLUTION INTRAVENOUS CONTINUOUS
Status: DISCONTINUED | OUTPATIENT
Start: 2021-02-01 | End: 2021-02-01 | Stop reason: HOSPADM

## 2021-02-01 RX ORDER — KETOROLAC TROMETHAMINE 30 MG/ML
INJECTION, SOLUTION INTRAMUSCULAR; INTRAVENOUS PRN
Status: DISCONTINUED | OUTPATIENT
Start: 2021-02-01 | End: 2021-02-01 | Stop reason: SURG

## 2021-02-01 RX ORDER — SODIUM CHLORIDE, SODIUM LACTATE, POTASSIUM CHLORIDE, CALCIUM CHLORIDE 600; 310; 30; 20 MG/100ML; MG/100ML; MG/100ML; MG/100ML
INJECTION, SOLUTION INTRAVENOUS
Status: DISCONTINUED | OUTPATIENT
Start: 2021-02-01 | End: 2021-02-01 | Stop reason: SURG

## 2021-02-01 RX ORDER — CEFAZOLIN SODIUM 1 G/3ML
INJECTION, POWDER, FOR SOLUTION INTRAMUSCULAR; INTRAVENOUS PRN
Status: DISCONTINUED | OUTPATIENT
Start: 2021-02-01 | End: 2021-02-01 | Stop reason: SURG

## 2021-02-01 RX ORDER — OXYCODONE HCL 5 MG/5 ML
5 SOLUTION, ORAL ORAL
Status: DISCONTINUED | OUTPATIENT
Start: 2021-02-01 | End: 2021-02-01 | Stop reason: HOSPADM

## 2021-02-01 RX ORDER — ONDANSETRON 2 MG/ML
INJECTION INTRAMUSCULAR; INTRAVENOUS PRN
Status: DISCONTINUED | OUTPATIENT
Start: 2021-02-01 | End: 2021-02-01 | Stop reason: SURG

## 2021-02-01 RX ORDER — HYDROMORPHONE HYDROCHLORIDE 1 MG/ML
0.1 INJECTION, SOLUTION INTRAMUSCULAR; INTRAVENOUS; SUBCUTANEOUS
Status: DISCONTINUED | OUTPATIENT
Start: 2021-02-01 | End: 2021-02-01 | Stop reason: HOSPADM

## 2021-02-01 RX ORDER — ONDANSETRON 2 MG/ML
4 INJECTION INTRAMUSCULAR; INTRAVENOUS
Status: DISCONTINUED | OUTPATIENT
Start: 2021-02-01 | End: 2021-02-01 | Stop reason: HOSPADM

## 2021-02-01 RX ORDER — HYDROMORPHONE HYDROCHLORIDE 2 MG/ML
INJECTION, SOLUTION INTRAMUSCULAR; INTRAVENOUS; SUBCUTANEOUS PRN
Status: DISCONTINUED | OUTPATIENT
Start: 2021-02-01 | End: 2021-02-01 | Stop reason: SURG

## 2021-02-01 RX ORDER — HYDRALAZINE HYDROCHLORIDE 20 MG/ML
5 INJECTION INTRAMUSCULAR; INTRAVENOUS
Status: DISCONTINUED | OUTPATIENT
Start: 2021-02-01 | End: 2021-02-01 | Stop reason: HOSPADM

## 2021-02-01 RX ORDER — HALOPERIDOL 5 MG/ML
1 INJECTION INTRAMUSCULAR
Status: DISCONTINUED | OUTPATIENT
Start: 2021-02-01 | End: 2021-02-01 | Stop reason: HOSPADM

## 2021-02-01 RX ORDER — LABETALOL HYDROCHLORIDE 5 MG/ML
5 INJECTION, SOLUTION INTRAVENOUS
Status: DISCONTINUED | OUTPATIENT
Start: 2021-02-01 | End: 2021-02-01 | Stop reason: HOSPADM

## 2021-02-01 RX ORDER — HYDROMORPHONE HYDROCHLORIDE 1 MG/ML
0.4 INJECTION, SOLUTION INTRAMUSCULAR; INTRAVENOUS; SUBCUTANEOUS
Status: DISCONTINUED | OUTPATIENT
Start: 2021-02-01 | End: 2021-02-01 | Stop reason: HOSPADM

## 2021-02-01 RX ORDER — HYDROMORPHONE HYDROCHLORIDE 1 MG/ML
0.2 INJECTION, SOLUTION INTRAMUSCULAR; INTRAVENOUS; SUBCUTANEOUS
Status: DISCONTINUED | OUTPATIENT
Start: 2021-02-01 | End: 2021-02-01 | Stop reason: HOSPADM

## 2021-02-01 RX ADMIN — PROPOFOL 200 MG: 10 INJECTION, EMULSION INTRAVENOUS at 16:46

## 2021-02-01 RX ADMIN — DOCUSATE SODIUM 50 MG AND SENNOSIDES 8.6 MG 2 TABLET: 8.6; 5 TABLET, FILM COATED ORAL at 04:25

## 2021-02-01 RX ADMIN — THERA TABS 1 TABLET: TAB at 04:25

## 2021-02-01 RX ADMIN — KETOROLAC TROMETHAMINE 15 MG: 30 INJECTION, SOLUTION INTRAMUSCULAR at 17:44

## 2021-02-01 RX ADMIN — HYDROMORPHONE HYDROCHLORIDE 0.5 MG: 2 INJECTION, SOLUTION INTRAMUSCULAR; INTRAVENOUS; SUBCUTANEOUS at 16:46

## 2021-02-01 RX ADMIN — HYDROMORPHONE HYDROCHLORIDE 0.5 MG: 2 INJECTION, SOLUTION INTRAMUSCULAR; INTRAVENOUS; SUBCUTANEOUS at 17:06

## 2021-02-01 RX ADMIN — ONDANSETRON 4 MG: 2 INJECTION INTRAMUSCULAR; INTRAVENOUS at 17:44

## 2021-02-01 RX ADMIN — CEFAZOLIN 2 G: 330 INJECTION, POWDER, FOR SOLUTION INTRAMUSCULAR; INTRAVENOUS at 16:50

## 2021-02-01 RX ADMIN — ROCURONIUM BROMIDE 50 MG: 10 INJECTION, SOLUTION INTRAVENOUS at 16:46

## 2021-02-01 RX ADMIN — SUGAMMADEX 200 MG: 100 INJECTION, SOLUTION INTRAVENOUS at 17:44

## 2021-02-01 RX ADMIN — LIDOCAINE HYDROCHLORIDE 100 MG: 20 INJECTION, SOLUTION EPIDURAL; INFILTRATION; INTRACAUDAL at 16:46

## 2021-02-01 RX ADMIN — RISPERIDONE 2 MG: 2 TABLET ORAL at 04:25

## 2021-02-01 RX ADMIN — SODIUM CHLORIDE, POTASSIUM CHLORIDE, SODIUM LACTATE AND CALCIUM CHLORIDE: 600; 310; 30; 20 INJECTION, SOLUTION INTRAVENOUS at 16:39

## 2021-02-01 RX ADMIN — KETAMINE HYDROCHLORIDE 40 MG: 50 INJECTION INTRAMUSCULAR; INTRAVENOUS at 17:08

## 2021-02-01 RX ADMIN — Medication 100 MG: at 04:25

## 2021-02-01 RX ADMIN — ESMOLOL HYDROCHLORIDE 10 MG: 100 INJECTION, SOLUTION INTRAVENOUS at 18:46

## 2021-02-01 RX ADMIN — ESMOLOL HYDROCHLORIDE 10 MG: 100 INJECTION, SOLUTION INTRAVENOUS at 18:37

## 2021-02-01 ASSESSMENT — ENCOUNTER SYMPTOMS
MYALGIAS: 1
NAUSEA: 0
HEADACHES: 0
DIARRHEA: 0
COUGH: 0
FEVER: 0
VOMITING: 0
PALPITATIONS: 0
MEMORY LOSS: 1
CONSTIPATION: 0
BACK PAIN: 0
NECK PAIN: 0
DIZZINESS: 0
SHORTNESS OF BREATH: 0
ABDOMINAL PAIN: 0

## 2021-02-01 ASSESSMENT — COGNITIVE AND FUNCTIONAL STATUS - GENERAL
TURNING FROM BACK TO SIDE WHILE IN FLAT BAD: A LOT
MOVING TO AND FROM BED TO CHAIR: A LOT
SUGGESTED CMS G CODE MODIFIER DAILY ACTIVITY: CK
PERSONAL GROOMING: A LITTLE
DRESSING REGULAR LOWER BODY CLOTHING: A LOT
MOBILITY SCORE: 10
TOILETING: A LOT
DRESSING REGULAR UPPER BODY CLOTHING: A LOT
CLIMB 3 TO 5 STEPS WITH RAILING: TOTAL
MOVING FROM LYING ON BACK TO SITTING ON SIDE OF FLAT BED: A LOT
DAILY ACTIVITIY SCORE: 15
STANDING UP FROM CHAIR USING ARMS: A LOT
HELP NEEDED FOR BATHING: A LOT
SUGGESTED CMS G CODE MODIFIER MOBILITY: CL
WALKING IN HOSPITAL ROOM: TOTAL

## 2021-02-01 ASSESSMENT — PAIN SCALES - PAIN ASSESSMENT IN ADVANCED DEMENTIA (PAINAD)
CONSOLABILITY: NO NEED TO CONSOLE
BODYLANGUAGE: RELAXED
FACIALEXPRESSION: SMILING OR INEXPRESSIVE
BREATHING: NORMAL
TOTALSCORE: 0

## 2021-02-01 ASSESSMENT — PAIN SCALES - GENERAL: PAIN_LEVEL: 2

## 2021-02-01 NOTE — PROGRESS NOTES
[facility] Medicine Progress Note    Date of Service  2/1/2021    Chief Complaint  Altered mental status    Hospital Course  Mr. Blake is a 50-year-old male who was brought to the emergency department on 9/9/2020 with altered mental status after he was found by his ex-wife to be obtunded after she had checked on him when he had not been seen for at least 2 days.  At that time he was alert and oriented x0 and found to be hypotensive.  GCS in the ED was 7 and he was severely hypotensive.  He was intubated for airway protection and central venous access was obtained for administration of high-volume crystalloid resuscitation and vasopressor therapy.  He had a significant leukocytosis with a WBC of 22.9 and was hypokalemic with a potassium level of 2.7.  Additionally he had acute kidney injury with a creatinine of 2.73.  His INR was 8.93 and he also had a lactate level of 11.  Urinalysis was performed and was negative for urinary tract infection.  He was diagnosed with septic shock and admitted to the intensive care unit for further evaluation and treatment.  A lumbar puncture was done on 9/9/2020.  He was also noted to have seizure-like activity on 9/10/2020 so an EEG was obtained and was significant for a moderate encephalopathy.  Extubation occurred on 9/11/2020.  He also had a traumatic catheter removal so on 9/21/2020 urology was consulted but was ultimately unnecessary.  On 9/13/2020 he was transferred out of the ICU where he required restraints due to confusion, hallucination, agitation, and impulsivity.  An MRI was performed and was negative for acute pathologies.  He was also treated for a UTI starting 10/2/2020.  He has also fallen multiple times in hospital.  Additionally, on 11/11/2020 he had an acute episode of sinus tachycardia in the 140s accompanied by hypotension.  There was concern for STEMI so cardiology was consulted.  A 12-lead EKG was obtained and was concerning for atrial flutter.  The patient  received metoprolol after which a repeat ECG showed sinus tachycardia.  A D-dimer was obtained and was elevated for which a CT chest was done that was negative for pulmonary embolism.  Cardiology subsequently signed off.  He has remained in the hospital for a prolonged period of time and is now pending guardianship and then will need placement.  Guardianship hearing is tentatively scheduled for 2/5/2021.   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 Ortho was consulted and he does require I&D. Surgery planned 2/1/2021    Interval Problem Update  1/26- Patient resting in bed, eyes closed. Attempted to waken and patient opened his eyes looked at me then closed his eyes and refused to open again. Awaiting guardianship hearing on 2/5. No acute signs of distress noted.     1/30- Patient much more cooperative today. States no needs other than his urine to drain as he holds catheter tubing. Patient being followed by LPS/ Wound care and is scheduled to undergo surgical procedure on Monday 2/1. 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     2/1- oriented to himself only. No other event. Pending surgery     Consultants/Specialty  Critical care  Psychiatry  Cardiology  Urology    Code Status  Full Code    Disposition  Pending guardianship and then placement.  Guardianship hearing scheduled for 2/5/2021.    Review of Systems  Review of Systems   Constitutional: Negative for fever and malaise/fatigue.   Respiratory: Negative for cough and shortness of breath.    Cardiovascular: Negative for chest pain, palpitations and leg swelling.   Gastrointestinal: Negative for abdominal pain, constipation, diarrhea, nausea and vomiting.   Genitourinary: Negative for dysuria.   Musculoskeletal: Positive for joint pain and myalgias. Negative for back pain and neck pain.   Neurological: Negative for dizziness and  headaches.   Psychiatric/Behavioral: Positive for memory loss.      Physical Exam  Temp:  [36.1 °C (97 °F)-36.6 °C (97.9 °F)] 36.2 °C (97.2 °F)  Pulse:  [67-77] 67  Resp:  [16-17] 17  BP: ()/(59-69) 107/69  SpO2:  [94 %-97 %] 97 %    Physical Exam  Vitals signs and nursing note reviewed.   Constitutional:       General: He is awake. He is not in acute distress.     Comments: Unkempt appearance  Frail, chronically ill appearing   HENT:      Head: Normocephalic and atraumatic.      Mouth/Throat:      Lips: Pink.      Mouth: Mucous membranes are moist.   Eyes:      Pupils: Pupils are equal, round, and reactive to light.   Neck:      Musculoskeletal: Normal range of motion and neck supple.   Cardiovascular:      Rate and Rhythm: Normal rate and regular rhythm.      Pulses: Normal pulses.   Pulmonary:      Effort: Pulmonary effort is normal.      Breath sounds: Normal breath sounds.   Abdominal:      General: Bowel sounds are normal. There is no distension or abdominal bruit.      Palpations: Abdomen is soft.      Tenderness: There is no abdominal tenderness.   Musculoskeletal:      Right lower leg: No edema.      Left lower leg: No edema.   Skin:     General: Skin is warm and dry.   Neurological:      Mental Status: He is alert. He is disoriented.   Psychiatric:         Attention and Perception: Attention normal.         Mood and Affect: Affect is flat.         Speech: Speech normal.         Behavior: Behavior is cooperative.         Cognition and Memory: Cognition is impaired. Memory is impaired. He exhibits impaired recent memory and impaired remote memory.       Fluids    Intake/Output Summary (Last 24 hours) at 2/1/2021 1516  Last data filed at 2/1/2021 1349  Gross per 24 hour   Intake --   Output 2450 ml   Net -2450 ml     Laboratory    Imaging  DX-FOOT-COMPLETE 3+ LEFT   Final Result         1.  No acute traumatic bony injury. No destructive osseous lesion is readily apparent, note that plain film can be  insensitive for evaluation of osteomyelitis and three-phase bone scan or MRI would offer improved diagnostic sensitivity as clinically    appropriate   2.  Atherosclerosis      CT-CTA CHEST PULMONARY ARTERY W/ RECONS   Final Result      1.  No evidence of pulmonary emboli.   2.  Right lower lobe discoid atelectasis.   3.  Gynecomastia.            DX-CHEST-PORTABLE (1 VIEW)   Final Result      No acute cardiac or pulmonary abnormalities are identified.      MR-BRAIN-W/O   Final Result      1.  Moderate diffuse cerebral atrophy.      2.  Minimal periventricular and juxtacortical white matter changes consistent with chronic microvascular ischemic gliosis.      3.  No evidence of acute infarction in the brain parenchyma.      DX-ABDOMEN FOR TUBE PLACEMENT   Final Result      Feeding tube tip overlies the second portion of the duodenum.      DX-CHEST-PORTABLE (1 VIEW)   Final Result         1.  Patchy left lung base opacity, new since prior, could represent early infiltrate            DX-CHEST-PORTABLE (1 VIEW)   Final Result         1.  Patchy left lung base opacity, new since prior, could represent early infiltrate         [id number] VIEW   Final Result      Enteric tube projects over the distal stomach/pylorus.         EC-ECHOCARDIOGRAM COMPLETE W/O CONT   Final Result      DX-CHEST-PORTABLE (1 VIEW)   Final Result         1.  No acute cardiopulmonary disease.      CT-ABDOMEN-PELVIS W/O   Final Result      1.  No renal stone or hydronephrosis.   2.  Normal appendix.   3.  No focal mesenteric inflammatory process.      DX-ABDOMEN FOR TUBE PLACEMENT   Final Result      NG tube tip projects over expected location the gastric fundus.      US-ABDOMEN COMPLETE SURVEY   Final Result         1.  Echogenic liver compatible with fatty change versus fibrosis.   2.  Gallbladder sludge without additional sonographic findings of cholecystitis.   3.  Partial atrophic bilateral kidneys with lobulated contour         CT-HEAD W/O    Final Result         1.  No acute intracranial abnormality is identified, there are nonspecific white matter changes, commonly associated with small vessel ischemic disease.  Associated mild cerebral atrophy is noted.   2.  Atherosclerosis.      DX-CHEST-PORTABLE (1 VIEW)   Final Result         1.  No acute cardiopulmonary disease.      IR-US GUIDED PIV    (Results Pending)      Assessment/Plan  * Toxic encephalopathy- (present on admission)  Assessment & Plan  -Unknown etiology - hypoxic induced brain injury vs Wernicke encephalopathy.  -LP, MRI brain, EEG negative. TSH normal. HIV/RPR negative.  -Psych was consulted and believe it is alcohol related.  -Continue risperdal.  -Remains free from restraints.  -Has fallen multiple times while in hospital. Sitter now at bedside.     Open wound of left foot  Assessment & Plan  Lateral aspect, worsening, red, nontender  HgA1c pending  No leukocytosis  Wound care following, recommended LPS consult  1/21 LPS consulted  1/24: Wound culture growing Staph aureus. Continue Augmentin. Bactrim discontinued.  1/30- Patient to go to surgery scheduled for 2/1 - transition back to acute care service     Normocytic anemia- (present on admission)  Assessment & Plan  -Very mild, no active bleeding.  - stable  -Check periodically.    Alcohol abuse- (present on admission)  Assessment & Plan  -Abstinent since admission.  -Continue daily PO vitamin therapy.     VTE prophylaxis: Lovenox    I have performed a physical exam and reviewed and updated ROS and Plan today (2/1/2021). In review of previous note, there are no changes except as documented above.       Mohan Dotson M.D.

## 2021-02-01 NOTE — PROGRESS NOTES
"2/1/2021    Yury Blake    .S: Bilateral foot drop, left foot ulcer    O:  BP (!) 94/59   Pulse 67   Temp 36.5 °C (97.7 °F) (Temporal)   Resp 16   Ht 1.803 m (5' 11\")   Wt 80.3 kg (177 lb)   SpO2 95%           No results for input(s): SODIUM, POTASSIUM, CHLORIDE, CO2, GLUCOSE, BUN, CPKTOTAL in the last 72 hours.    Intake/Output Summary (Last 24 hours) at 2/1/2021 0827  Last data filed at 2/1/2021 0100  Gross per 24 hour   Intake 300 ml   Output 1700 ml   Net -1400 ml     I have reviewed the relevant vascular and radiology studies.  Bilateral foot drop  L 5th MT ulcer    A:   Active Hospital Problems    Diagnosis   • Toxic encephalopathy [G92]     Priority: High   • Normocytic anemia [D64.9]     Priority: Low   • Alcohol abuse [F10.10]     Priority: Low   • Open wound of left foot [S91.302A]   • Other fracture of right lower leg, initial encounter for closed fracture [S82.601A]         P: Bilateral foot drop correction, I+D L foot, possible 5th met head excision  "

## 2021-02-01 NOTE — PROGRESS NOTES
Problem: Safety  Goal: Will remain free from falls  Outcome: PROGRESSING AS EXPECTED   Safety precautions in place.  Telesitter in place. Bed alarm on. Call light and personal items within reach. Bed at lowest position and locked.  Siderails up X 2.  Clutter free environment & adequate lighting. Educated on level of risk and reminded to call for assistance.  Hourly rounding in effect.     Problem: Knowledge Deficit  Goal: Knowledge of disease process/condition, treatment plan, diagnostic tests, and medications will improve  Outcome: PROGRESSING AS EXPECTED   Discussed plan of care.  Questions answered.  Verbalized understanding.     Problem: Mobility  Goal: Risk for activity intolerance will decrease  Outcome: PROGRESSING AS EXPECTED   Educated on ROM exercises, risks and benefits.  Verbalized understanding. Encouraged to increase mobility, patient refused to get out of bed up to chair or to edge of bed.

## 2021-02-02 LAB
ALBUMIN SERPL BCP-MCNC: 3.6 G/DL (ref 3.2–4.9)
ALBUMIN/GLOB SERPL: 1.6 G/DL
ALP SERPL-CCNC: 97 U/L (ref 30–99)
ALT SERPL-CCNC: 24 U/L (ref 2–50)
ANION GAP SERPL CALC-SCNC: 12 MMOL/L (ref 7–16)
AST SERPL-CCNC: 20 U/L (ref 12–45)
BASOPHILS # BLD AUTO: 0.2 % (ref 0–1.8)
BASOPHILS # BLD: 0.03 K/UL (ref 0–0.12)
BILIRUB SERPL-MCNC: 0.3 MG/DL (ref 0.1–1.5)
BUN SERPL-MCNC: 17 MG/DL (ref 8–22)
CALCIUM SERPL-MCNC: 9.5 MG/DL (ref 8.5–10.5)
CHLORIDE SERPL-SCNC: 106 MMOL/L (ref 96–112)
CO2 SERPL-SCNC: 25 MMOL/L (ref 20–33)
CREAT SERPL-MCNC: 0.84 MG/DL (ref 0.5–1.4)
EOSINOPHIL # BLD AUTO: 0.01 K/UL (ref 0–0.51)
EOSINOPHIL NFR BLD: 0.1 % (ref 0–6.9)
ERYTHROCYTE [DISTWIDTH] IN BLOOD BY AUTOMATED COUNT: 49.3 FL (ref 35.9–50)
GLOBULIN SER CALC-MCNC: 2.2 G/DL (ref 1.9–3.5)
GLUCOSE SERPL-MCNC: 120 MG/DL (ref 65–99)
GRAM STN SPEC: NORMAL
GRAM STN SPEC: NORMAL
HCT VFR BLD AUTO: 41.4 % (ref 42–52)
HGB BLD-MCNC: 13.3 G/DL (ref 14–18)
IMM GRANULOCYTES # BLD AUTO: 0.18 K/UL (ref 0–0.11)
IMM GRANULOCYTES NFR BLD AUTO: 0.9 % (ref 0–0.9)
LYMPHOCYTES # BLD AUTO: 1.73 K/UL (ref 1–4.8)
LYMPHOCYTES NFR BLD: 8.7 % (ref 22–41)
MCH RBC QN AUTO: 29.3 PG (ref 27–33)
MCHC RBC AUTO-ENTMCNC: 32.1 G/DL (ref 33.7–35.3)
MCV RBC AUTO: 91.2 FL (ref 81.4–97.8)
MONOCYTES # BLD AUTO: 1.04 K/UL (ref 0–0.85)
MONOCYTES NFR BLD AUTO: 5.2 % (ref 0–13.4)
NEUTROPHILS # BLD AUTO: 16.91 K/UL (ref 1.82–7.42)
NEUTROPHILS NFR BLD: 84.9 % (ref 44–72)
NRBC # BLD AUTO: 0 K/UL
NRBC BLD-RTO: 0 /100 WBC
PATHOLOGY CONSULT NOTE: NORMAL
PLATELET # BLD AUTO: 231 K/UL (ref 164–446)
PMV BLD AUTO: 11.3 FL (ref 9–12.9)
POTASSIUM SERPL-SCNC: 4.7 MMOL/L (ref 3.6–5.5)
PROT SERPL-MCNC: 5.8 G/DL (ref 6–8.2)
RBC # BLD AUTO: 4.54 M/UL (ref 4.7–6.1)
SIGNIFICANT IND 70042: NORMAL
SIGNIFICANT IND 70042: NORMAL
SITE SITE: NORMAL
SITE SITE: NORMAL
SODIUM SERPL-SCNC: 143 MMOL/L (ref 135–145)
SOURCE SOURCE: NORMAL
SOURCE SOURCE: NORMAL
WBC # BLD AUTO: 19.9 K/UL (ref 4.8–10.8)

## 2021-02-02 PROCEDURE — A9270 NON-COVERED ITEM OR SERVICE: HCPCS | Performed by: NURSE PRACTITIONER

## 2021-02-02 PROCEDURE — 700102 HCHG RX REV CODE 250 W/ 637 OVERRIDE(OP): Performed by: NURSE PRACTITIONER

## 2021-02-02 PROCEDURE — 85025 COMPLETE CBC W/AUTO DIFF WBC: CPT

## 2021-02-02 PROCEDURE — 770006 HCHG ROOM/CARE - MED/SURG/GYN SEMI*

## 2021-02-02 PROCEDURE — A9270 NON-COVERED ITEM OR SERVICE: HCPCS | Performed by: STUDENT IN AN ORGANIZED HEALTH CARE EDUCATION/TRAINING PROGRAM

## 2021-02-02 PROCEDURE — 700111 HCHG RX REV CODE 636 W/ 250 OVERRIDE (IP): Performed by: HOSPITALIST

## 2021-02-02 PROCEDURE — A9270 NON-COVERED ITEM OR SERVICE: HCPCS | Performed by: HOSPITALIST

## 2021-02-02 PROCEDURE — 97168 OT RE-EVAL EST PLAN CARE: CPT

## 2021-02-02 PROCEDURE — 700102 HCHG RX REV CODE 250 W/ 637 OVERRIDE(OP): Performed by: STUDENT IN AN ORGANIZED HEALTH CARE EDUCATION/TRAINING PROGRAM

## 2021-02-02 PROCEDURE — 97164 PT RE-EVAL EST PLAN CARE: CPT

## 2021-02-02 PROCEDURE — 36415 COLL VENOUS BLD VENIPUNCTURE: CPT

## 2021-02-02 PROCEDURE — 700102 HCHG RX REV CODE 250 W/ 637 OVERRIDE(OP): Performed by: HOSPITALIST

## 2021-02-02 PROCEDURE — 80053 COMPREHEN METABOLIC PANEL: CPT

## 2021-02-02 PROCEDURE — 99232 SBSQ HOSP IP/OBS MODERATE 35: CPT | Performed by: HOSPITALIST

## 2021-02-02 RX ADMIN — RISPERIDONE 2 MG: 2 TABLET ORAL at 05:11

## 2021-02-02 RX ADMIN — TAMSULOSIN HYDROCHLORIDE 0.8 MG: 0.4 CAPSULE ORAL at 09:03

## 2021-02-02 RX ADMIN — RISPERIDONE 2 MG: 2 TABLET ORAL at 16:40

## 2021-02-02 RX ADMIN — Medication 100 MG: at 05:11

## 2021-02-02 RX ADMIN — THERA TABS 1 TABLET: TAB at 05:11

## 2021-02-02 RX ADMIN — ASPIRIN 325 MG ORAL TABLET 325 MG: 325 PILL ORAL at 05:11

## 2021-02-02 RX ADMIN — ENOXAPARIN SODIUM 40 MG: 40 INJECTION SUBCUTANEOUS at 16:41

## 2021-02-02 RX ADMIN — DOCUSATE SODIUM 50 MG AND SENNOSIDES 8.6 MG 2 TABLET: 8.6; 5 TABLET, FILM COATED ORAL at 05:11

## 2021-02-02 RX ADMIN — DOCUSATE SODIUM 50 MG AND SENNOSIDES 8.6 MG 2 TABLET: 8.6; 5 TABLET, FILM COATED ORAL at 16:40

## 2021-02-02 ASSESSMENT — PAIN SCALES - PAIN ASSESSMENT IN ADVANCED DEMENTIA (PAINAD)
BREATHING: NORMAL
CONSOLABILITY: NO NEED TO CONSOLE
BREATHING: NORMAL
FACIALEXPRESSION: SMILING OR INEXPRESSIVE
BODYLANGUAGE: RELAXED
TOTALSCORE: 0
TOTALSCORE: 0
BODYLANGUAGE: RELAXED
FACIALEXPRESSION: SMILING OR INEXPRESSIVE
CONSOLABILITY: NO NEED TO CONSOLE

## 2021-02-02 ASSESSMENT — GAIT ASSESSMENTS: GAIT LEVEL OF ASSIST: UNABLE TO PARTICIPATE

## 2021-02-02 ASSESSMENT — ENCOUNTER SYMPTOMS
NAUSEA: 0
NECK PAIN: 0
DIARRHEA: 0
FEVER: 0
CONSTIPATION: 0
PALPITATIONS: 0
VOMITING: 0
MYALGIAS: 1
COUGH: 0
DIZZINESS: 0
SHORTNESS OF BREATH: 0
HEADACHES: 0
MEMORY LOSS: 1
ABDOMINAL PAIN: 0
BACK PAIN: 0

## 2021-02-02 ASSESSMENT — COGNITIVE AND FUNCTIONAL STATUS - GENERAL
MOBILITY SCORE: 9
HELP NEEDED FOR BATHING: A LOT
DAILY ACTIVITIY SCORE: 18
TOILETING: A LOT
TURNING FROM BACK TO SIDE WHILE IN FLAT BAD: A LOT
SUGGESTED CMS G CODE MODIFIER MOBILITY: CM
DRESSING REGULAR LOWER BODY CLOTHING: A LOT
MOVING TO AND FROM BED TO CHAIR: A LOT
MOVING FROM LYING ON BACK TO SITTING ON SIDE OF FLAT BED: UNABLE
WALKING IN HOSPITAL ROOM: TOTAL
SUGGESTED CMS G CODE MODIFIER DAILY ACTIVITY: CK
STANDING UP FROM CHAIR USING ARMS: A LOT
CLIMB 3 TO 5 STEPS WITH RAILING: TOTAL

## 2021-02-02 ASSESSMENT — ACTIVITIES OF DAILY LIVING (ADL): TOILETING: UNABLE TO DETERMINE AT THIS TIME

## 2021-02-02 NOTE — ANESTHESIA PROCEDURE NOTES
Airway    Date/Time: 2/1/2021 4:47 PM  Performed by: Armando Aguila M.D.  Authorized by: Armando Aguila M.D.     Location:  OR  Urgency:  Elective  Indications for Airway Management:  Anesthesia      Spontaneous Ventilation: absent    Sedation Level:  Deep  Preoxygenated: Yes    Patient Position:  Sniffing  Mask Difficulty Assessment:  0 - not attempted  Final Airway Type:  Endotracheal airway  Final Endotracheal Airway:  ETT  Cuffed: Yes    Technique Used for Successful ETT Placement:  Direct laryngoscopy    Insertion Site:  Oral  Blade Type:  Oliver  Laryngoscope Blade/Videolaryngoscope Blade Size:  2  ETT Size (mm):  7.0  Measured from:  Lips  ETT to Lips (cm):  23  Placement Verified by: auscultation and capnometry    Cormack-Lehane Classification:  Grade IIa - partial view of glottis  Number of Attempts at Approach:  1  Number of Other Approaches Attempted:  0

## 2021-02-02 NOTE — OP REPORT
DATE OF SERVICE:  02/01/2021     PREOPERATIVE DIAGNOSES:  1.  Bilateral equinovarus foot alignment.  2.  Left ulceration, fifth metatarsal head.     POSTOPERATIVE DIAGNOSES:  1.  Bilateral equinovarus foot alignment.  2.  Left ulceration, fifth metatarsal head.     PROCEDURES:  1.  Bilateral gastroc-soleus recessions.  2.  Bilateral posterior tibialis tendon transfer to the lateral cuneiform.  3.  Bilateral deep compartment fasciotomy.  4.  Bilateral flexor tendon release, toes 1 through 5.  5.  Left medial column release.  6.  Left irrigation and debridement of foot, multiple compartments including   lateral intra-articular fifth MTPJ.  7.  Left fifth metatarsal bone biopsy.     SURGEON:  Donny Roque MD     FIRST ASSISTANT:  Ana Lemus MD     SECOND ASSISTANT:  April Santoro CFA     ANESTHESIA:  General endotracheal with local.     ESTIMATED BLOOD LOSS:  None.     COMPLICATIONS:  None.     POSTOPERATIVE PLAN:  1.  Walker boots with transfer weightbearing.  2.  Antibiotics.  3.  Follow up on bone cultures.     SPECIMENS:  1.  Left fifth metatarsal bone for pathology and culture.  2.  Left ulcer for culture.     INDICATIONS:  Please see consult note.     PROCEDURE IN DETAIL:  The patient was brought in to the operating room and   placed under general endotracheal anesthesia without complications.  His   bilateral lower extremities were prepped and draped in standard fashion in the   supine position in the supine position with all appropriate padding.    Positive verification confirmed her bilateral lower extremities as well as   above procedure and confirmation that he received preoperative antibiotics.    Leg was elevated and tourniquet was inflated to 250 mmHg.  Posteromedial   incision was made at the level of the musculotendinous junction of the   gastroc.  It was brought down through the crural fascia, exposing the gastroc   tendon.  Under direct visualization, the gastroc tendon was released,    preserving some of the soleus fibers.  This did give significant improvement   in his overall dorsiflexion well above neutral.   The wound was irrigated with   copious irrigation, was closed in layered fashion with interrupted nylon.    Next, an incision was made over the medial aspect of his posterior tibialis   insertion.  This was brought down and the posterior tibialis tendon was   elevated off the navicular.  Scissors under direct visualization were used to   release the posterior tibialis compartment along the length of the posterior   aspect of the medial malleolus.  A separate incision was then made over the   leg, medial aspect of the tibia across from the above where the syndesmosis   lie and the posterior tibialis tendon was pulled out through this.  It was   debrided and tubularized.  The posterior Baez elevator was used to elevate and   release the posterior deep compartment, proximal and distal.  Next, an   incision was then made over the anterior part of the ankle below the   syndesmosis.  Through that, direct lateral dissection was made along the tibia   to the interosseous membrane, which was released with a Baez elevator.    Posterior tibialis tendon was then passed directly against bone from medial to   anterior.  Next, C-arm was used to identify the lateral cuneiform.  A small   incision was made over this.  Blunt dissection was made to the lateral   cuneiform.  A guide pin under C-arm guidance was advanced through the lateral   cuneiform.  Subcutaneous dissection was made from this incision up to the   anterior ankle incision.  The posterior tibialis tendon was then pulled   through the bone tunnel into the lateral cuneiform and it was transfixed with   a [employer] screw.  The foot had good overall alignment.    He still had a lot of extrinsic clawing of his lesser toes.  A 15 blade was   then used to make a small stab incision just proximal to the distal flexor   crease  of toes 1 through 5 releasing the flexor hallucis longus and flexor   digitorum longus.  We irrigated with copious irrigation and closed in layered   fashion directly with interrupted nylon.     Exact procedure was performed on the left side as described on the right   including gastroc-soleus recession, posterior tibialis tendon transfer, deep   compartment fasciotomy and percutaneous flexor tendon releases 1 through 5.     In addition, the left foot still maintained more of an equinovarus foot   position.  So, a medial column release was performed.  A 15 blade was used to   release the talonavicular ligament, spring ligament, as well as an arthrotomy   releasing the medial aspect of the subtalar joint.  This significantly   improved the overall foot shape which was done just prior to completing the   posterior tibialis tendon transfer.     A 15-blade was then used to do sharp excisional debridement down to the level   of bone on the fifth metatarsal.  It was brought down and this was all sharply   excised for excisional debridement.  In addition, a 15-blade was used to make   an arthrotomy.  A thorough irrigation was performed.  Rongeur was used to   obtain some soft tissue in the area, which was sent off for culture.  In   addition, a 15 blade was used to elevate all soft tissue off of the lateral   aspect of the fifth metatarsal head and a rongeur was used to take a bone   biopsy, which was then divided into culture and pathology.  This was irrigated   out and closed with interrupted nylon suture.  Sterile dressings were   applied.  He was placed into bilateral walker boots.  He was transferred to   the recovery room in good condition.     Utilization of Dr. Lemus was necessary for the patient positioning,   holding, retraction, closure and dressing placement.  She was present for the   entire procedure.        ______________________________  MD MIRANDA PAULINO/GOMEZ/GLADYS    DD:  02/01/2021 19:39  DT:   02/01/2021 21:35    Job#:  [id number]

## 2021-02-02 NOTE — PROGRESS NOTES
[facility] Medicine Progress Note    Date of Service  2/2/2021    Chief Complaint  Altered mental status    Hospital Course  Mr. Blake is a 50-year-old male who was brought to the emergency department on 9/9/2020 with altered mental status after he was found by his ex-wife to be obtunded after she had checked on him when he had not been seen for at least 2 days.  At that time he was alert and oriented x0 and found to be hypotensive.  GCS in the ED was 7 and he was severely hypotensive.  He was intubated for airway protection and central venous access was obtained for administration of high-volume crystalloid resuscitation and vasopressor therapy.  He had a significant leukocytosis with a WBC of 22.9 and was hypokalemic with a potassium level of 2.7.  Additionally he had acute kidney injury with a creatinine of 2.73.  His INR was 8.93 and he also had a lactate level of 11.  Urinalysis was performed and was negative for urinary tract infection.  He was diagnosed with septic shock and admitted to the intensive care unit for further evaluation and treatment.  A lumbar puncture was done on 9/9/2020.  He was also noted to have seizure-like activity on 9/10/2020 so an EEG was obtained and was significant for a moderate encephalopathy.  Extubation occurred on 9/11/2020.  He also had a traumatic catheter removal so on 9/21/2020 urology was consulted but was ultimately unnecessary.  On 9/13/2020 he was transferred out of the ICU where he required restraints due to confusion, hallucination, agitation, and impulsivity.  An MRI was performed and was negative for acute pathologies.  He was also treated for a UTI starting 10/2/2020.  He has also fallen multiple times in hospital.  Additionally, on 11/11/2020 he had an acute episode of sinus tachycardia in the 140s accompanied by hypotension.  There was concern for STEMI so cardiology was consulted.  A 12-lead EKG was obtained and was concerning for atrial flutter.  The patient  received metoprolol after which a repeat ECG showed sinus tachycardia.  A D-dimer was obtained and was elevated for which a CT chest was done that was negative for pulmonary embolism.  Cardiology subsequently signed off.  He has remained in the hospital for a prolonged period of time and is now pending guardianship and then will need placement.  Guardianship hearing is tentatively scheduled for 2/5/2021.   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 Ortho was consulted and he does require I&D. Surgery planned 2/1/2021    Interval Problem Update  2/2: Patient seen and examined by me today.  He underwent the OR yesterday and bone biopsies cultures have been negative so far.  He has completed his 10 days of antibiotics.  Consultants/Specialty  Critical care  Psychiatry  Cardiology  Urology    Code Status  Full Code    Disposition  Pending guardianship and then placement.  Guardianship hearing scheduled for 2/5/2021.    Review of Systems  Review of Systems   Constitutional: Negative for fever and malaise/fatigue.   Respiratory: Negative for cough and shortness of breath.    Cardiovascular: Negative for chest pain, palpitations and leg swelling.   Gastrointestinal: Negative for abdominal pain, constipation, diarrhea, nausea and vomiting.   Genitourinary: Negative for dysuria.   Musculoskeletal: Positive for joint pain and myalgias. Negative for back pain and neck pain.   Neurological: Negative for dizziness and headaches.   Psychiatric/Behavioral: Positive for memory loss.      Physical Exam  Temp:  [35.7 °C (96.2 °F)-36.4 °C (97.5 °F)] 36.2 °C (97.2 °F)  Pulse:  [60-87] 87  Resp:  [8-17] 17  BP: ()/(60-78) 94/60  SpO2:  [96 %-100 %] 97 %    Physical Exam  Vitals signs and nursing note reviewed.   Constitutional:       General: He is awake. He is not in acute distress.     Comments: Unkempt appearance  Frail, chronically ill appearing   HENT:      Head: Normocephalic  and atraumatic.      Mouth/Throat:      Lips: Pink.      Mouth: Mucous membranes are moist.   Eyes:      Pupils: Pupils are equal, round, and reactive to light.   Neck:      Musculoskeletal: Normal range of motion and neck supple.   Cardiovascular:      Rate and Rhythm: Normal rate and regular rhythm.      Pulses: Normal pulses.   Pulmonary:      Effort: Pulmonary effort is normal.      Breath sounds: Normal breath sounds.   Abdominal:      General: Bowel sounds are normal. There is no distension or abdominal bruit.      Palpations: Abdomen is soft.      Tenderness: There is no abdominal tenderness.   Musculoskeletal:      Right lower leg: No edema.      Left lower leg: No edema.   Skin:     General: Skin is warm and dry.   Neurological:      Mental Status: He is alert. He is disoriented.   Psychiatric:         Attention and Perception: Attention normal.         Mood and Affect: Affect is flat.         Speech: Speech normal.         Behavior: Behavior is cooperative.         Cognition and Memory: Cognition is impaired. Memory is impaired. He exhibits impaired recent memory and impaired remote memory.       Fluids    Intake/Output Summary (Last 24 hours) at 2/2/2021 1413  Last data filed at 2/2/2021 1200  Gross per 24 hour   Intake 500 ml   Output 1940 ml   Net -1440 ml     Laboratory    Imaging  DX-PORTABLE FLUOROSCOPY < 1 HOUR   Final Result      Operative procedure in progress with cuneiforms identified      IR-US GUIDED PIV   Final Result    Ultrasound-guided PERIPHERAL IV INSERTION performed by    qualified nursing staff as above.      DX-FOOT-COMPLETE 3+ LEFT   Final Result         1.  No acute traumatic bony injury. No destructive osseous lesion is readily apparent, note that plain film can be insensitive for evaluation of osteomyelitis and three-phase bone scan or MRI would offer improved diagnostic sensitivity as clinically    appropriate   2.  Atherosclerosis      CT-CTA CHEST PULMONARY ARTERY W/ RECONS    Final Result      1.  No evidence of pulmonary emboli.   2.  Right lower lobe discoid atelectasis.   3.  Gynecomastia.            DX-CHEST-PORTABLE (1 VIEW)   Final Result      No acute cardiac or pulmonary abnormalities are identified.      MR-BRAIN-W/O   Final Result      1.  Moderate diffuse cerebral atrophy.      2.  Minimal periventricular and juxtacortical white matter changes consistent with chronic microvascular ischemic gliosis.      3.  No evidence of acute infarction in the brain parenchyma.      DX-ABDOMEN FOR TUBE PLACEMENT   Final Result      Feeding tube tip overlies the second portion of the duodenum.      DX-CHEST-PORTABLE (1 VIEW)   Final Result         1.  Patchy left lung base opacity, new since prior, could represent early infiltrate            DX-CHEST-PORTABLE (1 VIEW)   Final Result         1.  Patchy left lung base opacity, new since prior, could represent early infiltrate         JH-FTSTISC-9 VIEW   Final Result      Enteric tube projects over the distal stomach/pylorus.         EC-ECHOCARDIOGRAM COMPLETE W/O CONT   Final Result      DX-CHEST-PORTABLE (1 VIEW)   Final Result         1.  No acute cardiopulmonary disease.      CT-ABDOMEN-PELVIS W/O   Final Result      1.  No renal stone or hydronephrosis.   2.  Normal appendix.   3.  No focal mesenteric inflammatory process.      DX-ABDOMEN FOR TUBE PLACEMENT   Final Result      NG tube tip projects over expected location the gastric fundus.      US-ABDOMEN COMPLETE SURVEY   Final Result         1.  Echogenic liver compatible with fatty change versus fibrosis.   2.  Gallbladder sludge without additional sonographic findings of cholecystitis.   3.  Partial atrophic bilateral kidneys with lobulated contour         CT-HEAD W/O   Final Result         1.  No acute intracranial abnormality is identified, there are nonspecific white matter changes, commonly associated with small vessel ischemic disease.  Associated mild cerebral atrophy is noted.    2.  Atherosclerosis.      DX-CHEST-PORTABLE (1 VIEW)   Final Result         1.  No acute cardiopulmonary disease.         Assessment/Plan  * Toxic encephalopathy- (present on admission)  Assessment & Plan  -Unknown etiology - hypoxic induced brain injury vs Wernicke encephalopathy.  -LP, MRI brain, EEG negative. TSH normal. HIV/RPR negative.  -Psych was consulted and believe it is alcohol related.  -Continue risperdal.  -Remains free from restraints.  -Has fallen multiple times while in hospital. Sitter now at bedside.     Open wound of left foot  Assessment & Plan  Lateral aspect, worsening, red, nontender  HgA1c pending  No leukocytosis  Wound care following, recommended LPS consult  1/21 LPS consulted  1/24: Wound culture growing Staph aureus. Continue Augmentin. Bactrim discontinued.  1/30- Patient to go to surgery scheduled for 2/1 - transition back to acute care service   2/2: Patient is complete his 10 days of antibiotics and has undergone to the OR.  Bone biopsy cultures have been negative so far.    Normocytic anemia- (present on admission)  Assessment & Plan  -Very mild, no active bleeding.  - stable  -Check periodically.    Alcohol abuse- (present on admission)  Assessment & Plan  -Abstinent since admission.  -Continue daily PO vitamin therapy.     VTE prophylaxis: Lovenox    I have performed a physical exam and reviewed and updated ROS and Plan today (2/2/2021). In review of previous note, there are no changes except as documented above.       Gisele Barnes M.D.

## 2021-02-02 NOTE — CARE PLAN
Problem: Urinary Elimination:  Goal: Ability to reestablish a normal urinary elimination pattern will improve  Outcome: PROGRESSING SLOWER THAN EXPECTED  Patient is incontinent. Condom cath in place. Patient voiding without other complications.     Problem: Mobility  Goal: Risk for activity intolerance will decrease  Outcome: PROGRESSING SLOWER THAN EXPECTED  Patient has bilateral CAM foot boots status post bilateral foot drop repair surgery on 2/1/21. Patient unable to mobilize.

## 2021-02-02 NOTE — CARE PLAN
Problem: Safety  Goal: Will remain free from injury  Outcome: PROGRESSING AS EXPECTED  Goal: Will remain free from falls  Outcome: PROGRESSING AS EXPECTED   Bed is locked and in lowest position. Call bell and belongings are within reach. Bed alarm is on. Tele sitter in place. When pt arrived back to unit, tele sitter was called to verify visualization. Tele sitter confirmed visual. Will continue to monitor PRN and Q1 hour.

## 2021-02-02 NOTE — PROGRESS NOTES
Pt denied pain throughout the night. Attempted to remind pt that he had surgery on his feet yesterday. Pt still denies any pain. Will continue to reassess PRN.   Paged MD on call to see if I can advance pt diet as there were no advance diet orders post op. [facility] on call MD stated over the phone order to advance diet back to regular. Orders placed.   Pt had no issues swallowing water with medications this AM.

## 2021-02-02 NOTE — ANESTHESIA TIME REPORT
Anesthesia Start and Stop Event Times     Date Time Event    2/1/2021 1622 Ready for Procedure     1639 Anesthesia Start     1900 Anesthesia Stop        Responsible Staff  02/01/21    Name Role Begin End    Armando Aguila M.D. Anesth 1639 1900        Preop Diagnosis (Free Text):  Pre-op Diagnosis     Bilateral equinovarus, left 5th toe infection        Preop Diagnosis (Codes):    Post op Diagnosis  Bilateral foot-drop      Premium Reason  A. 3PM - 7AM    Comments:

## 2021-02-02 NOTE — PROGRESS NOTES
POST-OP NOTE FOR LIMB PRESERVATION SERVICE    SURGERY DATE: 2/1/2021    PROCEDURE: Procedure(s):  LENGTHENING, TENDON- FOR FOOT DROP RECONSTRUCTION, POSTERIOR TIBIALIS TENDON  TRANSFER , ACHILLES LENGHTENING , LEFT MEDIAL RELEASE - Wound Class: Clean  IRRIGATION AND DEBRIDEMENT, WOUND-FOOT - Wound Class: Dirty or Infected    WEIGHT BEARING STATUS: Transfer weight bearing    PT CONSULT: Yes    ANTIBIOTICS: Per ID recommendation    PLAN TO RETURN TO O.R.: No    WOUND CARE PLAN: Do not change dressing, Surgeon will manage    FOLLOW-UP: Follow up with  Donny Roque M.D.  in 2 weeks    DURABLE MEDICAL EQUIPMENT: Offloading boot  at all times    OTHER: Boots not to be removed.      Donny Roque M.D.

## 2021-02-02 NOTE — CONSULTS
DATE OF SERVICE:  02/01/2021     REASON FOR CONSULT:  Bilateral equinovarus and fifth metatarsal ulceration.     HISTORY OF PRESENT ILLNESS:  The patient is a 50-year-old male who was brought   to the emergency room back in September 2020 with altered mental status.    During that time, he had significant hemodynamic changes.  He was brought to   the ICU.  He had some seizure-like activities.  He was also noted to have some   encephalopathy.  He was extubated later in the month then had further   neurologic changes.  MRI had been negative.  Since then, he was noted to have   foot deformities.  He developed an ulceration on the left fifth metatarsal.    He has altered mental status and is a poor historian.  Most of the history   symptoms are gleaned from chart review including recent hospital medicine   notes, wound care notes.     Past medical history, medications, allergies, family history, social history   and review of systems:  Reviewed on his original admit note as well as in the   chart.     PHYSICAL EXAMINATION:  VITAL SIGNS:  Afebrile, vital signs stable.  GENERAL:  Well-appearing male in no acute distress.   PSYCHIATRIC:  Pleasant demeanor, normal affect.  HEENT:  Eyes, pupils equal and round.   LUNGS:  Respirations regular rate.  Unlabored breathing.  CARDIOVASCULAR:  Palpable dorsalis pedis and posterior tibial pulse.  Brisk   capillary refill.  NEUROLOGIC:  He has difficult to follow commands.  He has some decreased   sensation and decreased muscle strength in his lower extremities.  SKIN:  Shows ulceration over his left fifth metatarsal head approximately the   size of a dime.  There is no surrounding cellulitis, no fluctuance.  MUSCULOSKELETAL:  He has fixed equinovarus bilaterally.  He has got decreased   range of motion.  He has no gross instability.  Muscle strength is limited and   he is nonambulatory.     DIAGNOSTIC DATA:  Foot x-rays were reviewed, demonstrated no obvious acute   trauma.  No  obvious osteomyelitis.     IMPRESSION:    1.  Encephalopathy.  2.  Equinovarus bilaterally.  3.  Left fifth metatarsal ulceration.     PLAN:  I discussed with the patient his options including operative and   nonoperative.  He is limited on full orientation, but he does ask appropriate   questions.  Recommended bilateral equinovarus reconstruction and irrigation   and debridement of his fifth, left foot.  The procedure and postoperative   course were discussed in detail, questions were answered.  Risks of surgery   were explained, which included, but not limited to wound problems, infection,   nerve injury, vascular injury, need for further surgery.  He is in agreement   with the procedure.  A 2-physician consent was obtained.        ______________________________  MD MIRANDA PAULINO/MADISON/GLADYS    DD:  02/01/2021 19:33  DT:  02/01/2021 20:11    Job#:  [id number]

## 2021-02-02 NOTE — ANESTHESIA PREPROCEDURE EVALUATION
Bilateral foot drop. H/o ETOH induced resp failure in Fall 2020 for which he was intubated and in the ICU. Ongoing encephalopathy.     Relevant Problems   No relevant active problems       Physical Exam    Airway   Mallampati: II  TM distance: >3 FB  Neck ROM: full       Cardiovascular - normal exam  Rhythm: regular  Rate: normal  (-) murmur     Dental - normal exam           Pulmonary - normal exam  Breath sounds clear to auscultation     Abdominal    Neurological - normal exam                 Anesthesia Plan    ASA 3 (encephalopathy with severe limitations)   ASA physical status 3 criteria: other (comment)    Plan - general       Airway plan will be ETT        Induction: intravenous    Postoperative Plan: Postoperative administration of opioids is intended.    Pertinent diagnostic labs and testing reviewed    Informed Consent:    Plan/risks discussed with: two physician consent performed.    Use of blood products discussed with: patient whom consented to blood products.

## 2021-02-02 NOTE — OR NURSING
"Patient recovered well in post-op. KIRTI AAO status. VSS, on RA. Surgical sites KIRTI, surgical dressing in place bilaterally, CDI. Tall CAM walking boots placed on patient by traction. Per patient, \"No\" post-op pain throughout recovery. BLE warm/pink, cap refill <3 seconds, popliteal pulses 2+, sensation KIRTI, strength KIRTI. Patient refused offered fluids. Patient contact, Kellee, updated and discussed POC. No patient belongings in recovery. Report called to MUSTAPHA Massey. Awaiting transport.   "

## 2021-02-02 NOTE — ANESTHESIA POSTPROCEDURE EVALUATION
Patient: Yury Blake    Procedure Summary     Date: 02/01/21 Room / Location: [place] [address] / SURGERY [facility]    Anesthesia Start: 1639 Anesthesia Stop: 1900    Procedures:       LENGTHENING, TENDON- FOR FOOT DROP RECONSTRUCTION, POSTERIOR TIBIALIS TENDON  TRANSFER , ACHILLES LENGHTENING , LEFT MEDIAL RELEASE (Bilateral Leg Lower)      IRRIGATION AND DEBRIDEMENT, WOUND-FOOT (Left Foot) Diagnosis: (Bilateral equinovarus, left 5th toe infection)    Surgeons: Donny Roque M.D. Responsible Provider: Armando Aguila M.D.    Anesthesia Type: general ASA Status: 3          Final Anesthesia Type: general  Last vitals  BP   Blood Pressure: 111/67    Temp   (P) 36.1 °C (97 °F)    Pulse   Pulse: 87   Resp   (!) 8    SpO2   99 %      Anesthesia Post Evaluation    Patient location during evaluation: PACU  Patient participation: complete - patient participated  Level of consciousness: awake and alert and sleepy but conscious  Pain score: 2    Airway patency: patent  Anesthetic complications: no  Cardiovascular status: hemodynamically stable  Respiratory status: acceptable  Hydration status: euvolemic    PONV: none           Nurse Pain Score: 0 (NPRS)

## 2021-02-03 PROCEDURE — A9270 NON-COVERED ITEM OR SERVICE: HCPCS | Performed by: NURSE PRACTITIONER

## 2021-02-03 PROCEDURE — 700102 HCHG RX REV CODE 250 W/ 637 OVERRIDE(OP): Performed by: NURSE PRACTITIONER

## 2021-02-03 PROCEDURE — 700102 HCHG RX REV CODE 250 W/ 637 OVERRIDE(OP): Performed by: STUDENT IN AN ORGANIZED HEALTH CARE EDUCATION/TRAINING PROGRAM

## 2021-02-03 PROCEDURE — 770006 HCHG ROOM/CARE - MED/SURG/GYN SEMI*

## 2021-02-03 PROCEDURE — A9270 NON-COVERED ITEM OR SERVICE: HCPCS | Performed by: HOSPITALIST

## 2021-02-03 PROCEDURE — 700111 HCHG RX REV CODE 636 W/ 250 OVERRIDE (IP): Performed by: HOSPITALIST

## 2021-02-03 PROCEDURE — 700102 HCHG RX REV CODE 250 W/ 637 OVERRIDE(OP): Performed by: HOSPITALIST

## 2021-02-03 PROCEDURE — A9270 NON-COVERED ITEM OR SERVICE: HCPCS | Performed by: STUDENT IN AN ORGANIZED HEALTH CARE EDUCATION/TRAINING PROGRAM

## 2021-02-03 RX ADMIN — THERA TABS 1 TABLET: TAB at 06:24

## 2021-02-03 RX ADMIN — Medication 100 MG: at 06:24

## 2021-02-03 RX ADMIN — TAMSULOSIN HYDROCHLORIDE 0.8 MG: 0.4 CAPSULE ORAL at 09:18

## 2021-02-03 RX ADMIN — DOCUSATE SODIUM 50 MG AND SENNOSIDES 8.6 MG 2 TABLET: 8.6; 5 TABLET, FILM COATED ORAL at 17:12

## 2021-02-03 RX ADMIN — DOCUSATE SODIUM 50 MG AND SENNOSIDES 8.6 MG 2 TABLET: 8.6; 5 TABLET, FILM COATED ORAL at 06:24

## 2021-02-03 RX ADMIN — RISPERIDONE 2 MG: 2 TABLET ORAL at 17:12

## 2021-02-03 RX ADMIN — ENOXAPARIN SODIUM 40 MG: 40 INJECTION SUBCUTANEOUS at 17:12

## 2021-02-03 RX ADMIN — ASPIRIN 325 MG ORAL TABLET 325 MG: 325 PILL ORAL at 06:24

## 2021-02-03 RX ADMIN — RISPERIDONE 2 MG: 2 TABLET ORAL at 06:24

## 2021-02-03 ASSESSMENT — PAIN SCALES - PAIN ASSESSMENT IN ADVANCED DEMENTIA (PAINAD)
FACIALEXPRESSION: SMILING OR INEXPRESSIVE
BODYLANGUAGE: RELAXED
CONSOLABILITY: NO NEED TO CONSOLE
BREATHING: NORMAL
TOTALSCORE: 0

## 2021-02-03 NOTE — THERAPY
Physical Therapy   Re-evaluation      Patient Name: Yury Blake  Age:  50 y.o., Sex:  male  Medical Record #: [id number]  Today's Date: 2/2/2021     Precautions: Fall Risk, Other (See Comments)  Comments: B LE WB for transfers only with boots on at all times    Assessment    Pt seen for PT re-eval. Pt initially evaluated on 9/15/20 after being found down with AMS and ETOH abuse. Pt was discharged from PT service on 11/5 as he had ceased to make progress with acute PT and continued to be limited by cognitive. Pt is now post B posterior tibialis tendon transfer and achilles lengthening and has orders for WB for transfers only and with off loading boots on at all times. Pt currently required min assist for bed mob and mod assist for STS. Pt unable to weight shift needed to transfer for chair. PT will cont at 3x/wk     Plan    Treatment plan modified to 3 times per week until therapy goals are met for the following treatments:  Bed Mobility, Gait Training, Neuro Re-Education / Balance, Self Care/Home Evaluation, Stair Training, Therapeutic Activities and Therapeutic Exercises.    DC Equipment Recommendations: Unable to determine at this time  Discharge Recommendations: Recommend post-acute placement for additional physical therapy services prior to discharge home         02/02/21 1532   Vitals   O2 (LPM) 2   O2 Delivery Device Silicone Nasal Cannula   Cognition    Cognition / Consciousness X   Speech/ Communication Delayed Responses   Orientation Level Not Oriented to Year;Not Oriented to Month;Not Oriented to Reason   Level of Consciousness Confused   Ability To Follow Commands 1 Step   Safety Awareness Impaired   New Learning Impaired   Attention Impaired   Comments appears to have improved cognitively   Passive ROM Lower Body   Passive ROM Lower Body X   Comments B post op boots   Strength Lower Body   Lower Body Strength  X   Gross Strength Generalized Weakness, Equal Bilaterally   Gait Analysis   Gait  Level Of Assist Unable to Participate   Weight Bearing Status WB for transfers only   Bed Mobility    Supine to Sit Minimal Assist   Sit to Supine Minimal Assist   Scooting Supervised   Skilled Intervention Verbal Cuing   Comments HOB raised   Functional Mobility   Sit to Stand Moderate Assist   Bed, Chair, Wheelchair Transfer Unable to Participate   Mobility bed mob, STS   Skilled Intervention Verbal Cuing;Sequencing   Comments unable to weight shift for transfer   Short Term Goals    Short Term Goal # 2 Patient will move sitting<>standing with supervision within 6tx in order to initiate gait and transfers   Short Term Goal # 3 Pt will be able to transfer bed to chair with SPV in 6tx in order to progress with therapy   Short Term Goal # 4 Pt will be able to demosntrate SPV with WC mobility in 6tx in order to progress with therapy   Anticipated Discharge Equipment and Recommendations   DC Equipment Recommendations Unable to determine at this time   Discharge Recommendations Recommend post-acute placement for additional physical therapy services prior to discharge home

## 2021-02-03 NOTE — CARE PLAN
Problem: Safety  Goal: Will remain free from injury  Outcome: PROGRESSING AS EXPECTED  Goal: Will remain free from falls  Outcome: PROGRESSING AS EXPECTED   Bed is locked and in lowest position. Bed alarm is on. Call bell and belongings are within reach. Tele sitter in place. Frequent Q1 hour rounding.

## 2021-02-03 NOTE — CARE PLAN
Problem: Pain Management  Goal: Pain level will decrease to patient's comfort goal  Outcome: PROGRESSING AS EXPECTED  Patient denies any pain. Patient does not remember having surgery on 2/1/21.     Problem: Mobility  Goal: Risk for activity intolerance will decrease  Outcome: PROGRESSING AS EXPECTED  Patient to be working more consistently with PT/OT post-operatively. Patient able to move to EOB without assist.

## 2021-02-03 NOTE — DISCHARGE PLANNING
Received document: Notice of Non-opposition from , [employer] Hernandez. Copy placed on chart.    Anticipated Discharge Disposition: Pending private guardianship with eventual [facility] placement.    Action: Private guardianship hearing scheduled for 2/5/2021. Verified  with management team that we have no role in private guardianship process. Will need to F/U with guardian once appointed.    Barriers to Discharge: Pending guardianship.    Plan: F/U with guardian.

## 2021-02-03 NOTE — DISCHARGE PLANNING
Received 2 documents: Addendum to Petition for Court to Assume Juristiction of Trust Which Proposed Protected Person is a Beneficiary;Petition to Remove Successor Trustee Yury Blake;Petition to Confirm Sole Successor Trustee Ira Cheung; and Petition for Instructions.  And Request for Submission. Both documents place in chart.

## 2021-02-03 NOTE — THERAPY
Occupational Therapy   Re-evaluation     Patient Name: Yury Blake  Age:  50 y.o., Sex:  male  Medical Record #: [id number]  Today's Date: 2/2/2021     Precautions  Precautions: Fall Risk, Other (See Comments)  Comments: BLE w/b for transfers only with boots on AAT    Assessment  Patient is 50 y.o. male seen for OT re-eval. Pt initially evaluated on 9/17/20 after being found down with AMS and ETOH abuse. Pt was discharged from OT service on 11/25 as he had ceased to make progress with acute OT 2/2 cognition. Pt is now s/p B posterior tibialis tendon transfer and achilles lengthening and has orders for WB for transfers only and with off loading boots on at all times. Pt presents to OT re-eval with continued impaired cognition, although appears slightly improved as pt knew he was at [facility] but didn't know the year or what he was here for. Pt following all 1-step commands consistently and was pleasant/cooperative. Pt able to complete seated grooming/hygiene with setup and UB dressing with spv and verbal cues. Pt stood EOB with modA HHA, but unable to complete ADL transfers. Deferred LB dressing this session due to bilateral boots in place.     Plan    Recommend Occupational Therapy 3 times per week until therapy goals are met for the following treatments:  Adaptive Equipment, Cognitive Skill Development, Manual Therapy Techniques, Neuro Re-Education / Balance, Self Care/Activities of Daily Living, Therapeutic Activities and Therapeutic Exercises.    DC Equipment Recommendations: Unable to determine at this time  Discharge Recommendations: Recommend post-acute placement for additional occupational therapy services prior to discharge home     Subjective    Pt alert, pleasant, and cooperative. Pt unable to provide year but knew he was at [facility].      Objective       02/02/21 1539   Prior Living Situation   Prior Services Home-Independent   Lives with - Patient's Self Care Capacity Alone and Able to Care For Self    Comments Pt reports he lives alone but unable to give further information regarding home setup.    Prior Level of ADL Function   Self Feeding Unable To Determine At This Time   Grooming / Hygiene Unable To Determine At This Time   Bathing Unable To Determine At This Time   Dressing Unable To Determine At This Time   Toileting Unable To Determine At This Time   Prior Level of IADL Function   Medication Management Unable To Determine At This Time   Laundry Unable To Determine At This Time   Kitchen Mobility Unable To Determine At This Time   Finances Unable To Determine At This Time   Home Management Unable To Determine At This Time   Shopping Unable To Determine At This Time   Prior Level Of Mobility Unable to Determine At This Time   Driving / Transportation Unable To Determine At This Time   Occupation (Pre-Hospital Vocational) Unable To Determine At This Time   Leisure Interests Unable To Determine At This Time   Cognition    Cognition / Consciousness X   Orientation Level Not Oriented to Year;Not Oriented to Month;Not Oriented to Reason   Level of Consciousness Confused   Ability To Follow Commands 1 Step   New Learning Impaired   Comments cognition appears improved, knew he is at [facility], did not know the year, unclear on what he is here for, following commands consistently, pleasant, cooperative, able to sequence ADLs tasks without cues   Balance Assessment   Comments seated at SBA, standing with HHA x2, no FWW present in room   Bed Mobility    Supine to Sit Minimal Assist   Comments HOB raised   ADL Assessment   Grooming Supervision;Seated  (brushing teeth)   Upper Body Dressing Supervision  (gown)   Lower Body Dressing   (deferred due to bilateral boots)   Toileting   (Unable to get to toilet)   Functional Mobility   Sit to Stand Moderate Assist   Toilet Transfers Unable to Participate   Mobility EOB, STS x1, scooting towards HOB   Activity Tolerance   Comments limited by weakness, cognition   Patient /  Family Goals   Patient / Family Goal #1 Unable to state   Short Term Goals   Short Term Goal # 1 Pt will complete toilet transfer using BSC if needed with spv, no LOB by discharge.   Short Term Goal # 2 Pt will complete standing grooming/hygiene with spv by discharge.   Short Term Goal # 3 Pt will complete toileting with spv by discharge.

## 2021-02-04 LAB
BACTERIA TISS AEROBE CULT: ABNORMAL
BACTERIA TISS AEROBE CULT: ABNORMAL
GRAM STN SPEC: ABNORMAL
SIGNIFICANT IND 70042: ABNORMAL
SITE SITE: ABNORMAL
SOURCE SOURCE: ABNORMAL

## 2021-02-04 PROCEDURE — 700102 HCHG RX REV CODE 250 W/ 637 OVERRIDE(OP): Performed by: NURSE PRACTITIONER

## 2021-02-04 PROCEDURE — A9270 NON-COVERED ITEM OR SERVICE: HCPCS | Performed by: NURSE PRACTITIONER

## 2021-02-04 PROCEDURE — 700111 HCHG RX REV CODE 636 W/ 250 OVERRIDE (IP): Performed by: HOSPITALIST

## 2021-02-04 PROCEDURE — 700101 HCHG RX REV CODE 250: Performed by: STUDENT IN AN ORGANIZED HEALTH CARE EDUCATION/TRAINING PROGRAM

## 2021-02-04 PROCEDURE — 700102 HCHG RX REV CODE 250 W/ 637 OVERRIDE(OP): Performed by: HOSPITALIST

## 2021-02-04 PROCEDURE — A9270 NON-COVERED ITEM OR SERVICE: HCPCS | Performed by: STUDENT IN AN ORGANIZED HEALTH CARE EDUCATION/TRAINING PROGRAM

## 2021-02-04 PROCEDURE — 700102 HCHG RX REV CODE 250 W/ 637 OVERRIDE(OP): Performed by: STUDENT IN AN ORGANIZED HEALTH CARE EDUCATION/TRAINING PROGRAM

## 2021-02-04 PROCEDURE — A9270 NON-COVERED ITEM OR SERVICE: HCPCS | Performed by: HOSPITALIST

## 2021-02-04 PROCEDURE — 770006 HCHG ROOM/CARE - MED/SURG/GYN SEMI*

## 2021-02-04 RX ORDER — AMOXICILLIN AND CLAVULANATE POTASSIUM 875; 125 MG/1; MG/1
1 TABLET, FILM COATED ORAL EVERY 12 HOURS
Status: DISCONTINUED | OUTPATIENT
Start: 2021-02-04 | End: 2021-02-05

## 2021-02-04 RX ADMIN — POLYETHYLENE GLYCOL 3350 1 PACKET: 17 POWDER, FOR SOLUTION ORAL at 06:03

## 2021-02-04 RX ADMIN — THERA TABS 1 TABLET: TAB at 06:03

## 2021-02-04 RX ADMIN — ASPIRIN 325 MG ORAL TABLET 325 MG: 325 PILL ORAL at 06:03

## 2021-02-04 RX ADMIN — DOCUSATE SODIUM 50 MG AND SENNOSIDES 8.6 MG 2 TABLET: 8.6; 5 TABLET, FILM COATED ORAL at 17:04

## 2021-02-04 RX ADMIN — AMOXICILLIN AND CLAVULANATE POTASSIUM 1 TABLET: 875; 125 TABLET, FILM COATED ORAL at 17:03

## 2021-02-04 RX ADMIN — RISPERIDONE 2 MG: 2 TABLET ORAL at 17:04

## 2021-02-04 RX ADMIN — MAGNESIUM HYDROXIDE 30 ML: 400 SUSPENSION ORAL at 17:36

## 2021-02-04 RX ADMIN — ENOXAPARIN SODIUM 40 MG: 40 INJECTION SUBCUTANEOUS at 17:37

## 2021-02-04 RX ADMIN — RISPERIDONE 2 MG: 2 TABLET ORAL at 06:03

## 2021-02-04 RX ADMIN — DOCUSATE SODIUM 50 MG AND SENNOSIDES 8.6 MG 2 TABLET: 8.6; 5 TABLET, FILM COATED ORAL at 06:03

## 2021-02-04 RX ADMIN — TAMSULOSIN HYDROCHLORIDE 0.8 MG: 0.4 CAPSULE ORAL at 09:15

## 2021-02-04 RX ADMIN — Medication 100 MG: at 06:03

## 2021-02-04 NOTE — CARE PLAN
Problem: Safety  Goal: Will remain free from injury  Outcome: PROGRESSING AS EXPECTED  Goal: Will remain free from falls  Outcome: PROGRESSING AS EXPECTED   Bed is locked and in lowest position. Call bell and belongings within reach. Bed alarm on. Tele sitter in place. Pt has no complaints at this time.

## 2021-02-04 NOTE — CARE PLAN
Problem: Communication  Goal: The ability to communicate needs accurately and effectively will improve  Outcome: PROGRESSING SLOWER THAN EXPECTED  Patient is confused and only oriented to self. Patient needs frequent reorienting.     Problem: Safety  Goal: Will remain free from injury  Outcome: PROGRESSING AS EXPECTED  Safety precautions are in place including bed locked and in lowest position, upper bed rails up, bed alarms on, call light within reach, treaded socks on, tray table and personal belongings within reach. Telesitter at bedside.

## 2021-02-05 LAB
BACTERIA SPEC ANAEROBE CULT: NORMAL
BACTERIA SPEC ANAEROBE CULT: NORMAL
BACTERIA TISS AEROBE CULT: ABNORMAL
BACTERIA TISS AEROBE CULT: ABNORMAL
BASOPHILS # BLD AUTO: 0.7 % (ref 0–1.8)
BASOPHILS # BLD: 0.08 K/UL (ref 0–0.12)
EOSINOPHIL # BLD AUTO: 0.63 K/UL (ref 0–0.51)
EOSINOPHIL NFR BLD: 5.7 % (ref 0–6.9)
ERYTHROCYTE [DISTWIDTH] IN BLOOD BY AUTOMATED COUNT: 51.1 FL (ref 35.9–50)
GRAM STN SPEC: ABNORMAL
HCT VFR BLD AUTO: 34.6 % (ref 42–52)
HGB BLD-MCNC: 11 G/DL (ref 14–18)
IMM GRANULOCYTES # BLD AUTO: 0.1 K/UL (ref 0–0.11)
IMM GRANULOCYTES NFR BLD AUTO: 0.9 % (ref 0–0.9)
LYMPHOCYTES # BLD AUTO: 2.56 K/UL (ref 1–4.8)
LYMPHOCYTES NFR BLD: 23.4 % (ref 22–41)
MCH RBC QN AUTO: 29.1 PG (ref 27–33)
MCHC RBC AUTO-ENTMCNC: 31.8 G/DL (ref 33.7–35.3)
MCV RBC AUTO: 91.5 FL (ref 81.4–97.8)
MONOCYTES # BLD AUTO: 0.75 K/UL (ref 0–0.85)
MONOCYTES NFR BLD AUTO: 6.8 % (ref 0–13.4)
NEUTROPHILS # BLD AUTO: 6.84 K/UL (ref 1.82–7.42)
NEUTROPHILS NFR BLD: 62.5 % (ref 44–72)
NRBC # BLD AUTO: 0 K/UL
NRBC BLD-RTO: 0 /100 WBC
PLATELET # BLD AUTO: 235 K/UL (ref 164–446)
PMV BLD AUTO: 11.5 FL (ref 9–12.9)
RBC # BLD AUTO: 3.78 M/UL (ref 4.7–6.1)
SIGNIFICANT IND 70042: ABNORMAL
SIGNIFICANT IND 70042: NORMAL
SIGNIFICANT IND 70042: NORMAL
SITE SITE: ABNORMAL
SITE SITE: NORMAL
SITE SITE: NORMAL
SOURCE SOURCE: ABNORMAL
SOURCE SOURCE: NORMAL
SOURCE SOURCE: NORMAL
WBC # BLD AUTO: 11 K/UL (ref 4.8–10.8)

## 2021-02-05 PROCEDURE — 97535 SELF CARE MNGMENT TRAINING: CPT

## 2021-02-05 PROCEDURE — A9270 NON-COVERED ITEM OR SERVICE: HCPCS | Performed by: NURSE PRACTITIONER

## 2021-02-05 PROCEDURE — 85025 COMPLETE CBC W/AUTO DIFF WBC: CPT

## 2021-02-05 PROCEDURE — 700102 HCHG RX REV CODE 250 W/ 637 OVERRIDE(OP): Performed by: NURSE PRACTITIONER

## 2021-02-05 PROCEDURE — 99254 IP/OBS CNSLTJ NEW/EST MOD 60: CPT | Performed by: INTERNAL MEDICINE

## 2021-02-05 PROCEDURE — 36415 COLL VENOUS BLD VENIPUNCTURE: CPT

## 2021-02-05 PROCEDURE — 700102 HCHG RX REV CODE 250 W/ 637 OVERRIDE(OP): Performed by: HOSPITALIST

## 2021-02-05 PROCEDURE — A9270 NON-COVERED ITEM OR SERVICE: HCPCS | Performed by: STUDENT IN AN ORGANIZED HEALTH CARE EDUCATION/TRAINING PROGRAM

## 2021-02-05 PROCEDURE — A9270 NON-COVERED ITEM OR SERVICE: HCPCS | Performed by: HOSPITALIST

## 2021-02-05 PROCEDURE — 99231 SBSQ HOSP IP/OBS SF/LOW 25: CPT | Performed by: NURSE PRACTITIONER

## 2021-02-05 PROCEDURE — 700111 HCHG RX REV CODE 636 W/ 250 OVERRIDE (IP): Performed by: HOSPITALIST

## 2021-02-05 PROCEDURE — A9270 NON-COVERED ITEM OR SERVICE: HCPCS | Performed by: INTERNAL MEDICINE

## 2021-02-05 PROCEDURE — 770006 HCHG ROOM/CARE - MED/SURG/GYN SEMI*

## 2021-02-05 PROCEDURE — 700102 HCHG RX REV CODE 250 W/ 637 OVERRIDE(OP): Performed by: STUDENT IN AN ORGANIZED HEALTH CARE EDUCATION/TRAINING PROGRAM

## 2021-02-05 PROCEDURE — 700102 HCHG RX REV CODE 250 W/ 637 OVERRIDE(OP): Performed by: INTERNAL MEDICINE

## 2021-02-05 RX ORDER — DOXYCYCLINE 100 MG/1
100 TABLET ORAL EVERY 12 HOURS
Status: COMPLETED | OUTPATIENT
Start: 2021-02-05 | End: 2021-02-15

## 2021-02-05 RX ADMIN — Medication 100 MG: at 05:21

## 2021-02-05 RX ADMIN — BISACODYL 10 MG: 10 SUPPOSITORY RECTAL at 09:33

## 2021-02-05 RX ADMIN — DOXYCYCLINE 100 MG: 100 TABLET, FILM COATED ORAL at 17:34

## 2021-02-05 RX ADMIN — ASPIRIN 325 MG ORAL TABLET 325 MG: 325 PILL ORAL at 05:21

## 2021-02-05 RX ADMIN — TAMSULOSIN HYDROCHLORIDE 0.8 MG: 0.4 CAPSULE ORAL at 09:32

## 2021-02-05 RX ADMIN — RISPERIDONE 2 MG: 2 TABLET ORAL at 05:21

## 2021-02-05 RX ADMIN — RISPERIDONE 2 MG: 2 TABLET ORAL at 17:34

## 2021-02-05 RX ADMIN — THERA TABS 1 TABLET: TAB at 05:21

## 2021-02-05 RX ADMIN — DOCUSATE SODIUM 50 MG AND SENNOSIDES 8.6 MG 2 TABLET: 8.6; 5 TABLET, FILM COATED ORAL at 17:33

## 2021-02-05 RX ADMIN — AMOXICILLIN AND CLAVULANATE POTASSIUM 1 TABLET: 875; 125 TABLET, FILM COATED ORAL at 05:22

## 2021-02-05 RX ADMIN — ENOXAPARIN SODIUM 40 MG: 40 INJECTION SUBCUTANEOUS at 17:34

## 2021-02-05 RX ADMIN — DOCUSATE SODIUM 50 MG AND SENNOSIDES 8.6 MG 2 TABLET: 8.6; 5 TABLET, FILM COATED ORAL at 05:22

## 2021-02-05 ASSESSMENT — ENCOUNTER SYMPTOMS
MEMORY LOSS: 1
CHILLS: 0
COUGH: 0
NAUSEA: 0
CONSTIPATION: 1
ABDOMINAL PAIN: 0
PALPITATIONS: 0
DIARRHEA: 0
SHORTNESS OF BREATH: 0
VOMITING: 0
MYALGIAS: 0
BACK PAIN: 0
FLANK PAIN: 0
SORE THROAT: 0
SINUS PAIN: 0
FEVER: 0
NECK PAIN: 0
DIZZINESS: 0
HEADACHES: 0

## 2021-02-05 ASSESSMENT — COGNITIVE AND FUNCTIONAL STATUS - GENERAL
HELP NEEDED FOR BATHING: A LOT
DRESSING REGULAR LOWER BODY CLOTHING: A LOT
SUGGESTED CMS G CODE MODIFIER DAILY ACTIVITY: CK
TOILETING: A LOT
DAILY ACTIVITIY SCORE: 18

## 2021-02-05 ASSESSMENT — GAIT ASSESSMENTS: DISTANCE (FEET): 2

## 2021-02-05 NOTE — PROGRESS NOTES
Patient has been resting well. Toes are pink and warm as assessed through boot toes. However no removal of boot is ordered. MD will remove when rounds are performed. Patient is not participating in self care, nor does he wish for staff to perform some care as in linen changes or repositioning. Education was provided with each attempt. Will continue to care for patient as he will allow.

## 2021-02-05 NOTE — CONSULTS
INFECTIOUS DISEASES INPATIENT CONSULT NOTE     Date of Service: 2/5/2021    Consult Requested By: ALONZO Haile    Reason for Consultation: Left foot infection    History of Present Illness:   Yury Blake is a 50 y.o. man with a history of alcohol abuse and hypertension admitted 9/9/2020 for acute encephalopathy.  Patient has had a prolonged hospitalization and was initially admitted for encephalopathy.  Patient had an extensive work-up and it was felt that his toxic encephalopathy was secondary to his alcohol use.  His encephalopathy has improved.  His hospital course has been complicated by a left fifth metatarsal ulcer.  Patient denies any prior injury or trauma.  X-ray of the left foot revealed no bony injury or osseous lesions.  Patient was evaluated by the limb preservation service on 1/22/2021 and was noted to have cellulitis rounding the left fifth metatarsal ulcer.  Wound culture on 1/22 was positive for MSSA for which the patient was treated with Augmentin through 1/31.  Patient is status post bilateral gastrocsoleus recessions, bilateral posterior tibial tendon transfer to the lateral cuneiform, bilateral deep compartment fasciotomy, bilateral flexor tendon release from toes 1 through 5, left medial column release, left foot I&D of multiple foot compartments and left fifth metatarsal bone biopsy on 2/1/2021 by Dr. Roque.  Debridement was down to the level of bone of the fifth metatarsal per the operative note.  Lateral aspect of the fifth metatarsal head sent for bone biopsy, culture and pathology.  Bone culture of left fifth metatarsal growing Staph epidermidis, oxacillin resistant.  Tissue culture from the left fifth toe ulcer also growing corynebacterium species.  Bone biopsy of left fifth metatarsal negative for osteomyelitis.  Patient remains on Augmentin.  Infectious disease service consulted for antibiotic recommendations.      All other review of systems reviewed and  negative except those documented above in the HPI.     PMH:   Past Medical History:   Diagnosis Date   • Arthritis     RA   • Hypertension    • Pain     Right ankle   • Psychiatric problem     anxiety       PSH:  Past Surgical History:   Procedure Laterality Date   • TENDON LENGHTENING Bilateral 2/1/2021    Procedure: LENGTHENING, TENDON- FOR FOOT DROP RECONSTRUCTION, POSTERIOR TIBIALIS TENDON  TRANSFER , ACHILLES LENGHTENING , LEFT MEDIAL RELEASE;  Surgeon: Donny Roque M.D.;  Location: SURGERY [facility];  Service: Orthopedics   • IRRIGATION & DEBRIDEMENT ORTHO Left 2/1/2021    Procedure: IRRIGATION AND DEBRIDEMENT, WOUND-FOOT;  Surgeon: Donny Roque M.D.;  Location: SURGERY [facility];  Service: Orthopedics   • ANKLE ORIF Right 9/26/2017    Procedure: ANKLE ORIF SYNDESOMOSIS REPAIR;  Surgeon: Armando Woodard M.D.;  Location: SURGERY [facility];  Service: Orthopedics       FAMILY HX:  Reviewed and not pertinent to the patient's clinical presentation.    SOCIAL HX:  Social History     Socioeconomic History   • Marital status:      Spouse name: Not on file   • Number of children: Not on file   • Years of education: Not on file   • Highest education level: Not on file   Occupational History   • Not on file   Social Needs   • Financial resource strain: Not on file   • Food insecurity     Worry: Not on file     Inability: Not on file   • Transportation needs     Medical: Not on file     Non-medical: Not on file   Tobacco Use   • Smoking status: Never Smoker   • Smokeless tobacco: Current User     Types: Chew   Substance and Sexual Activity   • Alcohol use: Yes     Comment: 6-10 beers daily   • Drug use: No   • Sexual activity: Not on file   Lifestyle   • Physical activity     Days per week: Not on file     Minutes per session: Not on file   • Stress: Not on file   Relationships   • Social connections     Talks on phone: Not on file     Gets together: Not on file     Attends [employer] service:  Not on file     Active member of club or organization: Not on file     Attends meetings of clubs or organizations: Not on file     Relationship status: Not on file   • Intimate partner violence     Fear of current or ex partner: Not on file     Emotionally abused: Not on file     Physically abused: Not on file     Forced sexual activity: Not on file   Other Topics Concern   • Not on file   Social History Narrative   • Not on file     Social History     Tobacco Use   Smoking Status Never Smoker   Smokeless Tobacco Current User   • Types: Chew     Social History     Substance and Sexual Activity   Alcohol Use Yes    Comment: 6-10 beers daily       Allergies/Intolerances:  No Known Allergies      Other Current Medications:    Current Facility-Administered Medications:   •  amoxicillin-clavulanate (AUGMENTIN) 875-125 MG per tablet 1 Tab, 1 Tab, Oral, Q12HRS, Daina Reed A.P.R.N., 1 Tab at 02/05/21 0522  •  senna-docusate (PERICOLACE or SENOKOT S) 8.6-50 MG per tablet 2 Tab, 2 Tab, Oral, BID, 2 Tab at 02/05/21 0522 **AND** polyethylene glycol/lytes (MIRALAX) PACKET 1 Packet, 1 Packet, Oral, QDAY PRN, 1 Packet at 02/04/21 0603 **AND** magnesium hydroxide (MILK OF MAGNESIA) suspension 30 mL, 30 mL, Oral, QDAY PRN, 30 mL at 02/04/21 1736 **AND** bisacodyl (DULCOLAX) suppository 10 mg, 10 mg, Rectal, QDAY PRN, Adonis Solares M.D., 10 mg at 02/05/21 0933  •  acetaminophen (Tylenol) tablet 650 mg, 650 mg, Oral, Q6HRS PRN, Imani Rosa A.P.R.N.  •  ondansetron (ZOFRAN ODT) dispertab 4 mg, 4 mg, Oral, Q6HRS PRN, Diana Reed A.P.R.N.  •  risperiDONE (RISPERDAL) tablet 2 mg, 2 mg, Oral, BID, Diana Reed A.P.R.N., 2 mg at 02/05/21 0521  •  aspirin (ASA) tablet 325 mg, 325 mg, Oral, DAILY, BYRON VillagranP.R.N., 325 mg at 02/05/21 0521  •  multivitamin (THERAGRAN) tablet 1 Tab, 1 Tab, Oral, DAILY, JAVIER Villagran.P.R.N., 1 Tab at 02/05/21 0521  •  thiamine tablet 100 mg, 100 mg, Oral, DAILY, Gisele Barnes,  "MJose AlbertoDJose Alberto, 100 mg at 21 0521  •  tamsulosin (FLOMAX) capsule 0.8 mg, 0.8 mg, Oral, AFTER BREAKFAST, Gisele Barnes M.D., 0.8 mg at 21 0932  •  enoxaparin (LOVENOX) inj 40 mg, 40 mg, Subcutaneous, Q EVENING, Gisele Barnes M.D., 40 mg at 21 5108  [unfilled]    Most Recent Vital Signs:  /57   Pulse 87   Temp 36 °C (96.8 °F) (Temporal)   Resp 17   Ht 1.803 m (5' 11\")   Wt 80.3 kg (177 lb)   SpO2 96%   BMI 24.69 kg/m²   Temp  Av.6 °C (97.8 °F)  Min: 35.6 °C (96.1 °F)  Max: 39 °C (102.2 °F)    Physical Exam:  General: well nourished, no diaphoresis, well-appearing, no acute distress, nontoxic  HEENT: sclera anicteric, PERRL, extraocular muscles intact, mucous membranes moist, oropharynx clear and moist, no oral lesions or exudate  Neck: supple, no lymphadenopathy  Chest: CTAB, no rales, rhonchi or wheezes, normal work of breathing.  Cardiac: regular rate and rhythm, normal S1 S2, no murmurs, rubs or gallops  Abdomen: + bowel sounds, soft, non-tender, non-distended, no hepatosplenomegaly  Extremities: WWP, no edema, 2+ pedal pulses  Skin: warm and dry, no rashes or worrisome lesions  Neuro: Alert and oriented times 3, non-focal exam, speech fluent, full range of motion to bilateral upper and lower extremities.  Left fifth metatarsal head surgical site with sutures in place, well approximated.  No active drainage, surrounding erythema or edema.  Other surgical sites on foot with sutures in place, without any active drainage or surrounding edema or erythema.  Psych: Pleasant, flat affect    Pertinent Lab Results:  No results for input(s): WBC, HGB in the last 72 hours.    Invalid input(s): PLATELET, ABSOLUTENEUT           No results for input(s): SODIUM, POTASSIUM, CHLORIDE, CO2, CREATININE, EGFR in the last 72 hours.    Invalid input(s): UREANITROGEN, GULCOSE     No results for input(s): ALBUMIN in the last 72 hours.    Invalid input(s): AST, ALT, ALKPHOS, BILITOT, TOTALBILIRUB, " BILIRUBINTOT, BILIRUBINDIR, BILIRUBININD, ALKALINEPHOS     Pertinent Micro:  Results     Procedure Component Value Units Date/Time    Anaerobic Culture [[id number]] Collected: 02/01/21 1823    Order Status: Completed Specimen: Bone Updated: 02/05/21 0756     Significant Indicator NEG     Source BONE     Site Left Fifth Metatarsal     Culture Result Culture in progress.    Narrative:      Surgery Specimen    CULTURE TISSUE W/ GRM STAIN [[id number]]  (Abnormal)  (Susceptibility) Collected: 02/01/21 1823    Order Status: Completed Specimen: Bone Updated: 02/05/21 0756     Significant Indicator POS     Source BONE     Site Left Fifth Metatarsal     Culture Result Growth noted after further incubation, see below for  organism identification.       Gram Stain Result No organisms seen.     Culture Result Staphylococcus epidermidis  Isolated from enrichment broth only, please correlate with  clinical condition.      Narrative:      Surgery Specimen    Susceptibility     Staphylococcus epidermidis (1)     Antibiotic Interpretation Microscan Method Status    Azithromycin Resistant >4 mcg/mL AMANDA Final    Clindamycin Resistant >4 mcg/mL AMANDA Final    Cefotaxime Resistant <=8 mcg/mL AMANDA Final    Cefazolin Resistant <=8 mcg/mL AMANDA Final    Cefepime Resistant <=4 mcg/mL AMANDA Final    Daptomycin Sensitive <=0.5 mcg/mL AMANDA Final    Erythromycin Resistant >4 mcg/mL AMANDA Final    Ampicillin/sulbactam Resistant <=8/4 mcg/mL AMANDA Final    Vancomycin Sensitive 1 mcg/mL AMANDA Final    Oxacillin Resistant >2 mcg/mL AMANDA Final    Penicillin Resistant >2 mcg/mL AMANDA Final    Trimeth/Sulfa Sensitive 2/38 mcg/mL AMANDA Final    Tetracycline Sensitive <=4 mcg/mL AMANDA Final                   CULTURE TISSUE W/ GRM STAIN [[id number]]  (Abnormal) Collected: 02/01/21 1822    Order Status: Completed Specimen: Tissue Updated: 02/04/21 1420     Significant Indicator POS     Source TISS     Site Left Fifth Toe Ulcer     Culture Result Growth noted after further  "incubation, see below for  organism identification.       Gram Stain Result No organisms seen.     Culture Result Corynebacterium species  Isolated from enrichment broth only, please correlate with  clinical condition.      Narrative:      Surgery Specimen    Anaerobic Culture [[id number]] Collected: 02/01/21 1822    Order Status: Completed Specimen: Tissue Updated: 02/04/21 1420     Significant Indicator NEG     Source TISS     Site Left Fifth Toe Ulcer     Culture Result Culture in progress.    Narrative:      Surgery Specimen    GRAM STAIN [[id number]] Collected: 02/01/21 1823    Order Status: Completed Specimen: Bone Updated: 02/02/21 1331     Significant Indicator .     Source BONE     Site Left Fifth Metatarsal     Gram Stain Result No organisms seen.    Narrative:      Surgery Specimen    GRAM STAIN [[id number]] Collected: 02/01/21 1822    Order Status: Completed Specimen: Tissue Updated: 02/02/21 1331     Significant Indicator .     Source TISS     Site Left Fifth Toe Ulcer     Gram Stain Result No organisms seen.    Narrative:      Surgery Specimen    SARS-CoV-2 PCR (24 hour In-House): Collect NP swab in [facility] [[id number]] Collected: 01/29/21 1649    Order Status: Completed Specimen: Respirate from Nasopharyngeal Updated: 01/30/21 0213     SARS-CoV-2 Source NP Swab     SARS-CoV-2 by PCR NotDetected     Comment: PATIENTS: Important information regarding your results and instructions can  be found at [email or web address]   \"After your  Covid-19 Test\"  [facility] providers: PLEASE REFER TO DE-ESCALATION AND RETESTING PROTOCOL  on [email or web address]  **The Roche SARS-CoV-2 PCR test has been made available for use under the  Emergency Use Authorization (EUA) only.         Narrative:      Collected By:[id number] RHONDA ROSE  Have you been in close contact with a person who is suspected  or known to be positive for COVID-19 within the last 30 days  (e.g. last seen that person < 30 days ago)->No        "     No results found for: BLOODCULTU, BLDCULT, BCHOLD     Studies:  Dx-foot-complete 3+ Left    Result Date: 1/21/2021 1/21/2021 7:45 PM HISTORY/REASON FOR EXAM: Wound check TECHNIQUE/EXAM DESCRIPTION:  AP, lateral, and oblique views of the LEFT foot. COMPARISON:  None FINDINGS: The bony structures and articulations appear within normal limits without visualized fracture, subluxation, or dislocation. Atherosclerotic calcification are seen.     1.  No acute traumatic bony injury. No destructive osseous lesion is readily apparent, note that plain film can be insensitive for evaluation of osteomyelitis and three-phase bone scan or MRI would offer improved diagnostic sensitivity as clinically appropriate 2.  Atherosclerosis    Ir-us Guided Piv    Result Date: 2/1/2021  EXAMINATION:                                                                    HISTORY/REASON FOR EXAM:  Ultrasound Guided PIV.  TECHNIQUE/EXAM DESCRIPTION AND NUMBER OF VIEWS:  Peripheral IV insertion with ultrasound guidance.  The procedure was prepared using maximal sterile barrier technique including sterile gown, mask, cap, and donning of sterile gloves following appropriate hand hygiene and/or sterile scrub. Patient skin site was prepped with 2% Chlorhexidine solution.   FINDINGS: Peripheral IV insertion with Ultrasound Guidance was performed by qualified imaging nursing staff without the assistance of a Radiologist.      Ultrasound-guided PERIPHERAL IV INSERTION performed by qualified nursing staff as above.    Dx-portable Fluoroscopy < 1 Hour    Result Date: 2/1/2021 2/1/2021 4:39 PM HISTORY/REASON FOR EXAM: Operation TECHNIQUE/EXAM DESCRIPTION AND NUMBER OF VIEWS: 2 fluoroscopic views of the unlabeled foot. Total fluoroscopy time was 6.8 seconds. COMPARISON: 1/21/2021 radiographs of the left foot FINDINGS: Coned down fluoroscopic views demonstrate operative procedure progress with the lateral and middle cuneiforms identified fluoroscopically .      Operative procedure in progress with cuneiforms identified      IMPRESSION:   1.  Left fifth metatarsal head ulceration    2.  Encephalopathy, improved  3.  Alcohol dependence      PLAN:   Yury Blake is a 50 y.o. man with a history of hypertension, and alcohol dependence admitted for acute encephalopathy secondary to alcohol use.  Patient has had a prolonged hospitalization and his acute encephalopathy has since improved.  His hospital course was complicated by a left fifth metatarsal head ulceration with surrounding cellulitis.  Cultures on 1/22 grew MSSA.  He was evaluated by the limb preservation service and underwent I&D of multiple compartments on 2/1/2021 by Dr. Roque.  Debridement was down to the level of the bone of the left fifth metatarsal head.  Operative cultures are growing oxacillin resistant Staph epidermidis (MRSE).  Tissue culture also grew corynebacterium species. However, pathology of the left fifth metatarsal head is negative for osteomyelitis.    -Discontinue Augmentin  -Transition to p.o. doxycycline 100 mg twice daily for 10-day course as the patient underwent I&D of multiple compartments of the foot  -Patient will not need a prolonged course of antibiotics as the pathology of the left metatarsal head was negative for osteomyelitis  -Continue wound care      Plan of care discussed with ALONZO De Jesus.  ID signing off.  Please reconsult if needed.    Birdie Fuller M.D.      Please note that this dictation was created using voice recognition software. I have worked with technical experts from [facility] to optimize the interface.  I have made every reasonable attempt to correct obvious errors, but there may be errors of grammar and possibly content that I did not discover before finalizing the note.

## 2021-02-05 NOTE — WOUND TEAM
Pt was on f/u list for pressure injury assessment on L lateral foot. He underwent surgical intervention for BLE with ortho on 2/1.  Site assessed and pressure injury is now a closed incision. Notified primary RN and supervisor. Primary RN to obtain dressing care orders from ortho team. Currently has adaptic dressing in places secured with cast padding and ace wrap. Bilateral walker boots in place. Wound team signing off and nursing to re consult as needed for new wounds.

## 2021-02-05 NOTE — THERAPY
Occupational Therapy  Daily Treatment     Patient Name: Yury Blake  Age:  50 y.o., Sex:  male  Medical Record #: [id number]  Today's Date: 2/5/2021     Precautions  Precautions: Fall Risk, Other (See Comments)  Comments: BLE w/b for transfers only, boots on AAT    Assessment    Pt able to complete BSC transfer with modA using FWW, however required maxA for toileting. Pt able to complete transfer into chair with modA and complete grooming/hygiene with setup and verbal cues for attention/initiation.     Plan    Continue current treatment plan.    DC Equipment Recommendations: Unable to determine at this time  Discharge Recommendations: Recommend post-acute placement for additional occupational therapy services prior to discharge home    Subjective    Pt alert, pleasant, and cooperative with OT session. Pt reported feeling dizzy while seated EOB, however after a few minutes dizziness subsided.     Objective       02/05/21 1206   Cognition    Cognition / Consciousness X   New Learning Impaired   Attention Impaired   Sequencing Impaired   Comments pleasant, cooperative, cues for initiation/attention   Balance   Comments seated at SBA, standing with modA using FWW, no overt LOB, initial posterior lean in standing but improved with cues   Bed Mobility    Supine to Sit Moderate Assist   Comments assist for BLE due to boots being heavy   Activities of Daily Living   Grooming Supervision;Seated  (washing face, brushing hair)   Toileting Maximal Assist   Functional Mobility   Sit to Stand Moderate Assist   Toilet Transfers Moderate Assist  (BSC)   Transfer Method Stand Step   Mobility EOB to BSC, BSC to chair, used FWW   Distance (Feet) 2   # of Times Distance was Traveled 2   Activity Tolerance   Comments limited by weakness, fatigue, cognition, balance   Patient / Family Goals   Patient / Family Goal #1 Unable to state   Short Term Goals   Short Term Goal # 1 Pt will complete toilet transfer using BSC if needed  with spv, no LOB by discharge.   Goal Outcome # 1 Progressing slower than expected   Short Term Goal # 2 Pt will complete seated grooming/hygiene with spv by discharge.   Goal Outcome # 2 Progressing as expected   Short Term Goal # 3 Pt will complete toileting with spv by discharge.   Goal Outcome # 3 Progressing slower than expected

## 2021-02-05 NOTE — PROGRESS NOTES
[facility] Medicine Progress Note    Date of Service  2/5/2021    Chief Complaint  Altered mental status    Hospital Course  Mr. Blake is a 50-year-old male who was brought to the emergency department on 9/9/2020 with altered mental status after he was found by his ex-wife to be obtunded after she had checked on him when he had not been seen for at least 2 days.  At that time he was alert and oriented x0 and found to be hypotensive.  GCS in the ED was 7 and he was severely hypotensive.  He was intubated for airway protection and central venous access was obtained for administration of high-volume crystalloid resuscitation and vasopressor therapy.  He had a significant leukocytosis with a WBC of 22.9 and was hypokalemic with a potassium level of 2.7.  Additionally he had acute kidney injury with a creatinine of 2.73.  His INR was 8.93 and he also had a lactate level of 11.  Urinalysis was performed and was negative for urinary tract infection.  He was diagnosed with septic shock and admitted to the intensive care unit for further evaluation and treatment.  A lumbar puncture was done on 9/9/2020.  He was also noted to have seizure-like activity on 9/10/2020 so an EEG was obtained and was significant for a moderate encephalopathy.  Extubation occurred on 9/11/2020.  He also had a traumatic catheter removal so on 9/21/2020 urology was consulted but was ultimately unnecessary.  On 9/13/2020 he was transferred out of the ICU where he required restraints due to confusion, hallucination, agitation, and impulsivity.  An MRI was performed and was negative for acute pathologies.  He was also treated for a UTI starting 10/2/2020.  He has also fallen multiple times in hospital.  Additionally, on 11/11/2020 he had an acute episode of sinus tachycardia in the 140s accompanied by hypotension.  There was concern for STEMI so cardiology was consulted.  A 12-lead EKG was obtained and was concerning for atrial flutter.  The patient  "received metoprolol after which a repeat ECG showed sinus tachycardia.  A D-dimer was obtained and was elevated for which a CT chest was done that was negative for pulmonary embolism.  Cardiology subsequently signed off.  He has remained in the hospital for a prolonged period of time and is now pending guardianship and then will need placement.  Guardianship hearing took place 2/5/2021.   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 Ortho was consulted and he does require I&D. Surgery with Dr. Roque completed 2/1/2021    Interval Problem Update  Patient lying in bed, pleasant and cooperative.  He is awaiting guardianship hearing.  He feels \"backed up.\"  He denies chills, pain and any other problems.  -As needed bowel medications  -Surgical cultures growing Corynebacterium and Staphylococcus epidermidis  -ID consulted, recommended 10 days of oral doxycycline    Consultants/Specialty  Critical care  Psychiatry  Cardiology  Urology  Infectious disease    Code Status  Full Code    Disposition  Pending guardianship and then placement.  Guardianship hearing scheduled for 2/5/2021.    Review of Systems  Review of Systems   Constitutional: Negative for chills, fever and malaise/fatigue.   HENT: Negative for sinus pain and sore throat.    Respiratory: Negative for cough and shortness of breath.    Cardiovascular: Negative for chest pain, palpitations and leg swelling.   Gastrointestinal: Positive for constipation. Negative for abdominal pain, diarrhea, nausea and vomiting.   Genitourinary: Negative for dysuria and flank pain.   Musculoskeletal: Negative for back pain, joint pain, myalgias and neck pain.   Skin: Negative for itching and rash.   Neurological: Negative for dizziness and headaches.   Psychiatric/Behavioral: Positive for memory loss.      Physical Exam  Temp:  [36 °C (96.8 °F)-36.8 °C (98.3 °F)] 36 °C (96.8 °F)  Pulse:  [87-98] 87  Resp:  [16-17] 17  BP: " (108-123)/(57-72) 110/57  SpO2:  [96 %-100 %] 96 %    Physical Exam  Vitals signs and nursing note reviewed.   Constitutional:       General: He is awake. He is not in acute distress.     Comments: Unkempt appearance  Frail, chronically ill appearing   HENT:      Head: Normocephalic and atraumatic.      Mouth/Throat:      Lips: Pink.      Mouth: Mucous membranes are moist.   Eyes:      Pupils: Pupils are equal, round, and reactive to light.   Neck:      Musculoskeletal: Normal range of motion and neck supple.   Cardiovascular:      Rate and Rhythm: Normal rate and regular rhythm.      Pulses: Normal pulses.   Pulmonary:      Effort: Pulmonary effort is normal.      Breath sounds: Normal breath sounds.   Abdominal:      General: Bowel sounds are normal. There is no distension or abdominal bruit.      Palpations: Abdomen is soft.      Tenderness: There is no abdominal tenderness.   Musculoskeletal:      Right lower leg: No edema.      Left lower leg: No edema.        Feet:    Skin:     General: Skin is warm and dry.   Neurological:      Mental Status: He is alert. He is disoriented.   Psychiatric:         Attention and Perception: Attention normal.         Mood and Affect: Affect is flat.         Speech: Speech normal.         Behavior: Behavior is cooperative.         Cognition and Memory: Cognition is impaired. Memory is impaired. He exhibits impaired recent memory and impaired remote memory.       Fluids    Intake/Output Summary (Last 24 hours) at 2/5/2021 1419  Last data filed at 2/5/2021 1200  Gross per 24 hour   Intake 1600 ml   Output 4350 ml   Net -2750 ml     Laboratory    Imaging  DX-PORTABLE FLUOROSCOPY < 1 HOUR   Final Result      Operative procedure in progress with cuneiforms identified      IR-US GUIDED PIV   Final Result    Ultrasound-guided PERIPHERAL IV INSERTION performed by    qualified nursing staff as above.      DX-FOOT-COMPLETE 3+ LEFT   Final Result         1.  No acute traumatic bony injury.  No destructive osseous lesion is readily apparent, note that plain film can be insensitive for evaluation of osteomyelitis and three-phase bone scan or MRI would offer improved diagnostic sensitivity as clinically    appropriate   2.  Atherosclerosis      CT-CTA CHEST PULMONARY ARTERY W/ RECONS   Final Result      1.  No evidence of pulmonary emboli.   2.  Right lower lobe discoid atelectasis.   3.  Gynecomastia.            DX-CHEST-PORTABLE (1 VIEW)   Final Result      No acute cardiac or pulmonary abnormalities are identified.      MR-BRAIN-W/O   Final Result      1.  Moderate diffuse cerebral atrophy.      2.  Minimal periventricular and juxtacortical white matter changes consistent with chronic microvascular ischemic gliosis.      3.  No evidence of acute infarction in the brain parenchyma.      DX-ABDOMEN FOR TUBE PLACEMENT   Final Result      Feeding tube tip overlies the second portion of the duodenum.      DX-CHEST-PORTABLE (1 VIEW)   Final Result         1.  Patchy left lung base opacity, new since prior, could represent early infiltrate            DX-CHEST-PORTABLE (1 VIEW)   Final Result         1.  Patchy left lung base opacity, new since prior, could represent early infiltrate         [id number] VIEW   Final Result      Enteric tube projects over the distal stomach/pylorus.         EC-ECHOCARDIOGRAM COMPLETE W/O CONT   Final Result      DX-CHEST-PORTABLE (1 VIEW)   Final Result         1.  No acute cardiopulmonary disease.      CT-ABDOMEN-PELVIS W/O   Final Result      1.  No renal stone or hydronephrosis.   2.  Normal appendix.   3.  No focal mesenteric inflammatory process.      DX-ABDOMEN FOR TUBE PLACEMENT   Final Result      NG tube tip projects over expected location the gastric fundus.      US-ABDOMEN COMPLETE SURVEY   Final Result         1.  Echogenic liver compatible with fatty change versus fibrosis.   2.  Gallbladder sludge without additional sonographic findings of cholecystitis.   3.   Partial atrophic bilateral kidneys with lobulated contour         CT-HEAD W/O   Final Result         1.  No acute intracranial abnormality is identified, there are nonspecific white matter changes, commonly associated with small vessel ischemic disease.  Associated mild cerebral atrophy is noted.   2.  Atherosclerosis.      DX-CHEST-PORTABLE (1 VIEW)   Final Result         1.  No acute cardiopulmonary disease.         Assessment/Plan  * Toxic encephalopathy- (present on admission)  Assessment & Plan  -Unknown etiology - hypoxic induced brain injury vs Wernicke encephalopathy.  -LP, MRI brain, EEG negative. TSH normal. HIV/RPR negative.  -Psych was consulted and believe it is alcohol related.  -Continue risperdal.  -Remains free from restraints.  -Has fallen multiple times while in hospital. Sitter now at bedside.     Open wound of left foot  Assessment & Plan  Lateral aspect, worsening, red, nontender  HgA1c 1/21: 5.2%  No leukocytosis  Wound care following  1/21 LPS consulted  1/24: Wound culture growing Staph aureus. Continue Augmentin. Bactrim discontinued.  2/1: I &D surgery of foot with Dr. Roque    2/4: Bone biopsy cultures positive for S epidermitis and Corynebacterium. ID consulted  2/5: ID recommended 10 days of doxycycline    Normocytic anemia- (present on admission)  Assessment & Plan  -Very mild, no active bleeding.  - stable  -Check periodically.    Alcohol abuse- (present on admission)  Assessment & Plan  -Abstinent since admission.  -Continue daily PO vitamin therapy.     VTE prophylaxis: Lovenox    I have performed a physical exam and reviewed and updated ROS and Plan today (2/5/2021). In review of previous note, there are no changes except as documented above.       TRACE Haile.

## 2021-02-06 PROCEDURE — 700102 HCHG RX REV CODE 250 W/ 637 OVERRIDE(OP): Performed by: STUDENT IN AN ORGANIZED HEALTH CARE EDUCATION/TRAINING PROGRAM

## 2021-02-06 PROCEDURE — A9270 NON-COVERED ITEM OR SERVICE: HCPCS | Performed by: HOSPITALIST

## 2021-02-06 PROCEDURE — A9270 NON-COVERED ITEM OR SERVICE: HCPCS | Performed by: NURSE PRACTITIONER

## 2021-02-06 PROCEDURE — 700102 HCHG RX REV CODE 250 W/ 637 OVERRIDE(OP): Performed by: NURSE PRACTITIONER

## 2021-02-06 PROCEDURE — 700102 HCHG RX REV CODE 250 W/ 637 OVERRIDE(OP): Performed by: HOSPITALIST

## 2021-02-06 PROCEDURE — 700102 HCHG RX REV CODE 250 W/ 637 OVERRIDE(OP): Performed by: INTERNAL MEDICINE

## 2021-02-06 PROCEDURE — 770006 HCHG ROOM/CARE - MED/SURG/GYN SEMI*

## 2021-02-06 PROCEDURE — A9270 NON-COVERED ITEM OR SERVICE: HCPCS | Performed by: INTERNAL MEDICINE

## 2021-02-06 PROCEDURE — 700111 HCHG RX REV CODE 636 W/ 250 OVERRIDE (IP): Performed by: HOSPITALIST

## 2021-02-06 PROCEDURE — A9270 NON-COVERED ITEM OR SERVICE: HCPCS | Performed by: STUDENT IN AN ORGANIZED HEALTH CARE EDUCATION/TRAINING PROGRAM

## 2021-02-06 RX ADMIN — THERA TABS 1 TABLET: TAB at 04:35

## 2021-02-06 RX ADMIN — ENOXAPARIN SODIUM 40 MG: 40 INJECTION SUBCUTANEOUS at 16:56

## 2021-02-06 RX ADMIN — DOXYCYCLINE 100 MG: 100 TABLET, FILM COATED ORAL at 16:55

## 2021-02-06 RX ADMIN — RISPERIDONE 2 MG: 2 TABLET ORAL at 04:35

## 2021-02-06 RX ADMIN — ASPIRIN 325 MG ORAL TABLET 325 MG: 325 PILL ORAL at 04:34

## 2021-02-06 RX ADMIN — RISPERIDONE 2 MG: 2 TABLET ORAL at 16:56

## 2021-02-06 RX ADMIN — DOCUSATE SODIUM 50 MG AND SENNOSIDES 8.6 MG 2 TABLET: 8.6; 5 TABLET, FILM COATED ORAL at 04:35

## 2021-02-06 RX ADMIN — DOCUSATE SODIUM 50 MG AND SENNOSIDES 8.6 MG 2 TABLET: 8.6; 5 TABLET, FILM COATED ORAL at 16:56

## 2021-02-06 RX ADMIN — TAMSULOSIN HYDROCHLORIDE 0.8 MG: 0.4 CAPSULE ORAL at 09:26

## 2021-02-06 RX ADMIN — DOXYCYCLINE 100 MG: 100 TABLET, FILM COATED ORAL at 04:34

## 2021-02-06 RX ADMIN — Medication 100 MG: at 04:34

## 2021-02-06 NOTE — DISCHARGE PLANNING
The patient had a guardianship court today.   The appointed guardian is his friend Deonte Xie.    Next court date is February 26 at 10 am to discuss financial issues.

## 2021-02-06 NOTE — PROGRESS NOTES
[facility] PRESERVATION SERVICE  PROGRESS NOTE    HPI: 50 y.o.  with a past medical history that includes hypertension, alcohol abuse, , admitted 9/9/2020 for Acute respiratory failure with hypoxia (HCC)   LPS has been consulted for left fifth MTH ulcer.      Patient is a poor historian.  A&O to self only.  Chart reviewed.  Followed by wound team for hospital-acquired pressure injury to left fifth MTH.  Pressure injury first noted on 12/18/2020 that started as a deep tissue injury from PRAFO boot that evolved to an unstageable and now presents as a stage IV.  Patient would not allow anyone to assess his ulcer.  Finally he allowed wound team to see the ulcer on 12/27/2020.  Advanced wound dressings have been applied including hydrocolloid thin, Hydrofiber silver, Mepilex.  Patient initially presented to the hospital 9/9/2020 for altered mental status.  Found to be obtunded for least 2 days.  He was intubated, vasopressor therapy was utilized. extubated 9/11/2020. He had encephalopathy. Patient was severely agitated and confused with hallucinations and impulsivity.  He developed bilateral foot drop.  He was given a PRAFO boot for left foot on 11/19/2020 and a PRAFO boot for right foot on 12/16/2020.  He has had multiple falls. Patient is two-person max assist.  Not ambulating at this time. He has had sitters and remote sitter at bedside. currently pending guardianship.     Date/Surgeon: 2/1/2021 with Dr. Roque  PROCEDURES:  1.  Bilateral gastroc-soleus recessions.  2.  Bilateral posterior tibialis tendon transfer to the lateral cuneiform.  3.  Bilateral deep compartment fasciotomy.  4.  Bilateral flexor tendon release, toes 1 through 5.  5.  Left medial column release.  6.  Left irrigation and debridement of foot, multiple compartments including   lateral intra-articular fifth MTPJ.  7.  Left fifth metatarsal bone biopsy.      INTERVAL HISTORY:  1/26/2021: Patient sleeping, wakes up when name is called, but goes back to  "sleep. Intermittent twitching left foot. PRAFO on L foot, foot slipped out. On augmentin, bactrim discontinued once cx resulted  1/29/2021: Patient dozing.  Alert during exam.  Wound care debrided ulcer yesterday.  Continues to take Augmentin until 1/31/2021.  2/5/2020: Patient sitting in chair. Awake, alert, calm and pleasant. Boots and dressings in place to bilateral lower extremities. Denies fever, chills, nausea, vomiting, diarrhea, pain.     PERTINENT LPS RESULTS:     Pathology 2/1/2021    FINAL DIAGNOSIS:     A. Left fifth metatarsal:          Bening bone with reactive fibrosis.          No evidence of osteomyelitis.     COVID-19: not detected this admission 1/29/2021    Bone culture of left fifth metatarsal 2/1/2021: Staph epidermidis, oxacillin resistant.      Tissue culture from the left fifth toe ulcer 2/1/2021: corynebacterium species.      EXAM:      /73   Pulse (!) 105   Temp 36.2 °C (97.1 °F) (Temporal)   Resp 17   Ht 1.803 m (5' 11\")   Wt 80.3 kg (177 lb)   SpO2 97%   BMI 24.69 kg/m²     Palpable popliteal pulses.       Wound :  Dressings and boots in place to bilateral lower extremities. Per Dr. Roque, dressings to remain in place until next week.     Wound Care: Post-operative dressings to remain in place and changed on 2/8/2021 by LPS.       INFECTION MANAGEMENT:    Results from last 7 days   Lab Units 02/05/21  0943 02/02/21  0451   WBC 1501 K/uL 11.0* 19.9*   PLATELET COUNT 1518 K/uL 235 231     Wound culture results:   Results     Procedure Component Value Units Date/Time    Anaerobic Culture [[id number]] Collected: 02/01/21 1823    Order Status: Completed Specimen: Bone Updated: 02/05/21 1344     Significant Indicator NEG     Source BONE     Site Left Fifth Metatarsal     Culture Result No Anaerobes isolated.    Narrative:      Surgery Specimen    CULTURE TISSUE W/ GRM STAIN [[id number]]  (Abnormal)  (Susceptibility) Collected: 02/01/21 1823    Order Status: Completed Specimen: " Bone Updated: 02/05/21 1344     Significant Indicator POS     Source BONE     Site Left Fifth Metatarsal     Culture Result Growth noted after further incubation, see below for  organism identification.       Gram Stain Result No organisms seen.     Culture Result Staphylococcus epidermidis  Isolated from enrichment broth only, please correlate with  clinical condition.      Narrative:      Surgery Specimen    Susceptibility     Staphylococcus epidermidis (1)     Antibiotic Interpretation Microscan Method Status    Azithromycin Resistant >4 mcg/mL AMANDA Final    Clindamycin Resistant >4 mcg/mL AMANDA Final    Cefotaxime Resistant <=8 mcg/mL AMANDA Final    Cefazolin Resistant <=8 mcg/mL AMANDA Final    Cefepime Resistant <=4 mcg/mL AMANDA Final    Daptomycin Sensitive <=0.5 mcg/mL AMANDA Final    Erythromycin Resistant >4 mcg/mL AMANDA Final    Ampicillin/sulbactam Resistant <=8/4 mcg/mL AMANDA Final    Vancomycin Sensitive 1 mcg/mL AMANDA Final    Oxacillin Resistant >2 mcg/mL AMANDA Final    Penicillin Resistant >2 mcg/mL AMANDA Final    Trimeth/Sulfa Sensitive 2/38 mcg/mL AMANDA Final    Tetracycline Sensitive <=4 mcg/mL AMANDA Final                   CULTURE TISSUE W/ GRM STAIN [[id number]]  (Abnormal) Collected: 02/01/21 1822    Order Status: Completed Specimen: Tissue Updated: 02/05/21 1340     Significant Indicator POS     Source TISS     Site Left Fifth Toe Ulcer     Culture Result Growth noted after further incubation, see below for  organism identification.       Gram Stain Result No organisms seen.     Culture Result Corynebacterium species  Isolated from enrichment broth only, please correlate with  clinical condition.      Narrative:      Surgery Specimen    Anaerobic Culture [[id number]] Collected: 02/01/21 1822    Order Status: Completed Specimen: Tissue Updated: 02/05/21 1340     Significant Indicator NEG     Source TISS     Site Left Fifth Toe Ulcer     Culture Result No Anaerobes isolated.    Narrative:      Surgery Specimen    GRAM  "STAIN [[id number]] Collected: 02/01/21 1823    Order Status: Completed Specimen: Bone Updated: 02/02/21 1331     Significant Indicator .     Source BONE     Site Left Fifth Metatarsal     Gram Stain Result No organisms seen.    Narrative:      Surgery Specimen    GRAM STAIN [[id number]] Collected: 02/01/21 1822    Order Status: Completed Specimen: Tissue Updated: 02/02/21 1331     Significant Indicator .     Source TISS     Site Left Fifth Toe Ulcer     Gram Stain Result No organisms seen.    Narrative:      Surgery Specimen    SARS-CoV-2 PCR (24 hour In-House): Collect NP swab in VTM [[id number]] Collected: 01/29/21 1649    Order Status: Completed Specimen: Respirate from Nasopharyngeal Updated: 01/30/21 0213     SARS-CoV-2 Source NP Swab     SARS-CoV-2 by PCR NotDetected     Comment: PATIENTS: Important information regarding your results and instructions can  be found at [email or web address]   \"After your  Covid-19 Test\"  [facility] providers: PLEASE REFER TO DE-ESCALATION AND RETESTING PROTOCOL  on [email or web address]  **The Roche SARS-CoV-2 PCR test has been made available for use under the  Emergency Use Authorization (EUA) only.         Narrative:      Collected By:[id number] RHONDA ROSE  Have you been in close contact with a person who is suspected  or known to be positive for COVID-19 within the last 30 days  (e.g. last seen that person < 30 days ago)->No             ASSESSMENT/PLAN:     Patient admitted September 2020 for altered mental status.  Required intubation and vasopressor therapy.  Developed bilateral foot drop and subsequently pressure injury to left fifth MTH from PRAFO boot. Patient is POD #4 Bilateral gastroc-soleus recessions, Bilateral posterior tibialis tendon transfer to the lateral cuneiform, Bilateral deep compartment fasciotomy,Bilateral flexor tendon release, toes 1 through 5, Left medial column release, Left irrigation and debridement of foot, multiple compartments " including lateral intra-articular fifth MTPJ, Left fifth metatarsal bone biopsy with Dr. Roque on 2/1/2021. Patient has dressings and offloading boots to bilateral lower extremities that are to be left in place per orthopedic surgeon, LPS to manage/change dressing prior to patient discharge. Seen by ID, Augmentin discontinued and patient transitioned to 10 day course of doxycycline.    Wound care:   -Nursing communication order for nursing placed to leave dressings and boots in place per orthopedics. LPS APRN to change dressing 2/8/2021.     Labs/Imaging:  -COVID-19: not detected 1/29/2020    Vascular status:   -Palpable popliteal pulses bilaterally    Surgery:   No return to OR at this time      Antibiotics:   -ID: ID involved.  Augmentin discontinued and patient transitioned to 10 day course of doxycycline.  Bone culture of left fifth metatarsal 2/1/2021: Staph epidermidis, oxacillin resistant.    Tissue culture from the left fifth toe ulcer 2/1/2021: corynebacterium species.      Weight Bearing Status:   -Transfer weight bearing with boots in place to BLE    Offloading:   Offloading boots at all times  -Orthotic company: none    PT/OT :   Involved. Last seen by PT 2/2/2021. Last seen by OT 2/5/2021      DISCHARGE PLAN:    Disposition: Guardianship hearing done 2/5/2021, guardianship granted to Deonte Henderson. Discharge pending placement.      Discussed with: pt, Marques LUCIANO, Dr. Roque.     Please note that this dictation was created using voice recognition software. I have  worked with technical experts from [facility] to optimize the interface.  I have made every reasonable attempt to correct obvious errors, but there may be errors of grammar and possibly content that I did not discover before finalizing the note.    Delilah Key, JAVIER.[email or web address].N.    If any questions or concerns, please contact [facility] through voalte.

## 2021-02-06 NOTE — PROGRESS NOTES
No BM since 1/29, previous shift administered laxatives, still patient remained with no BM. Suppository given this AM and patient produced formed medium stool on BSC. Abdomen still remains semi-firm but patient states relief.

## 2021-02-06 NOTE — CARE PLAN
Problem: Infection  Goal: Will remain free from infection  Outcome: PROGRESSING AS EXPECTED   Patient is compliant with current PO abx treatment.   Problem: Bowel/Gastric:  Goal: Normal bowel function is maintained or improved  Outcome: PROGRESSING AS EXPECTED   Patient achieved a BM 2/5/21

## 2021-02-06 NOTE — PROGRESS NOTES
Assessment completed.oriented  to person.on room air sat 96%.Denied pain at this time.Pivot to commode X2. Due medication given per MAR.Patients belongings within reach,Bed locked in low position.States understanding of POC. Call light at reach, advised to call for assistance. Bed alarm on.

## 2021-02-06 NOTE — CARE PLAN
Problem: Knowledge Deficit  Goal: Knowledge of disease process/condition, treatment plan, diagnostic tests, and medications will improve  Outcome: PROGRESSING AS EXPECTED  Note: Information regarding disease process/condition explained,question answered.  Updated with plan of care, verbalized understanding.      Problem: Discharge Barriers/Planning  Goal: Patient's continuum of care needs will be met  Outcome: PROGRESSING AS EXPECTED  Note: Collaborate with transitional Care team and interdisciplinary team to meet discharge needs      Problem: Safety  Goal: Will remain free from falls  Outcome: PROGRESSING AS EXPECTED  Note: Hourly rounding.  Non-skid socks. Bed locked & in low position. Personal belongings and call light  within reach .      Problem: Urinary Elimination:  Goal: Ability to reestablish a normal urinary elimination pattern will improve  Outcome: PROGRESSING AS EXPECTED  Note: Kept clean and dry, condom cath in use

## 2021-02-07 PROCEDURE — 770006 HCHG ROOM/CARE - MED/SURG/GYN SEMI*

## 2021-02-07 PROCEDURE — A9270 NON-COVERED ITEM OR SERVICE: HCPCS | Performed by: NURSE PRACTITIONER

## 2021-02-07 PROCEDURE — 700102 HCHG RX REV CODE 250 W/ 637 OVERRIDE(OP): Performed by: HOSPITALIST

## 2021-02-07 PROCEDURE — 700102 HCHG RX REV CODE 250 W/ 637 OVERRIDE(OP): Performed by: NURSE PRACTITIONER

## 2021-02-07 PROCEDURE — 700102 HCHG RX REV CODE 250 W/ 637 OVERRIDE(OP): Performed by: STUDENT IN AN ORGANIZED HEALTH CARE EDUCATION/TRAINING PROGRAM

## 2021-02-07 PROCEDURE — A9270 NON-COVERED ITEM OR SERVICE: HCPCS | Performed by: HOSPITALIST

## 2021-02-07 PROCEDURE — A9270 NON-COVERED ITEM OR SERVICE: HCPCS | Performed by: INTERNAL MEDICINE

## 2021-02-07 PROCEDURE — A9270 NON-COVERED ITEM OR SERVICE: HCPCS | Performed by: STUDENT IN AN ORGANIZED HEALTH CARE EDUCATION/TRAINING PROGRAM

## 2021-02-07 PROCEDURE — 700111 HCHG RX REV CODE 636 W/ 250 OVERRIDE (IP): Performed by: HOSPITALIST

## 2021-02-07 PROCEDURE — 700102 HCHG RX REV CODE 250 W/ 637 OVERRIDE(OP): Performed by: INTERNAL MEDICINE

## 2021-02-07 RX ADMIN — RISPERIDONE 2 MG: 2 TABLET ORAL at 04:36

## 2021-02-07 RX ADMIN — TAMSULOSIN HYDROCHLORIDE 0.8 MG: 0.4 CAPSULE ORAL at 09:37

## 2021-02-07 RX ADMIN — DOCUSATE SODIUM 50 MG AND SENNOSIDES 8.6 MG 2 TABLET: 8.6; 5 TABLET, FILM COATED ORAL at 04:35

## 2021-02-07 RX ADMIN — RISPERIDONE 2 MG: 2 TABLET ORAL at 17:30

## 2021-02-07 RX ADMIN — THERA TABS 1 TABLET: TAB at 04:35

## 2021-02-07 RX ADMIN — ENOXAPARIN SODIUM 40 MG: 40 INJECTION SUBCUTANEOUS at 17:31

## 2021-02-07 RX ADMIN — ACETAMINOPHEN 650 MG: 325 TABLET, FILM COATED ORAL at 09:38

## 2021-02-07 RX ADMIN — Medication 100 MG: at 04:36

## 2021-02-07 RX ADMIN — DOXYCYCLINE 100 MG: 100 TABLET, FILM COATED ORAL at 17:30

## 2021-02-07 RX ADMIN — ASPIRIN 325 MG ORAL TABLET 325 MG: 325 PILL ORAL at 04:35

## 2021-02-07 RX ADMIN — DOCUSATE SODIUM 50 MG AND SENNOSIDES 8.6 MG 2 TABLET: 8.6; 5 TABLET, FILM COATED ORAL at 17:30

## 2021-02-07 RX ADMIN — DOXYCYCLINE 100 MG: 100 TABLET, FILM COATED ORAL at 04:35

## 2021-02-07 NOTE — THERAPY
- Stable  - Patient with discitis and acute on chronic osteomyelitis of lumbar region. Pain mostly unchanged from yesterday.  - Notably patient is an IVDU, which complicates our discharge planning and pain control regimen  - Symptomatic treatment with current pain regiment- gabapentin 900mg TID, Ibprofen 600mg Q6, baclofen, lidocaine patch and PRN Norco 10mg-325mg for break-through pain  - Continue to monitor       Missed Therapy     Patient Name: Yury Blake  Age:  50 y.o., Sex:  male  Medical Record #: [id number]  Today's Date: 2/7/2021    Discussed missed therapy with RN    Despite several attempts as well as education, pt refused PT. Will attempt again this week    Meagan Miller PT, DPT

## 2021-02-07 NOTE — CARE PLAN
Problem: Venous Thromboembolism (VTW)/Deep Vein Thrombosis (DVT) Prevention:  Goal: Patient will participate in Venous Thrombosis (VTE)/Deep Vein Thrombosis (DVT)Prevention Measures  Outcome: PROGRESSING AS EXPECTED  Note: Lovenox subcutaneous in use for  DVT prophylaxis.      Problem: Knowledge Deficit  Goal: Knowledge of disease process/condition, treatment plan, diagnostic tests, and medications will improve  Outcome: PROGRESSING AS EXPECTED  Note: Information regarding disease process/condition explained,question answered.  Updated with plan of care, verbalized understanding.

## 2021-02-07 NOTE — CARE PLAN
Problem: Communication  Goal: The ability to communicate needs accurately and effectively will improve  Outcome: PROGRESSING AS EXPECTED   Patient has baseline limitations, this is expected.   Problem: Safety  Goal: Will remain free from injury  Outcome: PROGRESSING AS EXPECTED   Patient fall and safety precautions are in place.   Problem: Discharge Barriers/Planning  Goal: Patient's continuum of care needs will be met  Outcome: PROGRESSING AS EXPECT  Care team and family working on patient discharge advancement.   Problem: Psychosocial Needs:  Goal: Level of anxiety will decrease  Outcome: PROGRESSING AS EXPECTED   Patient has been steadily improving with orientation.   Problem: Skin Integrity  Goal: Risk for impaired skin integrity will decrease  Outcome: PROGRESSING AS EXPECTED   Skin integrity remains in good standing.

## 2021-02-08 PROCEDURE — 770006 HCHG ROOM/CARE - MED/SURG/GYN SEMI*

## 2021-02-08 PROCEDURE — 700102 HCHG RX REV CODE 250 W/ 637 OVERRIDE(OP): Performed by: INTERNAL MEDICINE

## 2021-02-08 PROCEDURE — 700102 HCHG RX REV CODE 250 W/ 637 OVERRIDE(OP): Performed by: HOSPITALIST

## 2021-02-08 PROCEDURE — A9270 NON-COVERED ITEM OR SERVICE: HCPCS | Performed by: NURSE PRACTITIONER

## 2021-02-08 PROCEDURE — 700102 HCHG RX REV CODE 250 W/ 637 OVERRIDE(OP): Performed by: NURSE PRACTITIONER

## 2021-02-08 PROCEDURE — 700102 HCHG RX REV CODE 250 W/ 637 OVERRIDE(OP): Performed by: STUDENT IN AN ORGANIZED HEALTH CARE EDUCATION/TRAINING PROGRAM

## 2021-02-08 PROCEDURE — A9270 NON-COVERED ITEM OR SERVICE: HCPCS | Performed by: INTERNAL MEDICINE

## 2021-02-08 PROCEDURE — A9270 NON-COVERED ITEM OR SERVICE: HCPCS | Performed by: HOSPITALIST

## 2021-02-08 PROCEDURE — 700111 HCHG RX REV CODE 636 W/ 250 OVERRIDE (IP): Performed by: HOSPITALIST

## 2021-02-08 PROCEDURE — A9270 NON-COVERED ITEM OR SERVICE: HCPCS | Performed by: STUDENT IN AN ORGANIZED HEALTH CARE EDUCATION/TRAINING PROGRAM

## 2021-02-08 RX ADMIN — THERA TABS 1 TABLET: TAB at 04:37

## 2021-02-08 RX ADMIN — DOCUSATE SODIUM 50 MG AND SENNOSIDES 8.6 MG 2 TABLET: 8.6; 5 TABLET, FILM COATED ORAL at 17:33

## 2021-02-08 RX ADMIN — ENOXAPARIN SODIUM 40 MG: 40 INJECTION SUBCUTANEOUS at 17:33

## 2021-02-08 RX ADMIN — RISPERIDONE 2 MG: 2 TABLET ORAL at 17:33

## 2021-02-08 RX ADMIN — ASPIRIN 325 MG ORAL TABLET 325 MG: 325 PILL ORAL at 04:37

## 2021-02-08 RX ADMIN — DOXYCYCLINE 100 MG: 100 TABLET, FILM COATED ORAL at 04:37

## 2021-02-08 RX ADMIN — DOCUSATE SODIUM 50 MG AND SENNOSIDES 8.6 MG 2 TABLET: 8.6; 5 TABLET, FILM COATED ORAL at 04:37

## 2021-02-08 RX ADMIN — TAMSULOSIN HYDROCHLORIDE 0.8 MG: 0.4 CAPSULE ORAL at 08:55

## 2021-02-08 RX ADMIN — DOXYCYCLINE 100 MG: 100 TABLET, FILM COATED ORAL at 17:33

## 2021-02-08 RX ADMIN — Medication 100 MG: at 04:37

## 2021-02-08 RX ADMIN — RISPERIDONE 2 MG: 2 TABLET ORAL at 04:37

## 2021-02-08 NOTE — CARE PLAN
Problem: Communication  Goal: The ability to communicate needs accurately and effectively will improve  Outcome: PROGRESSING AS EXPECTED   Patients limitations are expected.   Problem: Safety  Goal: Will remain free from injury  Outcome: PROGRESSING AS EXPECT  Fall precautions in place. Patient compliant with fall risk safety, tele sitter in place.   Problem: Bowel/Gastric:  Goal: Normal bowel function is maintained or improved  Outcome: PROGRESSING SLOWER THAN EXPECTED   Bowel care meds in place to promote normal function.

## 2021-02-09 PROCEDURE — 770006 HCHG ROOM/CARE - MED/SURG/GYN SEMI*

## 2021-02-09 PROCEDURE — 700102 HCHG RX REV CODE 250 W/ 637 OVERRIDE(OP): Performed by: NURSE PRACTITIONER

## 2021-02-09 PROCEDURE — 700102 HCHG RX REV CODE 250 W/ 637 OVERRIDE(OP): Performed by: HOSPITALIST

## 2021-02-09 PROCEDURE — A9270 NON-COVERED ITEM OR SERVICE: HCPCS | Performed by: NURSE PRACTITIONER

## 2021-02-09 PROCEDURE — 700102 HCHG RX REV CODE 250 W/ 637 OVERRIDE(OP): Performed by: STUDENT IN AN ORGANIZED HEALTH CARE EDUCATION/TRAINING PROGRAM

## 2021-02-09 PROCEDURE — A9270 NON-COVERED ITEM OR SERVICE: HCPCS | Performed by: STUDENT IN AN ORGANIZED HEALTH CARE EDUCATION/TRAINING PROGRAM

## 2021-02-09 PROCEDURE — 36415 COLL VENOUS BLD VENIPUNCTURE: CPT

## 2021-02-09 PROCEDURE — 700102 HCHG RX REV CODE 250 W/ 637 OVERRIDE(OP): Performed by: INTERNAL MEDICINE

## 2021-02-09 PROCEDURE — 99231 SBSQ HOSP IP/OBS SF/LOW 25: CPT | Performed by: NURSE PRACTITIONER

## 2021-02-09 PROCEDURE — 97535 SELF CARE MNGMENT TRAINING: CPT

## 2021-02-09 PROCEDURE — A9270 NON-COVERED ITEM OR SERVICE: HCPCS | Performed by: INTERNAL MEDICINE

## 2021-02-09 PROCEDURE — A9270 NON-COVERED ITEM OR SERVICE: HCPCS | Performed by: HOSPITALIST

## 2021-02-09 PROCEDURE — 700111 HCHG RX REV CODE 636 W/ 250 OVERRIDE (IP): Performed by: HOSPITALIST

## 2021-02-09 PROCEDURE — 97530 THERAPEUTIC ACTIVITIES: CPT

## 2021-02-09 PROCEDURE — 84425 ASSAY OF VITAMIN B-1: CPT

## 2021-02-09 RX ADMIN — Medication 100 MG: at 04:51

## 2021-02-09 RX ADMIN — DOCUSATE SODIUM 50 MG AND SENNOSIDES 8.6 MG 2 TABLET: 8.6; 5 TABLET, FILM COATED ORAL at 04:51

## 2021-02-09 RX ADMIN — DOXYCYCLINE 100 MG: 100 TABLET, FILM COATED ORAL at 04:53

## 2021-02-09 RX ADMIN — ACETAMINOPHEN 650 MG: 325 TABLET, FILM COATED ORAL at 04:51

## 2021-02-09 RX ADMIN — DOXYCYCLINE 100 MG: 100 TABLET, FILM COATED ORAL at 17:06

## 2021-02-09 RX ADMIN — TAMSULOSIN HYDROCHLORIDE 0.8 MG: 0.4 CAPSULE ORAL at 09:43

## 2021-02-09 RX ADMIN — RISPERIDONE 2 MG: 2 TABLET ORAL at 04:51

## 2021-02-09 RX ADMIN — DOCUSATE SODIUM 50 MG AND SENNOSIDES 8.6 MG 2 TABLET: 8.6; 5 TABLET, FILM COATED ORAL at 17:06

## 2021-02-09 RX ADMIN — ASPIRIN 325 MG ORAL TABLET 325 MG: 325 PILL ORAL at 04:51

## 2021-02-09 RX ADMIN — THERA TABS 1 TABLET: TAB at 04:51

## 2021-02-09 RX ADMIN — ENOXAPARIN SODIUM 40 MG: 40 INJECTION SUBCUTANEOUS at 17:06

## 2021-02-09 RX ADMIN — RISPERIDONE 2 MG: 2 TABLET ORAL at 17:06

## 2021-02-09 ASSESSMENT — COGNITIVE AND FUNCTIONAL STATUS - GENERAL
WALKING IN HOSPITAL ROOM: TOTAL
SUGGESTED CMS G CODE MODIFIER DAILY ACTIVITY: CK
MOVING FROM LYING ON BACK TO SITTING ON SIDE OF FLAT BED: UNABLE
DRESSING REGULAR LOWER BODY CLOTHING: A LOT
SUGGESTED CMS G CODE MODIFIER MOBILITY: CM
DAILY ACTIVITIY SCORE: 18
MOVING TO AND FROM BED TO CHAIR: A LOT
STANDING UP FROM CHAIR USING ARMS: A LOT
CLIMB 3 TO 5 STEPS WITH RAILING: TOTAL
HELP NEEDED FOR BATHING: A LOT
TOILETING: A LOT
TURNING FROM BACK TO SIDE WHILE IN FLAT BAD: A LOT
MOBILITY SCORE: 9

## 2021-02-09 ASSESSMENT — ENCOUNTER SYMPTOMS
COUGH: 0
MYALGIAS: 0
DIARRHEA: 0
BACK PAIN: 0
DIZZINESS: 0
NECK PAIN: 0
PALPITATIONS: 0
NAUSEA: 0
CONSTIPATION: 1
SHORTNESS OF BREATH: 0
FEVER: 0
MEMORY LOSS: 1
FLANK PAIN: 0
SORE THROAT: 0
SINUS PAIN: 0
ABDOMINAL PAIN: 0
HEADACHES: 0
VOMITING: 0
CHILLS: 0

## 2021-02-09 ASSESSMENT — GAIT ASSESSMENTS
GAIT LEVEL OF ASSIST: UNABLE TO PARTICIPATE
DISTANCE (FEET): 2

## 2021-02-09 NOTE — THERAPY
Physical Therapy   Daily Treatment     Patient Name: Yury Blake  Age:  50 y.o., Sex:  male  Medical Record #: [id number]  Today's Date: 2/9/2021     Precautions: Fall Risk, Other (See Comments)  Comments: WB for transfers only with boots on at all times    Assessment    Pt seen for follow up PT tx. Pt continues to be limited by balance and cognition. Session focused on transfers with FWW and pt required max cues and mod assist due to significant posterior lean. PT frequency dropped to 2x/wk due to limited pt participation and slow progress.     Plan    Treatment plan modified to 2 times per week until therapy goals are met for the following treatments:  Bed Mobility, Neuro Re-Education / Balance, Self Care/Home Evaluation, Therapeutic Activities and Therapeutic Exercises.    DC Equipment Recommendations: Unable to determine at this time  Discharge Recommendations: Recommend post-acute placement for additional physical therapy services prior to discharge home         02/09/21 1131   Cognition    Cognition / Consciousness X   Speech/ Communication Delayed Responses   Orientation Level Not Oriented to Year;Not Oriented to Month;Not Oriented to Reason   Level of Consciousness Alert   Ability To Follow Commands 1 Step   Safety Awareness Impaired   New Learning Impaired   Attention Impaired   Sequencing Impaired   Comments requires constant cues    Neuro-Muscular Treatments   Neuro-Muscular Treatments Weight Shift Left;Weight Shift Right;Verbal Cuing;Tactile Cuing;Sequencing;Anterior weight shift;Compensatory Strategies   Comments standing    Neurological Concerns   Neurological Concerns Yes   Comments cognition and balance deficits   Gait Analysis   Gait Level Of Assist Unable to Participate   Weight Bearing Status WB for transfers only   Bed Mobility    Supine to Sit   (NT in chair)   Functional Mobility   Sit to Stand Moderate Assist   Bed, Chair, Wheelchair Transfer Moderate Assist   Toilet Transfers  Moderate Assist   Transfer Method Stand Step   Mobility transfers    Skilled Intervention Verbal Cuing;Tactile Cuing;Sequencing   Comments max cues needed for sequencing   Short Term Goals    Short Term Goal # 2 Patient will move sitting<>standing with supervision within 6tx in order to initiate gait and transfers   Goal Outcome # 2 Goal not met   Short Term Goal # 3 Pt will be able to transfer bed to chair with SPV in 6tx in order to progress with therapy   Goal Outcome # 3 Goal not met   Short Term Goal # 4 Pt will be able to demosntrate SPV with WC mobility in 6tx in order to progress with therapy   Goal Outcome # 4 Goal not met   Anticipated Discharge Equipment and Recommendations   DC Equipment Recommendations Unable to determine at this time   Discharge Recommendations Recommend post-acute placement for additional physical therapy services prior to discharge home

## 2021-02-09 NOTE — CARE PLAN
Problem: Communication  Goal: The ability to communicate needs accurately and effectively will improve  Outcome: PROGRESSING AS EXPECTED  Note: Call light and personal belongings within reach.      Problem: Safety  Goal: Will remain free from injury  Outcome: PROGRESSING AS EXPECTED  Note: Educated patient to use call light to alert staff of needs.Patient verbalized understanding. Uses call light appropriately. Fall precautions in place. Non-skid footwear in place. Bed locked and placed in lowest position. Call light at the bedside. Personal belongings within reach. Hourly monitoring in place. Patient comfortably resting in bed, will continue to monitor.

## 2021-02-09 NOTE — PROGRESS NOTES
Pt aaox1. GARNER, BLE weakness- 3/5. Boots to BLE. Pt up to chair with x1-2 min assist. Denied pain. Good appetite. Condom cath in place for incontinence. On waffle mattress. Pt able to turn self in bed. Reviewed poc with pt- needs reinforcement. Bed alarm in use. Call light in reach. Telesitter in use.

## 2021-02-09 NOTE — DISCHARGE PLANNING
Anticipated Discharge Disposition: GH    Action: Spoke to guardian, Deonte Henderson . Discussed GH placement. He states he has been calling GHs and has had difficulty finding male bed. Provided him with Mathew's contact . Large GH list left at bedside, Deonte will  when he visits this evening.    Barriers to Discharge: Pending finding GH.    Plan: Will continue to work with guardian for GH placement.    Received copies of Notice of Appearance from  Samuel Carter Esq. Copies placed on chart.

## 2021-02-09 NOTE — PROGRESS NOTES
[facility] Medicine Progress Note    Date of Service  2/9/2021    Chief Complaint  Altered mental status    Hospital Course  Mr. Blake is a 50-year-old male who was brought to the emergency department on 9/9/2020 with altered mental status after he was found by his ex-wife to be obtunded after she had checked on him when he had not been seen for at least 2 days.  At that time he was alert and oriented x0 and found to be hypotensive.  GCS in the ED was 7 and he was severely hypotensive.  He was intubated for airway protection and central venous access was obtained for administration of high-volume crystalloid resuscitation and vasopressor therapy.  He had a significant leukocytosis with a WBC of 22.9 and was hypokalemic with a potassium level of 2.7.  Additionally he had acute kidney injury with a creatinine of 2.73.  His INR was 8.93 and he also had a lactate level of 11.  Urinalysis was performed and was negative for urinary tract infection.  He was diagnosed with septic shock and admitted to the intensive care unit for further evaluation and treatment.  A lumbar puncture was done on 9/9/2020.  He was also noted to have seizure-like activity on 9/10/2020 so an EEG was obtained and was significant for a moderate encephalopathy.  Extubation occurred on 9/11/2020.  He also had a traumatic catheter removal so on 9/21/2020 urology was consulted but was ultimately unnecessary.  On 9/13/2020 he was transferred out of the ICU where he required restraints due to confusion, hallucination, agitation, and impulsivity.  An MRI was performed and was negative for acute pathologies.  He was also treated for a UTI starting 10/2/2020.  He has also fallen multiple times in hospital.  Additionally, on 11/11/2020 he had an acute episode of sinus tachycardia in the 140s accompanied by hypotension.  There was concern for STEMI so cardiology was consulted.  A 12-lead EKG was obtained and was concerning for atrial flutter.  The patient  received metoprolol after which a repeat ECG showed sinus tachycardia.  A D-dimer was obtained and was elevated for which a CT chest was done that was negative for pulmonary embolism.  Cardiology subsequently signed off.  He has remained in the hospital for a prolonged period of time and is now pending guardianship and then will need placement.  Guardianship hearing took place 2/5/2021.   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 Ortho was consulted and he does require I&D. Surgery with Dr. Roque completed 2/1/2021    Interval Problem Update  Guardianship has been granted  BM today  Labs reviewed from 2/5 and are okay  Doxycycline for infected foot to continue through 2/15 a.m. dose.  Awaits placement when financial issues are straightened out.  Next court date is February 26.  Mentation remains altered and is likely his baseline    Consultants/Specialty  Critical care  Psychiatry  Cardiology  Urology  Infectious disease    Code Status  Full Code    Disposition  Patient will require 24/7 supervision upon discharge    Review of Systems  Review of Systems   Constitutional: Negative for chills, fever and malaise/fatigue.   HENT: Negative for sinus pain and sore throat.    Respiratory: Negative for cough and shortness of breath.    Cardiovascular: Negative for chest pain, palpitations and leg swelling.   Gastrointestinal: Positive for constipation. Negative for abdominal pain, diarrhea, nausea and vomiting.   Genitourinary: Negative for dysuria and flank pain.   Musculoskeletal: Negative for back pain, joint pain, myalgias and neck pain.   Skin: Negative for itching and rash.   Neurological: Negative for dizziness and headaches.   Psychiatric/Behavioral: Positive for memory loss.      Physical Exam  Temp:  [36.3 °C (97.4 °F)-37 °C (98.6 °F)] 36.4 °C (97.5 °F)  Pulse:  [71-91] 71  Resp:  [17] 17  BP: (107-114)/(60-80) 108/60  SpO2:  [93 %-97 %] 95 %    Physical  Exam  Vitals signs and nursing note reviewed.   Constitutional:       General: He is awake. He is not in acute distress.     Comments: Unkempt appearance  Frail, chronically ill appearing   HENT:      Head: Normocephalic and atraumatic.      Mouth/Throat:      Lips: Pink.      Mouth: Mucous membranes are moist.   Eyes:      Pupils: Pupils are equal, round, and reactive to light.   Neck:      Musculoskeletal: Normal range of motion and neck supple.   Cardiovascular:      Rate and Rhythm: Normal rate and regular rhythm.      Pulses: Normal pulses.   Pulmonary:      Effort: Pulmonary effort is normal.      Breath sounds: Normal breath sounds.   Abdominal:      General: Bowel sounds are normal. There is no distension or abdominal bruit.      Palpations: Abdomen is soft.      Tenderness: There is no abdominal tenderness.   Musculoskeletal:      Right lower leg: No edema.      Left lower leg: No edema.        Feet:    Skin:     General: Skin is warm and dry.   Neurological:      Mental Status: He is alert. He is disoriented.   Psychiatric:         Attention and Perception: Attention normal.         Mood and Affect: Affect is flat.         Speech: Speech normal.         Behavior: Behavior is cooperative.         Cognition and Memory: Cognition is impaired. Memory is impaired. He exhibits impaired recent memory and impaired remote memory.       Fluids    Intake/Output Summary (Last 24 hours) at 2/9/2021 0856  Last data filed at 2/8/2021 2000  Gross per 24 hour   Intake 700 ml   Output 1700 ml   Net -1000 ml     Laboratory    Imaging  None recent    Assessment/Plan  * Toxic encephalopathy- (present on admission)  Assessment & Plan  -Unknown etiology - hypoxic induced brain injury vs Wernicke encephalopathy.  -LP, MRI brain, EEG negative. TSH normal. HIV/RPR negative.  -Psych was consulted and believe it is alcohol related.  -Continue risperdal.  -Remains free from restraints.  -Has fallen multiple times while in hospital.  Jama now at bedside.     Open wound of left foot  Assessment & Plan  Lateral aspect, worsening, red, nontender  HgA1c 1/21: 5.2%  No leukocytosis  Wound care following  1/21 LPS consulted  1/24: Wound culture growing Staph aureus. Continue Augmentin. Bactrim discontinued.  2/1: I &D surgery of foot with Dr. Roque    2/4: Bone biopsy cultures positive for S epidermitis and Corynebacterium. ID consulted  2/5: ID recommended 10 days of doxycycline    Normocytic anemia- (present on admission)  Assessment & Plan  -Very mild, no active bleeding.  - stable  -Check periodically.    Alcohol abuse- (present on admission)  Assessment & Plan  -Abstinent since admission.  -Continue daily PO vitamin therapy.     VTE prophylaxis: Lovenox    I have performed a physical exam and reviewed and updated ROS and Plan today (2/9/2021). In review of previous note, there are no changes except as documented above.       AMY Verduzco.

## 2021-02-09 NOTE — CARE PLAN
Problem: Safety  Goal: Will remain free from falls  Outcome: PROGRESSING AS EXPECTED     Problem: Safety - Medical Restraint  Goal: Free from restraint(s) (Restraint for Interference with Medical Device)  Description: INTERVENTIONS:  Identify and implement measures to help patient regain control  Outcome: PROGRESSING AS EXPECTED   Telesitter in use.    Problem: Pain Management  Goal: Pain level will decrease to patient's comfort goal  Outcome: PROGRESSING AS EXPECTED     Problem: Urinary Elimination:  Goal: Ability to reestablish a normal urinary elimination pattern will improve  Outcome: PROGRESSING AS EXPECTED     Problem: Mobility  Goal: Risk for activity intolerance will decrease  Outcome: PROGRESSING AS EXPECTED

## 2021-02-10 PROCEDURE — 700102 HCHG RX REV CODE 250 W/ 637 OVERRIDE(OP): Performed by: NURSE PRACTITIONER

## 2021-02-10 PROCEDURE — 770006 HCHG ROOM/CARE - MED/SURG/GYN SEMI*

## 2021-02-10 PROCEDURE — A9270 NON-COVERED ITEM OR SERVICE: HCPCS | Performed by: NURSE PRACTITIONER

## 2021-02-10 PROCEDURE — A9270 NON-COVERED ITEM OR SERVICE: HCPCS | Performed by: HOSPITALIST

## 2021-02-10 PROCEDURE — 700102 HCHG RX REV CODE 250 W/ 637 OVERRIDE(OP): Performed by: HOSPITALIST

## 2021-02-10 PROCEDURE — 700111 HCHG RX REV CODE 636 W/ 250 OVERRIDE (IP): Performed by: HOSPITALIST

## 2021-02-10 PROCEDURE — 99232 SBSQ HOSP IP/OBS MODERATE 35: CPT | Performed by: NURSE PRACTITIONER

## 2021-02-10 RX ADMIN — DOCUSATE SODIUM 50 MG AND SENNOSIDES 8.6 MG 2 TABLET: 8.6; 5 TABLET, FILM COATED ORAL at 16:25

## 2021-02-10 RX ADMIN — DOXYCYCLINE 100 MG: 100 TABLET, FILM COATED ORAL at 05:40

## 2021-02-10 RX ADMIN — RISPERIDONE 2 MG: 2 TABLET ORAL at 16:25

## 2021-02-10 RX ADMIN — ENOXAPARIN SODIUM 40 MG: 40 INJECTION SUBCUTANEOUS at 16:25

## 2021-02-10 RX ADMIN — ASPIRIN 325 MG ORAL TABLET 325 MG: 325 PILL ORAL at 05:40

## 2021-02-10 RX ADMIN — RISPERIDONE 2 MG: 2 TABLET ORAL at 05:40

## 2021-02-10 RX ADMIN — Medication 100 MG: at 05:40

## 2021-02-10 RX ADMIN — DOCUSATE SODIUM 50 MG AND SENNOSIDES 8.6 MG 2 TABLET: 8.6; 5 TABLET, FILM COATED ORAL at 05:40

## 2021-02-10 RX ADMIN — THERA TABS 1 TABLET: TAB at 05:40

## 2021-02-10 RX ADMIN — DOXYCYCLINE 100 MG: 100 TABLET, FILM COATED ORAL at 16:25

## 2021-02-10 RX ADMIN — TAMSULOSIN HYDROCHLORIDE 0.8 MG: 0.4 CAPSULE ORAL at 08:05

## 2021-02-10 NOTE — CARE PLAN
Problem: Pain Management  Goal: Pain level will decrease to patient's comfort goal  Outcome: PROGRESSING AS EXPECTED  Pain controlled denies need for prn medication thus far.      Problem: Urinary Elimination:  Goal: Ability to reestablish a normal urinary elimination pattern will improve  Outcome: PROGRESSING SLOWER THAN EXPECTED   Incontinent with a condom cath.

## 2021-02-10 NOTE — DISCHARGE PLANNING
Spoke to xiao Nascimentoan. He spoke to Mathew yesterday and is going to look at  today. Will call me after visiting .

## 2021-02-10 NOTE — THERAPY
"Occupational Therapy  Daily Treatment     Patient Name: Yury Blake  Age:  50 y.o., Sex:  male  Medical Record #: [id number]  Today's Date: 2/9/2021     Precautions  Precautions: Fall Risk, Other (See Comments)  Comments: w/b for transfers only, boots on at all times    Assessment    Pt educated on BSC transfers but still required modA. Pt had several posterior LOB requiring physical assist from therapist to maintain upright position. Pt does well with cues for weight shift, pacing, and safety awareness. Pt able to complete UB dressing with spv and cues for sequencing/initiation and setup for seated grooming/hygiene. Spoke with Deonte, pts guardian, about bringing some of patients personal belongings such as electric razor and loose shorts.     Plan    Continue current treatment plan.    DC Equipment Recommendations: Unable to determine at this time  Discharge Recommendations: Recommend post-acute placement for additional occupational therapy services prior to discharge home    Subjective    Pt pleasant and cooperative with OT tx session. No complaints of pain.     Objective       02/09/21 1150   Cognition    Level of Consciousness Alert   Comments pleasant, cooperative, cues for safety/participation   Other Treatments   Other Treatments Provided Education on purpose of current w/b status. Pt verbalized understanding but would benefit from reinforcement. Pt verbalized wanting to move around more. Discussed with PT possibility of attempting w/c transfer in future sessions to improve morale/mental health and be able to leave hospital room. Called legal guardian, \"Deonte\" to request he bring in electric razor and loose shorts, and some personal items. Deonte agreed to bring in some of his things this evening when he visits.   Balance   Comments standing with FWW, several slight LOB requiring therapists assist to maintain balance, cues for anterior weight shift in standing   Activities of Daily Living   Grooming " Supervision;Seated  (washing face, brushing teeth, deodorant)   Upper Body Dressing Supervision  (gown, cues to initiate/sequence)   Toileting   (declined need)   Functional Mobility   Sit to Stand Moderate Assist   Toilet Transfers Moderate Assist   Transfer Method Stand Step   Mobility chair to BSC, BSC to chair with FWW   Activity Tolerance   Comments limited by weakness, balance, cognition   Patient / Family Goals   Patient / Family Goal #1 Unable to state   Short Term Goals   Short Term Goal # 1 Pt will complete toilet transfer using BSC if needed with spv, no LOB by discharge.   Goal Outcome # 1 Progressing slower than expected   Short Term Goal # 2 Pt will complete seated grooming/hygiene with spv by discharge.   Goal Outcome # 2 Goal met   Short Term Goal # 3 Pt will complete toileting with spv by discharge.   Goal Outcome # 3 Progressing slower than expected   Short Term Goal # 4 Pt will complete LB dressing with spv by discharge.   Goal Outcome # 4 Goal not met

## 2021-02-10 NOTE — PROGRESS NOTES
LIMB PRESERVATION SERVICE  PROGRESS NOTE    HPI: 50 y.o.  with a past medical history that includes hypertension, alcohol abuse, , admitted 9/9/2020 for Acute respiratory failure with hypoxia (HCC)   LPS has been consulted for left fifth MTH ulcer.      Patient is a poor historian.  A&O to self only.  Chart reviewed.  Followed by wound team for hospital-acquired pressure injury to left fifth MTH.  Pressure injury first noted on 12/18/2020 that started as a deep tissue injury from PRAFO boot that evolved to an unstageable and now presents as a stage IV.  Patient would not allow anyone to assess his ulcer.  Finally he allowed wound team to see the ulcer on 12/27/2020.  Advanced wound dressings have been applied including hydrocolloid thin, Hydrofiber silver, Mepilex.  Patient initially presented to the hospital 9/9/2020 for altered mental status.  Found to be obtunded for least 2 days.  He was intubated, vasopressor therapy was utilized. extubated 9/11/2020. He had encephalopathy. Patient was severely agitated and confused with hallucinations and impulsivity.  He developed bilateral foot drop.  He was given a PRAFO boot for left foot on 11/19/2020 and a PRAFO boot for right foot on 12/16/2020.  He has had multiple falls. Patient is two-person max assist.  Not ambulating at this time. He has had sitters and remote sitter at bedside. currently pending guardianship.     Date/Surgeon: 2/1/2021 with Dr. Roque  PROCEDURES:  1.  Bilateral gastroc-soleus recessions.  2.  Bilateral posterior tibialis tendon transfer to the lateral cuneiform.  3.  Bilateral deep compartment fasciotomy.  4.  Bilateral flexor tendon release, toes 1 through 5.  5.  Left medial column release.  6.  Left irrigation and debridement of foot, multiple compartments including   lateral intra-articular fifth MTPJ.  7.  Left fifth metatarsal bone biopsy.      INTERVAL HISTORY:  1/26/2021: Patient sleeping, wakes up when name is called, but goes back to  "sleep. Intermittent twitching left foot. PRAFO on L foot, foot slipped out. On augmentin, bactrim discontinued once cx resulted  1/29/2021: Patient dozing.  Alert during exam.  Wound care debrided ulcer yesterday.  Continues to take Augmentin until 1/31/2021.  2/5/2020: Patient sitting in chair. Awake, alert, calm and pleasant. Boots and dressings in place to bilateral lower extremities. Denies fever, chills, nausea, vomiting, diarrhea, pain.   2/10/2021: POD #9.  Patient confused, delusional.  States he has been helping a friend with fixing his car.  Does not know he is in the hospital.  Patient wearing bilateral boots at all time.  Patient denies that he had surgery.        PERTINENT LPS RESULTS:     Pathology 2/1/2021    FINAL DIAGNOSIS:     A. Left fifth metatarsal:          Bening bone with reactive fibrosis.          No evidence of osteomyelitis.     COVID-19: not detected this admission 1/29/2021    Bone culture of left fifth metatarsal 2/1/2021: Staph epidermidis, oxacillin resistant.      Tissue culture from the left fifth toe ulcer 2/1/2021: corynebacterium species.            EXAM:      BP (!) 91/56   Pulse 83   Temp 36.7 °C (98 °F) (Temporal)   Resp 17   Ht 1.803 m (5' 11\")   Wt 80.3 kg (177 lb)   SpO2 94%   BMI 24.69 kg/m²     Palpable pedal pulses      Incisions:    Right foot and lower leg incisions:  Incisions are well approximated with sutures  Single stab suture to tenotomies are intact  No erythema  No edema  No drainage  There is mild to moderate ecchymosis to right plantar medial foot, right MTPJ  There is purple discoloration to lateral right great toe                                            Left foot and lower leg incisions  Ulcer to left fifth MTH has been sutured close  Incisions are well approximated with sutures  Single stab suture to tenotomies are intact  No erythema  No edema  No drainage  There is mild to moderate ecchymosis to left dorsal lateral foot, left great " toe                                  Wound care completed by APRN and RN.  Applied adhesive foams to all incisions.  Apply dry gauze in between toes bilaterally.  Applied bilateral heel Mepilex to heels.  Applied roll gauze followed by Ace wrap to BLE.  Replaced offloading boots.  Patient tolerated dressing change well.            INFECTION MANAGEMENT:    Results from last 7 days   Lab Units 02/05/21  0943   WBC 1501 K/uL 11.0*   PLATELET COUNT 1518 K/uL 235     Wound culture results:   Results     Procedure Component Value Units Date/Time    Anaerobic Culture [[id number]] Collected: 02/01/21 1823    Order Status: Completed Specimen: Bone Updated: 02/05/21 1344     Significant Indicator NEG     Source BONE     Site Left Fifth Metatarsal     Culture Result No Anaerobes isolated.    Narrative:      Surgery Specimen    CULTURE TISSUE W/ GRM STAIN [[id number]]  (Abnormal)  (Susceptibility) Collected: 02/01/21 1823    Order Status: Completed Specimen: Bone Updated: 02/05/21 1344     Significant Indicator POS     Source BONE     Site Left Fifth Metatarsal     Culture Result Growth noted after further incubation, see below for  organism identification.       Gram Stain Result No organisms seen.     Culture Result Staphylococcus epidermidis  Isolated from enrichment broth only, please correlate with  clinical condition.      Narrative:      Surgery Specimen    Susceptibility     Staphylococcus epidermidis (1)     Antibiotic Interpretation Microscan Method Status    Azithromycin Resistant >4 mcg/mL AMANDA Final    Clindamycin Resistant >4 mcg/mL AMANDA Final    Cefotaxime Resistant <=8 mcg/mL AMANDA Final    Cefazolin Resistant <=8 mcg/mL AMANDA Final    Cefepime Resistant <=4 mcg/mL AMANDA Final    Daptomycin Sensitive <=0.5 mcg/mL AMANDA Final    Erythromycin Resistant >4 mcg/mL AMANDA Final    Ampicillin/sulbactam Resistant <=8/4 mcg/mL AMANDA Final    Vancomycin Sensitive 1 mcg/mL AMANDA Final    Oxacillin Resistant >2 mcg/mL AMANDA Final    Penicillin  Resistant >2 mcg/mL AMANDA Final    Trimeth/Sulfa Sensitive 2/38 mcg/mL AMANDA Final    Tetracycline Sensitive <=4 mcg/mL AMANDA Final                   CULTURE TISSUE W/ GRM STAIN [[id number]]  (Abnormal) Collected: 02/01/21 1822    Order Status: Completed Specimen: Tissue Updated: 02/05/21 1340     Significant Indicator POS     Source TISS     Site Left Fifth Toe Ulcer     Culture Result Growth noted after further incubation, see below for  organism identification.       Gram Stain Result No organisms seen.     Culture Result Corynebacterium species  Isolated from enrichment broth only, please correlate with  clinical condition.      Narrative:      Surgery Specimen    Anaerobic Culture [[id number]] Collected: 02/01/21 1822    Order Status: Completed Specimen: Tissue Updated: 02/05/21 1340     Significant Indicator NEG     Source TISS     Site Left Fifth Toe Ulcer     Culture Result No Anaerobes isolated.    Narrative:      Surgery Specimen             ASSESSMENT/PLAN:     -Patient admitted September 2020 for altered mental status.  Required intubation and vasopressor therapy.  Developed bilateral foot drop and subsequently pressure injury to left fifth MTH from PRAFO boot.     -POD #9 Bilateral gastroc-soleus recessions, Bilateral posterior tibialis tendon transfer to the lateral cuneiform, Bilateral deep compartment fasciotomy,Bilateral flexor tendon release, toes 1 through 5, Left medial column release, Left irrigation and debridement of foot, multiple compartments including lateral intra-articular fifth MTPJ, Left fifth metatarsal bone biopsy with Dr. Roque on 2/1/2021.   -Incisions well approximated with sutures.  Ulcer to left fifth MTH no longer present.  Dry dressings applied.  Anticipate suture removal next week.      Wound care:   -LPS to manage dressings.  Change weekly.  Anticipate suture removal next week.  -RN to adjust offloading boots as needed per patient comfort      Labs/Imaging:  -COVID-19: not detected  1/29/2020    Vascular status:   -Palpable pedal pulses bilaterally    Surgery:   No return to OR at this time      Antibiotics:   -ID: ID involved.  Augmentin discontinued and patient transitioned to 10 day course of doxycycline.  Bone culture of left fifth metatarsal 2/1/2021: Staph epidermidis, oxacillin resistant.  Wound culture from the left fifth toe ulcer 2/1/2021: corynebacterium species.        Weight Bearing Status:   -Transfer weight bearing with boots in place to BLE    Offloading:   Offloading boots at all times  -Orthotic company: none    PT/OT :   Involved. Last seen by PT 2/9/2021. Last seen by OT 2/9/2021      DISCHARGE PLAN:    Disposition: Guardianship hearing done 2/5/2021, guardianship granted to Deonte Henderson. Discharge pending placement.      Discussed with: pt, RN, Omer MUNOZ hospitalist    Please note that this dictation was created using voice recognition software. I have  worked with technical experts from [facility] to optimize the interface.  I have made every reasonable attempt to correct obvious errors, but there may be errors of grammar and possibly content that I did not discover before finalizing the note.    Viri Lynn, A.P.R.N.    If any questions or concerns, please contact LPS through voalte.

## 2021-02-11 PROCEDURE — A9270 NON-COVERED ITEM OR SERVICE: HCPCS | Performed by: NURSE PRACTITIONER

## 2021-02-11 PROCEDURE — 700102 HCHG RX REV CODE 250 W/ 637 OVERRIDE(OP): Performed by: NURSE PRACTITIONER

## 2021-02-11 PROCEDURE — 700111 HCHG RX REV CODE 636 W/ 250 OVERRIDE (IP): Performed by: HOSPITALIST

## 2021-02-11 PROCEDURE — 770006 HCHG ROOM/CARE - MED/SURG/GYN SEMI*

## 2021-02-11 PROCEDURE — 700102 HCHG RX REV CODE 250 W/ 637 OVERRIDE(OP): Performed by: HOSPITALIST

## 2021-02-11 PROCEDURE — A9270 NON-COVERED ITEM OR SERVICE: HCPCS | Performed by: HOSPITALIST

## 2021-02-11 RX ORDER — AMOXICILLIN 250 MG
1 CAPSULE ORAL 2 TIMES DAILY PRN
Status: DISCONTINUED | OUTPATIENT
Start: 2021-02-11 | End: 2021-03-18 | Stop reason: HOSPADM

## 2021-02-11 RX ADMIN — DOCUSATE SODIUM 50 MG AND SENNOSIDES 8.6 MG 2 TABLET: 8.6; 5 TABLET, FILM COATED ORAL at 05:09

## 2021-02-11 RX ADMIN — ENOXAPARIN SODIUM 40 MG: 40 INJECTION SUBCUTANEOUS at 16:58

## 2021-02-11 RX ADMIN — RISPERIDONE 2 MG: 2 TABLET ORAL at 16:57

## 2021-02-11 RX ADMIN — RISPERIDONE 2 MG: 2 TABLET ORAL at 05:09

## 2021-02-11 RX ADMIN — ASPIRIN 325 MG ORAL TABLET 325 MG: 325 PILL ORAL at 05:09

## 2021-02-11 RX ADMIN — ACETAMINOPHEN 650 MG: 325 TABLET, FILM COATED ORAL at 20:41

## 2021-02-11 RX ADMIN — THERA TABS 1 TABLET: TAB at 05:09

## 2021-02-11 RX ADMIN — DOXYCYCLINE 100 MG: 100 TABLET, FILM COATED ORAL at 05:09

## 2021-02-11 RX ADMIN — DOXYCYCLINE 100 MG: 100 TABLET, FILM COATED ORAL at 16:57

## 2021-02-11 RX ADMIN — Medication 100 MG: at 05:09

## 2021-02-12 PROCEDURE — A9270 NON-COVERED ITEM OR SERVICE: HCPCS | Performed by: NURSE PRACTITIONER

## 2021-02-12 PROCEDURE — 700102 HCHG RX REV CODE 250 W/ 637 OVERRIDE(OP): Performed by: NURSE PRACTITIONER

## 2021-02-12 PROCEDURE — A9270 NON-COVERED ITEM OR SERVICE: HCPCS | Performed by: HOSPITALIST

## 2021-02-12 PROCEDURE — 97530 THERAPEUTIC ACTIVITIES: CPT

## 2021-02-12 PROCEDURE — 770006 HCHG ROOM/CARE - MED/SURG/GYN SEMI*

## 2021-02-12 PROCEDURE — 700111 HCHG RX REV CODE 636 W/ 250 OVERRIDE (IP): Performed by: HOSPITALIST

## 2021-02-12 PROCEDURE — 99231 SBSQ HOSP IP/OBS SF/LOW 25: CPT | Performed by: NURSE PRACTITIONER

## 2021-02-12 PROCEDURE — 700102 HCHG RX REV CODE 250 W/ 637 OVERRIDE(OP): Performed by: HOSPITALIST

## 2021-02-12 RX ADMIN — Medication 100 MG: at 04:38

## 2021-02-12 RX ADMIN — ASPIRIN 325 MG ORAL TABLET 325 MG: 325 PILL ORAL at 04:38

## 2021-02-12 RX ADMIN — RISPERIDONE 2 MG: 2 TABLET ORAL at 04:38

## 2021-02-12 RX ADMIN — DOXYCYCLINE 100 MG: 100 TABLET, FILM COATED ORAL at 16:08

## 2021-02-12 RX ADMIN — DOXYCYCLINE 100 MG: 100 TABLET, FILM COATED ORAL at 04:38

## 2021-02-12 RX ADMIN — THERA TABS 1 TABLET: TAB at 04:38

## 2021-02-12 RX ADMIN — ENOXAPARIN SODIUM 40 MG: 40 INJECTION SUBCUTANEOUS at 16:08

## 2021-02-12 RX ADMIN — RISPERIDONE 2 MG: 2 TABLET ORAL at 16:08

## 2021-02-12 ASSESSMENT — COGNITIVE AND FUNCTIONAL STATUS - GENERAL
MOVING TO AND FROM BED TO CHAIR: A LOT
STANDING UP FROM CHAIR USING ARMS: A LITTLE
WALKING IN HOSPITAL ROOM: TOTAL
SUGGESTED CMS G CODE MODIFIER DAILY ACTIVITY: CK
DRESSING REGULAR LOWER BODY CLOTHING: A LOT
TURNING FROM BACK TO SIDE WHILE IN FLAT BAD: A LOT
TOILETING: A LOT
MOBILITY SCORE: 10
DAILY ACTIVITIY SCORE: 18
HELP NEEDED FOR BATHING: A LOT
MOVING FROM LYING ON BACK TO SITTING ON SIDE OF FLAT BED: UNABLE
SUGGESTED CMS G CODE MODIFIER MOBILITY: CL
CLIMB 3 TO 5 STEPS WITH RAILING: TOTAL

## 2021-02-12 NOTE — CARE PLAN
Problem: Communication  Goal: The ability to communicate needs accurately and effectively will improve  Outcome: PROGRESSING AS EXPECTED  Note: Discussed POC with patient Updated white board Calls appropriately  Call light within reach      Problem: Safety  Goal: Will remain free from injury  Outcome: PROGRESSING AS EXPECTED  Note: Fall Precautions in place: Pt room in front of nurse's station, telesitter being utilized,  Non-skid socks on, bed locked and in lowest position, upper bed rails up, DME in bathroom, call light within reach, bed and chair alarm on.

## 2021-02-12 NOTE — THERAPY
Occupational Therapy  Daily Treatment     Patient Name: Yury Blake  Age:  50 y.o., Sex:  male  Medical Record #: [id number]  Today's Date: 2/12/2021     Precautions  Precautions: Fall Risk  Comments: w/b for transfers only, boots on at all times    Assessment    Pt completed w/c transfer and wheeled to shower room, however became very irritable and verbally aggresive once there. Therapist decided to bring pt back to room due to safety concerns as pt was cursing. Once back in room, pt refusing to move from w/c to bed. Eventually pt agreed to go back to bed but was slamming FWW and was impulsive during transfer. Once seated EOB pt refused to move back into supine. Pt left seated EOB with alarm on, tray table in front of pt. Nsg notified. Pts treatment frequency decreased to 2x/week due to behavior, cognition, ability to participate.     Plan    Treatment plan modified to 2 times per week until therapy goals are met for the following treatments:  Adaptive Equipment, Cognitive Skill Development, Manual Therapy Techniques, Neuro Re-Education / Balance, Self Care/Activities of Daily Living, Therapeutic Activities and Therapeutic Exercises.    DC Equipment Recommendations: Unable to determine at this time  Discharge Recommendations: Recommend post-acute placement for additional occupational therapy services prior to discharge home.     Subjective    Pt alert, but confused, stating it was 3pm. Pt initially cooperative but became progressively irritable, verbally aggressive, and uncooperative. See note below.      Objective       02/12/21 1036   Cognition    Cognition / Consciousness X   Speech/ Communication Delayed Responses   Orientation Level Not Oriented to Year;Not Oriented to Month;Not Oriented to Day;Not Oriented to Time;Not Oriented to Place;Not Oriented to Reason   Level of Consciousness Confused   Ability To Follow Commands 1 Step   Safety Awareness Impaired;Impulsive   New Learning Impaired   Comments  "Pt initially agreeable to participate, however became progressively more irritable, verbally aggressive, and uncooperative during session. Pt telling therapist to \"piss off\" and \"everyone here is an idiot.\" Pt refusing participation in shower claiming he took a shower this morning.   Other Treatments   Other Treatments Provided Deescalation attempts and empathetic listening provided as pt became progressively more irritable and uncooperative. Pt completed w/c transfer and wheeled to shower room, however became very irritable and verbally aggresive once there. Therapist decided to bring pt back to room due to safety concerns as pt was cursing. Once back in room, pt refusing to move from w/c to bed. Eventually pt agreed to go back to bed but was slamming FWW and impulsive during transfer. Once seated EOB pt refused to move back into supine. Pt left seated EOB with alarm on, tray table in front of pt. Nsg notified.   Balance   Comments standing balance appeared improved this session   Bed Mobility    Supine to Sit Minimal Assist  (for BLE due to boots being heavy)   Scooting Supervised   Functional Mobility   Sit to Stand Moderate Assist   Bed, Chair, Wheelchair Transfer Minimal Assist   Transfer Method Stand Step   Mobility w/c transfer, bed transfer using FWW   Activity Tolerance   Comments limited by behavior this session   Patient / Family Goals   Patient / Family Goal #1 Unable to state   Short Term Goals   Short Term Goal # 1 Pt will complete toilet transfer using BSC if needed with spv, no LOB by discharge.   Goal Outcome # 1 Progressing slower than expected   Short Term Goal # 2 Pt will complete seated grooming/hygiene with spv by discharge.   Goal Outcome # 2 Goal met   Short Term Goal # 3 Pt will complete toileting with spv by discharge.   Goal Outcome # 3 Progressing slower than expected   Short Term Goal # 4 Pt will complete LB dressing with spv by discharge.   Goal Outcome # 4 Goal not met         "

## 2021-02-12 NOTE — THERAPY
Physical Therapy   Daily Treatment     Patient Name: Yury Blake  Age:  50 y.o., Sex:  male  Medical Record #: [id number]  Today's Date: 2/12/2021     Precautions: (P) Fall Risk    Assessment    Patient appears to be hitting a plateau with physical therapy, demonstrates min A need for all allowable functional mobility.  Defer to primary therapist for POC while in acute hospital.  Patient able to follow 1 step commands to transfer from chair to bed.  Attempts to work with patient later in the afternoon post OT giving patient a shower, however patient remains irritable post OT session.  PT will follow.        02/12/21 1035   Precautions   Precautions Fall Risk   Comments WB transfers only - with boots on at all times   Cognition    Cognition / Consciousness X   Speech/ Communication Delayed Responses   Orientation Level Not Oriented to Year;Not Oriented to Month;Not Oriented to Reason   Level of Consciousness Confused   Ability To Follow Commands 1 Step   Safety Awareness Impaired   New Learning Impaired   Attention Impaired   Sequencing Impaired   Bed Mobility    Supine to Sit Minimal Assist   Comments for sequencing   Functional Mobility   Sit to Stand Mod Assist   Bed, Chair, Wheelchair Transfer Minimal Assist   Transfer Method Stand Step   How much difficulty does the patient currently have...   Turning over in bed (including adjusting bedclothes, sheets and blankets)? 2   Sitting down on and standing up from a chair with arms (e.g., wheelchair, bedside commode, etc.) 1   Moving from lying on back to sitting on the side of the bed? 2   How much help from another person does the patient currently need...   Moving to and from a bed to a chair (including a wheelchair)? 3   Need to walk in a hospital room? 1   Climbing 3-5 steps with a railing? 1   6 clicks Mobility Score 10   Short Term Goals    Short Term Goal # 2 Patient will move sitting<>standing with supervision within 6tx in order to initiate gait and  transfers   Goal Outcome # 2 Goal not met   Short Term Goal # 3 Pt will be able to transfer bed to chair with SPV in 6tx in order to progress with therapy   Goal Outcome # 3 Goal not met   Short Term Goal # 4 Pt will be able to demosntrate SPV with WC mobility in 6tx in order to progress with therapy   Goal Outcome # 4 Goal not met   Education Group   Education Provided Role of Physical Therapist   Role of Physical Therapist Patient Response Patient;Acceptance;Explanation;Action Demonstration;Reinforcement Needed   Anticipated Discharge Equipment and Recommendations   DC Equipment Recommendations Unable to determine at this time     Plan    Continue current treatment plan.    DC Equipment Recommendations: (P) Unable to determine at this time  Discharge Recommendations: (P) Recommend post-acute placement for additional physical therapy services prior to discharge home

## 2021-02-12 NOTE — PROGRESS NOTES
[facility] Medicine Progress Note    Date of Service  2/12/2021    Chief Complaint  Altered mental status    Hospital Course  Mr. Blake is a 50-year-old male who was brought to the emergency department on 9/9/2020 with altered mental status after he was found by his ex-wife to be obtunded after she had checked on him when he had not been seen for at least 2 days.  At that time he was alert and oriented x0 and found to be hypotensive.  GCS in the ED was 7 and he was severely hypotensive.  He was intubated for airway protection and central venous access was obtained for administration of high-volume crystalloid resuscitation and vasopressor therapy.  He had a significant leukocytosis with a WBC of 22.9 and was hypokalemic with a potassium level of 2.7.  Additionally he had acute kidney injury with a creatinine of 2.73.  His INR was 8.93 and he also had a lactate level of 11.  Urinalysis was performed and was negative for urinary tract infection.  He was diagnosed with septic shock and admitted to the intensive care unit for further evaluation and treatment.  A lumbar puncture was done on 9/9/2020.  He was also noted to have seizure-like activity on 9/10/2020 so an EEG was obtained and was significant for a moderate encephalopathy.  Extubation occurred on 9/11/2020.  He also had a traumatic catheter removal so on 9/21/2020 urology was consulted but was ultimately unnecessary.  On 9/13/2020 he was transferred out of the ICU where he required restraints due to confusion, hallucination, agitation, and impulsivity.  An MRI was performed and was negative for acute pathologies.  He was also treated for a UTI starting 10/2/2020.  He has also fallen multiple times in hospital.  Additionally, on 11/11/2020 he had an acute episode of sinus tachycardia in the 140s accompanied by hypotension.  There was concern for STEMI so cardiology was consulted.  A 12-lead EKG was obtained and was concerning for atrial flutter.  The  patient received metoprolol after which a repeat ECG showed sinus tachycardia.  A D-dimer was obtained and was elevated for which a CT chest was done that was negative for pulmonary embolism.  Cardiology subsequently signed off.  He has remained in the hospital for a prolonged period of time and is now pending guardianship and then will need placement.  Guardianship hearing took place 2/5/2021.   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 Ortho was consulted and he does require I&D. Surgery with Dr. Roque completed 2/1/2021    Interval Problem Update  2/12: VSS and WNL with exception of mild low SBP at 97.  Extensive conversation with the patient's guardian who is a lifelong friend.  Patient will need 24/7 supervision moving forward.  LPS following wounds to his feet bilaterally.  Sutures out in the next few days.  Mentation remains altered as patient says he will be playing golf later this afternoon.    2/9: Guardianship has been granted  BM today  Labs reviewed from 2/5 and are okay  Doxycycline for infected foot to continue through 2/15 a.m. dose.  Awaits placement when financial issues are straightened out.  Next court date is February 26.  Mentation remains altered and is likely his baseline    Consultants/Specialty  Critical care  Psychiatry  Cardiology  Urology  Infectious disease    Code Status  Full Code    Disposition  Patient will require 24/7 supervision upon discharge    Review of Systems  Review of Systems   Unable to perform ROS: Medical condition      Physical Exam  Temp:  [35.8 °C (96.4 °F)-36.4 °C (97.6 °F)] 36.4 °C (97.6 °F)  Pulse:  [66-81] 66  Resp:  [16-17] 16  BP: (96-97)/(55-64) 97/64  SpO2:  [92 %-95 %] 95 %    Physical Exam  Vitals and nursing note reviewed.   HENT:      Head: Normocephalic and atraumatic.      Nose: Nose normal.   Eyes:      Conjunctiva/sclera: Conjunctivae normal.      Pupils: Pupils are equal, round, and reactive to  light.   Cardiovascular:      Rate and Rhythm: Normal rate and regular rhythm.      Heart sounds: No murmur. No friction rub.   Pulmonary:      Effort: No respiratory distress.   Abdominal:      General: Bowel sounds are normal. There is no distension.      Palpations: Abdomen is soft.   Musculoskeletal:      Cervical back: Neck supple.      Comments: Bilateral feet in offloading boots   Skin:     General: Skin is warm and dry.   Neurological:      Mental Status: He is alert. Mental status is at baseline. He is confused.   Psychiatric:         Attention and Perception: Attention normal.         Mood and Affect: Mood normal.         Speech: Speech normal.         Behavior: Behavior normal.         Thought Content: Thought content is delusional.         Cognition and Memory: Cognition is impaired. Memory is impaired.       Fluids    Intake/Output Summary (Last 24 hours) at 2/12/2021 1311  Last data filed at 2/12/2021 0938  Gross per 24 hour   Intake 1080 ml   Output 2600 ml   Net -1520 ml     Laboratory    Imaging  None recent    Assessment/Plan  * Toxic encephalopathy- (present on admission)  Assessment & Plan  -Unknown etiology - hypoxic induced brain injury vs Wernicke encephalopathy.  -LP, MRI brain, EEG negative. TSH normal. HIV/RPR negative.  -Psych was consulted and believe it is alcohol related.  -Continue risperdal.  -Remains free from restraints.  -Has fallen multiple times while in hospital. Sitter now at bedside.     Open wound of left foot  Assessment & Plan  Lateral aspect, worsening, red, nontender  HgA1c 1/21: 5.2%  No leukocytosis  Wound care following  1/21 LPS consulted  1/24: Wound culture growing Staph aureus. Continue Augmentin. Bactrim discontinued.  2/1: I &D surgery of foot with Dr. Roque    2/4: Bone biopsy cultures positive for S epidermitis and Corynebacterium. ID consulted  2/5: ID recommended 10 days of doxycycline    Normocytic anemia- (present on admission)  Assessment & Plan  -Very  mild, no active bleeding.  - stable  -Check periodically.    Alcohol abuse- (present on admission)  Assessment & Plan  -Abstinent since admission.  -Continue daily PO vitamin therapy.  -Serum B1 --consider cessation of supplementation     VTE prophylaxis: Lovenox    I have performed a physical exam and reviewed and updated ROS and Plan today (2/12/2021). In review of previous note, there are no changes except as documented above.       AMY Verduzco.

## 2021-02-13 LAB — VIT B1 BLD-MCNC: 208 NMOL/L (ref 70–180)

## 2021-02-13 PROCEDURE — A9270 NON-COVERED ITEM OR SERVICE: HCPCS | Performed by: NURSE PRACTITIONER

## 2021-02-13 PROCEDURE — A9270 NON-COVERED ITEM OR SERVICE: HCPCS | Performed by: HOSPITALIST

## 2021-02-13 PROCEDURE — 84425 ASSAY OF VITAMIN B-1: CPT

## 2021-02-13 PROCEDURE — 700102 HCHG RX REV CODE 250 W/ 637 OVERRIDE(OP): Performed by: NURSE PRACTITIONER

## 2021-02-13 PROCEDURE — 700102 HCHG RX REV CODE 250 W/ 637 OVERRIDE(OP): Performed by: HOSPITALIST

## 2021-02-13 PROCEDURE — 36415 COLL VENOUS BLD VENIPUNCTURE: CPT

## 2021-02-13 PROCEDURE — 700111 HCHG RX REV CODE 636 W/ 250 OVERRIDE (IP): Performed by: HOSPITALIST

## 2021-02-13 PROCEDURE — 770006 HCHG ROOM/CARE - MED/SURG/GYN SEMI*

## 2021-02-13 RX ADMIN — Medication 100 MG: at 05:03

## 2021-02-13 RX ADMIN — DOXYCYCLINE 100 MG: 100 TABLET, FILM COATED ORAL at 05:03

## 2021-02-13 RX ADMIN — DOXYCYCLINE 100 MG: 100 TABLET, FILM COATED ORAL at 16:19

## 2021-02-13 RX ADMIN — THERA TABS 1 TABLET: TAB at 05:03

## 2021-02-13 RX ADMIN — ENOXAPARIN SODIUM 40 MG: 40 INJECTION SUBCUTANEOUS at 16:19

## 2021-02-13 RX ADMIN — RISPERIDONE 2 MG: 2 TABLET ORAL at 16:19

## 2021-02-13 RX ADMIN — TAMSULOSIN HYDROCHLORIDE 0.8 MG: 0.4 CAPSULE ORAL at 10:20

## 2021-02-13 RX ADMIN — ASPIRIN 325 MG ORAL TABLET 325 MG: 325 PILL ORAL at 05:03

## 2021-02-13 RX ADMIN — RISPERIDONE 2 MG: 2 TABLET ORAL at 05:03

## 2021-02-13 NOTE — PROGRESS NOTES
[id number] Received report from night RN Ml, POC discussed. Call light in place, bed lowered and locked, bed alarm on. Encourage pt to call if needed anything. Pt A&Ox2, confused with time and event, dressing on BLE is CD&I, boot on at all times, telesitter in place

## 2021-02-13 NOTE — PROGRESS NOTES
[id number] Received report from night RN Prema, POC discussed. Call light in place, bed lowered and locked, bed alarm on. Encourage pt to call if needed anything. Pt A&Ox2, confused with time and event, dressing on BLE is CD&I, boot on at all times, telesitter in place

## 2021-02-13 NOTE — CARE PLAN
Problem: Communication  Goal: The ability to communicate needs accurately and effectively will improve  Outcome: PROGRESSING AS EXPECTED     Problem: Safety  Goal: Will remain free from injury  Outcome: PROGRESSING AS EXPECTED  Goal: Will remain free from falls  Outcome: PROGRESSING AS EXPECTED     Problem: Knowledge Deficit  Goal: Knowledge of disease process/condition, treatment plan, diagnostic tests, and medications will improve  Outcome: PROGRESSING SLOWER THAN EXPECTED

## 2021-02-13 NOTE — CARE PLAN
Problem: Safety  Goal: Will remain free from falls  Outcome: PROGRESSING AS EXPECTED  Note: Assess LOC, assess environment. Maintain a clutter-free environment, bed alarm on, bed lowered and locked, non-skid socks in use, call light in place, personal belongings within reach. Offered toileting. Fall precautions in place. Tele sitter in place     Problem: Knowledge Deficit  Goal: Knowledge of disease process/condition, treatment plan, diagnostic tests, and medications will improve  Outcome: PROGRESSING AS EXPECTED  Intervention: Assess knowledge level of disease process/condition, treatment plan, diagnostic tests, and medications  Note: Update POC, encourage questions or to verbalize any concerns

## 2021-02-13 NOTE — CARE PLAN
Problem: Safety  Goal: Will remain free from falls  Outcome: PROGRESSING AS EXPECTED  Note: Assess LOC, assess environment. Maintain a clutter-free environment, bed alarm on, bed lowered and locked, non-skid socks in use, call light in place, personal belongings within reach. Offered toileting. Fall precautions in place.      Problem: Knowledge Deficit  Goal: Knowledge of disease process/condition, treatment plan, diagnostic tests, and medications will improve  Outcome: PROGRESSING SLOWER THAN EXPECTED  Intervention: Assess knowledge level of disease process/condition, treatment plan, diagnostic tests, and medications  Note: Update POC, encourage questions or to verbalize any concerns

## 2021-02-14 PROCEDURE — A9270 NON-COVERED ITEM OR SERVICE: HCPCS | Performed by: NURSE PRACTITIONER

## 2021-02-14 PROCEDURE — 700102 HCHG RX REV CODE 250 W/ 637 OVERRIDE(OP): Performed by: NURSE PRACTITIONER

## 2021-02-14 PROCEDURE — A9270 NON-COVERED ITEM OR SERVICE: HCPCS | Performed by: HOSPITALIST

## 2021-02-14 PROCEDURE — 770006 HCHG ROOM/CARE - MED/SURG/GYN SEMI*

## 2021-02-14 PROCEDURE — 700102 HCHG RX REV CODE 250 W/ 637 OVERRIDE(OP): Performed by: HOSPITALIST

## 2021-02-14 PROCEDURE — 700111 HCHG RX REV CODE 636 W/ 250 OVERRIDE (IP): Performed by: HOSPITALIST

## 2021-02-14 RX ADMIN — DOXYCYCLINE 100 MG: 100 TABLET, FILM COATED ORAL at 05:01

## 2021-02-14 RX ADMIN — RISPERIDONE 2 MG: 2 TABLET ORAL at 18:09

## 2021-02-14 RX ADMIN — TAMSULOSIN HYDROCHLORIDE 0.8 MG: 0.4 CAPSULE ORAL at 07:54

## 2021-02-14 RX ADMIN — ASPIRIN 325 MG ORAL TABLET 325 MG: 325 PILL ORAL at 05:01

## 2021-02-14 RX ADMIN — ENOXAPARIN SODIUM 40 MG: 40 INJECTION SUBCUTANEOUS at 18:09

## 2021-02-14 RX ADMIN — ACETAMINOPHEN 650 MG: 325 TABLET, FILM COATED ORAL at 20:47

## 2021-02-14 RX ADMIN — DOXYCYCLINE 100 MG: 100 TABLET, FILM COATED ORAL at 18:09

## 2021-02-14 RX ADMIN — THERA TABS 1 TABLET: TAB at 05:01

## 2021-02-14 RX ADMIN — DOCUSATE SODIUM 50 MG AND SENNOSIDES 8.6 MG 1 TABLET: 8.6; 5 TABLET, FILM COATED ORAL at 05:01

## 2021-02-14 RX ADMIN — Medication 100 MG: at 05:01

## 2021-02-14 RX ADMIN — RISPERIDONE 2 MG: 2 TABLET ORAL at 05:01

## 2021-02-15 PROCEDURE — 700102 HCHG RX REV CODE 250 W/ 637 OVERRIDE(OP): Performed by: NURSE PRACTITIONER

## 2021-02-15 PROCEDURE — 99232 SBSQ HOSP IP/OBS MODERATE 35: CPT | Performed by: NURSE PRACTITIONER

## 2021-02-15 PROCEDURE — 700111 HCHG RX REV CODE 636 W/ 250 OVERRIDE (IP): Performed by: HOSPITALIST

## 2021-02-15 PROCEDURE — A9270 NON-COVERED ITEM OR SERVICE: HCPCS | Performed by: NURSE PRACTITIONER

## 2021-02-15 PROCEDURE — 700102 HCHG RX REV CODE 250 W/ 637 OVERRIDE(OP): Performed by: HOSPITALIST

## 2021-02-15 PROCEDURE — A9270 NON-COVERED ITEM OR SERVICE: HCPCS | Performed by: HOSPITALIST

## 2021-02-15 PROCEDURE — 770006 HCHG ROOM/CARE - MED/SURG/GYN SEMI*

## 2021-02-15 RX ADMIN — DOXYCYCLINE 100 MG: 100 TABLET, FILM COATED ORAL at 04:56

## 2021-02-15 RX ADMIN — ENOXAPARIN SODIUM 40 MG: 40 INJECTION SUBCUTANEOUS at 17:44

## 2021-02-15 RX ADMIN — TAMSULOSIN HYDROCHLORIDE 0.8 MG: 0.4 CAPSULE ORAL at 08:37

## 2021-02-15 RX ADMIN — RISPERIDONE 2 MG: 2 TABLET ORAL at 04:56

## 2021-02-15 RX ADMIN — RISPERIDONE 2 MG: 2 TABLET ORAL at 17:44

## 2021-02-15 RX ADMIN — THERA TABS 1 TABLET: TAB at 04:56

## 2021-02-15 RX ADMIN — ASPIRIN 325 MG ORAL TABLET 325 MG: 325 PILL ORAL at 04:56

## 2021-02-15 NOTE — CARE PLAN
Problem: Safety  Goal: Will remain free from injury  Outcome: PROGRESSING AS EXPECTED   Pt has had no falls/injuries this shift. Telesitter in place. Hourly rounding performed on pt.    Problem: Pain Management  Goal: Pain level will decrease to patient's comfort goal  Outcome: PROGRESSING AS EXPECTED   Pt pain controlled with PRN Tylenol. Other comfort measures, such as repositioning, offered.

## 2021-02-15 NOTE — CARE PLAN
Problem: Bowel/Gastric:  Goal: Normal bowel function is maintained or improved  Outcome: PROGRESSING AS EXPECTED   Pt had BM today. Condom cath replaced and berto area cleaned, reapplied barrier cream.

## 2021-02-16 PROCEDURE — 700102 HCHG RX REV CODE 250 W/ 637 OVERRIDE(OP): Performed by: NURSE PRACTITIONER

## 2021-02-16 PROCEDURE — 770006 HCHG ROOM/CARE - MED/SURG/GYN SEMI*

## 2021-02-16 PROCEDURE — 700102 HCHG RX REV CODE 250 W/ 637 OVERRIDE(OP): Performed by: HOSPITALIST

## 2021-02-16 PROCEDURE — A9270 NON-COVERED ITEM OR SERVICE: HCPCS | Performed by: NURSE PRACTITIONER

## 2021-02-16 PROCEDURE — 97110 THERAPEUTIC EXERCISES: CPT

## 2021-02-16 PROCEDURE — 700111 HCHG RX REV CODE 636 W/ 250 OVERRIDE (IP): Performed by: HOSPITALIST

## 2021-02-16 PROCEDURE — 97530 THERAPEUTIC ACTIVITIES: CPT

## 2021-02-16 PROCEDURE — A9270 NON-COVERED ITEM OR SERVICE: HCPCS | Performed by: HOSPITALIST

## 2021-02-16 RX ADMIN — RISPERIDONE 2 MG: 2 TABLET ORAL at 05:06

## 2021-02-16 RX ADMIN — ASPIRIN 325 MG ORAL TABLET 325 MG: 325 PILL ORAL at 05:06

## 2021-02-16 RX ADMIN — RISPERIDONE 2 MG: 2 TABLET ORAL at 17:12

## 2021-02-16 RX ADMIN — ENOXAPARIN SODIUM 40 MG: 40 INJECTION SUBCUTANEOUS at 17:12

## 2021-02-16 RX ADMIN — TAMSULOSIN HYDROCHLORIDE 0.8 MG: 0.4 CAPSULE ORAL at 07:47

## 2021-02-16 RX ADMIN — THERA TABS 1 TABLET: TAB at 05:06

## 2021-02-16 ASSESSMENT — COGNITIVE AND FUNCTIONAL STATUS - GENERAL
MOBILITY SCORE: 9
STANDING UP FROM CHAIR USING ARMS: A LOT
MOVING TO AND FROM BED TO CHAIR: A LOT
WALKING IN HOSPITAL ROOM: TOTAL
TURNING FROM BACK TO SIDE WHILE IN FLAT BAD: A LOT
MOVING FROM LYING ON BACK TO SITTING ON SIDE OF FLAT BED: UNABLE
SUGGESTED CMS G CODE MODIFIER DAILY ACTIVITY: CK
DAILY ACTIVITIY SCORE: 18
SUGGESTED CMS G CODE MODIFIER MOBILITY: CM
DRESSING REGULAR LOWER BODY CLOTHING: A LOT
TOILETING: A LOT
CLIMB 3 TO 5 STEPS WITH RAILING: TOTAL
HELP NEEDED FOR BATHING: A LOT

## 2021-02-16 ASSESSMENT — GAIT ASSESSMENTS
DISTANCE (FEET): 3
GAIT LEVEL OF ASSIST: UNABLE TO PARTICIPATE

## 2021-02-16 NOTE — PROGRESS NOTES
[facility] PRESERVATION SERVICE  PROGRESS NOTE    HPI: 50 y.o.  with a past medical history that includes hypertension, alcohol abuse, , admitted 9/9/2020 for Acute respiratory failure with hypoxia (HCC)   LPS has been consulted for left fifth MTH ulcer.      Patient is a poor historian.  A&O to self only.  Chart reviewed.  Followed by wound team for hospital-acquired pressure injury to left fifth MTH.  Pressure injury first noted on 12/18/2020 that started as a deep tissue injury from PRAFO boot that evolved to an unstageable and now presents as a stage IV.  Patient would not allow anyone to assess his ulcer.  Finally he allowed wound team to see the ulcer on 12/27/2020.  Advanced wound dressings have been applied including hydrocolloid thin, Hydrofiber silver, Mepilex.  Patient initially presented to the hospital 9/9/2020 for altered mental status.  Found to be obtunded for least 2 days.  He was intubated, vasopressor therapy was utilized. extubated 9/11/2020. He had encephalopathy. Patient was severely agitated and confused with hallucinations and impulsivity.  He developed bilateral foot drop.  He was given a PRAFO boot for left foot on 11/19/2020 and a PRAFO boot for right foot on 12/16/2020.  He has had multiple falls. Patient is two-person max assist.  Not ambulating at this time. He has had sitters and remote sitter at bedside. currently pending guardianship.     Date/Surgeon: 2/1/2021 with Dr. Roque  PROCEDURES:  1.  Bilateral gastroc-soleus recessions.  2.  Bilateral posterior tibialis tendon transfer to the lateral cuneiform.  3.  Bilateral deep compartment fasciotomy.  4.  Bilateral flexor tendon release, toes 1 through 5.  5.  Left medial column release.  6.  Left irrigation and debridement of foot, multiple compartments including   lateral intra-articular fifth MTPJ.  7.  Left fifth metatarsal bone biopsy.      INTERVAL HISTORY:  1/26/2021: Patient sleeping, wakes up when name is called, but goes back to  "sleep. Intermittent twitching left foot. PRAFO on L foot, foot slipped out. On augmentin, bactrim discontinued once cx resulted  1/29/2021: Patient dozing.  Alert during exam.  Wound care debrided ulcer yesterday.  Continues to take Augmentin until 1/31/2021.  2/5/2020: Patient sitting in chair. Awake, alert, calm and pleasant. Boots and dressings in place to bilateral lower extremities. Denies fever, chills, nausea, vomiting, diarrhea, pain.   2/10/2021: POD #9.  Patient confused, delusional.  States he has been helping a friend with fixing his car.  Does not know he is in the hospital.  Patient wearing bilateral boots at all time.  Patient denies that he had surgery.  2/15/2021: POD #14.  Patient remains confused and delusional.  He states that he had just woken with Dominic Marie on the telephone.  All incisions are well approximated and healing well, sutures removed today.  But is obtained and dressings reapplied.  Bilateral boots in place at all times.      PERTINENT LPS RESULTS:     Pathology 2/1/2021    FINAL DIAGNOSIS:     A. Left fifth metatarsal:          Bening bone with reactive fibrosis.          No evidence of osteomyelitis.     COVID-19: not detected this admission 1/29/2021    Bone culture of left fifth metatarsal 2/1/2021: Staph epidermidis, oxacillin resistant.      Tissue culture from the left fifth toe ulcer 2/1/2021: corynebacterium species.            EXAM:      BP (!) 96/61 Comment: Rn notified  Pulse 89   Temp 36.6 °C (97.8 °F) (Temporal)   Resp 16   Ht 1.803 m (5' 11\")   Wt 80.3 kg (177 lb)   SpO2 93%   BMI 24.69 kg/m²     Palpable pedal pulses      Incisions:    Right foot and lower leg incisions:  Incisions well approximated after suture removal.  No surrounding erythema, edema, drainage.  Previous ecchymosis to right plantar foot and purple discoloration to lateral right great toe improved.    Plantar toes    Right medial lower extremity    Right anterior lower extremity    Right " medial foot            Left foot and lower leg incisions  Incisions well approximated after removal of sutures.  No surrounding erythema, edema, no drainage  Previous ecchymosis to left dorsal foot and left great toe improved.    Left plantar toes    Left lateral fifth MTH    Left anterior lower leg and dorsal foot    Left medial lower extremity    Left medial foot              Wound care completed by APRN and RN applied adhesive foams to all incisions.  Apply dry gauze in between toes bilaterally.  Applied bilateral heel Mepilex to heels.  Applied roll gauze followed by Ace wrap to BLE.  Replaced offloading boots.  Patient tolerated dressing change well.        INFECTION MANAGEMENT:        Wound culture results:   Results     ** No results found for the last 168 hours. **             ASSESSMENT/PLAN:     -Patient admitted September 2020 for altered mental status.  Required intubation and vasopressor therapy.  Developed bilateral foot drop and subsequently pressure injury to left fifth MTH from PRAFO boot.     -POD #14 Bilateral gastroc-soleus recessions, Bilateral posterior tibialis tendon transfer to the lateral cuneiform, Bilateral deep compartment fasciotomy,Bilateral flexor tendon release, toes 1 through 5, Left medial column release, Left irrigation and debridement of foot, multiple compartments including lateral intra-articular fifth MTPJ, Left fifth metatarsal bone biopsy with Dr. Roque on 2/1/2021.   -Incisions well approximated sutures removed.  Ulcer to left fifth MTH no longer present.  Dry dressings applied.      Wound care:   -LPS to manage dressings.  Change weekly.    -RN to adjust offloading boots as needed per patient comfort      Labs/Imaging:  -COVID-19: not detected 1/29/2020    Vascular status:   -Palpable pedal pulses bilaterally    Surgery:   No return to OR at this time      Antibiotics:   -ID: ID involved.  Augmentin discontinued and patient transitioned to 10 day course of  doxycycline.  Bone culture of left fifth metatarsal 2/1/2021: Staph epidermidis, oxacillin resistant.  Wound culture from the left fifth toe ulcer 2/1/2021: corynebacterium species.        Weight Bearing Status:   -Transfer weight bearing with boots in place to BLE    Offloading:   Offloading boots at all times  -Orthotic company: none    PT/OT :   Involved. Last seen by PT 2/12/2021. Last seen by OT 2/12/2021      DISCHARGE PLAN:    Disposition: Guardianship hearing done 2/5/2021, guardianship granted to Deonte Santiago. Discharge pending placement.      Discussed with: pt, Brayden RN, Omer MUNOZ hospitalist    Please note that this dictation was created using voice recognition software. I have  worked with technical experts from [facility] to optimize the interface.  I have made every reasonable attempt to correct obvious errors, but there may be errors of grammar and possibly content that I did not discover before finalizing the note.    Delilah Key, A.P.R.N.    If any questions or concerns, please contact LPS through voalte.

## 2021-02-16 NOTE — CARE PLAN
Problem: Safety  Goal: Will remain free from injury  Outcome: PROGRESSING AS EXPECTED   Pt has had no falls or injuries this shift. Telesitter in place. Will continue to perform hourly rounding.     Problem: Psychosocial Needs:  Goal: Level of anxiety will decrease  Outcome: PROGRESSING AS EXPECTED   Pt has had no episodes of agitation or getting out of bed.

## 2021-02-16 NOTE — PROGRESS NOTES
"Pt showered today, replaced condom cath. Wound care removed sutures and changed dressing.    Lis Clemons is present and requesting a \"care plan\" and follow up from case management.  "

## 2021-02-17 LAB — VIT B1 BLD-MCNC: 245 NMOL/L (ref 70–180)

## 2021-02-17 PROCEDURE — A9270 NON-COVERED ITEM OR SERVICE: HCPCS | Performed by: NURSE PRACTITIONER

## 2021-02-17 PROCEDURE — 770006 HCHG ROOM/CARE - MED/SURG/GYN SEMI*

## 2021-02-17 PROCEDURE — 700102 HCHG RX REV CODE 250 W/ 637 OVERRIDE(OP): Performed by: HOSPITALIST

## 2021-02-17 PROCEDURE — A9270 NON-COVERED ITEM OR SERVICE: HCPCS | Performed by: HOSPITALIST

## 2021-02-17 PROCEDURE — 700102 HCHG RX REV CODE 250 W/ 637 OVERRIDE(OP): Performed by: NURSE PRACTITIONER

## 2021-02-17 PROCEDURE — 700111 HCHG RX REV CODE 636 W/ 250 OVERRIDE (IP): Performed by: HOSPITALIST

## 2021-02-17 RX ADMIN — ASPIRIN 325 MG ORAL TABLET 325 MG: 325 PILL ORAL at 05:54

## 2021-02-17 RX ADMIN — RISPERIDONE 2 MG: 2 TABLET ORAL at 18:05

## 2021-02-17 RX ADMIN — THERA TABS 1 TABLET: TAB at 05:54

## 2021-02-17 RX ADMIN — TAMSULOSIN HYDROCHLORIDE 0.8 MG: 0.4 CAPSULE ORAL at 08:13

## 2021-02-17 RX ADMIN — ENOXAPARIN SODIUM 40 MG: 40 INJECTION SUBCUTANEOUS at 18:05

## 2021-02-17 RX ADMIN — RISPERIDONE 2 MG: 2 TABLET ORAL at 05:54

## 2021-02-17 NOTE — THERAPY
"Physical Therapy   Daily Treatment     Patient Name: Yury Blake  Age:  50 y.o., Sex:  male  Medical Record #: [id number]  Today's Date: 2/16/2021     Precautions: Fall Risk  Comments: WB for transfers only, boots on all the time    Assessment    Pt seen for follow up PT tx. Pt cooperative but confused. Pt completed seated exercises including long arc quads and hip flexion. Pt continues to require mod assist and max cues for sequencing for transfers. In standing, pt instructed on weight shifting for improved static standing balance. PT will continue at a low frequency     Plan    Continue current treatment plan.    DC Equipment Recommendations: Unable to determine at this time  Discharge Recommendations: Recommend post-acute placement for additional physical therapy services prior to discharge home      Subjective    \"I probably have lighter boots at home, these are heavy\"       02/16/21 1624   Vitals   Patient BP Position Sitting   Blood Pressure (!) 92/62   Cognition    Orientation Level Not Oriented to Reason;Not Oriented to Place;Not Oriented to Time;Not Oriented to Day;Not Oriented to Month;Not Oriented to Year;Not Oriented to Address   Level of Consciousness Confused   Ability To Follow Commands 1 Step   Safety Awareness Impaired   New Learning Impaired   Attention Impaired   Sequencing Impaired   Comments cooperative during session    Sitting Lower Body Exercises   Sitting Lower Body Exercises Yes   Hip Flexion 2 sets of 10;Bilateral   Long Arc Quad 2 sets of 10;Bilateral   Sit to Stand   (x2)   Neuro-Muscular Treatments   Neuro-Muscular Treatments Weight Shift Left;Weight Shift Right;Verbal Cuing;Tactile Cuing;Anterior weight shift;Compensatory Strategies   Comments standing balance with FWW   Other Treatments   Other Treatments Provided pt very cooperative but confused. therapist explained to pt why he has post op boots on and why he is in the hospital    Balance   Sitting Balance (Static) Fair "   Sitting Balance (Dynamic) Fair -   Standing Balance (Static) Fair -   Standing Balance (Dynamic) Poor +   Weight Shift Sitting Fair   Weight Shift Standing Poor   Skilled Intervention Verbal Cuing;Sequencing;Compensatory Strategies   Comments standing with FWW   Gait Analysis   Gait Level Of Assist Unable to Participate   Bed Mobility    Supine to Sit   (NT in chair)   Sit to Supine   (Nt in chair)   Skilled Intervention Verbal Cuing   Functional Mobility   Sit to Stand Moderate Assist   Toilet Transfers Refused   Mobility seated ex, STS x3, standing balance   Skilled Intervention Verbal Cuing;Sequencing;Compensatory Strategies   Comments max cues needed for sequencing   Short Term Goals    Short Term Goal # 2 Patient will move sitting<>standing with supervision within 6tx in order to initiate gait and transfers   Goal Outcome # 2 Goal not met   Short Term Goal # 3 Pt will be able to transfer bed to chair with SPV in 6tx in order to progress with therapy   Goal Outcome # 3 Goal not met   Short Term Goal # 4 Pt will be able to demosntrate SPV with WC mobility in 6tx in order to progress with therapy   Goal Outcome # 4 Goal not met   Anticipated Discharge Equipment and Recommendations   DC Equipment Recommendations Unable to determine at this time   Discharge Recommendations Recommend post-acute placement for additional physical therapy services prior to discharge home

## 2021-02-17 NOTE — THERAPY
"Occupational Therapy  Daily Treatment     Patient Name: Yury Blake  Age:  50 y.o., Sex:  male  Medical Record #: [id number]  Today's Date: 2/16/2021     Precautions  Precautions: Fall Risk  Comments: w/b for transfers only, boots on at all times    Assessment    Pt required modA to stand from EOB but Jyoti for chair transfer. Pt declined participating in ADLs, however was agreeable to complete UB strengthening exercises while seated in chair.     Plan    Continue current treatment plan.    DC Equipment Recommendations: Unable to determine at this time  Discharge Recommendations: Recommend post-acute placement for additional occupational therapy services prior to discharge home    Subjective    Pt confused but cooperative with encouragement. Pt states, \"How long will it take this train to get me home?\"     Objective       02/16/21 1527   Cognition    Orientation Level Not Oriented to Reason;Not Oriented to Place;Not Oriented to Time;Not Oriented to Day;Not Oriented to Month;Not Oriented to Year;Not Oriented to Address   Level of Consciousness Confused   Ability To Follow Commands 1 Step   Comments cooperative with encouragement, dysthymic mood, confused throughout session, [place]   Sitting Upper Body Exercises   Sitting Upper Body Exercises Yes   Shoulder Press 2 sets of 10;Bilateral   Bicep Curls 2 sets of 10;Bilateral   Tricep Press 2 sets of 10;Bilateral   Comments 10 lb dumbbell seated in chair   Balance   Comments seated at SBA, standing with Jyoti using FWW, slight LOB upon initial stand, able to self correct   Bed Mobility    Supine to Sit Supervised   Comments HOB elevated, use of bed rails, increased time needed   Activities of Daily Living   Grooming   (declined)   Functional Mobility   Sit to Stand Moderate Assist   Toilet Transfers Refused   Transfer Method Stand Step   Mobility bed mob, STS, SST to chair with FWW   Distance (Feet) 3 "   # of Times Distance was Traveled 1   Activity Tolerance   Comments limited by weakness, balance   Patient / Family Goals   Patient / Family Goal #1 Unable to state   Short Term Goals   Short Term Goal # 1 Pt will complete toilet transfer using BSC if needed with spv, no LOB by discharge.   Goal Outcome # 1 Progressing slower than expected   Short Term Goal # 2 Pt will complete seated grooming/hygiene with spv by discharge.   Goal Outcome # 2 Goal met   Short Term Goal # 3 Pt will complete toileting with spv by discharge.   Goal Outcome # 3 Progressing slower than expected   Short Term Goal # 4 Pt will complete LB dressing with spv by discharge.   Goal Outcome # 4 Goal not met

## 2021-02-17 NOTE — DISCHARGE PLANNING
Anticipated Discharge Disposition: [facility] vs Long Term Care.    Action: Spoke to Guardian, Deonte Henderson. He is trying to find GH but has had difficulty because of pt's behavior and lack of mobility. We will continue to attempt to find GH vs Long Term Care.    Barriers to Discharge: Pending GH vs long term care.    Plan: F/U for placement.

## 2021-02-18 PROCEDURE — A9270 NON-COVERED ITEM OR SERVICE: HCPCS | Performed by: HOSPITALIST

## 2021-02-18 PROCEDURE — 700102 HCHG RX REV CODE 250 W/ 637 OVERRIDE(OP): Performed by: NURSE PRACTITIONER

## 2021-02-18 PROCEDURE — 99231 SBSQ HOSP IP/OBS SF/LOW 25: CPT | Performed by: NURSE PRACTITIONER

## 2021-02-18 PROCEDURE — 770006 HCHG ROOM/CARE - MED/SURG/GYN SEMI*

## 2021-02-18 PROCEDURE — 700111 HCHG RX REV CODE 636 W/ 250 OVERRIDE (IP): Performed by: HOSPITALIST

## 2021-02-18 PROCEDURE — 97530 THERAPEUTIC ACTIVITIES: CPT

## 2021-02-18 PROCEDURE — 700102 HCHG RX REV CODE 250 W/ 637 OVERRIDE(OP): Performed by: HOSPITALIST

## 2021-02-18 PROCEDURE — A9270 NON-COVERED ITEM OR SERVICE: HCPCS | Performed by: NURSE PRACTITIONER

## 2021-02-18 PROCEDURE — 97110 THERAPEUTIC EXERCISES: CPT

## 2021-02-18 RX ADMIN — RISPERIDONE 2 MG: 2 TABLET ORAL at 05:14

## 2021-02-18 RX ADMIN — RISPERIDONE 2 MG: 2 TABLET ORAL at 17:40

## 2021-02-18 RX ADMIN — ENOXAPARIN SODIUM 40 MG: 40 INJECTION SUBCUTANEOUS at 17:40

## 2021-02-18 RX ADMIN — ASPIRIN 325 MG ORAL TABLET 325 MG: 325 PILL ORAL at 05:14

## 2021-02-18 RX ADMIN — TAMSULOSIN HYDROCHLORIDE 0.8 MG: 0.4 CAPSULE ORAL at 09:20

## 2021-02-18 RX ADMIN — THERA TABS 1 TABLET: TAB at 05:14

## 2021-02-18 ASSESSMENT — GAIT ASSESSMENTS
GAIT LEVEL OF ASSIST: UNABLE TO PARTICIPATE
DEVIATION: ATAXIC;DECREASED BASE OF SUPPORT

## 2021-02-18 ASSESSMENT — COGNITIVE AND FUNCTIONAL STATUS - GENERAL
WALKING IN HOSPITAL ROOM: TOTAL
STANDING UP FROM CHAIR USING ARMS: A LOT
MOVING FROM LYING ON BACK TO SITTING ON SIDE OF FLAT BED: UNABLE
CLIMB 3 TO 5 STEPS WITH RAILING: TOTAL
MOVING TO AND FROM BED TO CHAIR: A LOT
MOBILITY SCORE: 9
TURNING FROM BACK TO SIDE WHILE IN FLAT BAD: A LOT
SUGGESTED CMS G CODE MODIFIER MOBILITY: CM

## 2021-02-18 NOTE — PROGRESS NOTES
[facility] Medicine TWICE WEEKLY Progress Note    Date of Service  2/18/2021    Chief Complaint  50 y.o. male admitted 9/9/2020 with altered mental status    Hospital Course  Mr. Blake is a 50-year-old male who was brought to the emergency department on 9/9/2020 with altered mental status after he was found by his ex-wife to be obtunded after she had checked on him when he had not been seen for at least 2 days.  At that time he was alert and oriented x0 and found to be hypotensive.  GCS in the ED was 7 and he was severely hypotensive.  He was intubated for airway protection and central venous access was obtained for administration of high-volume crystalloid resuscitation and vasopressor therapy.  He had a significant leukocytosis with a WBC of 22.9 and was hypokalemic with a potassium level of 2.7.  Additionally he had acute kidney injury with a creatinine of 2.73.  His INR was 8.93 and he also had a lactate level of 11.  Urinalysis was performed and was negative for urinary tract infection.  He was diagnosed with septic shock and admitted to the intensive care unit for further evaluation and treatment.  A lumbar puncture was done on 9/9/2020.  He was also noted to have seizure-like activity on 9/10/2020 so an EEG was obtained and was significant for a moderate encephalopathy.  Extubation occurred on 9/11/2020.  He also had a traumatic catheter removal so on 9/21/2020 urology was consulted but was ultimately unnecessary.  On 9/13/2020 he was transferred out of the ICU where he required restraints due to confusion, hallucination, agitation, and impulsivity.  An MRI was performed and was negative for acute pathologies.  He was also treated for a UTI starting 10/2/2020.  He has also fallen multiple times in hospital.  Additionally, on 11/11/2020 he had an acute episode of sinus tachycardia in the 140s accompanied by hypotension.  There was concern for STEMI so cardiology was consulted.  A 12-lead EKG was obtained  and was concerning for atrial flutter.  The patient received metoprolol after which a repeat ECG showed sinus tachycardia.  A D-dimer was obtained and was elevated for which a CT chest was done that was negative for pulmonary embolism.  Cardiology subsequently signed off.  He has remained in the hospital for a prolonged period of time and is now pending guardianship and then will need placement.  Guardianship hearing took place 2/5/2021.   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 Ortho was consulted and he does require I&D. Surgery with Dr. Roque completed 2/1/2021      Interval Problem Update  -Patient seen and examined.  Noted wounds on feet bilaterally are improving.  Patient very confused/altered.  Patient only oriented to self, and patient reports that he [place] in [place] to go somewhere.  Attempted to reorient patient.  Patient was given an update in plan of care.  -POC: Continue supportive care; monitor for safety; altered mentation likely baseline; guardianship established; next court date on 2/26 for financial issues; pending placement.  -Lab work: Reviewed; last obtained on 2/5/2021 -unremarkable; we will update labs as warranted.  -VSS at this time    ==========================================================================================  BATSHEVA SONG, MSN, APRN, FNP-C, certify that the patient requires continued medically necessary hospital services for the treatment of  cognitive impairment and wound infection and patient will need long-term placement. The patient will remain in the hospital for the foreseeable future.  Discharge may or may not occur in the next 20 days due to ongoing discharge delays due to no medically acceptable discharge option.  ==========================================================================================    Consultants/Specialty  Psychiatry  Cardiology  Urology  Infectious disease  LPS    Code  Status  Full Code    Disposition  -Guardianship established; next court date on 2/26 for financial issues; patient will need placement    Review of Systems  Review of Systems   Unable to perform ROS: Mental acuity        Physical Exam  Temp:  [36.3 °C (97.4 °F)-36.9 °C (98.4 °F)] 36.3 °C (97.4 °F)  Pulse:  [66-98] 66  Resp:  [15-17] 17  BP: (103-119)/(68-93) 103/68  SpO2:  [96 %-97 %] 97 %    Physical Exam  Vitals and nursing note reviewed.   HENT:      Head: Normocephalic.      Nose: Nose normal.      Mouth/Throat:      Mouth: Mucous membranes are moist.   Eyes:      Pupils: Pupils are equal, round, and reactive to light.   Cardiovascular:      Rate and Rhythm: Normal rate and regular rhythm.      Pulses: Normal pulses.      Heart sounds: Normal heart sounds.   Pulmonary:      Effort: Pulmonary effort is normal.      Breath sounds: Normal breath sounds.   Abdominal:      General: Bowel sounds are normal.      Palpations: Abdomen is soft.   Musculoskeletal:         General: Tenderness and signs of injury present.      Cervical back: Normal range of motion and neck supple.   Skin:     General: Skin is dry.      Capillary Refill: Capillary refill takes 2 to 3 seconds.   Neurological:      Mental Status: He is alert. Mental status is at baseline.         Fluids    Intake/Output Summary (Last 24 hours) at 2/18/2021 1032  Last data filed at 2/18/2021 0500  Gross per 24 hour   Intake 240 ml   Output 2700 ml   Net -2460 ml       Laboratory                        Imaging  DX-PORTABLE FLUOROSCOPY < 1 HOUR   Final Result      Operative procedure in progress with cuneiforms identified      IR-US GUIDED PIV   Final Result    Ultrasound-guided PERIPHERAL IV INSERTION performed by    qualified nursing staff as above.      DX-FOOT-COMPLETE 3+ LEFT   Final Result         1.  No acute traumatic bony injury. No destructive osseous lesion is readily apparent, note that plain film can be insensitive for evaluation of osteomyelitis and  three-phase bone scan or MRI would offer improved diagnostic sensitivity as clinically    appropriate   2.  Atherosclerosis      CT-CTA CHEST PULMONARY ARTERY W/ RECONS   Final Result      1.  No evidence of pulmonary emboli.   2.  Right lower lobe discoid atelectasis.   3.  Gynecomastia.            DX-CHEST-PORTABLE (1 VIEW)   Final Result      No acute cardiac or pulmonary abnormalities are identified.      MR-BRAIN-W/O   Final Result      1.  Moderate diffuse cerebral atrophy.      2.  Minimal periventricular and juxtacortical white matter changes consistent with chronic microvascular ischemic gliosis.      3.  No evidence of acute infarction in the brain parenchyma.      DX-ABDOMEN FOR TUBE PLACEMENT   Final Result      Feeding tube tip overlies the second portion of the duodenum.      DX-CHEST-PORTABLE (1 VIEW)   Final Result         1.  Patchy left lung base opacity, new since prior, could represent early infiltrate            DX-CHEST-PORTABLE (1 VIEW)   Final Result         1.  Patchy left lung base opacity, new since prior, could represent early infiltrate         [id number] VIEW   Final Result      Enteric tube projects over the distal stomach/pylorus.         EC-ECHOCARDIOGRAM COMPLETE W/O CONT   Final Result      DX-CHEST-PORTABLE (1 VIEW)   Final Result         1.  No acute cardiopulmonary disease.      CT-ABDOMEN-PELVIS W/O   Final Result      1.  No renal stone or hydronephrosis.   2.  Normal appendix.   3.  No focal mesenteric inflammatory process.      DX-ABDOMEN FOR TUBE PLACEMENT   Final Result      NG tube tip projects over expected location the gastric fundus.      US-ABDOMEN COMPLETE SURVEY   Final Result         1.  Echogenic liver compatible with fatty change versus fibrosis.   2.  Gallbladder sludge without additional sonographic findings of cholecystitis.   3.  Partial atrophic bilateral kidneys with lobulated contour         CT-HEAD W/O   Final Result         1.  No acute intracranial  abnormality is identified, there are nonspecific white matter changes, commonly associated with small vessel ischemic disease.  Associated mild cerebral atrophy is noted.   2.  Atherosclerosis.      DX-CHEST-PORTABLE (1 VIEW)   Final Result         1.  No acute cardiopulmonary disease.           Assessment/Plan  * Toxic encephalopathy- (present on admission)  Assessment & Plan  -Unknown etiology - hypoxic induced brain injury vs Wernicke encephalopathy.  -LP, MRI brain, EEG negative. TSH normal. HIV/RPR negative.  -Psych was consulted and believe it is alcohol related.  -Continue risperdal.  -Remains free from restraints  -Has fallen multiple times while in hospital. Sitter now at bedside.     Open wound of left foot  Assessment & Plan  -Lateral aspect, worsening, red, nontender  -HgA1c 1/21: 5.2%  -No leukocytosis  -Wound care following  -1/21 LPS consulted  -1/24: Wound culture growing Staph aureus. Continue Augmentin. Bactrim discontinued.  -2/1: I &D surgery of foot with Dr. Roque    -2/4: Bone biopsy cultures positive for S epidermitis and Corynebacterium. ID consulted  -2/5: ID recommended 10 days of doxycycline    Normocytic anemia- (present on admission)  Assessment & Plan  -Very mild, no active bleeding.  -Stable  -Check periodically.    Alcohol abuse- (present on admission)  Assessment & Plan  -Abstinent since admission  -Continue daily MV. Stopped thiamine 2/14 as supratherapeutic         VTE prophylaxis: Lovenox    ==========================================================================================  Please note that this dictation was created using voice recognition software. I have made every reasonable attempt to correct obvious errors, but there may be errors of grammar and possibly content that I did not discover before finalizing the note.    Electronically signed by:  BATSHEVA Choudhury, MSN, APRN, FNP-C  Hospitalist Services  [facility]  [phone]  [phone]  [email or web address]  02/18/21    1107

## 2021-02-18 NOTE — THERAPY
Physical Therapy   Daily Treatment     Patient Name: Yury Blake  Age:  50 y.o., Sex:  male  Medical Record #: [id number]  Today's Date: 2/18/2021     Precautions: (P) Fall Risk    Assessment    Patient's mobility has remained impaired since evaluation by PT.  It appears patient has hit a plateau with physical therapy.  Decreasing PT to 1x/week secondary to lack of progress.  Patient able to transfer x 2 today from bed to chair.  Patient somewhat agitated during session, and therefore return to bed.  Patient continued to need most of help with lift off with sit<>stand and transfers.  Cognition also continues to be limiting factor.     Plan    Continue current treatment plan.    DC Equipment Recommendations: (P) Unable to determine at this time  Discharge Recommendations: (P) Recommend post-acute placement for additional physical therapy services prior to discharge home       Objective     02/18/21 1403   Precautions   Precautions Fall Risk   Comments WB for transfers only   Cognition    Cognition / Consciousness X   Speech/ Communication Delayed Responses   Level of Consciousness Confused   Ability To Follow Commands 1 Step   Safety Awareness Impaired   New Learning Impaired   Attention Impaired   Sequencing Impaired   Comments gets agitated easily   Sitting Lower Body Exercises   Sitting Lower Body Exercises Yes   Hip Flexion 2 sets of 10;Bilateral   Long Arc Quad 2 sets of 10;Bilateral   Sit to Stand   (x2)   Neuro-Muscular Treatments   Neuro-Muscular Treatments Weight Shift Left;Weight Shift Right;Verbal Cuing;Tactile Cuing;Anterior weight shift;Compensatory Strategies   Comments standing balance with FWW   Neurological Concerns   Neurological Concerns Yes   Comments cognition   Balance   Sitting Balance (Static) Fair   Sitting Balance (Dynamic) Fair -   Standing Balance (Static) Fair -   Standing Balance (Dynamic) Poor +   Weight Shift Sitting Fair   Weight Shift Standing Poor   Skilled Intervention  Verbal Cuing;Tactile Cuing;Compensatory Strategies   Gait Analysis   Gait Level Of Assist Unable to Participate   Deviation Ataxic;Decreased Base Of Support   Bed Mobility    Supine to Sit Minimal Assist   Sit to Supine Minimal Assist   Functional Mobility   Sit to Stand Moderate Assist   Bed, Chair, Wheelchair Transfer Minimal Assist   How much difficulty does the patient currently have...   Turning over in bed (including adjusting bedclothes, sheets and blankets)? 2   Sitting down on and standing up from a chair with arms (e.g., wheelchair, bedside commode, etc.) 1   Moving from lying on back to sitting on the side of the bed? 2   How much help from another person does the patient currently need...   Moving to and from a bed to a chair (including a wheelchair)? 2   Need to walk in a hospital room? 1   Climbing 3-5 steps with a railing? 1   6 clicks Mobility Score 9   Short Term Goals    Short Term Goal # 2 Patient will move sitting<>standing with supervision within 6tx in order to initiate gait and transfers   Goal Outcome # 2 Goal not met   Short Term Goal # 3 Pt will be able to transfer bed to chair with SPV in 6tx in order to progress with therapy   Goal Outcome # 3 Goal not met   Short Term Goal # 4 Pt will be able to demosntrate SPV with WC mobility in 6tx in order to progress with therapy   Goal Outcome # 4 Goal not met   Education Group   Education Provided Role of Physical Therapist   Role of Physical Therapist Patient Response Patient;Acceptance;Explanation;Action Demonstration;Reinforcement Needed   Anticipated Discharge Equipment and Recommendations   DC Equipment Recommendations Unable to determine at this time

## 2021-02-19 PROCEDURE — 700102 HCHG RX REV CODE 250 W/ 637 OVERRIDE(OP): Performed by: NURSE PRACTITIONER

## 2021-02-19 PROCEDURE — A9270 NON-COVERED ITEM OR SERVICE: HCPCS | Performed by: HOSPITALIST

## 2021-02-19 PROCEDURE — 770006 HCHG ROOM/CARE - MED/SURG/GYN SEMI*

## 2021-02-19 PROCEDURE — A9270 NON-COVERED ITEM OR SERVICE: HCPCS | Performed by: NURSE PRACTITIONER

## 2021-02-19 PROCEDURE — 700102 HCHG RX REV CODE 250 W/ 637 OVERRIDE(OP): Performed by: HOSPITALIST

## 2021-02-19 PROCEDURE — 700111 HCHG RX REV CODE 636 W/ 250 OVERRIDE (IP): Performed by: HOSPITALIST

## 2021-02-19 RX ADMIN — ASPIRIN 325 MG ORAL TABLET 325 MG: 325 PILL ORAL at 04:37

## 2021-02-19 RX ADMIN — ENOXAPARIN SODIUM 40 MG: 40 INJECTION SUBCUTANEOUS at 18:09

## 2021-02-19 RX ADMIN — RISPERIDONE 2 MG: 2 TABLET ORAL at 18:09

## 2021-02-19 RX ADMIN — RISPERIDONE 2 MG: 2 TABLET ORAL at 04:37

## 2021-02-19 RX ADMIN — TAMSULOSIN HYDROCHLORIDE 0.8 MG: 0.4 CAPSULE ORAL at 09:04

## 2021-02-19 RX ADMIN — THERA TABS 1 TABLET: TAB at 04:37

## 2021-02-19 NOTE — CARE PLAN
Problem: Urinary Elimination:  Goal: Ability to reestablish a normal urinary elimination pattern will improve  Outcome: PROGRESSING AS EXPECTED  Patient incontinent of urine. Condom cath in place.     Problem: Mobility  Goal: Risk for activity intolerance will decrease  Outcome: PROGRESSING AS EXPECTED  Patient working with PT/OT to transfer to bedside chair for meals. Patient refused OT today.

## 2021-02-19 NOTE — CARE PLAN
Problem: Safety  Goal: Will remain free from injury  Outcome: PROGRESSING AS EXPECTED  Goal: Will remain free from falls  Note: Safety precautions in place. Call light within reach. Bed is low and in locked position. Hourly rounding in place. Bed alarm in use     Problem: Discharge Barriers/Planning  Goal: Patient's continuum of care needs will be met  Outcome: PROGRESSING AS EXPECTED  Intervention: Assess potential discharge barriers on admission and throughout hospital stay  Note: Court date 2/26

## 2021-02-19 NOTE — CARE PLAN
Problem: Communication  Goal: The ability to communicate needs accurately and effectively will improve  Outcome: PROGRESSING AS EXPECTED   Baseline cognitive deficits.   Problem: Safety  Goal: Will remain free from injury  Outcome: PROGRESSING AS EXPECTED   Patient is calling for need appropriately.   Problem: Bowel/Gastric:  Goal: Normal bowel function is maintained or improved  Outcome: PROGRESSING AS EXPECTED   BM today.

## 2021-02-19 NOTE — THERAPY
Missed Therapy     Patient Name: Yury Blake  Age:  50 y.o., Sex:  male  Medical Record #: [id number]  Today's Date: 2/19/2021    Discussed missed therapy with nursing staff.       02/19/21 0823   Treatment Variance   Reason For Missed Therapy Non-Medical - Patient Refused   Interdisciplinary Plan of Care Collaboration   IDT Collaboration with  Nursing   Collaboration Comments Attempted OT tx session, however pt confused, does not realize he's in the hospital, and refusing treatment. Will attempt again as able and pt is agreeable to participate.

## 2021-02-19 NOTE — PROGRESS NOTES
Patient rested well through the night.    His mood is still labile towards staff. Best to give him space in those moments.     Friend and now guardian of patient, asked for patient's ID for DC planning at group home. Hospital has not been able to locate physical ID.     We checked with the three floors where he has been, also with the ED. Those floor checked the nine different rooms where he had been. I personally went through his bags and personal belongings with patient's consent.     There may be the possibility that early on in patient's stay, the brother could have taken some of his items home, before his visits were restricted.

## 2021-02-20 PROCEDURE — 700111 HCHG RX REV CODE 636 W/ 250 OVERRIDE (IP): Performed by: HOSPITALIST

## 2021-02-20 PROCEDURE — 700102 HCHG RX REV CODE 250 W/ 637 OVERRIDE(OP): Performed by: HOSPITALIST

## 2021-02-20 PROCEDURE — 99231 SBSQ HOSP IP/OBS SF/LOW 25: CPT | Performed by: NURSE PRACTITIONER

## 2021-02-20 PROCEDURE — A9270 NON-COVERED ITEM OR SERVICE: HCPCS | Performed by: NURSE PRACTITIONER

## 2021-02-20 PROCEDURE — 770006 HCHG ROOM/CARE - MED/SURG/GYN SEMI*

## 2021-02-20 PROCEDURE — 700102 HCHG RX REV CODE 250 W/ 637 OVERRIDE(OP): Performed by: NURSE PRACTITIONER

## 2021-02-20 PROCEDURE — A9270 NON-COVERED ITEM OR SERVICE: HCPCS | Performed by: HOSPITALIST

## 2021-02-20 RX ADMIN — RISPERIDONE 2 MG: 2 TABLET ORAL at 05:07

## 2021-02-20 RX ADMIN — ASPIRIN 325 MG ORAL TABLET 325 MG: 325 PILL ORAL at 05:07

## 2021-02-20 RX ADMIN — TAMSULOSIN HYDROCHLORIDE 0.8 MG: 0.4 CAPSULE ORAL at 09:00

## 2021-02-20 RX ADMIN — ENOXAPARIN SODIUM 40 MG: 40 INJECTION SUBCUTANEOUS at 16:46

## 2021-02-20 RX ADMIN — RISPERIDONE 2 MG: 2 TABLET ORAL at 16:45

## 2021-02-20 RX ADMIN — THERA TABS 1 TABLET: TAB at 05:07

## 2021-02-20 NOTE — CARE PLAN
Problem: Safety  Goal: Will remain free from injury  Outcome: PROGRESSING AS EXPECTED  Safety precautions are in place including bed locked and in lowest position, 3  bed rails up, bed alarm on, call light within reach, treaded socks on, tray table and personal belongings within reach. Telesitter in place at bedside.     Problem: Knowledge Deficit  Goal: Knowledge of disease process/condition, treatment plan, diagnostic tests, and medications will improve  Outcome: PROGRESSING SLOWER THAN EXPECTED  Patient is confused and forgetful. Requires frequent reorienting. Patient has no memory of why he is hospitalized.

## 2021-02-20 NOTE — CARE PLAN
Problem: Communication  Goal: The ability to communicate needs accurately and effectively will improve  Outcome: PROGRESSING AS EXPECTED     Problem: Safety  Goal: Will remain free from injury  Outcome: PROGRESSING AS EXPECTED  Telesitter in place. Pt's bed is locked and in lowest position. Pt is close to nurses station.     Problem: Fluid Volume:  Goal: Will maintain balanced intake and output  Outcome: PROGRESSING AS EXPECTED     Problem: Skin Integrity  Goal: Risk for impaired skin integrity will decrease  Outcome: PROGRESSING AS EXPECTED  Q2 turns in place. 2 RN skin check.     Problem: Urinary Elimination:  Goal: Ability to reestablish a normal urinary elimination pattern will improve  Outcome: PROGRESSING AS EXPECTED  Condom cath is secured and connected to pt.

## 2021-02-20 NOTE — PROGRESS NOTES
2 RN Skin Check    2 RN skin check complete with MUSTAPHA Shepard.   Devices in place: Bilateral CAM boots in place.  Skin assessed under devices: yes.  No confirmed pressure ulcers found on patient.  No new potential pressure ulcers noted on patient.   Wound consult placed No.  The following interventions in place Waffle chair cushion and heel float boots in place.    Skin was blanching and intact. Back had slight redness but was blanching. Elbows blanching. No other skin lesions noted on patient at this time.

## 2021-02-20 NOTE — PROGRESS NOTES
[facility] Medicine TWICE WEEKLY Progress Note    Date of Service  2/20/2021    Chief Complaint  50 y.o. male admitted 9/9/2020 with altered mental status    Hospital Course  Mr. Blake is a 50-year-old male who was brought to the emergency department on 9/9/2020 with altered mental status after he was found by his ex-wife to be obtunded after she had checked on him when he had not been seen for at least 2 days.  At that time he was alert and oriented x0 and found to be hypotensive.  GCS in the ED was 7 and he was severely hypotensive.  He was intubated for airway protection and central venous access was obtained for administration of high-volume crystalloid resuscitation and vasopressor therapy.  He had a significant leukocytosis with a WBC of 22.9 and was hypokalemic with a potassium level of 2.7.  Additionally he had acute kidney injury with a creatinine of 2.73.  His INR was 8.93 and he also had a lactate level of 11.  Urinalysis was performed and was negative for urinary tract infection.  He was diagnosed with septic shock and admitted to the intensive care unit for further evaluation and treatment.  A lumbar puncture was done on 9/9/2020.  He was also noted to have seizure-like activity on 9/10/2020 so an EEG was obtained and was significant for a moderate encephalopathy.  Extubation occurred on 9/11/2020.  He also had a traumatic catheter removal so on 9/21/2020 urology was consulted but was ultimately unnecessary.  On 9/13/2020 he was transferred out of the ICU where he required restraints due to confusion, hallucination, agitation, and impulsivity.  An MRI was performed and was negative for acute pathologies.  He was also treated for a UTI starting 10/2/2020.  He has also fallen multiple times in hospital.  Additionally, on 11/11/2020 he had an acute episode of sinus tachycardia in the 140s accompanied by hypotension.  There was concern for STEMI so cardiology was consulted.  A 12-lead EKG was obtained  and was concerning for atrial flutter.  The patient received metoprolol after which a repeat ECG showed sinus tachycardia.  A D-dimer was obtained and was elevated for which a CT chest was done that was negative for pulmonary embolism.  Cardiology subsequently signed off.  He has remained in the hospital for a prolonged period of time and is now pending guardianship and then will need placement.  Guardianship hearing took place 2/5/2021.   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 Ortho was consulted and he does require I&D. Surgery with Dr. Roque completed 2/1/2021      Interval Problem Update  2/18/2021  -Patient seen and examined.  Noted wounds on feet bilaterally are improving.  Patient very confused/altered.  Patient only oriented to self, and patient reports that he [place] in [place] to go somewhere.  Attempted to reorient patient.  Patient was given an update in plan of care.  -POC: Continue supportive care; monitor for safety; altered mentation likely baseline; guardianship established; next court date on 2/26 for financial issues; pending placement.  -Lab work: Reviewed; last obtained on 2/5/2021 -unremarkable; we will update labs as warranted.  -VSS at this time    2/20/2021  -Patient seen and examined.  Patient laying comfortably in bed slightly slumped to the right.  Patient altered and oriented to self.  Patient appears lethargic and did not want to converse today.  Patient given an update in plan of care.  -POC: Continue supportive care; monitor for safety; guardianship establish with next court date on 2/26/2021 for financial issues.;  Pending placement  -Lab work: Reviewed; last obtained on 2/5/2021 -unremarkable; we will order labs as warranted.  -VSS at this time  -There are no significant changes from a previous ROS/PE, please see my previous note for further  details.    ==========================================================================================  BATSHEVA SONG, MSN, APRN, FNP-C, certify that the patient requires continued medically necessary hospital services for the treatment of  cognitive impairment and wound infection and patient will need long-term placement. The patient will remain in the hospital for the foreseeable future.  Discharge may or may not occur in the next 20 days due to ongoing discharge delays due to no medically acceptable discharge option.  ==========================================================================================    Consultants/Specialty  Psychiatry  Cardiology  Urology  Infectious disease  LPS    Code Status  Full Code    Disposition  -Guardianship established; next court date on 2/26 for financial issues; patient will need placement    Review of Systems  Review of Systems   Unable to perform ROS: Mental acuity        Physical Exam  Temp:  [36.3 °C (97.3 °F)-36.9 °C (98.4 °F)] 36.5 °C (97.7 °F)  Pulse:  [60-77] 60  Resp:  [16-19] 17  BP: (85-99)/(52-60) 85/52  SpO2:  [94 %-96 %] 96 %    Physical Exam  Vitals and nursing note reviewed.   HENT:      Head: Normocephalic.      Nose: Nose normal.      Mouth/Throat:      Mouth: Mucous membranes are moist.   Eyes:      Pupils: Pupils are equal, round, and reactive to light.   Cardiovascular:      Rate and Rhythm: Normal rate and regular rhythm.      Pulses: Normal pulses.      Heart sounds: Normal heart sounds.   Pulmonary:      Effort: Pulmonary effort is normal.      Breath sounds: Normal breath sounds.   Abdominal:      General: Bowel sounds are normal.      Palpations: Abdomen is soft.   Musculoskeletal:         General: Tenderness and signs of injury present.      Cervical back: Normal range of motion and neck supple.   Skin:     General: Skin is dry.      Capillary Refill: Capillary refill takes 2 to 3 seconds.   Neurological:      Mental Status: He is  alert. Mental status is at baseline.         Fluids    Intake/Output Summary (Last 24 hours) at 2/20/2021 1240  Last data filed at 2/20/2021 0900  Gross per 24 hour   Intake 240 ml   Output 2475 ml   Net -2235 ml       Laboratory                        Imaging  DX-PORTABLE FLUOROSCOPY < 1 HOUR   Final Result      Operative procedure in progress with cuneiforms identified      IR-US GUIDED PIV   Final Result    Ultrasound-guided PERIPHERAL IV INSERTION performed by    qualified nursing staff as above.      DX-FOOT-COMPLETE 3+ LEFT   Final Result         1.  No acute traumatic bony injury. No destructive osseous lesion is readily apparent, note that plain film can be insensitive for evaluation of osteomyelitis and three-phase bone scan or MRI would offer improved diagnostic sensitivity as clinically    appropriate   2.  Atherosclerosis      CT-CTA CHEST PULMONARY ARTERY W/ RECONS   Final Result      1.  No evidence of pulmonary emboli.   2.  Right lower lobe discoid atelectasis.   3.  Gynecomastia.            DX-CHEST-PORTABLE (1 VIEW)   Final Result      No acute cardiac or pulmonary abnormalities are identified.      MR-BRAIN-W/O   Final Result      1.  Moderate diffuse cerebral atrophy.      2.  Minimal periventricular and juxtacortical white matter changes consistent with chronic microvascular ischemic gliosis.      3.  No evidence of acute infarction in the brain parenchyma.      DX-ABDOMEN FOR TUBE PLACEMENT   Final Result      Feeding tube tip overlies the second portion of the duodenum.      DX-CHEST-PORTABLE (1 VIEW)   Final Result         1.  Patchy left lung base opacity, new since prior, could represent early infiltrate            DX-CHEST-PORTABLE (1 VIEW)   Final Result         1.  Patchy left lung base opacity, new since prior, could represent early infiltrate         [id number] VIEW   Final Result      Enteric tube projects over the distal stomach/pylorus.         EC-ECHOCARDIOGRAM COMPLETE W/O CONT    Final Result      DX-CHEST-PORTABLE (1 VIEW)   Final Result         1.  No acute cardiopulmonary disease.      CT-ABDOMEN-PELVIS W/O   Final Result      1.  No renal stone or hydronephrosis.   2.  Normal appendix.   3.  No focal mesenteric inflammatory process.      DX-ABDOMEN FOR TUBE PLACEMENT   Final Result      NG tube tip projects over expected location the gastric fundus.      US-ABDOMEN COMPLETE SURVEY   Final Result         1.  Echogenic liver compatible with fatty change versus fibrosis.   2.  Gallbladder sludge without additional sonographic findings of cholecystitis.   3.  Partial atrophic bilateral kidneys with lobulated contour         CT-HEAD W/O   Final Result         1.  No acute intracranial abnormality is identified, there are nonspecific white matter changes, commonly associated with small vessel ischemic disease.  Associated mild cerebral atrophy is noted.   2.  Atherosclerosis.      DX-CHEST-PORTABLE (1 VIEW)   Final Result         1.  No acute cardiopulmonary disease.           Assessment/Plan  * Toxic encephalopathy- (present on admission)  Assessment & Plan  -Unknown etiology - hypoxic induced brain injury vs Wernicke encephalopathy.  -LP, MRI brain, EEG negative. TSH normal. HIV/RPR negative.  -Psych was consulted and believe it is alcohol related.  -Continue risperdal.  -Remains free from restraints  -Has fallen multiple times while in hospital. Sitter now at bedside.     Open wound of left foot  Assessment & Plan  -Lateral aspect, worsening, red, nontender  -HgA1c 1/21: 5.2%  -No leukocytosis  -Wound care following  -1/21 LPS consulted  -1/24: Wound culture growing Staph aureus. Continue Augmentin. Bactrim discontinued.  -2/1: I &D surgery of foot with Dr. Roque    -2/4: Bone biopsy cultures positive for S epidermitis and Corynebacterium. ID consulted  -2/5: ID recommended 10 days of doxycycline    Normocytic anemia- (present on admission)  Assessment & Plan  -Very mild, no active  bleeding.  -Stable  -Check periodically.    Alcohol abuse- (present on admission)  Assessment & Plan  -Abstinent since admission  -Continue daily MV. Stopped thiamine 2/14 as supratherapeutic         VTE prophylaxis: Lovenox    ==========================================================================================  Please note that this dictation was created using voice recognition software. I have made every reasonable attempt to correct obvious errors, but there may be errors of grammar and possibly content that I did not discover before finalizing the note.    Electronically signed by:  BATSHEVA Choudhury, MSN, APRN, FNP-C  Hospitalist Services  [facility]  [phone]  [email or web address]  02/20/21    [id number]

## 2021-02-21 PROCEDURE — 700102 HCHG RX REV CODE 250 W/ 637 OVERRIDE(OP): Performed by: NURSE PRACTITIONER

## 2021-02-21 PROCEDURE — 700102 HCHG RX REV CODE 250 W/ 637 OVERRIDE(OP): Performed by: HOSPITALIST

## 2021-02-21 PROCEDURE — A9270 NON-COVERED ITEM OR SERVICE: HCPCS | Performed by: HOSPITALIST

## 2021-02-21 PROCEDURE — A9270 NON-COVERED ITEM OR SERVICE: HCPCS | Performed by: NURSE PRACTITIONER

## 2021-02-21 PROCEDURE — 700111 HCHG RX REV CODE 636 W/ 250 OVERRIDE (IP): Performed by: HOSPITALIST

## 2021-02-21 PROCEDURE — 770006 HCHG ROOM/CARE - MED/SURG/GYN SEMI*

## 2021-02-21 RX ADMIN — THERA TABS 1 TABLET: TAB at 04:41

## 2021-02-21 RX ADMIN — RISPERIDONE 2 MG: 2 TABLET ORAL at 17:20

## 2021-02-21 RX ADMIN — TAMSULOSIN HYDROCHLORIDE 0.8 MG: 0.4 CAPSULE ORAL at 09:03

## 2021-02-21 RX ADMIN — RISPERIDONE 2 MG: 2 TABLET ORAL at 04:41

## 2021-02-21 RX ADMIN — ENOXAPARIN SODIUM 40 MG: 40 INJECTION SUBCUTANEOUS at 17:19

## 2021-02-21 RX ADMIN — ASPIRIN 325 MG ORAL TABLET 325 MG: 325 PILL ORAL at 04:41

## 2021-02-21 NOTE — PROGRESS NOTES
2 RN skin check complete with MUSTAPHA Aguila .   Devices in place: Bilateral CAM boots, condom catheter  Skin assessed under devices: Unable to assess under CAM boots  Confirmed pressure ulcers: None  Potential pressure ulcers: None    The following interventions in place: Waffle cushion, pillows for support & positioning.     Skin blanching and intact including bony prominences.  Back & sacrum are red but blanching.

## 2021-02-21 NOTE — CARE PLAN
Problem: Safety  Goal: Will remain free from injury  Outcome: PROGRESSING AS EXPECTED  Bed is locked and in lowest position. Bed rails up. Telesitter is in room with pt.     Problem: Respiratory:  Goal: Respiratory status will improve  Outcome: PROGRESSING AS EXPECTED     Problem: Skin Integrity  Goal: Risk for impaired skin integrity will decrease  Outcome: PROGRESSING AS EXPECTED  Pt is able to turn when asked. Back is red but blanching. Pillows are in use. Barrier cream is used.     Problem: Pain Management  Goal: Pain level will decrease to patient's comfort goal  Outcome: PROGRESSING AS EXPECTED  Patients pain management is well managed.     Problem: Urinary Elimination:  Goal: Ability to reestablish a normal urinary elimination pattern will improve  Outcome: PROGRESSING AS EXPECTED  Pt has a condom cath placed.

## 2021-02-21 NOTE — PROGRESS NOTES
2 RN Skin Check    2 RN skin check complete with MUSTAPHA Shepard.  Devices in place: Bilateral Cam boots  Skin assessed under devices: yes.  No confirmed pressure ulcers found on pt.  No new potential pressure ulcers noted on pt.   Wound consult placed No.  The following interventions in place Pillows, Barrier cream and Waffle chair cushion.    Pt was able to turn. No redness on elbows. No redness or noticeable skin breakdown under bilateral CAM boots. Back and sacrum had some redness but was blanching okay. Pillows were used to adjust patient to relieve pressure on back. Barrier cream is used. Patient is on a waffle cushion. Turning will be encouraged in pt. 2hr turn rounds will be done on patient.

## 2021-02-21 NOTE — CARE PLAN
Problem: Pain Management  Goal: Pain level will decrease to patient's comfort goal  Outcome: PROGRESSING AS EXPECTED  Patient has no complaints of pain at this time.     Problem: Mobility  Goal: Risk for activity intolerance will decrease  Outcome: PROGRESSING SLOWER THAN EXPECTED  Patient is confused and does not follow mobility instructions well. PT has signed off due to failure of patient to make progress.

## 2021-02-22 PROCEDURE — A9270 NON-COVERED ITEM OR SERVICE: HCPCS | Performed by: NURSE PRACTITIONER

## 2021-02-22 PROCEDURE — 700102 HCHG RX REV CODE 250 W/ 637 OVERRIDE(OP): Performed by: HOSPITALIST

## 2021-02-22 PROCEDURE — 700102 HCHG RX REV CODE 250 W/ 637 OVERRIDE(OP): Performed by: NURSE PRACTITIONER

## 2021-02-22 PROCEDURE — 770006 HCHG ROOM/CARE - MED/SURG/GYN SEMI*

## 2021-02-22 PROCEDURE — 700111 HCHG RX REV CODE 636 W/ 250 OVERRIDE (IP): Performed by: HOSPITALIST

## 2021-02-22 PROCEDURE — A9270 NON-COVERED ITEM OR SERVICE: HCPCS | Performed by: HOSPITALIST

## 2021-02-22 PROCEDURE — 99232 SBSQ HOSP IP/OBS MODERATE 35: CPT | Performed by: NURSE PRACTITIONER

## 2021-02-22 RX ADMIN — THERA TABS 1 TABLET: TAB at 05:13

## 2021-02-22 RX ADMIN — ENOXAPARIN SODIUM 40 MG: 40 INJECTION SUBCUTANEOUS at 18:30

## 2021-02-22 RX ADMIN — RISPERIDONE 2 MG: 2 TABLET ORAL at 18:30

## 2021-02-22 RX ADMIN — ASPIRIN 325 MG ORAL TABLET 325 MG: 325 PILL ORAL at 05:13

## 2021-02-22 RX ADMIN — RISPERIDONE 2 MG: 2 TABLET ORAL at 05:13

## 2021-02-22 RX ADMIN — TAMSULOSIN HYDROCHLORIDE 0.8 MG: 0.4 CAPSULE ORAL at 08:15

## 2021-02-22 NOTE — PROGRESS NOTES
2 RN Skin Check     2 RN skin check complete with MUSTAPHA Bermudez.  Devices in place: Bilateral Cam boots  Skin assessed under devices: yes.  No confirmed pressure ulcers found on pt.  No new potential pressure ulcers noted on pt.   Wound consult placed: No.  The following interventions in place Pillows, and Waffle chair cushion.     Pt was able to turn. No redness on elbows. No redness or noticeable skin breakdown under bilateral CAM boots. Back and sacrum had some redness but was blanching okay. Pillows were used to adjust patient to relieve pressure on back. Patient is on a waffle cushion. Pt encouraged to turn q2 hrs or as needed.

## 2021-02-22 NOTE — PROGRESS NOTES
LIMB PRESERVATION SERVICE  PROGRESS NOTE    HPI: 50 y.o.  with a past medical history that includes hypertension, alcohol abuse, , admitted 9/9/2020 for Acute respiratory failure with hypoxia (HCC)   LPS has been consulted for left fifth MTH ulcer.      Patient is a poor historian.  A&O to self only.  Chart reviewed.  Followed by wound team for hospital-acquired pressure injury to left fifth MTH.  Pressure injury first noted on 12/18/2020 that started as a deep tissue injury from PRAFO boot that evolved to an unstageable and now presents as a stage IV.  Patient would not allow anyone to assess his ulcer.  Finally he allowed wound team to see the ulcer on 12/27/2020.  Advanced wound dressings have been applied including hydrocolloid thin, Hydrofiber silver, Mepilex.  Patient initially presented to the hospital 9/9/2020 for altered mental status.  Found to be obtunded for least 2 days.  He was intubated, vasopressor therapy was utilized. extubated 9/11/2020. He had encephalopathy. Patient was severely agitated and confused with hallucinations and impulsivity.  He developed bilateral foot drop.  He was given a PRAFO boot for left foot on 11/19/2020 and a PRAFO boot for right foot on 12/16/2020.  He has had multiple falls. Patient is two-person max assist.  Not ambulating at this time. He has had sitters and remote sitter at bedside. currently pending guardianship.     Date/Surgeon: 2/1/2021 with Dr. Roque  PROCEDURES:  1.  Bilateral gastroc-soleus recessions.  2.  Bilateral posterior tibialis tendon transfer to the lateral cuneiform.  3.  Bilateral deep compartment fasciotomy.  4.  Bilateral flexor tendon release, toes 1 through 5.  5.  Left medial column release.  6.  Left irrigation and debridement of foot, multiple compartments including   lateral intra-articular fifth MTPJ.  7.  Left fifth metatarsal bone biopsy.      INTERVAL HISTORY:  1/26/2021: Patient sleeping, wakes up when name is called, but goes back to  "sleep. Intermittent twitching left foot. PRAFO on L foot, foot slipped out. On augmentin, bactrim discontinued once cx resulted  1/29/2021: Patient dozing.  Alert during exam.  Wound care debrided ulcer yesterday.  Continues to take Augmentin until 1/31/2021.  2/5/2020: Patient sitting in chair. Awake, alert, calm and pleasant. Boots and dressings in place to bilateral lower extremities. Denies fever, chills, nausea, vomiting, diarrhea, pain.   2/10/2021: POD #9.  Patient confused, delusional.  States he has been helping a friend with fixing his car.  Does not know he is in the hospital.  Patient wearing bilateral boots at all time.  Patient denies that he had surgery.  2/15/2021: POD #14.  Patient remains confused and delusional.  He states that he had just woken with Dominic Marie on the telephone.  All incisions are well approximated and healing well, sutures removed today.  But is obtained and dressings reapplied.  Bilateral boots in place at all times.  2/22/2021: POD #21.  Patient awake but somnolent.  Incisions appear well-healed.  DTI to left heel by placement of heel Mepilex last week..  Photos obtained and notified wound care supervisor.  Consult for wound care team placed.  Boots in place to bilateral feet at all times.    PERTINENT LPS RESULTS:     Pathology 2/1/2021    FINAL DIAGNOSIS:     A. Left fifth metatarsal:          Bening bone with reactive fibrosis.          No evidence of osteomyelitis.     COVID-19: not detected this admission 1/29/2021    Bone culture of left fifth metatarsal 2/1/2021: Staph epidermidis, oxacillin resistant.      Tissue culture from the left fifth toe ulcer 2/1/2021: corynebacterium species.            EXAM:      /59   Pulse 62   Temp 36.9 °C (98.5 °F) (Temporal)   Resp 17   Ht 1.803 m (5' 11\")   Wt 80.3 kg (177 lb)   SpO2 95%   BMI 24.69 kg/m²     Palpable pedal pulses      Incisions:    Right foot and lower leg incisions:  Incisions to right anterior lower leg, " dorsal foot, medial ankle, medial lower extremity are well approximated.  No erythema, localized edema.  No drainage.  Mild lower extremity edema above level of previous compression.    Right anterior lower leg/dorsal foot    Right medial foot and lower extremity    Right plantar toes          Left foot and lower leg incisions  Incisions remain well approximated.  No surrounding erythema, edema, drainage.  Previous ecchymosis left dorsal foot and left great toe continue to improve.    New DTI found to left posterior heel.  Skin is intact, nonblanching dark discolored area.        Left plantar toes      Left lateral fifth MTH      Left anterior lower leg/dorsal foot      Left medial lower extremity/medial ankle      Left posterior heel                  Wound care completed by APRN applied adhesive foams to all incisions.  Apply dry gauze in between toes bilaterally.  Applied bilateral heel Mepilex to heels.  Tubigrip F to bilateral lower extremities.  Replaced offloading boots.  Patient tolerated dressing change well.      INFECTION MANAGEMENT:        Wound culture results:   Results     ** No results found for the last 168 hours. **             ASSESSMENT/PLAN:     -Patient admitted September 2020 for altered mental status.  Required intubation and vasopressor therapy.  Developed bilateral foot drop and subsequently pressure injury to left fifth MTH from PRAFO boot.     -POD #21 Bilateral gastroc-soleus recessions, Bilateral posterior tibialis tendon transfer to the lateral cuneiform, Bilateral deep compartment fasciotomy,Bilateral flexor tendon release, toes 1 through 5, Left medial column release, Left irrigation and debridement of foot, multiple compartments including lateral intra-articular fifth MTPJ, Left fifth metatarsal bone biopsy with Dr. Roque on 2/1/2021.   -Incisions remain well approximated.  Ulcer to left fifth MTH no longer present.  Dry dressings applied.   -New DTI to left posterior heel but use  of heel Mepilex.  Skin is intact.  Heel Mepilex and boot in place.  Notified wound care supervisor.  Inpatient wound team consult placed.    Wound care:   Bilateral lower extremities: Nursing to cleanse incision sites with wound cleanser, pat dry with gauze.  Apply adhesive foams to all incisions. Weave dry gauze in between toes bilaterally.  Apply bilateral heel Mepilex to heels.  Tubigrip F to bilateral lower extremities.  Replace offloading boots.  Change every 72 hours or PRN saturation or dislodgement.     Labs/Imaging:  -COVID-19: not detected 1/29/2020    Vascular status:   -Palpable pedal pulses bilaterally    Surgery:   No return to OR at this time    Antibiotics:   -ID: ID involved.  Augmentin discontinued and patient transitioned to 10 day course of doxycycline.  Doxycycline ended 2/15/2021  Bone culture of left fifth metatarsal 2/1/2021: Staph epidermidis, oxacillin resistant.  Wound culture from the left fifth toe ulcer 2/1/2021: corynebacterium species.        Weight Bearing Status:   -Transfer weight bearing with boots in place to BLE    Offloading:   Heel Mepilex to bilateral heels at all times.  Offloading boots at all times  -Orthotic company: none     PT/OT :   Involved. Last seen by PT 2/18/2021. Last seen by OT 2/19/2021      DISCHARGE PLAN:    Disposition: Guardianship hearing done 2/5/2021, guardianship granted to Deonte Henderson. Next court date on 2/26/2021 for financial issues.  Discharge pending placement.    Discussed with: pt, Slim RN, Farnaz Wound Care RN supervisor.     Please note that this dictation was created using voice recognition software. I have  worked with technical experts from [employer] to optimize the interface.  I have made every reasonable attempt to correct obvious errors, but there may be errors of grammar and possibly content that I did not discover before finalizing the note.    Delilah Key, A.P.R.N.    If any questions or concerns, please contact LPS through  [employer].

## 2021-02-22 NOTE — CARE PLAN
Problem: Safety  Goal: Will remain free from injury  Outcome: PROGRESSING AS EXPECTED  Note: Non-slip socks are on, bed is locked and in lowest position, personal belongings are within reach, room is free of clutter and spills, call light is in place. Patient educated on how to use the call light and how to call for assistance. Patient verbalized understanding.       Problem: Skin Integrity  Goal: Risk for impaired skin integrity will decrease  Outcome: PROGRESSING AS EXPECTED   Skin assessed. No new skin breakdown noted.  Problem: Communication  Goal: The ability to communicate needs accurately and effectively will improve  Outcome: PROGRESSING SLOWER THAN EXPECTED   Pt confused, unable to verbalize needs appropriately.

## 2021-02-22 NOTE — PROGRESS NOTES
"2015 Pt leaning on the right side of the bed with his arm hanging down. I offered patient my help  to get repositioned, or to put a pillow on the right side of the bed. Pt declines and states \"leave me alone, I don't want to move\". Few more attempts were attempted to reposition patient but he became upset and declined.  "

## 2021-02-23 PROCEDURE — 700102 HCHG RX REV CODE 250 W/ 637 OVERRIDE(OP): Performed by: HOSPITALIST

## 2021-02-23 PROCEDURE — 700111 HCHG RX REV CODE 636 W/ 250 OVERRIDE (IP): Performed by: HOSPITALIST

## 2021-02-23 PROCEDURE — 700102 HCHG RX REV CODE 250 W/ 637 OVERRIDE(OP): Performed by: NURSE PRACTITIONER

## 2021-02-23 PROCEDURE — A9270 NON-COVERED ITEM OR SERVICE: HCPCS | Performed by: NURSE PRACTITIONER

## 2021-02-23 PROCEDURE — A9270 NON-COVERED ITEM OR SERVICE: HCPCS | Performed by: HOSPITALIST

## 2021-02-23 PROCEDURE — 770006 HCHG ROOM/CARE - MED/SURG/GYN SEMI*

## 2021-02-23 RX ADMIN — TAMSULOSIN HYDROCHLORIDE 0.8 MG: 0.4 CAPSULE ORAL at 08:55

## 2021-02-23 RX ADMIN — THERA TABS 1 TABLET: TAB at 05:49

## 2021-02-23 RX ADMIN — ENOXAPARIN SODIUM 40 MG: 40 INJECTION SUBCUTANEOUS at 16:46

## 2021-02-23 RX ADMIN — RISPERIDONE 2 MG: 2 TABLET ORAL at 16:46

## 2021-02-23 RX ADMIN — ASPIRIN 325 MG ORAL TABLET 325 MG: 325 PILL ORAL at 05:49

## 2021-02-23 RX ADMIN — RISPERIDONE 2 MG: 2 TABLET ORAL at 05:49

## 2021-02-23 NOTE — DISCHARGE PLANNING
Anticipated Discharge Disposition: GH placement    Action: Called numerous GHs. Found 3 possibilities with male bed openings: [facility] , [facility] [phone], and [facility] . Called guardian, Deonte Henderson and shared this information. He will contact the GHs.    Barriers to Discharge: Pending GH placement.    Plan:F/U with guardian.

## 2021-02-23 NOTE — CARE PLAN
Problem: Safety  Goal: Will remain free from injury  Outcome: PROGRESSING AS EXPECTED  Note: Non-slip socks are on, bed is locked and in lowest position, personal belongings are within reach, room is free of clutter and spills, call light is in place. Patient educated on how to use the call light and how to call for assistance.        Problem: Communication  Goal: The ability to communicate needs accurately and effectively will improve  Outcome: PROGRESSING SLOWER THAN EXPECTED  Note: Pt educated on treatment plan and importance of interventions (SCDs, ambulation, repositioning). No apparent learning evidence. Pt confused. Reinforcement needed.

## 2021-02-23 NOTE — THERAPY
Missed Therapy     Patient Name: Yury Blake  Age:  50 y.o., Sex:  male  Medical Record #: [id number]  Today's Date: 2/23/2021 02/23/21 1156   Treatment Variance   Reason For Missed Therapy Non-Medical - Patient Refused   Interdisciplinary Plan of Care Collaboration   IDT Collaboration with  Nursing   Collaboration Comments OT tx attempted, however pt refused to participate despite encouragement from therapist. Pt remains confused and disoriented limiting his ability to meaningfully participate in treatment. Decreasing pts therapy frequency to 1x/week due to several refusals and lack of participation.

## 2021-02-23 NOTE — PROGRESS NOTES
Addendum          2 RN Skin Check     2 RN skin check complete with MUSTAPHA Campbell.  Devices in place: Bilateral Cam boots  Skin assessed under devices: yes.  No confirmed pressure ulcers found on pt.  No new potential pressure ulcers noted on pt.   Wound consult placed: No.  The following interventions in place Pillows, and Waffle chair cushion.     Pt was able to turn. No redness on elbows. No redness or noticeable skin breakdown under bilateral CAM boots/Unable to assess skin under bilateral boots dressing. Dressing changed yesterday by LPS. Back and sacrum had some redness but was blanching okay. Pillows were used to adjust patient to relieve pressure on back. Patient is on a waffle cushion. Pt encouraged to turn q2 hrs or as needed.

## 2021-02-23 NOTE — WOUND TEAM
[facility]  Inpatient Services  Initial Wound and Skin Care Evaluation    Admission Date: 9/9/2020     Last order of IP CONSULT TO WOUND CARE was found on 2/22/2021 from Hospital Encounter on 9/9/2020     HPI, PMH, SH: Reviewed    Past Surgical History:   Procedure Laterality Date   • TENDON LENGHTENING Bilateral 2/1/2021    Procedure: LENGTHENING, TENDON- FOR FOOT DROP RECONSTRUCTION, POSTERIOR TIBIALIS TENDON  TRANSFER , ACHILLES LENGHTENING , LEFT MEDIAL RELEASE;  Surgeon: Donny Roque M.D.;  Location: SURGERY [facility];  Service: Orthopedics   • IRRIGATION & DEBRIDEMENT ORTHO Left 2/1/2021    Procedure: IRRIGATION AND DEBRIDEMENT, WOUND-FOOT;  Surgeon: Donny Roque M.D.;  Location: SURGERY [facility];  Service: Orthopedics   • ANKLE ORIF Right 9/26/2017    Procedure: ANKLE ORIF SYNDESOMOSIS REPAIR;  Surgeon: Armando Woodard M.D.;  Location: [facility];  Service: Orthopedics     Social History     Tobacco Use   • Smoking status: Never Smoker   • Smokeless tobacco: Current User     Types: Chew   Substance Use Topics   • Alcohol use: Yes     Comment: 6-10 beers daily     Chief Complaint   Patient presents with   • Altered Mental Status   • Hypotension     Diagnosis: Acute respiratory failure with hypoxia (HCC)  Acute respiratory failure with hypoxia (HCC)  Acute respiratory failure with hypoxia (HCC)    Unit where seen by Wound Team: T335/01     WOUND CONSULT/FOLLOW UP RELATED TO:  Left heel      WOUND HISTORY:  Discoloration noted over left posterior heel by LPS APRN on 2/22.     WOUND ASSESSMENT/LDA        Wound 02/23/21 Pressure Injury Heel Posterior Left (Active)   Wound Image   02/23/21 0930   Site Assessment Purple 02/23/21 0930   Periwound Assessment Pink;Blanchable erythema 02/23/21 0930   Margins Undefined edges 02/23/21 0930   Closure Secondary intention;Open to air 02/23/21 0930   Drainage Amount None 02/23/21 0930   Treatments Offloading;Cleansed;Site care  02/23/21 0930   Wound Cleansing Approved Wound Cleanser 02/23/21 0930   Periwound Protectant Not Applicable 02/23/21 0930   Dressing Cleansing/Solutions Not Applicable 02/23/21 0930   Dressing Options Hydrofiber;Mepilex Heel 02/23/21 0930   Dressing Changed New 02/23/21 0930   Dressing Status Clean;Dry;Intact 02/23/21 0930   Dressing Change/Treatment Frequency Every 48 hrs, and As Needed 02/23/21 0930   NEXT Dressing Change/Treatment Date 02/25/21 02/23/21 0930   NEXT Weekly Photo (Inpatient Only) 03/02/21 02/23/21 0930   Pressure Injury Stage Pending Stag 02/23/21 0930   Non-staged Wound Description Not applicable 02/23/21 0930   Wound Length (cm) 3.5 cm 02/23/21 0930   Wound Width (cm) 2 cm 02/23/21 0930   Wound Surface Area (cm^2) 7 cm^2 02/23/21 0930   Shape oval  02/23/21 0930   Wound Odor None 02/23/21 0930   Exposed Structures None 02/23/21 0930   WOUND NURSE ONLY - Time Spent with Patient (mins) 30 02/23/21 0930        Vascular:    ANA:   No results found.    Lab Values:    Lab Results   Component Value Date/Time    WBC 11.0 (H) 02/05/2021 09:43 AM    RBC 3.78 (L) 02/05/2021 09:43 AM    HEMOGLOBIN 11.0 (L) 02/05/2021 09:43 AM    HEMATOCRIT 34.6 (L) 02/05/2021 09:43 AM    CREACTPROT 3.58 (H) 01/21/2021 12:47 PM    SEDRATEWES 16 (H) 01/21/2021 12:47 PM    HBA1C 5.2 01/21/2021 12:47 PM        Culture Results show:  No results found for this or any previous visit (from the past 720 hour(s)).    Pain Level/Medicated:  Denies pain        INTERVENTIONS BY WOUND TEAM:  Chart and images reviewed. Discussed with bedside RN. All areas of concern (based on picture review, LDA review and discussion with bedside RN) have been thoroughly assessed. Documentation of areas based on significant findings. This RN in to assess patient. Performed standard wound care which includes appropriate positioning, dressing removal and non-selective debridement. Pictures and measurements obtained weekly if/when required.  Preparation for  Dressing removal: N/A  Cleansed with:  wound cleanser and gauze.  Sharp debridement: N/A  Radha wound: Cleansed with wound cleanser  Primary Dressing: hydrofiber  Secondary (Outer) Dressing: heel mepilex    Interdisciplinary consultation: Patient, wound RN Denise     EVALUATION / RATIONALE FOR TREATMENT:  Most Recent Date:  02/23/2021: Discoloration noted over left heel related to pressure from the offloading boot. Unable to assess wound bed, purple boggy tissue noted. Hydrofiber placed over wound bed to help with managing moisture that may develop as heel mepilex is also utilized for offloading.      Goals: Steady decrease in wound area and depth weekly.    WOUND TEAM PLAN OF CARE ([X] for frequency of wound follow up,):   Nursing to follow orders written for wound care. Contact wound team if area fails to progress, deteriorates or with any questions/concerns  Dressing changes by wound team:                   Follow up 3 times weekly:                NPWT change 3 times weekly:     Follow up 1-2 times weekly:    X, weekly for left posterior heel  Follow up Bi-Monthly:                   Follow up as needed:     Other (explain):     NURSING PLAN OF CARE ORDERS (X):  Dressing changes: See Dressing Care orders: X  Skin care: See Skin Care orders: X  RN Prevention Protocol: X  Rectal tube care: See Rectal Tube Care orders:   Other orders:    RSKIN:   CURRENTLY IN PLACE (X), APPLIED THIS VISIT (A), ORDERED (O):   Q shift Hunter:  X  Q shift pressure point assessments:  X    Surface/Positioning   Pressure redistribution mattress   X         Low Airloss          Bariatric foam      Bariatric SOMMER     Waffle cushion        Waffle Overlay        X  Reposition q 2 hours      TAPs Turning system     Z Ralph Pillow     Offloading/Redistribution   Sacral Mepilex (Silicone dressing)     Heel Mepilex (Silicone dressing)     X    Heel float boots (Prevalon boot)             Float Heels off Bed with Pillows           Respiratory  N/A  Silicone O2 tubing         Gray Foam Ear protectors     Cannula fixation Device (Tender )          High flow offloading Clip    Elastic head band offloading device      Anchorfast                                                         Trach with Optifoam split foam             Containment/Moisture Prevention     Rectal tube or BMS    Purwick/Condom Cath    X    Gallardo Catheter    Barrier wipes           Barrier paste       Antifungal tx      Interdry        Mobilization N/A      Up to chair        Ambulate      PT/OT      Nutrition       Dietician        Diabetes Education      PO   X  TF     TPN     NPO   # days     Other        Anticipated discharge plans: Pending group home acceptance  LTACH:        SNF/Rehab:                  Home Health Care:           Outpatient Wound Center:            Self/Family Care:        Other:

## 2021-02-23 NOTE — PROGRESS NOTES
2 RN skin check complete with MUSTAPHA Lopez.  Devices in place: Bilateral Cam boots  Skin assessed under devices: yes.  New DTI found on L heel by LPS during dressing change. Modified mepilex foam placed on heel, wound care consult ordered.  Wound consult placed: yes  The following interventions in place Pillows, and Waffle chair cushion.     Pt was able to turn. No redness on elbows.  Back and sacrum had some redness but was blanching okay. Pillows were used to adjust patient to relieve pressure on back. Patient is on a waffle cushion. Pt encouraged to turn q2 hrs or as needed.

## 2021-02-23 NOTE — PROGRESS NOTES
Added extra mepilex boardered foam with hole cut out to further offload pressure off of left heel after discussion with LPS. Will continue to monitor closely.

## 2021-02-23 NOTE — DISCHARGE PLANNING
Received another document form : Notice of Entry of Order Appointing General Guardian of the Person and Estate of Yury Percy Blake. Copy placed in chart.

## 2021-02-24 PROCEDURE — 770006 HCHG ROOM/CARE - MED/SURG/GYN SEMI*

## 2021-02-24 PROCEDURE — 700102 HCHG RX REV CODE 250 W/ 637 OVERRIDE(OP): Performed by: NURSE PRACTITIONER

## 2021-02-24 PROCEDURE — A9270 NON-COVERED ITEM OR SERVICE: HCPCS | Performed by: NURSE PRACTITIONER

## 2021-02-24 PROCEDURE — 99231 SBSQ HOSP IP/OBS SF/LOW 25: CPT | Performed by: NURSE PRACTITIONER

## 2021-02-24 PROCEDURE — 700111 HCHG RX REV CODE 636 W/ 250 OVERRIDE (IP): Performed by: HOSPITALIST

## 2021-02-24 PROCEDURE — 97530 THERAPEUTIC ACTIVITIES: CPT

## 2021-02-24 PROCEDURE — 700102 HCHG RX REV CODE 250 W/ 637 OVERRIDE(OP): Performed by: HOSPITALIST

## 2021-02-24 PROCEDURE — A9270 NON-COVERED ITEM OR SERVICE: HCPCS | Performed by: HOSPITALIST

## 2021-02-24 RX ADMIN — TAMSULOSIN HYDROCHLORIDE 0.8 MG: 0.4 CAPSULE ORAL at 09:08

## 2021-02-24 RX ADMIN — RISPERIDONE 2 MG: 2 TABLET ORAL at 04:54

## 2021-02-24 RX ADMIN — ASPIRIN 325 MG ORAL TABLET 325 MG: 325 PILL ORAL at 04:54

## 2021-02-24 RX ADMIN — RISPERIDONE 2 MG: 2 TABLET ORAL at 17:58

## 2021-02-24 RX ADMIN — THERA TABS 1 TABLET: TAB at 04:54

## 2021-02-24 RX ADMIN — ENOXAPARIN SODIUM 40 MG: 40 INJECTION SUBCUTANEOUS at 17:58

## 2021-02-24 ASSESSMENT — COGNITIVE AND FUNCTIONAL STATUS - GENERAL
TURNING FROM BACK TO SIDE WHILE IN FLAT BAD: A LOT
MOVING TO AND FROM BED TO CHAIR: A LOT
WALKING IN HOSPITAL ROOM: TOTAL
CLIMB 3 TO 5 STEPS WITH RAILING: TOTAL
MOBILITY SCORE: 9
SUGGESTED CMS G CODE MODIFIER MOBILITY: CM
MOVING FROM LYING ON BACK TO SITTING ON SIDE OF FLAT BED: UNABLE
STANDING UP FROM CHAIR USING ARMS: A LOT

## 2021-02-24 ASSESSMENT — GAIT ASSESSMENTS: GAIT LEVEL OF ASSIST: UNABLE TO PARTICIPATE

## 2021-02-24 NOTE — CARE PLAN
Problem: Safety  Goal: Will remain free from injury  Outcome: PROGRESSING AS EXPECTED  Goal: Will remain free from falls  Outcome: PROGRESSING AS EXPECTED     Problem: Venous Thromboembolism (VTW)/Deep Vein Thrombosis (DVT) Prevention:  Goal: Patient will participate in Venous Thrombosis (VTE)/Deep Vein Thrombosis (DVT)Prevention Measures  Outcome: PROGRESSING AS EXPECTED   Lovenox in use

## 2021-02-24 NOTE — THERAPY
Physical Therapy   Daily Treatment     Patient Name: Yury Blake  Age:  50 y.o., Sex:  male  Medical Record #: [id number]  Today's Date: 2/24/2021     Precautions: (P) Fall Risk    Assessment    Patient has not made progress with skilled PT in past 6 session (since evaluation).  Likely cause is combination of cognition, and specific restriction in only allowed to transfer with boots on due to LE wounds.  PT will not follow in acute setting, however may benefit from post acute therapy when LE wound heal, as patient would be able to tolerate more WB activities and receive more frequent therapy.      Plan    Discharge secondary to no likely benefit.    DC Equipment Recommendations: Unable to determine at this time  Discharge Recommendations: Other - Patient has achieved highest practical level of function in an acute care setting and will not actively be followed by acute physical therapy services. Patient would still benefit from post-acute placement for additional rehabilitation services to focus on IADL and community mobility training that is not available in the acute care setting.     Objective     02/24/21 1115   Precautions   Precautions Fall Risk   Comments WB for transfers w/ boots on only   Cognition    Cognition / Consciousness X   Speech/ Communication Delayed Responses   Level of Consciousness Confused   Ability To Follow Commands 1 Step   Safety Awareness Impaired   New Learning Impaired   Attention Impaired   Sequencing Impaired   Neuro-Muscular Treatments   Neuro-Muscular Treatments Weight Shift Left;Weight Shift Right;Verbal Cuing;Tactile Cuing;Anterior weight shift;Compensatory Strategies   Comments seated balance   Other Treatments   Other Treatments Provided confused, with mild agitation today   Balance   Sitting Balance (Static) Fair   Sitting Balance (Dynamic) Fair -   Standing Balance (Static) Fair -   Standing Balance (Dynamic) Poor +   Weight Shift Sitting Fair   Weight Shift Standing  Poor   Skilled Intervention Verbal Cuing;Tactile Cuing;Sequencing   Comments standing wit FWW   Gait Analysis   Gait Level Of Assist Unable to Participate   Bed Mobility    Supine to Sit Minimal Assist   Sit to Supine Minimal Assist   Scooting Supervised   Functional Mobility   Sit to Stand Moderate Assist   Bed, Chair, Wheelchair Transfer Minimal Assist   How much difficulty does the patient currently have...   Turning over in bed (including adjusting bedclothes, sheets and blankets)? 2   Sitting down on and standing up from a chair with arms (e.g., wheelchair, bedside commode, etc.) 1   Moving from lying on back to sitting on the side of the bed? 2   How much help from another person does the patient currently need...   Moving to and from a bed to a chair (including a wheelchair)? 2   Need to walk in a hospital room? 1   Climbing 3-5 steps with a railing? 1   6 clicks Mobility Score 9   Short Term Goals    Short Term Goal # 2 Patient will move sitting<>standing with supervision within 6tx in order to initiate gait and transfers   Goal Outcome # 2 Goal not met   Short Term Goal # 3 Pt will be able to transfer bed to chair with SPV in 6tx in order to progress with therapy   Goal Outcome # 3 Goal not met   Short Term Goal # 4 Pt will be able to demosntrate SPV with WC mobility in 6tx in order to progress with therapy   Goal Outcome # 4 Goal not met   Education Group   Education Provided Role of Physical Therapist   Role of Physical Therapist Patient Response Patient;Acceptance;Explanation;Action Demonstration;Reinforcement Needed   Anticipated Discharge Equipment and Recommendations   DC Equipment Recommendations Unable to determine at this time

## 2021-02-24 NOTE — PROGRESS NOTES
LIMB PRESERVATION SERVICE     POD #22 s/p bilateral gastroc-soleus recessions, Bilateral posterior tibialis tendon transfer to the lateral cuneiform, Bilateral deep compartment fasciotomy,Bilateral flexor tendon release, toes 1 through 5, Left medial column release, Left irrigation and debridement of foot, multiple compartments including lateral intra-articular fifth MTPJ, Left fifth metatarsal bone biopsy with Dr. Roque on 2/1/2021.     New DTI to left posterior heel.  Seen by wound team.  Heel offloaded with Mepilex followed by an additional Mepilex as a horseshoe        Plan  Continue to wear offloading boot at all times, and date 3/15/2021  Switch left boot to PRAFO boot to further offload heel  RN to maintain proper positioning of PRAFO boot.

## 2021-02-24 NOTE — PROGRESS NOTES
[facility] Medicine Progress Note    Date of Service  2/24/2021    Chief Complaint  Altered mental status    Hospital Course  Mr. Blake is a 50-year-old male who was brought to the emergency department on 9/9/2020 with altered mental status after he was found by his ex-wife to be obtunded after she had checked on him when he had not been seen for at least 2 days.  At that time he was alert and oriented x0 and found to be hypotensive.  GCS in the ED was 7 and he was severely hypotensive.  He was intubated for airway protection and central venous access was obtained for administration of high-volume crystalloid resuscitation and vasopressor therapy.  He had a significant leukocytosis with a WBC of 22.9 and was hypokalemic with a potassium level of 2.7.  Additionally he had acute kidney injury with a creatinine of 2.73.  His INR was 8.93 and he also had a lactate level of 11.  Urinalysis was performed and was negative for urinary tract infection.  He was diagnosed with septic shock and admitted to the intensive care unit for further evaluation and treatment.  A lumbar puncture was done on 9/9/2020.  He was also noted to have seizure-like activity on 9/10/2020 so an EEG was obtained and was significant for a moderate encephalopathy.  Extubation occurred on 9/11/2020.  He also had a traumatic catheter removal so on 9/21/2020 urology was consulted but was ultimately unnecessary.  On 9/13/2020 he was transferred out of the ICU where he required restraints due to confusion, hallucination, agitation, and impulsivity.  An MRI was performed and was negative for acute pathologies.  He was also treated for a UTI starting 10/2/2020.  He has also fallen multiple times in hospital.  Additionally, on 11/11/2020 he had an acute episode of sinus tachycardia in the 140s accompanied by hypotension.  There was concern for STEMI so cardiology was consulted.  A 12-lead EKG was obtained and was concerning for atrial flutter.  The  patient received metoprolol after which a repeat ECG showed sinus tachycardia.  A D-dimer was obtained and was elevated for which a CT chest was done that was negative for pulmonary embolism.  Cardiology subsequently signed off.  He has remained in the hospital for a prolonged period of time and is now pending guardianship and then will need placement.  Guardianship hearing took place 2/5/2021.   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 Ortho was consulted and he does require I&D. Surgery with Dr. Roque completed 2/1/2021    Interval Problem Update  2/24- Patient resting in bed, states he is anxious today but can not tell me why. Patient states he feels better now that he has cleaned up and bathed. Court date scheduled for 2/26. Guardian looking for group homes for placement.     Consultants/Specialty  Critical care  Psychiatry  Cardiology  Urology  Infectious disease    Code Status  Full Code    Disposition  Patient will require 24/7 supervision upon discharge- guardianship granted to friend who is looking at group homes    Review of Systems  Review of Systems   Unable to perform ROS: Medical condition      Physical Exam  Temp:  [36.7 °C (98 °F)-36.7 °C (98.1 °F)] 36.7 °C (98 °F)  Pulse:  [64-66] 64  Resp:  [16-17] 17  BP: ()/(57-65) 103/57  SpO2:  [95 %-96 %] 96 %    Physical Exam  Vitals and nursing note reviewed.   HENT:      Head: Normocephalic and atraumatic.      Nose: Nose normal.   Eyes:      Conjunctiva/sclera: Conjunctivae normal.      Pupils: Pupils are equal, round, and reactive to light.   Cardiovascular:      Rate and Rhythm: Normal rate and regular rhythm.      Heart sounds: No murmur. No friction rub.   Pulmonary:      Effort: No respiratory distress.   Abdominal:      General: Bowel sounds are normal. There is no distension.      Palpations: Abdomen is soft.   Musculoskeletal:      Cervical back: Neck supple.   Skin:     General: Skin is  warm and dry.   Neurological:      Mental Status: He is alert. Mental status is at baseline. He is confused.   Psychiatric:         Attention and Perception: Attention normal.         Mood and Affect: Mood normal.         Speech: Speech normal.         Behavior: Behavior normal.         Thought Content: Thought content is delusional.         Cognition and Memory: Cognition is impaired. Memory is impaired.       Fluids    Intake/Output Summary (Last 24 hours) at 2/24/2021 1554  Last data filed at 2/24/2021 0900  Gross per 24 hour   Intake 240 ml   Output 2600 ml   Net -2360 ml     Laboratory    Imaging  None recent    Assessment/Plan  * Toxic encephalopathy- (present on admission)  Assessment & Plan  -Unknown etiology - hypoxic induced brain injury vs Wernicke encephalopathy.  -LP, MRI brain, EEG negative. TSH normal. HIV/RPR negative.  -Psych was consulted and believe it is alcohol related.  -Continue risperdal.  -Remains free from restraints  -Has fallen multiple times while in hospital. TeleSitter now at bedside.     Open wound of left foot  Assessment & Plan  -Lateral aspect, worsening, red, nontender  -HgA1c 1/21: 5.2%  -No leukocytosis  -Wound care following  -1/21 LPS consulted  -1/24: Wound culture growing Staph aureus. Continue Augmentin. Bactrim discontinued.  -2/1: I &D surgery of foot with Dr. Roque    -2/4: Bone biopsy cultures positive for S epidermitis and Corynebacterium. ID consulted  -2/5: ID recommended 10 days of doxycycline    Normocytic anemia- (present on admission)  Assessment & Plan  -Very mild, no active bleeding.  -Stable  -Check periodically.    Alcohol abuse- (present on admission)  Assessment & Plan  -Abstinent since admission  -Continue daily MV. Stopped thiamine 2/14 as supratherapeutic       VTE prophylaxis: Lovenox    I have performed a physical exam and reviewed and updated ROS and Plan today (2/24/2021). In review of previous note, there are no changes except as documented above.        TRACE Garcia.

## 2021-02-24 NOTE — CARE PLAN
Problem: Communication  Goal: The ability to communicate needs accurately and effectively will improve  Outcome: PROGRESSING AS EXPECTED     Problem: Bowel/Gastric:  Goal: Normal bowel function is maintained or improved  Outcome: PROGRESSING AS EXPECTED  Intervention: Educate patient and significant other/support system about diet, fluid intake, medications and activity to promote bowel function  Note:  2/23

## 2021-02-24 NOTE — PROGRESS NOTES
2 RN skin check complete.   Devices in place PRAFO boot to LLE, CAM boot RLE. Condom cath in place & changed.   Skin assessed under devices No-All dressings changed today by LPS/Wound. Pt has known DTI L heel.   Confirmed pressure ulcers found on NA.  Posterior testicle red & raw, barrier cream applied. Sacrum Red 7 blanching.   The following interventions in place Q2 hr turns when pt allows us, Waffle mattress overlay.

## 2021-02-25 PROCEDURE — 700102 HCHG RX REV CODE 250 W/ 637 OVERRIDE(OP): Performed by: NURSE PRACTITIONER

## 2021-02-25 PROCEDURE — A9270 NON-COVERED ITEM OR SERVICE: HCPCS | Performed by: NURSE PRACTITIONER

## 2021-02-25 PROCEDURE — A9270 NON-COVERED ITEM OR SERVICE: HCPCS | Performed by: HOSPITALIST

## 2021-02-25 PROCEDURE — 770006 HCHG ROOM/CARE - MED/SURG/GYN SEMI*

## 2021-02-25 PROCEDURE — 700102 HCHG RX REV CODE 250 W/ 637 OVERRIDE(OP): Performed by: HOSPITALIST

## 2021-02-25 PROCEDURE — 700111 HCHG RX REV CODE 636 W/ 250 OVERRIDE (IP): Performed by: HOSPITALIST

## 2021-02-25 RX ADMIN — RISPERIDONE 2 MG: 2 TABLET ORAL at 04:50

## 2021-02-25 RX ADMIN — ENOXAPARIN SODIUM 40 MG: 40 INJECTION SUBCUTANEOUS at 17:01

## 2021-02-25 RX ADMIN — RISPERIDONE 2 MG: 2 TABLET ORAL at 17:01

## 2021-02-25 RX ADMIN — TAMSULOSIN HYDROCHLORIDE 0.8 MG: 0.4 CAPSULE ORAL at 10:48

## 2021-02-25 RX ADMIN — ASPIRIN 325 MG ORAL TABLET 325 MG: 325 PILL ORAL at 04:51

## 2021-02-25 RX ADMIN — THERA TABS 1 TABLET: TAB at 04:50

## 2021-02-25 NOTE — CARE PLAN
Problem: Safety  Goal: Will remain free from injury  Outcome: PROGRESSING AS EXPECTED   Bed locked and in lowest position, call light and personal belongings within reach, treaded socks and bed alarm in place, hourly rounding on going.  Tele-sitter at bedside.   Problem: Infection  Goal: Will remain free from infection  Outcome: PROGRESSING AS EXPECTED   Standard precautions in place. Cleansed hands before and after patient care to prevent the spread of germs.

## 2021-02-25 NOTE — CARE PLAN
Problem: Safety  Goal: Will remain free from injury  Outcome: PROGRESSING AS EXPECTED  Note: Educated pt to use call button to call for help before getting up. Bed rails up x2. Provided hospital non-slip socks. Bed locked and at the lowest position. Call light and personal belongings placed within reach. Telesitter at bedside.     Problem: Skin Integrity  Goal: Risk for impaired skin integrity will decrease  Outcome: PROGRESSING AS EXPECTED  Note: Pt repositioned every 2 hours. Waffle cushion in place. Mepilex and barrier paste utilized.

## 2021-02-26 PROCEDURE — A9270 NON-COVERED ITEM OR SERVICE: HCPCS | Performed by: HOSPITALIST

## 2021-02-26 PROCEDURE — A9270 NON-COVERED ITEM OR SERVICE: HCPCS | Performed by: NURSE PRACTITIONER

## 2021-02-26 PROCEDURE — 97535 SELF CARE MNGMENT TRAINING: CPT

## 2021-02-26 PROCEDURE — 700102 HCHG RX REV CODE 250 W/ 637 OVERRIDE(OP): Performed by: NURSE PRACTITIONER

## 2021-02-26 PROCEDURE — 700102 HCHG RX REV CODE 250 W/ 637 OVERRIDE(OP): Performed by: HOSPITALIST

## 2021-02-26 PROCEDURE — 700111 HCHG RX REV CODE 636 W/ 250 OVERRIDE (IP): Performed by: HOSPITALIST

## 2021-02-26 PROCEDURE — 770006 HCHG ROOM/CARE - MED/SURG/GYN SEMI*

## 2021-02-26 RX ADMIN — ENOXAPARIN SODIUM 40 MG: 40 INJECTION SUBCUTANEOUS at 16:55

## 2021-02-26 RX ADMIN — THERA TABS 1 TABLET: TAB at 04:35

## 2021-02-26 RX ADMIN — TAMSULOSIN HYDROCHLORIDE 0.8 MG: 0.4 CAPSULE ORAL at 10:42

## 2021-02-26 RX ADMIN — ASPIRIN 325 MG ORAL TABLET 325 MG: 325 PILL ORAL at 04:35

## 2021-02-26 RX ADMIN — RISPERIDONE 2 MG: 2 TABLET ORAL at 04:35

## 2021-02-26 RX ADMIN — RISPERIDONE 2 MG: 2 TABLET ORAL at 16:55

## 2021-02-26 ASSESSMENT — COGNITIVE AND FUNCTIONAL STATUS - GENERAL
DRESSING REGULAR LOWER BODY CLOTHING: A LOT
DRESSING REGULAR UPPER BODY CLOTHING: A LITTLE
SUGGESTED CMS G CODE MODIFIER DAILY ACTIVITY: CK
DAILY ACTIVITIY SCORE: 16
PERSONAL GROOMING: A LITTLE
HELP NEEDED FOR BATHING: A LOT
TOILETING: A LOT

## 2021-02-26 NOTE — CARE PLAN
Problem: Safety  Goal: Will remain free from injury  Outcome: PROGRESSING AS EXPECTED   Standard precautions in place. Cleansed hands before and after patient care to prevent the spread of germs.    Problem: Infection  Goal: Will remain free from infection  Outcome: PROGRESSING AS EXPECTED   Standard precautions in place. Cleansed hands before and after patient care to prevent the spread of germs.

## 2021-02-26 NOTE — DISCHARGE PLANNING
Anticipated Discharge Disposition: [facility]    Action: Guardian working on GH placement. Provided guardian with contacts for GH possibilities earlier this week.    Barriers to Discharge: pending GH accept.    Plan: F/U with guardian on [facility] progress.

## 2021-02-26 NOTE — CARE PLAN
Problem: Safety  Goal: Will remain free from injury  Outcome: PROGRESSING AS EXPECTED  Note: Educated pt to use call button to call for help before getting up. Bed rails up x2. Provided hospital non-slip socks. Bed locked and at the lowest position. Call light and personal belongings within reach. Telesitter at bedside.     Problem: Skin Integrity  Goal: Risk for impaired skin integrity will decrease  Outcome: PROGRESSING AS EXPECTED  Note: Repositioned every 2 hours. Waffle cushion in place. Barrier paste and mepilex in use.

## 2021-02-26 NOTE — PROGRESS NOTES
2 RN skin check completed with Ya LUCIANO.   Devices in place: [place], drop foot boot, condom cath.  Skin assessed under devices: Yes.  Confirmed pressure ulcers found on: Left heel and lateral left 5th toe.   Sacrum red and blanching, bilateral scattered abrasions to feet and ankles, pressure injury to left heel and lateral left 5th toe.   The following interventions in place: Pillows in place for support and comfort, condom cath,  waffle cushion overlay in place, barrier cream in use, q2 turns on going, bilateral mepilexes applied to heels, baitain dressing applied to abrasions to bilateral ankles and feet.

## 2021-02-27 PROCEDURE — A9270 NON-COVERED ITEM OR SERVICE: HCPCS | Performed by: NURSE PRACTITIONER

## 2021-02-27 PROCEDURE — 770006 HCHG ROOM/CARE - MED/SURG/GYN SEMI*

## 2021-02-27 PROCEDURE — A9270 NON-COVERED ITEM OR SERVICE: HCPCS | Performed by: HOSPITALIST

## 2021-02-27 PROCEDURE — 700102 HCHG RX REV CODE 250 W/ 637 OVERRIDE(OP): Performed by: NURSE PRACTITIONER

## 2021-02-27 PROCEDURE — 700111 HCHG RX REV CODE 636 W/ 250 OVERRIDE (IP): Performed by: HOSPITALIST

## 2021-02-27 PROCEDURE — 700102 HCHG RX REV CODE 250 W/ 637 OVERRIDE(OP): Performed by: HOSPITALIST

## 2021-02-27 PROCEDURE — 99231 SBSQ HOSP IP/OBS SF/LOW 25: CPT | Performed by: NURSE PRACTITIONER

## 2021-02-27 RX ADMIN — RISPERIDONE 2 MG: 2 TABLET ORAL at 17:33

## 2021-02-27 RX ADMIN — ASPIRIN 325 MG ORAL TABLET 325 MG: 325 PILL ORAL at 05:06

## 2021-02-27 RX ADMIN — RISPERIDONE 2 MG: 2 TABLET ORAL at 05:06

## 2021-02-27 RX ADMIN — ENOXAPARIN SODIUM 40 MG: 40 INJECTION SUBCUTANEOUS at 17:32

## 2021-02-27 RX ADMIN — TAMSULOSIN HYDROCHLORIDE 0.8 MG: 0.4 CAPSULE ORAL at 10:01

## 2021-02-27 RX ADMIN — THERA TABS 1 TABLET: TAB at 05:06

## 2021-02-27 NOTE — THERAPY
"Occupational Therapy  Daily Treatment     Patient Name: Yury Blake  Age:  50 y.o., Sex:  male  Medical Record #: [id number]  Today's Date: 2/26/2021     Precautions: Fall Risk  Comments: WB for transfers only in boots    Assessment    Pt seen for OT tx, agreeable to OOB functional activities. Pt demonstrated improved indepdnence with bed mobility, sat EOB with extra time but no phyiscal assist required. Pt requires maxA to manage BLE boots. ModA to initiate stand from chair height, repeated cues required for safe hand placement and body mechanics for sit<>stand. Pt is limited by impaired new learning which is likely his new baseline as this appears to have been a primary limiting factor for many sessions without improvement in education carryover. Pt will require post-acute placement and anticipate need for long term care d/t cognitive impairments.     Plan    Continue current treatment plan.    DC Equipment Recommendations: Unable to determine at this time  Discharge Recommendations: Recommend post-acute placement for additional occupational therapy services prior to discharge home    Subjective    \"I was just with my wife and kids, we got this food to go.\" (pt disoriented to place, reason, time)     Objective     02/26/21 1501   Cognition    Speech/ Communication Delayed Responses  (random, confabulating statements)   Orientation Level Not Oriented to Reason;Not Oriented to Place;Not Oriented to Time;Not Oriented to Day   Level of Consciousness Confused   Ability To Follow Commands 1 Step   Safety Awareness Impaired   New Learning Impaired   Attention Impaired   Sequencing Impaired   Initiation Impaired   Comments pt confused but agreeable to OOB activities    Balance   Weight Shift Sitting Fair   Weight Shift Standing Fair   Comments FWW   Bed Mobility    Supine to Sit Supervised   Scooting Supervised   Skilled Intervention Verbal Cuing   Comments HOB slightly elevated   Activities of Daily Living "   Eating Supervision   Grooming Supervision;Seated  (face wash, wiping hands, hair comb)   Lower Body Dressing Maximal Assist  (to manage L boot)   Toileting   (declined)   Skilled Intervention Verbal Cuing;Tactile Cuing;Sequencing   Comments ADL independence limited by cog, impaired new learning and safety    Functional Mobility   Sit to Stand Moderate Assist   Bed, Chair, Wheelchair Transfer Minimal Assist   Transfer Method Stand Step   Mobility stand pivot transfer only   Skilled Intervention Verbal Cuing;Tactile Cuing;Facilitation   Comments cues for sequencing   Short Term Goals   Short Term Goal # 1 Pt will complete toilet transfer using BSC if needed with spv, no LOB by discharge.   Goal Outcome # 1 Goal not met   Short Term Goal # 2 Pt will complete seated grooming/hygiene with spv by discharge.   Goal Outcome # 2 Goal met   Short Term Goal # 3 Pt will complete toileting with spv by discharge.   Goal Outcome # 3 Progressing slower than expected   Short Term Goal # 4 Pt will complete LB dressing with spv by discharge.   Goal Outcome # 4 Goal not met

## 2021-02-27 NOTE — PROGRESS NOTES
2 RN Skin Check    2 RN skin check complete.   Devices in place: Cam/prafo boots bilateral.  Skin assessed under devices: yes.  Confirmed pressure ulcers found on: left heel.  New potential pressure ulcers noted on na. Wound consult placed N/A.  The following interventions in place Pillows, Mepilex, Heel float boots and Waffle bed overlay     Left heel dressing changed, right heel dressing CDI next dressing change 2/28/2021 .

## 2021-02-27 NOTE — PROGRESS NOTES
2 RN skin check complete Ya RN  Devices in place Condom cath, Cam Boots BLE.  Skin assessed under devices -yes. skin intact, checked under boots, no skin issues noted, KIRTI under wound dressing but dressing is CDI.  Confirmed pressure ulcers found on NA  New potential pressure ulcers noted on NA Wound consult placed n/a (wound team notified.  The following interventions in place Waffle mattress in place, 2 RN skin check q shift, pt able to turn with min assist frequent reminders/prompts needed. *

## 2021-02-27 NOTE — CARE PLAN
Problem: Communication  Goal: The ability to communicate needs accurately and effectively will improve  Outcome: PROGRESSING AS EXPECTED   Discussed POC with patient Updated white board Calls appropriately  Call light within reach       Problem: Safety  Goal: Will remain free from injury  Outcome: PROGRESSING AS EXPECTED   Fall Precautions in place: Patient room near nurse's station, Non-skid socks on, bed locked and in lowest position, upper bed rails up, bed and chair alarm being used, DME in bathroom, call light within reach. Telesitter in place.

## 2021-02-28 PROCEDURE — 700102 HCHG RX REV CODE 250 W/ 637 OVERRIDE(OP): Performed by: NURSE PRACTITIONER

## 2021-02-28 PROCEDURE — A9270 NON-COVERED ITEM OR SERVICE: HCPCS | Performed by: HOSPITALIST

## 2021-02-28 PROCEDURE — 770006 HCHG ROOM/CARE - MED/SURG/GYN SEMI*

## 2021-02-28 PROCEDURE — 700102 HCHG RX REV CODE 250 W/ 637 OVERRIDE(OP): Performed by: HOSPITALIST

## 2021-02-28 PROCEDURE — A9270 NON-COVERED ITEM OR SERVICE: HCPCS | Performed by: NURSE PRACTITIONER

## 2021-02-28 PROCEDURE — 700111 HCHG RX REV CODE 636 W/ 250 OVERRIDE (IP): Performed by: HOSPITALIST

## 2021-02-28 RX ADMIN — RISPERIDONE 2 MG: 2 TABLET ORAL at 17:19

## 2021-02-28 RX ADMIN — ASPIRIN 325 MG ORAL TABLET 325 MG: 325 PILL ORAL at 07:26

## 2021-02-28 RX ADMIN — THERA TABS 1 TABLET: TAB at 07:26

## 2021-02-28 RX ADMIN — TAMSULOSIN HYDROCHLORIDE 0.8 MG: 0.4 CAPSULE ORAL at 08:00

## 2021-02-28 RX ADMIN — ENOXAPARIN SODIUM 40 MG: 40 INJECTION SUBCUTANEOUS at 17:20

## 2021-02-28 RX ADMIN — ACETAMINOPHEN 650 MG: 325 TABLET, FILM COATED ORAL at 20:16

## 2021-02-28 RX ADMIN — RISPERIDONE 2 MG: 2 TABLET ORAL at 07:26

## 2021-02-28 NOTE — CARE PLAN
Problem: Communication  Goal: The ability to communicate needs accurately and effectively will improve  Outcome: PROGRESSING SLOWER THAN EXPECTED  Note: Patient does not use call light or communicate needs for repositioning or nutrition. Frequently rounding, offering food/drink and repositioning.     Problem: Skin Integrity  Goal: Risk for impaired skin integrity will decrease  Outcome: PROGRESSING SLOWER THAN EXPECTED  Note: Confirmed pressure ulcer on left heel with q48 hour dressing change performed by nurse during day shift. 2 RN skin check completed this shift. Mepilex, pillows, barrier paste, and waffle bed overlay in place. Turning q2 hours.

## 2021-02-28 NOTE — PROGRESS NOTES
2 RN Skin Check    2 RN skin check complete.   Devices in place: CAM boot/ PRAFO boot, condom cath.  Skin assessed under devices: yes.  Confirmed pressure ulcers found on: left heel.  New potential pressure ulcers noted on n/a. Wound consult placed N/A.  The following interventions in place Pillows, Lotion, Barrier cream and Waffle bed overlay.

## 2021-02-28 NOTE — CARE PLAN
Problem: Safety  Goal: Will remain free from injury  Outcome: PROGRESSING AS EXPECTED  Note: Pt remained free from injury, bed in lowest position, hourly rounding, call light within reach.     Problem: Skin Integrity  Goal: Risk for impaired skin integrity will decrease  Outcome: PROGRESSING SLOWER THAN EXPECTED  Note: 2 RN skin check preformed, cleansed and dressing change on DTI of left heel. Pt positioned with pillows for comfort and position change.

## 2021-02-28 NOTE — PROGRESS NOTES
[facility] Medicine Progress Note    Date of Service  2/28/2021    Chief Complaint  Altered mental status    Hospital Course  Mr. Blake is a 50-year-old male who was brought to the emergency department on 9/9/2020 with altered mental status after he was found by his ex-wife to be obtunded after she had checked on him when he had not been seen for at least 2 days.  At that time he was alert and oriented x0 and found to be hypotensive.  GCS in the ED was 7 and he was severely hypotensive.  He was intubated for airway protection and central venous access was obtained for administration of high-volume crystalloid resuscitation and vasopressor therapy.  He had a significant leukocytosis with a WBC of 22.9 and was hypokalemic with a potassium level of 2.7.  Additionally he had acute kidney injury with a creatinine of 2.73.  His INR was 8.93 and he also had a lactate level of 11.  Urinalysis was performed and was negative for urinary tract infection.  He was diagnosed with septic shock and admitted to the intensive care unit for further evaluation and treatment.  A lumbar puncture was done on 9/9/2020.  He was also noted to have seizure-like activity on 9/10/2020 so an EEG was obtained and was significant for a moderate encephalopathy.  Extubation occurred on 9/11/2020.  He also had a traumatic catheter removal so on 9/21/2020 urology was consulted but was ultimately unnecessary.  On 9/13/2020 he was transferred out of the ICU where he required restraints due to confusion, hallucination, agitation, and impulsivity.  An MRI was performed and was negative for acute pathologies.  He was also treated for a UTI starting 10/2/2020.  He has also fallen multiple times in hospital.  Additionally, on 11/11/2020 he had an acute episode of sinus tachycardia in the 140s accompanied by hypotension.  There was concern for STEMI so cardiology was consulted.  A 12-lead EKG was obtained and was concerning for atrial flutter.  The  "patient received metoprolol after which a repeat ECG showed sinus tachycardia.  A D-dimer was obtained and was elevated for which a CT chest was done that was negative for pulmonary embolism.  Cardiology subsequently signed off.  He has remained in the hospital for a prolonged period of time and is now pending guardianship and then will need placement.  Guardianship hearing took place 2/5/2021.   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 Ortho was consulted and he does require I&D. Surgery with Dr. Roque completed 2/1/2021    Interval Problem Update  2/24- Patient resting in bed, states he is anxious today but can not tell me why. Patient states he feels better now that he has cleaned up and bathed. Court date scheduled for 2/26. Guardian looking for group homes for placement.     2/27- Patient resting in bed with bilateral foot boots in place. States no needs but feels tired and bored. Per patient \"I was considering getting drunk last night but then thought maybe there was something better to do\". Reminded patient he was in hospital. Patient did not seem to retain redirection of orientation.       Consultants/Specialty  Critical care  Psychiatry  Cardiology  Urology  Infectious disease    Code Status  Full Code    Disposition  Patient will require 24/7 supervision upon discharge- guardianship granted to friend who is looking at group homes    Review of Systems  Review of Systems   Unable to perform ROS: Medical condition      Physical Exam  Temp:  [36.1 °C (97 °F)-36.6 °C (97.8 °F)] 36.2 °C (97.2 °F)  Pulse:  [60-77] 63  Resp:  [17-18] 17  BP: ()/(52-64) 102/64  SpO2:  [95 %-98 %] 96 %    Physical Exam  Vitals and nursing note reviewed.   HENT:      Head: Normocephalic and atraumatic.      Nose: Nose normal.   Eyes:      Conjunctiva/sclera: Conjunctivae normal.      Pupils: Pupils are equal, round, and reactive to light.   Cardiovascular:      Rate and " Rhythm: Normal rate and regular rhythm.      Heart sounds: No murmur. No friction rub.   Pulmonary:      Effort: No respiratory distress.   Abdominal:      General: Bowel sounds are normal. There is no distension.      Palpations: Abdomen is soft.   Musculoskeletal:      Cervical back: Neck supple.   Skin:     General: Skin is warm and dry.   Neurological:      Mental Status: He is alert. Mental status is at baseline. He is confused.   Psychiatric:         Attention and Perception: Attention normal.         Mood and Affect: Mood normal.         Speech: Speech normal.         Behavior: Behavior normal.         Thought Content: Thought content is delusional.         Cognition and Memory: Cognition is impaired. Memory is impaired.       Fluids    Intake/Output Summary (Last 24 hours) at 2/28/2021 0854  Last data filed at 2/27/2021 1600  Gross per 24 hour   Intake --   Output 1200 ml   Net -1200 ml     Laboratory    Imaging  None recent    Assessment/Plan  * Toxic encephalopathy- (present on admission)  Assessment & Plan  -Unknown etiology - hypoxic induced brain injury vs Wernicke encephalopathy.  -LP, MRI brain, EEG negative. TSH normal. HIV/RPR negative.  -Psych was consulted and believe it is alcohol related.  -Continue risperdal.  -Remains free from restraints  -Has fallen multiple times while in hospital. TeleSitter now at bedside.     Open wound of left foot  Assessment & Plan  -Lateral aspect, worsening, red, nontender  -HgA1c 1/21: 5.2%  -No leukocytosis  -Wound care following  -1/21 LPS consulted  -1/24: Wound culture growing Staph aureus. Continue Augmentin. Bactrim discontinued.  -2/1: I &D surgery of foot with Dr. Roque    -2/4: Bone biopsy cultures positive for S epidermitis and Corynebacterium. ID consulted  -2/5: ID recommended 10 days of doxycycline    Normocytic anemia- (present on admission)  Assessment & Plan  -Very mild, no active bleeding.  -Stable  -Check periodically.    Alcohol abuse- (present  on admission)  Assessment & Plan  -Abstinent since admission  -Continue daily MV. Stopped thiamine 2/14 as supratherapeutic       VTE prophylaxis: Lovenox    I have performed a physical exam and reviewed and updated ROS and Plan today (2/27/2021). In review of previous note, there are no changes except as documented above.       Taniya Dutton A.P.R.N.

## 2021-03-01 PROCEDURE — A9270 NON-COVERED ITEM OR SERVICE: HCPCS | Performed by: NURSE PRACTITIONER

## 2021-03-01 PROCEDURE — 700102 HCHG RX REV CODE 250 W/ 637 OVERRIDE(OP): Performed by: NURSE PRACTITIONER

## 2021-03-01 PROCEDURE — 700111 HCHG RX REV CODE 636 W/ 250 OVERRIDE (IP): Performed by: HOSPITALIST

## 2021-03-01 PROCEDURE — 770006 HCHG ROOM/CARE - MED/SURG/GYN SEMI*

## 2021-03-01 PROCEDURE — 700102 HCHG RX REV CODE 250 W/ 637 OVERRIDE(OP): Performed by: HOSPITALIST

## 2021-03-01 PROCEDURE — A9270 NON-COVERED ITEM OR SERVICE: HCPCS | Performed by: HOSPITALIST

## 2021-03-01 RX ADMIN — ASPIRIN 325 MG ORAL TABLET 325 MG: 325 PILL ORAL at 04:56

## 2021-03-01 RX ADMIN — RISPERIDONE 2 MG: 2 TABLET ORAL at 17:14

## 2021-03-01 RX ADMIN — TAMSULOSIN HYDROCHLORIDE 0.8 MG: 0.4 CAPSULE ORAL at 09:28

## 2021-03-01 RX ADMIN — THERA TABS 1 TABLET: TAB at 04:56

## 2021-03-01 RX ADMIN — RISPERIDONE 2 MG: 2 TABLET ORAL at 04:56

## 2021-03-01 RX ADMIN — ENOXAPARIN SODIUM 40 MG: 40 INJECTION SUBCUTANEOUS at 17:14

## 2021-03-01 ASSESSMENT — FIBROSIS 4 INDEX: FIB4 SCORE: 0.85

## 2021-03-01 NOTE — CARE PLAN
Problem: Safety  Goal: Will remain free from falls  Outcome: PROGRESSING AS EXPECTED  Note: Patient is not exhibiting impulsivity. High fall risk interventions in place. Room near nursing station. Bed alarm on.      Problem: Bowel/Gastric:  Goal: Normal bowel function is maintained or improved  Outcome: PROGRESSING AS EXPECTED  Note: Patient given laxatives miralax, milk of mag, suppository 2/28, had large BM.

## 2021-03-01 NOTE — PROGRESS NOTES
Received report and assumed pt care.  A&O 1-2.  Bed alarm and treaded socks on.  Call light and personal belongings within reach.  Bed locked and at lowest position.  GARNER.  VSS.  Cam boot on RLE.  Prafo boot on LLE.

## 2021-03-01 NOTE — CARE PLAN
Problem: Safety  Goal: Will remain free from injury  Outcome: PROGRESSING AS EXPECTED   Patient has not tried to get out of bed on his own.  Bed alarm in place as patient is confused.      Problem: Bowel/Gastric:  Goal: Normal bowel function is maintained or improved  Outcome: PROGRESSING AS EXPECTED  Flowsheets (Taken 3/1/2021 0400 by Cathleen Graves, C.N.A.)  Last BM: 03/01/21     Problem: Psychosocial Needs:  Goal: Level of anxiety will decrease  Outcome: PROGRESSING AS EXPECTED   Patient confused, but not anxious at this time.

## 2021-03-01 NOTE — DISCHARGE PLANNING
Anticipated Discharge Disposition: Group home    Action: Called guardian and left voicemail message requesting return call.     Barriers to Discharge:Pending GH placement.    Plan: F/U with guardian.

## 2021-03-01 NOTE — CARE PLAN
Problem: Safety  Goal: Will remain free from injury  Outcome: PROGRESSING AS EXPECTED  Note: Pt educated on using call light, bed in lowest position, hourly rounding, call light within reach.     Problem: Skin Integrity  Goal: Risk for impaired skin integrity will decrease  Outcome: PROGRESSING AS EXPECTED     Problem: Mobility  Goal: Risk for activity intolerance will decrease  Outcome: PROGRESSING SLOWER THAN EXPECTED  Note: Pt refuses to sit up in chair. Pt educated on the importance of ambulation.

## 2021-03-01 NOTE — PROGRESS NOTES
.2 RN Skin Check    2 RN skin check complete.   Devices in place: cam boot on right foot/prafo boot on left foot.  Skin assessed under devices: yes.  Confirmed pressure ulcers found on: **N/A*.  New potential pressure ulcers noted on *N/A**. Wound consult placed Yes.  The following interventions in place Pillows, Mepilex, Heel float boots and Waffle bed overlay.      Changed Mepilex on right heel per orders.

## 2021-03-02 PROCEDURE — A9270 NON-COVERED ITEM OR SERVICE: HCPCS | Performed by: NURSE PRACTITIONER

## 2021-03-02 PROCEDURE — 770006 HCHG ROOM/CARE - MED/SURG/GYN SEMI*

## 2021-03-02 PROCEDURE — 700102 HCHG RX REV CODE 250 W/ 637 OVERRIDE(OP): Performed by: NURSE PRACTITIONER

## 2021-03-02 PROCEDURE — 99232 SBSQ HOSP IP/OBS MODERATE 35: CPT | Performed by: NURSE PRACTITIONER

## 2021-03-02 PROCEDURE — 700111 HCHG RX REV CODE 636 W/ 250 OVERRIDE (IP): Performed by: HOSPITALIST

## 2021-03-02 RX ADMIN — RISPERIDONE 2 MG: 2 TABLET ORAL at 17:39

## 2021-03-02 RX ADMIN — ENOXAPARIN SODIUM 40 MG: 40 INJECTION SUBCUTANEOUS at 17:39

## 2021-03-02 RX ADMIN — ASPIRIN 325 MG ORAL TABLET 325 MG: 325 PILL ORAL at 04:35

## 2021-03-02 RX ADMIN — THERA TABS 1 TABLET: TAB at 04:35

## 2021-03-02 RX ADMIN — RISPERIDONE 2 MG: 2 TABLET ORAL at 04:35

## 2021-03-02 NOTE — WOUND TEAM
[facility]  Inpatient Services   Wound and Skin Care Progress    Admission Date: 9/9/2020     Last order of IP CONSULT TO WOUND CARE was found on 2/22/2021 from Hospital Encounter on 9/9/2020     HPI, PMH, SH: Reviewed    Past Surgical History:   Procedure Laterality Date   • TENDON LENGHTENING Bilateral 2/1/2021    Procedure: LENGTHENING, TENDON- FOR FOOT DROP RECONSTRUCTION, POSTERIOR TIBIALIS TENDON  TRANSFER , ACHILLES LENGHTENING , LEFT MEDIAL RELEASE;  Surgeon: Donny Roque M.D.;  Location: SURGERY [facility];  Service: Orthopedics   • IRRIGATION & DEBRIDEMENT ORTHO Left 2/1/2021    Procedure: IRRIGATION AND DEBRIDEMENT, WOUND-FOOT;  Surgeon: Donny Roque M.D.;  Location: SURGERY [facility];  Service: Orthopedics   • ANKLE ORIF Right 9/26/2017    Procedure: ANKLE ORIF SYNDESOMOSIS REPAIR;  Surgeon: Armando Woodard M.D.;  Location: [facility];  Service: Orthopedics     Social History     Tobacco Use   • Smoking status: Never Smoker   • Smokeless tobacco: Current User     Types: Chew   Substance Use Topics   • Alcohol use: Yes     Comment: 6-10 beers daily     Chief Complaint   Patient presents with   • Altered Mental Status   • Hypotension     Diagnosis: Acute respiratory failure with hypoxia (HCC)  Acute respiratory failure with hypoxia (HCC)  Acute respiratory failure with hypoxia (HCC)    Unit where seen by Wound Team: T335/01     WOUND CONSULT/FOLLOW UP RELATED TO:  Left heel      WOUND HISTORY:  Discoloration noted over left posterior heel by LPS APRN on 2/22.     WOUND ASSESSMENT/LDAVascular:    Wound 02/23/21 Pressure Injury Heel Posterior Left (Active)   Wound Image   03/02/21 1100   Site Assessment Purple;Pink;Red    Periwound Assessment Pink;Dry    Margins Defined edges;Attached edges    Closure Secondary intention    Drainage Amount None    Treatments Cleansed;Site care;Offloading    Wound Cleansing Approved Wound Cleanser    Periwound Protectant Not Applicable     Dressing Cleansing/Solutions Not Applicable    Dressing Options Hydrofiber Silver;Mepilex Heel    Dressing Changed Changed    Dressing Status Clean;Dry;Intact    Dressing Change/Treatment Frequency Every 72 hrs, and As Needed    NEXT Dressing Change/Treatment Date 03/05/21    NEXT Weekly Photo (Inpatient Only) 03/09/21    Pressure Injury Stage U    Non-staged Wound Description Not applicable    Wound Length (cm) 3.3 cm    Wound Width (cm) 3 cm    Wound Surface Area (cm^2) 9.9 cm^2    Shape oval    Wound Odor None    Pulses 2+    Exposed Structures None    WOUND NURSE ONLY - Time Spent with Patient (mins) 60        Wound 03/02/21 Foot Anterior Right Pressure injury (Active)   Wound Image      Site Assessment Red;Pink    Periwound Assessment Non-blanchable erythema;Red;Pink    Margins Attached edges;Defined edges    Closure Secondary intention    Drainage Amount None    Treatments Cleansed;Site care    Wound Cleansing Approved Wound Cleanser    Periwound Protectant Skin Protectant Wipes to Periwound    Dressing Cleansing/Solutions Not Applicable    Dressing Options Mepilex Heel    Dressing Changed New    Dressing Status Clean;Dry;Intact    Dressing Change/Treatment Frequency Every 72 hrs, and As Needed    NEXT Dressing Change/Treatment Date 03/05/21    NEXT Weekly Photo (Inpatient Only) 03/09/21    Pressure Injury Stage 1    Non-staged Wound Description Not applicable    Wound Length (cm) 1.2 cm    Wound Width (cm) 4.5 cm    Wound Surface Area (cm^2) 5.4 cm^2    Shape linear    Wound Odor None    Pulses 2+    Exposed Structures None    WOUND NURSE ONLY - Time Spent with Patient (mins) 60           ANA:   No results found.    Lab Values:    Lab Results   Component Value Date/Time    WBC 11.0 (H) 02/05/2021 09:43 AM    RBC 3.78 (L) 02/05/2021 09:43 AM    HEMOGLOBIN 11.0 (L) 02/05/2021 09:43 AM    HEMATOCRIT 34.6 (L) 02/05/2021 09:43 AM    CREACTPROT 3.58 (H) 01/21/2021 12:47 PM    SEDRATEWES 16 (H) 01/21/2021 12:47  PM    HBA1C 5.2 01/21/2021 12:47 PM        Culture Results show:  No results found for this or any previous visit (from the past 720 hour(s)).    Pain Level/Medicated:  Denies pain        INTERVENTIONS BY WOUND TEAM:  Chart and images reviewed. Discussed with bedside RN. All areas of concern (based on picture review, LDA review and discussion with bedside RN) have been thoroughly assessed. Documentation of areas based on significant findings. This RN in to assess patient. Performed standard wound care which includes appropriate positioning, dressing removal and non-selective debridement. Pictures and measurements obtained weekly if/when required.  Preparation for Dressing removal: N/A  Cleansed with:  wound cleanser and gauze.  Sharp debridement: N/A  Radha wound: Cleansed with wound cleanser  Primary Dressing: hydrofiber Ag   Secondary (Outer) Dressing: heel mepilex    Interdisciplinary consultation: Patient, EDWIN Meredith     EVALUATION / RATIONALE FOR TREATMENT:  Most Recent Date:  3/2/2021:  Left heel wound is turning into stable eschar, per EDWIN Meredith, mepilex is off loading pressure.  Right anterior foot has pressure injury related to boot, mepilex placed over it.    02/23/2021: Discoloration noted over left heel related to pressure from the offloading boot. Unable to assess wound bed, purple boggy tissue noted. Hydrofiber placed over wound bed to help with managing moisture that may develop as heel mepilex is also utilized for offloading.      Goals: Steady decrease in wound area and depth weekly.    WOUND TEAM PLAN OF CARE ([X] for frequency of wound follow up,):   Nursing to follow orders written for wound care. Contact wound team if area fails to progress, deteriorates or with any questions/concerns  Dressing changes by wound team:                   Follow up 3 times weekly:                NPWT change 3 times weekly:     Follow up 1-2 times weekly:    X, weekly for left posterior heel & right  anterior foot  Follow up Bi-Monthly:                   Follow up as needed:     Other (explain):     NURSING PLAN OF CARE ORDERS (X):  Dressing changes: See Dressing Care orders: X  Skin care: See Skin Care orders: X  RN Prevention Protocol: X  Rectal tube care: See Rectal Tube Care orders:   Other orders:    RSKIN:   CURRENTLY IN PLACE (X), APPLIED THIS VISIT (A), ORDERED (O):   Q shift Hunter:  X  Q shift pressure point assessments:  X    Surface/Positioning   Pressure redistribution mattress   X         Low Airloss          Bariatric foam      Bariatric SOMMER     Waffle cushion        Waffle Overlay        X  Reposition q 2 hours    O  TAPs Turning system     Z Ralph Pillow     Offloading/Redistribution   Sacral Mepilex (Silicone dressing)     Heel Mepilex (Silicone dressing)     X    Heel float boots (Prevalon boot)             Float Heels off Bed with Pillows           Respiratory N/A  Silicone O2 tubing         Gray Foam Ear protectors     Cannula fixation Device (Tender )          High flow offloading Clip    Elastic head band offloading device      Anchorfast                                                         Trach with Optifoam split foam             Containment/Moisture Prevention     Rectal tube or BMS    Purwick/Condom Cath    X    Gallardo Catheter    Barrier wipes           Barrier paste       Antifungal tx      Interdry        Mobilization  Patient working on pivoting to chair  Up to chair        Ambulate      PT/OT  X    Nutrition       Dietician        Diabetes Education      PO   X  TF     TPN     NPO   # days     Other        Anticipated discharge plans: wound team to continue to follow.    LTACH:        SNF/Rehab:                  Home Health Care:           Outpatient Wound Center:            Self/Family Care:        Other:

## 2021-03-02 NOTE — PROGRESS NOTES
2 RN skin check complete.   Devices in place: CAM boot/ PRAFO boot, condom cath.  Skin assessed under devices: yes.  Confirmed pressure ulcers found on: left heel; left lateral foot.  New potential pressure ulcers noted on n/a.   Wound consult placed N/A.  The following interventions in place Pillows, Lotion, Barrier cream and Waffle bed overlay.

## 2021-03-02 NOTE — DISCHARGE PLANNING
Anticipated Discharge Disposition: GH    Action: Received call from Deonte Henderson, xiaoan. He has not had any success with finding GH. He suggested LTC. Explained that there are no available LTC beds in the community. Will continue to work on placement.    Barriers to Discharge: Availability of GH vs LTC.    Plan: Continue to F/U.

## 2021-03-02 NOTE — PROGRESS NOTES
LIMB PRESERVATION SERVICE  PROGRESS NOTE    HPI: 50 y.o.  with a past medical history that includes hypertension, alcohol abuse, , admitted 9/9/2020 for Acute respiratory failure with hypoxia (HCC)   LPS has been consulted for left fifth MTH ulcer.      Patient is a poor historian.  A&O to self only.  Chart reviewed.  Followed by wound team for hospital-acquired pressure injury to left fifth MTH.  Pressure injury first noted on 12/18/2020 that started as a deep tissue injury from PRAFO boot that evolved to an unstageable and now presents as a stage IV.  Patient would not allow anyone to assess his ulcer.  Finally he allowed wound team to see the ulcer on 12/27/2020.  Advanced wound dressings have been applied including hydrocolloid thin, Hydrofiber silver, Mepilex.  Patient initially presented to the hospital 9/9/2020 for altered mental status.  Found to be obtunded for least 2 days.  He was intubated, vasopressor therapy was utilized. extubated 9/11/2020. He had encephalopathy. Patient was severely agitated and confused with hallucinations and impulsivity.  He developed bilateral foot drop.  He was given a PRAFO boot for left foot on 11/19/2020 and a PRAFO boot for right foot on 12/16/2020.  He has had multiple falls. Patient is two-person max assist.  Not ambulating at this time. He has had sitters and remote sitter at bedside. currently pending guardianship.     Date/Surgeon: 2/1/2021 with Dr. Roque  PROCEDURES:  1.  Bilateral gastroc-soleus recessions.  2.  Bilateral posterior tibialis tendon transfer to the lateral cuneiform.  3.  Bilateral deep compartment fasciotomy.  4.  Bilateral flexor tendon release, toes 1 through 5.  5.  Left medial column release.  6.  Left irrigation and debridement of foot, multiple compartments including   lateral intra-articular fifth MTPJ.  7.  Left fifth metatarsal bone biopsy.      INTERVAL HISTORY:  1/26/2021: Patient sleeping, wakes up when name is called, but goes back to  sleep. Intermittent twitching left foot. PRAFO on L foot, foot slipped out. On augmentin, bactrim discontinued once cx resulted  1/29/2021: Patient dozing.  Alert during exam.  Wound care debrided ulcer yesterday.  Continues to take Augmentin until 1/31/2021.  2/5/2020: Patient sitting in chair. Awake, alert, calm and pleasant. Boots and dressings in place to bilateral lower extremities. Denies fever, chills, nausea, vomiting, diarrhea, pain.   2/10/2021: POD #9.  Patient confused, delusional.  States he has been helping a friend with fixing his car.  Does not know he is in the hospital.  Patient wearing bilateral boots at all time.  Patient denies that he had surgery.  2/15/2021: POD #14.  Patient remains confused and delusional.  He states that he had just woken with Dominic Marie on the telephone.  All incisions are well approximated and healing well, sutures removed today.  But is obtained and dressings reapplied.  Bilateral boots in place at all times.  2/22/2021: POD #21.  Patient awake but somnolent.  Incisions appear well-healed.  DTI to left heel by placement of heel Mepilex last week..  Photos obtained and notified wound care supervisor.  Consult for wound care team placed.  Boots in place to bilateral feet at all times.  3/2/2021: 4 weeks postop.  Seen with wound team to assess left heel DTI.  Patient continues to wear offloading boot to right foot, PRAFO boot left foot.  Pending group home placement, has guardian.  Working with PT/OT.  Presents with a new DTI to right dorsal foot.              PERTINENT LPS RESULTS:     Pathology 2/1/2021    FINAL DIAGNOSIS:     A. Left fifth metatarsal:          Bening bone with reactive fibrosis.          No evidence of osteomyelitis.     COVID-19: not detected this admission 1/29/2021    Bone culture of left fifth metatarsal 2/1/2021: Staph epidermidis, oxacillin resistant.      Tissue culture from the left fifth toe ulcer 2/1/2021: corynebacterium species.             "          EXAM:      BP (!) 89/59 Comment: Rn notified  Pulse 65   Temp 36.6 °C (97.9 °F) (Temporal)   Resp 14   Ht 1.803 m (5' 11\")   Wt 90.5 kg (199 lb 8.3 oz)   SpO2 95%   BMI 27.83 kg/m²     Palpable pedal pulses          Right foot and lower leg incisions:  Incisions remain healed without sutures to right anterior lower leg, dorsal foot, medial ankle, medial lower extremity are well approximated.  No erythema, localized edema.  No drainage.  Edema controlled      New right dorsal midfoot DTI:  Dark purple, nonblanching surrounded by red blanching skin.  Skin intact  No pain  No erythema                    Photos from 2/22/2021 right anterior lower leg/dorsal foot    Right medial foot and lower extremity    Right plantar toes          Left foot and lower leg incisions  Incisions remain well approximated, healed without sutures.  No surrounding erythema, edema, drainage.  Previous ecchymosis left dorsal foot and left great toe resolved    left posterior heel DTI has evolved to unstageable  Thin layer of eschar to center of ulcer with surrounding discolored tissue to edges  Slightly decreased in length however width has increased    Left heel:                    Photos from 2/22/2021 left plantar toes      Left lateral fifth MTH      Left anterior lower leg/dorsal foot      Left medial lower extremity/medial ankle                        Wound care completed by wound care RN.  Incision was left open to air.  dry gauze placed in between toes bilaterally.  Applied Aquacel, heel Mepilex to left heel and left lateral foot.  Applied heel Mepilex right heel and to dorsal right midfoot.  Tubigrip E to bilateral lower extremities.   offloading boot right foot without dorsal foot protector while in bed in [facility] boot to left foot.  Patient tolerated dressing change well.      INFECTION MANAGEMENT:        Wound culture results:   Results     ** No results found for the last 168 hours. **             ASSESSMENT/PLAN: "     -Patient admitted September 2020 for altered mental status.  Required intubation and vasopressor therapy.  Developed bilateral foot drop and subsequently pressure injury to left fifth MTH from PRAFO boot.     4 weeks postop--bilateral gastroc-soleus recessions, Bilateral posterior tibialis tendon transfer to the lateral cuneiform, Bilateral deep compartment fasciotomy,Bilateral flexor tendon release, toes 1 through 5, Left medial column release, Left irrigation and debridement of foot, multiple compartments including lateral intra-articular fifth MTPJ, Left fifth metatarsal bone biopsy with Dr. Roque on 2/1/2021.   -Incisions remain approximated.  Site of previous ulcer to left fifth MTH closed with primary closure remains resolved  -DTI to left posterior heel has evolved to an unstageable.  Eschar thin layer intact    -New DTI to right dorsal midfoot with purple nonblanching skin that is intact.  Wound care orders and offloading initiated        Wound care:   Bilateral lower extremities: Nursing to cleanse incision sites with wound cleanser, pat dry with gauze.  Weave dry gauze in between toes bilaterally.    Left foot: Aquacel, heel Mepilex to lateral fifth MTH and left heel.  Change every 72 hours  Right foot: Heel Mepilex to heel and heel Mepilex to dorsal midfoot.  Change every 72 hours  -Bilateral Tubigrip E to control edema    Labs/Imaging:  -COVID-19: not detected 1/29/2020    Vascular status:   -Palpable pedal pulses bilaterally    Surgery:   No return to OR at this time    Antibiotics:   -ID: ID involved, now signed off.    -No signs and symptoms of infection to feet  -Completed doxycycline on 2/15/2021  Bone culture of left fifth metatarsal 2/1/2021: Staph epidermidis, oxacillin resistant.  Wound culture from the left fifth toe ulcer 2/1/2021: corynebacterium species.        Weight Bearing Status:   -Transfer weight bearing with boots in place to BLE    Offloading:   Heel Mepilex to bilateral heels  at all times.  Offloading boot to right foot, PRAFO boot to left foot.  Okay to have green dorsal plate protector off while in bed to right foot boot.   -Boots to be worn at all times for total of 6 weeks.  End date 3/15/2021  -Orthotic company: none     PT/OT :   Involved. Last seen by PT 2/24/2021. Last seen by OT 2/26/2021      DISCHARGE PLAN:    Disposition: Guardianship hearing done 2/5/2021, guardianship granted to Deontehannah Henderson.   Pending group home placement versus long-term care    Discussed with: pt,  RN, Meagan Wound Care RN, Jean-Paul [place] hospitalist TAMMY        Please note that this dictation was created using voice recognition software. I have  worked with technical experts from [facility] to optimize the interface.  I have made every reasonable attempt to correct obvious errors, but there may be errors of grammar and possibly content that I did not discover before finalizing the note.    Viri Lynn, A.P.R.N.    If any questions or concerns, please contact LPS through voalte.

## 2021-03-02 NOTE — PROGRESS NOTES
2 RN skin check completed with MUSTAPHA Vanessa.   Devices in place :  CAM boot, Prafo boot, condom cath.  Skin assessed under devices:  Yes, under LLE.  RLE has dressing in place.  Confirmed pressure ulcers found on:  Left heel (wound care following).  New potential pressure ulcers noted :  N/A  The following interventions in place:  q2 turns, waffle overlay, pillows, barrier cream

## 2021-03-02 NOTE — CARE PLAN
Problem: Safety  Goal: Will remain free from injury  Outcome: PROGRESSING AS EXPECTED  Note: Room close to nursing station; high fall risk precautions in place; bed alarm on.     Problem: Skin Integrity  Goal: Risk for impaired skin integrity will decrease  Outcome: PROGRESSING AS EXPECTED  Note: 2 RN skin check done with Tae. CAM/Prafo boots used to float heels; barrier paste for incontinence; protectiv mepilex. 2 hour turning schedule in place.

## 2021-03-03 PROCEDURE — A9270 NON-COVERED ITEM OR SERVICE: HCPCS | Performed by: NURSE PRACTITIONER

## 2021-03-03 PROCEDURE — 700102 HCHG RX REV CODE 250 W/ 637 OVERRIDE(OP): Performed by: NURSE PRACTITIONER

## 2021-03-03 PROCEDURE — 770006 HCHG ROOM/CARE - MED/SURG/GYN SEMI*

## 2021-03-03 PROCEDURE — 700102 HCHG RX REV CODE 250 W/ 637 OVERRIDE(OP): Performed by: HOSPITALIST

## 2021-03-03 PROCEDURE — 700111 HCHG RX REV CODE 636 W/ 250 OVERRIDE (IP): Performed by: HOSPITALIST

## 2021-03-03 PROCEDURE — A9270 NON-COVERED ITEM OR SERVICE: HCPCS | Performed by: HOSPITALIST

## 2021-03-03 RX ADMIN — RISPERIDONE 2 MG: 2 TABLET ORAL at 18:28

## 2021-03-03 RX ADMIN — TAMSULOSIN HYDROCHLORIDE 0.8 MG: 0.4 CAPSULE ORAL at 09:47

## 2021-03-03 RX ADMIN — ENOXAPARIN SODIUM 40 MG: 40 INJECTION SUBCUTANEOUS at 18:28

## 2021-03-03 RX ADMIN — ASPIRIN 325 MG ORAL TABLET 325 MG: 325 PILL ORAL at 05:40

## 2021-03-03 RX ADMIN — RISPERIDONE 2 MG: 2 TABLET ORAL at 05:40

## 2021-03-03 RX ADMIN — THERA TABS 1 TABLET: TAB at 05:40

## 2021-03-03 NOTE — CARE PLAN
Problem: Safety  Goal: Will remain free from injury  Outcome: PROGRESSING AS EXPECTED  Safety precautions are in place including bed locked and in lowest position, upper bed rails up, bed alarm on, call light within reach, treaded socks on, tray table and personal belongings within reach. Tele-sitter in place for patient due to history of impulsive behavior and multiple falls during current hospitalization.     Problem: Knowledge Deficit  Goal: Knowledge of the prescribed therapeutic regimen will improve  Outcome: PROGRESSING SLOWER THAN EXPECTED  Patient is confused an alert to self only. Patient requires frequent reorienting.

## 2021-03-03 NOTE — PROGRESS NOTES
2 RN skin check complete with MUSTAPHA Castelan.  Devices in place: CAM boot to R foot, PRAFO boot to L foot, dressings under each foot, condom cath.  Skin assessed under devices: No (dressing change for R foot 3/5, L foot dressing change due 3/4).  Confirmed pressure ulcers found on: Left heel; left lateral foot, R dorsal foot.  New potential pressure ulcers noted on N/A.  Wound consult not placed (Wound team already involved).    Back and sacrum are intact, pink & blanching.    The following interventions in place: Waffle mattress, pillows, baby powder, barrier cream, q2 turns.

## 2021-03-03 NOTE — PROGRESS NOTES
RN MOBILITY NOTE     Surgery patient?: No  Date of surgery:   Ambulated 50 ft on day of surgery? (N/A if today is not date of surgery):   Number of times ambulated 50 feet or greater today:   Patient has been up to chair, edge of bed or HOB 90 degrees for all meals?: No  Goal met? (goal is ambulating at least 50 feet 2 times on day shift, one time on night shift): No  If patient did not meet mobility goal, why?:  Patient refused.  Wanted to sleep and was confused.

## 2021-03-03 NOTE — CARE PLAN
Problem: Venous Thromboembolism (VTW)/Deep Vein Thrombosis (DVT) Prevention:  Goal: Patient will participate in Venous Thrombosis (VTE)/Deep Vein Thrombosis (DVT)Prevention Measures  Outcome: PROGRESSING AS EXPECTED  SCDs on and lovenox in use     Problem: Skin Integrity  Goal: Risk for impaired skin integrity will decrease  Outcome: PROGRESSING AS EXPECTED   Look at skin note at start of shift; progressing as expected with no new skin breakdown

## 2021-03-03 NOTE — PROGRESS NOTES
2 RN skin check complete.   Devices in place:  Prafo and Cam boots  Skin assessed under devices:  KIRTI, dressings in place  Confirmed pressure ulcers found:  NA  New potential pressure ulcers noted:  NA  The following interventions in place:  q2 turns, waffle mattress, pillows for positioning and comfort, scheduled dressing changes.

## 2021-03-03 NOTE — THERAPY
"Missed Therapy     Patient Name: Yury Blake  Age:  50 y.o., Sex:  male  Medical Record #: [id number]  Today's Date: 3/2/2021       03/02/21 1714   Treatment Variance   Reason For Missed Therapy Non-Medical - Patient Refused   Interdisciplinary Plan of Care Collaboration   IDT Collaboration with  Nursing   Collaboration Comments OT treatment attempted. Pt initially agreeable however when therapist began moving bedding out of way pt became agitated stating, \"Don't move that!\" Pt confused stating he needs to go take care of a bill that his wife forgot about. Attempted to reorient pt to situation/place, however pt remains confused and not agreeable to moving towards EOB. Will attempt OT tx again at a later date.     "

## 2021-03-04 PROBLEM — L89.899 PRESSURE ULCER OF RIGHT FOOT: Status: ACTIVE | Noted: 2021-03-04

## 2021-03-04 PROCEDURE — 99231 SBSQ HOSP IP/OBS SF/LOW 25: CPT | Performed by: NURSE PRACTITIONER

## 2021-03-04 PROCEDURE — A9270 NON-COVERED ITEM OR SERVICE: HCPCS | Performed by: HOSPITALIST

## 2021-03-04 PROCEDURE — 700102 HCHG RX REV CODE 250 W/ 637 OVERRIDE(OP): Performed by: NURSE PRACTITIONER

## 2021-03-04 PROCEDURE — A9270 NON-COVERED ITEM OR SERVICE: HCPCS | Performed by: NURSE PRACTITIONER

## 2021-03-04 PROCEDURE — 700111 HCHG RX REV CODE 636 W/ 250 OVERRIDE (IP): Performed by: HOSPITALIST

## 2021-03-04 PROCEDURE — 770006 HCHG ROOM/CARE - MED/SURG/GYN SEMI*

## 2021-03-04 PROCEDURE — 700102 HCHG RX REV CODE 250 W/ 637 OVERRIDE(OP): Performed by: HOSPITALIST

## 2021-03-04 RX ADMIN — ENOXAPARIN SODIUM 40 MG: 40 INJECTION SUBCUTANEOUS at 17:15

## 2021-03-04 RX ADMIN — ASPIRIN 325 MG ORAL TABLET 325 MG: 325 PILL ORAL at 04:24

## 2021-03-04 RX ADMIN — TAMSULOSIN HYDROCHLORIDE 0.8 MG: 0.4 CAPSULE ORAL at 08:50

## 2021-03-04 RX ADMIN — RISPERIDONE 2 MG: 2 TABLET ORAL at 04:24

## 2021-03-04 RX ADMIN — RISPERIDONE 2 MG: 2 TABLET ORAL at 17:16

## 2021-03-04 RX ADMIN — THERA TABS 1 TABLET: TAB at 04:24

## 2021-03-04 ASSESSMENT — ENCOUNTER SYMPTOMS
MYALGIAS: 1
HEADACHES: 0

## 2021-03-04 NOTE — PROGRESS NOTES
2 RN skin check complete with MUSTAPHA Ramires.  Devices in place: CAM boot to R foot, foot drop aide to L foot, and condom cath.  Skin assessed under devices: No (dressing change for R foot 3/5, L foot dressing change due 3/4). Radha area around condom cath is intact, pink and blanching. Back, elbows, and sacrum are intact, pink & blanching.  New potential pressure ulcers note: no new skin breakdown.  Wound team and dressing changes already in place.

## 2021-03-04 NOTE — CARE PLAN
Problem: Safety  Goal: Will remain free from injury  Outcome: PROGRESSING AS EXPECTED  Goal: Will remain free from falls  Outcome: PROGRESSING AS EXPECTED     Problem: Venous Thromboembolism (VTW)/Deep Vein Thrombosis (DVT) Prevention:  Goal: Patient will participate in Venous Thrombosis (VTE)/Deep Vein Thrombosis (DVT)Prevention Measures  Outcome: PROGRESSING AS EXPECTED  Lovenox in use     Problem: Skin Integrity  Goal: Risk for impaired skin integrity will decrease  Outcome: PROGRESSING AS EXPECTED   In use: barrier paste, repositioning q 2 hours, extra pillows, baby powder, and mepilex

## 2021-03-04 NOTE — DISCHARGE PLANNING
Anticipated Discharge Disposition: GH vs LTC    Action: Attempts to procure GH placement have been unsuccessful. Long term care unlikely due to lack of availability in community. Will discuss case with administration  this afternoon. Also have requested advice from Lna OBRIEN.     Barriers to Discharge: Hx of behavioral issues and immobility.    Plan: Cont to work on placement.

## 2021-03-04 NOTE — DISCHARGE PLANNING
Spoke to guardian about insurance as pt is currently uninsured. Deonte is aware that he needs to look into obtaining private insurance for pt. He states that funds are currently in probate from mother's estate. He states that he will have access to funds for GH/long term care placement.

## 2021-03-05 PROCEDURE — 700102 HCHG RX REV CODE 250 W/ 637 OVERRIDE(OP): Performed by: HOSPITALIST

## 2021-03-05 PROCEDURE — 700102 HCHG RX REV CODE 250 W/ 637 OVERRIDE(OP): Performed by: NURSE PRACTITIONER

## 2021-03-05 PROCEDURE — 770006 HCHG ROOM/CARE - MED/SURG/GYN SEMI*

## 2021-03-05 PROCEDURE — A9270 NON-COVERED ITEM OR SERVICE: HCPCS | Performed by: NURSE PRACTITIONER

## 2021-03-05 PROCEDURE — 700111 HCHG RX REV CODE 636 W/ 250 OVERRIDE (IP): Performed by: HOSPITALIST

## 2021-03-05 PROCEDURE — A9270 NON-COVERED ITEM OR SERVICE: HCPCS | Performed by: HOSPITALIST

## 2021-03-05 RX ADMIN — ASPIRIN 325 MG ORAL TABLET 325 MG: 325 PILL ORAL at 04:33

## 2021-03-05 RX ADMIN — RISPERIDONE 2 MG: 2 TABLET ORAL at 04:33

## 2021-03-05 RX ADMIN — ENOXAPARIN SODIUM 40 MG: 40 INJECTION SUBCUTANEOUS at 16:58

## 2021-03-05 RX ADMIN — TAMSULOSIN HYDROCHLORIDE 0.8 MG: 0.4 CAPSULE ORAL at 08:20

## 2021-03-05 RX ADMIN — THERA TABS 1 TABLET: TAB at 04:33

## 2021-03-05 RX ADMIN — RISPERIDONE 2 MG: 2 TABLET ORAL at 16:58

## 2021-03-05 NOTE — PROGRESS NOTES
[facility] Medicine Progress Note    Date of Service  3/4/2021    Chief Complaint  Altered mental status    Hospital Course  Mr. Blake is a 50-year-old male who was brought to the emergency department on 9/9/2020 with altered mental status after he was found by his ex-wife to be obtunded after she had checked on him when he had not been seen for at least 2 days.  At that time he was alert and oriented x0 and found to be hypotensive.  GCS in the ED was 7 and he was severely hypotensive.  He was intubated for airway protection and central venous access was obtained for administration of high-volume crystalloid resuscitation and vasopressor therapy.  He had a significant leukocytosis with a WBC of 22.9 and was hypokalemic with a potassium level of 2.7.  Additionally he had acute kidney injury with a creatinine of 2.73.  His INR was 8.93 and he also had a lactate level of 11.  Urinalysis was performed and was negative for urinary tract infection.  He was diagnosed with septic shock and admitted to the intensive care unit for further evaluation and treatment.  A lumbar puncture was done on 9/9/2020.  He was also noted to have seizure-like activity on 9/10/2020 so an EEG was obtained and was significant for a moderate encephalopathy.  Extubation occurred on 9/11/2020.  He also had a traumatic catheter removal so on 9/21/2020 urology was consulted but was ultimately unnecessary.  On 9/13/2020 he was transferred out of the ICU where he required restraints due to confusion, hallucination, agitation, and impulsivity.  An MRI was performed and was negative for acute pathologies.  He was also treated for a UTI starting 10/2/2020.  He has also fallen multiple times in hospital.  Additionally, on 11/11/2020 he had an acute episode of sinus tachycardia in the 140s accompanied by hypotension.  There was concern for STEMI so cardiology was consulted.  A 12-lead EKG was obtained and was concerning for atrial flutter.  The patient  received metoprolol after which a repeat ECG showed sinus tachycardia.  A D-dimer was obtained and was elevated for which a CT chest was done that was negative for pulmonary embolism.  Cardiology subsequently signed off.  He has remained in the hospital for a prolonged period of time and is now pending guardianship and then will need placement.  Guardianship hearing took place 2/5/2021.   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 Ortho was consulted and he does require I&D. Surgery with Dr. Roque completed 2/1/2021    Interval Problem Update  Patient lying in bed, flat affect, cooperative.  He has some pain in his bilateral lower extremities, well-managed on current regimen.  He denies any other problems.  -Per LPS, new right dorsal foot ulcer.  From photographs, skin appears unbroken  -Per LPS, left heel wound is worsening.  -Nursing reports no recent behavioral outbursts  -No changes to clinic    Consultants/Specialty  Critical care  Psychiatry  Cardiology  Urology  Infectious disease    Code Status  Full Code    Disposition  Patient will require 24/7 supervision upon discharge- guardianship granted to friend who is looking at group homes    Review of Systems  Review of Systems   Unable to perform ROS: Medical condition   Musculoskeletal: Positive for myalgias.   Neurological: Negative for headaches.      Physical Exam  Temp:  [36.1 °C (97 °F)-36.8 °C (98.2 °F)] 36.2 °C (97.2 °F)  Pulse:  [68-80] 80  Resp:  [17-18] 17  BP: ()/(55-66) 106/66  SpO2:  [94 %-96 %] 94 %    Physical Exam  Vitals and nursing note reviewed.   Constitutional:       General: He is awake.   HENT:      Head: Normocephalic and atraumatic.      Nose: Nose normal.   Eyes:      Conjunctiva/sclera: Conjunctivae normal.      Pupils: Pupils are equal, round, and reactive to light.   Cardiovascular:      Rate and Rhythm: Normal rate and regular rhythm.      Heart sounds: No murmur. No friction  rub.   Pulmonary:      Effort: No respiratory distress.   Abdominal:      General: Bowel sounds are normal. There is no distension.      Palpations: Abdomen is soft.   Musculoskeletal:      Cervical back: Neck supple.        Feet:    Feet:      Right foot:      Skin integrity: Blister present.      Left foot:      Skin integrity: Ulcer present.   Skin:     General: Skin is warm and dry.   Neurological:      Mental Status: He is alert. Mental status is at baseline. He is confused.   Psychiatric:         Attention and Perception: Attention normal.         Mood and Affect: Mood normal.         Speech: Speech normal.         Behavior: Behavior normal. Behavior is cooperative.         Thought Content: Thought content is delusional.         Cognition and Memory: Cognition is impaired. Memory is impaired.       Fluids    Intake/Output Summary (Last 24 hours) at 3/4/2021 1915  Last data filed at 3/4/2021 0400  Gross per 24 hour   Intake 480 ml   Output --   Net 480 ml     Laboratory    Imaging  None recent    Assessment/Plan  * Toxic encephalopathy- (present on admission)  Assessment & Plan  -Unknown etiology - hypoxic induced brain injury vs Wernicke encephalopathy.  -LP, MRI brain, EEG negative. TSH normal. HIV/RPR negative.  -Psych was consulted and believe it is alcohol related.  -Continue risperdal.  -Remains free from restraints  -Has fallen multiple times while in hospital. TeleSitter now at bedside.     Pressure ulcer of right foot  Assessment & Plan  Wound care per LPS.   Skin is intact, however new blister    Open wound of left foot  Assessment & Plan  -Lateral aspect, worsening, red, nontender  -HgA1c 1/21: 5.2%  -No leukocytosis  -Wound care following  -1/21 LPS consulted  -1/24: Wound culture growing Staph aureus. Continue Augmentin. Bactrim discontinued.  -2/1: I &D surgery of foot with Dr. Roque    -2/4: Bone biopsy cultures positive for S epidermitis and Corynebacterium. ID consulted  -2/5: ID recommended  10 days of doxycycline, now completed  -3/4: Per LPS, wound is worsening    Normocytic anemia- (present on admission)  Assessment & Plan  -Very mild, no active bleeding.  -Stable  -Check periodically.    Alcohol abuse- (present on admission)  Assessment & Plan  -Abstinent since admission  -Continue daily MV. Stopped thiamine 2/14 as supratherapeutic     VTE prophylaxis: Lovenox    TRACE Haile.

## 2021-03-05 NOTE — CARE PLAN
Problem: Safety  Goal: Will remain free from injury  Outcome: PROGRESSING AS EXPECTED  Note: Non-slip socks are on, bed is locked and in lowest position, personal belongings are within reach, room is free of clutter and spills, call light is in place. Patient educated on how to use the call light and how to call for assistance. Bed alarm on. Tele sitter in room.     Problem: Communication  Goal: The ability to communicate needs accurately and effectively will improve  Outcome: PROGRESSING SLOWER THAN EXPECTED

## 2021-03-05 NOTE — PROGRESS NOTES
2 RN skin check complete with MUSTAPHA Bull.  Devices in place: CAM boot to R foot, PRAFO boot to L foot, dressings under each foot, condom cath.  Skin assessed under devices: yes   Confirmed pressure ulcers found on: R dorsal foot dressings changed today.  New potential pressure ulcers noted on N/A.  Wound consult not placed (Wound team already involved).     Back and sacrum are intact, pink & blanching.     The following interventions in place: Waffle mattress, pillows, baby powder, barrier cream, q2 turns, condom catheter.

## 2021-03-05 NOTE — CARE PLAN
Problem: Safety  Goal: Will remain free from injury  Outcome: PROGRESSING AS EXPECTED     Problem: Mobility  Goal: Risk for activity intolerance will decrease  Outcome: PROGRESSING SLOWER THAN EXPECTED

## 2021-03-05 NOTE — CARE PLAN
Problem: Safety  Goal: Will remain free from injury  Outcome: PROGRESSING AS EXPECTED  Safety precautions are in place including bed locked and in lowest position, upper bed rails up, bed alarm on, call light within reach, treaded socks on, tray table and personal belongings within reach. Tele-sitter at bedside.     Problem: Bowel/Gastric:  Goal: Normal bowel function is maintained or improved  Outcome: PROGRESSING AS EXPECTED  Bowel protocol in place.

## 2021-03-05 NOTE — PROGRESS NOTES
2 RN skin check complete with MUSTAPHA Castelan.  Devices in place: CAM boot to R foot, PRAFO boot to L foot, dressings under each foot, condom cath.  Skin assessed under devices: KIRTI  Confirmed pressure ulcers found on: Left heel; left lateral foot, R dorsal foot. L foot dressings changed today.  New potential pressure ulcers noted on N/A.  Wound consult not placed (Wound team already involved).     Back and sacrum are intact, pink & blanching.     The following interventions in place: Waffle mattress, pillows, baby powder, barrier cream, q2 turns, condom catheter.

## 2021-03-06 PROCEDURE — 770006 HCHG ROOM/CARE - MED/SURG/GYN SEMI*

## 2021-03-06 PROCEDURE — 700102 HCHG RX REV CODE 250 W/ 637 OVERRIDE(OP): Performed by: NURSE PRACTITIONER

## 2021-03-06 PROCEDURE — 700102 HCHG RX REV CODE 250 W/ 637 OVERRIDE(OP): Performed by: HOSPITALIST

## 2021-03-06 PROCEDURE — A9270 NON-COVERED ITEM OR SERVICE: HCPCS | Performed by: NURSE PRACTITIONER

## 2021-03-06 PROCEDURE — 700111 HCHG RX REV CODE 636 W/ 250 OVERRIDE (IP): Performed by: HOSPITALIST

## 2021-03-06 PROCEDURE — A9270 NON-COVERED ITEM OR SERVICE: HCPCS | Performed by: HOSPITALIST

## 2021-03-06 RX ADMIN — THERA TABS 1 TABLET: TAB at 04:16

## 2021-03-06 RX ADMIN — RISPERIDONE 2 MG: 2 TABLET ORAL at 16:57

## 2021-03-06 RX ADMIN — RISPERIDONE 2 MG: 2 TABLET ORAL at 04:16

## 2021-03-06 RX ADMIN — ACETAMINOPHEN 650 MG: 325 TABLET, FILM COATED ORAL at 04:15

## 2021-03-06 RX ADMIN — ASPIRIN 325 MG ORAL TABLET 325 MG: 325 PILL ORAL at 04:16

## 2021-03-06 RX ADMIN — TAMSULOSIN HYDROCHLORIDE 0.8 MG: 0.4 CAPSULE ORAL at 08:14

## 2021-03-06 RX ADMIN — ENOXAPARIN SODIUM 40 MG: 40 INJECTION SUBCUTANEOUS at 16:57

## 2021-03-06 NOTE — PROGRESS NOTES
2 RN skin check complete with Elisa LUCIANO.   Devices in place RLE cam boot, LLE prafo boot, condom catheter.  Skin assessed under devices yes.  Confirmed pressure ulcers found on R dorsal midfoot, L posterior heel, L fifth MTH, all KIRTI with dressings CDI.  New potential pressure ulcers noted on none. Wound consult placed, wound team involved.  The following interventions in place Q2 turns with TAPS, condom catheter, barrier paste, frequent rounding.

## 2021-03-06 NOTE — CARE PLAN
Problem: Safety  Goal: Will remain free from injury  Outcome: PROGRESSING AS EXPECTED   Non-slip socks are on, bed is locked and in lowest position, personal belongings are within reach, room is free of clutter and spills, call light is in place. Patient educated on how to use the call light and how to call for assistance. Patient verbalized understanding.  Bed alarm on and tele sitter in room.  Problem: Infection  Goal: Will remain free from infection  Outcome: PROGRESSING AS EXPECTED     Problem: Communication  Goal: The ability to communicate needs accurately and effectively will improve  Outcome: PROGRESSING SLOWER THAN EXPECTED   Pt needs help verbalizing needs.

## 2021-03-06 NOTE — PROGRESS NOTES
2 RN skin check complete.   Devices in place CAM boot RLE, Condom catheter, Prafo boot LLE.  Skin assessed under devices yes.  Confirmed pressure ulcers found on Left later foot, dressing CDI.  New potential pressure ulcers noted on none. Wound consult placed, wound team involved.  The following interventions in place Q2 turns with TAPS, condom cath, waffle overlay, frequent rounding.

## 2021-03-06 NOTE — PROGRESS NOTES
2 RN skin check complete with MUSTAPHA Shepard.  Devices in place CAM boot RLE, Condom catheter, Prafo boot LLE.  Skin assessed under devices yes.  Confirmed pressure ulcers found on Left later foot, dressing CDI.  New potential pressure ulcers noted on none. Wound consult placed, wound team involved. Sacrum intact. Bilateral heels red but blanching.  The following interventions in place Q2 turns with TAPS, condom cath, waffle overlay, frequent rounding.

## 2021-03-07 PROCEDURE — 700102 HCHG RX REV CODE 250 W/ 637 OVERRIDE(OP): Performed by: NURSE PRACTITIONER

## 2021-03-07 PROCEDURE — 770006 HCHG ROOM/CARE - MED/SURG/GYN SEMI*

## 2021-03-07 PROCEDURE — A9270 NON-COVERED ITEM OR SERVICE: HCPCS | Performed by: HOSPITALIST

## 2021-03-07 PROCEDURE — 99231 SBSQ HOSP IP/OBS SF/LOW 25: CPT | Performed by: NURSE PRACTITIONER

## 2021-03-07 PROCEDURE — 700111 HCHG RX REV CODE 636 W/ 250 OVERRIDE (IP): Performed by: HOSPITALIST

## 2021-03-07 PROCEDURE — A9270 NON-COVERED ITEM OR SERVICE: HCPCS | Performed by: NURSE PRACTITIONER

## 2021-03-07 PROCEDURE — 700102 HCHG RX REV CODE 250 W/ 637 OVERRIDE(OP): Performed by: HOSPITALIST

## 2021-03-07 RX ADMIN — RISPERIDONE 2 MG: 2 TABLET ORAL at 17:18

## 2021-03-07 RX ADMIN — THERA TABS 1 TABLET: TAB at 05:08

## 2021-03-07 RX ADMIN — ENOXAPARIN SODIUM 40 MG: 40 INJECTION SUBCUTANEOUS at 17:18

## 2021-03-07 RX ADMIN — RISPERIDONE 2 MG: 2 TABLET ORAL at 05:08

## 2021-03-07 RX ADMIN — TAMSULOSIN HYDROCHLORIDE 0.8 MG: 0.4 CAPSULE ORAL at 08:12

## 2021-03-07 RX ADMIN — ASPIRIN 325 MG ORAL TABLET 325 MG: 325 PILL ORAL at 05:08

## 2021-03-07 ASSESSMENT — ENCOUNTER SYMPTOMS
MYALGIAS: 1
HEADACHES: 0

## 2021-03-07 NOTE — PROGRESS NOTES
2 RN skin check complete with IRISH RN.   Devices in place RLE cam boot, LLE prafo boot, condom catheter.  Skin assessed under devices yes.  Confirmed pressure ulcers found on R dorsal midfoot, L posterior heel, L fifth MTH, all KIRTI with dressings CDI.  New potential pressure ulcers noted on none. Wound consult placed, wound team involved.  The following interventions in place Q2 turns with TAPS, condom catheter, barrier paste, frequent rounding.

## 2021-03-07 NOTE — CARE PLAN
Problem: Safety  Goal: Will remain free from injury  Outcome: PROGRESSING AS EXPECTED  Hourly rounding, telesitter, room next to nurses station.     Problem: Skin Integrity  Goal: Risk for impaired skin integrity will decrease  Outcome: PROGRESSING AS EXPECTED   Q2 turns with TAPS, condom catheter, barrier paste, frequent rounding.

## 2021-03-07 NOTE — CARE PLAN
Problem: Communication  Goal: The ability to communicate needs accurately and effectively will improve  Outcome: PROGRESSING AS EXPECTED     Problem: Safety  Goal: Will remain free from injury  Outcome: PROGRESSING AS EXPECTED     Problem: Skin Integrity  Goal: Risk for impaired skin integrity will decrease  Outcome: PROGRESSING AS EXPECTED     Plan of care reviewed, encouraged to voice feelings or concerns. Telesitter at bedside. Fall precautions in place. Pt rounded on hourly. Q2H turns in place via TAPS. Bed alarm on, bed locked and in lowest position. Barrier cream applied and pillows in use for support. Call light in reach. Reorienting patient throughout shift.

## 2021-03-07 NOTE — PROGRESS NOTES
2 RN Skin Check    2 RN skin check complete with MUSTAPHA Aviles.   Devices in place: RLE cam boot, LLE prafo boot, condom catheter.  Skin assessed under devices: yes.  Confirmed pressure ulcers found on: R dorsal midfoot, L posterior heel, L fifth MTH, unable to assess due to dressings in place..  New potential pressure ulcers noted on none. Wound consult placed, wound team involved.  The following interventions in place Pillows and Q2 turn with TAPS, condom catheter, and barrier paste.

## 2021-03-08 PROCEDURE — 700102 HCHG RX REV CODE 250 W/ 637 OVERRIDE(OP): Performed by: NURSE PRACTITIONER

## 2021-03-08 PROCEDURE — A9270 NON-COVERED ITEM OR SERVICE: HCPCS | Performed by: HOSPITALIST

## 2021-03-08 PROCEDURE — 700102 HCHG RX REV CODE 250 W/ 637 OVERRIDE(OP): Performed by: HOSPITALIST

## 2021-03-08 PROCEDURE — A9270 NON-COVERED ITEM OR SERVICE: HCPCS | Performed by: NURSE PRACTITIONER

## 2021-03-08 PROCEDURE — 700111 HCHG RX REV CODE 636 W/ 250 OVERRIDE (IP): Performed by: HOSPITALIST

## 2021-03-08 PROCEDURE — 770006 HCHG ROOM/CARE - MED/SURG/GYN SEMI*

## 2021-03-08 RX ADMIN — ASPIRIN 325 MG ORAL TABLET 325 MG: 325 PILL ORAL at 04:43

## 2021-03-08 RX ADMIN — ENOXAPARIN SODIUM 40 MG: 40 INJECTION SUBCUTANEOUS at 17:44

## 2021-03-08 RX ADMIN — THERA TABS 1 TABLET: TAB at 04:43

## 2021-03-08 RX ADMIN — RISPERIDONE 2 MG: 2 TABLET ORAL at 04:43

## 2021-03-08 RX ADMIN — RISPERIDONE 2 MG: 2 TABLET ORAL at 17:45

## 2021-03-08 RX ADMIN — TAMSULOSIN HYDROCHLORIDE 0.8 MG: 0.4 CAPSULE ORAL at 08:20

## 2021-03-08 ASSESSMENT — FIBROSIS 4 INDEX: FIB4 SCORE: 0.85

## 2021-03-08 NOTE — PROGRESS NOTES
[facility] Medicine Progress Note    Date of Service  3/7/2021    Chief Complaint  Altered mental status    Hospital Course  Mr. Blake is a 50-year-old male who was brought to the emergency department on 9/9/2020 with altered mental status after he was found by his ex-wife to be obtunded after she had checked on him when he had not been seen for at least 2 days.  At that time he was alert and oriented x0 and found to be hypotensive.  GCS in the ED was 7 and he was severely hypotensive.  He was intubated for airway protection and central venous access was obtained for administration of high-volume crystalloid resuscitation and vasopressor therapy.  He had a significant leukocytosis with a WBC of 22.9 and was hypokalemic with a potassium level of 2.7.  Additionally he had acute kidney injury with a creatinine of 2.73.  His INR was 8.93 and he also had a lactate level of 11.  Urinalysis was performed and was negative for urinary tract infection.  He was diagnosed with septic shock and admitted to the intensive care unit for further evaluation and treatment.  A lumbar puncture was done on 9/9/2020.  He was also noted to have seizure-like activity on 9/10/2020 so an EEG was obtained and was significant for a moderate encephalopathy.  Extubation occurred on 9/11/2020.  He also had a traumatic catheter removal so on 9/21/2020 urology was consulted but was ultimately unnecessary.  On 9/13/2020 he was transferred out of the ICU where he required restraints due to confusion, hallucination, agitation, and impulsivity.  An MRI was performed and was negative for acute pathologies.  He was also treated for a UTI starting 10/2/2020.  He has also fallen multiple times in hospital.  Additionally, on 11/11/2020 he had an acute episode of sinus tachycardia in the 140s accompanied by hypotension.  There was concern for STEMI so cardiology was consulted.  A 12-lead EKG was obtained and was concerning for atrial flutter.  The patient  "received metoprolol after which a repeat ECG showed sinus tachycardia.  A D-dimer was obtained and was elevated for which a CT chest was done that was negative for pulmonary embolism.  Cardiology subsequently signed off.  He has remained in the hospital for a prolonged period of time and is now pending guardianship and then will need placement.  Guardianship hearing took place 2/5/2021.   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 Ortho was consulted and he does require I&D. Surgery with Dr. Roque completed 2/1/2021    Interval Problem Update  Patient lying in bed, flat affect, alert and cooperative.  He has continues to have a \"little\" pain in his bilateral lower extremities, well-managed on current regimen.  He denies any other problems.  -Per LPS, new right dorsal foot ulcer.  From photographs, skin appears unbroken  -Per LPS, left heel wound is worsening.  -Nursing reports no recent behavioral outbursts  -No changes to plan of care    Consultants/Specialty  Critical care  Psychiatry  Cardiology  Urology  Infectious disease    Code Status  Full Code    Disposition  Patient will require 24/7 supervision upon discharge- guardianship granted to friend who is looking at group homes    Review of Systems  Review of Systems   Unable to perform ROS: Medical condition   Musculoskeletal: Positive for myalgias.   Neurological: Negative for headaches.      Physical Exam  Temp:  [36.1 °C (97 °F)-37.1 °C (98.7 °F)] 36.4 °C (97.5 °F)  Pulse:  [56-80] 80  Resp:  [16-18] 18  BP: ()/(52-63) 104/62  SpO2:  [93 %-96 %] 96 %    Physical Exam  Vitals and nursing note reviewed.   Constitutional:       General: He is awake.   HENT:      Head: Normocephalic and atraumatic.      Nose: Nose normal.   Eyes:      Conjunctiva/sclera: Conjunctivae normal.      Pupils: Pupils are equal, round, and reactive to light.   Cardiovascular:      Rate and Rhythm: Normal rate and regular rhythm.   "      Heart sounds: No murmur. No friction rub.   Pulmonary:      Effort: No respiratory distress.   Abdominal:      General: Bowel sounds are normal. There is no distension.      Palpations: Abdomen is soft.   Musculoskeletal:      Cervical back: Neck supple.        Feet:    Feet:      Right foot:      Skin integrity: Blister present.      Left foot:      Skin integrity: Ulcer present.   Skin:     General: Skin is warm and dry.   Neurological:      Mental Status: He is alert. Mental status is at baseline. He is confused.   Psychiatric:         Attention and Perception: Attention normal.         Mood and Affect: Mood normal.         Speech: Speech normal.         Behavior: Behavior normal. Behavior is cooperative.         Thought Content: Thought content is delusional.         Cognition and Memory: Cognition is impaired. Memory is impaired.       Fluids    Intake/Output Summary (Last 24 hours) at 3/7/2021 1647  Last data filed at 3/7/2021 1200  Gross per 24 hour   Intake --   Output 1700 ml   Net -1700 ml     Laboratory    Imaging  None recent    Assessment/Plan  * Toxic encephalopathy- (present on admission)  Assessment & Plan  -Unknown etiology - hypoxic induced brain injury vs Wernicke encephalopathy.  -LP, MRI brain, EEG negative. TSH normal. HIV/RPR negative.  -Psych was consulted and believe it is alcohol related.  -Continue risperdal.  -Remains free from restraints  -Has fallen multiple times while in hospital. TeleSitter now at bedside.     Pressure ulcer of right foot  Assessment & Plan  Wound care per LPS.   Skin is intact, however new blister    Open wound of left foot  Assessment & Plan  -Lateral aspect, worsening, red, nontender  -HgA1c 1/21: 5.2%  -No leukocytosis  -Wound care following  -1/21 LPS consulted  -1/24: Wound culture growing Staph aureus. Continue Augmentin. Bactrim discontinued.  -2/1: I &D surgery of foot with Dr. Roque    -2/4: Bone biopsy cultures positive for S epidermitis and  Corynebacterium. ID consulted  -2/5: ID recommended 10 days of doxycycline, now completed  -3/4: Per LPS, wound is worsening    Normocytic anemia- (present on admission)  Assessment & Plan  -Very mild, no active bleeding.  -Stable  -Check periodically.    Alcohol abuse- (present on admission)  Assessment & Plan  -Abstinent since admission  -Continue daily MV. Stopped thiamine 2/14 as supratherapeutic     VTE prophylaxis: Lovenox    TRACE Haile.

## 2021-03-08 NOTE — CARE PLAN
Problem: Safety  Goal: Will remain free from falls  Outcome: PROGRESSING AS EXPECTED  Note: All safety precautions are in place. Pt is in desk bed with tele sitter at bedside. Bed is locked and in lowest position with water and call light within reach. Bed alarm is on and working properly. Pt eudcated to call when he has any needs, hourly rounding in place      Problem: Skin Integrity  Goal: Risk for impaired skin integrity will decrease  Outcome: PROGRESSING AS EXPECTED  Note: Discussed and reinforced reasons behind [id number]. Pillows in use for support and repositioning as well as TAPs system in use

## 2021-03-08 NOTE — PROGRESS NOTES
2 RN Skin Check    2 RN skin check completed with MUSTAPHA Alexandra.   Devices in place: RLE CAM boot, LLE prafo boot, condom catheter  Skin assessed under devices: yes  Confirmed pressure ulcers found on: R dorsal mid foot, L posterior heel, L fifth metatarsal.  New potential pressure ulcers noted on n/a.   Wound consult placed: wound team involved  The following interventions in place: Q2 turns with TAPS, condom catheter, barrier paste, LLE and RLE dressings changed today.

## 2021-03-08 NOTE — PROGRESS NOTES
Received bedside report and assumed care of pt at change of shift. Pt is resting in bed with no signs of distress. No complaints of pain at this time. Assessment completed, pt able to state name and that he was in a medical center however could not tell why or what the year was. Stated no needs at this time. Bed is locked and in lowest position with water and call light in reach.

## 2021-03-08 NOTE — CARE PLAN
Problem: Communication  Goal: The ability to communicate needs accurately and effectively will improve  Outcome: PROGRESSING AS EXPECTED  Note: Patient able to communicate needs. All questions answered at this time.      Problem: Safety  Goal: Will remain free from injury  Outcome: PROGRESSING AS EXPECTED  Note: Bed is locked and in lowest position, personal belongings are within reach, room is free of clutter and spills, call light is in place. Patient educated on how to use the call light and how to call for assistance. Patient verbalized understanding.

## 2021-03-09 PROCEDURE — A9270 NON-COVERED ITEM OR SERVICE: HCPCS | Performed by: NURSE PRACTITIONER

## 2021-03-09 PROCEDURE — 99232 SBSQ HOSP IP/OBS MODERATE 35: CPT | Performed by: NURSE PRACTITIONER

## 2021-03-09 PROCEDURE — 700102 HCHG RX REV CODE 250 W/ 637 OVERRIDE(OP): Performed by: NURSE PRACTITIONER

## 2021-03-09 PROCEDURE — 770006 HCHG ROOM/CARE - MED/SURG/GYN SEMI*

## 2021-03-09 PROCEDURE — A9270 NON-COVERED ITEM OR SERVICE: HCPCS | Performed by: HOSPITALIST

## 2021-03-09 PROCEDURE — 97535 SELF CARE MNGMENT TRAINING: CPT

## 2021-03-09 PROCEDURE — 700102 HCHG RX REV CODE 250 W/ 637 OVERRIDE(OP): Performed by: HOSPITALIST

## 2021-03-09 PROCEDURE — 700111 HCHG RX REV CODE 636 W/ 250 OVERRIDE (IP): Performed by: HOSPITALIST

## 2021-03-09 RX ADMIN — RISPERIDONE 2 MG: 2 TABLET ORAL at 04:40

## 2021-03-09 RX ADMIN — ENOXAPARIN SODIUM 40 MG: 40 INJECTION SUBCUTANEOUS at 17:36

## 2021-03-09 RX ADMIN — ASPIRIN 325 MG ORAL TABLET 325 MG: 325 PILL ORAL at 04:40

## 2021-03-09 RX ADMIN — THERA TABS 1 TABLET: TAB at 04:40

## 2021-03-09 RX ADMIN — RISPERIDONE 2 MG: 2 TABLET ORAL at 17:36

## 2021-03-09 RX ADMIN — TAMSULOSIN HYDROCHLORIDE 0.8 MG: 0.4 CAPSULE ORAL at 09:09

## 2021-03-09 ASSESSMENT — COGNITIVE AND FUNCTIONAL STATUS - GENERAL
SUGGESTED CMS G CODE MODIFIER DAILY ACTIVITY: CK
HELP NEEDED FOR BATHING: A LOT
TOILETING: A LOT
DAILY ACTIVITIY SCORE: 17
DRESSING REGULAR UPPER BODY CLOTHING: A LITTLE
DRESSING REGULAR LOWER BODY CLOTHING: A LOT

## 2021-03-09 NOTE — DISCHARGE PLANNING
Anticipated Discharge Disposition: GH placement.    Action: Spoke to Lesley from [facility]. MAR and wound care recommendation faxed at her request,[phone].    Barriers to Discharge: Pending GH placement.    Plan: F/U with GH.

## 2021-03-09 NOTE — PROGRESS NOTES
2 RN Skin Check    2 RN skin check completed with MUSTAPHA Lloyd.   Devices in place: RLE CAM boot, LLE prafo boot, condom catheter  Skin assessed under devices: yes  Confirmed pressure ulcers found on: R dorsal mid foot, L posterior heel, L fifth metatarsal.  New potential pressure ulcers noted on n/a.   Wound consult placed: wound team involved  The following interventions in place: Q2 turns with TAPS, condom catheter, barrier paste, LLE and RLE dressing changes Q72 and Q48.

## 2021-03-09 NOTE — CARE PLAN
Problem: Safety  Goal: Will remain free from injury  Outcome: PROGRESSING AS EXPECTED  Note: Telesitter in place, Educated on fall risk and ensured fall precautions in place. Bed in lowest position, treaded socks in place, call light in reach, bed alarm in place, room free of clutter.      Problem: Skin Integrity  Goal: Risk for impaired skin integrity will decrease  Outcome: PROGRESSING AS EXPECTED  Note: Q2 turns in place, daily 2 RN skin checks in place

## 2021-03-09 NOTE — PROGRESS NOTES
[facility] PRESERVATION SERVICE  PROGRESS NOTE    HPI: 50 y.o.  with a past medical history that includes hypertension, alcohol abuse, , admitted 9/9/2020 for Acute respiratory failure with hypoxia (HCC)   LPS has been consulted for left fifth MTH ulcer.      Patient is a poor historian.  A&O to self only.  Chart reviewed.  Followed by wound team for hospital-acquired pressure injury to left fifth MTH.  Pressure injury first noted on 12/18/2020 that started as a deep tissue injury from PRAFO boot that evolved to an unstageable and now presents as a stage IV.  Patient would not allow anyone to assess his ulcer.  Finally he allowed wound team to see the ulcer on 12/27/2020.  Advanced wound dressings have been applied including hydrocolloid thin, Hydrofiber silver, Mepilex.  Patient initially presented to the hospital 9/9/2020 for altered mental status.  Found to be obtunded for least 2 days.  He was intubated, vasopressor therapy was utilized. extubated 9/11/2020. He had encephalopathy. Patient was severely agitated and confused with hallucinations and impulsivity.  He developed bilateral foot drop.  He was given a PRAFO boot for left foot on 11/19/2020 and a PRAFO boot for right foot on 12/16/2020.  He has had multiple falls. Patient is two-person max assist.  Not ambulating at this time. He has had sitters and remote sitter at bedside. currently pending guardianship.     Date/Surgeon: 2/1/2021 with Dr. Roque  PROCEDURES:  1.  Bilateral gastroc-soleus recessions.  2.  Bilateral posterior tibialis tendon transfer to the lateral cuneiform.  3.  Bilateral deep compartment fasciotomy.  4.  Bilateral flexor tendon release, toes 1 through 5.  5.  Left medial column release.  6.  Left irrigation and debridement of foot, multiple compartments including   lateral intra-articular fifth MTPJ.  7.  Left fifth metatarsal bone biopsy.      INTERVAL HISTORY:  1/26/2021: Patient sleeping, wakes up when name is called, but goes back to  sleep. Intermittent twitching left foot. PRAFO on L foot, foot slipped out. On augmentin, bactrim discontinued once cx resulted  1/29/2021: Patient dozing.  Alert during exam.  Wound care debrided ulcer yesterday.  Continues to take Augmentin until 1/31/2021.  2/5/2020: Patient sitting in chair. Awake, alert, calm and pleasant. Boots and dressings in place to bilateral lower extremities. Denies fever, chills, nausea, vomiting, diarrhea, pain.   2/10/2021: POD #9.  Patient confused, delusional.  States he has been helping a friend with fixing his car.  Does not know he is in the hospital.  Patient wearing bilateral boots at all time.  Patient denies that he had surgery.  2/15/2021: POD #14.  Patient remains confused and delusional.  He states that he had just woken with Dominic Marie on the telephone.  All incisions are well approximated and healing well, sutures removed today.  But is obtained and dressings reapplied.  Bilateral boots in place at all times.  2/22/2021: POD #21.  Patient awake but somnolent.  Incisions appear well-healed.  DTI to left heel by placement of heel Mepilex last week..  Photos obtained and notified wound care supervisor.  Consult for wound care team placed.  Boots in place to bilateral feet at all times.  3/2/2021: 4 weeks postop.  Seen with wound team to assess left heel DTI.  Patient continues to wear offloading boot to right foot, PRAFO boot left foot.  Pending group home placement, has guardian.  Working with PT/OT.  Presents with a new DTI to right dorsal foot.  3/9/2021: 5 weeks postop.  Seen with wound team to assess left heel unstageable and right dorsal foot DTI.  Patient wearing offloading boot to right foot continuously and PRAFO boot to left foot continuously.            PERTINENT LPS RESULTS:     Pathology 2/1/2021    FINAL DIAGNOSIS:     A. Left fifth metatarsal:          Bening bone with reactive fibrosis.          No evidence of osteomyelitis.     COVID-19: not detected this  "admission 1/29/2021    Bone culture of left fifth metatarsal 2/1/2021: Staph epidermidis, oxacillin resistant.      Tissue culture from the left fifth toe ulcer 2/1/2021: corynebacterium species.            EXAM:      /67   Pulse 61   Temp 36.4 °C (97.6 °F) (Temporal)   Resp 16   Ht 1.803 m (5' 11\")   Wt 91.2 kg (201 lb 1 oz)   SpO2 96%   BMI 28.04 kg/m²     Palpable pedal pulses          Right foot and lower leg incisions:  Incisions remain healed without sutures to right anterior lower leg, dorsal foot, medial ankle, medial lower extremity are well approximated.  No erythema, localized edema.  No drainage.  Edema controlled       right dorsal midfoot DTI:  Dark purple linear line, remains nonblanching surrounded by red blanching skin.  Less redness compared to last week.  Skin intact  No pain  No erythema                        Photos from 2/22/2021 right anterior lower leg/dorsal foot    Right medial foot and lower extremity    Right plantar toes          Left foot and lower leg incisions  Incisions remain well approximated, healed without sutures.  No surrounding erythema, edema, drainage.  Previous ecchymosis left dorsal foot and left great toe resolved    left posterior heel unstageable, previously DTI  Thin layer of stable eschar.  Eschar to edges beginning to lift.  Manually removed to reveal intact skin      Left heel:                      Photos from 2/22/2021 left plantar toes      Left lateral fifth MTH      Left anterior lower leg/dorsal foot      Left medial lower extremity/medial ankle                        Wound care completed by wound care RN.  Incisions left open to air.  dry gauze placed in between toes bilaterally.  Applied Aquacel, heel Mepilex to left heel.  Applied Mepilex to left dorsal foot incorporating lateral foot.  Applied heel Mepilex right heel and to dorsal right midfoot.  Tubigrip E to bilateral lower extremities.   PRAFO boot bilaterally      INFECTION MANAGEMENT:      "   Wound culture results:   Results     ** No results found for the last 168 hours. **             ASSESSMENT/PLAN:     -Patient admitted September 2020 for altered mental status.  Required intubation and vasopressor therapy.  Developed bilateral foot drop and subsequently pressure injury to left fifth MTH from PRAFO boot.     5 weeks postop--bilateral gastroc-soleus recessions, Bilateral posterior tibialis tendon transfer to the lateral cuneiform, Bilateral deep compartment fasciotomy,Bilateral flexor tendon release, toes 1 through 5, Left medial column release, Left irrigation and debridement of foot, multiple compartments including lateral intra-articular fifth MTPJ, Left fifth metatarsal bone biopsy with Dr. Roque on 2/1/2021.   -Incisions remain approximated.  Site of previous ulcer to left fifth MTH closed with primary closure remains resolved  -Unstageable left heel with stable eschar.  Remains stable.  - DTI to right dorsal midfoot with purple nonblanching skin that is intact.  Slightly improved from last week.    Wound care orders and offloading updated        Wound care:   Bilateral lower extremities:  Weave dry gauze in between toes bilaterally.    Left foot: Aquacel, heel Mepilex to left heel.  Change every 72 hours.  Mepilex to dorsal and lateral foot.  Change every 72 hours.  Right foot: Heel Mepilex to heel and heel Mepilex to dorsal midfoot.  Change every 72 hours  -Bilateral Tubigrip E to control edema    Labs/Imaging:  -COVID-19: not detected 1/29/2020    Vascular status:   -Palpable pedal pulses bilaterally    Surgery:   No return to OR at this time    Antibiotics:   -ID: ID involved, now signed off.    -No signs and symptoms of infection to feet  -Completed doxycycline on 2/15/2021  Bone culture of left fifth metatarsal 2/1/2021: Staph epidermidis, oxacillin resistant.  Wound culture from the left fifth toe ulcer 2/1/2021: corynebacterium species.        Weight Bearing Status:   -WBAT with boots  in place to BLE    Offloading:   Heel Mepilex to bilateral heels at all times.  PRAFO to bilateral foot at all times.    -Boots to be worn at all times for total of 6 weeks.  End date 3/15/2021  -Orthotic company: none     PT/OT :   Involved. Last seen by OT 3/9/2021       DISCHARGE PLAN:    Disposition: Guardianship hearing done 2/5/2021, guardianship granted to Deonte Henderson.   Pending group home placement versus long-term care    Discussed with: pt,  RN, Denise Marshall Wound Care RN, Paola Lizama         Please note that this dictation was created using voice recognition software. I have  worked with technical experts from [facility] to optimize the interface.  I have made every reasonable attempt to correct obvious errors, but there may be errors of grammar and possibly content that I did not discover before finalizing the note.    Viri Lynn, [email or web address].    If any questions or concerns, please contact [facility] through voalte.

## 2021-03-10 PROCEDURE — A9270 NON-COVERED ITEM OR SERVICE: HCPCS | Performed by: NURSE PRACTITIONER

## 2021-03-10 PROCEDURE — 770006 HCHG ROOM/CARE - MED/SURG/GYN SEMI*

## 2021-03-10 PROCEDURE — 700102 HCHG RX REV CODE 250 W/ 637 OVERRIDE(OP): Performed by: NURSE PRACTITIONER

## 2021-03-10 PROCEDURE — 700102 HCHG RX REV CODE 250 W/ 637 OVERRIDE(OP): Performed by: HOSPITALIST

## 2021-03-10 PROCEDURE — A9270 NON-COVERED ITEM OR SERVICE: HCPCS | Performed by: HOSPITALIST

## 2021-03-10 PROCEDURE — 700111 HCHG RX REV CODE 636 W/ 250 OVERRIDE (IP): Performed by: HOSPITALIST

## 2021-03-10 RX ADMIN — THERA TABS 1 TABLET: TAB at 04:26

## 2021-03-10 RX ADMIN — RISPERIDONE 2 MG: 2 TABLET ORAL at 17:23

## 2021-03-10 RX ADMIN — ASPIRIN 325 MG ORAL TABLET 325 MG: 325 PILL ORAL at 04:26

## 2021-03-10 RX ADMIN — ENOXAPARIN SODIUM 40 MG: 40 INJECTION SUBCUTANEOUS at 17:23

## 2021-03-10 RX ADMIN — DOCUSATE SODIUM 50 MG AND SENNOSIDES 8.6 MG 1 TABLET: 8.6; 5 TABLET, FILM COATED ORAL at 04:29

## 2021-03-10 RX ADMIN — RISPERIDONE 2 MG: 2 TABLET ORAL at 04:26

## 2021-03-10 RX ADMIN — TAMSULOSIN HYDROCHLORIDE 0.8 MG: 0.4 CAPSULE ORAL at 09:08

## 2021-03-10 NOTE — PROGRESS NOTES
2 RN Skin Check    2 RN skin check completed with MUSTAPHA Lloyd.     Devices in place: SCDs; condom bath; MOR foot drop boots  Skin assessed under devices: yes.  Confirmed pressure ulcers found on: left heel, right foot.  New potential pressure ulcers noted on none. Wound consult placed N/A. Wound team already following.  The following interventions in place Pillows, Mepilex, Barrier cream, Heel float boots and Waffle bed overlay.

## 2021-03-10 NOTE — DISCHARGE PLANNING
"Anticipated Discharge Disposition:GH    Action: Spoke to Lesley from [facility]. She is declining pt because \"doctor has not responded.\" She was hesitant from the beginning because, \"so many other GH have already declined.\"    Barriers to Discharge: Pending GH placement.    Plan: Request further assist from management team and colleagueLan.  "

## 2021-03-10 NOTE — THERAPY
Occupational Therapy  Daily Treatment     Patient Name: Yury Blake  Age:  50 y.o., Sex:  male  Medical Record #: [id number]  Today's Date: 3/9/2021     Precautions  Precautions: Fall Risk  Comments: transfer w/b only in boots    Assessment    Pt able to complete seated grooming/hygiene with setup and encouragement to participate. Pt needed modA for LB dressing but refused any further participation/mobility. Pt is limited by behavior and cognition and has achieved highest practical level of function in an acute care setting. Patient will not be actively followed for occupational therapy services at this time, however may be seen if requested by physician for 1 more visit within 30 days to address any discharge or equipment needs.     Plan    Discharge secondary to no likely benefit.    DC Equipment Recommendations: Unable to determine at this time  Discharge Recommendations: Other - Pt will need supportive environment that provides 24/7 spv/care and cognitive assistance.     Subjective    Pt alert but confused, not initially agreeable to participate. No c/o pain during session.      Objective       03/09/21 1550   Cognition    Cognition / Consciousness X   Speech/ Communication Delayed Responses   Level of Consciousness Confused   Ability To Follow Commands 1 Step   New Learning Impaired   Initiation Impaired   Comments confused, encouragement to participate, easily agitated, behavioral   Balance   Comments seated with SBA, refused standing attempt, no overt LOB while seated   Bed Mobility    Supine to Sit Moderate Assist   Comments HOB elevated, use of bed rails/HHA, assist with BLE   Activities of Daily Living   Grooming Supervision;Seated  (brushing teeth, combing hair)   Lower Body Dressing Moderate Assist  (socks)   Functional Mobility   Sit to Stand Refused   Toilet Transfers Refused   Mobility EOB only   Activity Tolerance   Comments limited by cognition, weakness   Patient / Family Goals   Patient /  Family Goal #1 Unable to state   Short Term Goals   Short Term Goal # 1 Pt will complete toilet transfer using BSC if needed with spv, no LOB by discharge.   Goal Outcome # 1 Goal not met   Short Term Goal # 2 Pt will complete seated grooming/hygiene with spv by discharge.   Goal Outcome # 2 Goal met   Short Term Goal # 3 Pt will complete toileting with spv by discharge.   Goal Outcome # 3 Goal not met   Short Term Goal # 4 Pt will complete LB dressing with spv by discharge.   Goal Outcome # 4 Goal not met

## 2021-03-10 NOTE — CARE PLAN
Problem: Bowel/Gastric:  Goal: Normal bowel function is maintained or improved  Outcome: PROGRESSING SLOWER THAN EXPECTED  Flowsheets (Taken 3/9/2021 1945)  Last BM: 03/06/21  Note: Given PRN senna per ordered bowel protocol.      Problem: Skin Integrity  Goal: Risk for impaired skin integrity will decrease  Outcome: PROGRESSING AS EXPECTED  Note: 2 RN skin check completed this shift with MUSTAPHA Lloyd. Prevalon turning system in place. Waffle cushion, mepilex to heels, padded foot drop boots in use. Condom cath; barrier paste.

## 2021-03-11 PROCEDURE — A9270 NON-COVERED ITEM OR SERVICE: HCPCS | Performed by: NURSE PRACTITIONER

## 2021-03-11 PROCEDURE — 700111 HCHG RX REV CODE 636 W/ 250 OVERRIDE (IP): Performed by: HOSPITALIST

## 2021-03-11 PROCEDURE — 700102 HCHG RX REV CODE 250 W/ 637 OVERRIDE(OP): Performed by: NURSE PRACTITIONER

## 2021-03-11 PROCEDURE — A9270 NON-COVERED ITEM OR SERVICE: HCPCS | Performed by: HOSPITALIST

## 2021-03-11 PROCEDURE — 99231 SBSQ HOSP IP/OBS SF/LOW 25: CPT | Performed by: NURSE PRACTITIONER

## 2021-03-11 PROCEDURE — 770006 HCHG ROOM/CARE - MED/SURG/GYN SEMI*

## 2021-03-11 PROCEDURE — 700102 HCHG RX REV CODE 250 W/ 637 OVERRIDE(OP): Performed by: HOSPITALIST

## 2021-03-11 RX ORDER — CALCIUM POLYCARBOPHIL 625 MG 625 MG/1
625 TABLET ORAL
Status: DISCONTINUED | OUTPATIENT
Start: 2021-03-12 | End: 2021-03-18 | Stop reason: HOSPADM

## 2021-03-11 RX ORDER — DOCUSATE SODIUM 100 MG/1
100 CAPSULE, LIQUID FILLED ORAL
Status: DISCONTINUED | OUTPATIENT
Start: 2021-03-11 | End: 2021-03-18 | Stop reason: HOSPADM

## 2021-03-11 RX ADMIN — THERA TABS 1 TABLET: TAB at 04:54

## 2021-03-11 RX ADMIN — RISPERIDONE 2 MG: 2 TABLET ORAL at 04:54

## 2021-03-11 RX ADMIN — ENOXAPARIN SODIUM 40 MG: 40 INJECTION SUBCUTANEOUS at 18:12

## 2021-03-11 RX ADMIN — RISPERIDONE 2 MG: 2 TABLET ORAL at 18:12

## 2021-03-11 RX ADMIN — DOCUSATE SODIUM 50 MG AND SENNOSIDES 8.6 MG 1 TABLET: 8.6; 5 TABLET, FILM COATED ORAL at 04:54

## 2021-03-11 RX ADMIN — ASPIRIN 325 MG ORAL TABLET 325 MG: 325 PILL ORAL at 04:54

## 2021-03-11 RX ADMIN — TAMSULOSIN HYDROCHLORIDE 0.8 MG: 0.4 CAPSULE ORAL at 09:05

## 2021-03-11 ASSESSMENT — ENCOUNTER SYMPTOMS: MYALGIAS: 1

## 2021-03-11 NOTE — PROGRESS NOTES
2 RN Skin Check    2 RN skin check completed with MUSTAPHA Lloyd.   Devices in place: BLE foot drop boots  Skin assessed under devices: yes.  Confirmed pressure ulcers found on: Left heel, Left fifth metatarsal, R dorsal mid foot.  New potential pressure ulcers noted on none.   Wound consult placed: no, wound team already following.  The following interventions in place: Q2 turns, pillows for support/positioning, mepilex, barrier cream, foot drop boots  Skin: sacrum red/blanching

## 2021-03-11 NOTE — CARE PLAN
Problem: Safety  Goal: Will remain free from falls  Outcome: PROGRESSING AS EXPECTED  Note: Telesitter in place, Educated on fall risk and ensured fall precautions in place. Bed in lowest position, treaded socks in place, call light in reach, bed alarm in place, room free of clutter.      Problem: Skin Integrity  Goal: Risk for impaired skin integrity will decrease  Outcome: PROGRESSING AS EXPECTED  Note: Q2 turns in place,2 RN skin check in place

## 2021-03-12 PROCEDURE — 770006 HCHG ROOM/CARE - MED/SURG/GYN SEMI*

## 2021-03-12 PROCEDURE — A9270 NON-COVERED ITEM OR SERVICE: HCPCS | Performed by: NURSE PRACTITIONER

## 2021-03-12 PROCEDURE — 700102 HCHG RX REV CODE 250 W/ 637 OVERRIDE(OP): Performed by: NURSE PRACTITIONER

## 2021-03-12 PROCEDURE — 700111 HCHG RX REV CODE 636 W/ 250 OVERRIDE (IP): Performed by: HOSPITALIST

## 2021-03-12 PROCEDURE — 700102 HCHG RX REV CODE 250 W/ 637 OVERRIDE(OP): Performed by: STUDENT IN AN ORGANIZED HEALTH CARE EDUCATION/TRAINING PROGRAM

## 2021-03-12 PROCEDURE — A9270 NON-COVERED ITEM OR SERVICE: HCPCS | Performed by: HOSPITALIST

## 2021-03-12 PROCEDURE — 700102 HCHG RX REV CODE 250 W/ 637 OVERRIDE(OP): Performed by: HOSPITALIST

## 2021-03-12 PROCEDURE — A9270 NON-COVERED ITEM OR SERVICE: HCPCS | Performed by: STUDENT IN AN ORGANIZED HEALTH CARE EDUCATION/TRAINING PROGRAM

## 2021-03-12 RX ADMIN — CALCIUM POLYCARBOPHIL 625 MG: 625 TABLET, FILM COATED ORAL at 08:43

## 2021-03-12 RX ADMIN — RISPERIDONE 2 MG: 2 TABLET ORAL at 04:02

## 2021-03-12 RX ADMIN — CALCIUM POLYCARBOPHIL 625 MG: 625 TABLET, FILM COATED ORAL at 13:22

## 2021-03-12 RX ADMIN — CALCIUM POLYCARBOPHIL 625 MG: 625 TABLET, FILM COATED ORAL at 17:27

## 2021-03-12 RX ADMIN — RISPERIDONE 2 MG: 2 TABLET ORAL at 17:27

## 2021-03-12 RX ADMIN — TAMSULOSIN HYDROCHLORIDE 0.8 MG: 0.4 CAPSULE ORAL at 08:43

## 2021-03-12 RX ADMIN — DOCUSATE SODIUM 100 MG: 100 CAPSULE, LIQUID FILLED ORAL at 04:02

## 2021-03-12 RX ADMIN — ASPIRIN 325 MG ORAL TABLET 325 MG: 325 PILL ORAL at 04:02

## 2021-03-12 RX ADMIN — ENOXAPARIN SODIUM 40 MG: 40 INJECTION SUBCUTANEOUS at 17:27

## 2021-03-12 RX ADMIN — DOCUSATE SODIUM 50 MG AND SENNOSIDES 8.6 MG 1 TABLET: 8.6; 5 TABLET, FILM COATED ORAL at 04:02

## 2021-03-12 NOTE — PROGRESS NOTES
[facility] Medicine Twice Weekly Progress Note    Date of Service  3/11/2021    Chief Complaint  Altered mental status    Hospital Course  Mr. Blake is a 50-year-old male who was brought to the emergency department on 9/9/2020 with altered mental status after he was found by his ex-wife to be obtunded after she had checked on him when he had not been seen for at least 2 days.  At that time he was alert and oriented x0 and found to be hypotensive.  GCS in the ED was 7 and he was severely hypotensive.  He was intubated for airway protection and central venous access was obtained for administration of high-volume crystalloid resuscitation and vasopressor therapy.  He had a significant leukocytosis with a WBC of 22.9 and was hypokalemic with a potassium level of 2.7.  Additionally he had acute kidney injury with a creatinine of 2.73.  His INR was 8.93 and he also had a lactate level of 11.  Urinalysis was performed and was negative for urinary tract infection.  He was diagnosed with septic shock and admitted to the intensive care unit for further evaluation and treatment.  A lumbar puncture was done on 9/9/2020.  He was also noted to have seizure-like activity on 9/10/2020 so an EEG was obtained and was significant for a moderate encephalopathy.  Extubation occurred on 9/11/2020.  He also had a traumatic catheter removal so on 9/21/2020 urology was consulted but was ultimately unnecessary.  On 9/13/2020 he was transferred out of the ICU where he required restraints due to confusion, hallucination, agitation, and impulsivity.  An MRI was performed and was negative for acute pathologies.  He was also treated for a UTI starting 10/2/2020.  He has also fallen multiple times in hospital.  Additionally, on 11/11/2020 he had an acute episode of sinus tachycardia in the 140s accompanied by hypotension.  There was concern for STEMI so cardiology was consulted.  A 12-lead EKG was obtained and was concerning for atrial  flutter.  The patient received metoprolol after which a repeat ECG showed sinus tachycardia.  A D-dimer was obtained and was elevated for which a CT chest was done that was negative for pulmonary embolism.  Cardiology subsequently signed off.  He has remained in the hospital for a prolonged period of time and is now pending guardianship and then will need placement.  Guardianship hearing took place 2/5/2021.   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 Ortho was consulted and he does require I&D. Surgery with Dr. Roque completed 2/1/2021    Interval Problem Update  Lying in bed in no acute distress. Denies any physical needs or complaints. Oriented to self only which is his baseline.     Consultants/Specialty  Critical care  Psychiatry  Cardiology  Urology  Infectious disease    Code Status  Full Code    Disposition  Patient will require 24/7 supervision upon discharge- guardianship granted to friend who is looking at group homes    Review of Systems  Review of Systems   Unable to perform ROS: Medical condition   Musculoskeletal: Positive for myalgias (chronic ).      Physical Exam  Temp:  [36.1 °C (96.9 °F)-36.7 °C (98 °F)] 36.7 °C (98 °F)  Pulse:  [60-80] 69  Resp:  [15-18] 15  BP: ()/(57-71) 106/63  SpO2:  [92 %-96 %] 95 %    Physical Exam  Vitals and nursing note reviewed.   Constitutional:       General: He is awake.   HENT:      Head: Normocephalic and atraumatic.      Right Ear: External ear normal.      Left Ear: External ear normal.      Nose: Nose normal.      Mouth/Throat:      Mouth: Mucous membranes are moist.   Eyes:      Conjunctiva/sclera: Conjunctivae normal.      Pupils: Pupils are equal, round, and reactive to light.   Cardiovascular:      Rate and Rhythm: Normal rate.   Pulmonary:      Effort: Pulmonary effort is normal. No respiratory distress.   Abdominal:      General: There is no distension.      Palpations: Abdomen is soft.       Tenderness: There is no abdominal tenderness. There is no guarding.   Musculoskeletal:      Cervical back: Neck supple.        Feet:    Feet:      Right foot:      Skin integrity: Blister present.      Left foot:      Skin integrity: Ulcer present.   Skin:     General: Skin is warm and dry.   Neurological:      Mental Status: He is alert. He is disoriented.   Psychiatric:         Mood and Affect: Affect is blunt and flat.         Speech: Speech is delayed.         Behavior: Behavior is slowed.         Cognition and Memory: Cognition is impaired. Memory is impaired.       Fluids    Intake/Output Summary (Last 24 hours) at 3/11/2021 1642  Last data filed at 3/11/2021 1330  Gross per 24 hour   Intake 600 ml   Output 1500 ml   Net -900 ml     Laboratory    Imaging  None recent    Assessment/Plan  * Toxic encephalopathy- (present on admission)  Assessment & Plan  -likely secondary to etoh   -LP, MRI brain, EEG negative. TSH normal. HIV/RPR negative.  -Continue risperdal.  -Remains free from restraints  -Has fallen multiple times while in hospital. TeleSitter now at bedside.     Pressure ulcer of right foot  Assessment & Plan  -Wound care per LPS.   -Offloading boot     Open wound of left foot  Assessment & Plan  -Lateral aspect, worsening, red, nontender  -HgA1c 1/21: 5.2%  -No leukocytosis  -Wound care following  -1/21 LPS consulted  -1/24: Wound culture growing Staph aureus. Continue Augmentin. Bactrim discontinued.  -2/1: I &D surgery of foot with Dr. Roque    -2/4: Bone biopsy cultures positive for S epidermitis and Corynebacterium. ID consulted  -2/5: ID recommended 10 days of doxycycline, now completed  -3/10: LPS is following, off abx . Afebrile     Normocytic anemia- (present on admission)  Assessment & Plan  -chronic and stable  - no active bleeding.  -Check periodically.    Alcohol abuse- (present on admission)  Assessment & Plan  -Abstinent since admission  -off all vitamins     VTE prophylaxis:  TRACE La.

## 2021-03-12 NOTE — PROGRESS NOTES
2 RN Skin Check    2 RN skin check completed with MUSTAPHA Brenner.   Devices in place: BLE foot drop boots, condom catheter.  Skin assessed under devices: yes.  Confirmed pressure ulcers found on: Left heel, left fifth metatarsal, and right dorsal midfoot.  New potential pressure ulcers noted on: none.  Wound consult placed: no, wound team already following.  The following interventions in place Q2 turns, Q72 dressing changes to BLE with mepilex, pillows for support and positioning, barrier cream, and foot drop boots.    Skin: sacrum intact, red, and blanching.

## 2021-03-12 NOTE — DISCHARGE PLANNING
Anticipated Discharge Disposition:   Group Home    Action:   Chart review done. PC to Noemi DIEZ, who said that pt is ineligible for Medicaid . He is self pay.     CM called guardian Deonte Xie. He said that the  in charge of pt's mother's estate will pay for the GH placement. Apparently, pt's mother  last year and left pt $700,000 but still in probate.     Email sent to Stuart Campbell for GH placement. ([facility])  CM talked to Chad at [facility], emailed a referral to him . Chad said he will possibly be here today so CM asked him to contact Lara LARA.     Barriers to Discharge:   Pending GH placement.     Plan:   Follow up with Stuart Campbell and with Chad at [facility].

## 2021-03-12 NOTE — DISCHARGE PLANNING
Anticipated Discharge Disposition: Group Home    Action: Chad with [facility] came to assess pt at bedside. Chad states pt seems appropriate for his GH. He will follow up on Monday with a final decision if they will be able to accept pt.     Barriers to Discharge: Pending GH placement     Plan: Care coordination will continue to follow and assist with discharge planning

## 2021-03-12 NOTE — CARE PLAN
Problem: Skin Integrity  Goal: Risk for impaired skin integrity will decrease  Outcome: PROGRESSING AS EXPECTED  Note: Q2 turns in place, sacrum red but blanching, 2 RN skin check in place.     Problem: Bowel/Gastric:  Goal: Normal bowel function is maintained or improved  Outcome: PROGRESSING SLOWER THAN EXPECTED  Flowsheets (Taken 3/11/2021 2047)  Last BM: 03/06/21  Note: Patient has not had a bowel movement since 3/6/21 and is hypoactive. Notified Dr. Gallegos at 2039 about patient's situation and patient has been ordered a bowel protocol.

## 2021-03-13 PROCEDURE — 700102 HCHG RX REV CODE 250 W/ 637 OVERRIDE(OP): Performed by: STUDENT IN AN ORGANIZED HEALTH CARE EDUCATION/TRAINING PROGRAM

## 2021-03-13 PROCEDURE — 700102 HCHG RX REV CODE 250 W/ 637 OVERRIDE(OP): Performed by: NURSE PRACTITIONER

## 2021-03-13 PROCEDURE — A9270 NON-COVERED ITEM OR SERVICE: HCPCS | Performed by: STUDENT IN AN ORGANIZED HEALTH CARE EDUCATION/TRAINING PROGRAM

## 2021-03-13 PROCEDURE — A9270 NON-COVERED ITEM OR SERVICE: HCPCS | Performed by: NURSE PRACTITIONER

## 2021-03-13 PROCEDURE — 700111 HCHG RX REV CODE 636 W/ 250 OVERRIDE (IP): Performed by: HOSPITALIST

## 2021-03-13 PROCEDURE — 770006 HCHG ROOM/CARE - MED/SURG/GYN SEMI*

## 2021-03-13 PROCEDURE — 700102 HCHG RX REV CODE 250 W/ 637 OVERRIDE(OP): Performed by: HOSPITALIST

## 2021-03-13 PROCEDURE — A9270 NON-COVERED ITEM OR SERVICE: HCPCS | Performed by: HOSPITALIST

## 2021-03-13 RX ADMIN — DOCUSATE SODIUM 50 MG AND SENNOSIDES 8.6 MG 1 TABLET: 8.6; 5 TABLET, FILM COATED ORAL at 06:46

## 2021-03-13 RX ADMIN — RISPERIDONE 2 MG: 2 TABLET ORAL at 06:46

## 2021-03-13 RX ADMIN — MAGNESIUM HYDROXIDE 30 ML: 400 SUSPENSION ORAL at 06:46

## 2021-03-13 RX ADMIN — RISPERIDONE 2 MG: 2 TABLET ORAL at 17:38

## 2021-03-13 RX ADMIN — CALCIUM POLYCARBOPHIL 625 MG: 625 TABLET, FILM COATED ORAL at 17:38

## 2021-03-13 RX ADMIN — ENOXAPARIN SODIUM 40 MG: 40 INJECTION SUBCUTANEOUS at 17:38

## 2021-03-13 RX ADMIN — TAMSULOSIN HYDROCHLORIDE 0.8 MG: 0.4 CAPSULE ORAL at 07:57

## 2021-03-13 RX ADMIN — CALCIUM POLYCARBOPHIL 625 MG: 625 TABLET, FILM COATED ORAL at 07:57

## 2021-03-13 RX ADMIN — ASPIRIN 325 MG ORAL TABLET 325 MG: 325 PILL ORAL at 06:46

## 2021-03-13 RX ADMIN — CALCIUM POLYCARBOPHIL 625 MG: 625 TABLET, FILM COATED ORAL at 12:37

## 2021-03-13 NOTE — PROGRESS NOTES
2 RN skin check complete with Arely LUCIANO.   Devices in place : Yes, Foot drop boots to BL feet.  Skin assessed under devices : yes.  Confirmed pressure ulcers found on : No.  New potential pressure ulcers noted on : No. Wound consult placed :n/a.  Dressings to bilateral feet in place. Due to be changed 3/15/2021. Skin assessed under the foot drop boots. No signs of breakdown noted.     The following interventions in place : Q2 hour turns, barrier cream, blue disposable pads, condom catheter, moisturizer, extra pillows for comfort.

## 2021-03-13 NOTE — CARE PLAN
Problem: Safety  Goal: Will remain free from falls  Outcome: PROGRESSING AS EXPECTED  Note: Safety precautions in place. Call light within reach. Bed is low and in locked position. Hourly rounding in place.  Bed alarm in use.     Problem: Skin Integrity  Goal: Risk for impaired skin integrity will decrease  Outcome: PROGRESSING AS EXPECTED  Note: [id number]  Barrier cream  Extra pillows
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
## 2021-03-14 PROCEDURE — 700102 HCHG RX REV CODE 250 W/ 637 OVERRIDE(OP): Performed by: HOSPITALIST

## 2021-03-14 PROCEDURE — A9270 NON-COVERED ITEM OR SERVICE: HCPCS | Performed by: HOSPITALIST

## 2021-03-14 PROCEDURE — 700102 HCHG RX REV CODE 250 W/ 637 OVERRIDE(OP): Performed by: STUDENT IN AN ORGANIZED HEALTH CARE EDUCATION/TRAINING PROGRAM

## 2021-03-14 PROCEDURE — 770006 HCHG ROOM/CARE - MED/SURG/GYN SEMI*

## 2021-03-14 PROCEDURE — 700102 HCHG RX REV CODE 250 W/ 637 OVERRIDE(OP): Performed by: NURSE PRACTITIONER

## 2021-03-14 PROCEDURE — 700111 HCHG RX REV CODE 636 W/ 250 OVERRIDE (IP): Performed by: HOSPITALIST

## 2021-03-14 PROCEDURE — 99231 SBSQ HOSP IP/OBS SF/LOW 25: CPT | Performed by: NURSE PRACTITIONER

## 2021-03-14 PROCEDURE — A9270 NON-COVERED ITEM OR SERVICE: HCPCS | Performed by: NURSE PRACTITIONER

## 2021-03-14 PROCEDURE — A9270 NON-COVERED ITEM OR SERVICE: HCPCS | Performed by: STUDENT IN AN ORGANIZED HEALTH CARE EDUCATION/TRAINING PROGRAM

## 2021-03-14 RX ADMIN — RISPERIDONE 2 MG: 2 TABLET ORAL at 17:26

## 2021-03-14 RX ADMIN — ASPIRIN 325 MG ORAL TABLET 325 MG: 325 PILL ORAL at 04:49

## 2021-03-14 RX ADMIN — CALCIUM POLYCARBOPHIL 625 MG: 625 TABLET, FILM COATED ORAL at 17:26

## 2021-03-14 RX ADMIN — ENOXAPARIN SODIUM 40 MG: 40 INJECTION SUBCUTANEOUS at 17:26

## 2021-03-14 RX ADMIN — RISPERIDONE 2 MG: 2 TABLET ORAL at 04:48

## 2021-03-14 RX ADMIN — TAMSULOSIN HYDROCHLORIDE 0.8 MG: 0.4 CAPSULE ORAL at 08:24

## 2021-03-14 RX ADMIN — CALCIUM POLYCARBOPHIL 625 MG: 625 TABLET, FILM COATED ORAL at 08:24

## 2021-03-14 RX ADMIN — CALCIUM POLYCARBOPHIL 625 MG: 625 TABLET, FILM COATED ORAL at 12:41

## 2021-03-14 NOTE — CARE PLAN
Problem: Safety  Goal: Will remain free from injury  Outcome: PROGRESSING AS EXPECTED  Note:   Safety precautions in place. Call light within reach. Bed is low and in locked position. Hourly rounding in place.  Bed alarm in use.     Problem: Pain Management  Goal: Pain level will decrease to patient's comfort goal  Outcome: PROGRESSING AS EXPECTED  Intervention: Educate and implement non-pharmacologic comfort measures. Examples: relaxation, distration, play therapy, activity therapy, massage, etc.  Flowsheets (Taken 3/13/2021 2046)  Intervention:   Repositioned   Rest   Relaxation Technique  Note: Pain assessment q 2 hours, medicated as needed, comfort measures provided.

## 2021-03-14 NOTE — PROGRESS NOTES
[facility] Medicine Twice Weekly Progress Note    Date of Service  3/14/2021    Chief Complaint  Altered mental status    Hospital Course  Mr. Blake is a 50-year-old male who was brought to the emergency department on 9/9/2020 with altered mental status after he was found by his ex-wife to be obtunded after she had checked on him when he had not been seen for at least 2 days.  At that time he was alert and oriented x0 and found to be hypotensive.  GCS in the ED was 7 and he was severely hypotensive.  He was intubated for airway protection and central venous access was obtained for administration of high-volume crystalloid resuscitation and vasopressor therapy.  He had a significant leukocytosis with a WBC of 22.9 and was hypokalemic with a potassium level of 2.7.  Additionally he had acute kidney injury with a creatinine of 2.73.  His INR was 8.93 and he also had a lactate level of 11.  Urinalysis was performed and was negative for urinary tract infection.  He was diagnosed with septic shock and admitted to the intensive care unit for further evaluation and treatment.  A lumbar puncture was done on 9/9/2020.  He was also noted to have seizure-like activity on 9/10/2020 so an EEG was obtained and was significant for a moderate encephalopathy.  Extubation occurred on 9/11/2020.  He also had a traumatic catheter removal so on 9/21/2020 urology was consulted but was ultimately unnecessary.  On 9/13/2020 he was transferred out of the ICU where he required restraints due to confusion, hallucination, agitation, and impulsivity.  An MRI was performed and was negative for acute pathologies.  He was also treated for a UTI starting 10/2/2020.  He has also fallen multiple times in hospital.  Additionally, on 11/11/2020 he had an acute episode of sinus tachycardia in the 140s accompanied by hypotension.  There was concern for STEMI so cardiology was consulted.  A 12-lead EKG was obtained and was concerning for atrial  "flutter.  The patient received metoprolol after which a repeat ECG showed sinus tachycardia.  A D-dimer was obtained and was elevated for which a CT chest was done that was negative for pulmonary embolism.  Cardiology subsequently signed off.  He has remained in the hospital for a prolonged period of time and is now pending guardianship and then will need placement.  Guardianship hearing took place 2/5/2021.   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 Ortho was consulted and he does require I&D. Surgery with Dr. Roque completed 2/1/2021    Interval Problem Update  Patient oriented to self only this morning. He [facility] and said he does keep track of the date. No significant changes in PE since prior assessment. Still requiring telesitter for safety. He denied any complaints of pain.     Consultants/Specialty  Critical care  Psychiatry  Cardiology  Urology  Infectious disease    Code Status  Full Code    Disposition  Patient will require 24/7 supervision upon discharge- guardianship granted to friend who is looking at group homes    Review of Systems  Review of Systems   Unable to perform ROS: Medical condition      Physical Exam  Temp:  [36.1 °C (97 °F)-36.4 °C (97.6 °F)] 36.1 °C (97 °F)  Pulse:  [60-73] 60  Resp:  [16-18] 18  BP: ()/(59-65) 106/64  SpO2:  [93 %-95 %] 95 %    Physical Exam  Vitals and nursing note reviewed.   Constitutional:       General: He is awake. He is not in acute distress.     Appearance: He is underweight. He is not toxic-appearing.   HENT:      Head: Normocephalic and atraumatic.      Right Ear: External ear normal.      Left Ear: External ear normal.      Nose: Nose normal.      Mouth/Throat:      Mouth: Mucous membranes are moist.   Eyes:      Conjunctiva/sclera: Conjunctivae normal.      Pupils: Pupils are equal, round, and reactive to light.   Cardiovascular:      Rate and Rhythm: " Normal rate and regular rhythm.   Pulmonary:      Effort: Pulmonary effort is normal. No respiratory distress.   Abdominal:      General: Bowel sounds are normal. There is no distension.      Palpations: Abdomen is soft.      Tenderness: There is no abdominal tenderness. There is no guarding.   Musculoskeletal:      Cervical back: Neck supple.        Feet:    Feet:      Right foot:      Skin integrity: Blister present.      Left foot:      Skin integrity: Ulcer present.   Skin:     General: Skin is warm and dry.   Neurological:      Mental Status: He is alert. Mental status is at baseline. He is disoriented.   Psychiatric:         Mood and Affect: Affect is blunt and flat.         Speech: Speech is delayed.         Behavior: Behavior is slowed.         Cognition and Memory: Cognition is impaired. Memory is impaired.       Fluids    Intake/Output Summary (Last 24 hours) at 3/14/2021 1336  Last data filed at 3/14/2021 0945  Gross per 24 hour   Intake 640 ml   Output 2650 ml   Net -2010 ml     Laboratory    Imaging  None recent    Assessment/Plan  * Toxic encephalopathy- (present on admission)  Assessment & Plan  -likely secondary to etoh   -LP, MRI brain, EEG negative. TSH normal. HIV/RPR negative.  -Continue risperdal.  -Remains free from restraints but still requiring telesitter for frequent falls   -oriented to self only at baseline    Fall  Assessment & Plan  -multiple falls due to disorientation  -continue telesitter  -fall precautions     Pressure ulcer of right foot  Assessment & Plan  -Wound care per LPS.   -Offloading boot     Open wound of left foot  Assessment & Plan  -Lateral aspect, worsening, red, nontender  -HgA1c 1/21: 5.2%  -No leukocytosis  -Wound care following  -1/21 LPS consulted  -1/24: Wound culture growing Staph aureus. Continue Augmentin. Bactrim discontinued.  -2/1: I &D surgery of foot with Dr. Roque    -2/4: Bone biopsy cultures positive for S epidermitis and Corynebacterium. ID  consulted  -2/5: ID recommended 10 days of doxycycline, now completed  -3/10: LPS is following, off abx . Afebrile     Normocytic anemia- (present on admission)  Assessment & Plan  -chronic and stable  - no active bleeding.  -Check periodically.    Alcohol abuse- (present on admission)  Assessment & Plan  -Abstinent since admission  -off all vitamins     VTE prophylaxis: Lovenox    ALONZO Hall have performed a physical exam and reviewed and updated ROS and Plan today (3/14/2021). In review of previous note, there are no changes except as documented above.

## 2021-03-14 NOTE — PROGRESS NOTES
2 RN skin check complete with MUSTAPHA Pandya.   Devices in place : Yes, Foot drop boots to BL feet.  Skin assessed under devices : yes.  Confirmed pressure ulcers found on : No.  New potential pressure ulcers noted on : No. Wound consult placed :n/a.  Dressings to bilateral feet in place. Due to be changed 3/15/2021. Skin assessed under the foot drop boots. No signs of breakdown noted.      The following interventions in place : Q2 hour turns, barrier cream, blue disposable pads, condom catheter, moisturizer, extra pillows for comfort

## 2021-03-14 NOTE — PROGRESS NOTES
Mobility Progress Note    Surgery patient: No  Date of surgery: N/a  Ambulated 50 ft on day of surgery? (N/A if patient did not undergo surgery today): N/a  Number of times ambulated 50 feet or greater today: 0  Patient has been up to chair, edge of bed or HOB 90 degrees for all meals?: No  Goal met? (goal is ambulating at least 50 feet 2 times on day shift, one time on night shift): No  If patient did not meet mobility goal, why?: pt very lethargic and unable to ambulate

## 2021-03-15 PROCEDURE — 700102 HCHG RX REV CODE 250 W/ 637 OVERRIDE(OP): Performed by: NURSE PRACTITIONER

## 2021-03-15 PROCEDURE — A9270 NON-COVERED ITEM OR SERVICE: HCPCS | Performed by: NURSE PRACTITIONER

## 2021-03-15 PROCEDURE — 700111 HCHG RX REV CODE 636 W/ 250 OVERRIDE (IP): Performed by: HOSPITALIST

## 2021-03-15 PROCEDURE — 36415 COLL VENOUS BLD VENIPUNCTURE: CPT

## 2021-03-15 PROCEDURE — 770006 HCHG ROOM/CARE - MED/SURG/GYN SEMI*

## 2021-03-15 PROCEDURE — 700102 HCHG RX REV CODE 250 W/ 637 OVERRIDE(OP): Performed by: HOSPITALIST

## 2021-03-15 PROCEDURE — 700102 HCHG RX REV CODE 250 W/ 637 OVERRIDE(OP): Performed by: STUDENT IN AN ORGANIZED HEALTH CARE EDUCATION/TRAINING PROGRAM

## 2021-03-15 PROCEDURE — A9270 NON-COVERED ITEM OR SERVICE: HCPCS | Performed by: STUDENT IN AN ORGANIZED HEALTH CARE EDUCATION/TRAINING PROGRAM

## 2021-03-15 PROCEDURE — A9270 NON-COVERED ITEM OR SERVICE: HCPCS | Performed by: HOSPITALIST

## 2021-03-15 PROCEDURE — 86480 TB TEST CELL IMMUN MEASURE: CPT

## 2021-03-15 RX ADMIN — RISPERIDONE 2 MG: 2 TABLET ORAL at 04:52

## 2021-03-15 RX ADMIN — RISPERIDONE 2 MG: 2 TABLET ORAL at 17:30

## 2021-03-15 RX ADMIN — CALCIUM POLYCARBOPHIL 625 MG: 625 TABLET, FILM COATED ORAL at 08:57

## 2021-03-15 RX ADMIN — CALCIUM POLYCARBOPHIL 625 MG: 625 TABLET, FILM COATED ORAL at 13:23

## 2021-03-15 RX ADMIN — ENOXAPARIN SODIUM 40 MG: 40 INJECTION SUBCUTANEOUS at 17:30

## 2021-03-15 RX ADMIN — ASPIRIN 325 MG ORAL TABLET 325 MG: 325 PILL ORAL at 04:52

## 2021-03-15 RX ADMIN — TAMSULOSIN HYDROCHLORIDE 0.8 MG: 0.4 CAPSULE ORAL at 08:57

## 2021-03-15 RX ADMIN — CALCIUM POLYCARBOPHIL 625 MG: 625 TABLET, FILM COATED ORAL at 17:30

## 2021-03-15 NOTE — CARE PLAN
Problem: Safety  Goal: Will remain free from injury  Note: Bed alarm on, desk bed     Problem: Discharge Barriers/Planning  Goal: Patient's continuum of care needs will be met  Note:  coordination

## 2021-03-15 NOTE — PROGRESS NOTES
Received in bed awake, aaox1, incontinent of urine, cleaned and condom cath placed, denies any discomfort, skin checked, foot drop boots on, needs attended.

## 2021-03-15 NOTE — PROGRESS NOTES
2 RN Skin Check    2 RN skin check completed with MUSTAPHA Suazo.   Devices in place: Foot drop boots BLE.  Skin assessed under devices: Yes.  Confirmed pressure ulcers found on: N/A.  New potential pressure ulcers noted on N/A.   Wound consult placed N/A.  The following interventions in place: Q2H turns, condom cath as tolerated, pillows for positioning.    Skin: Dressings to bilateral feet. Skin assessed under foot drop boots. Sacrum blanching.

## 2021-03-15 NOTE — DISCHARGE PLANNING
Anticipated Discharge Disposition: [facility]     Action: Spoke to Chad at [facility]. He WILL accept.Need Covid test and Quant Gold. APRN will order labs.    Barriers to Discharge: Pending lab results.    Plan: F/U on labs and coordinate transfer with  .

## 2021-03-15 NOTE — CARE PLAN
Problem: Safety  Goal: Will remain free from injury  Outcome: PROGRESSING AS EXPECTED  Telesitter at bedside. Bed in lowest and locked position. Frequent rounding on pt. Call light in reach. Pt educated to call for assistance.      Problem: Psychosocial Needs:  Goal: Level of anxiety will decrease  Outcome: PROGRESSING AS EXPECTED  Pt reoriented as needed.      Problem: Mobility  Goal: Risk for activity intolerance will decrease  Outcome: PROGRESSING AS EXPECTED  Mobility Progress Note    Surgery patient: Yes.  Date of surgery: 2/1/2021  Ambulated 50 ft on day of surgery? (N/A if patient did not undergo surgery today): N/A  Number of times ambulated 50 feet or greater today: Zero.  Patient has been up to chair, edge of bed or HOB 90 degrees for all meals?: Unknown.  Goal met? (goal is ambulating at least 50 feet 2 times on day shift, one time on night shift): No.  If patient did not meet mobility goal, why?: Pt unsteady to mobilize.

## 2021-03-16 PROCEDURE — 700102 HCHG RX REV CODE 250 W/ 637 OVERRIDE(OP): Performed by: NURSE PRACTITIONER

## 2021-03-16 PROCEDURE — A9270 NON-COVERED ITEM OR SERVICE: HCPCS | Performed by: NURSE PRACTITIONER

## 2021-03-16 PROCEDURE — 700111 HCHG RX REV CODE 636 W/ 250 OVERRIDE (IP): Performed by: HOSPITALIST

## 2021-03-16 PROCEDURE — A9270 NON-COVERED ITEM OR SERVICE: HCPCS | Performed by: STUDENT IN AN ORGANIZED HEALTH CARE EDUCATION/TRAINING PROGRAM

## 2021-03-16 PROCEDURE — 700102 HCHG RX REV CODE 250 W/ 637 OVERRIDE(OP): Performed by: STUDENT IN AN ORGANIZED HEALTH CARE EDUCATION/TRAINING PROGRAM

## 2021-03-16 PROCEDURE — A9270 NON-COVERED ITEM OR SERVICE: HCPCS | Performed by: HOSPITALIST

## 2021-03-16 PROCEDURE — 700102 HCHG RX REV CODE 250 W/ 637 OVERRIDE(OP): Performed by: HOSPITALIST

## 2021-03-16 PROCEDURE — U0005 INFEC AGEN DETEC AMPLI PROBE: HCPCS

## 2021-03-16 PROCEDURE — U0003 INFECTIOUS AGENT DETECTION BY NUCLEIC ACID (DNA OR RNA); SEVERE ACUTE RESPIRATORY SYNDROME CORONAVIRUS 2 (SARS-COV-2) (CORONAVIRUS DISEASE [COVID-19]), AMPLIFIED PROBE TECHNIQUE, MAKING USE OF HIGH THROUGHPUT TECHNOLOGIES AS DESCRIBED BY CMS-2020-01-R: HCPCS

## 2021-03-16 PROCEDURE — 770006 HCHG ROOM/CARE - MED/SURG/GYN SEMI*

## 2021-03-16 RX ADMIN — DOCUSATE SODIUM 50 MG AND SENNOSIDES 8.6 MG 1 TABLET: 8.6; 5 TABLET, FILM COATED ORAL at 08:30

## 2021-03-16 RX ADMIN — RISPERIDONE 2 MG: 2 TABLET ORAL at 04:42

## 2021-03-16 RX ADMIN — CALCIUM POLYCARBOPHIL 625 MG: 625 TABLET, FILM COATED ORAL at 08:30

## 2021-03-16 RX ADMIN — ACETAMINOPHEN 650 MG: 325 TABLET, FILM COATED ORAL at 11:28

## 2021-03-16 RX ADMIN — ASPIRIN 325 MG ORAL TABLET 325 MG: 325 PILL ORAL at 04:42

## 2021-03-16 RX ADMIN — CALCIUM POLYCARBOPHIL 625 MG: 625 TABLET, FILM COATED ORAL at 11:28

## 2021-03-16 RX ADMIN — TAMSULOSIN HYDROCHLORIDE 0.8 MG: 0.4 CAPSULE ORAL at 08:30

## 2021-03-16 RX ADMIN — ENOXAPARIN SODIUM 40 MG: 40 INJECTION SUBCUTANEOUS at 16:37

## 2021-03-16 RX ADMIN — RISPERIDONE 2 MG: 2 TABLET ORAL at 16:37

## 2021-03-16 RX ADMIN — CALCIUM POLYCARBOPHIL 625 MG: 625 TABLET, FILM COATED ORAL at 16:37

## 2021-03-16 NOTE — PROGRESS NOTES
2 RN skin check complete Carito F  Devices in place condom cath, foot drop boot on andrew feet..  Skin assessed under devices yes, skin intact.  Confirmed pressure ulcers found on NA.  New potential pressure ulcers noted on NA. Wound consult placed NA.  The following interventions in place: q2h turns, 2 RN skin check q shift, barrier paste and barrier wipes being used, extra pillows for floating heels and pillows for repositioning, dressing changes as ordered, checking skin under devices.

## 2021-03-16 NOTE — DISCHARGE PLANNING
"Anticipated Discharge Disposition: Group home.    Action: Spoke to [facility] .  He states guardian is \"working with  to secure funding.\"  Anticipate transfer Wednesday or Thursday. Awaiting Quant Gold results and covid results. The pharmacy [facility] uses is [facility]. Chad has w/c and hospital bed for pt in group home. They are unable to provide transport.     Barriers to Discharge: Pending funding , lab results.    Plan: F/U for labs and confirmation that finances secure.   "

## 2021-03-16 NOTE — PROGRESS NOTES
Mobility Progress Note    Surgery patient: no  Date of surgery: n/a  Ambulated 50 ft on day of surgery? (N/A if patient did not undergo surgery today): n/a  Number of times ambulated 50 feet or greater today: 0  Patient has been up to chair, edge of bed or HOB 90 degrees for all meals?: HOB at 90 degrees, refused to get up to chair  Goal met? (goal is ambulating at least 50 feet 2 times on day shift, one time on night shift): no  If patient did not meet mobility goal, why?: unable to ambulate, refused to pivot to chair for meals.

## 2021-03-16 NOTE — CARE PLAN
Problem: Safety  Goal: Will remain free from injury  Outcome: PROGRESSING AS EXPECTED   Fall Precautions in place: Non-skid socks on, bed locked and in lowest position, 3 bed rails up, DME in bathroom, call light within reach, Telesitter in place, bed alarm on.      Problem: Skin Integrity  Goal: Risk for impaired skin integrity will decrease  Outcome: PROGRESSING AS EXPECTED  interventions implemented: q2h turns, 2 RN skin check, barrier paste being used, extra pillows for floating heels and pillows for repositioning.

## 2021-03-17 LAB
GAMMA INTERFERON BACKGROUND BLD IA-ACNC: 0.03 IU/ML
M TB IFN-G BLD-IMP: NEGATIVE
M TB IFN-G CD4+ BCKGRND COR BLD-ACNC: 0 IU/ML
MITOGEN IGNF BCKGRD COR BLD-ACNC: >10 IU/ML
QFT TB2 - NIL TBQ2: 0.01 IU/ML
SARS-COV-2 RNA RESP QL NAA+PROBE: NOTDETECTED
SPECIMEN SOURCE: NORMAL

## 2021-03-17 PROCEDURE — 700111 HCHG RX REV CODE 636 W/ 250 OVERRIDE (IP): Performed by: HOSPITALIST

## 2021-03-17 PROCEDURE — A9270 NON-COVERED ITEM OR SERVICE: HCPCS | Performed by: NURSE PRACTITIONER

## 2021-03-17 PROCEDURE — 700102 HCHG RX REV CODE 250 W/ 637 OVERRIDE(OP): Performed by: NURSE PRACTITIONER

## 2021-03-17 PROCEDURE — 99232 SBSQ HOSP IP/OBS MODERATE 35: CPT | Mod: 25 | Performed by: NURSE PRACTITIONER

## 2021-03-17 PROCEDURE — A9270 NON-COVERED ITEM OR SERVICE: HCPCS | Performed by: HOSPITALIST

## 2021-03-17 PROCEDURE — 700102 HCHG RX REV CODE 250 W/ 637 OVERRIDE(OP): Performed by: STUDENT IN AN ORGANIZED HEALTH CARE EDUCATION/TRAINING PROGRAM

## 2021-03-17 PROCEDURE — 11721 DEBRIDE NAIL 6 OR MORE: CPT | Performed by: NURSE PRACTITIONER

## 2021-03-17 PROCEDURE — 700102 HCHG RX REV CODE 250 W/ 637 OVERRIDE(OP): Performed by: HOSPITALIST

## 2021-03-17 PROCEDURE — A9270 NON-COVERED ITEM OR SERVICE: HCPCS | Performed by: STUDENT IN AN ORGANIZED HEALTH CARE EDUCATION/TRAINING PROGRAM

## 2021-03-17 PROCEDURE — 770006 HCHG ROOM/CARE - MED/SURG/GYN SEMI*

## 2021-03-17 RX ADMIN — CALCIUM POLYCARBOPHIL 625 MG: 625 TABLET, FILM COATED ORAL at 08:40

## 2021-03-17 RX ADMIN — ENOXAPARIN SODIUM 40 MG: 40 INJECTION SUBCUTANEOUS at 16:52

## 2021-03-17 RX ADMIN — CALCIUM POLYCARBOPHIL 625 MG: 625 TABLET, FILM COATED ORAL at 13:15

## 2021-03-17 RX ADMIN — RISPERIDONE 2 MG: 2 TABLET ORAL at 16:51

## 2021-03-17 RX ADMIN — TAMSULOSIN HYDROCHLORIDE 0.8 MG: 0.4 CAPSULE ORAL at 08:40

## 2021-03-17 RX ADMIN — CALCIUM POLYCARBOPHIL 625 MG: 625 TABLET, FILM COATED ORAL at 16:52

## 2021-03-17 RX ADMIN — RISPERIDONE 2 MG: 2 TABLET ORAL at 05:26

## 2021-03-17 RX ADMIN — ASPIRIN 325 MG ORAL TABLET 325 MG: 325 PILL ORAL at 05:26

## 2021-03-17 NOTE — DISCHARGE PLANNING
Anticipated Discharge Disposition:     Action: Planning for transfer to [facility] tomorrow. (Quant Gold results still pending.) [facility] has provided written simple wound care instructions for group home and RX for custom inserts, shoes, and AFOs. (These will need to be purchased by guardian.).Called Chad   to confirm plan for discharge tomorrow. Left voicemail message requesting return call.    Barriers to Discharge: Pending confirmation from .    Plan: F/U in am with  .

## 2021-03-17 NOTE — DISCHARGE PLANNING
Received Choice form at [phone]  Agency/Facility Name: [facility]   Referral sent per Choice form @ [phone]

## 2021-03-17 NOTE — CARE PLAN
Problem: Communication  Goal: The ability to communicate needs accurately and effectively will improve  Outcome: PROGRESSING AS EXPECTED  Note: Encouraged call light use; was able to answer questions and attain needed items.      Problem: Venous Thromboembolism (VTW)/Deep Vein Thrombosis (DVT) Prevention:  Goal: Patient will participate in Venous Thrombosis (VTE)/Deep Vein Thrombosis (DVT)Prevention Measures  Outcome: PROGRESSING AS EXPECTED  Flowsheets  Taken 3/16/2021 1500 by Carina Mandel R.N.  Mechanical Prophylaxis: HAY Hose (Graduated Compression Stockings)  HAY Hose (Graduated Compression Stockings): On  Pharmacologic Prophylaxis Used: LMWH: Enoxaparin(Lovenox)  Taken 3/15/2021 1945 by Jesus Lopez RSANDRA  Risk Assessment Score: 2  VTE RISK: Moderate  Taken 3/15/2021 0800 by Ernesto Delos Reyes V, RJose AlbertoNJose Alberto  AV Foot Pumps: On  SCDs, Sequential Compression Device: Off  Note: Lovenox administered; no signs or symptoms of clot formation at this time.

## 2021-03-17 NOTE — PROGRESS NOTES
2 RN Skin Check    2 RN skin check complete.   Devices in place: N/A.  Skin assessed under devices: yes.  Confirmed pressure ulcers found on: left heel.  New potential pressure ulcers noted on n/a. Wound consult placed No.  The following interventions in place Pillows and Mepilex.

## 2021-03-17 NOTE — WOUND TEAM
[facility]  Inpatient Services   Wound and Skin Care Progress    Admission Date: 9/9/2020     Last order of IP CONSULT TO WOUND CARE was found on 2/22/2021 from Hospital Encounter on 9/9/2020     HPI, PMH, SH: Reviewed    Past Surgical History:   Procedure Laterality Date   • TENDON LENGHTENING Bilateral 2/1/2021    Procedure: LENGTHENING, TENDON- FOR FOOT DROP RECONSTRUCTION, POSTERIOR TIBIALIS TENDON  TRANSFER , ACHILLES LENGHTENING , LEFT MEDIAL RELEASE;  Surgeon: Donny Roque M.D.;  Location: SURGERY [facility];  Service: Orthopedics   • IRRIGATION & DEBRIDEMENT ORTHO Left 2/1/2021    Procedure: IRRIGATION AND DEBRIDEMENT, WOUND-FOOT;  Surgeon: Donny Roque M.D.;  Location: SURGERY [facility];  Service: Orthopedics   • ANKLE ORIF Right 9/26/2017    Procedure: ANKLE ORIF SYNDESOMOSIS REPAIR;  Surgeon: Armando Woodard M.D.;  Location: SURGERY [facility];  Service: Orthopedics     Social History     Tobacco Use   • Smoking status: Never Smoker   • Smokeless tobacco: Current User     Types: Chew   Substance Use Topics   • Alcohol use: Yes     Comment: 6-10 beers daily     Chief Complaint   Patient presents with   • Altered Mental Status   • Hypotension     Diagnosis: Acute respiratory failure with hypoxia (HCC)  Acute respiratory failure with hypoxia (HCC)  Acute respiratory failure with hypoxia (HCC)    Unit where seen by Wound Team: T335/01     WOUND CONSULT/FOLLOW UP RELATED TO:  Left heel      WOUND HISTORY:  Discoloration noted over left posterior heel by LPS APRN on 2/22.     WOUND ASSESSMENT/LDAVascular:     Wound 02/23/21 Pressure Injury Heel Posterior Left 2/23 DTI; 3/2 unstageable (Active)   Wound Image   03/17/21 1130   Site Assessment Black;[place];Eschar 03/17/21 1130   Periwound Assessment Pink;Dry;Clean;Intact 03/17/21 1130   Margins Defined edges 03/17/21 1130   Closure Secondary intention 03/17/21 1130   Drainage Amount Scant 03/17/21 1130   Drainage Description  Serosanguineous 03/17/21 1130   Treatments Cleansed;Site care;Offloading 03/17/21 1130   Wound Cleansing Soap and Water 03/17/21 1130   Periwound Protectant Not Applicable 03/17/21 1130   Dressing Cleansing/Solutions Not Applicable 03/17/21 1130   Dressing Options Hydrofiber Silver;Mepilex;Tubigrip 03/17/21 1130   Dressing Changed Changed 03/17/21 1130   Dressing Status Clean;Dry;Intact 03/17/21 1130   Dressing Change/Treatment Frequency Every 72 hrs, and As Needed 03/17/21 1130   NEXT Dressing Change/Treatment Date 03/20/21 03/17/21 1130   NEXT Weekly Photo (Inpatient Only) 03/24/21 03/17/21 1130   Pressure Injury Stage U 03/17/21 1130   Non-staged Wound Description Not applicable 03/17/21 1130   Wound Length (cm) 1.7 cm 03/17/21 1130   Wound Width (cm) 0.9 cm 03/17/21 1130   Wound Surface Area (cm^2) 1.53 cm^2 03/17/21 1130   Wound Bed Slough (%) 10 % 03/17/21 1130   Wound Bed Eschar (%) 90 % 03/17/21 1130   Shape oval 03/17/21 1130   Wound Odor None 03/17/21 1130   Pulses 2+ 03/02/21 1100   Exposed Structures KIRTI 03/17/21 1130   WOUND NURSE ONLY - Time Spent with Patient (mins) 60 03/09/21 0900       Wound 03/02/21 Pressure Injury Foot Dorsal Right stage 2 (Active)   Wound Image   03/17/21 1130   Site Assessment Red;[place] 03/17/21 1130   Periwound Assessment Clean;Dry;Intact 03/17/21 1130   Margins Attached edges 03/17/21 1130   Closure Secondary intention 03/17/21 1130   Drainage Amount Scant 03/17/21 1130   Drainage Description Serosanguineous 03/17/21 1130   Treatments Cleansed;Site care;Offloading 03/17/21 1130   Wound Cleansing Soap and Water 03/17/21 1130   Periwound Protectant Not Applicable 03/17/21 1130   Dressing Cleansing/Solutions Not Applicable 03/17/21 1130   Dressing Options Mepilex Heel 03/17/21 1130   Dressing Changed Changed 03/17/21 1130   Dressing Status Clean;Dry;Intact 03/17/21 1130   Dressing Change/Treatment Frequency Every 72 hrs, and As Needed 03/17/21 1130   NEXT Dressing  Change/Treatment Date 03/20/21 03/17/21 1130   NEXT Weekly Photo (Inpatient Only) 03/16/21 03/09/21 0900   Pressure Injury Stage 2 03/17/21 1130   Non-staged Wound Description Not applicable 03/02/21 1100   Wound Length (cm) 0.2 cm 03/17/21 1130   Wound Width (cm) 0.2 cm 03/17/21 1130   Wound Depth (cm) 0.1 cm 03/17/21 1130   Wound Surface Area (cm^2) 0.04 cm^2 03/17/21 1130   Wound Volume (cm^3) 0 cm^3 03/17/21 1130   Shape circular 03/17/21 1130   Wound Odor None 03/17/21 1130   Pulses 2+ 03/02/21 1100   Right Foot Monofilament 10-point exam (Sensate) 0/10 03/17/21 1130   Left Foot Monofilament 10-point exam (Sensate) 0/10 03/17/21 1130   Exposed Structures KIRTI 03/09/21 0900   WOUND NURSE ONLY - Time Spent with Patient (mins) 60 03/17/21 1130       Wound 03/17/21 Pressure Injury Foot Dorsal Left 3/17 Stage 1 (Active)   Wound Image   03/17/21 1130   Site Assessment Red;Intact 03/17/21 1130   Periwound Assessment Blanchable erythema;Non-blanchable erythema 03/17/21 1130   Margins Defined edges 03/17/21 1130   Closure Open to air 03/17/21 1130   Drainage Amount None 03/17/21 1130   Treatments Cleansed;Site care;Offloading 03/17/21 1130   Wound Cleansing Soap and Water 03/17/21 1130   Periwound Protectant Not Applicable 03/17/21 1130   Dressing Cleansing/Solutions Not Applicable 03/17/21 1130   Dressing Options Mepilex Heel 03/17/21 1130   Dressing Changed New 03/17/21 1130   Dressing Status Clean;Dry;Intact 03/17/21 1130   Dressing Change/Treatment Frequency Every 72 hrs, and As Needed 03/17/21 1130   NEXT Dressing Change/Treatment Date 03/20/21 03/17/21 1130   NEXT Weekly Photo (Inpatient Only) 03/24/21 03/17/21 1130   Pressure Injury Stage 1 03/17/21 1130   Wound Length (cm) 0.5 cm 03/17/21 1130   Wound Width (cm) 0.4 cm 03/17/21 1130   Wound Surface Area (cm^2) 0.2 cm^2 03/17/21 1130   Shape circular 03/17/21 1130   Wound Odor None 03/17/21 1130   Right Foot Monofilament 10-point exam (Sensate) 0/10 03/17/21 1130     Left Foot Monofilament 10-point exam (Sensate) 0/10 03/17/21 1130   Exposed Structures None 03/17/21 1130   WOUND NURSE ONLY - Time Spent with Patient (mins) 60 03/17/21 1130          ANA:   No results found.    Lab Values:    Lab Results   Component Value Date/Time    WBC 11.0 (H) 02/05/2021 09:43 AM    RBC 3.78 (L) 02/05/2021 09:43 AM    HEMOGLOBIN 11.0 (L) 02/05/2021 09:43 AM    HEMATOCRIT 34.6 (L) 02/05/2021 09:43 AM    CREACTPROT 3.58 (H) 01/21/2021 12:47 PM    SEDRATEWES 16 (H) 01/21/2021 12:47 PM    HBA1C 5.2 01/21/2021 12:47 PM        Culture Results show:  No results found for this or any previous visit (from the past 720 hour(s)).    Pain Level/Medicated:  Denies pain        INTERVENTIONS BY WOUND TEAM:  Chart and images reviewed. Discussed with bedside RN. All areas of concern (based on picture review, LDA review and discussion with bedside RN) have been thoroughly assessed. Documentation of areas based on significant findings. This RN in to assess patient. Performed standard wound care which includes appropriate positioning, dressing removal and non-selective debridement. Pictures and measurements obtained weekly if/when required.  LEFT POSTERIOR HEEL  Preparation for Dressing removal: N/A  Cleansed with:  washcloth and gauze.  Sharp debridement: N/A  Radha wound: Cleansed with washcloth  Primary Dressing: betadine patted to eschar, let dry, hydrofiber Ag   Secondary (Outer) Dressing: heel mepilex, tubigrib E, PRAFO    RIGHT DORSAL FOOT  Preparation for Dressing removal: N/A  Cleansed with: soap and washcloth  Sharp debridement: N/A  Radha wound: Cleansed with washcloth  Primary Dressing: None  Secondary (Outer) Dressing: heel mepilex, tubigrib E, PRAFO    LEFT DORSAL FOOT  Preparation for Dressing removal: N/A  Cleansed with: soap and washcloth  Sharp debridement: N/A  Radha wound: Cleansed with washcloth  Primary Dressing: None  Secondary (Outer) Dressing: heel mepilex, tubigrib E,  PRAFO      Interdisciplinary consultation: Patient, LPS TAMMY Meredith, Bedside RN (Ayla)    EVALUATION / RATIONALE FOR TREATMENT:  Most Recent Date:  3/17/21: New pressure induced injury to the left dorsal foot.  Neuropathy monofilament test performed by Leonid PINEDA determined patient has little to no sensation to bilateral feet.  Etiology to pressure injuries is related to the neuropathy.  Will continue with offloading with mepilex.  3/9/21: no changes to wounds, will continue with offloading therapy.  Changed right foot to PRAFO.   3/2/2021:  Left heel wound is turning into stable eschar, per LPS TAMMY Meredith, mepilex is off loading pressure.  Right anterior foot has pressure injury related to boot, mepilex placed over it.    02/23/2021: Discoloration noted over left heel related to pressure from the offloading boot. Unable to assess wound bed, purple boggy tissue noted. Hydrofiber placed over wound bed to help with managing moisture that may develop as heel mepilex is also utilized for offloading.      Goals: Steady decrease in wound area and depth weekly.    WOUND TEAM PLAN OF CARE ([X] for frequency of wound follow up,):   Nursing to follow orders written for wound care. Contact wound team if area fails to progress, deteriorates or with any questions/concerns  Dressing changes by wound team:                   Follow up 3 times weekly:                NPWT change 3 times weekly:     Follow up 1-2 times weekly:    X, weekly for left posterior heel & right anterior foot  Follow up Bi-Monthly:                   Follow up as needed:     Other (explain):     NURSING PLAN OF CARE ORDERS (X):  Dressing changes: See Dressing Care orders: X  Skin care: See Skin Care orders: X  RN Prevention Protocol: X  Rectal tube care: See Rectal Tube Care orders:   Other orders:    Anticipated discharge plans: Patient going to .   LTACH:        SNF/Rehab:                  Home Health Care:           Outpatient Wound  Center:            Self/Family Care:        Other:

## 2021-03-17 NOTE — PROGRESS NOTES
LIMB PRESERVATION SERVICE  PROGRESS NOTE    HPI: 50 y.o.  with a past medical history that includes hypertension, alcohol abuse, , admitted 9/9/2020 for Acute respiratory failure with hypoxia (HCC)   LPS has been consulted for left fifth MTH ulcer.      Patient is a poor historian.  A&O to self only.  Chart reviewed.  Followed by wound team for hospital-acquired pressure injury to left fifth MTH.  Pressure injury first noted on 12/18/2020 that started as a deep tissue injury from PRAFO boot that evolved to an unstageable and then stage IV.  Patient would not allow anyone to assess his ulcer.  Finally he allowed wound team to see the ulcer on 12/27/2020.  Advanced wound dressings have been applied including hydrocolloid thin, Hydrofiber silver, Mepilex.  Patient initially presented to the hospital 9/9/2020 for altered mental status.  Found to be obtunded for least 2 days.  He was intubated, vasopressor therapy was utilized. extubated 9/11/2020. He had encephalopathy. Patient was severely agitated and confused with hallucinations and impulsivity.  He developed bilateral foot drop.  He was given a PRAFO boot for left foot on 11/19/2020 and a PRAFO boot for right foot on 12/16/2020.  He has had multiple falls. Patient is two-person max assist.  Not ambulating at this time. He has had sitters and remote sitter at bedside.  Currently has remote sitter.  Now has guardianship.     Date/Surgeon: 2/1/2021 with Dr. Roque  PROCEDURES:  1.  Bilateral gastroc-soleus recessions.  2.  Bilateral posterior tibialis tendon transfer to the lateral cuneiform.  3.  Bilateral deep compartment fasciotomy.  4.  Bilateral flexor tendon release, toes 1 through 5.  5.  Left medial column release.  6.  Left irrigation and debridement of foot, multiple compartments including   lateral intra-articular fifth MTPJ.  7.  Left fifth metatarsal bone biopsy.      INTERVAL HISTORY:  1/26/2021: Patient sleeping, wakes up when name is called, but goes  back to sleep. Intermittent twitching left foot. PRAFO on L foot, foot slipped out. On augmentin, bactrim discontinued once cx resulted  1/29/2021: Patient dozing.  Alert during exam.  Wound care debrided ulcer yesterday.  Continues to take Augmentin until 1/31/2021.  2/5/2020: Patient sitting in chair. Awake, alert, calm and pleasant. Boots and dressings in place to bilateral lower extremities. Denies fever, chills, nausea, vomiting, diarrhea, pain.   2/10/2021: POD #9.  Patient confused, delusional.  States he has been helping a friend with fixing his car.  Does not know he is in the hospital.  Patient wearing bilateral boots at all time.  Patient denies that he had surgery.  2/15/2021: POD #14.  Patient remains confused and delusional.  He states that he had just woken with Dominic Marie on the telephone.  All incisions are well approximated and healing well, sutures removed today.  But is obtained and dressings reapplied.  Bilateral boots in place at all times.  2/22/2021: POD #21.  Patient awake but somnolent.  Incisions appear well-healed.  DTI to left heel by placement of heel Mepilex last week..  Photos obtained and notified wound care supervisor.  Consult for wound care team placed.  Boots in place to bilateral feet at all times.  3/2/2021: 4 weeks postop.  Seen with wound team to assess left heel DTI.  Patient continues to wear offloading boot to right foot, PRAFO boot left foot.  Pending group home placement, has guardian.  Working with PT/OT.  Presents with a new DTI to right dorsal foot.  3/9/2021: 5 weeks postop.  Seen with wound team to assess left heel unstageable and right dorsal foot DTI.  Patient wearing offloading boot to right foot continuously and PRAFO boot to left foot continuously.  3/17/2021: 6 weeks postop.  Seen with wound team to assess pressure injuries to feet.  Toenails overgrown and were trimmed.  Wearing bilateral PRAFO boots.  Patient does report he experiences pins-and-needles to  "his feet, left worse than right foot.              PERTINENT LPS RESULTS:     Pathology 2/1/2021    FINAL DIAGNOSIS:     A. Left fifth metatarsal:          Bening bone with reactive fibrosis.          No evidence of osteomyelitis.     COVID-19: not detected this admission 1/29/2021    Bone culture of left fifth metatarsal 2/1/2021: Staph epidermidis, oxacillin resistant.      Tissue culture from the left fifth toe ulcer 2/1/2021: corynebacterium species.            EXAM:      /69   Pulse (!) 58   Temp 36.8 °C (98.2 °F) (Temporal)   Resp 16   Ht 1.803 m (5' 11\")   Wt 91.2 kg (201 lb 1 oz)   SpO2 94%   BMI 28.04 kg/m²     -Palpable pedal pulses  -Tubigrips rolled at ankle bilaterally  -Monofilament testing with a 10 gram force: sensation intact: decreased bilaterally  Visual Inspection: Feet with maceration, ulcers, fissures.  Pedal pulses: intact bilaterally     -Patient alert today.  Participating in care.  Able to sense monofilament to hand.  Assessed feet.  Able to sense 2/10 points to left foot  Able to sense 0/10 points to right foot  Feet diminished with light touch bilaterally.  Neuropathy irregular in pattern          Right foot and lower leg incisions:  Incisions remain healed without sutures to right anterior lower leg, dorsal foot, medial ankle, medial lower extremity are well approximated.  No erythema, localized edema.  No drainage.  Edema controlled       right dorsal midfoot stage II, HAPI, complicated by neuropathy:  Dried crust removed, revealing open ulcer with dermis exposed   no pain  No erythema  No drainage                            Photos from 2/22/2021 right anterior lower leg/dorsal foot    Right medial foot and lower extremity    Right plantar toes                Left foot and lower leg incisions  Incisions remain well approximated, healed without sutures.  No surrounding erythema, edema, drainage.  Previous ecchymosis left dorsal foot and left great toe resolved        New " stage I pressure injury to dorsal left midfoot, HAPI  Red nonblanching intact skin  Complicated by neuropathy      left posterior heel unstageable, previously DTPI  Eschar soft, slight serosanguineous drainage  No erythema  No edema          Left posterior heel      Left dorsal midfoot                        Photos from 2/22/2021 left plantar toes      Left lateral fifth MTH      Left anterior lower leg/dorsal foot      Left medial lower extremity/medial ankle              Procedure  Toenails overgrown.  Patient with neuropathy at risk for further injury. Pt seen for foot/nail care.   Toes wrapped with soapy, wet washcloths and covered with plastic bags for ~10 minutes prior to trimming and filing of nails.  Trimming and filing completed without difficulty, no knicks or cuts. Moisturizer applied to both feet prior to replacing Tubigrip E    Wound care completed by wound care RN.  Incisions left open to air.    Applied Betadine, Aquacel AG, heel Mepilex to left heel.  Applied Mepilex to left dorsal foot incorporating lateral foot.  Applied heel Mepilex right heel and to dorsal right midfoot.  Tubigrip E to bilateral lower extremities.   PRAFO boot bilaterally      INFECTION MANAGEMENT:        Wound culture results:   Results     Procedure Component Value Units Date/Time    SARS-CoV-2 PCR (24 hour In-House): Collect NP swab in [facility] [[id number]] Collected: 03/16/21 0939    Order Status: Completed Specimen: Respirate from Nasopharyngeal Updated: 03/16/21 0951     SARS-CoV-2 Source NP Swab    Narrative:      Collected By:[id number] JERAD MCKEON  Have you been in close contact with a person who is suspected  or known to be positive for COVID-19 within the last 30 days  (e.g. last seen that person < 30 days ago)->Unknown             ASSESSMENT/PLAN:     -Patient admitted September 2020 for altered mental status.  Required intubation and vasopressor therapy.  Developed bilateral foot drop and subsequently pressure injury to left  fifth MTH from [facility] boot.     6 weeks postop--bilateral gastroc-soleus recessions, Bilateral posterior tibialis tendon transfer to the lateral cuneiform, Bilateral deep compartment fasciotomy,Bilateral flexor tendon release, toes 1 through 5, Left medial column release, Left irrigation and debridement of foot, multiple compartments including lateral intra-articular fifth MTPJ, Left fifth metatarsal bone biopsy with Dr. Roque on 2/1/2021.   -Incisions remain approximated.  Site of previous ulcer to left fifth MTH closed with primary closure remains resolved  -Unstageable left heel with soft eschar and slight drainage.  Eschar cap still remains in place.  Applied Betadine to dry out eschar and allowed to air dry before applying Aquacel Ag, Mepilex  - DTI to right dorsal midfoot has evolved to stage II pressure injury.  Area has decreased in size.  -New stage I pressure injury to left dorsal midfoot.    Wound care orders and offloading updated.  Okay to discharge from LPS standpoint.        Wound care:   Bilateral lower extremities:     Left foot: Betadine daily, dry gauze daily, heel Mepilex to left heel.  Change heel Mepilex every 72 hours if not soiled.  Mepilex to dorsal and lateral foot.  Change every 72 hours.  Right foot: Heel Mepilex to heel and Mepilex to dorsal midfoot.  Change every 72 hours  -Bilateral Tubigrip E to control edema    -Wound care orders written and given to Paola Lizama  for group home      Labs/Imaging:  -COVID-19: not detected 1/29/2020    Vascular status:   -Palpable pedal pulses bilaterally    Surgery:   No return to OR at this time    Antibiotics:   -ID: ID involved, now signed off.    -No signs and symptoms of infection to feet  -Completed doxycycline on 2/15/2021  Path negative for OM 2/1/21 of left fifth metatarsal  Bone culture of left fifth metatarsal 2/1/2021: Staph epidermidis, oxacillin resistant.  Wound culture from the left fifth toe ulcer 2/1/2021:  corynebacterium species.        Weight Bearing Status:   -WBAT with boots in place to BLE    Offloading:   Heel Mepilex to bilateral heels at all times.  PRAFO to bilateral foot at all times to prevent foot drop.    -Orthotic company: none.  Rx given to  to give to guardian for Ability for shoes and inserts and evaluation for AFO bilaterally    PT/OT :   Involved. Last seen by OT 3/9/2021       DISCHARGE PLAN:  Okay to discharge from LPS standpoint.    Disposition: Guardianship hearing completed 2/5/2021, guardianship granted to Deonte Henderson.   Patient has been accepted to group home pending [employer] gold and Covid screening.    Discussed with: pt,  RN, Denise Marshall Wound Care RN, Farnaz Sadler RN wound care supervisor, Paola Lizama         Please note that this dictation was created using voice recognition software. I have  worked with technical experts from [facility] to optimize the interface.  I have made every reasonable attempt to correct obvious errors, but there may be errors of grammar and possibly content that I did not discover before finalizing the note.    Viri Lynn, A.P.R.N.    If any questions or concerns, please contact [facility] through voalte.

## 2021-03-17 NOTE — FACE TO FACE
Face to Face Supporting Documentation - Home Health    The encounter with this patient was in whole or in part the primary reason for home health admission.    Date of encounter:   Patient:                    MRN:                       YOB: 2020  Yury Blake  [id number]  1970     Home health to see patient for:  Skilled Nursing care for assessment, interventions & education, Wound Care, Physical Therapy evaluation and treatment and Occupational therapy evaluation and treatment    Skilled need for:  Surgical Aftercare foot wound debridement and Recent Deterioration of Health Status encephalopathy    Skilled nursing interventions to include:  Wound Care    Homebound status evidenced by:  Need the aid of supportive devices such as crutches, canes, wheelchairs or walkers, Require the use of special transportation or Needs the assistance of another person in order to leave the home. Leaving home requires a considerable and taxing effort. There is a normal inability to leave the home.    Community Physician to provide follow up care: Harman Barlow M.D.     Optional Interventions? No      I certify the face to face encounter for this home health care referral meets the CMS requirements and the encounter/clinical assessment with the patient was, in whole, or in part, for the medical condition(s) listed above, which is the primary reason for home health care. Based on my clinical findings: the service(s) are medically necessary, support the need for home health care, and the homebound criteria are met.  I certify that this patient has had a face to face encounter by myself.  ALONZO Haile - NPI: [id number]

## 2021-03-17 NOTE — CARE PLAN
Problem: Communication  Goal: The ability to communicate needs accurately and effectively will improve  Outcome: PROGRESSING AS EXPECTED   Discussed POC with patient Updated white board Calls appropriately  Call light within reach     Problem: Safety  Goal: Will remain free from injury  3/16/2021 2026 by Jesus Lopez RBHUPINDER.  Outcome: PROGRESSING AS EXPECTED    Fall Prevention in place: Wearing teaded socks, bed locked and in lowest position, call light within reach, DME in bathroom.

## 2021-03-17 NOTE — DISCHARGE PLANNING
Spoke to Chad at [facility] . All is good on his end and he is expecting pt tomorrow. Needs HH for wound care and PT/OT, Chad has already contacted [facility], guardian has given consent. APRN has ordered HH. Referral made to [facility]. Planning for discharge tomorrow.

## 2021-03-17 NOTE — PROGRESS NOTES
2 RN skin check yovani Strong RN  Devices in place condom cath, foot drop boot on andrew feet.  Skin assessed under devices yes, skin intact.  Confirmed pressure ulcers found on NA.  New potential pressure ulcers noted on NA. Wound consult placed NA.  No skin issues/changes noted.   The following interventions in place: q2h turns, 2 RN skin check q shift, barrier paste and barrier wipes being used, extra pillows for floating heels and pillows for repositioning, dressing changes as ordered, checking skin under devices.

## 2021-03-18 VITALS
WEIGHT: 201.06 LBS | SYSTOLIC BLOOD PRESSURE: 103 MMHG | BODY MASS INDEX: 28.15 KG/M2 | OXYGEN SATURATION: 95 % | DIASTOLIC BLOOD PRESSURE: 67 MMHG | HEART RATE: 67 BPM | TEMPERATURE: 97.8 F | RESPIRATION RATE: 18 BRPM | HEIGHT: 71 IN
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
## 2021-03-18 PROBLEM — S82.891A OTHER FRACTURE OF RIGHT LOWER LEG, INITIAL ENCOUNTER FOR CLOSED FRACTURE: Status: RESOLVED | Noted: 2017-09-26 | Resolved: 2021-03-18

## 2021-03-18 PROCEDURE — 700102 HCHG RX REV CODE 250 W/ 637 OVERRIDE(OP): Performed by: NURSE PRACTITIONER

## 2021-03-18 PROCEDURE — A9270 NON-COVERED ITEM OR SERVICE: HCPCS | Performed by: NURSE PRACTITIONER

## 2021-03-18 PROCEDURE — A9270 NON-COVERED ITEM OR SERVICE: HCPCS | Performed by: STUDENT IN AN ORGANIZED HEALTH CARE EDUCATION/TRAINING PROGRAM

## 2021-03-18 PROCEDURE — A9270 NON-COVERED ITEM OR SERVICE: HCPCS | Performed by: HOSPITALIST

## 2021-03-18 PROCEDURE — 700111 HCHG RX REV CODE 636 W/ 250 OVERRIDE (IP): Performed by: HOSPITALIST

## 2021-03-18 PROCEDURE — 700102 HCHG RX REV CODE 250 W/ 637 OVERRIDE(OP): Performed by: STUDENT IN AN ORGANIZED HEALTH CARE EDUCATION/TRAINING PROGRAM

## 2021-03-18 PROCEDURE — 700102 HCHG RX REV CODE 250 W/ 637 OVERRIDE(OP): Performed by: HOSPITALIST

## 2021-03-18 RX ORDER — ASPIRIN 325 MG
325 TABLET ORAL 2 TIMES DAILY
Qty: 120 TABLET | Refills: 1 | Status: SHIPPED | OUTPATIENT
Start: 2021-03-18 | End: 2023-09-08

## 2021-03-18 RX ORDER — TAMSULOSIN HYDROCHLORIDE 0.4 MG/1
0.8 CAPSULE ORAL
Qty: 30 CAPSULE | Refills: 2 | Status: SHIPPED | OUTPATIENT
Start: 2021-03-19 | End: 2023-09-08

## 2021-03-18 RX ORDER — ACETAMINOPHEN 325 MG/1
650 TABLET ORAL EVERY 6 HOURS PRN
Qty: 30 TABLET | Refills: 0 | Status: SHIPPED | OUTPATIENT
Start: 2021-03-18 | End: 2021-03-18

## 2021-03-18 RX ORDER — AMOXICILLIN 250 MG
1 CAPSULE ORAL 2 TIMES DAILY PRN
Qty: 60 TABLET | Refills: 1 | Status: SHIPPED | OUTPATIENT
Start: 2021-03-18 | End: 2023-09-08

## 2021-03-18 RX ORDER — TAMSULOSIN HYDROCHLORIDE 0.4 MG/1
0.8 CAPSULE ORAL
Qty: 30 CAPSULE | Refills: 1 | Status: SHIPPED | OUTPATIENT
Start: 2021-03-19 | End: 2021-03-18

## 2021-03-18 RX ORDER — AMOXICILLIN 250 MG
1 CAPSULE ORAL 2 TIMES DAILY PRN
Qty: 60 TABLET | Refills: 0 | Status: SHIPPED | OUTPATIENT
Start: 2021-03-18 | End: 2021-03-18

## 2021-03-18 RX ORDER — CALCIUM POLYCARBOPHIL 625 MG
625 TABLET ORAL
Qty: 180 TABLET | Refills: 1 | Status: SHIPPED | OUTPATIENT
Start: 2021-03-18 | End: 2021-03-18

## 2021-03-18 RX ORDER — CALCIUM POLYCARBOPHIL 625 MG
625 TABLET ORAL
Qty: 180 TABLET | Refills: 2 | Status: ON HOLD | OUTPATIENT
Start: 2021-03-18 | End: 2023-09-12

## 2021-03-18 RX ORDER — RISPERIDONE 2 MG/1
2 TABLET ORAL 2 TIMES DAILY
Qty: 120 TABLET | Refills: 2 | Status: SHIPPED | OUTPATIENT
Start: 2021-03-18

## 2021-03-18 RX ORDER — RISPERIDONE 2 MG/1
2 TABLET ORAL 2 TIMES DAILY
Qty: 120 TABLET | Refills: 1 | Status: SHIPPED | OUTPATIENT
Start: 2021-03-18 | End: 2021-03-18

## 2021-03-18 RX ORDER — ACETAMINOPHEN 325 MG/1
650 TABLET ORAL EVERY 6 HOURS PRN
Qty: 30 TABLET | Refills: 1 | Status: SHIPPED | OUTPATIENT
Start: 2021-03-18 | End: 2021-05-17

## 2021-03-18 RX ORDER — PSEUDOEPHEDRINE HCL 30 MG
100 TABLET ORAL
Qty: 60 CAPSULE | Refills: 1 | Status: SHIPPED | OUTPATIENT
Start: 2021-03-18 | End: 2021-03-18

## 2021-03-18 RX ORDER — PSEUDOEPHEDRINE HCL 30 MG
100 TABLET ORAL
Qty: 60 CAPSULE | Refills: 2 | Status: SHIPPED | OUTPATIENT
Start: 2021-03-18 | End: 2023-09-08

## 2021-03-18 RX ADMIN — ENOXAPARIN SODIUM 40 MG: 40 INJECTION SUBCUTANEOUS at 17:42

## 2021-03-18 RX ADMIN — RISPERIDONE 2 MG: 2 TABLET ORAL at 17:42

## 2021-03-18 RX ADMIN — ASPIRIN 325 MG ORAL TABLET 325 MG: 325 PILL ORAL at 04:33

## 2021-03-18 RX ADMIN — CALCIUM POLYCARBOPHIL 625 MG: 625 TABLET, FILM COATED ORAL at 13:30

## 2021-03-18 RX ADMIN — TAMSULOSIN HYDROCHLORIDE 0.8 MG: 0.4 CAPSULE ORAL at 08:38

## 2021-03-18 RX ADMIN — CALCIUM POLYCARBOPHIL 625 MG: 625 TABLET, FILM COATED ORAL at 08:37

## 2021-03-18 RX ADMIN — CALCIUM POLYCARBOPHIL 625 MG: 625 TABLET, FILM COATED ORAL at 17:42

## 2021-03-18 RX ADMIN — RISPERIDONE 2 MG: 2 TABLET ORAL at 04:33

## 2021-03-18 NOTE — DISCHARGE PLANNING
Anticipated Discharge Disposition:  with HH    Action: Spoke to Chad to ensure that he is ready to accept pt today and verify that HH is secure. He will speak to [facility] and call me back.    Barriers to Discharge: Pending final accept from .    Plan: F/U with [facility].

## 2021-03-18 NOTE — DISCHARGE PLANNING
Spoke to Chad. All is in order for pt to discharge to [facility] [address] [id number] today. Rx to be escribed to [facility]. DPA will arrange for W/C transport today.

## 2021-03-18 NOTE — PROGRESS NOTES
2 RN Skin Check    2 RN skin check complete with MUSTAPHA Brenner.   Devices in place: n/a.  Skin assessed under devices: yes.  Skin dry and intact.  Confirmed pressure ulcers found on: left heel.   New potential pressure ulcers noted on n/a. Wound consult placed N/A.  The following interventions in place Q2h turns in place, Pillows, Mepilex, Barrier cream and prafo boots.

## 2021-03-18 NOTE — DISCHARGE INSTRUCTIONS
Discharge Instructions    Discharged to group home by medical transportation with self. Discharged via wheelchair, hospital escort: Refused. Pt with transport  Special equipment needed: Not Applicable    Be sure to schedule a follow-up appointment with your primary care doctor or any specialists as instructed.     Discharge Plan:   Influenza Vaccine Indication: Patient Refuses  Influenza Vaccine Given - only chart on this line when given: Influenza Vaccine Given (See MAR)    I understand that a diet low in cholesterol, fat, and sodium is recommended for good health. Unless I have been given specific instructions below for another diet, I accept this instruction as my diet prescription.   Other diet: regular      Special Instructions: None    · Is patient discharged on Warfarin / Coumadin?   No     Depression / Suicide Risk    As [facility] are [facility], it is important to learn how to keep safe from harming yourself.    Recognize the warning signs:  · Abrupt changes in personality, positive or negative- including increase in energy   · Giving away possessions  · Change in eating patterns- significant weight changes-  positive or negative  · Change in sleeping patterns- unable to sleep or sleeping all the time   · Unwillingness or inability to communicate  · Depression  · Unusual sadness, discouragement and loneliness  · Talk of wanting to die  · Neglect of personal appearance   · Rebelliousness- reckless behavior  · Withdrawal from people/activities they love  · Confusion- inability to concentrate     If you or a loved one observes any of these behaviors or has concerns about self-harm, here's what you can do:  · Talk about it- your feelings and reasons for harming yourself  · Remove any means that you might use to hurt yourself (examples: pills, rope, extension cords, firearm)  · Get professional help from the community (Mental Health, Substance Abuse, psychological counseling)  · Do not  be alone:Call your Safe Contact- someone whom you trust who will be there for you.  · Call your local CRISIS HOTLINE 380-8126 or 117-330-2424  · Call your local Children's Mobile Crisis Response Team [place] (915) 014-3403 or www.Audemat  · Call the toll free National Suicide Prevention Hotlines   · National Suicide Prevention Lifeline 676-683-NAHZ (6158)  · National [employer] Line Network 800-SUICIDE (132-3499)

## 2021-03-18 NOTE — CARE PLAN
Problem: Communication  Goal: The ability to communicate needs accurately and effectively will improve  Outcome: PROGRESSING AS EXPECTED     Problem: Safety  Goal: Will remain free from injury  Outcome: PROGRESSING AS EXPECTED     Problem: Psychosocial Needs:  Goal: Level of anxiety will decrease  Outcome: PROGRESSING AS EXPECTED    Fall precautions in place. Pt rounded on hourly. Q2hr turns in place, pillows used for positioning, and mepilex applied. Telesitter at bedside. Bed alarm on, bed locked and in lowest position. Clutter-free environment maintained. Call light and bedside table in reach.

## 2021-03-18 NOTE — DISCHARGE PLANNING
Spoke to Kavitha @ [facility]    Transport is scheduled for 03/18/21 @[phone] going to [address] [facility].    Manually Faxed Approved services of $103 to [facility]

## 2021-03-18 NOTE — CARE PLAN
Problem: Communication  Goal: The ability to communicate needs accurately and effectively will improve  Outcome: PROGRESSING AS EXPECTED  Note: Encouraged call light use; was able to answer questions and attain needed items.      Problem: Venous Thromboembolism (VTW)/Deep Vein Thrombosis (DVT) Prevention:  Goal: Patient will participate in Venous Thrombosis (VTE)/Deep Vein Thrombosis (DVT)Prevention Measures  Outcome: PROGRESSING AS EXPECTED  Flowsheets  Taken 3/17/2021 1300 by Carina Mandel R.N.  Mechanical Prophylaxis: HAY Hose (Graduated Compression Stockings)  HAY Hose (Graduated Compression Stockings): On  Pharmacologic Prophylaxis Used: LMWH: Enoxaparin(Lovenox)  Taken 3/16/2021 1930 by Jesus Lopez RSANDRA  Risk Assessment Score: 1  VTE RISK: Moderate  Taken 3/15/2021 0800 by Ernesto Delos Reyes V, RSANDRA  AV Foot Pumps: On  SCDs, Sequential Compression Device: Off  Note: Lovenox administered; no signs or symptoms of clot formation at this time.      Problem: Fluid Volume:  Goal: Will maintain balanced intake and output  Outcome: PROGRESSING AS EXPECTED  Note: Patient is drinking adequate fluids throughout shift; IV fluids not needed at this time.

## 2021-03-18 NOTE — PROGRESS NOTES
0700 Received report from night MUSTAPHA Campbell, POC discussed. Call light in place, bed lowered and locked, bed alarm on. Encourage pt to call if needed anything. Pt A&Ox4, RA, dressing on bilateral feet is CD&I, on telesitter

## 2021-03-18 NOTE — DISCHARGE PLANNING
Anticipated Discharge Disposition: Group home    Action: Pt has been accepted to [facility]. Transport arranged via [facility] for 5-5:30 PM today, wheelchair van. Meds have been escribed to [facility]. Chad will arrange for meds to be delivered tonight. Written wound care instructions and Rx for orthotics have been given to bedside RN to be included in discharge packet.    Barriers to Discharge: none    Plan: Discharge to group home today.

## 2021-03-18 NOTE — PROGRESS NOTES
2 RN Skin Check    2 RN skin check complete.   Devices in place: n/a.  Skin assessed under devices: yes.  Confirmed pressure ulcers found on: left heel.  New potential pressure ulcers noted on n/a. Wound consult placed N/A.  The following interventions in place Pillows, Mepilex, Barrier cream and prafo boots.

## 2021-03-18 NOTE — DISCHARGE SUMMARY
Discharge Summary    CHIEF COMPLAINT ON ADMISSION  Chief Complaint   Patient presents with   • Altered Mental Status   • Hypotension       Reason for Admission  Acute respiratory failure with hypoxia, altered mental status, hypotension    Admission Date  9/9/2020    CODE STATUS  Full Code    HPI & HOSPITAL COURSE  Mr. Blake is a 50-year-old male who was brought to the emergency department on 9/9/2020 with altered mental status after he was found by his ex-wife to be obtunded after she had checked on him when he had not been seen for at least 2 days.  At that time he was alert and oriented x0 and found to be hypotensive.  GCS in the ED was 7 and he was severely hypotensive.  He was intubated for airway protection and central venous access was obtained for administration of high-volume crystalloid resuscitation and vasopressor therapy.  He had a significant leukocytosis with a WBC of 22.9 and was hypokalemic with a potassium level of 2.7.  Additionally he had acute kidney injury with a creatinine of 2.73.  His INR was 8.93 and he also had a lactate level of 11.  Urinalysis was performed and was negative for urinary tract infection.  He was diagnosed with septic shock and admitted to the intensive care unit for further evaluation and treatment.  A lumbar puncture was done on 9/9/2020.  He was also noted to have seizure-like activity on 9/10/2020 so an EEG was obtained and was significant for a moderate encephalopathy.  Extubation occurred on 9/11/2020.  He also had a traumatic catheter removal so on 9/21/2020 urology was consulted but was ultimately unnecessary.  On 9/13/2020 he was transferred out of the ICU where he required restraints due to confusion, hallucination, agitation, and impulsivity.  An MRI was performed and was negative for acute pathologies.  He was also treated for a UTI starting 10/2/2020.  He has also fallen multiple times in hospital.  Additionally, on 11/11/2020 he had an acute episode of  sinus tachycardia in the 140s accompanied by hypotension.  There was concern for STEMI so cardiology was consulted.  A 12-lead EKG was obtained and was concerning for atrial flutter.  The patient received metoprolol after which a repeat ECG showed sinus tachycardia.  A D-dimer was obtained and was elevated for which a CT chest was done that was negative for pulmonary embolism.  Cardiology subsequently signed off.  He has remained in the hospital for a prolonged period of time. A court hearing took place 2/5/2021 and his friend Deonte Xie was granted guardianship.   While inpatient he was been followed by LPS team for pressure injury of the left fifth MTH and two pressure wounds on right foot. Ortho was consulted and he does require incision and drainage. Surgery with Dr. Roque completed 2/1/2021.  Surgical wound cultures of the left fifth metatarsal grew staph epidermidis, oxacillin resistant.  Tissue culture from the left toe ulcer grew corynebacterium.  On 2/5  Infectious disease was consulted and recommended oral doxycycline for 10 days, which is now completed.  Low suspicion for osteomyelitis.    * Toxic encephalopathy- (present on admission)  Assessment & Plan  -likely secondary to etoh   -LP, MRI brain, EEG negative. TSH normal. HIV/RPR negative.  -Continue risperdal.  -Remains free from restraints but still requiring telesitter for frequent falls   -oriented to self only at baseline    Pressure ulcer of right foot- (present on admission)  Assessment & Plan  -Wound care per LPS.   -Offloading boot     Open wound of left foot- (present on admission)  Assessment & Plan  -Lateral aspect, worsening, red, nontender  -HgA1c 1/21: 5.2%  -No leukocytosis  -Wound care following  -1/21 LPS consulted  -1/24: Wound culture growing Staph aureus. Continue Augmentin. Bactrim discontinued.  -2/1: I &D surgery of foot with Dr. Roque    -2/4: Bone biopsy cultures positive for S epidermitis and Corynebacterium. ID  consulted  -2/5: ID recommended 10 days of doxycycline, now completed  -3/10: LPS is following, off abx . Afebrile     Normocytic anemia- (present on admission)  Assessment & Plan  -chronic and stable  - no active bleeding.  -Check periodically.    Alcohol abuse- (present on admission)  Assessment & Plan  -Abstinent since admission  -off all vitamins    Today patient is sitting upright in bed, pleasant and cooperative.  He is oriented to himself and place only.  He believes he is at home.  He continues to have pain in his bilateral lower extremities, well-managed on current regimen.  He denies headache, dizziness, nausea and any other problems.  He feels well enough to discharge today.    Therefore, he is discharged in good and stable condition to home with organized home healthcare and close outpatient follow-up.    The patient met 2-midnight criteria for an inpatient stay at the time of discharge.    Discharge Date  03/18/21    FOLLOW UP ITEMS POST DISCHARGE  Home health for wound care, physical and occupational therapies    DISCHARGE DIAGNOSES  Principal Problem:    Toxic encephalopathy POA: Yes  Active Problems:    Open wound of left foot POA: Yes    Pressure ulcer of right foot POA: Yes    Alcohol abuse POA: Yes    Normocytic anemia POA: Yes  Resolved Problems:    JEWELS (acute kidney injury) (HCC) POA: Yes    Urinary retention POA: Yes    Tachycardia POA: Yes    Leukocytosis POA: Yes    Other fracture of right lower leg, initial encounter for closed fracture POA: Yes    Septic shock (HCC) POA: Yes    Lactic acidosis POA: Yes    Dehydration POA: Yes    Elevated transaminase level POA: Yes    Benzodiazepine dependence (HCC) POA: Yes    Acute respiratory failure with hypoxia (HCC) POA: Yes    Hypernatremia POA: Yes    Other dysphagia POA: Yes    Acute cystitis POA: Yes    Fall POA: Yes    Hypokalemia POA: Yes      FOLLOW UP  No future appointments.  [facility]  [address]  [address]  [phone]        MEDICATIONS ON DISCHARGE     Medication List      START taking these medications      Instructions   acetaminophen 325 MG Tabs  Commonly known as: Tylenol   Take 2 Tablets by mouth every 6 hours as needed for up to 60 days.  Dose: 650 mg     docusate sodium 100 MG Caps   Take 100 mg by mouth 1 time a day as needed (hard stools).  Dose: 100 mg     Fiber 625 MG Tabs   Take 1 tablet by mouth 3 times a day, with meals.  Dose: 625 mg     magnesium hydroxide 400 MG/5ML Susp  Commonly known as: MILK OF MAGNESIA   Take 30 mL by mouth 1 time a day as needed (for constipation).  Dose: 30 mL     risperiDONE 2 MG Tabs  Commonly known as: RISPERDAL   Take 1 tablet by mouth 2 Times a Day.  Dose: 2 mg     senna-docusate 8.6-50 MG Tabs  Commonly known as: PERICOLACE or SENOKOT S   Take 1 tablet by mouth 2 times a day as needed for Constipation.  Dose: 1 tablet     tamsulosin 0.4 MG capsule  Start taking on: March 19, 2021  Commonly known as: FLOMAX   Take 2 Capsules by mouth 1/2 hour after breakfast.  Dose: 0.8 mg        CONTINUE taking these medications      Instructions   aspirin 325 MG Tabs  Commonly known as: ASA   Take 1 tablet by mouth 2 Times a Day.  Dose: 325 mg        STOP taking these medications    allopurinol 300 MG Tabs  Commonly known as: ZYLOPRIM     ALPRAZolam 0.5 MG Tabs  Commonly known as: XANAX     hydroCHLOROthiazide 25 MG Tabs  Commonly known as: HYDRODIURIL     oxyCODONE-acetaminophen 5-325 MG Tabs  Commonly known as: PERCOCET     verapamil  MG Tbcr  Commonly known as: CALAN-SR            Allergies  No Known Allergies    DIET  Orders Placed This Encounter   Procedures   • Diet Order Diet: Regular     Standing Status:   Standing     Number of Occurrences:   1     Order Specific Question:   Diet:     Answer:   Regular [1]       ACTIVITY  As tolerated and directed by skilled nursing.  Weight bearing as tolerated    CONSULTATIONS  Infectious disease  Limb preservation  "services  Orthopedic surgery  Cardiology  Psychiatry  Urology  Critical care    PROCEDURES  2/1/2021 bilateral: gastroc-soleus recessions, posterior tibialis tendon transfer to lateral cuneiform, compartment fasciotomy, flexor tendon release toes 1 through 5.  Left medial column release, irrigation and debridement of foot, multiple compartments including lateral intra-articular fifth MTPJ.  Dr. Roque    9/9/2020 lumbar puncture    LABORATORY  Lab Results   Component Value Date    SODIUM 143 02/02/2021    POTASSIUM 4.7 02/02/2021    CHLORIDE 106 02/02/2021    CO2 25 02/02/2021    GLUCOSE 120 (H) 02/02/2021    BUN 17 02/02/2021    CREATININE 0.84 02/02/2021        Lab Results   Component Value Date    WBC 11.0 (H) 02/05/2021    HEMOGLOBIN 11.0 (L) 02/05/2021    HEMATOCRIT 34.6 (L) 02/05/2021    PLATELETCT 235 02/05/2021      Results     Procedure Component Value Units Date/Time    SARS-CoV-2 PCR (24 hour In-House): Collect NP swab in [facility] [[id number]] Collected: 03/16/21 0939    Order Status: Completed Specimen: Respirate from Nasopharyngeal Updated: 03/17/21 0943     SARS-CoV-2 Source NP Swab     SARS-CoV-2 by PCR NotDetected     Comment: PATIENTS: Important information regarding your results and instructions can  be found at [email or web address]   \"After your  Covid-19 Test\"  [facility] providers: PLEASE REFER TO DE-ESCALATION AND RETESTING PROTOCOL  on [email or web address]  **The TaqPath COVID-19 SARS-CoV-2 RT-PCR test has been made available for  use under the Emergency Use Authorization (EUA) only.         Narrative:      Collected By:[id number] JERAD MCKEON  Have you been in close contact with a person who is suspected  or known to be positive for COVID-19 within the last 30 days  (e.g. last seen that person < 30 days ago)->Unknown          Total time of the discharge process exceeds 36 minutes.  "

## 2021-03-19 NOTE — CARE PLAN
Problem: Safety  Goal: Will remain free from falls  Outcome: PROGRESSING AS EXPECTED  Note: Assess LOC, assess environment. Maintain a clutter-free environment, bed alarm on, bed lowered and locked, non-skid socks in use, call light in place, personal belongings within reach. Offered toileting. Fall precautions in place. Telesitter in place     Problem: Knowledge Deficit  Goal: Knowledge of disease process/condition, treatment plan, diagnostic tests, and medications will improve  Note: Update POC, encourage questions or to verbalize any concerns

## 2021-03-19 NOTE — PROGRESS NOTES
[id number] Patient got picked up by 2 male medical transports via wheelchair with personal belongings. Paperworks from  Pat given to medical transport

## 2021-04-01 ENCOUNTER — APPOINTMENT (OUTPATIENT)
Dept: RADIOLOGY | Facility: MEDICAL CENTER | Age: 51
End: 2021-04-01
Attending: EMERGENCY MEDICINE
Payer: MEDICAID

## 2021-04-01 ENCOUNTER — HOSPITAL ENCOUNTER (EMERGENCY)
Facility: MEDICAL CENTER | Age: 51
End: 2021-04-02
Attending: EMERGENCY MEDICINE
Payer: MEDICAID

## 2021-04-01 DIAGNOSIS — S82.392A CLOSED FRACTURE OF POSTERIOR MALLEOLUS OF LEFT TIBIA, INITIAL ENCOUNTER: ICD-10-CM

## 2021-04-01 LAB
ANION GAP SERPL CALC-SCNC: 12 MMOL/L (ref 7–16)
APTT PPP: 34.9 SEC (ref 24.7–36)
BASOPHILS # BLD AUTO: 1.3 % (ref 0–1.8)
BASOPHILS # BLD: 0.12 K/UL (ref 0–0.12)
BUN SERPL-MCNC: 12 MG/DL (ref 8–22)
CALCIUM SERPL-MCNC: 9.9 MG/DL (ref 8.4–10.2)
CHLORIDE SERPL-SCNC: 105 MMOL/L (ref 96–112)
CO2 SERPL-SCNC: 23 MMOL/L (ref 20–33)
CREAT SERPL-MCNC: 0.8 MG/DL (ref 0.5–1.4)
EOSINOPHIL # BLD AUTO: 0.31 K/UL (ref 0–0.51)
EOSINOPHIL NFR BLD: 3.2 % (ref 0–6.9)
ERYTHROCYTE [DISTWIDTH] IN BLOOD BY AUTOMATED COUNT: 46.1 FL (ref 35.9–50)
GLUCOSE SERPL-MCNC: 106 MG/DL (ref 65–99)
HCT VFR BLD AUTO: 41.2 % (ref 42–52)
HGB BLD-MCNC: 13 G/DL (ref 14–18)
IMM GRANULOCYTES # BLD AUTO: 0.05 K/UL (ref 0–0.11)
IMM GRANULOCYTES NFR BLD AUTO: 0.5 % (ref 0–0.9)
INR PPP: 1.09 (ref 0.87–1.13)
LYMPHOCYTES # BLD AUTO: 2.16 K/UL (ref 1–4.8)
LYMPHOCYTES NFR BLD: 22.5 % (ref 22–41)
MCH RBC QN AUTO: 28.8 PG (ref 27–33)
MCHC RBC AUTO-ENTMCNC: 31.6 G/DL (ref 33.7–35.3)
MCV RBC AUTO: 91.4 FL (ref 81.4–97.8)
MONOCYTES # BLD AUTO: 0.57 K/UL (ref 0–0.85)
MONOCYTES NFR BLD AUTO: 5.9 % (ref 0–13.4)
NEUTROPHILS # BLD AUTO: 6.37 K/UL (ref 1.82–7.42)
NEUTROPHILS NFR BLD: 66.6 % (ref 44–72)
NRBC # BLD AUTO: 0 K/UL
NRBC BLD-RTO: 0 /100 WBC
PLATELET # BLD AUTO: 306 K/UL (ref 164–446)
PMV BLD AUTO: 10 FL (ref 9–12.9)
POTASSIUM SERPL-SCNC: 4 MMOL/L (ref 3.6–5.5)
PROTHROMBIN TIME: 13.8 SEC (ref 12–14.6)
RBC # BLD AUTO: 4.51 M/UL (ref 4.7–6.1)
SODIUM SERPL-SCNC: 140 MMOL/L (ref 135–145)
WBC # BLD AUTO: 9.6 K/UL (ref 4.8–10.8)

## 2021-04-01 PROCEDURE — 85730 THROMBOPLASTIN TIME PARTIAL: CPT

## 2021-04-01 PROCEDURE — 73590 X-RAY EXAM OF LOWER LEG: CPT | Mod: RT

## 2021-04-01 PROCEDURE — 94760 N-INVAS EAR/PLS OXIMETRY 1: CPT

## 2021-04-01 PROCEDURE — 73630 X-RAY EXAM OF FOOT: CPT | Mod: LT

## 2021-04-01 PROCEDURE — 70450 CT HEAD/BRAIN W/O DYE: CPT

## 2021-04-01 PROCEDURE — 73610 X-RAY EXAM OF ANKLE: CPT | Mod: LT

## 2021-04-01 PROCEDURE — 85610 PROTHROMBIN TIME: CPT

## 2021-04-01 PROCEDURE — 73630 X-RAY EXAM OF FOOT: CPT | Mod: RT

## 2021-04-01 PROCEDURE — 36415 COLL VENOUS BLD VENIPUNCTURE: CPT

## 2021-04-01 PROCEDURE — 73590 X-RAY EXAM OF LOWER LEG: CPT | Mod: LT

## 2021-04-01 PROCEDURE — 85025 COMPLETE CBC W/AUTO DIFF WBC: CPT

## 2021-04-01 PROCEDURE — 80048 BASIC METABOLIC PNL TOTAL CA: CPT

## 2021-04-01 PROCEDURE — 93970 EXTREMITY STUDY: CPT

## 2021-04-01 PROCEDURE — 99285 EMERGENCY DEPT VISIT HI MDM: CPT

## 2021-04-01 RX ORDER — HYDROCODONE BITARTRATE AND ACETAMINOPHEN 5; 325 MG/1; MG/1
1-2 TABLET ORAL EVERY 6 HOURS PRN
Qty: 12 TABLET | Refills: 0 | Status: SHIPPED | OUTPATIENT
Start: 2021-04-01 | End: 2021-04-05

## 2021-04-01 ASSESSMENT — PAIN DESCRIPTION - PAIN TYPE: TYPE: ACUTE PAIN

## 2021-04-01 ASSESSMENT — FIBROSIS 4 INDEX: FIB4 SCORE: 0.85

## 2021-04-02 VITALS
TEMPERATURE: 98.1 F | WEIGHT: 198.41 LBS | HEART RATE: 98 BPM | DIASTOLIC BLOOD PRESSURE: 78 MMHG | OXYGEN SATURATION: 96 % | SYSTOLIC BLOOD PRESSURE: 123 MMHG | HEIGHT: 71 IN | BODY MASS INDEX: 27.78 KG/M2 | RESPIRATION RATE: 14 BRPM

## 2021-04-02 NOTE — ED NOTES
Patient is stable for discharge at this time, anticipatory guidance provided, close follow-up is encouraged, and ED return instructions have been detailed. Guardian is both agreeable to the disposition and plan and discharged home in ambulatory state and in good condition.      Rx education provided, Pt verbalized understanding;

## 2021-04-02 NOTE — ED PROVIDER NOTES
ED Provider Note    CHIEF COMPLAINT  Chief Complaint   Patient presents with    T-5000 FALL    Leg Injury       HPI  Yury Blake is a 50 y.o. male who presents bilateral leg pain.  Patient has a very poor historian he is currently in a group home he has been there about a week.  The patient's power of  or guardian states that he checked in on him and found that his legs were swollen apparently he had several falls 4 to 5 days ago in the middle of the night.  A apparently the patient tried to get out of bed.  Patient has had significant encephalopathy since being in the hospital for several months.  Patient does complain of some pain bilaterally in his lower legs but cannot specify where.  Patient knows he is in the hospital but does not know which one.  He thinks the year is 2014 and does not know the month.  He knows his age is somewhere in the 40s but does not know exactly how old he is.  Therefore because of patient's mental condition history is significant limited.      The caregiver states that x-rays were done at the facility and demonstrated fractures but he is unsure of exactly where.  They do not have any reports or imaging with them.    REVIEW OF SYSTEMS  Pertinent positives include lower extremity pain, falling, swelling.  Patient's mental condition limits review of systems  PAST MEDICAL HISTORY   has a past medical history of Arthritis, Hypertension, Pain, and Psychiatric problem.    SOCIAL HISTORY  Social History     Tobacco Use    Smoking status: Never Smoker    Smokeless tobacco: Current User     Types: Chew   Substance and Sexual Activity    Alcohol use: Yes     Comment: 6-10 beers daily    Drug use: No    Sexual activity: Not on file       SURGICAL HISTORY   has a past surgical history that includes ankle orif (Right, 9/26/2017); tendon lenghtening (Bilateral, 2/1/2021); and irrigation & debridement ortho (Left, 2/1/2021).    CURRENT MEDICATIONS  Home Medications    **Home  "medications have not yet been reviewed for this encounter**         ALLERGIES  No Known Allergies    PHYSICAL EXAM  VITAL SIGNS: /78   Pulse 98   Temp 36.7 °C (98.1 °F) (Temporal)   Resp 14   Ht 1.803 m (5' 11\")   Wt 90 kg (198 lb 6.6 oz)   SpO2 96%   BMI 27.67 kg/m²   Constitutional: Well developed, Well nourished, mild distress.   HENT: Normocephalic, Atraumatic, mask in place  Eyes: Conjunctiva normal, No discharge.   Neck: Supple, No stridor, no no vertebral point tenderness or step-offs  Cardiovascular: Normal heart rate, Normal rhythm, No murmurs, equal pulses.   Pulmonary: Normal breath sounds, No respiratory distress, No wheezing, No rales, No rhonchi.  Abdomen:Soft, No tenderness,   Back: No CVA tenderness.   Musculoskeletal: Examination of the patient's lower extremities.  He has 2+ pitting edema from mid calf bilaterally over both feet.  There is some mild erythema over both feet as well.  Patient seems to have some pain and tenderness to palpation over the proximal fibula on the left.  Questionable pain and tenderness to the calcaneus on the left as well.  There is a bandage that is clean dry and intact over the posterior heel.  This was removed and there is a 1 cm x 2 cm eschar in this area.  there is no fluctuance or drainage from it.  Patient has well-healed incision all scars over the anterior ankles bilaterally as well as the medial aspect of the feet.  Examination of the right lower extremity patient has edema as described above.  No obvious pain or tenderness to palpation of the tibia but does state that it hurts with palpation.  Patient does complain of some foot pain with palpation over the midfoot there is significant swelling.  2+ DP pulses felt bilaterally.    Skin: Warm, Dry,  No rash.  Patient has some slight erythema bilaterally with dorsum of the foot no significant warmth to the area or obvious fluctuance.  Neurologic: Alert & oriented to the fact that he is in the hospital " but does not know the year, month or his age.  No obvious focal deficit           RADIOLOGY/PROCEDURES  DX-ANKLE 3+ VIEWS LEFT   Final Result         1.  Cortical irregularity of the posterior distal tibial malleolus, appearance most compatible with subtle fracture.   2.  Soft tissue swelling of the ankle   3.  Atherosclerosis      US-EXTREMITY VENOUS LOWER BILAT   Final Result         1.  No evidence of bilateral lower extremity deep venous thrombosis. Examination technically limited due to limited mobility.      DX-FOOT-COMPLETE 3+ RIGHT   Final Result         1.  Medial malleolus fracture.   2.  Lucency of ankle syndesmosis fixation screws concerning for hardware loosening.   3.  Atherosclerosis      DX-FOOT-COMPLETE 3+ LEFT   Final Result         1.  No acute traumatic bony injury.   2.  Soft tissue swelling of the ankle.   3.  Diffuse osteopenia compatible with osteoporosis      DX-TIBIA AND FIBULA LEFT   Final Result         1.  Suspect fracture of the lateral and medial malleolus, incompletely visualized, dedicated views of the ankle for further characterization.      DX-TIBIA AND FIBULA RIGHT   Final Result         1.  No acute traumatic bony injury.   2.  Perihardware lucency of the upper most ORIF screw at the ankle, concerning for hardware loosening      CT-HEAD W/O   Final Result      No acute intracranial hemorrhage is identified.      Minimal nonspecific white matter hypodensity could indicate gliosis, demyelinating disease or age indeterminate ischemia          Laboratory tests  Results for orders placed or performed during the hospital encounter of 04/01/21   CBC WITH DIFFERENTIAL   Result Value Ref Range    WBC 9.6 4.8 - 10.8 K/uL    RBC 4.51 (L) 4.70 - 6.10 M/uL    Hemoglobin 13.0 (L) 14.0 - 18.0 g/dL    Hematocrit 41.2 (L) 42.0 - 52.0 %    MCV 91.4 81.4 - 97.8 fL    MCH 28.8 27.0 - 33.0 pg    MCHC 31.6 (L) 33.7 - 35.3 g/dL    RDW 46.1 35.9 - 50.0 fL    Platelet Count 306 164 - 446 K/uL    MPV 10.0  9.0 - 12.9 fL    Neutrophils-Polys 66.60 44.00 - 72.00 %    Lymphocytes 22.50 22.00 - 41.00 %    Monocytes 5.90 0.00 - 13.40 %    Eosinophils 3.20 0.00 - 6.90 %    Basophils 1.30 0.00 - 1.80 %    Immature Granulocytes 0.50 0.00 - 0.90 %    Nucleated RBC 0.00 /100 WBC    Neutrophils (Absolute) 6.37 1.82 - 7.42 K/uL    Lymphs (Absolute) 2.16 1.00 - 4.80 K/uL    Monos (Absolute) 0.57 0.00 - 0.85 K/uL    Eos (Absolute) 0.31 0.00 - 0.51 K/uL    Baso (Absolute) 0.12 0.00 - 0.12 K/uL    Immature Granulocytes (abs) 0.05 0.00 - 0.11 K/uL    NRBC (Absolute) 0.00 K/uL   BASIC METABOLIC PANEL   Result Value Ref Range    Sodium 140 135 - 145 mmol/L    Potassium 4.0 3.6 - 5.5 mmol/L    Chloride 105 96 - 112 mmol/L    Co2 23 20 - 33 mmol/L    Glucose 106 (H) 65 - 99 mg/dL    Bun 12 8 - 22 mg/dL    Creatinine 0.80 0.50 - 1.40 mg/dL    Calcium 9.9 8.4 - 10.2 mg/dL    Anion Gap 12.0 7.0 - 16.0   APTT   Result Value Ref Range    APTT 34.9 24.7 - 36.0 sec   PROTHROMBIN TIME (INR)   Result Value Ref Range    PT 13.8 12.0 - 14.6 sec    INR 1.09 0.87 - 1.13   ESTIMATED GFR   Result Value Ref Range    GFR If African American >60 >60 mL/min/1.73 m 2    GFR If Non African American >60 >60 mL/min/1.73 m 2         Medications given in the ER  Medications - No data to display    COURSE & MEDICAL DECISION MAKING  Pertinent Labs & Imaging studies reviewed. (See chart for details)  Differential includes fracture, sprain, cellulitis, peripheral edema, intracranial hemorrhage    I verified that the patient was wearing a mask and I was wearing appropriate PPE every time I entered the room. The patient's mask was on the patient at all times during my encounter.    Reviewed the patient's x-rays where there was felt to be a possible subtle malleolar fracture in his left ankle therefore x-rays of the left ankle were obtained.  This shows it just a posterior fracture.    Discussed the case with Dr. San orthopedic surgery on-call for Dr. Pereira.   Discussed the patient's altered mental status and how best to splint the patient's posterior malleolus fracture.  Is felt that the patient would probably do best with a walking boot.  The patient can be admitted weightbearing as tolerated and follow-up with Dr. Pereira in his office.    I discussed x-ray findings with the patient and his guardian.  Patient was placed in a walking boot at this point time we will give him a short prescription for Norco in case he is having severe pain.    Medical decision making: At this point time I think the patient may have suffered a slight posterior malleolus fracture when he fell out of bed or fell trying to get out of bed while at the group home.  This does not appear to be significantly displaced.  After discussion with orthopedics he will be placed in a walking boot and made nonweightbearing.  X-rays were obtained of both lower extremities as the patient has pretty significant cephalopathy which is been for the last several months.  No other injuries are found.  I think the patient's edema is likely secondary to him not getting out of bed.  I discussed elevating the patient's legs with the guarding as well as the patient.  CT the head was noted because the patient may have had a fall it is unclear if he could have hit his head and he was altered this is negative for intracranial hemorrhage.    I reviewed prescription monitoring program for patient's narcotic use before prescribing a scheduled drug.The patient will not drink alcohol nor drive with prescribed medications. The patient will return for new or worsening symptoms and is stable at the time of discharge.    The patient is referred to a primary physician for blood pressure management, diabetic screening, and for all other preventative health concerns.    In prescribing controlled substances to this patient, I certify that I have obtained and reviewed the medical history of Yury Blake. I have also made a good  leny effort to obtain applicable records from other providers who have treated the patient and records did not demonstrate any increased risk of substance abuse that would prevent me from prescribing controlled substances.     I have conducted a physical exam and documented it. I have reviewed Mr. Blake’s prescription history as maintained by the [place] Prescription Monitoring Program.     I have assessed the patient’s risk for abuse, dependency, and addiction using the validated Opioid Risk Tool available at https://www.mdcalc.com/uiiqnk-yglc-jfbj-ort-narcotic-abuse.     Given the above, I believe the benefits of controlled substance therapy outweigh the risks. The reasons for prescribing controlled substances include non-narcotic, oral analgesic alternatives have been inadequate for pain control. Accordingly, I have discussed the risk and benefits, treatment plan, and alternative therapies with the patient.       DISPOSITION:  Patient will be discharged home in stable condition.    FOLLOW UP:  Donny Roque M.D.  [address] [facility] [address]  [phone]    Schedule an appointment as soon as possible for a visit in 5 days        OUTPATIENT MEDICATIONS:  Discharge Medication List as of 4/1/2021 11:50 PM        START taking these medications    Details   HYDROcodone-acetaminophen (NORCO) 5-325 MG Tab per tablet Take 1-2 Tablets by mouth every 6 hours as needed for up to 4 days., Disp-12 tablet, R-0, Normal               FINAL IMPRESSION  1. Closed fracture of posterior malleolus of left tibia, initial encounter      Electronically signed by: Karl Sheldon M.D., 4/1/2021 7:05 PM    This record was made with a voice recognition software. The software is not perfect. I have tried to correct any grammar, spelling or context errors to the best of my ability, but errors may still remain. Interpretation of this chart should be taken in this context.

## 2021-04-02 NOTE — ED TRIAGE NOTES
"Pt presents from a group home.  He is accompanied by his caregiver.  He C/O recurrence of GL falls within the past 5 days with already determined \"fractures\" via X rays, affecting his lower extremities bilaterally.  He is referred to our facility for further evaluation and management.   Chief Complaint   Patient presents with   • T-5000 FALL   • Leg Injury     /85   Pulse 96   Temp 36.4 °C (97.6 °F) (Temporal)   Resp 16   Ht 1.803 m (5' 11\")   Wt 90 kg (198 lb 6.6 oz)   SpO2 94%   BMI 27.67 kg/m²      "

## 2021-04-02 NOTE — DISCHARGE INSTRUCTIONS
Wear the walking boot when out of bed.  Weightbearing as tolerated.  Return the emergency department for increasing pain, fever, chills, redness going up your leg or cold blue foot.

## 2021-04-11 ENCOUNTER — HOSPITAL ENCOUNTER (INPATIENT)
Facility: MEDICAL CENTER | Age: 51
LOS: 4 days | DRG: 573 | End: 2021-04-15
Attending: EMERGENCY MEDICINE | Admitting: STUDENT IN AN ORGANIZED HEALTH CARE EDUCATION/TRAINING PROGRAM
Payer: MEDICAID

## 2021-04-11 DIAGNOSIS — S91.302A OPEN WOUND OF LEFT FOOT, INITIAL ENCOUNTER: ICD-10-CM

## 2021-04-11 LAB
ANION GAP SERPL CALC-SCNC: 8 MMOL/L (ref 7–16)
BASOPHILS # BLD AUTO: 1.3 % (ref 0–1.8)
BASOPHILS # BLD: 0.09 K/UL (ref 0–0.12)
BUN SERPL-MCNC: 14 MG/DL (ref 8–22)
CALCIUM SERPL-MCNC: 9.6 MG/DL (ref 8.5–10.5)
CHLORIDE SERPL-SCNC: 107 MMOL/L (ref 96–112)
CO2 SERPL-SCNC: 25 MMOL/L (ref 20–33)
CREAT SERPL-MCNC: 0.77 MG/DL (ref 0.5–1.4)
CRP SERPL HS-MCNC: 2.17 MG/DL (ref 0–0.75)
EOSINOPHIL # BLD AUTO: 0.29 K/UL (ref 0–0.51)
EOSINOPHIL NFR BLD: 4 % (ref 0–6.9)
ERYTHROCYTE [DISTWIDTH] IN BLOOD BY AUTOMATED COUNT: 48.2 FL (ref 35.9–50)
ERYTHROCYTE [SEDIMENTATION RATE] IN BLOOD BY WESTERGREN METHOD: 15 MM/HOUR (ref 0–15)
GLUCOSE SERPL-MCNC: 102 MG/DL (ref 65–99)
HCT VFR BLD AUTO: 39 % (ref 42–52)
HGB BLD-MCNC: 12.2 G/DL (ref 14–18)
IMM GRANULOCYTES # BLD AUTO: 0.03 K/UL (ref 0–0.11)
IMM GRANULOCYTES NFR BLD AUTO: 0.4 % (ref 0–0.9)
LYMPHOCYTES # BLD AUTO: 1.89 K/UL (ref 1–4.8)
LYMPHOCYTES NFR BLD: 26.4 % (ref 22–41)
MAGNESIUM SERPL-MCNC: 2.3 MG/DL (ref 1.5–2.5)
MCH RBC QN AUTO: 29 PG (ref 27–33)
MCHC RBC AUTO-ENTMCNC: 31.3 G/DL (ref 33.7–35.3)
MCV RBC AUTO: 92.6 FL (ref 81.4–97.8)
MONOCYTES # BLD AUTO: 0.59 K/UL (ref 0–0.85)
MONOCYTES NFR BLD AUTO: 8.2 % (ref 0–13.4)
NEUTROPHILS # BLD AUTO: 4.28 K/UL (ref 1.82–7.42)
NEUTROPHILS NFR BLD: 59.7 % (ref 44–72)
NRBC # BLD AUTO: 0 K/UL
NRBC BLD-RTO: 0 /100 WBC
PLATELET # BLD AUTO: 272 K/UL (ref 164–446)
PMV BLD AUTO: 11 FL (ref 9–12.9)
POTASSIUM SERPL-SCNC: 3.8 MMOL/L (ref 3.6–5.5)
RBC # BLD AUTO: 4.21 M/UL (ref 4.7–6.1)
SARS-COV+SARS-COV-2 AG RESP QL IA.RAPID: NOTDETECTED
SODIUM SERPL-SCNC: 140 MMOL/L (ref 135–145)
SPECIMEN SOURCE: NORMAL
WBC # BLD AUTO: 7.2 K/UL (ref 4.8–10.8)

## 2021-04-11 PROCEDURE — 99285 EMERGENCY DEPT VISIT HI MDM: CPT

## 2021-04-11 PROCEDURE — 86140 C-REACTIVE PROTEIN: CPT

## 2021-04-11 PROCEDURE — 85652 RBC SED RATE AUTOMATED: CPT

## 2021-04-11 PROCEDURE — C9803 HOPD COVID-19 SPEC COLLECT: HCPCS | Performed by: EMERGENCY MEDICINE

## 2021-04-11 PROCEDURE — 80048 BASIC METABOLIC PNL TOTAL CA: CPT

## 2021-04-11 PROCEDURE — 99223 1ST HOSP IP/OBS HIGH 75: CPT | Performed by: STUDENT IN AN ORGANIZED HEALTH CARE EDUCATION/TRAINING PROGRAM

## 2021-04-11 PROCEDURE — 85025 COMPLETE CBC W/AUTO DIFF WBC: CPT

## 2021-04-11 PROCEDURE — 87426 SARSCOV CORONAVIRUS AG IA: CPT

## 2021-04-11 PROCEDURE — U0003 INFECTIOUS AGENT DETECTION BY NUCLEIC ACID (DNA OR RNA); SEVERE ACUTE RESPIRATORY SYNDROME CORONAVIRUS 2 (SARS-COV-2) (CORONAVIRUS DISEASE [COVID-19]), AMPLIFIED PROBE TECHNIQUE, MAKING USE OF HIGH THROUGHPUT TECHNOLOGIES AS DESCRIBED BY CMS-2020-01-R: HCPCS

## 2021-04-11 PROCEDURE — 770006 HCHG ROOM/CARE - MED/SURG/GYN SEMI*

## 2021-04-11 PROCEDURE — U0005 INFEC AGEN DETEC AMPLI PROBE: HCPCS

## 2021-04-11 PROCEDURE — 83735 ASSAY OF MAGNESIUM: CPT

## 2021-04-11 RX ORDER — TAMSULOSIN HYDROCHLORIDE 0.4 MG/1
0.8 CAPSULE ORAL
Status: DISCONTINUED | OUTPATIENT
Start: 2021-04-12 | End: 2021-04-15 | Stop reason: HOSPADM

## 2021-04-11 RX ORDER — ACETAMINOPHEN 325 MG/1
650 TABLET ORAL EVERY 6 HOURS PRN
Status: DISCONTINUED | OUTPATIENT
Start: 2021-04-11 | End: 2021-04-15 | Stop reason: HOSPADM

## 2021-04-11 RX ORDER — ENALAPRILAT 1.25 MG/ML
1.25 INJECTION INTRAVENOUS EVERY 6 HOURS PRN
Status: DISCONTINUED | OUTPATIENT
Start: 2021-04-11 | End: 2021-04-15 | Stop reason: HOSPADM

## 2021-04-11 RX ORDER — PROCHLORPERAZINE EDISYLATE 5 MG/ML
5-10 INJECTION INTRAMUSCULAR; INTRAVENOUS EVERY 4 HOURS PRN
Status: DISCONTINUED | OUTPATIENT
Start: 2021-04-11 | End: 2021-04-15 | Stop reason: HOSPADM

## 2021-04-11 RX ORDER — BISACODYL 10 MG
10 SUPPOSITORY, RECTAL RECTAL
Status: DISCONTINUED | OUTPATIENT
Start: 2021-04-11 | End: 2021-04-15 | Stop reason: HOSPADM

## 2021-04-11 RX ORDER — PROMETHAZINE HYDROCHLORIDE 25 MG/1
12.5-25 SUPPOSITORY RECTAL EVERY 4 HOURS PRN
Status: DISCONTINUED | OUTPATIENT
Start: 2021-04-11 | End: 2021-04-15 | Stop reason: HOSPADM

## 2021-04-11 RX ORDER — GUAIFENESIN/DEXTROMETHORPHAN 100-10MG/5
10 SYRUP ORAL EVERY 6 HOURS PRN
Status: DISCONTINUED | OUTPATIENT
Start: 2021-04-11 | End: 2021-04-15 | Stop reason: HOSPADM

## 2021-04-11 RX ORDER — CLONIDINE HYDROCHLORIDE 0.1 MG/1
0.1 TABLET ORAL EVERY 6 HOURS PRN
Status: DISCONTINUED | OUTPATIENT
Start: 2021-04-11 | End: 2021-04-15 | Stop reason: HOSPADM

## 2021-04-11 RX ORDER — PROMETHAZINE HYDROCHLORIDE 25 MG/1
12.5-25 TABLET ORAL EVERY 4 HOURS PRN
Status: DISCONTINUED | OUTPATIENT
Start: 2021-04-11 | End: 2021-04-15 | Stop reason: HOSPADM

## 2021-04-11 RX ORDER — POLYETHYLENE GLYCOL 3350 17 G/17G
1 POWDER, FOR SOLUTION ORAL
Status: DISCONTINUED | OUTPATIENT
Start: 2021-04-11 | End: 2021-04-15 | Stop reason: HOSPADM

## 2021-04-11 RX ORDER — ONDANSETRON 4 MG/1
4 TABLET, ORALLY DISINTEGRATING ORAL EVERY 4 HOURS PRN
Status: DISCONTINUED | OUTPATIENT
Start: 2021-04-11 | End: 2021-04-15 | Stop reason: HOSPADM

## 2021-04-11 RX ORDER — AMOXICILLIN 250 MG
2 CAPSULE ORAL 2 TIMES DAILY
Status: DISCONTINUED | OUTPATIENT
Start: 2021-04-11 | End: 2021-04-15 | Stop reason: HOSPADM

## 2021-04-11 RX ORDER — RISPERIDONE 2 MG/1
2 TABLET ORAL 2 TIMES DAILY
Status: DISCONTINUED | OUTPATIENT
Start: 2021-04-12 | End: 2021-04-15 | Stop reason: HOSPADM

## 2021-04-11 RX ORDER — ONDANSETRON 2 MG/ML
4 INJECTION INTRAMUSCULAR; INTRAVENOUS EVERY 4 HOURS PRN
Status: DISCONTINUED | OUTPATIENT
Start: 2021-04-11 | End: 2021-04-15 | Stop reason: HOSPADM

## 2021-04-11 ASSESSMENT — FIBROSIS 4 INDEX: FIB4 SCORE: 0.65

## 2021-04-11 ASSESSMENT — PAIN DESCRIPTION - PAIN TYPE: TYPE: CHRONIC PAIN

## 2021-04-12 ENCOUNTER — ANESTHESIA EVENT (OUTPATIENT)
Dept: SURGERY | Facility: MEDICAL CENTER | Age: 51
DRG: 573 | End: 2021-04-12
Payer: MEDICAID

## 2021-04-12 ENCOUNTER — ANESTHESIA (OUTPATIENT)
Dept: SURGERY | Facility: MEDICAL CENTER | Age: 51
DRG: 573 | End: 2021-04-12
Payer: MEDICAID

## 2021-04-12 PROBLEM — S82.392A CLOSED FRACTURE OF POSTERIOR MALLEOLUS OF LEFT TIBIA: Status: ACTIVE | Noted: 2021-04-12

## 2021-04-12 PROBLEM — R41.89 COGNITIVE DECLINE: Status: ACTIVE | Noted: 2020-09-09

## 2021-04-12 LAB
ABO + RH BLD: NORMAL
ABO GROUP BLD: NORMAL
ALBUMIN SERPL BCP-MCNC: 3.5 G/DL (ref 3.2–4.9)
ALBUMIN/GLOB SERPL: 1.3 G/DL
ALP SERPL-CCNC: 131 U/L (ref 30–99)
ALT SERPL-CCNC: 15 U/L (ref 2–50)
ANION GAP SERPL CALC-SCNC: 9 MMOL/L (ref 7–16)
AST SERPL-CCNC: 17 U/L (ref 12–45)
BASOPHILS # BLD AUTO: 1.1 % (ref 0–1.8)
BASOPHILS # BLD: 0.07 K/UL (ref 0–0.12)
BILIRUB SERPL-MCNC: 0.3 MG/DL (ref 0.1–1.5)
BLD GP AB SCN SERPL QL: NORMAL
BUN SERPL-MCNC: 13 MG/DL (ref 8–22)
CALCIUM SERPL-MCNC: 9.5 MG/DL (ref 8.5–10.5)
CHLORIDE SERPL-SCNC: 108 MMOL/L (ref 96–112)
CHOLEST SERPL-MCNC: 128 MG/DL (ref 100–199)
CO2 SERPL-SCNC: 23 MMOL/L (ref 20–33)
CREAT SERPL-MCNC: 0.65 MG/DL (ref 0.5–1.4)
EKG IMPRESSION: NORMAL
EOSINOPHIL # BLD AUTO: 0.33 K/UL (ref 0–0.51)
EOSINOPHIL NFR BLD: 5 % (ref 0–6.9)
ERYTHROCYTE [DISTWIDTH] IN BLOOD BY AUTOMATED COUNT: 47.3 FL (ref 35.9–50)
GLOBULIN SER CALC-MCNC: 2.6 G/DL (ref 1.9–3.5)
GLUCOSE SERPL-MCNC: 86 MG/DL (ref 65–99)
GRAM STN SPEC: NORMAL
HCT VFR BLD AUTO: 38.2 % (ref 42–52)
HDLC SERPL-MCNC: 34 MG/DL
HGB BLD-MCNC: 12.1 G/DL (ref 14–18)
IMM GRANULOCYTES # BLD AUTO: 0.02 K/UL (ref 0–0.11)
IMM GRANULOCYTES NFR BLD AUTO: 0.3 % (ref 0–0.9)
INR PPP: 1.12 (ref 0.87–1.13)
LDLC SERPL CALC-MCNC: 74 MG/DL
LYMPHOCYTES # BLD AUTO: 1.98 K/UL (ref 1–4.8)
LYMPHOCYTES NFR BLD: 29.9 % (ref 22–41)
MCH RBC QN AUTO: 28.9 PG (ref 27–33)
MCHC RBC AUTO-ENTMCNC: 31.7 G/DL (ref 33.7–35.3)
MCV RBC AUTO: 91.4 FL (ref 81.4–97.8)
MONOCYTES # BLD AUTO: 0.5 K/UL (ref 0–0.85)
MONOCYTES NFR BLD AUTO: 7.6 % (ref 0–13.4)
NEUTROPHILS # BLD AUTO: 3.72 K/UL (ref 1.82–7.42)
NEUTROPHILS NFR BLD: 56.1 % (ref 44–72)
NRBC # BLD AUTO: 0 K/UL
NRBC BLD-RTO: 0 /100 WBC
PLATELET # BLD AUTO: 264 K/UL (ref 164–446)
PMV BLD AUTO: 10.9 FL (ref 9–12.9)
POTASSIUM SERPL-SCNC: 3.5 MMOL/L (ref 3.6–5.5)
PROT SERPL-MCNC: 6.1 G/DL (ref 6–8.2)
PROTHROMBIN TIME: 14.8 SEC (ref 12–14.6)
RBC # BLD AUTO: 4.18 M/UL (ref 4.7–6.1)
RH BLD: NORMAL
SARS-COV-2 RNA RESP QL NAA+PROBE: NOTDETECTED
SIGNIFICANT IND 70042: NORMAL
SITE SITE: NORMAL
SODIUM SERPL-SCNC: 140 MMOL/L (ref 135–145)
SOURCE SOURCE: NORMAL
SPECIMEN SOURCE: NORMAL
TRIGL SERPL-MCNC: 100 MG/DL (ref 0–149)
WBC # BLD AUTO: 6.6 K/UL (ref 4.8–10.8)

## 2021-04-12 PROCEDURE — 86901 BLOOD TYPING SEROLOGIC RH(D): CPT

## 2021-04-12 PROCEDURE — 501838 HCHG SUTURE GENERAL: Performed by: ORTHOPAEDIC SURGERY

## 2021-04-12 PROCEDURE — 80061 LIPID PANEL: CPT

## 2021-04-12 PROCEDURE — 36415 COLL VENOUS BLD VENIPUNCTURE: CPT

## 2021-04-12 PROCEDURE — 700102 HCHG RX REV CODE 250 W/ 637 OVERRIDE(OP): Performed by: STUDENT IN AN ORGANIZED HEALTH CARE EDUCATION/TRAINING PROGRAM

## 2021-04-12 PROCEDURE — 503339 HCHG DRESSSING PICO: Performed by: ORTHOPAEDIC SURGERY

## 2021-04-12 PROCEDURE — 500881 HCHG PACK, EXTREMITY: Performed by: ORTHOPAEDIC SURGERY

## 2021-04-12 PROCEDURE — 87040 BLOOD CULTURE FOR BACTERIA: CPT

## 2021-04-12 PROCEDURE — A6223 GAUZE >16<=48 NO W/SAL W/O B: HCPCS | Performed by: ORTHOPAEDIC SURGERY

## 2021-04-12 PROCEDURE — 160035 HCHG PACU - 1ST 60 MINS PHASE I: Performed by: ORTHOPAEDIC SURGERY

## 2021-04-12 PROCEDURE — 99232 SBSQ HOSP IP/OBS MODERATE 35: CPT | Performed by: INTERNAL MEDICINE

## 2021-04-12 PROCEDURE — 87205 SMEAR GRAM STAIN: CPT

## 2021-04-12 PROCEDURE — 0QBM0ZZ EXCISION OF LEFT TARSAL, OPEN APPROACH: ICD-10-PCS | Performed by: ORTHOPAEDIC SURGERY

## 2021-04-12 PROCEDURE — 700101 HCHG RX REV CODE 250: Performed by: ORTHOPAEDIC SURGERY

## 2021-04-12 PROCEDURE — 87070 CULTURE OTHR SPECIMN AEROBIC: CPT

## 2021-04-12 PROCEDURE — 770006 HCHG ROOM/CARE - MED/SURG/GYN SEMI*

## 2021-04-12 PROCEDURE — 700111 HCHG RX REV CODE 636 W/ 250 OVERRIDE (IP): Performed by: ANESTHESIOLOGY

## 2021-04-12 PROCEDURE — 93010 ELECTROCARDIOGRAM REPORT: CPT | Performed by: INTERNAL MEDICINE

## 2021-04-12 PROCEDURE — 85610 PROTHROMBIN TIME: CPT

## 2021-04-12 PROCEDURE — 160048 HCHG OR STATISTICAL LEVEL 1-5: Performed by: ORTHOPAEDIC SURGERY

## 2021-04-12 PROCEDURE — 0HRNXK3 REPLACEMENT OF LEFT FOOT SKIN WITH NONAUTOLOGOUS TISSUE SUBSTITUTE, FULL THICKNESS, EXTERNAL APPROACH: ICD-10-PCS | Performed by: ORTHOPAEDIC SURGERY

## 2021-04-12 PROCEDURE — 85025 COMPLETE CBC W/AUTO DIFF WBC: CPT

## 2021-04-12 PROCEDURE — 502000 HCHG MISC OR IMPLANTS RC 0278: Performed by: ORTHOPAEDIC SURGERY

## 2021-04-12 PROCEDURE — 93005 ELECTROCARDIOGRAM TRACING: CPT | Performed by: INTERNAL MEDICINE

## 2021-04-12 PROCEDURE — 80053 COMPREHEN METABOLIC PANEL: CPT

## 2021-04-12 PROCEDURE — 160038 HCHG SURGERY MINUTES - EA ADDL 1 MIN LEVEL 2: Performed by: ORTHOPAEDIC SURGERY

## 2021-04-12 PROCEDURE — 700105 HCHG RX REV CODE 258: Performed by: ANESTHESIOLOGY

## 2021-04-12 PROCEDURE — 86900 BLOOD TYPING SEROLOGIC ABO: CPT

## 2021-04-12 PROCEDURE — 160009 HCHG ANES TIME/MIN: Performed by: ORTHOPAEDIC SURGERY

## 2021-04-12 PROCEDURE — A6454 SELF-ADHER BAND W>=3" <5"/YD: HCPCS | Performed by: ORTHOPAEDIC SURGERY

## 2021-04-12 PROCEDURE — 160002 HCHG RECOVERY MINUTES (STAT): Performed by: ORTHOPAEDIC SURGERY

## 2021-04-12 PROCEDURE — 86850 RBC ANTIBODY SCREEN: CPT

## 2021-04-12 PROCEDURE — 160027 HCHG SURGERY MINUTES - 1ST 30 MINS LEVEL 2: Performed by: ORTHOPAEDIC SURGERY

## 2021-04-12 PROCEDURE — A9270 NON-COVERED ITEM OR SERVICE: HCPCS | Performed by: STUDENT IN AN ORGANIZED HEALTH CARE EDUCATION/TRAINING PROGRAM

## 2021-04-12 PROCEDURE — 501330 HCHG SET, CYSTO IRRIG TUBING: Performed by: ORTHOPAEDIC SURGERY

## 2021-04-12 DEVICE — GRAFT AMNIOFILL HUMAN TISSUE 500MG: Type: IMPLANTABLE DEVICE | Status: FUNCTIONAL

## 2021-04-12 DEVICE — IMPLANTABLE DEVICE: Type: IMPLANTABLE DEVICE | Status: FUNCTIONAL

## 2021-04-12 RX ORDER — HALOPERIDOL 5 MG/ML
1 INJECTION INTRAMUSCULAR
Status: DISCONTINUED | OUTPATIENT
Start: 2021-04-12 | End: 2021-04-12 | Stop reason: HOSPADM

## 2021-04-12 RX ORDER — MEPERIDINE HYDROCHLORIDE 25 MG/ML
12.5 INJECTION INTRAMUSCULAR; INTRAVENOUS; SUBCUTANEOUS
Status: DISCONTINUED | OUTPATIENT
Start: 2021-04-12 | End: 2021-04-12 | Stop reason: HOSPADM

## 2021-04-12 RX ORDER — CEFAZOLIN SODIUM 1 G/3ML
INJECTION, POWDER, FOR SOLUTION INTRAMUSCULAR; INTRAVENOUS PRN
Status: DISCONTINUED | OUTPATIENT
Start: 2021-04-12 | End: 2021-04-12 | Stop reason: SURG

## 2021-04-12 RX ORDER — HYDROMORPHONE HYDROCHLORIDE 1 MG/ML
0.6 INJECTION, SOLUTION INTRAMUSCULAR; INTRAVENOUS; SUBCUTANEOUS
Status: DISCONTINUED | OUTPATIENT
Start: 2021-04-12 | End: 2021-04-12 | Stop reason: HOSPADM

## 2021-04-12 RX ORDER — ONDANSETRON 2 MG/ML
4 INJECTION INTRAMUSCULAR; INTRAVENOUS
Status: DISCONTINUED | OUTPATIENT
Start: 2021-04-12 | End: 2021-04-12 | Stop reason: HOSPADM

## 2021-04-12 RX ORDER — OXYCODONE HCL 5 MG/5 ML
10 SOLUTION, ORAL ORAL
Status: DISCONTINUED | OUTPATIENT
Start: 2021-04-12 | End: 2021-04-12 | Stop reason: HOSPADM

## 2021-04-12 RX ORDER — HYDROMORPHONE HYDROCHLORIDE 1 MG/ML
0.2 INJECTION, SOLUTION INTRAMUSCULAR; INTRAVENOUS; SUBCUTANEOUS
Status: DISCONTINUED | OUTPATIENT
Start: 2021-04-12 | End: 2021-04-12 | Stop reason: HOSPADM

## 2021-04-12 RX ORDER — HYDROMORPHONE HYDROCHLORIDE 1 MG/ML
0.4 INJECTION, SOLUTION INTRAMUSCULAR; INTRAVENOUS; SUBCUTANEOUS
Status: DISCONTINUED | OUTPATIENT
Start: 2021-04-12 | End: 2021-04-12 | Stop reason: HOSPADM

## 2021-04-12 RX ORDER — SODIUM CHLORIDE, SODIUM LACTATE, POTASSIUM CHLORIDE, CALCIUM CHLORIDE 600; 310; 30; 20 MG/100ML; MG/100ML; MG/100ML; MG/100ML
INJECTION, SOLUTION INTRAVENOUS CONTINUOUS
Status: DISCONTINUED | OUTPATIENT
Start: 2021-04-12 | End: 2021-04-12 | Stop reason: HOSPADM

## 2021-04-12 RX ORDER — BUPIVACAINE HYDROCHLORIDE 5 MG/ML
INJECTION, SOLUTION EPIDURAL; INTRACAUDAL
Status: DISCONTINUED | OUTPATIENT
Start: 2021-04-12 | End: 2021-04-12 | Stop reason: HOSPADM

## 2021-04-12 RX ORDER — OXYCODONE HCL 5 MG/5 ML
5 SOLUTION, ORAL ORAL
Status: DISCONTINUED | OUTPATIENT
Start: 2021-04-12 | End: 2021-04-12 | Stop reason: HOSPADM

## 2021-04-12 RX ORDER — DIPHENHYDRAMINE HYDROCHLORIDE 50 MG/ML
12.5 INJECTION INTRAMUSCULAR; INTRAVENOUS
Status: DISCONTINUED | OUTPATIENT
Start: 2021-04-12 | End: 2021-04-12 | Stop reason: HOSPADM

## 2021-04-12 RX ORDER — SODIUM CHLORIDE, SODIUM LACTATE, POTASSIUM CHLORIDE, CALCIUM CHLORIDE 600; 310; 30; 20 MG/100ML; MG/100ML; MG/100ML; MG/100ML
INJECTION, SOLUTION INTRAVENOUS
Status: DISCONTINUED | OUTPATIENT
Start: 2021-04-12 | End: 2021-04-12 | Stop reason: SURG

## 2021-04-12 RX ADMIN — RISPERIDONE 2 MG: 2 TABLET ORAL at 17:42

## 2021-04-12 RX ADMIN — SODIUM CHLORIDE, POTASSIUM CHLORIDE, SODIUM LACTATE AND CALCIUM CHLORIDE: 600; 310; 30; 20 INJECTION, SOLUTION INTRAVENOUS at 18:58

## 2021-04-12 RX ADMIN — FENTANYL CITRATE 100 MCG: 50 INJECTION, SOLUTION INTRAMUSCULAR; INTRAVENOUS at 19:00

## 2021-04-12 RX ADMIN — CEFAZOLIN 2 G: 330 INJECTION, POWDER, FOR SOLUTION INTRAMUSCULAR; INTRAVENOUS at 19:02

## 2021-04-12 RX ADMIN — PROPOFOL 150 MG: 10 INJECTION, EMULSION INTRAVENOUS at 18:59

## 2021-04-12 RX ADMIN — DOCUSATE SODIUM 50 MG AND SENNOSIDES 8.6 MG 2 TABLET: 8.6; 5 TABLET, FILM COATED ORAL at 05:13

## 2021-04-12 RX ADMIN — RISPERIDONE 2 MG: 2 TABLET ORAL at 05:14

## 2021-04-12 RX ADMIN — TAMSULOSIN HYDROCHLORIDE 0.8 MG: 0.4 CAPSULE ORAL at 09:17

## 2021-04-12 ASSESSMENT — ENCOUNTER SYMPTOMS
PALPITATIONS: 0
DIARRHEA: 0
SHORTNESS OF BREATH: 0
FEVER: 0
ABDOMINAL PAIN: 0
CHILLS: 0

## 2021-04-12 ASSESSMENT — PAIN DESCRIPTION - PAIN TYPE
TYPE: SURGICAL PAIN

## 2021-04-12 ASSESSMENT — PAIN SCALES - GENERAL: PAIN_LEVEL: 2

## 2021-04-12 NOTE — ASSESSMENT & PLAN NOTE
S/p I&D on 4/12.  Hold abx per Ortho.    LPS following, appreciate assistance. Will need ongoing wound care after discharge.

## 2021-04-12 NOTE — DISCHARGE PLANNING
Care Transition Team Assessment    LSW completed chart review and used that information to complete assessment. Pt has a public guardian Deonte Xie [phone]. Pt has an ex-wife Kellee who lives in [place], however no other family. Pt is wheelchair bound and requires assistance with ADLs and IADLs. Pt currently lives at [facility] at [address] [place] [address]. , Sonali Stone [phone], will be able to provide transportation upon DC.      Information Source  Orientation : Unable to Assess  Information Given By: Other (Comments)(Chart review)  Who is responsible for making decisions for patient? : Guardian  Name(s) of Primary Decision Maker: Deonte Xie    Readmission Evaluation  Is this a readmission?: No    Elopement Risk  Legal Hold: No  Elopement Risk: Not at Risk for Elopement    Discharge Preparedness  What is your plan after discharge?: skilled nursing  What are your discharge supports?: Guardian  Prior Functional Level: Needs Assist with Activities of Daily Living, Needs Assist with Medication Management, Wheelchair Dependent  Difficulity with ADLs: Bathing, Dressing, Walking, Toileting  Difficulity with IADLs: Cooking, Driving, Keeping track of finances, Managing medication, Shopping    Functional Assesment  Prior Functional Level: Needs Assist with Activities of Daily Living, Needs Assist with Medication Management, Wheelchair Dependent    Finances  Financial Barriers to Discharge: No    Vision / Hearing Impairment  Hearing Impairment: Both Ears, Hearing Device(s) Available    Advance Directive  Advance Directive?: None    Domestic Abuse  Have you ever been the victim of abuse or violence?: No    Psychological Assessment  History of Substance Abuse: Alcohol  History of Psychiatric Problems: No  Non-compliant with Treatment: No  Newly Diagnosed Illness: Yes    Discharge Risks or Barriers  Discharge risks or barriers?: No  Patient risk factors: Cognitive / sensory / physical  deficit    Anticipated Discharge Information  Discharge Disposition: D/T to facility providing senior living/supportive care (04)  Discharge Address: [address] [address]

## 2021-04-12 NOTE — ED TRIAGE NOTES
"Pt bib caregiver from group home c/o \"was told we need to get him an I&D of his left foot\" pt has a wound to his heal with dressing in place. Caregiver states Dr. Roque was md who advised them to come to ED. Pt is disoriented but per caregiver is at his baseline. Nad.   "

## 2021-04-12 NOTE — ED PROVIDER NOTES
ED Provider Note    CHIEF COMPLAINT  Chief Complaint   Patient presents with   • Wound Infection       HPI  Yury Blake is a 50 y.o. male who presents with a chief complaint of potential wound infection.  Patient presents with the owner of his group home and is unable to provide any information regarding his care due to baseline mental status changes.  He recently fractured his heel and was seen in the emergency department at which time he was placed in a walking boot and instructed to follow-up with orthopedics, Dr. Roque.  The patient did this and Dr. Roque was concerned for potential infection in the bone so requested that he be brought to the ER on Sunday night (tonight), admitted, and Dr. Roque will take him to the OR for debridement on Monday (tomorrow).  He has not had any fevers and is otherwise at his baseline mental state.  He is wheelchair-bound at baseline.    REVIEW OF SYSTEMS  See HPI for further details.  Potential wound infection.  All other systems are negative.     PAST MEDICAL HISTORY   has a past medical history of Arthritis, Encephalopathy, Hypertension, Pain, and Psychiatric problem.    SOCIAL HISTORY  Social History     Tobacco Use   • Smoking status: Never Smoker   • Smokeless tobacco: Current User     Types: Chew   Substance and Sexual Activity   • Alcohol use: Yes     Comment: 6-10 beers daily   • Drug use: No   • Sexual activity: Not on file       SURGICAL HISTORY   has a past surgical history that includes ankle orif (Right, 9/26/2017); tendon lenghtening (Bilateral, 2/1/2021); and irrigation & debridement ortho (Left, 2/1/2021).    CURRENT MEDICATIONS  Home Medications     Reviewed by Sandra Zambrano R.N. (Registered Nurse) on 04/11/21 at 1747  Med List Status: Partial   Medication Last Dose Status   acetaminophen (TYLENOL) 325 MG Tab  Active   aspirin (ASA) 325 MG Tab  Active   Calcium Polycarbophil (FIBER) 625 MG Tab  Active   docusate sodium 100 MG Cap  Active  "  magnesium hydroxide (MILK OF MAGNESIA) 400 MG/5ML Suspension  Active   risperiDONE (RISPERDAL) 2 MG Tab  Active   senna-docusate (PERICOLACE OR SENOKOT S) 8.6-50 MG Tab  Active   tamsulosin (FLOMAX) 0.4 MG capsule  Active                ALLERGIES  No Known Allergies    PHYSICAL EXAM  VITAL SIGNS: /73   Pulse 82   Temp 36.8 °C (98.2 °F) (Temporal)   Resp 18   Ht 1.803 m (5' 11\")   Wt 90 kg (198 lb 6.6 oz)   SpO2 96%   BMI 27.67 kg/m²   Pulse ox interpretation: I interpret this pulse ox as normal.  Constitutional: Alert in no apparent distress.  HENT: No signs of trauma, Bilateral external ears normal, Nose normal.  Moist mucous membranes.  Eyes: Pupils are equal and reactive, Conjunctiva normal, Non-icteric.   Neck: Normal range of motion, No tenderness, Supple, No stridor.   Lymphatic: No lymphadenopathy noted.   Cardiovascular: Regular rate and rhythm, no murmurs. Pulses symmetrical.  Left dorsalis pedis pulse 2+.  Thorax & Lungs: Normal breath sounds, No respiratory distress, No wheezing, No chest tenderness.   Abdomen: Bowel sounds normal, Soft, No tenderness, No masses, No pulsatile masses. No peritoneal signs.  Skin: Warm, Dry, No erythema, No rash.   Back: Normal alignment.  Extremities: Intact distal pulses, mild bilateral lower extremity edema, tender over left heel, No cyanosis.  Musculoskeletal: No major deformities noted.  Tender over left foot.  Mild erythema over lateral aspect of left foot.  Left heel bandages clean/dry/intact.  2 cm eschar over left heel without fluctuance or drainage.  Neurologic: Alert.  Psychiatric: Affect normal, Judgment normal, Mood normal.     DIAGNOSTIC STUDIES / PROCEDURES    LABS  Results for orders placed or performed during the hospital encounter of 04/11/21   CBC WITH DIFFERENTIAL   Result Value Ref Range    WBC 7.2 4.8 - 10.8 K/uL    RBC 4.21 (L) 4.70 - 6.10 M/uL    Hemoglobin 12.2 (L) 14.0 - 18.0 g/dL    Hematocrit 39.0 (L) 42.0 - 52.0 %    MCV 92.6 81.4 " - 97.8 fL    MCH 29.0 27.0 - 33.0 pg    MCHC 31.3 (L) 33.7 - 35.3 g/dL    RDW 48.2 35.9 - 50.0 fL    Platelet Count 272 164 - 446 K/uL    MPV 11.0 9.0 - 12.9 fL    Neutrophils-Polys 59.70 44.00 - 72.00 %    Lymphocytes 26.40 22.00 - 41.00 %    Monocytes 8.20 0.00 - 13.40 %    Eosinophils 4.00 0.00 - 6.90 %    Basophils 1.30 0.00 - 1.80 %    Immature Granulocytes 0.40 0.00 - 0.90 %    Nucleated RBC 0.00 /100 WBC    Neutrophils (Absolute) 4.28 1.82 - 7.42 K/uL    Lymphs (Absolute) 1.89 1.00 - 4.80 K/uL    Monos (Absolute) 0.59 0.00 - 0.85 K/uL    Eos (Absolute) 0.29 0.00 - 0.51 K/uL    Baso (Absolute) 0.09 0.00 - 0.12 K/uL    Immature Granulocytes (abs) 0.03 0.00 - 0.11 K/uL    NRBC (Absolute) 0.00 K/uL   Basic Metabolic Panel   Result Value Ref Range    Sodium 140 135 - 145 mmol/L    Potassium 3.8 3.6 - 5.5 mmol/L    Chloride 107 96 - 112 mmol/L    Co2 25 20 - 33 mmol/L    Glucose 102 (H) 65 - 99 mg/dL    Bun 14 8 - 22 mg/dL    Creatinine 0.77 0.50 - 1.40 mg/dL    Calcium 9.6 8.5 - 10.5 mg/dL    Anion Gap 8.0 7.0 - 16.0   CRP QUANTITIVE (NON-CARDIAC)   Result Value Ref Range    Stat C-Reactive Protein 2.17 (H) 0.00 - 0.75 mg/dL   ESTIMATED GFR   Result Value Ref Range    GFR If African American >60 >60 mL/min/1.73 m 2    GFR If Non African American >60 >60 mL/min/1.73 m 2       COURSE & MEDICAL DECISION MAKING  Pertinent Labs & Imaging studies reviewed. (See chart for details)  Records obtained and reviewed: Patient was most recently seen in this facility 4/1/2021 for bilateral leg pain.  It was noted that he is a very poor historian.  Also noted that he is currently in a group home and had been there for about a week.  His power of  or guardian stated that when he checked in on him he found his legs were swollen and had had several falls in the middle of the night.  He was hospitalized recently and had significant encephalopathy following discharge.  During his last ER visit he was alert and understood that  he was in the hospital but did not know which one.  He was not oriented to time, date, or age.  He had 2+ pitting edema from mid calf bilaterally over both feet with mild erythema over both feet as well.  Bandage was over the left posterior heel which was removed and a 1 x 2 cm eschar was noted without fluctuance or drainage.  An x-ray of the left ankle suggested subtle fracture.  Ultrasound of the bilateral lower extremities were without evidence for DVT.  He was placed in a walking boot and made nonweightbearing.  He was given a prescription for opioid pain medication and discharged to follow-up with primary care physician and orthopedics.    This is a 50-year-old male, wheelchair-bound at baseline, who is here apparently at the request of orthopedic surgery, Dr. Roque, for admission and I&D in the morning.  He is afebrile with normal vital signs.  He is here with his group home owner who notes that he is at his baseline mental status.  He has very mild erythema over the lateral aspect of the left foot and an ulceration/eschar over the heel.  His left lower extremity is warm and well perfused.    I spoke with Dr. Bill, on-call for Dr. Roque, and he has recommended hospitalization for evaluation in the morning as we are unable to confirm Dr. Roque's plan via notes.  Basic labs ordered.  He has no leukocytosis.  Metabolic panel is grossly normal with the exception of mild hyperglycemia to 102.  CRP is elevated at 2.17.  Imaging deferred given recent imaging and no new trauma.    Patient was discussed with the hospitalist and admitted in guarded condition.    FINAL IMPRESSION  1.  Tibial fracture  2.  Wound    Electronically signed by: Yon Cody M.D., 4/11/2021 7:10 PM

## 2021-04-12 NOTE — PROGRESS NOTES
Full Consult Note Dictated    Assessment: Yury Greer Hayden 50 y.o. male s/p bilateral PTT transfers for equinovarus deformities on 2/1/21 with Dr. Roque who has now developed a left heel ulcer      Plan: to OR this evening with Dr. Roque for left heel I&D    Continue npo pending OR    Ana Lemus M.D.

## 2021-04-12 NOTE — CONSULTS
Ortho Brief Note    Spoke with Dr Cody in ED, patient being pre-admitted per Dr. Roque. Patient was seen in clinic 4/8, plan for OR 4/12 for debridement of foot ulcer.     -Admit to hospitalists  -Hold abx  -Pre-op optimization, please order basic labs  -NPO at midnight    Hua Bill MD  [facility] Orthopedic Surgery Fellow

## 2021-04-12 NOTE — THERAPY
Physical Therapy Contact Note    PT consult received and acknowledged. Per chart review patient has strict bed rest orders. Chart also indicates patient pending I&D. Will hold PT eval until post op / patient can participate with OOB activity.    Delilah Francois, PT, DPT  [phone]

## 2021-04-12 NOTE — PROGRESS NOTES
· 2 RN skin check complete with Lara LUCIANO.  · Devices in place dressing on left heel.   · Skin assessed under devices yes.  · Confirmed pressure ulcers found on n/a.  · New potential pressure ulcers noted on n/a. Wound consult placed and wound reported yes.   · The following interventions in place waffle overlay, heel float boots, mepilex to left heel.     Wound on left heel, left lateral foot, right knee has small abrasion. Pictures uploaded and opened LDAs. Sacrum intact. Wound consult placed.

## 2021-04-12 NOTE — PROGRESS NOTES
Patient arrived to the unit. Oriented to the room. Tele sitter in place, patient has a history of falls (last fall was last week per patient) and impulsivity. Bed alarm in place.

## 2021-04-12 NOTE — PROGRESS NOTES
HOSPITAL MEDICINE PROGRESS NOTE    Date of service  4/12/2021    Chief Complaint  Wound Infection      Hospital Course:     49 yo man with cognitive impairment, has guardian, a resident a group home, p/w infected left heel ulcer that the Orthopaedic Surgeon, Dr. Roque, is concerned for osteo after he fractured his left heel.             Interval History Updates:  No event overnight.  Afebrile.      Consultants/Specialty  Orthopaedics    Review of Systems   Review of Systems   Constitutional: Negative for chills and fever.   Respiratory: Negative for shortness of breath.    Cardiovascular: Negative for chest pain and palpitations.   Gastrointestinal: Negative for abdominal pain and diarrhea.   Musculoskeletal: Positive for joint pain.        Medications:  • risperiDONE  2 mg BID   • tamsulosin  0.8 mg AFTER BREAKFAST   • senna-docusate  2 tablet BID    And   • polyethylene glycol/lytes  1 Packet QDAY PRN    And   • magnesium hydroxide  30 mL QDAY PRN    And   • bisacodyl  10 mg QDAY PRN   • enoxaparin  40 mg DAILY   • acetaminophen  650 mg Q6HRS PRN   • cloNIDine  0.1 mg Q6HRS PRN   • enalaprilat  1.25 mg Q6HRS PRN   • ondansetron  4 mg Q4HRS PRN   • ondansetron  4 mg Q4HRS PRN   • promethazine  12.5-25 mg Q4HRS PRN   • promethazine  12.5-25 mg Q4HRS PRN   • prochlorperazine  5-10 mg Q4HRS PRN   • guaiFENesin dextromethorphan  10 mL Q6HRS PRN           Physical Exam   Vitals:    04/11/21 2202 04/11/21 2303 04/12/21 0349 04/12/21 0808   BP: 125/64 109/76 101/67 102/67   Pulse: 64 74 61 (!) 58   Resp:  16 16 16   Temp:  36.2 °C (97.2 °F) 36.6 °C (97.8 °F) 36.6 °C (97.9 °F)   TempSrc:  Temporal Temporal Temporal   SpO2: 96% 98% 98% 95%   Weight:       Height:           Physical Exam   Constitutional: He is well-developed, well-nourished, and in no distress. No distress.   Cardiovascular: Normal rate and regular rhythm.   Pulmonary/Chest: Effort normal. No respiratory distress.   Musculoskeletal:         General: No  "edema.      Comments: Left heel has an ulcer with some small amt of purulent discharge.     Neurological: He is alert.   Oriented to self.   Skin: Skin is warm.   Psychiatric: Mood normal.   Vitals reviewed.         Laboratory   Recent Labs     04/11/21 1939 04/12/21 0237   WBC 7.2 6.6   RBC 4.21* 4.18*   HEMOGLOBIN 12.2* 12.1*   HEMATOCRIT 39.0* 38.2*   PLATELETCT 272 264     Recent Labs     04/11/21 1939 04/12/21 0237   SODIUM 140 140   POTASSIUM 3.8 3.5*   CHLORIDE 107 108   CO2 25 23   GLUCOSE 102* 86   BUN 14 13   CREATININE 0.77 0.65      Recent Labs     04/11/21 1939 04/12/21 0237   ALTSGPT  --  15   ASTSGOT  --  17   ALKPHOSPHAT  --  131*   TBILIRUBIN  --  0.3   GLUCOSE 102* 86      Recent Labs     04/12/21 0237   INR 1.12           Microbiology  Results     Procedure Component Value Units Date/Time    Culture Wound W/ Gram Stain [[id number]] Collected: 04/12/21 0008    Order Status: Completed Specimen: Wound from Abscess Updated: 04/12/21 0306    Narrative:      Have you been in close contact with a person who is suspected  or known to be positive for COVID-19 within the last 30 days  (e.g. last seen that person < 30 days ago)->Unknown    BLOOD CULTURE [[id number]] Collected: 04/12/21 0237    Order Status: Completed Specimen: Blood from Peripheral Updated: 04/12/21 0303    Narrative:      Per Hospital Policy: Only change Specimen Src: to \"Line\" if  specified by physician order.    BLOOD CULTURE [[id number]] Collected: 04/12/21 0237    Order Status: Completed Specimen: Blood from Peripheral Updated: 04/12/21 0303    Narrative:      Per Hospital Policy: Only change Specimen Src: to \"Line\" if  specified by physician order.    SARS-CoV-2 PCR (24 hour In-House): Collect NP swab in [facility] [[id number]] Collected: 04/11/21 2140    Order Status: Completed Specimen: Respirate from Nasopharyngeal Updated: 04/11/21 2146     SARS-CoV-2 Source NP Swab    Narrative:      Have you been in close contact with a person who is " suspected  or known to be positive for COVID-19 within the last 30 days  (e.g. last seen that person < 30 days ago)->Unknown    URINALYSIS [[id number]]     Order Status: Sent Specimen: Urine              Labs reviewed by me and noted above.    Radiology  Ankle left XR:  1.  Cortical irregularity of the posterior distal tibial malleolus, appearance most compatible with subtle fracture.  2.  Soft tissue swelling of the ankle  3.  Atherosclerosis        No results found for this or any previous visit.       Assessment and Plan      * Open wound of left foot- (present on admission)  Assessment & Plan  Plan for OR today with Ortho.  Hold abx per Ortho.  Keep NPO.  I discussed with LPS RN and will evaluate patient post-op.  Float heals.  Order EKG for pre-op.    Cognitive decline- (present on admission)  Assessment & Plan  He has guardian and lives in group home.  At baseline, he appears to be oriented to self and location.      Closed fracture of posterior malleolus of left tibia- (present on admission)  Assessment & Plan  Plan OR today with Ortho.               Principal Problem:    Open wound of left foot POA: Yes  Active Problems:    Cognitive decline POA: Yes    Closed fracture of posterior malleolus of left tibia POA: Yes  Resolved Problems:    * No resolved hospital problems. *        DVT prophylaxis: Lovenox    CODE STATUS:  FULL CODE    Disposition: back to group home once medical and surgically stable.

## 2021-04-12 NOTE — ASSESSMENT & PLAN NOTE
He has guardian and lives in group home.  At baseline, he appears to be oriented to self and location.

## 2021-04-12 NOTE — CARE PLAN
Problem: Communication  Goal: The ability to communicate needs accurately and effectively will improve  Outcome: PROGRESSING AS EXPECTED   Patient is A&O x4 at the moment. He has some forgetfulness, call light within reach, patient updated on plan of care.     Problem: Safety  Goal: Will remain free from injury  Outcome: PROGRESSING AS EXPECTED  Bed locked in the lowest position, bed alarm in place, tele sitter present for history of falls and impulsiveness.

## 2021-04-12 NOTE — H&P
Hospital Medicine History & Physical Note    Date of Service  4/12/2021    Primary Care Physician  Harman Barlow M.D.    Consultants  Dr. Roque, orthopedic surgery    Code Status  Full Code    Chief Complaint  Chief Complaint   Patient presents with   • Wound Infection       History of Presenting Illness  50 y.o. male with a past medical hx of AMS, L. Posterior malleolus Fracture with ulceration lives in group home, consulted Ortho this week who presented 4/11/2021 with a chief complaint of potential wound infection.  Patient presents with the owner of his group home and is unable to provide any information regarding his care due to baseline mental status changes.  He recently fractured his heel and was seen in the emergency department at which time he was placed in a walking boot and instructed to follow-up with orthopedics, Dr. Roque.  The patient did this and Dr. Roque was concerned for potential infection in the bone so requested that he be brought to the ER on Sunday night (tonight), admitted, and Dr. Roque will take him to the OR for debridement tomorrow.  He has not had any fevers and is otherwise at his baseline mental state.  He is wheelchair-bound at baseline.    04/01/2021:received x-rays where there was felt to be a possible subtle malleolar fracture in his left ankle therefore x-rays of the left ankle were obtained.  This shows it just a posterior fracture.  ERP discussed the case with Dr. San orthopedic surgery on-call for Dr. Pereira. Patient was placed in a walked boot with non weight bearing recommendations.      I&D to rule out osteomyelitis.    Consulted Dr. Roque with orthopedic surgery in ER.     VSS  CRP 2.17    Patient is admitted to hospitalist service for medical management he is n.p.o. after midnight for possible I&D in a.m. with orthopedic surgery.      Review of Systems  Review of Systems   Musculoskeletal:        LLE pain   All other systems reviewed and are  negative.      Past Medical History   has a past medical history of Arthritis, Encephalopathy, Hypertension, Pain, and Psychiatric problem.    Surgical History   has a past surgical history that includes ankle orif (Right, 9/26/2017); tendon lenghtening (Bilateral, 2/1/2021); and irrigation & debridement ortho (Left, 2/1/2021).     Family History  Non pertinent    Social History   reports that he has never smoked. His smokeless tobacco use includes chew. He reports current alcohol use. He reports that he does not use drugs.    Allergies  No Known Allergies    Medications  Prior to Admission Medications   Prescriptions Last Dose Informant Patient Reported? Taking?   Calcium Polycarbophil (FIBER) 625 MG Tab 4/11/2021 at 1730  No No   Sig: Take 1 tablet by mouth 3 times a day, with meals.   acetaminophen (TYLENOL) 325 MG Tab Not Taking at Unknown time  No No   Sig: Take 2 Tablets by mouth every 6 hours as needed for up to 60 days.   Patient not taking: Reported on 4/11/2021   aspirin (ASA) 325 MG Tab 4/11/2021 at 1730  No No   Sig: Take 1 tablet by mouth 2 Times a Day.   docusate sodium 100 MG Cap Not Taking at Unknown time  No No   Sig: Take 100 mg by mouth 1 time a day as needed (hard stools).   Patient not taking: Reported on 4/11/2021   magnesium hydroxide (MILK OF MAGNESIA) 400 MG/5ML Suspension Not Taking at Unknown time  No No   Sig: Take 30 mL by mouth 1 time a day as needed (for constipation).   Patient not taking: Reported on 4/11/2021   risperiDONE (RISPERDAL) 2 MG Tab 4/11/2021 at 1730  No No   Sig: Take 1 tablet by mouth 2 Times a Day.   senna-docusate (PERICOLACE OR SENOKOT S) 8.6-50 MG Tab Not Taking at Unknown time  No No   Sig: Take 1 tablet by mouth 2 times a day as needed for Constipation.   Patient not taking: Reported on 4/11/2021   tamsulosin (FLOMAX) 0.4 MG capsule 4/11/2021 at 0700  No No   Sig: Take 2 Capsules by mouth 1/2 hour after breakfast.      Facility-Administered Medications: None        Physical Exam  Temp:  [36.2 °C (97.2 °F)-36.8 °C (98.2 °F)] 36.2 °C (97.2 °F)  Pulse:  [64-82] 74  Resp:  [16-18] 16  BP: (109-125)/(64-76) 109/76  SpO2:  [96 %-98 %] 98 %    Physical Exam     Constitutional: Resting comfortably in NAD   HENT: Normocephalic, no obvious evidence of acute trauma.  Eyes: No scleral icterus. Normal conjunctiva   Neck: Comfortable movement without any obvious restriction in the range of motion.   Cardiovascular: Upon ascultation I appreciate a regular heart rhythm and a normal rate with no murmurs, rubs or gallops  Thorax & Lungs: No respiratory distress. No wheezing, rales or rhonchi heard on ausculation.  there is no obvious chest wall tenderness. I appreciate normal air movement throughout.   Abdomen: The abdomen is not visibly distended. Upon palpation, I find it to be without tenderness.  No mass appreciated.  Skin: Left foot in boot, patient denies current pain  Extremities/Musculoskeletal: no lower extremity edema with no asymmetry. Unable to assess ROM at LLE due to pain with movement   Neurologic: AAAX2 to self and hospital only, unaware of  or year  Psychiatric: Normal affect appropriate for the clinical situation.          Laboratory:  Recent Labs     21   WBC 7.2 6.6   RBC 4.21* 4.18*   HEMOGLOBIN 12.2* 12.1*   HEMATOCRIT 39.0* 38.2*   MCV 92.6 91.4   MCH 29.0 28.9   MCHC 31.3* 31.7*   RDW 48.2 47.3   PLATELETCT 272 264   MPV 11.0 10.9     Recent Labs     21   SODIUM 140   POTASSIUM 3.8   CHLORIDE 107   CO2 25   GLUCOSE 102*   BUN 14   CREATININE 0.77   CALCIUM 9.6     Recent Labs     21   GLUCOSE 102*     Recent Labs     217   INR 1.12     No results for input(s): NTPROBNP in the last 72 hours.      No results for input(s): TROPONINT in the last 72 hours.    Imaging:  No orders to display         Assessment/Plan:  I anticipate this patient will require at least two midnights for appropriate medical  management, necessitating inpatient admission.    Toxic encephalopathy- (present on admission)  Assessment & Plan  Lives in group home  AMS, AAAX2 to self and hospital as location, unaware of  or circumstances, current year    Closed fracture of posterior malleolus of left tibia- (present on admission)  Assessment & Plan  2021:received x-rays where there was felt to be a possible subtle malleolar fracture in his left ankle therefore x-rays of the left ankle were obtained.  This shows it just a posterior fracture.  ERP discussed the case with Dr. San orthopedic surgery on-call for Dr. Pereira. Patient was placed in a walking boot with non weight bearing recommendations.      I&D to rule out osteomyelitis planned  Consulted Dr. Roque with orthopedic surgery in ER.         Open wound of left foot- (present on admission)  Assessment & Plan  Wound care consult  I and D planned for AM, patient is NPO  CRP elevated  Orthopedic surgery advises to hold abx at this time, WBC WNL  VSS

## 2021-04-12 NOTE — PROGRESS NOTES
"Patient is resting in bed. Tele sitter in place. Patient is A&O to self now. 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 He answered it was 2013 when asked what year it was. Patient was reoriented and reminded to not get out of bed without help. Bed alarm on, bed locked in lowest position.   "

## 2021-04-12 NOTE — THERAPY
Occupational Therapy Contact Note:    OT orders received, pt w/ strict bedrest orders in place. Will hold until orders have been discontinued per policy. Of note, pt undergoing I&D today.    Palak Chavez, OTR/L

## 2021-04-13 LAB
ANION GAP SERPL CALC-SCNC: 8 MMOL/L (ref 7–16)
APPEARANCE UR: CLEAR
BASOPHILS # BLD AUTO: 0.8 % (ref 0–1.8)
BASOPHILS # BLD: 0.05 K/UL (ref 0–0.12)
BILIRUB UR QL STRIP.AUTO: NEGATIVE
BUN SERPL-MCNC: 11 MG/DL (ref 8–22)
CALCIUM SERPL-MCNC: 9.5 MG/DL (ref 8.5–10.5)
CHLORIDE SERPL-SCNC: 111 MMOL/L (ref 96–112)
CO2 SERPL-SCNC: 24 MMOL/L (ref 20–33)
COLOR UR: YELLOW
CREAT SERPL-MCNC: 0.74 MG/DL (ref 0.5–1.4)
EOSINOPHIL # BLD AUTO: 0.19 K/UL (ref 0–0.51)
EOSINOPHIL NFR BLD: 3 % (ref 0–6.9)
ERYTHROCYTE [DISTWIDTH] IN BLOOD BY AUTOMATED COUNT: 45.3 FL (ref 35.9–50)
GLUCOSE SERPL-MCNC: 80 MG/DL (ref 65–99)
GLUCOSE UR STRIP.AUTO-MCNC: NEGATIVE MG/DL
HCT VFR BLD AUTO: 38.1 % (ref 42–52)
HGB BLD-MCNC: 12.2 G/DL (ref 14–18)
IMM GRANULOCYTES # BLD AUTO: 0.01 K/UL (ref 0–0.11)
IMM GRANULOCYTES NFR BLD AUTO: 0.2 % (ref 0–0.9)
KETONES UR STRIP.AUTO-MCNC: ABNORMAL MG/DL
LEUKOCYTE ESTERASE UR QL STRIP.AUTO: NEGATIVE
LYMPHOCYTES # BLD AUTO: 2.02 K/UL (ref 1–4.8)
LYMPHOCYTES NFR BLD: 31.4 % (ref 22–41)
MCH RBC QN AUTO: 28.9 PG (ref 27–33)
MCHC RBC AUTO-ENTMCNC: 32 G/DL (ref 33.7–35.3)
MCV RBC AUTO: 90.3 FL (ref 81.4–97.8)
MICRO URNS: ABNORMAL
MONOCYTES # BLD AUTO: 0.41 K/UL (ref 0–0.85)
MONOCYTES NFR BLD AUTO: 6.4 % (ref 0–13.4)
NEUTROPHILS # BLD AUTO: 3.75 K/UL (ref 1.82–7.42)
NEUTROPHILS NFR BLD: 58.2 % (ref 44–72)
NITRITE UR QL STRIP.AUTO: NEGATIVE
NRBC # BLD AUTO: 0 K/UL
NRBC BLD-RTO: 0 /100 WBC
PH UR STRIP.AUTO: 6.5 [PH] (ref 5–8)
PLATELET # BLD AUTO: 249 K/UL (ref 164–446)
PMV BLD AUTO: 11.4 FL (ref 9–12.9)
POTASSIUM SERPL-SCNC: 3.9 MMOL/L (ref 3.6–5.5)
PROT UR QL STRIP: NEGATIVE MG/DL
RBC # BLD AUTO: 4.22 M/UL (ref 4.7–6.1)
RBC UR QL AUTO: NEGATIVE
SODIUM SERPL-SCNC: 143 MMOL/L (ref 135–145)
SP GR UR STRIP.AUTO: 1.01
UROBILINOGEN UR STRIP.AUTO-MCNC: 0.2 MG/DL
WBC # BLD AUTO: 6.4 K/UL (ref 4.8–10.8)

## 2021-04-13 PROCEDURE — 85025 COMPLETE CBC W/AUTO DIFF WBC: CPT

## 2021-04-13 PROCEDURE — 700111 HCHG RX REV CODE 636 W/ 250 OVERRIDE (IP): Performed by: STUDENT IN AN ORGANIZED HEALTH CARE EDUCATION/TRAINING PROGRAM

## 2021-04-13 PROCEDURE — A9270 NON-COVERED ITEM OR SERVICE: HCPCS | Performed by: STUDENT IN AN ORGANIZED HEALTH CARE EDUCATION/TRAINING PROGRAM

## 2021-04-13 PROCEDURE — 81003 URINALYSIS AUTO W/O SCOPE: CPT

## 2021-04-13 PROCEDURE — 99222 1ST HOSP IP/OBS MODERATE 55: CPT | Performed by: NURSE PRACTITIONER

## 2021-04-13 PROCEDURE — 99233 SBSQ HOSP IP/OBS HIGH 50: CPT | Performed by: INTERNAL MEDICINE

## 2021-04-13 PROCEDURE — 770006 HCHG ROOM/CARE - MED/SURG/GYN SEMI*

## 2021-04-13 PROCEDURE — 700102 HCHG RX REV CODE 250 W/ 637 OVERRIDE(OP): Performed by: STUDENT IN AN ORGANIZED HEALTH CARE EDUCATION/TRAINING PROGRAM

## 2021-04-13 PROCEDURE — 80048 BASIC METABOLIC PNL TOTAL CA: CPT

## 2021-04-13 PROCEDURE — 36415 COLL VENOUS BLD VENIPUNCTURE: CPT

## 2021-04-13 RX ADMIN — TAMSULOSIN HYDROCHLORIDE 0.8 MG: 0.4 CAPSULE ORAL at 08:44

## 2021-04-13 RX ADMIN — DOCUSATE SODIUM 50 MG AND SENNOSIDES 8.6 MG 2 TABLET: 8.6; 5 TABLET, FILM COATED ORAL at 17:29

## 2021-04-13 RX ADMIN — ENOXAPARIN SODIUM 40 MG: 40 INJECTION SUBCUTANEOUS at 04:49

## 2021-04-13 RX ADMIN — RISPERIDONE 2 MG: 2 TABLET ORAL at 04:49

## 2021-04-13 RX ADMIN — RISPERIDONE 2 MG: 2 TABLET ORAL at 17:29

## 2021-04-13 RX ADMIN — DOCUSATE SODIUM 50 MG AND SENNOSIDES 8.6 MG 2 TABLET: 8.6; 5 TABLET, FILM COATED ORAL at 04:49

## 2021-04-13 RX ADMIN — ACETAMINOPHEN 650 MG: 325 TABLET, FILM COATED ORAL at 04:51

## 2021-04-13 ASSESSMENT — ENCOUNTER SYMPTOMS
CHILLS: 0
NAUSEA: 0
FEVER: 0
VOMITING: 0

## 2021-04-13 ASSESSMENT — PAIN DESCRIPTION - PAIN TYPE
TYPE: ACUTE PAIN
TYPE: ACUTE PAIN
TYPE: ACUTE PAIN;CHRONIC PAIN

## 2021-04-13 NOTE — CARE PLAN
Problem: Safety  Goal: Will remain free from injury  Outcome: PROGRESSING AS EXPECTED   Bed locked in the lowest position, bad alarm on, tele sitter present, patient reoriented and reminded to call before attempting to get out of bed.     Problem: Skin Integrity  Goal: Risk for impaired skin integrity will decrease  Outcome: PROGRESSING AS EXPECTED  Q2 turns in place, waffle mattress, heel float boots.

## 2021-04-13 NOTE — CONSULTS
DATE OF SERVICE:  04/12/2021     CONSULTING SERVICE:  Limb preservation service.     REASON FOR CONSULTATION:  Left heel ulcer.     HISTORY OF PRESENT ILLNESS:  He is a very pleasant patient of mine.  Back in   early February, he underwent bilateral cavovarus foot reconstructions.  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    He was in a group home.  He was noted to have a heel ulcer.  Local measures   were taken.  However, the heel ulcer did not improve.  He was admitted to the   hospital for definitive management.  Denies any fevers, chills. Denies   numbness, tingling.  Denies any significant pain.     Past medical history, medicines, allergies, family history, social history,   review of systems are reviewed on Dr. العراقي's admit note.     PHYSICAL EXAMINATION:  VITAL SIGNS:  Afebrile, vital signs stable.  GENERAL:  Well-appearing male in no acute distress.  Pleasant demeanor.    Normal affect.  HEENT:  Pupils equal, round.  LUNGS:  Respirations regular rate.  Unlabored breathing.  CARDIOVASCULAR:  He has palpable pulses.  Sensation intact.  SKIN:  Shows a full thickness ulceration on the posterior aspect of his heel   with necrotic tissue at the base.  There is mild surrounding cellulitis.  He   has good alignment.  Good range of motion.  He has some weakness.  He has no   gross instability.  Nonambulatory.     X-rays demonstrate no obvious osteomyelitis.     IMPRESSION:  1.  Status post equinovarus reconstruction, doing well.  2.  Pressure ulcer under his left heel.     PLAN:  Discussed the case with his caregiver, Nestor, as well as with the   patient.  Options were discussed with him including operative and   nonoperative.  He wants to proceed with operative intervention.  I recommended   left foot irrigation and debridement with placement of skin graft substitute.    The procedure was discussed in detail, questions were answered.  Risks of   surgery were explained  to him not limited to wound problem, infection, nerve   injury, vascular injury, need for surgery.  He does have persistent risk of   his pain, wound and need for further surgery.  He understands and accepts   these risks and wished to proceed.  Schedule this next available.        ______________________________  MD MIRANDA PAULINO/RAMONITA/VEL    DD:  04/12/2021 19:57  DT:  04/12/2021 22:39    Job#:  [id number]

## 2021-04-13 NOTE — CONSULTS
LIMB PRESERVATION SERVICE CONSULT      REFERRED BY: Dr. Roberts    DATE OF CONSULTATION: 4/13/2021    REASON FOR CONSULT: Left heel     HISTORY OF PRESENT ILLNESS: Yury Blake is a 50 y.o.  with a past medical history that includes encephalopathy, bilateral foot drop reconstruction February 2021.  Admitted 4/11/2021 for Foot ulcer, unspecified laterality, with unspecified severity ([facility]) [L97.509].     LPS has been consulted for evaluation of left posterior heel ulcer.    Patient alert and oriented to self only.  Chart reviewed for history.  Patient known to [facility] from last admission for left fifth MTH pressure injury and bilateral cavovarus foot reconstruction.  Patient discharged to group home.  Unstageable heel ulcer opened and worsened with cellulitis.  He also suffered from subtle distal tibial malleolus fracture to left ankle on 4/1/2021.  He was placed in a walking boot, nonweightbearing with follow-up by Dr. Roque.  Patient followed up with surgeon.  There was concern for possible infection and patient was sent to ED for further care.  IV antibiotics were not started on this admission.  Infectious diseases has not been consulted.  Xray completed 4/1/21 and is negative for osteomyelitis.  Patient underwent surgery with Dr. Roque on 4/12/2021 for left foot, posterior heel I&D, placement of skin graft substitute, placement of negative pressure incisional dressing.  Of note, the stage II pressure injury right dorsal foot has resolved.            Smoking:   reports that he has never smoked. His smokeless tobacco use includes chew.    Alcohol:   reports current alcohol use.    Drug:   reports no history of drug use.      PAST MEDICAL HISTORY:   Past Medical History:   Diagnosis Date   • Arthritis     RA   • Encephalopathy    • Hypertension    • Pain     Right ankle   • Psychiatric problem     anxiety        PAST SURGICAL HISTORY:   Past Surgical History:   Procedure Laterality Date   • IRRIGATION &  DEBRIDEMENT ORTHO Left 4/12/2021    Procedure: IRRIGATION AND DEBRIDEMENT, WOUND - HEEL;  Surgeon: Donny Roque M.D.;  Location: SURGERY [facility];  Service: Orthopedics   • TENDON LENGHTENING Bilateral 2/1/2021    Procedure: LENGTHENING, TENDON- FOR FOOT DROP RECONSTRUCTION, POSTERIOR TIBIALIS TENDON  TRANSFER , ACHILLES LENGHTENING , LEFT MEDIAL RELEASE;  Surgeon: Donny Roque M.D.;  Location: SURGERY [facility];  Service: Orthopedics   • IRRIGATION & DEBRIDEMENT ORTHO Left 2/1/2021    Procedure: IRRIGATION AND DEBRIDEMENT, WOUND-FOOT;  Surgeon: Donny Roque M.D.;  Location: SURGERY [facility];  Service: Orthopedics   • ANKLE ORIF Right 9/26/2017    Procedure: ANKLE ORIF SYNDESOMOSIS REPAIR;  Surgeon: Armando Woodard M.D.;  Location: [facility];  Service: Orthopedics       MEDICATIONS:   Current Facility-Administered Medications   Medication Dose   • risperiDONE (RISPERDAL) tablet 2 mg  2 mg   • tamsulosin (FLOMAX) capsule 0.8 mg  0.8 mg   • senna-docusate (PERICOLACE or SENOKOT S) 8.6-50 MG per tablet 2 tablet  2 tablet    And   • polyethylene glycol/lytes (MIRALAX) PACKET 1 Packet  1 Packet    And   • magnesium hydroxide (MILK OF MAGNESIA) suspension 30 mL  30 mL    And   • bisacodyl (DULCOLAX) suppository 10 mg  10 mg   • enoxaparin (LOVENOX) inj 40 mg  40 mg   • acetaminophen (Tylenol) tablet 650 mg  650 mg   • cloNIDine (CATAPRES) tablet 0.1 mg  0.1 mg   • enalaprilat (VASOTEC) injection 1.25 mg  1.25 mg   • ondansetron (ZOFRAN) syringe/vial injection 4 mg  4 mg   • ondansetron (ZOFRAN ODT) dispertab 4 mg  4 mg   • promethazine (PHENERGAN) tablet 12.5-25 mg  12.5-25 mg   • promethazine (PHENERGAN) suppository 12.5-25 mg  12.5-25 mg   • prochlorperazine (COMPAZINE) injection 5-10 mg  5-10 mg   • guaiFENesin dextromethorphan (ROBITUSSIN DM) 100-10 MG/5ML syrup 10 mL  10 mL       ALLERGIES:  No Known Allergies     FAMILY HISTORY: History reviewed. No pertinent family  history.      REVIEW OF SYSTEMS:   KIRTI due to AMS    RESULTS:     Recent Labs     04/11/21 1939 04/12/21 0237 04/13/21  0935   WBC 7.2 6.6 6.4   RBC 4.21* 4.18* 4.22*   HEMOGLOBIN 12.2* 12.1* 12.2*   HEMATOCRIT 39.0* 38.2* 38.1*   MCV 92.6 91.4 90.3   MCH 29.0 28.9 28.9   MCHC 31.3* 31.7* 32.0*   RDW 48.2 47.3 45.3   PLATELETCT 272 264 249   MPV 11.0 10.9 11.4     Recent Labs     04/11/21 1939 04/12/21 0237 04/13/21  0935   SODIUM 140 140 143   POTASSIUM 3.8 3.5* 3.9   CHLORIDE 107 108 111   CO2 25 23 24   GLUCOSE 102* 86 80   BUN 14 13 11         ESR:     Results from last 7 days   Lab Units 04/11/21 1939   SED RATE WESTERGREN 1526 mm/hour 15       CRP:       Results from last 7 days   Lab Units 04/11/21 1939   C REACTIVE PROTEIN 4596 mg/dL 2.17*         COVID-19: Not detected this admission    X-ray: Left ankle:1.  Cortical irregularity of the posterior distal tibial malleolus, appearance most compatible with subtle fracture.  2.  Soft tissue swelling of the ankle  3.  Atherosclerosis    Left foot1.  No acute traumatic bony injury.  2.  Soft tissue swelling of the ankle.  3.  Diffuse osteopenia compatible with osteoporosis    Right foot:1.  Medial malleolus fracture.  2.  Lucency of ankle syndesmosis fixation screws concerning for hardware loosening.  3.  Atherosclerosis      MRI: N/A    Arterial studies: N/A    A1c:  Lab Results   Component Value Date/Time    HBA1C 5.2 01/21/2021 12:47 PM            Microbiology:  Results     Procedure Component Value Units Date/Time    Culture Wound W/ Gram Stain [[id number]] Collected: 04/12/21 0008    Order Status: Completed Specimen: Wound from Abscess Updated: 04/13/21 1328     Significant Indicator NEG     Source WND     Site Left Heel     Culture Result Light growth usual skin susan predominantly diphtheroids.     Gram Stain Result No organisms seen.    Narrative:      Have you been in close contact with a person who is suspected  or known to be positive for COVID-19  "within the last 30 days  (e.g. last seen that person < 30 days ago)->Unknown    BLOOD CULTURE [[id number]] Collected: 04/12/21 0237    Order Status: Completed Specimen: Blood from Peripheral Updated: 04/13/21 0720     Significant Indicator NEG     Source BLD     Site PERIPHERAL     Culture Result No Growth  Note: Blood cultures are incubated for 5 days and  are monitored continuously.Positive blood cultures  are called to the RN and reported as soon as  they are identified.      Narrative:      Per Hospital Policy: Only change Specimen Src: to \"Line\" if  specified by physician order.  Left Hand    BLOOD CULTURE [[id number]] Collected: 04/12/21 0237    Order Status: Completed Specimen: Blood from Peripheral Updated: 04/13/21 0720     Significant Indicator NEG     Source BLD     Site PERIPHERAL     Culture Result No Growth  Note: Blood cultures are incubated for 5 days and  are monitored continuously.Positive blood cultures  are called to the RN and reported as soon as  they are identified.      Narrative:      Per Hospital Policy: Only change Specimen Src: to \"Line\" if  specified by physician order.  Right Hand    URINALYSIS [[id number]]  (Abnormal) Collected: 04/13/21 0455    Order Status: Completed Specimen: Urine Updated: 04/13/21 0645     Color Yellow     Character Clear     Specific Gravity 1.011     Ph 6.5     Glucose Negative mg/dL      Ketones Trace mg/dL      Protein Negative mg/dL      Bilirubin Negative     Urobilinogen, Urine 0.2     Nitrite Negative     Leukocyte Esterase Negative     Occult Blood Negative     Micro Urine Req see below     Comment: Microscopic examination not performed when specimen is clear  and chemically negative for protein, blood, leukocyte esterase  and nitrite.         Narrative:      Have you been in close contact with a person who is suspected  or known to be positive for COVID-19 within the last 30 days  (e.g. last seen that person < 30 days ago)->Unknown    GRAM STAIN [[id number]] " "Collected: 04/12/21 0008    Order Status: Completed Specimen: Wound Updated: 04/12/21 1612     Significant Indicator .     Source WND     Site Left Heel     Gram Stain Result No organisms seen.    Narrative:      Have you been in close contact with a person who is suspected  or known to be positive for COVID-19 within the last 30 days  (e.g. last seen that person < 30 days ago)->Unknown    SARS-CoV-2 PCR (24 hour In-House): Collect NP swab in [facility] [[id number]] Collected: 04/11/21 2140    Order Status: Completed Specimen: Respirate from Nasopharyngeal Updated: 04/12/21 1433     SARS-CoV-2 Source NP Swab     SARS-CoV-2 by PCR NotDetected     Comment: PATIENTS: Important information regarding your results and instructions can  be found at [email or web address]   \"After your  Covid-19 Test\"  [facility] providers: PLEASE REFER TO DE-ESCALATION AND RETESTING PROTOCOL  on [email or web address]  **The TaqPath COVID-19 SARS-CoV-2 RT-PCR test has been made available for  use under the Emergency Use Authorization (EUA) only.         Narrative:      Have you been in close contact with a person who is suspected  or known to be positive for COVID-19 within the last 30 days  (e.g. last seen that person < 30 days ago)->Unknown           PHYSICAL EXAMINATION:     VITAL SIGNS: /69   Pulse 99   Temp 36.8 °C (98.2 °F) (Temporal)   Resp 16   Ht 1.803 m (5' 11\")   Wt 90 kg (198 lb 6.6 oz)   SpO2 91%   BMI 27.67 kg/m²       General Appearance:  Well developed, well nourished, in no acute distress  A x O x1    Lower Extremity Assessment:    Edema:   Minimal edema BLE      ROM dorsi/plantarflexion  Limited    Structural /mechanical changes:  Hammertoes 1 through 5 bilaterally    Sensory Assessment  Unable to assess      Pulses:  R foot: +2 DP, +2 PT  L foot: +2 DP, +2 PT      Wound Assessment:    KRISHNA VAC in place to left posterior heel, functioning    Resolved incision left fifth MTH with dark " scabbing  Resolved right dorsal foot pressure injury    Seen with wound team.  Mepilex applied to right heel, right dorsal foot, left lateral foot  Heel float boots readjusted.      ASSESSMENT AND PLAN:   POD #1 SP left heel I&D, placement skin substitute, jena VAC by Dr. Roque  Surgical dressing in place.  OR cultures pending.  Patient will require wound care at discharge.  Discharge planning in process.      Wound care:   Nursing orders placed by wound team for management of Mepilex to prevent pressure injury   -Left heel: Jena wound VAC to be removed by wound team or LPS POD 7    Imaging/Labs:  -COVID-19: not detected this admission    Vascular status:   -Palpable pedal pulses bilaterally    Surgery:   -No plans for further surgery    Antibiotics:   Not on antibiotics at this time  OR cultures pending    Weight Bearing Status:   -Non Weight bearing LLE    Offloading:   Heel float boots  -Orthotic company: None.  Patient was given Rx for shoes and inserts, AFO evaluation during last admission    PT/OT:   Involved            D/W: pt, RN, Dr. Lemus, Dr. Roberts      Discharge Plan:  Return to group home, has guardian   Case coordination to assist with obtaining insurance   recommend home health for wound care      Follow up: Dr. Roque in 2 weeks        Please note that this dictation was created using voice recognition software. I have  worked with technical experts from [employer] to optimize the interface.  I have made every reasonable attempt to correct obvious errors, but there may be errors of grammar and possibly content that I did not discover before finalizing the note.    Please contact [facility] through [employer].

## 2021-04-13 NOTE — PROGRESS NOTES
· 2 RN skin check complete with Lara LUCIANO.  · Devices in place SCDs, gina hose to right leg, nasal cannula, condom cath.   · Skin assessed under devices yes.  · Confirmed pressure ulcers found on n/a.  · New potential pressure ulcers noted on n/a. Wound consult placed and wound reported.   · The following interventions in place Q2 turns, waffle mattress, heel float boots.     Dressing to left heel CDI, jena drain in place, sacrum pink blanching intact. Small wound to right knee pink with a scab.

## 2021-04-13 NOTE — OP REPORT
DATE OF SERVICE:  04/12/2021     PREOPERATIVE DIAGNOSIS:  Left pressure ulcer heel.     POSTOPERATIVE DIAGNOSIS:  Left pressure ulcer heel.     PROCEDURES PERFORMED:  1.  Left irrigation and debridement of foot including the posterior heel and   plantar.  2.  Left placement, skin graft substitute less than 20 square cm.  3.  Left placement negative pressure incisional dressing.     SURGEON:  Donny Roque MD     FIRST ASSISTANT:  Ana Lemus MD     SECOND ASSISTANT:  April Santoro CFA     ANESTHESIA:  General endotracheal with local.     ESTIMATED BLOOD LOSS:  None.     COMPLICATIONS:  None.     POSTOPERATIVE PLAN:  1.  Check the wound in 7 days.  2.  PRAFO.     INDICATIONS:  Please see consult note.     PROCEDURE IN DETAIL:  The patient was brought in the operating room.  He   underwent general endotracheal anesthetic without complications.  His left   lower extremity was prepped and draped in the usual fashion in the supine   position with all appropriate padding.  Positive verification confirmed his   left lower extremity as site of procedure and confirmation that he received   preoperative antibiotics.  Leg was elevated and tourniquet was inflated to 250   mmHg.  Using a 15 blade, sharp excisional debridement was brought down to the   level of the calcaneus peripherally around the wound.  It measured   approximately 3 cm in diameter.  There was some necrotic tendon as well, which   was debrided as well as the surrounding skin margins.  The completion of the   _____ was good.  We debrided the posterior heel as well as into the plantar   aspect of the foot.  A thorough irrigation was performed. AmnioFill from   [employer] was placed at the base of the wound followed by amniotic cord, skin   graft substitute.  This was then sutured in place.  Negative pressure   incisional dressing was placed over the wound with positive seal.  Sterile   dressings were applied.  He was transferred to recovery room in  good   condition.     Utilization of Dr. Lemus was necessary for patient positioning, retraction,   closure and dressing placement.  She was present for the entire procedure.        ______________________________  MD MIRANDA PAULINO/DULCE    DD:  04/12/2021 19:59  DT:  04/12/2021 21:18    Job#:  [id number]

## 2021-04-13 NOTE — THERAPY
Missed Therapy     Patient Name: Yury Blake  Age:  50 y.o., Sex:  male  Medical Record #: [id number]  Today's Date: 4/13/2021    Discussed missed therapy with RN.    Patient still on strict bed rest orders. Will hold until order has been discontinued per policy.

## 2021-04-13 NOTE — PROGRESS NOTES
Hospital Medicine Daily Progress Note    Date of Service  4/13/2021    Chief Complaint  50 y.o. male admitted 4/11/2021 with wound infection    Hospital Course  49 yo man with cognitive impairment, has guardian, a resident a group home, p/w infected left heel ulcer that the Orthopaedic Surgeon, Dr. Roque, is concerned for osteo after he fractured his left heel.       Interval Problem Update  POD 1 I&D left heel. KRISHNA in place over wound.  Pain well controlled.    I have personally seen and examined the patient at bedside with LPS. I discussed the plan of care with patient, multidisciplinary team including pharmacist, bedside RN, , LPS.    Consultants/Specialty  Orthopedic surgery    Code Status  Full Code    Disposition  Return to group home when medically cleared.  Will need continued wound care.    Review of Systems  Review of Systems   Constitutional: Negative for chills and fever.   Gastrointestinal: Negative for nausea and vomiting.   Musculoskeletal: Positive for joint pain (foot).   All other systems reviewed and are negative.       Physical Exam  Temp:  [36.3 °C (97.4 °F)-37.1 °C (98.7 °F)] 36.5 °C (97.7 °F)  Pulse:  [53-97] 62  Resp:  [11-16] 16  BP: ()/(54-83) 95/61  SpO2:  [93 %-100 %] 93 %    Physical Exam  Vitals and nursing note reviewed.   Constitutional:       General: He is not in acute distress.     Appearance: He is overweight. He is not toxic-appearing.   HENT:      Head: Normocephalic and atraumatic.      Right Ear: External ear normal.      Left Ear: External ear normal.      Nose: Nose normal.      Mouth/Throat:      Mouth: Mucous membranes are moist.      Pharynx: Oropharynx is clear.   Eyes:      General: No scleral icterus.     Conjunctiva/sclera: Conjunctivae normal.   Cardiovascular:      Rate and Rhythm: Normal rate and regular rhythm.   Pulmonary:      Effort: Pulmonary effort is normal.      Breath sounds: Normal breath sounds.   Abdominal:      Palpations: Abdomen  is soft.      Tenderness: There is no abdominal tenderness. There is no guarding.   Musculoskeletal:         General: No swelling.      Cervical back: Normal range of motion and neck supple.      Comments: LLE with KRISHNA in place, overlying ACE wrap.   Skin:     General: Skin is warm and dry.   Neurological:      Mental Status: He is alert. Mental status is at baseline.   Psychiatric:         Mood and Affect: Mood normal.         Behavior: Behavior is cooperative.         Fluids    Intake/Output Summary (Last 24 hours) at 4/13/2021 0910  Last data filed at 4/13/2021 0500  Gross per 24 hour   Intake 1080 ml   Output 1015 ml   Net 65 ml       Laboratory  Recent Labs     04/11/21 1939 04/12/21  0237   WBC 7.2 6.6   RBC 4.21* 4.18*   HEMOGLOBIN 12.2* 12.1*   HEMATOCRIT 39.0* 38.2*   MCV 92.6 91.4   MCH 29.0 28.9   MCHC 31.3* 31.7*   RDW 48.2 47.3   PLATELETCT 272 264   MPV 11.0 10.9     Recent Labs     04/11/21 1939 04/12/21  0237   SODIUM 140 140   POTASSIUM 3.8 3.5*   CHLORIDE 107 108   CO2 25 23   GLUCOSE 102* 86   BUN 14 13   CREATININE 0.77 0.65   CALCIUM 9.6 9.5     Recent Labs     04/12/21  0237   INR 1.12         Recent Labs     04/12/21  0237   TRIGLYCERIDE 100   HDL 34*   LDL 74       Imaging  No orders to display        Assessment/Plan  * Open wound of left foot- (present on admission)  Assessment & Plan  S/p I&D on 4/12.  Hold abx per Ortho.    LPS following, appreciate assistance. Will need ongoing wound care after discharge.    Cognitive decline- (present on admission)  Assessment & Plan  He has guardian and lives in group home.  At baseline, he appears to be oriented to self and location.      Closed fracture of posterior malleolus of left tibia- (present on admission)  Assessment & Plan  Ortho following, appreciate recommendations.  Conservative management.      Normocytic anemia- (present on admission)  Assessment & Plan  Check iron studies.    Hypokalemia  Assessment & Plan  Mild, resolved.  Monitor  and replace.       VTE prophylaxis: enoxaparin

## 2021-04-13 NOTE — ANESTHESIA PREPROCEDURE EVALUATION
Relevant Problems   No relevant active problems       Physical Exam    Airway   Mallampati: II  TM distance: >3 FB  Neck ROM: full       Cardiovascular - normal exam  Rhythm: regular  Rate: normal  (-) murmur     Dental - normal exam           Pulmonary - normal exam  Breath sounds clear to auscultation     Abdominal    Neurological - normal exam                 Anesthesia Plan    ASA 3   ASA physical status 3 criteria: respiratory insufficiency or compromise    Plan - general       Airway plan will be LMA          Induction: intravenous    Postoperative Plan: Postoperative administration of opioids is intended.    Pertinent diagnostic labs and testing reviewed    Informed Consent:    Anesthetic plan and risks discussed with patient.    Use of blood products discussed with: patient whom consented to blood products.

## 2021-04-13 NOTE — ANESTHESIA PROCEDURE NOTES
Airway    Date/Time: 4/12/2021 7:02 PM  Performed by: Giorgi Baer M.D.  Authorized by: Giorgi Baer M.D.     Location:  OR  Urgency:  Elective  Indications for Airway Management:  Anesthesia      Spontaneous Ventilation: absent    Sedation Level:  Deep  Preoxygenated: Yes    Final Airway Type:  Supraglottic airway  Final Supraglottic Airway:  Standard LMA    SGA Size:  4  Number of Attempts at Approach:  1

## 2021-04-13 NOTE — ANESTHESIA POSTPROCEDURE EVALUATION
Patient: Yury Blake    Procedure Summary     Date: 04/12/21 Room / Location: [place] [address] / SURGERY [facility]    Anesthesia Start: 1858 Anesthesia Stop:     Procedure: IRRIGATION AND DEBRIDEMENT, WOUND - HEEL (Left Foot) Diagnosis: (Pressure ulcer Right foot)    Surgeons: Donny Roque M.D. Responsible Provider: Giorgi Baer M.D.    Anesthesia Type: general ASA Status: 3          Final Anesthesia Type: general  Last vitals  BP   Blood Pressure: 100/58    Temp   36.6 °C (97.9 °F)    Pulse   69   Resp   16    SpO2   95 %      Anesthesia Post Evaluation    Patient location during evaluation: PACU  Patient participation: complete - patient participated  Level of consciousness: awake and alert  Pain score: 2    Airway patency: patent  Anesthetic complications: no  Cardiovascular status: hemodynamically stable  Respiratory status: acceptable  Hydration status: euvolemic    PONV: none          No complications documented.     Nurse Pain Score: 0 (NPRS)

## 2021-04-13 NOTE — PROGRESS NOTES
Skin Check  Sacrum intact, no redness noted. Wound on L heel, left lateral foot, R knee. L foot pending I&D w/ Dr. Mya york.

## 2021-04-13 NOTE — PROGRESS NOTES
Patient arrived to unit. AAox1 to self. Patient reoriented. He complains of 0/10 pain. Patient on 2L nasal cannula. Dressing to surgical site clean dry intact. Patient reports full sensation in left foot.

## 2021-04-13 NOTE — PROGRESS NOTES
POST-OP NOTE FOR LIMB PRESERVATION SERVICE    SURGERY DATE: 4/12/2021    PROCEDURE: Procedure(s):  IRRIGATION AND DEBRIDEMENT, WOUND - HEEL - Wound Class: Dirty or Infected    WEIGHT BEARING STATUS: Non Weight bearing    PT CONSULT: Yes    ANTIBIOTICS: Continue current ABX    PLAN TO RETURN TO O.R.: No    WOUND CARE PLAN: jena in place covering amniocell, plan to maintain dressing for 1 week post-op    FOLLOW-UP: LPS rounds in Wound Clinic    DURABLE MEDICAL EQUIPMENT: None    OTHER:       Ana Lemus M.D.

## 2021-04-13 NOTE — ANESTHESIA TIME REPORT
Anesthesia Start and Stop Event Times     Date Time Event    4/12/2021 1805 Ready for Procedure     1858 Anesthesia Start     1946 Anesthesia Stop        Responsible Staff  04/12/21    Name Role Begin End    Giorgi Baer M.D. Anesth 1858 1946        Preop Diagnosis (Free Text):  Pre-op Diagnosis     Pressure ulcer Right foot        Preop Diagnosis (Codes):    Post op Diagnosis  Foot arthropathy  calcaneous washout    Premium Reason  A. 3PM - 7AM    Comments: PREMIUM HOWIE

## 2021-04-13 NOTE — OR NURSING
"Patient recovered well in post-op. AAOx1. VSS, on 2L via NC. Surgical sites KIRTI, surgical dressing in place CDI w/ jena wound vac. LLE warm/pink, numb cap refill < 3 seconds, numb sensation, L radial pulse 1+, L popliteal pulse 1+. Per patient \"no\" surgical pain throughout recovery. Patient denied nausea. Caregiver, Sonali, updated and discussed POC. No patient belongings in recovery. Report called to MUSTAPHA Larsen. Awaiting transport. O2 tank 3/4 full for transport.  "

## 2021-04-13 NOTE — HOSPITAL COURSE
Yury Blake is a 51 yo man with cognitive impairment, who has guardian and is a resident a group home, who presented with infected left heel ulcer. He was evaluated by Orthopaedic Surgery, and there was concern for osteomyelitis after left heel fracture. He was admitted and underwent I&D of left heel on 4/12/21. He tolerated the procedure well and wound cultures did not grow any organisms. Wound care was continued and he was cleared for return to his group home.

## 2021-04-14 ENCOUNTER — APPOINTMENT (OUTPATIENT)
Dept: RADIOLOGY | Facility: MEDICAL CENTER | Age: 51
DRG: 573 | End: 2021-04-14
Attending: NURSE PRACTITIONER
Payer: MEDICAID

## 2021-04-14 PROBLEM — D63.8 ANEMIA OF CHRONIC DISEASE: Status: ACTIVE | Noted: 2020-10-20

## 2021-04-14 LAB
ANION GAP SERPL CALC-SCNC: 9 MMOL/L (ref 7–16)
BACTERIA WND AEROBE CULT: NORMAL
BASOPHILS # BLD AUTO: 1 % (ref 0–1.8)
BASOPHILS # BLD: 0.08 K/UL (ref 0–0.12)
BUN SERPL-MCNC: 12 MG/DL (ref 8–22)
CALCIUM SERPL-MCNC: 9.4 MG/DL (ref 8.5–10.5)
CHLORIDE SERPL-SCNC: 109 MMOL/L (ref 96–112)
CO2 SERPL-SCNC: 24 MMOL/L (ref 20–33)
CREAT SERPL-MCNC: 0.93 MG/DL (ref 0.5–1.4)
EOSINOPHIL # BLD AUTO: 0.23 K/UL (ref 0–0.51)
EOSINOPHIL NFR BLD: 2.9 % (ref 0–6.9)
ERYTHROCYTE [DISTWIDTH] IN BLOOD BY AUTOMATED COUNT: 45.4 FL (ref 35.9–50)
GLUCOSE SERPL-MCNC: 89 MG/DL (ref 65–99)
GRAM STN SPEC: NORMAL
HCT VFR BLD AUTO: 36.5 % (ref 42–52)
HGB BLD-MCNC: 11.7 G/DL (ref 14–18)
IMM GRANULOCYTES # BLD AUTO: 0.03 K/UL (ref 0–0.11)
IMM GRANULOCYTES NFR BLD AUTO: 0.4 % (ref 0–0.9)
IRON SATN MFR SERPL: 28 % (ref 15–55)
IRON SERPL-MCNC: 58 UG/DL (ref 50–180)
LYMPHOCYTES # BLD AUTO: 2.19 K/UL (ref 1–4.8)
LYMPHOCYTES NFR BLD: 27.3 % (ref 22–41)
MCH RBC QN AUTO: 28.8 PG (ref 27–33)
MCHC RBC AUTO-ENTMCNC: 32.1 G/DL (ref 33.7–35.3)
MCV RBC AUTO: 89.9 FL (ref 81.4–97.8)
MONOCYTES # BLD AUTO: 0.65 K/UL (ref 0–0.85)
MONOCYTES NFR BLD AUTO: 8.1 % (ref 0–13.4)
NEUTROPHILS # BLD AUTO: 4.84 K/UL (ref 1.82–7.42)
NEUTROPHILS NFR BLD: 60.3 % (ref 44–72)
NRBC # BLD AUTO: 0 K/UL
NRBC BLD-RTO: 0 /100 WBC
PLATELET # BLD AUTO: 263 K/UL (ref 164–446)
PMV BLD AUTO: 11.3 FL (ref 9–12.9)
POTASSIUM SERPL-SCNC: 3.8 MMOL/L (ref 3.6–5.5)
RBC # BLD AUTO: 4.06 M/UL (ref 4.7–6.1)
SIGNIFICANT IND 70042: NORMAL
SITE SITE: NORMAL
SODIUM SERPL-SCNC: 142 MMOL/L (ref 135–145)
SOURCE SOURCE: NORMAL
TIBC SERPL-MCNC: 204 UG/DL (ref 250–450)
UIBC SERPL-MCNC: 146 UG/DL (ref 110–370)
WBC # BLD AUTO: 8 K/UL (ref 4.8–10.8)

## 2021-04-14 PROCEDURE — 700102 HCHG RX REV CODE 250 W/ 637 OVERRIDE(OP): Performed by: STUDENT IN AN ORGANIZED HEALTH CARE EDUCATION/TRAINING PROGRAM

## 2021-04-14 PROCEDURE — 97162 PT EVAL MOD COMPLEX 30 MIN: CPT

## 2021-04-14 PROCEDURE — 73610 X-RAY EXAM OF ANKLE: CPT | Mod: RT

## 2021-04-14 PROCEDURE — 83540 ASSAY OF IRON: CPT

## 2021-04-14 PROCEDURE — 85025 COMPLETE CBC W/AUTO DIFF WBC: CPT

## 2021-04-14 PROCEDURE — 99232 SBSQ HOSP IP/OBS MODERATE 35: CPT | Performed by: INTERNAL MEDICINE

## 2021-04-14 PROCEDURE — 770006 HCHG ROOM/CARE - MED/SURG/GYN SEMI*

## 2021-04-14 PROCEDURE — 97165 OT EVAL LOW COMPLEX 30 MIN: CPT

## 2021-04-14 PROCEDURE — A9270 NON-COVERED ITEM OR SERVICE: HCPCS | Performed by: STUDENT IN AN ORGANIZED HEALTH CARE EDUCATION/TRAINING PROGRAM

## 2021-04-14 PROCEDURE — 83550 IRON BINDING TEST: CPT

## 2021-04-14 PROCEDURE — 80048 BASIC METABOLIC PNL TOTAL CA: CPT

## 2021-04-14 PROCEDURE — 700111 HCHG RX REV CODE 636 W/ 250 OVERRIDE (IP): Performed by: STUDENT IN AN ORGANIZED HEALTH CARE EDUCATION/TRAINING PROGRAM

## 2021-04-14 PROCEDURE — 36415 COLL VENOUS BLD VENIPUNCTURE: CPT

## 2021-04-14 RX ADMIN — RISPERIDONE 2 MG: 2 TABLET ORAL at 04:26

## 2021-04-14 RX ADMIN — TAMSULOSIN HYDROCHLORIDE 0.8 MG: 0.4 CAPSULE ORAL at 09:06

## 2021-04-14 RX ADMIN — ACETAMINOPHEN 650 MG: 325 TABLET, FILM COATED ORAL at 04:26

## 2021-04-14 RX ADMIN — ENOXAPARIN SODIUM 40 MG: 40 INJECTION SUBCUTANEOUS at 04:26

## 2021-04-14 RX ADMIN — RISPERIDONE 2 MG: 2 TABLET ORAL at 18:39

## 2021-04-14 RX ADMIN — DOCUSATE SODIUM 50 MG AND SENNOSIDES 8.6 MG 2 TABLET: 8.6; 5 TABLET, FILM COATED ORAL at 18:39

## 2021-04-14 RX ADMIN — ACETAMINOPHEN 650 MG: 325 TABLET, FILM COATED ORAL at 19:35

## 2021-04-14 RX ADMIN — DOCUSATE SODIUM 50 MG AND SENNOSIDES 8.6 MG 2 TABLET: 8.6; 5 TABLET, FILM COATED ORAL at 04:26

## 2021-04-14 ASSESSMENT — COGNITIVE AND FUNCTIONAL STATUS - GENERAL
SUGGESTED CMS G CODE MODIFIER DAILY ACTIVITY: CL
MOBILITY SCORE: 7
DRESSING REGULAR UPPER BODY CLOTHING: A LITTLE
SUGGESTED CMS G CODE MODIFIER MOBILITY: CM
PERSONAL GROOMING: A LITTLE
MOVING TO AND FROM BED TO CHAIR: UNABLE
HELP NEEDED FOR BATHING: TOTAL
TURNING FROM BACK TO SIDE WHILE IN FLAT BAD: A LOT
TOILETING: TOTAL
STANDING UP FROM CHAIR USING ARMS: TOTAL
CLIMB 3 TO 5 STEPS WITH RAILING: TOTAL
DAILY ACTIVITIY SCORE: 13
DRESSING REGULAR LOWER BODY CLOTHING: TOTAL
MOVING FROM LYING ON BACK TO SITTING ON SIDE OF FLAT BED: UNABLE
WALKING IN HOSPITAL ROOM: TOTAL

## 2021-04-14 ASSESSMENT — PAIN DESCRIPTION - PAIN TYPE
TYPE: ACUTE PAIN

## 2021-04-14 ASSESSMENT — ENCOUNTER SYMPTOMS
FEVER: 0
VOMITING: 0
NAUSEA: 0
CHILLS: 0

## 2021-04-14 ASSESSMENT — ACTIVITIES OF DAILY LIVING (ADL): TOILETING: UNABLE TO DETERMINE AT THIS TIME

## 2021-04-14 ASSESSMENT — GAIT ASSESSMENTS: GAIT LEVEL OF ASSIST: UNABLE TO PARTICIPATE

## 2021-04-14 NOTE — PROGRESS NOTES
· 2 RN skin check completed  · Devices in place SCDs, gina hose to right leg, condom cath.   · Skin assessed under devices yes.  · Confirmed pressure ulcers found on n/a.  · New potential pressure ulcers noted on n/a.   · The following interventions in place Q2 turns, waffle mattress, heel float boots, mepilex.     Dressing to left heel CDI, jena drain in place, surgical incision on left lateral foot. sacrum pink, blanching, and intact. Small wound to right knee pink with a scab. Right upper foot redness but blanching. All bony prominences assessed. No other skin problems found at this time.

## 2021-04-14 NOTE — WOUND TEAM
[facility]  Inpatient Services  Initial Wound and Skin Care Evaluation    Admission Date: 4/11/2021     Last order of IP CONSULT TO WOUND CARE was found on 4/11/2021 from Hospital Encounter on 4/11/2021     HPI, PMH, SH: Reviewed    Past Surgical History:   Procedure Laterality Date   • IRRIGATION & DEBRIDEMENT ORTHO Left 4/12/2021    Procedure: IRRIGATION AND DEBRIDEMENT, WOUND - HEEL;  Surgeon: Donny Roque M.D.;  Location: SURGERY [facility];  Service: Orthopedics   • TENDON LENGHTENING Bilateral 2/1/2021    Procedure: LENGTHENING, TENDON- FOR FOOT DROP RECONSTRUCTION, POSTERIOR TIBIALIS TENDON  TRANSFER , ACHILLES LENGHTENING , LEFT MEDIAL RELEASE;  Surgeon: Donny Roque M.D.;  Location: SURGERY [facility];  Service: Orthopedics   • IRRIGATION & DEBRIDEMENT ORTHO Left 2/1/2021    Procedure: IRRIGATION AND DEBRIDEMENT, WOUND-FOOT;  Surgeon: Donny Roque M.D.;  Location: [facility];  Service: Orthopedics   • ANKLE ORIF Right 9/26/2017    Procedure: ANKLE ORIF SYNDESOMOSIS REPAIR;  Surgeon: Armando Woodard M.D.;  Location: [facility];  Service: Orthopedics     Social History     Tobacco Use   • Smoking status: Never Smoker   • Smokeless tobacco: Current User     Types: Chew   Substance Use Topics   • Alcohol use: Yes     Comment: 6-10 beers daily     Chief Complaint   Patient presents with   • Wound Infection     Diagnosis: Foot ulcer, unspecified laterality, with unspecified severity ([facility]) [L97.509]    Unit where seen by Wound Team: T304/01     WOUND CONSULT/FOLLOW UP RELATED TO:  Left lateral foot, left heel, right foot, right knee     WOUND HISTORY:  Patient known to wound team and [facility]. Left heel was recently fractured and previous surgeries. Had I&D on left heel on 4/12, Gretchen dressing in place at this time. Patient left lateral foot is a healing incision. Right dorsal foot used to have a pressure injury present, is now healed.     WOUND ASSESSMENT/LDA  Wound  04/11/21 Other (comment) Foot Lateral Left POA, healing incision with scab and some discoloration present (4/13) (Active)   Wound Image   04/13/21 1145   Site Assessment Dry;Black 04/13/21 1145   Periwound Assessment Purple;Dry 04/13/21 1145   Margins Attached edges 04/13/21 1145   Closure Adhesive bandage 04/13/21 1145   Drainage Amount None 04/13/21 1145   Treatments Cleansed;Site care;Offloading;Tape change 04/13/21 1145   Wound Cleansing Approved Wound Cleanser 04/13/21 1145   Periwound Protectant Not Applicable 04/13/21 1145   Dressing Cleansing/Solutions Not Applicable 04/13/21 1145   Dressing Options Mepilex Heel 04/13/21 1145   Dressing Changed New 04/13/21 1145   Dressing Status Clean;Dry;Intact 04/13/21 1145   Dressing Change/Treatment Frequency Every 72 hrs, and As Needed 04/13/21 1145   NEXT Dressing Change/Treatment Date 04/16/21 04/13/21 1145   NEXT Weekly Photo (Inpatient Only) 04/20/21 04/13/21 1145   Non-staged Wound Description Full thickness 04/13/21 1145   Number of days: 2       Wound 04/12/21 Incision Foot;Heel Left Gretchen wound vac, benzoin, steri-strips, adaptec (Active)   Site Assessment KIRTI 04/13/21 1145   Periwound Assessment KIRTI 04/13/21 0845   Margins KIRTI 04/13/21 0845   Closure KIRTI 04/13/21 0845   Drainage Amount KIRTI 04/13/21 0845   Drainage Description KIRTI 04/13/21 0845   Treatments Offloading 04/13/21 0845   Periwound Protectant Benzoin 04/12/21 2000   Dressing Options Ace Wrap;Wound Vac 04/13/21 0845   Dressing Changed Observed 04/13/21 0845   Dressing Status Dry;Clean;Intact 04/13/21 0845   Number of days: 1        Vascular:    ANA:   No results found.    Lab Values:    Lab Results   Component Value Date/Time    WBC 6.4 04/13/2021 09:35 AM    RBC 4.22 (L) 04/13/2021 09:35 AM    HEMOGLOBIN 12.2 (L) 04/13/2021 09:35 AM    HEMATOCRIT 38.1 (L) 04/13/2021 09:35 AM    CREACTPROT 2.17 (H) 04/11/2021 07:39 PM    SEDRATEWES 15 04/11/2021 07:39 PM    HBA1C 5.2 01/21/2021 12:47 PM        Culture  Results show:  No results found for this or any previous visit (from the past 720 hour(s)).    Pain Level/Medicated:  n/a       INTERVENTIONS BY WOUND TEAM:  Chart and images reviewed. Discussed with bedside RN. All areas of concern (based on picture review, LDA review and discussion with bedside RN) have been thoroughly assessed. Documentation of areas based on significant findings. This RN in to assess patient. Performed standard wound care which includes appropriate positioning, dressing removal and non-selective debridement. Pictures and measurements obtained weekly if/when required.  RIGHT HEEL/ LEFT LATERAL FOOT/ RIGHT DORSAL FOOT  Preparation for Dressing removal: removed gina hose from RLE (threw away)  Cleansed with:  wound cleanser and gauze.  Sharp debridement: n/a  Radha wound: Cleansed with wound cleanser, Prepped with n/a  Primary Dressing: mepilex  Secondary (Outer) Dressing:     LEFT HEEL WITH KRISHNA IN PLACE FROM OR    Interdisciplinary consultation: Patient, Bedside RN, EDWIN Lynn    EVALUATION / RATIONALE FOR TREATMENT:  Most Recent Date:  4/13/21: patient right dorsal foot with healed pressure injury from previous admit, applied mepilex to keep offloaded. Patient right heel pink and intact, mepilex placed. Patient left heel with surgical dressing in place. Patient left lateral foot with old healing incision present, scab present, some slight discoloration surrounding. Mepilex to offload. Right knee with 0.4x 0.4cm scab, clean, dry,intact, kept open to air. Heel float boots to be on at all times.      Goals: Steady decrease in wound area and depth weekly.    WOUND TEAM PLAN OF CARE ([X] for frequency of wound follow up,):   Nursing to follow orders written for wound care. Contact wound team if area fails to progress, deteriorates or with any questions/concerns  Dressing changes by wound team:                   Follow up 3 times weekly:                NPWT change 3 times weekly:     Follow up  1-2 times weekly:      Follow up Bi-Monthly:                   Follow up as needed: X    Other (explain):     NURSING PLAN OF CARE ORDERS (X):  Dressing changes: See Dressing Care orders: X  Skin care: See Skin Care orders:   RN Prevention Protocol:   Rectal tube care: See Rectal Tube Care orders:   Other orders:    RSKIN:   CURRENTLY IN PLACE (X), APPLIED THIS VISIT (A), ORDERED (O):   Q shift Hunter:  X  Q shift pressure point assessments:  X    Surface/Positioning   Pressure redistribution mattress            Low Airloss          Bariatric foam      Bariatric SOMMER     Waffle cushion        Waffle Overlay   X       Reposition q 2 hours   X   TAPs Turning system     Z Ralph Pillow     Offloading/Redistribution   Sacral Mepilex (Silicone dressing)     Heel Mepilex (Silicone dressing)  X       Heel float boots (Prevalon boot)     X        Float Heels off Bed with Pillows           Respiratory KIRTI  Silicone O2 tubing         Gray Foam Ear protectors     Cannula fixation Device (Tender )          High flow offloading Clip    Elastic head band offloading device      Anchorfast                                                         Trach with Optifoam split foam             Containment/Moisture Prevention KIRTI    Rectal tube or BMS    Purwick/Condom Cath        Gallardo Catheter    Barrier wipes           Barrier paste       Antifungal tx      Interdry        Mobilization KIRTI      Up to chair        Ambulate      PT/OT      Nutrition KIRTI      Dietician        Diabetes Education      PO     TF     TPN     NPO   # days     Other        Anticipated discharge plans: will need to be followed up with surgeon once outpatient.   LTACH:        SNF/Rehab:                  Home Health Care:           Outpatient Wound Center:            Self/Family Care:        Other:

## 2021-04-14 NOTE — THERAPY
"Physical Therapy   Initial Evaluation     Patient Name: Yury Blake  Age:  50 y.o., Sex:  male  Medical Record #: [id number]  Today's Date: 4/14/2021     Precautions: Fall Risk, Non Weight Bearing Left Lower Extremity    Assessment  Patient is 50 y.o. male that presented to [facility] with wound infection. PMHx significant for AMS, HTN, chronic pain, ETOH, and recent L posterior malleolus fracture with wound. He presented to PT with apparent impaired cognition with poor safety awareness and insight (though appears this may be baseline), impaired balance and coordination, functional weakness, and decreased activity tolerance which are limiting his ability to safely perform mobility at PLOF. He was able to move supine to sitting EOB with min A and stand from EOB with max A and facilitation to maintain NWB LLE with poor adherence. He refused attempt to transfer to  and therapists were unable to reason with patient regarding benefits of OOB activity. Will follow however patient is primarily limited by cognition and behavior so anticipate limited therapeutic benefit. Recommend patient mobilize to EOB 3x/day at meal times with RN staff.    Plan    Recommend Physical Therapy 1 time per week until therapy goals are met for the following treatments:  Bed Mobility, Community Re-integration, Equipment, Gait Training, Manual Therapy, Neuro Re-Education / Balance, Self Care/Home Evaluation, Stair Training, Therapeutic Activities and Therapeutic Exercises    DC Equipment Recommendations: Unable to determine at this time  Discharge Recommendations: (will require assist for mobility)       Subjective    \"What's the point of going from a chair to a chair?\" (while sitting EOB)     Objective       04/14/21 1006   Total Time Spent   Total Time Spent (Mins) 27   Charge Group   PT Evaluation PT Evaluation Mod   Initial Contact Note    Initial Contact Note Order Received and Verified, Physical Therapy Evaluation in Progress with " Full Report to Follow.   Precautions   Precautions Fall Risk;Non Weight Bearing Left Lower Extremity   Vitals   O2 (LPM) 0   O2 Delivery Device None - Room Air   Pain 0 - 10 Group   Therapist Pain Assessment   (no pain complaint during session)   Prior Living Situation   Prior Services Unable To Determine At This Time   Comments Per chart review patient lives at group home; patient reported he lives alone and works as a ; anticipate patient is unreliable historian   Prior Level of Functional Mobility   Bed Mobility Unable To Determine At This Time   Comments Patient unable to provide PLOF; chart indicates he used WC for primary mobility and there was a WC at bedside   History of Falls   History of Falls   (unable to ascertain)   Cognition    Cognition / Consciousness X   Ability To Follow Commands 1 Step  (intermittent)   Safety Awareness Impaired   New Learning Impaired   Attention Impaired   Initiation Impaired   Comments confused and increasingly irritable, unable to reason, poor insight   Passive ROM Lower Body   Passive ROM Lower Body WDL   Comments not formally tested, WFL for mobility   Active ROM Lower Body    Active ROM Lower Body  X   Comments grossly limited but unclear if due to cognition, weakness, muscle length, other   Strength Lower Body   Lower Body Strength  X   Comments as above; patient unable to stand on RLE with assist   Sensation Lower Body   Lower Extremity Sensation   Not Tested   Lower Body Muscle Tone   Lower Body Muscle Tone  WDL   Neurological Concerns   Neurological Concerns Yes   Comments given cognition   Coordination Lower Body    Coordination Lower Body  X   Comments grossly limited, appears to be due to cognition   Balance Assessment   Sitting Balance (Static) Fair   Sitting Balance (Dynamic) Fair -   Standing Balance (Static) Trace +   Standing Balance (Dynamic) Trace   Weight Shift Sitting Poor   Weight Shift Standing Absent   Comments SBA sitting EOB; min A x2 standing  with FWW   Gait Analysis   Gait Level Of Assist Unable to Participate   Weight Bearing Status NWB LLE   Comments patient unable to maintain NWB LLE, required facilitation from therapist and patient with no demonstration of understanding regarding reason for NWB LLE   Bed Mobility    Supine to Sit Minimal Assist   Sit to Supine Minimal Assist   Scooting Supervised  (seated)   Functional Mobility   Sit to Stand Maximal Assist   Bed, Chair, Wheelchair Transfer Unable to Participate   Transfer Method Other (Comments)  (STS only)   How much difficulty does the patient currently have...   Turning over in bed (including adjusting bedclothes, sheets and blankets)? 2   Sitting down on and standing up from a chair with arms (e.g., wheelchair, bedside commode, etc.) 1   Moving from lying on back to sitting on the side of the bed? 1   How much help from another person does the patient currently need...   Moving to and from a bed to a chair (including a wheelchair)? 1   Need to walk in a hospital room? 1   Climbing 3-5 steps with a railing? 1   6 clicks Mobility Score 7   Activity Tolerance   Sitting in Chair unable   Sitting Edge of Bed 15 min   Standing 2 min   Comments limited by cognition, weakness   Edema / Skin Assessment   Edema / Skin  Not Assessed   Short Term Goals    Short Term Goal # 1 Patient will move supine<>sitting EOB with supervision within 6tx in order to get in/out of bed   Short Term Goal # 2 Patient will perform transfer with min A within 6tx in order to achieve functional transfer and decrease CG burden   Short Term Goal # 3 Patient will self propel WC 50ft with supervision within 6tx in order to access environment   Education Group   Education Provided Role of Physical Therapist;Weight Bearing Precautions   Role of Physical Therapist Patient Response Patient;Acceptance;Explanation;No Learning Evidence   Problem List    Problems Impaired Bed Mobility;Impaired Transfers;Impaired Ambulation;Functional ROM  Deficit;Functional Strength Deficit;Impaired Balance;Impaired Coordination;Decreased Activity Tolerance;Safety Awareness Deficits / Cognition;Limited Knowledge of Post-Op Precautions;Motor Planning / Sequencing   Anticipated Discharge Equipment and Recommendations   DC Equipment Recommendations Unable to determine at this time   Discharge Recommendations   (will require assist for mobility)   Interdisciplinary Plan of Care Collaboration   IDT Collaboration with  Nursing;Occupational Therapist   Patient Position at End of Therapy In Bed;Bed Alarm On;Call Light within Reach;Tray Table within Reach;Phone within Reach   Collaboration Comments RN aware of visit, response   Session Information   Date / Session Number  4/14-1 (1/1, 4/20)

## 2021-04-14 NOTE — CARE PLAN
Problem: Safety  Goal: Will remain free from injury  Outcome: PROGRESSING AS EXPECTED  Note: Non-slip socks are on, bed is locked and in lowest position, personal belongings are within reach, room is free of clutter and spills, call light is in place. Patient educated on how to use the call light and how to call for assistance. Patient verbalized understanding. Bed alarm on.     Problem: Skin Integrity  Goal: Risk for impaired skin integrity will decrease  Outcome: PROGRESSING AS EXPECTED  Note: Skin assessed. Q2 turns in place. Mepilex and waffle mattress overlay.

## 2021-04-14 NOTE — FACE TO FACE
Face to Face Supporting Documentation - Home Health    The encounter with this patient was in whole or in part the primary reason for home health admission.    Date of encounter:   Patient:                    MRN:                       YOB: 2020  Yury Blake  [id number]  1970     Home health to see patient for:  Wound Care    Skilled need for:  Surgical Aftercare left heel wound care s/p I&D    Skilled nursing interventions to include:  Wound Care    Homebound status evidenced by:  Need the aid of supportive devices such as crutches, canes, wheelchairs or walkers or Needs the assistance of another person in order to leave the home. Leaving home requires a considerable and taxing effort. There is a normal inability to leave the home.    Community Physician to provide follow up care: Harman Barlow M.D.     Optional Interventions? No      I certify the face to face encounter for this home health care referral meets the CMS requirements and the encounter/clinical assessment with the patient was, in whole, or in part, for the medical condition(s) listed above, which is the primary reason for home health care. Based on my clinical findings: the service(s) are medically necessary, support the need for home health care, and the homebound criteria are met.  I certify that this patient has had a face to face encounter by myself.  Teresa Roberts M.D. - NPI: [id number]

## 2021-04-14 NOTE — DISCHARGE PLANNING
Anticipated Discharge Disposition: Return to  with HH.    Action: Pt will require HH for wound care. Spoke to guardian, Deonte Henderson. He gave consent to refer to [facility] , same agency as in the past. He will make payment arrangements     Barriers to Discharge: Pending HH for wounf care.    Plan: F/U on referral.

## 2021-04-14 NOTE — DISCHARGE PLANNING
Received Choice form at [phone]  Agency/Facility Name: [facility]    Referral sent per Choice form @ [phone]

## 2021-04-14 NOTE — DISCHARGE PLANNING
Agency/Facility Name: [facility]  Outcome: Received fax notification, patient accepted.     @[phone], wound care instructions faxed to [facility]

## 2021-04-14 NOTE — THERAPY
"Occupational Therapy   Initial Evaluation     Patient Name: Yury Blake  Age:  50 y.o., Sex:  male  Medical Record #: [id number]  Today's Date: 4/14/2021     Precautions  Precautions: Fall Risk, Non Weight Bearing Left Lower Extremity    Assessment  Patient is 50 y.o. male who present so acute  W/ wound infection. PMH includes AMS, HTN, chronic pain, ETOH,, and recent posterior malleolus fx w/ wound. Pt primarily limited by cognition demo'ing very poor insight into deficits and no internalization of ed/training provided. Pt required min A for LB dressing and seated grooming, Max A to stand EOB and mannual cues required to maintain NWBing status. Will continue to follow.    Plan    Recommend Occupational Therapy 1 time per week until therapy goals are met for the following treatments:  Adaptive Equipment, Neuro Re-Education / Balance, Self Care/Activities of Daily Living, Therapeutic Activities and Therapeutic Exercises.    DC Equipment Recommendations: (P) Unable to determine at this time  Discharge Recommendations: (P) (DC back to group home if they can manage him at this level)     Subjective    \"Why would I get to the chair?\"     Objective       04/14/21 1004   Total Time Spent   Total Time Spent (Mins) 20   Charge Group   OT Evaluation OT Evaluation Low   Initial Contact Note    Initial Contact Note Order Received and Verified, Occupational Therapy Evaluation in Progress with Full Report to Follow.   Prior Living Situation   Prior Services Continuous (24 Hour) Care Giving Per Service   Housing / Facility Group Home   Equipment Owned Wheelchair   Lives with - Patient's Self Care Capacity Attendant / Paid Care Giver   Comments Per EMR pt resides at group home, pt is a poor historian, unable to provide meaningful PLOF information, w/c in pts room   Prior Level of ADL Function   Self Feeding Independent   Grooming / Hygiene Unable To Determine At This Time   Bathing Unable To Determine At This Time " "  Dressing Unable To Determine At This Time   Toileting Unable To Determine At This Time   Prior Level of IADL Function   Comments likely dependent for IADLs, need to verify   Precautions   Precautions Fall Risk;Non Weight Bearing Left Lower Extremity   Pain 0 - 10 Group   Therapist Pain Assessment Post Activity Pain Same as Prior to Activity;Nurse Notified  (c/o knee \"stiffness\")   Cognition    Cognition / Consciousness X   Ability To Follow Commands 1 Step   Safety Awareness Impaired   New Learning Impaired   Attention Impaired   Initiation Impaired   Comments very poor retention of new ed, reports he is driving and working as a , per chart pt resides at group [place]   Balance Assessment   Sitting Balance (Static) Fair   Sitting Balance (Dynamic) Fair -   Standing Balance (Static) Trace +   Standing Balance (Dynamic) Trace   Weight Shift Sitting Fair   Weight Shift Standing Absent   Comments w/ FWW, required mannual cues to maintain NWBing status   Bed Mobility    Supine to Sit Minimal Assist   Sit to Supine Minimal Assist   Scooting Supervised   ADL Assessment   Grooming Minimal Assist;Seated  (facewashing )   Lower Body Dressing Minimal Assist  (donned sock )   Toileting Total Assist  (incont of stool)   How much help from another person does the patient currently need...   Putting on and taking off regular lower body clothing? 1   Bathing (including washing, rinsing, and drying)? 1   Toileting, which includes using a toilet, bedpan, or urinal? 1   Putting on and taking off regular upper body clothing? 3   Taking care of personal grooming such as brushing teeth? 3   Eating meals? 4   6 Clicks Daily Activity Score 13   Functional Mobility   Sit to Stand Maximal Assist   Bed, Chair, Wheelchair Transfer Unable to Participate   Mobility stood at EOB    Edema / Skin Assessment   Edema / Skin  Not Assessed   Activity Tolerance   Sitting Edge of Bed 15 min   Standing 2 min total   Patient / Family Goals   Patient " / Family Goal #1 to walk on his foot   Short Term Goals   Short Term Goal # 1 Pt will demo BSC txf w/ min A   Short Term Goal # 2 Pt will demo seated grooming at sink w/ SPV   Short Term Goal # 3 Pt will demo LB dressing w/ SPV   Education Group   Role of Occupational Therapist Patient Response Patient;Explanation;Demonstration;No Learning Evidence   Problem List   Problem List Unable To Determine At This Time   Anticipated Discharge Equipment and Recommendations   DC Equipment Recommendations Unable to determine at this time   Discharge Recommendations   (DC back to group home if they can manage him at this level)   Interdisciplinary Plan of Care Collaboration   IDT Collaboration with  Nursing;Physical Therapist   Patient Position at End of Therapy In Bed;Bed Alarm On;Call Light within Reach;Tray Table within Reach;Phone within Reach   Collaboration Comments report given   Session Information   Date / Session Number  4/14, 1 (1/1, 4/20)   Priority 1

## 2021-04-14 NOTE — PROGRESS NOTES
Assumed care of patient at 0645. Bedside report received. Assessment complete.  A&Ox1. Denies CP/SOB.  Reporting pain 0/10.   Educated regarding pharmacologic and non pharmacologic modalities for pain management.  Tele sitter in place.  Hells floated, dressing on L foot in place CDI.  Tolerating diet. Denies N/V  +void via condom cath. +BM Last BM 4/12/21  Skin per flowsheets.  Patient in bed, PT will be in today to evaluate.  All needs met at this time. Call light within reach. Pt calls appropriately. Bed low and locked, non skid socks in place. Hourly rounding in place.

## 2021-04-15 VITALS
TEMPERATURE: 99.1 F | OXYGEN SATURATION: 98 % | BODY MASS INDEX: 27.78 KG/M2 | SYSTOLIC BLOOD PRESSURE: 108 MMHG | HEIGHT: 71 IN | WEIGHT: 198.41 LBS | DIASTOLIC BLOOD PRESSURE: 63 MMHG | HEART RATE: 57 BPM | RESPIRATION RATE: 20 BRPM

## 2021-04-15 PROCEDURE — 700111 HCHG RX REV CODE 636 W/ 250 OVERRIDE (IP): Performed by: STUDENT IN AN ORGANIZED HEALTH CARE EDUCATION/TRAINING PROGRAM

## 2021-04-15 PROCEDURE — A9270 NON-COVERED ITEM OR SERVICE: HCPCS | Performed by: STUDENT IN AN ORGANIZED HEALTH CARE EDUCATION/TRAINING PROGRAM

## 2021-04-15 PROCEDURE — 700102 HCHG RX REV CODE 250 W/ 637 OVERRIDE(OP): Performed by: STUDENT IN AN ORGANIZED HEALTH CARE EDUCATION/TRAINING PROGRAM

## 2021-04-15 PROCEDURE — 99239 HOSP IP/OBS DSCHRG MGMT >30: CPT | Performed by: INTERNAL MEDICINE

## 2021-04-15 RX ADMIN — TAMSULOSIN HYDROCHLORIDE 0.8 MG: 0.4 CAPSULE ORAL at 09:24

## 2021-04-15 RX ADMIN — ENOXAPARIN SODIUM 40 MG: 40 INJECTION SUBCUTANEOUS at 05:44

## 2021-04-15 RX ADMIN — RISPERIDONE 2 MG: 2 TABLET ORAL at 05:44

## 2021-04-15 RX ADMIN — DOCUSATE SODIUM 50 MG AND SENNOSIDES 8.6 MG 2 TABLET: 8.6; 5 TABLET, FILM COATED ORAL at 05:44

## 2021-04-15 ASSESSMENT — PAIN DESCRIPTION - PAIN TYPE: TYPE: ACUTE PAIN

## 2021-04-15 NOTE — PROGRESS NOTES
LIMB PRESERVATION SERVICE     [facility] has been consulted for evaluation of left posterior heel ulcer.      POD #3 s/p I&D left heel, placement of skin graft substitute, placement of negative pressure incisional dressing by Dr. Roque    Patient accepted to [facility].  Wound care orders given to [facility]  4/14/21.      Right ankle x-ray results reviewed with Dr. Lemus  1.  Status post syndesmotic fusion     2.  Osteopenia     3.  Atherosclerosis          Plan  WBAT RLE  NWB LLE  Follow-up with Dr. Roque in 1 to 2 weeks  [facility] to remove KRISHNA on 4/19/2021, leave Adaptic in place, change outer dressing.  Unable to follow-up at [facility] rounds due to no insurance.

## 2021-04-15 NOTE — PROGRESS NOTES
0710 Patient's in bed. Bedside report received from Keyla MCDONALD RN at the beginning of the shift.    0920 Patient's in bed. AXOX1, re oriented. Fall protocol in effect. Call light within reach. Reminded patient to call for assist. Assessment completed. No distress noted. Plan of care reviewed with the patient. Turned & repositioned. Tele sitter in use.     0926 New order received from Dr Roberts for the patient to be discharged.    1115 Patient's in bed. No distress noted.     1235  Placed a call to Deonte Henderson (Private Guardian) regarding discharge x3. No answer and voicemail is full. Notified Mony .    1335 Received a call from Devang Henderson (private Guardian). Witnessed by Dalia Sanford RN over the phone.    1427    Patient left via taxi with  for  (Sonali Stone) for [facility] KRISHNA drain, prescription for dressing,  patient's belongings and own wheelchair, via w/c accompanied by one female CNA to a group home.

## 2021-04-15 NOTE — PROGRESS NOTES
Hospital Medicine Daily Progress Note    Date of Service  4/14/2021    Chief Complaint  50 y.o. male admitted 4/11/2021 with wound infection    Hospital Course  51 yo man with cognitive impairment, has guardian, a resident a group home, p/w infected left heel ulcer that the Orthopaedic Surgeon, Dr. Roque, is concerned for osteo after he fractured his left heel.       Interval Problem Update  POD 2 I&D left heel. Pain well controlled on PO meds. Tolerating diet.    I have personally seen and examined the patient at bedside. I discussed the plan of care with patient, multidisciplinary team including pharmacist, bedside RN, , LPS.    Consultants/Specialty  Orthopedic surgery    Code Status  Full Code    Disposition  Return to group home when medically cleared.  Will need continued wound care.    Review of Systems  Review of Systems   Constitutional: Negative for chills and fever.   Gastrointestinal: Negative for nausea and vomiting.   Musculoskeletal: Positive for joint pain (foot).   All other systems reviewed and are negative.       Physical Exam  Temp:  [36.3 °C (97.4 °F)-37 °C (98.6 °F)] 36.3 °C (97.4 °F)  Pulse:  [61-70] 70  Resp:  [15-18] 18  BP: ()/(62-82) 120/82  SpO2:  [92 %-96 %] 94 %    Physical Exam  Vitals and nursing note reviewed.   Constitutional:       General: He is not in acute distress.     Appearance: He is overweight. He is not toxic-appearing.   HENT:      Head: Normocephalic and atraumatic.      Right Ear: External ear normal.      Left Ear: External ear normal.      Nose: Nose normal.      Mouth/Throat:      Mouth: Mucous membranes are moist.      Pharynx: Oropharynx is clear.   Eyes:      General: No scleral icterus.     Conjunctiva/sclera: Conjunctivae normal.   Cardiovascular:      Rate and Rhythm: Normal rate and regular rhythm.   Pulmonary:      Effort: Pulmonary effort is normal.      Breath sounds: Normal breath sounds.   Abdominal:      Palpations: Abdomen is soft.       Tenderness: There is no abdominal tenderness. There is no guarding.   Musculoskeletal:         General: No swelling.      Cervical back: Normal range of motion and neck supple.      Comments: LLE with KRISHNA in place, overlying ACE wrap.   Skin:     General: Skin is warm and dry.   Neurological:      Mental Status: He is alert. Mental status is at baseline.   Psychiatric:         Mood and Affect: Mood normal.         Behavior: Behavior is cooperative.         Fluids    Intake/Output Summary (Last 24 hours) at 4/14/2021 2038  Last data filed at 4/14/2021 1200  Gross per 24 hour   Intake 240 ml   Output --   Net 240 ml       Laboratory  Recent Labs     04/12/21  0237 04/13/21  0935 04/14/21  0407   WBC 6.6 6.4 8.0   RBC 4.18* 4.22* 4.06*   HEMOGLOBIN 12.1* 12.2* 11.7*   HEMATOCRIT 38.2* 38.1* 36.5*   MCV 91.4 90.3 89.9   MCH 28.9 28.9 28.8   MCHC 31.7* 32.0* 32.1*   RDW 47.3 45.3 45.4   PLATELETCT 264 249 263   MPV 10.9 11.4 11.3     Recent Labs     04/12/21  0237 04/13/21  0935 04/14/21  0407   SODIUM 140 143 142   POTASSIUM 3.5* 3.9 3.8   CHLORIDE 108 111 109   CO2 23 24 24   GLUCOSE 86 80 89   BUN 13 11 12   CREATININE 0.65 0.74 0.93   CALCIUM 9.5 9.5 9.4     Recent Labs     04/12/21  0237   INR 1.12         Recent Labs     04/12/21  0237   TRIGLYCERIDE 100   HDL 34*   LDL 74       Imaging  DX-ANKLE 3+ VIEWS RIGHT   Final Result         1.  Status post syndesmotic fusion      2.  Osteopenia      3.  Atherosclerosis           Assessment/Plan  * Open wound of left foot- (present on admission)  Assessment & Plan  S/p I&D on 4/12.  Hold abx per Ortho.    LPS following, appreciate assistance. Will need ongoing wound care after discharge.    Cognitive decline- (present on admission)  Assessment & Plan  He has guardian and lives in group home.  At baseline, he appears to be oriented to self and location.      Closed fracture of posterior malleolus of left tibia- (present on admission)  Assessment & Plan  Ortho  following, appreciate recommendations.  Conservative management.      Anemia of chronic disease- (present on admission)  Assessment & Plan  Normocytic, iron wnl, TIBC low. Suggestive of anemia of chronic disease.  Monitor.    Hypokalemia  Assessment & Plan  Mild, resolved.  Monitor and replace.       VTE prophylaxis: enoxaparin

## 2021-04-15 NOTE — PROGRESS NOTES
2 RN skin check completed with MUSTAPHA Levine    Devices in place SCDs, SCD's to right leg, condom cath.   Skin assessed under devices yes.  Confirmed pressure ulcers found on n/a.  New potential pressure ulcers noted on n/a.   The following interventions in place Q2 turns, waffle mattress, heel float boots, mepilex, using barrier paste     Dressing to left heel CDI, jena drain in place, previous surgical incision on left lateral foot. sacrum pink, blanching, and intact. Small wound to right knee pink with a scab. Right upper foot redness but blanching. All bony prominences assessed. Bilateral redness on elbows but blanching. Redness on nose noted.No other skin problems found at this time.

## 2021-04-15 NOTE — PROGRESS NOTES
· 2 RN skin check completed with MUSTAPHA Strong.  · Devices in place SCDs, gina hose to right leg, condom cath.   · Skin assessed under devices yes.  · Confirmed pressure ulcers found on n/a.  · New potential pressure ulcers noted on n/a.   · The following interventions in place Q2 turns, waffle mattress, heel float boots, mepilex.     Dressing to left heel CDI, jena drain in place, previous surgical incision on left lateral foot. sacrum pink, blanching, and intact. Small wound to right knee pink with a scab. Right upper foot redness but blanching. All bony prominences assessed. Bilateral redness on elbows but blanching. Redness on nose noted.No other skin problems found at this time.

## 2021-04-15 NOTE — DISCHARGE INSTRUCTIONS
Incision and Drainage, Care After  This sheet gives you information about how to care for yourself after your procedure. Your health care provider may also give you more specific instructions. If you have problems or questions, contact your health care provider.  What can I expect after the procedure?  After the procedure, it is common to have:  · Pain or discomfort around the incision site.  · Blood, fluid, or pus (drainage) from the incision.  · Redness and firm skin around the incision site.  Follow these instructions at home:  Medicines  · Take over-the-counter and prescription medicines only as told by your health care provider.  · If you were prescribed an antibiotic medicine, use or take it as told by your health care provider. Do not stop using the antibiotic even if you start to feel better.  Wound care  Follow instructions from your health care provider about how to take care of your wound. Make sure you:  · Wash your hands with soap and water before and after you change your bandage (dressing). If soap and water are not available, use hand .  · Change your dressing and packing as told by your health care provider.  ? If your dressing is dry or stuck when you try to remove it, moisten or wet the dressing with saline or water so that it can be removed without harming your skin or tissues.  ? If your wound is packed, leave it in place until your health care provider tells you to remove it. To remove the packing, moisten or wet the packing with saline or water so that it can be removed without harming your skin or tissues.  · Leave stitches (sutures), skin glue, or adhesive strips in place. These skin closures may need to stay in place for 2 weeks or longer. If adhesive strip edges start to loosen and curl up, you may trim the loose edges. Do not remove adhesive strips completely unless your health care provider tells you to do that.  Check your wound every day for signs of infection. Check  for:  · More redness, swelling, or pain.  · More fluid or blood.  · Warmth.  · Pus or a bad smell.  If you were sent home with a drain tube in place, follow instructions from your health care provider about:  · How to empty it.  · How to care for it at home.    General instructions  · Rest the affected area.  · Do not take baths, swim, or use a hot tub until your health care provider approves. Ask your health care provider if you may take showers. You may only be allowed to take sponge baths.  · Return to your normal activities as told by your health care provider. Ask your health care provider what activities are safe for you. Your health care provider may put you on activity or lifting restrictions.  · The incision will continue to drain. It is normal to have some clear or slightly bloody drainage. The amount of drainage should lessen each day.  · Do not apply any creams, ointments, or liquids unless you have been told to by your health care provider.  · Keep all follow-up visits as told by your health care provider. This is important.  Contact a health care provider if:  · Your cyst or abscess returns.  · You have a fever or chills.  · You have more redness, swelling, or pain around your incision.  · You have more fluid or blood coming from your incision.  · Your incision feels warm to the touch.  · You have pus or a bad smell coming from your incision.  · You have red streaks above or below the incision site.  Get help right away if:  · You have severe pain or bleeding.  · You cannot eat or drink without vomiting.  · You have decreased urine output.  · You become short of breath.  · You have chest pain.  · You cough up blood.  · The affected area becomes numb or starts to tingle.  These symptoms may represent a serious problem that is an emergency. Do not wait to see if the symptoms will go away. Get medical help right away. Call your local emergency services (911 in the [place] Do not drive yourself to the  hospital.  Summary  · After this procedure, it is common to have fluid, blood, or pus coming from the surgery site.  · Follow all home care instructions. You will be told how to take care of your incision, how to check for infection, and how to take medicines.  · If you were prescribed an antibiotic medicine, take it as told by your health care provider. Do not stop taking the antibiotic even if you start to feel better.  · Contact a health care provider if you have increased redness, swelling, or pain around your incision. Get help right away if you have chest pain, you vomit, you cough up blood, or you have shortness of breath.  · Keep all follow-up visits as told by your health care provider. This is important.  This information is not intended to replace advice given to you by your health care provider. Make sure you discuss any questions you have with your health care provider.  Document Released: 03/11/2013 Document Revised: 11/18/2019 Document Reviewed: 11/18/2019  [employer] Patient Education © 2020 [employer]      Discharge Instructions    Discharged to [facility] by taxi with caregiven with escort. Discharged via wheelchair, hospital escort: Yes.  Special equipment needed: KRISHNA drain to left heel, own wheelchair    Be sure to schedule a follow-up appointment with your primary care doctor or any specialists as instructed.     Discharge Plan:        I understand that a diet low in cholesterol, fat, and sodium is recommended for good health. Unless I have been given specific instructions below for another diet, I accept this instruction as my diet prescription.   Other diet: Regular    Special Instructions:     Weight bearing status, non weight bearing status left lower extremity and follow up with Dr Donny Roque, telephone # . [phone]  KRISHNA drain plan to maintain dressing for 1 week post-op  See Prescription for dressing changes      [facility] to  follow.    · Is patient discharged on Warfarin / Coumadin?   No     Depression / Suicide Risk    As you are discharged from this [facility] facility, it is important to learn how to keep safe from harming yourself.    Recognize the warning signs:  · Abrupt changes in personality, positive or negative- including increase in energy   · Giving away possessions  · Change in eating patterns- significant weight changes-  positive or negative  · Change in sleeping patterns- unable to sleep or sleeping all the time   · Unwillingness or inability to communicate  · Depression  · Unusual sadness, discouragement and loneliness  · Talk of wanting to die  · Neglect of personal appearance   · Rebelliousness- reckless behavior  · Withdrawal from people/activities they love  · Confusion- inability to concentrate     If you or a loved one observes any of these behaviors or has concerns about self-harm, here's what you can do:  · Talk about it- your feelings and reasons for harming yourself  · Remove any means that you might use to hurt yourself (examples: pills, rope, extension cords, firearm)  · Get professional help from the community (Mental Health, Substance Abuse, psychological counseling)  · Do not be alone:Call your Safe Contact- someone whom you trust who will be there for you.  · Call your local CRISIS HOTLINE 354-5956 or 348-751-6494  · Call your local Children's Mobile Crisis Response Team [place] (176) 637-7715 or www.[employer]  · Call the toll free National Suicide Prevention Hotlines   · National Suicide Prevention Lifeline 091-914-VTZP (6305)  · National Hope Line Network 800-SUICIDE (116-2230)    Wound Infection  A wound infection happens when germs start to grow in a wound. Germs that cause wound infections are most often bacteria. Other types of infections can occur as well. An infection can cause the wound to break open. Wound infections need treatment. If a wound infection is not treated, problems  can happen.  What are the causes?  · Most often caused by germs (bacteria) that grow in a wound.  · Other germs, such as yeast and funguses, can also cause wound infections.  What increases the risk?  · Having a weak body defense system (immune system).  · Having diabetes.  · Taking certain medicines (steroids) for a long time.  · Smoking.  · Being an older person.  · Being overweight.  · Taking certain medicines for cancer treatment.  What are the signs or symptoms?  · Having more redness, swelling, or pain at the wound site.  · Having more blood or fluid at the wound site.  · A bad smell coming from a wound or bandage (dressing).  · Having a fever.  · Feeling very tired.  · Having warmth at or around the wound.  · Having pus at the wound site.  How is this treated?  · This condition is most often treated with an antibiotic medicine.  ? The infection should improve 24-48 hours after you start antibiotics.  ? After 24-48 hours, redness around the wound should stop spreading. The wound should also be less painful.  Follow these instructions at home:  Medicines  · Take or apply over-the-counter and prescription medicines only as told by your doctor.  · If you were prescribed an antibiotic medicine, take or apply it as told by your doctor. Do not stop using the antibiotic even if you start to feel better.  Wound care    · Clean the wound each day, or as told by your doctor.  ? Wash the wound with mild soap and water.  ? Rinse the wound with water to remove all soap.  ? Pat the wound dry with a clean towel. Do not rub it.  · Follow instructions from your doctor about how to take care of your wound. Make sure you:  ? Wash your hands with soap and water before and after you change your bandage. If you cannot use soap and water, use hand .  ? Change your bandage as told by your doctor.  ? Leave stitches (sutures), skin glue, or skin tape (adhesive) strips in place if your wound has been closed. They may need to  stay in place for 2 weeks or longer. If tape strips get loose and curl up, you may trim the loose edges. Do not remove tape strips completely unless your doctor says it is okay. Some wounds are left open to heal on their own.  · Check your wound every day for signs of infection. Watch for:  ? More redness, swelling, or pain.  ? More fluid or blood.  ? Warmth.  ? Pus or a bad smell.  General instructions  · Keep the bandage dry until your doctor says it can be removed.  · Do not take baths, swim, or use a hot tub until your doctor approves. Ask your doctor if you may take showers. You may only be allowed to take sponge baths.  · Raise (elevate) the injured area above the level of your heart while you are sitting or lying down.  · Do not scratch or pick at the wound.  · Keep all follow-up visits as told by your doctor. This is important.  Contact a doctor if:  · Medicine does not help your pain.  · You have more redness, swelling, or pain around your wound.  · You have more fluid or blood coming from your wound.  · Your wound feels warm to the touch.  · You have pus coming from your wound.  · You notice a bad smell coming from your wound or your bandage.  · Your wound that was closed breaks open.  Get help right away if:  · You have a red streak going away from your wound.  · You have a fever.  Summary  · A wound infection happens when germs start to grow in a wound.  · This condition is usually treated with an antibiotic medicine.  · Follow instructions from your doctor about how to take care of your wound.  · Contact a doctor if your wound infection does not start to get better in 24-48 hours, or your symptoms get worse.  · Keep all follow-up visits as told by your doctor. This is important.  This information is not intended to replace advice given to you by your health care provider. Make sure you discuss any questions you have with your health care provider.  Document Released: 09/26/2009 Document Revised:  07/30/2019 Document Reviewed: 07/30/2019  [employer] Patient Education © 2020 [employer]

## 2021-04-15 NOTE — CARE PLAN
Problem: Safety  Goal: Will remain free from injury  Outcome: PROGRESSING AS EXPECTED  Note: Safety precaution in effect. Non skid socks in use. Call light within reach. Reminded patient to call for assist. Hourly rounds in effect.      Problem: Infection  Goal: Will remain free from infection  Outcome: PROGRESSING AS EXPECTED  Note: Implement standard precaution. Hand washing every encounter & before & after patient care.     Problem: Knowledge Deficit  Goal: Knowledge of disease process/condition, treatment plan, diagnostic tests, and medications will improve  Outcome: PROGRESSING AS EXPECTED  Note: Discussed Plan of care with Deonte Henderson (private guardian) and discharge instructions. .   Questions answered.      Problem: Pain Management  Goal: Pain level will decrease to patient's comfort goal  Outcome: PROGRESSING AS EXPECTED  Note: Educated on pain scale. Encourage to verbalize pain. Will medicate as per MAR.    Patient's confused, re oriented.

## 2021-04-15 NOTE — DISCHARGE SUMMARY
Discharge Summary    CHIEF COMPLAINT ON ADMISSION  Chief Complaint   Patient presents with   • Wound Infection       Reason for Admission  Sent by MD     Admission Date  4/11/2021    CODE STATUS  Full Code    HPI & HOSPITAL COURSE  Yury Blake is a 51 yo man with cognitive impairment, who has guardian and is a resident a group home, who presented with infected left heel ulcer. He was evaluated by Orthopaedic Surgery, and there was concern for osteomyelitis after left heel fracture. He was admitted and underwent I&D of left heel on 4/12/21. He tolerated the procedure well and wound cultures did not grow any organisms. Wound care was continued and he was cleared for return to his group home.      Therefore, he is discharged in good and stable condition to group [place] with close outpatient follow-up.    The patient met 2-midnight criteria for an inpatient stay at the time of discharge.    Discharge Date  4/15/21    FOLLOW UP ITEMS POST DISCHARGE  Wound care    DISCHARGE DIAGNOSES  Principal Problem:    Open wound of left foot POA: Yes  Active Problems:    Cognitive decline POA: Yes    Closed fracture of posterior malleolus of left tibia POA: Yes    Hypokalemia POA: No    Anemia of chronic disease POA: Yes  Resolved Problems:    * No resolved hospital problems. *      FOLLOW UP  [facility]  [address] [address]  [phone]        Harman Barlow M.D.  [address] [facility] [address]  [address]  [facility] [address]  [phone]            MEDICATIONS ON DISCHARGE     Medication List      CONTINUE taking these medications      Instructions   acetaminophen 325 MG Tabs  Commonly known as: Tylenol   Take 2 Tablets by mouth every 6 hours as needed for up to 60 days.  Dose: 650 mg     aspirin 325 MG Tabs  Commonly known as: ASA   Take 1 tablet by mouth 2 Times a Day.  Dose: 325 mg     docusate sodium 100 MG Caps   Take 100 mg by mouth 1 time a day as needed (hard stools).  Dose: 100 mg     Fiber  625 MG Tabs   Take 1 tablet by mouth 3 times a day, with meals.  Dose: 625 mg     magnesium hydroxide 400 MG/5ML Susp  Commonly known as: MILK OF MAGNESIA   Take 30 mL by mouth 1 time a day as needed (for constipation).  Dose: 30 mL     risperiDONE 2 MG Tabs  Commonly known as: RISPERDAL   Take 1 tablet by mouth 2 Times a Day.  Dose: 2 mg     senna-docusate 8.6-50 MG Tabs  Commonly known as: PERICOLACE or SENOKOT S   Take 1 tablet by mouth 2 times a day as needed for Constipation.  Dose: 1 tablet     tamsulosin 0.4 MG capsule  Commonly known as: FLOMAX   Take 2 Capsules by mouth 1/2 hour after breakfast.  Dose: 0.8 mg            Allergies  No Known Allergies    DIET  Orders Placed This Encounter   Procedures   • Diet Order Diet: Regular     Standing Status:   Standing     Number of Occurrences:   1     Order Specific Question:   Diet:     Answer:   Regular [1]       ACTIVITY  As tolerated.  Non-weight bearing LEFT leg    CONSULTATIONS  Orthopedic Surgery  Limb Preservation Service    PROCEDURES  4/12/21:  1.  Left irrigation and debridement of foot including the posterior heel and   plantar.  2.  Left placement, skin graft substitute less than 20 square cm.  3.  Left placement negative pressure incisional dressing.    LABORATORY  Lab Results   Component Value Date    SODIUM 142 04/14/2021    POTASSIUM 3.8 04/14/2021    CHLORIDE 109 04/14/2021    CO2 24 04/14/2021    GLUCOSE 89 04/14/2021    BUN 12 04/14/2021    CREATININE 0.93 04/14/2021        Lab Results   Component Value Date    WBC 8.0 04/14/2021    HEMOGLOBIN 11.7 (L) 04/14/2021    HEMATOCRIT 36.5 (L) 04/14/2021    PLATELETCT 263 04/14/2021      DX-ANKLE 3+ VIEWS RIGHT   Final Result         1.  Status post syndesmotic fusion      2.  Osteopenia      3.  Atherosclerosis         Total time of the discharge process exceeds 45 minutes.

## 2021-04-15 NOTE — PROGRESS NOTES
2 RN skin check completed with Skin Team  Devices in place SCDs, gina hose to right leg, condom cath.   Skin assessed under devices yes.  Confirmed pressure ulcers found on n/a.  New potential pressure ulcers noted on n/a.   The following interventions in place Q2 turns, waffle mattress, heel float boots, mepilex, using barrier paste     Dressing to left heel CDI, jena drain in place, previous surgical incision on left lateral foot. sacrum pink, blanching, and intact. Small wound to right knee pink with a scab. Right upper foot redness but blanching. All bony prominences assessed. Bilateral redness on elbows but blanching. Redness on nose noted.No other skin problems found at this time.

## 2021-04-15 NOTE — CARE PLAN
Problem: Safety  Goal: Will remain free from injury  Outcome: PROGRESSING AS EXPECTED  Note: Non-slip socks are on, bed is locked and in lowest position, personal belongings are within reach, room is free of clutter and spills, call light is in place. Patient educated on how to use the call light and how to call for assistance. Patient verbalized understanding. Telesitter in room. Bed alarm on.     Problem: Urinary Elimination:  Goal: Ability to reestablish a normal urinary elimination pattern will improve  Outcome: PROGRESSING AS EXPECTED   P2gdxma, waffle overlay, barrier cream, qshift skin checks.

## 2021-04-17 LAB
BACTERIA BLD CULT: NORMAL
BACTERIA BLD CULT: NORMAL
SIGNIFICANT IND 70042: NORMAL
SIGNIFICANT IND 70042: NORMAL
SITE SITE: NORMAL
SITE SITE: NORMAL
SOURCE SOURCE: NORMAL
SOURCE SOURCE: NORMAL

## 2021-05-15 NOTE — PROGRESS NOTES
Infectious Disease Follow up Note      Subjective:     Chief Complaint   Patient presents with   • Follow-Up     Pressure injury of skin of right foot         Interval History:     Patient is a 50-year-old male with history of alcohol abuse and hypertension admitted on 9/9/2020 for acute encephalopathy.  He had a prolonged hospitalization and his encephalopathy was felt to be secondary to his alcohol use. His encephalopathy ultimately improved.  His hospital course was complicated by a left fifth metatarsal ulcer.  He denied any prior injury or trauma.  X-ray of the left foot revealed no bony injury or osseous lesions.  He was seen by the limb preservation service in January 2021 and was noted to have cellulitis surrounding the left fifth metatarsal.  Wound culture on 1/22/2021 was positive for MSSA for which the patient was treated with Augmentin through 1/31.   Patient is status post bilateral gastrocsoleus recessions, bilateral posterior tibial tendon transfer to the lateral cuneiform, bilateral deep compartment fasciotomy, bilateral flexor tendon release from toes 1 through 5, left medial column release, left foot I&D of multiple foot compartments and left fifth metatarsal bone biopsy on 2/1/2021 by Dr. Roque.  Debridement was down to the level of bone of the fifth metatarsal per the operative note.  Lateral aspect of the fifth metatarsal head sent for bone biopsy, culture and pathology.  Bone culture of left fifth metatarsal growing Staph epidermidis, oxacillin resistant.  Tissue culture from the left fifth toe ulcer also growing corynebacterium species.  Bone biopsy of left fifth metatarsal negative for osteomyelitis.   Patient was seen by the ID service on 2/5/2021.  Augmentin was discontinued and he was transitioned to doxycycline to complete a 10-day course as the patient underwent I&D of multiple compartments of the foot.  He did not need a prolonged course of antibiotics as the pathology of the left  metatarsal head was negative for osteomyelitis.    Patient was recently hospitalized from 4/11 through 4/15 secondary to an infected left heel ulcer.  X-ray of the left ankle revealed cortical irregularity of the posterior distal tibia malleolus compatible with subtle fracture.  Patient was evaluated by orthopedic surgery and underwent irrigation and debridement of the foot including the posterior heel, placement of skin graft and placement of incisional dressing on 4/12/2021 by Dr. Roque.  Debridement was brought down to the level of the calcaneus per the operative note and a necrotic tendon was debrided as well.  Operative cultures were negative.    Hospital records reviewed    Patient here for follow-up.  He is accompanied by a caretaker from his group home.  Patient is mainly wheelchair bound.  Given the patient's cognitive decline, history also obtained from the caregiver.  Patient has developed a superficial pressure ulcer on the left heel.  Patient notes some pain.  He has had no recent fevers or chills.  Home wound care has been changing his dressing on a daily basis.  Patient is not on any antibiotics.          Review of Systems   Unable to perform ROS: Mental acuity   Patient with cognitive decline and unable to give a reliable history    Past Medical History:   Diagnosis Date   • Arthritis     RA   • Encephalopathy    • Hypertension    • Pain     Right ankle   • Psychiatric problem     anxiety       Past Surgical History:   Procedure Laterality Date   • IRRIGATION & DEBRIDEMENT ORTHO Left 4/12/2021    Procedure: IRRIGATION AND DEBRIDEMENT, WOUND - HEEL;  Surgeon: Donny Roque M.D.;  Location: [facility];  Service: Orthopedics   • TENDON LENGHTENING Bilateral 2/1/2021    Procedure: LENGTHENING, TENDON- FOR FOOT DROP RECONSTRUCTION, POSTERIOR TIBIALIS TENDON  TRANSFER , ACHILLES LENGHTENING , LEFT MEDIAL RELEASE;  Surgeon: Donny Roque M.D.;  Location: [facility];  Service:  Orthopedics   • IRRIGATION & DEBRIDEMENT ORTHO Left 2/1/2021    Procedure: IRRIGATION AND DEBRIDEMENT, WOUND-FOOT;  Surgeon: Donny Roque M.D.;  Location: SURGERY [facility];  Service: Orthopedics   • ANKLE ORIF Right 9/26/2017    Procedure: ANKLE ORIF SYNDESOMOSIS REPAIR;  Surgeon: Armando Woodard M.D.;  Location: SURGERY [facility];  Service: Orthopedics       Allergies: No Known Allergies      Medications:  Current Outpatient Medications on File Prior to Visit   Medication Sig Dispense Refill   • aspirin (ASA) 325 MG Tab Take 1 tablet by mouth 2 Times a Day. 120 tablet 1   • acetaminophen (TYLENOL) 325 MG Tab Take 2 Tablets by mouth every 6 hours as needed for up to 60 days. (Patient not taking: Reported on 4/11/2021) 30 tablet 1   • risperiDONE (RISPERDAL) 2 MG Tab Take 1 tablet by mouth 2 Times a Day. 120 tablet 2   • Calcium Polycarbophil (FIBER) 625 MG Tab Take 1 tablet by mouth 3 times a day, with meals. 180 tablet 2   • docusate sodium 100 MG Cap Take 100 mg by mouth 1 time a day as needed (hard stools). (Patient not taking: Reported on 4/11/2021) 60 capsule 2   • magnesium hydroxide (MILK OF MAGNESIA) 400 MG/5ML Suspension Take 30 mL by mouth 1 time a day as needed (for constipation). (Patient not taking: Reported on 4/11/2021) 354 mL 2   • senna-docusate (PERICOLACE OR SENOKOT S) 8.6-50 MG Tab Take 1 tablet by mouth 2 times a day as needed for Constipation. (Patient not taking: Reported on 4/11/2021) 60 tablet 1   • tamsulosin (FLOMAX) 0.4 MG capsule Take 2 Capsules by mouth 1/2 hour after breakfast. 30 capsule 2     No current facility-administered medications on file prior to visit.         ROS  As documented above in my HPI       Objective:     PE:  /84 (BP Location: Right arm, Patient Position: Sitting, BP Cuff Size: Adult)   Pulse 94   Temp 37 °C (98.6 °F) (Temporal)   Resp 16   Ht 1.829 m (6')   Wt 68 kg (150 lb) Comment: patient reported  SpO2 96%   BMI 20.34 kg/m²       Vital signs reviewed  Constitutional: patient is oriented to person, place, and time. Appears well-developed and well-nourished. No distress.  Arrives in wheelchair  Eyes: Conjunctivae normal and EOM are normal. Pupils are equal, round, and reactive to light.   Mouth/Throat: Lips without lesions, good dentition, oropharynx is clear and moist.  Neck: Trachea midline. Normal range of motion. Neck supple. No masses  Cardiovascular: Normal rate, regular rhythm, normal heart sounds and intact distal pulses. No murmur, gallop, or friction rub. No edema.  Pulmonary/Chest: No respiratory distress. Unlabored respiratory effort, lungs clear to auscultation. No wheezes or rales.   Abdominal: Soft, non tender. BS + x 4. No masses or hepatosplenomegaly.   Musculoskeletal: Right foot with pedal edema.  There are no skin lesions or ulcerations of the right foot.  Left heel has superficial ulcer without any active drainage or surrounding erythema.  There is pedal edema of the left foot.  Surgical site is clean   Neurological: alert and oriented to person, place.  Patient was unable to tell me the year.  He said it was 2019.   Skin: Skin is warm and dry. Good turgor. No rashes visable.  Psychiatric: Normal mood and affect. Behavior is normal.     LABS:  WBC   Date/Time Value Ref Range Status   04/14/2021 04:07 AM 8.0 4.8 - 10.8 K/uL Final     RBC   Date/Time Value Ref Range Status   04/14/2021 04:07 AM 4.06 (L) 4.70 - 6.10 M/uL Final     Hemoglobin   Date/Time Value Ref Range Status   04/14/2021 04:07 AM 11.7 (L) 14.0 - 18.0 g/dL Final     Hematocrit   Date/Time Value Ref Range Status   04/14/2021 04:07 AM 36.5 (L) 42.0 - 52.0 % Final     MCV   Date/Time Value Ref Range Status   04/14/2021 04:07 AM 89.9 81.4 - 97.8 fL Final     MCH   Date/Time Value Ref Range Status   04/14/2021 04:07 AM 28.8 27.0 - 33.0 pg Final     MCHC   Date/Time Value Ref Range Status   04/14/2021 04:07 AM 32.1 (L) 33.7 - 35.3 g/dL Final     MPV   Date/Time  Value Ref Range Status   04/14/2021 04:07 AM 11.3 9.0 - 12.9 fL Final        Sodium   Date/Time Value Ref Range Status   04/14/2021 04:07  135 - 145 mmol/L Final     Potassium   Date/Time Value Ref Range Status   04/14/2021 04:07 AM 3.8 3.6 - 5.5 mmol/L Final     Chloride   Date/Time Value Ref Range Status   04/14/2021 04:07  96 - 112 mmol/L Final     Co2   Date/Time Value Ref Range Status   04/14/2021 04:07 AM 24 20 - 33 mmol/L Final     Glucose   Date/Time Value Ref Range Status   04/14/2021 04:07 AM 89 65 - 99 mg/dL Final     Bun   Date/Time Value Ref Range Status   04/14/2021 04:07 AM 12 8 - 22 mg/dL Final     Creatinine   Date/Time Value Ref Range Status   04/14/2021 04:07 AM 0.93 0.50 - 1.40 mg/dL Final     Alkaline Phosphatase   Date/Time Value Ref Range Status   04/12/2021 02:37  (H) 30 - 99 U/L Final     AST(SGOT)   Date/Time Value Ref Range Status   04/12/2021 02:37 AM 17 12 - 45 U/L Final     ALT(SGPT)   Date/Time Value Ref Range Status   04/12/2021 02:37 AM 15 2 - 50 U/L Final     Total Bilirubin   Date/Time Value Ref Range Status   04/12/2021 02:37 AM 0.3 0.1 - 1.5 mg/dL Final        CPK Total   Date/Time Value Ref Range Status   09/09/2020 04:55  (H) 0 - 154 U/L Final        MICRO:  No results found for: BLOODCULTU, BLDCULT, BCHOLD     IMAGING STUDIES:  X-ray of the left ankle on 4/1/2021  IMPRESSION:   1.  Cortical irregularity of the posterior distal tibial malleolus, appearance most compatible with subtle fracture.  2.  Soft tissue swelling of the ankle  3.  Atherosclerosis    Assessment/Plan:     Problem List Items Addressed This Visit     Cognitive decline    Pressure ulcer of left heel- Primary.  His pressure ulcer is superficial without any signs of infection.  Continue wound care and waffle boots/heel elevator.  Monitor for any signs of infection including increasing erythema, purulent drainage or increasing pain.          Follow up: As needed. FU with PCP for ongoing  chronic medical conditions.     Birdie Fuller M.D.

## 2021-05-19 ENCOUNTER — OFFICE VISIT (OUTPATIENT)
Dept: INFECTIOUS DISEASES | Facility: MEDICAL CENTER | Age: 51
End: 2021-05-19
Payer: MEDICAID

## 2021-05-19 VITALS
HEART RATE: 94 BPM | OXYGEN SATURATION: 96 % | DIASTOLIC BLOOD PRESSURE: 84 MMHG | WEIGHT: 150 LBS | TEMPERATURE: 98.6 F | BODY MASS INDEX: 20.32 KG/M2 | HEIGHT: 72 IN | RESPIRATION RATE: 16 BRPM | SYSTOLIC BLOOD PRESSURE: 134 MMHG

## 2021-05-19 DIAGNOSIS — R41.89 COGNITIVE DECLINE: ICD-10-CM

## 2021-05-19 DIAGNOSIS — L89.899 PRESSURE INJURY OF SKIN OF RIGHT FOOT, UNSPECIFIED INJURY STAGE: Primary | ICD-10-CM

## 2021-05-19 PROCEDURE — 99213 OFFICE O/P EST LOW 20 MIN: CPT | Performed by: INTERNAL MEDICINE

## 2021-05-19 RX ORDER — FLUCONAZOLE 100 MG/1
150 TABLET ORAL DAILY
COMMUNITY
End: 2023-09-08

## 2021-05-19 RX ORDER — DOCUSATE SODIUM 100 MG
CAPSULE ORAL
COMMUNITY
Start: 2021-03-24 | End: 2023-09-08

## 2021-05-19 RX ORDER — AMOXICILLIN 500 MG/1
500 CAPSULE ORAL 2 TIMES DAILY
COMMUNITY
End: 2023-09-08

## 2021-05-19 RX ORDER — SULFAMETHOXAZOLE AND TRIMETHOPRIM 800; 160 MG/1; MG/1
1 TABLET ORAL 2 TIMES DAILY
COMMUNITY
End: 2023-09-08

## 2021-05-19 ASSESSMENT — PAIN SCALES - GENERAL: PAINLEVEL: 2=MINIMAL-SLIGHT

## 2021-05-19 ASSESSMENT — FIBROSIS 4 INDEX: FIB4 SCORE: 0.82

## 2021-05-24 ENCOUNTER — HOSPITAL ENCOUNTER (INPATIENT)
Facility: REHABILITATION | Age: 51
End: 2021-05-24
Attending: PHYSICAL MEDICINE & REHABILITATION | Admitting: PHYSICAL MEDICINE & REHABILITATION
Payer: MEDICAID

## 2021-10-14 NOTE — WOUND TEAM
[facility]  Inpatient Services   Wound and Skin Care Progress    Admission Date: 9/9/2020     Last order of IP CONSULT TO WOUND CARE was found on 2/22/2021 from Hospital Encounter on 9/9/2020     HPI, PMH, SH: Reviewed    Past Surgical History:   Procedure Laterality Date   • TENDON LENGHTENING Bilateral 2/1/2021    Procedure: LENGTHENING, TENDON- FOR FOOT DROP RECONSTRUCTION, POSTERIOR TIBIALIS TENDON  TRANSFER , ACHILLES LENGHTENING , LEFT MEDIAL RELEASE;  Surgeon: Donny Roque M.D.;  Location: SURGERY [facility];  Service: Orthopedics   • IRRIGATION & DEBRIDEMENT ORTHO Left 2/1/2021    Procedure: IRRIGATION AND DEBRIDEMENT, WOUND-FOOT;  Surgeon: Donny Roque M.D.;  Location: SURGERY [facility];  Service: Orthopedics   • ANKLE ORIF Right 9/26/2017    Procedure: ANKLE ORIF SYNDESOMOSIS REPAIR;  Surgeon: Armando Woodard M.D.;  Location: [facility];  Service: Orthopedics     Social History     Tobacco Use   • Smoking status: Never Smoker   • Smokeless tobacco: Current User     Types: Chew   Substance Use Topics   • Alcohol use: Yes     Comment: 6-10 beers daily     Chief Complaint   Patient presents with   • Altered Mental Status   • Hypotension     Diagnosis: Acute respiratory failure with hypoxia (HCC)  Acute respiratory failure with hypoxia (HCC)  Acute respiratory failure with hypoxia (HCC)    Unit where seen by Wound Team: T335/01     WOUND CONSULT/FOLLOW UP RELATED TO:  Left heel      WOUND HISTORY:  Discoloration noted over left posterior heel by LPS APRN on 2/22.     WOUND ASSESSMENT/LDAVascular:     Wound 02/23/21 Pressure Injury Heel Posterior Left (Active)   Wound Image     03/09/21 0900   Site Assessment Eschar 03/09/21 0900   Periwound Assessment Clean;Dry;Intact 03/09/21 0900   Margins Defined edges 03/09/21 0900   Closure Secondary intention 03/09/21 0900   Drainage Amount Scant 03/09/21 0900   Drainage Description Serosanguineous 03/09/21 0900   Treatments  Cleansed;Site care;Offloading 03/09/21 0900   Wound Cleansing Normal Saline Irrigation 03/09/21 0900   Periwound Protectant Not Applicable 03/09/21 0900   Dressing Cleansing/Solutions Not Applicable 03/09/21 0900   Dressing Options Hydrofiber;Mepilex Heel 03/09/21 0900   Dressing Changed Changed 03/09/21 0900   Dressing Status Clean;Dry;Intact 03/09/21 0900   Dressing Change/Treatment Frequency Every 72 hrs, and As Needed 03/09/21 0900   NEXT Dressing Change/Treatment Date 03/12/21 03/09/21 0900   NEXT Weekly Photo (Inpatient Only) 03/16/21 03/09/21 0900   Pressure Injury Stage U 03/09/21 0900   Non-staged Wound Description Not applicable 03/09/21 0900   Wound Length (cm) 2.5 cm 03/09/21 0900   Wound Width (cm) 2.5 cm 03/09/21 0900   Wound Surface Area (cm^2) 6.25 cm^2 03/09/21 0900   Shape oval 03/09/21 0900   Wound Odor None 03/09/21 0900   Pulses 2+ 03/02/21 1100   Exposed Structures KIRTI 03/09/21 0900   WOUND NURSE ONLY - Time Spent with Patient (mins) 60 03/09/21 0900       Wound 03/02/21 Foot Dorsal Right Pressure injury (Active)   Wound Image   03/09/21 0900   Site Assessment Red;Intact 03/09/21 0900   Periwound Assessment Blanchable erythema;Non-blanchable erythema 03/09/21 0900   Margins Defined edges 03/09/21 0900   Closure Open to air 03/09/21 0900   Drainage Amount None 03/09/21 0900   Drainage Description KIRTI 03/08/21 2000   Treatments Cleansed;Site care;Offloading 03/09/21 0900   Wound Cleansing Normal Saline Irrigation 03/09/21 0900   Periwound Protectant Skin Protectant Wipes to Periwound 03/02/21 1100   Dressing Cleansing/Solutions Not Applicable 03/09/21 0900   Dressing Options Mepilex Heel 03/09/21 0900   Dressing Changed Changed 03/09/21 0900   Dressing Status Clean;Dry;Intact 03/09/21 0900   Dressing Change/Treatment Frequency Every 72 hrs, and As Needed 03/09/21 0900   NEXT Dressing Change/Treatment Date 03/12/21 03/09/21 0900   NEXT Weekly Photo (Inpatient Only) 03/16/21 03/09/21 0900      Pressure Injury Stage 1 03/02/21 1100   Non-staged Wound Description Not applicable 03/02/21 1100   Wound Length (cm) 1 cm 03/09/21 0900   Wound Width (cm) 0.5 cm 03/09/21 0900   Wound Surface Area (cm^2) 0.5 cm^2 03/09/21 0900   Shape linear 03/09/21 0900   Wound Odor None 03/09/21 0900   Pulses 2+ 03/02/21 1100   Exposed Structures KIRTI 03/09/21 0900   WOUND NURSE ONLY - Time Spent with Patient (mins) 60 03/09/21 0900          ANA:   No results found.    Lab Values:    Lab Results   Component Value Date/Time    WBC 11.0 (H) 02/05/2021 09:43 AM    RBC 3.78 (L) 02/05/2021 09:43 AM    HEMOGLOBIN 11.0 (L) 02/05/2021 09:43 AM    HEMATOCRIT 34.6 (L) 02/05/2021 09:43 AM    CREACTPROT 3.58 (H) 01/21/2021 12:47 PM    SEDRATEWES 16 (H) 01/21/2021 12:47 PM    HBA1C 5.2 01/21/2021 12:47 PM        Culture Results show:  No results found for this or any previous visit (from the past 720 hour(s)).    Pain Level/Medicated:  Denies pain        INTERVENTIONS BY WOUND TEAM:  Chart and images reviewed. Discussed with bedside RN. All areas of concern (based on picture review, LDA review and discussion with bedside RN) have been thoroughly assessed. Documentation of areas based on significant findings. This RN in to assess patient. Performed standard wound care which includes appropriate positioning, dressing removal and non-selective debridement. Pictures and measurements obtained weekly if/when required.  LEFT POSTERIOR HEEL  Preparation for Dressing removal: N/A  Cleansed with:  wound cleanser and gauze.  Sharp debridement: N/A  Radha wound: Cleansed with wound cleanser  Primary Dressing: hydrofiber Ag   Secondary (Outer) Dressing: heel mepilex, tubigrib E, PRAFO    RIGHT DORSAL FOOT  Preparation for Dressing removal: N/A  Cleansed with:  wound cleanser and gauze.  Sharp debridement: N/A  Radha wound: Cleansed with wound cleanser  Primary Dressing: None  Secondary (Outer) Dressing: heel mepilex, tubigrib E,  PRAFO      Interdisciplinary consultation: Patient, LPS TAMMY Meredith, Bedside RN (Dara)    EVALUATION / RATIONALE FOR TREATMENT:  Most Recent Date:  3/9/21: no changes to wounds, will continue with offloading therapy.  Changed right foot to PRAFO.   3/2/2021:  Left heel wound is turning into stable eschar, per LPS TAMMY Meredith, mepilex is off loading pressure.  Right anterior foot has pressure injury related to boot, mepilex placed over it.    02/23/2021: Discoloration noted over left heel related to pressure from the offloading boot. Unable to assess wound bed, purple boggy tissue noted. Hydrofiber placed over wound bed to help with managing moisture that may develop as heel mepilex is also utilized for offloading.      Goals: Steady decrease in wound area and depth weekly.    WOUND TEAM PLAN OF CARE ([X] for frequency of wound follow up,):   Nursing to follow orders written for wound care. Contact wound team if area fails to progress, deteriorates or with any questions/concerns  Dressing changes by wound team:                   Follow up 3 times weekly:                NPWT change 3 times weekly:     Follow up 1-2 times weekly:    X, weekly for left posterior heel & right anterior foot  Follow up Bi-Monthly:                   Follow up as needed:     Other (explain):     NURSING PLAN OF CARE ORDERS (X):  Dressing changes: See Dressing Care orders: X  Skin care: See Skin Care orders: X  RN Prevention Protocol: X  Rectal tube care: See Rectal Tube Care orders:   Other orders:    Anticipated discharge plans: wound team to continue to follow.    LTACH:        SNF/Rehab:                  Home Health Care:           Outpatient Wound Center:            Self/Family Care:        Other:                 - - -

## 2022-11-07 ENCOUNTER — HOSPITAL ENCOUNTER (EMERGENCY)
Facility: MEDICAL CENTER | Age: 52
End: 2022-11-07
Attending: EMERGENCY MEDICINE
Payer: MEDICAID

## 2022-11-07 ENCOUNTER — APPOINTMENT (OUTPATIENT)
Dept: RADIOLOGY | Facility: MEDICAL CENTER | Age: 52
End: 2022-11-07
Attending: EMERGENCY MEDICINE
Payer: MEDICAID

## 2022-11-07 VITALS
WEIGHT: 175 LBS | DIASTOLIC BLOOD PRESSURE: 75 MMHG | RESPIRATION RATE: 16 BRPM | OXYGEN SATURATION: 95 % | HEIGHT: 72 IN | HEART RATE: 68 BPM | TEMPERATURE: 98 F | BODY MASS INDEX: 23.7 KG/M2 | SYSTOLIC BLOOD PRESSURE: 116 MMHG

## 2022-11-07 DIAGNOSIS — M79.89 LEG SWELLING: ICD-10-CM

## 2022-11-07 DIAGNOSIS — R60.9 PERIPHERAL EDEMA: ICD-10-CM

## 2022-11-07 LAB
ALBUMIN SERPL BCP-MCNC: 3.9 G/DL (ref 3.2–4.9)
ALBUMIN/GLOB SERPL: 1.4 G/DL
ALP SERPL-CCNC: 117 U/L (ref 30–99)
ALT SERPL-CCNC: 14 U/L (ref 2–50)
ANION GAP SERPL CALC-SCNC: 10 MMOL/L (ref 7–16)
AST SERPL-CCNC: 18 U/L (ref 12–45)
BASOPHILS # BLD AUTO: 1.4 % (ref 0–1.8)
BASOPHILS # BLD: 0.1 K/UL (ref 0–0.12)
BILIRUB SERPL-MCNC: 0.2 MG/DL (ref 0.1–1.5)
BUN SERPL-MCNC: 15 MG/DL (ref 8–22)
CALCIUM SERPL-MCNC: 9.4 MG/DL (ref 8.5–10.5)
CHLORIDE SERPL-SCNC: 102 MMOL/L (ref 96–112)
CO2 SERPL-SCNC: 27 MMOL/L (ref 20–33)
CREAT SERPL-MCNC: 0.94 MG/DL (ref 0.5–1.4)
EOSINOPHIL # BLD AUTO: 0.18 K/UL (ref 0–0.51)
EOSINOPHIL NFR BLD: 2.5 % (ref 0–6.9)
ERYTHROCYTE [DISTWIDTH] IN BLOOD BY AUTOMATED COUNT: 44.3 FL (ref 35.9–50)
GFR SERPLBLD CREATININE-BSD FMLA CKD-EPI: 98 ML/MIN/1.73 M 2
GLOBULIN SER CALC-MCNC: 2.7 G/DL (ref 1.9–3.5)
GLUCOSE SERPL-MCNC: 100 MG/DL (ref 65–99)
HCT VFR BLD AUTO: 43 % (ref 42–52)
HGB BLD-MCNC: 14.3 G/DL (ref 14–18)
IMM GRANULOCYTES # BLD AUTO: 0.06 K/UL (ref 0–0.11)
IMM GRANULOCYTES NFR BLD AUTO: 0.8 % (ref 0–0.9)
LYMPHOCYTES # BLD AUTO: 2.26 K/UL (ref 1–4.8)
LYMPHOCYTES NFR BLD: 31.1 % (ref 22–41)
MCH RBC QN AUTO: 29.8 PG (ref 27–33)
MCHC RBC AUTO-ENTMCNC: 33.3 G/DL (ref 33.7–35.3)
MCV RBC AUTO: 89.6 FL (ref 81.4–97.8)
MONOCYTES # BLD AUTO: 0.71 K/UL (ref 0–0.85)
MONOCYTES NFR BLD AUTO: 9.8 % (ref 0–13.4)
NEUTROPHILS # BLD AUTO: 3.96 K/UL (ref 1.82–7.42)
NEUTROPHILS NFR BLD: 54.4 % (ref 44–72)
NRBC # BLD AUTO: 0 K/UL
NRBC BLD-RTO: 0 /100 WBC
PLATELET # BLD AUTO: 215 K/UL (ref 164–446)
PMV BLD AUTO: 10.8 FL (ref 9–12.9)
POTASSIUM SERPL-SCNC: 3.7 MMOL/L (ref 3.6–5.5)
PROT SERPL-MCNC: 6.6 G/DL (ref 6–8.2)
RBC # BLD AUTO: 4.8 M/UL (ref 4.7–6.1)
SODIUM SERPL-SCNC: 139 MMOL/L (ref 135–145)
WBC # BLD AUTO: 7.3 K/UL (ref 4.8–10.8)

## 2022-11-07 PROCEDURE — 36415 COLL VENOUS BLD VENIPUNCTURE: CPT

## 2022-11-07 PROCEDURE — 93971 EXTREMITY STUDY: CPT | Mod: 26,LT | Performed by: INTERNAL MEDICINE

## 2022-11-07 PROCEDURE — 85025 COMPLETE CBC W/AUTO DIFF WBC: CPT

## 2022-11-07 PROCEDURE — 93971 EXTREMITY STUDY: CPT | Mod: LT

## 2022-11-07 PROCEDURE — 99284 EMERGENCY DEPT VISIT MOD MDM: CPT

## 2022-11-07 PROCEDURE — 80053 COMPREHEN METABOLIC PANEL: CPT

## 2022-11-07 ASSESSMENT — FIBROSIS 4 INDEX: FIB4 SCORE: 0.85

## 2022-11-07 NOTE — ED PROVIDER NOTES
ED Provider Note    CHIEF COMPLAINT  Chief Complaint   Patient presents with    Leg Swelling     BLE swelling x2 weeks to a month. L > R. Pts group home sent him for discoloration. Pt denies any pain. AOx4 baseline.        HPI  Yury Blake is a 52 y.o. male who presents with acute on chronic swelling.  Patient has had months of bilateral leg swelling, since Friday left leg slightly erythematous and more swollen than the right.  Patient denies trauma.  No chest pain.  No difficulty breathing.  Patient states it is difficult for him to feel slightly short of breath with exercise, states this has not changed.  He denies fever or sweats.  No cough.  He denies injury to the left leg.  Symptoms were gradual onset while at rest at his group home.  Nursing staff states caretaker at group home was concerned about discoloration of the left leg.    REVIEW OF SYSTEMS    Constitutional: No fever  Respiratory: No acute shortness of breath or pleurisy  Cardiac: No chest pain or syncope  Gastrointestinal: No abdominal pain, no vomiting  Musculoskeletal: Mild left calf pain.  Neurologic: Denies headache  Rheumatologic: Gout  Skin: Asymmetric bilateral leg swelling       All other systems are negative.       PAST MEDICAL HISTORY  Past Medical History:   Diagnosis Date    Arthritis     RA    Encephalopathy     Hypertension     Pain     Right ankle    Psychiatric problem     anxiety       FAMILY HISTORY  History reviewed. No pertinent family history.    SOCIAL HISTORY  Social History     Socioeconomic History    Marital status:    Tobacco Use    Smoking status: Never    Smokeless tobacco: Current     Types: Chew   Vaping Use    Vaping Use: Never used   Substance and Sexual Activity    Alcohol use: Yes     Comment: 6-10 beers daily    Drug use: No       SURGICAL HISTORY  Past Surgical History:   Procedure Laterality Date    IRRIGATION & DEBRIDEMENT ORTHO Left 4/12/2021    Procedure: IRRIGATION AND DEBRIDEMENT, WOUND  - HEEL;  Surgeon: Donny Roque M.D.;  Location: SURGERY [facility];  Service: Orthopedics    TENDON LENGHTENING Bilateral 2/1/2021    Procedure: LENGTHENING, TENDON- FOR FOOT DROP RECONSTRUCTION, POSTERIOR TIBIALIS TENDON  TRANSFER , ACHILLES LENGHTENING , LEFT MEDIAL RELEASE;  Surgeon: Donny Roque M.D.;  Location: SURGERY [facility];  Service: Orthopedics    IRRIGATION & DEBRIDEMENT ORTHO Left 2/1/2021    Procedure: IRRIGATION AND DEBRIDEMENT, WOUND-FOOT;  Surgeon: Donny Roque M.D.;  Location: SURGERY [facility];  Service: Orthopedics    ORIF, ANKLE Right 9/26/2017    Procedure: ANKLE ORIF SYNDESOMOSIS REPAIR;  Surgeon: Armando Woodard M.D.;  Location: [facility];  Service: Orthopedics       CURRENT MEDICATIONS  Home Medications       Reviewed by Claudia Christine R.N. (Registered Nurse) on 11/07/22 at 1219  Med List Status: Not Addressed     Medication Last Dose Status   amoxicillin (AMOXIL) 500 MG Cap  Active   aspirin (ASA) 325 MG Tab  Active   Calcium Polycarbophil (FIBER) 625 MG Tab  Active   docusate sodium 100 MG Cap  Active   fluconazole (DIFLUCAN) 100 MG Tab  Active   magnesium hydroxide (MILK OF MAGNESIA) 400 MG/5ML Suspension  Active   risperiDONE (RISPERDAL) 2 MG Tab  Active   senna-docusate (PERICOLACE OR SENOKOT S) 8.6-50 MG Tab  Active   STOOL SOFTENER 100 MG Cap  Active   sulfamethoxazole-trimethoprim (BACTRIM DS) 800-160 MG tablet  Active   tamsulosin (FLOMAX) 0.4 MG capsule  Active                    ALLERGIES  No Known Allergies    PHYSICAL EXAM  VITAL SIGNS: /77   Pulse 78   Temp 36.6 °C (97.9 °F) (Temporal)   Resp 18   Ht 1.829 m (6')   Wt 79.4 kg (175 lb)   SpO2 93%   BMI 23.73 kg/m²   Constitutional:  Non-toxic appearance.   HENT: No facial swelling  Eyes: Anicteric, no conjunctivitis.     Cardiovascular: Normal heart rate, Normal rhythm  Pulmonary:  No wheezing, No rales.  Gastrointestinal: Soft, No tenderness  Skin: Warm, Dry, No cyanosis.   Bilateral lower extremity edema left greater than right.  Faint pinkish discoloration to the left calf  Neurologic: Speech is clear, follows commands, facial expression is symmetrical.  Psychiatric: Affect flat,  Mood normal.  Patient is calm and cooperative  Musculoskeletal: Negative Homans' sign.  No joint deformity.  No evidence of fracture    EKG/Labs  Results for orders placed or performed during the hospital encounter of 11/07/22   CBC WITH DIFFERENTIAL   Result Value Ref Range    WBC 7.3 4.8 - 10.8 K/uL    RBC 4.80 4.70 - 6.10 M/uL    Hemoglobin 14.3 14.0 - 18.0 g/dL    Hematocrit 43.0 42.0 - 52.0 %    MCV 89.6 81.4 - 97.8 fL    MCH 29.8 27.0 - 33.0 pg    MCHC 33.3 (L) 33.7 - 35.3 g/dL    RDW 44.3 35.9 - 50.0 fL    Platelet Count 215 164 - 446 K/uL    MPV 10.8 9.0 - 12.9 fL    Neutrophils-Polys 54.40 44.00 - 72.00 %    Lymphocytes 31.10 22.00 - 41.00 %    Monocytes 9.80 0.00 - 13.40 %    Eosinophils 2.50 0.00 - 6.90 %    Basophils 1.40 0.00 - 1.80 %    Immature Granulocytes 0.80 0.00 - 0.90 %    Nucleated RBC 0.00 /100 WBC    Neutrophils (Absolute) 3.96 1.82 - 7.42 K/uL    Lymphs (Absolute) 2.26 1.00 - 4.80 K/uL    Monos (Absolute) 0.71 0.00 - 0.85 K/uL    Eos (Absolute) 0.18 0.00 - 0.51 K/uL    Baso (Absolute) 0.10 0.00 - 0.12 K/uL    Immature Granulocytes (abs) 0.06 0.00 - 0.11 K/uL    NRBC (Absolute) 0.00 K/uL   COMP METABOLIC PANEL   Result Value Ref Range    Sodium 139 135 - 145 mmol/L    Potassium 3.7 3.6 - 5.5 mmol/L    Chloride 102 96 - 112 mmol/L    Co2 27 20 - 33 mmol/L    Anion Gap 10.0 7.0 - 16.0    Glucose 100 (H) 65 - 99 mg/dL    Bun 15 8 - 22 mg/dL    Creatinine 0.94 0.50 - 1.40 mg/dL    Calcium 9.4 8.5 - 10.5 mg/dL    AST(SGOT) 18 12 - 45 U/L    ALT(SGPT) 14 2 - 50 U/L    Alkaline Phosphatase 117 (H) 30 - 99 U/L    Total Bilirubin 0.2 0.1 - 1.5 mg/dL    Albumin 3.9 3.2 - 4.9 g/dL    Total Protein 6.6 6.0 - 8.2 g/dL    Globulin 2.7 1.9 - 3.5 g/dL    A-G Ratio 1.4 g/dL   ESTIMATED GFR   Result  Value Ref Range    GFR (CKD-EPI) 98 >60 mL/min/1.73 m 2         RADIOLOGY/PROCEDURES  US-EXTREMITY VENOUS LOWER UNILAT LEFT           Ultrasound study is negative for DVT    COURSE & MEDICAL DECISION MAKING  Pertinent Labs & Imaging studies reviewed. (See chart for details)  Patient with asymmetric swelling left greater than right.  Ultrasound was obtained which was negative for DVT.  Reddish discoloration to the surface of skin however he is afebrile with normal white blood cell count, minimal tenderness to palpation.  This does not appear to be bacterial cellulitis.  Patient is advised to follow-up with his doctor for recheck in 1 week, he is advised to elevate his legs when possible, increase his activity.    FINAL IMPRESSION     1. Peripheral edema        2. Leg swelling                        Electronically signed by: Harman Patel M.D., 11/7/2022 12:52 PM

## 2022-11-07 NOTE — ED TRIAGE NOTES
Chief Complaint   Patient presents with    Leg Swelling     BLE swelling x2 weeks to a month. L > R. Pts group home sent him for discoloration. Pt denies any pain. AOx4 baseline.      /77   Pulse 78   Temp 36.6 °C (97.9 °F) (Temporal)   Resp 18   Ht 1.829 m (6')   Wt 79.4 kg (175 lb)   SpO2 93%   BMI 23.73 kg/m²     Pt brought in by EMS from [facility] to [address], mask in place. Pt in a gown and on monitor. Chart flagged for ERP to see.

## 2022-11-08 NOTE — DISCHARGE PLANNING
Pt's 'guardian' called (Deonte Henderson) and asked where the new medications went. AVS reviewed and no prescriptions were ordered. JAMES d/w Deonte and asked him to have pt follow up with PCP. He states pt lives in a  in [place] and Dr Roche comes to the house to see him.

## 2022-11-08 NOTE — ED NOTES
Discharge orders received, IV and monitor discontinued, instructions and education given, follow-up discussed. Pt taken out to front lobby by RADHA.

## 2022-12-09 NOTE — PROGRESS NOTES
Pharmacy Pharmacotherapy Consult for LOS >30 days    Admit Date: 9/9/2020      Medications were reviewed for appropriateness and ongoing need.     Current Facility-Administered Medications   Medication Dose Route Frequency Provider Last Rate Last Admin   • doxycycline monohydrate (ADOXA) tablet 100 mg  100 mg Oral Q12HRS Birdie Fuller M.D.   100 mg at 02/09/21 0453   • senna-docusate (PERICOLACE or SENOKOT S) 8.6-50 MG per tablet 2 Tab  2 Tab Oral BID Adonis Solares M.D.   2 Tab at 02/09/21 0451    And   • polyethylene glycol/lytes (MIRALAX) PACKET 1 Packet  1 Packet Oral QDAY PRN Adonis Solares M.D.   1 Packet at 02/04/21 0603    And   • magnesium hydroxide (MILK OF MAGNESIA) suspension 30 mL  30 mL Oral QDAY PRN Adonis Solares M.D.   30 mL at 02/04/21 1736    And   • bisacodyl (DULCOLAX) suppository 10 mg  10 mg Rectal QDAY PRN Adonis Solares M.D.   10 mg at 02/05/21 0933   • acetaminophen (Tylenol) tablet 650 mg  650 mg Oral Q6HRS PRN Imani Rosa, A.P.R.N.   650 mg at 02/09/21 0451   • ondansetron (ZOFRAN ODT) dispertab 4 mg  4 mg Oral Q6HRS PRN Diana Reed A.P.R.N.       • risperiDONE (RISPERDAL) tablet 2 mg  2 mg Oral BID JAVIER Haile.P.R.N.   2 mg at 02/09/21 0451   • aspirin (ASA) tablet 325 mg  325 mg Oral DAILY Ella Nunes A.P.R.N.   325 mg at 02/09/21 0451   • multivitamin (THERAGRAN) tablet 1 Tab  1 Tab Oral DAILY JAVIER Villagran.P.R.N.   1 Tab at 02/09/21 0451   • thiamine tablet 100 mg  100 mg Oral DAILY Gisele Barnes M.D.   100 mg at 02/09/21 0451   • tamsulosin (FLOMAX) capsule 0.8 mg  0.8 mg Oral AFTER BREAKFAST Gisele Barnes M.D.   0.8 mg at 02/08/21 0855   • enoxaparin (LOVENOX) inj 40 mg  40 mg Subcutaneous Q EVENING Gisele Barnes M.D.   40 mg at 02/08/21 1733       Recommendations:  None at this time - all medications with appropriate use or indication in the past 2 weeks    Meagan Galaviz, PharmD, BCPS     show

## 2023-08-18 NOTE — CARE PLAN
Problem: Nutritional:  Goal: Nutrition support tolerated and meeting greater than 85% of estimated needs  Outcome: NOT MET      Addended by: Adan Arreguin on: 8/18/2023 06:03 PM     Modules accepted: Orders

## 2023-09-08 ENCOUNTER — APPOINTMENT (OUTPATIENT)
Dept: RADIOLOGY | Facility: MEDICAL CENTER | Age: 53
DRG: 637 | End: 2023-09-08
Payer: MEDICAID

## 2023-09-08 ENCOUNTER — APPOINTMENT (OUTPATIENT)
Dept: RADIOLOGY | Facility: MEDICAL CENTER | Age: 53
DRG: 637 | End: 2023-09-08
Attending: EMERGENCY MEDICINE
Payer: MEDICAID

## 2023-09-08 ENCOUNTER — HOSPITAL ENCOUNTER (INPATIENT)
Facility: MEDICAL CENTER | Age: 53
LOS: 4 days | DRG: 637 | End: 2023-09-12
Attending: EMERGENCY MEDICINE | Admitting: STUDENT IN AN ORGANIZED HEALTH CARE EDUCATION/TRAINING PROGRAM
Payer: MEDICAID

## 2023-09-08 DIAGNOSIS — R73.9 HYPERGLYCEMIA: ICD-10-CM

## 2023-09-08 DIAGNOSIS — S91.302D OPEN WOUND OF LEFT FOOT, SUBSEQUENT ENCOUNTER: ICD-10-CM

## 2023-09-08 PROBLEM — R41.82 ALTERED MENTAL STATUS: Status: ACTIVE | Noted: 2023-09-08

## 2023-09-08 PROBLEM — M79.89 LEG SWELLING: Status: ACTIVE | Noted: 2023-09-08

## 2023-09-08 PROBLEM — E87.1 HYPONATREMIA: Status: ACTIVE | Noted: 2023-09-08

## 2023-09-08 LAB
ALBUMIN SERPL BCP-MCNC: 3.8 G/DL (ref 3.2–4.9)
ALBUMIN/GLOB SERPL: 1.4 G/DL
ALP SERPL-CCNC: 145 U/L (ref 30–99)
ALT SERPL-CCNC: 10 U/L (ref 2–50)
ANION GAP SERPL CALC-SCNC: 10 MMOL/L (ref 7–16)
AST SERPL-CCNC: 8 U/L (ref 12–45)
B-OH-BUTYR SERPL-MCNC: 0.74 MMOL/L (ref 0.02–0.27)
BASOPHILS # BLD AUTO: 0.6 % (ref 0–1.8)
BASOPHILS # BLD: 0.07 K/UL (ref 0–0.12)
BILIRUB SERPL-MCNC: 0.6 MG/DL (ref 0.1–1.5)
BUN SERPL-MCNC: 10 MG/DL (ref 8–22)
CALCIUM ALBUM COR SERPL-MCNC: 9.4 MG/DL (ref 8.5–10.5)
CALCIUM SERPL-MCNC: 9.2 MG/DL (ref 8.5–10.5)
CHLORIDE SERPL-SCNC: 95 MMOL/L (ref 96–112)
CO2 SERPL-SCNC: 28 MMOL/L (ref 20–33)
CREAT SERPL-MCNC: 0.92 MG/DL (ref 0.5–1.4)
EOSINOPHIL # BLD AUTO: 0.07 K/UL (ref 0–0.51)
EOSINOPHIL NFR BLD: 0.6 % (ref 0–6.9)
ERYTHROCYTE [DISTWIDTH] IN BLOOD BY AUTOMATED COUNT: 43.8 FL (ref 35.9–50)
EST. AVERAGE GLUCOSE BLD GHB EST-MCNC: 249 MG/DL
ETHANOL BLD-MCNC: <10.1 MG/DL
GFR SERPLBLD CREATININE-BSD FMLA CKD-EPI: 100 ML/MIN/1.73 M 2
GLOBULIN SER CALC-MCNC: 2.7 G/DL (ref 1.9–3.5)
GLUCOSE BLD STRIP.AUTO-MCNC: 269 MG/DL (ref 65–99)
GLUCOSE BLD STRIP.AUTO-MCNC: 351 MG/DL (ref 65–99)
GLUCOSE BLD STRIP.AUTO-MCNC: 550 MG/DL (ref 65–99)
GLUCOSE SERPL-MCNC: 588 MG/DL (ref 65–99)
HBA1C MFR BLD: 10.3 % (ref 4–5.6)
HCT VFR BLD AUTO: 37.9 % (ref 42–52)
HGB BLD-MCNC: 12.8 G/DL (ref 14–18)
IMM GRANULOCYTES # BLD AUTO: 0.17 K/UL (ref 0–0.11)
IMM GRANULOCYTES NFR BLD AUTO: 1.4 % (ref 0–0.9)
LACTATE SERPL-SCNC: 1.4 MMOL/L (ref 0.5–2)
LYMPHOCYTES # BLD AUTO: 2.04 K/UL (ref 1–4.8)
LYMPHOCYTES NFR BLD: 17.1 % (ref 22–41)
MAGNESIUM SERPL-MCNC: 1.9 MG/DL (ref 1.5–2.5)
MCH RBC QN AUTO: 30.7 PG (ref 27–33)
MCHC RBC AUTO-ENTMCNC: 33.8 G/DL (ref 32.3–36.5)
MCV RBC AUTO: 90.9 FL (ref 81.4–97.8)
MONOCYTES # BLD AUTO: 1.15 K/UL (ref 0–0.85)
MONOCYTES NFR BLD AUTO: 9.6 % (ref 0–13.4)
NEUTROPHILS # BLD AUTO: 8.44 K/UL (ref 1.82–7.42)
NEUTROPHILS NFR BLD: 70.7 % (ref 44–72)
NRBC # BLD AUTO: 0 K/UL
NRBC BLD-RTO: 0 /100 WBC (ref 0–0.2)
PHOSPHATE SERPL-MCNC: 3.8 MG/DL (ref 2.5–4.5)
PLATELET # BLD AUTO: 175 K/UL (ref 164–446)
PMV BLD AUTO: 10.1 FL (ref 9–12.9)
POTASSIUM SERPL-SCNC: 4 MMOL/L (ref 3.6–5.5)
PROT SERPL-MCNC: 6.5 G/DL (ref 6–8.2)
RBC # BLD AUTO: 4.17 M/UL (ref 4.7–6.1)
SODIUM SERPL-SCNC: 133 MMOL/L (ref 135–145)
WBC # BLD AUTO: 11.9 K/UL (ref 4.8–10.8)

## 2023-09-08 PROCEDURE — 80053 COMPREHEN METABOLIC PANEL: CPT

## 2023-09-08 PROCEDURE — 700105 HCHG RX REV CODE 258: Performed by: STUDENT IN AN ORGANIZED HEALTH CARE EDUCATION/TRAINING PROGRAM

## 2023-09-08 PROCEDURE — A9270 NON-COVERED ITEM OR SERVICE: HCPCS | Performed by: STUDENT IN AN ORGANIZED HEALTH CARE EDUCATION/TRAINING PROGRAM

## 2023-09-08 PROCEDURE — 73630 X-RAY EXAM OF FOOT: CPT | Mod: RT

## 2023-09-08 PROCEDURE — 770006 HCHG ROOM/CARE - MED/SURG/GYN SEMI*

## 2023-09-08 PROCEDURE — 82962 GLUCOSE BLOOD TEST: CPT

## 2023-09-08 PROCEDURE — 96365 THER/PROPH/DIAG IV INF INIT: CPT

## 2023-09-08 PROCEDURE — 71045 X-RAY EXAM CHEST 1 VIEW: CPT

## 2023-09-08 PROCEDURE — 700102 HCHG RX REV CODE 250 W/ 637 OVERRIDE(OP): Performed by: STUDENT IN AN ORGANIZED HEALTH CARE EDUCATION/TRAINING PROGRAM

## 2023-09-08 PROCEDURE — 99222 1ST HOSP IP/OBS MODERATE 55: CPT | Performed by: STUDENT IN AN ORGANIZED HEALTH CARE EDUCATION/TRAINING PROGRAM

## 2023-09-08 PROCEDURE — 96372 THER/PROPH/DIAG INJ SC/IM: CPT

## 2023-09-08 PROCEDURE — 700111 HCHG RX REV CODE 636 W/ 250 OVERRIDE (IP): Mod: JZ,UD | Performed by: EMERGENCY MEDICINE

## 2023-09-08 PROCEDURE — 83605 ASSAY OF LACTIC ACID: CPT

## 2023-09-08 PROCEDURE — 700111 HCHG RX REV CODE 636 W/ 250 OVERRIDE (IP): Mod: JZ | Performed by: STUDENT IN AN ORGANIZED HEALTH CARE EDUCATION/TRAINING PROGRAM

## 2023-09-08 PROCEDURE — 85025 COMPLETE CBC W/AUTO DIFF WBC: CPT

## 2023-09-08 PROCEDURE — 82077 ASSAY SPEC XCP UR&BREATH IA: CPT

## 2023-09-08 PROCEDURE — 36415 COLL VENOUS BLD VENIPUNCTURE: CPT

## 2023-09-08 PROCEDURE — 87040 BLOOD CULTURE FOR BACTERIA: CPT

## 2023-09-08 PROCEDURE — 83735 ASSAY OF MAGNESIUM: CPT

## 2023-09-08 PROCEDURE — 99285 EMERGENCY DEPT VISIT HI MDM: CPT

## 2023-09-08 PROCEDURE — 84100 ASSAY OF PHOSPHORUS: CPT

## 2023-09-08 PROCEDURE — 83036 HEMOGLOBIN GLYCOSYLATED A1C: CPT

## 2023-09-08 PROCEDURE — 82010 KETONE BODYS QUAN: CPT

## 2023-09-08 PROCEDURE — 700105 HCHG RX REV CODE 258: Mod: UD | Performed by: EMERGENCY MEDICINE

## 2023-09-08 RX ORDER — FOLIC ACID 1 MG/1
1 TABLET ORAL DAILY
Status: DISCONTINUED | OUTPATIENT
Start: 2023-09-08 | End: 2023-09-12 | Stop reason: HOSPADM

## 2023-09-08 RX ORDER — VALPROIC ACID 250 MG/5ML
250 SOLUTION ORAL 3 TIMES DAILY
Status: DISCONTINUED | OUTPATIENT
Start: 2023-09-09 | End: 2023-09-12 | Stop reason: HOSPADM

## 2023-09-08 RX ORDER — CEPHALEXIN 500 MG/1
1000 CAPSULE ORAL 2 TIMES DAILY
Status: ON HOLD | COMMUNITY
Start: 2023-09-07 | End: 2023-09-12

## 2023-09-08 RX ORDER — POLYETHYLENE GLYCOL 3350 17 G/17G
1 POWDER, FOR SOLUTION ORAL
Status: DISCONTINUED | OUTPATIENT
Start: 2023-09-08 | End: 2023-09-12 | Stop reason: HOSPADM

## 2023-09-08 RX ORDER — FUROSEMIDE 40 MG/1
40 TABLET ORAL DAILY
Status: DISCONTINUED | OUTPATIENT
Start: 2023-09-08 | End: 2023-09-09

## 2023-09-08 RX ORDER — AMOXICILLIN 250 MG
2 CAPSULE ORAL 2 TIMES DAILY
Status: DISCONTINUED | OUTPATIENT
Start: 2023-09-08 | End: 2023-09-12 | Stop reason: HOSPADM

## 2023-09-08 RX ORDER — ENOXAPARIN SODIUM 100 MG/ML
40 INJECTION SUBCUTANEOUS DAILY
Status: DISCONTINUED | OUTPATIENT
Start: 2023-09-08 | End: 2023-09-12 | Stop reason: HOSPADM

## 2023-09-08 RX ORDER — GAUZE BANDAGE 2" X 2"
100 BANDAGE TOPICAL DAILY
Status: DISCONTINUED | OUTPATIENT
Start: 2023-09-08 | End: 2023-09-12 | Stop reason: HOSPADM

## 2023-09-08 RX ORDER — SODIUM CHLORIDE, SODIUM LACTATE, POTASSIUM CHLORIDE, AND CALCIUM CHLORIDE .6; .31; .03; .02 G/100ML; G/100ML; G/100ML; G/100ML
1000 INJECTION, SOLUTION INTRAVENOUS ONCE
Status: COMPLETED | OUTPATIENT
Start: 2023-09-08 | End: 2023-09-08

## 2023-09-08 RX ORDER — PHENOL 1.4 %
10 AEROSOL, SPRAY (ML) MUCOUS MEMBRANE
Status: ON HOLD | COMMUNITY
End: 2023-09-12

## 2023-09-08 RX ORDER — DIVALPROEX SODIUM 250 MG/1
250 TABLET, DELAYED RELEASE ORAL 3 TIMES DAILY
Status: DISCONTINUED | OUTPATIENT
Start: 2023-09-08 | End: 2023-09-08

## 2023-09-08 RX ORDER — GUAIFENESIN 200 MG/10ML
10 LIQUID ORAL EVERY 4 HOURS PRN
Status: ON HOLD | COMMUNITY
End: 2023-09-12

## 2023-09-08 RX ORDER — HYDROXYZINE HYDROCHLORIDE 25 MG/1
25 TABLET, FILM COATED ORAL 3 TIMES DAILY
Status: ON HOLD | COMMUNITY
End: 2023-09-12

## 2023-09-08 RX ORDER — DOCUSATE SODIUM 100 MG/1
100 CAPSULE, LIQUID FILLED ORAL
Status: ON HOLD | COMMUNITY
End: 2023-09-12

## 2023-09-08 RX ORDER — ACETAMINOPHEN 325 MG/1
650 TABLET ORAL EVERY 6 HOURS PRN
COMMUNITY

## 2023-09-08 RX ORDER — ASPIRIN 325 MG
325 TABLET ORAL DAILY
Status: DISCONTINUED | OUTPATIENT
Start: 2023-09-09 | End: 2023-09-12 | Stop reason: HOSPADM

## 2023-09-08 RX ORDER — INSULIN LISPRO 100 [IU]/ML
10 INJECTION, SOLUTION INTRAVENOUS; SUBCUTANEOUS ONCE
Status: COMPLETED | OUTPATIENT
Start: 2023-09-08 | End: 2023-09-08

## 2023-09-08 RX ORDER — FOLIC ACID 1 MG/1
1 TABLET ORAL DAILY
COMMUNITY

## 2023-09-08 RX ORDER — ACETAMINOPHEN 325 MG/1
650 TABLET ORAL EVERY 6 HOURS PRN
Status: DISCONTINUED | OUTPATIENT
Start: 2023-09-08 | End: 2023-09-12 | Stop reason: HOSPADM

## 2023-09-08 RX ORDER — FUROSEMIDE 40 MG/1
40 TABLET ORAL DAILY
Status: ON HOLD | COMMUNITY
End: 2023-09-12

## 2023-09-08 RX ORDER — BISACODYL 10 MG
10 SUPPOSITORY, RECTAL RECTAL
Status: DISCONTINUED | OUTPATIENT
Start: 2023-09-08 | End: 2023-09-12 | Stop reason: HOSPADM

## 2023-09-08 RX ORDER — POTASSIUM CHLORIDE 1.5 G/1.58G
20 POWDER, FOR SOLUTION ORAL DAILY
Status: ON HOLD | COMMUNITY
End: 2023-09-12

## 2023-09-08 RX ORDER — DEXTROSE MONOHYDRATE 25 G/50ML
25 INJECTION, SOLUTION INTRAVENOUS
Status: DISCONTINUED | OUTPATIENT
Start: 2023-09-08 | End: 2023-09-12 | Stop reason: HOSPADM

## 2023-09-08 RX ORDER — DOXYCYCLINE HYCLATE 100 MG
100 TABLET ORAL 2 TIMES DAILY
Status: ON HOLD | COMMUNITY
Start: 2023-09-07 | End: 2023-09-12

## 2023-09-08 RX ORDER — DIVALPROEX SODIUM 250 MG/1
250 TABLET, DELAYED RELEASE ORAL 3 TIMES DAILY
COMMUNITY

## 2023-09-08 RX ORDER — ASPIRIN 325 MG
325 TABLET ORAL DAILY
COMMUNITY

## 2023-09-08 RX ORDER — GAUZE BANDAGE 2" X 2"
100 BANDAGE TOPICAL DAILY
COMMUNITY

## 2023-09-08 RX ORDER — RISPERIDONE 2 MG/1
2 TABLET ORAL 2 TIMES DAILY
Status: DISCONTINUED | OUTPATIENT
Start: 2023-09-08 | End: 2023-09-12 | Stop reason: HOSPADM

## 2023-09-08 RX ADMIN — FOLIC ACID 1 MG: 1 TABLET ORAL at 20:24

## 2023-09-08 RX ADMIN — FUROSEMIDE 40 MG: 40 TABLET ORAL at 20:27

## 2023-09-08 RX ADMIN — ENOXAPARIN SODIUM 40 MG: 100 INJECTION SUBCUTANEOUS at 20:27

## 2023-09-08 RX ADMIN — INSULIN HUMAN 7 UNITS: 100 INJECTION, SOLUTION PARENTERAL at 20:58

## 2023-09-08 RX ADMIN — Medication 100 MG: at 20:24

## 2023-09-08 RX ADMIN — SENNOSIDES AND DOCUSATE SODIUM 2 TABLET: 50; 8.6 TABLET ORAL at 20:26

## 2023-09-08 RX ADMIN — DIVALPROEX SODIUM 250 MG: 250 TABLET, DELAYED RELEASE ORAL at 20:24

## 2023-09-08 RX ADMIN — SODIUM CHLORIDE, POTASSIUM CHLORIDE, SODIUM LACTATE AND CALCIUM CHLORIDE 1000 ML: 600; 310; 30; 20 INJECTION, SOLUTION INTRAVENOUS at 14:15

## 2023-09-08 RX ADMIN — AMPICILLIN AND SULBACTAM 3 G: 1; 2 INJECTION, POWDER, FOR SOLUTION INTRAMUSCULAR; INTRAVENOUS at 20:40

## 2023-09-08 RX ADMIN — PIPERACILLIN AND TAZOBACTAM 4.5 G: 4; .5 INJECTION, POWDER, FOR SOLUTION INTRAVENOUS at 14:31

## 2023-09-08 RX ADMIN — INSULIN LISPRO 10 UNITS: 100 INJECTION, SOLUTION INTRAVENOUS; SUBCUTANEOUS at 16:54

## 2023-09-08 RX ADMIN — RISPERIDONE 2 MG: 2 TABLET ORAL at 20:24

## 2023-09-08 ASSESSMENT — FIBROSIS 4 INDEX: FIB4 SCORE: 1.16

## 2023-09-08 ASSESSMENT — PAIN DESCRIPTION - PAIN TYPE: TYPE: ACUTE PAIN

## 2023-09-08 NOTE — ASSESSMENT & PLAN NOTE
Likely source of infection at this time.  Patient presents with leukocytosis and altered mental status.  WBC of 11.9.    9/9 cont  Unasyn.  Follow-up wound culture and blood culture  May need wound care consult  LPS    9/10 blood cx neg  Wound care  brodie and  Dopplers pending  bld cx neg    9/11 seen by wound care, s/p debridement, rec wound care  LE dopplers neg  F/u brodie  Cont abx, transition to po on dc

## 2023-09-08 NOTE — ED PROVIDER NOTES
ED Provider Note    CHIEF COMPLAINT  Chief Complaint   Patient presents with    ALOC     EMS called due to increased weakness and fatigue with aloc. Baseline ano 3x, now GCS 13    Wound Check     Right heel wound, receiving abx for the last 2x days         HPI/ROS      Yury Blake is a 53 y.o. male who presents complaint altered mental status, weakness and wound check.  Patient does have a right healing wound and received antibiotics for the last 2 days.  Patient has had a skilled nursing facility and was found on elevated blood sugar and he was brought to our facility.  Does not have a history of diabetes in the past and is unknown why the patient blood sugar is extremely high.  Denies fever, shakes, chills, sweats, nausea, vomiting    PAST MEDICAL HISTORY   has a past medical history of Arthritis, Encephalopathy, Hypertension, Pain, and Psychiatric problem.    SURGICAL HISTORY   has a past surgical history that includes orif, ankle (Right, 9/26/2017); tendon lenghtening (Bilateral, 2/1/2021); irrigation & debridement ortho (Left, 2/1/2021); and irrigation & debridement ortho (Left, 4/12/2021).    FAMILY HISTORY  No family history on file.    SOCIAL HISTORY  Social History     Tobacco Use    Smoking status: Never    Smokeless tobacco: Current     Types: Chew   Vaping Use    Vaping Use: Never used   Substance and Sexual Activity    Alcohol use: Yes     Comment: 6-10 beers daily    Drug use: No    Sexual activity: Not on file       CURRENT MEDICATIONS  Home Medications       Reviewed by Lily Salazar (Pharmacy Tech) on 09/08/23 at 1457  Med List Status: Complete     Medication Last Dose Status   aspirin (ASA) 325 MG Tab 9/8/2023 Active   Calcium Polycarbophil (FIBER) 625 MG Tab 9/8/2023 Active   cephALEXin (KEFLEX) 500 MG Cap 9/8/2023 Active   divalproex (DEPAKOTE) 250 MG Tablet Delayed Response 9/8/2023 Active   doxycycline (VIBRAMYCIN) 100 MG Tab 9/8/2023 Active   folic acid (FOLVITE) 1 MG Tab  9/8/2023 Active   furosemide (LASIX) 40 MG Tab 9/7/2023 Active   hydrOXYzine HCl (ATARAX) 25 MG Tab 9/8/2023 Active   Melatonin 10 MG Tab 9/7/2023 Active   potassium chloride (KLOR-CON) 20 MEQ Pack 9/8/2023 Active   risperiDONE (RISPERDAL) 2 MG Tab 9/8/2023 Active   thiamine (VITAMIN B-1) 100 MG Tab 9/8/2023 Active                    ALLERGIES  No Known Allergies    PHYSICAL EXAM  VITAL SIGNS: BP (!) 141/75   Pulse 83   Temp 36.4 °C (97.6 °F) (Temporal)   Resp 18   Ht 1.829 m (6')   Wt 79.4 kg (175 lb)   SpO2 98%   BMI 23.73 kg/m²      Nursing notes and vitals reviewed.  Constitutional: Well developed, Well nourished, No acute distress, Non-toxic appearance.   Eyes: PERRLA, EOMI, Conjunctiva normal, No discharge.   HENT: Normocephalic, Atraumatic, moist mucous membranes, no facial edema   Cardiovascular: Normal heart rate, Normal rhythm, No murmurs, No rubs, No gallops.   Thorax & Lungs: No respiratory distress, No rales, No rhonchi, No wheezing, No chest tenderness.   Abdomen: Bowel sounds normal, Soft, No tenderness, No guarding, No rebound, No masses, No pulsatile masses.   : Circumcised male, significant erythema, no fluctuance, no discharge  Skin: Ulceration to the right heel with surrounding erythema edema, erythema edema to the left anterior calf, groin erythema perineum, scrotum and penis, no fluctuance, discharge   Extremities: Findings as above, no significant edema bilaterally, distal pulses are brisk  Neurologic: Alert & oriented x 2, no focal abnormalities noted, acting appropriately on examination  Psychiatric: Odd affect      DIAGNOSTIC STUDIES / PROCEDURES  EKG  I have independently interpreted this EKG  Normal sinus rhythm on monitor    LABS  Results for orders placed or performed during the hospital encounter of 09/08/23   Lactic acid (lactate)   Result Value Ref Range    Lactic Acid 1.4 0.5 - 2.0 mmol/L   CBC With Differential   Result Value Ref Range    WBC 11.9 (H) 4.8 - 10.8 K/uL     RBC 4.17 (L) 4.70 - 6.10 M/uL    Hemoglobin 12.8 (L) 14.0 - 18.0 g/dL    Hematocrit 37.9 (L) 42.0 - 52.0 %    MCV 90.9 81.4 - 97.8 fL    MCH 30.7 27.0 - 33.0 pg    MCHC 33.8 32.3 - 36.5 g/dL    RDW 43.8 35.9 - 50.0 fL    Platelet Count 175 164 - 446 K/uL    MPV 10.1 9.0 - 12.9 fL    Neutrophils-Polys 70.70 44.00 - 72.00 %    Lymphocytes 17.10 (L) 22.00 - 41.00 %    Monocytes 9.60 0.00 - 13.40 %    Eosinophils 0.60 0.00 - 6.90 %    Basophils 0.60 0.00 - 1.80 %    Immature Granulocytes 1.40 (H) 0.00 - 0.90 %    Nucleated RBC 0.00 0.00 - 0.20 /100 WBC    Neutrophils (Absolute) 8.44 (H) 1.82 - 7.42 K/uL    Lymphs (Absolute) 2.04 1.00 - 4.80 K/uL    Monos (Absolute) 1.15 (H) 0.00 - 0.85 K/uL    Eos (Absolute) 0.07 0.00 - 0.51 K/uL    Baso (Absolute) 0.07 0.00 - 0.12 K/uL    Immature Granulocytes (abs) 0.17 (H) 0.00 - 0.11 K/uL    NRBC (Absolute) 0.00 K/uL   Comp Metabolic Panel   Result Value Ref Range    Sodium 133 (L) 135 - 145 mmol/L    Potassium 4.0 3.6 - 5.5 mmol/L    Chloride 95 (L) 96 - 112 mmol/L    Co2 28 20 - 33 mmol/L    Anion Gap 10.0 7.0 - 16.0    Glucose 588 (HH) 65 - 99 mg/dL    Bun 10 8 - 22 mg/dL    Creatinine 0.92 0.50 - 1.40 mg/dL    Calcium 9.2 8.5 - 10.5 mg/dL    Correct Calcium 9.4 8.5 - 10.5 mg/dL    AST(SGOT) 8 (L) 12 - 45 U/L    ALT(SGPT) 10 2 - 50 U/L    Alkaline Phosphatase 145 (H) 30 - 99 U/L    Total Bilirubin 0.6 0.1 - 1.5 mg/dL    Albumin 3.8 3.2 - 4.9 g/dL    Total Protein 6.5 6.0 - 8.2 g/dL    Globulin 2.7 1.9 - 3.5 g/dL    A-G Ratio 1.4 g/dL   MAGNESIUM   Result Value Ref Range    Magnesium 1.9 1.5 - 2.5 mg/dL   PHOSPHORUS   Result Value Ref Range    Phosphorus 3.8 2.5 - 4.5 mg/dL   DIAGNOSTIC ALCOHOL   Result Value Ref Range    Diagnostic Alcohol <10.1 <10.1 mg/dL   ESTIMATED GFR   Result Value Ref Range    GFR (CKD-EPI) 100 >60 mL/min/1.73 m 2   POCT glucose device results   Result Value Ref Range    POC Glucose, Blood 550 (HH) 65 - 99 mg/dL           RADIOLOGY  I have independently  interpreted the diagnostic imaging associated with this visit and am waiting the final reading from the radiologist.   My preliminary interpretation is as follows: Chest x-ray is negative for infiltrate  Radiologist interpretation:   DX-CHEST-PORTABLE (1 VIEW)   Final Result      Bibasilar underinflation atelectasis      DX-FOOT-COMPLETE 3+ RIGHT   Final Result      1.  No radiographic evidence of acute traumatic injury or bony erosion   2.  Osteopenia            COURSE & MEDICAL DECISION MAKING    ED Observation Status? No; Patient does not meet criteria for ED Observation.     INITIAL ASSESSMENT, COURSE AND PLAN  Care Narrative: This is a pleasant 53-year-old gentleman presents with slight altered mental status, infection to his right heel and left calf.  The patient ischemically stable here and no evidence of hypotension, his lactic acid is normal as well.  The patient was found to have a blood sugar of 588 but no evidence of anion gap elevation and CO2 is normal no acidosis.  This might be a new onset of diabetes causing a slight altered mental status.  He received Zosyn initially secondary to infection.  The patient here in the emergency department was chemically stable I do not believe he requires ICU.  Patient does not have a significant abdominal discomfort/tenderness suspicious for abscess, appendicitis, diverticulitis therefore CT scan was not completed.  The patient received 2 L lactated ringer IV fluid for the elevated blood sugar and I will defer to the hospitalist for management.    I discussed the patient with Dr. Temple with hospitalist medicine graciously excepts hospitalization of this patient.          ADDITIONAL PROBLEM LIST      FINAL DIAGNOSIS  Hyperglycemia  Cellulitis of right heel  Cellulitis of left lower extremity  Altered mental status       Electronically signed by: Dany Simmons D.O., 9/8/2023 3:40 PM

## 2023-09-08 NOTE — ED TRIAGE NOTES
Chief Complaint   Patient presents with    ALOC     EMS called due to increased weakness and fatigue with aloc. Baseline ano 3x, now GCS 13    Wound Check     Right heel wound, receiving abx for the last 2x days       Pt BIBA from a SNF with the above complaint. Pt has hx of etoh related encephalopathy. FSBG read HI at facility without hx of diabetes, pt also hypoxic on RA @ 88%.     BP (!) 159/74   Pulse 94   Temp 36.4 °C (97.6 °F) (Temporal)   Resp 16   Ht 1.829 m (6')   Wt 79.4 kg (175 lb)   SpO2 96%   BMI 23.73 kg/m²

## 2023-09-08 NOTE — ED NOTES
Med rec completed per MAR from [facility]

## 2023-09-08 NOTE — ASSESSMENT & PLAN NOTE
Patient presents with severe hyperglycemia with a blood sugar of 588.  He apparently has no known history of diabetes.  He is not on any outpatient diabetes medication.  Last A1c was done in 2021 and was approximately 5.1.  Given patient 10 units insulin stat.    9/9  hemoglobin A1c 10.3  Starting insulin sliding scale  DM educator  DM diet  + betahydroxy    9/10 on SSI, monitor  Dietary and DM educator  - start metformin  - pt will not be able to self administer insulin, will need help with rx on dc    9/11 improving on metformin, f/u ssi  - dm educator

## 2023-09-08 NOTE — ASSESSMENT & PLAN NOTE
9/9 Likely secondary to hyperglycemia  Patient is oriented to self at this time    9/10 axox1-2, ? Baseline  From memory care unit  - pt/ot eval, will need snf  Staff, guardian has decided to transition patient to DNR CODE STATUS, to follow-up POLST    9/11 d/w guardian/friend Devang, code status updated: DNR  I discussed advance care planning with the patient's family for at least 26 minutes, including diagnosis, prognosis, plan of care, risks and benefits of any therapies that could be offered, as well as alternatives including palliation and hospice, as appropriate. Patient has been made a DNR now. BILL CODE 78079.    F/u pt/ot eval, may need snf for rx assistance, CM to assist

## 2023-09-08 NOTE — H&P
Hospital Medicine History & Physical Note    Date of Service  9/8/2023    Primary Care Physician  Kristina Roche M.D.        Code Status  Full Code    Chief Complaint  Chief Complaint   Patient presents with    ALOC     EMS called due to increased weakness and fatigue with aloc. Baseline ano 3x, now GCS 13    Wound Check     Right heel wound, receiving abx for the last 2x days       History of Presenting Illness  Yury Blake is a 53 y.o. male who presented 9/8/2023 with altered mental status.  Patient is currently only oriented to self, so history was obtained from chart review and ER physician.  Patient currently thinks that he lives in [place].  He is unable to recall what year it is.  Patient states that he lives by himself at home, however he was brought here by EMS from a nursing home.  According to the ER physician, he was brought here for worsening altered mental status and changes in mentation.  He has been found confused.  In the ER, he was found to be hyperglycemic with a blood glucose of 588.  Patient has no known history of diabetes.  I spoke to the patient, he was unable to tell me if he was diabetic or not.  I did asked the patient about his chronic right heel wound, patient states that he is unaware how long its been there for.  He is not able to tell me if he has been having any fevers or chills, or any nausea or vomiting.  In the emergency room, imaging was obtained and are unremarkable.  On labs he had a leukocytosis to 11.9.  He was mildly hyponatremic to 133.  Glucose levels of 588.  He was given 1 L of IV fluids and given a single dose of empiric Zosyn.  Patient will be admitted for management of hyperglycemia and altered mental status, and management of a chronic right heel wound.    I discussed the plan of care with patient.    Review of Systems  Unable to perform full review of systems as patient is only oriented to self.    Past Medical History   has a past medical history of  Arthritis, Encephalopathy, Hypertension, Pain, and Psychiatric problem.    Surgical History   has a past surgical history that includes orif, ankle (Right, 9/26/2017); tendon lenghtening (Bilateral, 2/1/2021); irrigation & debridement ortho (Left, 2/1/2021); and irrigation & debridement ortho (Left, 4/12/2021).     Family History  family history is not on file.   Family history reviewed with patient. There is no family history that is pertinent to the chief complaint.     Social History   reports that he has never smoked. His smokeless tobacco use includes chew. He reports current alcohol use. He reports that he does not use drugs.    Allergies  No Known Allergies    Medications  Prior to Admission Medications   Prescriptions Last Dose Informant Patient Reported? Taking?   Calcium Polycarbophil (FIBER) 625 MG Tab 9/8/2023 at Noon MAR from Other Facility No No   Sig: Take 1 tablet by mouth 3 times a day, with meals.   Melatonin 10 MG Tab 9/7/2023 at HS MAR from Other Facility Yes Yes   Sig: Take 10 mg by mouth at bedtime.   acetaminophen (TYLENOL) 325 MG Tab PRN at PRN MAR from Other Facility Yes Yes   Sig: Take 650 mg by mouth every 6 hours as needed. Indications: Pain   aspirin (ASA) 325 MG Tab 9/8/2023 at AM MAR from Other Facility Yes Yes   Sig: Take 325 mg by mouth every day.   cephALEXin (KEFLEX) 500 MG Cap 9/8/2023 at AM MAR from Other Facility Yes Yes   Sig: Take 1,000 mg by mouth 2 times a day. 14 day course   divalproex (DEPAKOTE) 250 MG Tablet Delayed Response 9/8/2023 at Noon MAR from Other Facility Yes Yes   Sig: Take 250 mg by mouth 3 times a day.   docusate sodium (COLACE) 100 MG Cap PRN at PRN MAR from Other Facility Yes Yes   Sig: Take 100 mg by mouth 1 time a day as needed for Constipation.   doxycycline (VIBRAMYCIN) 100 MG Tab 9/8/2023 at AM MAR from Other Facility Yes Yes   Sig: Take 100 mg by mouth 2 times a day. 14 day course   folic acid (FOLVITE) 1 MG Tab 9/8/2023 at Noon MAR from Other  Facility Yes Yes   Sig: Take 1 mg by mouth every day.   furosemide (LASIX) 40 MG Tab 9/7/2023 at PM MAR from Other Facility Yes Yes   Sig: Take 40 mg by mouth every day.   guaiFENesin (TUSSIN) 100 MG/5ML liquid PRN at PRN MAR from Other Facility Yes Yes   Sig: Take 10 mL by mouth every four hours as needed for Cough.   hydrOXYzine HCl (ATARAX) 25 MG Tab 9/8/2023 at Noon MAR from Other Facility Yes Yes   Sig: Take 25 mg by mouth in the morning, at noon, and at bedtime.   magnesium hydroxide (MILK OF MAGNESIA) 400 MG/5ML Suspension PRN at PRN MAR from Other Facility Yes Yes   Sig: Take 30 mL by mouth 1 time a day as needed. Indications: Constipation   potassium chloride (KLOR-CON) 20 MEQ Pack 9/8/2023 at Noon MAR from Other Facility Yes Yes   Sig: Take 20 mEq by mouth every day.   risperiDONE (RISPERDAL) 2 MG Tab 9/8/2023 at AM MAR from Other Facility No No   Sig: Take 1 tablet by mouth 2 Times a Day.   thiamine (VITAMIN B-1) 100 MG Tab 9/8/2023 at Noon MAR from Other Facility Yes Yes   Sig: Take 100 mg by mouth every day.      Facility-Administered Medications: None       Physical Exam  Temp:  [36.4 °C (97.6 °F)] 36.4 °C (97.6 °F)  Pulse:  [81-94] 82  Resp:  [16-18] 18  BP: (138-159)/() 138/68  SpO2:  [90 %-98 %] 97 %  Blood Pressure: 138/68   Temperature: 36.4 °C (97.6 °F)   Pulse: 82   Respiration: 18   Pulse Oximetry: 97 %       Physical Exam  Constitutional:       General: He is not in acute distress.     Appearance: Normal appearance. He is obese. He is diaphoretic. He is not ill-appearing or toxic-appearing.   HENT:      Head: Normocephalic and atraumatic.      Mouth/Throat:      Mouth: Mucous membranes are moist.   Eyes:      Pupils: Pupils are equal, round, and reactive to light.   Cardiovascular:      Rate and Rhythm: Normal rate and regular rhythm.   Pulmonary:      Effort: Pulmonary effort is normal. No respiratory distress.      Breath sounds: Normal breath sounds. No stridor. No wheezing or  "rhonchi.   Abdominal:      General: Abdomen is flat. There is no distension.      Palpations: Abdomen is soft.      Tenderness: There is no abdominal tenderness.      Hernia: No hernia is present.   Musculoskeletal:         General: Swelling and tenderness present. No deformity.      Right lower leg: Edema present.      Left lower leg: Edema present.      Comments: Left lower extremity with 2+ pitting edema, and calf tenderness to palpation.  Right heel pressure ulcer.  No purulent drainage.  Some surrounding erythema.   Skin:     Capillary Refill: Capillary refill takes less than 2 seconds.      Coloration: Skin is not jaundiced or pale.      Findings: No bruising or erythema.   Neurological:      Mental Status: He is alert. He is disoriented.      Cranial Nerves: No cranial nerve deficit.      Sensory: No sensory deficit.      Motor: No weakness.      Coordination: Coordination normal.      Comments: Patient is oriented to self.  No other focal neurological deficits         Laboratory:  Recent Labs     09/08/23  1318   WBC 11.9*   RBC 4.17*   HEMOGLOBIN 12.8*   HEMATOCRIT 37.9*   MCV 90.9   MCH 30.7   MCHC 33.8   RDW 43.8   PLATELETCT 175   MPV 10.1     Recent Labs     09/08/23  1318   SODIUM 133*   POTASSIUM 4.0   CHLORIDE 95*   CO2 28   GLUCOSE 588*   BUN 10   CREATININE 0.92   CALCIUM 9.2     Recent Labs     09/08/23  1318   ALTSGPT 10   ASTSGOT 8*   ALKPHOSPHAT 145*   TBILIRUBIN 0.6   GLUCOSE 588*         No results for input(s): \"NTPROBNP\" in the last 72 hours.      No results for input(s): \"TROPONINT\" in the last 72 hours.    Imaging:  DX-CHEST-PORTABLE (1 VIEW)   Final Result      Bibasilar underinflation atelectasis      DX-FOOT-COMPLETE 3+ RIGHT   Final Result      1.  No radiographic evidence of acute traumatic injury or bony erosion   2.  Osteopenia      US-EXTREMITY VENOUS LOWER BILAT    (Results Pending)       X-Ray:  I have personally reviewed the images and compared with prior images.  EKG:  I have " personally reviewed the images and compared with prior images.    Assessment/Plan:  Justification for Admission Status  I anticipate this patient will require at least two midnights for appropriate medical management, necessitating inpatient admission because management of hyperglycemia, altered mental status, pressure ulcer of his right heel requiring IV antibiotics    Patient will need a Med/Surg bed on EMERGENCY service .  The need is secondary to altered mental status, likely diabetic foot ulcer, hyperglycemia.    * Hyperglycemia- (present on admission)  Assessment & Plan  Patient presents with severe hyperglycemia with a blood sugar of 588.  He apparently has no known history of diabetes.  He is not on any outpatient diabetes medication.  Last A1c was done in 2021 and was approximately 5.1.  Given patient 10 units insulin stat.  Pending hemoglobin A1c  Starting insulin sliding scale      Leg swelling  Assessment & Plan  Left leg swelling with tenderness to palpation   Follow-up DVT ultrasound of left lower extremity    Altered mental status  Assessment & Plan  Likely secondary to hyperglycemia  Patient is oriented to self at this time    Hyponatremia  Assessment & Plan  Pseudohyponatremia secondary to hyperglycemia    Pressure ulcer of right foot- (present on admission)  Assessment & Plan  Likely source of infection at this time.  Patient presents with leukocytosis and altered mental status.  WBC of 11.9.  Starting patient on Unasyn.  Follow-up wound culture and blood culture          VTE prophylaxis: enoxaparin ppx

## 2023-09-09 LAB
AMPHET UR QL SCN: NEGATIVE
ANION GAP SERPL CALC-SCNC: 13 MMOL/L (ref 7–16)
APPEARANCE UR: CLEAR
BACTERIA #/AREA URNS HPF: NEGATIVE /HPF
BARBITURATES UR QL SCN: NEGATIVE
BENZODIAZ UR QL SCN: NEGATIVE
BILIRUB UR QL STRIP.AUTO: NEGATIVE
BUN SERPL-MCNC: 8 MG/DL (ref 8–22)
BZE UR QL SCN: NEGATIVE
CALCIUM SERPL-MCNC: 9.2 MG/DL (ref 8.5–10.5)
CANNABINOIDS UR QL SCN: NEGATIVE
CHLORIDE SERPL-SCNC: 100 MMOL/L (ref 96–112)
CHOLEST SERPL-MCNC: 146 MG/DL (ref 100–199)
CO2 SERPL-SCNC: 29 MMOL/L (ref 20–33)
COLOR UR: YELLOW
CREAT SERPL-MCNC: 0.76 MG/DL (ref 0.5–1.4)
EPI CELLS #/AREA URNS HPF: NEGATIVE /HPF
ERYTHROCYTE [DISTWIDTH] IN BLOOD BY AUTOMATED COUNT: 43.8 FL (ref 35.9–50)
ERYTHROCYTE [DISTWIDTH] IN BLOOD BY AUTOMATED COUNT: 44.4 FL (ref 35.9–50)
FENTANYL UR QL: NEGATIVE
GFR SERPLBLD CREATININE-BSD FMLA CKD-EPI: 108 ML/MIN/1.73 M 2
GLUCOSE BLD STRIP.AUTO-MCNC: 117 MG/DL (ref 65–99)
GLUCOSE BLD STRIP.AUTO-MCNC: 164 MG/DL (ref 65–99)
GLUCOSE BLD STRIP.AUTO-MCNC: 243 MG/DL (ref 65–99)
GLUCOSE BLD STRIP.AUTO-MCNC: 265 MG/DL (ref 65–99)
GLUCOSE SERPL-MCNC: 284 MG/DL (ref 65–99)
GLUCOSE UR STRIP.AUTO-MCNC: >=1000 MG/DL
HCT VFR BLD AUTO: 40.9 % (ref 42–52)
HCT VFR BLD AUTO: 42 % (ref 42–52)
HDLC SERPL-MCNC: 36 MG/DL
HGB BLD-MCNC: 13.5 G/DL (ref 14–18)
HGB BLD-MCNC: 14 G/DL (ref 14–18)
HYALINE CASTS #/AREA URNS LPF: ABNORMAL /LPF
KETONES UR STRIP.AUTO-MCNC: 15 MG/DL
LDLC SERPL CALC-MCNC: 54 MG/DL
LEUKOCYTE ESTERASE UR QL STRIP.AUTO: ABNORMAL
MCH RBC QN AUTO: 30.3 PG (ref 27–33)
MCH RBC QN AUTO: 30.3 PG (ref 27–33)
MCHC RBC AUTO-ENTMCNC: 33 G/DL (ref 32.3–36.5)
MCHC RBC AUTO-ENTMCNC: 33.3 G/DL (ref 32.3–36.5)
MCV RBC AUTO: 90.9 FL (ref 81.4–97.8)
MCV RBC AUTO: 91.9 FL (ref 81.4–97.8)
METHADONE UR QL SCN: NEGATIVE
MICRO URNS: ABNORMAL
NITRITE UR QL STRIP.AUTO: NEGATIVE
OPIATES UR QL SCN: NEGATIVE
OXYCODONE UR QL SCN: NEGATIVE
PCP UR QL SCN: NEGATIVE
PH UR STRIP.AUTO: 6.5 [PH] (ref 5–8)
PLATELET # BLD AUTO: 154 K/UL (ref 164–446)
PLATELET # BLD AUTO: 155 K/UL (ref 164–446)
PMV BLD AUTO: 9.8 FL (ref 9–12.9)
PMV BLD AUTO: 9.8 FL (ref 9–12.9)
POTASSIUM SERPL-SCNC: 3.4 MMOL/L (ref 3.6–5.5)
PROPOXYPH UR QL SCN: NEGATIVE
PROT UR QL STRIP: NEGATIVE MG/DL
RBC # BLD AUTO: 4.45 M/UL (ref 4.7–6.1)
RBC # BLD AUTO: 4.62 M/UL (ref 4.7–6.1)
RBC # URNS HPF: ABNORMAL /HPF
RBC UR QL AUTO: ABNORMAL
SODIUM SERPL-SCNC: 142 MMOL/L (ref 135–145)
SP GR UR STRIP.AUTO: 1.02
TRIGL SERPL-MCNC: 282 MG/DL (ref 0–149)
UROBILINOGEN UR STRIP.AUTO-MCNC: 0.2 MG/DL
WBC # BLD AUTO: 11.1 K/UL (ref 4.8–10.8)
WBC # BLD AUTO: 11.4 K/UL (ref 4.8–10.8)
WBC #/AREA URNS HPF: ABNORMAL /HPF

## 2023-09-09 PROCEDURE — 82962 GLUCOSE BLOOD TEST: CPT | Mod: 91

## 2023-09-09 PROCEDURE — 36415 COLL VENOUS BLD VENIPUNCTURE: CPT

## 2023-09-09 PROCEDURE — C9113 INJ PANTOPRAZOLE SODIUM, VIA: HCPCS | Performed by: STUDENT IN AN ORGANIZED HEALTH CARE EDUCATION/TRAINING PROGRAM

## 2023-09-09 PROCEDURE — 85027 COMPLETE CBC AUTOMATED: CPT

## 2023-09-09 PROCEDURE — 87106 FUNGI IDENTIFICATION YEAST: CPT

## 2023-09-09 PROCEDURE — 87086 URINE CULTURE/COLONY COUNT: CPT

## 2023-09-09 PROCEDURE — 770006 HCHG ROOM/CARE - MED/SURG/GYN SEMI*

## 2023-09-09 PROCEDURE — 80061 LIPID PANEL: CPT

## 2023-09-09 PROCEDURE — 99232 SBSQ HOSP IP/OBS MODERATE 35: CPT | Performed by: INTERNAL MEDICINE

## 2023-09-09 PROCEDURE — 80048 BASIC METABOLIC PNL TOTAL CA: CPT

## 2023-09-09 PROCEDURE — 700102 HCHG RX REV CODE 250 W/ 637 OVERRIDE(OP): Performed by: STUDENT IN AN ORGANIZED HEALTH CARE EDUCATION/TRAINING PROGRAM

## 2023-09-09 PROCEDURE — 80307 DRUG TEST PRSMV CHEM ANLYZR: CPT

## 2023-09-09 PROCEDURE — A9270 NON-COVERED ITEM OR SERVICE: HCPCS | Performed by: STUDENT IN AN ORGANIZED HEALTH CARE EDUCATION/TRAINING PROGRAM

## 2023-09-09 PROCEDURE — 700111 HCHG RX REV CODE 636 W/ 250 OVERRIDE (IP): Mod: JZ | Performed by: STUDENT IN AN ORGANIZED HEALTH CARE EDUCATION/TRAINING PROGRAM

## 2023-09-09 PROCEDURE — 81001 URINALYSIS AUTO W/SCOPE: CPT

## 2023-09-09 PROCEDURE — 700105 HCHG RX REV CODE 258: Performed by: STUDENT IN AN ORGANIZED HEALTH CARE EDUCATION/TRAINING PROGRAM

## 2023-09-09 RX ORDER — PANTOPRAZOLE SODIUM 40 MG/10ML
40 INJECTION, POWDER, LYOPHILIZED, FOR SOLUTION INTRAVENOUS 2 TIMES DAILY
Status: DISCONTINUED | OUTPATIENT
Start: 2023-09-09 | End: 2023-09-11

## 2023-09-09 RX ORDER — SODIUM CHLORIDE, SODIUM LACTATE, POTASSIUM CHLORIDE, CALCIUM CHLORIDE 600; 310; 30; 20 MG/100ML; MG/100ML; MG/100ML; MG/100ML
INJECTION, SOLUTION INTRAVENOUS CONTINUOUS
Status: DISCONTINUED | OUTPATIENT
Start: 2023-09-09 | End: 2023-09-10

## 2023-09-09 RX ORDER — ONDANSETRON 2 MG/ML
4 INJECTION INTRAMUSCULAR; INTRAVENOUS EVERY 4 HOURS PRN
Status: DISCONTINUED | OUTPATIENT
Start: 2023-09-09 | End: 2023-09-12 | Stop reason: HOSPADM

## 2023-09-09 RX ORDER — PROMETHAZINE HYDROCHLORIDE 25 MG/1
25 SUPPOSITORY RECTAL EVERY 6 HOURS PRN
Status: DISCONTINUED | OUTPATIENT
Start: 2023-09-09 | End: 2023-09-12 | Stop reason: HOSPADM

## 2023-09-09 RX ADMIN — AMPICILLIN AND SULBACTAM 3 G: 1; 2 INJECTION, POWDER, FOR SOLUTION INTRAMUSCULAR; INTRAVENOUS at 00:22

## 2023-09-09 RX ADMIN — AMPICILLIN AND SULBACTAM 3 G: 1; 2 INJECTION, POWDER, FOR SOLUTION INTRAMUSCULAR; INTRAVENOUS at 13:01

## 2023-09-09 RX ADMIN — SODIUM CHLORIDE, POTASSIUM CHLORIDE, SODIUM LACTATE AND CALCIUM CHLORIDE: 600; 310; 30; 20 INJECTION, SOLUTION INTRAVENOUS at 06:08

## 2023-09-09 RX ADMIN — INSULIN HUMAN 4 UNITS: 100 INJECTION, SOLUTION PARENTERAL at 12:57

## 2023-09-09 RX ADMIN — AMPICILLIN AND SULBACTAM 3 G: 1; 2 INJECTION, POWDER, FOR SOLUTION INTRAMUSCULAR; INTRAVENOUS at 05:52

## 2023-09-09 RX ADMIN — SENNOSIDES AND DOCUSATE SODIUM 2 TABLET: 50; 8.6 TABLET ORAL at 17:46

## 2023-09-09 RX ADMIN — INSULIN HUMAN 3 UNITS: 100 INJECTION, SOLUTION PARENTERAL at 17:56

## 2023-09-09 RX ADMIN — RISPERIDONE 2 MG: 2 TABLET ORAL at 17:46

## 2023-09-09 RX ADMIN — AMPICILLIN AND SULBACTAM 3 G: 1; 2 INJECTION, POWDER, FOR SOLUTION INTRAMUSCULAR; INTRAVENOUS at 18:23

## 2023-09-09 RX ADMIN — PANTOPRAZOLE SODIUM 40 MG: 40 INJECTION, POWDER, FOR SOLUTION INTRAVENOUS at 06:37

## 2023-09-09 RX ADMIN — VALPROIC ACID 250 MG: 250 SOLUTION ORAL at 17:45

## 2023-09-09 RX ADMIN — ENOXAPARIN SODIUM 40 MG: 100 INJECTION SUBCUTANEOUS at 17:46

## 2023-09-09 RX ADMIN — INSULIN HUMAN 7 UNITS: 100 INJECTION, SOLUTION PARENTERAL at 08:34

## 2023-09-09 RX ADMIN — ONDANSETRON 4 MG: 2 INJECTION INTRAMUSCULAR; INTRAVENOUS at 12:55

## 2023-09-09 RX ADMIN — PANTOPRAZOLE SODIUM 40 MG: 40 INJECTION, POWDER, FOR SOLUTION INTRAVENOUS at 17:47

## 2023-09-09 ASSESSMENT — ENCOUNTER SYMPTOMS
MYALGIAS: 0
DEPRESSION: 0
CHILLS: 0
SENSORY CHANGE: 0
ABDOMINAL PAIN: 1
SPEECH CHANGE: 0
FEVER: 0
DIZZINESS: 0
WEAKNESS: 1
NAUSEA: 1
FOCAL WEAKNESS: 0
MEMORY LOSS: 1
FLANK PAIN: 0
NERVOUS/ANXIOUS: 0
BACK PAIN: 0
SHORTNESS OF BREATH: 1
HEADACHES: 0
PALPITATIONS: 0
VOMITING: 1
COUGH: 0

## 2023-09-09 ASSESSMENT — PAIN DESCRIPTION - PAIN TYPE
TYPE: ACUTE PAIN

## 2023-09-09 NOTE — CARE PLAN
The patient is Stable - Low risk of patient condition declining or worsening    Shift Goals  Clinical Goals: Monitor blood sugar  Patient Goals: Rest and comfort    Progress made toward(s) clinical / shift goals:  Pt's FSBG was 269 and pt received 7 units of insulin per sliding scale on MAR. Pt educated that the reason for hospitalization was due to hyperglycemia. Pt sleeping comfortably in bed, showing no signs of distress, and unlabored breathing.      Problem: Knowledge Deficit - Standard  Goal: Patient and family/care givers will demonstrate understanding of plan of care, disease process/condition, diagnostic tests and medications  Outcome: Progressing     Problem: Skin Integrity  Goal: Skin integrity is maintained or improved  Outcome: Progressing     Problem: Fall Risk  Goal: Patient will remain free from falls  Outcome: Progressing       Patient is not progressing towards the following goals:

## 2023-09-09 NOTE — CARE PLAN
Problem: Skin Integrity  Goal: Skin integrity is maintained or improved  Outcome: Progressing     Problem: Fall Risk  Goal: Patient will remain free from falls  Outcome: Progressing   The patient is Watcher - Medium risk of patient condition declining or worsening    Shift Goals  Clinical Goals: monitor bs  Patient Goals: monitor bs    Progress made toward(s) clinical / shift goals:  turns in place.  Bed alarm activated for safety.    Patient is not progressing towards the following goals:

## 2023-09-09 NOTE — DIETARY
[facility] [facility] - Alert received for newly identified wound. Wound team consult pending, will await wound staging to make recommendations if appropriate.     RD will monitor per dept policy.

## 2023-09-09 NOTE — PROGRESS NOTES
Pt oriented to self this morning.   this morning.  Pt had several bouts of emesis over night per noc RN.  Call light within reach.

## 2023-09-09 NOTE — PROGRESS NOTES
Assumed care of patient at 1900. Received Report from day nurse. Patient A&O x3, disoriented to situation, pt reoriented, on 2L NC, Reporting a pain level of 0/10. Call light within reach, belongings within reach, Fall precautions in place, bed in lowest position, bed alarm on. Patient does not have any other needs at this time.     POC was discussed with patient. All questions were answered. Patient verbalized understanding.

## 2023-09-09 NOTE — PROGRESS NOTES
4 Eyes Skin Assessment Completed by MUSTAPHA Rodriguez and MUSTAPHA Siddiqi.    Head WDL  Ears WDL  Nose WDL  Mouth WDL  Neck WDL  Breast/Chest WDL  Shoulder Blades WDL  Spine WDL  (R) Arm/Elbow/Hand WDL  (L) Arm/Elbow/Hand WDL  Abdomen WDL  Groin Redness and Rash  Scrotum/Coccyx/Buttocks Redness and Blanching  (R) Leg Redness/Rash  (L) Leg Redness and Rash  (R) Heel/Foot/Toe Ulcer(s)  (L) Heel/Foot/Toe Redness and Rash          Devices In Places Nasal Cannula      Interventions In Place NC W/Ear Foams, Heel Float Boots, Waffle Overlay, Q2 Turns, and Barrier Cream    Possible Skin Injury Yes    Pictures Uploaded Into Epic Yes  Wound Consult Placed Yes  RN Wound Prevention Protocol Ordered Yes

## 2023-09-09 NOTE — PROGRESS NOTES
Hospital Medicine Daily Progress Note    Date of Service  9/9/2023    Chief Complaint  Yury Blake is a 53 y.o. male admitted 9/8/2023 with     Hospital Course  Yury Blake is a 53 y.o. male who presented 9/8/2023 with altered mental status.  Patient is currently only oriented to self, so history was obtained from chart review and ER physician.  Patient currently thinks that he lives in [place].  He is unable to recall what year it is.  Patient states that he lives by himself at home, however he was brought here by EMS from a nursing home.  According to the ER physician, he was brought here for worsening altered mental status and changes in mentation.  He has been found confused.  In the ER, he was found to be hyperglycemic with a blood glucose of 588.  Patient has no known history of diabetes.  I spoke to the patient, he was unable to tell me if he was diabetic or not.  I did asked the patient about his chronic right heel wound, patient states that he is unaware how long its been there for.  He is not able to tell me if he has been having any fevers or chills, or any nausea or vomiting.  In the emergency room, imaging was obtained and are unremarkable.  On labs he had a leukocytosis to 11.9.  He was mildly hyponatremic to 133.  Glucose levels of 588.  He was given 1 L of IV fluids and given a single dose of empiric Zosyn.  Patient will be admitted for management of hyperglycemia and altered mental status, and management of a chronic right heel wound.       Interval Problem Update  9/9 patient is lethargic, arousable  Per staff, several bouts of nausea and vomiting this a.m.  Patient is a poor historian, states that he is a  and works 5 days a week, upon review of records with, patient currently a resident at [facility] unit  He is unable to provide much history on diabetic history as well as heel ulcers  Denies pain        I have discussed this patient's plan of care  and discharge plan at IDT rounds today with Case Management, Nursing, Nursing leadership, and other members of the IDT team.    Consultants/Specialty  none    Code Status  Full Code    Disposition  The patient is not medically cleared for discharge to home or a post-acute facility.      I have placed the appropriate orders for post-discharge needs.    Review of Systems  Review of Systems   Constitutional:  Positive for malaise/fatigue. Negative for chills and fever.   HENT:  Negative for congestion and hearing loss.    Respiratory:  Positive for shortness of breath. Negative for cough.    Cardiovascular:  Negative for chest pain, palpitations and leg swelling.   Gastrointestinal:  Positive for abdominal pain, nausea and vomiting.   Genitourinary:  Negative for dysuria and flank pain.   Musculoskeletal:  Negative for back pain, joint pain and myalgias.   Neurological:  Positive for weakness. Negative for dizziness, sensory change, speech change, focal weakness and headaches.   Psychiatric/Behavioral:  Positive for memory loss. Negative for depression. The patient is not nervous/anxious.         Physical Exam  Temp:  [35.8 °C (96.5 °F)-36.6 °C (97.9 °F)] 35.9 °C (96.6 °F)  Pulse:  [64-85] 81  Resp:  [16-18] 17  BP: (120-149)/() 133/83  SpO2:  [94 %-98 %] 96 %    Physical Exam  Vitals and nursing note reviewed.   Constitutional:       General: He is not in acute distress.     Appearance: He is ill-appearing. He is not toxic-appearing or diaphoretic.   HENT:      Head: Normocephalic and atraumatic.      Nose: Nose normal.      Mouth/Throat:      Mouth: Mucous membranes are moist.   Eyes:      General: No scleral icterus.        Right eye: No discharge.         Left eye: No discharge.      Extraocular Movements: Extraocular movements intact.      Pupils: Pupils are equal, round, and reactive to light.   Neck:      Thyroid: No thyromegaly.      Vascular: No JVD.   Cardiovascular:      Rate and Rhythm: Normal rate.       Heart sounds: No murmur heard.  Pulmonary:      Effort: No respiratory distress.      Breath sounds: Normal breath sounds. No wheezing.   Abdominal:      General: Bowel sounds are normal. There is no distension.      Palpations: Abdomen is soft.      Tenderness: There is no abdominal tenderness.   Musculoskeletal:         General: Swelling present. No tenderness.      Cervical back: Neck supple.   Skin:     General: Skin is warm and dry.      Coloration: Skin is pale.      Findings: Erythema present. No rash.   Neurological:      Mental Status: He is alert.      Cranial Nerves: No cranial nerve deficit.      Sensory: No sensory deficit.      Motor: Weakness present.      Coordination: Coordination normal.      Comments: Oriented x 1-2   Psychiatric:         Behavior: Behavior normal.         Fluids  No intake or output data in the 24 hours ending 09/09/23 1410    Laboratory  Recent Labs     09/08/23  1318 09/09/23  0356 09/09/23  0914   WBC 11.9* 11.1* 11.4*   RBC 4.17* 4.45* 4.62*   HEMOGLOBIN 12.8* 13.5* 14.0   HEMATOCRIT 37.9* 40.9* 42.0   MCV 90.9 91.9 90.9   MCH 30.7 30.3 30.3   MCHC 33.8 33.0 33.3   RDW 43.8 44.4 43.8   PLATELETCT 175 154* 155*   MPV 10.1 9.8 9.8     Recent Labs     09/08/23  1318 09/09/23  0356   SODIUM 133* 142   POTASSIUM 4.0 3.4*   CHLORIDE 95* 100   CO2 28 29   GLUCOSE 588* 284*   BUN 10 8   CREATININE 0.92 0.76   CALCIUM 9.2 9.2             Recent Labs     09/09/23  0356   TRIGLYCERIDE 282*   HDL 36*   LDL 54       Imaging  DX-CHEST-PORTABLE (1 VIEW)   Final Result      Bibasilar underinflation atelectasis      DX-FOOT-COMPLETE 3+ RIGHT   Final Result      1.  No radiographic evidence of acute traumatic injury or bony erosion   2.  Osteopenia      US-EXTREMITY VENOUS LOWER BILAT    (Results Pending)        Assessment/Plan  * Hyperglycemia- (present on admission)  Assessment & Plan  Patient presents with severe hyperglycemia with a blood sugar of 588.  He apparently has no known  history of diabetes.  He is not on any outpatient diabetes medication.  Last A1c was done in 2021 and was approximately 5.1.  Given patient 10 units insulin stat.    9/9  hemoglobin A1c 10.3  Starting insulin sliding scale  DM educator  DM diet  + betahydroxy      Leg swelling  Assessment & Plan  Left leg swelling with tenderness to palpation   Follow-up DVT ultrasound of left lower extremity    Altered mental status  Assessment & Plan  Likely secondary to hyperglycemia  Patient is oriented to self at this time    Hyponatremia  Assessment & Plan  Pseudohyponatremia secondary to hyperglycemia    Pressure ulcer of right foot- (present on admission)  Assessment & Plan  Likely source of infection at this time.  Patient presents with leukocytosis and altered mental status.  WBC of 11.9.    9/9 cont  Unasyn.  Follow-up wound culture and blood culture  May need wound care consult  LPS           VTE prophylaxis:    enoxaparin ppx      I have performed a physical exam and reviewed and updated ROS and Plan today (9/9/2023). In review of yesterday's note (9/8/2023), there are no changes except as documented above.

## 2023-09-09 NOTE — PROGRESS NOTES
Messaged Dr. Giorgi Drew on pt status. Pt has vomited 2x. The first time at 0300 appeared like coffee ground emesis. The 2nd time at 0500 was green. HGB is 13.5. Urine drug screen is negative. Pt had a large BM. Pt is still very sleepy/drowsy, cooperative but does not report nausea, did not alert staff that he vomited on self.    MD put in orders, NPO at this time, IVF, IV Protonix.

## 2023-09-10 ENCOUNTER — APPOINTMENT (OUTPATIENT)
Dept: RADIOLOGY | Facility: MEDICAL CENTER | Age: 53
DRG: 637 | End: 2023-09-10
Attending: STUDENT IN AN ORGANIZED HEALTH CARE EDUCATION/TRAINING PROGRAM
Payer: MEDICAID

## 2023-09-10 LAB
ALBUMIN SERPL BCP-MCNC: 3.1 G/DL (ref 3.2–4.9)
ALBUMIN/GLOB SERPL: 1 G/DL
ALP SERPL-CCNC: 129 U/L (ref 30–99)
ALT SERPL-CCNC: 12 U/L (ref 2–50)
ANION GAP SERPL CALC-SCNC: 9 MMOL/L (ref 7–16)
AST SERPL-CCNC: 17 U/L (ref 12–45)
BASOPHILS # BLD AUTO: 0.8 % (ref 0–1.8)
BASOPHILS # BLD: 0.07 K/UL (ref 0–0.12)
BILIRUB SERPL-MCNC: 0.3 MG/DL (ref 0.1–1.5)
BUN SERPL-MCNC: 6 MG/DL (ref 8–22)
CALCIUM ALBUM COR SERPL-MCNC: 9.9 MG/DL (ref 8.5–10.5)
CALCIUM SERPL-MCNC: 9.2 MG/DL (ref 8.5–10.5)
CHLORIDE SERPL-SCNC: 103 MMOL/L (ref 96–112)
CO2 SERPL-SCNC: 31 MMOL/L (ref 20–33)
CREAT SERPL-MCNC: 0.73 MG/DL (ref 0.5–1.4)
EOSINOPHIL # BLD AUTO: 0.17 K/UL (ref 0–0.51)
EOSINOPHIL NFR BLD: 1.9 % (ref 0–6.9)
ERYTHROCYTE [DISTWIDTH] IN BLOOD BY AUTOMATED COUNT: 46.7 FL (ref 35.9–50)
GFR SERPLBLD CREATININE-BSD FMLA CKD-EPI: 109 ML/MIN/1.73 M 2
GLOBULIN SER CALC-MCNC: 3 G/DL (ref 1.9–3.5)
GLUCOSE BLD STRIP.AUTO-MCNC: 120 MG/DL (ref 65–99)
GLUCOSE BLD STRIP.AUTO-MCNC: 147 MG/DL (ref 65–99)
GLUCOSE BLD STRIP.AUTO-MCNC: 152 MG/DL (ref 65–99)
GLUCOSE BLD STRIP.AUTO-MCNC: 153 MG/DL (ref 65–99)
GLUCOSE SERPL-MCNC: 188 MG/DL (ref 65–99)
HCT VFR BLD AUTO: 39.9 % (ref 42–52)
HGB BLD-MCNC: 12.7 G/DL (ref 14–18)
IMM GRANULOCYTES # BLD AUTO: 0.14 K/UL (ref 0–0.11)
IMM GRANULOCYTES NFR BLD AUTO: 1.6 % (ref 0–0.9)
LYMPHOCYTES # BLD AUTO: 1.84 K/UL (ref 1–4.8)
LYMPHOCYTES NFR BLD: 20.7 % (ref 22–41)
MCH RBC QN AUTO: 30.2 PG (ref 27–33)
MCHC RBC AUTO-ENTMCNC: 31.8 G/DL (ref 32.3–36.5)
MCV RBC AUTO: 94.8 FL (ref 81.4–97.8)
MONOCYTES # BLD AUTO: 0.91 K/UL (ref 0–0.85)
MONOCYTES NFR BLD AUTO: 10.2 % (ref 0–13.4)
NEUTROPHILS # BLD AUTO: 5.77 K/UL (ref 1.82–7.42)
NEUTROPHILS NFR BLD: 64.8 % (ref 44–72)
NRBC # BLD AUTO: 0 K/UL
NRBC BLD-RTO: 0 /100 WBC (ref 0–0.2)
PLATELET # BLD AUTO: 169 K/UL (ref 164–446)
PMV BLD AUTO: 9.7 FL (ref 9–12.9)
POTASSIUM SERPL-SCNC: 3.9 MMOL/L (ref 3.6–5.5)
PROT SERPL-MCNC: 6.1 G/DL (ref 6–8.2)
RBC # BLD AUTO: 4.21 M/UL (ref 4.7–6.1)
SODIUM SERPL-SCNC: 143 MMOL/L (ref 135–145)
WBC # BLD AUTO: 8.9 K/UL (ref 4.8–10.8)

## 2023-09-10 PROCEDURE — 700102 HCHG RX REV CODE 250 W/ 637 OVERRIDE(OP): Performed by: STUDENT IN AN ORGANIZED HEALTH CARE EDUCATION/TRAINING PROGRAM

## 2023-09-10 PROCEDURE — C9113 INJ PANTOPRAZOLE SODIUM, VIA: HCPCS | Performed by: STUDENT IN AN ORGANIZED HEALTH CARE EDUCATION/TRAINING PROGRAM

## 2023-09-10 PROCEDURE — 700111 HCHG RX REV CODE 636 W/ 250 OVERRIDE (IP): Performed by: STUDENT IN AN ORGANIZED HEALTH CARE EDUCATION/TRAINING PROGRAM

## 2023-09-10 PROCEDURE — 700105 HCHG RX REV CODE 258: Performed by: STUDENT IN AN ORGANIZED HEALTH CARE EDUCATION/TRAINING PROGRAM

## 2023-09-10 PROCEDURE — A9270 NON-COVERED ITEM OR SERVICE: HCPCS | Performed by: INTERNAL MEDICINE

## 2023-09-10 PROCEDURE — 82962 GLUCOSE BLOOD TEST: CPT | Mod: 91

## 2023-09-10 PROCEDURE — 99221 1ST HOSP IP/OBS SF/LOW 40: CPT | Mod: 25 | Performed by: NURSE PRACTITIONER

## 2023-09-10 PROCEDURE — 36415 COLL VENOUS BLD VENIPUNCTURE: CPT

## 2023-09-10 PROCEDURE — 770006 HCHG ROOM/CARE - MED/SURG/GYN SEMI*

## 2023-09-10 PROCEDURE — 80053 COMPREHEN METABOLIC PANEL: CPT

## 2023-09-10 PROCEDURE — 85025 COMPLETE CBC W/AUTO DIFF WBC: CPT

## 2023-09-10 PROCEDURE — 99233 SBSQ HOSP IP/OBS HIGH 50: CPT | Performed by: INTERNAL MEDICINE

## 2023-09-10 PROCEDURE — 700111 HCHG RX REV CODE 636 W/ 250 OVERRIDE (IP): Mod: JZ | Performed by: STUDENT IN AN ORGANIZED HEALTH CARE EDUCATION/TRAINING PROGRAM

## 2023-09-10 PROCEDURE — 93970 EXTREMITY STUDY: CPT | Mod: 26 | Performed by: INTERNAL MEDICINE

## 2023-09-10 PROCEDURE — 0JBQ3ZZ EXCISION OF RIGHT FOOT SUBCUTANEOUS TISSUE AND FASCIA, PERCUTANEOUS APPROACH: ICD-10-PCS | Performed by: INTERNAL MEDICINE

## 2023-09-10 PROCEDURE — 700102 HCHG RX REV CODE 250 W/ 637 OVERRIDE(OP): Performed by: INTERNAL MEDICINE

## 2023-09-10 PROCEDURE — 97602 WOUND(S) CARE NON-SELECTIVE: CPT

## 2023-09-10 PROCEDURE — 93970 EXTREMITY STUDY: CPT

## 2023-09-10 PROCEDURE — A9270 NON-COVERED ITEM OR SERVICE: HCPCS | Performed by: STUDENT IN AN ORGANIZED HEALTH CARE EDUCATION/TRAINING PROGRAM

## 2023-09-10 PROCEDURE — 11042 DBRDMT SUBQ TIS 1ST 20SQCM/<: CPT | Performed by: NURSE PRACTITIONER

## 2023-09-10 RX ADMIN — SODIUM CHLORIDE, POTASSIUM CHLORIDE, SODIUM LACTATE AND CALCIUM CHLORIDE: 600; 310; 30; 20 INJECTION, SOLUTION INTRAVENOUS at 00:20

## 2023-09-10 RX ADMIN — ENOXAPARIN SODIUM 40 MG: 100 INJECTION SUBCUTANEOUS at 19:27

## 2023-09-10 RX ADMIN — Medication 100 MG: at 06:07

## 2023-09-10 RX ADMIN — VALPROIC ACID 250 MG: 250 SOLUTION ORAL at 06:07

## 2023-09-10 RX ADMIN — AMPICILLIN AND SULBACTAM 3 G: 1; 2 INJECTION, POWDER, FOR SOLUTION INTRAMUSCULAR; INTRAVENOUS at 19:28

## 2023-09-10 RX ADMIN — AMPICILLIN AND SULBACTAM 3 G: 1; 2 INJECTION, POWDER, FOR SOLUTION INTRAMUSCULAR; INTRAVENOUS at 06:29

## 2023-09-10 RX ADMIN — AMPICILLIN AND SULBACTAM 3 G: 1; 2 INJECTION, POWDER, FOR SOLUTION INTRAMUSCULAR; INTRAVENOUS at 00:14

## 2023-09-10 RX ADMIN — INSULIN HUMAN 3 UNITS: 100 INJECTION, SOLUTION PARENTERAL at 13:25

## 2023-09-10 RX ADMIN — RISPERIDONE 2 MG: 2 TABLET ORAL at 19:27

## 2023-09-10 RX ADMIN — SENNOSIDES AND DOCUSATE SODIUM 2 TABLET: 50; 8.6 TABLET ORAL at 19:27

## 2023-09-10 RX ADMIN — PANTOPRAZOLE SODIUM 40 MG: 40 INJECTION, POWDER, FOR SOLUTION INTRAVENOUS at 19:27

## 2023-09-10 RX ADMIN — SENNOSIDES AND DOCUSATE SODIUM 2 TABLET: 50; 8.6 TABLET ORAL at 06:07

## 2023-09-10 RX ADMIN — VALPROIC ACID 250 MG: 250 SOLUTION ORAL at 12:48

## 2023-09-10 RX ADMIN — AMPICILLIN AND SULBACTAM 3 G: 1; 2 INJECTION, POWDER, FOR SOLUTION INTRAMUSCULAR; INTRAVENOUS at 12:50

## 2023-09-10 RX ADMIN — ASPIRIN 325 MG: 325 TABLET ORAL at 06:06

## 2023-09-10 RX ADMIN — ACETAMINOPHEN 650 MG: 325 TABLET, FILM COATED ORAL at 13:44

## 2023-09-10 RX ADMIN — PANTOPRAZOLE SODIUM 40 MG: 40 INJECTION, POWDER, FOR SOLUTION INTRAVENOUS at 06:07

## 2023-09-10 RX ADMIN — ACETAMINOPHEN 650 MG: 325 TABLET, FILM COATED ORAL at 22:27

## 2023-09-10 RX ADMIN — INSULIN HUMAN 3 UNITS: 100 INJECTION, SOLUTION PARENTERAL at 22:26

## 2023-09-10 RX ADMIN — VALPROIC ACID 250 MG: 250 SOLUTION ORAL at 19:26

## 2023-09-10 RX ADMIN — FOLIC ACID 1 MG: 1 TABLET ORAL at 06:07

## 2023-09-10 RX ADMIN — METFORMIN HYDROCHLORIDE 500 MG: 500 TABLET ORAL at 19:27

## 2023-09-10 RX ADMIN — RISPERIDONE 2 MG: 2 TABLET ORAL at 06:07

## 2023-09-10 ASSESSMENT — ENCOUNTER SYMPTOMS
MYALGIAS: 0
WEAKNESS: 1
FOCAL WEAKNESS: 0
SPEECH CHANGE: 0
FEVER: 0
DEPRESSION: 0
SHORTNESS OF BREATH: 1
MEMORY LOSS: 1
COUGH: 0
FLANK PAIN: 0
SENSORY CHANGE: 0
HEADACHES: 0
VOMITING: 0
BACK PAIN: 0
PALPITATIONS: 0
DIZZINESS: 0
HEARTBURN: 0
CHILLS: 0
NAUSEA: 1
NERVOUS/ANXIOUS: 0
ABDOMINAL PAIN: 0

## 2023-09-10 ASSESSMENT — PAIN DESCRIPTION - PAIN TYPE
TYPE: ACUTE PAIN

## 2023-09-10 NOTE — CONSULTS
"LIMB PRESERVATION SERVICE CONSULT      REFERRED BY: Dr. Mony Cox    DATE OF CONSULTATION: 9/10/2023    REASON FOR CONSULT: Right medial heel and right medial great toe     HISTORY OF PRESENT ILLNESS: Yury Blake is a 53 y.o.  with a past medical history that includes type 2 diabetes, encephalopathy, hypertension, admitted 9/8/2023 for Hyperglycemia [R73.9].     LPS has been consulted for right medial heel and right great toe ulcers.  Patient resides at [facility].  He reports he has had the ulcer to the heel \"for a long time \"and states that someone helps with dressing changes.  He reports he gets up to a wheelchair. chart reviewed for further detail.     Patient presented to ED with worsening altered mental status change.  His blood glucose was 588.  WBC of 11.9.  Antibiotics were started on this admission.  Infectious diseases has not consulted.  Xray completed and is negative for osteomyelitis. Ortho and Vascular surgery not involved yet.  Blood cultures negative.  UA positive for Candida.  Patient denies fevers, chills, nausea, vomiting.           Smoking:   reports that he has never smoked. His smokeless tobacco use includes chew.    Alcohol:   reports current alcohol use.    Drug:   reports no history of drug use.      PAST MEDICAL HISTORY:   Past Medical History:   Diagnosis Date    Arthritis     RA    Encephalopathy     Hypertension     Pain     Right ankle    Psychiatric problem     anxiety        PAST SURGICAL HISTORY:   Past Surgical History:   Procedure Laterality Date    IRRIGATION & DEBRIDEMENT ORTHO Left 4/12/2021    Procedure: IRRIGATION AND DEBRIDEMENT, WOUND - HEEL;  Surgeon: Donny Roque M.D.;  Location: SURGERY [facility];  Service: Orthopedics    TENDON LENGHTENING Bilateral 2/1/2021    Procedure: LENGTHENING, TENDON- FOR FOOT DROP RECONSTRUCTION, POSTERIOR TIBIALIS TENDON  TRANSFER , ACHILLES LENGHTENING , LEFT MEDIAL RELEASE;  Surgeon: Donny Roque, " M.D.;  Location: SURGERY [facility];  Service: Orthopedics    IRRIGATION & DEBRIDEMENT ORTHO Left 2/1/2021    Procedure: IRRIGATION AND DEBRIDEMENT, WOUND-FOOT;  Surgeon: Donny Roque M.D.;  Location: SURGERY [facility];  Service: Orthopedics    ORIF, ANKLE Right 9/26/2017    Procedure: ANKLE ORIF SYNDESOMOSIS REPAIR;  Surgeon: Armando Woodard M.D.;  Location: [facility];  Service: Orthopedics       MEDICATIONS:   Scheduled Medications   Medication Dose Frequency    metFORMIN  500 mg BID WITH MEALS    pantoprazole  40 mg BID    senna-docusate  2 Tablet BID    enoxaparin (LOVENOX) injection  40 mg DAILY AT 1800    insulin regular  3-14 Units 4X/DAY ACHS    aspirin  325 mg DAILY    risperiDONE  2 mg BID    thiamine  100 mg DAILY    folic acid  1 mg DAILY    ampicillin-sulbactam (UNASYN) IV  3 g Q6HR    valproic acid  250 mg TID       ALLERGIES:  No Known Allergies     FAMILY HISTORY: No family history on file.      REVIEW OF SYSTEMS:   Constitutional: Negative for chills, fever   Respiratory: Negative for cough and shortness of breath.    Cardiovascular:Negative for chest pain, and claudication.   Gastrointestinal: Negative for constipation, diarrhea, nausea and vomiting.   Lower extremities: positive for swelling and redness  Neurological: positive for numbness to feet and lower legs  All other systems reviewed and are negative     RESULTS:     Recent Labs     09/09/23  0356 09/09/23  0914 09/10/23  0522   WBC 11.1* 11.4* 8.9   RBC 4.45* 4.62* 4.21*   HEMOGLOBIN 13.5* 14.0 12.7*   HEMATOCRIT 40.9* 42.0 39.9*   MCV 91.9 90.9 94.8   MCH 30.3 30.3 30.2   MCHC 33.0 33.3 31.8*   RDW 44.4 43.8 46.7   PLATELETCT 154* 155* 169   MPV 9.8 9.8 9.7     Recent Labs     09/08/23  1318 09/09/23  0356 09/10/23  0522   SODIUM 133* 142 143   POTASSIUM 4.0 3.4* 3.9   CHLORIDE 95* 100 103   CO2 28 29 31   GLUCOSE 588* 284* 188*   BUN 10 8 6*         ESR:     None this admission    CRP:       None this  admission      COVID-19: Not completed this admission     Imaging:  DX-CHEST-PORTABLE (1 VIEW)   Final Result      Bibasilar underinflation atelectasis      DX-FOOT-COMPLETE 3+ RIGHT   Final Result      1.  No radiographic evidence of acute traumatic injury or bony erosion   2.  Osteopenia      US-EXTREMITY VENOUS LOWER BILAT    (Results Pending)   US-EXTREMITY ARTERY LOWER BILAT W/ANA (COMBO)    (Results Pending)             Arterial studies: Arterial study, venous study pending    A1c:  Lab Results   Component Value Date/Time    HBA1C 10.3 (H) 09/08/2023 01:18 PM            Microbiology:  Results       Procedure Component Value Units Date/Time    Urine Culture (New) [[id number]]  (Abnormal) Collected: 09/09/23 0314    Order Status: Completed Specimen: Urine Updated: 09/10/23 1529     Significant Indicator POS     Source UR     Site -     Culture Result -      Candida albicans  10-50,000 cfu/mL      Narrative:      Release to patient->Immediate  Indication for culture:->Evaluation for sepsis without a  clear source of infection  Indication for culture:->Evaluation for sepsis without a    Blood Culture - Draw one set from central line if present [[id number]] Collected: 09/08/23 1318    Order Status: Completed Specimen: Blood from Peripheral Updated: 09/09/23 0902     Significant Indicator NEG     Source BLD     Site PERIPHERAL     Culture Result No Growth  Note: Blood cultures are incubated for 5 days and  are monitored continuously.Positive blood cultures  are called to the RN and reported as soon as  they are identified.      Narrative:      Blood Cultures X2. Draw one blood culture from central line  (including implanted port) and one blood culture  peripherally. If no central line present draw blood cultures  times two peripherally from different sites  Release to patient->Immediate  Right AC    Blood Culture - Draw one set from central line if present [[id number]] Collected: 09/08/23 1345    Order Status:  Completed Specimen: Blood from Line Updated: 09/09/23 0902     Significant Indicator NEG     Source BLD     Site Peripheral     Culture Result No Growth  Note: Blood cultures are incubated for 5 days and  are monitored continuously.Positive blood cultures  are called to the RN and reported as soon as  they are identified.      Narrative:      Blood Cultures X2. Draw one blood culture from central line  (including implanted port) and one blood culture  peripherally. If no central line present draw blood cultures  times two peripherally from different sites  Release to patient->Immediate  Right Hand    Urinalysis [[id number]]  (Abnormal) Collected: 09/09/23 0314    Order Status: Completed Specimen: Urine Updated: 09/09/23 0343     Color Yellow     Character Clear     Specific Gravity 1.018     Ph 6.5     Glucose >=1000 mg/dL      Ketones 15 mg/dL      Protein Negative mg/dL      Bilirubin Negative     Urobilinogen, Urine 0.2     Nitrite Negative     Leukocyte Esterase Small     Occult Blood Small     Micro Urine Req Microscopic    Narrative:      Release to patient->Immediate  Indication for culture:->Evaluation for sepsis without a  clear source of infection             PHYSICAL EXAMINATION:     VITAL SIGNS: BP (!) 140/76   Pulse 65   Temp 36.1 °C (96.9 °F) (Temporal)   Resp 19   Ht 1.829 m (6')   Wt 79.4 kg (175 lb)   SpO2 94%   BMI 23.73 kg/m²       General Appearance:  Well developed, well nourished, in no acute distress  pleasant,    Lower Extremity Assessment:    Edema:   Minimal non pitting edema BLE        ROM dorsi/plantarflexion:   Dorsiflexion limited BLE  Able to actively and passively dorsi and plantarflex.  Able to lift right leg.    Structural /mechanical changes:  None mycotic toenails  Varus rotation of bilateral great toe    Sensory Assessment:  Monofilament testing with a 10 gram force: sensation: decreased bilaterally  Visual Inspection: Feet with maceration, ulcers, fissures.  Pedal pulses:  intact bilaterally    Feet diminished to light touch        Pulses:  R foot: palpable DP, palpable PT  L foot: palpable DP, palpable PT      Wound Assessment:    Wound(s)   location: Right medial heel  Full thickness, does not  probe to bone  Wound characteristics:   100% slough  Erythema: Minimal-moderate.    Drainage: Minimal old sanguinous  Callus: No  Odor: No  Measures: 2.5 x 2.5cm          PROCEDURE:   -Curette, forceps used to debride wound bed.  Excisional debridement was performed to remove devitalized tissue until healthy, bleeding tissue was visualized.   Entire surface of wound, 6.44 cm2 debrided.  Tissue debrided into the subcutaneous layer.    -Bleeding controlled with manual pressure.    -Wound care completed by wound RN, refer to flowsheet  -Patient tolerated the procedure well, without c/o pain or discomfort.       Postdebridement:  2.8 x 2.3 x 0.2 cm    Wound photo:             Right great toe:  Full-thickness  Thick callus  Does not probe to bone  No erythema  No edema  No drainage  Predebridement measurement: 0.5 x 0.7 x 0.2 cm          PROCEDURE:   -Curette, scalpel, forceps used to debride wound bed and periwound callus.  Excisional debridement was performed to remove devitalized tissue until healthy, bleeding tissue was visualized.   Entire surface of wound, 0.04 cm2 debrided.  Tissue debrided into the subcutaneous layer.  Periwound callus, ~1 cm2, debrided to skin level, excising hyperkeratinized tissue.   -Bleeding controlled with manual pressure.    -Wound care completed by wound RN, refer to flowsheet  -Patient tolerated the procedure well, without c/o pain or discomfort.       Postdebridement measurement: 0.2 x 0.2 x 0.3 cm                  ASSESSMENT AND PLAN:   53 y.o. admitted for Hyperglycemia [R73.9]. Presents with stage III pressure injury complicated by diabetes and neuropathy.      -Right medial heel: Slough. Excisional debridement performed today.  Medically necessary to promote  wound healing.  Initiate advanced wound care dressing with Jeannine to reduce MMPs, Hydrofera Blue for antimicrobial effects.  Initiate offloading with heel Mepilex, heel offloading boots.  Offloading shoe ordered    -Right great toe: Thick callus depression from previous corn with nonviable tissue. Excisional debridement performed today.  Medically necessary to promote wound healing.  -Initiate advanced wound care dressing and offloading as above    -Continue antibiotics to treat cellulitis.              Wound care:   -Wound care orders placed for nursing by LPS and wound team   -Right great toe, right heel: Jeannine, Hydrofera Blue.  Change every 48 hours      Imaging/Labs:  -COVID-19: not completed this admission.     Vascular status:   -  Palpable pedal pulses bilaterally      Surgery:     -  no plans for surgery at this time    Antibiotics:   -currently on antibiotics managed by hospitalist   Continue to treat cellulitis        Weight Bearing Status:   -Right foot: Weight bearing as tolerated      Offloading:   -Offloading shoe; ordered  -Heel Mepilex and heel float boots while in bed    PT/OT:   -consult placed already        Diabetes Education:     -consult already in place for CDE   -   Lab Results   Component Value Date/Time    HBA1C 10.3 (H) 09/08/2023 01:18 PM          - Implications of loss of protective sensation (LOPS) discussed with patient- including increased risk for amputation.  Advised to check feet at least daily, moisturize feet, and to always wear protective foot wear.   -avoid trimming own nails. See podiatrist or certified foot and nail RN  -keep blood sugars <150 for improved wound healing              Discharge Plan:  Okay to discharge to memory care unit with home health and wound care clinic    Follow up: wound care clinic referral placed         D/W: pt, RN, Dr. Cox, [place] wound team      Please note that this dictation was created using voice recognition software. I have  worked with  technical experts from [facility] to optimize the interface.  I have made every reasonable attempt to correct obvious errors, but there may be errors of grammar and possibly content that I did not discover before finalizing the note.    Please contact [facility] through [employer].

## 2023-09-10 NOTE — PROGRESS NOTES
Assumed care of patient 1900. Received Report from day nurse. Patient A&Ox2, disoriented to time and situation, stated it was early 2000s, stated he is here because he had an accident. Pt reoriented to current situation. Pt on 2L NC, Reporting a pain level of 0/10. Call light within reach, belongings within reach, Fall precautions in place, bed in lowest position, bed alarm on. Patient does not have any other needs at this time.     POC was discussed with patient. All questions were answered. Patient verbalized understanding.

## 2023-09-10 NOTE — PROGRESS NOTES
[facility] Medicine Daily Progress Note    Date of Service  9/10/2023    Chief Complaint  Yury Blake is a 53 y.o. male admitted 9/8/2023 with     Hospital Course  Yury Blake is a 53 y.o. male who presented 9/8/2023 with altered mental status.  Patient is currently only oriented to self, so history was obtained from chart review and ER physician.  Patient currently thinks that he lives in [place].  He is unable to recall what year it is.  Patient states that he lives by himself at home, however he was brought here by EMS from a nursing home.  According to the ER physician, he was brought here for worsening altered mental status and changes in mentation.  He has been found confused.  In the ER, he was found to be hyperglycemic with a blood glucose of 588.  Patient has no known history of diabetes.  I spoke to the patient, he was unable to tell me if he was diabetic or not.  I did asked the patient about his chronic right heel wound, patient states that he is unaware how long its been there for.  He is not able to tell me if he has been having any fevers or chills, or any nausea or vomiting.  In the emergency room, imaging was obtained and are unremarkable.  On labs he had a leukocytosis to 11.9.  He was mildly hyponatremic to 133.  Glucose levels of 588.  He was given 1 L of IV fluids and given a single dose of empiric Zosyn.  Patient will be admitted for management of hyperglycemia and altered mental status, and management of a chronic right heel wound.       Interval Problem Update  9/9 patient is lethargic, arousable  Per staff, several bouts of nausea and vomiting this a.m.  Patient is a poor historian, states that he is a  and works 5 days a week, upon review of records with, patient currently a resident at [facility] unit  He is unable to provide much history on diabetic history as well as heel ulcers  Denies pain    9/10 patient awake and alert, oriented  x2  Continues to report falls information, states that he lives independently  Per patient's guardian, will transition to DNR, to follow-up POLST  Patient denies pain, generalized weakness  Unable to move lower extremities, LPS follow-up for lower extremity wound  Nausea, no vomiting today        I have discussed this patient's plan of care and discharge plan at IDT rounds today with Case Management, Nursing, Nursing leadership, and other members of the IDT team.    Consultants/Specialty  none    Code Status  Full Code    Disposition  The patient is not medically cleared for discharge to home or a post-acute facility.      I have placed the appropriate orders for post-discharge needs.    Review of Systems  Review of Systems   Constitutional:  Negative for chills, fever and malaise/fatigue.   HENT:  Negative for congestion and hearing loss.    Respiratory:  Positive for shortness of breath. Negative for cough.    Cardiovascular:  Negative for chest pain, palpitations and leg swelling.   Gastrointestinal:  Positive for nausea. Negative for abdominal pain, heartburn and vomiting.   Genitourinary:  Negative for dysuria and flank pain.   Musculoskeletal:  Negative for back pain, joint pain and myalgias.   Neurological:  Positive for weakness. Negative for dizziness, sensory change, speech change, focal weakness and headaches.   Psychiatric/Behavioral:  Positive for memory loss. Negative for depression. The patient is not nervous/anxious.         Physical Exam  Temp:  [36.1 °C (96.9 °F)-36.3 °C (97.3 °F)] 36.1 °C (96.9 °F)  Pulse:  [65-89] 65  Resp:  [16-19] 19  BP: (120-140)/(66-85) 140/76  SpO2:  [93 %-98 %] 94 %    Physical Exam  Vitals and nursing note reviewed.   Constitutional:       General: He is not in acute distress.     Appearance: He is ill-appearing.   HENT:      Head: Normocephalic and atraumatic.      Nose: Nose normal.      Mouth/Throat:      Mouth: Mucous membranes are moist.   Eyes:      General:          Right eye: No discharge.         Left eye: No discharge.      Extraocular Movements: Extraocular movements intact.      Pupils: Pupils are equal, round, and reactive to light.   Neck:      Thyroid: No thyromegaly.      Vascular: No JVD.   Cardiovascular:      Rate and Rhythm: Normal rate.      Heart sounds: No murmur heard.  Pulmonary:      Effort: No respiratory distress.      Breath sounds: Normal breath sounds. No wheezing.   Abdominal:      General: Bowel sounds are normal. There is no distension.      Palpations: Abdomen is soft.      Tenderness: There is no abdominal tenderness.   Musculoskeletal:         General: Swelling present. No tenderness.      Cervical back: Neck supple.   Skin:     General: Skin is warm and dry.      Coloration: Skin is pale.      Findings: Erythema present.      Comments: Lower extremity wounds   Neurological:      Mental Status: He is alert.      Cranial Nerves: No cranial nerve deficit.      Sensory: No sensory deficit.      Motor: Weakness present.      Coordination: Coordination normal.      Comments: Oriented x 1-2   Psychiatric:         Behavior: Behavior normal.         Fluids  No intake or output data in the 24 hours ending 09/10/23 1243    Laboratory  Recent Labs     09/09/23  0356 09/09/23  0914 09/10/23  0522   WBC 11.1* 11.4* 8.9   RBC 4.45* 4.62* 4.21*   HEMOGLOBIN 13.5* 14.0 12.7*   HEMATOCRIT 40.9* 42.0 39.9*   MCV 91.9 90.9 94.8   MCH 30.3 30.3 30.2   MCHC 33.0 33.3 31.8*   RDW 44.4 43.8 46.7   PLATELETCT 154* 155* 169   MPV 9.8 9.8 9.7     Recent Labs     09/08/23  1318 09/09/23  0356 09/10/23  0522   SODIUM 133* 142 143   POTASSIUM 4.0 3.4* 3.9   CHLORIDE 95* 100 103   CO2 28 29 31   GLUCOSE 588* 284* 188*   BUN 10 8 6*   CREATININE 0.92 0.76 0.73   CALCIUM 9.2 9.2 9.2             Recent Labs     09/09/23  0356   TRIGLYCERIDE 282*   HDL 36*   LDL 54       Imaging  DX-CHEST-PORTABLE (1 VIEW)   Final Result      Bibasilar underinflation atelectasis       DX-FOOT-COMPLETE 3+ RIGHT   Final Result      1.  No radiographic evidence of acute traumatic injury or bony erosion   2.  Osteopenia      US-EXTREMITY VENOUS LOWER BILAT    (Results Pending)   US-EXTREMITY ARTERY LOWER BILAT W/ANA (COMBO)    (Results Pending)        Assessment/Plan  * Hyperglycemia- (present on admission)  Assessment & Plan  Patient presents with severe hyperglycemia with a blood sugar of 588.  He apparently has no known history of diabetes.  He is not on any outpatient diabetes medication.  Last A1c was done in 2021 and was approximately 5.1.  Given patient 10 units insulin stat.    9/9  hemoglobin A1c 10.3  Starting insulin sliding scale  DM educator  DM diet  + betahydroxy    9/10 on SSI, monitor  Dietary and DM educator  - start metformin  - pt will not be able to self administer insulin, will need help with rx on dc      Leg swelling  Assessment & Plan  Left leg swelling with tenderness to palpation   Follow-up DVT ultrasound of left lower extremity    Altered mental status  Assessment & Plan  9/9 Likely secondary to hyperglycemia  Patient is oriented to self at this time    9/10 axox1-2, ? Baseline  From memory care unit  - pt/ot eval, will need snf  Staff, guardian has decided to transition patient to DNR CODE STATUS, to follow-up POLST    Hyponatremia  Assessment & Plan  Pseudohyponatremia secondary to hyperglycemia    Pressure ulcer of right foot- (present on admission)  Assessment & Plan  Likely source of infection at this time.  Patient presents with leukocytosis and altered mental status.  WBC of 11.9.    9/9 cont  Unasyn.  Follow-up wound culture and blood culture  May need wound care consult  LPS    9/10 blood cx neg  Wound care  ana and  Dopplers pending  bld cx neg         VTE prophylaxis:    enoxaparin ppx      I have performed a physical exam and reviewed and updated ROS and Plan today (9/10/2023). In review of yesterday's note (9/9/2023), there are no changes except as  documented above.

## 2023-09-10 NOTE — CARE PLAN
The patient is Stable - Low risk of patient condition declining or worsening    Shift Goals  Clinical Goals:  (blood glucose monitoring)  Patient Goals:  (sleep)    Progress made toward(s) clinical / shift goals:    Problem: Knowledge Deficit - Standard  Goal: Patient and family/care givers will demonstrate understanding of plan of care, disease process/condition, diagnostic tests and medications  Outcome: Progressing  Note: Legal guardian understands POC     Problem: Skin Integrity  Goal: Skin integrity is maintained or improved  Outcome: Progressing  Note: Wound care consult placed     Problem: Fall Risk  Goal: Patient will remain free from falls  Outcome: Progressing  Note: Bed alarm in place, patient is bedbound       Patient is not progressing towards the following goals:

## 2023-09-10 NOTE — CARE PLAN
The patient is Stable - Low risk of patient condition declining or worsening    Shift Goals  Clinical Goals: Monitor blood sugar  Patient Goals: Rest and comfort    Progress made toward(s) clinical / shift goals: Pt blood sugar was 117 and a nightly insulin dose was no required. Pt has been resting comfortably in bed, showing no signs of distress. Pt has had no episodes of emesis. Pt denies pain.    Problem: Knowledge Deficit - Standard  Goal: Patient and family/care givers will demonstrate understanding of plan of care, disease process/condition, diagnostic tests and medications  Outcome: Progressing     Problem: Skin Integrity  Goal: Skin integrity is maintained or improved  Outcome: Progressing     Problem: Fall Risk  Goal: Patient will remain free from falls  Outcome: Progressing       Patient is not progressing towards the following goals:

## 2023-09-11 ENCOUNTER — APPOINTMENT (OUTPATIENT)
Dept: RADIOLOGY | Facility: MEDICAL CENTER | Age: 53
DRG: 637 | End: 2023-09-11
Attending: INTERNAL MEDICINE
Payer: MEDICAID

## 2023-09-11 LAB
ANION GAP SERPL CALC-SCNC: 8 MMOL/L (ref 7–16)
BACTERIA UR CULT: ABNORMAL
BACTERIA UR CULT: ABNORMAL
BASOPHILS # BLD AUTO: 0.9 % (ref 0–1.8)
BASOPHILS # BLD: 0.06 K/UL (ref 0–0.12)
BUN SERPL-MCNC: 5 MG/DL (ref 8–22)
CALCIUM SERPL-MCNC: 9 MG/DL (ref 8.5–10.5)
CHLORIDE SERPL-SCNC: 103 MMOL/L (ref 96–112)
CO2 SERPL-SCNC: 29 MMOL/L (ref 20–33)
CREAT SERPL-MCNC: 0.66 MG/DL (ref 0.5–1.4)
EOSINOPHIL # BLD AUTO: 0.22 K/UL (ref 0–0.51)
EOSINOPHIL NFR BLD: 3.1 % (ref 0–6.9)
ERYTHROCYTE [DISTWIDTH] IN BLOOD BY AUTOMATED COUNT: 44.9 FL (ref 35.9–50)
GFR SERPLBLD CREATININE-BSD FMLA CKD-EPI: 113 ML/MIN/1.73 M 2
GLUCOSE BLD STRIP.AUTO-MCNC: 125 MG/DL (ref 65–99)
GLUCOSE BLD STRIP.AUTO-MCNC: 127 MG/DL (ref 65–99)
GLUCOSE BLD STRIP.AUTO-MCNC: 133 MG/DL (ref 65–99)
GLUCOSE BLD STRIP.AUTO-MCNC: 139 MG/DL (ref 65–99)
GLUCOSE SERPL-MCNC: 119 MG/DL (ref 65–99)
HCT VFR BLD AUTO: 35 % (ref 42–52)
HGB BLD-MCNC: 11.7 G/DL (ref 14–18)
IMM GRANULOCYTES # BLD AUTO: 0.12 K/UL (ref 0–0.11)
IMM GRANULOCYTES NFR BLD AUTO: 1.7 % (ref 0–0.9)
LYMPHOCYTES # BLD AUTO: 2.11 K/UL (ref 1–4.8)
LYMPHOCYTES NFR BLD: 30.2 % (ref 22–41)
MCH RBC QN AUTO: 30.9 PG (ref 27–33)
MCHC RBC AUTO-ENTMCNC: 33.4 G/DL (ref 32.3–36.5)
MCV RBC AUTO: 92.3 FL (ref 81.4–97.8)
MONOCYTES # BLD AUTO: 0.68 K/UL (ref 0–0.85)
MONOCYTES NFR BLD AUTO: 9.7 % (ref 0–13.4)
NEUTROPHILS # BLD AUTO: 3.8 K/UL (ref 1.82–7.42)
NEUTROPHILS NFR BLD: 54.4 % (ref 44–72)
NRBC # BLD AUTO: 0 K/UL
NRBC BLD-RTO: 0 /100 WBC (ref 0–0.2)
PLATELET # BLD AUTO: 168 K/UL (ref 164–446)
PMV BLD AUTO: 9.8 FL (ref 9–12.9)
POTASSIUM SERPL-SCNC: 3.4 MMOL/L (ref 3.6–5.5)
RBC # BLD AUTO: 3.79 M/UL (ref 4.7–6.1)
SIGNIFICANT IND 70042: ABNORMAL
SITE SITE: ABNORMAL
SODIUM SERPL-SCNC: 140 MMOL/L (ref 135–145)
SOURCE SOURCE: ABNORMAL
WBC # BLD AUTO: 7 K/UL (ref 4.8–10.8)

## 2023-09-11 PROCEDURE — A9270 NON-COVERED ITEM OR SERVICE: HCPCS | Performed by: INTERNAL MEDICINE

## 2023-09-11 PROCEDURE — 700111 HCHG RX REV CODE 636 W/ 250 OVERRIDE (IP): Performed by: STUDENT IN AN ORGANIZED HEALTH CARE EDUCATION/TRAINING PROGRAM

## 2023-09-11 PROCEDURE — 700105 HCHG RX REV CODE 258: Performed by: STUDENT IN AN ORGANIZED HEALTH CARE EDUCATION/TRAINING PROGRAM

## 2023-09-11 PROCEDURE — 770006 HCHG ROOM/CARE - MED/SURG/GYN SEMI*

## 2023-09-11 PROCEDURE — 80048 BASIC METABOLIC PNL TOTAL CA: CPT

## 2023-09-11 PROCEDURE — 82962 GLUCOSE BLOOD TEST: CPT

## 2023-09-11 PROCEDURE — 36415 COLL VENOUS BLD VENIPUNCTURE: CPT

## 2023-09-11 PROCEDURE — 700102 HCHG RX REV CODE 250 W/ 637 OVERRIDE(OP): Performed by: INTERNAL MEDICINE

## 2023-09-11 PROCEDURE — 93922 UPR/L XTREMITY ART 2 LEVELS: CPT

## 2023-09-11 PROCEDURE — 97163 PT EVAL HIGH COMPLEX 45 MIN: CPT

## 2023-09-11 PROCEDURE — 93922 UPR/L XTREMITY ART 2 LEVELS: CPT | Mod: 26 | Performed by: INTERNAL MEDICINE

## 2023-09-11 PROCEDURE — 700111 HCHG RX REV CODE 636 W/ 250 OVERRIDE (IP): Mod: JZ | Performed by: INTERNAL MEDICINE

## 2023-09-11 PROCEDURE — 700105 HCHG RX REV CODE 258: Performed by: INTERNAL MEDICINE

## 2023-09-11 PROCEDURE — C9113 INJ PANTOPRAZOLE SODIUM, VIA: HCPCS | Performed by: STUDENT IN AN ORGANIZED HEALTH CARE EDUCATION/TRAINING PROGRAM

## 2023-09-11 PROCEDURE — 700102 HCHG RX REV CODE 250 W/ 637 OVERRIDE(OP): Performed by: STUDENT IN AN ORGANIZED HEALTH CARE EDUCATION/TRAINING PROGRAM

## 2023-09-11 PROCEDURE — 700111 HCHG RX REV CODE 636 W/ 250 OVERRIDE (IP): Mod: JZ | Performed by: STUDENT IN AN ORGANIZED HEALTH CARE EDUCATION/TRAINING PROGRAM

## 2023-09-11 PROCEDURE — 99497 ADVNCD CARE PLAN 30 MIN: CPT | Performed by: INTERNAL MEDICINE

## 2023-09-11 PROCEDURE — 92610 EVALUATE SWALLOWING FUNCTION: CPT

## 2023-09-11 PROCEDURE — A9270 NON-COVERED ITEM OR SERVICE: HCPCS | Performed by: STUDENT IN AN ORGANIZED HEALTH CARE EDUCATION/TRAINING PROGRAM

## 2023-09-11 PROCEDURE — 85025 COMPLETE CBC W/AUTO DIFF WBC: CPT

## 2023-09-11 PROCEDURE — 99233 SBSQ HOSP IP/OBS HIGH 50: CPT | Mod: 25 | Performed by: INTERNAL MEDICINE

## 2023-09-11 RX ORDER — POTASSIUM CHLORIDE 20 MEQ/1
20 TABLET, EXTENDED RELEASE ORAL DAILY
Status: DISCONTINUED | OUTPATIENT
Start: 2023-09-11 | End: 2023-09-12 | Stop reason: HOSPADM

## 2023-09-11 RX ORDER — OMEPRAZOLE 20 MG/1
20 CAPSULE, DELAYED RELEASE ORAL 2 TIMES DAILY
Status: DISCONTINUED | OUTPATIENT
Start: 2023-09-11 | End: 2023-09-12 | Stop reason: HOSPADM

## 2023-09-11 RX ADMIN — AMPICILLIN AND SULBACTAM 3 G: 1; 2 INJECTION, POWDER, FOR SOLUTION INTRAMUSCULAR; INTRAVENOUS at 00:48

## 2023-09-11 RX ADMIN — METFORMIN HYDROCHLORIDE 500 MG: 500 TABLET ORAL at 17:24

## 2023-09-11 RX ADMIN — PANTOPRAZOLE SODIUM 40 MG: 40 INJECTION, POWDER, FOR SOLUTION INTRAVENOUS at 06:35

## 2023-09-11 RX ADMIN — RISPERIDONE 2 MG: 2 TABLET ORAL at 06:35

## 2023-09-11 RX ADMIN — AMPICILLIN AND SULBACTAM 3 G: 1; 2 INJECTION, POWDER, FOR SOLUTION INTRAMUSCULAR; INTRAVENOUS at 12:45

## 2023-09-11 RX ADMIN — SENNOSIDES AND DOCUSATE SODIUM 2 TABLET: 50; 8.6 TABLET ORAL at 17:24

## 2023-09-11 RX ADMIN — ASPIRIN 325 MG: 325 TABLET ORAL at 06:35

## 2023-09-11 RX ADMIN — ENOXAPARIN SODIUM 40 MG: 100 INJECTION SUBCUTANEOUS at 17:24

## 2023-09-11 RX ADMIN — OMEPRAZOLE 20 MG: 20 CAPSULE, DELAYED RELEASE ORAL at 17:24

## 2023-09-11 RX ADMIN — VALPROIC ACID 250 MG: 250 SOLUTION ORAL at 12:44

## 2023-09-11 RX ADMIN — METFORMIN HYDROCHLORIDE 500 MG: 500 TABLET ORAL at 09:34

## 2023-09-11 RX ADMIN — VALPROIC ACID 250 MG: 250 SOLUTION ORAL at 17:23

## 2023-09-11 RX ADMIN — Medication 100 MG: at 06:35

## 2023-09-11 RX ADMIN — AMPICILLIN AND SULBACTAM 3 G: 1; 2 INJECTION, POWDER, FOR SOLUTION INTRAMUSCULAR; INTRAVENOUS at 19:49

## 2023-09-11 RX ADMIN — VALPROIC ACID 250 MG: 250 SOLUTION ORAL at 06:34

## 2023-09-11 RX ADMIN — POTASSIUM CHLORIDE 20 MEQ: 1500 TABLET, EXTENDED RELEASE ORAL at 09:33

## 2023-09-11 RX ADMIN — RISPERIDONE 2 MG: 2 TABLET ORAL at 19:46

## 2023-09-11 RX ADMIN — AMPICILLIN AND SULBACTAM 3 G: 1; 2 INJECTION, POWDER, FOR SOLUTION INTRAMUSCULAR; INTRAVENOUS at 06:47

## 2023-09-11 RX ADMIN — FOLIC ACID 1 MG: 1 TABLET ORAL at 06:35

## 2023-09-11 ASSESSMENT — ENCOUNTER SYMPTOMS
FEVER: 0
FOCAL WEAKNESS: 0
CHILLS: 0
HALLUCINATIONS: 0
WEAKNESS: 1
HEADACHES: 0
PALPITATIONS: 0
SPEECH CHANGE: 0
DIZZINESS: 0
HEARTBURN: 0
FLANK PAIN: 0
DIAPHORESIS: 0
SHORTNESS OF BREATH: 0
DEPRESSION: 0
SENSORY CHANGE: 0
MYALGIAS: 0
MEMORY LOSS: 1
TREMORS: 0
WHEEZING: 0
ABDOMINAL PAIN: 0
NAUSEA: 1
NERVOUS/ANXIOUS: 0
VOMITING: 0

## 2023-09-11 ASSESSMENT — COGNITIVE AND FUNCTIONAL STATUS - GENERAL
WALKING IN HOSPITAL ROOM: A LOT
MOVING TO AND FROM BED TO CHAIR: A LOT
STANDING UP FROM CHAIR USING ARMS: TOTAL
WALKING IN HOSPITAL ROOM: TOTAL
PERSONAL GROOMING: A LOT
MOVING FROM LYING ON BACK TO SITTING ON SIDE OF FLAT BED: A LOT
TOILETING: A LOT
CLIMB 3 TO 5 STEPS WITH RAILING: TOTAL
EATING MEALS: A LOT
DRESSING REGULAR LOWER BODY CLOTHING: A LOT
SUGGESTED CMS G CODE MODIFIER MOBILITY: CL
TURNING FROM BACK TO SIDE WHILE IN FLAT BAD: A LOT
DAILY ACTIVITIY SCORE: 12
STANDING UP FROM CHAIR USING ARMS: A LOT
CLIMB 3 TO 5 STEPS WITH RAILING: A LOT
HELP NEEDED FOR BATHING: A LOT
MOVING FROM LYING ON BACK TO SITTING ON SIDE OF FLAT BED: UNABLE
MOVING TO AND FROM BED TO CHAIR: UNABLE
SUGGESTED CMS G CODE MODIFIER DAILY ACTIVITY: CL
TURNING FROM BACK TO SIDE WHILE IN FLAT BAD: UNABLE
MOBILITY SCORE: 6
MOBILITY SCORE: 12
SUGGESTED CMS G CODE MODIFIER MOBILITY: CN
DRESSING REGULAR UPPER BODY CLOTHING: A LOT

## 2023-09-11 ASSESSMENT — PAIN DESCRIPTION - PAIN TYPE
TYPE: ACUTE PAIN
TYPE: ACUTE PAIN

## 2023-09-11 ASSESSMENT — GAIT ASSESSMENTS: GAIT LEVEL OF ASSIST: UNABLE TO PARTICIPATE

## 2023-09-11 NOTE — DOCUMENTATION QUERY
"                                                                         [facility]                                                                       Query Response Note      PATIENT:               JOON CANTU  ACCT #:                  [id number]  MRN:                     [id number]  :                      1970  ADMIT DATE:       2023 1:00 PM  DISCH DATE:          RESPONDING  PROVIDER #:        [id number]           QUERY TEXT:    Encephalopathy is documented in the Medical Record. Please specify type.    NOTE:  If an appropriate response is not listed below, please respond with a new note.    Note: If you agree with a diagnosis listed above, please remember to include it in your concurrent daily documentation and onto the Discharge Summary.          The patient's Clinical Indicators include:  53 year old male admitted for hyperglycemia.    9/10 Cox \"admitted for management of hyperglycemia and altered mental status, and management of a chronic right heel wound.\"  \"Altered mental status  Likely secondary to hyperglycemia\"  \"Likely source of infection at this time.  Patient presents with leukocytosis and altered mental status.\"    Risk factors: hyperglycemia, chronic heal wound  Treatment: labs, insulin, US, CXR, Unasyn    If you have any questions, please contact:  Ya Fiore RN CDI [facility]  [email or web address]  Ya Fiore [address] [employer]  Options provided:   -- Metabolic encephalopathy   -- Other explanation, Please specify   -- Unable to determine      Query created by: Ya Fiore on 2023 1:19 PM    RESPONSE TEXT:    Metabolic encephalopathy          Electronically signed by:  AIMEE COX DO 2023 3:44 PM              "

## 2023-09-11 NOTE — PROGRESS NOTES
Assumed patient care and received report from Tiana LUCIANO Assessment completed. Pt A&Ox 3 disoriented to time. Respirations are even and unlabored on 2L n/c. Pt reports pain at this time. Medical patient, VS stable, call light and belongings within reach. POC updated (Speech and dietary consults). Pt educated on room and call light, pt verbalized understanding. Communication board updated. Needs met.

## 2023-09-11 NOTE — PROGRESS NOTES
[facility] Medicine Daily Progress Note    Date of Service  9/11/2023    Chief Complaint  Yury Blake is a 53 y.o. male admitted 9/8/2023 with     Hospital Course  Yury Blake is a 53 y.o. male who presented 9/8/2023 with altered mental status.  Patient is currently only oriented to self, so history was obtained from chart review and ER physician.  Patient currently thinks that he lives in [place].  He is unable to recall what year it is.  Patient states that he lives by himself at home, however he was brought here by EMS from a nursing home.  According to the ER physician, he was brought here for worsening altered mental status and changes in mentation.  He has been found confused.  In the ER, he was found to be hyperglycemic with a blood glucose of 588.  Patient has no known history of diabetes.  I spoke to the patient, he was unable to tell me if he was diabetic or not.  I did asked the patient about his chronic right heel wound, patient states that he is unaware how long its been there for.  He is not able to tell me if he has been having any fevers or chills, or any nausea or vomiting.  In the emergency room, imaging was obtained and are unremarkable.  On labs he had a leukocytosis to 11.9.  He was mildly hyponatremic to 133.  Glucose levels of 588.  He was given 1 L of IV fluids and given a single dose of empiric Zosyn.  Patient will be admitted for management of hyperglycemia and altered mental status, and management of a chronic right heel wound.       Interval Problem Update  9/9 patient is lethargic, arousable  Per staff, several bouts of nausea and vomiting this a.m.  Patient is a poor historian, states that he is a  and works 5 days a week, upon review of records with, patient currently a resident at [facility] unit  He is unable to provide much history on diabetic history as well as heel ulcers  Denies pain    9/10 patient awake and alert, oriented  x2  Continues to report falls information, states that he lives independently  Per patient's guardian, will transition to DNR, to follow-up POLST  Patient denies pain, generalized weakness  Unable to move lower extremities, LPS follow-up for lower extremity wound  Nausea, no vomiting today    9/11 awake, alert, denies pain, LE weakness  Improved bs control  - pt/ot josh        I have discussed this patient's plan of care and discharge plan at IDT rounds today with Case Management, Nursing, Nursing leadership, and other members of the IDT team.    Consultants/Specialty  none    Code Status  DNAR/DNI    Disposition  The patient is not medically cleared for discharge to home or a post-acute facility.      I have placed the appropriate orders for post-discharge needs.    Review of Systems  Review of Systems   Constitutional:  Negative for chills, diaphoresis, fever and malaise/fatigue.   HENT:  Negative for congestion and hearing loss.    Respiratory:  Negative for shortness of breath and wheezing.    Cardiovascular:  Negative for palpitations and leg swelling.   Gastrointestinal:  Positive for nausea. Negative for abdominal pain, heartburn and vomiting.   Genitourinary:  Negative for dysuria and flank pain.   Musculoskeletal:  Negative for myalgias.   Neurological:  Positive for weakness. Negative for dizziness, tremors, sensory change, speech change, focal weakness and headaches.   Psychiatric/Behavioral:  Positive for memory loss. Negative for depression and hallucinations. The patient is not nervous/anxious.         Physical Exam  Temp:  [36 °C (96.8 °F)-37.1 °C (98.8 °F)] 36.3 °C (97.3 °F)  Pulse:  [63-70] 63  Resp:  [16-17] 17  BP: (116-121)/(66-71) 121/70  SpO2:  [98 %] 98 %    Physical Exam  Vitals and nursing note reviewed.   Constitutional:       General: He is not in acute distress.     Appearance: He is ill-appearing. He is not diaphoretic.   HENT:      Head: Normocephalic and atraumatic.      Nose: Nose  normal.      Mouth/Throat:      Mouth: Mucous membranes are moist.   Eyes:      General:         Right eye: No discharge.         Left eye: No discharge.      Extraocular Movements: Extraocular movements intact.      Pupils: Pupils are equal, round, and reactive to light.   Neck:      Thyroid: No thyromegaly.      Vascular: No JVD.   Cardiovascular:      Rate and Rhythm: Normal rate.      Heart sounds: No murmur heard.  Pulmonary:      Effort: No respiratory distress.      Breath sounds: Normal breath sounds. No wheezing.   Abdominal:      General: Bowel sounds are normal. There is no distension.      Palpations: Abdomen is soft.      Tenderness: There is no abdominal tenderness.   Musculoskeletal:         General: Swelling present. No tenderness.      Cervical back: Neck supple.   Skin:     General: Skin is warm and dry.      Coloration: Skin is pale.      Findings: Erythema present.      Comments: Lower extremity wounds, dressing c/d/i   Neurological:      Mental Status: He is alert.      Cranial Nerves: No cranial nerve deficit.      Sensory: No sensory deficit.      Motor: Weakness present.      Coordination: Coordination normal.      Comments: Oriented x 1-2   Psychiatric:         Behavior: Behavior normal.         Fluids    Intake/Output Summary (Last 24 hours) at 9/11/2023 0815  Last data filed at 9/10/2023 2200  Gross per 24 hour   Intake 720 ml   Output --   Net 720 ml       Laboratory  Recent Labs     09/09/23  0914 09/10/23  0522 09/11/23  0312   WBC 11.4* 8.9 7.0   RBC 4.62* 4.21* 3.79*   HEMOGLOBIN 14.0 12.7* 11.7*   HEMATOCRIT 42.0 39.9* 35.0*   MCV 90.9 94.8 92.3   MCH 30.3 30.2 30.9   MCHC 33.3 31.8* 33.4   RDW 43.8 46.7 44.9   PLATELETCT 155* 169 168   MPV 9.8 9.7 9.8       Recent Labs     09/09/23  0356 09/10/23  0522 09/11/23  0312   SODIUM 142 143 140   POTASSIUM 3.4* 3.9 3.4*   CHLORIDE 100 103 103   CO2 29 31 29   GLUCOSE 284* 188* 119*   BUN 8 6* 5*   CREATININE 0.76 0.73 0.66   CALCIUM 9.2  9.2 9.0               Recent Labs     09/09/23  0356   TRIGLYCERIDE 282*   HDL 36*   LDL 54         Imaging  US-EXTREMITY VENOUS LOWER BILAT   Final Result      DX-CHEST-PORTABLE (1 VIEW)   Final Result      Bibasilar underinflation atelectasis      DX-FOOT-COMPLETE 3+ RIGHT   Final Result      1.  No radiographic evidence of acute traumatic injury or bony erosion   2.  Osteopenia      US-EXTREMITY ARTERY LOWER BILAT W/ANA (COMBO)    (Results Pending)          Assessment/Plan  * Hyperglycemia- (present on admission)  Assessment & Plan  Patient presents with severe hyperglycemia with a blood sugar of 588.  He apparently has no known history of diabetes.  He is not on any outpatient diabetes medication.  Last A1c was done in 2021 and was approximately 5.1.  Given patient 10 units insulin stat.    9/9  hemoglobin A1c 10.3  Starting insulin sliding scale  DM educator  DM diet  + betahydroxy    9/10 on SSI, monitor  Dietary and DM educator  - start metformin  - pt will not be able to self administer insulin, will need help with rx on dc    9/11 improving on metformin, f/u ssi  - dm educator      Leg swelling  Assessment & Plan  Left leg swelling with tenderness to palpation   Follow-up DVT ultrasound of left lower extremity    Altered mental status  Assessment & Plan  9/9 Likely secondary to hyperglycemia  Patient is oriented to self at this time    9/10 axox1-2, ? Baseline  From memory care unit  - pt/ot eval, will need snf  Staff, guardian has decided to transition patient to DNR CODE STATUS, to follow-up POLST    9/11 d/w guardian/friend Devang, code status updated: DNR  I discussed advance care planning with the patient's family for at least 26 minutes, including diagnosis, prognosis, plan of care, risks and benefits of any therapies that could be offered, as well as alternatives including palliation and hospice, as appropriate. Patient has been made a DNR now. BILL CODE 91798.    F/u pt/ot eval, may need snf for rx  assistance, CM to assist      Hyponatremia  Assessment & Plan  Pseudohyponatremia secondary to hyperglycemia    Pressure ulcer of right foot- (present on admission)  Assessment & Plan  Likely source of infection at this time.  Patient presents with leukocytosis and altered mental status.  WBC of 11.9.    9/9 cont  Unasyn.  Follow-up wound culture and blood culture  May need wound care consult  LPS    9/10 blood cx neg  Wound care  brodie and  Dopplers pending  bld cx neg    9/11 seen by wound care, s/p debridement, rec wound care  LE dopplers neg  F/u brodie  Cont abx, transition to po on dc         VTE prophylaxis:    enoxaparin ppx      I have performed a physical exam and reviewed and updated ROS and Plan today (9/11/2023). In review of yesterday's note (9/10/2023), there are no changes except as documented above.

## 2023-09-11 NOTE — CARE PLAN
Problem: Knowledge Deficit - Standard  Goal: Patient and family/care givers will demonstrate understanding of plan of care, disease process/condition, diagnostic tests and medications  Outcome: Progressing     Problem: Skin Integrity  Goal: Skin integrity is maintained or improved  Outcome: Progressing   The patient is Stable - Low risk of patient condition declining or worsening    Shift Goals  Clinical Goals: monitor glucose  Patient Goals: rest  Family Goals: KIRTI    Progress made toward(s) clinical / shift goals:  Patient updated on POC    Patient is not progressing towards the following goals:

## 2023-09-11 NOTE — CARE PLAN
The patient is Stable - Low risk of patient condition declining or worsening    Shift Goals  Clinical Goals: Patient will have no s/sx of hyperglycemia over night  Patient Goals: Patient will sleep comfortably overnight  Family Goals: KIRTI    Progress made toward(s) clinical / shift goals:  No s/sx hyperglycemia overnight. BG coverage required @ bedtime, 3 units. Sleeping comfortably throughout the night. C/o 4/10 generalized pain relieved with Tylenol. Refused dinner. Good PO fluid intake. Remains disoriented to time and confused in conversation at times overnight.      Problem: Pain - Standard  Goal: Alleviation of pain or a reduction in pain to the patient’s comfort goal  Outcome: Progressing     Problem: Knowledge Deficit - Standard  Goal: Patient and family/care givers will demonstrate understanding of plan of care, disease process/condition, diagnostic tests and medications  Outcome: Progressing           Patient is not progressing towards the following goals:

## 2023-09-11 NOTE — DIETARY
Nutrition Services: Diabetes Diet Education Consult   Day 3 of admit.  Yury Blake is a 53 y.o. male with admitting DX of Hyperglycemia. Pt admitted with altered mental status. Pt lived at [facility] unit and has a guardian per notes. Pt is A&O x 2-3 per notes. Discussed with RN and RN unsure if pt would fully understand diet education. Diabetes handout left at bedside for guardian and RN aware.    No other education needs identified at this time. Consider referral to outpatient nutrition services for continuation of education as indicated or per pt preferences.     Please re-consult RD as indicated.

## 2023-09-11 NOTE — WOUND TEAM
[facility]  Inpatient Services  Initial Wound and Skin Care Evaluation    Admission Date: 9/8/2023     Last order of IP CONSULT TO WOUND CARE was found on 9/8/2023 from Hospital Encounter on 9/8/2023     HPI, PMH, SH: Reviewed    Past Surgical History:   Procedure Laterality Date    IRRIGATION & DEBRIDEMENT ORTHO Left 4/12/2021    Procedure: IRRIGATION AND DEBRIDEMENT, WOUND - HEEL;  Surgeon: Donny Roque M.D.;  Location: SURGERY [facility];  Service: Orthopedics    TENDON LENGHTENING Bilateral 2/1/2021    Procedure: LENGTHENING, TENDON- FOR FOOT DROP RECONSTRUCTION, POSTERIOR TIBIALIS TENDON  TRANSFER , ACHILLES LENGHTENING , LEFT MEDIAL RELEASE;  Surgeon: Donny Roque M.D.;  Location: SURGERY [facility];  Service: Orthopedics    IRRIGATION & DEBRIDEMENT ORTHO Left 2/1/2021    Procedure: IRRIGATION AND DEBRIDEMENT, WOUND-FOOT;  Surgeon: Donny Roque M.D.;  Location: SURGERY [facility];  Service: Orthopedics    ORIF, ANKLE Right 9/26/2017    Procedure: ANKLE ORIF SYNDESOMOSIS REPAIR;  Surgeon: Armando Woodard M.D.;  Location: [facility];  Service: Orthopedics     Social History     Tobacco Use    Smoking status: Never    Smokeless tobacco: Current     Types: Chew   Substance Use Topics    Alcohol use: Yes     Comment: 6-10 beers daily     Chief Complaint   Patient presents with    ALOC     EMS called due to increased weakness and fatigue with aloc. Baseline ano 3x, now GCS 13    Wound Check     Right heel wound, receiving abx for the last 2x days     Diagnosis: Hyperglycemia [R73.9]    Unit where seen by Wound Team: S532/02     WOUND CONSULT RELATED TO:  IKE Dave     WOUND TEAM PLAN OF CARE - Frequency of Follow-up:   Nursing to follow dressing orders written for wound care. Contact wound team if area fails to progress, deteriorates or with any questions/concerns if something comes up before next scheduled follow up (See below as to whether wound is following  and frequency of wound follow up)   Weekly - R. Heel and R. hallux    WOUND HISTORY:   Patient admitted from group home R/T concerns for change in LOC and wound check.  Patient is unable to provide information about how long he has had wound or what happened.  Per chart review patient is diabetic.         WOUND ASSESSMENT/LDA  Wound 09/08/23 Pressure Injury Heel Right POA unstageable---> stage 3 (Active)   Date First Assessed/Time First Assessed: 09/08/23 1600   Present on Original Admission: Yes  Hand Hygiene Completed: Yes  Primary Wound Type: Pressure Injury  Location: Heel  Laterality: Right  Wound Description (Comments): POA unstageable---> stage 3      Assessments 9/10/2023  6:00 PM   Wound Image      Site Assessment Red   Periwound Assessment Pink;Red   Margins Defined edges;Unattached edges   Closure Secondary intention   Drainage Amount Scant   Drainage Description Serosanguineous   Treatments Cleansed;Site care;Offloading   Offloading/DME Heel float boot   Wound Cleansing Approved Wound Cleanser   Periwound Protectant Skin Protectant Wipes to Periwound   Dressing Status Clean;Dry;Intact   Dressing Changed New   Dressing Cleansing/Solutions Not Applicable   Dressing Options Collagen Dressing;Hydrofera Blue Ready;Hypafix Tape   Dressing Change/Treatment Frequency Every 72 hrs, and As Needed   NEXT Dressing Change/Treatment Date 09/13/23   NEXT Weekly Photo (Inpatient Only) 09/17/23   Wound Team Following Weekly   Pressure Injury Stage Stage 3   Wound Length (cm) 2.8 cm   Wound Width (cm) 2.5 cm   Wound Depth (cm) 0.2 cm   Wound Surface Area (cm^2) 7 cm^2   Wound Volume (cm^3) 1.4 cm^3   Shape Andreafski   Wound Odor None   Pulses Right;1+;DP;PT   WOUND NURSE ONLY - Time Spent with Patient (mins) 75       Wound 09/09/23 Foot;Toe, Hallux Right (Active)   Date First Assessed/Time First Assessed: 09/09/23 0849   Location: Foot;Toe, Hallux  Laterality: Right      Assessments 9/10/2023  6:00 PM   Wound Image      Site  Assessment Red   Periwound Assessment Pink   Margins Defined edges;Unattached edges   Closure Adhesive bandage   Drainage Amount Scant   Drainage Description Sanguineous   Treatments Cleansed;Site care   Wound Cleansing Approved Wound Cleanser   Periwound Protectant Skin Protectant Wipes to Periwound   Dressing Status Clean;Dry;Intact   Dressing Changed New   Dressing Cleansing/Solutions Not Applicable   Dressing Options Collagen Dressing;Hydrofera Blue Ready;Hypafix Tape   Dressing Change/Treatment Frequency Every 72 hrs, and As Needed   NEXT Dressing Change/Treatment Date 09/13/23   NEXT Weekly Photo (Inpatient Only) 09/17/23   Wound Team Following Weekly   Wound Length (cm) 0.2 cm   Wound Width (cm) 0.2 cm   Wound Depth (cm) 0.3 cm   Wound Surface Area (cm^2) 0.04 cm^2   Wound Volume (cm^3) 0.012 cm^3   Shape [place]   Wound Odor None   Pulses Right;1+;DP;PT        Vascular:    ANA:   No results found.    Lab Values:    Lab Results   Component Value Date/Time    WBC 8.9 09/10/2023 05:22 AM    RBC 4.21 (L) 09/10/2023 05:22 AM    HEMOGLOBIN 12.7 (L) 09/10/2023 05:22 AM    HEMATOCRIT 39.9 (L) 09/10/2023 05:22 AM    CREACTPROT 2.17 (H) 04/11/2021 07:39 PM    SEDRATEWES 15 04/11/2021 07:39 PM    HBA1C 10.3 (H) 09/08/2023 01:18 PM         Culture Results show:  No results found for this or any previous visit (from the past 720 hour(s)).    Pain Level/Medicated:  None, Tolerated without pain medication       INTERVENTIONS BY WOUND TEAM:  Chart and images reviewed. Discussed with bedside RN. All areas of concern (based on picture review, LDA review and discussion with bedside RN) have been thoroughly assessed. Documentation of areas based on significant findings. This RN in to assess patient. Performed standard wound care which includes appropriate positioning, dressing removal and non-selective debridement. Pictures and measurements obtained weekly if/when required.    Wound:  R. Heel and R.  hallux  Cleansed/Non-selectively Debrided with:  Wound cleanser and Gauze  Radha wound: Cleansed with Wound cleanser and Gauze, Prepped with No Sting  Primary Dressing:  Collagen activated with normal saline placed on to wound beds  Secondary (Outer) Dressing: Hydrofera blue applied over inés.  And secured with hypafix tape    Advanced Wound Care Discharge Planning  Number of Clinicians necessary to complete wound care: 1  Is patient requiring IV pain medications for dressing changes:  No   Length of time for dressing change 45 min. (This does not include chart review, pre-medication time, set up, clean up or time spent charting.)    Interdisciplinary consultation: Patient, Bedside RN ([facility]), Leonid Lynn ([facility] APRN).  Pressure injury and staging reviewed with N/A.    EVALUATION / RATIONALE FOR TREATMENT:     Date:  09/10/23  Wound Status:  Initial evaluation    R. Heel and R. Hallux debrided by [facility] APRN, shallow stage 3 now with red healthy tissue.  Applied inés and hydrofera blue.  Hydrofera Blue applied for the hydrophilic polyurethane foam which contains ethylene oxide used as a bactericidal, fungicidal, and sporicidal disinfectant. Hydrofera Blue also aids in maintaining a moist wound environment. The absorption properties of this dressing are important in collecting exudates and bacteria from the injured area. These harmful fluid secretions bind to the dressing removing it from the wound without the foam sticking to the wound causing more harm.                   Goals: Steady decrease in wound area and depth weekly.    NURSING PLAN OF CARE ORDERS:  Dressing changes: See Dressing Care orders  Skin care: See Skin Care orders  RN Prevention Protocol    NUTRITION RECOMMENDATIONS   Wound Team Recommendations:  Dietary consult    DIET ORDERS (From admission to next 24h)       Start     Ordered    09/09/23 0750  Diet Order Diet: Consistent CHO (Diabetic)  ALL MEALS        Question:  Diet:  Answer:  Consistent CHO  (Diabetic)    09/09/23 0749                    PREVENTATIVE INTERVENTIONS:    Q shift Hunter - performed per nursing policy  Q shift pressure point assessments - performed per nursing policy    Surface/Positioning  Standard/trauma mattress - Currently in Place  Waffle cushion - Currently in Place    Offloading/Redistribution  Heel float boots (Prevalon boot) - Currently in Place      Containment/Moisture Prevention    Purwick/Condom Cath - Currently in Place    Anticipated discharge plans:  TBD        Vac Discharge Needs:  Vac Discharge plan is purely a recommendation from wound team and not a requirement for discharge unless otherwise stated by physician.  Not Applicable Pt not on a wound vac

## 2023-09-11 NOTE — THERAPY
Physical Therapy   Initial Evaluation     Patient Name: Yury Blake  Age:  53 y.o., Sex:  male  Medical Record #: [id number]  Today's Date: 9/11/2023     Precautions  Precautions: Fall Risk;Weight Bearing As Tolerated Right Lower Extremity  Comments: offloading shoe    Assessment  Patient is 53 y.o. male admitted with AMS and hyperglycemia; PMH of type 2 diabetes, encephalopathy, hypertension. Pt seen for PT evaluation, required Max A to complete supine to sit due to very rigid movement and poor sequencing. Max A to stand at EOB. Unable to initiate gait or transfer to bedside chair today. Pt was able to initate lateral scooting to HOB prior to lying down, required Mod A for LE BTB.     Phone call placed to pt's contact, Rojas, to clarify PLOF baseline. Rojas stated that pt is essentially WC bound at baseline. Required 2 staff person assist at [facility] to transfer from bed to . Pt was ABLE to self propel WC with B feet short distances and self feed, but was otherwise dependent on staff for mobility and ADLs.    PT to trail therapy interventions at 3x/wk and assess if mobility can be improved to reach or exceed PLOF baseline.     Plan    Physical Therapy Initial Treatment Plan   Treatment Plan : Bed Mobility, Equipment, Neuro Re-Education / Balance, Self Care / Home Evaluation, Therapeutic Activities, Therapeutic Exercise  Treatment Frequency: 3 Times per Week  Duration: Until Therapy Goals Met       Discharge Recommendations: Per EMR, pt will require RUBI/SNF placement which can assist with DM / medication management. From a PT standpoint, pt appears to be at PLOF baseline. may not benefit from cont therapy intervention due to cog baseline-will cont to assess rehab potential.        09/11/23 1601   Precautions   Precautions Fall Risk;Weight Bearing As Tolerated Right Lower Extremity   Comments offloading shoe   Vitals   Vitals Comments Nasal cannula out of pt's nose upon PT arrival, 2L running. Pt  "requested to not have it put back in at end of session. CNA updated and will check SpO2   Pain 0 - 10 Group   Therapist Pain Assessment During Activity;Nurse Notified  (R foot pain, not rated)   Prior Living Situation   Housing / Facility   ([facility] in [place])   Lives with - Patient's Self Care Capacity Attendant / Paid Care Giver   Prior Level of Functional Mobility   Bed Mobility Required Assist   Transfer Status Required Assist   Ambulation Dependent   Assistive Devices Used Wheelchair   Wheelchair Required Assist  (friend reports pt would sometimes pedal WC with feet)   Comments Phone call placed to Pt's contact, Rojas, who reports that staff at the facility would help pivot pt to the wheelchair (usually 2 person assist). Pt did not ambulate but could pedal the WC with his feet. Pt able to self feed and brush teeth when prompted, but otherwise staff assisted with all ADLs   Cognition    Cognition / Consciousness X   Level of Consciousness Responds to voice   Ability To Follow Commands 1 Step  (inconsistently)   Comments oriented to place ([facility]), aware of wounds on foot due to pain present, otherwise not oriented- unable to state where he lives beyond \"[place]\" and reported that he still works   Active ROM Lower Body    Active ROM Lower Body  X   Comments unable to fully asssess due to impaired command following, very guarded   Strength Lower Body   Lower Body Strength  X   Comments grossly assessed B LE at 3+/5   Lower Body Muscle Tone   Lower Body Muscle Tone  X   Comments appears to have increased tone through B LE and considerable difficulty sequencing movement with B LE   Balance Assessment   Sitting Balance (Static) Fair   Sitting Balance (Dynamic) Fair   Standing Balance (Static) Fair -   Standing Balance (Dynamic) Poor +   Weight Shift Sitting Fair   Weight Shift Standing Poor   Comments with PT assist   Bed Mobility    Supine to Sit Maximal Assist   Sit to Supine Moderate Assist "   Scooting Maximal Assist  (to EOB, able to laterally scoot to HOB with extra time and Mod A)   Rolling Maximal Assist to Rt.   Gait Analysis   Gait Level Of Assist Unable to Participate   Comments appears to be non ambulatory at baseline per friend report   Functional Mobility   Sit to Stand Maximal Assist   Bed, Chair, Wheelchair Transfer Unable to Participate   How much difficulty does the patient currently have...   Turning over in bed (including adjusting bedclothes, sheets and blankets)? 1   Sitting down on and standing up from a chair with arms (e.g., wheelchair, bedside commode, etc.) 1   Moving from lying on back to sitting on the side of the bed? 1   How much help from another person does the patient currently need...   Moving to and from a bed to a chair (including a wheelchair)? 1   Need to walk in a hospital room? 1   Climbing 3-5 steps with a railing? 1   6 clicks Mobility Score 6   Activity Tolerance   Sitting Edge of Bed 5 min   Standing 30 seconds   Short Term Goals    Short Term Goal # 1 pt will perform supine <> sit with Min A in 6 visits   Short Term Goal # 2 pt will perform stand pivot transfer with Mod A in 6 visits   Short Term Goal # 3 pt will self propel WC x50 ft with Min A in 6 visits   Education Group   Education Provided Role of Physical Therapist   Role of Physical Therapist Patient Response Patient;Acceptance;Explanation;Verbal Demonstration   Physical Therapy Initial Treatment Plan    Treatment Plan  Bed Mobility;Equipment;Neuro Re-Education / Balance;Self Care / Home Evaluation;Therapeutic Activities;Therapeutic Exercise   Treatment Frequency 3 Times per Week   Duration Until Therapy Goals Met   Problem List    Problems Impaired Bed Mobility;Impaired Transfers;Impaired Ambulation;Functional Strength Deficit;Impaired Balance;Decreased Activity Tolerance;Motor Planning / Sequencing;Safety Awareness Deficits / Cognition   Anticipated Discharge Equipment and Recommendations   Discharge  Recommendations Other -  (Per EMR, pt will require care home/SNF placement which can assist with DM / medication management. From a PT standpoint, pt appears to be at PLOF baseline. may not benefit from cont therapy intervention due to cog baseline-will cont to assess rehab potential)   Interdisciplinary Plan of Care Collaboration   IDT Collaboration with  Nursing   Patient Position at End of Therapy Tray Table within Reach;In Bed;Call Light within Reach   Collaboration Comments aware of PT eval and recs   Session Information   Date / Session Number  9/11-1 (1/3, 9/17)

## 2023-09-11 NOTE — THERAPY
"Speech Language Pathology   Clinical Swallow Evaluation     Patient Name: Yury Blake  AGE:  53 y.o., SEX:  male  Medical Record #: [id number]  Date of Service: 9/11/2023      History of Present Illness  53 y.o. male admitted 9/8 with altered mental status, brought here from a nursing home. Found to be hyperglycemic and with chronic right heel wound in ER. Hx of SLP in EMR for cognitive-linguistic treatment.     PMHx: Arthritis, encephalopathy, HTN, pain, and psychiatric problem    CXR (9/8): Bibasilar underinflation atelectasis      General Information:  Vitals  O2 (LPM): 2  O2 Delivery Device: Nasal Cannula  Level of Consciousness: Alert, Awake  Patient Behaviors: Flat Affect  Orientation: Self, General place  Follows Directives: Yes      Prior Living Situation & Level of Function:  Housing / Facility: Skilled Nursing Facility  Lives with - Patient's Self Care Capacity: Attendant / Paid Care Giver   Swallowing: Reported no difficulties       Oral Mechanism Evaluation:  Dentition: Natural dentition, Poor   Facial Symmetry: Equal  Facial Sensation: Equal     Labial Observations: WFL   Lingual Observations: Midline  Motor Speech: WFL            Laryngeal Function:  Secretion Management: Adequate  Voice Quality: WFL  Cough: Pt did not follow commands to assess       Subjective  RN cleared patient for clinical swallow evaluation. Pt received awake, alert, repositioned to upright in bed. Pt denied any history of swallowing difficulties, however when asked if there were any foods he avoided at baseline pt responded \"probably but I can't tell you what.\" Per EMR, pt has been experiencing emesis last couple of days, however pt denied any nausea at this junction. Pt agreeable to PO trials.        Assessment  Current Method of Nutrition: Oral diet (regular solids/thin liquids)  Positioning: Miller's (60-90 degrees)  Bolus Administration: Patient  O2 (LPM): 2   O2 Delivery Device: Nasal Cannula  Factor(s) Affecting " Performance: None  Tracheostomy : No        Swallowing Trials:  Swallowing Trials  Thin Liquid (TN0): WFL  Liquidised (LQ3): WFL  Soft & Bite Sized (SB6): WFL  Regular (RG7): WFL      Comments: Patient demo'd adequate self-feeding, however needed some assistance with holding food container. Adequate oral bolus acceptance, labial assimilation to feeding tools, and oral bolus containment. Presumed complete AP transfer. No notable oral bolus residue upon oral inspection. Timely and complete mastication of solids. No cough/throat clear appreciated across consistencies. Vocal quality remained stable throughout oral intake. Single swallow completed per bolus. Provided education regarding general aspiration precautions as well as signs of aspiration, pt stated understanding.       Clinical Impressions  Patient presents with a functional oropharyngeal swallow mechanism for recommended diet of regular solids and thin liquids. No overt s/sx of aspiration across PO intake. Pt would benefit from assistance with meal tray set up. No further acute speech therapy needs indicated at this time, please reconsult should any overt s/sx of aspiration or concerns arise.       Recommendations  Diet Consistency: Regular solids/thin liquids  Instrumentation: None indicated at this time  Medication: As tolerated  Supervision: Assist with meal tray set up  Positioning: Fully upright and midline during oral intake  Oral Care: BID         SLP Treatment Plan  Treatment Plan: None Indicated  SLP Frequency: N/A - Evaluation Only  Estimated Duration: N/A - Evaluation Only      Anticipated Discharge Needs  Discharge Recommendations: Anticipate that the patient will have no further speech therapy needs after discharge from the hospital   Therapy Recommendations Upon DC: Not Indicated               Monalisa Moreno, SLP

## 2023-09-12 VITALS
BODY MASS INDEX: 23.7 KG/M2 | WEIGHT: 175 LBS | OXYGEN SATURATION: 93 % | HEIGHT: 72 IN | SYSTOLIC BLOOD PRESSURE: 123 MMHG | HEART RATE: 65 BPM | DIASTOLIC BLOOD PRESSURE: 82 MMHG | RESPIRATION RATE: 16 BRPM | TEMPERATURE: 97.7 F

## 2023-09-12 LAB
ANION GAP SERPL CALC-SCNC: 11 MMOL/L (ref 7–16)
BUN SERPL-MCNC: 4 MG/DL (ref 8–22)
CALCIUM SERPL-MCNC: 8.9 MG/DL (ref 8.5–10.5)
CHLORIDE SERPL-SCNC: 107 MMOL/L (ref 96–112)
CO2 SERPL-SCNC: 24 MMOL/L (ref 20–33)
CREAT SERPL-MCNC: 0.71 MG/DL (ref 0.5–1.4)
GFR SERPLBLD CREATININE-BSD FMLA CKD-EPI: 110 ML/MIN/1.73 M 2
GLUCOSE BLD STRIP.AUTO-MCNC: 126 MG/DL (ref 65–99)
GLUCOSE BLD STRIP.AUTO-MCNC: 127 MG/DL (ref 65–99)
GLUCOSE SERPL-MCNC: 120 MG/DL (ref 65–99)
POTASSIUM SERPL-SCNC: 3.5 MMOL/L (ref 3.6–5.5)
SODIUM SERPL-SCNC: 142 MMOL/L (ref 135–145)

## 2023-09-12 PROCEDURE — A9270 NON-COVERED ITEM OR SERVICE: HCPCS | Performed by: STUDENT IN AN ORGANIZED HEALTH CARE EDUCATION/TRAINING PROGRAM

## 2023-09-12 PROCEDURE — A9270 NON-COVERED ITEM OR SERVICE: HCPCS | Performed by: INTERNAL MEDICINE

## 2023-09-12 PROCEDURE — 36415 COLL VENOUS BLD VENIPUNCTURE: CPT

## 2023-09-12 PROCEDURE — 700111 HCHG RX REV CODE 636 W/ 250 OVERRIDE (IP): Mod: JZ | Performed by: INTERNAL MEDICINE

## 2023-09-12 PROCEDURE — 700102 HCHG RX REV CODE 250 W/ 637 OVERRIDE(OP): Performed by: STUDENT IN AN ORGANIZED HEALTH CARE EDUCATION/TRAINING PROGRAM

## 2023-09-12 PROCEDURE — 97167 OT EVAL HIGH COMPLEX 60 MIN: CPT

## 2023-09-12 PROCEDURE — 700105 HCHG RX REV CODE 258: Performed by: INTERNAL MEDICINE

## 2023-09-12 PROCEDURE — 80048 BASIC METABOLIC PNL TOTAL CA: CPT

## 2023-09-12 PROCEDURE — 700102 HCHG RX REV CODE 250 W/ 637 OVERRIDE(OP): Performed by: INTERNAL MEDICINE

## 2023-09-12 PROCEDURE — 99239 HOSP IP/OBS DSCHRG MGMT >30: CPT | Performed by: STUDENT IN AN ORGANIZED HEALTH CARE EDUCATION/TRAINING PROGRAM

## 2023-09-12 PROCEDURE — 82962 GLUCOSE BLOOD TEST: CPT | Mod: 91

## 2023-09-12 PROCEDURE — 700111 HCHG RX REV CODE 636 W/ 250 OVERRIDE (IP): Mod: JZ | Performed by: STUDENT IN AN ORGANIZED HEALTH CARE EDUCATION/TRAINING PROGRAM

## 2023-09-12 RX ORDER — AMOXICILLIN AND CLAVULANATE POTASSIUM 875; 125 MG/1; MG/1
1 TABLET, FILM COATED ORAL 2 TIMES DAILY
Qty: 8 TABLET | Refills: 0 | Status: ACTIVE | OUTPATIENT
Start: 2023-09-12 | End: 2023-09-16

## 2023-09-12 RX ORDER — OMEPRAZOLE 20 MG/1
20 CAPSULE, DELAYED RELEASE ORAL 2 TIMES DAILY
Qty: 30 CAPSULE | Refills: 0 | Status: SHIPPED | OUTPATIENT
Start: 2023-09-12

## 2023-09-12 RX ORDER — NYSTATIN 100000 [USP'U]/G
POWDER TOPICAL 2 TIMES DAILY
Status: DISCONTINUED | OUTPATIENT
Start: 2023-09-12 | End: 2023-09-12 | Stop reason: HOSPADM

## 2023-09-12 RX ADMIN — Medication 100 MG: at 05:21

## 2023-09-12 RX ADMIN — VALPROIC ACID 250 MG: 250 SOLUTION ORAL at 05:19

## 2023-09-12 RX ADMIN — AMPICILLIN AND SULBACTAM 3 G: 1; 2 INJECTION, POWDER, FOR SOLUTION INTRAMUSCULAR; INTRAVENOUS at 01:09

## 2023-09-12 RX ADMIN — ASPIRIN 325 MG: 325 TABLET ORAL at 05:21

## 2023-09-12 RX ADMIN — SENNOSIDES AND DOCUSATE SODIUM 2 TABLET: 50; 8.6 TABLET ORAL at 05:21

## 2023-09-12 RX ADMIN — RISPERIDONE 2 MG: 2 TABLET ORAL at 05:19

## 2023-09-12 RX ADMIN — SENNOSIDES AND DOCUSATE SODIUM 2 TABLET: 50; 8.6 TABLET ORAL at 17:20

## 2023-09-12 RX ADMIN — METFORMIN HYDROCHLORIDE 500 MG: 500 TABLET ORAL at 17:20

## 2023-09-12 RX ADMIN — AMPICILLIN AND SULBACTAM 3 G: 1; 2 INJECTION, POWDER, FOR SOLUTION INTRAMUSCULAR; INTRAVENOUS at 08:46

## 2023-09-12 RX ADMIN — OMEPRAZOLE 20 MG: 20 CAPSULE, DELAYED RELEASE ORAL at 05:20

## 2023-09-12 RX ADMIN — OMEPRAZOLE 20 MG: 20 CAPSULE, DELAYED RELEASE ORAL at 17:20

## 2023-09-12 RX ADMIN — AMPICILLIN AND SULBACTAM 3 G: 1; 2 INJECTION, POWDER, FOR SOLUTION INTRAMUSCULAR; INTRAVENOUS at 12:44

## 2023-09-12 RX ADMIN — VALPROIC ACID 250 MG: 250 SOLUTION ORAL at 17:21

## 2023-09-12 RX ADMIN — POTASSIUM CHLORIDE 20 MEQ: 1500 TABLET, EXTENDED RELEASE ORAL at 05:20

## 2023-09-12 RX ADMIN — NYSTATIN: 100000 POWDER TOPICAL at 17:21

## 2023-09-12 RX ADMIN — FOLIC ACID 1 MG: 1 TABLET ORAL at 05:23

## 2023-09-12 RX ADMIN — VALPROIC ACID 250 MG: 250 SOLUTION ORAL at 12:44

## 2023-09-12 RX ADMIN — ENOXAPARIN SODIUM 40 MG: 100 INJECTION SUBCUTANEOUS at 17:21

## 2023-09-12 RX ADMIN — METFORMIN HYDROCHLORIDE 500 MG: 500 TABLET ORAL at 08:41

## 2023-09-12 RX ADMIN — RISPERIDONE 2 MG: 2 TABLET ORAL at 17:20

## 2023-09-12 ASSESSMENT — COGNITIVE AND FUNCTIONAL STATUS - GENERAL
DAILY ACTIVITIY SCORE: 14
DRESSING REGULAR UPPER BODY CLOTHING: A LOT
DRESSING REGULAR LOWER BODY CLOTHING: A LOT
HELP NEEDED FOR BATHING: A LOT
EATING MEALS: A LITTLE
TOILETING: A LOT
PERSONAL GROOMING: A LITTLE
SUGGESTED CMS G CODE MODIFIER DAILY ACTIVITY: CK

## 2023-09-12 ASSESSMENT — PAIN DESCRIPTION - PAIN TYPE
TYPE: ACUTE PAIN
TYPE: ACUTE PAIN

## 2023-09-12 NOTE — THERAPY
Occupational Therapy   Initial Evaluation     Patient Name: Yury Blake  Age:  53 y.o., Sex:  male  Medical Record #: [id number]  Today's Date: 9/12/2023     Precautions  Precautions: Fall Risk, Weight Bearing As Tolerated Right Lower Extremity  Comments: offloading shoe    Assessment  Patient is 53 y.o. male with a diagnosis of ALOC, R heel wound. Pt currently limited by decreased functional mobility, activity tolerance, cognition, balance, and pain which are affecting pt's ability to complete ADLs/IADLs at baseline. Pt would benefit from OT services in the acute care setting to maximize functional recovery.      Plan    Occupational Therapy Initial Treatment Plan   Treatment Interventions: (P) Self Care / Activities of Daily Living, Therapeutic Activity  Treatment Frequency: (P) 3 Times per Week  Duration: (P) Until Therapy Goals Met       Discharge Recommendations: (P) Recommend post-acute placement for additional occupational therapy services prior to discharge home (return to memory care)        09/12/23 0754   Prior Living Situation   Housing / Facility Assisted Living Residence  (per chart pt lives in a memory care facility in [place])   Lives with - Patient's Self Care Capacity Attendant / Paid Care Giver   Prior Level of ADL Function   Self Feeding Unable To Determine At This Time   Dressing Unable To Determine At This Time   ADL Assessment   Grooming Moderate Assist   Upper Body Dressing Moderate Assist   Lower Body Dressing Maximal Assist   Toileting Maximal Assist   Functional Mobility   Sit to Stand Moderate Assist   Bed, Chair, Wheelchair Transfer Unable to Participate   Short Term Goals   Short Term Goal # 1 supervised with UB dressing   Short Term Goal # 2 mod A with LB dressing   Short Term Goal # 3 min A with ADL txfs   Occupational Therapy Initial Treatment Plan    Treatment Interventions Self Care / Activities of Daily Living;Therapeutic Activity   Treatment Frequency 3 Times per Week    Duration Until Therapy Goals Met   Anticipated Discharge Equipment and Recommendations   Discharge Recommendations Recommend post-acute placement for additional occupational therapy services prior to discharge home  (return to memory care)

## 2023-09-12 NOTE — CARE PLAN
The patient is Stable - Low risk of patient condition declining or worsening    Shift Goals  Clinical Goals: Monitor blood glucose; IV abx  Patient Goals: rest  Family Goals: KIRTI    Progress made toward(s) clinical / shift goals:  Ongoing    Patient is progressing towards the following goals:      Problem: Pain - Standard  Goal: Alleviation of pain or a reduction in pain to the patient’s comfort goal  Outcome: Progressing     Problem: Fall Risk  Goal: Patient will remain free from falls  Outcome: Progressing     Problem: Skin Integrity  Goal: Skin integrity is maintained or improved  Outcome: Progressing

## 2023-09-12 NOTE — CARE PLAN
The patient is Stable - Low risk of patient condition declining or worsening    Shift Goals  Clinical Goals:  (monitor blood sugars)  Patient Goals:  (rest comfort)  Family Goals: KIRTI    Progress made toward(s) clinical / shift goals:    Problem: Knowledge Deficit - Standard  Goal: Patient and family/care givers will demonstrate understanding of plan of care, disease process/condition, diagnostic tests and medications  Outcome: Progressing  Note: Guardian understands POC     Problem: Skin Integrity  Goal: Skin integrity is maintained or improved  Outcome: Progressing  Note: Wound orders are in, heel float boat on right foot, heel foam on left heel, left heel floating, waffle mattress in use, TAP system initiated, Q 2 hour turns, lotion applied to patients shin flakiness     Problem: Fall Risk  Goal: Patient will remain free from falls  Outcome: Progressing  Note: Patient stood at bedside with OT       Patient is not progressing towards the following goals:

## 2023-09-12 NOTE — PROGRESS NOTES
Diabetes education: Met with pt briefly. Pt not appropriate for diabetes education. CDE called Group home to confirm their ability to give medications ( yes to oral medications, no to finger sticks and insulin).  Plan:  Note left for CM with number for  ( [phone] ) as it was asked if someone would call with discharge plans.

## 2023-09-12 NOTE — PROGRESS NOTES
Assumed care of patient 0700. Received Report from [facility] nurse. Patient A&O X 3, on 2 L NC, Reporting a pain level of 0. Call light within reach, belongings within reach, Fall precautions in place, and bed alarm is on and bed in lowest position. Patient does not have any other needs at this time.

## 2023-09-12 NOTE — DISCHARGE PLANNING
"Case Management Discharge Planning    Admission Date: 9/8/2023  GMLOS: 3.9  ALOS: 4    6-Clicks ADL Score: 12  6-Clicks Mobility Score: 6  PT and/or OT Eval ordered: Yes  Post-acute Referrals Ordered: No  Post-acute Choice Obtained: No  Has referral(s) been sent to post-acute provider:  No      Anticipated Discharge Dispo: Discharge Address: [address] [place] [address] Rd,   [place] [address] ([facility])  [phone] Chad Stone Owner- [phone]    DME Needed: No    Action(s) Taken: Updated Provider/Nurse on Discharge Plan    1045 RNCM spoke to Pt's guardian Deonte.  Per Deonte, pt's baseline is aox 1-2. Pt is WC bound. He resides in the Memory care Unit of the . Guardibuzz Clemons wants to move pt to [place] since it is difficult for him to see him in [place].  RNCM spoke to Chad (owner of ) to inquire about / Memory Care Units in [place].  Per Chad, facilities in [place] don't have enough staff to address pt's needs.  Per Chad \"pt is a big kaylyn needing 2-3 ppl to transfer to .\"      1145  Pt discussed in IDT rounds.  RNCM called guardian to update him on facilities in [place].  Deonte agrees for pt to go back to  in [place]. Pt will be returning to  with resumption of [facility]. Choice form given to MORGAN Gaona.  RNCM spoke to Jake.  Per Jake, they can't provide transportation.    1226 RNCM requested [facility] transport. Per Alia Amaya, [facility] is full.  Will upgrade to [facility] transport.      1400 [facility] Transportation arranged for . Jake from the  notified of time of dc.  He will also follow up with [facility] since pt was to start services with them per PCP referral.  MORGAN Gaona followed up with [facility].  Pending acceptance. MD Chavez ok with pt dc without established HH.     1515 Per Viraj PANTOJA, [facility] to be cancelled due to no HH acceptance. Jake from the  called regarding pt not qualifying for [facility] due to Medicaid.  RNCM spoke to Chad  owner and David from [facility] is reviewing chart " and will call back about HH accepatance. Owner wants HH established prior to taking pt back.     1630  RNCM spoke to David from [facility].  They have accepted the pt. Referral sent via epic.  Deonte xiaoan notified and choice obtained for [facility]. [facility] scheduled for 1730.  owner and Charge RN notified about time.  Transfer packet given to RN .    Escalations Completed: None    Medically Clear: Yes    Next Steps: RNCM to continue to follow up with pt and medical team to address dc needs and barriers      Barriers to Discharge: Transportation

## 2023-09-12 NOTE — DISCHARGE PLANNING
DC Transport Scheduled    Received request at: 9/12/2023 at 1221    Transport Company Scheduled:  [employer]  Spoke with Sandy at [facility] to schedule transport.  [facility] Trip #: [id number]    Scheduled Date: 9/12/2032  Scheduled Time: 9455-1187    Destination: [facility] at [address] [place] [address] [place]     Notified care team of scheduled transport via Vo[employer].     If there are any changes needed to the DC transportation scheduled, please contact [facility] at ext. [phone] between the hours of 9076-5675 Mon-Fri. If outside those hours, contact the ED Case Manager at ext. [phone].

## 2023-09-12 NOTE — CONSULTS
Diabetes education: Pt is listed as newly dx with diabetes, admitted with blood sugar of 588 and Hga1c of 10.3%.  Pt is currently on regular insulin per sliding scale coverage ac and hs with blood sugars of  133, 127, and 139.   Pt is not appropriate for education.  CDE called [facility] [place] ( on [address] which was the wrong [facility]), who then called the correct GH/Memory care. CDE confirmed they are agreeable to take him back and give oral medications but they cannot give insulin or do finger sticks.  Plan:  Note left for CM with number for [facility] ( [phone] ) as it was asked if someone would call with discharge plans.                                       ;

## 2023-09-12 NOTE — DISCHARGE SUMMARY
Discharge Summary    CHIEF COMPLAINT ON ADMISSION  Chief Complaint   Patient presents with    ALOC     EMS called due to increased weakness and fatigue with aloc. Baseline ano 3x, now GCS 13    Wound Check     Right heel wound, receiving abx for the last 2x days       Reason for Admission  EMS    Admission Date  9/8/2023     CODE STATUS  DNAR/DNI    HPI & HOSPITAL COURSE  53 y.o. male who presented 9/8/2023 with altered mental status.  Patient is currently only oriented to self, so history was obtained from chart review and ER physician.  Patient currently thinks that he lives in [place].  He is unable to recall what year it is.  Patient states that he lives by himself at home, however he was brought here by EMS from a nursing home.  According to the ER physician, he was brought here for worsening altered mental status and changes in mentation.  He has been found confused.  In the ER, he was found to be hyperglycemic with a blood glucose of 588.  Patient has no known history of diabetes.  I spoke to the patient, he was unable to tell me if he was diabetic or not.  I did asked the patient about his chronic right heel wound, patient states that he is unaware how long its been there for.  He is not able to tell me if he has been having any fevers or chills, or any nausea or vomiting.  In the emergency room, imaging was obtained and are unremarkable.  On labs he had a leukocytosis to 11.9.  He was mildly hyponatremic to 133.  Glucose levels of 588.  He was given 1 L of IV fluids and given a single dose of empiric Zosyn.  Patient will be admitted for management of hyperglycemia and altered mental status, and management of a chronic right heel wound.    He was admitted and started on subcu insulin as well as IV antibiotics.  His hypoglycemia resolved and A1c at 10.  Patient was transition to metformin and not requiring sliding scale.  His hyponatremia resolved as well as he had improvement with ental status.  He was  transition to a Unasyn antibiotic regimen and is to conclude an Augmentin antibiotic regimen in the outpatient setting.  Patient is to follow-up with wound care in the outpatient setting.  Patient is to be discharged to group home and to follow-up with primary care provider within 1 week.    Therefore, he is discharged in good and stable condition to home with organized home healthcare and close outpatient follow-up.    The patient met 2-midnight criteria for an inpatient stay at the time of discharge.      FOLLOW UP ITEMS POST DISCHARGE  Primary care provider follow posthospital discharge care    DISCHARGE DIAGNOSES  Principal Problem:    Hyperglycemia (POA: Yes)  Active Problems:    Hypokalemia (POA: Yes)    Pressure ulcer of right foot (POA: Yes)    Hyponatremia (POA: Unknown)    Altered mental status (POA: Unknown)    Leg swelling (POA: Unknown)  Resolved Problems:    * No resolved hospital problems. *      FOLLOW UP  No future appointments.  No follow-up provider specified.    MEDICATIONS ON DISCHARGE     Medication List        START taking these medications        Instructions   amoxicillin-clavulanate 875-125 MG Tabs  Commonly known as: Augmentin   Take 1 Tablet by mouth 2 times a day for 4 days.  Dose: 1 Tablet     metFORMIN 500 MG Tabs  Commonly known as: Glucophage   Take 1 Tablet by mouth 2 times a day with meals.  Dose: 500 mg     omeprazole 20 MG delayed-release capsule  Commonly known as: PriLOSEC   Take 1 Capsule by mouth 2 times a day.  Dose: 20 mg            CONTINUE taking these medications        Instructions   acetaminophen 325 MG Tabs  Commonly known as: Tylenol   Take 650 mg by mouth every 6 hours as needed. Indications: Pain  Dose: 650 mg     aspirin 325 MG Tabs  Commonly known as: Asa   Take 325 mg by mouth every day.  Dose: 325 mg     divalproex 250 MG Tbec  Commonly known as: Depakote   Take 250 mg by mouth 3 times a day.  Dose: 250 mg     folic acid 1 MG Tabs  Commonly known as: Folvite    Take 1 mg by mouth every day.  Dose: 1 mg     risperiDONE 2 MG Tabs  Commonly known as: RisperDAL   Take 1 tablet by mouth 2 Times a Day.  Dose: 2 mg     thiamine 100 MG Tabs  Commonly known as: Vitamin B-1   Take 100 mg by mouth every day.  Dose: 100 mg            STOP taking these medications      cephALEXin 500 MG Caps  Commonly known as: Keflex     docusate sodium 100 MG Caps  Commonly known as: Colace     doxycycline 100 MG Tabs  Commonly known as: Vibramycin     Fiber 625 MG Tabs     furosemide 40 MG Tabs  Commonly known as: Lasix     hydrOXYzine HCl 25 MG Tabs  Commonly known as: Atarax     magnesium hydroxide 400 MG/5ML Susp  Commonly known as: Milk Of Magnesia     Melatonin 10 MG Tabs     potassium chloride 20 MEQ Pack  Commonly known as: Klor-Con     Tussin 100 MG/5ML liquid  Generic drug: guaiFENesin              Allergies  No Known Allergies    DIET  Orders Placed This Encounter   Procedures    Diet Order Diet: Consistent CHO (Diabetic)     Standing Status:   Standing     Number of Occurrences:   1     Order Specific Question:   Diet:     Answer:   Consistent CHO (Diabetic) [4]       ACTIVITY  As tolerated.  Weight bearing as tolerated    LINES, DRAINS, AND WOUNDS  This is an automated list. Peripheral IVs will be removed prior to discharge.  Peripheral IV 09/08/23 22 G Left;Posterior Wrist (Active)   Site Assessment Clean;Dry;Intact 09/12/23 1200   Dressing Type Transparent 09/12/23 1200   Line Status Scrubbed the hub prior to access;Saline locked 09/12/23 1200   Dressing Status Clean;Dry;Intact 09/12/23 1200   Dressing Intervention N/A 09/12/23 1200   Dressing Change Due 09/16/23 09/12/23 1200   Date Primary Tubing Changed 09/09/23 09/09/23 2000   Infiltration Grading ([facility], [place] [facility]) 0 09/11/23 0730   [facility]bitis [facility] ([facility]) 0 09/11/23 0730       Wound 02/23/21 Pressure Injury Heel Posterior Left 2/23 DTI; 3/2 unstageable (Active)       Wound 03/02/21 Pressure Injury Foot Dorsal Right stage 2  (Active)       Wound 03/17/21 Pressure Injury Foot Dorsal Left 3/17 Stage 1 (Active)       Wound 04/11/21 Other (comment) Foot Lateral Left POA, healing incision with scab and some discoloration present (4/13) (Active)       Wound 04/12/21 Incision Foot;Heel Left Gretchen wound vac, benzoin, steri-strips, adaptec (Active)       Wound 09/08/23 Pressure Injury Heel Right POA unstageable---> stage 3 (Active)   Wound Image    09/10/23 1800   Site Assessment KIRTI 09/12/23 0740   Periwound Assessment Clean;Dry;Intact 09/12/23 0740   Margins KIRTI 09/12/23 0740   Closure Secondary intention 09/12/23 0740   Drainage Amount None 09/12/23 0740   Drainage Description KIRTI 09/12/23 0740   Treatments Offloading 09/12/23 0740   Offloading/DME Heel float boot 09/12/23 0740   Wound Cleansing Not Applicable 09/12/23 0740   Periwound Protectant Not Applicable 09/12/23 0740   Dressing Status Clean;Dry;Intact 09/12/23 0740   Dressing Changed Observed 09/12/23 0740   Dressing Cleansing/Solutions Not Applicable 09/12/23 0740   Dressing Options Collagen Dressing;Hydrofera Blue Ready;Hypafix Tape 09/12/23 0740   Dressing Change/Treatment Frequency Every 72 hrs, and As Needed 09/12/23 0740   NEXT Dressing Change/Treatment Date 09/13/23 09/11/23 2200   NEXT Weekly Photo (Inpatient Only) 09/17/23 09/11/23 2200   Wound Team Following Weekly 09/10/23 1800   WOUND NURSE ONLY - Pressure Injury Stage 3 09/10/23 1800   Wound Length (cm) 2.8 cm 09/10/23 1800   Wound Width (cm) 2.5 cm 09/10/23 1800   Wound Depth (cm) 0.2 cm 09/10/23 1800   Wound Surface Area (cm^2) 7 cm^2 09/10/23 1800   Wound Volume (cm^3) 1.4 cm^3 09/10/23 1800   Shape [place] 09/10/23 1800   Wound Odor None 09/10/23 1800   Pulses Right;1+;DP;PT 09/10/23 1800   WOUND NURSE ONLY - Time Spent with Patient (mins) 75 09/10/23 1800       Wound 09/09/23 Foot;Toe, Hallux Right (Active)   Wound Image    09/10/23 1800   Site Assessment KIRTI 09/12/23 0740   Periwound Assessment Clean;Dry;Intact  09/12/23 0740   Margins KIRTI 09/12/23 0740   Closure Secondary intention 09/12/23 0740   Drainage Amount None 09/12/23 0740   Drainage Description Sanguineous 09/10/23 1800   Treatments Offloading 09/12/23 0740   Wound Cleansing Not Applicable 09/12/23 0740   Periwound Protectant Not Applicable 09/12/23 0740   Dressing Status Clean;Dry;Intact 09/12/23 0740   Dressing Changed Observed 09/12/23 0740   Dressing Cleansing/Solutions Not Applicable 09/12/23 0740   Dressing Options Collagen Dressing;Hydrofera Blue Ready;Hypafix Tape 09/12/23 0740   Dressing Change/Treatment Frequency Every 72 hrs, and As Needed 09/12/23 0740   NEXT Dressing Change/Treatment Date 09/13/23 09/11/23 2200   NEXT Weekly Photo (Inpatient Only) 09/17/23 09/11/23 2200   Wound Team Following Weekly 09/10/23 1800   Wound Length (cm) 0.2 cm 09/10/23 1800   Wound Width (cm) 0.2 cm 09/10/23 1800   Wound Depth (cm) 0.3 cm 09/10/23 1800   Wound Surface Area (cm^2) 0.04 cm^2 09/10/23 1800   Wound Volume (cm^3) 0.012 cm^3 09/10/23 1800   Shape [place] 09/10/23 1800   Wound Odor None 09/10/23 1800   Pulses Right;1+;DP;PT 09/10/23 1800       Peripheral IV 09/08/23 22 G Left;Posterior Wrist (Active)   Site Assessment Clean;Dry;Intact 09/12/23 1200   Dressing Type Transparent 09/12/23 1200   Line Status Scrubbed the hub prior to access;Saline locked 09/12/23 1200   Dressing Status Clean;Dry;Intact 09/12/23 1200   Dressing Intervention N/A 09/12/23 1200   Dressing Change Due 09/16/23 09/12/23 1200   Date Primary Tubing Changed 09/09/23 09/09/23 2000   Infiltration Grading ([facility] 0 09/11/23 0730   Phlebitis Scale ([facility] Only) 0 09/11/23 0730               MENTAL STATUS ON TRANSFER  At baseline    CONSULTATIONS  None    PROCEDURES  None    LABORATORY  Lab Results   Component Value Date    SODIUM 142 09/12/2023    POTASSIUM 3.5 (L) 09/12/2023    CHLORIDE 107 09/12/2023    CO2 24 09/12/2023    GLUCOSE 120 (H) 09/12/2023    BUN 4 (L) 09/12/2023    CREATININE  0.71 09/12/2023        Lab Results   Component Value Date    WBC 7.0 09/11/2023    HEMOGLOBIN 11.7 (L) 09/11/2023    HEMATOCRIT 35.0 (L) 09/11/2023    PLATELETCT 168 09/11/2023        Total time of the discharge process exceeds 32 minutes.

## 2023-09-13 DIAGNOSIS — L89.893 PRESSURE INJURY OF RIGHT FOOT, STAGE 3 (HCC): ICD-10-CM

## 2023-10-03 ENCOUNTER — TELEPHONE (OUTPATIENT)
Dept: HEALTH INFORMATION MANAGEMENT | Facility: OTHER | Age: 53
End: 2023-10-03
Payer: MEDICAID

## 2023-10-07 ENCOUNTER — HOSPITAL ENCOUNTER (EMERGENCY)
Facility: MEDICAL CENTER | Age: 53
End: 2023-10-07
Attending: EMERGENCY MEDICINE
Payer: MEDICAID

## 2023-10-07 ENCOUNTER — APPOINTMENT (OUTPATIENT)
Dept: RADIOLOGY | Facility: MEDICAL CENTER | Age: 53
End: 2023-10-07
Attending: EMERGENCY MEDICINE
Payer: MEDICAID

## 2023-10-07 VITALS
OXYGEN SATURATION: 96 % | RESPIRATION RATE: 18 BRPM | HEIGHT: 72 IN | WEIGHT: 185 LBS | SYSTOLIC BLOOD PRESSURE: 112 MMHG | DIASTOLIC BLOOD PRESSURE: 57 MMHG | TEMPERATURE: 97.2 F | BODY MASS INDEX: 25.06 KG/M2 | HEART RATE: 68 BPM

## 2023-10-07 DIAGNOSIS — N50.812 PAIN IN BOTH TESTICLES: ICD-10-CM

## 2023-10-07 DIAGNOSIS — M25.571 BILATERAL ANKLE PAIN, UNSPECIFIED CHRONICITY: ICD-10-CM

## 2023-10-07 DIAGNOSIS — M25.572 BILATERAL ANKLE PAIN, UNSPECIFIED CHRONICITY: ICD-10-CM

## 2023-10-07 DIAGNOSIS — L97.512 ULCER OF RIGHT FOOT WITH FAT LAYER EXPOSED (HCC): ICD-10-CM

## 2023-10-07 DIAGNOSIS — N50.811 PAIN IN BOTH TESTICLES: ICD-10-CM

## 2023-10-07 LAB
ALBUMIN SERPL BCP-MCNC: 3.7 G/DL (ref 3.2–4.9)
ALBUMIN/GLOB SERPL: 1.4 G/DL
ALP SERPL-CCNC: 86 U/L (ref 30–99)
ALT SERPL-CCNC: 11 U/L (ref 2–50)
ANION GAP SERPL CALC-SCNC: 12 MMOL/L (ref 7–16)
APPEARANCE UR: CLEAR
AST SERPL-CCNC: 14 U/L (ref 12–45)
BASOPHILS # BLD AUTO: 0.8 % (ref 0–1.8)
BASOPHILS # BLD: 0.07 K/UL (ref 0–0.12)
BILIRUB SERPL-MCNC: 0.2 MG/DL (ref 0.1–1.5)
BILIRUB UR QL STRIP.AUTO: NEGATIVE
BUN SERPL-MCNC: 5 MG/DL (ref 8–22)
CALCIUM ALBUM COR SERPL-MCNC: 9.6 MG/DL (ref 8.5–10.5)
CALCIUM SERPL-MCNC: 9.4 MG/DL (ref 8.5–10.5)
CHLORIDE SERPL-SCNC: 107 MMOL/L (ref 96–112)
CO2 SERPL-SCNC: 23 MMOL/L (ref 20–33)
COLOR UR: YELLOW
CREAT SERPL-MCNC: 0.66 MG/DL (ref 0.5–1.4)
EOSINOPHIL # BLD AUTO: 0.2 K/UL (ref 0–0.51)
EOSINOPHIL NFR BLD: 2.2 % (ref 0–6.9)
ERYTHROCYTE [DISTWIDTH] IN BLOOD BY AUTOMATED COUNT: 47.1 FL (ref 35.9–50)
GFR SERPLBLD CREATININE-BSD FMLA CKD-EPI: 113 ML/MIN/1.73 M 2
GLOBULIN SER CALC-MCNC: 2.6 G/DL (ref 1.9–3.5)
GLUCOSE BLD STRIP.AUTO-MCNC: 109 MG/DL (ref 65–99)
GLUCOSE SERPL-MCNC: 113 MG/DL (ref 65–99)
GLUCOSE UR STRIP.AUTO-MCNC: NEGATIVE MG/DL
HCT VFR BLD AUTO: 36.7 % (ref 42–52)
HGB BLD-MCNC: 12.1 G/DL (ref 14–18)
IMM GRANULOCYTES # BLD AUTO: 0.06 K/UL (ref 0–0.11)
IMM GRANULOCYTES NFR BLD AUTO: 0.6 % (ref 0–0.9)
KETONES UR STRIP.AUTO-MCNC: ABNORMAL MG/DL
LEUKOCYTE ESTERASE UR QL STRIP.AUTO: NEGATIVE
LYMPHOCYTES # BLD AUTO: 1.8 K/UL (ref 1–4.8)
LYMPHOCYTES NFR BLD: 19.5 % (ref 22–41)
MCH RBC QN AUTO: 30.6 PG (ref 27–33)
MCHC RBC AUTO-ENTMCNC: 33 G/DL (ref 32.3–36.5)
MCV RBC AUTO: 92.7 FL (ref 81.4–97.8)
MICRO URNS: ABNORMAL
MONOCYTES # BLD AUTO: 0.71 K/UL (ref 0–0.85)
MONOCYTES NFR BLD AUTO: 7.7 % (ref 0–13.4)
NEUTROPHILS # BLD AUTO: 6.41 K/UL (ref 1.82–7.42)
NEUTROPHILS NFR BLD: 69.2 % (ref 44–72)
NITRITE UR QL STRIP.AUTO: NEGATIVE
NRBC # BLD AUTO: 0 K/UL
NRBC BLD-RTO: 0 /100 WBC (ref 0–0.2)
PH UR STRIP.AUTO: 6.5 [PH] (ref 5–8)
PLATELET # BLD AUTO: 196 K/UL (ref 164–446)
PMV BLD AUTO: 10.2 FL (ref 9–12.9)
POTASSIUM SERPL-SCNC: 3.9 MMOL/L (ref 3.6–5.5)
PROT SERPL-MCNC: 6.3 G/DL (ref 6–8.2)
PROT UR QL STRIP: NEGATIVE MG/DL
RBC # BLD AUTO: 3.96 M/UL (ref 4.7–6.1)
RBC UR QL AUTO: NEGATIVE
SODIUM SERPL-SCNC: 142 MMOL/L (ref 135–145)
SP GR UR STRIP.AUTO: 1.01
UROBILINOGEN UR STRIP.AUTO-MCNC: 0.2 MG/DL
WBC # BLD AUTO: 9.3 K/UL (ref 4.8–10.8)

## 2023-10-07 PROCEDURE — 85025 COMPLETE CBC W/AUTO DIFF WBC: CPT

## 2023-10-07 PROCEDURE — 82962 GLUCOSE BLOOD TEST: CPT

## 2023-10-07 PROCEDURE — 81003 URINALYSIS AUTO W/O SCOPE: CPT

## 2023-10-07 PROCEDURE — 73600 X-RAY EXAM OF ANKLE: CPT | Mod: LT

## 2023-10-07 PROCEDURE — 99285 EMERGENCY DEPT VISIT HI MDM: CPT

## 2023-10-07 PROCEDURE — 80053 COMPREHEN METABOLIC PANEL: CPT

## 2023-10-07 PROCEDURE — 73600 X-RAY EXAM OF ANKLE: CPT | Mod: RT

## 2023-10-07 PROCEDURE — 76870 US EXAM SCROTUM: CPT

## 2023-10-07 PROCEDURE — 36415 COLL VENOUS BLD VENIPUNCTURE: CPT

## 2023-10-07 ASSESSMENT — FIBROSIS 4 INDEX: FIB4 SCORE: 1.52

## 2023-10-07 NOTE — ED PROVIDER NOTES
ER Provider Note    Scribed for Devyn Crews M.D. by Vicente Johnson. 10/7/2023   2:01 PM    Primary Care Provider: Kristina Roche M.D.    CHIEF COMPLAINT  Chief Complaint   Patient presents with    Ankle Pain     Pt bib [facility] ems from [facility], reports bilateral ankle pain x 3 days. Denies any trauma. Per ems pt is non-ambulatory at baseline    Testicle Pain     Reports bilateral testicular pain and swelling x 3 days. Denies trauma.     EXTERNAL RECORDS REVIEWED  Inpatient Notes Patient lives in a group home. He was last admitted in September for a right heal wound. Patient is a newly diagnosed diabetic. He had an X-ray on his right foot in September that was negative. He had an X-ray of his right ankle in April that was also negative.    HPI/ROS  LIMITATION TO HISTORY   Select: History limited due to dementia.  OUTSIDE HISTORIAN(S):  None noted.    Yury Blake is a 53 y.o. male who presents to the ED for evaluation of right foot pain. He also is experiencing pain on his left foot. He reports having an X-ray conducted on his right foot for a fracture. He adds that he has been feeling testicular pain since his ankle pain. The patient states he also has fever and nausea, but denies any emesis. He reports that he has been taking his medications. No alleviating or exacerbating factors were noted.    PAST MEDICAL HISTORY  Past Medical History:   Diagnosis Date    Arthritis     RA    Encephalopathy     Hypertension     Pain     Right ankle    Psychiatric problem     anxiety       SURGICAL HISTORY  Past Surgical History:   Procedure Laterality Date    IRRIGATION & DEBRIDEMENT ORTHO Left 4/12/2021    Procedure: IRRIGATION AND DEBRIDEMENT, WOUND - HEEL;  Surgeon: Donny Roque M.D.;  Location: SURGERY [facility];  Service: Orthopedics    TENDON LENGHTENING Bilateral 2/1/2021    Procedure: LENGTHENING, TENDON- FOR FOOT DROP RECONSTRUCTION, POSTERIOR TIBIALIS TENDON  TRANSFER , ACHILLES  RADHAING , LEFT MEDIAL RELEASE;  Surgeon: Donny Roque M.D.;  Location: SURGERY [facility];  Service: Orthopedics    IRRIGATION & DEBRIDEMENT ORTHO Left 2/1/2021    Procedure: IRRIGATION AND DEBRIDEMENT, WOUND-FOOT;  Surgeon: Donny Roque M.D.;  Location: SURGERY [facility];  Service: Orthopedics    ORIF, ANKLE Right 9/26/2017    Procedure: ANKLE ORIF SYNDESOMOSIS REPAIR;  Surgeon: Armando Woodard M.D.;  Location: SURGERY [facility];  Service: Orthopedics       FAMILY HISTORY  No family history noted.    SOCIAL HISTORY   reports that he has never smoked. His smokeless tobacco use includes chew. He reports current alcohol use. He reports that he does not use drugs.    CURRENT MEDICATIONS  Discharge Medication List as of 10/7/2023  6:48 PM        CONTINUE these medications which have NOT CHANGED    Details   metFORMIN (GLUCOPHAGE) 500 MG Tab Take 1 Tablet by mouth 2 times a day with meals., Disp-60 Tablet, R-0, Normal      omeprazole (PRILOSEC) 20 MG delayed-release capsule Take 1 Capsule by mouth 2 times a day., Disp-30 Capsule, R-0, Normal      aspirin (ASA) 325 MG Tab Take 325 mg by mouth every day., Historical Med      divalproex (DEPAKOTE) 250 MG Tablet Delayed Response Take 250 mg by mouth 3 times a day., Historical Med      folic acid (FOLVITE) 1 MG Tab Take 1 mg by mouth every day., Historical Med      thiamine (VITAMIN B-1) 100 MG Tab Take 100 mg by mouth every day., Historical Med      acetaminophen (TYLENOL) 325 MG Tab Take 650 mg by mouth every 6 hours as needed. Indications: Pain, Historical Med      risperiDONE (RISPERDAL) 2 MG Tab Take 1 tablet by mouth 2 Times a Day., Disp-120 tablet, R-2, Normal             ALLERGIES  No Known Allergies     PHYSICAL EXAM  /62   Pulse 85   Temp 36.2 °C (97.1 °F) (Temporal)   Resp 16   Ht 1.829 m (6')   Wt 83.9 kg (185 lb)   SpO2 92%   BMI 25.09 kg/m²    Constitutional: Well developed, Well nourished, No distress,    HENT: Normocephalic,  Atraumatic   Eyes: PERRLA, EOMI, Conjunctiva normal, No discharge.   Neck: No tenderness, Supple, No stridor.   Cardiovascular: Normal heart rate, Normal rhythm.   Thorax & Lungs: Clear to auscultation bilaterally, No respiratory distress, No wheezing, No crackles.   Abdomen: Soft, No tenderness, No masses, No pulsatile masses.   Skin: Warm, Dry, No erythema, No rash.    Musculoskeletal: No major deformities noted.  Intact distal pulses  Extremities:  Bilateral lower extremity pitting edema, 2+ pulses bilaterally, healing wound on right heel,   Neurologic: Awake, alert. Moves all extremities spontaneously.  : Testicles non tender, no soft tissue swelling,  Psychiatric: Affect normal, Judgment normal, Mood normal.      DIAGNOSTIC STUDIES    Labs:   Results for orders placed or performed during the hospital encounter of 10/07/23   CBC WITH DIFFERENTIAL   Result Value Ref Range    WBC 9.3 4.8 - 10.8 K/uL    RBC 3.96 (L) 4.70 - 6.10 M/uL    Hemoglobin 12.1 (L) 14.0 - 18.0 g/dL    Hematocrit 36.7 (L) 42.0 - 52.0 %    MCV 92.7 81.4 - 97.8 fL    MCH 30.6 27.0 - 33.0 pg    MCHC 33.0 32.3 - 36.5 g/dL    RDW 47.1 35.9 - 50.0 fL    Platelet Count 196 164 - 446 K/uL    MPV 10.2 9.0 - 12.9 fL    Neutrophils-Polys 69.20 44.00 - 72.00 %    Lymphocytes 19.50 (L) 22.00 - 41.00 %    Monocytes 7.70 0.00 - 13.40 %    Eosinophils 2.20 0.00 - 6.90 %    Basophils 0.80 0.00 - 1.80 %    Immature Granulocytes 0.60 0.00 - 0.90 %    Nucleated RBC 0.00 0.00 - 0.20 /100 WBC    Neutrophils (Absolute) 6.41 1.82 - 7.42 K/uL    Lymphs (Absolute) 1.80 1.00 - 4.80 K/uL    Monos (Absolute) 0.71 0.00 - 0.85 K/uL    Eos (Absolute) 0.20 0.00 - 0.51 K/uL    Baso (Absolute) 0.07 0.00 - 0.12 K/uL    Immature Granulocytes (abs) 0.06 0.00 - 0.11 K/uL    NRBC (Absolute) 0.00 K/uL   COMP METABOLIC PANEL   Result Value Ref Range    Sodium 142 135 - 145 mmol/L    Potassium 3.9 3.6 - 5.5 mmol/L    Chloride 107 96 - 112 mmol/L    Co2 23 20 - 33 mmol/L    Anion Gap  12.0 7.0 - 16.0    Glucose 113 (H) 65 - 99 mg/dL    Bun 5 (L) 8 - 22 mg/dL    Creatinine 0.66 0.50 - 1.40 mg/dL    Calcium 9.4 8.5 - 10.5 mg/dL    Correct Calcium 9.6 8.5 - 10.5 mg/dL    AST(SGOT) 14 12 - 45 U/L    ALT(SGPT) 11 2 - 50 U/L    Alkaline Phosphatase 86 30 - 99 U/L    Total Bilirubin 0.2 0.1 - 1.5 mg/dL    Albumin 3.7 3.2 - 4.9 g/dL    Total Protein 6.3 6.0 - 8.2 g/dL    Globulin 2.6 1.9 - 3.5 g/dL    A-G Ratio 1.4 g/dL   URINALYSIS    Specimen: Urine   Result Value Ref Range    Color Yellow     Character Clear     Specific Gravity 1.015 <1.035    Ph 6.5 5.0 - 8.0    Glucose Negative Negative mg/dL    Ketones Trace (A) Negative mg/dL    Protein Negative Negative mg/dL    Bilirubin Negative Negative    Urobilinogen, Urine 0.2 Negative    Nitrite Negative Negative    Leukocyte Esterase Negative Negative    Occult Blood Negative Negative    Micro Urine Req see below    ESTIMATED GFR   Result Value Ref Range    GFR (CKD-EPI) 113 >60 mL/min/1.73 m 2   POCT glucose device results   Result Value Ref Range    POC Glucose, Blood 109 (H) 65 - 99 mg/dL       Radiology:   This attending emergency physician has independently interpreted the diagnostic imaging associated with this visit and is awaiting the final reading from the radiologist.   Preliminary interpretation is a follows: No fracture  Radiologist interpretation:   [id number]   Final Result      1.  No acute inflammation or torsion seen in both testes.      2.  Right varicocele.      DX-ANKLE 2- VIEWS RIGHT   Final Result      1.  No evidence of acute fracture or dislocation.      2.  Old postsurgical and traumatic change of the distal tibia and fibula.      DX-ANKLE 2- VIEWS LEFT   Final Result      No evidence of acute fracture or dislocation.         COURSE & MEDICAL DECISION MAKING     ED Observation Status? No; Patient does not meet criteria for ED Observation.     INITIAL ASSESSMENT, COURSE AND PLAN    Care Narrative: Is very difficult to get  a history on this patient.  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  Currently the patient is complaining of bilateral ankle pain, x-rays are unremarkable except for show old fracture, the patient does have a healing ulcer on his right heel.  He was also complaining of testicular pain, ultrasound was negative.  The nurse contacted the group home, they stated they were concerned about possibility of cellulitis on the patient scrotum.  Reevaluation of the patient scrotum has some excoriations but no rayo cellulitis.  Discussed with him for zinc barrier.  Will discharge patient home, have the patient return with worsening symptoms.    2:01 PM - Patient was evaluated at bedside. Ordered for UA, CMP, CBC w/ diff, DX-Ankle-Right, US-Scrotum, and DX-Ankle-Left to evaluate. Patient verbalizes understanding and support with my plan of care.     4:17 PM - Patient was reevaluated at bedside. Discussed lab and radiology results with the patient. I discussed plan for discharge and follow up as outlined below. The patient is stable for discharge at this time and will return for any new or worsening symptoms. Patient verbalizes understanding and support with my plan for discharge.        DISPOSITION AND DISCUSSIONS    I have discussed management of the patient with the following physicians and MARLEY's:  None    Discussion of management with other [facility] or appropriate source(s): None     Barriers to care at this time, including but not limited to, patient's dementia     The patient will return for new or worsening symptoms and is stable at the time of discharge.    The patient is referred to a primary physician for blood pressure management, diabetic screening, and for all other preventative health concerns.    DISPOSITION:  Patient will be discharged home in stable condition.    FOLLOW UP:  [facility], Emergency Dept  [address] [address] [phone]    If symptoms worsen    Kristina  GALO Roche M.D.  [address] [address]  [phone]          FINAL DIAGNOSIS  1. Bilateral ankle pain, unspecified chronicity    2. Pain in both testicles    3. Ulcer of right foot with fat layer exposed (HCC)       Vicente SONG (Scribe), am scribing for, and in the presence of, Devyn Crews M.D..    Electronically signed by: Vicente Johnson (Scribe), 10/7/2023    IDevyn M.D. personally performed the services described in this documentation, as scribed by Vicente Johnson in my presence, and it is both accurate and complete.      The note accurately reflects work and decisions made by me.  Devyn Crews M.D.  10/7/2023  9:30 PM

## 2023-10-07 NOTE — ED TRIAGE NOTES
Chief Complaint   Patient presents with    Ankle Pain     Pt bib [facility] ems from [facility], reports bilateral ankle pain x 3 days. Denies any trauma. Per ems pt is non-ambulatory at baseline    Testicle Pain     Reports bilateral testicular pain and swelling x 3 days. Denies trauma.     GCS 14 and aaox1 at baseline per ems. Has hx of alcohol induced dementia encephalopathy. Bgl of 129 on scene.

## 2023-10-08 NOTE — ED NOTES
Per group home they are concerned about the rash in pt's testicle and been using anti-fungal powder without relief, and also concerned for possible cellulitis. Erp made aware.

## 2023-10-08 NOTE — ED NOTES
Scrotum assessed by erp, noted redness/excoriated, barrier cream applied per erp. Diaper changed.  Group home given an instruction about this as well to apply barrier cream on affected area in pt's scrotum.  Will notify  about assistance in transporting pt back to group home.

## 2023-10-08 NOTE — ED NOTES
Pt transported back to [facility] home via ambulance [facility]. Clothes including barrier cream given to ems.

## 2023-10-08 NOTE — ED NOTES
3 h gtt ordered.   Your glucose test was elevated. This does not mean you have diabetes but it does mean we need to do further testing to make sure that you do not.  The test that you need to have is the 3 hour glucose tolerance test.  You will arrive in the lab fasting. FASTING is required. You will need to make an appointment for this test with the lab.  Nothing to eat or drink after midnight the night before your test.  You will be in the lab for 3 hours while they check you glucose over that period of time.  We advise that you have this within the next 7 days.  Please contact our office with any questions.  Patient notified.   Patient voiced understanding  of information given and denies any questions.    Discharge instructions provided to staff at group [facility] over the phone. Questions answered by this RN.

## 2023-10-08 NOTE — DISCHARGE PLANNING
MSW received notification from bedside RN. Pt is medically cleared and needs to return to his group home. Pt currently lives at [facility] in [place], [place]. MSW spoke to  owner Chad [phone]. Chad confirmed pt is still resident. Address is [address] [place] [address].     MSW spoke to pt's guardian Deonte ([phone]) and updated him pt is discharging.     MSW called Dalia at [facility] and [facility] transport was scheduled for 1900. MSW updated bedside RN.

## 2023-10-27 ENCOUNTER — APPOINTMENT (OUTPATIENT)
Dept: RADIOLOGY | Facility: MEDICAL CENTER | Age: 53
End: 2023-10-27
Attending: STUDENT IN AN ORGANIZED HEALTH CARE EDUCATION/TRAINING PROGRAM
Payer: MEDICAID

## 2023-10-27 ENCOUNTER — HOSPITAL ENCOUNTER (EMERGENCY)
Facility: MEDICAL CENTER | Age: 53
End: 2023-10-27
Attending: STUDENT IN AN ORGANIZED HEALTH CARE EDUCATION/TRAINING PROGRAM
Payer: MEDICAID

## 2023-10-27 VITALS
TEMPERATURE: 98.2 F | SYSTOLIC BLOOD PRESSURE: 138 MMHG | HEART RATE: 71 BPM | RESPIRATION RATE: 18 BRPM | DIASTOLIC BLOOD PRESSURE: 83 MMHG | OXYGEN SATURATION: 96 % | BODY MASS INDEX: 25.06 KG/M2 | HEIGHT: 72 IN | WEIGHT: 185 LBS

## 2023-10-27 DIAGNOSIS — Z51.89 ENCOUNTER FOR WOUND RE-CHECK: ICD-10-CM

## 2023-10-27 LAB
ALBUMIN SERPL BCP-MCNC: 3.9 G/DL (ref 3.2–4.9)
ALBUMIN/GLOB SERPL: 1.5 G/DL
ALP SERPL-CCNC: 82 U/L (ref 30–99)
ALT SERPL-CCNC: 8 U/L (ref 2–50)
ANION GAP SERPL CALC-SCNC: 12 MMOL/L (ref 7–16)
AST SERPL-CCNC: 9 U/L (ref 12–45)
B-OH-BUTYR SERPL-MCNC: 0.06 MMOL/L (ref 0.02–0.27)
BASE EXCESS BLDV CALC-SCNC: 2 MMOL/L
BASOPHILS # BLD AUTO: 0.6 % (ref 0–1.8)
BASOPHILS # BLD: 0.05 K/UL (ref 0–0.12)
BILIRUB SERPL-MCNC: 0.2 MG/DL (ref 0.1–1.5)
BODY TEMPERATURE: 37.6 CENTIGRADE
BUN SERPL-MCNC: 9 MG/DL (ref 8–22)
CALCIUM ALBUM COR SERPL-MCNC: 9.3 MG/DL (ref 8.5–10.5)
CALCIUM SERPL-MCNC: 9.2 MG/DL (ref 8.5–10.5)
CHLORIDE SERPL-SCNC: 103 MMOL/L (ref 96–112)
CO2 SERPL-SCNC: 28 MMOL/L (ref 20–33)
CREAT SERPL-MCNC: 0.78 MG/DL (ref 0.5–1.4)
EOSINOPHIL # BLD AUTO: 0.14 K/UL (ref 0–0.51)
EOSINOPHIL NFR BLD: 1.6 % (ref 0–6.9)
ERYTHROCYTE [DISTWIDTH] IN BLOOD BY AUTOMATED COUNT: 48.5 FL (ref 35.9–50)
GFR SERPLBLD CREATININE-BSD FMLA CKD-EPI: 107 ML/MIN/1.73 M 2
GLOBULIN SER CALC-MCNC: 2.6 G/DL (ref 1.9–3.5)
GLUCOSE SERPL-MCNC: 112 MG/DL (ref 65–99)
HCO3 BLDV-SCNC: 28 MMOL/L (ref 24–28)
HCT VFR BLD AUTO: 39.3 % (ref 42–52)
HGB BLD-MCNC: 12.8 G/DL (ref 14–18)
IMM GRANULOCYTES # BLD AUTO: 0.07 K/UL (ref 0–0.11)
IMM GRANULOCYTES NFR BLD AUTO: 0.8 % (ref 0–0.9)
INHALED O2 FLOW RATE: ABNORMAL L/MIN
LYMPHOCYTES # BLD AUTO: 2.28 K/UL (ref 1–4.8)
LYMPHOCYTES NFR BLD: 26.8 % (ref 22–41)
MCH RBC QN AUTO: 30.3 PG (ref 27–33)
MCHC RBC AUTO-ENTMCNC: 32.6 G/DL (ref 32.3–36.5)
MCV RBC AUTO: 92.9 FL (ref 81.4–97.8)
MONOCYTES # BLD AUTO: 0.79 K/UL (ref 0–0.85)
MONOCYTES NFR BLD AUTO: 9.3 % (ref 0–13.4)
NEUTROPHILS # BLD AUTO: 5.17 K/UL (ref 1.82–7.42)
NEUTROPHILS NFR BLD: 60.9 % (ref 44–72)
NRBC # BLD AUTO: 0 K/UL
NRBC BLD-RTO: 0 /100 WBC (ref 0–0.2)
OSMOLALITY SERPL: 296 MOSM/KG H2O (ref 278–298)
PCO2 BLDV: 49.6 MMHG (ref 41–51)
PCO2 TEMP ADJ BLDV: 34.1 MMHG (ref 41–51)
PH BLDV: 7.36 [PH] (ref 7.31–7.45)
PH TEMP ADJ BLDV: 7.36 [PH] (ref 7.31–7.45)
PLATELET # BLD AUTO: 229 K/UL (ref 164–446)
PMV BLD AUTO: 10.5 FL (ref 9–12.9)
PO2 BLDV: 28.9 MMHG (ref 25–40)
PO2 TEMP ADJ BLDV: 15.7 MMHG (ref 25–40)
POTASSIUM SERPL-SCNC: 3.8 MMOL/L (ref 3.6–5.5)
PROT SERPL-MCNC: 6.5 G/DL (ref 6–8.2)
RBC # BLD AUTO: 4.23 M/UL (ref 4.7–6.1)
SAO2 % BLDV: 47.8 %
SODIUM SERPL-SCNC: 143 MMOL/L (ref 135–145)
WBC # BLD AUTO: 8.5 K/UL (ref 4.8–10.8)

## 2023-10-27 PROCEDURE — 73630 X-RAY EXAM OF FOOT: CPT | Mod: RT

## 2023-10-27 PROCEDURE — 82803 BLOOD GASES ANY COMBINATION: CPT

## 2023-10-27 PROCEDURE — 700105 HCHG RX REV CODE 258: Mod: UD | Performed by: STUDENT IN AN ORGANIZED HEALTH CARE EDUCATION/TRAINING PROGRAM

## 2023-10-27 PROCEDURE — 85025 COMPLETE CBC W/AUTO DIFF WBC: CPT

## 2023-10-27 PROCEDURE — 82010 KETONE BODYS QUAN: CPT

## 2023-10-27 PROCEDURE — 36415 COLL VENOUS BLD VENIPUNCTURE: CPT

## 2023-10-27 PROCEDURE — 80053 COMPREHEN METABOLIC PANEL: CPT

## 2023-10-27 PROCEDURE — 99284 EMERGENCY DEPT VISIT MOD MDM: CPT

## 2023-10-27 PROCEDURE — 83930 ASSAY OF BLOOD OSMOLALITY: CPT

## 2023-10-27 RX ORDER — SODIUM CHLORIDE, SODIUM LACTATE, POTASSIUM CHLORIDE, CALCIUM CHLORIDE 600; 310; 30; 20 MG/100ML; MG/100ML; MG/100ML; MG/100ML
1000 INJECTION, SOLUTION INTRAVENOUS ONCE
Status: COMPLETED | OUTPATIENT
Start: 2023-10-27 | End: 2023-10-27

## 2023-10-27 RX ADMIN — SODIUM CHLORIDE, POTASSIUM CHLORIDE, SODIUM LACTATE AND CALCIUM CHLORIDE 1000 ML: 600; 310; 30; 20 INJECTION, SOLUTION INTRAVENOUS at 18:30

## 2023-10-27 ASSESSMENT — PAIN DESCRIPTION - PAIN TYPE: TYPE: ACUTE PAIN

## 2023-10-27 ASSESSMENT — FIBROSIS 4 INDEX: FIB4 SCORE: 1.119899279860264884

## 2023-10-28 NOTE — ED NOTES
Assumed care of patient, patient bedside report received from MUSTAPHA Christian . Pt AAO X 4 , respirations even and unlabored, on room air . Introduced self as pt RN, POC discussed, call light in reach, Fall risk interventions in place.

## 2023-10-28 NOTE — DISCHARGE PLANNING
SW asked to assist with getting Pt back to Group Home ([facility]- [address], [place], [place]).  [facility] PCS completed and SW faxed all required documentation to [facility].  SW placed call to [facility] and was informed that they would be able to accommodate this Pt's transportation at 2215.    MTM called and JANE requested trip with Willy.

## 2023-10-28 NOTE — ED NOTES
Patient report to JASWINDER, Pt discharged to [facility] at [address] [place] [address], [facility] [place] . GCS 15. IV discontinued and gauze placed, pt in possession of belongings. Pt provided discharge education and information  Jaswinder  received copy of discharge instructions   Discharge Vitals:    10/27/23 2230   BP: 138/83   Pulse: 71   Resp: 18   Temp: 36.8 °C (98.2 °F)   SpO2: 96%

## 2023-10-28 NOTE — ED NOTES
Checked on bed, connected to monitor,  with unlabored respirations.   Denied any new complaints. No current needs identified.  Gurney in low position, side rail up for pt safety. Call light within reach.

## 2023-10-28 NOTE — ED PROVIDER NOTES
ER Provider Note    Scribed for Milton Gregory M.D. by Otoniel Venegas. 10/27/2023   6:02 PM    Primary Care Provider: Kristina Roche M.D.    CHIEF COMPLAINT  Chief Complaint   Patient presents with    Wound Check     Patient bib EMS from [facility] for non-healing right sided diabetic foot wound. Hx of Wernicke's Encephalopathy and Dementia per report from EMS.       HPI/ROS  LIMITATION TO HISTORY   Select: : None  OUTSIDE HISTORIAN(S):  None    Yury Blake is a 53 y.o. male who presents to the ED via EMS for evaluation of high blood sugar onset 1 week ago per patient however per EMS, patient sent in due to his nonhealing foot wound and generalized weakness. The patient reports he checked his blood sugar at home and it was high, but does not remember the exact number. He states he called his doctor, who instructed him to call an ambulance and present to the ED. He denies any fever, chills, nausea, vomiting, or diarrhea.  He states he is able to walk around his house comfortably. The patient notes he has pain in his buttock region, but is unsure as to why. The patient also presents with a diabetic foot wound to his right heal with associated pain. There are no known alleviating or exacerbating factors.      PAST MEDICAL HISTORY  Past Medical History:   Diagnosis Date    Arthritis     RA    Encephalopathy     Hypertension     Pain     Right ankle    Psychiatric problem     anxiety       SURGICAL HISTORY  Past Surgical History:   Procedure Laterality Date    IRRIGATION & DEBRIDEMENT ORTHO Left 4/12/2021    Procedure: IRRIGATION AND DEBRIDEMENT, WOUND - HEEL;  Surgeon: Donny Roque M.D.;  Location: SURGERY [facility];  Service: Orthopedics    TENDON LENGHTENING Bilateral 2/1/2021    Procedure: LENGTHENING, TENDON- FOR FOOT DROP RECONSTRUCTION, POSTERIOR TIBIALIS TENDON  TRANSFER , ACHILLES LENGHTENING , LEFT MEDIAL RELEASE;  Surgeon: Donny Roque M.D.;  Location: SURGERY  [facility];  Service: Orthopedics    IRRIGATION & DEBRIDEMENT ORTHO Left 2/1/2021    Procedure: IRRIGATION AND DEBRIDEMENT, WOUND-FOOT;  Surgeon: Donny Roque M.D.;  Location: SURGERY [facility];  Service: Orthopedics    ORIF, ANKLE Right 9/26/2017    Procedure: ANKLE ORIF SYNDESOMOSIS REPAIR;  Surgeon: Armando Woodard M.D.;  Location: SURGERY [facility];  Service: Orthopedics       FAMILY HISTORY  No family history noted.    SOCIAL HISTORY   reports that he has never smoked. His smokeless tobacco use includes chew. He reports current alcohol use. He reports that he does not use drugs.    CURRENT MEDICATIONS  Previous Medications    ACETAMINOPHEN (TYLENOL) 325 MG TAB    Take 650 mg by mouth every 6 hours as needed. Indications: Pain    ASPIRIN (ASA) 325 MG TAB    Take 325 mg by mouth every day.    DIVALPROEX (DEPAKOTE) 250 MG TABLET DELAYED RESPONSE    Take 250 mg by mouth 3 times a day.    FOLIC ACID (FOLVITE) 1 MG TAB    Take 1 mg by mouth every day.    METFORMIN (GLUCOPHAGE) 500 MG TAB    Take 1 Tablet by mouth 2 times a day with meals.    OMEPRAZOLE (PRILOSEC) 20 MG DELAYED-RELEASE CAPSULE    Take 1 Capsule by mouth 2 times a day.    RISPERIDONE (RISPERDAL) 2 MG TAB    Take 1 tablet by mouth 2 Times a Day.    THIAMINE (VITAMIN B-1) 100 MG TAB    Take 100 mg by mouth every day.       ALLERGIES  No Known Allergies     PHYSICAL EXAM  BP (!) 141/64   Pulse 75   Resp 16   Ht 1.829 m (6')   Wt 83.9 kg (185 lb)   SpO2 93%   BMI 25.09 kg/m²    General: Pleasant male, nontoxic-appearing,  Head: Normocephalic atraumatic  Eyes: Extraocular motion intact  Neck: Supple, no rigidity  Cardiovascular: Regular rate and rhythm no murmurs rubs or gallops  Respiratory: Clear to auscultation bilaterally, equal chest rise and fall, no increased work of breathing  Abdomen: Soft nontender no guarding  Musculoskeletal: Warm and well perfused, Flexes and extends toes, hallices, able to dorsiflex/plantar flex the ankle,  sensation intact to light touch, deep, superficial peroneal, tibial, sural and plantar distributions. Diabetic wound over the medial aspect of the right heal which involves soft tissue with no exposed bone and minimal surrounding erythema. 2+ DP pulses. Trace edema bilaterally.  Neuro: Alert and oriented to person and location, no focal deficits  Integumentary: No additional wounds or rashes   Sacrum: No pressure ulcers no skin changes  DIAGNOSTIC STUDIES    Labs:   Labs Reviewed   CBC WITH DIFFERENTIAL - Abnormal; Notable for the following components:       Result Value    RBC 4.23 (*)     Hemoglobin 12.8 (*)     Hematocrit 39.3 (*)     All other components within normal limits   COMP METABOLIC PANEL - Abnormal; Notable for the following components:    Glucose 112 (*)     AST(SGOT) 9 (*)     All other components within normal limits   VENOUS BLOOD GAS - Abnormal; Notable for the following components:    Venous Bg Pco2 Temp Corrected 34.1 (*)     Venous Bg Po2 Temp Corrected 15.7 (*)     All other components within normal limits   BETA-HYDROXYBUTYRIC ACID   OSMOLALITY SERUM   ESTIMATED GFR   Leukocytosis, hemoglobin near baseline, no acidosis, no elevation of osmolality or beta hydroxybutyrate, low suspicion for DKA HHS, glucose appears well controlled  Radiology:       Radiologist interpretation:   DX-FOOT-COMPLETE 3+ RIGHT   Final Result      1.  No acute fracture or dislocation.   2.  Decreased bone mineralization.           INITIAL ASSESSMENT COURSE AND PLAN  Care Narrative 53-year-old with history of Warnicke's encephalopathy/dementia presenting with chronic nonhealing ulcer of the right foot, presenting for wound check.  Vital signs are reassuring, without fever tachycardia hypotension tachypnea or hypoxia, on exam, patient has a chronic appearing wound to the right heel without surrounding cellulitis, minimal tenderness, is in no acute distress, is alert and interactive although confabulating which is  consistent with his baseline of Warnicke's encephalopathy.     6:02 PM - Patient was evaluated at bedside. The patient is a 53 year old man with a history of diabetes who presents for evaluation of high blood sugar and a diabetic wound to his right heal. He denies any fever, chills, nausea, vomiting, or diarrhea. The patient reports he has not been taking his insulin for 1 week. He presents oriented to person and location, but not time, and with a diabetic wound over the medial aspect of the right heal which involves soft tissue with no exposed bone and minimal surrounding erythema. 2+ DP pulses. Trace edema bilaterally. Ordered for DX-Foot-Complete 3+ right, CBC w/ diff, CMP, Beta-Hydroxybutyric acid, osmolality serum, and venous blood gas to evaluate. The patient will be treated with LR bolus IV for his symptoms. Patient verbalizes understanding and support with my plan of care.  Differential diagnoses include but not limited to: DKA, HHS, considered cellulitis, considered osteomyelitis, fracture, JEWELS, electrolyte abnormality, anemia    ED Observation Status? No; Patient does not meet criteria for ED Observation.         DISPOSITION AND DISCUSSIONS  Work-up reassuring, patient discharged back to his care facility, return precautions given, patient to follow-up with his home health.    I have discussed management of the patient with the following physicians and MARLEY's:  None    Discussion of management with other [facility] or appropriate source(s): None         Barriers to care at this time, including but not limited to:  None .     Decision tools and prescription drugs considered including, but not limited to: Antibiotics considered antibiotics for cellulitis however low suspicion for this, overall wound appears clean and well bandaged, with minimal surrounding inflammation. .    The patient will return for new or worsening symptoms and is stable at the time of discharge.    DISPOSITION:  Patient will be discharged home  in stable condition.    FOLLOW UP:  No follow-up provider specified.      FINAL DIAGNOSIS  1. Encounter for wound re-check         Otoniel SONG (Scribe), am scribing for, and in the presence of, Demarco Gregory M.D..    Electronically signed by: Otoniel Venegas (Scribe), 10/27/2023    Demarco SONG M.D. personally performed the services described in this documentation, as scribed by Otoniel Venegas in my presence, and it is both accurate and complete.      The note accurately reflects work and decisions made by me.  Demarco Gregory M.D.  10/28/2023  12:32 AM

## 2023-10-28 NOTE — ED TRIAGE NOTES
Chief Complaint   Patient presents with    Wound Check     Patient bib EMS from [facility] for non-healing right sided diabetic foot wound. Hx of Wernicke's Encephalopathy and Dementia per report from EMS.

## 2023-10-28 NOTE — ED NOTES
Incontinence episode of stool. Patient cleaned and new linens placed. PIV placed. Fluids initiated.     Report to MUSTAPHA Mcrae. POC discussed. Patient remains on monitoring. VSS.

## 2023-10-30 ENCOUNTER — HOSPITAL ENCOUNTER (OUTPATIENT)
Facility: MEDICAL CENTER | Age: 53
End: 2023-10-30
Attending: FAMILY MEDICINE
Payer: MEDICAID

## 2023-10-30 PROCEDURE — 87205 SMEAR GRAM STAIN: CPT

## 2023-10-30 PROCEDURE — 87077 CULTURE AEROBIC IDENTIFY: CPT

## 2023-10-30 PROCEDURE — 87186 SC STD MICRODIL/AGAR DIL: CPT

## 2023-10-30 PROCEDURE — 87070 CULTURE OTHR SPECIMN AEROBIC: CPT

## 2023-10-31 LAB
GRAM STN SPEC: NORMAL
SIGNIFICANT IND 70042: NORMAL
SITE SITE: NORMAL
SOURCE SOURCE: NORMAL

## 2023-11-01 LAB
BACTERIA WND AEROBE CULT: ABNORMAL
BACTERIA WND AEROBE CULT: ABNORMAL
GRAM STN SPEC: ABNORMAL
SIGNIFICANT IND 70042: ABNORMAL
SITE SITE: ABNORMAL
SOURCE SOURCE: ABNORMAL

## 2023-11-28 NOTE — DISCHARGE PLANNING
Anticipated Discharge Disposition: Return to  with [facility]    Action: LSW made phone call to Sonali Stone at [facility] to arrange transportation. Sonali reported that she will arrange transportation for the pt to be picked up at 1300 today. LSW notified bedside MUSTAPHA Pérez.    Addendum 4/15 @0345  MUSTAPHA Pérez notified this LSW that pt's guardian Deonte Xie is not answering his phone and does not have  set up. Need to contact Deonte prior to pt leaving the hospital. LSW made phone call to Sonali. Sonali reported that she will try to call and text Deonte as well. Sonali also verified that she will be riding in a taxi with the pt.    LSW made phone call to Deonte Rojas and notified him that pt is medically clear to DC today. Requested that Deonte call MUSTAPHA Pérez.    Addendum 4/15 @4782  LSW called Sonali to verify transportation. Sonali stated she will try to arrange the taxi for 1400 today and will let the RN know if that time changes. LSW notified MUSTAPHA Pérez.    Barriers to Discharge: none    Plan: No DC needs identified at this time.   Continue Regimen: Fluorouracil x salicylic acid compound every other night once warts are healed Render In Strict Bullet Format?: No Detail Level: Zone

## 2024-04-18 ENCOUNTER — HOSPITAL ENCOUNTER (OUTPATIENT)
Facility: MEDICAL CENTER | Age: 54
End: 2024-04-23
Attending: EMERGENCY MEDICINE | Admitting: STUDENT IN AN ORGANIZED HEALTH CARE EDUCATION/TRAINING PROGRAM
Payer: MEDICAID

## 2024-04-18 ENCOUNTER — APPOINTMENT (OUTPATIENT)
Dept: RADIOLOGY | Facility: MEDICAL CENTER | Age: 54
End: 2024-04-18
Attending: EMERGENCY MEDICINE
Payer: MEDICAID

## 2024-04-18 DIAGNOSIS — T14.8XXA WOUND, OPEN: ICD-10-CM

## 2024-04-18 DIAGNOSIS — L03.116 CELLULITIS OF LEFT LOWER EXTREMITY: ICD-10-CM

## 2024-04-18 DIAGNOSIS — L97.929 NON-HEALING ULCER OF LOWER EXTREMITY, LEFT, WITH UNSPECIFIED SEVERITY (HCC): ICD-10-CM

## 2024-04-18 DIAGNOSIS — R60.9 DEPENDENT EDEMA: ICD-10-CM

## 2024-04-18 DIAGNOSIS — E11.29 TYPE 2 DIABETES MELLITUS WITH OTHER DIABETIC KIDNEY COMPLICATION, WITHOUT LONG-TERM CURRENT USE OF INSULIN (HCC): ICD-10-CM

## 2024-04-18 LAB
ALBUMIN SERPL BCP-MCNC: 3.7 G/DL (ref 3.2–4.9)
ALBUMIN/GLOB SERPL: 1.2 G/DL
ALP SERPL-CCNC: 93 U/L (ref 30–99)
ALT SERPL-CCNC: 5 U/L (ref 2–50)
ANION GAP SERPL CALC-SCNC: 14 MMOL/L (ref 7–16)
AST SERPL-CCNC: 9 U/L (ref 12–45)
BASOPHILS # BLD AUTO: 0.6 % (ref 0–1.8)
BASOPHILS # BLD: 0.07 K/UL (ref 0–0.12)
BILIRUB SERPL-MCNC: 0.2 MG/DL (ref 0.1–1.5)
BUN SERPL-MCNC: 9 MG/DL (ref 8–22)
CALCIUM ALBUM COR SERPL-MCNC: 9.5 MG/DL (ref 8.5–10.5)
CALCIUM SERPL-MCNC: 9.3 MG/DL (ref 8.5–10.5)
CHLORIDE SERPL-SCNC: 100 MMOL/L (ref 96–112)
CO2 SERPL-SCNC: 26 MMOL/L (ref 20–33)
CREAT SERPL-MCNC: 0.73 MG/DL (ref 0.5–1.4)
CRP SERPL HS-MCNC: 8.99 MG/DL (ref 0–0.75)
EOSINOPHIL # BLD AUTO: 0.26 K/UL (ref 0–0.51)
EOSINOPHIL NFR BLD: 2.4 % (ref 0–6.9)
ERYTHROCYTE [DISTWIDTH] IN BLOOD BY AUTOMATED COUNT: 49.2 FL (ref 35.9–50)
ERYTHROCYTE [SEDIMENTATION RATE] IN BLOOD BY WESTERGREN METHOD: 46 MM/HOUR (ref 0–20)
GFR SERPLBLD CREATININE-BSD FMLA CKD-EPI: 109 ML/MIN/1.73 M 2
GLOBULIN SER CALC-MCNC: 3 G/DL (ref 1.9–3.5)
GLUCOSE SERPL-MCNC: 114 MG/DL (ref 65–99)
HCT VFR BLD AUTO: 33.4 % (ref 42–52)
HGB BLD-MCNC: 10.9 G/DL (ref 14–18)
IMM GRANULOCYTES # BLD AUTO: 0.15 K/UL (ref 0–0.11)
IMM GRANULOCYTES NFR BLD AUTO: 1.4 % (ref 0–0.9)
LYMPHOCYTES # BLD AUTO: 2.47 K/UL (ref 1–4.8)
LYMPHOCYTES NFR BLD: 22.7 % (ref 22–41)
MCH RBC QN AUTO: 29.1 PG (ref 27–33)
MCHC RBC AUTO-ENTMCNC: 32.6 G/DL (ref 32.3–36.5)
MCV RBC AUTO: 89.3 FL (ref 81.4–97.8)
MONOCYTES # BLD AUTO: 0.87 K/UL (ref 0–0.85)
MONOCYTES NFR BLD AUTO: 8 % (ref 0–13.4)
NEUTROPHILS # BLD AUTO: 7.06 K/UL (ref 1.82–7.42)
NEUTROPHILS NFR BLD: 64.9 % (ref 44–72)
NRBC # BLD AUTO: 0 K/UL
NRBC BLD-RTO: 0 /100 WBC (ref 0–0.2)
PLATELET # BLD AUTO: 254 K/UL (ref 164–446)
PMV BLD AUTO: 9.4 FL (ref 9–12.9)
POTASSIUM SERPL-SCNC: 3.8 MMOL/L (ref 3.6–5.5)
PROT SERPL-MCNC: 6.7 G/DL (ref 6–8.2)
RBC # BLD AUTO: 3.74 M/UL (ref 4.7–6.1)
SODIUM SERPL-SCNC: 140 MMOL/L (ref 135–145)
WBC # BLD AUTO: 10.9 K/UL (ref 4.8–10.8)

## 2024-04-18 PROCEDURE — 36415 COLL VENOUS BLD VENIPUNCTURE: CPT

## 2024-04-18 PROCEDURE — 85652 RBC SED RATE AUTOMATED: CPT

## 2024-04-18 PROCEDURE — 99285 EMERGENCY DEPT VISIT HI MDM: CPT

## 2024-04-18 PROCEDURE — 85025 COMPLETE CBC W/AUTO DIFF WBC: CPT

## 2024-04-18 PROCEDURE — 80053 COMPREHEN METABOLIC PANEL: CPT

## 2024-04-18 PROCEDURE — 73620 X-RAY EXAM OF FOOT: CPT | Mod: LT

## 2024-04-18 PROCEDURE — 86140 C-REACTIVE PROTEIN: CPT

## 2024-04-18 PROCEDURE — 71045 X-RAY EXAM CHEST 1 VIEW: CPT

## 2024-04-18 ASSESSMENT — FIBROSIS 4 INDEX: FIB4 SCORE: 0.72

## 2024-04-19 ENCOUNTER — APPOINTMENT (OUTPATIENT)
Dept: RADIOLOGY | Facility: MEDICAL CENTER | Age: 54
End: 2024-04-19
Attending: EMERGENCY MEDICINE
Payer: MEDICAID

## 2024-04-19 PROBLEM — E51.2 WERNICKE ENCEPHALOPATHY: Status: ACTIVE | Noted: 2024-04-19

## 2024-04-19 PROBLEM — L97.929 NONHEALING ULCER OF LEFT LOWER EXTREMITY (HCC): Status: ACTIVE | Noted: 2024-04-19

## 2024-04-19 PROBLEM — F99 PSYCHIATRIC DISORDER: Status: ACTIVE | Noted: 2024-04-19

## 2024-04-19 PROBLEM — L03.90 CELLULITIS: Status: ACTIVE | Noted: 2024-04-19

## 2024-04-19 LAB — SCCMEC + MECA PNL NOSE NAA+PROBE: NEGATIVE

## 2024-04-19 PROCEDURE — 96367 TX/PROPH/DG ADDL SEQ IV INF: CPT

## 2024-04-19 PROCEDURE — 96372 THER/PROPH/DIAG INJ SC/IM: CPT | Mod: XU

## 2024-04-19 PROCEDURE — G0378 HOSPITAL OBSERVATION PER HR: HCPCS

## 2024-04-19 PROCEDURE — 700111 HCHG RX REV CODE 636 W/ 250 OVERRIDE (IP): Mod: UD | Performed by: EMERGENCY MEDICINE

## 2024-04-19 PROCEDURE — A9270 NON-COVERED ITEM OR SERVICE: HCPCS | Mod: UD | Performed by: STUDENT IN AN ORGANIZED HEALTH CARE EDUCATION/TRAINING PROGRAM

## 2024-04-19 PROCEDURE — 96365 THER/PROPH/DIAG IV INF INIT: CPT

## 2024-04-19 PROCEDURE — 99223 1ST HOSP IP/OBS HIGH 75: CPT | Performed by: STUDENT IN AN ORGANIZED HEALTH CARE EDUCATION/TRAINING PROGRAM

## 2024-04-19 PROCEDURE — 700105 HCHG RX REV CODE 258: Mod: UD | Performed by: STUDENT IN AN ORGANIZED HEALTH CARE EDUCATION/TRAINING PROGRAM

## 2024-04-19 PROCEDURE — 700111 HCHG RX REV CODE 636 W/ 250 OVERRIDE (IP): Mod: JZ,UD | Performed by: STUDENT IN AN ORGANIZED HEALTH CARE EDUCATION/TRAINING PROGRAM

## 2024-04-19 PROCEDURE — 93971 EXTREMITY STUDY: CPT | Mod: 26,LT | Performed by: INTERNAL MEDICINE

## 2024-04-19 PROCEDURE — 96366 THER/PROPH/DIAG IV INF ADDON: CPT

## 2024-04-19 PROCEDURE — 93971 EXTREMITY STUDY: CPT | Mod: LT

## 2024-04-19 PROCEDURE — 97602 WOUND(S) CARE NON-SELECTIVE: CPT

## 2024-04-19 PROCEDURE — 700102 HCHG RX REV CODE 250 W/ 637 OVERRIDE(OP): Mod: UD | Performed by: STUDENT IN AN ORGANIZED HEALTH CARE EDUCATION/TRAINING PROGRAM

## 2024-04-19 PROCEDURE — 87641 MR-STAPH DNA AMP PROBE: CPT

## 2024-04-19 PROCEDURE — 700105 HCHG RX REV CODE 258: Mod: UD | Performed by: EMERGENCY MEDICINE

## 2024-04-19 RX ORDER — OMEPRAZOLE 20 MG/1
20 CAPSULE, DELAYED RELEASE ORAL 2 TIMES DAILY
Status: DISCONTINUED | OUTPATIENT
Start: 2024-04-19 | End: 2024-04-23 | Stop reason: HOSPADM

## 2024-04-19 RX ORDER — ENOXAPARIN SODIUM 100 MG/ML
40 INJECTION SUBCUTANEOUS DAILY
Status: DISCONTINUED | OUTPATIENT
Start: 2024-04-19 | End: 2024-04-23 | Stop reason: HOSPADM

## 2024-04-19 RX ORDER — AMOXICILLIN 250 MG
2 CAPSULE ORAL 2 TIMES DAILY PRN
Status: DISCONTINUED | OUTPATIENT
Start: 2024-04-19 | End: 2024-04-23 | Stop reason: HOSPADM

## 2024-04-19 RX ORDER — ACETAMINOPHEN 325 MG/1
650 TABLET ORAL EVERY 6 HOURS PRN
Status: DISCONTINUED | OUTPATIENT
Start: 2024-04-19 | End: 2024-04-23 | Stop reason: HOSPADM

## 2024-04-19 RX ORDER — FOLIC ACID 1 MG/1
1 TABLET ORAL DAILY
Status: DISCONTINUED | OUTPATIENT
Start: 2024-04-19 | End: 2024-04-23 | Stop reason: HOSPADM

## 2024-04-19 RX ORDER — TRAMADOL HYDROCHLORIDE 50 MG/1
50 TABLET ORAL EVERY 6 HOURS PRN
Status: DISCONTINUED | OUTPATIENT
Start: 2024-04-19 | End: 2024-04-23 | Stop reason: HOSPADM

## 2024-04-19 RX ORDER — DOXYCYCLINE HYCLATE 100 MG
100 TABLET ORAL EVERY 12 HOURS
Status: ON HOLD | COMMUNITY
End: 2024-04-20

## 2024-04-19 RX ORDER — GAUZE BANDAGE 2" X 2"
100 BANDAGE TOPICAL DAILY
Status: DISCONTINUED | OUTPATIENT
Start: 2024-04-19 | End: 2024-04-23 | Stop reason: HOSPADM

## 2024-04-19 RX ORDER — RISPERIDONE 2 MG/1
2 TABLET ORAL 2 TIMES DAILY
Status: DISCONTINUED | OUTPATIENT
Start: 2024-04-19 | End: 2024-04-23 | Stop reason: HOSPADM

## 2024-04-19 RX ORDER — ASPIRIN 325 MG
325 TABLET ORAL DAILY
Status: DISCONTINUED | OUTPATIENT
Start: 2024-04-19 | End: 2024-04-23 | Stop reason: HOSPADM

## 2024-04-19 RX ORDER — DIVALPROEX SODIUM 250 MG/1
250 TABLET, DELAYED RELEASE ORAL 3 TIMES DAILY
Status: DISCONTINUED | OUTPATIENT
Start: 2024-04-19 | End: 2024-04-23 | Stop reason: HOSPADM

## 2024-04-19 RX ORDER — POLYETHYLENE GLYCOL 3350 17 G/17G
1 POWDER, FOR SOLUTION ORAL
Status: DISCONTINUED | OUTPATIENT
Start: 2024-04-19 | End: 2024-04-23 | Stop reason: HOSPADM

## 2024-04-19 RX ORDER — CEFDINIR 300 MG/1
300 CAPSULE ORAL EVERY 12 HOURS
Status: ON HOLD | COMMUNITY
End: 2024-04-20

## 2024-04-19 RX ADMIN — OMEPRAZOLE 20 MG: 20 CAPSULE, DELAYED RELEASE ORAL at 17:56

## 2024-04-19 RX ADMIN — RISPERIDONE 2 MG: 2 TABLET ORAL at 17:56

## 2024-04-19 RX ADMIN — OMEPRAZOLE 20 MG: 20 CAPSULE, DELAYED RELEASE ORAL at 05:15

## 2024-04-19 RX ADMIN — DIVALPROEX SODIUM 250 MG: 250 TABLET, DELAYED RELEASE ORAL at 12:25

## 2024-04-19 RX ADMIN — ASPIRIN 325 MG: 325 TABLET ORAL at 05:15

## 2024-04-19 RX ADMIN — DIVALPROEX SODIUM 250 MG: 250 TABLET, DELAYED RELEASE ORAL at 05:15

## 2024-04-19 RX ADMIN — VANCOMYCIN HYDROCHLORIDE 2000 MG: 5 INJECTION, POWDER, LYOPHILIZED, FOR SOLUTION INTRAVENOUS at 00:55

## 2024-04-19 RX ADMIN — ENOXAPARIN SODIUM 40 MG: 100 INJECTION SUBCUTANEOUS at 17:56

## 2024-04-19 RX ADMIN — CEFEPIME 2 G: 2 INJECTION, POWDER, FOR SOLUTION INTRAVENOUS at 14:13

## 2024-04-19 RX ADMIN — AMPICILLIN AND SULBACTAM 3 G: 1; 2 INJECTION, POWDER, FOR SOLUTION INTRAMUSCULAR; INTRAVENOUS at 00:08

## 2024-04-19 RX ADMIN — RISPERIDONE 2 MG: 2 TABLET ORAL at 05:15

## 2024-04-19 RX ADMIN — DIVALPROEX SODIUM 250 MG: 250 TABLET, DELAYED RELEASE ORAL at 17:56

## 2024-04-19 RX ADMIN — FOLIC ACID 1 MG: 1 TABLET ORAL at 05:15

## 2024-04-19 RX ADMIN — CEFEPIME 2 G: 2 INJECTION, POWDER, FOR SOLUTION INTRAVENOUS at 04:40

## 2024-04-19 RX ADMIN — CEFEPIME 2 G: 2 INJECTION, POWDER, FOR SOLUTION INTRAVENOUS at 21:05

## 2024-04-19 RX ADMIN — Medication 100 MG: at 05:15

## 2024-04-19 ASSESSMENT — COGNITIVE AND FUNCTIONAL STATUS - GENERAL
SUGGESTED CMS G CODE MODIFIER MOBILITY: CK
WALKING IN HOSPITAL ROOM: A LITTLE
CLIMB 3 TO 5 STEPS WITH RAILING: A LITTLE
MOVING TO AND FROM BED TO CHAIR: A LITTLE
MOVING FROM LYING ON BACK TO SITTING ON SIDE OF FLAT BED: A LITTLE
MOBILITY SCORE: 18
HELP NEEDED FOR BATHING: A LITTLE
STANDING UP FROM CHAIR USING ARMS: A LITTLE
SUGGESTED CMS G CODE MODIFIER DAILY ACTIVITY: CJ
TURNING FROM BACK TO SIDE WHILE IN FLAT BAD: A LITTLE
TOILETING: A LITTLE
DAILY ACTIVITIY SCORE: 22

## 2024-04-19 ASSESSMENT — LIFESTYLE VARIABLES
TOTAL SCORE: 0
TOTAL SCORE: 0
EVER HAD A DRINK FIRST THING IN THE MORNING TO STEADY YOUR NERVES TO GET RID OF A HANGOVER: NO
ALCOHOL_USE: YES
HAVE YOU EVER FELT YOU SHOULD CUT DOWN ON YOUR DRINKING: NO
TOTAL SCORE: 0
HOW MANY TIMES IN THE PAST YEAR HAVE YOU HAD 5 OR MORE DRINKS IN A DAY: 2
CONSUMPTION TOTAL: POSITIVE
HAVE PEOPLE ANNOYED YOU BY CRITICIZING YOUR DRINKING: NO
ON A TYPICAL DAY WHEN YOU DRINK ALCOHOL HOW MANY DRINKS DO YOU HAVE: 4
AVERAGE NUMBER OF DAYS PER WEEK YOU HAVE A DRINK CONTAINING ALCOHOL: 2
EVER FELT BAD OR GUILTY ABOUT YOUR DRINKING: NO

## 2024-04-19 ASSESSMENT — FIBROSIS 4 INDEX: FIB4 SCORE: 0.82

## 2024-04-19 ASSESSMENT — PAIN DESCRIPTION - PAIN TYPE
TYPE: ACUTE PAIN
TYPE: ACUTE PAIN

## 2024-04-19 NOTE — PROGRESS NOTES
4 Eyes Skin Assessment Completed by MUSTAPHA Lewis and MUSTAPHA Renteria.    Head WDL  Ears WDL  Nose WDL  Mouth WDL  Neck WDL  Breast/Chest WDL  Shoulder Blades WDL  Spine WDL  (R) Arm/Elbow/Hand WDL  (L) Arm/Elbow/Hand WDL  Abdomen WDL  Groin WDL  Scrotum/Coccyx/Buttocks  rashes , redness on buttocks  (R) Leg WDL  (L) Leg Swelling and Edema,flaking, peeling   (R) Heel/Foot/Toe  dry  (L) Heel/Foot/Toe old wound ,dry          Devices In Places Blood Pressure Cuff and Condom Cath, PIV      Interventions In Place Pillows and Barrier Cream    Possible Skin Injury Yes    Pictures Uploaded Into Epic Yes  Wound Consult Placed Yes  RN Wound Prevention Protocol Ordered No

## 2024-04-19 NOTE — DISCHARGE PLANNING
[facility] to [facility]     1300- Per [facility], declined / out of network    1315- Referral sent to [facility]

## 2024-04-19 NOTE — ASSESSMENT & PLAN NOTE
Swollen, red left leg  Given Unasyn and vancomycin in ED  Recently completed antibiotics and has had nonhealing wound now with possible cellulitis  Previous cultures growing Pseudomonas, will give cefepime, check MRSA nares

## 2024-04-19 NOTE — CARE PLAN
The patient is Stable - Low risk of patient condition declining or worsening    Shift Goals  Clinical Goals: control pain <4  Patient Goals: rest  Family Goals: KIRTI    Progress made toward(s) clinical / shift goals:    Problem: Pain - Standard  Goal: Alleviation of pain or a reduction in pain to the patient’s comfort goal  Outcome: Progressing     Problem: Knowledge Deficit - Standard  Goal: Patient and family/care givers will demonstrate understanding of plan of care, disease process/condition, diagnostic tests and medications  Outcome: Progressing

## 2024-04-19 NOTE — H&P
Hospital Medicine History & Physical Note    Date of Service  4/19/2024    Primary Care Physician  Kristina Roche M.D.    Code Status  Prior    Chief Complaint  Chief Complaint   Patient presents with    Wound Check     L lateral foot       History of Presenting Illness  Yury Blake is a 53 y.o. male who presented 4/18/2024 with wound check and leg swelling.  Patient has history of Warnicke's encephalopathy, is unable to give much history at all, ANO x 1 at baseline.  Further history obtained from EDP and chart review.  Is brought in from [facility] from assisted living [facility] due to a nonhealing left foot wound, recently completed a course of antibiotics for.  His left leg was more swollen than his right and EDP was concern for DVT, ultrasound unavailable until the morning.  He was started on vancomycin and Unasyn.  Vital stable, had mild leukocytosis at 10.9, elevated inflammatory markers    I discussed the plan of care with patient.    Review of Systems  Review of Systems   Unable to perform ROS: Mental acuity       Past Medical History   has a past medical history of Arthritis, Encephalopathy, Hypertension, Pain, and Psychiatric problem.    Surgical History   has a past surgical history that includes orif, ankle (Right, 9/26/2017); tendon lenghtening (Bilateral, 2/1/2021); irrigation & debridement ortho (Left, 2/1/2021); and irrigation & debridement ortho (Left, 4/12/2021).     Family History  family history is not on file.   Family history reviewed with patient. There is no family history that is pertinent to the chief complaint.     Social History   reports that he has never smoked. His smokeless tobacco use includes chew. He reports current alcohol use. He reports that he does not use drugs.    Allergies  No Known Allergies    Medications  Prior to Admission Medications   Prescriptions Last Dose Informant Patient Reported? Taking?   acetaminophen (TYLENOL) 325 MG Tab  MAR from Other Facility Yes No    Sig: Take 650 mg by mouth every 6 hours as needed. Indications: Pain   aspirin (ASA) 325 MG Tab  MAR from Other Facility Yes No   Sig: Take 325 mg by mouth every day.   divalproex (DEPAKOTE) 250 MG Tablet Delayed Response  MAR from Other Facility Yes No   Sig: Take 250 mg by mouth 3 times a day.   folic acid (FOLVITE) 1 MG Tab  MAR from Other Facility Yes No   Sig: Take 1 mg by mouth every day.   metFORMIN (GLUCOPHAGE) 500 MG Tab   No No   Sig: Take 1 Tablet by mouth 2 times a day with meals.   omeprazole (PRILOSEC) 20 MG delayed-release capsule   No No   Sig: Take 1 Capsule by mouth 2 times a day.   risperiDONE (RISPERDAL) 2 MG Tab  MAR from Other Facility No No   Sig: Take 1 tablet by mouth 2 Times a Day.   thiamine (VITAMIN B-1) 100 MG Tab  MAR from Other Facility Yes No   Sig: Take 100 mg by mouth every day.      Facility-Administered Medications: None       Physical Exam  Temp:  [36.1 °C (97 °F)] 36.1 °C (97 °F)  Pulse:  [79-85] 79  Resp:  [13-17] 13  BP: (128-137)/(62-71) 135/71  SpO2:  [94 %-96 %] 95 %  Blood Pressure: 135/71   Temperature: 36.1 °C (97 °F)   Pulse: 79   Respiration: 13   Pulse Oximetry: 95 %       Physical Exam  Constitutional:       General: He is not in acute distress.     Appearance: He is obese. He is not toxic-appearing.   HENT:      Head: Normocephalic and atraumatic.      Mouth/Throat:      Mouth: Mucous membranes are moist.      Pharynx: Oropharynx is clear.   Eyes:      Extraocular Movements: Extraocular movements intact.      Conjunctiva/sclera: Conjunctivae normal.   Cardiovascular:      Rate and Rhythm: Normal rate and regular rhythm.   Pulmonary:      Effort: Pulmonary effort is normal.      Breath sounds: Normal breath sounds.   Abdominal:      Palpations: Abdomen is soft.      Tenderness: There is no abdominal tenderness.   Musculoskeletal:      Cervical back: Neck supple. No rigidity.      Comments: Ulcer on left lateral foot, left leg more swollen than contralateral  "  Skin:     General: Skin is warm and dry.   Neurological:      Mental Status: Mental status is at baseline.      Cranial Nerves: No cranial nerve deficit.         Laboratory:  Recent Labs     04/18/24 2203   WBC 10.9*   RBC 3.74*   HEMOGLOBIN 10.9*   HEMATOCRIT 33.4*   MCV 89.3   MCH 29.1   MCHC 32.6   RDW 49.2   PLATELETCT 254   MPV 9.4     Recent Labs     04/18/24 2203   SODIUM 140   POTASSIUM 3.8   CHLORIDE 100   CO2 26   GLUCOSE 114*   BUN 9   CREATININE 0.73   CALCIUM 9.3     Recent Labs     04/18/24 2203   ALTSGPT 5   ASTSGOT 9*   ALKPHOSPHAT 93   TBILIRUBIN 0.2   GLUCOSE 114*         No results for input(s): \"NTPROBNP\" in the last 72 hours.      No results for input(s): \"TROPONINT\" in the last 72 hours.    Imaging:  DX-CHEST-LIMITED (1 VIEW)   Final Result         1.  No acute cardiopulmonary disease.   2.  Cardiomegaly      DX-FOOT-2- LEFT   Final Result         1.  No acute traumatic bony injury.   2.  Soft tissue ulcer lateral to the fifth toe.   3.  Diffuse soft tissue swelling of the foot and ankle, consider edema or cellulitis.      US-EXTREMITY VENOUS LOWER UNILAT LEFT    (Results Pending)       Assessment/Plan:  Justification for Admission Status  I anticipate this patient is appropriate for observation status at this time because foot wound and need to rule out DVT    Patient will need a Med/Surg bed on MEDICAL service .  The need is secondary to above.    * Leg swelling- (present on admission)  Assessment & Plan  Asymmetric left leg swelling  Possible cellulitis versus DVT  DVT unable to be obtained on night of presentation, will be completed in morning      Wernicke encephalopathy  Assessment & Plan  Patient apparently at baseline  Unable to give much history  Continue thiamine    Nonhealing ulcer of left lower extremity (HCC)  Assessment & Plan  In setting of diabetes  Previous cultures have grown Pseudomonas  Given Unasyn and vancomycin in ED, will cover for Pseudomonas now, check MRSA " nares  Wound care  Elevated inflammatory markers though no signs of osteomyelitis on x-ray, could consider MRI if further concern for osteomyelitis or if ulcer probes to bone    Cellulitis  Assessment & Plan  Swollen, red left leg  Given Unasyn and vancomycin in ED  Recently completed antibiotics and has had nonhealing wound now with possible cellulitis  Previous cultures growing Pseudomonas, will give cefepime, check MRSA nares        VTE prophylaxis: SCDs/TEDs and enoxaparin ppx

## 2024-04-19 NOTE — ED NOTES
non healing Left foot wound that pt rcently finished a course of abx for. Pt is A&O x's 1, which is baseline for him. NAD noted. Connected to bedside monitor, call light in reach

## 2024-04-19 NOTE — FACE TO FACE
Face to Face Supporting Documentation - Home Health    The encounter with this patient was in whole or in part the primary reason for home health admission.    Date of encounter:   Patient:                    MRN:                       YOB: 2023  Yury Blake  [id number]  1970     Home health to see patient for:  Skilled Nursing care for assessment, interventions & education, Wound Care, Home health aide, Physical Therapy evaluation and treatment, and Occupational therapy evaluation and treatment    Skilled need for:  New Onset Medical Diagnosis LLE cellulitis    Skilled nursing interventions to include:  Wound Care and Comment: HH/PT/OT    Homebound status evidenced by:  Need the aid of supportive devices such as crutches, canes, wheelchairs or walkers. Leaving home requires a considerable and taxing effort. There is a normal inability to leave the home.    Community Physician to provide follow up care: Kristina Roche M.D.     Optional Interventions? No      I certify the face to face encounter for this home health care referral meets the CMS requirements and the encounter/clinical assessment with the patient was, in whole, or in part, for the medical condition(s) listed above, which is the primary reason for home health care. Based on my clinical findings: the service(s) are medically necessary, support the need for home health care, and the homebound criteria are met.  I certify that this patient has had a face to face encounter by myself.  Britni Valladares D.O. - NPI: [id number]

## 2024-04-19 NOTE — ED PROVIDER NOTES
ED Provider Note    Scribed for Thomas Bain M.D. by Jojo Bob. 4/18/2024  9:09 PM    Primary care provider: Krisitna Roche M.D.  Means of arrival: EMS    CHIEF COMPLAINT  Chief Complaint   Patient presents with    Wound Check     L lateral foot       EXTERNAL RECORDS REVIEWED  Reviewed patient's note. He was on cefdinir and doxy for 8 days. He has a history of Wernicke's encephalopathy.    HPI    Yury Blake is a 53 y.o. male who presents to the Emergency Department for left lateral foot wound check. He describes his pain as constant and sharp. He states associated symptoms of fevers, chills, nausea, vomiting, or shortness of breath. He states he finished his antibiotics today. He states a history of hypertension, but denies diabetes.     He denies taking metformin. He states he has a sore on his leg, but denies abdominal pain. He states he works as a , but does not know where he came from today.     LIMITATION TO HISTORY   None    OUTSIDE HISTORIAN(S):  None    PAST MEDICAL HISTORY  Past Medical History:   Diagnosis Date    Arthritis     RA    Encephalopathy     Hypertension     Pain     Right ankle    Psychiatric problem     anxiety       SOCIAL HISTORY  Social History     Tobacco Use    Smoking status: Never    Smokeless tobacco: Current     Types: Chew   Vaping Use    Vaping Use: Never used   Substance Use Topics    Alcohol use: Yes     Comment: 6-10 beers daily    Drug use: No     Social History     Substance and Sexual Activity   Drug Use No       SURGICAL HISTORY  Past Surgical History:   Procedure Laterality Date    IRRIGATION & DEBRIDEMENT ORTHO Left 4/12/2021    Procedure: IRRIGATION AND DEBRIDEMENT, WOUND - HEEL;  Surgeon: Donny Roque M.D.;  Location: SURGERY [facility];  Service: Orthopedics    TENDON LENGHTENING Bilateral 2/1/2021    Procedure: LENGTHENING, TENDON- FOR FOOT DROP RECONSTRUCTION, POSTERIOR TIBIALIS TENDON  TRANSFER , ACHILLES LENGHTENING , LEFT MEDIAL  RELEASE;  Surgeon: Donny Roque M.D.;  Location: SURGERY [facility];  Service: Orthopedics    IRRIGATION & DEBRIDEMENT ORTHO Left 2/1/2021    Procedure: IRRIGATION AND DEBRIDEMENT, WOUND-FOOT;  Surgeon: Donny Roque M.D.;  Location: SURGERY [facility];  Service: Orthopedics    ORIF, ANKLE Right 9/26/2017    Procedure: ANKLE ORIF SYNDESOMOSIS REPAIR;  Surgeon: Armando Woodard M.D.;  Location: [facility];  Service: Orthopedics       CURRENT MEDICATIONS  No current facility-administered medications for this encounter.    Current Outpatient Medications:     metFORMIN (GLUCOPHAGE) 500 MG Tab, Take 1 Tablet by mouth 2 times a day with meals., Disp: 60 Tablet, Rfl: 0    omeprazole (PRILOSEC) 20 MG delayed-release capsule, Take 1 Capsule by mouth 2 times a day., Disp: 30 Capsule, Rfl: 0    aspirin (ASA) 325 MG Tab, Take 325 mg by mouth every day., Disp: , Rfl:     divalproex (DEPAKOTE) 250 MG Tablet Delayed Response, Take 250 mg by mouth 3 times a day., Disp: , Rfl:     folic acid (FOLVITE) 1 MG Tab, Take 1 mg by mouth every day., Disp: , Rfl:     thiamine (VITAMIN B-1) 100 MG Tab, Take 100 mg by mouth every day., Disp: , Rfl:     acetaminophen (TYLENOL) 325 MG Tab, Take 650 mg by mouth every 6 hours as needed. Indications: Pain, Disp: , Rfl:     risperiDONE (RISPERDAL) 2 MG Tab, Take 1 tablet by mouth 2 Times a Day., Disp: 120 tablet, Rfl: 2    ALLERGIES  No Known Allergies    PHYSICAL EXAM  VITAL SIGNS: /62   Pulse 85   Temp 36.1 °C (97 °F) (Temporal)   Resp 17   Ht 1.829 m (6')   Wt 83.9 kg (184 lb 15.5 oz)   SpO2 94%   BMI 25.09 kg/m²   Reviewed and afebrile  Constitutional: Well developed, Well nourished, Elevated BMI, Confabulates.  HENT: Normocephalic, atraumatic, bilateral external ears normal, No intraoral erythema, edema, exudate  Eyes: PERRLA, conjunctiva pink, no scleral icterus.   Cardiovascular: Regular rate and rhythm. No murmurs, rubs or gallops.  3 plus pitting edema on  left and 1 plus on right.   Respiratory: Lungs clear to auscultation bilaterally. No wheezes, rales, or rhonchi.  Abdominal:  Abdomen soft, non-tender, non distended. No rebound, or guarding.    Skin: Erythema of left shin, Small 1 cm ulceration on left lateral foot that is healing.  Genitourinary: No costovertebral angle tenderness.   Musculoskeletal: no deformities.   Neurologic: Alert & oriented x 3, cranial nerves 2-12 intact by passive exam.  No focal deficit noted.  Psychiatric: Affect normal, Judgment normal, Mood normal.     LABS Ordered and Reviewed by Me:  Results for orders placed or performed during the hospital encounter of 04/18/24   CBC WITH DIFFERENTIAL   Result Value Ref Range    WBC 10.9 (H) 4.8 - 10.8 K/uL    RBC 3.74 (L) 4.70 - 6.10 M/uL    Hemoglobin 10.9 (L) 14.0 - 18.0 g/dL    Hematocrit 33.4 (L) 42.0 - 52.0 %    MCV 89.3 81.4 - 97.8 fL    MCH 29.1 27.0 - 33.0 pg    MCHC 32.6 32.3 - 36.5 g/dL    RDW 49.2 35.9 - 50.0 fL    Platelet Count 254 164 - 446 K/uL    MPV 9.4 9.0 - 12.9 fL    Neutrophils-Polys 64.90 44.00 - 72.00 %    Lymphocytes 22.70 22.00 - 41.00 %    Monocytes 8.00 0.00 - 13.40 %    Eosinophils 2.40 0.00 - 6.90 %    Basophils 0.60 0.00 - 1.80 %    Immature Granulocytes 1.40 (H) 0.00 - 0.90 %    Nucleated RBC 0.00 0.00 - 0.20 /100 WBC    Neutrophils (Absolute) 7.06 1.82 - 7.42 K/uL    Lymphs (Absolute) 2.47 1.00 - 4.80 K/uL    Monos (Absolute) 0.87 (H) 0.00 - 0.85 K/uL    Eos (Absolute) 0.26 0.00 - 0.51 K/uL    Baso (Absolute) 0.07 0.00 - 0.12 K/uL    Immature Granulocytes (abs) 0.15 (H) 0.00 - 0.11 K/uL    NRBC (Absolute) 0.00 K/uL   Comp Metabolic Panel   Result Value Ref Range    Sodium 140 135 - 145 mmol/L    Potassium 3.8 3.6 - 5.5 mmol/L    Chloride 100 96 - 112 mmol/L    Co2 26 20 - 33 mmol/L    Anion Gap 14.0 7.0 - 16.0    Glucose 114 (H) 65 - 99 mg/dL    Bun 9 8 - 22 mg/dL    Creatinine 0.73 0.50 - 1.40 mg/dL    Calcium 9.3 8.5 - 10.5 mg/dL    Correct Calcium 9.5 8.5 - 10.5  mg/dL    AST(SGOT) 9 (L) 12 - 45 U/L    ALT(SGPT) 5 2 - 50 U/L    Alkaline Phosphatase 93 30 - 99 U/L    Total Bilirubin 0.2 0.1 - 1.5 mg/dL    Albumin 3.7 3.2 - 4.9 g/dL    Total Protein 6.7 6.0 - 8.2 g/dL    Globulin 3.0 1.9 - 3.5 g/dL    A-G Ratio 1.2 g/dL   CRP QUANTITIVE (NON-CARDIAC)   Result Value Ref Range    Stat C-Reactive Protein 8.99 (H) 0.00 - 0.75 mg/dL   ESTIMATED GFR   Result Value Ref Range    GFR (CKD-EPI) 109 >60 mL/min/1.73 m 2         RADIOLOGY  I have independently interpreted the DX-Chest and DX-Foot Left associated with this visit demonstrating no pneumonia and no aversion of the bone. I am awaiting the final reading from the radiologist.     Final Radiology Report  DX-CHEST-LIMITED (1 VIEW)   Final Result         1.  No acute cardiopulmonary disease.   2.  Cardiomegaly      DX-FOOT-2- LEFT   Final Result         1.  No acute traumatic bony injury.   2.  Soft tissue ulcer lateral to the fifth toe.   3.  Diffuse soft tissue swelling of the foot and ankle, consider edema or cellulitis.      US-EXTREMITY VENOUS LOWER UNILAT LEFT    (Results Pending)     Radiologist interpretation have been reviewed by me.     COURSE & MEDICAL DECISION MAKING  9:09 PM - Patient seen and examined at bedside. Ordered for labs and imaging. Patient verbalizes understanding and agreement to this plan of care.      INTERVENTIONS BY ME:  Vancomycin, Unasyn    11:30 -I was notified by nursing that ultrasound is not in-house and since the patient is being admitted ultrasound will not be performed until the morning.    ASSESSMENT, COURSE AND PLAN:  PROBLEMS EVALUATED THIS VISIT:    This patient presents with diabetes chronic foot wound that appears to be healing and swelling redness and warmth of the left leg concerning for cellulitis or DVT.  The patient will require IV antibiotics and ultrasound of the leg.  I doubt he will require any surgical intervention of the foot.    DISPOSITION AND DISCUSSIONS    I have discussed  management of the patient with the following physicians and sources:    Dr. Tovar    RISK:  High given need for escalation of care to include admission    PLAN:  As above    CONDITION: Fair.     FINAL IMPRESSION  1. Cellulitis of left lower extremity    2. Wound, open    3. Type 2 diabetes mellitus with other diabetic kidney complication, without long-term current use of insulin (HCC)    4. Dependent edema       Jojo SONG (Scribe), am scribing for, and in the presence of, Thomas Bain M.D..    Electronically signed by: Jojo Bob (Scribe), 4/18/2024    IThomas M.D. personally performed the services described in this documentation, as scribed by Jojo Bob in my presence, and it is both accurate and complete.    The note accurately reflects work and decisions made by me.  Thomas Bain M.D.  4/19/2024  10:10 AM

## 2024-04-19 NOTE — HOSPITAL COURSE
"This is a 53-year-old male with past medical history of Warnicke encephalopathy, type 2 diabetes, who resides at an assisted living facility who was brought in for nonhealing left lower extremity wound.    X-ray negative for concerns of osteo, noted ulcer lateral to the fifth toe.  Last significant for elevated ESR and CRP.  Venous Doppler negative for DVT.  MRSA swab negative.  Patient started on empiric antibiotics, patient to complete antibiotic course with Keflex.  ANA with no evidence of arterial insufficiency.  Wound care team consulted.    Wound care reccs for left foot ulcer: \"Nursing to remove old dressing. Cleanse wound with wound cleanser and gauze. Pat dry. Apply no sting skin barrier to berto-wound. Cut a piece of hydrofera blue slightly larger than wound and apply to wound bed foam side down (writing side up). Secure in place with silicone adhesive foam. Nursing to change every 72hrs and PRN dislodgement and or drainage saturation.\"  "

## 2024-04-19 NOTE — ASSESSMENT & PLAN NOTE
In setting of diabetes  Previous cultures have grown Pseudomonas  Given Unasyn and vancomycin in ED, will cover for Pseudomonas now, check MRSA nares  X-ray negative for concerns of osteo, noted ulcer lateral to the fifth toe.  Last significant for elevated ESR and CRP.  Venous Doppler negative for DVT.  MRSA swab pending.  Patient started on empiric antibiotics.  ANA with no evidence of arterial insufficiency.  Wound care team consulted.

## 2024-04-19 NOTE — ED TRIAGE NOTES
Pt BIB [facility] from an [facility] for a non healing Left foot wound that pt rcently finished a course of abx for. Pt is A&O x's 1, which is baseline for him. NAD noted. Connected to bedside monitor, call light in reach

## 2024-04-19 NOTE — PROGRESS NOTES
Hospital Medicine Daily Progress Note    Date of Service  4/19/2024    Chief Complaint  Yury Blake is a 53 y.o. male admitted 4/18/2024 with lower extremity wound   Hospital Course  This is a 53-year-old male with past medical history of Warnicke encephalopathy, type 2 diabetes, who resides at an assisted living facility who was brought in for nonhealing left lower extremity wound.    X-ray negative for concerns of osteo, noted ulcer lateral to the fifth toe.  Last significant for elevated ESR and CRP.  Venous Doppler negative for DVT.  MRSA swab negative.  Patient started on empiric antibiotics.  ANA pending.  Wound care team consulted.    Interval Problem Update  Patient admitted earlier this morning my colleague, please refer to H&P.    In summary, patient admitted with left lower extremity cellulitis, negative for DVT, covered with empiric antibiotics, MRSA swab negative.  ANA ordered due to nonhealing ulcer.  Wound team consulted.    Home health ordered    Anticipate discharge back to [facility] in the next 24 to 48 hours.    I have discussed this patient's plan of care and discharge plan at IDT rounds today with Case Management, Nursing, Nursing leadership, and other members of the IDT team.    Consultants/Specialty  None    Code Status  Full Code    Disposition  The patient is not medically cleared for discharge to home or a post-acute facility.  Anticipate discharge to: home with organized home healthcare and close outpatient follow-up    I have placed the appropriate orders for post-discharge needs.    Review of Systems  Review of Systems   All other systems reviewed and are negative.       Physical Exam  Temp:  [36.1 °C (97 °F)-36.7 °C (98 °F)] 36.3 °C (97.4 °F)  Pulse:  [70-85] 70  Resp:  [11-17] 17  BP: (120-137)/(61-71) 120/63  SpO2:  [92 %-99 %] 94 %    Physical Exam  Vitals and nursing note reviewed.   Constitutional:       General: He is not in acute distress.     Appearance: Normal appearance.    HENT:      Head: Normocephalic and atraumatic.   Eyes:      Extraocular Movements: Extraocular movements intact.      Conjunctiva/sclera: Conjunctivae normal.      Pupils: Pupils are equal, round, and reactive to light.   Cardiovascular:      Rate and Rhythm: Normal rate and regular rhythm.      Pulses: Normal pulses.      Heart sounds: No murmur heard.     No friction rub. No gallop.   Pulmonary:      Effort: Pulmonary effort is normal. No respiratory distress.      Breath sounds: Normal breath sounds. No wheezing, rhonchi or rales.   Abdominal:      General: Bowel sounds are normal. There is no distension.      Palpations: Abdomen is soft.      Tenderness: There is no abdominal tenderness.   Musculoskeletal:         General: No swelling or tenderness. Normal range of motion.      Cervical back: Normal range of motion and neck supple. No muscular tenderness.      Right lower leg: No edema.      Left lower leg: Edema (cellulitis with foot ulcer) present.   Skin:     General: Skin is warm and dry.      Capillary Refill: Capillary refill takes less than 2 seconds.      Findings: No bruising, erythema or rash.   Neurological:      General: No focal deficit present.      Mental Status: He is alert and oriented to person, place, and time.         Fluids    Intake/Output Summary (Last 24 hours) at 4/19/2024 1312  Last data filed at 4/19/2024 1048  Gross per 24 hour   Intake 340 ml   Output --   Net 340 ml       Laboratory  Recent Labs     04/18/24  2203   WBC 10.9*   RBC 3.74*   HEMOGLOBIN 10.9*   HEMATOCRIT 33.4*   MCV 89.3   MCH 29.1   MCHC 32.6   RDW 49.2   PLATELETCT 254   MPV 9.4     Recent Labs     04/18/24  2203   SODIUM 140   POTASSIUM 3.8   CHLORIDE 100   CO2 26   GLUCOSE 114*   BUN 9   CREATININE 0.73   CALCIUM 9.3                   Imaging  US-EXTREMITY VENOUS LOWER UNILAT LEFT   Final Result      DX-CHEST-LIMITED (1 VIEW)   Final Result         1.  No acute cardiopulmonary disease.   2.  Cardiomegaly       DX-FOOT-2- LEFT   Final Result         1.  No acute traumatic bony injury.   2.  Soft tissue ulcer lateral to the fifth toe.   3.  Diffuse soft tissue swelling of the foot and ankle, consider edema or cellulitis.      US-EXTREMITY ARTERY LOWER UNILAT W/ANA (COMBO) LEFT    (Results Pending)        Assessment/Plan  * Nonhealing ulcer of left lower extremity (HCC)  Assessment & Plan  In setting of diabetes  Previous cultures have grown Pseudomonas  Given Unasyn and vancomycin in ED, will cover for Pseudomonas now, check MRSA nares  X-ray negative for concerns of osteo, noted ulcer lateral to the fifth toe.  Last significant for elevated ESR and CRP.  Venous Doppler negative for DVT.  MRSA swab pending.  Patient started on empiric antibiotics.  ANA pending.  Wound care team consulted.    Psychiatric disorder  Assessment & Plan  Resume Risperdal    Wernicke encephalopathy  Assessment & Plan  Patient apparently at baseline  Unable to give much history  Continue thiamine    Cellulitis  Assessment & Plan  Swollen, red left leg  Given Unasyn and vancomycin in ED  Recently completed antibiotics and has had nonhealing wound now with possible cellulitis  Previous cultures growing Pseudomonas, will give cefepime, check MRSA nares    Leg swelling- (present on admission)  Assessment & Plan  Asymmetric left leg swelling  Venous Doppler negative for DVT         VTE prophylaxis: Lovenox    I have performed a physical exam and reviewed and updated ROS and Plan today (4/19/2024). In review of yesterday's note (4/18/2024), there are no changes except as documented above.

## 2024-04-19 NOTE — DIETARY
NUTRITION SERVICES - Alert received for wound.Consult to wound team is currently pending.     Diet: Consistent CHO. Pt just admitted, no PO documented in chart at this itme.   No poor PO/wt loss per admit screen.   Pertinent Meds: Thiamine 100 mg, Folic Acid.   Labs reviewed.   Pt with past medical history of Warnicke encephalopathy (pt apparently at baseline per MD notes), type 2 diabetes, who resides at an assisted living facility who was brought in for nonhealing left lower extremity wound.       RECOMMEND:   1. RD will follow for wound care RN assessment and adequate intake.

## 2024-04-19 NOTE — DISCHARGE PLANNING
Case Management Discharge Planning    Admission Date: 4/18/2024  GMLOS:    ALOS: 0    6-Clicks ADL Score: 22  6-Clicks Mobility Score: 18      Anticipated Discharge Dispo: Discharge Disposition: D/T to home under [facility] care in anticipation of covered skilled care (06)    DME Needed: No    Action(s) Taken: Choice obtained and Transport Arranged     This RN CM was updated by Dr during IDT rounds that patient will be medically cleared tomorrow or Sunday. Called [facility] [phone] address [address] [place] [address], [facility] [address].    This RN CM called and confirmed that the group home is okay to accept patient back over the weekend and that patient will need transport set up. Called [facility] [phone] and confirmed that patient will need assistance with transport to [facility].    Escalations Completed: None    Medically Clear: No    Next Steps: Pending medical clearance and transportation set up.    Barriers to Discharge: Medical clearance and Transportation    Is the patient up for discharge tomorrow: No

## 2024-04-19 NOTE — CARE PLAN
The patient is Stable - Low risk of patient condition declining or worsening    Shift Goals  Clinical Goals: Pain management/Patient safety  Patient Goals: Rest  Family Goals: KIRTI    Progress made toward(s) clinical / shift goals:      Patient is not progressing towards the following goals:

## 2024-04-19 NOTE — WOUND TEAM
[facility]  Inpatient Services  Initial Wound and Skin Care Evaluation    Admission Date: 4/18/2024     Last order of IP CONSULT TO WOUND CARE was found on 4/19/2024 from Hospital Encounter on 4/18/2024     HPI, PMH, SH: Reviewed    Past Surgical History:   Procedure Laterality Date    IRRIGATION & DEBRIDEMENT ORTHO Left 4/12/2021    Procedure: IRRIGATION AND DEBRIDEMENT, WOUND - HEEL;  Surgeon: Donny Roque M.D.;  Location: SURGERY [facility];  Service: Orthopedics    TENDON LENGHTENING Bilateral 2/1/2021    Procedure: LENGTHENING, TENDON- FOR FOOT DROP RECONSTRUCTION, POSTERIOR TIBIALIS TENDON  TRANSFER , ACHILLES LENGHTENING , LEFT MEDIAL RELEASE;  Surgeon: Donny Roque M.D.;  Location: SURGERY [facility];  Service: Orthopedics    IRRIGATION & DEBRIDEMENT ORTHO Left 2/1/2021    Procedure: IRRIGATION AND DEBRIDEMENT, WOUND-FOOT;  Surgeon: Donny Roque M.D.;  Location: SURGERY [facility];  Service: Orthopedics    ORIF, ANKLE Right 9/26/2017    Procedure: ANKLE ORIF SYNDESOMOSIS REPAIR;  Surgeon: Armando Woodard M.D.;  Location: [facility];  Service: Orthopedics     Social History     Tobacco Use    Smoking status: Never    Smokeless tobacco: Current     Types: Chew   Substance Use Topics    Alcohol use: Yes     Comment: 6-10 beers daily     Chief Complaint   Patient presents with    Wound Check     L lateral foot     Diagnosis: Leg swelling [M79.89]    Unit where seen by Wound Team: S615/01     WOUND CONSULT RELATED TO:  L 5th MTH    WOUND TEAM PLAN OF CARE - Frequency of Follow-up:   Nursing to follow dressing orders written for wound care. Contact wound team if area fails to progress, deteriorates or with any questions/concerns if something comes up before next scheduled follow up (See below as to whether wound is following and frequency of wound follow up)   Not following, consult as needed  - L 5th MTH    WOUND HISTORY:   Yury Blake is a 53 y.o. male who  presented 4/18/2024 with wound check and leg swelling.  Patient has history of Warnicke's encephalopathy, is unable to give much history at all, ANO x 1 at baseline.  Further history obtained from EDP and chart review.  Is brought in from [facility] from assisted living facility due to a nonhealing left foot wound, recently completed a course of antibiotics for.  His left leg was more swollen than his right and EDP was concern for DVT, ultrasound unavailable until the morning.  He was started on vancomycin and Unasyn.  Vital stable, had mild leukocytosis at 10.9, elevated inflammatory markers        WOUND ASSESSMENT/LDA  Wound Diabetic Ulcer Foot Lateral Left (Active)   No Date First Assessed or Time First Assessed found.   Primary Wound Type: Diabetic Ulcer  Location: Foot  Wound Orientation: Lateral  Laterality: Left      Assessments 4/19/2024  1:00 PM   Wound Image      Site Assessment Red;Pink   Periwound Assessment Dry;Callused   Margins Defined edges;Unattached edges   Closure Secondary intention   Drainage Amount Small   Drainage Description Purulent;Serosanguineous   Treatments Cleansed;CSWD - Conservative Sharp Wound Debridement   Wound Cleansing Normal Saline Irrigation   Periwound Protectant Skin Protectant Wipes to Periwound   Dressing Status Clean;Dry;Intact   Dressing Changed Changed   Dressing Cleansing/Solutions Not Applicable   Dressing Options Hydrofera Blue Ready;Silicone Adhesive Foam   Dressing Change/Treatment Frequency Every 72 hrs, and As Needed   NEXT Dressing Change/Treatment Date 04/22/24   NEXT Weekly Photo (Inpatient Only) 04/26/24   Wound Team Following Not following        Vascular:    ANA:   No results found.    Lab Values:    Lab Results   Component Value Date/Time    WBC 10.9 (H) 04/18/2024 10:03 PM    RBC 3.74 (L) 04/18/2024 10:03 PM    HEMOGLOBIN 10.9 (L) 04/18/2024 10:03 PM    HEMATOCRIT 33.4 (L) 04/18/2024 10:03 PM    CREACTPROT 8.99 (H) 04/18/2024 10:03 PM    SEDRATEWES 46 (H)  04/18/2024 10:03 PM    HBA1C 10.3 (H) 09/08/2023 01:18 PM         Culture Results show:  No results found for this or any previous visit (from the past 720 hour(s)).    Pain Level/Medicated:  None, Tolerated without pain medication       INTERVENTIONS BY WOUND TEAM:  Chart and images reviewed. Discussed with bedside RN. All areas of concern (based on picture review, LDA review and discussion with bedside RN) have been thoroughly assessed. Documentation of areas based on significant findings. This RN in to assess patient. Performed standard wound care which includes appropriate positioning, dressing removal and non-selective debridement. Pictures and measurements obtained weekly if/when required.    Wound:  L 5th MTH  Preparation for Dressing removal: N/A  Cleansed/Non-selectively Debrided with:  Normal Saline and Gauze  Non-Excisional Conservative Sharp debridement: Callus debrided away using curette < 20cm2 debrided down to viable tissue.  Scant bleeding noted, controlled with manual pressure.  Radha wound: Cleansed with Normal Saline and Gauze, Prepped with No Sting  Primary Dressing:  Hydrofera blue  Secondary (Outer) Dressing: Silicone adhesive foam    Advanced Wound Care Discharge Planning  Number of Clinicians necessary to complete wound care: 1  Is patient requiring IV pain medications for dressing changes:  No   Length of time for dressing change 15 min. (This does not include chart review, pre-medication time, set up, clean up or time spent charting.)    Interdisciplinary consultation: Patient, Bedside RN, Whitney CASTILLO (Wound RN).    EVALUATION / RATIONALE FOR TREATMENT:     Date:  04/19/24  Wound Status:  Initial evaluation    Diabetic foot ulcer to L 5th MTH, likely worsened by pressure as patient's foot naturally lies on that spot. Thick callous to periwound and scant purulence was noted from wound bed. Callous debrided away, revealing pink tissue. Hydrofera Blue applied for the hydrophilic polyurethane foam  which contains ethylene oxide used as a bactericidal, fungicidal, and sporicidal disinfectant. Hydrofera Blue also aids in maintaining a moist wound environment. The absorption properties of this dressing are important in collecting exudates and bacteria from the injured area. These harmful fluid secretions bind to the dressing removing it from the wound without the foam sticking to the wound causing more harm.  Recommend outpatient follow-up for ongoing advanced wound care needs.           Goals: Steady decrease in wound area and depth weekly.    NURSING PLAN OF CARE ORDERS:  Dressing changes: See Dressing Care orders    NUTRITION RECOMMENDATIONS   Wound Team Recommendations:  N/A    DIET ORDERS (From admission to next 24h)       Start     Ordered    04/19/24 0732  Diet Order Diet: Consistent CHO (Diabetic)  ALL MEALS        Question:  Diet:  Answer:  Consistent CHO (Diabetic)    04/19/24 0733                    PREVENTATIVE INTERVENTIONS:    Q shift Hunter - performed per nursing policy  Q shift pressure point assessments - performed per nursing policy    Surface/Positioning  Standard/trauma mattress - Currently in Place    Anticipated discharge plans:  Recommend outpatient follow-up for ongoing advanced wound care needs         Vac Discharge Needs:  Vac Discharge plan is purely a recommendation from wound team and not a requirement for discharge unless otherwise stated by physician.  Not Applicable Pt not on a wound vac

## 2024-04-19 NOTE — ED NOTES
Med rec complete per patient's MAR from [facility]  No known allergies per MAR  Outpatient antibiotics in the last 30 days? Yes   Anticoagulants taken in the last 14 days? No   Patient takes Aspirin 325 mg daily, last dose 4/18/24 AM    Arely Dinh, ELENAhT

## 2024-04-19 NOTE — DISCHARGE PLANNING
Care Transition Team Assessment  Patient A/Ox3 called POA listed Deonte and confirmed demographics and insurance information. Per Deonte patient lives at [facility]. Patient has no other family and deonte is the POA for Yury.       Information Source  Orientation Level: Unable to assess  Information Given By: Legal Guardian  Informant's Name: Deonte  Who is responsible for making decisions for patient? : POA  Name(s) of Primary Decision Maker: Deonte Xie    Readmission Evaluation  Is this a readmission?: No    Elopement Risk  Legal Hold: No  Ambulatory or Self Mobile in Wheelchair: No-Not an Elopement Risk  Elopement Risk: Not at Risk for Elopement    Interdisciplinary Discharge Planning  Lives with - Patient's Self Care Capacity: Alone and Able to Care For Self  Patient or legal guardian wants to designate a caregiver: No  Support Systems: Friends / Neighbors  Housing / Facility: [address] [place] [address]  Durable Medical Equipment: Not Applicable    Discharge Preparedness  What is your plan after discharge?: Home with help  What are your discharge supports?: Other (comment)  Prior Functional Level: Needs Assist with Activities of Daily Living, Needs Assist with Medication Management  Difficulity with ADLs: Bathing, Dressing, Toileting, Walking  Difficulty with ADLs Comment: patient lives at [facility]  Difficulity with IADLs: Driving, Keeping track of finances, Managing medication  Difficulity with IADL Comments: patient lives at [facility]    [facility]  Prior Functional Level: Needs Assist with Activities of Daily Living, Needs Assist with Medication Management    Finances  Financial Barriers to Discharge: No  Prescription Coverage: Yes    Vision / Hearing Impairment  Vision Impairment : No  Hearing Impairment : No              Domestic Abuse  Have you ever been the victim of abuse or violence?: No  Physical Abuse or Sexual Abuse: No  Verbal Abuse or Emotional Abuse:  No  Possible Abuse/Neglect Reported to:: Not Applicable    Psychological Assessment  History of Substance Abuse: None  History of Psychiatric Problems: No  Non-compliant with Treatment: No  Newly Diagnosed Illness: No    Discharge Risks or Barriers  Discharge risks or barriers?: Transportation, Post-acute placement / services, No  Patient risk factors: Complex medical needs, Vulnerable adult    Anticipated Discharge Information  Discharge Disposition: Discharged to home/self care (01)  Discharge Address: [address] [place] [address] [address], [address] [place] [address]  Discharge Contact Phone Number: [phone]

## 2024-04-20 ENCOUNTER — APPOINTMENT (OUTPATIENT)
Dept: RADIOLOGY | Facility: MEDICAL CENTER | Age: 54
End: 2024-04-20
Attending: GENERAL PRACTICE
Payer: MEDICAID

## 2024-04-20 LAB
ERYTHROCYTE [DISTWIDTH] IN BLOOD BY AUTOMATED COUNT: 50.1 FL (ref 35.9–50)
EST. AVERAGE GLUCOSE BLD GHB EST-MCNC: 120 MG/DL
HBA1C MFR BLD: 5.8 % (ref 4–5.6)
HCT VFR BLD AUTO: 34 % (ref 42–52)
HGB BLD-MCNC: 10.4 G/DL (ref 14–18)
MCH RBC QN AUTO: 27.8 PG (ref 27–33)
MCHC RBC AUTO-ENTMCNC: 30.6 G/DL (ref 32.3–36.5)
MCV RBC AUTO: 90.9 FL (ref 81.4–97.8)
PLATELET # BLD AUTO: 234 K/UL (ref 164–446)
PMV BLD AUTO: 9.6 FL (ref 9–12.9)
RBC # BLD AUTO: 3.74 M/UL (ref 4.7–6.1)
WBC # BLD AUTO: 10.5 K/UL (ref 4.8–10.8)

## 2024-04-20 PROCEDURE — 700102 HCHG RX REV CODE 250 W/ 637 OVERRIDE(OP): Mod: UD | Performed by: STUDENT IN AN ORGANIZED HEALTH CARE EDUCATION/TRAINING PROGRAM

## 2024-04-20 PROCEDURE — G0378 HOSPITAL OBSERVATION PER HR: HCPCS

## 2024-04-20 PROCEDURE — 96372 THER/PROPH/DIAG INJ SC/IM: CPT

## 2024-04-20 PROCEDURE — 85027 COMPLETE CBC AUTOMATED: CPT

## 2024-04-20 PROCEDURE — 96367 TX/PROPH/DG ADDL SEQ IV INF: CPT

## 2024-04-20 PROCEDURE — 96365 THER/PROPH/DIAG IV INF INIT: CPT

## 2024-04-20 PROCEDURE — 700111 HCHG RX REV CODE 636 W/ 250 OVERRIDE (IP): Mod: JZ,UD | Performed by: STUDENT IN AN ORGANIZED HEALTH CARE EDUCATION/TRAINING PROGRAM

## 2024-04-20 PROCEDURE — 700105 HCHG RX REV CODE 258: Mod: UD | Performed by: STUDENT IN AN ORGANIZED HEALTH CARE EDUCATION/TRAINING PROGRAM

## 2024-04-20 PROCEDURE — 700111 HCHG RX REV CODE 636 W/ 250 OVERRIDE (IP): Mod: JZ,UD | Performed by: GENERAL PRACTICE

## 2024-04-20 PROCEDURE — 93922 UPR/L XTREMITY ART 2 LEVELS: CPT

## 2024-04-20 PROCEDURE — A9270 NON-COVERED ITEM OR SERVICE: HCPCS | Mod: UD | Performed by: STUDENT IN AN ORGANIZED HEALTH CARE EDUCATION/TRAINING PROGRAM

## 2024-04-20 PROCEDURE — 99232 SBSQ HOSP IP/OBS MODERATE 35: CPT | Performed by: GENERAL PRACTICE

## 2024-04-20 PROCEDURE — RXMED WILLOW AMBULATORY MEDICATION CHARGE: Performed by: GENERAL PRACTICE

## 2024-04-20 PROCEDURE — 36415 COLL VENOUS BLD VENIPUNCTURE: CPT

## 2024-04-20 PROCEDURE — 96366 THER/PROPH/DIAG IV INF ADDON: CPT

## 2024-04-20 PROCEDURE — 83036 HEMOGLOBIN GLYCOSYLATED A1C: CPT

## 2024-04-20 PROCEDURE — 700105 HCHG RX REV CODE 258: Mod: UD | Performed by: GENERAL PRACTICE

## 2024-04-20 PROCEDURE — 93922 UPR/L XTREMITY ART 2 LEVELS: CPT | Mod: 26 | Performed by: INTERNAL MEDICINE

## 2024-04-20 RX ORDER — CEPHALEXIN 500 MG/1
500 CAPSULE ORAL 4 TIMES DAILY
Qty: 24 CAPSULE | Refills: 0 | Status: ACTIVE | OUTPATIENT
Start: 2024-04-20 | End: 2024-04-20

## 2024-04-20 RX ORDER — CEPHALEXIN 500 MG/1
500 CAPSULE ORAL EVERY 6 HOURS
Qty: 8 CAPSULE | Refills: 0 | Status: ACTIVE | OUTPATIENT
Start: 2024-04-22 | End: 2024-04-22

## 2024-04-20 RX ORDER — CEPHALEXIN 500 MG/1
500 CAPSULE ORAL EVERY 6 HOURS
Qty: 12 CAPSULE | Refills: 0 | Status: DISCONTINUED | OUTPATIENT
Start: 2024-04-21 | End: 2024-04-23 | Stop reason: HOSPADM

## 2024-04-20 RX ADMIN — DIVALPROEX SODIUM 250 MG: 250 TABLET, DELAYED RELEASE ORAL at 04:37

## 2024-04-20 RX ADMIN — CEFEPIME 2 G: 2 INJECTION, POWDER, FOR SOLUTION INTRAVENOUS at 14:35

## 2024-04-20 RX ADMIN — ASPIRIN 325 MG: 325 TABLET ORAL at 04:37

## 2024-04-20 RX ADMIN — DIVALPROEX SODIUM 250 MG: 250 TABLET, DELAYED RELEASE ORAL at 17:58

## 2024-04-20 RX ADMIN — DIVALPROEX SODIUM 250 MG: 250 TABLET, DELAYED RELEASE ORAL at 12:13

## 2024-04-20 RX ADMIN — RISPERIDONE 2 MG: 2 TABLET ORAL at 17:58

## 2024-04-20 RX ADMIN — CEFEPIME 2 G: 2 INJECTION, POWDER, FOR SOLUTION INTRAVENOUS at 21:26

## 2024-04-20 RX ADMIN — OMEPRAZOLE 20 MG: 20 CAPSULE, DELAYED RELEASE ORAL at 17:58

## 2024-04-20 RX ADMIN — Medication 100 MG: at 04:37

## 2024-04-20 RX ADMIN — OMEPRAZOLE 20 MG: 20 CAPSULE, DELAYED RELEASE ORAL at 04:37

## 2024-04-20 RX ADMIN — ENOXAPARIN SODIUM 40 MG: 100 INJECTION SUBCUTANEOUS at 17:58

## 2024-04-20 RX ADMIN — FOLIC ACID 1 MG: 1 TABLET ORAL at 04:37

## 2024-04-20 RX ADMIN — RISPERIDONE 2 MG: 2 TABLET ORAL at 04:37

## 2024-04-20 RX ADMIN — CEFEPIME 2 G: 2 INJECTION, POWDER, FOR SOLUTION INTRAVENOUS at 04:42

## 2024-04-20 ASSESSMENT — FIBROSIS 4 INDEX: FIB4 SCORE: 0.89

## 2024-04-20 ASSESSMENT — PAIN DESCRIPTION - PAIN TYPE
TYPE: ACUTE PAIN
TYPE: ACUTE PAIN

## 2024-04-20 NOTE — DISCHARGE SUMMARY
"Discharge Summary    CHIEF COMPLAINT ON ADMISSION  Chief Complaint   Patient presents with    Wound Check     L lateral foot       Reason for Admission  EMS     Admission Date  4/18/2024    CODE STATUS  Full Code    HPI & HOSPITAL COURSE  This is a 53-year-old male with past medical history of Warnicke encephalopathy, type 2 diabetes, who resides at an assisted living facility who was brought in for nonhealing left lower extremity wound.    X-ray negative for concerns of osteo, noted ulcer lateral to the fifth toe.  Last significant for elevated ESR and CRP.  Venous Doppler negative for DVT.  MRSA swab negative.  Patient started on empiric antibiotics, patient to complete antibiotic course with Keflex.  ANA pending.  Wound care team consulted.    Wound care reccs for left foot ulcer: \"Nursing to remove old dressing. Cleanse wound with wound cleanser and gauze. Pat dry. Apply no sting skin barrier to berto-wound. Cut a piece of hydrofera blue slightly larger than wound and apply to wound bed foam side down (writing side up). Secure in place with silicone adhesive foam. Nursing to change every 72hrs and PRN dislodgement and or drainage saturation.\"    Therefore, he is discharged in good and stable condition to home with organized home healthcare and close outpatient follow-up.    The patient recovered much more quickly than anticipated on admission.    Discharge Date  4/20/2024    FOLLOW UP ITEMS POST DISCHARGE  Primary care physician    DISCHARGE DIAGNOSES  Principal Problem:    Nonhealing ulcer of left lower extremity (HCC) (POA: Unknown)  Active Problems:    Leg swelling (POA: Yes)    Cellulitis (POA: Unknown)    Wernicke encephalopathy (POA: Unknown)    Psychiatric disorder (POA: Unknown)  Resolved Problems:    * No resolved hospital problems. *      FOLLOW UP  Kristina Roche M.D.  [address] [facility] [id number] [phone]    Follow up        MEDICATIONS ON DISCHARGE     Medication List        START " taking these medications        Instructions   cephALEXin 500 MG Caps  Commonly known as: Keflex   Take 1 Capsule by mouth 4 times a day for 6 days.  Dose: 500 mg            CONTINUE taking these medications        Instructions   acetaminophen 325 MG Tabs  Commonly known as: Tylenol   Take 650 mg by mouth every 6 hours as needed. Indications: Pain  Dose: 650 mg     aspirin 325 MG Tabs  Commonly known as: Asa   Take 325 mg by mouth every day.  Dose: 325 mg     divalproex 250 MG Tbec  Commonly known as: Depakote   Take 250 mg by mouth 3 times a day.  Dose: 250 mg     folic acid 1 MG Tabs  Commonly known as: Folvite   Take 1 mg by mouth every day.  Dose: 1 mg     metFORMIN 500 MG Tabs  Commonly known as: Glucophage   Take 1 Tablet by mouth 2 times a day with meals.  Dose: 500 mg     omeprazole 20 MG delayed-release capsule  Commonly known as: PriLOSEC   Take 1 Capsule by mouth 2 times a day.  Dose: 20 mg     risperiDONE 2 MG Tabs  Commonly known as: RisperDAL   Take 1 tablet by mouth 2 Times a Day.  Dose: 2 mg     thiamine 100 MG Tabs  Commonly known as: Vitamin B-1   Take 100 mg by mouth every day.  Dose: 100 mg            STOP taking these medications      cefdinir 300 MG Caps  Commonly known as: Omnicef     doxycycline 100 MG Tabs  Commonly known as: Vibramycin              Allergies  No Known Allergies    DIET  Orders Placed This Encounter   Procedures    Diet Order Diet: Consistent CHO (Diabetic)     Standing Status:   Standing     Number of Occurrences:   1     Order Specific Question:   Diet:     Answer:   Consistent CHO (Diabetic) [4]       ACTIVITY  As tolerated.  Weight bearing as tolerated    CONSULTATIONS  None    PROCEDURES  None    LABORATORY  Lab Results   Component Value Date    SODIUM 140 04/18/2024    POTASSIUM 3.8 04/18/2024    CHLORIDE 100 04/18/2024    CO2 26 04/18/2024    GLUCOSE 114 (H) 04/18/2024    BUN 9 04/18/2024    CREATININE 0.73 04/18/2024        Lab Results   Component Value Date    WBC  10.5 04/20/2024    HEMOGLOBIN 10.4 (L) 04/20/2024    HEMATOCRIT 34.0 (L) 04/20/2024    PLATELETCT 234 04/20/2024      US-EXTREMITY VENOUS LOWER UNILAT LEFT   Final Result      DX-CHEST-LIMITED (1 VIEW)   Final Result         1.  No acute cardiopulmonary disease.   2.  Cardiomegaly      DX-FOOT-2- LEFT   Final Result         1.  No acute traumatic bony injury.   2.  Soft tissue ulcer lateral to the fifth toe.   3.  Diffuse soft tissue swelling of the foot and ankle, consider edema or cellulitis.      US-ANA SINGLE LEVEL BILAT    (Results Pending)      Total time of the discharge process exceeds 45 minutes.

## 2024-04-20 NOTE — CARE PLAN
The patient is Stable - Low risk of patient condition declining or worsening    Shift Goals  Clinical Goals: Patient safety and comfort  Patient Goals: Rest  Family Goals: Dania    Progress made toward(s) clinical / shift goals:      Patient is not progressing towards the following goals:

## 2024-04-20 NOTE — PROGRESS NOTES
"Hospital Medicine Daily Progress Note    Date of Service  4/20/2024    Chief Complaint  Yury Blake is a 53 y.o. male admitted 4/18/2024 with lower extremity wound   Hospital Course  This is a 53-year-old male with past medical history of Warnicke encephalopathy, type 2 diabetes, who resides at an assisted living facility who was brought in for nonhealing left lower extremity wound.    X-ray negative for concerns of osteo, noted ulcer lateral to the fifth toe.  Last significant for elevated ESR and CRP.  Venous Doppler negative for DVT.  MRSA swab negative.  Patient started on empiric antibiotics, patient to complete antibiotic course with Keflex.  ANA with no evidence of arterial insufficiency.  Wound care team consulted.    Wound care reccs for left foot ulcer: \"Nursing to remove old dressing. Cleanse wound with wound cleanser and gauze. Pat dry. Apply no sting skin barrier to berto-wound. Cut a piece of hydrofera blue slightly larger than wound and apply to wound bed foam side down (writing side up). Secure in place with silicone adhesive foam. Nursing to change every 72hrs and PRN dislodgement and or drainage saturation.\"    Interval Problem Update  Cellulitis improving, patient to continue antibiotic course with Keflex.    ANA negative for any arterial insufficiency.    Case management assisting in setting up home health or outpatient wound for wound care.    I have discussed this patient's plan of care and discharge plan at IDT rounds today with Case Management, Nursing, Nursing leadership, and other members of the IDT team.    Consultants/Specialty  None    Code Status  Full Code    Disposition  The patient is medically cleared for discharge to home or a post-acute facility.  Anticipate discharge to: home with organized home healthcare and close outpatient follow-up    I have placed the appropriate orders for post-discharge needs.    Review of Systems  Review of Systems   All other systems reviewed " and are negative.       Physical Exam  Temp:  [36.3 °C (97.3 °F)-36.6 °C (97.8 °F)] 36.3 °C (97.3 °F)  Pulse:  [70-76] 76  Resp:  [18] 18  BP: (109-130)/(39-78) 109/39  SpO2:  [91 %-94 %] 92 %    Physical Exam  Vitals and nursing note reviewed.   Constitutional:       General: He is not in acute distress.     Appearance: Normal appearance.   HENT:      Head: Normocephalic and atraumatic.   Eyes:      Extraocular Movements: Extraocular movements intact.      Conjunctiva/sclera: Conjunctivae normal.      Pupils: Pupils are equal, round, and reactive to light.   Cardiovascular:      Rate and Rhythm: Normal rate and regular rhythm.      Pulses: Normal pulses.      Heart sounds: No murmur heard.     No friction rub. No gallop.   Pulmonary:      Effort: Pulmonary effort is normal. No respiratory distress.      Breath sounds: Normal breath sounds. No wheezing, rhonchi or rales.   Abdominal:      General: Bowel sounds are normal. There is no distension.      Palpations: Abdomen is soft.      Tenderness: There is no abdominal tenderness.   Musculoskeletal:         General: No swelling or tenderness. Normal range of motion.      Cervical back: Normal range of motion and neck supple. No muscular tenderness.      Right lower leg: No edema.      Left lower leg: Edema (cellulitis with foot ulcer) present.   Skin:     General: Skin is warm and dry.      Capillary Refill: Capillary refill takes less than 2 seconds.      Findings: No bruising, erythema or rash.   Neurological:      General: No focal deficit present.      Mental Status: He is alert and oriented to person, place, and time.         Fluids    Intake/Output Summary (Last 24 hours) at 4/20/2024 1326  Last data filed at 4/20/2024 0955  Gross per 24 hour   Intake 720 ml   Output 1875 ml   Net -1155 ml       Laboratory  Recent Labs     04/18/24  2203 04/20/24  0139   WBC 10.9* 10.5   RBC 3.74* 3.74*   HEMOGLOBIN 10.9* 10.4*   HEMATOCRIT 33.4* 34.0*   MCV 89.3 90.9   MCH 29.1  27.8   MCHC 32.6 30.6*   RDW 49.2 50.1*   PLATELETCT 254 234   MPV 9.4 9.6     Recent Labs     04/18/24  2203   SODIUM 140   POTASSIUM 3.8   CHLORIDE 100   CO2 26   GLUCOSE 114*   BUN 9   CREATININE 0.73   CALCIUM 9.3                   Imaging  US-ANA SINGLE LEVEL BILAT   Final Result      US-EXTREMITY VENOUS LOWER UNILAT LEFT   Final Result      DX-CHEST-LIMITED (1 VIEW)   Final Result         1.  No acute cardiopulmonary disease.   2.  Cardiomegaly      DX-FOOT-2- LEFT   Final Result         1.  No acute traumatic bony injury.   2.  Soft tissue ulcer lateral to the fifth toe.   3.  Diffuse soft tissue swelling of the foot and ankle, consider edema or cellulitis.           Assessment/Plan  * Nonhealing ulcer of left lower extremity (HCC)  Assessment & Plan  In setting of diabetes  Previous cultures have grown Pseudomonas  Given Unasyn and vancomycin in ED, will cover for Pseudomonas now, check MRSA nares  X-ray negative for concerns of osteo, noted ulcer lateral to the fifth toe.  Last significant for elevated ESR and CRP.  Venous Doppler negative for DVT.  MRSA swab pending.  Patient started on empiric antibiotics.  ANA with no evidence of arterial insufficiency.  Wound care team consulted.    Psychiatric disorder  Assessment & Plan  Resume Risperdal    Wernicke encephalopathy  Assessment & Plan  Patient apparently at baseline  Unable to give much history  Continue thiamine    Cellulitis  Assessment & Plan  Swollen, red left leg  Given Unasyn and vancomycin in ED  Recently completed antibiotics and has had nonhealing wound now with possible cellulitis  Previous cultures growing Pseudomonas, will give cefepime, check MRSA nares    Leg swelling- (present on admission)  Assessment & Plan  Asymmetric left leg swelling  Venous Doppler negative for DVT         VTE prophylaxis: Lovenox    I have performed a physical exam and reviewed and updated ROS and Plan today (4/20/2024). In review of yesterday's note (4/19/2024),  there are no changes except as documented above.

## 2024-04-20 NOTE — CARE PLAN
The patient is Stable - Low risk of patient condition declining or worsening    Shift Goals  Clinical Goals: Up to chair for lunch  Patient Goals: Rest  Family Goals: Dania    Progress made toward(s) clinical / shift goals:    Problem: Pain - Standard  Goal: Alleviation of pain or a reduction in pain to the patient’s comfort goal  Outcome: Progressing       Problem: Knowledge Deficit - Standard  Goal: Patient and family/care givers will demonstrate understanding of plan of care, disease process/condition, diagnostic tests and medications  Outcome: Not Progressing

## 2024-04-20 NOTE — DISCHARGE PLANNING
Case Management Discharge Planning    Admission Date: 4/18/2024  GMLOS:    ALOS: 0    6-Clicks ADL Score: 22  6-Clicks Mobility Score: 18      Anticipated Discharge Dispo: Discharge Disposition: Discharged to home/self care (01)  Discharge Address: [address] [place] [address] [address] [place] [address]  Discharge Contact Phone Number: [phone]    DME Needed: No    Action(s) Taken: Updated Provider/Nurse on Discharge Plan  RN JAMES called [facility] ([phone]) and spoke to Moses, per Moses they are not allowed to change wound dressing and Pt needs to have HH set up before they can take Pt back.     Per note Pt has been decline by [facility] living at home and [facility] due to insurance. DPA to send HH referral to [facility] .     [facility]    [facility]    Pt has transport benefits. Plan is to set up wound clinic appointment and set up Pt with [facility] transport.    Escalations Completed: Provider    Medically Clear: Yes    Next Steps: Follow up HH acceptance    Barriers to Discharge: Outpatient referrals pending

## 2024-04-21 LAB
ERYTHROCYTE [DISTWIDTH] IN BLOOD BY AUTOMATED COUNT: 48.8 FL (ref 35.9–50)
HCT VFR BLD AUTO: 32.1 % (ref 42–52)
HGB BLD-MCNC: 10.2 G/DL (ref 14–18)
MCH RBC QN AUTO: 27.8 PG (ref 27–33)
MCHC RBC AUTO-ENTMCNC: 31.8 G/DL (ref 32.3–36.5)
MCV RBC AUTO: 87.5 FL (ref 81.4–97.8)
PLATELET # BLD AUTO: 231 K/UL (ref 164–446)
PMV BLD AUTO: 9.6 FL (ref 9–12.9)
RBC # BLD AUTO: 3.67 M/UL (ref 4.7–6.1)
WBC # BLD AUTO: 9.1 K/UL (ref 4.8–10.8)

## 2024-04-21 PROCEDURE — 85027 COMPLETE CBC AUTOMATED: CPT

## 2024-04-21 PROCEDURE — G0378 HOSPITAL OBSERVATION PER HR: HCPCS

## 2024-04-21 PROCEDURE — A9270 NON-COVERED ITEM OR SERVICE: HCPCS | Mod: UD | Performed by: GENERAL PRACTICE

## 2024-04-21 PROCEDURE — A9270 NON-COVERED ITEM OR SERVICE: HCPCS | Mod: UD | Performed by: STUDENT IN AN ORGANIZED HEALTH CARE EDUCATION/TRAINING PROGRAM

## 2024-04-21 PROCEDURE — 99231 SBSQ HOSP IP/OBS SF/LOW 25: CPT | Performed by: GENERAL PRACTICE

## 2024-04-21 PROCEDURE — 700102 HCHG RX REV CODE 250 W/ 637 OVERRIDE(OP): Mod: UD | Performed by: STUDENT IN AN ORGANIZED HEALTH CARE EDUCATION/TRAINING PROGRAM

## 2024-04-21 PROCEDURE — 96372 THER/PROPH/DIAG INJ SC/IM: CPT

## 2024-04-21 PROCEDURE — 700102 HCHG RX REV CODE 250 W/ 637 OVERRIDE(OP): Mod: UD | Performed by: GENERAL PRACTICE

## 2024-04-21 PROCEDURE — 36415 COLL VENOUS BLD VENIPUNCTURE: CPT

## 2024-04-21 PROCEDURE — 700111 HCHG RX REV CODE 636 W/ 250 OVERRIDE (IP): Mod: JZ,UD | Performed by: STUDENT IN AN ORGANIZED HEALTH CARE EDUCATION/TRAINING PROGRAM

## 2024-04-21 RX ADMIN — DIVALPROEX SODIUM 250 MG: 250 TABLET, DELAYED RELEASE ORAL at 13:03

## 2024-04-21 RX ADMIN — ENOXAPARIN SODIUM 40 MG: 100 INJECTION SUBCUTANEOUS at 18:24

## 2024-04-21 RX ADMIN — DIVALPROEX SODIUM 250 MG: 250 TABLET, DELAYED RELEASE ORAL at 18:24

## 2024-04-21 RX ADMIN — POLYETHYLENE GLYCOL 3350 1 PACKET: 17 POWDER, FOR SOLUTION ORAL at 18:25

## 2024-04-21 RX ADMIN — RISPERIDONE 2 MG: 2 TABLET ORAL at 05:34

## 2024-04-21 RX ADMIN — CEPHALEXIN 500 MG: 500 CAPSULE ORAL at 23:58

## 2024-04-21 RX ADMIN — OMEPRAZOLE 20 MG: 20 CAPSULE, DELAYED RELEASE ORAL at 05:34

## 2024-04-21 RX ADMIN — CEPHALEXIN 500 MG: 500 CAPSULE ORAL at 13:03

## 2024-04-21 RX ADMIN — CEPHALEXIN 500 MG: 500 CAPSULE ORAL at 18:24

## 2024-04-21 RX ADMIN — Medication 100 MG: at 05:34

## 2024-04-21 RX ADMIN — OMEPRAZOLE 20 MG: 20 CAPSULE, DELAYED RELEASE ORAL at 18:24

## 2024-04-21 RX ADMIN — ASPIRIN 325 MG: 325 TABLET ORAL at 05:34

## 2024-04-21 RX ADMIN — RISPERIDONE 2 MG: 2 TABLET ORAL at 18:24

## 2024-04-21 RX ADMIN — FOLIC ACID 1 MG: 1 TABLET ORAL at 05:34

## 2024-04-21 RX ADMIN — DIVALPROEX SODIUM 250 MG: 250 TABLET, DELAYED RELEASE ORAL at 05:34

## 2024-04-21 RX ADMIN — CEPHALEXIN 500 MG: 500 CAPSULE ORAL at 05:34

## 2024-04-21 ASSESSMENT — PAIN DESCRIPTION - PAIN TYPE
TYPE: ACUTE PAIN
TYPE: ACUTE PAIN

## 2024-04-21 NOTE — CARE PLAN
The patient is Stable - Low risk of patient condition declining or worsening    Shift Goals  Clinical Goals: immaintain skin integrity, q2 turns  Patient Goals: Rest  Family Goals: Dania    Progress made toward(s) clinical / shift goals:  patient is A&O*3. Disoriented to date and time. Hourly rounding. Needs met at this time     Patient is not progressing towards the following goals:

## 2024-04-21 NOTE — PROGRESS NOTES
"Hospital Medicine Daily Progress Note    Date of Service  4/21/2024    Chief Complaint  Yury Blake is a 53 y.o. male admitted 4/18/2024 with lower extremity wound   Hospital Course  This is a 53-year-old male with past medical history of Warnicke encephalopathy, type 2 diabetes, who resides at an assisted living facility who was brought in for nonhealing left lower extremity wound.    X-ray negative for concerns of osteo, noted ulcer lateral to the fifth toe.  Last significant for elevated ESR and CRP.  Venous Doppler negative for DVT.  MRSA swab negative.  Patient started on empiric antibiotics, patient to complete antibiotic course with Keflex.  ANA with no evidence of arterial insufficiency.  Wound care team consulted.    Wound care reccs for left foot ulcer: \"Nursing to remove old dressing. Cleanse wound with wound cleanser and gauze. Pat dry. Apply no sting skin barrier to berto-wound. Cut a piece of hydrofera blue slightly larger than wound and apply to wound bed foam side down (writing side up). Secure in place with silicone adhesive foam. Nursing to change every 72hrs and PRN dislodgement and or drainage saturation.\"    Interval Problem Update  Cellulitis improving, patient to continue antibiotic course with Keflex.    ANA negative for any arterial insufficiency.    Case management assisting in setting up home health or outpatient wound for wound care.    I have discussed this patient's plan of care and discharge plan at IDT rounds today with Case Management, Nursing, Nursing leadership, and other members of the IDT team.    Consultants/Specialty  None    Code Status  Full Code    Disposition  The patient is medically cleared for discharge to home or a post-acute facility.  Anticipate discharge to: home with organized home healthcare and close outpatient follow-up    I have placed the appropriate orders for post-discharge needs.    Review of Systems  Review of Systems   All other systems reviewed " and are negative.       Physical Exam  Temp:  [36.1 °C (97 °F)-37.4 °C (99.4 °F)] 36.1 °C (97 °F)  Pulse:  [71-85] 71  Resp:  [17-18] 18  BP: (109-123)/(39-67) 123/66  SpO2:  [90 %-92 %] 92 %    Physical Exam  Vitals and nursing note reviewed.   Constitutional:       General: He is not in acute distress.     Appearance: Normal appearance.   HENT:      Head: Normocephalic and atraumatic.   Eyes:      Extraocular Movements: Extraocular movements intact.      Conjunctiva/sclera: Conjunctivae normal.      Pupils: Pupils are equal, round, and reactive to light.   Cardiovascular:      Rate and Rhythm: Normal rate and regular rhythm.      Pulses: Normal pulses.      Heart sounds: No murmur heard.     No friction rub. No gallop.   Pulmonary:      Effort: Pulmonary effort is normal. No respiratory distress.      Breath sounds: Normal breath sounds. No wheezing, rhonchi or rales.   Abdominal:      General: Bowel sounds are normal. There is no distension.      Palpations: Abdomen is soft.      Tenderness: There is no abdominal tenderness.   Musculoskeletal:         General: No swelling or tenderness. Normal range of motion.      Cervical back: Normal range of motion and neck supple. No muscular tenderness.      Right lower leg: No edema.      Left lower leg: Edema (cellulitis with foot ulcer) present.   Skin:     General: Skin is warm and dry.      Capillary Refill: Capillary refill takes less than 2 seconds.      Findings: No bruising, erythema or rash.   Neurological:      General: No focal deficit present.      Mental Status: He is alert and oriented to person, place, and time.         Fluids    Intake/Output Summary (Last 24 hours) at 4/21/2024 0700  Last data filed at 4/21/2024 0321  Gross per 24 hour   Intake 840 ml   Output 1200 ml   Net -360 ml       Laboratory  Recent Labs     04/18/24  2203 04/20/24  0139 04/21/24  0153   WBC 10.9* 10.5 9.1   RBC 3.74* 3.74* 3.67*   HEMOGLOBIN 10.9* 10.4* 10.2*   HEMATOCRIT 33.4*  34.0* 32.1*   MCV 89.3 90.9 87.5   MCH 29.1 27.8 27.8   MCHC 32.6 30.6* 31.8*   RDW 49.2 50.1* 48.8   PLATELETCT 254 234 231   MPV 9.4 9.6 9.6     Recent Labs     04/18/24  2203   SODIUM 140   POTASSIUM 3.8   CHLORIDE 100   CO2 26   GLUCOSE 114*   BUN 9   CREATININE 0.73   CALCIUM 9.3                   Imaging  US-ANA SINGLE LEVEL BILAT   Final Result      US-EXTREMITY VENOUS LOWER UNILAT LEFT   Final Result      DX-CHEST-LIMITED (1 VIEW)   Final Result         1.  No acute cardiopulmonary disease.   2.  Cardiomegaly      DX-FOOT-2- LEFT   Final Result         1.  No acute traumatic bony injury.   2.  Soft tissue ulcer lateral to the fifth toe.   3.  Diffuse soft tissue swelling of the foot and ankle, consider edema or cellulitis.           Assessment/Plan  * Nonhealing ulcer of left lower extremity (HCC)  Assessment & Plan  In setting of diabetes  Previous cultures have grown Pseudomonas  Given Unasyn and vancomycin in ED, will cover for Pseudomonas now, check MRSA nares  X-ray negative for concerns of osteo, noted ulcer lateral to the fifth toe.  Last significant for elevated ESR and CRP.  Venous Doppler negative for DVT.  MRSA swab pending.  Patient started on empiric antibiotics.  ANA with no evidence of arterial insufficiency.  Wound care team consulted.    Psychiatric disorder  Assessment & Plan  Resume Risperdal    Wernicke encephalopathy  Assessment & Plan  Patient apparently at baseline  Unable to give much history  Continue thiamine    Cellulitis  Assessment & Plan  Swollen, red left leg  Given Unasyn and vancomycin in ED  Recently completed antibiotics and has had nonhealing wound now with possible cellulitis  Previous cultures growing Pseudomonas, will give cefepime, check MRSA nares    Leg swelling- (present on admission)  Assessment & Plan  Asymmetric left leg swelling  Venous Doppler negative for DVT         VTE prophylaxis: Lovenox    I have performed a physical exam and reviewed and updated ROS and  Plan today (4/21/2024). In review of yesterday's note (4/20/2024), there are no changes except as documented above.

## 2024-04-21 NOTE — CARE PLAN
The patient is Stable - Low risk of patient condition declining or worsening    Shift Goals  Clinical Goals: protect skin from breakdown  Patient Goals: rest  Family Goals: Dania    Progress made toward(s) clinical / shift goals:    Problem: Pain - Standard  Goal: Alleviation of pain or a reduction in pain to the patient’s comfort goal  Outcome: Progressing     Problem: Knowledge Deficit - Standard  Goal: Patient and family/care givers will demonstrate understanding of plan of care, disease process/condition, diagnostic tests and medications  Outcome: Progressing

## 2024-04-22 PROCEDURE — 700111 HCHG RX REV CODE 636 W/ 250 OVERRIDE (IP): Mod: JZ,UD | Performed by: STUDENT IN AN ORGANIZED HEALTH CARE EDUCATION/TRAINING PROGRAM

## 2024-04-22 PROCEDURE — 700102 HCHG RX REV CODE 250 W/ 637 OVERRIDE(OP): Mod: UD | Performed by: GENERAL PRACTICE

## 2024-04-22 PROCEDURE — 99231 SBSQ HOSP IP/OBS SF/LOW 25: CPT | Performed by: GENERAL PRACTICE

## 2024-04-22 PROCEDURE — A9270 NON-COVERED ITEM OR SERVICE: HCPCS | Mod: UD | Performed by: GENERAL PRACTICE

## 2024-04-22 PROCEDURE — 96372 THER/PROPH/DIAG INJ SC/IM: CPT

## 2024-04-22 PROCEDURE — G0378 HOSPITAL OBSERVATION PER HR: HCPCS

## 2024-04-22 PROCEDURE — 700102 HCHG RX REV CODE 250 W/ 637 OVERRIDE(OP): Mod: UD | Performed by: STUDENT IN AN ORGANIZED HEALTH CARE EDUCATION/TRAINING PROGRAM

## 2024-04-22 PROCEDURE — A9270 NON-COVERED ITEM OR SERVICE: HCPCS | Mod: UD | Performed by: STUDENT IN AN ORGANIZED HEALTH CARE EDUCATION/TRAINING PROGRAM

## 2024-04-22 RX ORDER — CEPHALEXIN 500 MG/1
500 CAPSULE ORAL EVERY 6 HOURS
Qty: 8 CAPSULE | Refills: 0 | Status: ACTIVE | OUTPATIENT
Start: 2024-04-24 | End: 2024-04-26

## 2024-04-22 RX ADMIN — ASPIRIN 325 MG: 325 TABLET ORAL at 04:20

## 2024-04-22 RX ADMIN — RISPERIDONE 2 MG: 2 TABLET ORAL at 04:20

## 2024-04-22 RX ADMIN — OMEPRAZOLE 20 MG: 20 CAPSULE, DELAYED RELEASE ORAL at 17:56

## 2024-04-22 RX ADMIN — CEPHALEXIN 500 MG: 500 CAPSULE ORAL at 17:56

## 2024-04-22 RX ADMIN — SENNOSIDES AND DOCUSATE SODIUM 2 TABLET: 50; 8.6 TABLET ORAL at 18:01

## 2024-04-22 RX ADMIN — OMEPRAZOLE 20 MG: 20 CAPSULE, DELAYED RELEASE ORAL at 04:20

## 2024-04-22 RX ADMIN — DIVALPROEX SODIUM 250 MG: 250 TABLET, DELAYED RELEASE ORAL at 14:16

## 2024-04-22 RX ADMIN — CEPHALEXIN 500 MG: 500 CAPSULE ORAL at 04:23

## 2024-04-22 RX ADMIN — Medication 100 MG: at 04:20

## 2024-04-22 RX ADMIN — FOLIC ACID 1 MG: 1 TABLET ORAL at 04:20

## 2024-04-22 RX ADMIN — RISPERIDONE 2 MG: 2 TABLET ORAL at 17:56

## 2024-04-22 RX ADMIN — DIVALPROEX SODIUM 250 MG: 250 TABLET, DELAYED RELEASE ORAL at 04:20

## 2024-04-22 RX ADMIN — ENOXAPARIN SODIUM 40 MG: 100 INJECTION SUBCUTANEOUS at 17:56

## 2024-04-22 RX ADMIN — CEPHALEXIN 500 MG: 500 CAPSULE ORAL at 14:15

## 2024-04-22 RX ADMIN — DIVALPROEX SODIUM 250 MG: 250 TABLET, DELAYED RELEASE ORAL at 17:56

## 2024-04-22 ASSESSMENT — PAIN DESCRIPTION - PAIN TYPE
TYPE: ACUTE PAIN
TYPE: ACUTE PAIN

## 2024-04-22 NOTE — CARE PLAN
The patient is Stable - Low risk of patient condition declining or worsening    Shift Goals  Clinical Goals: Pt to remain free from injury and falls during shift.  Patient Goals: KIRTI  Family Goals: Discharge home safely.    Progress made toward(s) clinical / shift goals:  Pt is progressing in preventing injury and fall during shift.

## 2024-04-22 NOTE — DISCHARGE SUMMARY
"Discharge Summary    CHIEF COMPLAINT ON ADMISSION  Chief Complaint   Patient presents with    Wound Check     L lateral foot       Reason for Admission  EMS     Admission Date  4/18/2024    CODE STATUS  Full Code    HPI & HOSPITAL COURSE  This is a 53-year-old male with past medical history of Warnicke encephalopathy, type 2 diabetes, who resides at an assisted living facility who was brought in for nonhealing left lower extremity wound.    X-ray negative for concerns of osteo, noted ulcer lateral to the fifth toe.  Last significant for elevated ESR and CRP.  Venous Doppler negative for DVT.  MRSA swab negative.  Patient started on empiric antibiotics, patient to complete antibiotic course with Keflex.  ANA with no evidence of arterial insufficiency.  Wound care team consulted.    Wound care reccs for left foot ulcer: \"Nursing to remove old dressing. Cleanse wound with wound cleanser and gauze. Pat dry. Apply no sting skin barrier to berto-wound. Cut a piece of hydrofera blue slightly larger than wound and apply to wound bed foam side down (writing side up). Secure in place with silicone adhesive foam. Nursing to change every 72hrs and PRN dislodgement and or drainage saturation.\"  Referral placed for outpatient wound care which has been confirmed by case management.  Patient's vital signs are stable and he is ready for discharge with outpatient follow-up with his primary care physician for further management.    Therefore, he is discharged in good and stable condition to home with organized home healthcare and close outpatient follow-up.    The patient met 2-midnight criteria for an inpatient stay at the time of discharge.    Discharge Date  4/23/2024    FOLLOW UP ITEMS POST DISCHARGE  Primary care physician  Outpatient wound care    DISCHARGE DIAGNOSES  Principal Problem:    Nonhealing ulcer of left lower extremity (HCC) (POA: Unknown)  Active Problems:    Leg swelling (POA: Yes)    Cellulitis (POA: Unknown)    " Wernicke encephalopathy (POA: Unknown)    Psychiatric disorder (POA: Unknown)  Resolved Problems:    * No resolved hospital problems. *      FOLLOW UP  Kristina Roche M.D.  [address] [facility] [place] [id number] [phone]    Follow up        MEDICATIONS ON DISCHARGE     Medication List        START taking these medications        Instructions   cephALEXin 500 MG Caps  Start taking on: April 24, 2024  Commonly known as: Keflex   Take 1 Capsule by mouth every 6 hours for 2 days.  Dose: 500 mg            CONTINUE taking these medications        Instructions   acetaminophen 325 MG Tabs  Commonly known as: Tylenol   Take 650 mg by mouth every 6 hours as needed. Indications: Pain  Dose: 650 mg     aspirin 325 MG Tabs  Commonly known as: Asa   Take 325 mg by mouth every day.  Dose: 325 mg     divalproex 250 MG Tbec  Commonly known as: Depakote   Take 250 mg by mouth 3 times a day.  Dose: 250 mg     folic acid 1 MG Tabs  Commonly known as: Folvite   Take 1 mg by mouth every day.  Dose: 1 mg     metFORMIN 500 MG Tabs  Commonly known as: Glucophage   Take 1 Tablet by mouth 2 times a day with meals.  Dose: 500 mg     omeprazole 20 MG delayed-release capsule  Commonly known as: PriLOSEC   Take 1 Capsule by mouth 2 times a day.  Dose: 20 mg     risperiDONE 2 MG Tabs  Commonly known as: RisperDAL   Take 1 tablet by mouth 2 Times a Day.  Dose: 2 mg     thiamine 100 MG Tabs  Commonly known as: Vitamin B-1   Take 100 mg by mouth every day.  Dose: 100 mg            STOP taking these medications      cefdinir 300 MG Caps  Commonly known as: Omnicef     doxycycline 100 MG Tabs  Commonly known as: Vibramycin              Allergies  No Known Allergies    DIET  Orders Placed This Encounter   Procedures    Diet Order Diet: Consistent CHO (Diabetic)     Standing Status:   Standing     Number of Occurrences:   1     Order Specific Question:   Diet:     Answer:   Consistent CHO (Diabetic) [4]       ACTIVITY  As tolerated.  Weight bearing  as tolerated    CONSULTATIONS  None    PROCEDURES  None    LABORATORY  Lab Results   Component Value Date    SODIUM 140 04/18/2024    POTASSIUM 3.8 04/18/2024    CHLORIDE 100 04/18/2024    CO2 26 04/18/2024    GLUCOSE 114 (H) 04/18/2024    BUN 9 04/18/2024    CREATININE 0.73 04/18/2024        Lab Results   Component Value Date    WBC 9.1 04/21/2024    HEMOGLOBIN 10.2 (L) 04/21/2024    HEMATOCRIT 32.1 (L) 04/21/2024    PLATELETCT 231 04/21/2024      US-ANA SINGLE LEVEL BILAT   Final Result      US-EXTREMITY VENOUS LOWER UNILAT LEFT   Final Result      DX-CHEST-LIMITED (1 VIEW)   Final Result         1.  No acute cardiopulmonary disease.   2.  Cardiomegaly      DX-FOOT-2- LEFT   Final Result         1.  No acute traumatic bony injury.   2.  Soft tissue ulcer lateral to the fifth toe.   3.  Diffuse soft tissue swelling of the foot and ankle, consider edema or cellulitis.         Total time of the discharge process exceeds 32 minutes.

## 2024-04-22 NOTE — PROGRESS NOTES
Received bedside report from previous RN. Patient alert and oriented x2, disoriented to place and time. On saline lock, no signs of infiltration and phlebitis at this time.  room air baseline, with o2 sat of 91-92% at this time. Fall precautions in place and call light with in reach. All other needs met at this time

## 2024-04-22 NOTE — PROGRESS NOTES
Assumed care of pt. No distress noted. Alert and denies any needs at this time.      Pt tolerating food and liquids well. Takes medications without difficulty.    Using condom cath; has clear yellow urine in bag.     Family member was her to visit and requested an update; update- [facility] is waiting for insurance authorization for wound clinic. Pt is medically cleared at this time.

## 2024-04-22 NOTE — PROGRESS NOTES
"Hospital Medicine Daily Progress Note    Date of Service  4/22/2024    Chief Complaint  Yury Blake is a 53 y.o. male admitted 4/18/2024 with lower extremity wound   Hospital Course  This is a 53-year-old male with past medical history of Warnicke encephalopathy, type 2 diabetes, who resides at an assisted living facility who was brought in for nonhealing left lower extremity wound.    X-ray negative for concerns of osteo, noted ulcer lateral to the fifth toe.  Last significant for elevated ESR and CRP.  Venous Doppler negative for DVT.  MRSA swab negative.  Patient started on empiric antibiotics, patient to complete antibiotic course with Keflex.  ANA with no evidence of arterial insufficiency.  Wound care team consulted.    Wound care reccs for left foot ulcer: \"Nursing to remove old dressing. Cleanse wound with wound cleanser and gauze. Pat dry. Apply no sting skin barrier to berto-wound. Cut a piece of hydrofera blue slightly larger than wound and apply to wound bed foam side down (writing side up). Secure in place with silicone adhesive foam. Nursing to change every 72hrs and PRN dislodgement and or drainage saturation.\"    Interval Problem Update  Cellulitis improving, patient to continue antibiotic course with Keflex.    ANA negative for any arterial insufficiency.    Case management assisting in setting up outpatient wound for wound care.    I have discussed this patient's plan of care and discharge plan at IDT rounds today with Case Management, Nursing, Nursing leadership, and other members of the IDT team.    Consultants/Specialty  None    Code Status  Full Code    Disposition  The patient is medically cleared for discharge to home or a post-acute facility.  Anticipate discharge to: home with organized home healthcare and close outpatient follow-up    I have placed the appropriate orders for post-discharge needs.    Review of Systems  Review of Systems   All other systems reviewed and are " negative.       Physical Exam  Temp:  [36 °C (96.8 °F)-36.7 °C (98.1 °F)] 36.4 °C (97.5 °F)  Pulse:  [62-79] 62  Resp:  [16-18] 16  BP: ()/(58-67) 115/67  SpO2:  [91 %-93 %] 91 %    Physical Exam  Vitals and nursing note reviewed.   Constitutional:       General: He is not in acute distress.     Appearance: Normal appearance.   HENT:      Head: Normocephalic and atraumatic.   Eyes:      Extraocular Movements: Extraocular movements intact.      Conjunctiva/sclera: Conjunctivae normal.      Pupils: Pupils are equal, round, and reactive to light.   Cardiovascular:      Rate and Rhythm: Normal rate and regular rhythm.      Pulses: Normal pulses.      Heart sounds: No murmur heard.     No friction rub. No gallop.   Pulmonary:      Effort: Pulmonary effort is normal. No respiratory distress.      Breath sounds: Normal breath sounds. No wheezing, rhonchi or rales.   Abdominal:      General: Bowel sounds are normal. There is no distension.      Palpations: Abdomen is soft.      Tenderness: There is no abdominal tenderness.   Musculoskeletal:         General: No swelling or tenderness. Normal range of motion.      Cervical back: Normal range of motion and neck supple. No muscular tenderness.      Right lower leg: No edema.      Left lower leg: Edema (cellulitis with foot ulcer) present.   Skin:     General: Skin is warm and dry.      Capillary Refill: Capillary refill takes less than 2 seconds.      Findings: No bruising, erythema or rash.   Neurological:      General: No focal deficit present.      Mental Status: He is alert and oriented to person, place, and time.         Fluids    Intake/Output Summary (Last 24 hours) at 4/22/2024 1100  Last data filed at 4/22/2024 0900  Gross per 24 hour   Intake 480 ml   Output 1750 ml   Net -1270 ml       Laboratory  Recent Labs     04/20/24  0139 04/21/24  0153   WBC 10.5 9.1   RBC 3.74* 3.67*   HEMOGLOBIN 10.4* 10.2*   HEMATOCRIT 34.0* 32.1*   MCV 90.9 87.5   MCH 27.8 27.8    MCHC 30.6* 31.8*   RDW 50.1* 48.8   PLATELETCT 234 231   MPV 9.6 9.6                         Imaging  US-ANA SINGLE LEVEL BILAT   Final Result      US-EXTREMITY VENOUS LOWER UNILAT LEFT   Final Result      DX-CHEST-LIMITED (1 VIEW)   Final Result         1.  No acute cardiopulmonary disease.   2.  Cardiomegaly      DX-FOOT-2- LEFT   Final Result         1.  No acute traumatic bony injury.   2.  Soft tissue ulcer lateral to the fifth toe.   3.  Diffuse soft tissue swelling of the foot and ankle, consider edema or cellulitis.           Assessment/Plan  * Nonhealing ulcer of left lower extremity (HCC)  Assessment & Plan  In setting of diabetes  Previous cultures have grown Pseudomonas  Given Unasyn and vancomycin in ED, will cover for Pseudomonas now, check MRSA nares  X-ray negative for concerns of osteo, noted ulcer lateral to the fifth toe.  Last significant for elevated ESR and CRP.  Venous Doppler negative for DVT.  MRSA swab pending.  Patient started on empiric antibiotics.  ANA with no evidence of arterial insufficiency.  Wound care team consulted.    Psychiatric disorder  Assessment & Plan  Resume Risperdal    Wernicke encephalopathy  Assessment & Plan  Patient apparently at baseline  Unable to give much history  Continue thiamine    Cellulitis  Assessment & Plan  Swollen, red left leg  Given Unasyn and vancomycin in ED  Recently completed antibiotics and has had nonhealing wound now with possible cellulitis  Previous cultures growing Pseudomonas, will give cefepime, check MRSA nares    Leg swelling- (present on admission)  Assessment & Plan  Asymmetric left leg swelling  Venous Doppler negative for DVT         VTE prophylaxis: Lovenox    I have performed a physical exam and reviewed and updated ROS and Plan today (4/22/2024). In review of yesterday's note (4/21/2024), there are no changes except as documented above.

## 2024-04-22 NOTE — DISCHARGE PLANNING
Case Management Discharge Planning    Admission Date: 4/18/2024  GMLOS:    ALOS: 0    6-Clicks ADL Score: 22  6-Clicks Mobility Score: 18      Anticipated Discharge Dispo: Discharge Disposition: Discharged to home/self care (01)  Discharge Address: [address] [place] [address] [place] [address]  Discharge Contact Phone Number: [phone]    DME Needed: No    Action(s) Taken: OTHER     This RN CM was updated that patinet will need out patient wound care and 4 home health agencys have declined related to insurance.     Escalated issue to CM leadership and was updated to set up [facility] follow up and [facility] rides to and from appointments.     0952 This RN CM called [facility] wound clinic to set up follow up wound care appointments.     1002 patient might be out of network with insurance at [facility] wound clinic, confirmed with wound clinic that they will start insurance auth for patient before we can confirm appointments.    1439 followed up with [facility] wound care on insurance auth pending, status is still pending.    Escalations Completed: None    Medically Clear: Yes    Next Steps:     Pending Insurance auth for wound clinic.    Call [facility] for transport round trip for follow up with wound care clinic.     Once wound care and rides are confirmed will need to set up transport back to group home.    Barriers to Discharge: Outpatient referrals pending    Is the patient up for discharge tomorrow: No

## 2024-04-22 NOTE — PROGRESS NOTES
Hourly rounds performed. Patient is resting comfortably with even respirations. Denies pain at this time. Fall precautions continue and call light within reach. All other needs met at this time.

## 2024-04-23 ENCOUNTER — PHARMACY VISIT (OUTPATIENT)
Dept: PHARMACY | Facility: MEDICAL CENTER | Age: 54
End: 2024-04-23
Payer: COMMERCIAL

## 2024-04-23 VITALS
HEIGHT: 72 IN | HEART RATE: 81 BPM | BODY MASS INDEX: 24.9 KG/M2 | DIASTOLIC BLOOD PRESSURE: 64 MMHG | TEMPERATURE: 97.5 F | SYSTOLIC BLOOD PRESSURE: 114 MMHG | OXYGEN SATURATION: 90 % | WEIGHT: 183.86 LBS | RESPIRATION RATE: 18 BRPM

## 2024-04-23 PROCEDURE — 99239 HOSP IP/OBS DSCHRG MGMT >30: CPT | Performed by: INTERNAL MEDICINE

## 2024-04-23 PROCEDURE — G0378 HOSPITAL OBSERVATION PER HR: HCPCS

## 2024-04-23 PROCEDURE — A9270 NON-COVERED ITEM OR SERVICE: HCPCS | Mod: UD | Performed by: STUDENT IN AN ORGANIZED HEALTH CARE EDUCATION/TRAINING PROGRAM

## 2024-04-23 PROCEDURE — A9270 NON-COVERED ITEM OR SERVICE: HCPCS | Mod: UD | Performed by: GENERAL PRACTICE

## 2024-04-23 PROCEDURE — 700102 HCHG RX REV CODE 250 W/ 637 OVERRIDE(OP): Mod: UD | Performed by: GENERAL PRACTICE

## 2024-04-23 PROCEDURE — 700102 HCHG RX REV CODE 250 W/ 637 OVERRIDE(OP): Mod: UD | Performed by: STUDENT IN AN ORGANIZED HEALTH CARE EDUCATION/TRAINING PROGRAM

## 2024-04-23 RX ADMIN — FOLIC ACID 1 MG: 1 TABLET ORAL at 04:44

## 2024-04-23 RX ADMIN — DIVALPROEX SODIUM 250 MG: 250 TABLET, DELAYED RELEASE ORAL at 04:44

## 2024-04-23 RX ADMIN — RISPERIDONE 2 MG: 2 TABLET ORAL at 04:44

## 2024-04-23 RX ADMIN — CEPHALEXIN 500 MG: 500 CAPSULE ORAL at 12:17

## 2024-04-23 RX ADMIN — ASPIRIN 325 MG: 325 TABLET ORAL at 04:44

## 2024-04-23 RX ADMIN — CEPHALEXIN 500 MG: 500 CAPSULE ORAL at 04:52

## 2024-04-23 RX ADMIN — CEPHALEXIN 500 MG: 500 CAPSULE ORAL at 00:05

## 2024-04-23 RX ADMIN — OMEPRAZOLE 20 MG: 20 CAPSULE, DELAYED RELEASE ORAL at 04:44

## 2024-04-23 RX ADMIN — Medication 100 MG: at 04:44

## 2024-04-23 RX ADMIN — DIVALPROEX SODIUM 250 MG: 250 TABLET, DELAYED RELEASE ORAL at 12:17

## 2024-04-23 ASSESSMENT — PAIN DESCRIPTION - PAIN TYPE: TYPE: ACUTE PAIN

## 2024-04-23 NOTE — PROGRESS NOTES
Received bedside report from previous RN. Patient alert and oriented x2, disoriented to place and time. On saline lock, no signs of infiltration and phlebitis at this time. on room air baseline, with o2 sat of 91-92% at this time. Fall precautions in place and call light with in reach.

## 2024-04-23 NOTE — DISCHARGE PLANNING
DC Transport Scheduled    Transport Company Scheduled:  [facility]  Spoke with Phil at [facility] to schedule transport.  [facility] Trip #: [id number]    Scheduled Date: 4/23/2024  Scheduled Time: 1700    Destination: [facility]   Destination address: [address] [place] [address]; [place] [id number]    Notified care team of scheduled transport via Voalte.     If there are any changes needed to the DC transportation scheduled, please contact [facility] at ext. [phone] between the hours of 5410-4824 Mon-Fri. If outside those hours, contact the ED Case Manager at ext. [phone].

## 2024-04-23 NOTE — PROGRESS NOTES
Assumed care of pt. No distress noted. Alert and denies any needs at this time.         Pt tolerating food and liquids well. Takes medications without difficulty.     Using condom cath; has clear yellow urine in bag.     Pt is cleared for discharge. PIV removed, condom cath removed, berto care provided. Meds to beds ready to provided, and AVS form signed. Education re enforced to pt.

## 2024-04-23 NOTE — PROGRESS NOTES
Patient medicated per MAR and tolerated well. Reposition patient q2. Denies pain at this time. All other needs met at this time.

## 2024-04-23 NOTE — CARE PLAN
The patient is Stable - Low risk of patient condition declining or worsening    Shift Goals  Clinical Goals: Pt to remain free from injury and falls during shift.  Patient Goals: KIRTI  Family Goals: KIRTI    Progress made toward(s) clinical / shift goals:  Pt remained free from falls and injury during shift.

## 2024-04-23 NOTE — DISCHARGE PLANNING
Case Management Discharge Planning    Admission Date: 4/18/2024  GMLOS:    ALOS: 0    6-Clicks ADL Score: 22  6-Clicks Mobility Score: 18      Anticipated Discharge Dispo: Discharge Disposition: Discharged to home/self care (01)  Discharge Address: [address] [place] [address] [place] [address]  Discharge Contact Phone Number: [phone]    DME Needed: No    Action(s) Taken: Updated Provider/Nurse on Discharge Plan  Discussed patient in discharge rounds. Wound care pending  clinic PA with patient's insurance.   At 0905 Call to [facility] to check on status of PA. It is still pending.  At 1025 - contacted JAMES Armendariz with [facility] Medicaid. Phone number [phone]. Kala provided auth # [id number] for [facility] 3x weekly.   1035 Called Moses at the group [place]. He can accept patient back and requests arrival of patient before 4PM, if possible. We discussed  clinic transport 3x per week for 4 weeks per MTM.   1040 Contacted guardian Deonte Henderson. [phone]. He agreed for patient to discharge and with discharge follow up through the wound care clinic.   1135 - Contacted Ana Lilia at [facility]. They have no auth.  1138 - Contacted Kala at [facility] Medicaid. VMM left requesting assistance with [facility] getting notice of the authorization for wound care.   1205 - Kala, Insurance CM, returned my call. She has called and left a VMM with the [facility] with the auth number. Kala will also fax the authorization.   1240 - Requested ride home for patient, through santiago.   1410 Confirmed with [facility] they have authorization. Leonid to call me back to coordinate appointment for wound care.  1430 WC transport set up for 1700 by santiago. Informed patient of planned discharge today. Tried call to [facility] to get appointment times so I can arrange transport for the first 1-2.   Called Moses and provided an update.  Provided the MTM number for him to set up transport for wound care. Voalted doctor about need for scripts for any new meds. Antibiotic was sent by doctor to meds to  beds. Charge nurse will follow up to make sure antibiotic is obtained to go with patient.   1515 First wound care center appointment scheduled for Thursday, 4/25/24 at 1PM.   The following was added to discharge instructions:    Appointment with   [facility]  Thursday, 4/25/24 at 1PM.     Arrive 10-15 minutes early.  If 5 minutes late, you will have the option to reschedule.     [address]  [address]  [place] [address]    [phone]    Wheelchair transportation has been set up for your first appointment at the [facility].   Trip # [id number].    time is 12:30PM, but may be earlier in order to get you to your appointment on time.    from the Wound Care Center is on WILL CALL.    Phone number for transport is [phone]. Provide your phone number of [phone] or patient's name and date of birth.     1540- Contacted Mercy Nascimentoan and updated him. Deonte will call transport number on Thursday AM and make sure they pick patient up earlier.   1545 - Contacted Moses and gave different number of [phone] to schedule subsequent wheelchair transport.     1555 Bedside RN and attending reminded of 5pm  time, need for antibiotic to go with patient.   Updating face sheet and providing packet to bedside RN to go with patient.     *POLST form indicated Deonte Henderson as guardian. Moses, , indicated they have documentation that Deonte Henderson is guardian.*    Escalations Completed: Insurance       Medically Clear: Yes    Next Steps: Contact [place] Medicaid to check on status.     Barriers to Discharge: None at this time.     Is the patient up for discharge tomorrow: No

## 2024-04-23 NOTE — DIETARY
Nutrition Update:    Day 0 of admit.  Yury Blake is a 53 y.o. male with admitting DX of Leg swelling [M79.89].  Patient being followed to optimize nutrition.    Current Diet: Consistent CHO  Per ADLs, PO has been mostly <50% for the past few meals. Pt ate % of his breakfast this am.    Visited pt at bedside. Pt hadn't eaten breakfast yet, though tray was on his table. RD opened lid to see if he ate breakfast. Pt immediately was interested in eating breakfast. RD asked multiple questions regarding his appetite, any food preferences, and why his PO has been poor recently. Pt didn't answer RD at this time, though was asked multiple times.    Problem: Nutritional:  Goal: Achieve adequate nutritional intake  Description: Patient will consume >50% of meals  Outcome: Progressing slower than expected.     Recommendations/Plan:   Encourage intake of >50% of meals  Document intake of all meals as % taken in ADL's to provide interdisciplinary communication across all shifts.   Monitor weight.    RD following.

## 2024-04-23 NOTE — DISCHARGE INSTR - CASE MGT
Appointment with   [facility]  Thursday, 4/25/24 at 1PM.     Arrive 10-15 minutes early.  If 5 minutes late, you will have the option to reschedule.     [address]  [address]  [place] [address]    [phone]    Wheelchair transportation has been set up for your first appointment at the W[facility] Care Center.   Trip # [id number].    time is 12:30PM, but may be earlier in order to get you to your appointment on time.    from the [facility]ound Care Center is on WILL CALL.    Phone number for transport is [phone]. Provide your phone number of [phone] or patient's name and date of birth.

## 2024-04-23 NOTE — PROGRESS NOTES
Hourly rounds performed. Patient is resting comfortably with even respirations. Denies pain at this time. Fall precautions continued and call light within reach. All other needs met at this time.

## 2024-04-25 ENCOUNTER — NON-PROVIDER VISIT (OUTPATIENT)
Dept: WOUND CARE | Facility: MEDICAL CENTER | Age: 54
End: 2024-04-25
Attending: GENERAL PRACTICE
Payer: MEDICAID

## 2024-04-25 PROCEDURE — 97597 DBRDMT OPN WND 1ST 20 CM/<: CPT

## 2024-04-25 PROCEDURE — 99211 OFF/OP EST MAY X REQ PHY/QHP: CPT

## 2024-04-25 NOTE — CERTIFICATION
"Non Provider Encounter- Diabetic Foot Ulcer      HISTORY OF PRESENT ILLNESS  Wound History:    START OF CARE IN CLINIC: 4/25/2024    REFERRING PROVIDER: Britni Valladares DO   WOUND- Diabetic foot ulcer   LOCATION: Left Lateral Foot   HISTORY: 52 y/o male pt with altered mental status from hx of wernicke encephalopathy presents with his caregiver from his group home, [place]. Hx obtained from [place] and Jake who provided hx through phone conversation. Pt has ulcer to left lateral 5th MTH that started about 3 months ago from rubbing on the metal/plastic piece on his wheelchair's foot rest. Pt then was admitted from 4/18/24~4/23/24 for cellulitis from this wound. Pt is on oral ABX and was referred to [facility] for further treatment.    Pertinent Medical History: DM, altered mental status from hx of wernicke encephalopathy.    DIABETES HX: Diagnosed with type 2 diabetes unknown years ago, and is currently managing with metformin.  Usually wears no shoes due to shoes causing heel ulcers in the past.      TOBACCO USE: Former chewing tobacco user     OFFLOADING: none    Pertinent Labs and Diagnostics:    Labs:     A1c:   Lab Results   Component Value Date/Time    HBA1C 5.8 (H) 04/20/2024 01:39 AM      IMAGING: x-ray 4/18/24: \"FINDINGS:     No acute fracture, subluxation, or dislocation identified. There is soft tissue ulceration of the lateral aspect of the fifth toe at the metatarsophalangeal joint. Diffuse soft tissue swelling of the forefoot extending onto the leg is seen.     IMPRESSION:        1.  No acute traumatic bony injury.  2.  Soft tissue ulcer lateral to the fifth toe.  3.  Diffuse soft tissue swelling of the foot and ankle, consider edema or cellulitis.\"    VASCULAR STUDIES: ANA 4/20/24: \"                RIGHT      Waveform            Systolic BPs (mmHg)                              108           Brachial   Triphasic                                Common Femoral   Biphasic                                 Popliteal   " "Bi, non-rev                112           Posterior Tibial   Biphasic                   114           Dorsalis Pedis                                            Digit                              1.00          ANA                                            TBI                           LEFT   Waveform        Systolic BPs (mmHg)                              114           Brachial   Bi, non-rev                              Common Femoral   Triphasic                                Popliteal   Triphasic                  109           Posterior Tibial   Bi, non-rev                107           Dorsalis Pedis                                            Digit                              0.96          ANA                                            TBI         Findings   Bilateral.    Doppler waveforms of the common femoral, popliteal, posterior tibial, and    dorsalis pedis arteries are of high amplitude and multiphasic.    Doppler waveforms at the ankle are brisk and multiphasic.    The ankle-brachial indices are normal.       Additional testing was not performed in accord\"    Venous study 4/19/24: \" CONCLUSIONS   Compared to the prior study on 09/10/2023, no changes.    Left lower extremity venous duplex imaging.    No deep venous thrombosis.      FINDINGS:   Left lower extremity.    All veins demonstrate complete color filling and compressibility with    normal venous flow dynamics including spontaneous flow and respiratory    phasicity.    No deep venous thrombosis.    Flow was evaluated in the contralateral common femoral vein and normal    venous flow dynamics including spontaneous flow and respiratory phasic    variation were demonstrated. \"    LAST  WOUND CULTURE:  DATE : n/a       Patient allergies and medications reviewed via Epic.     WOUND ASSESSMENT-      Wound Diabetic Ulcer Foot Lateral Left (Active)   Wound Image   04/25/24 1300   Site Assessment Red 04/25/24 1300   Periwound Assessment Callused 04/25/24 1300 "   Margins Unattached edges 04/25/24 1300   Drainage Amount Moderate 04/25/24 1300   Drainage Description Serosanguineous 04/25/24 1300   Treatments Cleansed;Topical Lidocaine;CSWD - Conservative Sharp Wound Debridement;Site care 04/25/24 1300   Wound Cleansing Hypochlorus Acid 04/25/24 1300   Periwound Protectant Skin Protectant Wipes to Periwound 04/25/24 1300   Dressing Changed New 04/25/24 1300   Dressing Cleansing/Solutions Not Applicable 04/25/24 1300   Dressing Options Hydrofiber Silver;Nonadhesive Foam;Hypafix Tape 04/25/24 1300   Dressing Change/Treatment Frequency Every 48 hrs, and As Needed 04/25/24 1300   Wound Team Following Weekly 04/25/24 1300   Non-staged Wound Description Full thickness 04/25/24 1300   Wound Length (cm) 1.3 cm 04/25/24 1300   Wound Width (cm) 0.9 cm 04/25/24 1300   Wound Depth (cm) 0.1 cm 04/25/24 1300   Wound Surface Area (cm^2) 1.17 cm^2 04/25/24 1300   Wound Volume (cm^3) 0.117 cm^3 04/25/24 1300   Post-Procedure Length (cm) 1.3 cm 04/25/24 1300   Post-Procedure Width (cm) 0.9 cm 04/25/24 1300   Post-Procedure Depth (cm) 0.1 cm 04/25/24 1300   Post-Procedure Surface Area (cm^2) 1.17 cm^2 04/25/24 1300   Post-Procedure Volume (cm^3) 0.117 cm^3 04/25/24 1300   Tunneling (cm) 0 cm 04/25/24 1300   Undermining (cm) 0 cm 04/25/24 1300   Wound Odor None 04/25/24 1300   Exposed Structures None 04/25/24 1300     Procedures:       -2% viscous lidocaine applied topically to wound bed for approximately 5 minutes prior to debridement  -Curette used to debride wound bed and periwound callus. Entire surface of wound, 1.17 cm2 debrided.  Periwound callus, ~ 2 cm2, debrided to skin level  -Refer to flowsheet for wound care details.     PATIENT EDUCATION  - Importance of tight glucose control for wound healing   - Implications of loss of protective sensation (LOPS) discussed with patient- including increased risk for amputation.  - Advised to check feet at least daily, moisturize feet, and to  always wear protective foot wear.   -  Importance of offloading foot to assist with wound healing  - Advised pt not to trim nails or calluses, seek foot/nail care from podiatrist or certified foot/nail nurse  - Importance of adequate nutrition for wound healing    NOTE  -Caregiver, [place] states home health care may help pt. Asked PAR to schedule with provider next week for evaluation and order or home health.

## 2024-04-25 NOTE — PATIENT INSTRUCTIONS
Should you experience any significant changes in your wound(s), such as infection (redness, swelling, localized heat, increased pain, fever > 101 F, chills) or have any questions regarding your home care instructions, please contact the wound center at [phone]. If after hours, contact your primary care physician or go to the hospital emergency room.   Keep dressing clean, dry and covered while bathing. Only change dressing if it becomes over saturated, soiled or falls off.

## 2024-05-09 ENCOUNTER — OFFICE VISIT (OUTPATIENT)
Dept: WOUND CARE | Facility: MEDICAL CENTER | Age: 54
End: 2024-05-09
Attending: GENERAL PRACTICE
Payer: MEDICAID

## 2024-05-09 DIAGNOSIS — Z91.199 NO-SHOW FOR APPOINTMENT: ICD-10-CM

## 2024-05-09 PROCEDURE — 99999 PR NO CHARGE: CPT | Performed by: NURSE PRACTITIONER

## 2024-05-09 NOTE — PROGRESS NOTES
2nd no call no show. Spoke to Deonte, friend/guardian on phone. Deonte states group home has received dressing care supplies and will be taking care of it at home as pt can get combative during transport. Confirmed to cancel remaining appointments at this time. Deonte agreeable.

## 2024-05-09 NOTE — PROGRESS NOTES
No show for appointment on 5/9/2024.  RN called friend/guardian. See RN note for detail. Pt to f/u PRN.

## 2024-08-27 ENCOUNTER — APPOINTMENT (OUTPATIENT)
Dept: RADIOLOGY | Facility: MEDICAL CENTER | Age: 54
DRG: 617 | End: 2024-08-27
Attending: STUDENT IN AN ORGANIZED HEALTH CARE EDUCATION/TRAINING PROGRAM
Payer: MEDICAID

## 2024-08-27 ENCOUNTER — APPOINTMENT (OUTPATIENT)
Dept: RADIOLOGY | Facility: MEDICAL CENTER | Age: 54
DRG: 617 | End: 2024-08-27
Attending: EMERGENCY MEDICINE
Payer: MEDICAID

## 2024-08-27 ENCOUNTER — HOSPITAL ENCOUNTER (INPATIENT)
Facility: MEDICAL CENTER | Age: 54
End: 2024-08-27
Attending: EMERGENCY MEDICINE | Admitting: STUDENT IN AN ORGANIZED HEALTH CARE EDUCATION/TRAINING PROGRAM
Payer: MEDICAID

## 2024-08-27 DIAGNOSIS — S91.302A OPEN WOUND OF LEFT FOOT, INITIAL ENCOUNTER: ICD-10-CM

## 2024-08-27 DIAGNOSIS — M86.9 OSTEOMYELITIS OF FIFTH TOE OF LEFT FOOT (HCC): ICD-10-CM

## 2024-08-27 PROBLEM — E11.9 DIABETES (HCC): Status: ACTIVE | Noted: 2024-08-27

## 2024-08-27 LAB
ALBUMIN SERPL BCP-MCNC: 3.4 G/DL (ref 3.2–4.9)
ALBUMIN/GLOB SERPL: 1 G/DL
ALP SERPL-CCNC: 85 U/L (ref 30–99)
ALT SERPL-CCNC: <5 U/L (ref 2–50)
ANION GAP SERPL CALC-SCNC: 14 MMOL/L (ref 7–16)
ANISOCYTOSIS BLD QL SMEAR: ABNORMAL
AST SERPL-CCNC: 9 U/L (ref 12–45)
BASOPHILS # BLD AUTO: 0.7 % (ref 0–1.8)
BASOPHILS # BLD: 0.08 K/UL (ref 0–0.12)
BILIRUB SERPL-MCNC: 0.2 MG/DL (ref 0.1–1.5)
BUN SERPL-MCNC: 9 MG/DL (ref 8–22)
CALCIUM ALBUM COR SERPL-MCNC: 10.1 MG/DL (ref 8.5–10.5)
CALCIUM SERPL-MCNC: 9.6 MG/DL (ref 8.5–10.5)
CHLORIDE SERPL-SCNC: 105 MMOL/L (ref 96–112)
CO2 SERPL-SCNC: 25 MMOL/L (ref 20–33)
COMMENT 1642: NORMAL
CREAT SERPL-MCNC: 0.78 MG/DL (ref 0.5–1.4)
CRP SERPL HS-MCNC: 12.59 MG/DL (ref 0–0.75)
EOSINOPHIL # BLD AUTO: 0.28 K/UL (ref 0–0.51)
EOSINOPHIL NFR BLD: 2.5 % (ref 0–6.9)
ERYTHROCYTE [DISTWIDTH] IN BLOOD BY AUTOMATED COUNT: 51.4 FL (ref 35.9–50)
ERYTHROCYTE [SEDIMENTATION RATE] IN BLOOD BY WESTERGREN METHOD: 87 MM/HOUR (ref 0–20)
GFR SERPLBLD CREATININE-BSD FMLA CKD-EPI: 107 ML/MIN/1.73 M 2
GLOBULIN SER CALC-MCNC: 3.3 G/DL (ref 1.9–3.5)
GLUCOSE BLD STRIP.AUTO-MCNC: 84 MG/DL (ref 65–99)
GLUCOSE SERPL-MCNC: 92 MG/DL (ref 65–99)
HCT VFR BLD AUTO: 31.4 % (ref 42–52)
HGB BLD-MCNC: 9.2 G/DL (ref 14–18)
IMM GRANULOCYTES # BLD AUTO: 0.1 K/UL (ref 0–0.11)
IMM GRANULOCYTES NFR BLD AUTO: 0.9 % (ref 0–0.9)
LACTATE SERPL-SCNC: 1.3 MMOL/L (ref 0.5–2)
LYMPHOCYTES # BLD AUTO: 2.17 K/UL (ref 1–4.8)
LYMPHOCYTES NFR BLD: 19.1 % (ref 22–41)
MCH RBC QN AUTO: 24.9 PG (ref 27–33)
MCHC RBC AUTO-ENTMCNC: 29.3 G/DL (ref 32.3–36.5)
MCV RBC AUTO: 85.1 FL (ref 81.4–97.8)
MICROCYTES BLD QL SMEAR: ABNORMAL
MONOCYTES # BLD AUTO: 1 K/UL (ref 0–0.85)
MONOCYTES NFR BLD AUTO: 8.8 % (ref 0–13.4)
MORPHOLOGY BLD-IMP: NORMAL
NEUTROPHILS # BLD AUTO: 7.71 K/UL (ref 1.82–7.42)
NEUTROPHILS NFR BLD: 68 % (ref 44–72)
NRBC # BLD AUTO: 0 K/UL
NRBC BLD-RTO: 0 /100 WBC (ref 0–0.2)
OVALOCYTES BLD QL SMEAR: NORMAL
PLATELET # BLD AUTO: 349 K/UL (ref 164–446)
PLATELET BLD QL SMEAR: NORMAL
PMV BLD AUTO: 9.3 FL (ref 9–12.9)
POIKILOCYTOSIS BLD QL SMEAR: NORMAL
POTASSIUM SERPL-SCNC: 3.5 MMOL/L (ref 3.6–5.5)
PROT SERPL-MCNC: 6.7 G/DL (ref 6–8.2)
RBC # BLD AUTO: 3.69 M/UL (ref 4.7–6.1)
RBC BLD AUTO: PRESENT
SODIUM SERPL-SCNC: 144 MMOL/L (ref 135–145)
WBC # BLD AUTO: 11.3 K/UL (ref 4.8–10.8)

## 2024-08-27 PROCEDURE — 36415 COLL VENOUS BLD VENIPUNCTURE: CPT

## 2024-08-27 PROCEDURE — 85652 RBC SED RATE AUTOMATED: CPT

## 2024-08-27 PROCEDURE — 84145 PROCALCITONIN (PCT): CPT

## 2024-08-27 PROCEDURE — 99223 1ST HOSP IP/OBS HIGH 75: CPT | Performed by: STUDENT IN AN ORGANIZED HEALTH CARE EDUCATION/TRAINING PROGRAM

## 2024-08-27 PROCEDURE — A9270 NON-COVERED ITEM OR SERVICE: HCPCS | Performed by: STUDENT IN AN ORGANIZED HEALTH CARE EDUCATION/TRAINING PROGRAM

## 2024-08-27 PROCEDURE — 80053 COMPREHEN METABOLIC PANEL: CPT

## 2024-08-27 PROCEDURE — 85025 COMPLETE CBC W/AUTO DIFF WBC: CPT

## 2024-08-27 PROCEDURE — 73620 X-RAY EXAM OF FOOT: CPT | Mod: RT

## 2024-08-27 PROCEDURE — 83605 ASSAY OF LACTIC ACID: CPT

## 2024-08-27 PROCEDURE — 73630 X-RAY EXAM OF FOOT: CPT | Mod: LT

## 2024-08-27 PROCEDURE — 99285 EMERGENCY DEPT VISIT HI MDM: CPT

## 2024-08-27 PROCEDURE — 83615 LACTATE (LD) (LDH) ENZYME: CPT

## 2024-08-27 PROCEDURE — 700102 HCHG RX REV CODE 250 W/ 637 OVERRIDE(OP): Mod: UD | Performed by: EMERGENCY MEDICINE

## 2024-08-27 PROCEDURE — 700105 HCHG RX REV CODE 258: Performed by: STUDENT IN AN ORGANIZED HEALTH CARE EDUCATION/TRAINING PROGRAM

## 2024-08-27 PROCEDURE — 700111 HCHG RX REV CODE 636 W/ 250 OVERRIDE (IP): Mod: JZ | Performed by: STUDENT IN AN ORGANIZED HEALTH CARE EDUCATION/TRAINING PROGRAM

## 2024-08-27 PROCEDURE — 86140 C-REACTIVE PROTEIN: CPT

## 2024-08-27 PROCEDURE — 700102 HCHG RX REV CODE 250 W/ 637 OVERRIDE(OP): Performed by: STUDENT IN AN ORGANIZED HEALTH CARE EDUCATION/TRAINING PROGRAM

## 2024-08-27 PROCEDURE — 82962 GLUCOSE BLOOD TEST: CPT

## 2024-08-27 PROCEDURE — 770006 HCHG ROOM/CARE - MED/SURG/GYN SEMI*

## 2024-08-27 PROCEDURE — A9270 NON-COVERED ITEM OR SERVICE: HCPCS | Mod: UD | Performed by: EMERGENCY MEDICINE

## 2024-08-27 RX ORDER — LORAZEPAM 2 MG/ML
1.5 INJECTION INTRAMUSCULAR
Status: DISCONTINUED | OUTPATIENT
Start: 2024-08-27 | End: 2024-08-29

## 2024-08-27 RX ORDER — LORAZEPAM 2 MG/ML
0.5 INJECTION INTRAMUSCULAR EVERY 4 HOURS PRN
Status: DISCONTINUED | OUTPATIENT
Start: 2024-08-27 | End: 2024-08-29

## 2024-08-27 RX ORDER — AMOXICILLIN 250 MG
2 CAPSULE ORAL EVERY EVENING
Status: DISCONTINUED | OUTPATIENT
Start: 2024-08-27 | End: 2024-09-05 | Stop reason: HOSPADM

## 2024-08-27 RX ORDER — FOLIC ACID 1 MG/1
1 TABLET ORAL DAILY
Status: DISCONTINUED | OUTPATIENT
Start: 2024-08-28 | End: 2024-08-27

## 2024-08-27 RX ORDER — ACETAMINOPHEN 325 MG/1
650 TABLET ORAL EVERY 6 HOURS PRN
Status: DISCONTINUED | OUTPATIENT
Start: 2024-08-27 | End: 2024-09-02

## 2024-08-27 RX ORDER — CEPHALEXIN 500 MG/1
500 CAPSULE ORAL ONCE
Status: COMPLETED | OUTPATIENT
Start: 2024-08-27 | End: 2024-08-27

## 2024-08-27 RX ORDER — LORAZEPAM 2 MG/ML
1 INJECTION INTRAMUSCULAR
Status: DISCONTINUED | OUTPATIENT
Start: 2024-08-27 | End: 2024-08-29

## 2024-08-27 RX ORDER — HEPARIN SODIUM 5000 [USP'U]/ML
5000 INJECTION, SOLUTION INTRAVENOUS; SUBCUTANEOUS EVERY 8 HOURS
Status: DISCONTINUED | OUTPATIENT
Start: 2024-08-27 | End: 2024-08-30

## 2024-08-27 RX ORDER — MAGNESIUM SULFATE 1 G/100ML
1 INJECTION INTRAVENOUS ONCE
Status: COMPLETED | OUTPATIENT
Start: 2024-08-27 | End: 2024-08-27

## 2024-08-27 RX ORDER — ASPIRIN 325 MG
325 TABLET ORAL DAILY
Status: DISCONTINUED | OUTPATIENT
Start: 2024-08-28 | End: 2024-08-27

## 2024-08-27 RX ORDER — RISPERIDONE 0.5 MG/1
2 TABLET ORAL 2 TIMES DAILY
Status: DISCONTINUED | OUTPATIENT
Start: 2024-08-27 | End: 2024-09-05 | Stop reason: HOSPADM

## 2024-08-27 RX ORDER — ONDANSETRON 2 MG/ML
4 INJECTION INTRAMUSCULAR; INTRAVENOUS EVERY 4 HOURS PRN
Status: DISCONTINUED | OUTPATIENT
Start: 2024-08-27 | End: 2024-09-05 | Stop reason: HOSPADM

## 2024-08-27 RX ORDER — LORAZEPAM 0.5 MG/1
0.5 TABLET ORAL EVERY 4 HOURS PRN
Status: DISCONTINUED | OUTPATIENT
Start: 2024-08-27 | End: 2024-08-29

## 2024-08-27 RX ORDER — PROMETHAZINE HYDROCHLORIDE 25 MG/1
12.5-25 TABLET ORAL EVERY 4 HOURS PRN
Status: DISCONTINUED | OUTPATIENT
Start: 2024-08-27 | End: 2024-09-05 | Stop reason: HOSPADM

## 2024-08-27 RX ORDER — ATORVASTATIN CALCIUM 40 MG/1
40 TABLET, FILM COATED ORAL EVERY EVENING
Status: DISCONTINUED | OUTPATIENT
Start: 2024-08-27 | End: 2024-09-05 | Stop reason: HOSPADM

## 2024-08-27 RX ORDER — SODIUM CHLORIDE, SODIUM LACTATE, POTASSIUM CHLORIDE, AND CALCIUM CHLORIDE .6; .31; .03; .02 G/100ML; G/100ML; G/100ML; G/100ML
500 INJECTION, SOLUTION INTRAVENOUS
Status: DISCONTINUED | OUTPATIENT
Start: 2024-08-27 | End: 2024-08-28

## 2024-08-27 RX ORDER — PROMETHAZINE HYDROCHLORIDE 25 MG/1
12.5-25 SUPPOSITORY RECTAL EVERY 4 HOURS PRN
Status: DISCONTINUED | OUTPATIENT
Start: 2024-08-27 | End: 2024-09-05 | Stop reason: HOSPADM

## 2024-08-27 RX ORDER — ONDANSETRON 4 MG/1
4 TABLET, ORALLY DISINTEGRATING ORAL EVERY 4 HOURS PRN
Status: DISCONTINUED | OUTPATIENT
Start: 2024-08-27 | End: 2024-09-05 | Stop reason: HOSPADM

## 2024-08-27 RX ORDER — SULFAMETHOXAZOLE/TRIMETHOPRIM 800-160 MG
1 TABLET ORAL ONCE
Status: COMPLETED | OUTPATIENT
Start: 2024-08-27 | End: 2024-08-27

## 2024-08-27 RX ORDER — LORAZEPAM 2 MG/ML
2 INJECTION INTRAMUSCULAR
Status: DISCONTINUED | OUTPATIENT
Start: 2024-08-27 | End: 2024-08-29

## 2024-08-27 RX ORDER — PROCHLORPERAZINE EDISYLATE 5 MG/ML
5-10 INJECTION INTRAMUSCULAR; INTRAVENOUS EVERY 4 HOURS PRN
Status: DISCONTINUED | OUTPATIENT
Start: 2024-08-27 | End: 2024-09-05 | Stop reason: HOSPADM

## 2024-08-27 RX ORDER — UREA 10 %
1000 LOTION (ML) TOPICAL DAILY
Status: DISCONTINUED | OUTPATIENT
Start: 2024-08-28 | End: 2024-09-05 | Stop reason: HOSPADM

## 2024-08-27 RX ORDER — LORAZEPAM 1 MG/1
1 TABLET ORAL EVERY 4 HOURS PRN
Status: DISCONTINUED | OUTPATIENT
Start: 2024-08-27 | End: 2024-08-29

## 2024-08-27 RX ORDER — DIVALPROEX SODIUM 250 MG/1
250 TABLET, DELAYED RELEASE ORAL 3 TIMES DAILY
Status: DISCONTINUED | OUTPATIENT
Start: 2024-08-27 | End: 2024-09-05 | Stop reason: HOSPADM

## 2024-08-27 RX ORDER — POTASSIUM CHLORIDE 1500 MG/1
40 TABLET, EXTENDED RELEASE ORAL ONCE
Status: COMPLETED | OUTPATIENT
Start: 2024-08-27 | End: 2024-08-27

## 2024-08-27 RX ORDER — LABETALOL HYDROCHLORIDE 5 MG/ML
10 INJECTION, SOLUTION INTRAVENOUS EVERY 4 HOURS PRN
Status: DISCONTINUED | OUTPATIENT
Start: 2024-08-27 | End: 2024-08-31

## 2024-08-27 RX ORDER — POLYETHYLENE GLYCOL 3350 17 G/17G
1 POWDER, FOR SOLUTION ORAL
Status: DISCONTINUED | OUTPATIENT
Start: 2024-08-27 | End: 2024-09-05 | Stop reason: HOSPADM

## 2024-08-27 RX ORDER — DEXTROSE MONOHYDRATE 25 G/50ML
25 INJECTION, SOLUTION INTRAVENOUS
Status: DISCONTINUED | OUTPATIENT
Start: 2024-08-27 | End: 2024-08-29

## 2024-08-27 RX ORDER — LORAZEPAM 2 MG/1
4 TABLET ORAL
Status: DISCONTINUED | OUTPATIENT
Start: 2024-08-27 | End: 2024-08-29

## 2024-08-27 RX ORDER — SODIUM CHLORIDE, SODIUM LACTATE, POTASSIUM CHLORIDE, CALCIUM CHLORIDE 600; 310; 30; 20 MG/100ML; MG/100ML; MG/100ML; MG/100ML
INJECTION, SOLUTION INTRAVENOUS CONTINUOUS
Status: DISCONTINUED | OUTPATIENT
Start: 2024-08-27 | End: 2024-08-28

## 2024-08-27 RX ORDER — LORAZEPAM 2 MG/1
2 TABLET ORAL
Status: DISCONTINUED | OUTPATIENT
Start: 2024-08-27 | End: 2024-08-29

## 2024-08-27 RX ORDER — GAUZE BANDAGE 2" X 2"
100 BANDAGE TOPICAL DAILY
Status: DISCONTINUED | OUTPATIENT
Start: 2024-08-28 | End: 2024-08-27

## 2024-08-27 RX ORDER — GAUZE BANDAGE 2" X 2"
100 BANDAGE TOPICAL 3 TIMES DAILY
Status: DISCONTINUED | OUTPATIENT
Start: 2024-08-27 | End: 2024-09-05 | Stop reason: HOSPADM

## 2024-08-27 RX ORDER — FOLIC ACID 1 MG/1
1 TABLET ORAL 2 TIMES DAILY
Status: DISCONTINUED | OUTPATIENT
Start: 2024-08-27 | End: 2024-09-05 | Stop reason: HOSPADM

## 2024-08-27 RX ORDER — ASPIRIN 81 MG/1
81 TABLET ORAL DAILY
Status: DISCONTINUED | OUTPATIENT
Start: 2024-08-28 | End: 2024-09-05 | Stop reason: HOSPADM

## 2024-08-27 RX ADMIN — RISPERIDONE 2 MG: 0.5 TABLET, FILM COATED ORAL at 21:16

## 2024-08-27 RX ADMIN — HEPARIN SODIUM 5000 UNITS: 5000 INJECTION, SOLUTION INTRAVENOUS; SUBCUTANEOUS at 21:17

## 2024-08-27 RX ADMIN — FOLIC ACID 1 MG: 1 TABLET ORAL at 21:17

## 2024-08-27 RX ADMIN — DIVALPROEX SODIUM 250 MG: 250 TABLET, DELAYED RELEASE ORAL at 21:16

## 2024-08-27 RX ADMIN — POTASSIUM CHLORIDE 40 MEQ: 1500 TABLET, EXTENDED RELEASE ORAL at 21:15

## 2024-08-27 RX ADMIN — ATORVASTATIN CALCIUM 40 MG: 40 TABLET, FILM COATED ORAL at 21:16

## 2024-08-27 RX ADMIN — OMEPRAZOLE 20 MG: 20 CAPSULE, DELAYED RELEASE ORAL at 21:17

## 2024-08-27 RX ADMIN — AMPICILLIN AND SULBACTAM 3 G: 1; 2 INJECTION, POWDER, FOR SOLUTION INTRAMUSCULAR; INTRAVENOUS at 21:21

## 2024-08-27 RX ADMIN — SULFAMETHOXAZOLE AND TRIMETHOPRIM 1 TABLET: 800; 160 TABLET ORAL at 16:43

## 2024-08-27 RX ADMIN — CEPHALEXIN 500 MG: 500 CAPSULE ORAL at 16:43

## 2024-08-27 RX ADMIN — SODIUM CHLORIDE, POTASSIUM CHLORIDE, SODIUM LACTATE AND CALCIUM CHLORIDE: 600; 310; 30; 20 INJECTION, SOLUTION INTRAVENOUS at 20:13

## 2024-08-27 RX ADMIN — AMPICILLIN AND SULBACTAM 3 G: 1; 2 INJECTION, POWDER, FOR SOLUTION INTRAMUSCULAR; INTRAVENOUS at 23:47

## 2024-08-27 RX ADMIN — MAGNESIUM SULFATE IN DEXTROSE 1 G: 10 INJECTION, SOLUTION INTRAVENOUS at 22:20

## 2024-08-27 RX ADMIN — Medication 100 MG: at 21:16

## 2024-08-27 ASSESSMENT — ENCOUNTER SYMPTOMS
FOCAL WEAKNESS: 0
CHILLS: 0
HEADACHES: 0
DEPRESSION: 0
FEVER: 0
DIAPHORESIS: 0
BLURRED VISION: 0
HEARTBURN: 0
DOUBLE VISION: 0
BRUISES/BLEEDS EASILY: 0
NAUSEA: 0
ABDOMINAL PAIN: 0
WEAKNESS: 1
SHORTNESS OF BREATH: 0
COUGH: 0
MYALGIAS: 1
DIZZINESS: 0
VOMITING: 0
PALPITATIONS: 0

## 2024-08-27 ASSESSMENT — LIFESTYLE VARIABLES
TREMOR: NO TREMOR
AUDITORY DISTURBANCES: NOT PRESENT
NAUSEA AND VOMITING: NO NAUSEA AND NO VOMITING
HEADACHE, FULLNESS IN HEAD: NOT PRESENT
ORIENTATION AND CLOUDING OF SENSORIUM: ORIENTED AND CAN DO SERIAL ADDITIONS
AGITATION: NORMAL ACTIVITY
VISUAL DISTURBANCES: NOT PRESENT
SUBSTANCE_ABUSE: 0
PAROXYSMAL SWEATS: NO SWEAT VISIBLE
ANXIETY: NO ANXIETY (AT EASE)
TOTAL SCORE: 0

## 2024-08-27 ASSESSMENT — FIBROSIS 4 INDEX: FIB4 SCORE: 0.92

## 2024-08-27 ASSESSMENT — PAIN DESCRIPTION - PAIN TYPE: TYPE: ACUTE PAIN

## 2024-08-27 NOTE — ED TRIAGE NOTES
Chief Complaint   Patient presents with    Wound Check     Px present from EMS from [facility] in [place], [place]. They noticed a erica sized wound to the L lateral foot draining, sent a pic to his MD and recommended they bring him in.        53 yo male to blue 18 for above complaint. No interventions per EMS    Pt is alert and orientedx3, speaking in full sentences, follows commands and responds appropriately to questions. Pivot to WC with assist per EMS      /65   Pulse 90   Temp 36.1 °C (96.9 °F) (Temporal)   Resp 18   Ht 1.829 m (6')   Wt 81.6 kg (180 lb)   SpO2 95%   BMI 24.41 kg/m²

## 2024-08-27 NOTE — ED PROVIDER NOTES
ED Provider Note    CHIEF COMPLAINT  Chief Complaint   Patient presents with    Wound Check     Px present from EMS from [facility] in [place], [place]. They noticed a erica sized wound to the L lateral foot draining, sent a pic to his MD and recommended they bring him in.        EXTERNAL RECORDS REVIEWED  Other no external notes but see inpatient records in HPI    HPI/ROS  LIMITATION TO HISTORY   Select: Altered mental status / Confusion  OUTSIDE HISTORIAN(S):  None    Yury Blake is a 54 y.o. male who presents to the ER for concern of wound on the left lateral foot.  He reports about 1 month ago he had a biopsy done in this area however I cannot find any report of this in his chart.  I do though see the hospital admission from April of this year where he had an ulcer to the lateral left fifth toe, had an elevated ESR and CRP.  DVT study was negative, ABIs showed no arterial insufficiency.  He was seen by wound care and had followed up with them throughout the rest of that month.  No surgery was needed.  He does have a history of Warnicke encephalopathy, type 2 diabetes.  Patient is unable to provide any significant details about the wound likely given his chronic encephalopathy.    PAST MEDICAL HISTORY   has a past medical history of Arthritis, Encephalopathy, Hypertension, Pain, and Psychiatric problem.    SURGICAL HISTORY   has a past surgical history that includes orif, ankle (Right, 9/26/2017); tendon lenghtening (Bilateral, 2/1/2021); irrigation & debridement ortho (Left, 2/1/2021); and irrigation & debridement ortho (Left, 4/12/2021).    FAMILY HISTORY  No family history on file.    SOCIAL HISTORY  Social History     Tobacco Use    Smoking status: Never    Smokeless tobacco: Former     Types: Chew   Vaping Use    Vaping status: Never Used   Substance and Sexual Activity    Alcohol use: Not Currently    Drug use: No    Sexual activity: Not on file       CURRENT MEDICATIONS  Home Medications        Reviewed by Leah De León R.N. (Registered Nurse) on 08/27/24 at 1604  Med List Status: Partial     Medication Last Dose Status   acetaminophen (TYLENOL) 325 MG Tab  Active   aspirin (ASA) 325 MG Tab  Active   divalproex (DEPAKOTE) 250 MG Tablet Delayed Response  Active   folic acid (FOLVITE) 1 MG Tab  Active   metFORMIN (GLUCOPHAGE) 500 MG Tab  Active   omeprazole (PRILOSEC) 20 MG delayed-release capsule  Active   risperiDONE (RISPERDAL) 2 MG Tab  Active   thiamine (VITAMIN B-1) 100 MG Tab  Active                  Audit from Redirected Encounters    **Home medications have not yet been reviewed for this encounter**         ALLERGIES  No Known Allergies    PHYSICAL EXAM  VITAL SIGNS: /58   Pulse 79   Temp 36.1 °C (96.9 °F) (Temporal)   Resp 14   Ht 1.829 m (6')   Wt 81.6 kg (180 lb)   SpO2 92%   BMI 24.41 kg/m²    General: Laying in stretcher in no distress  CV: Regular rate and rhythm no murmurs  Pulmonary: Breathing comfortably on room air, clear breath sounds  Abdomen soft nondistended nontender  MSK: Ulceration to the left lateral foot near the MTP joint no active draining, some surrounding erythema    EKG/LABS  CBC shows a mild leukocytosis to 11.3, he is anemic hemoglobin 9.2, normocytic.  Mild hypokalemia to 3.5.  CRP is elevated at 12.5.  Consistent with infection.      RADIOLOGY/PROCEDURES   I have independently interpreted the diagnostic imaging associated with this visit and am waiting the final reading from the radiologist.   My preliminary interpretation is as follows: There appears to be findings of osteomyelitis at the base of the left fifth toe proximal phalanx    Radiologist interpretation:  DX-FOOT-COMPLETE 3+ LEFT   Final Result      1. Lateral left forefoot soft tissue ulcer, with loss of the normal well-defined cortex involving the lateral aspect of the base of the left fifth toe proximal phalanx, concerning for osteomyelitis.   2. Decreased bone mineralization.   3. No  other acute findings.          COURSE & MEDICAL DECISION MAKING    ASSESSMENT, COURSE AND PLAN  Care Narrative: Differential includes foot ulcer, cellulitis, osteomyelitis, abscess, sepsis    On arrival patient vital signs within normal limits.  He is well-appearing, is not able to recall many significant details consistent with his chronic encephalopathy.  Has an ulceration to the left base of fifth toe which looking back in his chart he has had this at least since April of this year but at that time there was no evidence of osteomyelitis.  He has not followed up with wound clinic since May.  No fevers or vital sign changes to suggest sepsis.  Empirically gave Keflex and Bactrim prior to getting x-ray.  The x-ray had findings of osteomyelitis.  I am concerned that he has a nonhealing ulcer and now is developing osteomyelitis.  We will obtain labs CBC, CMP CRP. These were notable for mild leukocytosis, elevated CRP.  Also slightly anemic and had mild hypokalemia.  I spoke with the hospitalist Dr. Smyth to discuss admission for further management, IV antibiotics, possible debridement, who accepted for admission.          ADDITIONAL PROBLEMS MANAGED  None    DISPOSITION AND DISCUSSIONS  I have discussed management of the patient with the following physicians and MARLEY's:  Dr. Smyth hospitalist    Discussion of management with other [facility] or appropriate source(s): None       FINAL DIAGNOSIS  1. Osteomyelitis of fifth toe of left foot (HCC)         Electronically signed by: Samuel Marte M.D., 8/27/2024 4:12 PM

## 2024-08-28 ENCOUNTER — APPOINTMENT (OUTPATIENT)
Dept: RADIOLOGY | Facility: MEDICAL CENTER | Age: 54
DRG: 617 | End: 2024-08-28
Attending: STUDENT IN AN ORGANIZED HEALTH CARE EDUCATION/TRAINING PROGRAM
Payer: MEDICAID

## 2024-08-28 ENCOUNTER — APPOINTMENT (OUTPATIENT)
Dept: RADIOLOGY | Facility: MEDICAL CENTER | Age: 54
End: 2024-08-28
Attending: STUDENT IN AN ORGANIZED HEALTH CARE EDUCATION/TRAINING PROGRAM
Payer: MEDICAID

## 2024-08-28 LAB
ALBUMIN SERPL BCP-MCNC: 3 G/DL (ref 3.2–4.9)
ALBUMIN/GLOB SERPL: 0.9 G/DL
ALP SERPL-CCNC: 78 U/L (ref 30–99)
ALT SERPL-CCNC: 5 U/L (ref 2–50)
ANION GAP SERPL CALC-SCNC: 12 MMOL/L (ref 7–16)
APPEARANCE UR: CLEAR
AST SERPL-CCNC: 13 U/L (ref 12–45)
BASOPHILS # BLD AUTO: 0.7 % (ref 0–1.8)
BASOPHILS # BLD: 0.06 K/UL (ref 0–0.12)
BILIRUB SERPL-MCNC: 0.2 MG/DL (ref 0.1–1.5)
BILIRUB UR QL STRIP.AUTO: NEGATIVE
BUN SERPL-MCNC: 7 MG/DL (ref 8–22)
CALCIUM ALBUM COR SERPL-MCNC: 10 MG/DL (ref 8.5–10.5)
CALCIUM SERPL-MCNC: 9.2 MG/DL (ref 8.5–10.5)
CHLORIDE SERPL-SCNC: 108 MMOL/L (ref 96–112)
CHOLEST SERPL-MCNC: 135 MG/DL (ref 100–199)
CO2 SERPL-SCNC: 23 MMOL/L (ref 20–33)
COLOR UR: YELLOW
CREAT SERPL-MCNC: 0.79 MG/DL (ref 0.5–1.4)
EOSINOPHIL # BLD AUTO: 0.26 K/UL (ref 0–0.51)
EOSINOPHIL NFR BLD: 3.2 % (ref 0–6.9)
ERYTHROCYTE [DISTWIDTH] IN BLOOD BY AUTOMATED COUNT: 51.8 FL (ref 35.9–50)
EST. AVERAGE GLUCOSE BLD GHB EST-MCNC: 97 MG/DL
GFR SERPLBLD CREATININE-BSD FMLA CKD-EPI: 106 ML/MIN/1.73 M 2
GLOBULIN SER CALC-MCNC: 3.2 G/DL (ref 1.9–3.5)
GLUCOSE BLD STRIP.AUTO-MCNC: 77 MG/DL (ref 65–99)
GLUCOSE BLD STRIP.AUTO-MCNC: 79 MG/DL (ref 65–99)
GLUCOSE BLD STRIP.AUTO-MCNC: 86 MG/DL (ref 65–99)
GLUCOSE BLD STRIP.AUTO-MCNC: 92 MG/DL (ref 65–99)
GLUCOSE SERPL-MCNC: 92 MG/DL (ref 65–99)
GLUCOSE UR STRIP.AUTO-MCNC: NEGATIVE MG/DL
HBA1C MFR BLD: 5 % (ref 4–5.6)
HCT VFR BLD AUTO: 30.6 % (ref 42–52)
HDLC SERPL-MCNC: 23 MG/DL
HGB BLD-MCNC: 8.8 G/DL (ref 14–18)
IMM GRANULOCYTES # BLD AUTO: 0.07 K/UL (ref 0–0.11)
IMM GRANULOCYTES NFR BLD AUTO: 0.9 % (ref 0–0.9)
KETONES UR STRIP.AUTO-MCNC: NEGATIVE MG/DL
LDH SERPL L TO P-CCNC: 181 U/L (ref 107–266)
LDLC SERPL CALC-MCNC: 69 MG/DL
LEUKOCYTE ESTERASE UR QL STRIP.AUTO: NEGATIVE
LYMPHOCYTES # BLD AUTO: 1.69 K/UL (ref 1–4.8)
LYMPHOCYTES NFR BLD: 20.7 % (ref 22–41)
MAGNESIUM SERPL-MCNC: 2.5 MG/DL (ref 1.5–2.5)
MCH RBC QN AUTO: 24.7 PG (ref 27–33)
MCHC RBC AUTO-ENTMCNC: 28.8 G/DL (ref 32.3–36.5)
MCV RBC AUTO: 86 FL (ref 81.4–97.8)
MICRO URNS: NORMAL
MONOCYTES # BLD AUTO: 0.72 K/UL (ref 0–0.85)
MONOCYTES NFR BLD AUTO: 8.8 % (ref 0–13.4)
NEUTROPHILS # BLD AUTO: 5.35 K/UL (ref 1.82–7.42)
NEUTROPHILS NFR BLD: 65.7 % (ref 44–72)
NITRITE UR QL STRIP.AUTO: NEGATIVE
NRBC # BLD AUTO: 0 K/UL
NRBC BLD-RTO: 0 /100 WBC (ref 0–0.2)
PH UR STRIP.AUTO: 8 [PH] (ref 5–8)
PHOSPHATE SERPL-MCNC: 3.6 MG/DL (ref 2.5–4.5)
PLATELET # BLD AUTO: 313 K/UL (ref 164–446)
PMV BLD AUTO: 9.6 FL (ref 9–12.9)
POTASSIUM SERPL-SCNC: 3.9 MMOL/L (ref 3.6–5.5)
PROCALCITONIN SERPL-MCNC: 0.08 NG/ML
PROT SERPL-MCNC: 6.2 G/DL (ref 6–8.2)
PROT UR QL STRIP: NEGATIVE MG/DL
RBC # BLD AUTO: 3.56 M/UL (ref 4.7–6.1)
RBC UR QL AUTO: NEGATIVE
SCCMEC + MECA PNL NOSE NAA+PROBE: NEGATIVE
SODIUM SERPL-SCNC: 143 MMOL/L (ref 135–145)
SP GR UR STRIP.AUTO: 1.01
TRIGL SERPL-MCNC: 214 MG/DL (ref 0–149)
UROBILINOGEN UR STRIP.AUTO-MCNC: 0.2 MG/DL
WBC # BLD AUTO: 8.2 K/UL (ref 4.8–10.8)

## 2024-08-28 PROCEDURE — 87086 URINE CULTURE/COLONY COUNT: CPT

## 2024-08-28 PROCEDURE — 93922 UPR/L XTREMITY ART 2 LEVELS: CPT | Mod: 26 | Performed by: INTERNAL MEDICINE

## 2024-08-28 PROCEDURE — 700111 HCHG RX REV CODE 636 W/ 250 OVERRIDE (IP): Mod: JZ | Performed by: STUDENT IN AN ORGANIZED HEALTH CARE EDUCATION/TRAINING PROGRAM

## 2024-08-28 PROCEDURE — 71045 X-RAY EXAM CHEST 1 VIEW: CPT

## 2024-08-28 PROCEDURE — 99232 SBSQ HOSP IP/OBS MODERATE 35: CPT | Performed by: STUDENT IN AN ORGANIZED HEALTH CARE EDUCATION/TRAINING PROGRAM

## 2024-08-28 PROCEDURE — 700102 HCHG RX REV CODE 250 W/ 637 OVERRIDE(OP): Performed by: STUDENT IN AN ORGANIZED HEALTH CARE EDUCATION/TRAINING PROGRAM

## 2024-08-28 PROCEDURE — 85025 COMPLETE CBC W/AUTO DIFF WBC: CPT

## 2024-08-28 PROCEDURE — 82962 GLUCOSE BLOOD TEST: CPT | Mod: 91

## 2024-08-28 PROCEDURE — 74018 RADEX ABDOMEN 1 VIEW: CPT

## 2024-08-28 PROCEDURE — 81003 URINALYSIS AUTO W/O SCOPE: CPT

## 2024-08-28 PROCEDURE — 93922 UPR/L XTREMITY ART 2 LEVELS: CPT

## 2024-08-28 PROCEDURE — 700105 HCHG RX REV CODE 258: Performed by: STUDENT IN AN ORGANIZED HEALTH CARE EDUCATION/TRAINING PROGRAM

## 2024-08-28 PROCEDURE — 87641 MR-STAPH DNA AMP PROBE: CPT

## 2024-08-28 PROCEDURE — 80061 LIPID PANEL: CPT

## 2024-08-28 PROCEDURE — 97597 DBRDMT OPN WND 1ST 20 CM/<: CPT

## 2024-08-28 PROCEDURE — 84100 ASSAY OF PHOSPHORUS: CPT

## 2024-08-28 PROCEDURE — 83036 HEMOGLOBIN GLYCOSYLATED A1C: CPT

## 2024-08-28 PROCEDURE — 80053 COMPREHEN METABOLIC PANEL: CPT

## 2024-08-28 PROCEDURE — 36415 COLL VENOUS BLD VENIPUNCTURE: CPT

## 2024-08-28 PROCEDURE — 770006 HCHG ROOM/CARE - MED/SURG/GYN SEMI*

## 2024-08-28 PROCEDURE — A9270 NON-COVERED ITEM OR SERVICE: HCPCS | Performed by: STUDENT IN AN ORGANIZED HEALTH CARE EDUCATION/TRAINING PROGRAM

## 2024-08-28 PROCEDURE — 83735 ASSAY OF MAGNESIUM: CPT

## 2024-08-28 RX ADMIN — FOLIC ACID 1 MG: 1 TABLET ORAL at 05:24

## 2024-08-28 RX ADMIN — HEPARIN SODIUM 5000 UNITS: 5000 INJECTION, SOLUTION INTRAVENOUS; SUBCUTANEOUS at 21:53

## 2024-08-28 RX ADMIN — Medication 100 MG: at 14:04

## 2024-08-28 RX ADMIN — RISPERIDONE 2 MG: 0.5 TABLET, FILM COATED ORAL at 17:24

## 2024-08-28 RX ADMIN — ASPIRIN 81 MG: 81 TABLET, COATED ORAL at 05:25

## 2024-08-28 RX ADMIN — FOLIC ACID 1 MG: 1 TABLET ORAL at 17:24

## 2024-08-28 RX ADMIN — OMEPRAZOLE 20 MG: 20 CAPSULE, DELAYED RELEASE ORAL at 17:24

## 2024-08-28 RX ADMIN — AMPICILLIN AND SULBACTAM 3 G: 1; 2 INJECTION, POWDER, FOR SOLUTION INTRAMUSCULAR; INTRAVENOUS at 05:29

## 2024-08-28 RX ADMIN — HEPARIN SODIUM 5000 UNITS: 5000 INJECTION, SOLUTION INTRAVENOUS; SUBCUTANEOUS at 05:24

## 2024-08-28 RX ADMIN — HEPARIN SODIUM 5000 UNITS: 5000 INJECTION, SOLUTION INTRAVENOUS; SUBCUTANEOUS at 13:25

## 2024-08-28 RX ADMIN — DIVALPROEX SODIUM 250 MG: 250 TABLET, DELAYED RELEASE ORAL at 21:53

## 2024-08-28 RX ADMIN — AMPICILLIN AND SULBACTAM 3 G: 1; 2 INJECTION, POWDER, FOR SOLUTION INTRAMUSCULAR; INTRAVENOUS at 13:23

## 2024-08-28 RX ADMIN — ATORVASTATIN CALCIUM 40 MG: 40 TABLET, FILM COATED ORAL at 17:24

## 2024-08-28 RX ADMIN — OMEPRAZOLE 20 MG: 20 CAPSULE, DELAYED RELEASE ORAL at 05:24

## 2024-08-28 RX ADMIN — DIVALPROEX SODIUM 250 MG: 250 TABLET, DELAYED RELEASE ORAL at 05:25

## 2024-08-28 RX ADMIN — CYANOCOBALAMIN TAB 500 MCG 1000 MCG: 500 TAB at 05:24

## 2024-08-28 RX ADMIN — AMPICILLIN AND SULBACTAM 3 G: 1; 2 INJECTION, POWDER, FOR SOLUTION INTRAMUSCULAR; INTRAVENOUS at 17:23

## 2024-08-28 RX ADMIN — DIVALPROEX SODIUM 250 MG: 250 TABLET, DELAYED RELEASE ORAL at 12:24

## 2024-08-28 RX ADMIN — Medication 100 MG: at 21:53

## 2024-08-28 RX ADMIN — Medication 100 MG: at 08:00

## 2024-08-28 RX ADMIN — SENNOSIDES AND DOCUSATE SODIUM 2 TABLET: 50; 8.6 TABLET ORAL at 17:24

## 2024-08-28 RX ADMIN — RISPERIDONE 2 MG: 0.5 TABLET, FILM COATED ORAL at 05:24

## 2024-08-28 ASSESSMENT — COGNITIVE AND FUNCTIONAL STATUS - GENERAL
SUGGESTED CMS G CODE MODIFIER MOBILITY: CL
MOBILITY SCORE: 14
TOILETING: A LITTLE
DRESSING REGULAR UPPER BODY CLOTHING: A LITTLE
CLIMB 3 TO 5 STEPS WITH RAILING: TOTAL
MOVING FROM LYING ON BACK TO SITTING ON SIDE OF FLAT BED: A LITTLE
DAILY ACTIVITIY SCORE: 21
MOVING TO AND FROM BED TO CHAIR: A LOT
TURNING FROM BACK TO SIDE WHILE IN FLAT BAD: A LITTLE
WALKING IN HOSPITAL ROOM: A LOT
DRESSING REGULAR LOWER BODY CLOTHING: A LITTLE
SUGGESTED CMS G CODE MODIFIER DAILY ACTIVITY: CJ
STANDING UP FROM CHAIR USING ARMS: A LITTLE

## 2024-08-28 ASSESSMENT — ENCOUNTER SYMPTOMS
CARDIOVASCULAR NEGATIVE: 1
MUSCULOSKELETAL NEGATIVE: 1
NEUROLOGICAL NEGATIVE: 1
EYES NEGATIVE: 1
GASTROINTESTINAL NEGATIVE: 1
RESPIRATORY NEGATIVE: 1
CONSTITUTIONAL NEGATIVE: 1
PSYCHIATRIC NEGATIVE: 1

## 2024-08-28 ASSESSMENT — SOCIAL DETERMINANTS OF HEALTH (SDOH)
WITHIN THE LAST YEAR, HAVE TO BEEN RAPED OR FORCED TO HAVE ANY KIND OF SEXUAL ACTIVITY BY YOUR PARTNER OR EX-PARTNER?: NO
WITHIN THE LAST YEAR, HAVE YOU BEEN KICKED, HIT, SLAPPED, OR OTHERWISE PHYSICALLY HURT BY YOUR PARTNER OR EX-PARTNER?: NO
WITHIN THE LAST YEAR, HAVE YOU BEEN AFRAID OF YOUR PARTNER OR EX-PARTNER?: NO
WITHIN THE LAST YEAR, HAVE YOU BEEN HUMILIATED OR EMOTIONALLY ABUSED IN OTHER WAYS BY YOUR PARTNER OR EX-PARTNER?: NO

## 2024-08-28 ASSESSMENT — PAIN DESCRIPTION - PAIN TYPE
TYPE: ACUTE PAIN

## 2024-08-28 ASSESSMENT — FIBROSIS 4 INDEX: FIB4 SCORE: 0.98

## 2024-08-28 NOTE — ASSESSMENT & PLAN NOTE
X-ray left foot suspicious for osteomyelitis of lateral aspect of base of left fifth toe proximal phalanx  ANA normal  IV Unasyn  Wound care  Pending left foot MRI

## 2024-08-28 NOTE — ED NOTES
Med rec updated and complete. Allergies reviewed.    Per MUNIRA FROM [facility] [phone].      No anticoagulants noted.    No antibiotics noted.     Pharmacy  [facility] [phone]

## 2024-08-28 NOTE — PROGRESS NOTES
4 Eyes Skin Assessment Completed by Stephanie RN and MUSTAPHA Melo.    Head WDL  Ears WDL  Nose WDL  Mouth WDL  Neck WDL  Breast/Chest WDL  Shoulder Blades WDL  Spine WDL  (R) Arm/Elbow/Hand WDL  (L) Arm/Elbow/Hand WDL  Abdomen WDL  Groin WDL  Scrotum/Coccyx/Buttocks WDL  (R) Leg WDL  (L) Leg Posterior thigh DTI, wound to left lateral foot  (R) Heel/Foot/Toe Boggy  (L) Heel/Foot/Toe Boggy          Devices In Places Blood Pressure Cuff and SCD's      Interventions In Place Pillows and Heels Loaded W/Pillows    Possible Skin Injury Yes    Pictures Uploaded Into Epic Yes  Wound Consult Placed Yes  RN Wound Prevention Protocol Ordered Yes

## 2024-08-28 NOTE — WOUND TEAM
[facility]  Inpatient Services  Initial Wound and Skin Care Evaluation    Admission Date: 8/27/2024     Last order of IP CONSULT TO WOUND CARE was found on 8/27/2024 from Hospital Encounter on 8/27/2024     HPI, PMH, SH: Reviewed    Past Surgical History:   Procedure Laterality Date    IRRIGATION & DEBRIDEMENT ORTHO Left 4/12/2021    Procedure: IRRIGATION AND DEBRIDEMENT, WOUND - HEEL;  Surgeon: Donny Roque M.D.;  Location: SURGERY [facility];  Service: Orthopedics    TENDON LENGHTENING Bilateral 2/1/2021    Procedure: LENGTHENING, TENDON- FOR FOOT DROP RECONSTRUCTION, POSTERIOR TIBIALIS TENDON  TRANSFER , ACHILLES LENGHTENING , LEFT MEDIAL RELEASE;  Surgeon: Donny Roque M.D.;  Location: SURGERY [facility];  Service: Orthopedics    IRRIGATION & DEBRIDEMENT ORTHO Left 2/1/2021    Procedure: IRRIGATION AND DEBRIDEMENT, WOUND-FOOT;  Surgeon: Donny Roque M.D.;  Location: SURGERY [facility];  Service: Orthopedics    ORIF, ANKLE Right 9/26/2017    Procedure: ANKLE ORIF SYNDESOMOSIS REPAIR;  Surgeon: Armando Woodard M.D.;  Location: SURGERY [facility];  Service: Orthopedics     Social History     Tobacco Use    Smoking status: Never    Smokeless tobacco: Former     Types: Chew   Substance Use Topics    Alcohol use: Not Currently     Chief Complaint   Patient presents with    Wound Check     Px present from EMS from [facility] in [place], [place]. They noticed a erica sized wound to the L lateral foot draining, sent a pic to his MD and recommended they bring him in.      Diagnosis: Osteomyelitis (HCC) [M86.9]    Unit where seen by Wound Team: S519/02     WOUND CONSULT RELATED TO:  L 5th toe, & L posterior thigh    WOUND TEAM PLAN OF CARE - Frequency of Follow-up:   Nursing to follow dressing orders written for wound care. Contact wound team if area fails to progress, deteriorates or with any questions/concerns if something comes up before next scheduled follow up (See below as to  whether wound is following and frequency of wound follow up)   Weekly - Left lateral foot  Not following, consult as needed  - Left posterior thigh    WOUND HISTORY:   54 y.o. male from SNF with history of Wernicke encephalopathy, alcohol abuse, psychiatric disorder, diabetes, recent admission for cellulitis of feet who presented 8/27/2024 with evaluation for wound check. There was concern for purulent drainage from left foot at SNF, therefore referred to ER for evaluation. In ER, x-ray of left foot noted possible osteomyelitis of lateral aspect of base of left fifth toe proximal phalanx.        WOUND ASSESSMENT/LDA  Wound Diabetic Ulcer Foot Lateral Left (Active)   No Date First Assessed or Time First Assessed found.   Primary Wound Type: Diabetic Ulcer  Location: Foot  Wound Orientation: Lateral  Laterality: Left      Assessments 8/28/2024 11:00 AM   Wound Image       Site Assessment Red;Boggy   Periwound Assessment Callused;Maceration   Margins Attached edges;Defined edges   Drainage Amount Small   Drainage Description Tan;Serous   Treatments Cleansed;Nonselective debridement;Site care;CSWD - Conservative Sharp Wound Debridement   Wound Cleansing Normal Saline Irrigation   Periwound Protectant No-sting Skin Prep   Dressing Changed New   Dressing Cleansing/Solutions Not Applicable   Dressing Options Hydrofiber Silver   Dressing Change/Treatment Frequency Every 72 hrs, and As Needed   Wound Team Following Weekly   Wound Length (cm) 2.1 cm   Wound Width (cm) 1.9 cm   Wound Surface Area (cm^2) 3.99 cm^2   Shape irregular   Wound Odor None   Pulses 1+;Thready   Exposed Structures None   WOUND NURSE ONLY - Time Spent with Patient (mins) 45       Wound 08/28/24 Pressure Injury Thigh Posterior Left DTI POA (Active)   Date First Assessed/Time First Assessed: 08/28/24 1100   Primary Wound Type: Pressure Injury  Location: Thigh  Wound Orientation: Posterior  Laterality: Left  Wound Description (Comments): DTI POA       Assessments 8/28/2024 11:00 AM   Wound Image     Site Assessment Brown;Dry   Periwound Assessment Clean;Dry;Intact   Margins Attached edges;Defined edges   Closure Adhesive bandage   Drainage Amount None   Treatments Offloading   Offloading/DME Other (comment)   Wound Cleansing Not Applicable   Periwound Protectant Not Applicable   Dressing Status Clean;Dry;Intact   Dressing Changed New   Dressing Cleansing/Solutions Not Applicable   Dressing Options Silicone Adhesive Foam   Dressing Change/Treatment Frequency Every 72 hrs, and As Needed   NEXT Dressing Change/Treatment Date 08/31/24   Wound Team Following Not following   Wound Length (cm) 12 cm   Wound Width (cm) 7 cm   Wound Surface Area (cm^2) 84 cm^2       Sacrum    Vascular:    ANA:   No results found.    Lab Values:    Lab Results   Component Value Date/Time    WBC 8.2 08/28/2024 10:59 AM    RBC 3.56 (L) 08/28/2024 10:59 AM    HEMOGLOBIN 8.8 (L) 08/28/2024 10:59 AM    HEMATOCRIT 30.6 (L) 08/28/2024 10:59 AM    CREACTPROT 12.59 (H) 08/27/2024 05:50 PM    SEDRATEWES 87 (H) 08/27/2024 10:08 PM    HBA1C 5.0 08/28/2024 10:59 AM         Culture Results show:  No results found for this or any previous visit (from the past 720 hour(s)).    Pain Level/Medicated:  None, Tolerated without pain medication       INTERVENTIONS BY WOUND TEAM:  Chart and images reviewed. Discussed with bedside RN. All areas of concern (based on picture review, LDA review and discussion with bedside RN) have been thoroughly assessed. Documentation of areas based on significant findings. This RN in to assess patient. Performed standard wound care which includes appropriate positioning, dressing removal and non-selective debridement. Pictures and measurements obtained weekly if/when required.    Wound:  Left posterior thigh  Assessed, offloading silicone adhesive dressing in place     Wound:  Left lateral thigh  Cleansed/Non-selectively Debrided with:  Normal Saline and Gauze  Non-Excisional  Conservative Sharp debridement: Non-Viable/Devitalized tissue and Callus debrided away using curette, scissors, and forceps < 20cm2 debrided down to viable tissue.  No Bleeding noted.  Berto wound: Cleansed with Normal Saline and Gauze, Prepped with No Sting  Primary Dressing:  Aquacel AG, 4x4 gauze  Secondary (Outer) Dressing: Hypafix tape    Advanced Wound Care Discharge Planning  Number of Clinicians necessary to complete wound care: 1  Is patient requiring IV pain medications for dressing changes:  No   Length of time for dressing change 30 min. (This does not include chart review, pre-medication time, set up, clean up or time spent charting.)    Interdisciplinary consultation: Patient, Bedside RN (Ja), N/A.  Pressure injury and staging reviewed with N/A.    EVALUATION / RATIONALE FOR TREATMENT:     Date:  08/28/24  Wound Status:  Initial evaluation    The sacrum with some redness, but blanching. The R heel has blanchable redness present, and the L heel is intact, and blanching. (Heel pictures not uploaded due to downtime)    Left posterior thigh: The patient has a dry and discolored area on the L posterior thigh that is dry, and not blanching. This may be bruising or a DTI POA of unknown etiology. Offloading silicone adhesive dressing placed to reduce pressure and contact through extrinsic forces.    Left lateral foot: The patient with a chronic diabetic foot ulcer to the lateral aspect. The center of wound is red, with a deep maroon area. The periwound is callused, and partially macerated. Tan/serous fluid slightly draining from site. The callus was removed with conservative sharp debridement using scissors, forceps, and curette. Due to the maceration, Aquacel Ag Hydrofiber applied to manage bioburden, absorb exudate, and maintain a moist wound environment without laterally wicking exudate therefore reducing berto-wound maceration. Will reassess patient in 2-3 days, the dressing may be transitioned to  hydrofera blue at that time.         NURSING PLAN OF CARE ORDERS:  Dressing changes: See Dressing Care orders    NUTRITION RECOMMENDATIONS   Wound Team Recommendations:  N/A    DIET ORDERS (From admission to next 24h)       Start     Ordered    08/27/24 1830  Diet Order Diet: Consistent CHO (Diabetic)  ALL MEALS        Question:  Diet:  Answer:  Consistent CHO (Diabetic)    08/27/24 1830                    PREVENTATIVE INTERVENTIONS:    Q shift Hunter - performed per nursing policy  Q shift pressure point assessments - performed per nursing policy    Surface/Positioning  Standard/trauma mattress - Currently in Place    Offloading/Redistribution  Float Heels off Bed with Pillows - Currently in Place           Containment/Moisture Prevention    Dri-nicki pad - Currently in Place  Purwick/Condom Cath - Currently in Place    Anticipated discharge plans:  TBD        Vac Discharge Needs:  Vac Discharge plan is purely a recommendation from wound team and not a requirement for discharge unless otherwise stated by physician.  Not Applicable Pt not on a wound vac

## 2024-08-28 NOTE — CARE PLAN
The patient is Stable - Low risk of patient condition declining or worsening    Shift Goals  Clinical Goals: maintain skin integrity, IV ABX, pain control, orient to unit, comfort  Patient Goals: sleep    Progress made toward(s) clinical / shift goals:      Problem: Knowledge Deficit - Standard  Goal: Patient and family/care givers will demonstrate understanding of plan of care, disease process/condition, diagnostic tests and medications  Outcome: Progressing     Problem: Fall Risk  Goal: Patient will remain free from falls  Outcome: Progressing       Patient has call light within reach and calls appropriately. He has been updated on plan of care and will continue to be updated as information arises. He has remained free of falls this shift, bed is locked and in the lowest position, treaded socks on patient, bed alarm on.

## 2024-08-28 NOTE — PROGRESS NOTES
Patient arrived to [facility]2 via gurney and pivoted to bed with a one person assist and no assistive devices used. Patient displayed a shuffle/unsteady gate. Patient is A/Ox3, disoriented to time. This RN reoriented patient. Skin check complete. Assessment complete. Safety education discussed, bed is locked and in the lowest position, call light and personal belongings within reach. Bed alarm on. Hourly rounding in progress.

## 2024-08-28 NOTE — ASSESSMENT & PLAN NOTE
Cessation strongly advised, counseling provided, time spent:16minutes  Multivitamins  Thiamine supplement  JUAN

## 2024-08-28 NOTE — H&P
Hospital Medicine History & Physical Note    Date of Service  8/27/2024    Primary Care Physician  Kristina Roche M.D.    Consultants  None    Code Status  Full Code    Chief Complaint  Chief Complaint   Patient presents with    Wound Check     Px present from EMS from [facility] in [place], [place]. They noticed a erica sized wound to the L lateral foot draining, sent a pic to his MD and recommended they bring him in.        History of Presenting Illness  Yury Blake is a 54 y.o. male from SNF with history of Wernicke encephalopathy, alcohol abuse, psychiatric disorder, diabetes, recent admission for cellulitis of feet who presented 8/27/2024 with evaluation for wound check.  There was concern for purulent drainage from left foot at SNF, therefore referred to ER for evaluation.  In ER, x-ray of left foot noted possible osteomyelitis of lateral aspect of base of left fifth toe proximal phalanx.  Therefore admission requested by ERP.  Admitted to medicine service for further evaluation and treatment.    I discussed the plan of care with patient, bedside RN, and pharmacy.    Review of Systems  Review of Systems   Constitutional:  Positive for malaise/fatigue. Negative for chills, diaphoresis and fever.   HENT:  Negative for hearing loss and tinnitus.    Eyes:  Negative for blurred vision and double vision.   Respiratory:  Negative for cough and shortness of breath.    Cardiovascular:  Negative for chest pain and palpitations.   Gastrointestinal:  Negative for abdominal pain, heartburn, nausea and vomiting.   Genitourinary:  Negative for dysuria and hematuria.   Musculoskeletal:  Positive for joint pain and myalgias.   Neurological:  Positive for weakness. Negative for dizziness, focal weakness and headaches.   Endo/Heme/Allergies:  Negative for environmental allergies. Does not bruise/bleed easily.   Psychiatric/Behavioral:  Negative for depression and substance abuse.    All other systems reviewed  and are negative.      Past Medical History   has a past medical history of Arthritis, Encephalopathy, Hypertension, Pain, and Psychiatric problem.    Surgical History   has a past surgical history that includes orif, ankle (Right, 9/26/2017); tendon lenghtening (Bilateral, 2/1/2021); irrigation & debridement ortho (Left, 2/1/2021); and irrigation & debridement ortho (Left, 4/12/2021).     Family History  family history is not on file.   Family history reviewed with patient. There is no family history that is pertinent to the chief complaint.     Social History   reports that he has never smoked. He has quit using smokeless tobacco.  His smokeless tobacco use included chew. He reports that he does not currently use alcohol. He reports that he does not use drugs.    Allergies  No Known Allergies    Medications  Prior to Admission Medications   Prescriptions Last Dose Informant Patient Reported? Taking?   acetaminophen (TYLENOL) 325 MG Tab  MAR from Other Facility Yes No   Sig: Take 650 mg by mouth every 6 hours as needed. Indications: Pain   aspirin (ASA) 325 MG Tab  MAR from Other Facility Yes No   Sig: Take 325 mg by mouth every day.   divalproex (DEPAKOTE) 250 MG Tablet Delayed Response  MAR from Other Facility Yes No   Sig: Take 250 mg by mouth 3 times a day.   folic acid (FOLVITE) 1 MG Tab  MAR from Other Facility Yes No   Sig: Take 1 mg by mouth every day.   metFORMIN (GLUCOPHAGE) 500 MG Tab  MAR from Other Facility No No   Sig: Take 1 Tablet by mouth 2 times a day with meals.   omeprazole (PRILOSEC) 20 MG delayed-release capsule  MAR from Other Facility No No   Sig: Take 1 Capsule by mouth 2 times a day.   risperiDONE (RISPERDAL) 2 MG Tab  MAR from Other Facility No No   Sig: Take 1 tablet by mouth 2 Times a Day.   thiamine (VITAMIN B-1) 100 MG Tab  MAR from Other Facility Yes No   Sig: Take 100 mg by mouth every day.      Facility-Administered Medications: None       Physical Exam  Temp:  [36.1 °C (96.9  °F)-36.6 °C (97.8 °F)] 36.6 °C (97.8 °F)  Pulse:  [70-90] 78  Resp:  [13-20] 16  BP: (118-140)/(56-68) 140/68  SpO2:  [91 %-96 %] 92 %  Blood Pressure: 120/58   Temperature: 36.1 °C (96.9 °F)   Pulse: 79   Respiration: 14   Pulse Oximetry: 92 %       Physical Exam  Vitals and nursing note reviewed.   Constitutional:       General: He is not in acute distress.  HENT:      Head: Normocephalic and atraumatic.      Nose: Nose normal.      Mouth/Throat:      Mouth: Mucous membranes are dry.      Pharynx: Oropharynx is clear.   Eyes:      General: No scleral icterus.     Extraocular Movements: Extraocular movements intact.   Cardiovascular:      Rate and Rhythm: Normal rate and regular rhythm.      Pulses: Normal pulses.      Heart sounds:      No friction rub.   Pulmonary:      Effort: No respiratory distress.      Breath sounds: No wheezing or rales.   Chest:      Chest wall: No tenderness.   Abdominal:      General: There is distension.      Tenderness: There is no abdominal tenderness. There is no guarding or rebound.   Musculoskeletal:         General: Tenderness present. Normal range of motion.      Cervical back: Neck supple. No tenderness.      Right lower leg: Edema present.      Left lower leg: Edema present.      Comments: Mild tenderness at 5th toe, no apparent discharge   Skin:     General: Skin is warm and dry.      Capillary Refill: Capillary refill takes less than 2 seconds.   Neurological:      General: No focal deficit present.      Mental Status: He is alert and oriented to person, place, and time.   Psychiatric:         Mood and Affect: Mood normal.         Laboratory:  Recent Labs     08/27/24  1750   WBC 11.3*   RBC 3.69*   HEMOGLOBIN 9.2*   HEMATOCRIT 31.4*   MCV 85.1   MCH 24.9*   MCHC 29.3*   RDW 51.4*   PLATELETCT 349   MPV 9.3     Recent Labs     08/27/24  1750   SODIUM 144   POTASSIUM 3.5*   CHLORIDE 105   CO2 25   GLUCOSE 92   BUN 9   CREATININE 0.78   CALCIUM 9.6     Recent Labs      "08/27/24  1750   ALTSGPT <5   ASTSGOT 9*   ALKPHOSPHAT 85   TBILIRUBIN 0.2   GLUCOSE 92         No results for input(s): \"NTPROBNP\" in the last 72 hours.      No results for input(s): \"TROPONINT\" in the last 72 hours.    Imaging:  DX-FOOT-COMPLETE 3+ LEFT   Final Result      1. Lateral left forefoot soft tissue ulcer, with loss of the normal well-defined cortex involving the lateral aspect of the base of the left fifth toe proximal phalanx, concerning for osteomyelitis.   2. Decreased bone mineralization.   3. No other acute findings.      DX-FOOT-2- RIGHT    (Results Pending)   US-EXTREMITY ARTERY LOWER BILAT W/ANA (COMBO)    (Results Pending)       X-Ray:  I have personally reviewed the images and compared with prior images.    Assessment/Plan:  Justification for Admission Status  I anticipate this patient will require at least 2 midnights hospitalization, therefore appropriate for inpatient status.        * Osteomyelitis (HCC)- (present on admission)  Assessment & Plan  X-ray left foot suspicious for osteomyelitis of lateral aspect of base of left fifth toe proximal phalanx  Glycemic control  Wound care  Check ANA  Antibiotic: Unasyn    Diabetes (HCC)  Assessment & Plan  ISS  Statin    Psychiatric disorder- (present on admission)  Assessment & Plan  Continue Depakote, risperidone    Wernicke encephalopathy- (present on admission)  Assessment & Plan  Alcohol cessation strongly advised  Continue thiamine supplement  Multivitamins    Nonhealing ulcer of left lower extremity (HCC)- (present on admission)  Assessment & Plan  Check ANA  Plan as noted in OM    Alcohol abuse- (present on admission)  Assessment & Plan  Cessation strongly advised, counseling provided, time spent:16minutes  Multivitamins  Thiamine supplement  CIWA      Hypokalemia- (present on admission)  Assessment & Plan  Replace        VTE prophylaxis: heparin ppx  "

## 2024-08-28 NOTE — PROGRESS NOTES
[facility] Medicine Daily Progress Note    Date of Service  8/28/2024    Chief Complaint  Yury Blake is a 54 y.o. male admitted 8/27/2024 with left foot ulcer    Hospital Course  54 y.o. male from SNF with history of Wernicke encephalopathy, alcohol abuse, psychiatric disorder, diabetes, recent admission for cellulitis of feet who presented 8/27/2024 with evaluation for wound check.  There was concern for purulent drainage from left foot at SNF, therefore referred to ER for evaluation.  In ER, x-ray of left foot noted possible osteomyelitis of lateral aspect of base of left fifth toe proximal phalanx.  Therefore admission requested by ERP.  Admitted to medicine service for further evaluation and treatment.     8/28 bilateral ANA normal    Interval Problem Update  Evaluate bedside  In no acute distress this morning, denies pain  Stable vitals  On room air  Pending morning labs  Pending MRSA nares  Continue IV Unasyn for now      I have discussed this patient's plan of care and discharge plan at IDT rounds today with Case Management, Nursing, Nursing leadership, and other members of the IDT team.    Consultants/Specialty  None    Code Status  Full Code    Disposition  The patient is not medically cleared for discharge to home or a post-acute facility.      I have placed the appropriate orders for post-discharge needs.    Review of Systems  Review of Systems   Constitutional: Negative.    HENT: Negative.     Eyes: Negative.    Respiratory: Negative.     Cardiovascular: Negative.    Gastrointestinal: Negative.    Genitourinary: Negative.    Musculoskeletal: Negative.    Skin: Negative.    Neurological: Negative.    Endo/Heme/Allergies: Negative.    Psychiatric/Behavioral: Negative.          Physical Exam  Temp:  [36.1 °C (96.9 °F)-36.6 °C (97.8 °F)] 36.1 °C (97 °F)  Pulse:  [70-90] 70  Resp:  [13-20] 16  BP: (118-140)/(56-68) 133/59  SpO2:  [91 %-96 %] 91 %    Physical Exam  Constitutional:       General: He  is not in acute distress.     Appearance: Normal appearance.   HENT:      Head: Normocephalic and atraumatic.      Nose: Nose normal. No congestion.      Mouth/Throat:      Mouth: Mucous membranes are moist.   Eyes:      Extraocular Movements: Extraocular movements intact.      Pupils: Pupils are equal, round, and reactive to light.   Cardiovascular:      Rate and Rhythm: Normal rate and regular rhythm.      Pulses: Normal pulses.      Heart sounds: Normal heart sounds.   Pulmonary:      Effort: Pulmonary effort is normal.      Breath sounds: Normal breath sounds.   Abdominal:      General: Bowel sounds are normal.      Palpations: Abdomen is soft.      Tenderness: There is no abdominal tenderness.   Musculoskeletal:         General: Normal range of motion.      Cervical back: Normal range of motion and neck supple.      Comments: Mild tenderness at 5th toe, no apparent discharge    Skin:     General: Skin is warm.      Coloration: Skin is not jaundiced.   Neurological:      General: No focal deficit present.      Mental Status: He is alert and oriented to person, place, and time. Mental status is at baseline.      Cranial Nerves: No cranial nerve deficit.   Psychiatric:         Mood and Affect: Mood normal.         Behavior: Behavior normal.         Thought Content: Thought content normal.         Judgment: Judgment normal.       Fluids    Intake/Output Summary (Last 24 hours) at 8/28/2024 1211  Last data filed at 8/28/2024 0630  Gross per 24 hour   Intake --   Output 950 ml   Net -950 ml        Laboratory  Recent Labs     08/27/24  1750   WBC 11.3*   RBC 3.69*   HEMOGLOBIN 9.2*   HEMATOCRIT 31.4*   MCV 85.1   MCH 24.9*   MCHC 29.3*   RDW 51.4*   PLATELETCT 349   MPV 9.3     Recent Labs     08/27/24  1750   SODIUM 144   POTASSIUM 3.5*   CHLORIDE 105   CO2 25   GLUCOSE 92   BUN 9   CREATININE 0.78   CALCIUM 9.6                   Imaging  US-ANA SINGLE LEVEL BILAT         DX-FOOT-2- RIGHT   Final Result         1.   No acute traumatic bony injury.   2.  Atherosclerosis      DX-FOOT-COMPLETE 3+ LEFT   Final Result      1. Lateral left forefoot soft tissue ulcer, with loss of the normal well-defined cortex involving the lateral aspect of the base of the left fifth toe proximal phalanx, concerning for osteomyelitis.   2. Decreased bone mineralization.   3. No other acute findings.      MR-FOOT-WITH LEFT    (Results Pending)        Assessment/Plan  * Osteomyelitis (HCC)- (present on admission)  Assessment & Plan  X-ray left foot suspicious for osteomyelitis of lateral aspect of base of left fifth toe proximal phalanx  ANA normal  IV Unasyn  Wound care  Pending left foot MRI    Nonhealing ulcer of left lower extremity (HCC)- (present on admission)  Assessment & Plan  Chest x-ray concerning for osteomyelitis  ANA normal  IV antibiotics  Wound care  Pending left foot MRI    Diabetes (HCC)- (present on admission)  Assessment & Plan  ISS  Statin    Psychiatric disorder- (present on admission)  Assessment & Plan  Continue Depakote, risperidone    Wernicke encephalopathy- (present on admission)  Assessment & Plan  Alcohol cessation strongly advised  Continue thiamine supplement  Multivitamins    Alcohol abuse- (present on admission)  Assessment & Plan  Cessation strongly advised, counseling provided, time spent:16minutes  Multivitamins  Thiamine supplement  CIWA      Hypokalemia- (present on admission)  Assessment & Plan  Replace       VTE prophylaxis: Heparin    I have performed a physical exam and reviewed and updated ROS and Plan today (8/28/2024). In review of yesterday's note (8/27/2024), there are no changes except as documented above.

## 2024-08-28 NOTE — PROGRESS NOTES
Received report, assumed pt care. Pt a&o 2, pt disoriented to date and situation. Pt easily re-oriented. VSS, Assessment completed. Bilateral feet swelling observed. Wound on left lateral foot noted. Wound care at bedside, wound culture sent to lab. PT has condom catheter on. Resting comfortably in bed with call light, bedside table in reach. No c/o at this time. Side rails up x 2. Instructed to use call light when needing to get OOB verbalized understanding. Bed alarm on, bed in low position. Will continue to monitor.

## 2024-08-28 NOTE — ASSESSMENT & PLAN NOTE
Chest x-ray concerning for osteomyelitis  ANA normal  IV antibiotics  Wound care  Pending left foot MRI

## 2024-08-29 ENCOUNTER — APPOINTMENT (OUTPATIENT)
Dept: RADIOLOGY | Facility: MEDICAL CENTER | Age: 54
DRG: 617 | End: 2024-08-29
Attending: STUDENT IN AN ORGANIZED HEALTH CARE EDUCATION/TRAINING PROGRAM
Payer: MEDICAID

## 2024-08-29 LAB
ALBUMIN SERPL BCP-MCNC: 3 G/DL (ref 3.2–4.9)
ALBUMIN/GLOB SERPL: 1 G/DL
ALP SERPL-CCNC: 76 U/L (ref 30–99)
ALT SERPL-CCNC: <5 U/L (ref 2–50)
ANION GAP SERPL CALC-SCNC: 12 MMOL/L (ref 7–16)
AST SERPL-CCNC: 11 U/L (ref 12–45)
BASOPHILS # BLD AUTO: 1 % (ref 0–1.8)
BASOPHILS # BLD: 0.08 K/UL (ref 0–0.12)
BILIRUB SERPL-MCNC: 0.2 MG/DL (ref 0.1–1.5)
BUN SERPL-MCNC: 7 MG/DL (ref 8–22)
CALCIUM ALBUM COR SERPL-MCNC: 9.8 MG/DL (ref 8.5–10.5)
CALCIUM SERPL-MCNC: 9 MG/DL (ref 8.5–10.5)
CHLORIDE SERPL-SCNC: 107 MMOL/L (ref 96–112)
CO2 SERPL-SCNC: 23 MMOL/L (ref 20–33)
CREAT SERPL-MCNC: 0.76 MG/DL (ref 0.5–1.4)
EOSINOPHIL # BLD AUTO: 0.23 K/UL (ref 0–0.51)
EOSINOPHIL NFR BLD: 2.8 % (ref 0–6.9)
ERYTHROCYTE [DISTWIDTH] IN BLOOD BY AUTOMATED COUNT: 50.6 FL (ref 35.9–50)
GFR SERPLBLD CREATININE-BSD FMLA CKD-EPI: 107 ML/MIN/1.73 M 2
GLOBULIN SER CALC-MCNC: 3 G/DL (ref 1.9–3.5)
GLUCOSE BLD STRIP.AUTO-MCNC: 103 MG/DL (ref 65–99)
GLUCOSE BLD STRIP.AUTO-MCNC: 92 MG/DL (ref 65–99)
GLUCOSE SERPL-MCNC: 84 MG/DL (ref 65–99)
HCT VFR BLD AUTO: 28.7 % (ref 42–52)
HGB BLD-MCNC: 8.5 G/DL (ref 14–18)
IMM GRANULOCYTES # BLD AUTO: 0.08 K/UL (ref 0–0.11)
IMM GRANULOCYTES NFR BLD AUTO: 1 % (ref 0–0.9)
LYMPHOCYTES # BLD AUTO: 1.95 K/UL (ref 1–4.8)
LYMPHOCYTES NFR BLD: 23.9 % (ref 22–41)
MAGNESIUM SERPL-MCNC: 2.5 MG/DL (ref 1.5–2.5)
MCH RBC QN AUTO: 24.9 PG (ref 27–33)
MCHC RBC AUTO-ENTMCNC: 29.6 G/DL (ref 32.3–36.5)
MCV RBC AUTO: 84.2 FL (ref 81.4–97.8)
MONOCYTES # BLD AUTO: 0.83 K/UL (ref 0–0.85)
MONOCYTES NFR BLD AUTO: 10.2 % (ref 0–13.4)
NEUTROPHILS # BLD AUTO: 5 K/UL (ref 1.82–7.42)
NEUTROPHILS NFR BLD: 61.1 % (ref 44–72)
NRBC # BLD AUTO: 0 K/UL
NRBC BLD-RTO: 0 /100 WBC (ref 0–0.2)
PHOSPHATE SERPL-MCNC: 3.8 MG/DL (ref 2.5–4.5)
PLATELET # BLD AUTO: 293 K/UL (ref 164–446)
PMV BLD AUTO: 9.7 FL (ref 9–12.9)
POTASSIUM SERPL-SCNC: 3.9 MMOL/L (ref 3.6–5.5)
PROT SERPL-MCNC: 6 G/DL (ref 6–8.2)
RBC # BLD AUTO: 3.41 M/UL (ref 4.7–6.1)
SODIUM SERPL-SCNC: 142 MMOL/L (ref 135–145)
WBC # BLD AUTO: 8.2 K/UL (ref 4.8–10.8)

## 2024-08-29 PROCEDURE — 85025 COMPLETE CBC W/AUTO DIFF WBC: CPT

## 2024-08-29 PROCEDURE — A9579 GAD-BASE MR CONTRAST NOS,1ML: HCPCS | Mod: JZ | Performed by: STUDENT IN AN ORGANIZED HEALTH CARE EDUCATION/TRAINING PROGRAM

## 2024-08-29 PROCEDURE — 82962 GLUCOSE BLOOD TEST: CPT

## 2024-08-29 PROCEDURE — A9270 NON-COVERED ITEM OR SERVICE: HCPCS | Performed by: STUDENT IN AN ORGANIZED HEALTH CARE EDUCATION/TRAINING PROGRAM

## 2024-08-29 PROCEDURE — 70250 X-RAY EXAM OF SKULL: CPT

## 2024-08-29 PROCEDURE — 36415 COLL VENOUS BLD VENIPUNCTURE: CPT

## 2024-08-29 PROCEDURE — 84100 ASSAY OF PHOSPHORUS: CPT

## 2024-08-29 PROCEDURE — 80053 COMPREHEN METABOLIC PANEL: CPT

## 2024-08-29 PROCEDURE — 770006 HCHG ROOM/CARE - MED/SURG/GYN SEMI*

## 2024-08-29 PROCEDURE — 700111 HCHG RX REV CODE 636 W/ 250 OVERRIDE (IP): Mod: JZ | Performed by: STUDENT IN AN ORGANIZED HEALTH CARE EDUCATION/TRAINING PROGRAM

## 2024-08-29 PROCEDURE — 83735 ASSAY OF MAGNESIUM: CPT

## 2024-08-29 PROCEDURE — 99232 SBSQ HOSP IP/OBS MODERATE 35: CPT | Performed by: STUDENT IN AN ORGANIZED HEALTH CARE EDUCATION/TRAINING PROGRAM

## 2024-08-29 PROCEDURE — 700105 HCHG RX REV CODE 258: Performed by: STUDENT IN AN ORGANIZED HEALTH CARE EDUCATION/TRAINING PROGRAM

## 2024-08-29 PROCEDURE — 700117 HCHG RX CONTRAST REV CODE 255: Mod: JZ | Performed by: STUDENT IN AN ORGANIZED HEALTH CARE EDUCATION/TRAINING PROGRAM

## 2024-08-29 PROCEDURE — 700102 HCHG RX REV CODE 250 W/ 637 OVERRIDE(OP): Performed by: STUDENT IN AN ORGANIZED HEALTH CARE EDUCATION/TRAINING PROGRAM

## 2024-08-29 RX ADMIN — HEPARIN SODIUM 5000 UNITS: 5000 INJECTION, SOLUTION INTRAVENOUS; SUBCUTANEOUS at 04:54

## 2024-08-29 RX ADMIN — RISPERIDONE 2 MG: 0.5 TABLET, FILM COATED ORAL at 04:52

## 2024-08-29 RX ADMIN — AMPICILLIN AND SULBACTAM 3 G: 1; 2 INJECTION, POWDER, FOR SOLUTION INTRAMUSCULAR; INTRAVENOUS at 04:51

## 2024-08-29 RX ADMIN — Medication 100 MG: at 14:05

## 2024-08-29 RX ADMIN — Medication 100 MG: at 21:49

## 2024-08-29 RX ADMIN — SENNOSIDES AND DOCUSATE SODIUM 2 TABLET: 50; 8.6 TABLET ORAL at 17:25

## 2024-08-29 RX ADMIN — HEPARIN SODIUM 5000 UNITS: 5000 INJECTION, SOLUTION INTRAVENOUS; SUBCUTANEOUS at 21:49

## 2024-08-29 RX ADMIN — AMPICILLIN AND SULBACTAM 3 G: 1; 2 INJECTION, POWDER, FOR SOLUTION INTRAMUSCULAR; INTRAVENOUS at 00:18

## 2024-08-29 RX ADMIN — AMPICILLIN AND SULBACTAM 3 G: 1; 2 INJECTION, POWDER, FOR SOLUTION INTRAMUSCULAR; INTRAVENOUS at 12:12

## 2024-08-29 RX ADMIN — Medication 100 MG: at 08:03

## 2024-08-29 RX ADMIN — OMEPRAZOLE 20 MG: 20 CAPSULE, DELAYED RELEASE ORAL at 17:24

## 2024-08-29 RX ADMIN — ATORVASTATIN CALCIUM 40 MG: 40 TABLET, FILM COATED ORAL at 17:26

## 2024-08-29 RX ADMIN — AMPICILLIN AND SULBACTAM 3 G: 1; 2 INJECTION, POWDER, FOR SOLUTION INTRAMUSCULAR; INTRAVENOUS at 17:28

## 2024-08-29 RX ADMIN — RISPERIDONE 2 MG: 0.5 TABLET, FILM COATED ORAL at 17:22

## 2024-08-29 RX ADMIN — DIVALPROEX SODIUM 250 MG: 250 TABLET, DELAYED RELEASE ORAL at 21:49

## 2024-08-29 RX ADMIN — DIVALPROEX SODIUM 250 MG: 250 TABLET, DELAYED RELEASE ORAL at 12:12

## 2024-08-29 RX ADMIN — DIVALPROEX SODIUM 250 MG: 250 TABLET, DELAYED RELEASE ORAL at 04:52

## 2024-08-29 RX ADMIN — FOLIC ACID 1 MG: 1 TABLET ORAL at 17:25

## 2024-08-29 RX ADMIN — FOLIC ACID 1 MG: 1 TABLET ORAL at 04:52

## 2024-08-29 RX ADMIN — CYANOCOBALAMIN TAB 500 MCG 1000 MCG: 500 TAB at 04:52

## 2024-08-29 RX ADMIN — HEPARIN SODIUM 5000 UNITS: 5000 INJECTION, SOLUTION INTRAVENOUS; SUBCUTANEOUS at 14:05

## 2024-08-29 RX ADMIN — OMEPRAZOLE 20 MG: 20 CAPSULE, DELAYED RELEASE ORAL at 04:52

## 2024-08-29 RX ADMIN — ASPIRIN 81 MG: 81 TABLET, COATED ORAL at 04:53

## 2024-08-29 RX ADMIN — GADOTERIDOL 20 ML: 279.3 INJECTION, SOLUTION INTRAVENOUS at 23:32

## 2024-08-29 SDOH — ECONOMIC STABILITY: TRANSPORTATION INSECURITY
IN THE PAST 12 MONTHS, HAS LACK OF RELIABLE TRANSPORTATION KEPT YOU FROM MEDICAL APPOINTMENTS, MEETINGS, WORK OR FROM GETTING THINGS NEEDED FOR DAILY LIVING?: PATIENT UNABLE TO ANSWER

## 2024-08-29 SDOH — ECONOMIC STABILITY: TRANSPORTATION INSECURITY
IN THE PAST 12 MONTHS, HAS THE LACK OF TRANSPORTATION KEPT YOU FROM MEDICAL APPOINTMENTS OR FROM GETTING MEDICATIONS?: PATIENT UNABLE TO ANSWER

## 2024-08-29 ASSESSMENT — ENCOUNTER SYMPTOMS
NEUROLOGICAL NEGATIVE: 1
MUSCULOSKELETAL NEGATIVE: 1
EYES NEGATIVE: 1
PSYCHIATRIC NEGATIVE: 1
RESPIRATORY NEGATIVE: 1
CONSTITUTIONAL NEGATIVE: 1
GASTROINTESTINAL NEGATIVE: 1
CARDIOVASCULAR NEGATIVE: 1

## 2024-08-29 ASSESSMENT — SOCIAL DETERMINANTS OF HEALTH (SDOH)
WITHIN THE PAST 12 MONTHS, YOU WORRIED THAT YOUR FOOD WOULD RUN OUT BEFORE YOU GOT THE MONEY TO BUY MORE: PATIENT UNABLE TO ANSWER
IN THE PAST 12 MONTHS, HAS THE ELECTRIC, GAS, OIL, OR WATER COMPANY THREATENED TO SHUT OFF SERVICE IN YOUR HOME?: PATIENT UNABLE TO ANSWER
WITHIN THE PAST 12 MONTHS, THE FOOD YOU BOUGHT JUST DIDN'T LAST AND YOU DIDN'T HAVE MONEY TO GET MORE: PATIENT UNABLE TO ANSWER

## 2024-08-29 ASSESSMENT — PATIENT HEALTH QUESTIONNAIRE - PHQ9
SUM OF ALL RESPONSES TO PHQ9 QUESTIONS 1 AND 2: 0
1. LITTLE INTEREST OR PLEASURE IN DOING THINGS: NOT AT ALL
SUM OF ALL RESPONSES TO PHQ9 QUESTIONS 1 AND 2: 0
1. LITTLE INTEREST OR PLEASURE IN DOING THINGS: NOT AT ALL
2. FEELING DOWN, DEPRESSED, IRRITABLE, OR HOPELESS: NOT AT ALL
2. FEELING DOWN, DEPRESSED, IRRITABLE, OR HOPELESS: NOT AT ALL

## 2024-08-29 ASSESSMENT — PAIN DESCRIPTION - PAIN TYPE
TYPE: ACUTE PAIN

## 2024-08-29 NOTE — FLOWSHEET NOTE
Received report received and assumed care of patient. Patient assessed at 0740. Patient is Aox2 to place and person. Patient educated on situation of stay. Patient reports no pain. Patient skin is dry, warm, and intact. Wound dressing on left lateral foot is intact and dry. Patient last bowel movement was prior to arrival. Patient reports mild tenderness of the abdomen. Patient abdomen is soft and non distended. Will continue to monitor. Patient bed is in low and locked position with the bed alarm on. Patient educated on use of call light. Personal belongings and call light are within reach.

## 2024-08-29 NOTE — PROGRESS NOTES
0958: Pt down to xray with transport via [facility].     1030: Pt back from xray with no incident.

## 2024-08-29 NOTE — CARE PLAN
The patient is Watcher - Medium risk of patient condition declining or worsening    Shift Goals  Clinical Goals: IV fluids, rest, range of motion  Patient Goals: rest  Family Goals: KIRTI    Progress made toward(s) clinical / shift goals: Patient was Aox2 with slow response during course of shift    Problem: Skin Integrity  Goal: Skin integrity is maintained or improved  Outcome: Progressing     Problem: Fall Risk  Goal: Patient will remain free from falls  8/29/2024 1558 by Katie Grover, R.N.  Outcome: Progressing  8/29/2024 1556 by Katie rGover, R.N.  Outcome: Progressing     Problem: Bowel/Gastric:  Goal: Normal bowel function is maintained or improved  Outcome: Progressing      Patient is not progressing towards the following goals:    Problem: Knowledge Deficit - Standard  Goal: Patient and family/care givers will demonstrate understanding of plan of care, disease process/condition, diagnostic tests and medications  8/29/2024 1558 by Katie Grover, R.N.  Outcome: Not Progressing

## 2024-08-29 NOTE — CARE PLAN
The patient is Watcher - Medium risk of patient condition declining or worsening    Shift Goals: nutrition and pain  Clinical Goals: wound care, send wound cx and urine cx, ANA , MRI foot  Patient Goals: rest, safety    Progress made toward(s) clinical / shift goals:  Patient will consume all three meals and remain at a comfortable pain level during course of shift.    Problem: Fluid Volume  Goal: Fluid volume balance will be maintained  Outcome: Progressing    Patient consumed adequate fluid intake during course of shift. Continue to encourage fluid intake.     Problem: Skin Integrity  Goal: Skin integrity is maintained or improved  Outcome: Progressing    Patient seen by wound consult for left lateral foot wound. Continue to monitor wound and follow wound protocol.     Problem: Pain - Standard  Goal: Alleviation of pain or a reduction in pain to the patient’s comfort goal  Outcome: Met    During shift patient had no pain on a scale of 0-10. Patient pain maintain by repositioning and pillows.     Problem: Fall Risk  Goal: Patient will remain free from falls  Outcome: Met    During shift patient did not fall. Reinforce education to patient on using call light. Continue to implement stand fall precautions.       Patient is not progressing towards the following goals:      Problem: Knowledge Deficit - Standard  Goal: Patient and family/care givers will demonstrate understanding of plan of care, disease process/condition, diagnostic tests and medications  Outcome: Not Progressing    Patient was alert and oriented to person and place during course of shift. Continue to reinforce education of disease progress and medications.

## 2024-08-29 NOTE — DISCHARGE PLANNING
Case Management Discharge Planning    Admission Date: 8/27/2024  GMLOS: 3  ALOS: 2    6-Clicks ADL Score: 21  6-Clicks Mobility Score: 14  PT and/or OT Eval ordered: Yes  Post-acute Referrals Ordered: Yes  Post-acute Choice Obtained: Yes  Has referral(s) been sent to post-acute provider:  NA      Anticipated Discharge Dispo:      DME Needed: No    Action(s) Taken: OTHER    Per MD, patient is not medically clear. JANE spoke with Deonte Xie, MADELINOA [phone].    Deonte reports patient is a long term resident at [facility] [phone].     Per Deonte, patient will return to [facility] when medically clear.    JANE called [facility], message left requesting a call back for confirmation of acceptance when medically clear.    Escalations Completed: None    Medically Clear: No    Next Steps: Home    Barriers to Discharge: Medical clearance    Is the patient up for discharge tomorrow:

## 2024-08-29 NOTE — CARE PLAN
The patient is Stable - Low risk of patient condition declining or worsening    Shift Goals  Clinical Goals: maintain skin integrity, free from fall  Patient Goals: comfort  Family Goals: michelle    Progress made toward(s) clinical / shift goals:  pt kept dry, repositioned and turned rounded q2h, pt calls for help appropriately, denies any pain, bed alarm on and on low locked position    Patient is not progressing towards the following goals:

## 2024-08-29 NOTE — CARE PLAN
The patient is Watcher - Medium risk of patient condition declining or worsening    Shift Goals  Clinical Goals: IV fluids, rest, range of motion  Patient Goals: rest  Family Goals: KIRTI    Progress made toward(s) clinical / shift goals: Patient was Aox2 during course fo shift and remained free of pain. Patient able to role from side to side in bed.    Problem: Skin Integrity  Goal: Skin integrity is maintained or improved  Outcome: Progressing     Patient skin intact, dry, and warm. Patient has milplex on inner right thigh. Patient has dressing on left lateral foot. Both dressing remain intact, dry, and intact. Continue to monitor.    Problem: Fall Risk  Goal: Patient will remain free from falls  8/29/2024 1558 by Katie Grover, R.N.  Outcome: Progressing    Patient remained free from fall during shift. Continue to reinforce fall risk. Patient bed is in low position, locked with bed alarm on. Continue to implement standard fall precautions.     Problem: Bowel/Gastric:  Goal: Normal bowel function is maintained or improved  Outcome: Progressing    Patient had large bowel movement during shift. Continue to monitor and encourage adequate nutritional intake.    Patient is not progressing towards the following goals:    Problem: Knowledge Deficit - Standard  Goal: Patient and family/care givers will demonstrate understanding of plan of care, disease process/condition, diagnostic tests and medications  8/29/2024 1558 by Katie Grover, R.N.  Outcome: Not Progressing    Patient was Aox2 to place and person with slow response time. Continue to reinforce plan of care to patient

## 2024-08-29 NOTE — PROGRESS NOTES
[facility] Medicine Daily Progress Note    Date of Service  8/29/2024    Chief Complaint  Yury Blake is a 54 y.o. male admitted 8/27/2024 with left foot ulcer    Hospital Course  54 y.o. male from SNF with history of Wernicke encephalopathy, alcohol abuse, psychiatric disorder, diabetes, recent admission for cellulitis of feet who presented 8/27/2024 with evaluation for wound check.  There was concern for purulent drainage from left foot at SNF, therefore referred to ER for evaluation.  In ER, x-ray of left foot noted possible osteomyelitis of lateral aspect of base of left fifth toe proximal phalanx.  Therefore admission requested by ERP.  Admitted to medicine service for further evaluation and treatment.     8/28 bilateral ANA normal    Interval Problem Update  Evaluate bedside  Reports left foot ulcer discomfort today  Stable vitals  On room air  Continue IV Unasyn  Wound care following  Pending foot MRI    I have discussed this patient's plan of care and discharge plan at IDT rounds today with Case Management, Nursing, Nursing leadership, and other members of the IDT team.    Consultants/Specialty  None    Code Status  Full Code    Disposition  The patient is not medically cleared for discharge to home or a post-acute facility.     I have placed the appropriate orders for post-discharge needs.    Review of Systems  Review of Systems   Constitutional: Negative.    HENT: Negative.     Eyes: Negative.    Respiratory: Negative.     Cardiovascular: Negative.    Gastrointestinal: Negative.    Genitourinary: Negative.    Musculoskeletal: Negative.    Skin: Negative.    Neurological: Negative.    Endo/Heme/Allergies: Negative.    Psychiatric/Behavioral: Negative.          Physical Exam  Temp:  [36.1 °C (97 °F)-36.4 °C (97.6 °F)] 36.4 °C (97.6 °F)  Pulse:  [71-80] 80  Resp:  [15-18] 16  BP: (103-116)/(54-70) 116/64  SpO2:  [91 %-100 %] 92 %    Physical Exam  Constitutional:       General: He is not in acute  distress.     Appearance: Normal appearance.   HENT:      Head: Normocephalic and atraumatic.      Nose: Nose normal. No congestion.      Mouth/Throat:      Mouth: Mucous membranes are moist.   Eyes:      Extraocular Movements: Extraocular movements intact.      Pupils: Pupils are equal, round, and reactive to light.   Cardiovascular:      Rate and Rhythm: Normal rate and regular rhythm.      Pulses: Normal pulses.      Heart sounds: Normal heart sounds.   Pulmonary:      Effort: Pulmonary effort is normal.      Breath sounds: Normal breath sounds.   Abdominal:      General: Bowel sounds are normal.      Palpations: Abdomen is soft.      Tenderness: There is no abdominal tenderness.   Musculoskeletal:         General: Normal range of motion.      Cervical back: Normal range of motion and neck supple.      Comments: Mild tenderness at 5th toe, no apparent discharge    Skin:     General: Skin is warm.      Coloration: Skin is not jaundiced.   Neurological:      General: No focal deficit present.      Mental Status: He is alert and oriented to person, place, and time. Mental status is at baseline.      Cranial Nerves: No cranial nerve deficit.   Psychiatric:         Mood and Affect: Mood normal.         Behavior: Behavior normal.         Thought Content: Thought content normal.         Judgment: Judgment normal.       Fluids    Intake/Output Summary (Last 24 hours) at 8/29/2024 0852  Last data filed at 8/29/2024 0500  Gross per 24 hour   Intake 840 ml   Output 2950 ml   Net -2110 ml        Laboratory  Recent Labs     08/27/24  1750 08/28/24  1059 08/29/24  0124   WBC 11.3* 8.2 8.2   RBC 3.69* 3.56* 3.41*   HEMOGLOBIN 9.2* 8.8* 8.5*   HEMATOCRIT 31.4* 30.6* 28.7*   MCV 85.1 86.0 84.2   MCH 24.9* 24.7* 24.9*   MCHC 29.3* 28.8* 29.6*   RDW 51.4* 51.8* 50.6*   PLATELETCT 349 313 293   MPV 9.3 9.6 9.7     Recent Labs     08/27/24  1750 08/28/24  1059 08/29/24  0124   SODIUM 144 143 142   POTASSIUM 3.5* 3.9 3.9   CHLORIDE  105 108 107   CO2 25 23 23   GLUCOSE 92 92 84   BUN 9 7* 7*   CREATININE 0.78 0.79 0.76   CALCIUM 9.6 9.2 9.0             Recent Labs     08/28/24  1059   TRIGLYCERIDE 214*   HDL 23*   LDL 69       Imaging  DX-CHEST-LIMITED (1 VIEW)   Final Result         No imaging findings to preclude MRI imaging.      [id number] VIEW   Final Result      1. No opaque foreign object to preclude MR imaging.   2. Moderate to large amount of stool throughout the colon.      US-ANA SINGLE LEVEL BILAT   Final Result      DX-FOOT-2- RIGHT   Final Result         1.  No acute traumatic bony injury.   2.  Atherosclerosis      DX-FOOT-COMPLETE 3+ LEFT   Final Result      1. Lateral left forefoot soft tissue ulcer, with loss of the normal well-defined cortex involving the lateral aspect of the base of the left fifth toe proximal phalanx, concerning for osteomyelitis.   2. Decreased bone mineralization.   3. No other acute findings.      MR-FOOT-WITH LEFT    (Results Pending)        Assessment/Plan  * Osteomyelitis (HCC)- (present on admission)  Assessment & Plan  X-ray left foot suspicious for osteomyelitis of lateral aspect of base of left fifth toe proximal phalanx  ANA normal  IV Unasyn  Wound care  Pending left foot MRI    Nonhealing ulcer of left lower extremity (HCC)- (present on admission)  Assessment & Plan  Chest x-ray concerning for osteomyelitis  ANA normal  IV antibiotics  Wound care  Pending left foot MRI    Diabetes (HCC)- (present on admission)  Assessment & Plan  ISS  Statin    Psychiatric disorder- (present on admission)  Assessment & Plan  Continue Depakote, risperidone    Wernicke encephalopathy- (present on admission)  Assessment & Plan  Alcohol cessation strongly advised  Continue thiamine supplement  Multivitamins    Alcohol abuse- (present on admission)  Assessment & Plan  Cessation strongly advised, counseling provided, time spent:16minutes  Multivitamins  Thiamine supplement  CIWA      Hypokalemia- (present on  admission)  Assessment & Plan  Replace       VTE prophylaxis: Heparin    I have performed a physical exam and reviewed and updated ROS and Plan today (8/29/2024). In review of yesterday's note (8/28/2024), there are no changes except as documented above.

## 2024-08-30 LAB
BACTERIA UR CULT: NORMAL
ERYTHROCYTE [DISTWIDTH] IN BLOOD BY AUTOMATED COUNT: 51.6 FL (ref 35.9–50)
HCT VFR BLD AUTO: 30.7 % (ref 42–52)
HGB BLD-MCNC: 8.9 G/DL (ref 14–18)
MCH RBC QN AUTO: 24.6 PG (ref 27–33)
MCHC RBC AUTO-ENTMCNC: 29 G/DL (ref 32.3–36.5)
MCV RBC AUTO: 84.8 FL (ref 81.4–97.8)
PLATELET # BLD AUTO: 299 K/UL (ref 164–446)
PMV BLD AUTO: 9.3 FL (ref 9–12.9)
RBC # BLD AUTO: 3.62 M/UL (ref 4.7–6.1)
SIGNIFICANT IND 70042: NORMAL
SITE SITE: NORMAL
SOURCE SOURCE: NORMAL
WBC # BLD AUTO: 8.4 K/UL (ref 4.8–10.8)

## 2024-08-30 PROCEDURE — 770006 HCHG ROOM/CARE - MED/SURG/GYN SEMI*

## 2024-08-30 PROCEDURE — 73720 MRI LWR EXTREMITY W/O&W/DYE: CPT | Mod: LT

## 2024-08-30 PROCEDURE — 700111 HCHG RX REV CODE 636 W/ 250 OVERRIDE (IP): Performed by: STUDENT IN AN ORGANIZED HEALTH CARE EDUCATION/TRAINING PROGRAM

## 2024-08-30 PROCEDURE — A9270 NON-COVERED ITEM OR SERVICE: HCPCS | Performed by: STUDENT IN AN ORGANIZED HEALTH CARE EDUCATION/TRAINING PROGRAM

## 2024-08-30 PROCEDURE — 85027 COMPLETE CBC AUTOMATED: CPT

## 2024-08-30 PROCEDURE — 700102 HCHG RX REV CODE 250 W/ 637 OVERRIDE(OP): Performed by: STUDENT IN AN ORGANIZED HEALTH CARE EDUCATION/TRAINING PROGRAM

## 2024-08-30 PROCEDURE — 700111 HCHG RX REV CODE 636 W/ 250 OVERRIDE (IP): Mod: JZ | Performed by: STUDENT IN AN ORGANIZED HEALTH CARE EDUCATION/TRAINING PROGRAM

## 2024-08-30 PROCEDURE — 99232 SBSQ HOSP IP/OBS MODERATE 35: CPT | Performed by: STUDENT IN AN ORGANIZED HEALTH CARE EDUCATION/TRAINING PROGRAM

## 2024-08-30 PROCEDURE — 36415 COLL VENOUS BLD VENIPUNCTURE: CPT

## 2024-08-30 PROCEDURE — 700105 HCHG RX REV CODE 258: Performed by: STUDENT IN AN ORGANIZED HEALTH CARE EDUCATION/TRAINING PROGRAM

## 2024-08-30 RX ORDER — ENOXAPARIN SODIUM 100 MG/ML
40 INJECTION SUBCUTANEOUS DAILY
Status: DISCONTINUED | OUTPATIENT
Start: 2024-08-30 | End: 2024-09-05 | Stop reason: HOSPADM

## 2024-08-30 RX ADMIN — SENNOSIDES AND DOCUSATE SODIUM 2 TABLET: 50; 8.6 TABLET ORAL at 17:34

## 2024-08-30 RX ADMIN — AMPICILLIN AND SULBACTAM 3 G: 1; 2 INJECTION, POWDER, FOR SOLUTION INTRAMUSCULAR; INTRAVENOUS at 11:59

## 2024-08-30 RX ADMIN — CYANOCOBALAMIN TAB 500 MCG 1000 MCG: 500 TAB at 05:16

## 2024-08-30 RX ADMIN — AMPICILLIN AND SULBACTAM 3 G: 1; 2 INJECTION, POWDER, FOR SOLUTION INTRAMUSCULAR; INTRAVENOUS at 05:18

## 2024-08-30 RX ADMIN — HEPARIN SODIUM 5000 UNITS: 5000 INJECTION, SOLUTION INTRAVENOUS; SUBCUTANEOUS at 05:17

## 2024-08-30 RX ADMIN — DIVALPROEX SODIUM 250 MG: 250 TABLET, DELAYED RELEASE ORAL at 11:59

## 2024-08-30 RX ADMIN — Medication 100 MG: at 20:50

## 2024-08-30 RX ADMIN — AMPICILLIN AND SULBACTAM 3 G: 1; 2 INJECTION, POWDER, FOR SOLUTION INTRAMUSCULAR; INTRAVENOUS at 17:36

## 2024-08-30 RX ADMIN — ENOXAPARIN SODIUM 40 MG: 100 INJECTION SUBCUTANEOUS at 17:34

## 2024-08-30 RX ADMIN — AMPICILLIN AND SULBACTAM 3 G: 1; 2 INJECTION, POWDER, FOR SOLUTION INTRAMUSCULAR; INTRAVENOUS at 00:59

## 2024-08-30 RX ADMIN — ASPIRIN 81 MG: 81 TABLET, COATED ORAL at 05:15

## 2024-08-30 RX ADMIN — DIVALPROEX SODIUM 250 MG: 250 TABLET, DELAYED RELEASE ORAL at 20:50

## 2024-08-30 RX ADMIN — OMEPRAZOLE 20 MG: 20 CAPSULE, DELAYED RELEASE ORAL at 05:16

## 2024-08-30 RX ADMIN — FOLIC ACID 1 MG: 1 TABLET ORAL at 17:34

## 2024-08-30 RX ADMIN — Medication 100 MG: at 14:25

## 2024-08-30 RX ADMIN — DIVALPROEX SODIUM 250 MG: 250 TABLET, DELAYED RELEASE ORAL at 05:15

## 2024-08-30 RX ADMIN — OMEPRAZOLE 20 MG: 20 CAPSULE, DELAYED RELEASE ORAL at 17:34

## 2024-08-30 RX ADMIN — FOLIC ACID 1 MG: 1 TABLET ORAL at 05:17

## 2024-08-30 RX ADMIN — Medication 100 MG: at 08:19

## 2024-08-30 RX ADMIN — RISPERIDONE 2 MG: 0.5 TABLET, FILM COATED ORAL at 05:16

## 2024-08-30 RX ADMIN — RISPERIDONE 2 MG: 0.5 TABLET, FILM COATED ORAL at 17:34

## 2024-08-30 RX ADMIN — ATORVASTATIN CALCIUM 40 MG: 40 TABLET, FILM COATED ORAL at 17:34

## 2024-08-30 ASSESSMENT — ENCOUNTER SYMPTOMS
CARDIOVASCULAR NEGATIVE: 1
RESPIRATORY NEGATIVE: 1
NEUROLOGICAL NEGATIVE: 1
CONSTITUTIONAL NEGATIVE: 1
PSYCHIATRIC NEGATIVE: 1
EYES NEGATIVE: 1
MUSCULOSKELETAL NEGATIVE: 1
GASTROINTESTINAL NEGATIVE: 1

## 2024-08-30 ASSESSMENT — PAIN DESCRIPTION - PAIN TYPE
TYPE: ACUTE PAIN

## 2024-08-30 NOTE — PROGRESS NOTES
Received report and assumed patient care from Katie RN & MUSTAPHA Peres. Assessment performed at 2000. Patient oriented to person and place but follows commands. Patient educated on importance of stay and reoriented. Patient reports pain of 5 in abdomen but denies pharmacological interventions. Patient refused dinner meal. Bed in low and locked position with bed alarm on. Patient informed on use of call light and personal belongings in reach.     MRI contacted and transporting patient for scan around 2200.

## 2024-08-30 NOTE — PROGRESS NOTES
[facility] Medicine Daily Progress Note    Date of Service  8/30/2024    Chief Complaint  Yury Blake is a 54 y.o. male admitted 8/27/2024 with left foot ulcer    Hospital Course  54 y.o. male from SNF with history of Wernicke encephalopathy, alcohol abuse, psychiatric disorder, diabetes, recent admission for cellulitis of feet who presented 8/27/2024 with evaluation for wound check.  There was concern for purulent drainage from left foot at SNF, therefore referred to ER for evaluation.  In ER, x-ray of left foot noted possible osteomyelitis of lateral aspect of base of left fifth toe proximal phalanx.  Therefore admission requested by ERP.  Admitted to medicine service for further evaluation and treatment.     8/28 bilateral ANA normal.  8/30 left foot MRI showing fifth metatarsal osteomyelitis.    Interval Problem Update  Evaluate bedside  IN NAD this AM  Stable vitals  On room air  Continue IV Unasyn  Wound care following  Discussed case with orthopedic surgery, aim to evaluate on a.m.    I have discussed this patient's plan of care and discharge plan at IDT rounds today with Case Management, Nursing, Nursing leadership, and other members of the IDT team.    Consultants/Specialty  None    Code Status  Full Code    Disposition  The patient is not medically cleared for discharge to home or a post-acute facility.     I have placed the appropriate orders for post-discharge needs.    Review of Systems  Review of Systems   Constitutional: Negative.    HENT: Negative.     Eyes: Negative.    Respiratory: Negative.     Cardiovascular: Negative.    Gastrointestinal: Negative.    Genitourinary: Negative.    Musculoskeletal: Negative.    Skin: Negative.    Neurological: Negative.    Endo/Heme/Allergies: Negative.    Psychiatric/Behavioral: Negative.          Physical Exam  Temp:  [36.1 °C (97 °F)-36.4 °C (97.6 °F)] 36.1 °C (97 °F)  Pulse:  [72-78] 72  Resp:  [14-18] 16  BP: ()/(65-68) 105/66  SpO2:  [90 %-96  %] 95 %    Physical Exam  Constitutional:       General: He is not in acute distress.     Appearance: Normal appearance.   HENT:      Head: Normocephalic and atraumatic.      Nose: Nose normal. No congestion.      Mouth/Throat:      Mouth: Mucous membranes are moist.   Eyes:      Extraocular Movements: Extraocular movements intact.      Pupils: Pupils are equal, round, and reactive to light.   Cardiovascular:      Rate and Rhythm: Normal rate and regular rhythm.      Pulses: Normal pulses.      Heart sounds: Normal heart sounds.   Pulmonary:      Effort: Pulmonary effort is normal.      Breath sounds: Normal breath sounds.   Abdominal:      General: Bowel sounds are normal.      Palpations: Abdomen is soft.      Tenderness: There is no abdominal tenderness.   Musculoskeletal:         General: Normal range of motion.      Cervical back: Normal range of motion and neck supple.      Comments: Mild tenderness at 5th toe, no apparent discharge    Skin:     General: Skin is warm.      Coloration: Skin is not jaundiced.   Neurological:      General: No focal deficit present.      Mental Status: He is alert and oriented to person, place, and time. Mental status is at baseline.      Cranial Nerves: No cranial nerve deficit.   Psychiatric:         Mood and Affect: Mood normal.         Behavior: Behavior normal.         Thought Content: Thought content normal.         Judgment: Judgment normal.       Fluids    Intake/Output Summary (Last 24 hours) at 8/30/2024 1139  Last data filed at 8/30/2024 1045  Gross per 24 hour   Intake 480 ml   Output 1340 ml   Net -860 ml        Laboratory  Recent Labs     08/28/24  1059 08/29/24  0124 08/30/24  0056   WBC 8.2 8.2 8.4   RBC 3.56* 3.41* 3.62*   HEMOGLOBIN 8.8* 8.5* 8.9*   HEMATOCRIT 30.6* 28.7* 30.7*   MCV 86.0 84.2 84.8   MCH 24.7* 24.9* 24.6*   MCHC 28.8* 29.6* 29.0*   RDW 51.8* 50.6* 51.6*   PLATELETCT 313 293 299   MPV 9.6 9.7 9.3     Recent Labs     08/27/24  1750 08/28/24  1059  08/29/24  0124   SODIUM 144 143 142   POTASSIUM 3.5* 3.9 3.9   CHLORIDE 105 108 107   CO2 25 23 23   GLUCOSE 92 92 84   BUN 9 7* 7*   CREATININE 0.78 0.79 0.76   CALCIUM 9.6 9.2 9.0             Recent Labs     08/28/24  1059   TRIGLYCERIDE 214*   HDL 23*   LDL 69       Imaging  MR-FOOT-WITH & W/O LEFT   Final Result         1. Subcutaneous edema. This may be seen with cellulitis.   2. No abscess is evident.   3. Signal abnormality within the fifth metatarsal head and base of the fifth proximal phalanx is consistent with osteomyelitis given a soft tissue wound or ulcer in this region.   4. Postoperative changes are noted involving the midfoot with an anchor in the lateral cuneiform.      DX-SKULL-LIMITED 3-   Final Result      No radiopaque foreign object to preclude MR imaging.      DX-CHEST-LIMITED (1 VIEW)   Final Result         No imaging findings to preclude MRI imaging.      [id number] VIEW   Final Result      1. No opaque foreign object to preclude MR imaging.   2. Moderate to large amount of stool throughout the colon.      US-ANA SINGLE LEVEL BILAT   Final Result      DX-FOOT-2- RIGHT   Final Result         1.  No acute traumatic bony injury.   2.  Atherosclerosis      DX-FOOT-COMPLETE 3+ LEFT   Final Result      1. Lateral left forefoot soft tissue ulcer, with loss of the normal well-defined cortex involving the lateral aspect of the base of the left fifth toe proximal phalanx, concerning for osteomyelitis.   2. Decreased bone mineralization.   3. No other acute findings.           Assessment/Plan  * Osteomyelitis (HCC)- (present on admission)  Assessment & Plan  X-ray left foot suspicious for osteomyelitis of lateral aspect of base of left fifth toe proximal phalanx  ANA normal  IV Unasyn  Wound care  Pending left foot MRI    Nonhealing ulcer of left lower extremity (HCC)- (present on admission)  Assessment & Plan  Chest x-ray concerning for osteomyelitis  ANA normal  IV antibiotics  Wound care  Pending  left foot MRI    Diabetes (HCC)- (present on admission)  Assessment & Plan  ISS  Statin    Psychiatric disorder- (present on admission)  Assessment & Plan  Continue Depakote, risperidone    Wernicke encephalopathy- (present on admission)  Assessment & Plan  Alcohol cessation strongly advised  Continue thiamine supplement  Multivitamins    Alcohol abuse- (present on admission)  Assessment & Plan  Cessation strongly advised, counseling provided, time spent:16minutes  Multivitamins  Thiamine supplement  CIWA      Hypokalemia- (present on admission)  Assessment & Plan  Replace       VTE prophylaxis: Heparin    I have performed a physical exam and reviewed and updated ROS and Plan today (8/30/2024). In review of yesterday's note (8/29/2024), there are no changes except as documented above.

## 2024-08-30 NOTE — PROGRESS NOTES
8/29/2024 at 1058: Pt sent to MRI with transport by a [facility].    8/30/2024 at 0010: Pt back from MRI with no incident

## 2024-08-30 NOTE — CARE PLAN
The patient is Stable - Low risk of patient condition declining or worsening    Shift Goals  Clinical Goals: maintain skin integrity, range of movement, complete MRI  Patient Goals: rest  Family Goals: N/A    Progress made toward(s) clinical / shift goals:  pt Aox2 but folows command and cooperative with turning, pt needs reminders for turns, pt able to reposition, Mri done at this shift, procedure uneventful, rouynded hourly, nbeeds were attended, snack given at midnight    Patient is not progressing towards the following goals:

## 2024-08-30 NOTE — CARE PLAN
Problem: Urinary - Renal Perfusion  Goal: Ability to achieve and maintain adequate renal perfusion and functioning will improve  Outcome: Progressing  Note: Patient had adequate urine output this shift     Problem: Skin Integrity  Goal: Skin integrity is maintained or improved  Outcome: Progressing  Note: Patient cleaned for episodes of incontinence to prevent skin breakdown, and heel float boot applied to patient to prevent further skin breakdown of his left foot   The patient is Stable - Low risk of patient condition declining or worsening    Shift Goals  Clinical Goals: maintain skin integrity, range of movement, complete MRI  Patient Goals: rest  Family Goals: N/A    Progress made toward(s) clinical / shift goals:  progressing    Patient is not progressing towards the following goals:

## 2024-08-31 VITALS
HEIGHT: 72 IN | DIASTOLIC BLOOD PRESSURE: 61 MMHG | HEART RATE: 72 BPM | TEMPERATURE: 97.7 F | RESPIRATION RATE: 17 BRPM | BODY MASS INDEX: 34.82 KG/M2 | SYSTOLIC BLOOD PRESSURE: 121 MMHG | WEIGHT: 257.06 LBS | OXYGEN SATURATION: 90 %

## 2024-08-31 LAB
ERYTHROCYTE [DISTWIDTH] IN BLOOD BY AUTOMATED COUNT: 50.5 FL (ref 35.9–50)
HCT VFR BLD AUTO: 28.1 % (ref 42–52)
HGB BLD-MCNC: 8.4 G/DL (ref 14–18)
MCH RBC QN AUTO: 24.7 PG (ref 27–33)
MCHC RBC AUTO-ENTMCNC: 29.9 G/DL (ref 32.3–36.5)
MCV RBC AUTO: 82.6 FL (ref 81.4–97.8)
PLATELET # BLD AUTO: 293 K/UL (ref 164–446)
PMV BLD AUTO: 9.5 FL (ref 9–12.9)
RBC # BLD AUTO: 3.4 M/UL (ref 4.7–6.1)
WBC # BLD AUTO: 8.3 K/UL (ref 4.8–10.8)

## 2024-08-31 PROCEDURE — 700111 HCHG RX REV CODE 636 W/ 250 OVERRIDE (IP): Mod: JZ | Performed by: STUDENT IN AN ORGANIZED HEALTH CARE EDUCATION/TRAINING PROGRAM

## 2024-08-31 PROCEDURE — 99232 SBSQ HOSP IP/OBS MODERATE 35: CPT | Performed by: STUDENT IN AN ORGANIZED HEALTH CARE EDUCATION/TRAINING PROGRAM

## 2024-08-31 PROCEDURE — A9270 NON-COVERED ITEM OR SERVICE: HCPCS | Performed by: STUDENT IN AN ORGANIZED HEALTH CARE EDUCATION/TRAINING PROGRAM

## 2024-08-31 PROCEDURE — 700105 HCHG RX REV CODE 258: Performed by: STUDENT IN AN ORGANIZED HEALTH CARE EDUCATION/TRAINING PROGRAM

## 2024-08-31 PROCEDURE — 85027 COMPLETE CBC AUTOMATED: CPT

## 2024-08-31 PROCEDURE — 770006 HCHG ROOM/CARE - MED/SURG/GYN SEMI*

## 2024-08-31 PROCEDURE — 36415 COLL VENOUS BLD VENIPUNCTURE: CPT

## 2024-08-31 PROCEDURE — 700102 HCHG RX REV CODE 250 W/ 637 OVERRIDE(OP): Performed by: STUDENT IN AN ORGANIZED HEALTH CARE EDUCATION/TRAINING PROGRAM

## 2024-08-31 RX ORDER — HYDRALAZINE HYDROCHLORIDE 20 MG/ML
20 INJECTION INTRAMUSCULAR; INTRAVENOUS EVERY 6 HOURS PRN
Status: DISCONTINUED | OUTPATIENT
Start: 2024-08-31 | End: 2024-09-05 | Stop reason: HOSPADM

## 2024-08-31 RX ADMIN — AMPICILLIN AND SULBACTAM 3 G: 1; 2 INJECTION, POWDER, FOR SOLUTION INTRAMUSCULAR; INTRAVENOUS at 00:32

## 2024-08-31 RX ADMIN — ATORVASTATIN CALCIUM 40 MG: 40 TABLET, FILM COATED ORAL at 17:39

## 2024-08-31 RX ADMIN — RISPERIDONE 2 MG: 0.5 TABLET, FILM COATED ORAL at 04:37

## 2024-08-31 RX ADMIN — DIVALPROEX SODIUM 250 MG: 250 TABLET, DELAYED RELEASE ORAL at 04:37

## 2024-08-31 RX ADMIN — AMPICILLIN AND SULBACTAM 3 G: 1; 2 INJECTION, POWDER, FOR SOLUTION INTRAMUSCULAR; INTRAVENOUS at 17:37

## 2024-08-31 RX ADMIN — FOLIC ACID 1 MG: 1 TABLET ORAL at 04:38

## 2024-08-31 RX ADMIN — Medication 100 MG: at 21:01

## 2024-08-31 RX ADMIN — SENNOSIDES AND DOCUSATE SODIUM 2 TABLET: 50; 8.6 TABLET ORAL at 17:39

## 2024-08-31 RX ADMIN — DIVALPROEX SODIUM 250 MG: 250 TABLET, DELAYED RELEASE ORAL at 12:27

## 2024-08-31 RX ADMIN — DIVALPROEX SODIUM 250 MG: 250 TABLET, DELAYED RELEASE ORAL at 21:01

## 2024-08-31 RX ADMIN — FOLIC ACID 1 MG: 1 TABLET ORAL at 17:40

## 2024-08-31 RX ADMIN — Medication 100 MG: at 15:06

## 2024-08-31 RX ADMIN — Medication 100 MG: at 08:38

## 2024-08-31 RX ADMIN — AMPICILLIN AND SULBACTAM 3 G: 1; 2 INJECTION, POWDER, FOR SOLUTION INTRAMUSCULAR; INTRAVENOUS at 04:39

## 2024-08-31 RX ADMIN — RISPERIDONE 2 MG: 0.5 TABLET, FILM COATED ORAL at 17:41

## 2024-08-31 RX ADMIN — OMEPRAZOLE 20 MG: 20 CAPSULE, DELAYED RELEASE ORAL at 17:39

## 2024-08-31 RX ADMIN — AMPICILLIN AND SULBACTAM 3 G: 1; 2 INJECTION, POWDER, FOR SOLUTION INTRAMUSCULAR; INTRAVENOUS at 12:29

## 2024-08-31 RX ADMIN — CYANOCOBALAMIN TAB 500 MCG 1000 MCG: 500 TAB at 04:37

## 2024-08-31 RX ADMIN — ASPIRIN 81 MG: 81 TABLET, COATED ORAL at 04:37

## 2024-08-31 RX ADMIN — ENOXAPARIN SODIUM 40 MG: 100 INJECTION SUBCUTANEOUS at 17:40

## 2024-08-31 RX ADMIN — OMEPRAZOLE 20 MG: 20 CAPSULE, DELAYED RELEASE ORAL at 04:37

## 2024-08-31 ASSESSMENT — PATIENT HEALTH QUESTIONNAIRE - PHQ9
SUM OF ALL RESPONSES TO PHQ9 QUESTIONS 1 AND 2: 0
1. LITTLE INTEREST OR PLEASURE IN DOING THINGS: NOT AT ALL
2. FEELING DOWN, DEPRESSED, IRRITABLE, OR HOPELESS: NOT AT ALL

## 2024-08-31 ASSESSMENT — PAIN DESCRIPTION - PAIN TYPE
TYPE: ACUTE PAIN

## 2024-08-31 ASSESSMENT — ENCOUNTER SYMPTOMS
EYES NEGATIVE: 1
RESPIRATORY NEGATIVE: 1
GASTROINTESTINAL NEGATIVE: 1
MUSCULOSKELETAL NEGATIVE: 1
CONSTITUTIONAL NEGATIVE: 1
CARDIOVASCULAR NEGATIVE: 1
PSYCHIATRIC NEGATIVE: 1
NEUROLOGICAL NEGATIVE: 1

## 2024-08-31 ASSESSMENT — FIBROSIS 4 INDEX: FIB4 SCORE: 0.94

## 2024-08-31 NOTE — PROGRESS NOTES
[facility] Medicine Daily Progress Note    Date of Service  8/31/2024    Chief Complaint  Yury Blake is a 54 y.o. male admitted 8/27/2024 with left foot ulcer    Hospital Course  54 y.o. male from SNF with history of Wernicke encephalopathy, alcohol abuse, psychiatric disorder, diabetes, recent admission for cellulitis of feet who presented 8/27/2024 with evaluation for wound check.  There was concern for purulent drainage from left foot at SNF, therefore referred to ER for evaluation.  In ER, x-ray of left foot noted possible osteomyelitis of lateral aspect of base of left fifth toe proximal phalanx.  Therefore admission requested by ERP.  Admitted to medicine service for further evaluation and treatment.     8/28 bilateral ANA normal.  8/30 left foot MRI showing fifth metatarsal osteomyelitis.    Interval Problem Update  Evaluate bedside  IN NAD this AM  Stable vitals  On room air  Continue IV Unasyn  Wound care following  Discussed case with orthopedic surgery on 8/30, aim to evaluate today    I have discussed this patient's plan of care and discharge plan at IDT rounds today with Case Management, Nursing, Nursing leadership, and other members of the IDT team.    Consultants/Specialty  Orthopedic surgery    Code Status  Full Code    Disposition  The patient is not medically cleared for discharge to home or a post-acute facility.     I have placed the appropriate orders for post-discharge needs.    Review of Systems  Review of Systems   Constitutional: Negative.    HENT: Negative.     Eyes: Negative.    Respiratory: Negative.     Cardiovascular: Negative.    Gastrointestinal: Negative.    Genitourinary: Negative.    Musculoskeletal: Negative.    Skin: Negative.    Neurological: Negative.    Endo/Heme/Allergies: Negative.    Psychiatric/Behavioral: Negative.          Physical Exam  Temp:  [35.8 °C (96.5 °F)-36.4 °C (97.6 °F)] 35.8 °C (96.5 °F)  Pulse:  [69-90] 69  Resp:  [16-19] 17  BP: ()/(53-66)  98/56  SpO2:  [93 %-95 %] 93 %    Physical Exam  Constitutional:       General: He is not in acute distress.     Appearance: Normal appearance.   HENT:      Head: Normocephalic and atraumatic.      Nose: Nose normal. No congestion.      Mouth/Throat:      Mouth: Mucous membranes are moist.   Eyes:      Extraocular Movements: Extraocular movements intact.      Pupils: Pupils are equal, round, and reactive to light.   Cardiovascular:      Rate and Rhythm: Normal rate and regular rhythm.      Pulses: Normal pulses.      Heart sounds: Normal heart sounds.   Pulmonary:      Effort: Pulmonary effort is normal.      Breath sounds: Normal breath sounds.   Abdominal:      General: Bowel sounds are normal.      Palpations: Abdomen is soft.      Tenderness: There is no abdominal tenderness.   Musculoskeletal:         General: Normal range of motion.      Cervical back: Normal range of motion and neck supple.      Comments: Mild tenderness at 5th toe, no apparent discharge    Skin:     General: Skin is warm.      Coloration: Skin is not jaundiced.   Neurological:      General: No focal deficit present.      Mental Status: He is alert and oriented to person, place, and time. Mental status is at baseline.      Cranial Nerves: No cranial nerve deficit.   Psychiatric:         Mood and Affect: Mood normal.         Behavior: Behavior normal.         Thought Content: Thought content normal.         Judgment: Judgment normal.       Fluids    Intake/Output Summary (Last 24 hours) at 8/31/2024 1240  Last data filed at 8/31/2024 1000  Gross per 24 hour   Intake 540 ml   Output 2300 ml   Net -1760 ml        Laboratory  Recent Labs     08/29/24  0124 08/30/24  0056 08/31/24  0121   WBC 8.2 8.4 8.3   RBC 3.41* 3.62* 3.40*   HEMOGLOBIN 8.5* 8.9* 8.4*   HEMATOCRIT 28.7* 30.7* 28.1*   MCV 84.2 84.8 82.6   MCH 24.9* 24.6* 24.7*   MCHC 29.6* 29.0* 29.9*   RDW 50.6* 51.6* 50.5*   PLATELETCT 293 299 293   MPV 9.7 9.3 9.5     Recent Labs      08/29/24  0124   SODIUM 142   POTASSIUM 3.9   CHLORIDE 107   CO2 23   GLUCOSE 84   BUN 7*   CREATININE 0.76   CALCIUM 9.0                     Imaging  MR-FOOT-WITH & W/O LEFT   Final Result         1. Subcutaneous edema. This may be seen with cellulitis.   2. No abscess is evident.   3. Signal abnormality within the fifth metatarsal head and base of the fifth proximal phalanx is consistent with osteomyelitis given a soft tissue wound or ulcer in this region.   4. Postoperative changes are noted involving the midfoot with an anchor in the lateral cuneiform.      DX-SKULL-LIMITED 3-   Final Result      No radiopaque foreign object to preclude MR imaging.      DX-CHEST-LIMITED (1 VIEW)   Final Result         No imaging findings to preclude MRI imaging.      [id number] VIEW   Final Result      1. No opaque foreign object to preclude MR imaging.   2. Moderate to large amount of stool throughout the colon.      US-ANA SINGLE LEVEL BILAT   Final Result      DX-FOOT-2- RIGHT   Final Result         1.  No acute traumatic bony injury.   2.  Atherosclerosis      DX-FOOT-COMPLETE 3+ LEFT   Final Result      1. Lateral left forefoot soft tissue ulcer, with loss of the normal well-defined cortex involving the lateral aspect of the base of the left fifth toe proximal phalanx, concerning for osteomyelitis.   2. Decreased bone mineralization.   3. No other acute findings.           Assessment/Plan  * Osteomyelitis (HCC)- (present on admission)  Assessment & Plan  X-ray left foot suspicious for osteomyelitis of lateral aspect of base of left fifth toe proximal phalanx  ANA normal  IV Unasyn  Wound care  Pending left foot MRI    Nonhealing ulcer of left lower extremity (HCC)- (present on admission)  Assessment & Plan  Chest x-ray concerning for osteomyelitis  ANA normal  IV antibiotics  Wound care  Pending left foot MRI    Diabetes (HCC)- (present on admission)  Assessment & Plan  ISS  Statin    Psychiatric disorder- (present on  admission)  Assessment & Plan  Continue Depakote, risperidone    Wernicke encephalopathy- (present on admission)  Assessment & Plan  Alcohol cessation strongly advised  Continue thiamine supplement  Multivitamins    Alcohol abuse- (present on admission)  Assessment & Plan  Cessation strongly advised, counseling provided, time spent:16minutes  Multivitamins  Thiamine supplement  CIWA      Hypokalemia- (present on admission)  Assessment & Plan  Replace       VTE prophylaxis: Heparin    I have performed a physical exam and reviewed and updated ROS and Plan today (8/31/2024). In review of yesterday's note (8/30/2024), there are no changes except as documented above.

## 2024-08-31 NOTE — PROGRESS NOTES
Received RN report at bedside, assumed care for this patient  Awake, resting.   AO x 2, Confused with time and situation.   Consumed less than 25% of dinner. Offered assistance in eating- refused  Denies any pain at this time  Radha care done, oral care, partial bed linen changed  Condom cath in placed  Reviewed plan of care, Needs re-education and re-enforcement  Bed alarm in placed. Fall risk score at 13- Moderate fall risk  SCD in placed  Offloading pillows used, Float boots in placed  Medical Clearance- Pending  Ortho consult pending  Unasyn IV abx  CDI dressing on left thigh and left foot  Call light and personal items within reached  Kept safe and continue monitoring

## 2024-08-31 NOTE — CARE PLAN
The patient is Stable - Low risk of patient condition declining or worsening    Shift Goals  Clinical Goals: maintain skin integrity, pt/ot, IV abx  Patient Goals: rest and comfort  Family Goals: michelle    Progress made toward(s) clinical / shift goals:  Pt free from pain and falls and able to rest comfortably.    Problem: Knowledge Deficit - Standard  Goal: Patient and family/care givers will demonstrate understanding of plan of care, disease process/condition, diagnostic tests and medications  Outcome: Progressing     Problem: Urinary - Renal Perfusion  Goal: Ability to achieve and maintain adequate renal perfusion and functioning will improve  Outcome: Progressing     Problem: Skin Integrity  Goal: Skin integrity is maintained or improved  Outcome: Progressing       Patient is not progressing towards the following goals:

## 2024-08-31 NOTE — PROGRESS NOTES
Received report and assumed care of patient at change of shift. Patient is A&Ox 2, on RA, and reports no pain at this time. Patient assessment completed, bed in lowest position, and call light and personal belongings are within reach. Patient expressed no further needs at this time.

## 2024-08-31 NOTE — CARE PLAN
The patient is Stable - Low risk of patient condition declining or worsening    Shift Goals  Clinical Goals: ortho consult, IV abx, free from fall, maintain skin integrity  Patient Goals: Rest/comfort  Family Goals: notm present    Progress made toward(s) clinical / shift goals:    Problem: Knowledge Deficit - Standard  Goal: Patient and family/care givers will demonstrate understanding of plan of care, disease process/condition, diagnostic tests and medications  Outcome: Progressing     Problem: Hemodynamics  Goal: Patient's hemodynamics, fluid balance and neurologic status will be stable or improve  Outcome: Progressing     Problem: Fluid Volume  Goal: Fluid volume balance will be maintained  Outcome: Progressing     Problem: Urinary - Renal Perfusion  Goal: Ability to achieve and maintain adequate renal perfusion and functioning will improve  Outcome: Progressing     Problem: Respiratory  Goal: Patient will achieve/maintain optimum respiratory ventilation and gas exchange  Outcome: Progressing     Problem: Skin Integrity  Goal: Skin integrity is maintained or improved  Outcome: Progressing     Problem: Fall Risk  Goal: Patient will remain free from falls  Outcome: Progressing     Received RN report at bedside, assumed care for this patient  Awake, resting.   AO x 2, Confused with time and situation.   Consumed less than 25% of dinner. Offered assistance in eating- refused  Denies any pain at this time  Radha care done, oral care, partial bed linen changed  Condom cath in placed  Reviewed plan of care, Needs re-education and re-enforcement  Bed alarm in placed. Fall risk score at 13- Moderate fall risk  SCD in placed  Offloading pillows used, Float boots in placed  Medical Clearance- Pending  Ortho consult pending  Unasyn IV abx  CDI dressing on left thigh and left foot  Call light and personal items within reached  Kept safe and continue monitoring    Patient is not progressing towards the following goals:

## 2024-09-01 LAB
ERYTHROCYTE [DISTWIDTH] IN BLOOD BY AUTOMATED COUNT: 50.9 FL (ref 35.9–50)
HCT VFR BLD AUTO: 30.7 % (ref 42–52)
HGB BLD-MCNC: 8.9 G/DL (ref 14–18)
MCH RBC QN AUTO: 24.5 PG (ref 27–33)
MCHC RBC AUTO-ENTMCNC: 29 G/DL (ref 32.3–36.5)
MCV RBC AUTO: 84.3 FL (ref 81.4–97.8)
PLATELET # BLD AUTO: 294 K/UL (ref 164–446)
PMV BLD AUTO: 9.2 FL (ref 9–12.9)
RBC # BLD AUTO: 3.64 M/UL (ref 4.7–6.1)
WBC # BLD AUTO: 8.3 K/UL (ref 4.8–10.8)

## 2024-09-01 PROCEDURE — 770006 HCHG ROOM/CARE - MED/SURG/GYN SEMI*

## 2024-09-01 PROCEDURE — A9270 NON-COVERED ITEM OR SERVICE: HCPCS | Performed by: STUDENT IN AN ORGANIZED HEALTH CARE EDUCATION/TRAINING PROGRAM

## 2024-09-01 PROCEDURE — 700111 HCHG RX REV CODE 636 W/ 250 OVERRIDE (IP): Mod: JZ | Performed by: STUDENT IN AN ORGANIZED HEALTH CARE EDUCATION/TRAINING PROGRAM

## 2024-09-01 PROCEDURE — 85027 COMPLETE CBC AUTOMATED: CPT

## 2024-09-01 PROCEDURE — 700102 HCHG RX REV CODE 250 W/ 637 OVERRIDE(OP): Performed by: STUDENT IN AN ORGANIZED HEALTH CARE EDUCATION/TRAINING PROGRAM

## 2024-09-01 PROCEDURE — 700105 HCHG RX REV CODE 258: Performed by: STUDENT IN AN ORGANIZED HEALTH CARE EDUCATION/TRAINING PROGRAM

## 2024-09-01 PROCEDURE — 36415 COLL VENOUS BLD VENIPUNCTURE: CPT

## 2024-09-01 PROCEDURE — 99232 SBSQ HOSP IP/OBS MODERATE 35: CPT | Performed by: STUDENT IN AN ORGANIZED HEALTH CARE EDUCATION/TRAINING PROGRAM

## 2024-09-01 RX ADMIN — ATORVASTATIN CALCIUM 40 MG: 40 TABLET, FILM COATED ORAL at 17:50

## 2024-09-01 RX ADMIN — ASPIRIN 81 MG: 81 TABLET, COATED ORAL at 06:12

## 2024-09-01 RX ADMIN — FOLIC ACID 1 MG: 1 TABLET ORAL at 06:12

## 2024-09-01 RX ADMIN — Medication 100 MG: at 09:42

## 2024-09-01 RX ADMIN — OMEPRAZOLE 20 MG: 20 CAPSULE, DELAYED RELEASE ORAL at 06:12

## 2024-09-01 RX ADMIN — DIVALPROEX SODIUM 250 MG: 250 TABLET, DELAYED RELEASE ORAL at 12:32

## 2024-09-01 RX ADMIN — AMPICILLIN AND SULBACTAM 3 G: 1; 2 INJECTION, POWDER, FOR SOLUTION INTRAMUSCULAR; INTRAVENOUS at 12:31

## 2024-09-01 RX ADMIN — Medication 100 MG: at 15:38

## 2024-09-01 RX ADMIN — SENNOSIDES AND DOCUSATE SODIUM 2 TABLET: 50; 8.6 TABLET ORAL at 17:50

## 2024-09-01 RX ADMIN — ENOXAPARIN SODIUM 40 MG: 100 INJECTION SUBCUTANEOUS at 17:49

## 2024-09-01 RX ADMIN — OMEPRAZOLE 20 MG: 20 CAPSULE, DELAYED RELEASE ORAL at 17:50

## 2024-09-01 RX ADMIN — RISPERIDONE 2 MG: 0.5 TABLET, FILM COATED ORAL at 06:12

## 2024-09-01 RX ADMIN — DIVALPROEX SODIUM 250 MG: 250 TABLET, DELAYED RELEASE ORAL at 06:16

## 2024-09-01 RX ADMIN — AMPICILLIN AND SULBACTAM 3 G: 1; 2 INJECTION, POWDER, FOR SOLUTION INTRAMUSCULAR; INTRAVENOUS at 17:48

## 2024-09-01 RX ADMIN — CYANOCOBALAMIN TAB 500 MCG 1000 MCG: 500 TAB at 06:12

## 2024-09-01 RX ADMIN — Medication 100 MG: at 20:34

## 2024-09-01 RX ADMIN — AMPICILLIN AND SULBACTAM 3 G: 1; 2 INJECTION, POWDER, FOR SOLUTION INTRAMUSCULAR; INTRAVENOUS at 00:06

## 2024-09-01 RX ADMIN — FOLIC ACID 1 MG: 1 TABLET ORAL at 17:50

## 2024-09-01 RX ADMIN — AMPICILLIN AND SULBACTAM 3 G: 1; 2 INJECTION, POWDER, FOR SOLUTION INTRAMUSCULAR; INTRAVENOUS at 06:13

## 2024-09-01 RX ADMIN — DIVALPROEX SODIUM 250 MG: 250 TABLET, DELAYED RELEASE ORAL at 20:33

## 2024-09-01 RX ADMIN — RISPERIDONE 2 MG: 0.5 TABLET, FILM COATED ORAL at 17:49

## 2024-09-01 ASSESSMENT — PAIN DESCRIPTION - PAIN TYPE
TYPE: ACUTE PAIN

## 2024-09-01 ASSESSMENT — ENCOUNTER SYMPTOMS
EYES NEGATIVE: 1
MUSCULOSKELETAL NEGATIVE: 1
CONSTITUTIONAL NEGATIVE: 1
CARDIOVASCULAR NEGATIVE: 1
NEUROLOGICAL NEGATIVE: 1
PSYCHIATRIC NEGATIVE: 1
RESPIRATORY NEGATIVE: 1
GASTROINTESTINAL NEGATIVE: 1

## 2024-09-01 NOTE — CARE PLAN
The patient is Stable - Low risk of patient condition declining or worsening    Shift Goals  Clinical Goals: inreased po intake  Patient Goals: KIRTI  Family Goals: kirti    Progress made toward(s) clinical / shift goals:  pt refused lunch and dinner today, aox2, says he's not ready to eat whenever offered.cooperative during turning and bed mobility, offered snacks at night and gave options to drink, pt has no pain complaints nor discomforts,     Patient is not progressing towards the following goals:

## 2024-09-01 NOTE — PROGRESS NOTES
Received report and assumed care of patient at change of shift. Patient is A&Ox2 disoriented to situation and time, on RA, and reports no pain at this time. Pt has wound on L foot covered with aquacell and hypofix tape. Pt has SCD's and heel float boots on. Patient assessment completed, bed in lowest position, and call light and personal belongings are within reach. Patient expressed no further needs at this time.

## 2024-09-01 NOTE — PROGRESS NOTES
[facility] Medicine Daily Progress Note    Date of Service  9/1/2024    Chief Complaint  Yury Blake is a 54 y.o. male admitted 8/27/2024 with left foot ulcer    Hospital Course  54 y.o. male from SNF with history of Wernicke encephalopathy, alcohol abuse, psychiatric disorder, diabetes, recent admission for cellulitis of feet who presented 8/27/2024 with evaluation for wound check.  There was concern for purulent drainage from left foot at SNF, therefore referred to ER for evaluation.  In ER, x-ray of left foot noted possible osteomyelitis of lateral aspect of base of left fifth toe proximal phalanx.  Therefore admission requested by ERP.  Admitted to medicine service for further evaluation and treatment.     8/28 bilateral ANA normal.  8/30 left foot MRI showing fifth metatarsal osteomyelitis.    Interval Problem Update  Evaluate bedside  IN NAD this AM  Stable vitals  On room air  Continue IV Unasyn  Wound care following  Discussed orthopedic surgery ([facility]) on 8/30, appreciate recommendations.    I have discussed this patient's plan of care and discharge plan at IDT rounds today with Case Management, Nursing, Nursing leadership, and other members of the IDT team.    Consultants/Specialty  Orthopedic surgery    Code Status  Full Code    Disposition  The patient is not medically cleared for discharge to home or a post-acute facility.     I have placed the appropriate orders for post-discharge needs.    Review of Systems  Review of Systems   Constitutional: Negative.    HENT: Negative.     Eyes: Negative.    Respiratory: Negative.     Cardiovascular: Negative.    Gastrointestinal: Negative.    Genitourinary: Negative.    Musculoskeletal: Negative.    Skin: Negative.    Neurological: Negative.    Endo/Heme/Allergies: Negative.    Psychiatric/Behavioral: Negative.          Physical Exam  Temp:  [35.9 °C (96.7 °F)-36.5 °C (97.7 °F)] 36.5 °C (97.7 °F)  Pulse:  [65-73] 65  Resp:  [16-19] 16  BP: ()/(50-61)  92/56  SpO2:  [90 %-94 %] 91 %    Physical Exam  Constitutional:       General: He is not in acute distress.     Appearance: Normal appearance.   HENT:      Head: Normocephalic and atraumatic.      Nose: Nose normal. No congestion.      Mouth/Throat:      Mouth: Mucous membranes are moist.   Eyes:      Extraocular Movements: Extraocular movements intact.      Pupils: Pupils are equal, round, and reactive to light.   Cardiovascular:      Rate and Rhythm: Normal rate and regular rhythm.      Pulses: Normal pulses.      Heart sounds: Normal heart sounds.   Pulmonary:      Effort: Pulmonary effort is normal.      Breath sounds: Normal breath sounds.   Abdominal:      General: Bowel sounds are normal.      Palpations: Abdomen is soft.      Tenderness: There is no abdominal tenderness.   Musculoskeletal:         General: Normal range of motion.      Cervical back: Normal range of motion and neck supple.      Comments: Mild tenderness at 5th toe, no apparent discharge    Skin:     General: Skin is warm.      Coloration: Skin is not jaundiced.   Neurological:      General: No focal deficit present.      Mental Status: He is alert and oriented to person, place, and time. Mental status is at baseline.      Cranial Nerves: No cranial nerve deficit.   Psychiatric:         Mood and Affect: Mood normal.         Behavior: Behavior normal.         Thought Content: Thought content normal.         Judgment: Judgment normal.       Fluids    Intake/Output Summary (Last 24 hours) at 9/1/2024 1126  Last data filed at 9/1/2024 0607  Gross per 24 hour   Intake 1200 ml   Output 1650 ml   Net -450 ml        Laboratory  Recent Labs     08/30/24  0056 08/31/24  0121 09/01/24  0207   WBC 8.4 8.3 8.3   RBC 3.62* 3.40* 3.64*   HEMOGLOBIN 8.9* 8.4* 8.9*   HEMATOCRIT 30.7* 28.1* 30.7*   MCV 84.8 82.6 84.3   MCH 24.6* 24.7* 24.5*   MCHC 29.0* 29.9* 29.0*   RDW 51.6* 50.5* 50.9*   PLATELETCT 299 293 294   MPV 9.3 9.5 9.2                            Imaging  MR-FOOT-WITH & W/O LEFT   Final Result         1. Subcutaneous edema. This may be seen with cellulitis.   2. No abscess is evident.   3. Signal abnormality within the fifth metatarsal head and base of the fifth proximal phalanx is consistent with osteomyelitis given a soft tissue wound or ulcer in this region.   4. Postoperative changes are noted involving the midfoot with an anchor in the lateral cuneiform.      DX-SKULL-LIMITED 3-   Final Result      No radiopaque foreign object to preclude MR imaging.      DX-CHEST-LIMITED (1 VIEW)   Final Result         No imaging findings to preclude MRI imaging.      [id number] VIEW   Final Result      1. No opaque foreign object to preclude MR imaging.   2. Moderate to large amount of stool throughout the colon.      US-ANA SINGLE LEVEL BILAT   Final Result      DX-FOOT-2- RIGHT   Final Result         1.  No acute traumatic bony injury.   2.  Atherosclerosis      DX-FOOT-COMPLETE 3+ LEFT   Final Result      1. Lateral left forefoot soft tissue ulcer, with loss of the normal well-defined cortex involving the lateral aspect of the base of the left fifth toe proximal phalanx, concerning for osteomyelitis.   2. Decreased bone mineralization.   3. No other acute findings.           Assessment/Plan  * Osteomyelitis (HCC)- (present on admission)  Assessment & Plan  X-ray left foot suspicious for osteomyelitis of lateral aspect of base of left fifth toe proximal phalanx  ANA normal  IV Unasyn  Wound care  Pending left foot MRI    Nonhealing ulcer of left lower extremity (HCC)- (present on admission)  Assessment & Plan  Chest x-ray concerning for osteomyelitis  ANA normal  IV antibiotics  Wound care  Pending left foot MRI    Diabetes (HCC)- (present on admission)  Assessment & Plan  ISS  Statin    Psychiatric disorder- (present on admission)  Assessment & Plan  Continue Depakote, risperidone    Wernicke encephalopathy- (present on admission)  Assessment &  Plan  Alcohol cessation strongly advised  Continue thiamine supplement  Multivitamins    Alcohol abuse- (present on admission)  Assessment & Plan  Cessation strongly advised, counseling provided, time spent:16minutes  Multivitamins  Thiamine supplement  CIWA      Hypokalemia- (present on admission)  Assessment & Plan  Replace       VTE prophylaxis: Heparin    I have performed a physical exam and reviewed and updated ROS and Plan today (9/1/2024). In review of yesterday's note (8/31/2024), there are no changes except as documented above.

## 2024-09-01 NOTE — CARE PLAN
The patient is Stable - Low risk of patient condition declining or worsening    Shift Goals  Clinical Goals: Pt will have increased PO intake, and pending ortho consult  Patient Goals: Pt wants to rest and be updated with POC  Family Goals: michelle    Progress made toward(s) clinical / shift goals:  Pt ate 75% of breakfast and about 50% of lunch. Pt remained free from falls during shift and is still pending ortho consult. All needs met during shift       Problem: Knowledge Deficit - Standard  Goal: Patient and family/care givers will demonstrate understanding of plan of care, disease process/condition, diagnostic tests and medications  Outcome: Progressing  Note: Pt updated with POC and pt verbally acknowledged their understanding.     Problem: Skin Integrity  Goal: Skin integrity is maintained or improved  Outcome: Progressing  Note: Pt skin integrity maintained with dressing in place and CDI.     Problem: Fall Risk  Goal: Patient will remain free from falls  Outcome: Progressing  Note: Pt remained free from falls during shift.        Patient is not progressing towards the following goals:

## 2024-09-02 ENCOUNTER — ANESTHESIA EVENT (OUTPATIENT)
Dept: SURGERY | Facility: MEDICAL CENTER | Age: 54
DRG: 617 | End: 2024-09-02
Payer: MEDICAID

## 2024-09-02 LAB
ERYTHROCYTE [DISTWIDTH] IN BLOOD BY AUTOMATED COUNT: 51 FL (ref 35.9–50)
HCT VFR BLD AUTO: 28.9 % (ref 42–52)
HGB BLD-MCNC: 8.5 G/DL (ref 14–18)
MCH RBC QN AUTO: 24.9 PG (ref 27–33)
MCHC RBC AUTO-ENTMCNC: 29.4 G/DL (ref 32.3–36.5)
MCV RBC AUTO: 84.8 FL (ref 81.4–97.8)
PLATELET # BLD AUTO: 285 K/UL (ref 164–446)
PMV BLD AUTO: 9.7 FL (ref 9–12.9)
RBC # BLD AUTO: 3.41 M/UL (ref 4.7–6.1)
WBC # BLD AUTO: 9 K/UL (ref 4.8–10.8)

## 2024-09-02 PROCEDURE — 36415 COLL VENOUS BLD VENIPUNCTURE: CPT

## 2024-09-02 PROCEDURE — 99232 SBSQ HOSP IP/OBS MODERATE 35: CPT | Performed by: STUDENT IN AN ORGANIZED HEALTH CARE EDUCATION/TRAINING PROGRAM

## 2024-09-02 PROCEDURE — 700102 HCHG RX REV CODE 250 W/ 637 OVERRIDE(OP): Performed by: STUDENT IN AN ORGANIZED HEALTH CARE EDUCATION/TRAINING PROGRAM

## 2024-09-02 PROCEDURE — 85027 COMPLETE CBC AUTOMATED: CPT

## 2024-09-02 PROCEDURE — A9270 NON-COVERED ITEM OR SERVICE: HCPCS | Performed by: STUDENT IN AN ORGANIZED HEALTH CARE EDUCATION/TRAINING PROGRAM

## 2024-09-02 PROCEDURE — 700111 HCHG RX REV CODE 636 W/ 250 OVERRIDE (IP): Mod: JZ | Performed by: STUDENT IN AN ORGANIZED HEALTH CARE EDUCATION/TRAINING PROGRAM

## 2024-09-02 PROCEDURE — 770006 HCHG ROOM/CARE - MED/SURG/GYN SEMI*

## 2024-09-02 PROCEDURE — 700105 HCHG RX REV CODE 258: Performed by: STUDENT IN AN ORGANIZED HEALTH CARE EDUCATION/TRAINING PROGRAM

## 2024-09-02 RX ORDER — ACETAMINOPHEN 500 MG
1000 TABLET ORAL 3 TIMES DAILY PRN
Status: DISCONTINUED | OUTPATIENT
Start: 2024-09-02 | End: 2024-09-03

## 2024-09-02 RX ADMIN — AMPICILLIN AND SULBACTAM 3 G: 1; 2 INJECTION, POWDER, FOR SOLUTION INTRAMUSCULAR; INTRAVENOUS at 00:46

## 2024-09-02 RX ADMIN — RISPERIDONE 2 MG: 0.5 TABLET, FILM COATED ORAL at 05:52

## 2024-09-02 RX ADMIN — CYANOCOBALAMIN TAB 500 MCG 1000 MCG: 500 TAB at 05:51

## 2024-09-02 RX ADMIN — DIVALPROEX SODIUM 250 MG: 250 TABLET, DELAYED RELEASE ORAL at 20:14

## 2024-09-02 RX ADMIN — RISPERIDONE 2 MG: 0.5 TABLET, FILM COATED ORAL at 17:09

## 2024-09-02 RX ADMIN — DIVALPROEX SODIUM 250 MG: 250 TABLET, DELAYED RELEASE ORAL at 05:51

## 2024-09-02 RX ADMIN — SENNOSIDES AND DOCUSATE SODIUM 2 TABLET: 50; 8.6 TABLET ORAL at 17:10

## 2024-09-02 RX ADMIN — FOLIC ACID 1 MG: 1 TABLET ORAL at 05:51

## 2024-09-02 RX ADMIN — Medication 100 MG: at 15:18

## 2024-09-02 RX ADMIN — ASPIRIN 81 MG: 81 TABLET, COATED ORAL at 05:52

## 2024-09-02 RX ADMIN — AMPICILLIN AND SULBACTAM 3 G: 1; 2 INJECTION, POWDER, FOR SOLUTION INTRAMUSCULAR; INTRAVENOUS at 05:57

## 2024-09-02 RX ADMIN — ENOXAPARIN SODIUM 40 MG: 100 INJECTION SUBCUTANEOUS at 17:09

## 2024-09-02 RX ADMIN — DIVALPROEX SODIUM 250 MG: 250 TABLET, DELAYED RELEASE ORAL at 12:23

## 2024-09-02 RX ADMIN — OMEPRAZOLE 20 MG: 20 CAPSULE, DELAYED RELEASE ORAL at 17:10

## 2024-09-02 RX ADMIN — AMPICILLIN AND SULBACTAM 3 G: 1; 2 INJECTION, POWDER, FOR SOLUTION INTRAMUSCULAR; INTRAVENOUS at 12:23

## 2024-09-02 RX ADMIN — Medication 100 MG: at 09:04

## 2024-09-02 RX ADMIN — FOLIC ACID 1 MG: 1 TABLET ORAL at 17:10

## 2024-09-02 RX ADMIN — ATORVASTATIN CALCIUM 40 MG: 40 TABLET, FILM COATED ORAL at 17:10

## 2024-09-02 RX ADMIN — AMPICILLIN AND SULBACTAM 3 G: 1; 2 INJECTION, POWDER, FOR SOLUTION INTRAMUSCULAR; INTRAVENOUS at 17:08

## 2024-09-02 RX ADMIN — OMEPRAZOLE 20 MG: 20 CAPSULE, DELAYED RELEASE ORAL at 05:52

## 2024-09-02 RX ADMIN — Medication 100 MG: at 20:14

## 2024-09-02 ASSESSMENT — ENCOUNTER SYMPTOMS
CARDIOVASCULAR NEGATIVE: 1
NEUROLOGICAL NEGATIVE: 1
CONSTITUTIONAL NEGATIVE: 1
GASTROINTESTINAL NEGATIVE: 1
EYES NEGATIVE: 1
PSYCHIATRIC NEGATIVE: 1
MUSCULOSKELETAL NEGATIVE: 1
RESPIRATORY NEGATIVE: 1

## 2024-09-02 ASSESSMENT — PAIN DESCRIPTION - PAIN TYPE
TYPE: ACUTE PAIN

## 2024-09-02 NOTE — CARE PLAN
The patient is Stable - Low risk of patient condition declining or worsening    Shift Goals  Clinical Goals: Pt will have increased PO intake, and pending ortho consult  Patient Goals: Pt wants to rest and be updated with POC  Family Goals: michelle    Progress made toward(s) clinical / shift goals:      Patient is not progressing towards the following goals:

## 2024-09-02 NOTE — CARE PLAN
The patient is Stable - Low risk of patient condition declining or worsening    Shift Goals  Clinical Goals: Pt will increase PO intake, have ortho consult, and prepare for possible surger  Patient Goals: Pt wants to rest and be updated with POC  Family Goals: michelle    Progress made toward(s) clinical / shift goals:      Pt continued to increase PO intake. Wound care provided for pt and tolerated well. All needs met during shift and questions answered. Pt remained free from falls during shift.       Problem: Knowledge Deficit - Standard  Goal: Patient and family/care givers will demonstrate understanding of plan of care, disease process/condition, diagnostic tests and medications  Outcome: Progressing     Problem: Skin Integrity  Goal: Skin integrity is maintained or improved  Outcome: Progressing  Note: Pt wound maintained with dressing in place and offloading heel float boots.     Problem: Fall Risk  Goal: Patient will remain free from falls  Outcome: Progressing  Note: Pt remained free from falls during shift.       Patient is not progressing towards the following goals:

## 2024-09-02 NOTE — PROGRESS NOTES
Received report and assumed care of patient at change of shift. Patient is A&Ox2 disoriented to situation and time, on RA, and reports no pain at this time. Pt has condom cath in place. Patient assessment completed, bed in lowest position, and call light and personal belongings are within reach. Patient expressed no further needs at this time.

## 2024-09-02 NOTE — PROGRESS NOTES
Hospital Medicine Daily Progress Note    Date of Service  9/2/2024    Chief Complaint  Yury Blake is a 54 y.o. male admitted 8/27/2024 with left foot ulcer    Hospital Course  54 y.o. male from SNF with history of Wernicke encephalopathy, alcohol abuse, psychiatric disorder, diabetes, recent admission for cellulitis of feet who presented 8/27/2024 with evaluation for wound check.  There was concern for purulent drainage from left foot at SNF, therefore referred to ER for evaluation.  In ER, x-ray of left foot noted possible osteomyelitis of lateral aspect of base of left fifth toe proximal phalanx.  Therefore admission requested by ERP.  Admitted to medicine service for further evaluation and treatment.     8/28 bilateral ANA normal.  8/30 left foot MRI showing fifth metatarsal osteomyelitis.    Interval Problem Update  Evaluate bedside  IN NAD this AM  Stable vitals  On room air  Continue IV Unasyn  Wound care following  Discussed orthopedic surgery ([facility]) on 8/30.  Possible OR tomorrow  NPO at midnight    Plan of care discussed with Deonte Xie (Legal Guardian). All questions were answered. Mr. Xie voiced appreciation.    I have discussed this patient's plan of care and discharge plan at IDT rounds today with Case Management, Nursing, Nursing leadership, and other members of the IDT team.    Consultants/Specialty  Orthopedic surgery    Code Status  Full Code    Disposition  The patient is not medically cleared for discharge to home or a post-acute facility.     I have placed the appropriate orders for post-discharge needs.    Review of Systems  Review of Systems   Constitutional: Negative.    HENT: Negative.     Eyes: Negative.    Respiratory: Negative.     Cardiovascular: Negative.    Gastrointestinal: Negative.    Genitourinary: Negative.    Musculoskeletal: Negative.    Skin: Negative.    Neurological: Negative.    Endo/Heme/Allergies: Negative.    Psychiatric/Behavioral: Negative.           Physical Exam  Temp:  [36.2 °C (97.2 °F)-36.4 °C (97.5 °F)] 36.2 °C (97.2 °F)  Pulse:  [70-75] 75  Resp:  [16-18] 18  BP: (101-132)/(50-82) 132/82  SpO2:  [90 %-93 %] 92 %    Physical Exam  Constitutional:       General: He is not in acute distress.     Appearance: Normal appearance.   HENT:      Head: Normocephalic and atraumatic.      Nose: Nose normal. No congestion.      Mouth/Throat:      Mouth: Mucous membranes are moist.   Eyes:      Extraocular Movements: Extraocular movements intact.      Pupils: Pupils are equal, round, and reactive to light.   Cardiovascular:      Rate and Rhythm: Normal rate and regular rhythm.      Pulses: Normal pulses.      Heart sounds: Normal heart sounds.   Pulmonary:      Effort: Pulmonary effort is normal.      Breath sounds: Normal breath sounds.   Abdominal:      General: Bowel sounds are normal.      Palpations: Abdomen is soft.      Tenderness: There is no abdominal tenderness.   Musculoskeletal:         General: Normal range of motion.      Cervical back: Normal range of motion and neck supple.      Comments: Mild tenderness at 5th toe, no apparent discharge    Skin:     General: Skin is warm.      Coloration: Skin is not jaundiced.   Neurological:      General: No focal deficit present.      Mental Status: He is alert and oriented to person, place, and time. Mental status is at baseline.      Cranial Nerves: No cranial nerve deficit.   Psychiatric:         Mood and Affect: Mood normal.         Behavior: Behavior normal.         Thought Content: Thought content normal.         Judgment: Judgment normal.       Fluids    Intake/Output Summary (Last 24 hours) at 9/2/2024 1045  Last data filed at 9/2/2024 0618  Gross per 24 hour   Intake --   Output 800 ml   Net -800 ml        Laboratory  Recent Labs     08/31/24  0121 09/01/24  0207 09/02/24  0147   WBC 8.3 8.3 9.0   RBC 3.40* 3.64* 3.41*   HEMOGLOBIN 8.4* 8.9* 8.5*   HEMATOCRIT 28.1* 30.7* 28.9*   MCV 82.6 84.3 84.8   MCH  24.7* 24.5* 24.9*   MCHC 29.9* 29.0* 29.4*   RDW 50.5* 50.9* 51.0*   PLATELETCT 293 294 285   MPV 9.5 9.2 9.7                           Imaging  MR-FOOT-WITH & W/O LEFT   Final Result         1. Subcutaneous edema. This may be seen with cellulitis.   2. No abscess is evident.   3. Signal abnormality within the fifth metatarsal head and base of the fifth proximal phalanx is consistent with osteomyelitis given a soft tissue wound or ulcer in this region.   4. Postoperative changes are noted involving the midfoot with an anchor in the lateral cuneiform.      DX-SKULL-LIMITED 3-   Final Result      No radiopaque foreign object to preclude MR imaging.      DX-CHEST-LIMITED (1 VIEW)   Final Result         No imaging findings to preclude MRI imaging.      [id number] VIEW   Final Result      1. No opaque foreign object to preclude MR imaging.   2. Moderate to large amount of stool throughout the colon.      US-ANA SINGLE LEVEL BILAT   Final Result      DX-FOOT-2- RIGHT   Final Result         1.  No acute traumatic bony injury.   2.  Atherosclerosis      DX-FOOT-COMPLETE 3+ LEFT   Final Result      1. Lateral left forefoot soft tissue ulcer, with loss of the normal well-defined cortex involving the lateral aspect of the base of the left fifth toe proximal phalanx, concerning for osteomyelitis.   2. Decreased bone mineralization.   3. No other acute findings.           Assessment/Plan  * Osteomyelitis (HCC)- (present on admission)  Assessment & Plan  X-ray left foot suspicious for osteomyelitis of lateral aspect of base of left fifth toe proximal phalanx  8/30 left foot MRI showing fifth metatarsal osteomyelitis.  ANA normal  IV Unasyn  Wound care  Discussed orthopedic surgery ([facility]) on 8/30.  Possible OR tomorrow    Nonhealing ulcer of left lower extremity (HCC)- (present on admission)  Assessment & Plan  Chest x-ray concerning for osteomyelitis  ANA normal  8/30 left foot MRI showing fifth metatarsal osteomyelitis.  IV  antibiotics  Wound care  Orthopedic Surgery following    Diabetes (HCC)- (present on admission)  Assessment & Plan  ISS  Statin    Psychiatric disorder- (present on admission)  Assessment & Plan  Continue Depakote, risperidone    Wernicke encephalopathy- (present on admission)  Assessment & Plan  Alcohol cessation strongly advised  Continue thiamine supplement  Multivitamins    Alcohol abuse- (present on admission)  Assessment & Plan  H/O  Monitor      Hypokalemia- (present on admission)  Assessment & Plan  Replace       VTE prophylaxis: Heparin    I have performed a physical exam and reviewed and updated ROS and Plan today (9/2/2024). In review of yesterday's note (9/1/2024), there are no changes except as documented above.

## 2024-09-02 NOTE — PROGRESS NOTES
Pt A&Ox2. VSS. Denies pain, SOB, N/V. Neurovascular assessment complete. NPO after midnight per orders. No s/s of distress. Fall precautions; call light w/ reach. Tolerating IV ABX well. Condom cath patent and intact; UOP WNL.

## 2024-09-03 ENCOUNTER — ANESTHESIA (OUTPATIENT)
Dept: SURGERY | Facility: MEDICAL CENTER | Age: 54
DRG: 617 | End: 2024-09-03
Payer: MEDICAID

## 2024-09-03 PROBLEM — Z01.818 ENCOUNTER FOR PRE-OPERATIVE EXAMINATION: Status: ACTIVE | Noted: 2024-09-03

## 2024-09-03 PROBLEM — Z01.818 ENCOUNTER FOR PRE-OPERATIVE EXAMINATION: Status: RESOLVED | Noted: 2024-09-03 | Resolved: 2024-09-03

## 2024-09-03 PROBLEM — Z01.818 ENCOUNTER FOR PREOPERATIVE ASSESSMENT: Status: ACTIVE | Noted: 2024-09-03

## 2024-09-03 LAB
EKG IMPRESSION: NORMAL
ERYTHROCYTE [DISTWIDTH] IN BLOOD BY AUTOMATED COUNT: 51 FL (ref 35.9–50)
GLUCOSE BLD STRIP.AUTO-MCNC: 73 MG/DL (ref 65–99)
GLUCOSE BLD STRIP.AUTO-MCNC: 90 MG/DL (ref 65–99)
GRAM STN SPEC: NORMAL
HCT VFR BLD AUTO: 28.6 % (ref 42–52)
HGB BLD-MCNC: 8.3 G/DL (ref 14–18)
MCH RBC QN AUTO: 24.5 PG (ref 27–33)
MCHC RBC AUTO-ENTMCNC: 29 G/DL (ref 32.3–36.5)
MCV RBC AUTO: 84.4 FL (ref 81.4–97.8)
PATHOLOGY CONSULT NOTE: NORMAL
PLATELET # BLD AUTO: 293 K/UL (ref 164–446)
PMV BLD AUTO: 9.6 FL (ref 9–12.9)
RBC # BLD AUTO: 3.39 M/UL (ref 4.7–6.1)
SIGNIFICANT IND 70042: NORMAL
SITE SITE: NORMAL
SOURCE SOURCE: NORMAL
WBC # BLD AUTO: 8.5 K/UL (ref 4.8–10.8)

## 2024-09-03 PROCEDURE — 87075 CULTR BACTERIA EXCEPT BLOOD: CPT

## 2024-09-03 PROCEDURE — 28810 AMPUTATION TOE & METATARSAL: CPT | Mod: ASROC,T4 | Performed by: NURSE PRACTITIONER

## 2024-09-03 PROCEDURE — 87070 CULTURE OTHR SPECIMN AEROBIC: CPT

## 2024-09-03 PROCEDURE — 700102 HCHG RX REV CODE 250 W/ 637 OVERRIDE(OP): Performed by: STUDENT IN AN ORGANIZED HEALTH CARE EDUCATION/TRAINING PROGRAM

## 2024-09-03 PROCEDURE — A9270 NON-COVERED ITEM OR SERVICE: HCPCS | Performed by: STUDENT IN AN ORGANIZED HEALTH CARE EDUCATION/TRAINING PROGRAM

## 2024-09-03 PROCEDURE — 160048 HCHG OR STATISTICAL LEVEL 1-5: Performed by: ORTHOPAEDIC SURGERY

## 2024-09-03 PROCEDURE — 93005 ELECTROCARDIOGRAM TRACING: CPT | Performed by: STUDENT IN AN ORGANIZED HEALTH CARE EDUCATION/TRAINING PROGRAM

## 2024-09-03 PROCEDURE — 82962 GLUCOSE BLOOD TEST: CPT

## 2024-09-03 PROCEDURE — 700111 HCHG RX REV CODE 636 W/ 250 OVERRIDE (IP): Mod: JZ | Performed by: STUDENT IN AN ORGANIZED HEALTH CARE EDUCATION/TRAINING PROGRAM

## 2024-09-03 PROCEDURE — 36415 COLL VENOUS BLD VENIPUNCTURE: CPT

## 2024-09-03 PROCEDURE — 700111 HCHG RX REV CODE 636 W/ 250 OVERRIDE (IP): Mod: JZ | Performed by: ORTHOPAEDIC SURGERY

## 2024-09-03 PROCEDURE — 700105 HCHG RX REV CODE 258: Performed by: STUDENT IN AN ORGANIZED HEALTH CARE EDUCATION/TRAINING PROGRAM

## 2024-09-03 PROCEDURE — 88311 DECALCIFY TISSUE: CPT

## 2024-09-03 PROCEDURE — 700111 HCHG RX REV CODE 636 W/ 250 OVERRIDE (IP): Performed by: STUDENT IN AN ORGANIZED HEALTH CARE EDUCATION/TRAINING PROGRAM

## 2024-09-03 PROCEDURE — 93010 ELECTROCARDIOGRAM REPORT: CPT | Performed by: STUDENT IN AN ORGANIZED HEALTH CARE EDUCATION/TRAINING PROGRAM

## 2024-09-03 PROCEDURE — 85027 COMPLETE CBC AUTOMATED: CPT

## 2024-09-03 PROCEDURE — 160027 HCHG SURGERY MINUTES - 1ST 30 MINS LEVEL 2: Performed by: ORTHOPAEDIC SURGERY

## 2024-09-03 PROCEDURE — 160009 HCHG ANES TIME/MIN: Performed by: ORTHOPAEDIC SURGERY

## 2024-09-03 PROCEDURE — 87015 SPECIMEN INFECT AGNT CONCNTJ: CPT

## 2024-09-03 PROCEDURE — 160035 HCHG PACU - 1ST 60 MINS PHASE I: Performed by: ORTHOPAEDIC SURGERY

## 2024-09-03 PROCEDURE — 160038 HCHG SURGERY MINUTES - EA ADDL 1 MIN LEVEL 2: Performed by: ORTHOPAEDIC SURGERY

## 2024-09-03 PROCEDURE — 28810 AMPUTATION TOE & METATARSAL: CPT | Mod: T4 | Performed by: ORTHOPAEDIC SURGERY

## 2024-09-03 PROCEDURE — 88305 TISSUE EXAM BY PATHOLOGIST: CPT

## 2024-09-03 PROCEDURE — 160002 HCHG RECOVERY MINUTES (STAT): Performed by: ORTHOPAEDIC SURGERY

## 2024-09-03 PROCEDURE — 770006 HCHG ROOM/CARE - MED/SURG/GYN SEMI*

## 2024-09-03 PROCEDURE — 87205 SMEAR GRAM STAIN: CPT

## 2024-09-03 PROCEDURE — 99232 SBSQ HOSP IP/OBS MODERATE 35: CPT | Performed by: STUDENT IN AN ORGANIZED HEALTH CARE EDUCATION/TRAINING PROGRAM

## 2024-09-03 PROCEDURE — 88304 TISSUE EXAM BY PATHOLOGIST: CPT

## 2024-09-03 PROCEDURE — 0Y6N0ZF DETACHMENT AT LEFT FOOT, PARTIAL 5TH RAY, OPEN APPROACH: ICD-10-PCS | Performed by: ORTHOPAEDIC SURGERY

## 2024-09-03 PROCEDURE — 99222 1ST HOSP IP/OBS MODERATE 55: CPT | Mod: 57 | Performed by: ORTHOPAEDIC SURGERY

## 2024-09-03 RX ORDER — OXYCODONE HYDROCHLORIDE 5 MG/1
5 TABLET ORAL EVERY 6 HOURS PRN
Status: DISCONTINUED | OUTPATIENT
Start: 2024-09-03 | End: 2024-09-05

## 2024-09-03 RX ORDER — METOPROLOL TARTRATE 1 MG/ML
1 INJECTION, SOLUTION INTRAVENOUS
Status: DISCONTINUED | OUTPATIENT
Start: 2024-09-03 | End: 2024-09-03 | Stop reason: HOSPADM

## 2024-09-03 RX ORDER — HYDRALAZINE HYDROCHLORIDE 20 MG/ML
5 INJECTION INTRAMUSCULAR; INTRAVENOUS
Status: DISCONTINUED | OUTPATIENT
Start: 2024-09-03 | End: 2024-09-03 | Stop reason: HOSPADM

## 2024-09-03 RX ORDER — DEXAMETHASONE SODIUM PHOSPHATE 4 MG/ML
INJECTION, SOLUTION INTRA-ARTICULAR; INTRALESIONAL; INTRAMUSCULAR; INTRAVENOUS; SOFT TISSUE PRN
Status: DISCONTINUED | OUTPATIENT
Start: 2024-09-03 | End: 2024-09-03 | Stop reason: SURG

## 2024-09-03 RX ORDER — HYDROMORPHONE HYDROCHLORIDE 1 MG/ML
0.1 INJECTION, SOLUTION INTRAMUSCULAR; INTRAVENOUS; SUBCUTANEOUS
Status: DISCONTINUED | OUTPATIENT
Start: 2024-09-03 | End: 2024-09-03 | Stop reason: HOSPADM

## 2024-09-03 RX ORDER — LIDOCAINE HYDROCHLORIDE 10 MG/ML
INJECTION, SOLUTION EPIDURAL; INFILTRATION; INTRACAUDAL; PERINEURAL PRN
Status: DISCONTINUED | OUTPATIENT
Start: 2024-09-03 | End: 2024-09-03 | Stop reason: HOSPADM

## 2024-09-03 RX ORDER — ALBUTEROL SULFATE 5 MG/ML
2.5 SOLUTION RESPIRATORY (INHALATION)
Status: DISCONTINUED | OUTPATIENT
Start: 2024-09-03 | End: 2024-09-03 | Stop reason: HOSPADM

## 2024-09-03 RX ORDER — ONDANSETRON 2 MG/ML
4 INJECTION INTRAMUSCULAR; INTRAVENOUS
Status: DISCONTINUED | OUTPATIENT
Start: 2024-09-03 | End: 2024-09-03 | Stop reason: HOSPADM

## 2024-09-03 RX ORDER — HALOPERIDOL 5 MG/ML
1 INJECTION INTRAMUSCULAR
Status: DISCONTINUED | OUTPATIENT
Start: 2024-09-03 | End: 2024-09-03 | Stop reason: HOSPADM

## 2024-09-03 RX ORDER — OXYCODONE HCL 5 MG/5 ML
5 SOLUTION, ORAL ORAL
Status: DISCONTINUED | OUTPATIENT
Start: 2024-09-03 | End: 2024-09-03 | Stop reason: HOSPADM

## 2024-09-03 RX ORDER — HYDROMORPHONE HYDROCHLORIDE 1 MG/ML
0.4 INJECTION, SOLUTION INTRAMUSCULAR; INTRAVENOUS; SUBCUTANEOUS
Status: DISCONTINUED | OUTPATIENT
Start: 2024-09-03 | End: 2024-09-03 | Stop reason: HOSPADM

## 2024-09-03 RX ORDER — ONDANSETRON 2 MG/ML
INJECTION INTRAMUSCULAR; INTRAVENOUS PRN
Status: DISCONTINUED | OUTPATIENT
Start: 2024-09-03 | End: 2024-09-03 | Stop reason: SURG

## 2024-09-03 RX ORDER — VANCOMYCIN HYDROCHLORIDE 500 MG/10ML
INJECTION, POWDER, LYOPHILIZED, FOR SOLUTION INTRAVENOUS
Status: COMPLETED | OUTPATIENT
Start: 2024-09-03 | End: 2024-09-03

## 2024-09-03 RX ORDER — ACETAMINOPHEN 500 MG
1000 TABLET ORAL 3 TIMES DAILY
Status: DISCONTINUED | OUTPATIENT
Start: 2024-09-03 | End: 2024-09-05 | Stop reason: HOSPADM

## 2024-09-03 RX ORDER — EPHEDRINE SULFATE 50 MG/ML
5 INJECTION, SOLUTION INTRAVENOUS
Status: DISCONTINUED | OUTPATIENT
Start: 2024-09-03 | End: 2024-09-03 | Stop reason: HOSPADM

## 2024-09-03 RX ORDER — DEXTROSE MONOHYDRATE 25 G/50ML
25 INJECTION, SOLUTION INTRAVENOUS
Status: DISCONTINUED | OUTPATIENT
Start: 2024-09-03 | End: 2024-09-03 | Stop reason: HOSPADM

## 2024-09-03 RX ORDER — SODIUM CHLORIDE, SODIUM LACTATE, POTASSIUM CHLORIDE, CALCIUM CHLORIDE 600; 310; 30; 20 MG/100ML; MG/100ML; MG/100ML; MG/100ML
INJECTION, SOLUTION INTRAVENOUS CONTINUOUS
Status: DISCONTINUED | OUTPATIENT
Start: 2024-09-03 | End: 2024-09-03 | Stop reason: HOSPADM

## 2024-09-03 RX ORDER — LABETALOL HYDROCHLORIDE 5 MG/ML
5 INJECTION, SOLUTION INTRAVENOUS
Status: DISCONTINUED | OUTPATIENT
Start: 2024-09-03 | End: 2024-09-03 | Stop reason: HOSPADM

## 2024-09-03 RX ORDER — HYDROMORPHONE HYDROCHLORIDE 1 MG/ML
0.2 INJECTION, SOLUTION INTRAMUSCULAR; INTRAVENOUS; SUBCUTANEOUS
Status: DISCONTINUED | OUTPATIENT
Start: 2024-09-03 | End: 2024-09-03 | Stop reason: HOSPADM

## 2024-09-03 RX ORDER — MEPERIDINE HYDROCHLORIDE 50 MG/ML
6.25 INJECTION INTRAMUSCULAR; INTRAVENOUS; SUBCUTANEOUS
Status: DISCONTINUED | OUTPATIENT
Start: 2024-09-03 | End: 2024-09-03 | Stop reason: HOSPADM

## 2024-09-03 RX ORDER — CEFAZOLIN SODIUM 1 G/3ML
INJECTION, POWDER, FOR SOLUTION INTRAMUSCULAR; INTRAVENOUS PRN
Status: DISCONTINUED | OUTPATIENT
Start: 2024-09-03 | End: 2024-09-03 | Stop reason: SURG

## 2024-09-03 RX ORDER — ACETAMINOPHEN 500 MG
1000 TABLET ORAL ONCE
Status: DISCONTINUED | OUTPATIENT
Start: 2024-09-03 | End: 2024-09-03 | Stop reason: HOSPADM

## 2024-09-03 RX ORDER — OXYCODONE HCL 5 MG/5 ML
10 SOLUTION, ORAL ORAL
Status: DISCONTINUED | OUTPATIENT
Start: 2024-09-03 | End: 2024-09-03 | Stop reason: HOSPADM

## 2024-09-03 RX ORDER — HYDROMORPHONE HYDROCHLORIDE 1 MG/ML
1 INJECTION, SOLUTION INTRAMUSCULAR; INTRAVENOUS; SUBCUTANEOUS EVERY 4 HOURS PRN
Status: DISCONTINUED | OUTPATIENT
Start: 2024-09-03 | End: 2024-09-04

## 2024-09-03 RX ORDER — DIPHENHYDRAMINE HYDROCHLORIDE 50 MG/ML
12.5 INJECTION INTRAMUSCULAR; INTRAVENOUS
Status: DISCONTINUED | OUTPATIENT
Start: 2024-09-03 | End: 2024-09-03 | Stop reason: HOSPADM

## 2024-09-03 RX ORDER — SODIUM CHLORIDE, SODIUM LACTATE, POTASSIUM CHLORIDE, CALCIUM CHLORIDE 600; 310; 30; 20 MG/100ML; MG/100ML; MG/100ML; MG/100ML
INJECTION, SOLUTION INTRAVENOUS
Status: DISCONTINUED | OUTPATIENT
Start: 2024-09-03 | End: 2024-09-03 | Stop reason: SURG

## 2024-09-03 RX ADMIN — ONDANSETRON 4 MG: 2 INJECTION INTRAMUSCULAR; INTRAVENOUS at 11:11

## 2024-09-03 RX ADMIN — FOLIC ACID 1 MG: 1 TABLET ORAL at 05:05

## 2024-09-03 RX ADMIN — OMEPRAZOLE 20 MG: 20 CAPSULE, DELAYED RELEASE ORAL at 05:05

## 2024-09-03 RX ADMIN — Medication 100 MG: at 09:16

## 2024-09-03 RX ADMIN — CEFAZOLIN 2 G: 1 INJECTION, POWDER, FOR SOLUTION INTRAMUSCULAR; INTRAVENOUS at 10:52

## 2024-09-03 RX ADMIN — CYANOCOBALAMIN TAB 500 MCG 1000 MCG: 500 TAB at 05:05

## 2024-09-03 RX ADMIN — LIDOCAINE HYDROCHLORIDE 50 MG: 10 INJECTION, SOLUTION EPIDURAL; INFILTRATION; INTRACAUDAL; PERINEURAL at 10:49

## 2024-09-03 RX ADMIN — SODIUM CHLORIDE, POTASSIUM CHLORIDE, SODIUM LACTATE AND CALCIUM CHLORIDE: 600; 310; 30; 20 INJECTION, SOLUTION INTRAVENOUS at 10:33

## 2024-09-03 RX ADMIN — AMPICILLIN AND SULBACTAM 3 G: 1; 2 INJECTION, POWDER, FOR SOLUTION INTRAMUSCULAR; INTRAVENOUS at 00:31

## 2024-09-03 RX ADMIN — AMPICILLIN AND SULBACTAM 3 G: 1; 2 INJECTION, POWDER, FOR SOLUTION INTRAMUSCULAR; INTRAVENOUS at 05:05

## 2024-09-03 RX ADMIN — DIVALPROEX SODIUM 250 MG: 250 TABLET, DELAYED RELEASE ORAL at 05:05

## 2024-09-03 RX ADMIN — DEXAMETHASONE SODIUM PHOSPHATE 8 MG: 4 INJECTION INTRA-ARTICULAR; INTRALESIONAL; INTRAMUSCULAR; INTRAVENOUS; SOFT TISSUE at 10:54

## 2024-09-03 RX ADMIN — ASPIRIN 81 MG: 81 TABLET, COATED ORAL at 05:05

## 2024-09-03 RX ADMIN — RISPERIDONE 2 MG: 0.5 TABLET, FILM COATED ORAL at 17:12

## 2024-09-03 RX ADMIN — FENTANYL CITRATE 50 MCG: 50 INJECTION, SOLUTION INTRAMUSCULAR; INTRAVENOUS at 10:56

## 2024-09-03 RX ADMIN — ACETAMINOPHEN 1000 MG: 500 TABLET ORAL at 13:48

## 2024-09-03 RX ADMIN — ACETAMINOPHEN 1000 MG: 500 TABLET ORAL at 17:11

## 2024-09-03 RX ADMIN — DIVALPROEX SODIUM 250 MG: 250 TABLET, DELAYED RELEASE ORAL at 20:45

## 2024-09-03 RX ADMIN — ATORVASTATIN CALCIUM 40 MG: 40 TABLET, FILM COATED ORAL at 17:12

## 2024-09-03 RX ADMIN — AMPICILLIN AND SULBACTAM 3 G: 1; 2 INJECTION, POWDER, FOR SOLUTION INTRAMUSCULAR; INTRAVENOUS at 17:14

## 2024-09-03 RX ADMIN — Medication 100 MG: at 15:52

## 2024-09-03 RX ADMIN — SENNOSIDES AND DOCUSATE SODIUM 2 TABLET: 50; 8.6 TABLET ORAL at 17:12

## 2024-09-03 RX ADMIN — POLYETHYLENE GLYCOL 3350 1 PACKET: 17 POWDER, FOR SOLUTION ORAL at 16:47

## 2024-09-03 RX ADMIN — RISPERIDONE 2 MG: 0.5 TABLET, FILM COATED ORAL at 05:05

## 2024-09-03 RX ADMIN — PROPOFOL 170 MG: 10 INJECTION, EMULSION INTRAVENOUS at 10:49

## 2024-09-03 RX ADMIN — Medication 100 MG: at 21:00

## 2024-09-03 RX ADMIN — FOLIC ACID 1 MG: 1 TABLET ORAL at 17:12

## 2024-09-03 RX ADMIN — OMEPRAZOLE 20 MG: 20 CAPSULE, DELAYED RELEASE ORAL at 17:12

## 2024-09-03 ASSESSMENT — ENCOUNTER SYMPTOMS
CARDIOVASCULAR NEGATIVE: 1
PSYCHIATRIC NEGATIVE: 1
RESPIRATORY NEGATIVE: 1
CONSTITUTIONAL NEGATIVE: 1
EYES NEGATIVE: 1
MUSCULOSKELETAL NEGATIVE: 1
NEUROLOGICAL NEGATIVE: 1
GASTROINTESTINAL NEGATIVE: 1

## 2024-09-03 ASSESSMENT — PAIN DESCRIPTION - PAIN TYPE: TYPE: ACUTE PAIN

## 2024-09-03 ASSESSMENT — FIBROSIS 4 INDEX: FIB4 SCORE: 0.94

## 2024-09-03 NOTE — CONSULTS
9/3/2024    Reason for consultation: Left foot lateral nonhealing wound probing to bone    Consultation on Yury Blake at the request of hospitalist service for consideration of surgical management for osteomyelitis of the left foot.  Patient does work on his feet as a .  He has a power of .    Past Medical History:   Diagnosis Date    Arthritis     RA    Encephalopathy     Hypertension     Pain     Right ankle    Psychiatric problem     anxiety       Past Surgical History:   Procedure Laterality Date    IRRIGATION & DEBRIDEMENT ORTHO Left 4/12/2021    Procedure: IRRIGATION AND DEBRIDEMENT, WOUND - HEEL;  Surgeon: Donny Roque M.D.;  Location[place];  Service: Orthopedics    TENDON LENGHTENING Bilateral 2/1/2021    Procedure: LENGTHENING, TENDON- FOR FOOT DROP RECONSTRUCTION, POSTERIOR TIBIALIS TENDON  TRANSFER , ACHILLES LENGHTENING , LEFT MEDIAL RELEASE;  Surgeon: Donny Roque M.D.;  Location[place];  Service: Orthopedics    IRRIGATION & DEBRIDEMENT ORTHO Left 2/1/2021    Procedure: IRRIGATION AND DEBRIDEMENT, WOUND-FOOT;  Surgeon: Donny Roque M.D.;  Location[place];  Service: Orthopedics    ORIF, ANKLE Right 9/26/2017    Procedure: ANKLE ORIF SYNDESOMOSIS REPAIR;  Surgeon: Amrando Woodard M.D.;  Location: [facility];  Service: Orthopedics       Medications  No current facility-administered medications on file prior to encounter.     Current Outpatient Medications on File Prior to Encounter   Medication Sig Dispense Refill    metFORMIN (GLUCOPHAGE) 500 MG Tab Take 1 Tablet by mouth 2 times a day with meals. 60 Tablet 0    omeprazole (PRILOSEC) 20 MG delayed-release capsule Take 1 Capsule by mouth 2 times a day. 30 Capsule 0    aspirin (ASA) 325 MG Tab Take 325 mg by mouth every day.      divalproex (DEPAKOTE) 250 MG Tablet Delayed Response Take 250 mg by mouth 3 times a day.      folic acid (FOLVITE) 1 MG Tab Take 1 mg  by mouth every day.      thiamine (VITAMIN B-1) 100 MG Tab Take 100 mg by mouth every day.      risperiDONE (RISPERDAL) 2 MG Tab Take 1 tablet by mouth 2 Times a Day. 120 tablet 2    acetaminophen (TYLENOL) 325 MG Tab Take 650 mg by mouth every 6 hours as needed for Mild Pain. 325 mg x 2 tablets = 650 mg         Allergies  Patient has no known allergies.    ROS   All other systems were reviewed and found to be negative    No family history on file.    Social History     Socioeconomic History    Marital status:    Tobacco Use    Smoking status: Never    Smokeless tobacco: Former     Types: Chew   Vaping Use    Vaping status: Never Used   Substance and Sexual Activity    Alcohol use: Not Currently    Drug use: No     Social Determinants of Health     Food Insecurity: Patient Unable To Answer (8/29/2024)    Hunger Vital Sign     Worried About Running Out of Food in the Last Year: Patient unable to answer     Ran Out of Food in the Last Year: Patient unable to answer   Transportation Needs: Patient Unable To Answer (8/29/2024)    PRAPARE - Transportation     Lack of Transportation (Medical): Patient unable to answer     Lack of Transportation (Non-Medical): Patient unable to answer   Intimate Partner Violence: Not At Risk (8/28/2024)    Humiliation, Afraid, Rape, and Kick questionnaire     Fear of Current or Ex-Partner: No     Emotionally Abused: No     Physically Abused: No     Sexually Abused: No   Housing Stability: Patient Unable To Answer (8/29/2024)    Housing Stability Vital Sign     Unable to Pay for Housing in the Last Year: Patient unable to answer     Number of Times Moved in the Last Year: 1     Homeless in the Last Year: Patient unable to answer       Physical Exam  Vitals  /65   Pulse 76   Temp (!) 35.7 °C (96.2 °F) (Temporal)   Resp 16   Ht 1.829 m (6')   Wt 115 kg (253 lb 1.4 oz)   SpO2 92%   General: Well Developed, Well Nourished, no acute distress  Psychiatric: Alert and oriented  x3, appropriate responses to questions, pleasant mood and affect.  HEENT: Normocephalic, atraumatic  Eyes: Anicteric, PERRL  Neck: Midline trachea, no pain with ROM  Chest: Symmetric expansion of the chest wall, non-tender to palpation, no distress.  Heart: RRR, palpable peripheral pulses  Left lower extremity: Ulceration lateral fifth metatarsal head probing to bone, likely metatarsal phalangeal joint.  Palpable dorsalis pedis pulses.  Decreased but present sensation in stocking distribution to both the ankle.  No other wounds.      Radiographs:  MR-FOOT-WITH & W/O LEFT   Final Result         1. Subcutaneous edema. This may be seen with cellulitis.   2. No abscess is evident.   3. Signal abnormality within the fifth metatarsal head and base of the fifth proximal phalanx is consistent with osteomyelitis given a soft tissue wound or ulcer in this region.   4. Postoperative changes are noted involving the midfoot with an anchor in the lateral cuneiform.      DX-SKULL-LIMITED 3-   Final Result      No radiopaque foreign object to preclude MR imaging.      DX-CHEST-LIMITED (1 VIEW)   Final Result         No imaging findings to preclude MRI imaging.      HU-PPIUPPD-0 VIEW   Final Result      1. No opaque foreign object to preclude MR imaging.   2. Moderate to large amount of stool throughout the colon.      US-ANA SINGLE LEVEL BILAT   Final Result      DX-FOOT-2- RIGHT   Final Result         1.  No acute traumatic bony injury.   2.  Atherosclerosis      DX-FOOT-COMPLETE 3+ LEFT   Final Result      1. Lateral left forefoot soft tissue ulcer, with loss of the normal well-defined cortex involving the lateral aspect of the base of the left fifth toe proximal phalanx, concerning for osteomyelitis.   2. Decreased bone mineralization.   3. No other acute findings.          Laboratory Values  Recent Labs     09/01/24  0207 09/02/24  0147 09/03/24  0221   WBC 8.3 9.0 8.5   RBC 3.64* 3.41* 3.39*   HEMOGLOBIN 8.9* 8.5* 8.3*  "  HEMATOCRIT 30.7* 28.9* 28.6*   MCV 84.3 84.8 84.4   MCH 24.5* 24.9* 24.5*   MCHC 29.0* 29.4* 29.0*   RDW 50.9* 51.0* 51.0*   PLATELETCT 294 285 293   MPV 9.2 9.7 9.6     No results for input(s): \"SODIUM\", \"POTASSIUM\", \"CHLORIDE\", \"CO2\", \"GLUCOSE\", \"BUN\", \"CPKTOTAL\" in the last 72 hours.          Impression:    #1  Left lateral foot nonhealing wound with osteomyelitis around the fifth metatarsal head and base of the proximal phalanx    Plan:    N.p.o. for surgery today including Left foot irrigation and debridement, fifth metatarsal head excision, likely fifth ray amputation.  Weightbearing as tolerated postoperatively in a postoperative shoe.  Follow-up in my office in 1 to 2 weeks for a wound check.  "

## 2024-09-03 NOTE — CARE PLAN
The patient is Stable - Low risk of patient condition declining or worsening    Shift Goals  Clinical Goals: maitain skin integrity, free from fall  Patient Goals: michelle  Family Goals: na    Progress made toward(s) clinical / shift goals:  REMAINED FREE FROM FALL, PT SELF TURNS WHEN REMINDED, DENIES PAIN, AO TO PERSON AND PLACE,     Patient is not progressing towards the following goals:

## 2024-09-03 NOTE — ASSESSMENT & PLAN NOTE
Admit to:    Orthopedic/Med-Surg floor since no major co-morbidities.     Cardiovascular:   Patient does not have history of CHF  Pre-op EKG: Yes    Pulmonary:  Oxygen per protocol  Incentive Spirometer    GI:   No history of cirrhosis. Standard bowel regimen. Hold for loose stools.    Renal:   IV fluids: No fluids - risk of fluid overload    Labs: Metabolic Panel with AM labs    Musculoskeletal:   Check 25 OH vitamin D level. If 31-40 pg/mL, consider starting vitamin D3 1000 IU PO daily. If 20-30 pg/mL, consider vitamin D3 2000 IU PO daily. If <20 pg/mL, vitamin D2 50,000 IU weekly x 8 weeks then 2000 IU PO daily.    Neurologic:   Pain Control: Neuro checks every 4 hours  Avoid fentanyl (short-acting)  Acetaminophen 1000 mg PO TID; 650 mg PO TID if liver problems    Hematologic:  Plan on pharmacologic DVT prophylaxis post operative day #1. Hold for decreasing hemoglobin. Notify provider for hemoglobin less than 8.  Order preoperative type and cross.   Hgb every 6 hours if high bleeding risk.   If patient was on anticoagulation prior to arrival risks and benefits will be weighed by teams including surgery, hospitalist, geriatrics, and anesthesia for delaying surgery more than 24 hours.   On anticoagulation prior to arrival: No    Medical Assessment Risk:  Low    Surgical Risk:   Low

## 2024-09-03 NOTE — ANESTHESIA POSTPROCEDURE EVALUATION
Patient: Yury Blake    Procedure Summary       Date: 09/03/24 Room / Location: [place] [id number] / SURGERY [facility]    Anesthesia Start: 1043 Anesthesia Stop: 1125    Procedure: AMPUTATION, TOE-5TH RAY (Left: Foot) Diagnosis: (Left lateral foot nonhealing wound with osteomyelitis around the fifth metatarsal head and base of the proximal phalanx)    Surgeons: Armando Woodard M.D. Responsible Provider: Junito Hays D.O.    Anesthesia Type: general ASA Status: 3            Final Anesthesia Type: general  Last vitals  BP   Blood Pressure: 111/56    Temp   36.3 °C (97.4 °F)    Pulse   (!) 57   Resp   18    SpO2   95 %      Anesthesia Post Evaluation    Patient location during evaluation: PACU  Patient participation: complete - patient participated  Level of consciousness: awake and alert    Airway patency: patent  Anesthetic complications: no  Cardiovascular status: hemodynamically stable  Respiratory status: acceptable  Hydration status: euvolemic    PONV: none          No notable events documented.     Nurse Pain Score: 0 (NPRS)

## 2024-09-03 NOTE — OP REPORT
SURGEON:  Armando Woodard MD      PREOPERATIVE DIAGNOSIS:    1.  Left chronic lateral foot ulcer  2.  Left osteomyelitis fifth proximal phalanx and metatarsal head    POSTOPERATIVE DIAGNOSIS:    Same    PROCEDURES PERFORMED:      Left fifth ray amputation     ASSISTANT: TAMMY Hatch    ANESTHESIA:   General     IMPLANTS: None    TOURNIQUET TIME: 5 minutes at 250 mmHg    EBL: 10 cc    COMPLICATIONS:  None.    SPECIMEN:     DISPOSITION:  Stable to Post Anesthesia Care Unit.    INDICATIONS: Yury is a pleasant 54-year-old gentleman with a nonhealing wound in his left foot with clinical and MRI evidence of osteomyelitis.  Reviewed the biopsy procedure.    PROCEDURE IN DETAIL:   Greeted in the preop holding area and identified by name medical record number.  The left lower extremity was marked.  Risks of the procedure including bleeding, infection, pain, malunion, nonunion, neurovascular damage and need for more surgery were discussed and he provided written consent.  He was taken to the operating room and placed on table in supine position.  Preop antibiotics were administered and general anesthesia was induced.  A nonsterile tourniquet was placed on the left thigh and the left lower extremity prepped and draped in the usual sterile fashion.  An operative pause was taken where all present were in agreement with patient identification, laterality and procedure to be performed.  The limb was elevated for 5 minutes and the tourniquet raised to 250 mmHg.    We ellipsed out a 2 cm x 1 cm ulceration that did track to the fifth metatarsal phalangeal joint.  I did not encounter gross purulence but there was disintegration of bone consistent with osteomyelitis both of the metatarsal head and the proximal phalanx.  We extended the incision proximally about 3 cm along the fifth ray.  We ellipsed out the toe and disarticulated at the metatarsal phalangeal joint.  I used an oscillating saw to remove the fifth metatarsal  head and passed this for culture.  I then used the oscillating saw to make a clean cut wafer of bone and passed this for pathology.  We let the tourniquet down and did note adequate blood flow to skin flaps for healing.  We irrigated 3 L of sterile normal saline through the wound using a curette to excise additional nonviable tissue.  We then trimmed skin edges to fit, we placed vancomycin powder in the wound bed, then closed the skin flaps with 2-0 nylon in horizontal mattress fashion.  This was then oversewn with 3-0 nylon running baseball stitch.  Adaptic gauze and a compressive wrap was placed.  He was awakened and explained in stable condition.    POSTOPERATIVE COURSE: He will remain under the care of the hospitalist service, we appreciate their assistance in management.  Weightbearing as tolerated in a postoperative shoe when walking.  Continue PRAFO boots when in bed.  May discharge at any point from an orthopedic standpoint.  I would like to see him in my clinic in 1 to 2 weeks for wound check.    Armando Woodard MD

## 2024-09-03 NOTE — ANESTHESIA PROCEDURE NOTES
Airway    Date/Time: 9/3/2024 10:50 AM    Performed by: Junito Hays D.O.  Authorized by: Junito Hays D.O.    Location:  OR  Urgency:  Elective  Indications for Airway Management:  Anesthesia      Spontaneous Ventilation: absent    Sedation Level:  Deep  Preoxygenated: Yes    Patient Position:  Sniffing  MILS Maintained Throughout: No    Mask Difficulty Assessment:  0 - not attempted  Final Airway Type:  Supraglottic airway  Final Supraglottic Airway:  Standard LMA    SGA Size:  4  Number of Attempts at Approach:  1  Ventilation Between Attempts:  None  Number of Other Approaches Attempted:  0

## 2024-09-03 NOTE — PROGRESS NOTES
[facility] Medicine Daily Progress Note    Date of Service  9/3/2024    Chief Complaint  Yury Blake is a 54 y.o. male admitted 8/27/2024 with left foot ulcer    Hospital Course  54 y.o. male from SNF with history of Wernicke encephalopathy, alcohol abuse, psychiatric disorder, diabetes, recent admission for cellulitis of feet who presented 8/27/2024 with evaluation for wound check.  There was concern for purulent drainage from left foot at SNF, therefore referred to ER for evaluation.  In ER, x-ray of left foot noted possible osteomyelitis of lateral aspect of base of left fifth toe proximal phalanx.  Therefore admission requested by ERP.  Admitted to medicine service for further evaluation and treatment.     8/28 bilateral ANA normal.  8/30 left foot MRI showing fifth metatarsal osteomyelitis.    Interval Problem Update  Evaluate bedside  IN NAD this AM  Stable vitals  On room air  Continue IV Unasyn  Wound care following  Discussed orthopedic surgery ([facility]) on 8/30.  Aim for OR today  Labs on AM    I have discussed this patient's plan of care and discharge plan at IDT rounds today with Case Management, Nursing, Nursing leadership, and other members of the IDT team.    Consultants/Specialty  Orthopedic surgery    Code Status  Full Code    Disposition  The patient is not medically cleared for discharge to home or a post-acute facility.     I have placed the appropriate orders for post-discharge needs.    Review of Systems  Review of Systems   Constitutional: Negative.    HENT: Negative.     Eyes: Negative.    Respiratory: Negative.     Cardiovascular: Negative.    Gastrointestinal: Negative.    Genitourinary: Negative.    Musculoskeletal: Negative.    Skin: Negative.    Neurological: Negative.    Endo/Heme/Allergies: Negative.    Psychiatric/Behavioral: Negative.          Physical Exam  Temp:  [35.7 °C (96.2 °F)-36.9 °C (98.4 °F)] 35.7 °C (96.2 °F)  Pulse:  [67-79] 76  Resp:  [15-17] 16  BP:  (113-139)/(65-82) 114/65  SpO2:  [90 %-92 %] 92 %    Physical Exam  Constitutional:       General: He is not in acute distress.     Appearance: Normal appearance.   HENT:      Head: Normocephalic and atraumatic.      Nose: Nose normal. No congestion.      Mouth/Throat:      Mouth: Mucous membranes are moist.   Eyes:      Extraocular Movements: Extraocular movements intact.      Pupils: Pupils are equal, round, and reactive to light.   Cardiovascular:      Rate and Rhythm: Normal rate and regular rhythm.      Pulses: Normal pulses.      Heart sounds: Normal heart sounds.   Pulmonary:      Effort: Pulmonary effort is normal.      Breath sounds: Normal breath sounds.   Abdominal:      General: Bowel sounds are normal.      Palpations: Abdomen is soft.      Tenderness: There is no abdominal tenderness.   Musculoskeletal:         General: Normal range of motion.      Cervical back: Normal range of motion and neck supple.      Comments: Mild tenderness at 5th toe, no apparent discharge    Skin:     General: Skin is warm.      Coloration: Skin is not jaundiced.   Neurological:      General: No focal deficit present.      Mental Status: He is alert and oriented to person, place, and time. Mental status is at baseline.      Cranial Nerves: No cranial nerve deficit.   Psychiatric:         Mood and Affect: Mood normal.         Behavior: Behavior normal.         Thought Content: Thought content normal.         Judgment: Judgment normal.       Fluids    Intake/Output Summary (Last 24 hours) at 9/3/2024 1127  Last data filed at 9/3/2024 1125  Gross per 24 hour   Intake 300 ml   Output 1815 ml   Net -1515 ml          Laboratory  Recent Labs     09/01/24  0207 09/02/24  0147 09/03/24  0221   WBC 8.3 9.0 8.5   RBC 3.64* 3.41* 3.39*   HEMOGLOBIN 8.9* 8.5* 8.3*   HEMATOCRIT 30.7* 28.9* 28.6*   MCV 84.3 84.8 84.4   MCH 24.5* 24.9* 24.5*   MCHC 29.0* 29.4* 29.0*   RDW 50.9* 51.0* 51.0*   PLATELETCT 294 285 293   MPV 9.2 9.7 9.6                            Imaging  MR-FOOT-WITH & W/O LEFT   Final Result         1. Subcutaneous edema. This may be seen with cellulitis.   2. No abscess is evident.   3. Signal abnormality within the fifth metatarsal head and base of the fifth proximal phalanx is consistent with osteomyelitis given a soft tissue wound or ulcer in this region.   4. Postoperative changes are noted involving the midfoot with an anchor in the lateral cuneiform.      DX-SKULL-LIMITED 3-   Final Result      No radiopaque foreign object to preclude MR imaging.      DX-CHEST-LIMITED (1 VIEW)   Final Result         No imaging findings to preclude MRI imaging.      [id number] VIEW   Final Result      1. No opaque foreign object to preclude MR imaging.   2. Moderate to large amount of stool throughout the colon.      US-ANA SINGLE LEVEL BILAT   Final Result      DX-FOOT-2- RIGHT   Final Result         1.  No acute traumatic bony injury.   2.  Atherosclerosis      DX-FOOT-COMPLETE 3+ LEFT   Final Result      1. Lateral left forefoot soft tissue ulcer, with loss of the normal well-defined cortex involving the lateral aspect of the base of the left fifth toe proximal phalanx, concerning for osteomyelitis.   2. Decreased bone mineralization.   3. No other acute findings.           Assessment/Plan  * Osteomyelitis (HCC)- (present on admission)  Assessment & Plan  X-ray left foot suspicious for osteomyelitis of lateral aspect of base of left fifth toe proximal phalanx  8/30 left foot MRI showing fifth metatarsal osteomyelitis.  ANA normal  IV Unasyn  Wound care  Discussed orthopedic surgery ([facility]) on 8/30.  Possible OR tomorrow    Nonhealing ulcer of left lower extremity (HCC)- (present on admission)  Assessment & Plan  Chest x-ray concerning for osteomyelitis  ANA normal  8/30 left foot MRI showing fifth metatarsal osteomyelitis.  IV antibiotics  Wound care  Orthopedic Surgery following    Encounter for preoperative assessment  Assessment &  Plan  Admit to:    Orthopedic/Med-Surg floor since no major co-morbidities.     Cardiovascular:   Patient does not have history of CHF  Pre-op EKG: Yes    Pulmonary:  Oxygen per protocol  Incentive Spirometer    GI:   No history of cirrhosis. Standard bowel regimen. Hold for loose stools.    Renal:   IV fluids: No fluids - risk of fluid overload    Labs: Metabolic Panel with AM labs    Musculoskeletal:   Check 25 OH vitamin D level. If 31-40 pg/mL, consider starting vitamin D3 1000 IU PO daily. If 20-30 pg/mL, consider vitamin D3 2000 IU PO daily. If <20 pg/mL, vitamin D2 50,000 IU weekly x 8 weeks then 2000 IU PO daily.    Neurologic:   Pain Control: Neuro checks every 4 hours  Avoid fentanyl (short-acting)  Acetaminophen 1000 mg PO TID; 650 mg PO TID if liver problems    Hematologic:  Plan on pharmacologic DVT prophylaxis post operative day #1. Hold for decreasing hemoglobin. Notify provider for hemoglobin less than 8.  Order preoperative type and cross.   Hgb every 6 hours if high bleeding risk.   If patient was on anticoagulation prior to arrival risks and benefits will be weighed by teams including surgery, hospitalist, geriatrics, and anesthesia for delaying surgery more than 24 hours.   On anticoagulation prior to arrival: No    Medical Assessment Risk:  Low    Surgical Risk:   Low      Diabetes (HCC)- (present on admission)  Assessment & Plan  ISS  Statin    Psychiatric disorder- (present on admission)  Assessment & Plan  Continue Depakote, risperidone    Wernicke encephalopathy- (present on admission)  Assessment & Plan  Alcohol cessation strongly advised  Continue thiamine supplement  Multivitamins    Alcohol abuse- (present on admission)  Assessment & Plan  H/O  Monitor      Hypokalemia- (present on admission)  Assessment & Plan  Replace       VTE prophylaxis: Heparin    I have performed a physical exam and reviewed and updated ROS and Plan today (9/3/2024). In review of yesterday's note (9/2/2024), there  are no changes except as documented above.

## 2024-09-03 NOTE — PROGRESS NOTES
Pt arrived via transport from surgery. Pt has ace wrap around foot and dressing is CDI. Pt on 1L NC and reporting 6/10 foot pain. Offered medication and pt declined intervention at this time.

## 2024-09-03 NOTE — OR NURSING
Awake, alert and tolerating PO fluids.  Denies pain/nausea.  Dressing clean, dry and intact.  Flat affect. Oriented to self only.  Vitals stable.  On monitors with alarms audible.    Called POA with update.

## 2024-09-03 NOTE — PROGRESS NOTES
Received report and assumed care of patient at change of shift. Patient is A&Ox2 disoriented to situation and time, on RA, and reports no pain at this time. Pt has heel float boots, SCD's, and pillows in place for positioning. Patient assessment completed, bed in lowest position, and call light and personal belongings are within reach. Patient expressed no further needs at this time. Plan for pt is ortho surgery.

## 2024-09-03 NOTE — ANESTHESIA PREPROCEDURE EVALUATION
Case: [id number] Date/Time: 09/03/24 1240    Procedure: AMPUTATION, TOE-5TH RAY (Left)    Location: [place] [id number] / [facility]    Surgeons: JUDITH Nos H&P:  PAST MEDICAL HISTORY:   54 y.o. male who presents for Procedure(s) (LRB):  AMPUTATION, TOE-5TH RAY (Left).  He has current and past medical problems significant for:    - HTN  - T2DM  - GERD  - RA  - ETOH abuse leading to Wernicke encephalopathy and resp failure 2020 requiring intubation    Past Medical History:   Diagnosis Date    Arthritis     RA    Encephalopathy     Hypertension     Pain     Right ankle    Psychiatric problem     anxiety       SMOKING/ALCOHOL/RECREATIONAL DRUG USE:  Social History     Tobacco Use    Smoking status: Never    Smokeless tobacco: Former     Types: Chew   Vaping Use    Vaping status: Never Used   Substance Use Topics    Alcohol use: Not Currently    Drug use: No     Social History     Substance and Sexual Activity   Drug Use No       PAST SURGICAL HISTORY:  Past Surgical History:   Procedure Laterality Date    IRRIGATION & DEBRIDEMENT ORTHO Left 4/12/2021    Procedure: IRRIGATION AND DEBRIDEMENT, WOUND - HEEL;  Surgeon: Donny Roque M.D.;  Location: [facility];  Service: Orthopedics    TENDON LENGHTENING Bilateral 2/1/2021    Procedure: LENGTHENING, TENDON- FOR FOOT DROP RECONSTRUCTION, POSTERIOR TIBIALIS TENDON  TRANSFER , ACHILLES LENGHTENING , LEFT MEDIAL RELEASE;  Surgeon: Donny Roque M.D.;  Location: [facility];  Service: Orthopedics    IRRIGATION & DEBRIDEMENT ORTHO Left 2/1/2021    Procedure: IRRIGATION AND DEBRIDEMENT, WOUND-FOOT;  Surgeon: Donny Roque M.D.;  Location: [facility];  Service: Orthopedics    ORIF, ANKLE Right 9/26/2017    Procedure: ANKLE ORIF SYNDESOMOSIS REPAIR;  Surgeon: Armando Woodard M.D.;  Location: [facility];  Service: Orthopedics       ALLERGIES:   No Known Allergies    MEDICATIONS:  No current  facility-administered medications on file prior to encounter.     Current Outpatient Medications on File Prior to Encounter   Medication Sig Dispense Refill    metFORMIN (GLUCOPHAGE) 500 MG Tab Take 1 Tablet by mouth 2 times a day with meals. 60 Tablet 0    omeprazole (PRILOSEC) 20 MG delayed-release capsule Take 1 Capsule by mouth 2 times a day. 30 Capsule 0    aspirin (ASA) 325 MG Tab Take 325 mg by mouth every day.      divalproex (DEPAKOTE) 250 MG Tablet Delayed Response Take 250 mg by mouth 3 times a day.      folic acid (FOLVITE) 1 MG Tab Take 1 mg by mouth every day.      thiamine (VITAMIN B-1) 100 MG Tab Take 100 mg by mouth every day.      risperiDONE (RISPERDAL) 2 MG Tab Take 1 tablet by mouth 2 Times a Day. 120 tablet 2    acetaminophen (TYLENOL) 325 MG Tab Take 650 mg by mouth every 6 hours as needed for Mild Pain. 325 mg x 2 tablets = 650 mg         LABS:  Lab Results   Component Value Date/Time    HEMOGLOBIN 8.5 (L) 09/02/2024 0147    HEMATOCRIT 28.9 (L) 09/02/2024 0147    WBC 9.0 09/02/2024 0147     Lab Results   Component Value Date/Time    SODIUM 142 08/29/2024 0124    POTASSIUM 3.9 08/29/2024 0124    CHLORIDE 107 08/29/2024 0124    CO2 23 08/29/2024 0124    GLUCOSE 84 08/29/2024 0124    BUN 7 (L) 08/29/2024 0124    CALCIUM 9.0 08/29/2024 0124         PREVIOUS ANESTHETICS: See EMR  __________________________________________    Relevant Problems   Other   (positive) Alcohol abuse   (positive) Diabetes (HCC)   (positive) Osteomyelitis (HCC)   (positive) Wernicke encephalopathy       Physical Exam    Airway   Mallampati: II  TM distance: >3 FB  Neck ROM: full       Cardiovascular - normal exam  Rhythm: regular  Rate: normal  (-) murmur     Dental - normal exam           Pulmonary - normal exam  Breath sounds clear to auscultation     Abdominal   (+) obese     Neurological - normal exam                   Anesthesia Plan    ASA 3   ASA physical status 3 criteria: alcohol and/or substance dependence or  abuse    Plan - general       Airway plan will be LMA          Induction: intravenous    Postoperative Plan: Postoperative administration of opioids is intended.    Pertinent diagnostic labs and testing reviewed    Informed Consent:    Anesthetic plan and risks discussed with patient.    Use of blood products discussed with: patient whom consented to blood products.

## 2024-09-03 NOTE — ANESTHESIA TIME REPORT
Anesthesia Start and Stop Event Times       Date Time Event    9/3/2024 1035 Ready for Procedure     1043 Anesthesia Start     1125 Anesthesia Stop          Responsible Staff  09/03/24      Name Role Begin End    Junito Hays D.O. Anesth 1043 1125          Overtime Reason:  no overtime (within assigned shift)    Comments:

## 2024-09-04 LAB
ALBUMIN SERPL BCP-MCNC: 3.2 G/DL (ref 3.2–4.9)
ALBUMIN/GLOB SERPL: 1 G/DL
ALP SERPL-CCNC: 77 U/L (ref 30–99)
ALT SERPL-CCNC: <5 U/L (ref 2–50)
ANION GAP SERPL CALC-SCNC: 13 MMOL/L (ref 7–16)
AST SERPL-CCNC: 5 U/L (ref 12–45)
BILIRUB SERPL-MCNC: 0.2 MG/DL (ref 0.1–1.5)
BUN SERPL-MCNC: 13 MG/DL (ref 8–22)
CALCIUM ALBUM COR SERPL-MCNC: 9.8 MG/DL (ref 8.5–10.5)
CALCIUM SERPL-MCNC: 9.2 MG/DL (ref 8.5–10.5)
CHLORIDE SERPL-SCNC: 105 MMOL/L (ref 96–112)
CO2 SERPL-SCNC: 23 MMOL/L (ref 20–33)
CREAT SERPL-MCNC: 0.81 MG/DL (ref 0.5–1.4)
ERYTHROCYTE [DISTWIDTH] IN BLOOD BY AUTOMATED COUNT: 48.5 FL (ref 35.9–50)
GFR SERPLBLD CREATININE-BSD FMLA CKD-EPI: 105 ML/MIN/1.73 M 2
GLOBULIN SER CALC-MCNC: 3.3 G/DL (ref 1.9–3.5)
GLUCOSE SERPL-MCNC: 111 MG/DL (ref 65–99)
HCT VFR BLD AUTO: 31.4 % (ref 42–52)
HGB BLD-MCNC: 9.1 G/DL (ref 14–18)
MCH RBC QN AUTO: 24.3 PG (ref 27–33)
MCHC RBC AUTO-ENTMCNC: 29 G/DL (ref 32.3–36.5)
MCV RBC AUTO: 83.7 FL (ref 81.4–97.8)
PLATELET # BLD AUTO: 307 K/UL (ref 164–446)
PMV BLD AUTO: 9.6 FL (ref 9–12.9)
POTASSIUM SERPL-SCNC: 4.1 MMOL/L (ref 3.6–5.5)
PROT SERPL-MCNC: 6.5 G/DL (ref 6–8.2)
RBC # BLD AUTO: 3.75 M/UL (ref 4.7–6.1)
SODIUM SERPL-SCNC: 141 MMOL/L (ref 135–145)
WBC # BLD AUTO: 9.7 K/UL (ref 4.8–10.8)

## 2024-09-04 PROCEDURE — 99232 SBSQ HOSP IP/OBS MODERATE 35: CPT | Performed by: STUDENT IN AN ORGANIZED HEALTH CARE EDUCATION/TRAINING PROGRAM

## 2024-09-04 PROCEDURE — L4398 FOOT DROP SPLINT PRE OTS: HCPCS

## 2024-09-04 PROCEDURE — 85027 COMPLETE CBC AUTOMATED: CPT

## 2024-09-04 PROCEDURE — 700105 HCHG RX REV CODE 258: Performed by: STUDENT IN AN ORGANIZED HEALTH CARE EDUCATION/TRAINING PROGRAM

## 2024-09-04 PROCEDURE — 770006 HCHG ROOM/CARE - MED/SURG/GYN SEMI*

## 2024-09-04 PROCEDURE — 97162 PT EVAL MOD COMPLEX 30 MIN: CPT

## 2024-09-04 PROCEDURE — 97602 WOUND(S) CARE NON-SELECTIVE: CPT

## 2024-09-04 PROCEDURE — A9270 NON-COVERED ITEM OR SERVICE: HCPCS | Performed by: STUDENT IN AN ORGANIZED HEALTH CARE EDUCATION/TRAINING PROGRAM

## 2024-09-04 PROCEDURE — 80053 COMPREHEN METABOLIC PANEL: CPT

## 2024-09-04 PROCEDURE — 700102 HCHG RX REV CODE 250 W/ 637 OVERRIDE(OP): Performed by: STUDENT IN AN ORGANIZED HEALTH CARE EDUCATION/TRAINING PROGRAM

## 2024-09-04 PROCEDURE — 36415 COLL VENOUS BLD VENIPUNCTURE: CPT

## 2024-09-04 PROCEDURE — 700111 HCHG RX REV CODE 636 W/ 250 OVERRIDE (IP): Mod: JZ | Performed by: STUDENT IN AN ORGANIZED HEALTH CARE EDUCATION/TRAINING PROGRAM

## 2024-09-04 RX ORDER — OXYCODONE HYDROCHLORIDE 10 MG/1
10 TABLET ORAL EVERY 6 HOURS PRN
Status: DISCONTINUED | OUTPATIENT
Start: 2024-09-04 | End: 2024-09-05

## 2024-09-04 RX ADMIN — RISPERIDONE 2 MG: 0.5 TABLET, FILM COATED ORAL at 17:13

## 2024-09-04 RX ADMIN — CYANOCOBALAMIN TAB 500 MCG 1000 MCG: 500 TAB at 06:15

## 2024-09-04 RX ADMIN — ACETAMINOPHEN 1000 MG: 500 TABLET ORAL at 11:31

## 2024-09-04 RX ADMIN — RISPERIDONE 2 MG: 0.5 TABLET, FILM COATED ORAL at 06:16

## 2024-09-04 RX ADMIN — ACETAMINOPHEN 1000 MG: 500 TABLET ORAL at 17:13

## 2024-09-04 RX ADMIN — OMEPRAZOLE 20 MG: 20 CAPSULE, DELAYED RELEASE ORAL at 17:13

## 2024-09-04 RX ADMIN — OMEPRAZOLE 20 MG: 20 CAPSULE, DELAYED RELEASE ORAL at 06:15

## 2024-09-04 RX ADMIN — ACETAMINOPHEN 1000 MG: 500 TABLET ORAL at 06:15

## 2024-09-04 RX ADMIN — Medication 100 MG: at 08:30

## 2024-09-04 RX ADMIN — Medication 100 MG: at 20:48

## 2024-09-04 RX ADMIN — AMPICILLIN AND SULBACTAM 3 G: 1; 2 INJECTION, POWDER, FOR SOLUTION INTRAMUSCULAR; INTRAVENOUS at 00:01

## 2024-09-04 RX ADMIN — ATORVASTATIN CALCIUM 40 MG: 40 TABLET, FILM COATED ORAL at 17:13

## 2024-09-04 RX ADMIN — SENNOSIDES AND DOCUSATE SODIUM 2 TABLET: 50; 8.6 TABLET ORAL at 17:13

## 2024-09-04 RX ADMIN — Medication 100 MG: at 14:14

## 2024-09-04 RX ADMIN — DIVALPROEX SODIUM 250 MG: 250 TABLET, DELAYED RELEASE ORAL at 20:48

## 2024-09-04 RX ADMIN — FOLIC ACID 1 MG: 1 TABLET ORAL at 17:13

## 2024-09-04 RX ADMIN — DIVALPROEX SODIUM 250 MG: 250 TABLET, DELAYED RELEASE ORAL at 05:15

## 2024-09-04 RX ADMIN — DIVALPROEX SODIUM 250 MG: 250 TABLET, DELAYED RELEASE ORAL at 12:18

## 2024-09-04 RX ADMIN — ASPIRIN 81 MG: 81 TABLET, COATED ORAL at 06:16

## 2024-09-04 RX ADMIN — FOLIC ACID 1 MG: 1 TABLET ORAL at 06:15

## 2024-09-04 RX ADMIN — AMPICILLIN AND SULBACTAM 3 G: 1; 2 INJECTION, POWDER, FOR SOLUTION INTRAMUSCULAR; INTRAVENOUS at 06:16

## 2024-09-04 ASSESSMENT — ENCOUNTER SYMPTOMS
CARDIOVASCULAR NEGATIVE: 1
PSYCHIATRIC NEGATIVE: 1
GASTROINTESTINAL NEGATIVE: 1
EYES NEGATIVE: 1
NEUROLOGICAL NEGATIVE: 1
CONSTITUTIONAL NEGATIVE: 1
RESPIRATORY NEGATIVE: 1

## 2024-09-04 ASSESSMENT — COGNITIVE AND FUNCTIONAL STATUS - GENERAL
TURNING FROM BACK TO SIDE WHILE IN FLAT BAD: A LOT
STANDING UP FROM CHAIR USING ARMS: A LOT
SUGGESTED CMS G CODE MODIFIER MOBILITY: CL
CLIMB 3 TO 5 STEPS WITH RAILING: TOTAL
MOBILITY SCORE: 10
WALKING IN HOSPITAL ROOM: TOTAL
MOVING TO AND FROM BED TO CHAIR: A LOT
MOVING FROM LYING ON BACK TO SITTING ON SIDE OF FLAT BED: A LOT

## 2024-09-04 ASSESSMENT — GAIT ASSESSMENTS: GAIT LEVEL OF ASSIST: UNABLE TO PARTICIPATE

## 2024-09-04 ASSESSMENT — FIBROSIS 4 INDEX: FIB4 SCORE: 0.94

## 2024-09-04 NOTE — DISCHARGE PLANNING
Case Management Discharge Planning    Admission Date: 8/27/2024  GMLOS: 3  ALOS: 8    6-Clicks ADL Score: 21  6-Clicks Mobility Score: 14  PT and/or OT Eval ordered: Yes  Post-acute Referrals Ordered: Yes  Post-acute Choice Obtained: Yes  Has referral(s) been sent to post-acute provider:  Yes      Anticipated Discharge Dispo:      DME Needed: No    Action(s) Taken: OTHER    Per MD, patient is medically clear.    JANE spoke with [facility]   [phone]. Moses requested C for wound dressing changes, MD aware.    Jake requested RN Report prior to confirming acceptance, MUSTAPHA Mcgee aware.    JANE faxed HHC Choice Form to [facility] Referral sent to [facility].    Per Bedside RN, Moses requested [facility] for Wound Care is arranged prior to discharge.    Per DPA, the following agencies declined.    [facility] declined / non covered area  Referral sent to [facility]  [facility] declined / [facility] medicaid full  Referral sent to [facility]  [facility] declined  Referral sent to [place] / non covered area  [facility] does not service [facility] or accept insurance.  Referral sent to [facility], referral currently under review.    JANE called [facility], left voice message requesting a call back to clarify  ability to transport patient to [facility] Wound Care, if all [facility] agencies decline.     Escalations Completed: None    Medically Clear: Yes    Next Steps: Awaiting confirmation of acceptance from [facility] and .    Barriers to Discharge: Pending Placement    Is the patient up for discharge tomorrow:

## 2024-09-04 NOTE — CARE PLAN
The patient is Stable - Low risk of patient condition declining or worsening    Shift Goals  Clinical Goals: patient will receive ABX as scheuled. Pts skin integrity will be maintained throughout the shift; pts bowel elimination will improve  Patient Goals: rest  Family Goals: KIRTI    Progress made toward(s) clinical / shift goals: ABX discontinues by provider. Pts skin integrity maintained through repositioning and condom catheter in place. 4 eyes skin assessment completed. No bowel movement this shift. Last bowel movement 8/29. Patient has senna every evening per MAR and Miralax 1 time daily PRN.       Problem: Skin Integrity  Goal: Skin integrity is maintained or improved  Outcome: Progressing     Problem: Bowel/Gastric:  Goal: Normal bowel function is maintained or improved  Outcome: Progressing

## 2024-09-04 NOTE — THERAPY
Physical Therapy   Initial Evaluation     Patient Name: Yury Blake  Age:  54 y.o., Sex:  male  Medical Record #: [id number]  Today's Date: 9/4/2024          Assessment  Patient is 54 y.o. male w/ hx of Wernickes encephalopathy, ETOH, psych d/o, DM.  Admitted for wound LLE.  S/p left fifth ray amputation.  He lives in a [facility] care facility/ in [place].  I did contact them, and they report that he is assist x2 for transfers and does not ambulate.  Today, he is rec'd alert, in bed, A/Ox1.  He needs max assist to sit eob and to stand.  Unable to TF to his bed side chair.  Anticipate that he is at or very close to his PLOF and would be able to TF back to his [facility] w/ caregivers familiar with him.  PT for eval only.  Plan    Physical Therapy Initial Treatment Plan   Duration: Evaluation only    DC Equipment Recommendations: None  Discharge Recommendations:  (return to )       Objective       09/04/24 0827   Prior Living Situation   Housing / Facility Group Home   Lives with - Patient's Self Care Capacity Attendant / Paid Care Giver   Prior Level of Functional Mobility   Bed Mobility Required Assist   Transfer Status Required Assist   Ambulation   (non ambulatory PTA)   Strength Lower Body   Comments Unable to formally assess, pt no following commands.   Balance Assessment   Sitting Balance (Static) Fair -   Sitting Balance (Dynamic) Fair -   Standing Balance (Static) Trace +   Standing Balance (Dynamic) Trace +   Weight Shift Sitting Poor   Weight Shift Standing Poor   Bed Mobility    Supine to Sit Maximal Assist   Sit to Supine Maximal Assist   Gait Analysis   Gait Level Of Assist Unable to Participate   Functional Mobility   Sit to Stand Maximal Assist   Bed, Chair, Wheelchair Transfer Unable to Participate   Physical Therapy Initial Treatment Plan    Duration Evaluation only   Anticipated Discharge Equipment and Recommendations   DC Equipment Recommendations None   Discharge Recommendations   (return to  [facility])

## 2024-09-04 NOTE — PROGRESS NOTES
Hospital Medicine Daily Progress Note    Date of Service  9/4/2024    Chief Complaint  Yury Blake is a 54 y.o. male admitted 8/27/2024 with left foot ulcer    Hospital Course  54 y.o. male from SNF with history of Wernicke encephalopathy, alcohol abuse, psychiatric disorder, diabetes, recent admission for cellulitis of feet who presented 8/27/2024 with evaluation for wound check.  There was concern for purulent drainage from left foot at SNF, therefore referred to ER for evaluation.  In ER, x-ray of left foot noted possible osteomyelitis of lateral aspect of base of left fifth toe proximal phalanx.  Therefore admission requested by ERP.  Admitted to medicine service for further evaluation and treatment.     8/28 bilateral ANA normal.  8/30 left foot MRI showing fifth metatarsal osteomyelitis.  Orthopedic surgery following first patient underwent left fifth toe ray amputation on 9/3/2024.    Interval Problem Update  Evaluate at bedside  Reporting post op foot pain today  Pain better with pain mgmt  Stable vitals  On room air  Adjust pain management regimen  Wound care following  PT eval  Pending  for wound care    Patient is medically clear  Pending group home acceptance for discharge    I have discussed this patient's plan of care and discharge plan at IDT rounds today with Case Management, Nursing, Nursing leadership, and other members of the IDT team.    Consultants/Specialty  Orthopedic surgery    Code Status  Full Code    Disposition  The patient is medically cleared for discharge to home or a post-acute facility.     I have placed the appropriate orders for post-discharge needs.    Review of Systems  Review of Systems   Constitutional: Negative.    HENT: Negative.     Eyes: Negative.    Respiratory: Negative.     Cardiovascular: Negative.    Gastrointestinal: Negative.    Genitourinary: Negative.    Musculoskeletal:  Positive for joint pain.   Skin: Negative.    Neurological: Negative.     Endo/Heme/Allergies: Negative.    Psychiatric/Behavioral: Negative.          Physical Exam  Temp:  [35.7 °C (96.2 °F)-36.5 °C (97.7 °F)] 36.5 °C (97.7 °F)  Pulse:  [55-86] 58  Resp:  [10-18] 16  BP: ()/(46-80) 102/67  SpO2:  [92 %-98 %] 94 %    Physical Exam  Constitutional:       General: He is not in acute distress.     Appearance: Normal appearance.   HENT:      Head: Normocephalic and atraumatic.      Nose: Nose normal. No congestion.      Mouth/Throat:      Mouth: Mucous membranes are moist.   Eyes:      Extraocular Movements: Extraocular movements intact.      Pupils: Pupils are equal, round, and reactive to light.   Cardiovascular:      Rate and Rhythm: Normal rate and regular rhythm.      Pulses: Normal pulses.      Heart sounds: Normal heart sounds.   Pulmonary:      Effort: Pulmonary effort is normal.      Breath sounds: Normal breath sounds.   Abdominal:      General: Bowel sounds are normal.      Palpations: Abdomen is soft.      Tenderness: There is no abdominal tenderness.   Musculoskeletal:         General: Normal range of motion.      Cervical back: Normal range of motion and neck supple.      Comments: Mild tenderness at 5th toe, no apparent discharge    Skin:     General: Skin is warm.      Coloration: Skin is not jaundiced.   Neurological:      General: No focal deficit present.      Mental Status: He is alert and oriented to person, place, and time. Mental status is at baseline.      Cranial Nerves: No cranial nerve deficit.   Psychiatric:         Mood and Affect: Mood normal.         Behavior: Behavior normal.         Thought Content: Thought content normal.         Judgment: Judgment normal.       Fluids    Intake/Output Summary (Last 24 hours) at 9/4/2024 0729  Last data filed at 9/4/2024 0520  Gross per 24 hour   Intake 300 ml   Output 3240 ml   Net -2940 ml          Laboratory  Recent Labs     09/02/24  0147 09/03/24  0221   WBC 9.0 8.5   RBC 3.41* 3.39*   HEMOGLOBIN 8.5* 8.3*    HEMATOCRIT 28.9* 28.6*   MCV 84.8 84.4   MCH 24.9* 24.5*   MCHC 29.4* 29.0*   RDW 51.0* 51.0*   PLATELETCT 285 293   MPV 9.7 9.6                           Imaging  MR-FOOT-WITH & W/O LEFT   Final Result         1. Subcutaneous edema. This may be seen with cellulitis.   2. No abscess is evident.   3. Signal abnormality within the fifth metatarsal head and base of the fifth proximal phalanx is consistent with osteomyelitis given a soft tissue wound or ulcer in this region.   4. Postoperative changes are noted involving the midfoot with an anchor in the lateral cuneiform.      DX-SKULL-LIMITED 3-   Final Result      No radiopaque foreign object to preclude MR imaging.      DX-CHEST-LIMITED (1 VIEW)   Final Result         No imaging findings to preclude MRI imaging.      [id number] VIEW   Final Result      1. No opaque foreign object to preclude MR imaging.   2. Moderate to large amount of stool throughout the colon.      US-ANA SINGLE LEVEL BILAT   Final Result      DX-FOOT-2- RIGHT   Final Result         1.  No acute traumatic bony injury.   2.  Atherosclerosis      DX-FOOT-COMPLETE 3+ LEFT   Final Result      1. Lateral left forefoot soft tissue ulcer, with loss of the normal well-defined cortex involving the lateral aspect of the base of the left fifth toe proximal phalanx, concerning for osteomyelitis.   2. Decreased bone mineralization.   3. No other acute findings.           Assessment/Plan  * Osteomyelitis (HCC)- (present on admission)  Assessment & Plan  X-ray left foot suspicious for osteomyelitis of lateral aspect of base of left fifth toe proximal phalanx  8/30 left foot MRI showing fifth metatarsal osteomyelitis.  ANA normal  IV Unasyn  Wound care  Discussed orthopedic surgery ([facility]) on 8/30.  Possible OR tomorrow    Nonhealing ulcer of left lower extremity (HCC)- (present on admission)  Assessment & Plan  Chest x-ray concerning for osteomyelitis  ANA normal  8/30 left foot MRI showing fifth metatarsal  osteomyelitis.  IV antibiotics  Wound care  Orthopedic Surgery following    Encounter for preoperative assessment  Assessment & Plan  Admit to:    Orthopedic/Med-Surg floor since no major co-morbidities.     Cardiovascular:   Patient does not have history of CHF  Pre-op EKG: Yes    Pulmonary:  Oxygen per protocol  Incentive Spirometer    GI:   No history of cirrhosis. Standard bowel regimen. Hold for loose stools.    Renal:   IV fluids: No fluids - risk of fluid overload    Labs: Metabolic Panel with AM labs    Musculoskeletal:   Check 25 OH vitamin D level. If 31-40 pg/mL, consider starting vitamin D3 1000 IU PO daily. If 20-30 pg/mL, consider vitamin D3 2000 IU PO daily. If <20 pg/mL, vitamin D2 50,000 IU weekly x 8 weeks then 2000 IU PO daily.    Neurologic:   Pain Control: Neuro checks every 4 hours  Avoid fentanyl (short-acting)  Acetaminophen 1000 mg PO TID; 650 mg PO TID if liver problems    Hematologic:  Plan on pharmacologic DVT prophylaxis post operative day #1. Hold for decreasing hemoglobin. Notify provider for hemoglobin less than 8.  Order preoperative type and cross.   Hgb every 6 hours if high bleeding risk.   If patient was on anticoagulation prior to arrival risks and benefits will be weighed by teams including surgery, hospitalist, geriatrics, and anesthesia for delaying surgery more than 24 hours.   On anticoagulation prior to arrival: No    Medical Assessment Risk:  Low    Surgical Risk:   Low      Diabetes (HCC)- (present on admission)  Assessment & Plan  ISS  Statin    Psychiatric disorder- (present on admission)  Assessment & Plan  Continue Depakote, risperidone    Wernicke encephalopathy- (present on admission)  Assessment & Plan  Alcohol cessation strongly advised  Continue thiamine supplement  Multivitamins    Alcohol abuse- (present on admission)  Assessment & Plan  H/O  Monitor      Hypokalemia- (present on admission)  Assessment & Plan  Replace       VTE prophylaxis: Heparin    I have  performed a physical exam and reviewed and updated ROS and Plan today (9/4/2024). In review of yesterday's note (9/3/2024), there are no changes except as documented above.

## 2024-09-04 NOTE — CARE PLAN
The patient is Stable - Low risk of patient condition declining or worsening    Shift Goals  Clinical Goals: Pt will maintain safety and prepare for surgery  Patient Goals: KIRTI  Family Goals: na    Progress made toward(s) clinical / shift goals:      Pt maintaining post-op vitals WDL. Pt surgical wound dressing remained CDI. Pt complained of no pain during shift. Pt is on 0.5L NC and satting above 90%. Skin integrity maintained with dressings in place remaining CDI. All needs met during shift.      Problem: Respiratory  Goal: Patient will achieve/maintain optimum respiratory ventilation and gas exchange  Outcome: Progressing  Note: Pt is on 0.5 L NC post op and breathing WDL     Problem: Skin Integrity  Goal: Skin integrity is maintained or improved  Outcome: Progressing  Note: Pt skin integrity maintained with condom cath in place and repositioning.     Problem: Fall Risk  Goal: Patient will remain free from falls  Outcome: Progressing  Note: Pt remained free from falls during shift.       Patient is not progressing towards the following goals:

## 2024-09-04 NOTE — PROGRESS NOTES
Received bedside report from previous RN and assumed care of patient. Labs and orders noted. Assessment performed. Patient updated on POC; verbalizes understanding and states all of her questions/concerns have been addressed and answered appropriately. Patient states all of her needs are being met at this time. Safety precautions in place including patient call light within reach, personal possessions nearby, bed in low position and locked, patient rounding in practice, bed alarm on and working properly and non-skid socks in place.

## 2024-09-04 NOTE — DISCHARGE PLANNING
Referral sent to [facility]    1110- [facility] HH declined / non covered area  1115- [facility]erral [facility] to [facility]  1155- [facility] declined / [facility] medicaid full  1225- [facility] to [facility]   1310- [facility]  1320-Referral sent to [place] / non covered area  1323- [facility] does not service [place] or accept insurance.  1326- Referral sent to [facility]  1433-Per Rosendo at [facility], referral currently under review.  1515-Spoke To: Rosendo  Agency/Facility Name: [facility]  Plan or Request: MORGAN called to f/u, referral still under review.

## 2024-09-04 NOTE — PROGRESS NOTES
Received report from night shift RN and assumed pt care at 0700. Assessment completed. Patient is A&Ox2, oriented to self and place and on 0.5 L O2 via nasal cannula. Patient does not report or show any signs of pain at this time. Bed locked and in the lowest position. Patient on a low airloss pump. Call light within reach. Patient on post op vitals q4H.Non-skid socks and heel float boot in place.

## 2024-09-04 NOTE — WOUND TEAM
Referral for [facility] ordered so patient can follow up with Dr. Woodard in 1-2 weeks. PRAFO and post op shoe also ordered per MD surgical note.

## 2024-09-04 NOTE — PROGRESS NOTES
4 Eyes Skin Assessment Completed by MUSTAPHA Stevenson and MUSTAPHA Wilson.    Head WDL  Ears WDL  Nose WDL  Mouth WDL  Neck WDL  Breast/Chest WDL  Shoulder Blades WDL  Spine WDL  (R) Arm/Elbow/Hand WDL  (L) Arm/Elbow/Hand WDL  Abdomen WDL  Groin WDL  Scrotum/Coccyx/Buttocks Redness and Discoloration  (R) Leg WDL  (L) Leg KIRTI dressing in place; assess and change post op day 2  (R) Heel/Foot/Toe WDL  (L) Heel/Foot/Toe KIRTI dressing in place; assess and change post op day 2          Devices In Places SCD's, Ortho Boot/Shoe, Condom Cath, and Nasal Cannula      Interventions In Place Gray Ear Foams, Heel Float Boots, Pillows, and Low Air Loss Mattress    Possible Skin Injury Yes    Pictures Uploaded Into Epic Yes  Wound Consult Placed Yes  RN Wound Prevention Protocol Ordered Yes

## 2024-09-04 NOTE — CARE PLAN
The patient is Stable - Low risk of patient condition declining or worsening    Shift Goals  Clinical Goals: Skin integrity: Maintain free of skin injury throughout shift  Patient Goals: KIRTI, AMS  Family Goals: N/A    Progress made toward(s) clinical / shift goals:  Staff turning patient at least [id number]; extra pillows in place for comfort; Bilateral heel float boots in use.    Patient is not progressing towards the following goals: Non blanching left heel; wound cs placed       This patient was seen in [facility] at [address] [place] [address] Urgent Care while in their vehicle due to COVID-19 pandemic with PPE and focused examination in order to decrease community viral transmission. The patient/guardian gave verbal consent to treat. The history is provided by the patient and the mother. Pediatric Social History:       Asymptomatic  Exposed to covid from his cousine    Past Medical History:   Diagnosis Date    Asthma     Community acquired pneumonia     H/O seasonal allergies     Head ache     Otitis media     Scoliosis         Past Surgical History:   Procedure Laterality Date    HX ADENOIDECTOMY      HX ORTHOPAEDIC      toe surgery    HX TYMPANOSTOMY           No family history on file.      Social History     Socioeconomic History    Marital status: SINGLE     Spouse name: Not on file    Number of children: Not on file    Years of education: Not on file    Highest education level: Not on file   Occupational History    Not on file   Social Needs    Financial resource strain: Not on file    Food insecurity     Worry: Not on file     Inability: Not on file    Transportation needs     Medical: Not on file     Non-medical: Not on file   Tobacco Use    Smoking status: Never Smoker    Smokeless tobacco: Never Used   Substance and Sexual Activity    Alcohol use: Not on file    Drug use: Not on file    Sexual activity: Not on file   Lifestyle    Physical activity     Days per week: Not on file     Minutes per session: Not on file    Stress: Not on file   Relationships    Social connections     Talks on phone: Not on file     Gets together: Not on file     Attends [employer] service: Not on file     Active member of club or organization: Not on file     Attends meetings of clubs or organizations: Not on file     Relationship status: Not on file    Intimate partner violence     Fear of current or ex partner: Not on file     Emotionally abused: Not on file     Physically abused: Not on file     Forced sexual activity: Not on file   Other Topics Concern    Not on file   Social History Narrative    Not on file                ALLERGIES: Patient has no known allergies. Review of Systems   All other systems reviewed and are negative. Vitals:    09/21/20 1132   Pulse: 74   Resp: 14   Temp: 98.5 °F (36.9 °C)   SpO2: 99%       Physical Exam  Vitals signs and nursing note reviewed. Constitutional:       General: He is not in acute distress. Appearance: He is not ill-appearing. Pulmonary:      Effort: Pulmonary effort is normal. No respiratory distress. Breath sounds: Normal breath sounds. MDM    Procedures        ICD-10-CM ICD-9-CM    1. Exposure to COVID-19 virus  Z20.828 V01.79 NOVEL CORONAVIRUS (COVID-19)      Tested for Covid-19 and advised to quarantine until result comes back. No orders of the defined types were placed in this encounter. No results found for any visits on 09/21/20. The patients condition was discussed with the patient and they understand. The patient is to follow up with primary care doctor. If signs and symptoms become worse the pt is to go to the ER. The patient is to take medications as prescribed.

## 2024-09-04 NOTE — WOUND TEAM
[facility]  Inpatient Services  Wound and Skin Care Brief Evaluation    Admission Date: 8/27/2024     Last order of IP CONSULT TO WOUND CARE was found on 9/4/2024 from Hospital Encounter on 8/27/2024     HPI, PMH, SH: Reviewed    Chief Complaint   Patient presents with    Wound Check     Px present from EMS from [facility] in [place], [place]. They noticed a erica sized wound to the L lateral foot draining, sent a pic to his MD and recommended they bring him in.      Diagnosis: Osteomyelitis (HCC) [M86.9]    Unit where seen by Wound Team: S519/02     Wound consult placed regarding Left heel. Chart and images reviewed.  This clinician in to assess patient. Patient pleasant and agreeable. Left heel with blanchable redness and intact with no open wounds. Offloaded with heel float boots. Non-selectively debrided with Wound cleanser and Gauze.     No pressure injuries or advanced wound care needs identified. Wound consult completed. No further follow up unless indicated and consulted.     Wound team scheduled to follow up for lateral foot wound. Patient went to OR with Dr. Woodard on 9/3/2024. Patient with surgical dressing in place. Please escalate all concerns regarding surgical incision to Dr. Woodard.          PREVENTATIVE INTERVENTIONS:    Q shift Hunter - performed per nursing policy  Q shift pressure point assessments - performed per nursing policy    Surface/Positioning  Standard/trauma mattress - Currently in Place  Reposition q 2 hours - Currently in Place  TAPs Turning system - Currently in Place    Offloading/Redistribution  Heel float boots (Prevalon boot) - Currently in Place  Float Heels off Bed with Pillows - Currently in Place

## 2024-09-05 ENCOUNTER — PHARMACY VISIT (OUTPATIENT)
Dept: PHARMACY | Facility: MEDICAL CENTER | Age: 54
End: 2024-09-05
Payer: COMMERCIAL

## 2024-09-05 VITALS
HEIGHT: 72 IN | TEMPERATURE: 97 F | SYSTOLIC BLOOD PRESSURE: 106 MMHG | OXYGEN SATURATION: 95 % | BODY MASS INDEX: 36.22 KG/M2 | DIASTOLIC BLOOD PRESSURE: 56 MMHG | HEART RATE: 60 BPM | WEIGHT: 267.42 LBS | RESPIRATION RATE: 16 BRPM

## 2024-09-05 PROCEDURE — A9270 NON-COVERED ITEM OR SERVICE: HCPCS | Performed by: STUDENT IN AN ORGANIZED HEALTH CARE EDUCATION/TRAINING PROGRAM

## 2024-09-05 PROCEDURE — RXMED WILLOW AMBULATORY MEDICATION CHARGE: Performed by: STUDENT IN AN ORGANIZED HEALTH CARE EDUCATION/TRAINING PROGRAM

## 2024-09-05 PROCEDURE — 700102 HCHG RX REV CODE 250 W/ 637 OVERRIDE(OP): Performed by: STUDENT IN AN ORGANIZED HEALTH CARE EDUCATION/TRAINING PROGRAM

## 2024-09-05 PROCEDURE — 99239 HOSP IP/OBS DSCHRG MGMT >30: CPT | Performed by: STUDENT IN AN ORGANIZED HEALTH CARE EDUCATION/TRAINING PROGRAM

## 2024-09-05 RX ORDER — ATORVASTATIN CALCIUM 40 MG/1
40 TABLET, FILM COATED ORAL EVERY EVENING
Qty: 30 TABLET | Refills: 0 | Status: SHIPPED | OUTPATIENT
Start: 2024-09-05

## 2024-09-05 RX ORDER — ASPIRIN 81 MG/1
81 TABLET ORAL DAILY
Qty: 100 TABLET | Refills: 0 | Status: SHIPPED | OUTPATIENT
Start: 2024-09-06

## 2024-09-05 RX ORDER — ACETAMINOPHEN 500 MG
1000 TABLET ORAL 3 TIMES DAILY
Qty: 30 TABLET | Refills: 0 | Status: SHIPPED | OUTPATIENT
Start: 2024-09-05

## 2024-09-05 RX ADMIN — ACETAMINOPHEN 1000 MG: 500 TABLET ORAL at 04:41

## 2024-09-05 RX ADMIN — Medication 100 MG: at 08:31

## 2024-09-05 RX ADMIN — DIVALPROEX SODIUM 250 MG: 250 TABLET, DELAYED RELEASE ORAL at 04:41

## 2024-09-05 RX ADMIN — OMEPRAZOLE 20 MG: 20 CAPSULE, DELAYED RELEASE ORAL at 04:41

## 2024-09-05 RX ADMIN — RISPERIDONE 2 MG: 0.5 TABLET, FILM COATED ORAL at 04:41

## 2024-09-05 RX ADMIN — POLYETHYLENE GLYCOL 3350 1 PACKET: 17 POWDER, FOR SOLUTION ORAL at 04:42

## 2024-09-05 RX ADMIN — ACETAMINOPHEN 1000 MG: 500 TABLET ORAL at 11:49

## 2024-09-05 RX ADMIN — CYANOCOBALAMIN TAB 500 MCG 1000 MCG: 500 TAB at 04:41

## 2024-09-05 RX ADMIN — DIVALPROEX SODIUM 250 MG: 250 TABLET, DELAYED RELEASE ORAL at 11:49

## 2024-09-05 RX ADMIN — ASPIRIN 81 MG: 81 TABLET, COATED ORAL at 04:40

## 2024-09-05 RX ADMIN — FOLIC ACID 1 MG: 1 TABLET ORAL at 04:41

## 2024-09-05 NOTE — PROGRESS NOTES
Received report from night shift RN and assumed pt care at 0700. Patient is A&Ox1, disoriented to place, time, and situation. Pt on  0.5 L O2. Patient report pain of 0 /10. Bed locked and in the lowest position. Call light within reach. Plan of care discussed and Patient expresses no further needs at this time.

## 2024-09-05 NOTE — CARE PLAN
The patient is Stable - Low risk of patient condition declining or worsening    Shift Goals  Clinical Goals: pts skin integrity will be maintained; bowel elimination will improve following an enema intervention  Patient Goals: rest  Family Goals: KIRTI    Progress made toward(s) clinical / shift goals: Pts skin integrity maintained through positioning and mepilex/offloading. Pt had large bowel movement after receiving an enema.       Problem: Skin Integrity  Goal: Skin integrity is maintained or improved  Outcome: Progressing     Problem: Bowel/Gastric:  Goal: Normal bowel function is maintained or improved  Outcome: Progressing       Patient is not progressing towards the following goals:

## 2024-09-05 NOTE — DISCHARGE PLANNING
Case Management Discharge Planning    Admission Date: 8/27/2024  GMLOS: 5.8  ALOS: 9    6-Clicks ADL Score: 21  6-Clicks Mobility Score: 10  PT and/or OT Eval ordered: Yes  Post-acute Referrals Ordered: Yes  Post-acute Choice Obtained: Yes  Has referral(s) been sent to post-acute provider:  Yes      Anticipated Discharge Dispo:      DME Needed: No    Action(s) Taken: OTHER    Per MD, patient is medically clear for discharge. Per Daron MORA [facility] ACCEPTED, pending updated [facility] order, MD aware.    JANE spoke with Moses,  Owner. Moses issued confirmation of acceptance. Moses verified the address for Asim Guadalupe is [address] [place] [address] [place] [address].    Per Moses, hard copy and 2 days supply of any new medication has to be send with the patient, MD aware.    JANE submitted Transportation Request Order in Epic. JANE faxed PCS Form to [employer].    Per Brady Rojo transportation is scheduled for today at 1300, JANE notified RYAN Clemons via phone.    Escalations Completed: None    Medically Clear: Yes    Next Steps: Home    Barriers to Discharge: None    Is the patient up for discharge tomorrow: No

## 2024-09-05 NOTE — PROGRESS NOTES
Report received from DAY RN and assumed patient care at 1900. Patient is A&Ox 2, disoriented to situation and time, on 0.5 L NC, and bed alarm is on at this time. Pt has flat affect and does Patient declines pain at this time. Pt declines any other symptoms of needs at this time. Call light within reach and bed in lowest position. Reinforced the need to call for assistance. Plan of care discussed and patient does not have any further needs at this time.

## 2024-09-05 NOTE — PROGRESS NOTES
4 Eyes Skin Assessment Completed by MUSTAPHA Stevenson and MUSTAPHA Saldivar.    Head WDL  Ears Redness  Nose WDL  Mouth WDL  Neck WDL  Breast/Chest WDL  Shoulder Blades WDL  Spine WDL  (R) Arm/Elbow/Hand WDL  (L) Arm/Elbow/Hand WDL  Abdomen WDL  Groin WDL  Scrotum/Coccyx/Buttocks Redness  (R) Leg WDL  (L) Leg WDL, discoloration/bruise  (R) Heel/Foot/Toe WDL  (L) Heel/Foot/Toe WDL          Devices In Places SCD's, Condom Cath, Nasal Cannula      Interventions In Place Gray Ear Foams, Heel Float Boots, Pillows, and Low Air Loss Mattress    Possible Skin Injury Yes    Pictures Uploaded Into Epic Yes  Wound Consult Placed Yes  RN Wound Prevention Protocol Ordered Yes

## 2024-09-05 NOTE — DISCHARGE PLANNING
DC Transport Scheduled    Transport Company Scheduled:  [employer]  Spoke with Cheryl LIN to schedule transport.  MTM Trip #: [id number]    Scheduled Date: 9/5/2024  Scheduled Time: 1300    Destination: [facility] [place]:   [address] [place] [address] [place] [address]    Notified care team of scheduled transport via Voalte.     If there are any changes needed to the DC transportation scheduled, please contact [facility] at ext. [phone] between the hours of 0825-2707 Mon-Fri. If outside those hours, contact the ED Case Manager at ext. [phone].

## 2024-09-05 NOTE — PROGRESS NOTES
4 Eyes Skin Assessment Completed by MUSTAPHA Thompson and MUSTAPHA Saldivar.    Head WDL  Ears flaky  Nose WDL  Mouth WDL  Neck WDL  Breast/Chest WDL  Shoulder Blades WDL  Spine WDL  (R) Arm/Elbow/Hand WDL  (L) Arm/Elbow/Hand WDL  Abdomen WDL  Groin Redness, moisture  Scrotum/Coccyx/Buttocks Redness, excoriation  (R) Leg WDL  (L) Leg Bruising, L hip  (R) Heel/Foot/Toe Redness  (L) Heel/Foot/Toe Redness, heel, surgical dressing in place around foot, KIRTI          Devices In Places SCD's, Condom Cath, and Nasal Cannula,       Interventions In Place NC W/Ear Foams, Heel Float Boots, Pillows, and Low Air Loss Mattress, mepilex over L hip site    Possible Skin Injury No    Pictures Uploaded Into Epic N/A  Wound Consult Placed N/A  RN Wound Prevention Protocol Ordered Yes

## 2024-09-05 NOTE — PROGRESS NOTES
4 Eyes Skin Assessment Completed by MUSTAPHA Avina and MUSTAPHA Rios.    Head WDL  Ears WDL  Nose WDL  Mouth WDL  Neck WDL  Breast/Chest WDL  Shoulder Blades WDL  Spine WDL  (R) Arm/Elbow/Hand WDL  (L) Arm/Elbow/Hand WDL  Abdomen WDL  Groin moisture   Scrotum/Coccyx/Buttocks Redness and discoloration    (R) Leg WDL  (L) Leg L hip bruising   (R) Heel/Foot/Toe Redness heel   (L) Heel/Foot/Toe KIRTI surgical dressing in place           Devices In Places SCD's and Nasal Cannula and condom catheter       Interventions In Place NC W/Ear Foams, Heel Float Boots, Pillows, and Low Air Loss Mattress, mepilex to L hip     Possible Skin Injury Yes    Pictures Uploaded Into Epic Yes  Wound Consult Placed Yes  RN Wound Prevention Protocol Ordered Yes

## 2024-09-05 NOTE — CARE PLAN
The patient is Stable - Low risk of patient condition declining or worsening    Shift Goals  Clinical Goals: pt skin integrity to remain intact this shift, pt bowel elimination will improve  Patient Goals: rest  Family Goals: KIRTI    Progress made toward(s) clinical / shift goals:    Problem: Knowledge Deficit - Standard  Goal: Patient and family/care givers will demonstrate understanding of plan of care, disease process/condition, diagnostic tests and medications  9/5/2024 0314 by Kailyn Leger R.N.  Outcome: Progressing  9/5/2024 0303 by Kailyn Leger R.N.  Outcome: Progressing     Problem: Respiratory  Goal: Patient will achieve/maintain optimum respiratory ventilation and gas exchange  9/5/2024 0314 by Kailyn Leger R.N.  Outcome: Progressing  9/5/2024 0303 by Kailyn Leger R.N.  Outcome: Progressing     Problem: Skin Integrity  Goal: Skin integrity is maintained or improved  Outcome: Progressing     Problem: Fall Risk  Goal: Patient will remain free from falls  Outcome: Progressing     Problem: Nutrition  Goal: Patient's nutritional and fluid intake will be adequate or improve  Outcome: Progressing       Patient is not progressing towards the following goals:

## 2024-09-05 NOTE — PROGRESS NOTES
IV pulled, belongings, including meds to beds, offloading boot, and wound care supplies packaged for pt discharge.

## 2024-09-05 NOTE — DISCHARGE SUMMARY
Discharge Summary    CHIEF COMPLAINT ON ADMISSION  Chief Complaint   Patient presents with    Wound Check     Px present from EMS from [facility] in [place], [place]. They noticed a erica sized wound to the L lateral foot draining, sent a pic to his MD and recommended they bring him in.        Reason for Admission  EMS     Admission Date  8/27/2024    CODE STATUS  Full Code    HPI & HOSPITAL COURSE  54 y.o. male from [facility] with history of Wernicke encephalopathy, alcohol abuse, psychiatric disorder, diabetes, recent admission for cellulitis of feet who presented 8/27/2024 with evaluation for wound check.  There was concern for purulent drainage from left foot at SNF, therefore referred to ER for evaluation.  In ER, x-ray of left foot noted possible osteomyelitis of lateral aspect of base of left fifth toe proximal phalanx.  Therefore admission requested by ERP.  Admitted to medicine service for further evaluation and treatment.      8/28 bilateral ANA normal.  8/30 left foot MRI showing fifth metatarsal osteomyelitis.  Orthopedic surgery following first patient underwent left fifth toe ray amputation on 9/3/2024.  Completed Unasyn antibiotic regimen while inpatient.  OR cultures with no growth to date.  Patient tolerating meals.  He was set up with home health for wound care prior to discharge.  Stable patient within chronic conditions to be discharged to group home for continuity of care.      Therefore, he is discharged in good and stable condition to home with organized home healthcare and close outpatient follow-up.    The patient met 2-midnight criteria for an inpatient stay at the time of discharge.    Discharge Date  9/5/2024    FOLLOW UP ITEMS POST DISCHARGE  Primary care provider follow posthospital discharge care    DISCHARGE DIAGNOSES  Principal Problem:    Osteomyelitis (HCC) (POA: Yes)  Active Problems:    Nonhealing ulcer of left lower extremity (HCC) (POA: Yes)    Encounter for preoperative  assessment (POA: No)    Hypokalemia (POA: Yes)    Alcohol abuse (POA: Yes)    Wernicke encephalopathy (POA: Yes)    Psychiatric disorder (POA: Yes)    Diabetes (HCC) (POA: Yes)  Resolved Problems:    * No resolved hospital problems. *      FOLLOW UP  No future appointments.  Armando Woodard M.D.  [address]  [facility] [id number] [phone]    Follow up in 1 week(s)  For wound re-check    [facility] [place] [id number]  [facility] [address]  [phone]          MEDICATIONS ON DISCHARGE     Medication List        START taking these medications        Instructions   aspirin 81 MG EC tablet  Start taking on: September 6, 2024  Replaces: aspirin 325 MG Tabs   Take 1 Tablet by mouth every day.  Dose: 81 mg     atorvastatin 40 MG Tabs  Commonly known as: Lipitor   Take 1 Tablet by mouth every evening.  Dose: 40 mg            CHANGE how you take these medications        Instructions   acetaminophen 500 MG Tabs  What changed:   medication strength  how much to take  when to take this  reasons to take this  additional instructions  Commonly known as: Tylenol   Take 2 Tablets by mouth 3 times a day.  Dose: 1,000 mg            CONTINUE taking these medications        Instructions   divalproex 250 MG Tbec  Commonly known as: Depakote   Take 250 mg by mouth 3 times a day.  Dose: 250 mg     folic acid 1 MG Tabs  Commonly known as: Folvite   Take 1 mg by mouth every day.  Dose: 1 mg     metFORMIN 500 MG Tabs  Commonly known as: Glucophage   Take 1 Tablet by mouth 2 times a day with meals.  Dose: 500 mg     omeprazole 20 MG delayed-release capsule  Commonly known as: PriLOSEC   Take 1 Capsule by mouth 2 times a day.  Dose: 20 mg     risperiDONE 2 MG Tabs  Commonly known as: RisperDAL   Take 1 tablet by mouth 2 Times a Day.  Dose: 2 mg     thiamine 100 MG Tabs  Commonly known as: Vitamin B-1   Take 100 mg by mouth every day.  Dose: 100 mg            STOP taking these medications       aspirin 325 MG Tabs  Commonly known as: Asa  Replaced by: aspirin 81 MG EC tablet              Allergies  No Known Allergies    DIET  Orders Placed This Encounter   Procedures    Diet Order Diet: Consistent CHO (Diabetic)     Standing Status:   Standing     Number of Occurrences:   1     Order Specific Question:   Diet:     Answer:   Consistent CHO (Diabetic) [4]       ACTIVITY  As tolerated.  Weight bearing as tolerated    CONSULTATIONS  Orthopedic surgery    PROCEDURES  As above    LABORATORY  Lab Results   Component Value Date    SODIUM 141 09/04/2024    POTASSIUM 4.1 09/04/2024    CHLORIDE 105 09/04/2024    CO2 23 09/04/2024    GLUCOSE 111 (H) 09/04/2024    BUN 13 09/04/2024    CREATININE 0.81 09/04/2024        Lab Results   Component Value Date    WBC 9.7 09/04/2024    HEMOGLOBIN 9.1 (L) 09/04/2024    HEMATOCRIT 31.4 (L) 09/04/2024    PLATELETCT 307 09/04/2024        Total time of the discharge process exceeds 32 minutes.

## 2024-09-05 NOTE — DISCHARGE PLANNING
Agency/Facility Name: [facility]  Plan or Request: Message received from Akanksha stating a new HH order is needed. Please take off the PT and add wound care and skilled nursing.    9868- New HH order faxed to [facility].

## 2024-09-06 LAB
BACTERIA TISS AEROBE CULT: NORMAL
GRAM STN SPEC: NORMAL
SIGNIFICANT IND 70042: NORMAL
SITE SITE: NORMAL
SOURCE SOURCE: NORMAL

## 2024-09-08 LAB
BACTERIA SPEC ANAEROBE CULT: NORMAL
SIGNIFICANT IND 70042: NORMAL
SITE SITE: NORMAL
SOURCE SOURCE: NORMAL

## 2024-09-19 ENCOUNTER — APPOINTMENT (OUTPATIENT)
Dept: TELEHEALTH | Facility: TELEMEDICINE | Age: 54
End: 2024-09-19
Payer: MEDICAID

## 2024-10-09 NOTE — PROGRESS NOTES
"[facility] Medicine Daily Progress Note    Date of Service  11/3/2020    Chief Complaint  50 y.o. male with a pmhx of HTN, chronic pain and alcohol abuse who presented 9/9/2020 with altered mental status.     Hospital Course  50 y.o. male with a pmhx of HTN, chronic pain and alcohol abuse who presented 9/9/2020 with altered mental status, he was found obtunded in his house by his ex-wife who was checking on him as he had not been seen for 2 days or been able to get a hold of him.  He was found in a chair,  A&Ox0 and hypotensive.  He arrived in the ER with a GCS of 7 (E1,V1,M5) and severely hypotensive.  The patient was intubated for airway protection and central venous access was obtained for administration of high-volume crystalloid resuscitation and vasopressor therapy.    CT head and abdomen and pelvis were all unremarkable. He was briefly treated with antibiotics for concern for an underlying infection and a lumbar tap done was negative. Cultures remained negative.   EEG was done for concern for seizure-like activity was without any epileptiform activity.  He was extubated on 9/11 and has been able to protect his airway ever since. He received benzodiazepines and IV thiamine for etoh.  Now awaiting placement however continues to be impulsive and requires restraints intermittently.  He was found to have UTI s/p treatment with bactrim.  He had urinary retention s/p newton but continued to self-remove. His urinary retention resolved. Continues to be impulsive and has had multiple falls, now in restraints, on risperdal.      Interval Problem Update  - No acute overnight events  - Patient refused OT today  - Still required vest for impulsiveness, getting out of bed  - working on dc planning, guardianship meeting 11/20  - denies issues today or pain, reports he does have some mild abdominal discomfort because he \"had a baby this weekend\"    Consultants/Specialty  Intensivist  Psych    Code Status  Full " Code    Disposition  Pending SNF/guardianship  No acute medical issues, difficult discharge, will place on long term patient list and will see patient twice weekly unless acute issues arise.     Review of Systems  Review of Systems   Unable to perform ROS: Psychiatric disorder   Constitutional: Negative for malaise/fatigue.   HENT: Negative.    Eyes: Negative.    Respiratory: Negative for cough, hemoptysis and sputum production.    Cardiovascular: Negative for chest pain, palpitations, orthopnea and claudication.   Gastrointestinal: Negative for abdominal pain, heartburn, nausea and vomiting.   Genitourinary: Negative for dysuria, frequency and urgency.   Musculoskeletal: Positive for falls (last fall 10/20).   Neurological: Negative.    Psychiatric/Behavioral: Negative for hallucinations.       Physical Exam  Temp:  [36.3 °C (97.4 °F)-36.8 °C (98.2 °F)] 36.7 °C (98 °F)  Pulse:  [72-95] 83  Resp:  [17-18] 18  BP: (123-153)/(77-95) 127/91  SpO2:  [96 %-98 %] 98 %    Physical Exam  Vitals signs and nursing note reviewed.   Constitutional:       General: He is not in acute distress.     Appearance: He is not toxic-appearing.   HENT:      Head: Normocephalic and atraumatic.      Nose: Nose normal.      Mouth/Throat:      Mouth: Mucous membranes are moist.   Eyes:      General: No scleral icterus.        Right eye: No discharge.         Left eye: No discharge.      Conjunctiva/sclera: Conjunctivae normal.   Neck:      Musculoskeletal: Normal range of motion.   Cardiovascular:      Rate and Rhythm: Normal rate and regular rhythm.      Pulses: Normal pulses.      Heart sounds: Normal heart sounds.   Pulmonary:      Effort: Pulmonary effort is normal.      Breath sounds: Normal breath sounds.   Abdominal:      General: Bowel sounds are normal. There is no distension.      Palpations: Abdomen is soft.      Tenderness: There is no abdominal tenderness.   Musculoskeletal:      Right lower leg: No edema.      Left lower leg: No  edema.   Skin:     General: Skin is warm and dry.   Neurological:      Mental Status: He is alert.   Psychiatric:         Mood and Affect: Affect is flat.         Thought Content: Thought content is delusional.         Cognition and Memory: Cognition is impaired. He exhibits impaired recent memory.         Judgment: Judgment is impulsive.      Comments: Confused        Current Facility-Administered Medications:   •  multivitamin (THERAGRAN) tablet 1 Tab, 1 Tab, Oral, DAILY, Glynn Encarnacion M.D., 1 Tab at 11/03/20 0543  •  risperiDONE (RISPERDAL) tablet 1 mg, 1 mg, Oral, BID, Akanksha Martinez M.D., 1 mg at 11/03/20 0637  •  haloperidol lactate (HALDOL) injection 2.5 mg, 2.5 mg, Intramuscular, Q4HRS PRN, Gisele Barnes M.D., 2.5 mg at 10/21/20 2209  •  LORazepam (ATIVAN) injection 0.5 mg, 0.5 mg, Intravenous, Q6HRS PRN, Gisele Barnes M.D., 0.5 mg at 10/23/20 0151  •  thiamine tablet 100 mg, 100 mg, Oral, DAILY, Gisele Barnes M.D., 100 mg at 11/03/20 0543  •  tamsulosin (FLOMAX) capsule 0.8 mg, 0.8 mg, Oral, AFTER BREAKFAST, Gisele Barnes M.D., 0.8 mg at 11/03/20 1025  •  enoxaparin (LOVENOX) inj 40 mg, 40 mg, Subcutaneous, Q EVENING, Gisele Barnes M.D., 40 mg at 11/02/20 1653  •  acetaminophen (TYLENOL) tablet 650 mg, 650 mg, Oral, Q6HRS PRN, Gisele Barnes M.D., 650 mg at 10/31/20 2003  •  senna-docusate (PERICOLACE or SENOKOT S) 8.6-50 MG per tablet 2 Tab, 2 Tab, Oral, BID, 2 Tab at 11/03/20 0543 **AND** polyethylene glycol/lytes (MIRALAX) PACKET 1 Packet, 1 Packet, Oral, QDAY PRN, 1 Packet at 10/17/20 0458 **AND** magnesium hydroxide (MILK OF MAGNESIA) suspension 30 mL, 30 mL, Oral, QDAY PRN, 30 mL at 10/29/20 1822 **AND** [DISCONTINUED] bisacodyl (DULCOLAX) suppository 10 mg, 10 mg, Rectal, QDAY PRN, Xavi Durham D.O.  •  Respiratory Therapy Consult, , Nebulization, Continuous RT, Gisele Barnes M.D.  •  ipratropium-albuterol (DUONEB) nebulizer solution, 3 mL, Nebulization, Q2HRS PRN (RT), Gisele Barnes,  M.D.  •  ondansetron (ZOFRAN) syringe/vial injection 4 mg, 4 mg, Intravenous, Q4HRS PRN, Gisele Barnes M.D.      Fluids    Intake/Output Summary (Last 24 hours) at 11/3/2020 1558  Last data filed at 11/3/2020 1200  Gross per 24 hour   Intake 840 ml   Output --   Net 840 ml       Laboratory  Recent Labs     11/03/20  0337   WBC 7.9   RBC 4.38*   HEMOGLOBIN 12.9*   HEMATOCRIT 40.2*   MCV 91.8   MCH 29.5   MCHC 32.1*   RDW 43.9   PLATELETCT 329   MPV 10.3     Recent Labs     11/03/20  0337   SODIUM 138   POTASSIUM 3.9   CHLORIDE 104   CO2 25   GLUCOSE 95   BUN 11   CREATININE 0.70   CALCIUM 10.1                   Imaging  MR-BRAIN-W/O   Final Result      1.  Moderate diffuse cerebral atrophy.      2.  Minimal periventricular and juxtacortical white matter changes consistent with chronic microvascular ischemic gliosis.      3.  No evidence of acute infarction in the brain parenchyma.      DX-ABDOMEN FOR TUBE PLACEMENT   Final Result      Feeding tube tip overlies the second portion of the duodenum.      DX-CHEST-PORTABLE (1 VIEW)   Final Result         1.  Patchy left lung base opacity, new since prior, could represent early infiltrate            DX-CHEST-PORTABLE (1 VIEW)   Final Result         1.  Patchy left lung base opacity, new since prior, could represent early infiltrate         ZF-LUZEOIL-8 VIEW   Final Result      Enteric tube projects over the distal stomach/pylorus.         EC-ECHOCARDIOGRAM COMPLETE W/O CONT   Final Result      DX-CHEST-PORTABLE (1 VIEW)   Final Result         1.  No acute cardiopulmonary disease.      CT-ABDOMEN-PELVIS W/O   Final Result      1.  No renal stone or hydronephrosis.   2.  Normal appendix.   3.  No focal mesenteric inflammatory process.      DX-ABDOMEN FOR TUBE PLACEMENT   Final Result      NG tube tip projects over expected location the gastric fundus.      US-ABDOMEN COMPLETE SURVEY   Final Result         1.  Echogenic liver compatible with fatty change versus fibrosis.   2.   Gallbladder sludge without additional sonographic findings of cholecystitis.   3.  Partial atrophic bilateral kidneys with lobulated contour         CT-HEAD W/O   Final Result         1.  No acute intracranial abnormality is identified, there are nonspecific white matter changes, commonly associated with small vessel ischemic disease.  Associated mild cerebral atrophy is noted.   2.  Atherosclerosis.      DX-CHEST-PORTABLE (1 VIEW)   Final Result         1.  No acute cardiopulmonary disease.           Assessment/Plan  * Toxic encephalopathy- (present on admission)  Assessment & Plan  Unknown etiology whether this is hypoxic induced brain injury versus Wernicke encephalopathy  LP, MRI brain, EEG were negative. TSH normal. HIV/RPR negative.  - Psych md consult--> they believe it is etoh   - Prn haldol  - Po risperdal 1mg BID  - Continue vest restraint as needed for patient safety, allow patient to walk on vilchis with assistance TID  - pending guardianship Nov 20, 2020.    Urinary retention- (present on admission)  Assessment & Plan  Improved w/ Flomax, continue medication  Condom catheter in place for Ins and Out management    JEWELS (acute kidney injury) (HCC)- (present on admission)  Assessment & Plan  Consistent with ATN 2/2 hypotension, dehydration and sepsis  Avoid nephrotoxins.  Renal dose all medications.  Currently problem is resolved    Normocytic anemia  Assessment & Plan  Mild, unknown etiology  No active bleeding  CTM    Alcohol abuse- (present on admission)  Assessment & Plan  Reported hx of abuse. S/p etoh w/d protocol.  - Continue Vitamins, thiamine  - High dose thiamine  - Seizure precautions    Hypokalemia- (present on admission)  Assessment & Plan  Replace as needed, monitor BMP intermittently       VTE prophylaxis: Lovenox        The patient is a 75y Male complaining of flu-like symptoms.

## 2024-10-31 ENCOUNTER — HOSPITAL ENCOUNTER (EMERGENCY)
Facility: MEDICAL CENTER | Age: 54
End: 2024-10-31
Attending: EMERGENCY MEDICINE
Payer: MEDICAID

## 2024-10-31 VITALS
RESPIRATION RATE: 18 BRPM | OXYGEN SATURATION: 95 % | HEIGHT: 72 IN | DIASTOLIC BLOOD PRESSURE: 57 MMHG | HEART RATE: 73 BPM | SYSTOLIC BLOOD PRESSURE: 125 MMHG | BODY MASS INDEX: 36.16 KG/M2 | WEIGHT: 267 LBS | TEMPERATURE: 98 F

## 2024-10-31 DIAGNOSIS — Z51.89 ENCOUNTER FOR WOUND RE-CHECK: ICD-10-CM

## 2024-10-31 PROCEDURE — 99284 EMERGENCY DEPT VISIT MOD MDM: CPT

## 2024-10-31 ASSESSMENT — FIBROSIS 4 INDEX: FIB4 SCORE: 0.41

## 2024-10-31 NOTE — ED PROVIDER NOTES
ED Provider Note    CHIEF COMPLAINT  Chief Complaint   Patient presents with    Wound Check     Pt sent from group [place] for wound check to L foot. Hx of L pinky toe amputation.        EXTERNAL RECORDS REVIEWED  Inpatient Notes 8/27/2024, patient admitted for osteomyelitis of his fifth digit of his left foot, underwent ray amputation and IV antibiotics.    HPI/ROS  LIMITATION TO HISTORY   Select: Altered mental status / Confusion  OUTSIDE HISTORIAN(S):  Caregiver I discussed the case with patient's caregiver at his group home, he states that a nurse came in thought his foot might be infected and therefore recommended sending patient to the emergency department for IV antibiotics.  Patient has not had any fevers or change in his mental status or issues otherwise.  Apparently there was some delay in getting his postsurgical sutures removed, these were removed a few weeks ago    Yury Blake is a 54 y.o. male who presents for wound check.  Patient with a history of osteomyelitis of his left pinky toe, he is status post amputation of this back in August.  Patient has a history of early onset dementia and is therefore in a care facility  Apparently nurse was concerned about a possible infection of his surgical site and therefore sent him here for possible IV antibiotics.  Patient has no complaints.  He denies any foot pain ankle pain or leg pain.  He denies any fevers or chills however history is significantly limited as patient has a history of early onset dementia    PAST MEDICAL HISTORY   has a past medical history of Arthritis, Encephalopathy, Hypertension, Pain, and Psychiatric problem.    SURGICAL HISTORY   has a past surgical history that includes orif, ankle (Right, 9/26/2017); tendon lenghtening (Bilateral, 2/1/2021); irrigation & debridement ortho (Left, 2/1/2021); irrigation & debridement ortho (Left, 4/12/2021); and toe amputation (Left, 9/3/2024).    FAMILY HISTORY  History reviewed. No pertinent  family history.    SOCIAL HISTORY  Social History     Tobacco Use    Smoking status: Never    Smokeless tobacco: Former     Types: Chew   Vaping Use    Vaping status: Never Used   Substance and Sexual Activity    Alcohol use: Not Currently    Drug use: No    Sexual activity: Not on file       CURRENT MEDICATIONS  Home Medications       Reviewed by Dany Graham R.N. (Registered Nurse) on 10/31/24 at 1254  Med List Status: Partial     Medication Last Dose Status   acetaminophen (TYLENOL) 500 MG Tab  Active   aspirin 81 MG EC tablet  Active   atorvastatin (LIPITOR) 40 MG Tab  Active   divalproex (DEPAKOTE) 250 MG Tablet Delayed Response  Active   folic acid (FOLVITE) 1 MG Tab  Active   metFORMIN (GLUCOPHAGE) 500 MG Tab  Active   omeprazole (PRILOSEC) 20 MG delayed-release capsule  Active   risperiDONE (RISPERDAL) 2 MG Tab  Active   thiamine (VITAMIN B-1) 100 MG Tab  Active                    ALLERGIES  No Known Allergies    PHYSICAL EXAM  VITAL SIGNS: /58   Pulse 92   Temp 36.7 °C (98 °F) (Temporal)   Resp 15   Ht 1.829 m (6')   Wt 121 kg (267 lb)   SpO2 92%   BMI 36.21 kg/m²    Physical Exam  Constitutional:       Appearance: Normal appearance.   HENT:      Mouth/Throat:      Mouth: Mucous membranes are moist.   Pulmonary:      Effort: Pulmonary effort is normal.   Musculoskeletal:      Comments: Left foot is status post ray amputation of the fifth digit.  There is some dry skin underlying this.  There is a a small scab here as well.  The scab was removed and fails to reveal any underlying ulcer or draining exudate or sinus tract.  There is no surrounding erythema exudate or tenderness palpation.  There is no bogginess to the foot.  Distal pulses are 2+.   Neurological:      General: No focal deficit present.      Mental Status: He is alert and oriented to person, place, and time.   Psychiatric:         Mood and Affect: Mood normal.               COURSE & MEDICAL DECISION MAKING    ASSESSMENT,  COURSE AND PLAN  Care Narrative: Patient wound is very normal appearing , does not have any signs of infection, he has great pulses.  There is a small eschar over the wound that I removed and this revealed underlying well vitalized tissue without any associated sinus track or ulceration or erythema.  It does not appear the patient actually got wound care today as generally 1 would think that this eschar would have removed under their care.  The eschar was very superficial and came off by just pulling out with my fingers.  At this point I do not believe that any further imaging or labs are currently indicated As there is no indication of infection.  I discussed the case with patient's wound care nurse, he states that certainly the foot may have been more hyperemic as it was in a pink wall sock and may be mildly sweaty.  I discussed my exam to him and my low suspicion of infection and he is comfortable with ongoing outpatient management.              DISPOSITION AND DISCUSSIONS      Escalation of care considered, and ultimately not performed:Laboratory analysis and diagnostic imaging was deferred in this very well-appearing patient with very reassuring exam, no evidence of infection at this point.        FINAL DIAGNOSIS  1. Encounter for wound re-check

## 2024-10-31 NOTE — DISCHARGE INSTRUCTIONS
There is no evidence of infection at this point.  Continue routine wound care.  Follow-up as needed with orthopedic surgeons and primary care

## 2024-10-31 NOTE — ED NOTES
EMS at bedside to transport patient back to [facility]. D/c paperwork reviewed with EMS.

## 2024-10-31 NOTE — ED TRIAGE NOTES
Chief Complaint   Patient presents with    Wound Check     Pt sent from group home for wound check to L foot. Hx of L pinky toe amputation.      Pt BIB EMS from [place] [facility] for above complaint.    A+Ox4, GCS 15    /58   Pulse 92   Temp 36.7 °C (98 °F) (Temporal)   Resp 15   Ht 1.829 m (6')   Wt 121 kg (267 lb)   SpO2 92%   BMI 36.21 kg/m²

## 2025-02-26 NOTE — DISCHARGE PLANNING
MSW received call from bedside RN. Pt needs to return to [address] MSW updated [facility] ([phone]). ERP spoke to [facility] Wound nurse. ERP confirmed pt can return to . MSW [place] [facility] [place] [facility] for 1515. [facility] ref# [id number] Bedside RN updated.    Pt medicated per MAR. No other needs at this time. Telemetry in place. Call light in reach.

## 2025-04-13 ENCOUNTER — HOSPITAL ENCOUNTER (OUTPATIENT)
Facility: MEDICAL CENTER | Age: 55
End: 2025-04-13
Attending: INTERNAL MEDICINE
Payer: MEDICAID

## 2025-04-13 LAB
ALBUMIN SERPL BCP-MCNC: 3.8 G/DL (ref 3.2–4.9)
ALBUMIN/GLOB SERPL: 1.5 G/DL
ALP SERPL-CCNC: 88 U/L (ref 30–99)
ALT SERPL-CCNC: 9 U/L (ref 2–50)
ANION GAP SERPL CALC-SCNC: 14 MMOL/L (ref 7–16)
AST SERPL-CCNC: 13 U/L (ref 12–45)
BILIRUB SERPL-MCNC: 0.3 MG/DL (ref 0.1–1.5)
BUN SERPL-MCNC: 9 MG/DL (ref 8–22)
CALCIUM ALBUM COR SERPL-MCNC: 9.5 MG/DL (ref 8.5–10.5)
CALCIUM SERPL-MCNC: 9.3 MG/DL (ref 8.5–10.5)
CHLORIDE SERPL-SCNC: 104 MMOL/L (ref 96–112)
CHOLEST SERPL-MCNC: 109 MG/DL (ref 100–199)
CO2 SERPL-SCNC: 24 MMOL/L (ref 20–33)
CREAT SERPL-MCNC: 1 MG/DL (ref 0.5–1.4)
GFR SERPLBLD CREATININE-BSD FMLA CKD-EPI: 90 ML/MIN/1.73 M 2
GLOBULIN SER CALC-MCNC: 2.6 G/DL (ref 1.9–3.5)
GLUCOSE SERPL-MCNC: 136 MG/DL (ref 65–99)
HDLC SERPL-MCNC: 29 MG/DL
LDLC SERPL CALC-MCNC: 32 MG/DL
POTASSIUM SERPL-SCNC: 3.7 MMOL/L (ref 3.6–5.5)
PROT SERPL-MCNC: 6.4 G/DL (ref 6–8.2)
SODIUM SERPL-SCNC: 142 MMOL/L (ref 135–145)
TRIGL SERPL-MCNC: 241 MG/DL (ref 0–149)
TSH SERPL-ACNC: 3.5 UIU/ML (ref 0.38–5.33)
VALPROATE SERPL-MCNC: 38.4 UG/ML (ref 50–100)

## 2025-04-13 PROCEDURE — 84443 ASSAY THYROID STIM HORMONE: CPT

## 2025-04-13 PROCEDURE — 80164 ASSAY DIPROPYLACETIC ACD TOT: CPT

## 2025-04-13 PROCEDURE — 80053 COMPREHEN METABOLIC PANEL: CPT

## 2025-04-13 PROCEDURE — 80061 LIPID PANEL: CPT

## 2025-04-21 ENCOUNTER — HOSPITAL ENCOUNTER (OUTPATIENT)
Facility: MEDICAL CENTER | Age: 55
End: 2025-04-21
Attending: INTERNAL MEDICINE
Payer: MEDICAID

## 2025-04-21 LAB
EST. AVERAGE GLUCOSE BLD GHB EST-MCNC: 123 MG/DL
HBA1C MFR BLD: 5.9 % (ref 4–5.6)

## 2025-04-21 PROCEDURE — 83036 HEMOGLOBIN GLYCOSYLATED A1C: CPT

## (undated) DEVICE — GOWN SURGEONS X-LARGE - DISP. (30/CA)

## (undated) DEVICE — BLADE SURGICAL #15 - (50/BX 3BX/CA)

## (undated) DEVICE — GLOVE BIOGEL SZ 8 SURGICAL PF LTX - (50PR/BX 4BX/CA)

## (undated) DEVICE — GOWN WARMING STANDARD FLEX - (30/CA)

## (undated) DEVICE — PADDING CAST 6 IN STERILE - 6 X 4 YDS (24/CA)

## (undated) DEVICE — CANISTER SUCTION 3000ML MECHANICAL FILTER AUTO SHUTOFF MEDI-VAC NONSTERILE LF DISP (40EA/CA)

## (undated) DEVICE — PADDING CAST 4 IN X 4 YDS - SOF-ROLL (12RL/BG 6BG/CT)

## (undated) DEVICE — SET EXTENSION WITH 2 PORTS (48EA/CA) ***PART #2C8610 IS A SUBSTITUTE*****

## (undated) DEVICE — NEPTUNE 4 PORT MANIFOLD - (20/PK)

## (undated) DEVICE — SUTURE 3-0 ETHILON FS-1 - (36/BX) 30 INCH

## (undated) DEVICE — SPLINT PLASTER 5 IN X 30 IN - (50EA/BX 6BX/CA)

## (undated) DEVICE — SODIUM CHL. IRRIGATION 0.9% 3000ML (4EA/CA 65CA/PF)

## (undated) DEVICE — DRESSING ABDOMINAL PAD STERILE 8 X 10" (360EA/CA)"

## (undated) DEVICE — GLOVE BIOGEL ECLIPSE PF LATEX SIZE 8.0  (50PR/BX)

## (undated) DEVICE — TUBING CLEARLINK DUO-VENT - C-FLO (48EA/CA)

## (undated) DEVICE — SHEET BILATERAL 76X120 - (12/CA)

## (undated) DEVICE — TOURNIQUET CUFF 34 X 4 ONE PORT DISP - STERILE (10/BX)

## (undated) DEVICE — SUTURE 2-0 ETHILON FS - (36/BX) 18 INCH

## (undated) DEVICE — PACK LOWER EXTREMITY - (2/CA)

## (undated) DEVICE — SET IRRIGATION CYSTOSCOPY TUBE L80 IN (20EA/CA)

## (undated) DEVICE — GLOVE SZ 7 BIOGEL PI MICRO - PF LF (50PR/BX 4BX/CA)

## (undated) DEVICE — ELECTRODE DUAL RETURN W/ CORD - (50/PK)

## (undated) DEVICE — BLOCK

## (undated) DEVICE — PADDING CAST 4 IN STERILE - 4 X 4 YDS (24/CA)

## (undated) DEVICE — PAD LAP STERILE 18 X 18 - (5/PK 40PK/CA)

## (undated) DEVICE — Device

## (undated) DEVICE — CONTAINER SPECIMEN BAG OR - STERILE 4 OZ W/LID (100EA/CA)

## (undated) DEVICE — LACTATED RINGERS INJ 1000 ML - (14EA/CA 60CA/PF)

## (undated) DEVICE — CANISTER SUCTION 3000ML MECHANICAL FILTER AUTO SHUTOFF MEDI-VAC NONSTERILE LF DISP  (40EA/CA)

## (undated) DEVICE — STOCKINET BIAS 6 IN STERILE - (20/CA)

## (undated) DEVICE — KIT ANESTHESIA W/CIRCUIT & 3/LT BAG W/FILTER (20EA/CA)

## (undated) DEVICE — GLOVE BIOGEL PI INDICATOR SZ 7.0 SURGICAL PF LF - (50/BX 4BX/CA)

## (undated) DEVICE — SUCTION INSTRUMENT YANKAUER BULBOUS TIP W/O VENT (50EA/CA)

## (undated) DEVICE — DRESSING 3X3 ADAPTIC GAUZE - (50EA/CT)

## (undated) DEVICE — SUTURE 0 VICRYL PLUS CT-2 - 8 X 18 INCH (12/BX)

## (undated) DEVICE — WRAP CO-FLEX 4IN X 5YD STERIL - SELF-ADHERENT (18/CA)

## (undated) DEVICE — GLOVE BIOGEL INDICATOR SZ 8.5 SURGICAL PF LTX - (50/BX 4BX/CA)

## (undated) DEVICE — BLANKET WARMING UPPER BODY - (10/CA)

## (undated) DEVICE — SUTURE 3-0 VICRYL PLUS SH - 8X 18 INCH (12/BX)

## (undated) DEVICE — DRESSING 3X8 ADAPTIC GAUZE - NON-ADHERING (36/PK 6PK/BX)

## (undated) DEVICE — SUTURE GENERAL

## (undated) DEVICE — HEAD HOLDER JUNIOR/ADULT

## (undated) DEVICE — BANDAGE ELASTIC 4 HONEYCOMB - 4"X5YD LF (20/CA)"

## (undated) DEVICE — STOCKINET TUBULAR 6IN STERILE - 6 X 48YDS (25/CA)

## (undated) DEVICE — PROTECTOR ULNA NERVE - (36PR/CA)

## (undated) DEVICE — CHLORAPREP 26 ML APPLICATOR - ORANGE TINT(25/CA)

## (undated) DEVICE — SET LEADWIRE 5 LEAD BEDSIDE DISPOSABLE ECG (1SET OF 5/EA)

## (undated) DEVICE — GLOVE, BIOGEL ECLIPSE, SZ 7.0, PF LTX (50/BX)

## (undated) DEVICE — SLEEVE, VASO, THIGH, MED

## (undated) DEVICE — GLOVE BIOGEL SZ 7.5 SURGICAL PF LTX - (50PR/BX 4BX/CA)

## (undated) DEVICE — MASK, LARYNGEAL AIRWAY #4

## (undated) DEVICE — TOURNIQUET CUFF 24 X 4 ONE PORT - STERILE (10/BX)

## (undated) DEVICE — GLOVE BIOGEL PI INDICATOR SZ 6.5 SURGICAL PF LF - (50/BX 4BX/CA)

## (undated) DEVICE — GLOVE BIOGEL PI ORTHO SZ 7.5 PF LF (40PR/BX)

## (undated) DEVICE — TUBE E-T HI-LO CUFF 7.0MM (10EA/PK)

## (undated) DEVICE — CUP DENTURE W/ LID - (200/CA)

## (undated) DEVICE — SODIUM CHL IRRIGATION 0.9% 1000ML (12EA/CA)

## (undated) DEVICE — GLOVE BIOGEL ECLIPSE  PF LATEX SIZE 6.5 (50PR/BX)

## (undated) DEVICE — SCRUB SOLUTION EXIDINE 4% 40Z - 48/CS CHLORAHEXADINE GLUCONATE

## (undated) DEVICE — ELECTRODE 850 FOAM ADHESIVE - HYDROGEL RADIOTRNSPRNT (50/PK)

## (undated) DEVICE — MASK ANESTHESIA ADULT  - (100/CA)

## (undated) DEVICE — GLOVE BIOGEL INDICATOR SZ 8 SURGICAL PF LTX - (50/BX 4BX/CA)

## (undated) DEVICE — BLADE SURGICAL CLIPPER - (50EA/CA)

## (undated) DEVICE — SPONGE GAUZESTER 4 X 4 4PLY - (128PK/CA)

## (undated) DEVICE — SUTURE O FIBERWIRE - (12/BX)

## (undated) DEVICE — GLOVE SZ 6.5 BIOGEL PI MICRO - PF LF (50PR/BX)

## (undated) DEVICE — SENSOR SPO2 NEO LNCS ADHESIVE (20/BX) SEE USER NOTES

## (undated) DEVICE — GLOVE BIOGEL PI INDICATOR SZ 8.0 SURGICAL PF LF -(50/BX 4BX/CA)

## (undated) DEVICE — SUTURE 3-0 MONOCRYL PLUS PS-1 - 27 INCH (36/BX)

## (undated) DEVICE — MAT PATIENT POSITIONING PREVALON (10EA/CA)

## (undated) DEVICE — DRAPE LARGE 3 QUARTER - (20/CA)

## (undated) DEVICE — GLOVE BIOGEL INDICATOR SZ 6.5 SURGICAL PF LTX - (50PR/BX 4BX/CA)

## (undated) DEVICE — SUTURE 3-0 ETHILON PS-1 (36PK/BX)

## (undated) DEVICE — COVER LIGHT HANDLE ALC PLUS DISP (18EA/BX)

## (undated) DEVICE — SOD. CHL. INJ. 0.9% 1000 ML - (14EA/CA 60CA/PF)

## (undated) DEVICE — MASK AIRWAY SIZE 5 UNIQUE SILICON (10EA/BX)

## (undated) DEVICE — GLOVE BIOGEL SZ 6.5 SURGICAL PF LTX (50PR/BX 4BX/CA)

## (undated) DEVICE — KIT ROOM DECONTAMINATION

## (undated) DEVICE — BANDAGE ELASTIC 6 HONEYCOMB - 6X5YD LF (20/CA)"

## (undated) DEVICE — DRAPE C-ARM LARGE 41IN X 74 IN - (10/BX 2BX/CA)

## (undated) DEVICE — DISPOSABLE WOUND VAC PICO 10 X 20 CM - WOUND CARE (3/CA)

## (undated) DEVICE — TRAY SKIN SCRUB PVP WET (20EA/CA) PART #DYND70356 DISCONTINUED

## (undated) DEVICE — SENSOR OXIMETER ADULT SPO2 RD SET (20EA/BX)

## (undated) DEVICE — TOWELS CLOTH SURGICAL - (4/PK 20PK/CA)